# Patient Record
Sex: FEMALE | Race: WHITE | NOT HISPANIC OR LATINO | Employment: OTHER | URBAN - METROPOLITAN AREA
[De-identification: names, ages, dates, MRNs, and addresses within clinical notes are randomized per-mention and may not be internally consistent; named-entity substitution may affect disease eponyms.]

---

## 2017-02-01 ENCOUNTER — TELEPHONE (OUTPATIENT)
Dept: OTHER | Facility: CLINIC | Age: 57
End: 2017-02-01

## 2017-02-01 NOTE — TELEPHONE ENCOUNTER
2/1/2017    Call Regarding Onboarding [employer] Other    Attempt 1    Message on voicemail     Comments: No Dep      Outreach   cnt

## 2017-03-30 NOTE — TELEPHONE ENCOUNTER
3/30/2017    Call Regarding Onboarding [employer]    Attempt 2    Message VM FULL    Comments:       Outreach   KV

## 2017-04-04 ENCOUNTER — OFFICE VISIT (OUTPATIENT)
Dept: FAMILY MEDICINE | Facility: CLINIC | Age: 57
End: 2017-04-04
Payer: COMMERCIAL

## 2017-04-04 VITALS
DIASTOLIC BLOOD PRESSURE: 74 MMHG | OXYGEN SATURATION: 95 % | TEMPERATURE: 97.4 F | WEIGHT: 175.5 LBS | HEART RATE: 102 BPM | BODY MASS INDEX: 30.84 KG/M2 | SYSTOLIC BLOOD PRESSURE: 110 MMHG

## 2017-04-04 DIAGNOSIS — J44.9 CHRONIC OBSTRUCTIVE PULMONARY DISEASE, UNSPECIFIED COPD TYPE (H): Chronic | ICD-10-CM

## 2017-04-04 DIAGNOSIS — M79.645 PAIN OF FINGER OF LEFT HAND: ICD-10-CM

## 2017-04-04 DIAGNOSIS — J06.9 VIRAL URI WITH COUGH: Primary | ICD-10-CM

## 2017-04-04 LAB
DEPRECATED S PYO AG THROAT QL EIA: NORMAL
FLUAV+FLUBV AG SPEC QL: NEGATIVE
FLUAV+FLUBV AG SPEC QL: NORMAL
MICRO REPORT STATUS: NORMAL
SPECIMEN SOURCE: NORMAL
SPECIMEN SOURCE: NORMAL

## 2017-04-04 PROCEDURE — 99214 OFFICE O/P EST MOD 30 MIN: CPT | Performed by: INTERNAL MEDICINE

## 2017-04-04 PROCEDURE — 87804 INFLUENZA ASSAY W/OPTIC: CPT | Performed by: INTERNAL MEDICINE

## 2017-04-04 PROCEDURE — 87081 CULTURE SCREEN ONLY: CPT | Performed by: INTERNAL MEDICINE

## 2017-04-04 PROCEDURE — 87880 STREP A ASSAY W/OPTIC: CPT | Performed by: INTERNAL MEDICINE

## 2017-04-04 NOTE — PATIENT INSTRUCTIONS
- Schedule an appointment with the hand therapist.     - You can splint your finger as needed.     -  I recommend over the counter nasal rinses as needed.     - If symptoms worsen please contact me.      [facility]    If you have any questions regarding to your visit please contact your care team:     Team Pink:   Clinic Hours Telephone Number   Internal Medicine:  Dr. Claudine Sevilla NP       7am-7pm  Monday - Thursday   7am-5pm  Fridays  [phone]  (Appointment scheduling available 24/7)    Questions about your visit?  Team Line  [phone]   [facility] [place] [facility] 11am-[facility]pm Monday-Friday Saturday-Sunday- 9am-5pm   [place] - 5pm-9pm Monday-Friday Saturday-Sunday- 9am-5pm  [phone] - [place]   [phone] - [place]       What options do I have for visits at the clinic other than the traditional office visit?  To expand how we care for you, many of our providers are utilizing electronic visits (e-visits) and telephone visits, when medically appropriate, for interactions with their patients rather than a visit in the clinic.   We also offer nurse visits for many medical concerns. Just like any other service, we will bill your insurance company for this type of visit based on time spent on the phone with your provider. Not all insurance companies cover these visits. Please check with your medical insurance if this type of visit is covered. You will be responsible for any charges that are not paid by your insurance.      E-visits via [employer]:  generally incur a $35.00 fee.  Telephone visits:  Time spent on the phone: *charged based on time that is spent on the phone in increments of 10 minutes. Estimated cost:   5-10 mins $30.00   11-20 mins. $59.00   21-30 mins. $85.00   Use [employer] (secure email communication and access to your chart) to send your primary care provider a message or make an appointment. Ask someone on  your Team how to sign up for [email or web address].    For a Price Quote for your services, please call our Consumer Price Line at [phone].    As always, Thank you for trusting us with your health care needs!    Discharged by Heena SAHU CMA ([facility])

## 2017-04-04 NOTE — PROGRESS NOTES
INTERNAL MEDICINE   SUBJECTIVE:                                                    Ashleigh Alonzo is a 56 year old female who presents to clinic today for the following health issues:        ENT Symptoms             Symptoms: cc Present Absent Comment   Fever/Chills  x     Fatigue   x    Muscle Aches  x     Eye Irritation   x    Sneezing   x    Nasal Adi/Drg   x    Sinus Pressure/Pain   x    Loss of smell   x    Dental pain   x    Sore Throat  x     Swollen Glands   x    Ear Pain/Fullness   x    Cough  x     Wheeze   x    Chest Pain   x    Shortness of breath  x  Laying down mostly   Rash   x    Other   x      Symptom duration:  x2 days   Symptom severity:  moderate   Treatments tried:  none   Contacts:  [facility]   She started to get a sore throat yesterday. She denies any fever, but she does have body aches and chills. She notes nasal congestion. When she lays down she will have SOB. She adds that there is also a constant cough. Her symptoms are improved with ice water. She had one episode of nausea one day, but she did not vomit. She feels that her symptoms have worsened sice thy started. She was in contact with a lot of young children. Some of them were diagnosed with strep. She has a past medical history of COPD and sleep apnea, she takes Spiriva COPD and also has an inhaler. Her COPD is well controlled. Her sleep apnea is also well controlled.     Left Finger Pain: The third digit of the left hand has been very sore, this has been giving her concern for arthritis. This has been ongoing for th last couple of weeks. Her sister was diagnosed with rheumatoid artritis. She has full ROM of the finger, but it hurts to move it. The pain is localized to this area only, no symptoms are experienced in the right hand.     Problem list and histories reviewed & adjusted, as indicated.  Additional history: as documented    Patient Active Problem List   Diagnosis     Anxiety     Anisocoria     Hyperlipidemia LDL goal <130      COPD (chronic obstructive pulmonary disease)     Lack of libido     Family history of ischemic heart disease     Mild major depression (H)     Hives     Lung nodule, multiple     MERLIN (obstructive sleep apnea)- moderate (AHI 21)     Urticaria     Chronic obstructive pulmonary disease, unspecified COPD type (H)     Depression, unspecified depression type     Past Surgical History:   Procedure Laterality Date     APPENDECTOMY  10/6/2015     CATARACT IOL, RT/LT  11/2016    bilateral      COLONOSCOPY  11/15/2011    Procedure:COLONOSCOPY; Colonoscopy, screening; Surgeon:ANASTASIA BUNCH; Location:[facility] OR     TUBAL LIGATION  12/2004    Bilateral       Social History   Substance Use Topics     Smoking status: Former Smoker     Packs/day: 1.00     Years: 15.00     Types: Cigarettes     Quit date: 1/1/2000     Smokeless tobacco: Never Used     Alcohol use 8.4 - 12.6 oz/week      Comment: daily     Family History   Problem Relation Age of Onset     Cardiovascular Father 46     MI     Eye Disorder Father      CEREBROVASCULAR DISEASE Father      Arthritis Sister      Neurologic Disorder Brother      CANCER No family hx of      DIABETES No family hx of      Hypertension No family hx of          Labs reviewed in EPIC    Reviewed and updated as needed this visit by clinical staff  Tobacco  Allergies  Meds  Problems  Med Hx  Surg Hx  Fam Hx  Soc Hx        Reviewed and updated as needed this visit by Provider       ROS:  C: NEGATIVE for fever, chills, change in weight. POSITIVE for body aches.   E/M: NEGATIVE for ear, mouth. POSITIVE for sore throat  R: POSITIVE for significant cough or SOB  GI: NEGATIVE for nausea, abdominal pain, heartburn, or change in bowel habits  M: POSITIVE for pain in 3rd digit of left hand.   All other systems reviewed and were negative.      This document serves as a record of the services and decisions personally performed and made by Claudine Chinchilla MD. It was created on his/her behalf by Dionna  Nilson, a trained medical scribe. The creation of this document is based the provider's statements to the medical scribe.    Kaila Dionna Nilson 11:01 AM, April 4, 2017    OBJECTIVE:                                                    /74 (BP Location: Right arm, Patient Position: Chair, Cuff Size: Adult Regular)  Pulse 102  Temp 97.4  F (36.3  C) (Oral)  Wt 79.6 kg (175 lb 8 oz)  SpO2 95%  BMI 30.84 kg/m2  Body mass index is 30.84 kg/(m^2).  GENERAL: healthy, alert and no distress  HENT: ear canals and TM's normal, nose and mouth without ulcers or lesions  NECK: no adenopathy, no asymmetry, masses, or scars and thyroid normal to palpation. Tenderness to palpation of the left anterior cervical lymph nodes  RESP: lungs clear to auscultation - no rales, rhonchi or wheezes  CV: regular rate and rhythm, normal S1 S2, no S3 or S4, no murmur, click or rub, no peripheral edema and peripheral pulses strong  MS: no gross musculoskeletal defects noted, no edema, negative squeeze test on right hand, no active synovitis, no pain to palpation over the third digit of the left hand PIP joint.   PSYCH: mentation appears normal, affect normal/bright    Diagnostic Test Results:  Results for orders placed or performed in visit on 04/04/17 (from the past 24 hour(s))   Strep, Rapid Screen   Result Value Ref Range    Specimen Description Throat     Rapid Strep A Screen       NEGATIVE: No Group A streptococcal antigen detected by immunoassay, await   culture report.      Micro Report Status FINAL 04/04/2017    Influenza A/B antigen   Result Value Ref Range    Influenza A/B Agn Specimen Nasopharyngeal     Influenza A Negative NEG    Influenza B  NEG     Negative   Test results must be correlated with clinical data. If necessary, results   should be confirmed by a molecular assay or viral culture.          ASSESSMENT/PLAN:                                                    I spent 17 minutes of time with the patient and >50%  of it was in education and counseling regarding preventive healthcare.     1. Throat pain  Likely viral  - Strep, Rapid Screen  - Beta strep group A culture    2. Chronic obstructive pulmonary disease, unspecified COPD type (H)  No evidence of a significant exacerbation.  Low threshold for starting antibiotics and prednisone is she worsens      3. Pain of finger of left hand  Suspect trigger finger/oa.   Patient will schedule appointment   - [facility] PT, HAND, AND CHIROPRACTIC REFERRAL    4. Cough    - Influenza A/B antigen    5. Chills (without fever)    - Influenza A/B antigen    6. Viral URI with cough  Over the counter therapies.        Patient Instructions   - Schedule an appointment with the hand therapist.   - You can splint your finger as needed.   -  I recommend over the counter nasal rinses as needed.   - If symptoms worsen please contact me.        The information in this document, created by the medical scribe for me, accurately reflects the services I personally performed and the decisions made by me. I have reviewed and approved this document for accuracy prior to leaving the patient care area.  Claudine Chinchilla MD  11:01 AM, 04/04/17    Claudine Chinchilla MD  [facility]    Start: 10:58 AM   Out: 11:14 AM   In: 11:50 AM   End: 11:51 AM

## 2017-04-04 NOTE — NURSING NOTE
"Chief Complaint   Patient presents with     Pharyngitis       Initial /74 (BP Location: Right arm, Patient Position: Chair, Cuff Size: Adult Regular)  Pulse 102  Temp 97.4  F (36.3  C) (Oral)  Wt 175 lb 8 oz (79.6 kg)  SpO2 95%  BMI 30.84 kg/m2 Estimated body mass index is 30.84 kg/(m^2) as calculated from the following:    Height as of 11/15/16: 5' 3.25\" (1.607 m).    Weight as of this encounter: 175 lb 8 oz (79.6 kg).  Medication Reconciliation: complete   Heena SAHU CMA ([facility])      "

## 2017-04-04 NOTE — MR AVS SNAPSHOT
After Visit Summary   4/4/2017    Ashleigh Alonzo    MRN: [id number]           Patient Information     Date Of Birth          1960        Visit Information        Provider Department      4/4/2017 10:45 AM Claudine Chinchilla MD [facility]        Today's Diagnoses     Viral URI with cough    -  1    Throat pain        Chronic obstructive pulmonary disease, unspecified COPD type (H)        Pain of finger of left hand        Cough        Chills (without fever)          Care Instructions    - Schedule an appointment with the hand therapist.     - You can splint your finger as needed.     -  I recommend over the counter nasal rinses as needed.     - If symptoms worsen please contact me.      [facility]    If you have any questions regarding to your visit please contact your care team:     Team Pink:   Clinic Hours Telephone Number   Internal Medicine:  Dr. Claudine Sevilla, NP       7am-7pm  Monday - Thursday   7am-5pm  Fridays  [phone]  (Appointment scheduling available 24/7)    Questions about your visit?  Team Line  [phone]   Urgent Care - [place] - 11am-9pm Monday-Friday Saturday-Sunday- 9am-5pm   [place] - 5pm-9pm Monday-Friday Saturday-Sunday- 9am-5pm  [phone] - [place]   [phone] [facility]       What options do I have for visits at the clinic other than the traditional office visit?  To expand how we care for you, many of our providers are utilizing electronic visits (e-visits) and telephone visits, when medically appropriate, for interactions with their patients rather than a visit in the clinic.   We also offer nurse visits for many medical concerns. Just like any other service, we will bill your insurance company for this type of visit based on time spent on the phone with your provider. Not all insurance companies cover these visits. Please check with your medical insurance if  this type of visit is covered. You will be responsible for any charges that are not paid by your insurance.      E-visits via [employer]:  generally incur a $35.00 fee.  Telephone visits:  Time spent on the phone: *charged based on time that is spent on the phone in increments of 10 minutes. Estimated cost:   5-10 mins $30.00   11-20 mins. $59.00   21-30 mins. $85.00   Use [employer] (secure email communication and access to your chart) to send your primary care provider a message or make an appointment. Ask someone on your Team how to sign up for [employer].    For a Price Quote for your services, please call our Consumer Price Line at [phone].    As always, Thank you for trusting us with your health care needs!    Discharged by Heena SAHU CMA ([facility])          Follow-ups after your visit        Additional Services     [facility] PT, HAND, AND CHIROPRACTIC REFERRAL       **This order will print in the [facility] Scheduling Office**    Physical Therapy, Hand Therapy and Chiropractic Care are available through:    *[facility]  *[facility]  *[facility]    Call one number to schedule at any of the above locations: [phone].    Your provider has referred you to: Hand Therapy    Indication/Reason for Referral: Hand Pain  Onset of Illness: 2 weeks  Therapy Orders: Evaluate and Treat  Special Programs:   Special Request:     Ankit Pham      Additional Comments for the Therapist or Chiropractor:     Please be aware that coverage of these services is subject to the terms and limitations of your health insurance plan.  Call member services at your health plan with any benefit or coverage questions.      Please bring the following to your appointment:    *Your personal calendar for scheduling future appointments  *Comfortable clothing                  Your next 10 appointments already scheduled     Aug 22, 2017  2:30 PM CDT   CT CHEST LOW DOSE NON CONTRAST with MGCT1   [facility]  [facility] ([facility])    [address] [place] [address] [phone]           Please bring any scans or X-rays taken at other hospitals, if similar tests were done. Also bring a list of your medicines, including vitamins, minerals and over-the-counter drugs. It is safest to leave personal items at home.  Be sure to tell your doctor:   If you have any allergies.   If there s any chance you are pregnant.   If you are breastfeeding.   If you have any special needs.  You do not need to do anything special to prepare.  Please wear loose clothing, such as a sweat suit or jogging clothes. Avoid snaps, zippers and other metal. We may ask you to undress and put on a hospital gown.            Aug 22, 2017  3:00 PM CDT   Return Visit with Zonia Singh MD   [facility] ([facility])    [address] [place] [address] [phone]              Who to contact     If you have questions or need follow up information about today's clinic visit or your schedule please contact [facility] directly at [phone].  Normal or non-critical lab and imaging results will be communicated to you by [employer], letter or phone within 4 business days after the clinic has received the results. If you do not hear from us within 7 days, please contact the clinic through [employer] or phone. If you have a critical or abnormal lab result, we will notify you by phone as soon as possible.  Submit refill requests through [employer] or call your pharmacy and they will forward the refill request to us. Please allow 3 business days for your refill to be completed.          Additional Information About Your Visit        [employer] Information     [employer] gives you secure access to your electronic health record. If you see a primary care provider, you can also send messages to your care team and make appointments. If you have questions, please call  your primary care clinic.  If you do not have a primary care provider, please call [phone] and they will assist you.        Care EveryWhere ID     This is your Care EveryWhere ID. This could be used by other organizations to access your [facility] medical records  [id number]        Your Vitals Were     Pulse Temperature Pulse Oximetry BMI (Body Mass Index)          102 97.4  F (36.3  C) (Oral) 95% 30.84 kg/m2         Blood Pressure from Last 3 Encounters:   04/04/17 110/74   11/15/16 129/80   11/11/16 120/80    Weight from Last 3 Encounters:   04/04/17 175 lb 8 oz (79.6 kg)   11/15/16 176 lb 9.6 oz (80.1 kg)   11/11/16 175 lb (79.4 kg)              We Performed the Following     Beta strep group A culture     [facility] PT, HAND, AND CHIROPRACTIC REFERRAL     Influenza A/B antigen     Strep, Rapid Screen        Primary Care Provider Office Phone # Fax #    Radha Cazares [phone]       [address] [address]        Thank you!     Thank you for choosing [facility]  for your care. Our goal is always to provide you with excellent care. Hearing back from our patients is one way we can continue to improve our services. Please take a few minutes to complete the written survey that you may receive in the mail after your visit with us. Thank you!             Your Updated Medication List - Protect others around you: Learn how to safely use, store and throw away your medicines at www.disposemymeds.org.          This list is accurate as of: 4/4/17 11:52 AM.  Always use your most recent med list.                   Brand Name Dispense Instructions for use    albuterol 108 (90 BASE) MCG/ACT Inhaler    PROAIR HFA/PROVENTIL HFA/VENTOLIN HFA    1 Inhaler    Inhale 2 puffs into the lungs every 6 hours as needed for shortness of breath / dyspnea       aspirin 81 MG tablet      Take 1 tablet by mouth daily.       citalopram 10 MG tablet    celeXA     90 tablet    Take 1 tablet (10 mg) by mouth daily       CO Q 10 PO          guaiFENesin 600 MG 12 hr tablet    MUCINEX    180 tablet    Take 2 tablets (1,200 mg) by mouth 2 times daily       hydrOXYzine 25 MG tablet    ATARAX    100 tablet    Take 1-2 tablets (25-50 mg) by mouth every 6 hours as needed for itching       MULTIPLE VITAMIN PO          order for [facility]      Respironics Dream Station Auto CPAP 12-18 cm, F&P Simplus FFM small.       simvastatin 40 MG tablet    ZOCOR    90 tablet    Take 1 tablet (40 mg) by mouth At Bedtime       tiotropium 18 MCG capsule    SPIRIVA    1 capsule    Inhale 1 capsule (18 mcg) into the lungs daily Inhale contents of one capsule       VITAMIN B 12 PO      Take 500 mcg by mouth daily       VITAMIN B6 PO      Take 1 tablet by mouth daily.       vitamin D 1000 UNITS capsule      2 CAPSULE DAILY

## 2017-04-06 LAB
BACTERIA SPEC CULT: NORMAL
MICRO REPORT STATUS: NORMAL
SPECIMEN SOURCE: NORMAL

## 2017-04-06 NOTE — PROGRESS NOTES
Dear Ashleigh Perera,    Your recent test results are attached.      No strep on culture.      If you have any questions please feel free to contact [phone] or myself via [employer].    Sincerely,  Rebecca Sevilla, CNP

## 2017-04-06 NOTE — TELEPHONE ENCOUNTER
4/6/2017    Call Regarding Onboarding [employer]    Attempt 3    Message VM FULL    Comments: 0 Dep      Outreach   KV

## 2017-07-07 NOTE — PROGRESS NOTES
SUBJECTIVE:   CC: Ashleigh Alonzo is an 57 year old woman who presents for preventive health visit.     Healthy Habits:    Do you get at least three servings of calcium containing foods daily (dairy, green leafy vegetables, etc.)? yes    Amount of exercise or daily activities, outside of work: 3 day(s) per week    Problems taking medications regularly No    Medication side effects: No    Have you had an eye exam in the past two years? yes    Do you see a dentist twice per year? yes    Do you have sleep apnea, excessive snoring or daytime drowsiness?yes               Today's PHQ-2 Score:   PHQ-2 ( 1999 [employer]) 7/27/2017 11/11/2016   Q1: Little interest or pleasure in doing things 0 0   Q2: Feeling down, depressed or hopeless 0 0   PHQ-2 Score 0 0         Abuse: Current or Past(Physical, Sexual or Emotional)- No  Do you feel safe in your environment - Yes  Social History   Substance Use Topics     Smoking status: Former Smoker     Packs/day: 1.00     Years: 15.00     Types: Cigarettes     Quit date: 1/1/2000     Smokeless tobacco: Never Used     Alcohol use 8.4 - 12.6 oz/week      Comment: daily     The patient does not drink >3 drinks per day nor >7 drinks per week.    Reviewed orders with patient.  Reviewed health maintenance and updated orders accordingly - Yes  Labs reviewed in EPIC  BP Readings from Last 3 Encounters:   07/27/17 128/82   04/04/17 110/74   11/15/16 129/80    Wt Readings from Last 3 Encounters:   07/27/17 173 lb (78.5 kg)   04/04/17 175 lb 8 oz (79.6 kg)   11/15/16 176 lb 9.6 oz (80.1 kg)                  Patient Active Problem List   Diagnosis     Anxiety     Anisocoria     Hyperlipidemia LDL goal <130     COPD (chronic obstructive pulmonary disease)     Lack of libido     Family history of ischemic heart disease     Mild major depression (H)     Hives     Lung nodule, multiple     MERLIN (obstructive sleep apnea)- moderate (AHI 21)     Urticaria     Chronic obstructive pulmonary disease,  unspecified COPD type (H)     Depression, unspecified depression type     Past Surgical History:   Procedure Laterality Date     APPENDECTOMY  10/6/2015     CATARACT IOL, RT/LT  11/2016    bilateral      COLONOSCOPY  11/15/2011    Procedure:COLONOSCOPY; Colonoscopy, screening; Surgeon:ANASTASIA BUNCH; Location:[facility] OR     TUBAL LIGATION  12/2004    Bilateral       Social History   Substance Use Topics     Smoking status: Former Smoker     Packs/day: 1.00     Years: 15.00     Types: Cigarettes     Quit date: 1/1/2000     Smokeless tobacco: Never Used     Alcohol use 8.4 - 12.6 oz/week      Comment: daily     Family History   Problem Relation Age of Onset     Cardiovascular Father 46     MI     Eye Disorder Father      CEREBROVASCULAR DISEASE Father      Arthritis Sister      Neurologic Disorder Brother      CANCER No family hx of      DIABETES No family hx of      Hypertension No family hx of          Current Outpatient Prescriptions   Medication Sig Dispense Refill     hydrOXYzine (ATARAX) 25 MG tablet Take 1-2 tablets (25-50 mg) by mouth every 6 hours as needed for itching 100 tablet 2     guaiFENesin (MUCINEX) 600 MG 12 hr tablet Take 2 tablets (1,200 mg) by mouth 2 times daily 180 tablet 6     simvastatin (ZOCOR) 40 MG tablet Take 1 tablet (40 mg) by mouth At Bedtime 90 tablet 4     tiotropium (SPIRIVA) 18 MCG capsule Inhale 1 capsule (18 mcg) into the lungs daily Inhale contents of one capsule 1 capsule 11     albuterol (PROAIR HFA/PROVENTIL HFA/VENTOLIN HFA) 108 (90 BASE) MCG/ACT Inhaler Inhale 2 puffs into the lungs every 6 hours as needed for shortness of breath / dyspnea 1 Inhaler 5     citalopram (CELEXA) 10 MG tablet Take 1 tablet (10 mg) by mouth daily 90 tablet 3     darifenacin (ENABLEX) 7.5 MG 24 hr tablet Take 1 tablet (7.5 mg) by mouth daily 90 tablet 3     order for [facility] Respironics [place] Auto CPAP 12-18 cm, F&P Simplus FFM small.       Coenzyme Q10 (CO Q 10 PO)        MULTIPLE VITAMIN PO         Cyanocobalamin (VITAMIN B 12 PO) Take 500 mcg by mouth daily       Pyridoxine HCl (VITAMIN B6 PO) Take 1 tablet by mouth daily.       aspirin 81 MG tablet Take 1 tablet by mouth daily.  3     VITAMIN D 1000 UNIT OR CAPS 2 CAPSULE DAILY       [DISCONTINUED] simvastatin (ZOCOR) 40 MG tablet Take 1 tablet (40 mg) by mouth At Bedtime 90 tablet 4     [DISCONTINUED] tiotropium (SPIRIVA) 18 MCG inhalation capsule Inhale 1 capsule (18 mcg) into the lungs daily Inhale contents of one capsule 1 capsule 11     [DISCONTINUED] albuterol (PROAIR HFA, PROVENTIL HFA, VENTOLIN HFA) 108 (90 BASE) MCG/ACT inhaler Inhale 2 puffs into the lungs every 6 hours as needed for shortness of breath / dyspnea 1 Inhaler 5     [DISCONTINUED] citalopram (CELEXA) 10 MG tablet Take 1 tablet (10 mg) by mouth daily 90 tablet 3           Patient over age 50, mutual decision to screen reflected in health maintenance.      Pertinent mammograms are reviewed under the imaging tab.  History of abnormal Pap smear: NO - age 30-65 PAP every 5 years with negative HPV co-testing recommended    Reviewed and updated as needed this visit by clinical staff  Tobacco  Allergies  Meds  Med Hx  Surg Hx  Fam Hx  Soc Hx        Reviewed and updated as needed this visit by Provider  Fam Hx            Immunization History   Administered Date(s) Administered     HepB-Peds 07/10/2008, 12/05/2008     Hepatitis A Vac Ped/Adol-2 Dose 07/10/2008, 12/15/2008     Influenza (IIV3) 12/21/2010, 10/20/2011, 10/20/2012, 09/09/2014, 10/20/2016     Pneumococcal 23 valent 12/21/2010     TD (ADULT, 7+) 05/13/2004     TDAP Vaccine (Adacel) 06/22/2011      ROS:  This 57 year old female is here today for annual female exam. She sees pulmonologist for her COPD and uses her inhalers faithfully. She also uses her C-pap machine faithfully. She has 2 children and 3 grandchildren.   C: NEGATIVE for fever, chills, change in weight  I: NEGATIVE for worrisome rashes, moles or lesions  E:  "NEGATIVE for vision changes or irritation  ENT: NEGATIVE for ear, mouth and throat problems  R: NEGATIVE for significant cough or SOB  B: NEGATIVE for masses, tenderness or discharge  CV: NEGATIVE for chest pain, palpitations or peripheral edema  GI: NEGATIVE for nausea, abdominal pain, heartburn, or change in bowel habits  : NEGATIVE for unusual urinary or vaginal symptoms. No vaginal bleeding. But she has an overactive bladder. Used to take a medication for it about 10 years ago and then stopped. She would like to try a medication again.   M: NEGATIVE for significant arthralgias or myalgia  N: NEGATIVE for weakness, dizziness or paresthesias  P: NEGATIVE for changes in mood or affect     OBJECTIVE:   /82  Pulse 96  Temp 97.6  F (36.4  C) (Oral)  Ht 5' 3\" (1.6 m)  Wt 173 lb (78.5 kg)  SpO2 94%  BMI 30.65 kg/m2  EXAM:  GENERAL APPEARANCE: healthy, alert and no distress  EYES: Eyes grossly normal to inspection, PERRL and conjunctivae and sclerae normal  HENT: ear canals and TM's normal, nose and mouth without ulcers or lesions, oropharynx clear and oral mucous membranes moist  NECK: no adenopathy, no asymmetry, masses, or scars and thyroid normal to palpation  RESP: lungs clear to auscultation - no rales, rhonchi or wheezes  BREAST: normal without masses, tenderness or nipple discharge and no palpable axillary masses or adenopathy  CV: regular rate and rhythm, normal S1 S2, no S3 or S4, no murmur, click or rub, no peripheral edema and peripheral pulses strong  ABDOMEN: soft, nontender, no hepatosplenomegaly, no masses and bowel sounds normal   (female): normal female external genitalia, normal urethral meatus, vaginal mucosal atrophy noted, normal cervix, adnexae, and uterus without masses or abnormal discharge  MS: no musculoskeletal defects are noted and gait is age appropriate without ataxia  SKIN: no suspicious lesions or rashes  NEURO: Normal strength and tone, sensory exam grossly normal, " mentation intact and speech normal  PSYCH: mentation appears normal and affect normal/bright    ASSESSMENT/PLAN:   1. Encounter for routine adult health examination without abnormal findings  Healthy lady     2. Depression, unspecified depression type  PHQ-9 score:    PHQ-9 SCORE 6/8/2015   Total Score 0     KEVIN = 0  - citalopram (CELEXA) 10 MG tablet; Take 1 tablet (10 mg) by mouth daily  Dispense: 90 tablet; Refill: 3    3. MERLIN (obstructive sleep apnea)- moderate (AHI 21)  As above, good control     4. Chronic obstructive pulmonary disease, unspecified COPD type (H)  As above, good control   - tiotropium (SPIRIVA) 18 MCG capsule; Inhale 1 capsule (18 mcg) into the lungs daily Inhale contents of one capsule  Dispense: 1 capsule; Refill: 11  - albuterol (PROAIR HFA/PROVENTIL HFA/VENTOLIN HFA) 108 (90 BASE) MCG/ACT Inhaler; Inhale 2 puffs into the lungs every 6 hours as needed for shortness of breath / dyspnea  Dispense: 1 Inhaler; Refill: 5    5. Overactive bladder  As above   - darifenacin (ENABLEX) 7.5 MG 24 hr tablet; Take 1 tablet (7.5 mg) by mouth daily  Dispense: 90 tablet; Refill: 3    6. Cough with sputum  Good control   - guaiFENesin (MUCINEX) 600 MG 12 hr tablet; Take 2 tablets (1,200 mg) by mouth 2 times daily  Dispense: 180 tablet; Refill: 6    7. Hyperlipidemia LDL goal <130  Good control   - simvastatin (ZOCOR) 40 MG tablet; Take 1 tablet (40 mg) by mouth At Bedtime  Dispense: 90 tablet; Refill: 4  - Lipid panel reflex to direct LDL    8. Anxiety  KEVIN = 0  - citalopram (CELEXA) 10 MG tablet; Take 1 tablet (10 mg) by mouth daily  Dispense: 90 tablet; Refill: 3    9. Hives  Good control   - hydrOXYzine (ATARAX) 25 MG tablet; Take 1-2 tablets (25-50 mg) by mouth every 6 hours as needed for itching  Dispense: 100 tablet; Refill: 2    10. Screening for malignant neoplasm of cervix  due  - Pap imaged thin layer screen with HPV - recommended age 30 - 65 years (select HPV order below)  - HPV High Risk Types  "DNA Cervical    COUNSELING:   Reviewed preventive health counseling, as reflected in patient instructions       Regular exercise       Healthy diet/nutrition         reports that she quit smoking about 17 years ago. Her smoking use included Cigarettes. She has a 15.00 pack-year smoking history. She has never used smokeless tobacco.    Estimated body mass index is 30.65 kg/(m^2) as calculated from the following:    Height as of this encounter: 5' 3\" (1.6 m).    Weight as of this encounter: 173 lb (78.5 kg).   Weight management plan: Discussed healthy diet and exercise guidelines and patient will follow up in 12 months in clinic to re-evaluate.    Counseling Resources:  ATP IV Guidelines  Pooled Cohorts Equation Calculator  Breast Cancer Risk Calculator  FRAX Risk Assessment  ICSI Preventive Guidelines  Dietary Guidelines for Americans, 2010  USDA's MyPlate  ASA Prophylaxis  Lung CA Screening    HARLEY LEDBETTER MD  [facility]  "

## 2017-07-07 NOTE — PATIENT INSTRUCTIONS
Preventive Health Recommendations  Female Ages 50 - 64    Yearly exam: See your health care provider every year in order to  o Review health changes.   o Discuss preventive care.    o Review your medicines if your doctor has prescribed any.      Get a Pap test every three years (unless you have an abnormal result and your provider advises testing more often).    If you get Pap tests with HPV test, you only need to test every 5 years, unless you have an abnormal result.     You do not need a Pap test if your uterus was removed (hysterectomy) and you have not had cancer.    You should be tested each year for STDs (sexually transmitted diseases) if you're at risk.     Have a mammogram every 1 to 2 years.    Have a colonoscopy at age 50, or have a yearly FIT test (stool test). These exams screen for colon cancer.      Have a cholesterol test every 5 years, or more often if advised.    Have a diabetes test (fasting glucose) every three years. If you are at risk for diabetes, you should have this test more often.     If you are at risk for osteoporosis (brittle bone disease), think about having a bone density scan (DEXA).    Shots: Get a flu shot each year. Get a tetanus shot every 10 years.    Nutrition:     Eat at least 5 servings of fruits and vegetables each day.    Eat whole-grain bread, whole-wheat pasta and brown rice instead of white grains and rice.    Talk to your provider about Calcium and Vitamin D.     Lifestyle    Exercise at least 150 minutes a week (30 minutes a day, 5 days a week). This will help you control your weight and prevent disease.    Limit alcohol to one drink per day.    No smoking.     Wear sunscreen to prevent skin cancer.     See your dentist every six months for an exam and cleaning.    See your eye doctor every 1 to 2 years.    [facility]    If you have any questions regarding to your visit please contact your care team:       Team Purple:   Clinic Hours Telephone Number    Dr. Radha Bro     7am-7pm  Monday - Thursday   7am-5pm  Fridays  [phone]  (Appointment scheduling available 24/7)    Questions about your Visit?   Team Line:  [phone]   [facility] - [place] [facility] - 11am-9pm Monday-Friday Saturday-Sunday- 9am-5pm   [place] - 5pm-9pm Monday-Friday Saturday-Sunday- 9am-5pm  [phone] - [place]   [phone] - [place]       What options do I have for visits at the clinic other than the traditional office visit?  To expand how we care for you, many of our providers are utilizing electronic visits (e-visits) and telephone visits, when medically appropriate, for interactions with their patients rather than a visit in the clinic.   We also offer nurse visits for many medical concerns. Just like any other service, we will bill your insurance company for this type of visit based on time spent on the phone with your provider. Not all insurance companies cover these visits. Please check with your medical insurance if this type of visit is covered. You will be responsible for any charges that are not paid by your insurance.      E-visits via [employer]:  generally incur a $35.00 fee.  Telephone visits:  Time spent on the phone: *charged based on time that is spent on the phone in increments of 10 minutes. Estimated cost:   5-10 mins $30.00   11-20 mins. $59.00   21-30 mins. $85.00     Use [employer] (secure email communication and access to your chart) to send your primary care provider a message or make an appointment. Ask someone on your Team how to sign up for [employer].  For a Price Quote for your services, please call our Consumer Price Line at [phone].  As always, Thank you for trusting us with your health care needs!

## 2017-07-27 ENCOUNTER — OFFICE VISIT (OUTPATIENT)
Dept: FAMILY MEDICINE | Facility: CLINIC | Age: 57
End: 2017-07-27
Payer: COMMERCIAL

## 2017-07-27 ENCOUNTER — RADIANT APPOINTMENT (OUTPATIENT)
Dept: MAMMOGRAPHY | Facility: CLINIC | Age: 57
End: 2017-07-27
Payer: COMMERCIAL

## 2017-07-27 VITALS
OXYGEN SATURATION: 94 % | HEART RATE: 96 BPM | TEMPERATURE: 97.6 F | HEIGHT: 63 IN | BODY MASS INDEX: 30.65 KG/M2 | SYSTOLIC BLOOD PRESSURE: 128 MMHG | DIASTOLIC BLOOD PRESSURE: 82 MMHG | WEIGHT: 173 LBS

## 2017-07-27 DIAGNOSIS — Z12.31 VISIT FOR SCREENING MAMMOGRAM: ICD-10-CM

## 2017-07-27 DIAGNOSIS — F32.A DEPRESSION, UNSPECIFIED DEPRESSION TYPE: ICD-10-CM

## 2017-07-27 DIAGNOSIS — E78.5 HYPERLIPIDEMIA LDL GOAL <130: ICD-10-CM

## 2017-07-27 DIAGNOSIS — R05.8 COUGH WITH SPUTUM: ICD-10-CM

## 2017-07-27 DIAGNOSIS — Z12.4 SCREENING FOR MALIGNANT NEOPLASM OF CERVIX: Primary | ICD-10-CM

## 2017-07-27 DIAGNOSIS — G47.33 OSA (OBSTRUCTIVE SLEEP APNEA): Chronic | ICD-10-CM

## 2017-07-27 DIAGNOSIS — Z00.00 ENCOUNTER FOR ROUTINE ADULT HEALTH EXAMINATION WITHOUT ABNORMAL FINDINGS: ICD-10-CM

## 2017-07-27 DIAGNOSIS — L50.9 HIVES: ICD-10-CM

## 2017-07-27 DIAGNOSIS — F41.9 ANXIETY: ICD-10-CM

## 2017-07-27 DIAGNOSIS — J44.9 CHRONIC OBSTRUCTIVE PULMONARY DISEASE, UNSPECIFIED COPD TYPE (H): ICD-10-CM

## 2017-07-27 DIAGNOSIS — N32.81 OVERACTIVE BLADDER: ICD-10-CM

## 2017-07-27 LAB
CHOLEST SERPL-MCNC: 198 MG/DL
HDLC SERPL-MCNC: 51 MG/DL
LDLC SERPL CALC-MCNC: 131 MG/DL
NONHDLC SERPL-MCNC: 147 MG/DL
TRIGL SERPL-MCNC: 78 MG/DL

## 2017-07-27 PROCEDURE — G0202 SCR MAMMO BI INCL CAD: HCPCS | Mod: TC

## 2017-07-27 PROCEDURE — 80061 LIPID PANEL: CPT | Performed by: FAMILY MEDICINE

## 2017-07-27 PROCEDURE — G0145 SCR C/V CYTO,THINLAYER,RESCR: HCPCS | Performed by: FAMILY MEDICINE

## 2017-07-27 PROCEDURE — 99396 PREV VISIT EST AGE 40-64: CPT | Performed by: FAMILY MEDICINE

## 2017-07-27 PROCEDURE — 36415 COLL VENOUS BLD VENIPUNCTURE: CPT | Performed by: FAMILY MEDICINE

## 2017-07-27 PROCEDURE — 87624 HPV HI-RISK TYP POOLED RSLT: CPT | Performed by: FAMILY MEDICINE

## 2017-07-27 RX ORDER — HYDROXYZINE HYDROCHLORIDE 25 MG/1
TABLET, FILM COATED ORAL
Qty: 100 TABLET | Refills: 2 | Status: SHIPPED | OUTPATIENT
Start: 2017-07-27 | End: 2018-05-16

## 2017-07-27 RX ORDER — TIOTROPIUM BROMIDE 18 UG/1
18 CAPSULE ORAL; RESPIRATORY (INHALATION) DAILY
Qty: 1 CAPSULE | Refills: 11 | Status: SHIPPED | OUTPATIENT
Start: 2017-07-27 | End: 2018-08-21

## 2017-07-27 RX ORDER — SIMVASTATIN 40 MG
40 TABLET ORAL AT BEDTIME
Qty: 90 TABLET | Refills: 4 | Status: ON HOLD | OUTPATIENT
Start: 2017-07-27 | End: 2017-11-15

## 2017-07-27 RX ORDER — DARIFENACIN 7.5 MG/1
7.5 TABLET, EXTENDED RELEASE ORAL DAILY
Qty: 90 TABLET | Refills: 3 | Status: SHIPPED | OUTPATIENT
Start: 2017-07-27 | End: 2018-07-31

## 2017-07-27 RX ORDER — CITALOPRAM HYDROBROMIDE 10 MG/1
10 TABLET ORAL DAILY
Qty: 90 TABLET | Refills: 3 | Status: SHIPPED | OUTPATIENT
Start: 2017-07-27 | End: 2018-07-31

## 2017-07-27 RX ORDER — ALBUTEROL SULFATE 90 UG/1
2 AEROSOL, METERED RESPIRATORY (INHALATION) EVERY 6 HOURS PRN
Qty: 1 INHALER | Refills: 5 | Status: SHIPPED | OUTPATIENT
Start: 2017-07-27 | End: 2018-08-21

## 2017-07-27 RX ORDER — GUAIFENESIN 600 MG/1
1200 TABLET, EXTENDED RELEASE ORAL 2 TIMES DAILY
Qty: 180 TABLET | Refills: 6 | Status: SHIPPED | OUTPATIENT
Start: 2017-07-27 | End: 2018-08-21

## 2017-07-27 ASSESSMENT — PAIN SCALES - GENERAL: PAINLEVEL: NO PAIN (1)

## 2017-07-27 NOTE — LETTER
My Depression Action Plan  Name: Ashleigh Alonzo   Date of Birth 1960  Date: 7/27/2017    My doctor: Radha Cazares   My clinic: [address]  [place] [address]  [phone]          GREEN    ZONE   Good Control    What it looks like:     Things are going generally well. You have normal up s and down s. You may even feel depressed from time to time, but bad moods usually last less than a day.   What you need to do:  1. Continue to care for yourself (see self care plan)  2. Check your depression survival kit and update it as needed  3. Follow your physician s recommendations including any medication.  4. Do not stop taking medication unless you consult with your physician first.           YELLOW         ZONE Getting Worse    What it looks like:     Depression is starting to interfere with your life.     It may be hard to get out of bed; you may be starting to isolate yourself from others.    Symptoms of depression are starting to last most all day and this has happened for several days.     You may have suicidal thoughts but they are not constant.   What you need to do:     1. Call your care team, your response to treatment will improve if you keep your care team informed of your progress. Yellow periods are signs an adjustment may need to be made.     2. Continue your self-care, even if you have to fake it!    3. Talk to someone in your support network    4. Open up your depression survival kit           RED    ZONE Medical Alert - Get Help    What it looks like:     Depression is seriously interfering with your life.     You may experience these or other symptoms: You can t get out of bed most days, can t work or engage in other necessary activities, you have trouble taking care of basic hygiene, or basic responsibilities, thoughts of suicide or death that will not go away, self-injurious behavior.     What you need to do:  1. Call your care team and  request a same-day appointment. If they are not available (weekends or after hours) call your local crisis line, emergency room or 911.      Electronically signed by: HARLEY LEDBETTER, July 27, 2017    Depression Self Care Plan / Survival Kit    Self-Care for Depression  Here s the deal. Your body and mind are really not as separate as most people think.  What you do and think affects how you feel and how you feel influences what you do and think. This means if you do things that people who feel good do, it will help you feel better.  Sometimes this is all it takes.  There is also a place for medication and therapy depending on how severe your depression is, so be sure to consult with your medical provider and/ or Behavioral Health Consultant if your symptoms are worsening or not improving.     In order to better manage my stress, I will:    Exercise  Get some form of exercise, every day. This will help reduce pain and release endorphins, the  feel good  chemicals in your brain. This is almost as good as taking antidepressants!  This is not the same as joining a gym and then never going! (they count on that by the way ) It can be as simple as just going for a walk or doing some gardening, anything that will get you moving.      Hygiene   Maintain good hygiene (Get out of bed in the morning, Make your bed, Brush your teeth, Take a shower, and Get dressed like you were going to work, even if you are unemployed).  If your clothes don't fit try to get ones that do.    Diet  I will strive to eat foods that are good for me, drink plenty of water, and avoid excessive sugar, caffeine, alcohol, and other mood-altering substances.  Some foods that are helpful in depression are: complex carbohydrates, B vitamins, flaxseed, fish or fish oil, fresh fruits and vegetables.    Psychotherapy  I agree to participate in Individual Therapy (if recommended).    Medication  If prescribed medications, I agree to take them.  Missing  doses can result in serious side effects.  I understand that drinking alcohol, or other illicit drug use, may cause potential side effects.  I will not stop my medication abruptly without first discussing it with my provider.    Staying Connected With Others  I will stay in touch with my friends, family members, and my primary care provider/team.    Use your imagination  Be creative.  We all have a creative side; it doesn t matter if it s oil painting, sand castles, or mud pies! This will also kick up the endorphins.    Witness Beauty  (AKA stop and smell the roses) Take a look outside, even in mid-winter. Notice colors, textures. Watch the squirrels and birds.     Service to others  Be of service to others.  There is always someone else in need.  By helping others we can  get out of ourselves  and remember the really important things.  This also provides opportunities for practicing all the other parts of the program.    Humor  Laugh and be silly!  Adjust your TV habits for less news and crime-drama and more comedy.    Control your stress  Try breathing deep, massage therapy, biofeedback, and meditation. Find time to relax each day.     My support system    Clinic Contact:  Phone number:    Contact 1:  Phone number:    Contact 2:  Phone number:    Yarsanism/:  Phone number:    Therapist:  Phone number:    Local crisis center:    Phone number:    Other community support:  Phone number:

## 2017-07-27 NOTE — LETTER
[facility]  [address] [place] [address]. [place] [address]    July 28, 2017    Ashleigh Perera [place]  [address] [place] [address]          Dear Ashleigh Perera,    Your lipid panel is very good. Continue your healthy lifestyle    Enclosed is a copy of your results.     Results for orders placed or performed in visit on 07/27/17   Lipid panel reflex to direct LDL   Result Value Ref Range    Cholesterol 198 <200 mg/dL    Triglycerides 78 <150 mg/dL    HDL Cholesterol 51 >49 mg/dL    LDL Cholesterol Calculated 131 (H) <100 mg/dL    Non HDL Cholesterol 147 (H) <130 mg/dL       If you have any questions or concerns, please call myself or my nurse at [phone].      Sincerely,        Radha Cazares MD/ZANDER

## 2017-07-27 NOTE — NURSING NOTE
"Chief Complaint   Patient presents with     Physical     Health Maintenance     PHQ-9, Pap       Initial /82  Pulse 96  Temp 97.6  F (36.4  C) (Oral)  Ht 5' 3\" (1.6 m)  Wt 173 lb (78.5 kg)  SpO2 94%  BMI 30.65 kg/m2 Estimated body mass index is 30.65 kg/(m^2) as calculated from the following:    Height as of this encounter: 5' 3\" (1.6 m).    Weight as of this encounter: 173 lb (78.5 kg).  Medication Reconciliation: complete     Arpita Gaines MA       "

## 2017-07-27 NOTE — MR AVS SNAPSHOT
After Visit Summary   7/27/2017    Ashleigh Alonzo    MRN: [id number]           Patient Information     Date Of Birth          1960        Visit Information        Provider Department      7/27/2017 1:45 PM Radha Cazares MD [facility]        Today's Diagnoses     Screening for malignant neoplasm of cervix    -  1    Encounter for routine adult health examination without abnormal findings        Depression, unspecified depression type        MERLIN (obstructive sleep apnea)- moderate (AHI 21)        Chronic obstructive pulmonary disease, unspecified COPD type (H)        Overactive bladder        Cough with sputum        Hyperlipidemia LDL goal <130        Anxiety        Hives          Care Instructions      Preventive Health Recommendations  Female Ages 50 - 64    Yearly exam: See your health care provider every year in order to  o Review health changes.   o Discuss preventive care.    o Review your medicines if your doctor has prescribed any.      Get a Pap test every three years (unless you have an abnormal result and your provider advises testing more often).    If you get Pap tests with HPV test, you only need to test every 5 years, unless you have an abnormal result.     You do not need a Pap test if your uterus was removed (hysterectomy) and you have not had cancer.    You should be tested each year for STDs (sexually transmitted diseases) if you're at risk.     Have a mammogram every 1 to 2 years.    Have a colonoscopy at age 50, or have a yearly FIT test (stool test). These exams screen for colon cancer.      Have a cholesterol test every 5 years, or more often if advised.    Have a diabetes test (fasting glucose) every three years. If you are at risk for diabetes, you should have this test more often.     If you are at risk for osteoporosis (brittle bone disease), think about having a bone density scan (DEXA).    Shots: Get a flu shot each year. Get a tetanus shot every  10 years.    Nutrition:     Eat at least 5 servings of fruits and vegetables each day.    Eat whole-grain bread, whole-wheat pasta and brown rice instead of white grains and rice.    Talk to your provider about Calcium and Vitamin D.     Lifestyle    Exercise at least 150 minutes a week (30 minutes a day, 5 days a week). This will help you control your weight and prevent disease.    Limit alcohol to one drink per day.    No smoking.     Wear sunscreen to prevent skin cancer.     See your dentist every six months for an exam and cleaning.    See your eye doctor every 1 to 2 years.    [facility]    If you have any questions regarding to your visit please contact your care team:       Team Purple:   Clinic Hours Telephone Number   Dr. Radha Bro     7am-7pm  Monday - Thursday   7am-5pm  Fridays  [phone]  (Appointment scheduling available 24/7)    Questions about your Visit?   Team Line:  [phone]   Urgent Care - [place] [facility] - 11am-9pm Monday-Friday Saturday-Sunday- 9am-5pm   [place] - 5pm-9pm Monday-Friday Saturday-Sunday- 9am-5pm  [phone] - [place]   [phone] [facility]       What options do I have for visits at the clinic other than the traditional office visit?  To expand how we care for you, many of our providers are utilizing electronic visits (e-visits) and telephone visits, when medically appropriate, for interactions with their patients rather than a visit in the clinic.   We also offer nurse visits for many medical concerns. Just like any other service, we will bill your insurance company for this type of visit based on time spent on the phone with your provider. Not all insurance companies cover these visits. Please check with your medical insurance if this type of visit is covered. You will be responsible for any charges that are not paid by your insurance.      E-visits via [employer]:  generally incur  a $35.00 fee.  Telephone visits:  Time spent on the phone: *charged based on time that is spent on the phone in increments of 10 minutes. Estimated cost:   5-10 mins $30.00   11-20 mins. $59.00   21-30 mins. $85.00     Use [employer] (secure email communication and access to your chart) to send your primary care provider a message or make an appointment. Ask someone on your Team how to sign up for [employer].  For a Price Quote for your services, please call our [employer] Line at [phone].  As always, Thank you for trusting us with your health care needs!                Follow-ups after your visit        Your next 10 appointments already scheduled     Sep 05, 2017  2:15 PM CDT   CT CHEST LOW DOSE NON CONTRAST with MGCT1   [facility]    [address] [place] [id number] [phone]           Please bring any scans or X-rays taken at other hospitals, if similar tests were done. Also bring a list of your medicines, including vitamins, minerals and over-the-counter drugs. It is safest to leave personal items at home.  Be sure to tell your doctor:   If you have any allergies.   If there s any chance you are pregnant.   If you are breastfeeding.   If you have any special needs.  You do not need to do anything special to prepare.  Please wear loose clothing, such as a sweat suit or jogging clothes. Avoid snaps, zippers and other metal. We may ask you to undress and put on a hospital gown.            Sep 05, 2017  2:30 PM CDT   Return Visit with Zonia Singh MD   [facility])    [address] [place] [address] [phone]              Who to contact     If you have questions or need follow up information about today's clinic visit or your schedule please contact [facility] directly at [phone].  Normal or non-critical lab and imaging results will be communicated to  "you by MyChart, letter or phone within 4 business days after the clinic has received the results. If you do not hear from us within 7 days, please contact the clinic through [employer] or phone. If you have a critical or abnormal lab result, we will notify you by phone as soon as possible.  Submit refill requests through [employer] or call your pharmacy and they will forward the refill request to us. Please allow 3 business days for your refill to be completed.          Additional Information About Your Visit        [employer] Information     [employer] gives you secure access to your electronic health record. If you see a primary care provider, you can also send messages to your care team and make appointments. If you have questions, please call your primary care clinic.  If you do not have a primary care provider, please call [phone] and they will assist you.        Care EveryWhere ID     This is your Care EveryWhere ID. This could be used by other organizations to access your [facility] medical records  [id number]        Your Vitals Were     Pulse Temperature Height Pulse Oximetry BMI (Body Mass Index)       96 97.6  F (36.4  C) (Oral) 5' 3\" (1.6 m) 94% 30.65 kg/m2        Blood Pressure from Last 3 Encounters:   07/27/17 128/82   04/04/17 110/74   11/15/16 129/80    Weight from Last 3 Encounters:   07/27/17 173 lb (78.5 kg)   04/04/17 175 lb 8 oz (79.6 kg)   11/15/16 176 lb 9.6 oz (80.1 kg)              We Performed the Following     HPV High Risk Types DNA Cervical     Lipid panel reflex to direct LDL     Pap imaged thin layer screen with HPV - recommended age 30 - 65 years (select HPV order below)          Today's Medication Changes          These changes are accurate as of: 7/27/17  2:10 PM.  If you have any questions, ask your nurse or doctor.               Start taking these medicines.        Dose/Directions    darifenacin 7.5 MG 24 hr tablet   Commonly known as:  ENABLEX   Used for:  Overactive bladder   Started " by:  Radha Cazares MD        Dose:  7.5 mg   Take 1 tablet (7.5 mg) by mouth daily   Quantity:  90 tablet   Refills:  3            Where to get your medicines      These medications were sent to [facility] - [place], [place] - [address] [facility]  [address], [place] [facility] [address]     Phone:  [phone]     albuterol 108 (90 BASE) MCG/ACT Inhaler    citalopram 10 MG tablet    darifenacin 7.5 MG 24 hr tablet    guaiFENesin 600 MG 12 hr tablet    hydrOXYzine 25 MG tablet    simvastatin 40 MG tablet    tiotropium 18 MCG capsule                Primary Care Provider Office Phone # Fax #    Radha Cazares [phone]       [address] [address]        Equal Access to Services     [facility] AH: Hadii aad ku hadasho Soomaali, waaxda luqadaha, qaybta kaalmada adeegyada, waxay idiin hayaan adeeg nemo duran . So [facility] [phone].    ATENCIÓN: Si habla español, tiene a blackman disposición servicios gratuitos de asistencia lingüística. Pauline al [phone].    We comply with applicable federal civil rights laws and [place] laws. We do not discriminate on the basis of race, color, national origin, age, disability sex, sexual orientation or gender identity.            Thank you!     Thank you for choosing [facility]  for your care. Our goal is always to provide you with excellent care. Hearing back from our patients is one way we can continue to improve our services. Please take a few minutes to complete the written survey that you may receive in the mail after your visit with us. Thank you!             Your Updated Medication List - Protect others around you: Learn how to safely use, store and throw away your medicines at www.disposemymeds.org.          This list is accurate as of: 7/27/17  2:10 PM.  Always use your most recent med list.                   Brand Name Dispense  Instructions for use Diagnosis    albuterol 108 (90 BASE) MCG/ACT Inhaler    PROAIR HFA/PROVENTIL HFA/VENTOLIN HFA    1 Inhaler    Inhale 2 puffs into the lungs every 6 hours as needed for shortness of breath / dyspnea    Chronic obstructive pulmonary disease, unspecified COPD type (H)       aspirin 81 MG tablet      Take 1 tablet by mouth daily.        citalopram 10 MG tablet    celeXA    90 tablet    Take 1 tablet (10 mg) by mouth daily    Anxiety, Depression, unspecified depression type       CO Q 10 PO           darifenacin 7.5 MG 24 hr tablet    ENABLEX    90 tablet    Take 1 tablet (7.5 mg) by mouth daily    Overactive bladder       guaiFENesin 600 MG 12 hr tablet    MUCINEX    180 tablet    Take 2 tablets (1,200 mg) by mouth 2 times daily    Cough with sputum       hydrOXYzine 25 MG tablet    ATARAX    100 tablet    Take 1-2 tablets (25-50 mg) by mouth every 6 hours as needed for itching    Hives       MULTIPLE VITAMIN PO           order for [facility]      [employer] [place] Auto CPAP 12-18 cm, F&P Simplus FFM small.    MERLIN (obstructive sleep apnea)       simvastatin 40 MG tablet    ZOCOR    90 tablet    Take 1 tablet (40 mg) by mouth At Bedtime    Hyperlipidemia LDL goal <130       tiotropium 18 MCG capsule    SPIRIVA    1 capsule    Inhale 1 capsule (18 mcg) into the lungs daily Inhale contents of one capsule    Chronic obstructive pulmonary disease, unspecified COPD type (H)       VITAMIN B 12 PO      Take 500 mcg by mouth daily        VITAMIN B6 PO      Take 1 tablet by mouth daily.        vitamin D 1000 UNITS capsule      2 CAPSULE DAILY

## 2017-07-31 ENCOUNTER — TELEPHONE (OUTPATIENT)
Dept: FAMILY MEDICINE | Facility: CLINIC | Age: 57
End: 2017-07-31

## 2017-07-31 LAB
COPATH REPORT: NORMAL
PAP: NORMAL

## 2017-07-31 NOTE — TELEPHONE ENCOUNTER
Patient was in to see Dr. Cazares and has the diagnosis of Depression and was due for a PHQ-9, DAP and COPD action plan. Please complete, thank you.

## 2017-07-31 NOTE — TELEPHONE ENCOUNTER
1 attempt: Called patient and was not able to leave message due to mailbox is full.  Lauren Gomez MA

## 2017-08-01 LAB
FINAL DIAGNOSIS: NORMAL
HPV HR 12 DNA CVX QL NAA+PROBE: NEGATIVE
HPV16 DNA SPEC QL NAA+PROBE: NEGATIVE
HPV18 DNA SPEC QL NAA+PROBE: NEGATIVE
SPECIMEN DESCRIPTION: NORMAL

## 2017-08-03 ASSESSMENT — ANXIETY QUESTIONNAIRES
7. FEELING AFRAID AS IF SOMETHING AWFUL MIGHT HAPPEN: NOT AT ALL
IF YOU CHECKED OFF ANY PROBLEMS ON THIS QUESTIONNAIRE, HOW DIFFICULT HAVE THESE PROBLEMS MADE IT FOR YOU TO DO YOUR WORK, TAKE CARE OF THINGS AT HOME, OR GET ALONG WITH OTHER PEOPLE: NOT DIFFICULT AT ALL
3. WORRYING TOO MUCH ABOUT DIFFERENT THINGS: NOT AT ALL
5. BEING SO RESTLESS THAT IT IS HARD TO SIT STILL: NOT AT ALL
6. BECOMING EASILY ANNOYED OR IRRITABLE: NOT AT ALL
1. FEELING NERVOUS, ANXIOUS, OR ON EDGE: NOT AT ALL
GAD7 TOTAL SCORE: 0
2. NOT BEING ABLE TO STOP OR CONTROL WORRYING: NOT AT ALL

## 2017-08-03 ASSESSMENT — PATIENT HEALTH QUESTIONNAIRE - PHQ9: 5. POOR APPETITE OR OVEREATING: NOT AT ALL

## 2017-08-03 NOTE — TELEPHONE ENCOUNTER
Called patient. Completed PHQ9.   PHQ-9 SCORE 8/3/2017   Total Score -   Total Score 0     Lauren Gomez MA      DAP and COPD Action Plan pending. Please sign order.  Lauren Gomez MA

## 2017-08-04 ASSESSMENT — ANXIETY QUESTIONNAIRES: GAD7 TOTAL SCORE: 0

## 2017-08-04 ASSESSMENT — PATIENT HEALTH QUESTIONNAIRE - PHQ9: SUM OF ALL RESPONSES TO PHQ QUESTIONS 1-9: 0

## 2017-08-17 ENCOUNTER — RADIANT APPOINTMENT (OUTPATIENT)
Dept: MAMMOGRAPHY | Facility: CLINIC | Age: 57
End: 2017-08-17
Attending: FAMILY MEDICINE
Payer: COMMERCIAL

## 2017-08-17 DIAGNOSIS — R92.8 ABNORMAL MAMMOGRAM: ICD-10-CM

## 2017-08-17 PROCEDURE — G0206 DX MAMMO INCL CAD UNI: HCPCS | Mod: RT | Performed by: STUDENT IN AN ORGANIZED HEALTH CARE EDUCATION/TRAINING PROGRAM

## 2017-08-24 ENCOUNTER — TELEPHONE (OUTPATIENT)
Dept: MAMMOGRAPHY | Facility: CLINIC | Age: 57
End: 2017-08-24

## 2017-08-24 NOTE — TELEPHONE ENCOUNTER
Spoke to Ashleigh Perera this afternoon regarding her new diagnosis of Invasive Mammary/Ductal Carcinoma found during her breast biopsy earlier this week.  We discussed the Radiologist's recommendation of a surgical and oncological consultation. She is scheduled to meet with Dr. Fara Bradshaw for her Oncological consultation at the [facility] [place] [facility] on Monday, August 28th at 5:15pm.  Per Ashleigh Perera's request a informational e-mail was sent to her specified address with her new diagnosis, link to Dr. Bradshaw's profile, appointment information, and helpful links and documents to assist in her research prior to meeting with Dr. Bradshaw.  Ashleigh Perera verbalized understanding and did not have any additional questions or concerns at this time.

## 2017-08-24 NOTE — TELEPHONE ENCOUNTER
Left a message with Ashleigh Perera about the availability of her breast biopsy results.  Awaiting return phone call.

## 2017-08-25 NOTE — PROGRESS NOTES
Oncology Consultation:  Date on this visit: 8/28/2017    Ashleigh Alonzo is referred by Dr.Janice YASSINE Cazares for an oncology consultation. She requires evaluation for new diagnosis of triple negative breast cancer.    Primary Physician: Radha Cazares     History Of Present Illness:  Ms. Alonzo is a 57-year-old postmenopausal female with clinical stage IA, T1c N0 M0, grade 3, triple-negative invasive ductal carcinoma of the right breast.  Ms. Alonzo underwent routine bilateral screening mammogram on 07/27/2017 which showed possible developing calcifications in the right breast at the 12 o'clock position 6 cm from the nipple.  There were no concerning findings in the left breast.  Mammogram prior to this one had been 1 year prior in 07/2016.  Right breast ultrasound demonstrated a 7-mm, irregular, hypoechoic mass at the 12 o'clock position.  The patient went on to have a contrast-enhanced mammogram which showed a peripherally enhancing, irregular mass measured up to 1.9 cm as well as an additional 6-mm, enhancing focus anteromedial to the dominant mass.  The total area of abnormal enhancement on contrast mammogram measured up to 3.4 cm.  Right breast biopsy on 08/22/2017 demonstrated a grade 3 invasive mammary carcinoma with associated high-grade DCIS.  Invasive carcinoma was stained for estrogen and progesterone receptors.  Estrogen receptor and progesterone receptor staining was negative with 0% of nuclei staining.  HER2 was non-amplified by FISH with a HER2/CEN17 ratio of 1.5 and 2.8 HER2 signals per nucleus.  Ms. Alonzo comes in to clinic with her family today to discuss treatment options.       Ms. Alonzo has a history of COPD for which she is on inhalers.  She has a 10-pack-year history of smoking cigarettes; however, she quit smoking 15 years ago.  She states she is otherwise in relatively good health.  She has a history of hyperlipidemia and recently has been diagnosed with sleep apnea.  She has chronic right  "shoulder pain.  She denies new bone or joint aches or pains.  She denies palpable lumps or masses in either breast.  She has no breast pain or discomfort.  She denies nipple discharge.  She underwent menarche at 12 years old.  Her first full-term pregnancy was at 22 years old.  She  each of her two daughters for a few months.  She underwent a \"tubal ligation\" at 40 years old.  She states she had menopausal symptoms shortly thereafter and underwent menopause in her early 40s.  She was on OCPs until her early 30s, at which time she had an IUD for a period of 10 years.  She denies hormone replacement therapy.  She denies a family history of breast cancer.  She has never had any prior breast biopsies.  It is notable that both of her daughters have undergone BRCA testing due to a strong family history of breast cancer on their father's side.  Both of her daughters tested BRCA negative.       Ms. Alonzo denies current headache, visual changes or focal neurologic complaints.  She has no current cough, shortness of breath or chest pain; however, she does report occasional wheezing related to her COPD.  She denies abdominal complaints including abdominal pain, nausea, vomiting, diarrhea or constipation.  She denies symptoms of depression or anxiety.  The remainder of a 10-point review of systems is otherwise negative.          Past Medical/Surgical History:  Past Medical History:   Diagnosis Date     COPD (chronic obstructive pulmonary disease) (H) 2011     Periodontal disease      Sleep apnea 12/2015     Urticaria      Past Surgical History:   Procedure Laterality Date     APPENDECTOMY  10/6/2015     CATARACT IOL, RT/LT  11/2016    bilateral      COLONOSCOPY  11/15/2011    Procedure:COLONOSCOPY; Colonoscopy, screening; Surgeon:ANASTASIA BUNCH; Location:[facility]     TUBAL LIGATION  12/2004    Bilateral     Allergies:  Allergies as of 08/28/2017     (No Known Allergies)     Current Medications:  Current Outpatient " Prescriptions   Medication Sig Dispense Refill     hydrOXYzine (ATARAX) 25 MG tablet Take 1-2 tablets (25-50 mg) by mouth every 6 hours as needed for itching 100 tablet 2     guaiFENesin (MUCINEX) 600 MG 12 hr tablet Take 2 tablets (1,200 mg) by mouth 2 times daily 180 tablet 6     simvastatin (ZOCOR) 40 MG tablet Take 1 tablet (40 mg) by mouth At Bedtime 90 tablet 4     tiotropium (SPIRIVA) 18 MCG capsule Inhale 1 capsule (18 mcg) into the lungs daily Inhale contents of one capsule 1 capsule 11     albuterol (PROAIR HFA/PROVENTIL HFA/VENTOLIN HFA) 108 (90 BASE) MCG/ACT Inhaler Inhale 2 puffs into the lungs every 6 hours as needed for shortness of breath / dyspnea 1 Inhaler 5     citalopram (CELEXA) 10 MG tablet Take 1 tablet (10 mg) by mouth daily 90 tablet 3     darifenacin (ENABLEX) 7.5 MG 24 hr tablet Take 1 tablet (7.5 mg) by mouth daily 90 tablet 3     order for [facility] [employer] [place] Auto CPAP 12-18 cm, F&P Simplus FFM small.       Coenzyme Q10 (CO Q 10 PO)        MULTIPLE VITAMIN PO        Cyanocobalamin (VITAMIN B 12 PO) Take 500 mcg by mouth daily       Pyridoxine HCl (VITAMIN B6 PO) Take 1 tablet by mouth daily.       aspirin 81 MG tablet Take 1 tablet by mouth daily.  3     VITAMIN D 1000 UNIT OR CAPS 2 CAPSULE DAILY        Family History:  Maternal uncle with prostate cancer at 66 yo.  Maternal uncle with throat cancer.  Paternal great grandfather with h/o prostate cancer at 76 yo.  Of note, both of her daughters are BRCA negative; tested due to a strong family history of breast cancer on their father's side.  Patient denies other family history of malignancy.    Social History:  .  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  Has 2 daughters.  One of her daughters is a nurse on the oncology unit at [employer].  Patient resides in [place], [place].  They live on [place].  Patient smoked a 1/2 ppd of cigarettes for 20 years.  She quit smoking 15-17 years ago.  She drinks  "10-15 beers per week.  She does not exercise.      Physical Exam:  /82  Pulse 83  Temp 98.1  F (36.7  C) (Oral)  Resp 17  Ht 1.6 m (5' 3\")  Wt 78.3 kg (172 lb 11.2 oz)  SpO2 96%  Breastfeeding? No  BMI 30.59 kg/m2  General:  Well appearing, well-nourished adult female in NAD.  HEENT:  Normocephalic.  Sclera anicteric.  MMM.  No lesions of the oropharynx.  Lymph:  No palpable cervical, supraclavicular, or axillary LAD.  Chest:  CTA bilaterally.  No wheezes or crackles.  CV:  RRR.  Nl S1 and S2.  No m/r/g.  Breast:  There is a 2 x 2 cm soft mass palpable at the 12:00 position of the right breast.  There is a biopsy site with surrounding ecchymosis in the upper inner quadrant of the left breast, just medial to the mass.  There are no other discretely palpable masses in either breast.  Bilateral nipples are everted.  No nipple discharge.  Abd:  Soft/NT/ND.  BSs normoactive.  No hepatosplenomegaly.  Ext:  No pitting edema of the bilateral lower extremities.  Pulses 2+ and symmetric.  Musculo:  Strength 5/5 throughout.  Neuro:  Cranial nerves grossly intact.  Psych:  Mood and affect appear normal.    Laboratory/Imaging Studies:  7/27/17 Bilateral screening mammogram:  Possible developing calcifications in the right breast at  approximately 12:00 position, 6 cm from the nipple.  No significant  change in the left breast.    8/22/17 Right breast ultrasound:  Focused ultrasound of the right breast at 12:00 position 4 cm from the  nipple demonstrates an irregular hypoechoic mass with indistinct  margins and hyperechoic rim, measuring 7 x 7 x 5 mm. No posterior  features or internal vascularity on color Doppler. Punctate echogenic  foci within this mass, favored to represent calcifications seen on  Mammogram.    8/22/17 Contrast enhanced mammogram:  Findings: In the region of the suspicious calcifications and  architectural distortion at approximately 12:00 position on the right,  there is a peripherally " enhancing, irregular mass measuring up to 1.9  cm in length. Additional 6 mm enhancing focus is seen anterior and  medial to the dominant mass which corresponds to additional or focal  asymmetry with associated grouped microcalcifications on standard 2-D  images. The total area of or abnormal enhancement measures up to 3.4  cm.    8/22/17 Right breast biopsy:  FINAL DIAGNOSIS:   RIGHT BREAST, 12:00, 4 CM FROM NIPPLE, CORE NEEDLE BIOPSY:   - INVASIVE MAMMARY CARCINOMA (NST), Dex grade 3 (of 3), measuring   at least 0.7 cm; associated tumor necrosis is present.   - DUCTAL CARCINOMA IN-SITU (DCIS), high nuclear grade, solid type with   central (comedo) necrosis.   - Invasive carcinoma is estrogen receptor negative (0% nuclei) and   progesterone receptor negative (0% nuclei);     RESULTS:     Ratio of HER2/QUOC-17 signals   Ashleigh Alonzo:  1.5 (nonamplified)                               Avg.   number HER2 signals/nucleus:  2.8                                                                     Avg.   number QUOC-17 signals/nucleus:  1.8     ASSESSMENT/PLAN:  Ms. Alonzo is a 57-year-old postmenopausal female with newly diagnosed, clinical stage IA, T1c N0 M0, grade 3, triple-negative infiltrating ductal carcinoma of the right breast.  Today I discussed the diagnosis, staging, prognosis and treatment options in detail with Ashleigh Perera, her two daughters, her mother, and her .  We reviewed the results of her imaging which demonstrates an approximate 1.9 cm mass in the right breast.  The mass measures 2 cm on clinical exam.  There is no evidence of lymphadenopathy on either clinical exam or imaging.  She has no symptomatic evidence of metastatic disease.  Clinical staging at this time is therefore T1c N0 M0 or stage IA.  We discussed that the goal of treatment of stage IA breast cancer is curative or, in other words, to reduce the future risk of recurrence as much as possible.      We then reviewed treatment options  including chemotherapy, surgery, radiation, endocrine and HER2-targeted therapy.  We reviewed the receptor status of her breast cancer which is estrogen and progesterone receptor-negative and HER2-non-amplified.  Her breast cancer is therefore considered a triple-negative breast cancer.  Triple-negative breast cancers are inherently more aggressive in that they are prone to increaed rates of proliferation, recurrence, and metastasis.  Given that the breast cancer is estrogen receptor-negative and HER2-non-amplified, we cannot use endocrine or HER2-targeted therapy in order to prevent future recurrence.  Fortunately, a course of chemotherapy can significantly reduce the future risk of distant recurrence of triple-negative breast cancers.      We discussed performing chemotherapy prior to surgery.  We reviewed the rationale for this including the ability to shrink the size of the primary tumor for breast-conserving surgery, the ability to monitor the tumor response to chemotherapy, and the ability to predict prognosis based on response.  I recommended a regimen of Taxol administered intravenously weekly for 12 weeks followed by Adriamycin and cyclophosphamide administered intravenously once every 2 weeks for 4 cycles.  The chemotherapy is administered intravenously via a Port-A-Catheter which can be placed in a day procedure by Interventional Radiology.  We reviewed the potential side effects of chemotherapy including alopecia, fatigue, hypersensitivity reaction, neuropathy, nausea, vomiting, diarrhea, constipation, low blood counts, increased risk of infection, risk of congestive heart failure, increased risk of a secondary malignancy, and liver and kidney toxicity.       Approximately 4-5 weeks following completion of chemotherapy we would proceed with surgery.  Surgical excision could be by either lumpectomy or mastectomy.  Lymph nodes would be staged with a sentinel lymph node biopsy at the time of surgery.   During her treatment, she will have surgical consultation with a breast surgeon to discuss this further  Whether or not she will benefit from adjuvant radiation is unknown at this time and depends on the type of breast surgery, final surgical margins, and whether or not there is lymph node involvement at the time of surgery.  As she has an estrogen receptor-negative breast cancer, there will be no benefit to an adjuvant course of endocrine therapy.       Based on her imaging her tumor measures 1.9 cm; however, on contrast-enhanced mammogram there is an additional 6-mm-size area of enhancement adjacent to the primary tumor.  Altogether, the abnormal area of enhancement measures 3.4 cm.  We discussed that based on that measurement she may be eligible for enrollment in the I-SPY-2 clinical trial we have open here at the [facility].  This trial is evaluating whether the addition of a study drug to standard neoadjuvant breast cancer chemotherapy will increase the number of women with no tumor at the time of surgery.  In order for her to be eligible for the trial she needs to have at least a 2.5-cm area of mass-like enhancement on imaging.  The current study arm of the trial open to patients with triple negative breast cancer is Taxol plus pembrolizumab followed by Adriamycin and cyclophosphamide plus pembrolizumab.  We discussed that pembrolizumab is a type of immune therapy administered intravenously once every 3 weeks which aids the patient's immune system in fighting the cancer.  Ashleigh Perera expressed that she is interested in screening for the clinical trial, therefore I recommended obtaining a breast MRI at this time to see if she is indeed eligible.       Given she was diagnosed with a triple negative breast cancer at age <61 yo, NCCN guidelines recommend genetic counseling and testing.  She was agreeable, therefore a referral has been placed today.  Ashleigh Perera also dropped off [facility] paperwork, as she would like to take  the entirety of her chemotherapy treatment off of work.       Ashleigh Perera and her family had numerous questions which were answered to their satisfaction today.  A total of 80 minutes of our 90-minute face-to-face visit was spent in counseling the patient and her family.  At this time, we will proceed with breast MRI, port placement, echocardiogram, and chemo teaching.  Based on the results of her breast MRI (an eligibility for ISPY2), we will decide on the timing of when to start her chemotherapy.

## 2017-08-28 ENCOUNTER — ONCOLOGY VISIT (OUTPATIENT)
Dept: ONCOLOGY | Facility: CLINIC | Age: 57
End: 2017-08-28
Attending: INTERNAL MEDICINE
Payer: COMMERCIAL

## 2017-08-28 VITALS
SYSTOLIC BLOOD PRESSURE: 141 MMHG | TEMPERATURE: 98.1 F | DIASTOLIC BLOOD PRESSURE: 82 MMHG | HEIGHT: 63 IN | BODY MASS INDEX: 30.6 KG/M2 | RESPIRATION RATE: 17 BRPM | WEIGHT: 172.7 LBS | OXYGEN SATURATION: 96 % | HEART RATE: 83 BPM

## 2017-08-28 DIAGNOSIS — C50.211 MALIGNANT NEOPLASM OF UPPER-INNER QUADRANT OF RIGHT BREAST IN FEMALE, ESTROGEN RECEPTOR NEGATIVE (H): Primary | ICD-10-CM

## 2017-08-28 DIAGNOSIS — Z91.89 AT RISK FOR CARDIOMYOPATHY: ICD-10-CM

## 2017-08-28 DIAGNOSIS — Z17.1 MALIGNANT NEOPLASM OF UPPER-INNER QUADRANT OF RIGHT BREAST IN FEMALE, ESTROGEN RECEPTOR NEGATIVE (H): Primary | ICD-10-CM

## 2017-08-28 DIAGNOSIS — E55.9 VITAMIN D DEFICIENCY: ICD-10-CM

## 2017-08-28 PROCEDURE — 99354 ZZC PROLONGED SERV,OFFICE,1ST HR: CPT | Performed by: INTERNAL MEDICINE

## 2017-08-28 PROCEDURE — 99212 OFFICE O/P EST SF 10 MIN: CPT | Mod: ZF

## 2017-08-28 PROCEDURE — 99205 OFFICE O/P NEW HI 60 MIN: CPT | Mod: ZP | Performed by: INTERNAL MEDICINE

## 2017-08-28 ASSESSMENT — PAIN SCALES - GENERAL: PAINLEVEL: NO PAIN (0)

## 2017-08-28 NOTE — LETTER
8/28/2017       RE: Ashleigh Alonzo  [address] [facility] [place] [address]     Dear Colleague,    Thank you for referring your patient, Ashleigh Alonzo, to the [facility]. Please see a copy of my visit note below.    Oncology Consultation:  Date on this visit: 8/28/2017    Ashleigh Alonzo is referred by Dr.Janice YASSINE Cazares for an oncology consultation. She requires evaluation for new diagnosis of triple negative breast cancer.    Primary Physician: Radha Cazares     History Of Present Illness:  Ms. Alonzo is a 57-year-old postmenopausal female with clinical stage IA, T1c N0 M0, grade 3, triple-negative invasive ductal carcinoma of the right breast.  Ms. Alonzo underwent routine bilateral screening mammogram on 07/27/2017 which showed possible developing calcifications in the right breast at the 12 o'clock position 6 cm from the nipple.  There were no concerning findings in the left breast.  Mammogram prior to this one had been 1 year prior in 07/2016.  Right breast ultrasound demonstrated a 7-mm, irregular, hypoechoic mass at the 12 o'clock position.  The patient went on to have a contrast-enhanced mammogram which showed a peripherally enhancing, irregular mass measured up to 1.9 cm as well as an additional 6-mm, enhancing focus anteromedial to the dominant mass.  The total area of abnormal enhancement on contrast mammogram measured up to 3.4 cm.  Right breast biopsy on 08/22/2017 demonstrated a grade 3 invasive mammary carcinoma with associated high-grade DCIS.  Invasive carcinoma was stained for estrogen and progesterone receptors.  Estrogen receptor and progesterone receptor staining was negative with 0% of nuclei staining.  HER2 was non-amplified by FISH with a HER2/CEN17 ratio of 1.5 and 2.8 HER2 signals per nucleus.  Ms. Alonzo comes in to clinic with her family today to discuss treatment options.       Ms. Alonzo has a history of COPD for which she is on inhalers.  She has a  "10-pack-year history of smoking cigarettes; however, she quit smoking 15 years ago.  She states she is otherwise in relatively good health.  She has a history of hyperlipidemia and recently has been diagnosed with sleep apnea.  She has chronic right shoulder pain.  She denies new bone or joint aches or pains.  She denies palpable lumps or masses in either breast.  She has no breast pain or discomfort.  She denies nipple discharge.  She underwent menarche at 12 years old.  Her first full-term pregnancy was at 22 years old.  She  each of her two daughters for a few months.  She underwent a \"tubal ligation\" at 40 years old.  She states she had menopausal symptoms shortly thereafter and underwent menopause in her early 40s.  She was on OCPs until her early 30s, at which time she had an IUD for a period of 10 years.  She denies hormone replacement therapy.  She denies a family history of breast cancer.  She has never had any prior breast biopsies.  It is notable that both of her daughters have undergone BRCA testing due to a strong family history of breast cancer on their father's side.  Both of her daughters tested BRCA negative.       Ms. Alonzo denies current headache, visual changes or focal neurologic complaints.  She has no current cough, shortness of breath or chest pain; however, she does report occasional wheezing related to her COPD.  She denies abdominal complaints including abdominal pain, nausea, vomiting, diarrhea or constipation.  She denies symptoms of depression or anxiety.  The remainder of a 10-point review of systems is otherwise negative.          Past Medical/Surgical History:  Past Medical History:   Diagnosis Date     COPD (chronic obstructive pulmonary disease) (H) 2011     Periodontal disease      Sleep apnea 12/2015     Urticaria      Past Surgical History:   Procedure Laterality Date     APPENDECTOMY  10/6/2015     CATARACT IOL, RT/LT  11/2016    bilateral      COLONOSCOPY  11/15/2011 "    Procedure:COLONOSCOPY; Colonoscopy, screening; Surgeon:ANASTASIA BUNCH; Location:[facility] OR     TUBAL LIGATION  12/2004    Bilateral     Allergies:  Allergies as of 08/28/2017     (No Known Allergies)     Current Medications:  Current Outpatient Prescriptions   Medication Sig Dispense Refill     hydrOXYzine (ATARAX) 25 MG tablet Take 1-2 tablets (25-50 mg) by mouth every 6 hours as needed for itching 100 tablet 2     guaiFENesin (MUCINEX) 600 MG 12 hr tablet Take 2 tablets (1,200 mg) by mouth 2 times daily 180 tablet 6     simvastatin (ZOCOR) 40 MG tablet Take 1 tablet (40 mg) by mouth At Bedtime 90 tablet 4     tiotropium (SPIRIVA) 18 MCG capsule Inhale 1 capsule (18 mcg) into the lungs daily Inhale contents of one capsule 1 capsule 11     albuterol (PROAIR HFA/PROVENTIL HFA/VENTOLIN HFA) 108 (90 BASE) MCG/ACT Inhaler Inhale 2 puffs into the lungs every 6 hours as needed for shortness of breath / dyspnea 1 Inhaler 5     citalopram (CELEXA) 10 MG tablet Take 1 tablet (10 mg) by mouth daily 90 tablet 3     darifenacin (ENABLEX) 7.5 MG 24 hr tablet Take 1 tablet (7.5 mg) by mouth daily 90 tablet 3     order for [facility] [place] Auto CPAP 12-18 cm, F&P Simplus FFM small.       Coenzyme Q10 (CO Q 10 PO)        MULTIPLE VITAMIN PO        Cyanocobalamin (VITAMIN B 12 PO) Take 500 mcg by mouth daily       Pyridoxine HCl (VITAMIN B6 PO) Take 1 tablet by mouth daily.       aspirin 81 MG tablet Take 1 tablet by mouth daily.  3     VITAMIN D 1000 UNIT OR CAPS 2 CAPSULE DAILY        Family History:  Maternal uncle with prostate cancer at 64 yo.  Maternal uncle with throat cancer.  Paternal great grandfather with h/o prostate cancer at 74 yo.  Of note, both of her daughters are BRCA negative; tested due to a strong family history of breast cancer on their father's side.  Patient denies other family history of malignancy.    Social History:  .  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  Has 2  "daughters.  One of her daughters is a nurse on the oncology unit at [employer].  Patient resides in [place], [place].  They live on [place].  Patient smoked a 1/2 ppd of cigarettes for 20 years.  She quit smoking 15-17 years ago.  She drinks 10-15 beers per week.  She does not exercise.      Physical Exam:  /82  Pulse 83  Temp 98.1  F (36.7  C) (Oral)  Resp 17  Ht 1.6 m (5' 3\")  Wt 78.3 kg (172 lb 11.2 oz)  SpO2 96%  Breastfeeding? No  BMI 30.59 kg/m2  General:  Well appearing, well-nourished adult female in NAD.  HEENT:  Normocephalic.  Sclera anicteric.  MMM.  No lesions of the oropharynx.  Lymph:  No palpable cervical, supraclavicular, or axillary LAD.  Chest:  CTA bilaterally.  No wheezes or crackles.  CV:  RRR.  Nl S1 and S2.  No m/r/g.  Breast:  There is a 2 x 2 cm soft mass palpable at the 12:00 position of the right breast.  There is a biopsy site with surrounding ecchymosis in the upper inner quadrant of the left breast, just medial to the mass.  There are no other discretely palpable masses in either breast.  Bilateral nipples are everted.  No nipple discharge.  Abd:  Soft/NT/ND.  BSs normoactive.  No hepatosplenomegaly.  Ext:  No pitting edema of the bilateral lower extremities.  Pulses 2+ and symmetric.  Musculo:  Strength 5/5 throughout.  Neuro:  Cranial nerves grossly intact.  Psych:  Mood and affect appear normal.    Laboratory/Imaging Studies:  7/27/17 Bilateral screening mammogram:  Possible developing calcifications in the right breast at  approximately 12:00 position, 6 cm from the nipple.  No significant  change in the left breast.    8/22/17 Right breast ultrasound:  Focused ultrasound of the right breast at 12:00 position 4 cm from the  nipple demonstrates an irregular hypoechoic mass with indistinct  margins and hyperechoic rim, measuring 7 x 7 x 5 mm. No posterior  features or internal vascularity on color Doppler. Punctate echogenic  foci within this mass, favored to " represent calcifications seen on  Mammogram.    8/22/17 Contrast enhanced mammogram:  Findings: In the region of the suspicious calcifications and  architectural distortion at approximately 12:00 position on the right,  there is a peripherally enhancing, irregular mass measuring up to 1.9  cm in length. Additional 6 mm enhancing focus is seen anterior and  medial to the dominant mass which corresponds to additional or focal  asymmetry with associated grouped microcalcifications on standard 2-D  images. The total area of or abnormal enhancement measures up to 3.4  cm.    8/22/17 Right breast biopsy:  FINAL DIAGNOSIS:   RIGHT BREAST, 12:00, 4 CM FROM NIPPLE, CORE NEEDLE BIOPSY:   - INVASIVE MAMMARY CARCINOMA (NST), [place] grade 3 (of 3), measuring   at least 0.7 cm; associated tumor necrosis is present.   - DUCTAL CARCINOMA IN-SITU (DCIS), high nuclear grade, solid type with   central (comedo) necrosis.   - Invasive carcinoma is estrogen receptor negative (0% nuclei) and   progesterone receptor negative (0% nuclei);     RESULTS:     Ratio of HER2/QUOC-17 signals   Ashleigh Alonzo:  1.5 (nonamplified)                               Avg.   number HER2 signals/nucleus:  2.8                                                                     Avg.   number QUOC-17 signals/nucleus:  1.8     ASSESSMENT/PLAN:  Ms. Alonzo is a 57-year-old postmenopausal female with newly diagnosed, clinical stage IA, T1c N0 M0, grade 3, triple-negative infiltrating ductal carcinoma of the right breast.  Today I discussed the diagnosis, staging, prognosis and treatment options in detail with Ashleigh Perera, her two daughters, her mother, and her .  We reviewed the results of her imaging which demonstrates an approximate 1.9 cm mass in the right breast.  The mass measures 2 cm on clinical exam.  There is no evidence of lymphadenopathy on either clinical exam or imaging.  She has no symptomatic evidence of metastatic disease.  Clinical staging at  this time is therefore T1c N0 M0 or stage IA.  We discussed that the goal of treatment of stage IA breast cancer is curative or, in other words, to reduce the future risk of recurrence as much as possible.      We then reviewed treatment options including chemotherapy, surgery, radiation, endocrine and HER2-targeted therapy.  We reviewed the receptor status of her breast cancer which is estrogen and progesterone receptor-negative and HER2-non-amplified.  Her breast cancer is therefore considered a triple-negative breast cancer.  Triple-negative breast cancers are inherently more aggressive in that they are prone to increaed rates of proliferation, recurrence, and metastasis.  Given that the breast cancer is estrogen receptor-negative and HER2-non-amplified, we cannot use endocrine or HER2-targeted therapy in order to prevent future recurrence.  Fortunately, a course of chemotherapy can significantly reduce the future risk of distant recurrence of triple-negative breast cancers.      We discussed performing chemotherapy prior to surgery.  We reviewed the rationale for this including the ability to shrink the size of the primary tumor for breast-conserving surgery, the ability to monitor the tumor response to chemotherapy, and the ability to predict prognosis based on response.  I recommended a regimen of Taxol administered intravenously weekly for 12 weeks followed by Adriamycin and cyclophosphamide administered intravenously once every 2 weeks for 4 cycles.  The chemotherapy is administered intravenously via a Port-A-Catheter which can be placed in a day procedure by Interventional Radiology.  We reviewed the potential side effects of chemotherapy including alopecia, fatigue, hypersensitivity reaction, neuropathy, nausea, vomiting, diarrhea, constipation, low blood counts, increased risk of infection, risk of congestive heart failure, increased risk of a secondary malignancy, and liver and kidney toxicity.        Approximately 4-5 weeks following completion of chemotherapy we would proceed with surgery.  Surgical excision could be by either lumpectomy or mastectomy.  Lymph nodes would be staged with a sentinel lymph node biopsy at the time of surgery.  During her treatment, she will have surgical consultation with a breast surgeon to discuss this further  Whether or not she will benefit from adjuvant radiation is unknown at this time and depends on the type of breast surgery, final surgical margins, and whether or not there is lymph node involvement at the time of surgery.  As she has an estrogen receptor-negative breast cancer, there will be no benefit to an adjuvant course of endocrine therapy.       Based on her imaging her tumor measures 1.9 cm; however, on contrast-enhanced mammogram there is an additional 6-mm-size area of enhancement adjacent to the primary tumor.  Altogether, the abnormal area of enhancement measures 3.4 cm.  We discussed that based on that measurement she may be eligible for enrollment in the I-SPY-2 clinical trial we have open here at the [place].  This trial is evaluating whether the addition of a study drug to standard neoadjuvant breast cancer chemotherapy will increase the number of women with no tumor at the time of surgery.  In order for her to be eligible for the trial she needs to have at least a 2.5-cm area of mass-like enhancement on imaging.  The current study arm of the trial open to patients with triple negative breast cancer is Taxol plus pembrolizumab followed by Adriamycin and cyclophosphamide plus pembrolizumab.  We discussed that pembrolizumab is a type of immune therapy administered intravenously once every 3 weeks which aids the patient's immune system in fighting the cancer.  Ashleigh Perera expressed that she is interested in screening for the clinical trial, therefore I recommended obtaining a breast MRI at this time to see if she is indeed eligible.       Given she was  diagnosed with a triple negative breast cancer at age <61 yo, NCCN guidelines recommend genetic counseling and testing.  She was agreeable, therefore a referral has been placed today.  Ashleigh Perera also dropped off [facility] paperwork, as she would like to take the entirety of her chemotherapy treatment off of work.       Ashleigh Perera and her family had numerous questions which were answered to their satisfaction today.  A total of 80 minutes of our 90-minute face-to-face visit was spent in counseling the patient and her family.  At this time, we will proceed with breast MRI, port placement, echocardiogram, and chemo teaching.  Based on the results of her breast MRI (an eligibility for ISPY2), we will decide on the timing of when to start her chemotherapy.      Again, thank you for allowing me to participate in the care of your patient.      Sincerely,    Fara Bradshaw MD

## 2017-08-28 NOTE — NURSING NOTE
"Oncology Rooming Note    August 28, 2017 5:25 PM   Ashleigh Alonzo is a 57 year old female who presents for:    Chief Complaint   Patient presents with     Oncology Clinic Visit     new patient visit for consultation related to breast cancer     Initial Vitals: /82  Pulse 83  Temp 98.1  F (36.7  C) (Oral)  Resp 17  Ht 1.6 m (5' 3\")  Wt 78.3 kg (172 lb 11.2 oz)  SpO2 96%  Breastfeeding? No  BMI 30.59 kg/m2 Estimated body mass index is 30.59 kg/(m^2) as calculated from the following:    Height as of this encounter: 1.6 m (5' 3\").    Weight as of this encounter: 78.3 kg (172 lb 11.2 oz). Body surface area is 1.87 meters squared.  No Pain (0) Comment: Data Unavailable   No LMP recorded. Patient is postmenopausal.  Allergies reviewed: Yes  Medications reviewed: Yes    Medications: Medication refills not needed today.  Pharmacy name entered into [facility]:    [facility] [place], [place] - [address] [place] [facility] [place], [place] - [id number] [facility]    Clinical concerns: no concerns dr. hernandez was notified.    5 minutes for nursing intake (face to face time)     Anna Ruiz CMA              "

## 2017-08-28 NOTE — MR AVS SNAPSHOT
After Visit Summary   8/28/2017    Ashleigh Alonzo    MRN: [id number]           Patient Information     Date Of Birth          1960        Visit Information        Provider Department      8/28/2017 5:15 PM Fara Bradshaw MD [facility]        Today's Diagnoses     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)    -  1    At risk for cardiomyopathy        Vitamin D deficiency           Follow-ups after your visit        Additional Services     CANCER RISK MGMT/CANCER GENETIC COUNSELING REFERRAL       Your provider has referred you to the Cancer Risk Management Program - Cancer Genetic Counseling.    Reason for Referral: evaluate for inherited cause of malignancy in a patient with triple negative breast cancer    We have a sent a notice to a staff member of the Cancer Risk Management Program to give you a call to assist with scheduling your appointment.  You may also call  [phone] ([phone]) to initiate scheduling.    Please be aware that coverage of these services is subject to the terms and limitations of your health insurance plan.  Call member services at your health plan with any benefit or coverage questions.      Please bring the completed family history sheet to your appointment in addition to any available outside medical records documenting your cancer diagnosis.                  Your next 10 appointments already scheduled     Sep 01, 2017   Procedure with Leif Parkinson PA-C   [facility] ([facility])    [address] [place], [place] [address] [phone]           Located in the Clinics and Surgery Center at [address] [place] [address].   parking is very convenient and highly recommended.  is a $6 flat rate fee.  Both  and self parkers should enter the main arrival plaza  from [facility]; parking attendants will direct you based on your parking preference.            Sep 01, 2017  9:00 AM CDT   (Arrive by 7:30 AM)   IR CHEST PORT PLACEMENT > 5 YRS OF AGE with [id number]   [facility] ([facility])    [address]  5th Floor  [facility] [address]              1. Your doctor will need to do a history and physical within 7 days before this procedure. 2. Your doctor will which medications should not be taken the morning of the exam. 3. Laboratory tests are to be obtained by your doctor prior to the exam (Basic Metabolic Panel, CBCP, PTT and INR) (No labs needed if you are having a tunneled catheter exchange or removal) 4. If you have allergies to x-ray contrast or iodine, contact your doctor or a Radiology nurse prior to the exam day for instructions. 5. Someone will need to drive you to and from the hospital. 6. If you are or may be pregnant, contact your doctor or a Radiology nurse prior to the day of the exam. 7. If you have diabetes, check with your doctor or a Radiology nurse to see if your insulin needs to be adjusted for the exam. 8. If you are taking a medication called Glucophage or Glucovance; these medications need to be held the day of the exam and for approximately 48 hours following. A blood sample must be drawn so your creatinine level can be checked before resuming this medication. 9. If you are taking Coumadin (to thin you blood) please contact your doctor or a Radiology nurse at least 3 days before the exam for special instructions. 10. You should not have received contrast within 48 hours of this exam. 11. The day before your exam you may eat your regular diet and are encouraged to drink at least 2 quarts of clear liquids. Drink no alcoholic beverages for 24 hours prior to the exam. 12. If you have a colostomy you will need to irrigate it with tap water at 8PM the evening before and again at 6AM the morning of the exam.  13. Do not smoke for 24 hours prior to the procedure. 14. Birth to 4 years: - Breast feeding must be stopped 4 hours prior to exam - Solid food or formula must be stopped 6 hours prior to exam - Tube feedings must be stopped 6 hours prior to exam 15. 4-10 years old: - Nothing to eat or drink 6 hours prior to exam 16. 10+ years old: - Nothing to eat or drink 8 hours prior to exam 17. The morning of the exam you may brush your teeth and take medications as directed with a sip of water. 18. When discharged, you cannot drive until morning, and an adult must be with you until then. You should stay in the [facility] overnight. 19. Bring a list of all drugs you are taking; include supplements and over-the-counter medications. Wear comfortable clothes and leave your valuables at home.            Sep 01, 2017 11:00 AM CDT   Ech Limited with UCECHCR2   [facility] ([facility])    [address] [facility] [address] [facility] [address]  3rd [facility] [address] [phone]           1.  Please bring or wear a comfortable two-piece outfit. 2.  You may eat, drink and take your normal medicines. 3.  For any questions that cannot be answered, please contact the ordering physician            Sep 05, 2017  2:15 PM CDT   CT CHEST LOW DOSE NON CONTRAST with MGCT1   [facility] ([facility])    [address] [place] [address] [phone]           Please bring any scans or X-rays taken at other hospitals, if similar tests were done. Also bring a list of your medicines, including vitamins, minerals and over-the-counter drugs. It is safest to leave personal items at home.  Be sure to tell your doctor:   If you have any allergies.   If there s any chance you are pregnant.   If you are breastfeeding.   If you have any special needs.  You do not need to do anything special to prepare.  Please wear loose clothing, such as a sweat suit or jogging clothes. Avoid snaps, zippers  "and other metal. We may ask you to undress and put on a hospital gown.            Sep 05, 2017  2:30 PM CDT   Return Visit with Zonia Singh MD   [facility] ([facility])    [address] [address] [place] [address] [phone]              Who to contact     If you have questions or need follow up information about today's clinic visit or your schedule please contact [facility] directly at [phone].  Normal or non-critical lab and imaging results will be communicated to you by PulseOnhart, letter or phone within 4 business days after the clinic has received the results. If you do not hear from us within 7 days, please contact the clinic through Geewat or phone. If you have a critical or abnormal lab result, we will notify you by phone as soon as possible.  Submit refill requests through clickTRUE or call your pharmacy and they will forward the refill request to us. Please allow 3 business days for your refill to be completed.          Additional Information About Your Visit        PulseOnhart Information     clickTRUE gives you secure access to your electronic health record. If you see a primary care provider, you can also send messages to your care team and make appointments. If you have questions, please call your primary care clinic.  If you do not have a primary care provider, please call [phone] and they will assist you.        Care EveryWhere ID     This is your Care EveryWhere ID. This could be used by other organizations to access your [facility] medical records  [id number]        Your Vitals Were     Pulse Temperature Respirations Height Pulse Oximetry Breastfeeding?    83 98.1  F (36.7  C) (Oral) 17 1.6 m (5' 3\") 96% No    BMI (Body Mass Index)                   30.59 kg/m2            Blood Pressure from Last 3 Encounters:   08/28/17 141/82   07/27/17 128/82   04/04/17 110/74    Weight from Last 3 Encounters:   08/28/17 78.3 kg (172 lb " 11.2 oz)   07/27/17 78.5 kg (173 lb)   04/04/17 79.6 kg (175 lb 8 oz)              We Performed the Following     CANCER RISK MGMT/CANCER GENETIC COUNSELING REFERRAL        Primary Care Provider Office Phone # Fax #    Radha Cazares [phone]       [address] [address]        Equal Access to Services     THERESA HUGHES : Hadii aad ku hadasho Soomaali, waaxda luqadaha, qaybta kaalmada adeegyada, waxay idiin hayaan adeeg khkristiansh lajen . So [facility] [phone].    ATENCIÓN: Si marlen hawkins, tiene a blackman disposición servicios gratuitos de asistencia lingüística. Llame al [phone].    We comply with applicable federal civil rights laws and [place] laws. We do not discriminate on the basis of race, color, national origin, age, disability sex, sexual orientation or gender identity.            Thank you!     Thank you for choosing [facility]  for your care. Our goal is always to provide you with excellent care. Hearing back from our patients is one way we can continue to improve our services. Please take a few minutes to complete the written survey that you may receive in the mail after your visit with us. Thank you!             Your Updated Medication List - Protect others around you: Learn how to safely use, store and throw away your medicines at www.disposemymeds.org.          This list is accurate as of: 8/28/17 11:59 PM.  Always use your most recent med list.                   Brand Name Dispense Instructions for use Diagnosis    albuterol 108 (90 BASE) MCG/ACT Inhaler    PROAIR HFA/PROVENTIL HFA/VENTOLIN HFA    1 Inhaler    Inhale 2 puffs into the lungs every 6 hours as needed for shortness of breath / dyspnea    Chronic obstructive pulmonary disease, unspecified COPD type (H)       aspirin 81 MG tablet      Take 1 tablet by mouth daily.        citalopram 10 MG tablet    celeXA    90 tablet    Take 1 tablet (10 mg) by  mouth daily    Anxiety, Depression, unspecified depression type       CO Q 10 PO           darifenacin 7.5 MG 24 hr tablet    ENABLEX    90 tablet    Take 1 tablet (7.5 mg) by mouth daily    Overactive bladder       guaiFENesin 600 MG 12 hr tablet    MUCINEX    180 tablet    Take 2 tablets (1,200 mg) by mouth 2 times daily    Cough with sputum       hydrOXYzine 25 MG tablet    ATARAX    100 tablet    Take 1-2 tablets (25-50 mg) by mouth every 6 hours as needed for itching    Hives       MULTIPLE VITAMIN PO           order for [facility]      RespirAutrement ([employer])s [employer] Auto CPAP 12-18 cm, F&P Simplus FFM small.    MERLIN (obstructive sleep apnea)       simvastatin 40 MG tablet    ZOCOR    90 tablet    Take 1 tablet (40 mg) by mouth At Bedtime    Hyperlipidemia LDL goal <130       tiotropium 18 MCG capsule    SPIRIVA    1 capsule    Inhale 1 capsule (18 mcg) into the lungs daily Inhale contents of one capsule    Chronic obstructive pulmonary disease, unspecified COPD type (H)       VITAMIN B 12 PO      Take 500 mcg by mouth daily        VITAMIN B6 PO      Take 1 tablet by mouth daily.        vitamin D 1000 UNITS capsule      2 CAPSULE DAILY

## 2017-08-29 ENCOUNTER — CARE COORDINATION (OUTPATIENT)
Dept: ONCOLOGY | Facility: CLINIC | Age: 57
End: 2017-08-29

## 2017-08-29 NOTE — PROGRESS NOTES
Pt called triage and call was transferred.  Introduced this writer and explained upcoming appts that are in process of being scheduled.  Discussed power port placement.  Pt understood about receiving MRI results to see if she is eligible for I-Spy.  Pt had many questions and all were answered.  Pt was given information that our office will contact her with Echo and power port scheduling appts. Pt has MRI tomorrow.  Discussed meeting for chemo teaching.  Dionne Goode RNCC BSN CBCN

## 2017-08-30 ENCOUNTER — CARE COORDINATION (OUTPATIENT)
Dept: ONCOLOGY | Facility: CLINIC | Age: 57
End: 2017-08-30

## 2017-08-30 DIAGNOSIS — Z17.1 MALIGNANT NEOPLASM OF UPPER-INNER QUADRANT OF RIGHT BREAST IN FEMALE, ESTROGEN RECEPTOR NEGATIVE (H): Primary | ICD-10-CM

## 2017-08-30 DIAGNOSIS — Z00.6 EXAMINATION OF PARTICIPANT IN CLINICAL TRIAL: ICD-10-CM

## 2017-08-30 DIAGNOSIS — C50.211 MALIGNANT NEOPLASM OF UPPER-INNER QUADRANT OF RIGHT BREAST IN FEMALE, ESTROGEN RECEPTOR NEGATIVE (H): Primary | ICD-10-CM

## 2017-08-30 NOTE — PROGRESS NOTES
Call placed to patient for follow up about upcoming appts this Friday.  Port placement and Echo are scheduled.  Will wait for MRI ( done today) results and follow up with patient next week.  Pt was given  Our hospital on call telephone number for after hours, weekend and holiday.      Dionne Goode RNCC BSN CBCN

## 2017-08-31 ENCOUNTER — TELEPHONE (OUTPATIENT)
Dept: ONCOLOGY | Facility: CLINIC | Age: 57
End: 2017-08-31

## 2017-08-31 ENCOUNTER — RESEARCH ENCOUNTER (OUTPATIENT)
Dept: ONCOLOGY | Facility: CLINIC | Age: 57
End: 2017-08-31

## 2017-08-31 ENCOUNTER — TELEPHONE (OUTPATIENT)
Dept: PULMONOLOGY | Facility: CLINIC | Age: 57
End: 2017-08-31

## 2017-08-31 DIAGNOSIS — F41.9 ANXIETY: ICD-10-CM

## 2017-08-31 DIAGNOSIS — C50.919 BREAST CANCER (H): Primary | ICD-10-CM

## 2017-08-31 DIAGNOSIS — C50.211 MALIGNANT NEOPLASM OF UPPER-INNER QUADRANT OF RIGHT BREAST IN FEMALE, ESTROGEN RECEPTOR NEGATIVE (H): Primary | ICD-10-CM

## 2017-08-31 DIAGNOSIS — Z17.1 MALIGNANT NEOPLASM OF UPPER-INNER QUADRANT OF RIGHT BREAST IN FEMALE, ESTROGEN RECEPTOR NEGATIVE (H): Primary | ICD-10-CM

## 2017-08-31 RX ORDER — LORAZEPAM 0.5 MG/1
TABLET ORAL
Qty: 10 TABLET | Refills: 0 | COMMUNITY
Start: 2017-08-31 | End: 2017-12-26

## 2017-08-31 NOTE — NURSING NOTE
Research Note for ISPY2 Breast Trial: Patient was initially presented the clinical trial by Dr Bradshaw on 8/28/17. Patient had a Breast MRI on 8/30/17 which demonstrated eligibility for trial with tumor size 2.5 cm. I contacted patient by phone to further discuss the ISPY2 trial including reviewing the consent in detail, potential risks/bennefits of trial participation, expected chemotherapy drugs for TNBC (paclitaxol and pembrolizumab and AC), all study and screening procedures and study schedule. All patients questions answered to her satisfaction and she verbalized interest in participation in the ISP2 trial. . The actual consent signing is expected to take place following her SOC port placement Friday 9/1/17.  Screening MRI/BX and lab tests will be planned for 9/5/17.  Luzmaria Geiger RN / Research [phone]

## 2017-08-31 NOTE — TELEPHONE ENCOUNTER
[facility] Call Center    Phone Message    Name of Caller: Ashleigh Perera    Phone Number: Home number on file [phone] (home)    Best time to return call: any    May a detailed message be left on voicemail: yes    Relation to patient: Self    Reason for Call: Patient was just diagnosed with breast cancer, patient was told to cancel CT on 09/05, patient is asking if the Provider would like her to reschedule the CT?    Action Taken: Message routed to:  Adult Clinics: Pulmonology p [phone]

## 2017-08-31 NOTE — TELEPHONE ENCOUNTER
Form Request Documentation    Date Received in Clinic:  8/28/17  Name/Type of Form: [employer] [place]  FMLA  Questions that need to be addressed:   Current Employment Status: currently working on a full time basis    Amount of Leave Requested: Intermittent Leave beginning 9/1/2017 to 8/31/18   12 days per months time off for appts and treatments   Other: None  Date Completed: 8/31/2017  Copy Mailed to patient: No   Disposition of Form: Patient  in clinic on 9/1/17 at lock box on 1 st floor  [facility].   Form not faxed anywhere    ~  Just starting treatment and hopes to work when she can~

## 2017-09-01 ENCOUNTER — RADIANT APPOINTMENT (OUTPATIENT)
Dept: CARDIOLOGY | Facility: CLINIC | Age: 57
End: 2017-09-01
Attending: INTERNAL MEDICINE

## 2017-09-01 ENCOUNTER — SURGERY (OUTPATIENT)
Age: 57
End: 2017-09-01

## 2017-09-01 ENCOUNTER — HOSPITAL ENCOUNTER (OUTPATIENT)
Facility: AMBULATORY SURGERY CENTER | Age: 57
End: 2017-09-01
Attending: PHYSICIAN ASSISTANT

## 2017-09-01 ENCOUNTER — ANESTHESIA (OUTPATIENT)
Dept: SURGERY | Facility: AMBULATORY SURGERY CENTER | Age: 57
End: 2017-09-01

## 2017-09-01 ENCOUNTER — ANESTHESIA EVENT (OUTPATIENT)
Dept: SURGERY | Facility: AMBULATORY SURGERY CENTER | Age: 57
End: 2017-09-01

## 2017-09-01 VITALS
OXYGEN SATURATION: 96 % | HEIGHT: 63 IN | DIASTOLIC BLOOD PRESSURE: 61 MMHG | WEIGHT: 172.7 LBS | SYSTOLIC BLOOD PRESSURE: 115 MMHG | TEMPERATURE: 98.2 F | RESPIRATION RATE: 18 BRPM | HEART RATE: 81 BPM | BODY MASS INDEX: 30.6 KG/M2

## 2017-09-01 DIAGNOSIS — Z91.89 AT RISK FOR CARDIOMYOPATHY: ICD-10-CM

## 2017-09-01 LAB
ERYTHROCYTE [DISTWIDTH] IN BLOOD BY AUTOMATED COUNT: 11.6 % (ref 10–15)
HCT VFR BLD AUTO: 38.2 % (ref 35–47)
HGB BLD-MCNC: 13.7 G/DL (ref 11.7–15.7)
INR PPP: 1.06 (ref 0.86–1.14)
MCH RBC QN AUTO: 33.5 PG (ref 26.5–33)
MCHC RBC AUTO-ENTMCNC: 35.9 G/DL (ref 31.5–36.5)
MCV RBC AUTO: 93 FL (ref 78–100)
PLATELET # BLD AUTO: 267 10E9/L (ref 150–450)
RBC # BLD AUTO: 4.09 10E12/L (ref 3.8–5.2)
WBC # BLD AUTO: 6.6 10E9/L (ref 4–11)

## 2017-09-01 DEVICE — CATH PORT POWERPORT CLEARVUE ISP 8FR 5608062
Type: IMPLANTABLE DEVICE | Site: CHEST  WALL | Status: NON-FUNCTIONAL
Removed: 2018-02-27

## 2017-09-01 RX ORDER — ONDANSETRON 2 MG/ML
INJECTION INTRAMUSCULAR; INTRAVENOUS PRN
Status: DISCONTINUED | OUTPATIENT
Start: 2017-09-01 | End: 2017-09-01

## 2017-09-01 RX ORDER — HEPARIN SODIUM (PORCINE) LOCK FLUSH IV SOLN 100 UNIT/ML 100 UNIT/ML
5 SOLUTION INTRAVENOUS
Status: DISCONTINUED | OUTPATIENT
Start: 2017-09-01 | End: 2017-09-02 | Stop reason: HOSPADM

## 2017-09-01 RX ORDER — DEXAMETHASONE SODIUM PHOSPHATE 4 MG/ML
INJECTION, SOLUTION INTRA-ARTICULAR; INTRALESIONAL; INTRAMUSCULAR; INTRAVENOUS; SOFT TISSUE PRN
Status: DISCONTINUED | OUTPATIENT
Start: 2017-09-01 | End: 2017-09-01

## 2017-09-01 RX ORDER — LIDOCAINE HYDROCHLORIDE 20 MG/ML
INJECTION, SOLUTION INFILTRATION; PERINEURAL PRN
Status: DISCONTINUED | OUTPATIENT
Start: 2017-09-01 | End: 2017-09-01

## 2017-09-01 RX ORDER — HEPARIN SODIUM,PORCINE 10 UNIT/ML
5 VIAL (ML) INTRAVENOUS EVERY 24 HOURS
Status: DISCONTINUED | OUTPATIENT
Start: 2017-09-01 | End: 2017-09-02 | Stop reason: HOSPADM

## 2017-09-01 RX ORDER — FENTANYL CITRATE 50 UG/ML
25-50 INJECTION, SOLUTION INTRAMUSCULAR; INTRAVENOUS
Status: DISCONTINUED | OUTPATIENT
Start: 2017-09-01 | End: 2017-09-02 | Stop reason: HOSPADM

## 2017-09-01 RX ORDER — SODIUM CHLORIDE, SODIUM LACTATE, POTASSIUM CHLORIDE, CALCIUM CHLORIDE 600; 310; 30; 20 MG/100ML; MG/100ML; MG/100ML; MG/100ML
INJECTION, SOLUTION INTRAVENOUS CONTINUOUS
Status: DISCONTINUED | OUTPATIENT
Start: 2017-09-01 | End: 2017-09-02 | Stop reason: HOSPADM

## 2017-09-01 RX ORDER — PROPOFOL 10 MG/ML
INJECTION, EMULSION INTRAVENOUS PRN
Status: DISCONTINUED | OUTPATIENT
Start: 2017-09-01 | End: 2017-09-01

## 2017-09-01 RX ORDER — LIDOCAINE 40 MG/G
CREAM TOPICAL
Status: DISCONTINUED | OUTPATIENT
Start: 2017-09-01 | End: 2017-09-02 | Stop reason: HOSPADM

## 2017-09-01 RX ORDER — ONDANSETRON 4 MG/1
4 TABLET, ORALLY DISINTEGRATING ORAL EVERY 30 MIN PRN
Status: DISCONTINUED | OUTPATIENT
Start: 2017-09-01 | End: 2017-09-02 | Stop reason: HOSPADM

## 2017-09-01 RX ORDER — SODIUM CHLORIDE 9 MG/ML
INJECTION, SOLUTION INTRAVENOUS CONTINUOUS
Status: DISCONTINUED | OUTPATIENT
Start: 2017-09-01 | End: 2017-09-02 | Stop reason: HOSPADM

## 2017-09-01 RX ORDER — NALOXONE HYDROCHLORIDE 0.4 MG/ML
.1-.4 INJECTION, SOLUTION INTRAMUSCULAR; INTRAVENOUS; SUBCUTANEOUS
Status: DISCONTINUED | OUTPATIENT
Start: 2017-09-01 | End: 2017-09-02 | Stop reason: HOSPADM

## 2017-09-01 RX ORDER — KETOROLAC TROMETHAMINE 30 MG/ML
30 INJECTION, SOLUTION INTRAMUSCULAR; INTRAVENOUS EVERY 6 HOURS PRN
Status: DISCONTINUED | OUTPATIENT
Start: 2017-09-01 | End: 2017-09-02 | Stop reason: HOSPADM

## 2017-09-01 RX ORDER — MEPERIDINE HYDROCHLORIDE 25 MG/ML
12.5 INJECTION INTRAMUSCULAR; INTRAVENOUS; SUBCUTANEOUS
Status: DISCONTINUED | OUTPATIENT
Start: 2017-09-01 | End: 2017-09-02 | Stop reason: HOSPADM

## 2017-09-01 RX ORDER — ONDANSETRON 2 MG/ML
4 INJECTION INTRAMUSCULAR; INTRAVENOUS EVERY 30 MIN PRN
Status: DISCONTINUED | OUTPATIENT
Start: 2017-09-01 | End: 2017-09-02 | Stop reason: HOSPADM

## 2017-09-01 RX ADMIN — LIDOCAINE HYDROCHLORIDE 40 MG: 20 INJECTION, SOLUTION INFILTRATION; PERINEURAL at 09:20

## 2017-09-01 RX ADMIN — ONDANSETRON 4 MG: 2 INJECTION INTRAMUSCULAR; INTRAVENOUS at 09:00

## 2017-09-01 RX ADMIN — DEXAMETHASONE SODIUM PHOSPHATE 4 MG: 4 INJECTION, SOLUTION INTRA-ARTICULAR; INTRALESIONAL; INTRAMUSCULAR; INTRAVENOUS; SOFT TISSUE at 09:00

## 2017-09-01 RX ADMIN — Medication 6 ML: at 12:00

## 2017-09-01 RX ADMIN — PROPOFOL 40 MG: 10 INJECTION, EMULSION INTRAVENOUS at 09:20

## 2017-09-01 RX ADMIN — SODIUM CHLORIDE: 9 INJECTION, SOLUTION INTRAVENOUS at 08:29

## 2017-09-01 RX ADMIN — PROPOFOL 40 MG: 10 INJECTION, EMULSION INTRAVENOUS at 09:37

## 2017-09-01 RX ADMIN — Medication 25 ML: at 10:07

## 2017-09-01 RX ADMIN — PROPOFOL 40 MG: 10 INJECTION, EMULSION INTRAVENOUS at 09:44

## 2017-09-01 RX ADMIN — LIDOCAINE HYDROCHLORIDE 40 MG: 20 INJECTION, SOLUTION INFILTRATION; PERINEURAL at 09:44

## 2017-09-01 ASSESSMENT — COPD QUESTIONNAIRES: COPD: 1

## 2017-09-01 NOTE — ANESTHESIA POSTPROCEDURE EVALUATION
Patient: Ashleigh Alonzo    Procedure(s):  Single Lumen Chest Power Port - Wound Class: I-Clean    Diagnosis:Malignant Neoplasm of Upper Outer Quadrant of Right Female Breast  Diagnosis Additional Information: No value filed.    Anesthesia Type:  No value filed.    Note:  Anesthesia Post Evaluation    Patient location during evaluation: PACU  Patient participation: Able to fully participate in evaluation  Level of consciousness: awake and alert  Pain management: adequate  Airway patency: patent  Cardiovascular status: hemodynamically stable  Respiratory status: acceptable  Hydration status: stable  PONV: none     Anesthetic complications: None          Last vitals:  Vitals:    09/01/17 0716   BP: 121/79   Pulse: 81   Resp: 16   Temp: 37.1  C (98.7  F)   SpO2: 95%         Electronically Signed By: Alex Ybarra MD  September 1, 2017  10:20 AM

## 2017-09-01 NOTE — DISCHARGE INSTRUCTIONS
A collaboration between [facility] and [facility]  Experts in minimally invasive, targeted treatments performed using imaging guidance    Venous Access Device,  Port Catheter or Tunneled Central Line Placement    Today you had a procedure today to install a venous access device; either a tunneled central vein catheter or a subcutaneous port catheter.    One of our Radiology PAs performed this procedure for you today:  ? Aaron Cole PA-C  ? NANCI Maxwell PA-C  ? Dillon Monte PA-C  ? Deepthi Dewey PA-C   ? Lloyd Tinajero PA-C    After you go home:  - Drink plenty of fluids.  Generally 6-8 (8 ounce) glasses a day is recommended.  - Resume your regular diet unless otherwise ordered by a medical provider.  - Keep any applied tape/gauze dressings clean and dry.  Change tape/gauze dressings if they get wet or soiled.  - You may shower the following day after procedure, however cover and protect from moisture any tape/gauze dressings.  You may let water hit and run over dried skin glue, but do not scrub.  Pat the area dry after showering.  - Port placement incisions are closed with absorbable suture, meaning they do not need to be removed at a later date, and a topical skin adhesive (skin glue).  This glue will wear off in 7-14 days.  Do not remove before this time.  If 14 days have passed and residual glue is present, you may gently remove it.  - Do not apply gels, lotions, or ointments to the glue site for the first 10 days as this may cause the glue to prematurely soften and fail.  - Do not perform strenuous activities or lift greater than 10 pounds for the next three days.  - If there is bleeding or oozing from the procedure site, apply firm pressure to the area for 5-10 minutes.  If the bleeding continues seek medical advice at the numbers below.  - Mild procedure site discomfort can be treated with an ice pack and over-the-counter pain  relievers.        For 24 hours after any sedation used:  - Relax and take it easy.  No strenuous activities.  - Do not drive or operate machines at home or at work.  - No alcohol consumption.  - Do not make any important or legal decisions.    Call our Interventional Radiology (IR) service if:  - If you start bleeding from the procedure site.  If you do start to bleed from the site, lie down and hold some pressure on the site.  Our radiology provider can help you decide if you need to return to the hospital.  - If you have new or worsening pain related to the procedure.  - If you have concerning swelling at the procedure site.  - If you develop persistent nausea or vomiting.  - If you develop hives or a rash or any unexplained itching.  - If you have a fever of greater than 100.5  F and chills in the first 5 days after procedure.  - Any other concerns related to your procedure.      [facility]  Interventional Radiology (IR)  [address] [place] [address]    Contact Number:  [phone]  (IR control desk)  - Monday - Friday 8:00 am - 4:30 pm    After hours for urgent concerns:  [phone]  - After 4:30 pm Monday - Friday, Weekends and Holidays.   - Ask for Interventional Radiology on-call.  Someone is available 24 hours a day.  - [facility] toll free number:  [phone]      [facility]  Home Care Following Anesthesia  For 24 hours after surgery:  1. Get plenty of rest.  A responsible adult must stay with you for at least 24 hours after you leave the surgery center.  2. Do not drive or use heavy equipment.  If you have weakness or tingling, don't drive or use heavy equipment until this feeling goes away.   3. Do not drink alcohol.   4. Avoid strenuous or risky activities.  Ask for help when climbing stairs.  5. You may feel lightheaded.  IF so, sit for a few minutes before standing.  Have someone help you get  "up.   6. If you have nausea (feel sick to your stomach): Drink only clear liquids such as apple juice, ginger ale, broth or 7-Up.  Rest may also help.  Be sure to drink enough fluids.  Move to a regular diet as you feel able.   7. You may have a slight fever.  Call the doctor if your fever is over 100 F (37.7 C) (taken under the tongue) or lasts longer than 24 hours.  8. You may have a dry mouth, a sore throat, muscle aches or trouble sleeping. These should go away after 24 hours.  9. Do not make important or legal decisions.        Today you received a Marcaine or bupivacaine block to numb the nerves near your surgery site.  This is a block using local anesthetic or \"numbing\" medication injected around the nerves to anesthetize or \"numb\" the area supplied by those nerves.  This block is injected into the muscle layer near your surgical site.  The medication may numb the location where you had surgery for 6-18 hours, but may last up to 24 hours.  If your surgical site is an arm or leg you should be careful with your affected limb, since it is possible to injure your limb without being aware of it due to the numbing.  Until full feeling returns, you should guard against bumping or hitting your limb, and avoid extreme hot or cold temperatures on the skin.  As the block wears off, the feeling will return as a tingling or prickly sensation near your surgical site.  You will experience more discomfort from your incision as the feeling returns.  You may want to take a pain pill (a narcotic or Tylenol if this was prescribed by your surgeon) when you start to experience mild pain before the pain beccomes more severe.  If your pain medications do not control your pain you should notifiy your surgeon.    Tips for taking pain medications  To get the best pain relief possible, remember these points:    Take pain medications as directed, before pain becomes severe.    Pain medication can upset your stomach: taking it with food " may help.    Constipation is a common side effect of pain medication. Drink plenty of  fluids.    Eat foods high in fiber. Take a stool softener if recommended by your doctor or pharmacist.    Do not drink alcohol, drive or operate machinery while taking pain medications.    Ask about other ways to control pain, such as with heat, ice or relaxation.    Tylenol/Acetaminophen Consumption  To help encourage the safe use of acetaminophen, the makers of TYLENOL  have lowered the maximum daily dose for single-ingredient Extra Strength TYLENOL  (acetaminophen) products sold in the [place] from 8 pills per day (4,000 mg) to 6 pills per day (3,000 mg). The dosing interval has also changed from 2 pills every 4-6 hours to 2 pills every 6 hours.    If you feel your pain relief is insufficient, you may take Tylenol/Acetaminophen in addition to your narcotic pain medication.     Be careful not to exceed 3,000 mg of Tylenol/Acetaminophen in a 24 hour period from all sources.    If you are taking extra strength Tylenol/acetaminophen (500 mg), the maximum dose is 6 tablets in 24 hours.    If you are taking regular strength acetaminophen (325 mg), the maximum dose is 9 tablets in 24 hours.    Call a doctor for any of the followin. Signs of infection (fever, growing tenderness at the surgery site, a large amount of drainage or bleeding, severe pain, foul-smelling drainage, redness, swelling).  2. It has been over 8 to 10 hours since surgery and you are still not able to urinate (pass water).  3. Headache for over 24 hours.  4. Numbness, tingling or weakness the day after surgery (if you had spinal anesthesia).  Your doctor is:                         Or dial [phone] and ask for the resident on call for:  Interventional Radiology  For emergency care, call the:  [place] [facility]:  [phone] (TTY for hearing impaired: [phone])

## 2017-09-01 NOTE — ANESTHESIA CARE TRANSFER NOTE
Patient: Ashleigh Alonzo    Procedure(s):  Single Lumen Chest Power Port - Wound Class: I-Clean    Diagnosis: Malignant Neoplasm of Upper Outer Quadrant of Right Female Breast  Diagnosis Additional Information: No value filed.    Anesthesia Type:   No value filed.     Note:  Airway :Room Air  Patient transferred to:Phase II  Comments: Pt. Alert report given to RN.      Vitals: (Last set prior to Anesthesia Care Transfer)    CRNA VITALS  9/1/2017 0947 - 9/1/2017 1020      9/1/2017             Resp Rate (observed): (!)  1    Resp Rate (set): 10                Electronically Signed By: JENNIFER Keane CRNA  September 1, 2017  10:20 AM

## 2017-09-01 NOTE — ANESTHESIA PREPROCEDURE EVALUATION
Anesthesia Evaluation     .             ROS/MED HX    ENT/Pulmonary:     (+)sleep apnea, COPD, uses CPAP , . .    Neurologic:  - neg neurologic ROS     Cardiovascular:  - neg cardiovascular ROS       METS/Exercise Tolerance:     Hematologic:  - neg hematologic  ROS       Musculoskeletal:  - neg musculoskeletal ROS       GI/Hepatic:  - neg GI/hepatic ROS       Renal/Genitourinary:  - ROS Renal section negative       Endo:  - neg endo ROS       Psychiatric:  - neg psychiatric ROS       Infectious Disease:  - neg infectious disease ROS       Malignancy:      - no malignancy   Other:    - neg other ROS                 Physical Exam  Normal systems: cardiovascular, pulmonary and dental    Airway   Mallampati: I  TM distance: >3 FB  Neck ROM: full    Dental     Cardiovascular   Rhythm and rate: regular and normal      Pulmonary    breath sounds clear to auscultation                    Anesthesia Plan      History & Physical Review  History and physical reviewed and following examination; no interval change.    ASA Status:  2 .    NPO Status:  > 8 hours    Plan for MAC with Intravenous and Propofol induction. Maintenance will be TIVA.  Reason for MAC:  Deep or markedly invasive procedure (G8)  PONV prophylaxis:  Ondansetron (or other 5HT-3) and Dexamethasone or Solumedrol       Postoperative Care  Postoperative pain management:  Oral pain medications.      Consents  Anesthetic plan, risks, benefits and alternatives discussed with:  Patient..                          .

## 2017-09-01 NOTE — IP AVS SNAPSHOT
MRN:[id number]                      After Visit Summary   9/1/2017    Ashleigh Alonzo    MRN: [id number]           Thank you!     Thank you for choosing [facility] for your care. Our goal is always to provide you with excellent care. Hearing back from our patients is one way we can continue to improve our services. Please take a few minutes to complete the written survey that you may receive in the mail after you visit with us. Thank you!        Patient Information     Date Of Birth          1960        About your hospital stay     You were admitted on:  September 1, 2017 You last received care in the:  [facility]    You were discharged on:  September 1, 2017       Who to Call     For medical emergencies, please call 911.  For non-urgent questions about your medical care, please call your primary care provider or clinic, [phone]  For questions related to your surgery, please call your surgery clinic        Attending Provider     Provider Specialty    Leif Parkinson PA-C Radiology       Primary Care Provider Office Phone # Fax #    Radha Cazares [phone]      Your next 10 appointments already scheduled     Sep 01, 2017 11:00 AM CDT   [place] with [id number]   [facility] Echo ([facility])    [address] [place] [id number] [phone]           1.  Please bring or wear a comfortable two-piece outfit. 2.  You may eat, drink and take your normal medicines. 3.  For any questions that cannot be answered, please contact the ordering physician            Sep 01, 2017 12:10 PM CDT   (Arrive by 11:55 AM)   XR CHEST 2 VIEWS with [id number]   [facility] Center Xray ([facility] [place])    [address] [place] [id number] [phone]           Please bring a list of your current medicines to your exam. (Include vitamins, minerals and  over-thecounter medicines.) Leave your valuables at home.  Tell your doctor if there is a chance you may be pregnant.  You do not need to do anything special for this exam.            Sep 05, 2017 10:00 AM CDT   MR BREAST BILATERAL W/O & W CONTRAST with [id number]   [facility] ([facility])    [address] [facility] [facility] [address]   [phone]           Take your medicines as usual, unless your doctor tells you not to. Bring a list of your current medicines to your exam (including vitamins, minerals and over-the-counter drugs).  The timing of your exam may depend on the start of your last period. If you re in menopause, you may have the exam anytime.  Please bring any previous mammograms or breast MRIs from other facilities to the MRI dept. Do not mail these items to us.  You will have contrast for this exam. To prepare:   The day before your exam, drink extra fluids at least six 8-ounce glasses (unless your doctor tells you to restrict your fluids).   Have a blood test (creatinine test) within 30 days of your exam. Go to your clinic or Diagnostic Imaging Department for this test.  The MRI machine uses a strong magnet. Please wear clothes without metal (snaps, zippers). A sweatsuit works well, or we may give you a hospital gown.  Please remove any body piercings and hair extensions before you arrive. You will also remove watches, jewelry, hairpins, wallets, dentures, partial dental plates and hearing aids. You may wear contact lenses, and you may be able to wear your rings. We have a safe place to keep your personal items, but it is safer to leave them at home.   **IMPORTANT** THE INSTRUCTIONS BELOW ARE ONLY FOR THOSE PATIENTS WHO HAVE BEEN TOLD THEY WILL RECEIVE SEDATION OR GENERAL ANESTHESIA DURING THEIR MRI PROCEDURE:  IF YOU WILL RECEIVE SEDATION (take medicine to help you relax during your exam)   You must get the medicine from your doctor before you arrive. Bring the  medicine to the exam. Do not take it at home.   Arrive one hour early. Bring someone who can take you home after the test. Your medicine will make you sleepy. After the exam, you may not drive, take a bus or take a taxi by yourself.   No eating 8 hours before your exam. You may have clear liquids up until 4 hours before your exam. (Clear liquids include water, clear tea, black coffee and fruit juice without pulp.)  IF YOU WILL RECEIVE ANESTHESIA (be asleep for your exam)   Arrive 1 1/2 hours early. Bring someone who can take you home after the test. You may not drive, take a bus or take a taxi by yourself.   No eating 8 hours before your exam. You may have clear liquids up until 4 hours before your exam. (Clear liquids include water, clear tea, black coffee and fruit juice without pulp.)  If you have any questions, please contact your Imaging Department exam site.            Sep 05, 2017 12:15 PM CDT   [place] Lab Draw with  [facility]AS[facility] LAB DRAW   [facility] Lab Draw ([facility])    [address] [place] [id number] [phone]            Sep 05, 2017 12:30 PM CDT   Nurse Visit with  Oncology Nurse   [facility] ([facility])    [address] [place] [id number] [phone]            Sep 05, 2017  1:00 PM CDT   US BREAST BIOPSY CORE NEEDLE RIGHT with  Breast Rad, [email or web address],  BREAST NURSE   [facility] Imaging ([facility])    [address] [place] [id number] [phone]           Tell us in advance if there s any chance you may be pregnant.  Bring a list of your medicines to the exam. Include vitamins, minerals and over-the-counter drugs.  If you take blood thinners, you may need to stop taking them a few days before treatment. Talk to your doctor before stopping these medicines. You will need a blood test the morning of  your exam.   Stop taking Coumadin (warfarin) 3 days before your exam. Restart the day after your exam.   If you take aspirin, you may need to stop taking it 3 days before your scan.   If you take Plavix, Ticlid, Pletal or Persantine, you may need to stop taking them 5 days before your scan. Please talk to your doctor before stopping these medicines.  If you will receive sedation for this test (medicine to help you relax):   See your family doctor for an exam within 30 days of treatment.   Plan for an adult to drive you home and stay with you for at least 6 hours.   Follow the eating and drinking guidelines checked below:   No eating or drinking for 4 hours before your test. You may take medicine with small sips of water.   If you have diabetes:If you take insulin, call your diabetes care team. Do not take diabetes pills on the morning of your test. If you take metformin (Avandamet, Glucophage, Glucovance, Metaglip) and received contrast, wait 48 hours before re-starting this medicine.  Please call the Imaging Department at your exam site with any questions.            Sep 05, 2017  2:30 PM CDT   [facility] Visit with Zonia Singh MD   [facility]    [address] [place] [id number] [phone]              Further instructions from your care team         A collaboration between [facility] and [facility]  Experts in minimally invasive, targeted treatments performed using imaging guidance    Venous Access Device,  Port Catheter or Tunneled Central Line Placement    Today you had a procedure today to install a venous access device; either a tunneled central vein catheter or a subcutaneous port catheter.    One of our Radiology PAs performed this procedure for you today:  ? Aaron Cole PA-C  ? NANCI Maxwell PA-C  ? Dillon Monte PA-C  ? Deepthi Dewey PA-C   ? Lloyd Tinajero PA-C    After  you go home:  - Drink plenty of fluids.  Generally 6-8 (8 ounce) glasses a day is recommended.  - Resume your regular diet unless otherwise ordered by a medical provider.  - Keep any applied tape/gauze dressings clean and dry.  Change tape/gauze dressings if they get wet or soiled.  - You may shower the following day after procedure, however cover and protect from moisture any tape/gauze dressings.  You may let water hit and run over dried skin glue, but do not scrub.  Pat the area dry after showering.  - Port placement incisions are closed with absorbable suture, meaning they do not need to be removed at a later date, and a topical skin adhesive (skin glue).  This glue will wear off in 7-14 days.  Do not remove before this time.  If 14 days have passed and residual glue is present, you may gently remove it.  - Do not apply gels, lotions, or ointments to the glue site for the first 10 days as this may cause the glue to prematurely soften and fail.  - Do not perform strenuous activities or lift greater than 10 pounds for the next three days.  - If there is bleeding or oozing from the procedure site, apply firm pressure to the area for 5-10 minutes.  If the bleeding continues seek medical advice at the numbers below.  - Mild procedure site discomfort can be treated with an ice pack and over-the-counter pain relievers.        For 24 hours after any sedation used:  - Relax and take it easy.  No strenuous activities.  - Do not drive or operate machines at home or at work.  - No alcohol consumption.  - Do not make any important or legal decisions.    Call our Interventional Radiology (IR) service if:  - If you start bleeding from the procedure site.  If you do start to bleed from the site, lie down and hold some pressure on the site.  Our radiology provider can help you decide if you need to return to the hospital.  - If you have new or worsening pain related to the procedure.  - If you have concerning swelling at the  procedure site.  - If you develop persistent nausea or vomiting.  - If you develop hives or a rash or any unexplained itching.  - If you have a fever of greater than 100.5  F and chills in the first 5 days after procedure.  - Any other concerns related to your procedure.      [facility] [place]  Interventional Radiology (IR)  [address]  2nd Floor, [facility] [place] [address]    Contact Number:  [phone]  (IR control desk)  - Monday - Friday 8:00 am - 4:30 pm    After hours for urgent concerns:  [phone]  - After 4:30 pm Monday - Friday, Weekends and Holidays.   - Ask for Interventional Radiology on-call.  Someone is available 24 hours a day.  - [facility] toll free number:  [phone]      [facility]  Home Care Following Anesthesia  For 24 hours after surgery:  1. Get plenty of rest.  A responsible adult must stay with you for at least 24 hours after you leave the surgery center.  2. Do not drive or use heavy equipment.  If you have weakness or tingling, don't drive or use heavy equipment until this feeling goes away.   3. Do not drink alcohol.   4. Avoid strenuous or risky activities.  Ask for help when climbing stairs.  5. You may feel lightheaded.  IF so, sit for a few minutes before standing.  Have someone help you get up.   6. If you have nausea (feel sick to your stomach): Drink only clear liquids such as apple juice, ginger ale, broth or 7-Up.  Rest may also help.  Be sure to drink enough fluids.  Move to a regular diet as you feel able.   7. You may have a slight fever.  Call the doctor if your fever is over 100 F (37.7 C) (taken under the tongue) or lasts longer than 24 hours.  8. You may have a dry mouth, a sore throat, muscle aches or trouble sleeping. These should go away after 24 hours.  9. Do not make important or legal decisions.        Today you received a Marcaine or bupivacaine block to numb the nerves near your  "surgery site.  This is a block using local anesthetic or \"numbing\" medication injected around the nerves to anesthetize or \"numb\" the area supplied by those nerves.  This block is injected into the muscle layer near your surgical site.  The medication may numb the location where you had surgery for 6-18 hours, but may last up to 24 hours.  If your surgical site is an arm or leg you should be careful with your affected limb, since it is possible to injure your limb without being aware of it due to the numbing.  Until full feeling returns, you should guard against bumping or hitting your limb, and avoid extreme hot or cold temperatures on the skin.  As the block wears off, the feeling will return as a tingling or prickly sensation near your surgical site.  You will experience more discomfort from your incision as the feeling returns.  You may want to take a pain pill (a narcotic or Tylenol if this was prescribed by your surgeon) when you start to experience mild pain before the pain beccomes more severe.  If your pain medications do not control your pain you should notifiy your surgeon.    Tips for taking pain medications  To get the best pain relief possible, remember these points:    Take pain medications as directed, before pain becomes severe.    Pain medication can upset your stomach: taking it with food may help.    Constipation is a common side effect of pain medication. Drink plenty of  fluids.    Eat foods high in fiber. Take a stool softener if recommended by your doctor or pharmacist.    Do not drink alcohol, drive or operate machinery while taking pain medications.    Ask about other ways to control pain, such as with heat, ice or relaxation.    Tylenol/Acetaminophen Consumption  To help encourage the safe use of acetaminophen, the makers of TYLENOL  have lowered the maximum daily dose for single-ingredient Extra Strength TYLENOL  (acetaminophen) products sold in the [place] from 8 pills per day (4,000 mg) to " "6 pills per day (3,000 mg). The dosing interval has also changed from 2 pills every 4-6 hours to 2 pills every 6 hours.    If you feel your pain relief is insufficient, you may take Tylenol/Acetaminophen in addition to your narcotic pain medication.     Be careful not to exceed 3,000 mg of Tylenol/Acetaminophen in a 24 hour period from all sources.    If you are taking extra strength Tylenol/acetaminophen (500 mg), the maximum dose is 6 tablets in 24 hours.    If you are taking regular strength acetaminophen (325 mg), the maximum dose is 9 tablets in 24 hours.    Call a doctor for any of the followin. Signs of infection (fever, growing tenderness at the surgery site, a large amount of drainage or bleeding, severe pain, foul-smelling drainage, redness, swelling).  2. It has been over 8 to 10 hours since surgery and you are still not able to urinate (pass water).  3. Headache for over 24 hours.  4. Numbness, tingling or weakness the day after surgery (if you had spinal anesthesia).  Your doctor is:                         Or dial [phone] and ask for the resident on call for:  Interventional Radiology  For emergency care, call the:  [facility]:  [phone] (TTY for hearing impaired: [phone])          Pending Results     Date and Time Order Name Status Description    2017 0834 IR CHEST PORT PLACEMENT > 5 YRS OF AGE In process             Admission Information     Date & Time Provider Department Dept. Phone    2017 Leif Parkinson PA-C [facility] [phone]      Your Vitals Were     Blood Pressure Pulse Temperature Respirations Height Weight    136/83 81 98.2  F (36.8  C) (Oral) 18 1.6 m (5' 2.99\") 78.3 kg (172 lb 11.2 oz)    Pulse Oximetry BMI (Body Mass Index)                94% 30.6 kg/m2          [employer] [employer] Information     [employer] gives you secure access to your electronic health record. If you see a primary care provider, you can also " send messages to your care team and make appointments. If you have questions, please call your primary care clinic.  If you do not have a primary care provider, please call [phone] and they will assist you.      [employer] is an electronic gateway that provides easy, online access to your medical records. With [employer], you can request a clinic appointment, read your test results, renew a prescription or communicate with your care team.     To access your existing account, please contact your [facility] or call [phone] for assistance.        Care EveryWhere ID     This is your Care EveryWhere ID. This could be used by other organizations to access your [facility] medical records  [id number]        Equal Access to Services     THERESA HUGHES : Abdifatah Granda, kaden clancy, terrence lopez, bang pickering. So [facility] [phone].    ATENCIÓN: Si habla español, tiene a blackman disposición servicios gratuitos de asistencia lingüística. Llame al [phone].    We comply with applicable federal civil rights laws and [place] laws. We do not discriminate on the basis of race, color, national origin, age, disability sex, sexual orientation or gender identity.               Review of your medicines      CONTINUE these medicines which have NOT CHANGED        Dose / Directions    albuterol 108 (90 BASE) MCG/ACT Inhaler   Commonly known as:  PROAIR HFA/PROVENTIL HFA/VENTOLIN HFA   Used for:  Chronic obstructive pulmonary disease, unspecified COPD type (H)        Dose:  2 puff   Inhale 2 puffs into the lungs every 6 hours as needed for shortness of breath / dyspnea   Quantity:  1 Inhaler   Refills:  5       aspirin 81 MG tablet        Dose:  1 tablet   Take 1 tablet by mouth daily.   Refills:  3       citalopram 10 MG tablet   Commonly known as:  celeXA   Used for:  Anxiety, Depression, unspecified depression type        Dose:  10 mg   Take 1  tablet (10 mg) by mouth daily   Quantity:  90 tablet   Refills:  3       CO Q 10 PO        Dose:  1 tablet   Take 1 tablet by mouth   Refills:  0       darifenacin 7.5 MG 24 hr tablet   Commonly known as:  ENABLEX   Used for:  Overactive bladder        Dose:  7.5 mg   Take 1 tablet (7.5 mg) by mouth daily   Quantity:  90 tablet   Refills:  3       guaiFENesin 600 MG 12 hr tablet   Commonly known as:  MUCINEX   Used for:  Cough with sputum        Dose:  1200 mg   Take 2 tablets (1,200 mg) by mouth 2 times daily   Quantity:  180 tablet   Refills:  6       hydrOXYzine 25 MG tablet   Commonly known as:  ATARAX   Used for:  Hives        Take 1-2 tablets (25-50 mg) by mouth every 6 hours as needed for itching   Quantity:  100 tablet   Refills:  2       LORazepam 0.5 MG tablet   Commonly known as:  ATIVAN   Used for:  Breast cancer (H), Anxiety        Take 1-2 tabs 20 minutes prior to MRI scans.   Quantity:  10 tablet   Refills:  0       MULTIPLE VITAMIN PO        Refills:  0       order for DME   Used for:  MERLIN (obstructive sleep apnea)        Respir[employer]al [employer] Dream Station Auto CPAP 12-18 cm, F&P Simplus FFM small.   Refills:  0       simvastatin 40 MG tablet   Commonly known as:  ZOCOR   Used for:  Hyperlipidemia LDL goal <130        Dose:  40 mg   Take 1 tablet (40 mg) by mouth At Bedtime   Quantity:  90 tablet   Refills:  4       tiotropium 18 MCG capsule   Commonly known as:  SPIRIVA   Used for:  Chronic obstructive pulmonary disease, unspecified COPD type (H)        Dose:  18 mcg   Inhale 1 capsule (18 mcg) into the lungs daily Inhale contents of one capsule   Quantity:  1 capsule   Refills:  11       VITAMIN B 12 PO        Dose:  500 mcg   Take 500 mcg by mouth daily   Refills:  0       VITAMIN B6 PO        Dose:  1 tablet   Take 1 tablet by mouth daily.   Refills:  0       vitamin D 1000 UNITS capsule        2 CAPSULE DAILY   Refills:  0                Protect others around you: Learn how to safely use, store and throw  away your medicines at www.disposemymeds.org.             Medication List: This is a list of all your medications and when to take them. Check marks below indicate your daily home schedule. Keep this list as a reference.      Medications           Morning Afternoon Evening Bedtime As Needed    albuterol 108 (90 BASE) MCG/ACT Inhaler   Commonly known as:  PROAIR HFA/PROVENTIL HFA/VENTOLIN HFA   Inhale 2 puffs into the lungs every 6 hours as needed for shortness of breath / dyspnea                                aspirin 81 MG tablet   Take 1 tablet by mouth daily.                                citalopram 10 MG tablet   Commonly known as:  celeXA   Take 1 tablet (10 mg) by mouth daily                                CO Q 10 PO   Take 1 tablet by mouth                                darifenacin 7.5 MG 24 hr tablet   Commonly known as:  ENABLEX   Take 1 tablet (7.5 mg) by mouth daily                                guaiFENesin 600 MG 12 hr tablet   Commonly known as:  MUCINEX   Take 2 tablets (1,200 mg) by mouth 2 times daily                                hydrOXYzine 25 MG tablet   Commonly known as:  ATARAX   Take 1-2 tablets (25-50 mg) by mouth every 6 hours as needed for itching                                LORazepam 0.5 MG tablet   Commonly known as:  ATIVAN   Take 1-2 tabs 20 minutes prior to MRI scans.                                MULTIPLE VITAMIN PO                                order for [facility]   RespirBitex.las Dream Station Auto CPAP 12-18 cm, F&P Simplus FFM small.                                simvastatin 40 MG tablet   Commonly known as:  ZOCOR   Take 1 tablet (40 mg) by mouth At Bedtime                                tiotropium 18 MCG capsule   Commonly known as:  SPIRIVA   Inhale 1 capsule (18 mcg) into the lungs daily Inhale contents of one capsule                                VITAMIN B 12 PO   Take 500 mcg by mouth daily                                VITAMIN B6 PO   Take 1 tablet by mouth daily.                                 vitamin D 1000 UNITS capsule   2 CAPSULE DAILY

## 2017-09-01 NOTE — IP AVS SNAPSHOT
[facility]    [address] [place] [id number]    Phone:  [phone]    Fax:  [phone]                                       After Visit Summary   9/1/2017    Ashleigh Alonzo    MRN: [id number]           After Visit Summary Signature Page     I have received my discharge instructions, and my questions have been answered. I have discussed any challenges I see with this plan with the nurse or doctor.    ..........................................................................................................................................  Patient/Patient Representative Signature      ..........................................................................................................................................  Patient Representative Print Name and Relationship to Patient    ..................................................               ................................................  Date                                            Time    ..........................................................................................................................................  Reviewed by Signature/Title    ...................................................              ..............................................  Date                                                            Time

## 2017-09-01 NOTE — PROCEDURES
Interventional Radiology Brief Post Procedure Note    Procedure:  IR CHEST PORT PLACEMENT > 5 YRS OF AGE [[id number]]    Proceduralist: Leif Parkinson PA-C    Assistant: None    Time Out: Prior to the start of the procedure and with procedural staff participation, I verbally confirmed the patient s identity using two indicators, relevant allergies, that the procedure was appropriate and matched the consent or emergent situation, and that the correct equipment/implants were available. Immediately prior to starting the procedure I conducted the Time Out with the procedural staff and re-confirmed the patient s name, procedure, and site/side. (The Joint Commission universal protocol was followed.)  Yes        Sedation: Monitored Anesthesia Care (MAC) administered and documented by Anesthesia Care Provider    Findings: Completed placement of an 8 Syriac 27 cm, Bard ClearVUE isp brand, single lumen venous chest PowerPort via LIJV (RIGHT breast cancer). Tip lying in the right atrium. PORT is ready for immediate use. Dx: Malignant neoplasm of upper-inner quadrant of right breast in female, estrogen receptor negative. Iggy. MAC 5.4    Estimated Blood Loss: Minimal    Fluoroscopy Time:  minute(s)    SPECIMENS: None    Complications: 1. None     Condition: Stable    Plan:     Comments: See dictated procedure note for full details.    Leif Parkinson PA-C

## 2017-09-05 ENCOUNTER — ALLIED HEALTH/NURSE VISIT (OUTPATIENT)
Dept: ONCOLOGY | Facility: CLINIC | Age: 57
End: 2017-09-05
Attending: INTERNAL MEDICINE
Payer: COMMERCIAL

## 2017-09-05 ENCOUNTER — RESEARCH ENCOUNTER (OUTPATIENT)
Dept: ONCOLOGY | Facility: CLINIC | Age: 57
End: 2017-09-05

## 2017-09-05 DIAGNOSIS — C50.919 BREAST CANCER (H): Primary | ICD-10-CM

## 2017-09-05 DIAGNOSIS — Z17.1 MALIGNANT NEOPLASM OF UPPER-INNER QUADRANT OF RIGHT BREAST IN FEMALE, ESTROGEN RECEPTOR NEGATIVE (H): ICD-10-CM

## 2017-09-05 DIAGNOSIS — C50.211 MALIGNANT NEOPLASM OF UPPER-INNER QUADRANT OF RIGHT BREAST IN FEMALE, ESTROGEN RECEPTOR NEGATIVE (H): ICD-10-CM

## 2017-09-05 LAB
ALBUMIN SERPL-MCNC: 3.6 G/DL (ref 3.4–5)
ALBUMIN SERPL-MCNC: 3.6 G/DL (ref 3.4–5)
ALBUMIN UR-MCNC: NEGATIVE MG/DL
ALP SERPL-CCNC: 146 U/L (ref 40–150)
ALP SERPL-CCNC: 148 U/L (ref 40–150)
ALT SERPL W P-5'-P-CCNC: 49 U/L (ref 0–50)
ALT SERPL W P-5'-P-CCNC: 50 U/L (ref 0–50)
ANION GAP SERPL CALCULATED.3IONS-SCNC: 8 MMOL/L (ref 3–14)
APPEARANCE UR: CLEAR
APTT PPP: 28 SEC (ref 22–37)
AST SERPL W P-5'-P-CCNC: 33 U/L (ref 0–45)
AST SERPL W P-5'-P-CCNC: 34 U/L (ref 0–45)
BASOPHILS # BLD AUTO: 0 10E9/L (ref 0–0.2)
BASOPHILS NFR BLD AUTO: 0.4 %
BILIRUB DIRECT SERPL-MCNC: 0.1 MG/DL (ref 0–0.2)
BILIRUB SERPL-MCNC: 0.4 MG/DL (ref 0.2–1.3)
BILIRUB SERPL-MCNC: 0.5 MG/DL (ref 0.2–1.3)
BILIRUB UR QL STRIP: NEGATIVE
BUN SERPL-MCNC: 18 MG/DL (ref 7–30)
CALCIUM SERPL-MCNC: 9.1 MG/DL (ref 8.5–10.1)
CHLORIDE SERPL-SCNC: 104 MMOL/L (ref 94–109)
CO2 SERPL-SCNC: 25 MMOL/L (ref 20–32)
COLOR UR AUTO: ABNORMAL
CREAT SERPL-MCNC: 0.74 MG/DL (ref 0.52–1.04)
DIFFERENTIAL METHOD BLD: ABNORMAL
EOSINOPHIL # BLD AUTO: 0.5 10E9/L (ref 0–0.7)
EOSINOPHIL NFR BLD AUTO: 6.1 %
ERYTHROCYTE [DISTWIDTH] IN BLOOD BY AUTOMATED COUNT: 11.9 % (ref 10–15)
GFR SERPL CREATININE-BSD FRML MDRD: 81 ML/MIN/1.7M2
GLUCOSE SERPL-MCNC: 96 MG/DL (ref 70–99)
GLUCOSE UR STRIP-MCNC: NEGATIVE MG/DL
HBA1C MFR BLD: 5.4 % (ref 4.3–6)
HCT VFR BLD AUTO: 40.4 % (ref 35–47)
HGB BLD-MCNC: 14.2 G/DL (ref 11.7–15.7)
HGB UR QL STRIP: NEGATIVE
IMM GRANULOCYTES # BLD: 0 10E9/L (ref 0–0.4)
IMM GRANULOCYTES NFR BLD: 0.3 %
INR PPP: 0.94 (ref 0.86–1.14)
KETONES UR STRIP-MCNC: NEGATIVE MG/DL
LEUKOCYTE ESTERASE UR QL STRIP: ABNORMAL
LYMPHOCYTES # BLD AUTO: 2 10E9/L (ref 0.8–5.3)
LYMPHOCYTES NFR BLD AUTO: 27.5 %
MAGNESIUM SERPL-MCNC: 2.1 MG/DL (ref 1.6–2.3)
MCH RBC QN AUTO: 33.5 PG (ref 26.5–33)
MCHC RBC AUTO-ENTMCNC: 35.1 G/DL (ref 31.5–36.5)
MCV RBC AUTO: 95 FL (ref 78–100)
MONOCYTES # BLD AUTO: 0.6 10E9/L (ref 0–1.3)
MONOCYTES NFR BLD AUTO: 8.3 %
NEUTROPHILS # BLD AUTO: 4.3 10E9/L (ref 1.6–8.3)
NEUTROPHILS NFR BLD AUTO: 57.4 %
NITRATE UR QL: NEGATIVE
NRBC # BLD AUTO: 0 10*3/UL
NRBC BLD AUTO-RTO: 0 /100
PH UR STRIP: 5 PH (ref 5–7)
PLATELET # BLD AUTO: 296 10E9/L (ref 150–450)
POTASSIUM SERPL-SCNC: 4 MMOL/L (ref 3.4–5.3)
PROT SERPL-MCNC: 7.8 G/DL (ref 6.8–8.8)
PROT SERPL-MCNC: 7.8 G/DL (ref 6.8–8.8)
RBC # BLD AUTO: 4.24 10E12/L (ref 3.8–5.2)
RBC #/AREA URNS AUTO: 2 /HPF (ref 0–2)
SODIUM SERPL-SCNC: 136 MMOL/L (ref 133–144)
SOURCE: ABNORMAL
SP GR UR STRIP: 1.01 (ref 1–1.03)
SQUAMOUS #/AREA URNS AUTO: <1 /HPF (ref 0–1)
TSH SERPL DL<=0.005 MIU/L-ACNC: 2.11 MU/L (ref 0.4–4)
UROBILINOGEN UR STRIP-MCNC: 0 MG/DL (ref 0–2)
WBC # BLD AUTO: 7.4 10E9/L (ref 4–11)
WBC #/AREA URNS AUTO: 6 /HPF (ref 0–2)

## 2017-09-05 PROCEDURE — 85610 PROTHROMBIN TIME: CPT | Performed by: INTERNAL MEDICINE

## 2017-09-05 PROCEDURE — 93010 ELECTROCARDIOGRAM REPORT: CPT | Performed by: INTERNAL MEDICINE

## 2017-09-05 PROCEDURE — 85025 COMPLETE CBC W/AUTO DIFF WBC: CPT | Performed by: INTERNAL MEDICINE

## 2017-09-05 PROCEDURE — 83735 ASSAY OF MAGNESIUM: CPT | Performed by: INTERNAL MEDICINE

## 2017-09-05 PROCEDURE — 80076 HEPATIC FUNCTION PANEL: CPT | Performed by: INTERNAL MEDICINE

## 2017-09-05 PROCEDURE — 84443 ASSAY THYROID STIM HORMONE: CPT | Performed by: INTERNAL MEDICINE

## 2017-09-05 PROCEDURE — 80053 COMPREHEN METABOLIC PANEL: CPT | Performed by: INTERNAL MEDICINE

## 2017-09-05 PROCEDURE — 93005 ELECTROCARDIOGRAM TRACING: CPT

## 2017-09-05 PROCEDURE — 85730 THROMBOPLASTIN TIME PARTIAL: CPT | Performed by: INTERNAL MEDICINE

## 2017-09-05 PROCEDURE — 36415 COLL VENOUS BLD VENIPUNCTURE: CPT

## 2017-09-05 PROCEDURE — 81001 URINALYSIS AUTO W/SCOPE: CPT | Performed by: INTERNAL MEDICINE

## 2017-09-05 PROCEDURE — 83036 HEMOGLOBIN GLYCOSYLATED A1C: CPT | Performed by: INTERNAL MEDICINE

## 2017-09-05 NOTE — NURSING NOTE
Patient was seen for a triplicate EKG per Protocol.    This writer performed the EKG's.    Ligia RICOA

## 2017-09-05 NOTE — MR AVS SNAPSHOT
After Visit Summary   9/5/2017    Ashleigh Alonzo    MRN: [id number]           Patient Information     Date Of Birth          1960        Visit Information        Provider Department      9/5/2017 12:30 PM Nurse,  Oncology [facility]        Today's Diagnoses     Breast cancer (H)    -  1       Follow-ups after your visit        Your next 10 appointments already scheduled     Sep 05, 2017 12:30 PM CDT   Nurse Visit with  Oncology Nurse   [facility] ([facility])    [address] [place] [id number] [phone]            Sep 05, 2017  1:00 PM CDT   US BREAST BIOPSY CORE NEEDLE RIGHT with  Breast Rad, UCBCUS1,  BREAST NURSE   [facility] ([facility])    [address] [place] [id number] [phone]           Tell us in advance if there s any chance you may be pregnant.  Bring a list of your medicines to the exam. Include vitamins, minerals and over-the-counter drugs.  If you take blood thinners, you may need to stop taking them a few days before treatment. Talk to your doctor before stopping these medicines. You will need a blood test the morning of your exam.   Stop taking Coumadin (warfarin) 3 days before your exam. Restart the day after your exam.   If you take aspirin, you may need to stop taking it 3 days before your scan.   If you take Plavix, Ticlid, Pletal or Persantine, you may need to stop taking them 5 days before your scan. Please talk to your doctor before stopping these medicines.  If you will receive sedation for this test (medicine to help you relax):   See your family doctor for an exam within 30 days of treatment.   Plan for an adult to drive you home and stay with you for at least 6 hours.   Follow the eating and drinking guidelines checked below:   No eating or drinking for 4 hours before your test. You may take  medicine with small sips of water.   If you have diabetes:If you take insulin, call your diabetes care team. Do not take diabetes pills on the morning of your test. If you take metformin (Avandamet, Glucophage, Glucovance, Metaglip) and received contrast, wait 48 hours before re-starting this medicine.  Please call the Imaging Department at your exam site with any questions.            Sep 05, 2017  2:30 PM CDT   Return Visit with Zonia Singh MD   [facility] ([facility])    [id number] [place] [id number] [phone]              Who to contact     If you have questions or need follow up information about today's clinic visit or your schedule please contact [facility] directly at [phone].  Normal or non-critical lab and imaging results will be communicated to you by MyChart, letter or phone within 4 business days after the clinic has received the results. If you do not hear from us within 7 days, please contact the clinic through [employer] or phone. If you have a critical or abnormal lab result, we will notify you by phone as soon as possible.  Submit refill requests through [employer] or call your pharmacy and they will forward the refill request to us. Please allow 3 business days for your refill to be completed.          Additional Information About Your Visit        [employer] Information     [employer] gives you secure access to your electronic health record. If you see a primary care provider, you can also send messages to your care team and make appointments. If you have questions, please call your primary care clinic.  If you do not have a primary care provider, please call [phone] and they will assist you.        Care EveryWhere ID     This is your Care EveryWhere ID. This could be used by other organizations to access your [facility] medical records  [id number]         Blood Pressure from Last 3 Encounters:   09/01/17  115/61   08/28/17 141/82   07/27/17 128/82    Weight from Last 3 Encounters:   09/01/17 78.3 kg (172 lb 11.2 oz)   08/28/17 78.3 kg (172 lb 11.2 oz)   07/27/17 78.5 kg (173 lb)              Today, you had the following     No orders found for display       Primary Care Provider Office Phone # Fax #    Radha Cazares [phone]       [address] [address]        Equal Access to Services     [facility] : Hadii candie Granda, waenrico clancy, terrence kaalmajacqui lopez, bang duran . So [facility] [phone].    ATENCIÓN: Si habla español, tiene a blackman disposición servicios gratuitos de asistencia lingüística. [facility] [phone].    We comply with applicable federal civil rights laws and [place] laws. We do not discriminate on the basis of race, color, national origin, age, disability sex, sexual orientation or gender identity.            Thank you!     Thank you for choosing [facility]  for your care. Our goal is always to provide you with excellent care. Hearing back from our patients is one way we can continue to improve our services. Please take a few minutes to complete the written survey that you may receive in the mail after your visit with us. Thank you!             Your Updated Medication List - Protect others around you: Learn how to safely use, store and throw away your medicines at www.disposemymeds.org.          This list is accurate as of: 9/5/17 12:18 PM.  Always use your most recent med list.                   Brand Name Dispense Instructions for use Diagnosis    albuterol 108 (90 BASE) MCG/ACT Inhaler    PROAIR HFA/PROVENTIL HFA/VENTOLIN HFA    1 Inhaler    Inhale 2 puffs into the lungs every 6 hours as needed for shortness of breath / dyspnea    Chronic obstructive pulmonary disease, unspecified COPD type (H)       aspirin 81 MG tablet      Take 1 tablet by mouth daily.         citalopram 10 MG tablet    celeXA    90 tablet    Take 1 tablet (10 mg) by mouth daily    Anxiety, Depression, unspecified depression type       CO Q 10 PO      Take 1 tablet by mouth        darifenacin 7.5 MG 24 hr tablet    ENABLEX    90 tablet    Take 1 tablet (7.5 mg) by mouth daily    Overactive bladder       guaiFENesin 600 MG 12 hr tablet    MUCINEX    180 tablet    Take 2 tablets (1,200 mg) by mouth 2 times daily    Cough with sputum       hydrOXYzine 25 MG tablet    ATARAX    100 tablet    Take 1-2 tablets (25-50 mg) by mouth every 6 hours as needed for itching    Hives       LORazepam 0.5 MG tablet    ATIVAN    10 tablet    Take 1-2 tabs 20 minutes prior to MRI scans.    Breast cancer (H), Anxiety       MULTIPLE VITAMIN PO           order for [facility]      [employer] Dream Station Auto CPAP 12-18 cm, F&P Simplus FFM small.    MERLIN (obstructive sleep apnea)       simvastatin 40 MG tablet    ZOCOR    90 tablet    Take 1 tablet (40 mg) by mouth At Bedtime    Hyperlipidemia LDL goal <130       tiotropium 18 MCG capsule    SPIRIVA    1 capsule    Inhale 1 capsule (18 mcg) into the lungs daily Inhale contents of one capsule    Chronic obstructive pulmonary disease, unspecified COPD type (H)       VITAMIN B 12 PO      Take 500 mcg by mouth daily        VITAMIN B6 PO      Take 1 tablet by mouth daily.        vitamin D 1000 UNITS capsule      2 CAPSULE DAILY

## 2017-09-05 NOTE — NURSING NOTE
Research Note for ISPY2 Trial: Consenting process completed prior to MRI scan this morning due to sedation for port on 9/1/17. Patient had reviewed screen consent after my phone review of the protocol, procedures and potential risk and benefits by phone on 8/31/17. Patient demonstrated good comprehension and asked appropriate questions for understanding this protocol. All her and her 's questions were answered and she wants to sign the screen consent version 3/9/2017 and [employer] version 1/25/16 and a related HIPAA forms.  Copy given to patient and copy prepared for scanning into EMR. Patient will proceed today with screening procedures.  Luzmaria Geiger RN/ Research [phone]

## 2017-09-06 NOTE — TELEPHONE ENCOUNTER
She can hold of on CT-she should follow recommendation of Oncology team. We should renew inhalers as needed.    May be we should have a follow up before the end of the year.    Zonia Singh M.D.  Pulmonary/Critical Care/Sleep Medicine

## 2017-09-07 NOTE — TELEPHONE ENCOUNTER
Patient notified of the information below that she needs to follow the Oncology team recommendations   Patient states she has a follow up in November with Dr Singh that she will keep

## 2017-09-19 DIAGNOSIS — Z85.3 PERSONAL HISTORY OF MALIGNANT NEOPLASM OF BREAST: Primary | ICD-10-CM

## 2017-09-19 RX ORDER — DEXAMETHASONE SODIUM PHOSPHATE 10 MG/ML
10 INJECTION, SOLUTION INTRAMUSCULAR; INTRAVENOUS
Status: CANCELLED | OUTPATIENT
Start: 2017-09-20

## 2017-09-19 RX ORDER — MEPERIDINE HYDROCHLORIDE 25 MG/ML
25 INJECTION INTRAMUSCULAR; INTRAVENOUS; SUBCUTANEOUS EVERY 30 MIN PRN
Status: CANCELLED | OUTPATIENT
Start: 2017-09-20

## 2017-09-19 RX ORDER — METHYLPREDNISOLONE SODIUM SUCCINATE 125 MG/2ML
125 INJECTION, POWDER, LYOPHILIZED, FOR SOLUTION INTRAMUSCULAR; INTRAVENOUS
Status: CANCELLED
Start: 2017-09-20

## 2017-09-19 RX ORDER — ALBUTEROL SULFATE 0.83 MG/ML
2.5 SOLUTION RESPIRATORY (INHALATION)
Status: CANCELLED | OUTPATIENT
Start: 2017-09-20

## 2017-09-19 RX ORDER — LIDOCAINE/PRILOCAINE 2.5 %-2.5%
CREAM (GRAM) TOPICAL
Qty: 30 G | Refills: 1 | Status: SHIPPED | OUTPATIENT
Start: 2017-09-19 | End: 2017-12-05

## 2017-09-19 RX ORDER — EPINEPHRINE 0.3 MG/.3ML
0.3 INJECTION SUBCUTANEOUS EVERY 5 MIN PRN
Status: CANCELLED | OUTPATIENT
Start: 2017-09-20

## 2017-09-19 RX ORDER — SODIUM CHLORIDE 9 MG/ML
1000 INJECTION, SOLUTION INTRAVENOUS CONTINUOUS PRN
Status: CANCELLED
Start: 2017-09-20

## 2017-09-19 RX ORDER — LORAZEPAM 2 MG/ML
0.5 INJECTION INTRAMUSCULAR EVERY 4 HOURS PRN
Status: CANCELLED
Start: 2017-09-20

## 2017-09-19 RX ORDER — DIPHENHYDRAMINE HCL 25 MG
50 CAPSULE ORAL ONCE
Status: CANCELLED
Start: 2017-09-20

## 2017-09-19 RX ORDER — DIPHENHYDRAMINE HYDROCHLORIDE 50 MG/ML
50 INJECTION INTRAMUSCULAR; INTRAVENOUS
Status: CANCELLED
Start: 2017-09-20

## 2017-09-19 RX ORDER — EPINEPHRINE 1 MG/ML
0.3 INJECTION INTRAMUSCULAR; INTRAVENOUS; SUBCUTANEOUS EVERY 5 MIN PRN
Status: CANCELLED | OUTPATIENT
Start: 2017-09-20

## 2017-09-19 RX ORDER — ALBUTEROL SULFATE 90 UG/1
1-2 AEROSOL, METERED RESPIRATORY (INHALATION)
Status: CANCELLED
Start: 2017-09-20

## 2017-09-20 ENCOUNTER — ONCOLOGY VISIT (OUTPATIENT)
Dept: ONCOLOGY | Facility: CLINIC | Age: 57
End: 2017-09-20
Attending: PHYSICIAN ASSISTANT
Payer: COMMERCIAL

## 2017-09-20 ENCOUNTER — APPOINTMENT (OUTPATIENT)
Dept: ONCOLOGY | Facility: CLINIC | Age: 57
End: 2017-09-20
Attending: INTERNAL MEDICINE
Payer: COMMERCIAL

## 2017-09-20 ENCOUNTER — RESEARCH ENCOUNTER (OUTPATIENT)
Dept: ONCOLOGY | Facility: CLINIC | Age: 57
End: 2017-09-20

## 2017-09-20 VITALS
HEART RATE: 85 BPM | OXYGEN SATURATION: 92 % | DIASTOLIC BLOOD PRESSURE: 69 MMHG | SYSTOLIC BLOOD PRESSURE: 115 MMHG | TEMPERATURE: 97.7 F | RESPIRATION RATE: 16 BRPM | BODY MASS INDEX: 30.58 KG/M2 | WEIGHT: 172.6 LBS

## 2017-09-20 DIAGNOSIS — C50.211 MALIGNANT NEOPLASM OF UPPER-INNER QUADRANT OF RIGHT BREAST IN FEMALE, ESTROGEN RECEPTOR NEGATIVE (H): Primary | ICD-10-CM

## 2017-09-20 DIAGNOSIS — C50.211 MALIGNANT NEOPLASM OF UPPER-INNER QUADRANT OF RIGHT BREAST IN FEMALE, ESTROGEN RECEPTOR POSITIVE (H): Primary | ICD-10-CM

## 2017-09-20 DIAGNOSIS — Z17.1 MALIGNANT NEOPLASM OF UPPER-INNER QUADRANT OF RIGHT BREAST IN FEMALE, ESTROGEN RECEPTOR NEGATIVE (H): ICD-10-CM

## 2017-09-20 DIAGNOSIS — Z17.1 MALIGNANT NEOPLASM OF UPPER-INNER QUADRANT OF RIGHT BREAST IN FEMALE, ESTROGEN RECEPTOR NEGATIVE (H): Primary | ICD-10-CM

## 2017-09-20 DIAGNOSIS — C50.211 MALIGNANT NEOPLASM OF UPPER-INNER QUADRANT OF RIGHT BREAST IN FEMALE, ESTROGEN RECEPTOR NEGATIVE (H): ICD-10-CM

## 2017-09-20 DIAGNOSIS — Z17.0 MALIGNANT NEOPLASM OF UPPER-INNER QUADRANT OF RIGHT BREAST IN FEMALE, ESTROGEN RECEPTOR POSITIVE (H): Primary | ICD-10-CM

## 2017-09-20 LAB
ALBUMIN SERPL-MCNC: 3.4 G/DL (ref 3.4–5)
ALP SERPL-CCNC: 136 U/L (ref 40–150)
ALT SERPL W P-5'-P-CCNC: 49 U/L (ref 0–50)
ANION GAP SERPL CALCULATED.3IONS-SCNC: 8 MMOL/L (ref 3–14)
AST SERPL W P-5'-P-CCNC: 36 U/L (ref 0–45)
BASOPHILS # BLD AUTO: 0 10E9/L (ref 0–0.2)
BASOPHILS NFR BLD AUTO: 0.5 %
BILIRUB SERPL-MCNC: 0.4 MG/DL (ref 0.2–1.3)
BUN SERPL-MCNC: 12 MG/DL (ref 7–30)
CALCIUM SERPL-MCNC: 9 MG/DL (ref 8.5–10.1)
CHLORIDE SERPL-SCNC: 104 MMOL/L (ref 94–109)
CO2 SERPL-SCNC: 28 MMOL/L (ref 20–32)
CREAT SERPL-MCNC: 0.8 MG/DL (ref 0.52–1.04)
DIFFERENTIAL METHOD BLD: ABNORMAL
EOSINOPHIL # BLD AUTO: 0.7 10E9/L (ref 0–0.7)
EOSINOPHIL NFR BLD AUTO: 10.4 %
ERYTHROCYTE [DISTWIDTH] IN BLOOD BY AUTOMATED COUNT: 12 % (ref 10–15)
GFR SERPL CREATININE-BSD FRML MDRD: 73 ML/MIN/1.7M2
GLUCOSE SERPL-MCNC: 117 MG/DL (ref 70–99)
HCT VFR BLD AUTO: 39.1 % (ref 35–47)
HGB BLD-MCNC: 13.7 G/DL (ref 11.7–15.7)
IMM GRANULOCYTES # BLD: 0 10E9/L (ref 0–0.4)
IMM GRANULOCYTES NFR BLD: 0.3 %
LYMPHOCYTES # BLD AUTO: 1.6 10E9/L (ref 0.8–5.3)
LYMPHOCYTES NFR BLD AUTO: 25.3 %
MCH RBC QN AUTO: 34 PG (ref 26.5–33)
MCHC RBC AUTO-ENTMCNC: 35 G/DL (ref 31.5–36.5)
MCV RBC AUTO: 97 FL (ref 78–100)
MONOCYTES # BLD AUTO: 0.6 10E9/L (ref 0–1.3)
MONOCYTES NFR BLD AUTO: 9.5 %
NEUTROPHILS # BLD AUTO: 3.5 10E9/L (ref 1.6–8.3)
NEUTROPHILS NFR BLD AUTO: 54 %
NRBC # BLD AUTO: 0 10*3/UL
NRBC BLD AUTO-RTO: 0 /100
PLATELET # BLD AUTO: 288 10E9/L (ref 150–450)
POTASSIUM SERPL-SCNC: 4 MMOL/L (ref 3.4–5.3)
PROT SERPL-MCNC: 7.5 G/DL (ref 6.8–8.8)
RBC # BLD AUTO: 4.03 10E12/L (ref 3.8–5.2)
SODIUM SERPL-SCNC: 139 MMOL/L (ref 133–144)
TSH SERPL DL<=0.005 MIU/L-ACNC: 1.89 MU/L (ref 0.4–4)
WBC # BLD AUTO: 6.5 10E9/L (ref 4–11)

## 2017-09-20 PROCEDURE — S0028 INJECTION, FAMOTIDINE, 20 MG: HCPCS | Mod: ZF | Performed by: INTERNAL MEDICINE

## 2017-09-20 PROCEDURE — 96375 TX/PRO/DX INJ NEW DRUG ADDON: CPT

## 2017-09-20 PROCEDURE — 99212 OFFICE O/P EST SF 10 MIN: CPT | Mod: ZF

## 2017-09-20 PROCEDURE — 93010 ELECTROCARDIOGRAM REPORT: CPT | Mod: ZP | Performed by: INTERNAL MEDICINE

## 2017-09-20 PROCEDURE — 25000128 H RX IP 250 OP 636: Mod: ZF | Performed by: PHYSICIAN ASSISTANT

## 2017-09-20 PROCEDURE — 93005 ELECTROCARDIOGRAM TRACING: CPT

## 2017-09-20 PROCEDURE — 25000125 ZZHC RX 250: Mod: ZF | Performed by: INTERNAL MEDICINE

## 2017-09-20 PROCEDURE — 25000128 H RX IP 250 OP 636: Mod: ZF | Performed by: INTERNAL MEDICINE

## 2017-09-20 PROCEDURE — 80053 COMPREHEN METABOLIC PANEL: CPT | Performed by: INTERNAL MEDICINE

## 2017-09-20 PROCEDURE — 99213 OFFICE O/P EST LOW 20 MIN: CPT | Mod: ZP | Performed by: PHYSICIAN ASSISTANT

## 2017-09-20 PROCEDURE — 96417 CHEMO IV INFUS EACH ADDL SEQ: CPT

## 2017-09-20 PROCEDURE — 85025 COMPLETE CBC W/AUTO DIFF WBC: CPT | Performed by: INTERNAL MEDICINE

## 2017-09-20 PROCEDURE — 96413 CHEMO IV INFUSION 1 HR: CPT

## 2017-09-20 PROCEDURE — 84443 ASSAY THYROID STIM HORMONE: CPT | Performed by: INTERNAL MEDICINE

## 2017-09-20 RX ORDER — HEPARIN SODIUM (PORCINE) LOCK FLUSH IV SOLN 100 UNIT/ML 100 UNIT/ML
5 SOLUTION INTRAVENOUS ONCE
Status: COMPLETED | OUTPATIENT
Start: 2017-09-20 | End: 2017-09-20

## 2017-09-20 RX ORDER — ONDANSETRON 8 MG/1
8 TABLET, FILM COATED ORAL EVERY 8 HOURS PRN
Qty: 30 TABLET | Refills: 3 | Status: SHIPPED | OUTPATIENT
Start: 2017-09-20 | End: 2017-10-31

## 2017-09-20 RX ORDER — PROCHLORPERAZINE MALEATE 10 MG
10 TABLET ORAL EVERY 6 HOURS PRN
Qty: 30 TABLET | Refills: 2 | Status: SHIPPED | OUTPATIENT
Start: 2017-09-20 | End: 2017-10-31

## 2017-09-20 RX ORDER — LORAZEPAM 0.5 MG/1
0.5 TABLET ORAL EVERY 4 HOURS PRN
Qty: 30 TABLET | Refills: 2 | Status: SHIPPED | OUTPATIENT
Start: 2017-09-20 | End: 2018-04-18

## 2017-09-20 RX ORDER — LORAZEPAM 0.5 MG/1
0.5 TABLET ORAL EVERY 4 HOURS PRN
Qty: 30 TABLET | Refills: 2 | Status: SHIPPED | OUTPATIENT
Start: 2017-09-20 | End: 2017-09-20

## 2017-09-20 RX ORDER — EPINEPHRINE 0.3 MG/.3ML
INJECTION SUBCUTANEOUS
Status: DISCONTINUED
Start: 2017-09-20 | End: 2017-09-20 | Stop reason: WASHOUT

## 2017-09-20 RX ORDER — HEPARIN SODIUM (PORCINE) LOCK FLUSH IV SOLN 100 UNIT/ML 100 UNIT/ML
500 SOLUTION INTRAVENOUS ONCE
Status: COMPLETED | OUTPATIENT
Start: 2017-09-20 | End: 2017-09-20

## 2017-09-20 RX ADMIN — DIPHENHYDRAMINE HYDROCHLORIDE 50 MG: 50 INJECTION INTRAMUSCULAR; INTRAVENOUS at 15:12

## 2017-09-20 RX ADMIN — SODIUM CHLORIDE 250 ML: 9 INJECTION, SOLUTION INTRAVENOUS at 13:42

## 2017-09-20 RX ADMIN — PACLITAXEL 150 MG: 6 INJECTION, SOLUTION INTRAVENOUS at 15:52

## 2017-09-20 RX ADMIN — SODIUM CHLORIDE, PRESERVATIVE FREE 500 UNITS: 5 INJECTION INTRAVENOUS at 17:06

## 2017-09-20 RX ADMIN — SODIUM CHLORIDE, PRESERVATIVE FREE 5 ML: 5 INJECTION INTRAVENOUS at 10:27

## 2017-09-20 RX ADMIN — FAMOTIDINE 20 MG: 20 INJECTION, SOLUTION INTRAVENOUS at 15:30

## 2017-09-20 RX ADMIN — DEXAMETHASONE SODIUM PHOSPHATE 20 MG: 10 INJECTION, SOLUTION INTRAMUSCULAR; INTRAVENOUS at 14:57

## 2017-09-20 RX ADMIN — SODIUM CHLORIDE 200 MG: 9 INJECTION, SOLUTION INTRAVENOUS at 13:44

## 2017-09-20 ASSESSMENT — PAIN SCALES - GENERAL: PAINLEVEL: NO PAIN (0)

## 2017-09-20 NOTE — PATIENT INSTRUCTIONS
Contact Numbers  [facility]: [phone]  Triage: [phone] (Monday-Friday 8-4:30)  After Hours:  [phone]    Please call the [facility] Triage line if you experience a temperature greater than or equal to 100.5, shaking chills, have uncontrolled nausea, vomiting and/or diarrhea, dizziness, shortness of breath, chest pain, bleeding, unexplained bruising, or if you have any other new/concerning symptoms, questions or concerns.     If it is after hours, weekends, or holidays, please call [phone] to speak to someone regarding your questions or concerns.    If you are having any concerning symptoms or wish to speak to a provider before your next infusion visit, please call your care coordinator or triage to notify them so we can adequately serve you.     If you need a refill on a narcotic prescription or other medication, please call triage before your infusion appointment.             September 2017 Sunday Monday Tuesday Wednesday Thursday Friday Saturday                            1     Outpatient Visit    6:52 AM   [facility]     IR CHEST PORT PLACEMENT >5 YRS    7:30 AM   (75 min.)   [id number]   [facility] Imaging     INSERT PORT VASCULAR ACCESS    9:00 AM   Leif Parkinson PA-C    OR     ECH LIMITED   11:00 AM   (60 min.)   [id number]   [facility] Echo     XR CHEST 2 VIEWS   11:55 AM   (15 min.)   [address] [place] [address] Xray 2       3     4     5     MR BREAST BILATERAL WWO   10:00 AM   (90 min.)   [id number]   [facility]     [facility] LAB DRAW   12:15 PM   (15 min.)    [facility] LAB DRAW   [facility] Lab Draw     [facility] NURSE VISIT   12:30 PM   (10 min.)   Nurse,  Oncology   [facility]     US BREAST BX CORE NEEDLE RIGHT    1:00 PM   (50 min.)   [email or web address]   [facility]     XR CHEST 2 VIEWS    2:00 PM   (15 min.)   [address] [place] [address] Center Xray 6     7     8     9       10      11     12     13     14     15     16       17     18     19     20     [facility] RETURN WITH ROOM    9:05 AM   (60 min.)    ONCOLOGY RNCC   [facility]     [facility] LAB DRAW   10:45 AM   (15 min.)    MASONIC LAB DRAW   [facility] Mason[facility] Lab Draw     [facility] RETURN   10:55 AM   (50 min.)   Caren Colindres PA-C   [facility]     [facility] ONC INFUSION 180   12:30 PM   (180 min.)   [facility] ONCOLOGY INFUSION   [facility] 21     22     23       24     25     26     [facility] LAB DRAW   11:15 AM   (15 min.)   [facility] LAB DRAW   [facility] Lab Draw     [facility] RETURN   11:30 AM   (30 min.)   Fara Bradshaw MD   [facility]     [facility] ONC INFUSION 180    1:30 PM   (180 min.)    ONCOLOGY INFUSION   [facility] 27     28     29 30 October 2017 Sunday Monday Tuesday Wednesday Thursday Friday Saturday   1     2     3     [facility] LAB DRAW    1:15 PM   (15 min.)    MASONIC LAB DRAW   [facility] Lab Draw     [facility] RETURN    1:35 PM   (50 min.)   Lilia Livingston PA   [facility]     [facility] ONC INFUSION 180    2:30 PM   (180 min.)    ONCOLOGY INFUSION   [facility] 4     5     6     7       8     9     10     [facility] LAB DRAW    7:30 AM   (15 min.)    MASONIC LAB DRAW   [facility] Lab Draw     [facility] ONC INFUSION 180    8:30 AM   (180 min.)    ONCOLOGY INFUSION   [facility] 11     12     13     14       15     16     17     [facility] LAB DRAW    6:30 AM   (15 min.)    MASONIC LAB DRAW   [facility] Lab Draw     [facility] RETURN    6:45 AM   (50 min.)   Lilia Livingston PA   [facility]     [facility] ONC INFUSION 180    8:00 AM   (180 min.)    ONCOLOGY INFUSION   [facility] 18     19     20     21       22     23     24     [facility] LAB DRAW   12:30 PM   (15 min.)    MASONIC  LAB DRAW   [facility] Lab Draw     [facility] RETURN   12:45 PM   (50 min.)   Lilia Livingston PA   [facility] ONC INFUSION 180    2:30 PM   (180 min.)   UC ONCOLOGY INFUSION   [facility] 25     26     27     28       29     30     31                                      Lab Results:  Recent Results (from the past 12 hour(s))   TSH    Collection Time: 09/20/17 10:33 AM   Result Value Ref Range    TSH 1.89 0.40 - 4.00 mU/L   CBC with platelets differential    Collection Time: 09/20/17 10:33 AM   Result Value Ref Range    WBC 6.5 4.0 - 11.0 10e9/L    RBC Count 4.03 3.8 - 5.2 10e12/L    Hemoglobin 13.7 11.7 - 15.7 g/dL    Hematocrit 39.1 35.0 - 47.0 %    MCV 97 78 - 100 fl    MCH 34.0 (H) 26.5 - 33.0 pg    MCHC 35.0 31.5 - 36.5 g/dL    RDW 12.0 10.0 - 15.0 %    Platelet Count 288 150 - 450 10e9/L    Diff Method Automated Method     % Neutrophils 54.0 %    % Lymphocytes 25.3 %    % Monocytes 9.5 %    % Eosinophils 10.4 %    % Basophils 0.5 %    % Immature Granulocytes 0.3 %    Nucleated RBCs 0 0 /100    Absolute Neutrophil 3.5 1.6 - 8.3 10e9/L    Absolute Lymphocytes 1.6 0.8 - 5.3 10e9/L    Absolute Monocytes 0.6 0.0 - 1.3 10e9/L    Absolute Eosinophils 0.7 0.0 - 0.7 10e9/L    Absolute Basophils 0.0 0.0 - 0.2 10e9/L    Abs Immature Granulocytes 0.0 0 - 0.4 10e9/L    Absolute Nucleated RBC 0.0    Comprehensive metabolic panel    Collection Time: 09/20/17 10:33 AM   Result Value Ref Range    Sodium 139 133 - 144 mmol/L    Potassium 4.0 3.4 - 5.3 mmol/L    Chloride 104 94 - 109 mmol/L    Carbon Dioxide 28 20 - 32 mmol/L    Anion Gap 8 3 - 14 mmol/L    Glucose 117 (H) 70 - 99 mg/dL    Urea Nitrogen 12 7 - 30 mg/dL    Creatinine 0.80 0.52 - 1.04 mg/dL    GFR Estimate 73 >60 mL/min/1.7m2    GFR Estimate If Black 89 >60 mL/min/1.7m2    Calcium 9.0 8.5 - 10.1 mg/dL    Bilirubin Total 0.4 0.2 - 1.3 mg/dL    Albumin 3.4 3.4 - 5.0 g/dL    Protein Total 7.5 6.8 - 8.8 g/dL     Alkaline Phosphatase 136 40 - 150 U/L    ALT 49 0 - 50 U/L    AST 36 0 - 45 U/L

## 2017-09-20 NOTE — LETTER
9/20/2017      RE: Ashleigh Alonzo  [address] [facility] [place] [address]       Oncology Visit:  Date on this visit: Sep 20, 2017    Primary Physician: Radha Cazares     History Of Present Illness:  Ms. Alonzo is a 57 year old postmenopausal female with clinical stage IA, T1c N0 M0, grade 3, triple-negative invasive ductal carcinoma of the right breast.  Ms. Alonzo underwent routine bilateral screening mammogram on 07/27/2017 which showed possible developing calcifications in the right breast at the 12 o'clock position 6 cm from the nipple.  There were no concerning findings in the left breast.  Mammogram prior to this one had been 1 year prior in 07/2016.  Right breast ultrasound demonstrated a 7-mm, irregular, hypoechoic mass at the 12 o'clock position.  The patient went on to have a contrast-enhanced mammogram which showed a peripherally enhancing, irregular mass measured up to 1.9 cm as well as an additional 6-mm, enhancing focus anteromedial to the dominant mass.  The total area of abnormal enhancement on contrast mammogram measured up to 3.4 cm.  Right breast biopsy on 08/22/2017 demonstrated a grade 3 invasive mammary carcinoma with associated high-grade DCIS.  Invasive carcinoma was stained for estrogen and progesterone receptors.  Estrogen receptor and progesterone receptor staining was negative with 0% of nuclei staining.  HER2 was non-amplified by FISH with a HER2/CEN17 ratio of 1.5 and 2.8 HER2 signals per nucleus.       Ms. Alonzo has a history of COPD for which she is on inhalers.  She has a 10-pack-year history of smoking cigarettes; however, she quit smoking 15 years ago.  She states she is otherwise in relatively good health.  She has a history of hyperlipidemia and recently has been diagnosed with sleep apnea.  She has chronic right shoulder pain.  She denies new bone or joint aches or pains.  She denies palpable lumps or masses in either breast.  She has no breast pain or discomfort.  She  "denies nipple discharge.  She underwent menarche at 12 years old.  Her first full-term pregnancy was at 22 years old.  She  each of her two daughters for a few months.  She underwent a \"tubal ligation\" at 40 years old.  She states she had menopausal symptoms shortly thereafter and underwent menopause in her early 40s.  She was on OCPs until her early 30s, at which time she had an IUD for a period of 10 years.  She denies hormone replacement therapy.  She denies a family history of breast cancer.  She has never had any prior breast biopsies.  It is notable that both of her daughters have undergone BRCA testing due to a strong family history of breast cancer on their father's side.  Both of her daughters tested BRCA negative.       She is here today for routine follow up prior to starting on a clinical trial with Taxol +/- Keytruda. She was randomized to receive Keytruda.     Interval History:  Patient reports that she is doing well today. She jose any breast pain or changes to her breast mass. She reports eating and drinking well. She has used a CPAP for the past 1.5 years and is sleeping well. She denies concerns or questions.      Review of Systems:  Patient denies any of the following except if noted above: fevers, chills, difficulty with energy, vision or hearing changes, chest pain, dyspnea, abdominal pain, nausea, vomiting, diarrhea, constipation, urinary concerns, headaches, numbness, tingling, issues with sleep or mood.     Past Medical/Surgical History:  Past Medical History:   Diagnosis Date     COPD (chronic obstructive pulmonary disease) (H) 2011     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) 8/30/2017     Periodontal disease      Sleep apnea 12/2015     Urticaria      Past Surgical History:   Procedure Laterality Date     APPENDECTOMY  10/6/2015     CATARACT IOL, RT/LT  11/2016    bilateral      COLONOSCOPY  11/15/2011    " Procedure:COLONOSCOPY; Colonoscopy, screening; Surgeon:ANASTASIA BUNCH; Location:[facility] OR     INSERT PORT VASCULAR ACCESS Left 9/1/2017    Procedure: INSERT PORT VASCULAR ACCESS;  Single Lumen Chest Power Port;  Surgeon: Leif Parkinson PA-C;  Location: [facility] OR     TUBAL LIGATION  12/2004    Bilateral     Allergies:  Allergies as of 09/20/2017     (No Known Allergies)     Current Medications:  Current Outpatient Prescriptions   Medication Sig Dispense Refill     LORazepam (ATIVAN) 0.5 MG tablet Take 1-2 tabs 20 minutes prior to MRI scans. 10 tablet 0     hydrOXYzine (ATARAX) 25 MG tablet Take 1-2 tablets (25-50 mg) by mouth every 6 hours as needed for itching 100 tablet 2     guaiFENesin (MUCINEX) 600 MG 12 hr tablet Take 2 tablets (1,200 mg) by mouth 2 times daily 180 tablet 6     simvastatin (ZOCOR) 40 MG tablet Take 1 tablet (40 mg) by mouth At Bedtime 90 tablet 4     tiotropium (SPIRIVA) 18 MCG capsule Inhale 1 capsule (18 mcg) into the lungs daily Inhale contents of one capsule 1 capsule 11     albuterol (PROAIR HFA/PROVENTIL HFA/VENTOLIN HFA) 108 (90 BASE) MCG/ACT Inhaler Inhale 2 puffs into the lungs every 6 hours as needed for shortness of breath / dyspnea 1 Inhaler 5     citalopram (CELEXA) 10 MG tablet Take 1 tablet (10 mg) by mouth daily 90 tablet 3     darifenacin (ENABLEX) 7.5 MG 24 hr tablet Take 1 tablet (7.5 mg) by mouth daily 90 tablet 3     [facility] 12-18 cm, F&P Simplus FFM small.       Coenzyme Q10 (CO Q 10 PO) Take 1 tablet by mouth        MULTIPLE VITAMIN PO        Cyanocobalamin (VITAMIN B 12 PO) Take 500 mcg by mouth daily       Pyridoxine HCl (VITAMIN B6 PO) Take 1 tablet by mouth daily.       aspirin 81 MG tablet Take 1 tablet by mouth daily.  3     VITAMIN D 1000 UNIT OR CAPS 2 CAPSULE DAILY       ondansetron (ZOFRAN) 8 MG tablet Take 1 tablet (8 mg) by mouth every 8 hours as needed for nausea (Patient not taking: Reported on 9/20/2017) 30  tablet 3     LORazepam (ATIVAN) 0.5 MG tablet Take 1 tablet (0.5 mg) by mouth every 4 hours as needed (Anxiety, Nausea/Vomiting or Sleep) (Patient not taking: Reported on 9/20/2017) 30 tablet 2     prochlorperazine (COMPAZINE) 10 MG tablet Take 1 tablet (10 mg) by mouth every 6 hours as needed (Nausea/Vomiting) (Patient not taking: Reported on 9/20/2017) 30 tablet 2     lidocaine-prilocaine (EMLA) cream Please apply to port site 30 minutes before use prn (Patient not taking: Reported on 9/20/2017) 30 g 1      Family History:  Maternal uncle with prostate cancer at 66 yo.  Maternal uncle with throat cancer.  Paternal great grandfather with h/o prostate cancer at 76 yo.  Of note, both of her daughters are BRCA negative; tested due to a strong family history of breast cancer on their father's side.  Patient denies other family history of malignancy.    Social History:  .  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  Has 2 daughters.  One of her daughters is a nurse on the oncology unit at [employer].  Patient resides in [place], [place].  They live on [place] acres.  Patient smoked a 1/2 ppd of cigarettes for 20 years.  She quit smoking 15-17 years ago.  She drinks 10-15 beers per week.  She does not exercise.      Physical Exam:  /69 (BP Location: Right arm, Cuff Size: Adult Regular)  Pulse 85  Temp 97.7  F (36.5  C) (Oral)  Resp 16  Wt 78.3 kg (172 lb 9.6 oz)  SpO2 92%  BMI 30.58 kg/m2  General:  Well appearing, well-nourished adult female in [facility]. Here today with her .  HEENT:  Normocephalic.  Sclera anicteric.  MMM.  No lesions of the oropharynx.  Lymph:  No palpable cervical, supraclavicular, or axillary LAD.  Chest:  CTA bilaterally.  No wheezes or crackles.  CV:  RRR.   Breast:  There is a 2 x 2 cm soft mass palpable at the 12:00 position of the right breast.  There is a biopsy site with surrounding ecchymosis in the upper inner quadrant of the left breast, just medial to the  mass.  There are no other discretely palpable masses in either breast.  Bilateral nipples are everted.  No nipple discharge.  Abd:  Soft/NT/ND.  BSs normoactive.  No hepatosplenomegaly.  Ext:  No pitting edema of the bilateral lower extremities.   Neuro:  Cranial nerves grossly intact.  Psych:  Mood and affect appear normal.    Laboratory/Imaging Studies:   9/20/2017 10:33   Sodium 139   Potassium 4.0   Chloride 104   Carbon Dioxide 28   Urea Nitrogen 12   Creatinine 0.80   GFR Estimate 73   GFR Estimate If Black 89   Calcium 9.0   Anion Gap 8   Albumin 3.4   Protein Total 7.5   Bilirubin Total 0.4   Alkaline Phosphatase 136   ALT 49   AST 36   TSH 1.89   Glucose 117 (H)   WBC 6.5   Hemoglobin 13.7   Hematocrit 39.1   Platelet Count 288   RBC Count 4.03   MCV 97   MCH 34.0 (H)   MCHC 35.0   RDW 12.0   Diff Method Automated Method   % Neutrophils 54.0   % Lymphocytes 25.3   % Monocytes 9.5   % Eosinophils 10.4   % Basophils 0.5   % Immature Granulocytes 0.3   Nucleated RBCs 0   Absolute Neutrophil 3.5   Absolute Lymphocytes 1.6   Absolute Monocytes 0.6   Absolute Eosinophils 0.7   Absolute Basophils 0.0   Abs Immature Granulocytes 0.0   Absolute Nucleated RBC 0.0     ASSESSMENT/PLAN:  Ms. Alonzo is a 57 year old postmenopausal female with recently diagnosed, clinical stage IA, T1c N0 M0, grade 3, triple-negative infiltrating ductal carcinoma of the right breast, starting on a clinical trial with Taxol +/- Keytruda, randomized to Keytruda.      Breast cancer. Patient will start on treatment today with Taxol and Keytruda. We reviewed potential side effects and their management including fatigue, inflammation in organs, myelosuppression, nausea, vomiting, diarrhea, nail changes, and hair loss. Side effects were already previously reviewed with the patient. She will return weekly for follow up prior to each infusion. The plan is for to receive 12 weekly cycles of Taxol followed by 4 cycles of adriamycin and  cyclophosphamide, administered every 2 weeks. Keytruda will be given every 3 weeks throughout the course of chemotherapy. Approximately 4-5 weeks following completion of chemotherapy we would proceed with surgery.  Surgical excision could be by either lumpectomy or mastectomy.  Lymph nodes would be staged with a sentinel lymph node biopsy at the time of surgery.  During her treatment, she will have surgical consultation with a breast surgeon to discuss this further  Whether or not she will benefit from adjuvant radiation is unknown at this time and depends on the type of breast surgery, final surgical margins, and whether or not there is lymph node involvement at the time of surgery.  As she has an estrogen receptor-negative breast cancer, there will be no benefit to an adjuvant course of endocrine therapy.        Given she was diagnosed with a triple negative breast cancer at age <59 yo, NCCN guidelines recommend genetic counseling and testing.  I will f/u with scheduling to get this set up, as referral was placed last week.     Caren Colindres PA-C  [facility]  [address] [place] [id number] [phone]

## 2017-09-20 NOTE — PROGRESS NOTES
Research visit- C1D1. Pt had labs and is seeing provider before starting. Labs look good, checked in with pt- feeling good, ready to begin and a little anxious about the unknowns.  Went over treatment consent for ISOY2 for both SOC treatemtn and pembro with patient, discussed treatment schedule, S/E's labs and answered all questions with acknowledgement of understanding by the patient. Pt signed both consents and will start C1D1 today.   Checked lab results WBC and ANC ok to begin.   Will check in with patient by phone tomorrow to see how she is doing following the start of the study treatment.   Steffany Ma  [phone]

## 2017-09-20 NOTE — NURSING NOTE
"Chief Complaint   Patient presents with     Clinical Trials     EKG for research     Port Draw     Labs draw from port by RN. Line flushed with saline and heparin. Vitals taken and pt checked in for appt     Port accessed with 20g 3/4\" gripper needle by RN, labs collected, line flushed with saline and heparin.  Vitals taken. Pt checked in for appointment(s).    Luana Teran RN  "

## 2017-09-20 NOTE — PROGRESS NOTES
Infusion Nursing Note:  Ashleigh Alonzo presents today for Day 1 Cycle 1 Pembrolizumab (study) and Taxol.    Patient seen by provider today: Yes: EDDIE [place] saw patient prior to infusion   present during visit today: Not Applicable.    Note: Patient new to oncology infusion room and is receiving chemotherapy for the first time. Pt oriented to infusion room and call light. New patient teaching done previously. Pt received new pt folder prior to coming to infusion. Writer reinforced chemotherapy teaching/side effects and schedule.     Pt instructed to call care coordinator, triage (or MD on call if after hours/weekends) with chills/temp >=100.5, questions/concerns. Pt stated understanding of plan.      Intravenous Access:  Implanted Port.    Treatment Conditions:  Lab Results   Component Value Date    HGB 13.7 09/20/2017     Lab Results   Component Value Date    WBC 6.5 09/20/2017      Lab Results   Component Value Date    ANEU 3.5 09/20/2017     Lab Results   Component Value Date     09/20/2017      Lab Results   Component Value Date     09/20/2017                   Lab Results   Component Value Date    POTASSIUM 4.0 09/20/2017           Lab Results   Component Value Date    MAG 2.1 09/05/2017            Lab Results   Component Value Date    CR 0.80 09/20/2017                   Lab Results   Component Value Date    JAVIER 9.0 09/20/2017                Lab Results   Component Value Date    BILITOTAL 0.4 09/20/2017           Lab Results   Component Value Date    ALBUMIN 3.4 09/20/2017                    Lab Results   Component Value Date    ALT 49 09/20/2017           Lab Results   Component Value Date    AST 36 09/20/2017     Results reviewed, labs MET treatment parameters, ok to proceed with treatment.          Post Infusion Assessment:  Patient tolerated infusion without incident.  Patient observed for 30 minutes post Keytruda per study protocol.  Premedications then given after  observation.  Blood return noted pre and post infusion.  Site patent and intact, free from redness, edema or discomfort.  No evidence of extravasations.  Access discontinued per protocol.    Discharge Plan:   Prescription refills given for Ativan, Compazine, and Zofran.  Discharge instructions reviewed with: Patient and Family.  Patient and/or family verbalized understanding of discharge instructions and all questions answered.  Copy of AVS reviewed with patient and/or family.  Patient will return 9/26/17 for next appointment.  Patient discharged in stable condition accompanied by: .  Departure Mode: Ambulatory.  Face to Face time: 5 minutes educating patient.    Rebecca Dubois RN

## 2017-09-20 NOTE — MR AVS SNAPSHOT
After Visit Summary   9/20/2017    Ashleigh Alonzo    MRN: [id number]           Patient Information     Date Of Birth          1960        Visit Information        Provider Department      9/20/2017 12:30 PM  16 ATC;  ONCOLOGY INFUSION [facility]        Today's Diagnoses     Malignant neoplasm of upper-inner quadrant of right breast in female, estrogen receptor positive (H)    -  1    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)          Care Instructions    Contact Numbers  [facility]: [phone]  Triage: [phone] (Monday-Friday 8-4:30)  After Hours:  [phone]    Please call the [facility] Triage line if you experience a temperature greater than or equal to 100.5, shaking chills, have uncontrolled nausea, vomiting and/or diarrhea, dizziness, shortness of breath, chest pain, bleeding, unexplained bruising, or if you have any other new/concerning symptoms, questions or concerns.     If it is after hours, weekends, or holidays, please call [phone] to speak to someone regarding your questions or concerns.    If you are having any concerning symptoms or wish to speak to a provider before your next infusion visit, please call your care coordinator or triage to notify them so we can adequately serve you.     If you need a refill on a narcotic prescription or other medication, please call triage before your infusion appointment.             September 2017 Sunday Monday Tuesday Wednesday Thursday Friday Saturday                            1     Outpatient Visit    6:52 AM   [facility]     IR CHEST PORT PLACEMENT >5 YRS    7:30 AM   (75 min.)   [id number]   [facility] Imaging     INSERT PORT VASCULAR ACCESS    9:00 AM   Leif Parkinson PA-C    OR     ECH LIMITED   11:00 AM   (60 min.)   ECHCR2   [facility] Echo     XR CHEST 2 VIEWS   11:55 AM   (15 min.)   XR1     [facility] Xray 2       3     4     5     MR BREAST BILATERAL WWO   10:00 AM   (90 min.)   [id number]   [facility]     [facility] MASONIC LAB DRAW   12:15 PM   (15 min.)    MASONIC LAB DRAW   [facility] Masonic Lab Draw     UMP NURSE VISIT   12:30 PM   (10 min.)   Nurse,  Oncology   [facility]     US BREAST BX CORE NEEDLE RIGHT    1:00 PM   (50 min.)   UCBCUS1   [facility] Imaging     XR CHEST 2 VIEWS    2:00 PM   (15 min.)   UCXR1   [facility] Xray 6     7     8     9       10     11     12     13     14     15     16       17     18     19     20     UMP RETURN WITH ROOM    9:05 AM   (60 min.)    ON[facility] RNCC   [facility]   10:45 AM   (15 min.)    MASONIC LAB DRAW   [facility] Lab Draw     UMP RETURN   10:55 AM   (50 min.)   Caren Colindres PA-C   [facility] 180   12:30 PM   (180 min.)    ONCOLOGY INFUSION   [facility] 21     22     23       24     25     26     UMP [facility] LAB DRAW   11:15 AM   (15 min.)    MASONIC LAB DRAW   [facility] Masonic Lab Draw     UMP RETURN   11:30 AM   (30 min.)   Fara Bradshaw MD   [facility]    1:30 PM   (180 min.)    ONCOLOGY INFUSION   [facility] 27     28     29     30 October 2017 Sunday Monday Tuesday Wednesday Thursday Friday Saturday   1     2     3     UMP [facility] LAB DRAW    1:15 PM   (15 min.)    MASONIC LAB DRAW   [facility] Masonic Lab Draw     UMP RETURN    1:35 PM   (50 min.)   Lilia Livingston PA   [facility]     UMP ONC INFUSION 180    2:30 PM   (180 min.)    ONCOLOGY INFUSION   [facility] 4     5     6     7       8     9     10     UMP MASONIC LAB DRAW    7:30 AM   (15 min.)   UC MASONIC LAB DRAW   [facility] Lab  Draw     UMP ONC INFUSION 180    8:30 AM   (180 min.)   UC ONCOLOGY INFUSION   [facility] 11     12     13     14       15     16     17     [facility]    6:30 AM   (15 min.)    MASONIC LAB DRAW   [facility] Masonic Lab Draw     UMP RETURN    6:45 AM   (50 min.)   Lilia Livingston PA   [facility]     UMP ONC INFUSION 180    8:00 AM   (180 min.)   UC ONCOLOGY INFUSION   [facility] 18     19     20     21       22     23     24     [facility] DRAW   12:30 PM   (15 min.)    MASONIC LAB DRAW   [facility] Lab Draw     UMP RETURN   12:45 PM   (50 min.)   Lilia Livingston PA   [facility]    2:30 PM   (180 min.)    ONCOLOGY INFUSION   [facility] 25     26     27     28       29     30     31                                      Lab Results:  Recent Results (from the past 12 hour(s))   TSH    Collection Time: 09/20/17 10:33 AM   Result Value Ref Range    TSH 1.89 0.40 - 4.00 mU/L   CBC with platelets differential    Collection Time: 09/20/17 10:33 AM   Result Value Ref Range    WBC 6.5 4.0 - 11.0 10e9/L    RBC Count 4.03 3.8 - 5.2 10e12/L    Hemoglobin 13.7 11.7 - 15.7 g/dL    Hematocrit 39.1 35.0 - 47.0 %    MCV 97 78 - 100 fl    MCH 34.0 (H) 26.5 - 33.0 pg    MCHC 35.0 31.5 - 36.5 g/dL    RDW 12.0 10.0 - 15.0 %    Platelet Count 288 150 - 450 10e9/L    Diff Method Automated Method     % Neutrophils 54.0 %    % Lymphocytes 25.3 %    % Monocytes 9.5 %    % Eosinophils 10.4 %    % Basophils 0.5 %    % Immature Granulocytes 0.3 %    Nucleated RBCs 0 0 /100    Absolute Neutrophil 3.5 1.6 - 8.3 10e9/L    Absolute Lymphocytes 1.6 0.8 - 5.3 10e9/L    Absolute Monocytes 0.6 0.0 - 1.3 10e9/L    Absolute Eosinophils 0.7 0.0 - 0.7 10e9/L    Absolute Basophils 0.0 0.0 - 0.2 10e9/L    Abs Immature Granulocytes 0.0 0 - 0.4 10e9/L    Absolute Nucleated RBC 0.0    Comprehensive  metabolic panel    Collection Time: 09/20/17 10:33 AM   Result Value Ref Range    Sodium 139 133 - 144 mmol/L    Potassium 4.0 3.4 - 5.3 mmol/L    Chloride 104 94 - 109 mmol/L    Carbon Dioxide 28 20 - 32 mmol/L    Anion Gap 8 3 - 14 mmol/L    Glucose 117 (H) 70 - 99 mg/dL    Urea Nitrogen 12 7 - 30 mg/dL    Creatinine 0.80 0.52 - 1.04 mg/dL    GFR Estimate 73 >60 mL/min/1.7m2    GFR Estimate If Black 89 >60 mL/min/1.7m2    Calcium 9.0 8.5 - 10.1 mg/dL    Bilirubin Total 0.4 0.2 - 1.3 mg/dL    Albumin 3.4 3.4 - 5.0 g/dL    Protein Total 7.5 6.8 - 8.8 g/dL    Alkaline Phosphatase 136 40 - 150 U/L    ALT 49 0 - 50 U/L    AST 36 0 - 45 U/L              Follow-ups after your visit        Your next 10 appointments already scheduled     Sep 26, 2017 11:15 AM CDT   [facility]ic Lab Draw with  [facility]IC LAB DRAW   [facility]    [address] [place] [address]   [phone]            Sep 26, 2017 11:45 AM CDT   (Arrive by 11:30 AM)   Return Visit with Fara Bradshaw MD   [facility])    [address] [place] [address]   [phone]            Sep 26, 2017  1:30 PM CDT   Infusion 180 with UC ONCOLOGY INFUSION, UC 21 ATC   [facility] ([facility])    [address] [place] [address]   [phone]            Oct 03, 2017  1:15 PM CDT   Masonic Lab Draw with  MASONIC LAB DRAW   [facility] Masonic Lab Draw ([facility])    [address] [place] [address]   [phone]            Oct 03, 2017  1:50 PM CDT   (Arrive by 1:35 PM)   Return Visit with EDDIE Oliva   [facility] ([facility])    [address]  2nd [address]  [facility] [address]   [phone]            Oct 03, 2017   2:30 PM CDT   Infusion 180 with UC ONCOLOGY INFUSION, UC 19 ATC   [facility] ([facility])    [address]  [address] [facility] [id number] [phone]            Oct 10, 2017  7:30 AM CDT   Masonic Lab Draw with UC MASONIC LAB DRAW   [facility] Lab Draw ([facility])    [address] [id number] [phone]            Oct 10, 2017  8:30 AM CDT   Infusion 180 with UC ONCOLOGY INFUSION   [facility] ([facility])    [address] [address] [phone]              Who to contact     If you have questions or need follow up information about today's clinic visit or your schedule please contact [facility] directly at [phone].  Normal or non-critical lab and imaging results will be communicated to you by [employer], letter or phone within 4 business days after the clinic has received the results. If you do not hear from us within 7 days, please contact the clinic through [employer] or phone. If you have a critical or abnormal lab result, we will notify you by phone as soon as possible.  Submit refill requests through [place] [employer] or call your pharmacy and they will forward the refill request to us. Please allow 3 business days for your refill to be completed.          Additional Information About Your Visit        BitMethodhart Information     [place] [employer] gives you secure access to your electronic health record. If you see a primary care provider, you can also send messages to your care team and make appointments. If you have questions, please call your primary care clinic.  If you do not have a primary care provider, please call [phone] and they will assist you.        Care EveryWhere ID     This is your Care EveryWhere ID. This could be used by other organizations to access your [facility]  records  [id number]         Blood Pressure from Last 3 Encounters:   09/20/17 115/69   09/01/17 115/61   08/28/17 141/82    Weight from Last 3 Encounters:   09/20/17 78.3 kg (172 lb 9.6 oz)   09/01/17 78.3 kg (172 lb 11.2 oz)   08/28/17 78.3 kg (172 lb 11.2 oz)              Today, you had the following     No orders found for display         Today's Medication Changes          These changes are accurate as of: 9/20/17  2:17 PM.  If you have any questions, ask your nurse or doctor.               Start taking these medicines.        Dose/Directions    ondansetron 8 MG tablet   Commonly known as:  ZOFRAN   Used for:  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)   Started by:  Caren Colindres PA-C        Dose:  8 mg   Take 1 tablet (8 mg) by mouth every 8 hours as needed for nausea   Quantity:  30 tablet   Refills:  3       prochlorperazine 10 MG tablet   Commonly known as:  COMPAZINE   Used for:  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)   Started by:  Caren Colindres PA-C        Dose:  10 mg   Take 1 tablet (10 mg) by mouth every 6 hours as needed (Nausea/Vomiting)   Quantity:  30 tablet   Refills:  2         These medicines have changed or have updated prescriptions.        Dose/Directions    * LORazepam 0.5 MG tablet   Commonly known as:  ATIVAN   This may have changed:  Another medication with the same name was added. Make sure you understand how and when to take each.   Used for:  Breast cancer (H), Anxiety   Changed by:  Fara Bradshaw MD        Take 1-2 tabs 20 minutes prior to MRI scans.   Quantity:  10 tablet   Refills:  0       * LORazepam 0.5 MG tablet   Commonly known as:  ATIVAN   This may have changed:  You were already taking a medication with the same name, and this prescription was added. Make sure you understand how and when to take each.   Used for:  Malignant  neoplasm of upper-inner quadrant of right breast in female, estrogen receptor negative (H)        Dose:  0.5 mg   Take 1 tablet (0.5 mg) by mouth every 4 hours as needed (Anxiety, Nausea/Vomiting or Sleep)   Quantity:  30 tablet   Refills:  2       * Notice:  This list has 2 medication(s) that are the same as other medications prescribed for you. Read the directions carefully, and ask your doctor or other care provider to review them with you.         Where to get your medicines      These medications were sent to [place], [place] - [address]  [address], [facility] [address]    Hours:  TRANSPLANT PHONE NUMBER [phone] Phone:  [phone]     ondansetron 8 MG tablet    prochlorperazine 10 MG tablet         Some of these will need a paper prescription and others can be bought over the counter.  Ask your nurse if you have questions.     Bring a paper prescription for each of these medications     LORazepam 0.5 MG tablet                Primary Care Provider Office Phone # Fax #    Radha Cazares [phone]       [facility] [address] [place] [address]        Equal Access to Services     THERESA HUGHES AH: Abdifatah Granda, waenrico clancy, qaelianata kaalmada jessica, bang pickering. So [facility] [phone].    ATENCIÓN: Si habla español, tiene a blackman disposición servicios gratuitos de asistencia lingüística. Pauline al [phone].    We comply with applicable federal civil rights laws and [place] laws. We do not discriminate on the basis of race, color, national origin, age, disability sex, sexual orientation or gender identity.            Thank you!     Thank you for choosing [facility]  for your care. Our goal is always to provide you with excellent care. Hearing back from our patients is one way we can continue to improve our services.  Please take a few minutes to complete the written survey that you may receive in the mail after your visit with us. Thank you!             Your Updated Medication List - Protect others around you: Learn how to safely use, store and throw away your medicines at www.disposemymeds.org.          This list is accurate as of: 9/20/17  2:17 PM.  Always use your most recent med list.                   Brand Name Dispense Instructions for use Diagnosis    albuterol 108 (90 BASE) MCG/ACT Inhaler    PROAIR HFA/PROVENTIL HFA/VENTOLIN HFA    1 Inhaler    Inhale 2 puffs into the lungs every 6 hours as needed for shortness of breath / dyspnea    Chronic obstructive pulmonary disease, unspecified COPD type (H)       aspirin 81 MG tablet      Take 1 tablet by mouth daily.        citalopram 10 MG tablet    celeXA    90 tablet    Take 1 tablet (10 mg) by mouth daily    Anxiety, Depression, unspecified depression type       CO Q 10 PO      Take 1 tablet by mouth        darifenacin 7.5 MG 24 hr tablet    ENABLEX    90 tablet    Take 1 tablet (7.5 mg) by mouth daily    Overactive bladder       guaiFENesin 600 MG 12 hr tablet    MUCINEX    180 tablet    Take 2 tablets (1,200 mg) by mouth 2 times daily    Cough with sputum       hydrOXYzine 25 MG tablet    ATARAX    100 tablet    Take 1-2 tablets (25-50 mg) by mouth every 6 hours as needed for itching    Hives       lidocaine-prilocaine cream    EMLA    30 g    Please apply to port site 30 minutes before use prn    Personal history of malignant neoplasm of breast       * LORazepam 0.5 MG tablet    ATIVAN    10 tablet    Take 1-2 tabs 20 minutes prior to MRI scans.    Breast cancer (H), Anxiety       * LORazepam 0.5 MG tablet    ATIVAN    30 tablet    Take 1 tablet (0.5 mg) by mouth every 4 hours as needed (Anxiety, Nausea/Vomiting or Sleep)    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)       MULTIPLE VITAMIN PO           ondansetron  8 MG tablet    ZOFRAN    30 tablet    Take 1 tablet (8 mg) by mouth every 8 hours as needed for nausea    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)       [place] [facility]      [employer] Auto CPAP 12-18 cm, F&P Simplus FFM small.    MERLIN (obstructive sleep apnea)       prochlorperazine 10 MG tablet    COMPAZINE    30 tablet    Take 1 tablet (10 mg) by mouth every 6 hours as needed (Nausea/Vomiting)    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)       simvastatin 40 MG tablet    ZOCOR    90 tablet    Take 1 tablet (40 mg) by mouth At Bedtime    Hyperlipidemia LDL goal <130       tiotropium 18 MCG capsule    SPIRIVA    1 capsule    Inhale 1 capsule (18 mcg) into the lungs daily Inhale contents of one capsule    Chronic obstructive pulmonary disease, unspecified COPD type (H)       VITAMIN B 12 PO      Take 500 mcg by mouth daily        VITAMIN B6 PO      Take 1 tablet by mouth daily.        vitamin D 1000 UNITS capsule      2 CAPSULE DAILY        * Notice:  This list has 2 medication(s) that are the same as other medications prescribed for you. Read the directions carefully, and ask your doctor or other care provider to review them with you.

## 2017-09-20 NOTE — MR AVS SNAPSHOT
After Visit Summary   9/20/2017    Ashleigh Alonzo    MRN: [id number]           Patient Information     Date Of Birth          1960        Visit Information        Provider Department      9/20/2017 11:10 AM Caren Colindres PA-C [facility]        Today's Diagnoses     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)    -  1       Follow-ups after your visit        Your next 10 appointments already scheduled     Sep 26, 2017 11:15 AM CDT   Masonic Lab Draw with [facility] MASONIC LAB DRAW   [facility] Masonic Lab Draw ([facility])    [address] [address]   [phone]            Sep 26, 2017 11:45 AM CDT   (Arrive by 11:30 AM)   Return Visit with Fara Bradshaw MD   [facility] ([facility])    [address] [address]   [phone]            Sep 26, 2017  1:30 PM CDT   Infusion 180 with UC ONCOLOGY INFUSION, UC 21 ATC   [facility] ([facility])    [address] [facility]  [address] [facility] [address]   [phone]            Oct 03, 2017  1:15 PM CDT   Masonic Lab Draw with UC MASONIC LAB DRAW   [facility] Masonic Lab Draw ([facility])    [address] [address]   [phone]            Oct 03, 2017  1:50 PM CDT   (Arrive by 1:35 PM)   Return Visit with EDDIE Oliva   [facility] ([facility])    [address]  [address] [facility] [address]   [phone]            Oct 03, 2017  2:30 PM CDT   Infusion 180 with UC ONCOLOGY INFUSION, UC 19 ATC   [facility] ([facility])    [address] [address]   [phone]            Oct 10,  2017  7:30 AM CDT   Masonic Lab Draw with  MASONIC LAB DRAW   [facility] Lab Draw ([facility])    [address] [facility]  [address] [facility] [address] [phone]            Oct 10, 2017  8:30 AM CDT   Infusion 180 with UC ONCOLOGY INFUSION   [facility] ([facility])    [address] [address] [phone]              Who to contact     If you have questions or need follow up information about today's clinic visit or your schedule please contact [facility] directly at [phone].  Normal or non-critical lab and imaging results will be communicated to you by garbshart, letter or phone within 4 business days after the clinic has received the results. If you do not hear from us within 7 days, please contact the clinic through MessagePartyt or phone. If you have a critical or abnormal lab result, we will notify you by phone as soon as possible.  Submit refill requests through HotClickVideo or call your pharmacy and they will forward the refill request to us. Please allow 3 business days for your refill to be completed.          Additional Information About Your Visit        garbsharRealityMine Information     HotClickVideo gives you secure access to your electronic health record. If you see a primary care provider, you can also send messages to your care team and make appointments. If you have questions, please call your primary care clinic.  If you do not have a primary care provider, please call [phone] and they will assist you.        Care EveryWhere ID     This is your Care EveryWhere ID. This could be used by other organizations to access your [place] medical records  [id number]        Your Vitals Were     Pulse Temperature Respirations Pulse Oximetry BMI (Body Mass Index)       85 97.7  F (36.5  C) (Oral) 16 92% 30.58 kg/m2        Blood Pressure from Last 3 Encounters:   09/20/17 115/69    09/01/17 115/61   08/28/17 141/82    Weight from Last 3 Encounters:   09/20/17 78.3 kg (172 lb 9.6 oz)   09/01/17 78.3 kg (172 lb 11.2 oz)   08/28/17 78.3 kg (172 lb 11.2 oz)              We Performed the Following     CBC with platelets differential     Comprehensive metabolic panel     TSH          Today's Medication Changes          These changes are accurate as of: 9/20/17 12:54 PM.  If you have any questions, ask your nurse or doctor.               Start taking these medicines.        Dose/Directions    ondansetron 8 MG tablet   Commonly known as:  ZOFRAN   Used for:  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)   Started by:  Caren Colindres PA-C        Dose:  8 mg   Take 1 tablet (8 mg) by mouth every 8 hours as needed for nausea   Quantity:  30 tablet   Refills:  3       prochlorperazine 10 MG tablet   Commonly known as:  COMPAZINE   Used for:  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)   Started by:  Caren Colindres PA-C        Dose:  10 mg   Take 1 tablet (10 mg) by mouth every 6 hours as needed (Nausea/Vomiting)   Quantity:  30 tablet   Refills:  2         These medicines have changed or have updated prescriptions.        Dose/Directions    * LORazepam 0.5 MG tablet   Commonly known as:  ATIVAN   This may have changed:  Another medication with the same name was added. Make sure you understand how and when to take each.   Used for:  Breast cancer (H), Anxiety   Changed by:  Fara Bradshaw MD        Take 1-2 tabs 20 minutes prior to MRI scans.   Quantity:  10 tablet   Refills:  0       * LORazepam 0.5 MG tablet   Commonly known as:  ATIVAN   This may have changed:  You were already taking a medication with the same name, and this prescription was added. Make sure you understand how and when to take each.   Used for:  Malignant neoplasm of upper-inner quadrant of right  breast in female, estrogen receptor negative (H)   Changed by:  Caren Colindres PA-C        Dose:  0.5 mg   Take 1 tablet (0.5 mg) by mouth every 4 hours as needed (Anxiety, Nausea/Vomiting or Sleep)   Quantity:  30 tablet   Refills:  2       * Notice:  This list has 2 medication(s) that are the same as other medications prescribed for you. Read the directions carefully, and ask your doctor or other care provider to review them with you.         Where to get your medicines      These medications were sent to [place], [place] - [address] [facility] [address]  [address] [facility] [address], [facility] [place] [address]    Hours:  TRANSPLANT PHONE NUMBER [phone] Phone:  [phone]     ondansetron 8 MG tablet    prochlorperazine 10 MG tablet         Some of these will need a paper prescription and others can be bought over the counter.  Ask your nurse if you have questions.     Bring a paper prescription for each of these medications     LORazepam 0.5 MG tablet                Primary Care Provider Office Phone # Fax #    Radha Cazares [phone]       [address] [address]        Equal Access to Services     THERESA HUGHES AH: Abdifatah baileyo Somarian, waaxda luqadaha, qaybta kaalmada adeegyada, bang pickering. So [facility] [phone].    ATENCIÓN: Si habla español, tiene a blackman disposición servicios gratuitos de asistencia lingüística. Llerika al [phone].    We comply with applicable federal civil rights laws and [place] laws. We do not discriminate on the basis of race, color, national origin, age, disability sex, sexual orientation or gender identity.            Thank you!     Thank you for choosing [facility]  for your care. Our goal is always to provide you with excellent care. Hearing back from our patients is one way we can continue to improve our services.  Please take a few minutes to complete the written survey that you may receive in the mail after your visit with us. Thank you!             Your Updated Medication List - Protect others around you: Learn how to safely use, store and throw away your medicines at www.disposemymeds.org.          This list is accurate as of: 9/20/17 12:54 PM.  Always use your most recent med list.                   Brand Name Dispense Instructions for use Diagnosis    albuterol 108 (90 BASE) MCG/ACT Inhaler    PROAIR HFA/PROVENTIL HFA/VENTOLIN HFA    1 Inhaler    Inhale 2 puffs into the lungs every 6 hours as needed for shortness of breath / dyspnea    Chronic obstructive pulmonary disease, unspecified COPD type (H)       aspirin 81 MG tablet      Take 1 tablet by mouth daily.        citalopram 10 MG tablet    celeXA    90 tablet    Take 1 tablet (10 mg) by mouth daily    Anxiety, Depression, unspecified depression type       CO Q 10 PO      Take 1 tablet by mouth        darifenacin 7.5 MG 24 hr tablet    ENABLEX    90 tablet    Take 1 tablet (7.5 mg) by mouth daily    Overactive bladder       guaiFENesin 600 MG 12 hr tablet    MUCINEX    180 tablet    Take 2 tablets (1,200 mg) by mouth 2 times daily    Cough with sputum       hydrOXYzine 25 MG tablet    ATARAX    100 tablet    Take 1-2 tablets (25-50 mg) by mouth every 6 hours as needed for itching    Hives       lidocaine-prilocaine cream    EMLA    30 g    Please apply to port site 30 minutes before use prn    Personal history of malignant neoplasm of breast       * LORazepam 0.5 MG tablet    ATIVAN    10 tablet    Take 1-2 tabs 20 minutes prior to MRI scans.    Breast cancer (H), Anxiety       * LORazepam 0.5 MG tablet    ATIVAN    30 tablet    Take 1 tablet (0.5 mg) by mouth every 4 hours as needed (Anxiety, Nausea/Vomiting or Sleep)    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)       MULTIPLE VITAMIN PO           ondansetron  8 MG tablet    ZOFRAN    30 tablet    Take 1 tablet (8 mg) by mouth every 8 hours as needed for nausea    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)       order for [facility]      [employer] Dream Station Auto CPAP 12-18 cm, F&P Simplus FFM small.    MERLIN (obstructive sleep apnea)       prochlorperazine 10 MG tablet    COMPAZINE    30 tablet    Take 1 tablet (10 mg) by mouth every 6 hours as needed (Nausea/Vomiting)    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)       simvastatin 40 MG tablet    ZOCOR    90 tablet    Take 1 tablet (40 mg) by mouth At Bedtime    Hyperlipidemia LDL goal <130       tiotropium 18 MCG capsule    SPIRIVA    1 capsule    Inhale 1 capsule (18 mcg) into the lungs daily Inhale contents of one capsule    Chronic obstructive pulmonary disease, unspecified COPD type (H)       VITAMIN B 12 PO      Take 500 mcg by mouth daily        VITAMIN B6 PO      Take 1 tablet by mouth daily.        vitamin D 1000 UNITS capsule      2 CAPSULE DAILY        * Notice:  This list has 2 medication(s) that are the same as other medications prescribed for you. Read the directions carefully, and ask your doctor or other care provider to review them with you.

## 2017-09-20 NOTE — PROGRESS NOTES
Oncology Visit:  Date on this visit: Sep 20, 2017    Primary Physician: Radha Cazares     History Of Present Illness:  Ms. Alonzo is a 57 year old postmenopausal female with clinical stage IA, T1c N0 M0, grade 3, triple-negative invasive ductal carcinoma of the right breast.  Ms. Alonzo underwent routine bilateral screening mammogram on 07/27/2017 which showed possible developing calcifications in the right breast at the 12 o'clock position 6 cm from the nipple.  There were no concerning findings in the left breast.  Mammogram prior to this one had been 1 year prior in 07/2016.  Right breast ultrasound demonstrated a 7-mm, irregular, hypoechoic mass at the 12 o'clock position.  The patient went on to have a contrast-enhanced mammogram which showed a peripherally enhancing, irregular mass measured up to 1.9 cm as well as an additional 6-mm, enhancing focus anteromedial to the dominant mass.  The total area of abnormal enhancement on contrast mammogram measured up to 3.4 cm.  Right breast biopsy on 08/22/2017 demonstrated a grade 3 invasive mammary carcinoma with associated high-grade DCIS.  Invasive carcinoma was stained for estrogen and progesterone receptors.  Estrogen receptor and progesterone receptor staining was negative with 0% of nuclei staining.  HER2 was non-amplified by FISH with a HER2/CEN17 ratio of 1.5 and 2.8 HER2 signals per nucleus.       Ms. Alonzo has a history of COPD for which she is on inhalers.  She has a 10-pack-year history of smoking cigarettes; however, she quit smoking 15 years ago.  She states she is otherwise in relatively good health.  She has a history of hyperlipidemia and recently has been diagnosed with sleep apnea.  She has chronic right shoulder pain.  She denies new bone or joint aches or pains.  She denies palpable lumps or masses in either breast.  She has no breast pain or discomfort.  She denies nipple discharge.  She underwent menarche at 12 years old.  Her first  "full-term pregnancy was at 22 years old.  She  each of her two daughters for a few months.  She underwent a \"tubal ligation\" at 40 years old.  She states she had menopausal symptoms shortly thereafter and underwent menopause in her early 40s.  She was on OCPs until her early 30s, at which time she had an IUD for a period of 10 years.  She denies hormone replacement therapy.  She denies a family history of breast cancer.  She has never had any prior breast biopsies.  It is notable that both of her daughters have undergone BRCA testing due to a strong family history of breast cancer on their father's side.  Both of her daughters tested BRCA negative.       She is here today for routine follow up prior to starting on a clinical trial with Taxol +/- Keytruda. She was randomized to receive Keytruda.     Interval History:  Patient reports that she is doing well today. She jose any breast pain or changes to her breast mass. She reports eating and drinking well. She has used a CPAP for the past 1.5 years and is sleeping well. She denies concerns or questions.      Review of Systems:  Patient denies any of the following except if noted above: fevers, chills, difficulty with energy, vision or hearing changes, chest pain, dyspnea, abdominal pain, nausea, vomiting, diarrhea, constipation, urinary concerns, headaches, numbness, tingling, issues with sleep or mood.     Past Medical/Surgical History:  Past Medical History:   Diagnosis Date     COPD (chronic obstructive pulmonary disease) (H) 2011     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) 8/30/2017     Periodontal disease      Sleep apnea 12/2015     Urticaria      Past Surgical History:   Procedure Laterality Date     APPENDECTOMY  10/6/2015     CATARACT IOL, RT/LT  11/2016    bilateral      COLONOSCOPY  11/15/2011    Procedure:COLONOSCOPY; Colonoscopy, screening; Surgeon:ANASTASIA BUNCH; Location: OR "     INSERT PORT VASCULAR ACCESS Left 9/1/2017    Procedure: INSERT PORT VASCULAR ACCESS;  Single Lumen Chest Power Port;  Surgeon: Leif Parkinson PA-C;  [facility]     TUBAL LIGATION  12/2004    Bilateral     Allergies:  Allergies as of 09/20/2017     (No Known Allergies)     Current Medications:  Current Outpatient Prescriptions   Medication Sig Dispense Refill     LORazepam (ATIVAN) 0.5 MG tablet Take 1-2 tabs 20 minutes prior to MRI scans. 10 tablet 0     hydrOXYzine (ATARAX) 25 MG tablet Take 1-2 tablets (25-50 mg) by mouth every 6 hours as needed for itching 100 tablet 2     guaiFENesin (MUCINEX) 600 MG 12 hr tablet Take 2 tablets (1,200 mg) by mouth 2 times daily 180 tablet 6     simvastatin (ZOCOR) 40 MG tablet Take 1 tablet (40 mg) by mouth At Bedtime 90 tablet 4     tiotropium (SPIRIVA) 18 MCG capsule Inhale 1 capsule (18 mcg) into the lungs daily Inhale contents of one capsule 1 capsule 11     albuterol (PROAIR HFA/PROVENTIL HFA/VENTOLIN HFA) 108 (90 BASE) MCG/ACT Inhaler Inhale 2 puffs into the lungs every 6 hours as needed for shortness of breath / dyspnea 1 Inhaler 5     citalopram (CELEXA) 10 MG tablet Take 1 tablet (10 mg) by mouth daily 90 tablet 3     darifenacin (ENABLEX) 7.5 MG 24 hr tablet Take 1 tablet (7.5 mg) by mouth daily 90 tablet 3     order for [facility] [place] [facility] Auto CPAP 12-18 cm, F&P Simplus FFM small.       Coenzyme Q10 (CO Q 10 PO) Take 1 tablet by mouth        MULTIPLE VITAMIN PO        Cyanocobalamin (VITAMIN B 12 PO) Take 500 mcg by mouth daily       Pyridoxine HCl (VITAMIN B6 PO) Take 1 tablet by mouth daily.       aspirin 81 MG tablet Take 1 tablet by mouth daily.  3     VITAMIN D 1000 UNIT OR CAPS 2 CAPSULE DAILY       ondansetron (ZOFRAN) 8 MG tablet Take 1 tablet (8 mg) by mouth every 8 hours as needed for nausea (Patient not taking: Reported on 9/20/2017) 30 tablet 3     LORazepam (ATIVAN) 0.5 MG tablet Take 1 tablet (0.5 mg) by mouth every 4  hours as needed (Anxiety, Nausea/Vomiting or Sleep) (Patient not taking: Reported on 9/20/2017) 30 tablet 2     prochlorperazine (COMPAZINE) 10 MG tablet Take 1 tablet (10 mg) by mouth every 6 hours as needed (Nausea/Vomiting) (Patient not taking: Reported on 9/20/2017) 30 tablet 2     lidocaine-prilocaine (EMLA) cream Please apply to port site 30 minutes before use prn (Patient not taking: Reported on 9/20/2017) 30 g 1      Family History:  Maternal uncle with prostate cancer at 64 yo.  Maternal uncle with throat cancer.  Paternal great grandfather with h/o prostate cancer at 76 yo.  Of note, both of her daughters are BRCA negative; tested due to a strong family history of breast cancer on their father's side.  Patient denies other family history of malignancy.    Social History:  .  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  Has 2 daughters.  One of her daughters is a nurse on the oncology unit at [employer] [employer].  Patient resides in [place], [place].  They live on [place] acres.  Patient smoked a 1/2 ppd of cigarettes for 20 years.  She quit smoking 15-17 years ago.  She drinks 10-15 beers per week.  She does not exercise.      Physical Exam:  /69 (BP Location: Right arm, Cuff Size: Adult Regular)  Pulse 85  Temp 97.7  F (36.5  C) (Oral)  Resp 16  Wt 78.3 kg (172 lb 9.6 oz)  SpO2 92%  BMI 30.58 kg/m2  General:  Well appearing, well-nourished adult female in [facility]. Here today with her .  HEENT:  Normocephalic.  Sclera anicteric.  MMM.  No lesions of the oropharynx.  Lymph:  No palpable cervical, supraclavicular, or axillary LAD.  Chest:  CTA bilaterally.  No wheezes or crackles.  CV:  RRR.   Breast:  There is a 2 x 2 cm soft mass palpable at the 12:00 position of the right breast.  There is a biopsy site with surrounding ecchymosis in the upper inner quadrant of the left breast, just medial to the mass.  There are no other discretely palpable masses in either breast.  Bilateral  nipples are everted.  No nipple discharge.  Abd:  Soft/NT/ND.  BSs normoactive.  No hepatosplenomegaly.  Ext:  No pitting edema of the bilateral lower extremities.   Neuro:  Cranial nerves grossly intact.  Psych:  Mood and affect appear normal.    Laboratory/Imaging Studies:   9/20/2017 10:33   Sodium 139   Potassium 4.0   Chloride 104   Carbon Dioxide 28   Urea Nitrogen 12   Creatinine 0.80   GFR Estimate 73   GFR Estimate If Black 89   Calcium 9.0   Anion Gap 8   Albumin 3.4   Protein Total 7.5   Bilirubin Total 0.4   Alkaline Phosphatase 136   ALT 49   AST 36   TSH 1.89   Glucose 117 (H)   WBC 6.5   Hemoglobin 13.7   Hematocrit 39.1   Platelet Count 288   RBC Count 4.03   MCV 97   MCH 34.0 (H)   MCHC 35.0   RDW 12.0   Diff Method Automated Method   % Neutrophils 54.0   % Lymphocytes 25.3   % Monocytes 9.5   % Eosinophils 10.4   % Basophils 0.5   % Immature Granulocytes 0.3   Nucleated RBCs 0   Absolute Neutrophil 3.5   Absolute Lymphocytes 1.6   Absolute Monocytes 0.6   Absolute Eosinophils 0.7   Absolute Basophils 0.0   Abs Immature Granulocytes 0.0   Absolute Nucleated RBC 0.0     ASSESSMENT/PLAN:  Ms. Alonzo is a 57 year old postmenopausal female with recently diagnosed, clinical stage IA, T1c N0 M0, grade 3, triple-negative infiltrating ductal carcinoma of the right breast, starting on a clinical trial with Taxol +/- Keytruda, randomized to Keytruda.      Breast cancer. Patient will start on treatment today with Taxol and Keytruda. We reviewed potential side effects and their management including fatigue, inflammation in organs, myelosuppression, nausea, vomiting, diarrhea, nail changes, and hair loss. Side effects were already previously reviewed with the patient. She will return weekly for follow up prior to each infusion. The plan is for to receive 12 weekly cycles of Taxol followed by 4 cycles of adriamycin and cyclophosphamide, administered every 2 weeks. Keytruda will be given every 3 weeks throughout  the course of chemotherapy. Approximately 4-5 weeks following completion of chemotherapy we would proceed with surgery.  Surgical excision could be by either lumpectomy or mastectomy.  Lymph nodes would be staged with a sentinel lymph node biopsy at the time of surgery.  During her treatment, she will have surgical consultation with a breast surgeon to discuss this further  Whether or not she will benefit from adjuvant radiation is unknown at this time and depends on the type of breast surgery, final surgical margins, and whether or not there is lymph node involvement at the time of surgery.  As she has an estrogen receptor-negative breast cancer, there will be no benefit to an adjuvant course of endocrine therapy.        Given she was diagnosed with a triple negative breast cancer at age <61 yo, NCCN guidelines recommend genetic counseling and testing.  I will f/u with scheduling to get this set up, as referral was placed last week.     Caren Colindres PA-C  [facility]  [address] [place] [address] [phone]

## 2017-09-20 NOTE — NURSING NOTE
"Oncology Rooming Note    September 20, 2017 11:08 AM   Ashleigh Alonzo is a 57 year old female who presents for:    Chief Complaint   Patient presents with     Clinical Trials     EKG for research     Port Draw     Labs draw from port by RN. Line flushed with saline and heparin. Vitals taken and pt checked in for appt. No research labs seen.     Oncology Clinic Visit     Return-Breast Ca     Initial Vitals: /69 (BP Location: Right arm, Cuff Size: Adult Regular)  Pulse 85  Temp 97.7  F (36.5  C) (Oral)  Resp 16  Wt 78.3 kg (172 lb 9.6 oz)  SpO2 92%  BMI 30.58 kg/m2 Estimated body mass index is 30.58 kg/(m^2) as calculated from the following:    Height as of 9/1/17: 1.6 m (5' 2.99\").    Weight as of this encounter: 78.3 kg (172 lb 9.6 oz). Body surface area is 1.87 meters squared.  No Pain (0) Comment: Data Unavailable   No LMP recorded. Patient is postmenopausal.  Allergies reviewed: Yes  Medications reviewed: Yes    Medications: Medication refills not needed today.  Pharmacy name entered into Goowy:    [facility] [place] [address] [place] [facility] [place], [place] - [address] [facility]    Clinical concerns: No new concerns    6 minutes for nursing intake (face to face time)     Jillian Tay LPN            "

## 2017-09-21 ENCOUNTER — NURSE TRIAGE (OUTPATIENT)
Dept: NURSING | Facility: CLINIC | Age: 57
End: 2017-09-21

## 2017-09-22 ENCOUNTER — TELEPHONE (OUTPATIENT)
Dept: ONCOLOGY | Facility: CLINIC | Age: 57
End: 2017-09-22

## 2017-09-22 NOTE — TELEPHONE ENCOUNTER
Prior Authorization Approval    Authorization Effective Date: 9/21/2017  Authorization Expiration Date: 3/20/2018  Medication: Ondansetron - Approval  Approved Dose/Quantity: 8mg 30/5  Reference #:     Insurance Company: [employer] - Phone [phone] Fax [phone]  Expected CoPay:       CoPay Card Available:      Foundation Assistance Needed:    Which Pharmacy is filling the prescription (Not needed for infusion/clinic administered):    Pharmacy Notified:    Patient Notified:        Rufus Miner  [facility]  [facility] [email or web address]  [phone] (phone  [phone] (fax

## 2017-09-22 NOTE — TELEPHONE ENCOUNTER
"Patient states she had her first chemo treatment 9/20/17 and today is experiencing possible side effect(s), one of which is \"flushing\", not listed on discharge paperwork.  FNA paged on call, Dr. Page, via page  at 7:11PM to call patient back.  "

## 2017-09-23 RX ORDER — DEXAMETHASONE SODIUM PHOSPHATE 10 MG/ML
10 INJECTION, SOLUTION INTRAMUSCULAR; INTRAVENOUS
Status: CANCELLED | OUTPATIENT
Start: 2017-09-26

## 2017-09-23 RX ORDER — EPINEPHRINE 0.3 MG/.3ML
0.3 INJECTION SUBCUTANEOUS EVERY 5 MIN PRN
Status: CANCELLED | OUTPATIENT
Start: 2017-09-26

## 2017-09-23 RX ORDER — METHYLPREDNISOLONE SODIUM SUCCINATE 125 MG/2ML
125 INJECTION, POWDER, LYOPHILIZED, FOR SOLUTION INTRAMUSCULAR; INTRAVENOUS
Status: CANCELLED
Start: 2017-09-26

## 2017-09-23 RX ORDER — ALBUTEROL SULFATE 90 UG/1
1-2 AEROSOL, METERED RESPIRATORY (INHALATION)
Status: CANCELLED
Start: 2017-09-26

## 2017-09-23 RX ORDER — EPINEPHRINE 1 MG/ML
0.3 INJECTION INTRAMUSCULAR; INTRAVENOUS; SUBCUTANEOUS EVERY 5 MIN PRN
Status: CANCELLED | OUTPATIENT
Start: 2017-09-26

## 2017-09-23 RX ORDER — DIPHENHYDRAMINE HYDROCHLORIDE 50 MG/ML
50 INJECTION INTRAMUSCULAR; INTRAVENOUS
Status: CANCELLED
Start: 2017-09-26

## 2017-09-23 RX ORDER — LORAZEPAM 2 MG/ML
0.5 INJECTION INTRAMUSCULAR EVERY 4 HOURS PRN
Status: CANCELLED
Start: 2017-09-26

## 2017-09-23 RX ORDER — HEPARIN SODIUM (PORCINE) LOCK FLUSH IV SOLN 100 UNIT/ML 100 UNIT/ML
500 SOLUTION INTRAVENOUS ONCE
Status: CANCELLED
Start: 2017-09-26 | End: 2017-09-26

## 2017-09-23 RX ORDER — ALBUTEROL SULFATE 0.83 MG/ML
2.5 SOLUTION RESPIRATORY (INHALATION)
Status: CANCELLED | OUTPATIENT
Start: 2017-09-26

## 2017-09-23 RX ORDER — SODIUM CHLORIDE 9 MG/ML
1000 INJECTION, SOLUTION INTRAVENOUS CONTINUOUS PRN
Status: CANCELLED
Start: 2017-09-26

## 2017-09-23 RX ORDER — DIPHENHYDRAMINE HCL 25 MG
25 CAPSULE ORAL ONCE
Status: CANCELLED
Start: 2017-09-26

## 2017-09-23 RX ORDER — MEPERIDINE HYDROCHLORIDE 25 MG/ML
25 INJECTION INTRAMUSCULAR; INTRAVENOUS; SUBCUTANEOUS EVERY 30 MIN PRN
Status: CANCELLED | OUTPATIENT
Start: 2017-09-26

## 2017-09-23 NOTE — PROGRESS NOTES
Oncology On Treatment Visit:  Date on this visit: 9/26/2017    Diagnosis:  Stage IIa, T2N0M0, grade 3 triple negative cancer of the right breast.    Primary Physician: Radha Cazares     History Of Present Illness:  Ms. Alonzo is a 57-year-old postmenopausal female with stage IIa, T2 N0 M0, grade 3, triple-negative invasive carcinoma of the right breast.  Routine bilateral screening mammogram on 07/27/2017 showed possible developing calcifications in the right breast at the 12 o'clock position 6 cm from the nipple.  Mammogram prior to this one had been 1 year prior in 07/2016.  Right breast ultrasound demonstrated a 7-mm, irregular, hypoechoic mass at the 12 o'clock position.  The patient went on to have a contrast-enhanced mammogram which showed a peripherally enhancing, irregular mass measured up to 1.9 cm as well as an additional 6-mm, enhancing focus anteromedial to the dominant mass.  The total area of abnormal enhancement on contrast mammogram measured up to 3.4 cm.  Right breast biopsy on 08/22/2017 demonstrated a grade 3 invasive mammary carcinoma with associated high-grade DCIS.  Invasive carcinoma was stained for estrogen and progesterone receptors.  Estrogen receptor and progesterone receptor staining was negative with 0% of nuclei staining.  HER2 was non-amplified by FISH with a HER2/CEN17 ratio of 1.5 and 2.8 HER2 signals per nucleus.  Breast MRI measured the biopsy proven breast cancer at 3.2 cm.    Ms. Alonzo enrolled in the ISPY-2 clinical trial.  She began treatment with weekly taxol and once every 3 week pembrolizumab on 9/20/17.     Interval History:  Ms. Alonzo comes in to clinic today for evaluation prior to cycle number 2 of Taxol.  She received Taxol and pembrolizumab last week.  She states that overall her first infusion went very well for her.  She experienced flushing and a red face the day after chemotherapy.  This resolved after one day without intervention.  She has had a somewhat poor  appetite but is eating regular meals.  She denies nausea or vomiting.  She has not had any diarrhea or constipation.  She reports dry mouth but no mouth sores.  She has no joint aches or pains.  She denies cough, shortness of breath or chest pain.  She has not noted any swelling of her lower extremities.  She has had no headaches, visual changes or focal neurologic complaints.  She has not noted any symptoms of peripheral neuropathy.  There was some difficulty with port access this morning; therefore, she has tPA setting right now.  The remainder of a 10-point review of systems is otherwise negative.      Past Medical/Surgical History:  Past Medical History:   Diagnosis Date     COPD (chronic obstructive pulmonary disease) (H) 2011     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) 8/30/2017     Periodontal disease      Sleep apnea 12/2015     Urticaria      Past Surgical History:   Procedure Laterality Date     APPENDECTOMY  10/6/2015     CATARACT IOL, RT/LT  11/2016    bilateral      COLONOSCOPY  11/15/2011    Procedure:COLONOSCOPY; Colonoscopy, screening; Surgeon:ANASTASIA BUNCH; Location:[facility] OR     INSERT PORT VASCULAR ACCESS Left 9/1/2017    Procedure: INSERT PORT VASCULAR ACCESS;  Single Lumen Chest Power Port;  Surgeon: Leif Parkinson PA-C;  Location: [facility] OR     TUBAL LIGATION  12/2004    Bilateral     Allergies:  Allergies as of 09/26/2017     (No Known Allergies)     Current Medications:  Current Outpatient Prescriptions   Medication Sig Dispense Refill     ondansetron (ZOFRAN) 8 MG tablet Take 1 tablet (8 mg) by mouth every 8 hours as needed for nausea (Patient not taking: Reported on 9/20/2017) 30 tablet 3     prochlorperazine (COMPAZINE) 10 MG tablet Take 1 tablet (10 mg) by mouth every 6 hours as needed (Nausea/Vomiting) (Patient not taking: Reported on 9/20/2017) 30 tablet 2     LORazepam (ATIVAN) 0.5 MG tablet Take 1 tablet (0.5 mg) by  "mouth every 4 hours as needed (Anxiety, Nausea/Vomiting or Sleep) 30 tablet 2     lidocaine-prilocaine (EMLA) cream Please apply to port site 30 minutes before use prn (Patient not taking: Reported on 9/20/2017) 30 g 1     LORazepam (ATIVAN) 0.5 MG tablet Take 1-2 tabs 20 minutes prior to MRI scans. 10 tablet 0     hydrOXYzine (ATARAX) 25 MG tablet Take 1-2 tablets (25-50 mg) by mouth every 6 hours as needed for itching 100 tablet 2     guaiFENesin (MUCINEX) 600 MG 12 hr tablet Take 2 tablets (1,200 mg) by mouth 2 times daily 180 tablet 6     simvastatin (ZOCOR) 40 MG tablet Take 1 tablet (40 mg) by mouth At Bedtime 90 tablet 4     tiotropium (SPIRIVA) 18 MCG capsule Inhale 1 capsule (18 mcg) into the lungs daily Inhale contents of one capsule 1 capsule 11     albuterol (PROAIR HFA/PROVENTIL HFA/VENTOLIN HFA) 108 (90 BASE) MCG/ACT Inhaler Inhale 2 puffs into the lungs every 6 hours as needed for shortness of breath / dyspnea 1 Inhaler 5     citalopram (CELEXA) 10 MG tablet Take 1 tablet (10 mg) by mouth daily 90 tablet 3     darifenacin (ENABLEX) 7.5 MG 24 hr tablet Take 1 tablet (7.5 mg) by mouth daily 90 tablet 3     [facility] CPAP 12-18 cm, F&P Simplus FFM small.       Coenzyme Q10 (CO Q 10 PO) Take 1 tablet by mouth        MULTIPLE VITAMIN PO        Cyanocobalamin (VITAMIN B 12 PO) Take 500 mcg by mouth daily       Pyridoxine HCl (VITAMIN B6 PO) Take 1 tablet by mouth daily.       aspirin 81 MG tablet Take 1 tablet by mouth daily.  3     VITAMIN D 1000 UNIT OR CAPS 2 CAPSULE DAILY        Family and Social History:  Reviewed and unchanged from prior.  Please see initial consultation dated 8/28/17 for further details.    Physical Exam:  /89  Pulse 105  Temp 98.9  F (37.2  C) (Oral)  Resp 16  Ht 1.6 m (5' 2.99\")  Wt 77.4 kg (170 lb 11.2 oz)  SpO2 96%  BMI 30.25 kg/m2  General:  Well appearing, well-nourished adult female in NAD.  HEENT:  Normocephalic.  Sclera " anicteric.  MMM.  No lesions of the oropharynx.  Lymph:  No palpable cervical, supraclavicular, or axillary LAD.  Chest:  CTA bilaterally.  No wheezes or crackles.  CV:  RRR.  Nl S1 and S2.  No m/r/g.  Breast: Previously noted mass palpable at the 12:00 position of the right breast is barely palpable on today's exam.  There is a slight increase in density in this area, however, no discrete borders and therefore no measurable mass.    Abd:  Soft/NT/ND.  BSs normoactive.  No hepatosplenomegaly.  Ext:  No pitting edema of the bilateral lower extremities.  Pulses 2+ and symmetric.  Musculo:  Strength 5/5 throughout.  Neuro:  Cranial nerves grossly intact.  Psych:  Mood and affect appear normal.    Laboratory/Imaging Studies:  9/5/17 MRI Breast:  IMPRESSION: BI-RADS CATEGORY: 6 - Known Biopsy-Proven  Malignancy-Appropriate Action Should Be Taken.  1. The biopsy-proven cancer in the right breast at 12:00 measures 32  mm.  2. There is a satellite suspicious mass in the right breast at 1:00  measuring 15 mm in AP dimension.  3. The total area of suspicious enhancement measures about 30 x 40 mm.    Results for YUNI CALIXTO (MRN [id number]) as of 9/29/2017 14:00   Ref. Range 9/26/2017 11:27   Sodium Latest Ref Range: 133 - 144 mmol/L 136   Potassium Latest Ref Range: 3.4 - 5.3 mmol/L 4.0   Chloride Latest Ref Range: 94 - 109 mmol/L 104   Carbon Dioxide Latest Ref Range: 20 - 32 mmol/L 24   Urea Nitrogen Latest Ref Range: 7 - 30 mg/dL 10   Creatinine Latest Ref Range: 0.52 - 1.04 mg/dL 0.72   GFR Estimate Latest Ref Range: >60 mL/min/1.7m2 83   GFR Estimate If Black Latest Ref Range: >60 mL/min/1.7m2 >90   Calcium Latest Ref Range: 8.5 - 10.1 mg/dL 8.9   Anion Gap Latest Ref Range: 3 - 14 mmol/L 8   Albumin Latest Ref Range: 3.4 - 5.0 g/dL 3.3 (L)   Protein Total Latest Ref Range: 6.8 - 8.8 g/dL 7.5   Bilirubin Total Latest Ref Range: 0.2 - 1.3 mg/dL 1.2   Alkaline Phosphatase Latest Ref Range: 40 - 150 U/L 135   ALT Latest  Ref Range: 0 - 50 U/L 58 (H)   AST Latest Ref Range: 0 - 45 U/L 46 (H)   Results for YUNI CALIXTO (MRN [id number]) as of 9/29/2017 14:00   Ref. Range 9/26/2017 11:27   WBC Latest Ref Range: 4.0 - 11.0 10e9/L 4.8   Hemoglobin Latest Ref Range: 11.7 - 15.7 g/dL 13.5   Hematocrit Latest Ref Range: 35.0 - 47.0 % 38.7   Platelet Count Latest Ref Range: 150 - 450 10e9/L 282       ASSESSMENT/PLAN:  Ms. Calixto is a 57-year-old postmenopausal female with a stage IIa, grade 3, triple-negative infiltrating ductal carcinoma of the right breast. On weekly Taxol and once every three week pembrolizumab since 9/20/17.    1.  Right breast cancer:  Presents for evaluation prior to week #2 of chemotherapy, weekly Taxol.  She received both Taxol and Pembrolizumab last week.  Thus far she is tolerating treatment well. Given no evidence of hypersensitivity reaction, will decrease dexamethasone dosing to 12 mg today, and eliminate it altogether next week.  As she is clinically feeling well and laboratories are within parameters, we will proceed with administration of today's treatment. She will return in 1 week for labs, visit with Lilia Livingston, and Taxol infusion.  We discussed next breast MRI will be following week #3 chemotherapy administration per ISPY2 protocol.    Overall plan is to complete a total of 12 weeks Taxol and pembrolizumab followed by 4 cycles of adriamycin, Cytoxan, and pembrolizumab.   Following completion of chemotherapy, will proceed with surgery.  Whether or not she will benefit from adjuvant radiation is unknown at this time and depends on the type of breast surgery, final surgical margins, and whether or not there is lymph node involvement at the time of surgery.      2.  Facial flushing:  Secondary to dexamethasone.  If no hypersensitivity reaction, will discontinue dexamethasone altogether next week.    3.  Malfunctioning port-a-catheter:  TPA in place during visit.  If still does not draw, will obtain  CXR.    4.  Transaminitis:  Secondary to Taxol and pembrolizumab.  No need for dose reduction of chemotherapy today, but will need to continue to monitor closely.     5.  Genetic counseling:  Recommended based on diagnosis of a triple negative breast cancer at age <61 yo.  She is scheduled to see Naty Segovia on 10/10/17.     6.  Follow Up:  Return in 1 week for labs, visit with Lilia Livingston, and Taxol infusion.    It was a pleasure to meet with Ashleigh Perera in clinic today.  She had multiple questions which were answered to her satisfaction.  A total of 25 minutes of our 30 minute face to face visit was spent in counseling.

## 2017-09-26 ENCOUNTER — APPOINTMENT (OUTPATIENT)
Dept: LAB | Facility: CLINIC | Age: 57
End: 2017-09-26
Attending: INTERNAL MEDICINE
Payer: COMMERCIAL

## 2017-09-26 ENCOUNTER — ONCOLOGY VISIT (OUTPATIENT)
Dept: ONCOLOGY | Facility: CLINIC | Age: 57
End: 2017-09-26
Attending: INTERNAL MEDICINE
Payer: COMMERCIAL

## 2017-09-26 ENCOUNTER — RESEARCH ENCOUNTER (OUTPATIENT)
Dept: ONCOLOGY | Facility: CLINIC | Age: 57
End: 2017-09-26

## 2017-09-26 ENCOUNTER — CARE COORDINATION (OUTPATIENT)
Dept: ONCOLOGY | Facility: CLINIC | Age: 57
End: 2017-09-26

## 2017-09-26 VITALS
DIASTOLIC BLOOD PRESSURE: 89 MMHG | OXYGEN SATURATION: 96 % | HEIGHT: 63 IN | RESPIRATION RATE: 16 BRPM | BODY MASS INDEX: 30.25 KG/M2 | WEIGHT: 170.7 LBS | HEART RATE: 105 BPM | SYSTOLIC BLOOD PRESSURE: 132 MMHG | TEMPERATURE: 98.9 F

## 2017-09-26 DIAGNOSIS — Z17.1 MALIGNANT NEOPLASM OF UPPER-INNER QUADRANT OF RIGHT BREAST IN FEMALE, ESTROGEN RECEPTOR NEGATIVE (H): Primary | ICD-10-CM

## 2017-09-26 DIAGNOSIS — C50.211 MALIGNANT NEOPLASM OF UPPER-INNER QUADRANT OF RIGHT BREAST IN FEMALE, ESTROGEN RECEPTOR NEGATIVE (H): Primary | ICD-10-CM

## 2017-09-26 LAB
ALBUMIN SERPL-MCNC: 3.3 G/DL (ref 3.4–5)
ALP SERPL-CCNC: 135 U/L (ref 40–150)
ALT SERPL W P-5'-P-CCNC: 58 U/L (ref 0–50)
ANION GAP SERPL CALCULATED.3IONS-SCNC: 8 MMOL/L (ref 3–14)
AST SERPL W P-5'-P-CCNC: 46 U/L (ref 0–45)
BASOPHILS # BLD AUTO: 0 10E9/L (ref 0–0.2)
BASOPHILS NFR BLD AUTO: 0.2 %
BILIRUB SERPL-MCNC: 1.2 MG/DL (ref 0.2–1.3)
BUN SERPL-MCNC: 10 MG/DL (ref 7–30)
CALCIUM SERPL-MCNC: 8.9 MG/DL (ref 8.5–10.1)
CHLORIDE SERPL-SCNC: 104 MMOL/L (ref 94–109)
CO2 SERPL-SCNC: 24 MMOL/L (ref 20–32)
CREAT SERPL-MCNC: 0.72 MG/DL (ref 0.52–1.04)
DIFFERENTIAL METHOD BLD: ABNORMAL
EOSINOPHIL # BLD AUTO: 0.5 10E9/L (ref 0–0.7)
EOSINOPHIL NFR BLD AUTO: 10.9 %
ERYTHROCYTE [DISTWIDTH] IN BLOOD BY AUTOMATED COUNT: 11.8 % (ref 10–15)
GFR SERPL CREATININE-BSD FRML MDRD: 83 ML/MIN/1.7M2
GLUCOSE SERPL-MCNC: 178 MG/DL (ref 70–99)
HCT VFR BLD AUTO: 38.7 % (ref 35–47)
HGB BLD-MCNC: 13.5 G/DL (ref 11.7–15.7)
IMM GRANULOCYTES # BLD: 0 10E9/L (ref 0–0.4)
IMM GRANULOCYTES NFR BLD: 0.4 %
LYMPHOCYTES # BLD AUTO: 1 10E9/L (ref 0.8–5.3)
LYMPHOCYTES NFR BLD AUTO: 20.7 %
MCH RBC QN AUTO: 33.6 PG (ref 26.5–33)
MCHC RBC AUTO-ENTMCNC: 34.9 G/DL (ref 31.5–36.5)
MCV RBC AUTO: 96 FL (ref 78–100)
MONOCYTES # BLD AUTO: 0.2 10E9/L (ref 0–1.3)
MONOCYTES NFR BLD AUTO: 3.8 %
NEUTROPHILS # BLD AUTO: 3.1 10E9/L (ref 1.6–8.3)
NEUTROPHILS NFR BLD AUTO: 64 %
NRBC # BLD AUTO: 0 10*3/UL
NRBC BLD AUTO-RTO: 0 /100
PLATELET # BLD AUTO: 282 10E9/L (ref 150–450)
POTASSIUM SERPL-SCNC: 4 MMOL/L (ref 3.4–5.3)
PROT SERPL-MCNC: 7.5 G/DL (ref 6.8–8.8)
RBC # BLD AUTO: 4.02 10E12/L (ref 3.8–5.2)
SODIUM SERPL-SCNC: 136 MMOL/L (ref 133–144)
WBC # BLD AUTO: 4.8 10E9/L (ref 4–11)

## 2017-09-26 PROCEDURE — S0028 INJECTION, FAMOTIDINE, 20 MG: HCPCS | Mod: ZF | Performed by: INTERNAL MEDICINE

## 2017-09-26 PROCEDURE — 25000125 ZZHC RX 250: Mod: ZF | Performed by: INTERNAL MEDICINE

## 2017-09-26 PROCEDURE — 96375 TX/PRO/DX INJ NEW DRUG ADDON: CPT

## 2017-09-26 PROCEDURE — 99212 OFFICE O/P EST SF 10 MIN: CPT | Mod: ZF

## 2017-09-26 PROCEDURE — 25000128 H RX IP 250 OP 636: Mod: ZF | Performed by: INTERNAL MEDICINE

## 2017-09-26 PROCEDURE — 80053 COMPREHEN METABOLIC PANEL: CPT | Performed by: INTERNAL MEDICINE

## 2017-09-26 PROCEDURE — 96413 CHEMO IV INFUSION 1 HR: CPT

## 2017-09-26 PROCEDURE — G0008 ADMIN INFLUENZA VIRUS VAC: HCPCS

## 2017-09-26 PROCEDURE — 85025 COMPLETE CBC W/AUTO DIFF WBC: CPT | Performed by: INTERNAL MEDICINE

## 2017-09-26 PROCEDURE — 96367 TX/PROPH/DG ADDL SEQ IV INF: CPT

## 2017-09-26 PROCEDURE — 99214 OFFICE O/P EST MOD 30 MIN: CPT | Mod: ZP | Performed by: INTERNAL MEDICINE

## 2017-09-26 PROCEDURE — 36593 DECLOT VASCULAR DEVICE: CPT

## 2017-09-26 PROCEDURE — 90686 IIV4 VACC NO PRSV 0.5 ML IM: CPT | Mod: ZF | Performed by: INTERNAL MEDICINE

## 2017-09-26 RX ORDER — HEPARIN SODIUM (PORCINE) LOCK FLUSH IV SOLN 100 UNIT/ML 100 UNIT/ML
5 SOLUTION INTRAVENOUS ONCE
Status: COMPLETED | OUTPATIENT
Start: 2017-09-26 | End: 2017-09-26

## 2017-09-26 RX ORDER — HEPARIN SODIUM (PORCINE) LOCK FLUSH IV SOLN 100 UNIT/ML 100 UNIT/ML
500 SOLUTION INTRAVENOUS ONCE
Status: COMPLETED | OUTPATIENT
Start: 2017-09-26 | End: 2017-09-26

## 2017-09-26 RX ORDER — EPINEPHRINE 0.3 MG/.3ML
INJECTION SUBCUTANEOUS
Status: DISCONTINUED
Start: 2017-09-26 | End: 2017-09-26 | Stop reason: WASHOUT

## 2017-09-26 RX ADMIN — DEXAMETHASONE SODIUM PHOSPHATE 12 MG: 10 INJECTION, SOLUTION INTRAMUSCULAR; INTRAVENOUS at 14:15

## 2017-09-26 RX ADMIN — PACLITAXEL 150 MG: 6 INJECTION, SOLUTION INTRAVENOUS at 14:47

## 2017-09-26 RX ADMIN — SODIUM CHLORIDE 250 ML: 9 INJECTION, SOLUTION INTRAVENOUS at 13:29

## 2017-09-26 RX ADMIN — SODIUM CHLORIDE, PRESERVATIVE FREE 5 ML: 5 INJECTION INTRAVENOUS at 11:13

## 2017-09-26 RX ADMIN — INFLUENZA A VIRUS A/MICHIGAN/45/2015 X-275 (H1N1) ANTIGEN (FORMALDEHYDE INACTIVATED), INFLUENZA A VIRUS A/HONG KONG/4801/2014 X-263B (H3N2) ANTIGEN (FORMALDEHYDE INACTIVATED), INFLUENZA B VIRUS B/PHUKET/3073/2013 ANTIGEN (FORMALDEHYDE INACTIVATED), AND INFLUENZA B VIRUS B/BRISBANE/60/2008 ANTIGEN (FORMALDEHYDE INACTIVATED) 0.5 ML: 15; 15; 15; 15 INJECTION, SUSPENSION INTRAMUSCULAR at 16:01

## 2017-09-26 RX ADMIN — DIPHENHYDRAMINE HYDROCHLORIDE 25 MG: 50 INJECTION INTRAMUSCULAR; INTRAVENOUS at 13:52

## 2017-09-26 RX ADMIN — ALTEPLASE 2 MG: 2.2 INJECTION, POWDER, LYOPHILIZED, FOR SOLUTION INTRAVENOUS at 11:45

## 2017-09-26 RX ADMIN — FAMOTIDINE 20 MG: 20 INJECTION, SOLUTION INTRAVENOUS at 13:30

## 2017-09-26 RX ADMIN — SODIUM CHLORIDE, PRESERVATIVE FREE 500 UNITS: 5 INJECTION INTRAVENOUS at 16:04

## 2017-09-26 ASSESSMENT — PAIN SCALES - GENERAL: PAINLEVEL: NO PAIN (0)

## 2017-09-26 NOTE — MR AVS SNAPSHOT
After Visit Summary   9/26/2017    Ashleigh Alonzo    MRN: [id number]           Patient Information     Date Of Birth          1960        Visit Information        Provider Department      9/26/2017 11:45 AM Fara Bradshaw MD [facility]        Today's Diagnoses     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)    -  1       Follow-ups after your visit        Your next 10 appointments already scheduled     Oct 03, 2017  1:15 PM CDT   Masonic Lab Draw with  MASONIC LAB DRAW   [facility])    [address] [address]   [phone]            Oct 03, 2017  1:50 PM CDT   (Arrive by 1:35 PM)   Return Visit with EDDIE Oliva   [facility] ([facility])    [address] [place] [address]   [phone]            Oct 03, 2017  2:30 PM CDT   Infusion 180 with  ONCOLOGY INFUSION, UC 19 ATC   [facility] ([facility])    [address] [place] [address]   [phone]            Oct 05, 2017 12:00 PM CDT   Masonic Lab Draw with UC MASONIC LAB DRAW   [facility])    [address] [place] [address]   [phone]            Oct 05, 2017 12:30 PM CDT   MR BREAST BILATERAL W/O & W CONTRAST with [id number]   [facility] ([facility])    [address] [facility] [address] [address]   [phone]           Take your medicines as usual, unless your doctor tells you not to. Bring a list of your current medicines to your exam (including vitamins, minerals and over-the-counter drugs).  The timing of your exam may depend on the start of your last period. If you re in menopause, you may have  the exam anytime.  Please bring any previous mammograms or breast MRIs from other facilities to the MRI dept. Do not mail these items to us.  You will have contrast for this exam. To prepare:   The day before your exam, drink extra fluids at least six 8-ounce glasses (unless your doctor tells you to restrict your fluids).   Have a blood test (creatinine test) within 30 days of your exam. Go to your clinic or Diagnostic Imaging Department for this test.  The MRI machine uses a strong magnet. Please wear clothes without metal (snaps, zippers). A sweatsuit works well, or we may give you a hospital gown.  Please remove any body piercings and hair extensions before you arrive. You will also remove watches, jewelry, hairpins, wallets, dentures, partial dental plates and hearing aids. You may wear contact lenses, and you may be able to wear your rings. We have a safe place to keep your personal items, but it is safer to leave them at home.   **IMPORTANT** THE INSTRUCTIONS BELOW ARE ONLY FOR THOSE PATIENTS WHO HAVE BEEN TOLD THEY WILL RECEIVE SEDATION OR GENERAL ANESTHESIA DURING THEIR MRI PROCEDURE:  IF YOU WILL RECEIVE SEDATION (take medicine to help you relax during your exam)   You must get the medicine from your doctor before you arrive. Bring the medicine to the exam. Do not take it at home.   Arrive one hour early. Bring someone who can take you home after the test. Your medicine will make you sleepy. After the exam, you may not drive, take a bus or take a taxi by yourself.   No eating 8 hours before your exam. You may have clear liquids up until 4 hours before your exam. (Clear liquids include water, clear tea, black coffee and fruit juice without pulp.)  IF YOU WILL RECEIVE ANESTHESIA (be asleep for your exam)   Arrive 1 1/2 hours early. Bring someone who can take you home after the test. You may not drive, take a bus or take a taxi by yourself.   No eating 8 hours before your exam. You may have clear liquids up  until 4 hours before your exam. (Clear liquids include water, clear tea, black coffee and fruit juice without pulp.)  If you have any questions, please contact your Imaging Department exam site.            Oct 05, 2017  2:00 PM CDT   US BREAST BIOPSY CORE NEEDLE RIGHT with  Breast Rad, UCBCUS1,  BREAST NURSE   [facility] ([facility] [place])    [address] [address] [phone]           Tell us in advance if there s any chance you may be pregnant.  Bring a list of your medicines to the exam. Include vitamins, minerals and over-the-counter drugs.  If you take blood thinners, you may need to stop taking them a few days before treatment. Talk to your doctor before stopping these medicines. You will need a blood test the morning of your exam.   Stop taking Coumadin (warfarin) 3 days before your exam. Restart the day after your exam.   If you take aspirin, you may need to stop taking it 3 days before your scan.   If you take Plavix, Ticlid, Pletal or Persantine, you may need to stop taking them 5 days before your scan. Please talk to your doctor before stopping these medicines.  If you will receive sedation for this test (medicine to help you relax):   See your family doctor for an exam within 30 days of treatment.   Plan for an adult to drive you home and stay with you for at least 6 hours.   Follow the eating and drinking guidelines checked below:   No eating or drinking for 4 hours before your test. You may take medicine with small sips of water.   If you have diabetes:If you take insulin, call your diabetes care team. Do not take diabetes pills on the morning of your test. If you take metformin (Avandamet, Glucophage, Glucovance, Metaglip) and received contrast, wait 48 hours before re-starting this medicine.  Please call the Imaging Department at your exam site with any questions.            Oct 10, 2017  7:30 AM CDT   Strut Lab Draw  "with [facility] LAB DRAW   [facility] Draw ([facility] [place])    [address] [facility]  2nd Floor  [facility] [address] [phone]              Who to contact     If you have questions or need follow up information about today's clinic visit or your schedule please contact [facility] directly at [phone].  Normal or non-critical lab and imaging results will be communicated to you by ShopGohart, letter or phone within 4 business days after the clinic has received the results. If you do not hear from us within 7 days, please contact the clinic through Trovet or phone. If you have a critical or abnormal lab result, we will notify you by phone as soon as possible.  Submit refill requests through [employer] or call your pharmacy and they will forward the refill request to us. Please allow 3 business days for your refill to be completed.          Additional Information About Your Visit        ShopGoharSmartCrowds Information     [employer] gives you secure access to your electronic health record. If you see a primary care provider, you can also send messages to your care team and make appointments. If you have questions, please call your primary care clinic.  If you do not have a primary care provider, please call [phone] and they will assist you.        Care EveryWhere ID     This is your Care EveryWhere ID. This could be used by other organizations to access your [place] medical records  [id number]        Your Vitals Were     Pulse Temperature Respirations Height Pulse Oximetry BMI (Body Mass Index)    105 98.9  F (37.2  C) (Oral) 16 1.6 m (5' 2.99\") 96% 30.25 kg/m2       Blood Pressure from Last 3 Encounters:   09/26/17 132/89   09/20/17 115/69   09/01/17 115/61    Weight from Last 3 Encounters:   09/26/17 77.4 kg (170 lb 11.2 oz)   09/20/17 78.3 kg (172 lb 9.6 oz)   09/01/17 78.3 kg (172 lb 11.2 oz)              We Performed the Following     CBC with platelets " differential     Comprehensive metabolic panel          Today's Medication Changes          These changes are accurate as of: 9/26/17 11:59 PM.  If you have any questions, ask your nurse or doctor.               These medicines have changed or have updated prescriptions.        Dose/Directions    * order for DME   This may have changed:  Another medication with the same name was added. Make sure you understand how and when to take each.   Used for:  MERLIN (obstructive sleep apnea)   Changed by:  Matt Andrea MD        Respironics Dream Station Auto CPAP 12-18 cm, F&P Simplus FFM small.   Refills:  0       * order for DME   This may have changed:  You were already taking a medication with the same name, and this prescription was added. Make sure you understand how and when to take each.   Used for:  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)   Changed by:  Fara Bradshaw MD        Cranial prosthesis   Quantity:  1 Device   Refills:  1       * Notice:  This list has 2 medication(s) that are the same as other medications prescribed for you. Read the directions carefully, and ask your doctor or other care provider to review them with you.         Where to get your medicines      Some of these will need a paper prescription and others can be bought over the counter.  Ask your nurse if you have questions.     Bring a paper prescription for each of these medications     order for DME                Primary Care Provider Office Phone # Fax #    Radha Cazares [phone]       [address] [address]        Equal Access to Services     [facility] AH: Abdifatah Granda, waenrico clancy, qaybmike kaalbang hollis. So [facility] [phone].    ATENCIÓN: Si habla español, tiene a blackman disposición servicios gratuitos de asistencia  lingüística. Pauline al [phone].    We comply with applicable federal civil rights laws and [place] laws. We do not discriminate on the basis of race, color, national origin, age, disability, sex, sexual orientation, or gender identity.            Thank you!     Thank you for choosing [facility]  for your care. Our goal is always to provide you with excellent care. Hearing back from our patients is one way we can continue to improve our services. Please take a few minutes to complete the written survey that you may receive in the mail after your visit with us. Thank you!             Your Updated Medication List - Protect others around you: Learn how to safely use, store and throw away your medicines at www.disposemymeds.org.          This list is accurate as of: 9/26/17 11:59 PM.  Always use your most recent med list.                   Brand Name Dispense Instructions for use Diagnosis    albuterol 108 (90 BASE) MCG/ACT Inhaler    PROAIR HFA/PROVENTIL HFA/VENTOLIN HFA    1 Inhaler    Inhale 2 puffs into the lungs every 6 hours as needed for shortness of breath / dyspnea    Chronic obstructive pulmonary disease, unspecified COPD type (H)       aspirin 81 MG tablet      Take 1 tablet by mouth daily.        citalopram 10 MG tablet    celeXA    90 tablet    Take 1 tablet (10 mg) by mouth daily    Anxiety, Depression, unspecified depression type       CO Q 10 PO      Take 1 tablet by mouth        darifenacin 7.5 MG 24 hr tablet    ENABLEX    90 tablet    Take 1 tablet (7.5 mg) by mouth daily    Overactive bladder       guaiFENesin 600 MG 12 hr tablet    MUCINEX    180 tablet    Take 2 tablets (1,200 mg) by mouth 2 times daily    Cough with sputum       hydrOXYzine 25 MG tablet    ATARAX    100 tablet    Take 1-2 tablets (25-50 mg) by mouth every 6 hours as needed for itching    Hives       lidocaine-prilocaine cream    EMLA    30 g    Please apply to port site 30 minutes before use prn     Personal history of malignant neoplasm of breast       * LORazepam 0.5 MG tablet    ATIVAN    10 tablet    Take 1-2 tabs 20 minutes prior to MRI scans.    Breast cancer (H), Anxiety       * LORazepam 0.5 MG tablet    ATIVAN    30 tablet    Take 1 tablet (0.5 mg) by mouth every 4 hours as needed (Anxiety, Nausea/Vomiting or Sleep)    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)       MULTIPLE VITAMIN PO           ondansetron 8 MG tablet    ZOFRAN    30 tablet    Take 1 tablet (8 mg) by mouth every 8 hours as needed for nausea    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)       * order for [facility]      [email or web address] Dream Station Auto CPAP 12-18 cm, F&P Simplus FFM small.    MERLIN (obstructive sleep apnea)       * order for [facility]     1 Device    Cranial prosthesis    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)       prochlorperazine 10 MG tablet    COMPAZINE    30 tablet    Take 1 tablet (10 mg) by mouth every 6 hours as needed (Nausea/Vomiting)    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)       simvastatin 40 MG tablet    ZOCOR    90 tablet    Take 1 tablet (40 mg) by mouth At Bedtime    Hyperlipidemia LDL goal <130       tiotropium 18 MCG capsule    SPIRIVA    1 capsule    Inhale 1 capsule (18 mcg) into the lungs daily Inhale contents of one capsule    Chronic obstructive pulmonary disease, unspecified COPD type (H)       VITAMIN B 12 PO      Take 500 mcg by mouth daily        VITAMIN B6 PO      Take 1 tablet by mouth daily.        vitamin D 1000 UNITS capsule      2 CAPSULE DAILY        * Notice:  This list has 4 medication(s) that are the same as other medications prescribed for you. Read the directions carefully, and ask your doctor or other care provider to review them with you.

## 2017-09-26 NOTE — PATIENT INSTRUCTIONS
Contact Numbers    [facility] Main Line: [phone]    Triage Nurse Line: [phone]      Please call the [facility] Nurse Triage line if you experience a temperature greater than or equal to 100.5, shaking chills, have uncontrolled nausea, vomiting and/or diarrhea, dizziness, shortness of breath, bleeding not relieved with pressure, or if you have any other questions or concerns.     If it is after hours, weekends, or holidays, please call the main hospital  at  [phone] and ask to speak to the adult Oncology doctor on call.     If you are having any concerning symptoms or wish to speak to a provider before your next infusion visit, please call your care coordinator or triage them so we can adequately serve you.      If you need to refill your narcotic prescription or other medication, please call triage before your infusion appointment.        September 2017 Sunday Monday Tuesday Wednesday Thursday Friday Saturday                            1     Outpatient Visit    6:52 AM   [facility]     IR CHEST PORT PLACEMENT >5 YRS    7:30 AM   (75 min.)   [id number]   [facility]     INSERT PORT VASCULAR ACCESS    9:00 AM   Leif Parkinson PA-C    OR     ECH LIMITED   11:00 AM   (60 min.)   [id number]   [facility] Echo     XR CHEST 2 VIEWS   11:55 AM   (15 min.)   [address] [place] [address] Xray 2       3     4     5     MR BREAST BILATERAL WWO   10:00 AM   (90 min.)   [id number]   [facility]     [facility] MASONIC LAB DRAW   12:15 PM   (15 min.)   UC MASONIC LAB DRAW   [facility] Lab Draw     [facility] NURSE VISIT   12:30 PM   (10 min.)   Nurse,  Oncology   [facility]     US BREAST BX CORE NEEDLE RIGHT    1:00 PM   (50 min.)   [email or web address]   [facility]     XR CHEST 2 VIEWS    2:00 PM   (15 min.)   [address] [place] [address] Center Xray 6     7     8     9       10     11     12     13      14     15     16       17     18     19     20     UMP RETURN WITH ROOM    9:05 AM   (60 min.)    ON[facility]OGY RNCC   [facility]   10:45 AM   (15 min.)    MASONIC LAB DRAW   [facility] Masonic Lab Draw     UMP RETURN   10:55 AM   (50 min.)   Caren Colindres PA-C   [facility]     UMP ONC INFUSION 180   12:30 PM   (180 min.)   UC ONCOLOGY INFUSION   [facility] 21     22     23       24     25     26     UMP MASONIC LAB DRAW   11:15 AM   (15 min.)    MASONIC LAB DRAW   [facility] Lab Draw     UMP RETURN   11:30 AM   (30 min.)   Fara Bradshaw MD   [facility]     UMP ONC INFUSION 180    1:00 PM   (180 min.)    ONCOLOGY INFUSION   [facility] 27     28     29 30 October 2017 Sunday Monday Tuesday Wednesday Thursday Friday Saturday   1     2     3     [facility] MASONIC LAB DRAW    1:15 PM   (15 min.)    MASONIC LAB DRAW   [facility] Lab Draw     UMP RETURN    1:35 PM   (50 min.)   Lilia Livingston PA   [facility]     UMP ONC INFUSION 180    2:30 PM   (180 min.)    ONCOLOGY INFUSION   [facility] 4     5     UMP MASONIC LAB DRAW   12:00 PM   (15 min.)    [facility] LAB DRAW   [facility]     MR BREAST BILATERAL WWO   12:30 PM   (90 min.)   [id number]   [facility]     US BREAST BX CORE NEEDLE RIGHT    2:00 PM   (50 min.)   [email or web address]   [facility] 6     7       8     9     10     P MASONIC LAB DRAW    7:30 AM   (15 min.)    [facility] LAB DRAW   [facility]     [facility] ONC INFUSION 180    8:30 AM   (180 min.)    ONCOLOGY INFUSION   [facility]     UMP NEW WITH ROOM   11:45 AM   (75 min.)   Naty Segovia GC   [facility] 11     12     13     14       15     16     17     UMP MASONIC LAB DRAW    6:30 AM    (15 min.)   [facility] LAB DRAW   [facility] Lab Draw     UMP RETURN    6:45 AM   (50 min.)   Lilia Livingston PA   [facility]    8:00 AM   (180 min.)    ONCOLOGY INFUSION   [facility] 18     19     20     21       22     23     24     [facility]   12:30 PM   (15 min.)   [facility] LAB DRAW   [facility] Lab Draw     [facility] RETURN   12:45 PM   (50 min.)   Lilia Livingston PA   [facility] ONC INFUSION 180    2:30 PM   (180 min.)    ONCOLOGY INFUSION   [facility] 25     26     27     28       29     30     31                                     Recent Results (from the past 24 hour(s))   CBC with platelets differential    Collection Time: 09/26/17 11:27 AM   Result Value Ref Range    WBC 4.8 4.0 - 11.0 10e9/L    RBC Count 4.02 3.8 - 5.2 10e12/L    Hemoglobin 13.5 11.7 - 15.7 g/dL    Hematocrit 38.7 35.0 - 47.0 %    MCV 96 78 - 100 fl    MCH 33.6 (H) 26.5 - 33.0 pg    MCHC 34.9 31.5 - 36.5 g/dL    RDW 11.8 10.0 - 15.0 %    Platelet Count 282 150 - 450 10e9/L    Diff Method Automated Method     % Neutrophils 64.0 %    % Lymphocytes 20.7 %    % Monocytes 3.8 %    % Eosinophils 10.9 %    % Basophils 0.2 %    % Immature Granulocytes 0.4 %    Nucleated RBCs 0 0 /100    Absolute Neutrophil 3.1 1.6 - 8.3 10e9/L    Absolute Lymphocytes 1.0 0.8 - 5.3 10e9/L    Absolute Monocytes 0.2 0.0 - 1.3 10e9/L    Absolute Eosinophils 0.5 0.0 - 0.7 10e9/L    Absolute Basophils 0.0 0.0 - 0.2 10e9/L    Abs Immature Granulocytes 0.0 0 - 0.4 10e9/L    Absolute Nucleated RBC 0.0    Comprehensive metabolic panel    Collection Time: 09/26/17 11:27 AM   Result Value Ref Range    Sodium 136 133 - 144 mmol/L    Potassium 4.0 3.4 - 5.3 mmol/L    Chloride 104 94 - 109 mmol/L    Carbon Dioxide 24 20 - 32 mmol/L    Anion Gap 8 3 - 14 mmol/L    Glucose 178 (H) 70 - 99 mg/dL    Urea Nitrogen 10 7 - 30 mg/dL     Creatinine 0.72 0.52 - 1.04 mg/dL    GFR Estimate 83 >60 mL/min/1.7m2    GFR Estimate If Black >90 >60 mL/min/1.7m2    Calcium 8.9 8.5 - 10.1 mg/dL    Bilirubin Total 1.2 0.2 - 1.3 mg/dL    Albumin 3.3 (L) 3.4 - 5.0 g/dL    Protein Total 7.5 6.8 - 8.8 g/dL    Alkaline Phosphatase 135 40 - 150 U/L    ALT 58 (H) 0 - 50 U/L    AST 46 (H) 0 - 45 U/L

## 2017-09-26 NOTE — NURSING NOTE
"Oncology Rooming Note    September 26, 2017 11:51 AM   Ashleigh Alonzo is a 57 year old female who presents for:    Chief Complaint   Patient presents with     Port Draw     Port labs     Oncology Clinic Visit     Breast Ca- F/U     Initial Vitals: /89  Pulse 105  Temp 98.9  F (37.2  C) (Oral)  Resp 16  Ht 1.6 m (5' 2.99\")  Wt 77.4 kg (170 lb 11.2 oz)  SpO2 96%  BMI 30.25 kg/m2 Estimated body mass index is 30.25 kg/(m^2) as calculated from the following:    Height as of this encounter: 1.6 m (5' 2.99\").    Weight as of this encounter: 77.4 kg (170 lb 11.2 oz). Body surface area is 1.85 meters squared.  No Pain (0) Comment: Data Unavailable   No LMP recorded. Patient is postmenopausal.  Allergies reviewed: Yes  Medications reviewed: Yes    Medications: Medication refills not needed today.  Pharmacy name entered into [facility]:    [facility] [place], [place] - [address] [place] [facility] - [place], [place] - [address] [address]    Clinical concerns: no clinical concerns  provider was notified.    7 minutes for nursing intake (face to face time)     Alyssa Gomez CMA              "

## 2017-09-26 NOTE — NURSING NOTE
ISPY2 Research Note for C2D1 visit. Patient reports having facial flushing and hives under amrpits and inner thigh that started 9/21/17 early morning and lasted 12-16 hours following chemo on 9/20/17.  Updated con-medication log with patient provided start dates and clarifications. ECOG activity score is 0.  Added medical history for 1) intermittent hives/uticaria (onset 2015) treated with daily atarax 75 mg/day 2) overactive bladder (onset 2002) treated with darifenicen 7.5 mg/day since July 2017 3) right chronic shoulder pain (onset 2002)-no treatment required 4) appendectomy 10/6/2015.  All patient's questions answered related to research.     Plan: Schedule MRI/BX post C3 on or about 10/5/17.   Luzmaria Geiger RN/ Research [phone]

## 2017-09-26 NOTE — LETTER
September 26, 2017      TO: Ashleigh Alonzo  [address]  [place] [address]         To whom it may concern,    Ms. Alonzo is currently receiving cancer treatment under my care.  Her current treatment has a number of adverse effects that may be exacerbated by stress.  She has brought to my attention that a change in work locale has been proposed.  In my opinion, this has the potential to cause additional stress which could negatively impact the outcomes of her treatment.  As such, I would advise against significant changes in her work routine, including a move to another facility, at this time.      Sincerely,      Fara Bradshaw MD

## 2017-09-26 NOTE — MR AVS SNAPSHOT
After Visit Summary   9/26/2017    Ashleigh Alonzo    MRN: [id number]           Patient Information     Date Of Birth          1960        Visit Information        Provider Department      9/26/2017 1:00 PM CARLINE 21 ATC;  ONCOLOGY INFUSION [facility]        Today's Diagnoses     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)    -  1      Care Instructions    Contact Numbers    [facility] Main Line: [phone]    Triage Nurse Line: [phone]      Please call the [facility] Nurse Triage line if you experience a temperature greater than or equal to 100.5, shaking chills, have uncontrolled nausea, vomiting and/or diarrhea, dizziness, shortness of breath, bleeding not relieved with pressure, or if you have any other questions or concerns.     If it is after hours, weekends, or holidays, please call the main hospital  at  [phone] and ask to speak to the adult Oncology doctor on call.     If you are having any concerning symptoms or wish to speak to a provider before your next infusion visit, please call your care coordinator or triage them so we can adequately serve you.      If you need to refill your narcotic prescription or other medication, please call triage before your infusion appointment.        September 2017 Sunday Monday Tuesday Wednesday Thursday Friday Saturday                            1     Outpatient Visit    6:52 AM   [facility]     IR CHEST PORT PLACEMENT >5 YRS    7:30 AM   (75 min.)   [id number]   [facility]     INSERT PORT VASCULAR ACCESS    9:00 AM   Leif Parkinson PA-C    OR     ECH LIMITED   11:00 AM   (60 min.)   [id number]   [facility] Echo     XR CHEST 2 VIEWS   11:55 AM   (15 min.)   UCXR1   [facility] Xray 2       3     4     5     MR BREAST BILATERAL WWO   10:00 AM   (90 min.)   [id number]   [place] [facility]     [facility] MASONIC LAB DRAW   12:15 PM   (15 min.)    MASONIC LAB DRAW   [facility] Masonic Lab Draw     UMP NURSE VISIT   12:30 PM   (10 min.)   Nurse,  Oncology   [facility]     US BREAST BX CORE NEEDLE RIGHT    1:00 PM   (50 min.)   UCBCUS1   [facility]     XR CHEST 2 VIEWS    2:00 PM   (15 min.)   UCXR1   [facility] Xray 6     7     8     9       10     11     12     13     14     15     16       17     18     19     20     UMP RETURN WITH ROOM    9:05 AM   (60 min.)    ON[facility] RNCC   [facility]   10:45 AM   (15 min.)    [facility]     UMP RETURN   10:55 AM   (50 min.)   Caren Colindres PA-C   [facility] 180   12:30 PM   (180 min.)    ONCOLOGY INFUSION   [facility] 21     22     23       24     25     26     [facility] MASONIC LAB DRAW   11:15 AM   (15 min.)    [facility]     [facility] RETURN   11:30 AM   (30 min.)   Fara Bradshaw MD   [facility] 180    1:00 PM   (180 min.)    ONCOLOGY INFUSION   [facility] 27     28     29     30 October 2017 Sunday Monday Tuesday Wednesday Thursday Friday Saturday   1     2     3     [facility] MASONIC LAB DRAW    1:15 PM   (15 min.)    MASONIC LAB DRAW   [facility] Masonic Lab Draw     UMP RETURN    1:35 PM   (50 min.)   Lilia Livingston PA   [facility] 180    2:30 PM   (180 min.)    ONCOLOGY INFUSION   [facility] 4     5     UMP MASONIC LAB DRAW   12:00 PM   (15 min.)    [facility]   [facility]     MR BREAST BILATERAL WWO   12:30 PM   (90 min.)   [id number]   [facility]     US BREAST BX CORE NEEDLE RIGHT    2:00 PM   (50 min.)    UCBCUS1   [facility] 6     7       8     9     10     [facility] MASONIC LAB DRAW    7:30 AM   (15 min.)    MASONIC LAB DRAW   [facility] Masonic Lab Draw     P ONC INFUSION 180    8:30 AM   (180 min.)    ONCOLOGY INFUSION   [facility]     UMP NEW WITH ROOM   11:45 AM   (75 min.)   Naty Segovia GC   [facility] 11     12     13     14       15     16     17     UMP MASONIC LAB DRAW    6:30 AM   (15 min.)    [facility]     UMP RETURN    6:45 AM   (50 min.)   Lilia Livingston PA   [facility] ONC INFUSION 180    8:00 AM   (180 min.)    ONCOLOGY INFUSION   [facility] 18     19     20     21       22     23     24     [facility] MASONIC LAB DRAW   12:30 PM   (15 min.)    [facility] LAB DRAW   [facility]     UMP RETURN   12:45 PM   (50 min.)   Lilia Livingston PA   [facility] ONC INFUSION 180    2:30 PM   (180 min.)    ONCOLOGY INFUSION   [facility] 25     26     27     28       29     30     31                                     Recent Results (from the past 24 hour(s))   CBC with platelets differential    Collection Time: 09/26/17 11:27 AM   Result Value Ref Range    WBC 4.8 4.0 - 11.0 10e9/L    RBC Count 4.02 3.8 - 5.2 10e12/L    Hemoglobin 13.5 11.7 - 15.7 g/dL    Hematocrit 38.7 35.0 - 47.0 %    MCV 96 78 - 100 fl    MCH 33.6 (H) 26.5 - 33.0 pg    MCHC 34.9 31.5 - 36.5 g/dL    RDW 11.8 10.0 - 15.0 %    Platelet Count 282 150 - 450 10e9/L    Diff Method Automated Method     % Neutrophils 64.0 %    % Lymphocytes 20.7 %    % Monocytes 3.8 %    % Eosinophils 10.9 %    % Basophils 0.2 %    % Immature Granulocytes 0.4 %    Nucleated RBCs 0 0 /100    Absolute Neutrophil 3.1 1.6 - 8.3 10e9/L    Absolute Lymphocytes 1.0 0.8 - 5.3 10e9/L    Absolute Monocytes 0.2 0.0 - 1.3 10e9/L    Absolute Eosinophils 0.5 0.0 -  0.7 10e9/L    Absolute Basophils 0.0 0.0 - 0.2 10e9/L    Abs Immature Granulocytes 0.0 0 - 0.4 10e9/L    Absolute Nucleated RBC 0.0    Comprehensive metabolic panel    Collection Time: 09/26/17 11:27 AM   Result Value Ref Range    Sodium 136 133 - 144 mmol/L    Potassium 4.0 3.4 - 5.3 mmol/L    Chloride 104 94 - 109 mmol/L    Carbon Dioxide 24 20 - 32 mmol/L    Anion Gap 8 3 - 14 mmol/L    Glucose 178 (H) 70 - 99 mg/dL    Urea Nitrogen 10 7 - 30 mg/dL    Creatinine 0.72 0.52 - 1.04 mg/dL    GFR Estimate 83 >60 mL/min/1.7m2    GFR Estimate If Black >90 >60 mL/min/1.7m2    Calcium 8.9 8.5 - 10.1 mg/dL    Bilirubin Total 1.2 0.2 - 1.3 mg/dL    Albumin 3.3 (L) 3.4 - 5.0 g/dL    Protein Total 7.5 6.8 - 8.8 g/dL    Alkaline Phosphatase 135 40 - 150 U/L    ALT 58 (H) 0 - 50 U/L    AST 46 (H) 0 - 45 U/L                 Follow-ups after your visit        Your next 10 appointments already scheduled     Oct 03, 2017  1:15 PM CDT   M[facility]ic Lab Draw with  MASONIC LAB DRAW   [facility] ([facility])    [address] [place] [id number] [phone]            Oct 03, 2017  1:50 PM CDT   (Arrive by 1:35 PM)   Return Visit with EDDIE Oliva   [facility])    [address] [place] [id number] [phone]            Oct 03, 2017  2:30 PM CDT   Infusion 180 with UC ONCOLOGY INFUSION, UC 19 ATC   [facility])    [address] [place] [id number] [phone]            Oct 05, 2017 12:00 PM CDT   Masonic Lab Draw with  MASONIC LAB DRAW   [facility] Masonic Lab Draw ([facility])    [address] [place] [id number]   [phone]            Oct 05, 2017 12:30 PM CDT   MR BREAST BILATERAL W/O & W CONTRAST with [id number]   [facility] ([facility])    [address] [facility] [address]   [phone]           Take your medicines as usual, unless your doctor tells you not to. Bring a list of your current medicines to your exam (including vitamins, minerals and over-the-counter drugs).  The timing of your exam may depend on the start of your last period. If you re in menopause, you may have the exam anytime.  Please bring any previous mammograms or breast MRIs from other facilities to the MRI dept. Do not mail these items to us.  You will have contrast for this exam. To prepare:   The day before your exam, drink extra fluids at least six 8-ounce glasses (unless your doctor tells you to restrict your fluids).   Have a blood test (creatinine test) within 30 days of your exam. Go to your clinic or Diagnostic Imaging Department for this test.  The MRI machine uses a strong magnet. Please wear clothes without metal (snaps, zippers). A sweatsuit works well, or we may give you a hospital gown.  Please remove any body piercings and hair extensions before you arrive. You will also remove watches, jewelry, hairpins, wallets, dentures, partial dental plates and hearing aids. You may wear contact lenses, and you may be able to wear your rings. We have a safe place to keep your personal items, but it is safer to leave them at home.   **IMPORTANT** THE INSTRUCTIONS BELOW ARE ONLY FOR THOSE PATIENTS WHO HAVE BEEN TOLD THEY WILL RECEIVE SEDATION OR GENERAL ANESTHESIA DURING THEIR MRI PROCEDURE:  IF YOU WILL RECEIVE SEDATION (take medicine to help you relax during your exam)   You must get the medicine from your doctor before you arrive. Bring the medicine to the exam. Do not take it at home.   Arrive one hour early. Bring someone who can take you home after the test. Your medicine will make you sleepy. After the exam, you may not drive, take a bus or take a taxi by yourself.   No eating 8 hours before your exam. You may have  clear liquids up until 4 hours before your exam. (Clear liquids include water, clear tea, black coffee and fruit juice without pulp.)  IF YOU WILL RECEIVE ANESTHESIA (be asleep for your exam)   Arrive 1 1/2 hours early. Bring someone who can take you home after the test. You may not drive, take a bus or take a taxi by yourself.   No eating 8 hours before your exam. You may have clear liquids up until 4 hours before your exam. (Clear liquids include water, clear tea, black coffee and fruit juice without pulp.)  If you have any questions, please contact your Imaging Department exam site.            Oct 05, 2017  2:00 PM CDT   US BREAST BIOPSY CORE NEEDLE RIGHT with  Breast Rad, UCBCUS1,  BREAST NURSE   [facility] ([facility])    [address] [address] [phone]           Tell us in advance if there s any chance you may be pregnant.  Bring a list of your medicines to the exam. Include vitamins, minerals and over-the-counter drugs.  If you take blood thinners, you may need to stop taking them a few days before treatment. Talk to your doctor before stopping these medicines. You will need a blood test the morning of your exam.   Stop taking Coumadin (warfarin) 3 days before your exam. Restart the day after your exam.   If you take aspirin, you may need to stop taking it 3 days before your scan.   If you take Plavix, Ticlid, Pletal or Persantine, you may need to stop taking them 5 days before your scan. Please talk to your doctor before stopping these medicines.  If you will receive sedation for this test (medicine to help you relax):   See your family doctor for an exam within 30 days of treatment.   Plan for an adult to drive you home and stay with you for at least 6 hours.   Follow the eating and drinking guidelines checked below:   No eating or drinking for 4 hours before your test. You may take medicine with small sips of water.    If you have diabetes:If you take insulin, call your diabetes care team. Do not take diabetes pills on the morning of your test. If you take metformin (Avandamet, Glucophage, Glucovance, Metaglip) and received contrast, wait 48 hours before re-starting this medicine.  Please call the Imaging Department at your exam site with any questions.            Oct 10, 2017  7:30 AM CDT   [facility] with  [facility] LAB DRAW   [facility] [place])    [address]  [address] [facility] [address] [phone]              Future tests that were ordered for you today     Open Future Orders        Priority Expected Expires Ordered    US Breast Biopsy Core Needle, 1St Lesion Right Routine  9/26/2018 9/26/2017    MRI Breast Bilateral w/o & w Contrast Routine  9/26/2018 9/26/2017            Who to contact     If you have questions or need follow up information about today's clinic visit or your schedule please contact [facility] directly at [phone].  Normal or non-critical lab and imaging results will be communicated to you by MyChart, letter or phone within 4 business days after the clinic has received the results. If you do not hear from us within 7 days, please contact the clinic through [facility] or phone. If you have a critical or abnormal lab result, we will notify you by phone as soon as possible.  Submit refill requests through [email or web address] or call your pharmacy and they will forward the refill request to us. Please allow 3 business days for your refill to be completed.          Additional Information About Your Visit        [facility] Information     [email or web address] gives you secure access to your electronic health record. If you see a primary care provider, you can also send messages to your care team and make appointments. If you have questions, please call your primary care clinic.  If you do not have a primary care provider, please call [phone]  and they will assist you.        Care EveryWhere ID     This is your Care EveryWhere ID. This could be used by other organizations to access your [place] medical records  [id number]         Blood Pressure from Last 3 Encounters:   09/26/17 132/89   09/20/17 115/69   09/01/17 115/61    Weight from Last 3 Encounters:   09/26/17 77.4 kg (170 lb 11.2 oz)   09/20/17 78.3 kg (172 lb 9.6 oz)   09/01/17 78.3 kg (172 lb 11.2 oz)              Today, you had the following     No orders found for display         Today's Medication Changes          These changes are accurate as of: 9/26/17  3:59 PM.  If you have any questions, ask your nurse or doctor.               These medicines have changed or have updated prescriptions.        Dose/Directions    * order for DME   This may have changed:  Another medication with the same name was added. Make sure you understand how and when to take each.   Used for:  MERLIN (obstructive sleep apnea)   Changed by:  Matt Andrea MD        Respironics Dream Station Auto CPAP 12-18 cm, F&P Simplus FFM small.   Refills:  0       * order for DME   This may have changed:  You were already taking a medication with the same name, and this prescription was added. Make sure you understand how and when to take each.   Used for:  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)   Changed by:  Fara Bradshaw MD        Cranial prosthesis   Quantity:  1 Device   Refills:  1       * Notice:  This list has 2 medication(s) that are the same as other medications prescribed for you. Read the directions carefully, and ask your doctor or other care provider to review them with you.         Where to get your medicines      Some of these will need a paper prescription and others can be bought over the counter.  Ask your nurse if you have questions.     Bring a paper prescription for each of these medications     order for DME                Primary  Care Provider Office Phone # Fax #    Radha Cazares [phone]       [address] [address]        Equal Access to Services     THERESA HUGHES : Hadjose martin candie ku lynno Soomaali, waaxda luqadaha, qaybta kaalmada ademoda, bang aguilarn kevin chester laLolitarainer pickering. So [facility] [phone].    ATENCIÓN: Si habla español, tiene a blackman disposición servicios gratuitos de asistencia lingüística. Llame al [phone].    We comply with applicable federal civil rights laws and [place] laws. We do not discriminate on the basis of race, color, national origin, age, disability sex, sexual orientation or gender identity.            Thank you!     Thank you for choosing [facility]  for your care. Our goal is always to provide you with excellent care. Hearing back from our patients is one way we can continue to improve our services. Please take a few minutes to complete the written survey that you may receive in the mail after your visit with us. Thank you!             Your Updated Medication List - Protect others around you: Learn how to safely use, store and throw away your medicines at www.disposemymeds.org.          This list is accurate as of: 9/26/17  3:59 PM.  Always use your most recent med list.                   Brand Name Dispense Instructions for use Diagnosis    albuterol 108 (90 BASE) MCG/ACT Inhaler    PROAIR HFA/PROVENTIL HFA/VENTOLIN HFA    1 Inhaler    Inhale 2 puffs into the lungs every 6 hours as needed for shortness of breath / dyspnea    Chronic obstructive pulmonary disease, unspecified COPD type (H)       aspirin 81 MG tablet      Take 1 tablet by mouth daily.        citalopram 10 MG tablet    celeXA    90 tablet    Take 1 tablet (10 mg) by mouth daily    Anxiety, Depression, unspecified depression type       CO Q 10 PO      Take 1 tablet by mouth        darifenacin 7.5 MG 24 hr tablet    ENABLEX    90 tablet    Take 1  tablet (7.5 mg) by mouth daily    Overactive bladder       guaiFENesin 600 MG 12 hr tablet    MUCINEX    180 tablet    Take 2 tablets (1,200 mg) by mouth 2 times daily    Cough with sputum       hydrOXYzine 25 MG tablet    ATARAX    100 tablet    Take 1-2 tablets (25-50 mg) by mouth every 6 hours as needed for itching    Hives       lidocaine-prilocaine cream    EMLA    30 g    Please apply to port site 30 minutes before use prn    Personal history of malignant neoplasm of breast       * LORazepam 0.5 MG tablet    ATIVAN    10 tablet    Take 1-2 tabs 20 minutes prior to MRI scans.    Breast cancer (H), Anxiety       * LORazepam 0.5 MG tablet    ATIVAN    30 tablet    Take 1 tablet (0.5 mg) by mouth every 4 hours as needed (Anxiety, Nausea/Vomiting or Sleep)    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)       MULTIPLE VITAMIN PO           ondansetron 8 MG tablet    ZOFRAN    30 tablet    Take 1 tablet (8 mg) by mouth every 8 hours as needed for nausea    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)       * order for DME      RespirDS [employer] Dream Station Auto CPAP 12-18 cm, F&P Simplus FFM small.    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)       prochlorperazine 10 MG tablet    COMPAZINE    30 tablet    Take 1 tablet (10 mg) by mouth every 6 hours as needed (Nausea/Vomiting)    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)       simvastatin 40 MG tablet    ZOCOR    90 tablet    Take 1 tablet (40 mg) by mouth At Bedtime    Hyperlipidemia LDL goal <130       tiotropium 18 MCG capsule    SPIRIVA    1 capsule    Inhale 1 capsule (18 mcg) into the lungs daily Inhale contents of one capsule    Chronic obstructive  pulmonary disease, unspecified COPD type (H)       VITAMIN B 12 PO      Take 500 mcg by mouth daily        VITAMIN B6 PO      Take 1 tablet by mouth daily.        vitamin D 1000 UNITS capsule      2 CAPSULE DAILY        * Notice:  This list has 4 medication(s) that are the same as other medications prescribed for you. Read the directions carefully, and ask your doctor or other care provider to review them with you.

## 2017-09-26 NOTE — PROGRESS NOTES
Infusion Nursing Note:  Ashleigh Alonzo presents today for C2D1 Taxol.    Patient seen by provider today: Yes: Dr. Bradshaw    Note: Patient arrived in the infusion center with [employer] dwelling.  Positive blood return obtained and infusion given through the port without further issue.  Positive blood return obtained throughout.    Intravenous Access:  Implanted Port.      Treatment Conditions:  Lab Results   Component Value Date    HGB 13.5 09/26/2017     Lab Results   Component Value Date    WBC 4.8 09/26/2017      Lab Results   Component Value Date    ANEU 3.1 09/26/2017     Lab Results   Component Value Date     09/26/2017      Lab Results   Component Value Date     09/26/2017                   Lab Results   Component Value Date    POTASSIUM 4.0 09/26/2017           Lab Results   Component Value Date    MAG 2.1 09/05/2017            Lab Results   Component Value Date    CR 0.72 09/26/2017                   Lab Results   Component Value Date    JAVIER 8.9 09/26/2017                Lab Results   Component Value Date    BILITOTAL 1.2 09/26/2017           Lab Results   Component Value Date    ALBUMIN 3.3 09/26/2017                    Lab Results   Component Value Date    ALT 58 09/26/2017           Lab Results   Component Value Date    AST 46 09/26/2017     Results reviewed, labs MET treatment parameters, ok to proceed with treatment.          Post Infusion Assessment:  Patient tolerated infusion without incident.  Blood return noted pre and post infusion.  Site patent and intact, free from redness, edema or discomfort.  No evidence of extravasations.  Access discontinued per protocol.    Discharge Plan:   Patient declined prescription refills.  Discharge instructions reviewed with: Patient and Family.  Patient and/or family verbalized understanding of discharge instructions and all questions answered.  Copy of AVS reviewed with patient and/or family.  Patient will return 10/3/17 for next  appointment.  Patient discharged in stable condition accompanied by: .  Departure Mode: Ambulatory.    Afia Walton RN        Ashleigh Alonzo      1.  Has the patient received the information for the influenza vaccine? NO, patient denied need for information    2.  Does the patient have any of the following contraindications?     Allergy to eggs?  No     Allergic reaction to previous influenza vaccines? Yes     Any other problems to previous influenza vaccines?  No     Paralyzed by Guillain-Maynard syndrome?  No     Currently pregnant? NO     Current moderate or severe illness?  No     Allergy to contact lens solution?  No    3.  The vaccine has been administered in the usual fashion and the patient was instructed to wait 20 minutes before leaving the building in the event of an allergic reaction: NO    Vaccination given by Afia Walton RN.  Recorded by Afia Walton

## 2017-09-26 NOTE — LETTER
9/26/2017       RE: Ashleigh Alonzo  [address] [place] [address]     Dear Colleague,    Thank you for referring your patient, Ashleigh Alonzo, to the [facility]. Please see a copy of my visit note below.    Oncology On Treatment Visit:  Date on this visit: 9/26/2017    Diagnosis:  Stage IIa, T2N0M0, grade 3 triple negative cancer of the right breast.    Primary Physician: Radha Cazares     History Of Present Illness:  Ms. Alonzo is a 57-year-old postmenopausal female with stage IIa, T2 N0 M0, grade 3, triple-negative invasive carcinoma of the right breast.  Routine bilateral screening mammogram on 07/27/2017 showed possible developing calcifications in the right breast at the 12 o'clock position 6 cm from the nipple.  Mammogram prior to this one had been 1 year prior in 07/2016.  Right breast ultrasound demonstrated a 7-mm, irregular, hypoechoic mass at the 12 o'clock position.  The patient went on to have a contrast-enhanced mammogram which showed a peripherally enhancing, irregular mass measured up to 1.9 cm as well as an additional 6-mm, enhancing focus anteromedial to the dominant mass.  The total area of abnormal enhancement on contrast mammogram measured up to 3.4 cm.  Right breast biopsy on 08/22/2017 demonstrated a grade 3 invasive mammary carcinoma with associated high-grade DCIS.  Invasive carcinoma was stained for estrogen and progesterone receptors.  Estrogen receptor and progesterone receptor staining was negative with 0% of nuclei staining.  HER2 was non-amplified by FISH with a HER2/CEN17 ratio of 1.5 and 2.8 HER2 signals per nucleus.  Breast MRI measured the biopsy proven breast cancer at 3.2 cm.    Ms. Alonzo enrolled in the ISPY-2 clinical trial.  She began treatment with weekly taxol and once every 3 week pembrolizumab on 9/20/17.     Interval History:  Ms. Alonzo comes in to clinic today for evaluation prior to cycle number 2 of Taxol.  She received Taxol and  pembrolizumab last week.  She states that overall her first infusion went very well for her.  She experienced flushing and a red face the day after chemotherapy.  This resolved after one day without intervention.  She has had a somewhat poor appetite but is eating regular meals.  She denies nausea or vomiting.  She has not had any diarrhea or constipation.  She reports dry mouth but no mouth sores.  She has no joint aches or pains.  She denies cough, shortness of breath or chest pain.  She has not noted any swelling of her lower extremities.  She has had no headaches, visual changes or focal neurologic complaints.  She has not noted any symptoms of peripheral neuropathy.  There was some difficulty with port access this morning; therefore, she has tPA setting right now.  The remainder of a 10-point review of systems is otherwise negative.      Past Medical/Surgical History:  Past Medical History:   Diagnosis Date     COPD (chronic obstructive pulmonary disease) (H) 2011     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) 8/30/2017     Periodontal disease      Sleep apnea 12/2015     Urticaria      Past Surgical History:   Procedure Laterality Date     APPENDECTOMY  10/6/2015     CATARACT IOL, RT/LT  11/2016    bilateral      COLONOSCOPY  11/15/2011    Procedure:COLONOSCOPY; Colonoscopy, screening; Surgeon:ANASTASIA BUNCH; Location:[facility] OR     INSERT PORT VASCULAR ACCESS Left 9/1/2017    Procedure: INSERT PORT VASCULAR ACCESS;  Single Lumen Chest Power Port;  Surgeon: Leif Parkinson PA-C;  Location: [facility] OR     TUBAL LIGATION  12/2004    Bilateral     Allergies:  Allergies as of 09/26/2017     (No Known Allergies)     Current Medications:  Current Outpatient Prescriptions   Medication Sig Dispense Refill     ondansetron (ZOFRAN) 8 MG tablet Take 1 tablet (8 mg) by mouth every 8 hours as needed for nausea (Patient not taking: Reported on 9/20/2017) 30  tablet 3     prochlorperazine (COMPAZINE) 10 MG tablet Take 1 tablet (10 mg) by mouth every 6 hours as needed (Nausea/Vomiting) (Patient not taking: Reported on 9/20/2017) 30 tablet 2     LORazepam (ATIVAN) 0.5 MG tablet Take 1 tablet (0.5 mg) by mouth every 4 hours as needed (Anxiety, Nausea/Vomiting or Sleep) 30 tablet 2     lidocaine-prilocaine (EMLA) cream Please apply to port site 30 minutes before use prn (Patient not taking: Reported on 9/20/2017) 30 g 1     LORazepam (ATIVAN) 0.5 MG tablet Take 1-2 tabs 20 minutes prior to MRI scans. 10 tablet 0     hydrOXYzine (ATARAX) 25 MG tablet Take 1-2 tablets (25-50 mg) by mouth every 6 hours as needed for itching 100 tablet 2     guaiFENesin (MUCINEX) 600 MG 12 hr tablet Take 2 tablets (1,200 mg) by mouth 2 times daily 180 tablet 6     simvastatin (ZOCOR) 40 MG tablet Take 1 tablet (40 mg) by mouth At Bedtime 90 tablet 4     tiotropium (SPIRIVA) 18 MCG capsule Inhale 1 capsule (18 mcg) into the lungs daily Inhale contents of one capsule 1 capsule 11     albuterol (PROAIR HFA/PROVENTIL HFA/VENTOLIN HFA) 108 (90 BASE) MCG/ACT Inhaler Inhale 2 puffs into the lungs every 6 hours as needed for shortness of breath / dyspnea 1 Inhaler 5     citalopram (CELEXA) 10 MG tablet Take 1 tablet (10 mg) by mouth daily 90 tablet 3     darifenacin (ENABLEX) 7.5 MG 24 hr tablet Take 1 tablet (7.5 mg) by mouth daily 90 tablet 3     [facility] Dream [facility] Auto CPAP 12-18 cm, F&P Simplus FFM small.       Coenzyme Q10 (CO Q 10 PO) Take 1 tablet by mouth        MULTIPLE VITAMIN PO        Cyanocobalamin (VITAMIN B 12 PO) Take 500 mcg by mouth daily       Pyridoxine HCl (VITAMIN B6 PO) Take 1 tablet by mouth daily.       aspirin 81 MG tablet Take 1 tablet by mouth daily.  3     VITAMIN D 1000 UNIT OR CAPS 2 CAPSULE DAILY        Family and Social History:  Reviewed and unchanged from prior.  Please see initial consultation dated 8/28/17 for further details.    Physical  "Exam:  /89  Pulse 105  Temp 98.9  F (37.2  C) (Oral)  Resp 16  Ht 1.6 m (5' 2.99\")  Wt 77.4 kg (170 lb 11.2 oz)  SpO2 96%  BMI 30.25 kg/m2  General:  Well appearing, well-nourished adult female in NAD.  HEENT:  Normocephalic.  Sclera anicteric.  MMM.  No lesions of the oropharynx.  Lymph:  No palpable cervical, supraclavicular, or axillary LAD.  Chest:  CTA bilaterally.  No wheezes or crackles.  CV:  RRR.  Nl S1 and S2.  No m/r/g.  Breast: Previously noted mass palpable at the 12:00 position of the right breast is barely palpable on today's exam.  There is a slight increase in density in this area, however, no discrete borders and therefore no measurable mass.    Abd:  Soft/NT/ND.  BSs normoactive.  No hepatosplenomegaly.  Ext:  No pitting edema of the bilateral lower extremities.  Pulses 2+ and symmetric.  Musculo:  Strength 5/5 throughout.  Neuro:  Cranial nerves grossly intact.  Psych:  Mood and affect appear normal.    Laboratory/Imaging Studies:  9/5/17 MRI Breast:  IMPRESSION: BI-RADS CATEGORY: 6 - Known Biopsy-Proven  Malignancy-Appropriate Action Should Be Taken.  1. The biopsy-proven cancer in the right breast at 12:00 measures 32  mm.  2. There is a satellite suspicious mass in the right breast at 1:00  measuring 15 mm in AP dimension.  3. The total area of suspicious enhancement measures about 30 x 40 mm.    Results for YUNI CALIXTO (MRN [id number]) as of 9/29/2017 14:00   Ref. Range 9/26/2017 11:27   Sodium Latest Ref Range: 133 - 144 mmol/L 136   Potassium Latest Ref Range: 3.4 - 5.3 mmol/L 4.0   Chloride Latest Ref Range: 94 - 109 mmol/L 104   Carbon Dioxide Latest Ref Range: 20 - 32 mmol/L 24   Urea Nitrogen Latest Ref Range: 7 - 30 mg/dL 10   Creatinine Latest Ref Range: 0.52 - 1.04 mg/dL 0.72   GFR Estimate Latest Ref Range: >60 mL/min/1.7m2 83   GFR Estimate If Black Latest Ref Range: >60 mL/min/1.7m2 >90   Calcium Latest Ref Range: 8.5 - 10.1 mg/dL 8.9   Anion Gap Latest Ref Range: " 3 - 14 mmol/L 8   Albumin Latest Ref Range: 3.4 - 5.0 g/dL 3.3 (L)   Protein Total Latest Ref Range: 6.8 - 8.8 g/dL 7.5   Bilirubin Total Latest Ref Range: 0.2 - 1.3 mg/dL 1.2   Alkaline Phosphatase Latest Ref Range: 40 - 150 U/L 135   ALT Latest Ref Range: 0 - 50 U/L 58 (H)   AST Latest Ref Range: 0 - 45 U/L 46 (H)   Results for YUNI CALIXTO (MRN [id number]) as of 9/29/2017 14:00   Ref. Range 9/26/2017 11:27   WBC Latest Ref Range: 4.0 - 11.0 10e9/L 4.8   Hemoglobin Latest Ref Range: 11.7 - 15.7 g/dL 13.5   Hematocrit Latest Ref Range: 35.0 - 47.0 % 38.7   Platelet Count Latest Ref Range: 150 - 450 10e9/L 282       ASSESSMENT/PLAN:  Ms. Calixto is a 57-year-old postmenopausal female with a stage IIa, grade 3, triple-negative infiltrating ductal carcinoma of the right breast. On weekly Taxol and once every three week pembrolizumab since 9/20/17.    1.  Right breast cancer:  Presents for evaluation prior to week #2 of chemotherapy, weekly Taxol.  She received both Taxol and Pembrolizumab last week.  Thus far she is tolerating treatment well. Given no evidence of hypersensitivity reaction, will decrease dexamethasone dosing to 12 mg today, and eliminate it altogether next week.  As she is clinically feeling well and laboratories are within parameters, we will proceed with administration of today's treatment. She will return in 1 week for labs, visit with Lilia Livingston, and Taxol infusion.  We discussed next breast MRI will be following week #3 chemotherapy administration per ISPY2 protocol.    Overall plan is to complete a total of 12 weeks Taxol and pembrolizumab followed by 4 cycles of adriamycin, Cytoxan, and pembrolizumab.   Following completion of chemotherapy, will proceed with surgery.  Whether or not she will benefit from adjuvant radiation is unknown at this time and depends on the type of breast surgery, final surgical margins, and whether or not there is lymph node involvement at the time of surgery.       2.  Facial flushing:  Secondary to dexamethasone.  If no hypersensitivity reaction, will discontinue dexamethasone altogether next week.    3.  Malfunctioning port-a-catheter:  TPA in place during visit.  If still does not draw, will obtain CXR.    4.  Transaminitis:  Secondary to Taxol and pembrolizumab.  No need for dose reduction of chemotherapy today, but will need to continue to monitor closely.     5.  Genetic counseling:  Recommended based on diagnosis of a triple negative breast cancer at age <59 yo.  She is scheduled to see Naty Segovia on 10/10/17.     6.  Follow Up:  Return in 1 week for labs, visit with Lilia Livingston, and Taxol infusion.    It was a pleasure to meet with Ashleigh Perera in clinic today.  She had multiple questions which were answered to her satisfaction.  A total of 25 minutes of our 30 minute face to face visit was spent in counseling.    Again, thank you for allowing me to participate in the care of your patient.      Sincerely,    Fara Bradshaw MD

## 2017-09-26 NOTE — NURSING NOTE
Port accessed x2--flushing well without blood return.  Heparin instilled.  No blood return from heparin.  Labs drawn peripherally and sent.  VS done.  TPA ordered and instilled.  Sent to provider for infusion RN to follow up on port a cath.    Tiffany Meier  RN

## 2017-09-26 NOTE — PROGRESS NOTES
Met with patient and her  in clinic visit.  Patient is overall doing well.  Today, her port was not working and her labs were drawn peripherally.  Pt did not offer any other complaints.  Her I-SPY Quality of Life paperwork was given to us.      Dionne Goode RNCC BSN CBCN

## 2017-10-02 ASSESSMENT — ENCOUNTER SYMPTOMS
HALLUCINATIONS: 0
NECK PAIN: 0
WEIGHT LOSS: 0
DECREASED APPETITE: 1
MUSCLE WEAKNESS: 0
CHILLS: 0
MYALGIAS: 1
JOINT SWELLING: 0
WEIGHT GAIN: 0
FEVER: 0
NAIL CHANGES: 0
NIGHT SWEATS: 0
ARTHRALGIAS: 1
POLYDIPSIA: 0
INCREASED ENERGY: 0
MUSCLE CRAMPS: 1
SKIN CHANGES: 0
STIFFNESS: 0
POOR WOUND HEALING: 0
BACK PAIN: 0
ALTERED TEMPERATURE REGULATION: 0
FATIGUE: 1
POLYPHAGIA: 0

## 2017-10-03 ENCOUNTER — ONCOLOGY VISIT (OUTPATIENT)
Dept: ONCOLOGY | Facility: CLINIC | Age: 57
End: 2017-10-03
Attending: PHYSICIAN ASSISTANT
Payer: COMMERCIAL

## 2017-10-03 ENCOUNTER — RESEARCH ENCOUNTER (OUTPATIENT)
Dept: ONCOLOGY | Facility: CLINIC | Age: 57
End: 2017-10-03

## 2017-10-03 ENCOUNTER — INFUSION THERAPY VISIT (OUTPATIENT)
Dept: ONCOLOGY | Facility: CLINIC | Age: 57
End: 2017-10-03
Attending: INTERNAL MEDICINE
Payer: COMMERCIAL

## 2017-10-03 VITALS
BODY MASS INDEX: 30.59 KG/M2 | WEIGHT: 172.62 LBS | OXYGEN SATURATION: 95 % | TEMPERATURE: 99.3 F | DIASTOLIC BLOOD PRESSURE: 75 MMHG | SYSTOLIC BLOOD PRESSURE: 115 MMHG | HEART RATE: 109 BPM | RESPIRATION RATE: 16 BRPM | HEIGHT: 63 IN

## 2017-10-03 DIAGNOSIS — C50.211 MALIGNANT NEOPLASM OF UPPER-INNER QUADRANT OF RIGHT BREAST IN FEMALE, ESTROGEN RECEPTOR NEGATIVE (H): Primary | ICD-10-CM

## 2017-10-03 DIAGNOSIS — Z17.1 MALIGNANT NEOPLASM OF UPPER-INNER QUADRANT OF RIGHT BREAST IN FEMALE, ESTROGEN RECEPTOR NEGATIVE (H): Primary | ICD-10-CM

## 2017-10-03 LAB
ALBUMIN SERPL-MCNC: 3.2 G/DL (ref 3.4–5)
ALP SERPL-CCNC: 133 U/L (ref 40–150)
ALT SERPL W P-5'-P-CCNC: 62 U/L (ref 0–50)
ANION GAP SERPL CALCULATED.3IONS-SCNC: 6 MMOL/L (ref 3–14)
AST SERPL W P-5'-P-CCNC: 46 U/L (ref 0–45)
BASOPHILS # BLD AUTO: 0 10E9/L (ref 0–0.2)
BASOPHILS NFR BLD AUTO: 0.6 %
BILIRUB SERPL-MCNC: 0.5 MG/DL (ref 0.2–1.3)
BUN SERPL-MCNC: 6 MG/DL (ref 7–30)
CALCIUM SERPL-MCNC: 7.9 MG/DL (ref 8.5–10.1)
CHLORIDE SERPL-SCNC: 105 MMOL/L (ref 94–109)
CO2 SERPL-SCNC: 27 MMOL/L (ref 20–32)
CREAT SERPL-MCNC: 0.68 MG/DL (ref 0.52–1.04)
DIFFERENTIAL METHOD BLD: ABNORMAL
EOSINOPHIL # BLD AUTO: 0.2 10E9/L (ref 0–0.7)
EOSINOPHIL NFR BLD AUTO: 5.6 %
ERYTHROCYTE [DISTWIDTH] IN BLOOD BY AUTOMATED COUNT: 11.5 % (ref 10–15)
GFR SERPL CREATININE-BSD FRML MDRD: 90 ML/MIN/1.7M2
GLUCOSE SERPL-MCNC: 135 MG/DL (ref 70–99)
HCT VFR BLD AUTO: 33.9 % (ref 35–47)
HGB BLD-MCNC: 11.9 G/DL (ref 11.7–15.7)
IMM GRANULOCYTES # BLD: 0 10E9/L (ref 0–0.4)
IMM GRANULOCYTES NFR BLD: 0.6 %
LYMPHOCYTES # BLD AUTO: 1 10E9/L (ref 0.8–5.3)
LYMPHOCYTES NFR BLD AUTO: 30.7 %
MCH RBC QN AUTO: 33.6 PG (ref 26.5–33)
MCHC RBC AUTO-ENTMCNC: 35.1 G/DL (ref 31.5–36.5)
MCV RBC AUTO: 96 FL (ref 78–100)
MONOCYTES # BLD AUTO: 0.2 10E9/L (ref 0–1.3)
MONOCYTES NFR BLD AUTO: 6.3 %
NEUTROPHILS # BLD AUTO: 1.8 10E9/L (ref 1.6–8.3)
NEUTROPHILS NFR BLD AUTO: 56.2 %
NRBC # BLD AUTO: 0 10*3/UL
NRBC BLD AUTO-RTO: 0 /100
PLATELET # BLD AUTO: 316 10E9/L (ref 150–450)
POTASSIUM SERPL-SCNC: 3.7 MMOL/L (ref 3.4–5.3)
PROT SERPL-MCNC: 7.3 G/DL (ref 6.8–8.8)
RBC # BLD AUTO: 3.54 10E12/L (ref 3.8–5.2)
SODIUM SERPL-SCNC: 138 MMOL/L (ref 133–144)
WBC # BLD AUTO: 3.2 10E9/L (ref 4–11)

## 2017-10-03 PROCEDURE — 99214 OFFICE O/P EST MOD 30 MIN: CPT | Mod: ZP | Performed by: PHYSICIAN ASSISTANT

## 2017-10-03 PROCEDURE — 25000128 H RX IP 250 OP 636: Mod: ZF | Performed by: PHYSICIAN ASSISTANT

## 2017-10-03 PROCEDURE — 96375 TX/PRO/DX INJ NEW DRUG ADDON: CPT

## 2017-10-03 PROCEDURE — 25000125 ZZHC RX 250: Mod: ZF | Performed by: PHYSICIAN ASSISTANT

## 2017-10-03 PROCEDURE — 85025 COMPLETE CBC W/AUTO DIFF WBC: CPT | Performed by: INTERNAL MEDICINE

## 2017-10-03 PROCEDURE — 96413 CHEMO IV INFUSION 1 HR: CPT

## 2017-10-03 PROCEDURE — S0028 INJECTION, FAMOTIDINE, 20 MG: HCPCS | Mod: ZF | Performed by: PHYSICIAN ASSISTANT

## 2017-10-03 PROCEDURE — 99212 OFFICE O/P EST SF 10 MIN: CPT | Mod: ZF

## 2017-10-03 PROCEDURE — 80053 COMPREHEN METABOLIC PANEL: CPT | Performed by: INTERNAL MEDICINE

## 2017-10-03 RX ORDER — ALBUTEROL SULFATE 90 UG/1
1-2 AEROSOL, METERED RESPIRATORY (INHALATION)
Status: CANCELLED
Start: 2017-10-03

## 2017-10-03 RX ORDER — MEPERIDINE HYDROCHLORIDE 25 MG/ML
25 INJECTION INTRAMUSCULAR; INTRAVENOUS; SUBCUTANEOUS EVERY 30 MIN PRN
Status: CANCELLED | OUTPATIENT
Start: 2017-10-03

## 2017-10-03 RX ORDER — EPINEPHRINE 1 MG/ML
0.3 INJECTION INTRAMUSCULAR; INTRAVENOUS; SUBCUTANEOUS EVERY 5 MIN PRN
Status: CANCELLED | OUTPATIENT
Start: 2017-10-03

## 2017-10-03 RX ORDER — ALBUTEROL SULFATE 0.83 MG/ML
2.5 SOLUTION RESPIRATORY (INHALATION)
Status: CANCELLED | OUTPATIENT
Start: 2017-10-03

## 2017-10-03 RX ORDER — EPINEPHRINE 0.3 MG/.3ML
0.3 INJECTION SUBCUTANEOUS EVERY 5 MIN PRN
Status: CANCELLED | OUTPATIENT
Start: 2017-10-03

## 2017-10-03 RX ORDER — LORAZEPAM 2 MG/ML
0.5 INJECTION INTRAMUSCULAR EVERY 4 HOURS PRN
Status: CANCELLED
Start: 2017-10-03

## 2017-10-03 RX ORDER — SODIUM CHLORIDE 9 MG/ML
1000 INJECTION, SOLUTION INTRAVENOUS CONTINUOUS PRN
Status: CANCELLED
Start: 2017-10-03

## 2017-10-03 RX ORDER — HEPARIN SODIUM (PORCINE) LOCK FLUSH IV SOLN 100 UNIT/ML 100 UNIT/ML
500 SOLUTION INTRAVENOUS ONCE
Status: CANCELLED
Start: 2017-10-03 | End: 2017-10-03

## 2017-10-03 RX ORDER — METHYLPREDNISOLONE SODIUM SUCCINATE 125 MG/2ML
125 INJECTION, POWDER, LYOPHILIZED, FOR SOLUTION INTRAMUSCULAR; INTRAVENOUS
Status: CANCELLED
Start: 2017-10-03

## 2017-10-03 RX ORDER — DIPHENHYDRAMINE HYDROCHLORIDE 50 MG/ML
50 INJECTION INTRAMUSCULAR; INTRAVENOUS
Status: CANCELLED
Start: 2017-10-03

## 2017-10-03 RX ORDER — DEXAMETHASONE SODIUM PHOSPHATE 10 MG/ML
10 INJECTION, SOLUTION INTRAMUSCULAR; INTRAVENOUS
Status: CANCELLED | OUTPATIENT
Start: 2017-10-03

## 2017-10-03 RX ORDER — EPINEPHRINE 0.3 MG/.3ML
INJECTION SUBCUTANEOUS
Status: DISCONTINUED
Start: 2017-10-03 | End: 2017-10-03 | Stop reason: WASHOUT

## 2017-10-03 RX ORDER — HEPARIN SODIUM (PORCINE) LOCK FLUSH IV SOLN 100 UNIT/ML 100 UNIT/ML
5 SOLUTION INTRAVENOUS
Status: DISCONTINUED | OUTPATIENT
Start: 2017-10-03 | End: 2017-10-03 | Stop reason: HOSPADM

## 2017-10-03 RX ORDER — HEPARIN SODIUM (PORCINE) LOCK FLUSH IV SOLN 100 UNIT/ML 100 UNIT/ML
500 SOLUTION INTRAVENOUS ONCE
Status: COMPLETED | OUTPATIENT
Start: 2017-10-03 | End: 2017-10-03

## 2017-10-03 RX ADMIN — FAMOTIDINE 20 MG: 20 INJECTION, SOLUTION INTRAVENOUS at 14:38

## 2017-10-03 RX ADMIN — SODIUM CHLORIDE, PRESERVATIVE FREE 5 ML: 5 INJECTION INTRAVENOUS at 13:19

## 2017-10-03 RX ADMIN — PACLITAXEL 150 MG: 6 INJECTION, SOLUTION INTRAVENOUS at 15:07

## 2017-10-03 RX ADMIN — SODIUM CHLORIDE, PRESERVATIVE FREE 500 UNITS: 5 INJECTION INTRAVENOUS at 16:15

## 2017-10-03 RX ADMIN — SODIUM CHLORIDE 250 ML: 9 INJECTION, SOLUTION INTRAVENOUS at 14:38

## 2017-10-03 ASSESSMENT — PAIN SCALES - GENERAL: PAINLEVEL: NO PAIN (0)

## 2017-10-03 NOTE — MR AVS SNAPSHOT
After Visit Summary   10/3/2017    Ashleigh Alonzo    MRN: [id number]           Patient Information     Date Of Birth          1960        Visit Information        Provider Department      10/3/2017 1:50 PM Lilia Livingston PA [facility]        Today's Diagnoses     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)    -  1       Follow-ups after your visit        Your next 10 appointments already scheduled     Oct 05, 2017 12:30 PM CDT   MR BREAST BILATERAL W/O & W CONTRAST with [id number]   [facility]    [address] [address]   [phone]           Take your medicines as usual, unless your doctor tells you not to. Bring a list of your current medicines to your exam (including vitamins, minerals and over-the-counter drugs).  The timing of your exam may depend on the start of your last period. If you re in menopause, you may have the exam anytime.  Please bring any previous mammograms or breast MRIs from other facilities to the MRI dept. Do not mail these items to us.  You will have contrast for this exam. To prepare:   The day before your exam, drink extra fluids at least six 8-ounce glasses (unless your doctor tells you to restrict your fluids).   Have a blood test (creatinine test) within 30 days of your exam. Go to your clinic or Diagnostic Imaging Department for this test.  The MRI machine uses a strong magnet. Please wear clothes without metal (snaps, zippers). A sweatsuit works well, or we may give you a hospital gown.  Please remove any body piercings and hair extensions before you arrive. You will also remove watches, jewelry, hairpins, wallets, dentures, partial dental plates and hearing aids. You may wear contact lenses, and you may be able to wear your rings. We have a safe place to keep your personal items, but it is safer to leave them at  home.   **IMPORTANT** THE INSTRUCTIONS BELOW ARE ONLY FOR THOSE PATIENTS WHO HAVE BEEN TOLD THEY WILL RECEIVE SEDATION OR GENERAL ANESTHESIA DURING THEIR MRI PROCEDURE:  IF YOU WILL RECEIVE SEDATION (take medicine to help you relax during your exam)   You must get the medicine from your doctor before you arrive. Bring the medicine to the exam. Do not take it at home.   Arrive one hour early. Bring someone who can take you home after the test. Your medicine will make you sleepy. After the exam, you may not drive, take a bus or take a taxi by yourself.   No eating 8 hours before your exam. You may have clear liquids up until 4 hours before your exam. (Clear liquids include water, clear tea, black coffee and fruit juice without pulp.)  IF YOU WILL RECEIVE ANESTHESIA (be asleep for your exam)   Arrive 1 1/2 hours early. Bring someone who can take you home after the test. You may not drive, take a bus or take a taxi by yourself.   No eating 8 hours before your exam. You may have clear liquids up until 4 hours before your exam. (Clear liquids include water, clear tea, black coffee and fruit juice without pulp.)  If you have any questions, please contact your Imaging Department exam site.            Oct 05, 2017  2:00 PM CDT   US BREAST BIOPSY CORE NEEDLE RIGHT with  Breast Rad, [id number],  BREAST NURSE   [facility] ([facility])    [address] [place] [id number] [phone]           Tell us in advance if there s any chance you may be pregnant.  Bring a list of your medicines to the exam. Include vitamins, minerals and over-the-counter drugs.  If you take blood thinners, you may need to stop taking them a few days before treatment. Talk to your doctor before stopping these medicines. You will need a blood test the morning of your exam.   Stop taking Coumadin (warfarin) 3 days before your exam. Restart the day after your exam.   If you take  aspirin, you may need to stop taking it 3 days before your scan.   If you take Plavix, Ticlid, Pletal or Persantine, you may need to stop taking them 5 days before your scan. Please talk to your doctor before stopping these medicines.  If you will receive sedation for this test (medicine to help you relax):   See your family doctor for an exam within 30 days of treatment.   Plan for an adult to drive you home and stay with you for at least 6 hours.   Follow the eating and drinking guidelines checked below:   No eating or drinking for 4 hours before your test. You may take medicine with small sips of water.   If you have diabetes:If you take insulin, call your diabetes care team. Do not take diabetes pills on the morning of your test. If you take metformin (Avandamet, Glucophage, Glucovance, Metaglip) and received contrast, wait 48 hours before re-starting this medicine.  Please call the Imaging Department at your exam site with any questions.            Oct 05, 2017  3:00 PM CDT   [facility]ic Lab Draw with  MASONIC LAB DRAW   [facility] ([facility])    [address] [place] [id number] [phone]            Oct 10, 2017  7:30 AM CDT   Masonic Lab Draw with  MASONIC LAB DRAW   [facility] ([facility])    [address] [place] [address]   [phone]            Oct 10, 2017  8:30 AM CDT   Infusion 180 with  ONCOLOGY INFUSION, UC 20 ATC   [facility] ([facility])    [address] [place] [address]   [phone]            Oct 10, 2017 12:00 PM CDT   (Arrive by 11:45 AM)   NEW WITH ROOM with Naty Segovia GC,  2 119 CONSULT RM   [facility] ([facility])    [address] [place] [id number] [phone]              Who to contact     If you  "have questions or need follow up information about today's clinic visit or your schedule please contact [facility] directly at [phone].  Normal or non-critical lab and imaging results will be communicated to you by MyChart, letter or phone within 4 business days after the clinic has received the results. If you do not hear from us within 7 days, please contact the clinic through [employer] or phone. If you have a critical or abnormal lab result, we will notify you by phone as soon as possible.  Submit refill requests through [employer] or call your pharmacy and they will forward the refill request to us. Please allow 3 business days for your refill to be completed.          Additional Information About Your Visit        [employer] Information     [employer] gives you secure access to your electronic health record. If you see a primary care provider, you can also send messages to your care team and make appointments. If you have questions, please call your primary care clinic.  If you do not have a primary care provider, please call [phone] and they will assist you.        Care EveryWhere ID     This is your Care EveryWhere ID. This could be used by other organizations to access your [facility] medical records  [id number]        Your Vitals Were     Pulse Temperature Respirations Height Pulse Oximetry BMI (Body Mass Index)    109 99.3  F (37.4  C) (Oral) 16 1.6 m (5' 2.99\") 95% 30.59 kg/m2       Blood Pressure from Last 3 Encounters:   10/03/17 115/75   09/26/17 132/89   09/20/17 115/69    Weight from Last 3 Encounters:   10/03/17 78.3 kg (172 lb 9.9 oz)   09/26/17 77.4 kg (170 lb 11.2 oz)   09/20/17 78.3 kg (172 lb 9.6 oz)              Today, you had the following     No orders found for display       Primary Care Provider Office Phone # Fax #    Radha Cazares [phone]       [address] [address]        Equal Access to " Services     [facility]: Hadii candie mcdaniel lynnfacundo Karenali, waaxda luqadaha, qaybta kaalmada ricardomojacqui, bang duran . So [facility] [phone].    ATENCIÓN: Si marlen hawkins, tiene a blackman disposición servicios gratuitos de asistencia lingüística. Llame al [phone].    We comply with applicable federal civil rights laws and [place] laws. We do not discriminate on the basis of race, color, national origin, age, disability, sex, sexual orientation, or gender identity.            Thank you!     Thank you for choosing [facility]  for your care. Our goal is always to provide you with excellent care. Hearing back from our patients is one way we can continue to improve our services. Please take a few minutes to complete the written survey that you may receive in the mail after your visit with us. Thank you!             Your Updated Medication List - Protect others around you: Learn how to safely use, store and throw away your medicines at www.disposemymeds.org.          This list is accurate as of: 10/3/17  5:04 PM.  Always use your most recent med list.                   Brand Name Dispense Instructions for use Diagnosis    albuterol 108 (90 BASE) MCG/ACT Inhaler    PROAIR HFA/PROVENTIL HFA/VENTOLIN HFA    1 Inhaler    Inhale 2 puffs into the lungs every 6 hours as needed for shortness of breath / dyspnea    Chronic obstructive pulmonary disease, unspecified COPD type (H)       aspirin 81 MG tablet      Take 1 tablet by mouth daily.        citalopram 10 MG tablet    celeXA    90 tablet    Take 1 tablet (10 mg) by mouth daily    Anxiety, Depression, unspecified depression type       CO Q 10 PO      Take 1 tablet by mouth        darifenacin 7.5 MG 24 hr tablet    ENABLEX    90 tablet    Take 1 tablet (7.5 mg) by mouth daily    Overactive bladder       guaiFENesin 600 MG 12 hr tablet    MUCINEX    180 tablet    Take 2 tablets (1,200 mg) by mouth 2 times daily    Cough with  sputum       hydrOXYzine 25 MG tablet    ATARAX    100 tablet    Take 1-2 tablets (25-50 mg) by mouth every 6 hours as needed for itching    Hives       lidocaine-prilocaine cream    EMLA    30 g    Please apply to port site 30 minutes before use prn    Personal history of malignant neoplasm of breast       * LORazepam 0.5 MG tablet    ATIVAN    10 tablet    Take 1-2 tabs 20 minutes prior to MRI scans.    Breast cancer (H), Anxiety       * LORazepam 0.5 MG tablet    ATIVAN    30 tablet    Take 1 tablet (0.5 mg) by mouth every 4 hours as needed (Anxiety, Nausea/Vomiting or Sleep)    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)       MULTIPLE VITAMIN PO           ondansetron 8 MG tablet    ZOFRAN    30 tablet    Take 1 tablet (8 mg) by mouth every 8 hours as needed for nausea    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)       * order for DME      [employer]ironics Dream Station Auto CPAP 12-18 cm, F&P Simplus FFM small.    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)       prochlorperazine 10 MG tablet    COMPAZINE    30 tablet    Take 1 tablet (10 mg) by mouth every 6 hours as needed (Nausea/Vomiting)    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)       simvastatin 40 MG tablet    ZOCOR    90 tablet    Take 1 tablet (40 mg) by mouth At Bedtime    Hyperlipidemia LDL goal <130       tiotropium 18 MCG capsule    SPIRIVA    1 capsule    Inhale 1 capsule (18 mcg) into the lungs daily Inhale contents of one capsule    Chronic obstructive pulmonary disease, unspecified COPD type (H)       VITAMIN B 12 PO      Take 500 mcg by mouth daily        VITAMIN B6 PO      Take 1 tablet by mouth daily.        vitamin D 1000  UNITS capsule      2 CAPSULE DAILY        * Notice:  This list has 4 medication(s) that are the same as other medications prescribed for you. Read the directions carefully, and ask your doctor or other care provider to review them with you.

## 2017-10-03 NOTE — PROGRESS NOTES
Research C3D1 pt feeling good no real difference. She did report having the facial flushing and hives (9/28) like she did before (9/21) but they resolved the same day without needing to take anything.  Appetite fair but when she eats she eats fine, weight stable. Not having any trouble with bowels, some nausea but not enough to take meds for it.   Labs done and checked- ANC 1.8, WBC 3.2. Ok'd for treatment today, will get paclitaxel.   Gave pt calendar and reminded her of appts on thurs for MRI and Bx. No other concerns today.  Steffany Nix RN   [phone].

## 2017-10-03 NOTE — NURSING NOTE
"Oncology Rooming Note    October 3, 2017 1:37 PM   Ashleigh Alonzo is a 57 year old female who presents for:    Chief Complaint   Patient presents with     Port Draw     Labs drawn by RN from Left Chest Port-a-Cath.  Line flushed with Saline and Heparin.      Oncology Clinic Visit     Breast Ca- F/U     Initial Vitals: /75  Pulse 109  Temp 99.3  F (37.4  C) (Oral)  Resp 16  Ht 1.6 m (5' 2.99\")  Wt 78.3 kg (172 lb 9.9 oz)  SpO2 95%  BMI 30.59 kg/m2 Estimated body mass index is 30.59 kg/(m^2) as calculated from the following:    Height as of this encounter: 1.6 m (5' 2.99\").    Weight as of this encounter: 78.3 kg (172 lb 9.9 oz). Body surface area is 1.87 meters squared.  No Pain (0) Comment: Data Unavailable   No LMP recorded. Patient is postmenopausal.  Allergies reviewed: Yes  Medications reviewed: Yes    Medications: Medication refills not needed today.  Pharmacy name entered into [facility]:    [facility], [place] - [address] [place] [facility] - [place], [place] - [address] [facility]    Clinical concerns: no clinical concerns  provider was notified.    7 minutes for nursing intake (face to face time)     Alyssa Gomez CMA              "

## 2017-10-03 NOTE — Clinical Note
10/3/2017       RE: Ashleigh Alonzo  [address] [place] [address]     Dear Colleague,    Thank you for referring your patient, Ashleigh Alonzo, to the [facility]. Please see a copy of my visit note below.    No notes on file    Again, thank you for allowing me to participate in the care of your patient.      Sincerely,    EDDIE Oliva

## 2017-10-03 NOTE — LETTER
10/3/2017      RE: Ashleigh Alonzo  [address] [place] [address]       Hematology-Oncology Visit  Oct 3, 2017    Reason for Visit: follow-up breast cancer     HPI: Ashleigh Alonzo is a 57 year old female with past medical history of COPD, sleep apnea, periodontal disease with stage IIa, T2N0M0, grade 3, triple negative right breast cancer. She was diagnosed via abnormal screening mammogram. She ultimately had US, contrast-enhanced mammo, and biopsy showing 3.4 cm mass and pathology showed grade 3 invasive mammary carcinoma with associated high-grade DCIS. ER/MA was negative and HER2 negative. She enrolled in ISPY-2 clinical trial and began treatment with weekly taxol and once every 3 week pembrolizumab on 9/2/17. Please see notes from Dr. Bradshaw for further details of patient's oncology history.     Interval History: Ashleigh Perera is here with her  today for week 3 of treatment with Taxol. She is feeling well. Has tolerated treatment extremely well so far. She notes with the infusion last week her facial flushing and arm rash was better. This only lasted for one day. She has had intermittent queasy stomach but has not needed to take anything for this. She has a great appetite and is drinking plenty of fluids. No bowel changes. No mucositis, neuropathy, aches, fevers/chills or infectious concerns. ROS otherwise negative.    Current Outpatient Prescriptions   Medication     order for DME     LORazepam (ATIVAN) 0.5 MG tablet     lidocaine-prilocaine (EMLA) cream     LORazepam (ATIVAN) 0.5 MG tablet     hydrOXYzine (ATARAX) 25 MG tablet     guaiFENesin (MUCINEX) 600 MG 12 hr tablet     simvastatin (ZOCOR) 40 MG tablet     tiotropium (SPIRIVA) 18 MCG capsule     albuterol (PROAIR HFA/PROVENTIL HFA/VENTOLIN HFA) 108 (90 BASE) MCG/ACT Inhaler     citalopram (CELEXA) 10 MG tablet     darifenacin (ENABLEX) 7.5 MG 24 hr tablet     order for DME     Coenzyme Q10 (CO Q 10 PO)     MULTIPLE VITAMIN PO      "Cyanocobalamin (VITAMIN B 12 PO)     Pyridoxine HCl (VITAMIN B6 PO)     aspirin 81 MG tablet     VITAMIN D 1000 UNIT OR CAPS     ondansetron (ZOFRAN) 8 MG tablet     prochlorperazine (COMPAZINE) 10 MG tablet     Current Facility-Administered Medications   Medication     heparin 100 UNIT/ML injection 5 mL     Facility-Administered Medications Ordered in Other Visits   Medication     sodium chloride (PF) 0.9% PF flush 10 mL     heparin 100 UNIT/ML injection 500 Units     PACLitaxel (TAXOL) 150 mg in NaCl 0.9 % 300 mL CHEMOTHERAPY     EPINEPHrine (EPIPEN/ADRENACLICK/or ANY BX GENERIC EQUIV) 0.3 MG/0.3ML injection     influenza quadrivalent (PF) vacc age 3 yrs and older (FLUZONE or Flulaval) injection 0.5 mL     lidocaine-EPINEPHrine 1.5 %-1:[id number] injection 10 mL       PHYSICAL EXAM:  /75  Pulse 109  Temp 99.3  F (37.4  C) (Oral)  Resp 16  Ht 1.6 m (5' 2.99\")  Wt 78.3 kg (172 lb 9.9 oz)  SpO2 95%  BMI 30.59 kg/m2  General: Alert, oriented, pleasant, NAD  Skin: No focal rash on exposed skin   HEENT: Normocephalic, atraumatic, PERRLA, EOMI. Moist mucus membranes, no lesions or thrush  Neck: No cervical or supraclavicular LAD.  Axillary: No LAD  Breast: Deferred   Lungs: CTA bilaterally, normal work of breathing  Cardiac: RRR, S1, S2, no murmurs  Abdomen: Soft, nontender, nondistended. Normoactive bowel sounds. No hepatosplenomegaly, masses  Neuro: CNII-XII grossly intact  Extremities: No pedal edema    Labs:    10/3/2017 13:32   Sodium 138   Potassium 3.7   Chloride 105   Carbon Dioxide 27   Urea Nitrogen 6 (L)   Creatinine 0.68   GFR Estimate 90   GFR Estimate If Black >90   Calcium 7.9 (L)   Anion Gap 6   Albumin 3.2 (L)   Protein Total 7.3   Bilirubin Total 0.5   Alkaline Phosphatase 133   ALT 62 (H)   AST 46 (H)   Glucose 135 (H)   WBC 3.2 (L)   Hemoglobin 11.9   Hematocrit 33.9 (L)   Platelet Count 316   RBC Count 3.54 (L)   MCV 96   MCH 33.6 (H)   MCHC 35.1   RDW 11.5   Diff Method Automated Method   % " Neutrophils 56.2   % Lymphocytes 30.7   % Monocytes 6.3   % Eosinophils 5.6   % Basophils 0.6   % Immature Granulocytes 0.6   Nucleated RBCs 0   Absolute Neutrophil 1.8   Absolute Lymphocytes 1.0   Absolute Monocytes 0.2   Absolute Eosinophils 0.2   Absolute Basophils 0.0   Abs Immature Granulocytes 0.0   Absolute Nucleated RBC 0.0       Assessment & Plan:     1. Stage IIa, T2N0M0, grade 3, triple negative breast cancer: S/p 2 weeks of weekly Taxol and every 3 week pembro. She is tolerating treatment well without significant symptoms. Has tolerated first 2 weeks of Taxol well so will discontinue dexamethasone today. Will proceed with week 3 of treatment with Taxol as she is feeling well and labs are adequate. Plan for 12 weeks of Taxol and pembrolizumab followed by 4 cycles of AC and pembrolizumab. She will then proceed with surgery and potentially adjuvant radiation. She will be seen weekly on the clinical trial.     2. Mild transaminitis: Stable. Likely from Taxol. Monitor.     3. Facial flushing/rash: Should resolve now that dexamethasone is being discontinued.     Lilia Livingston PA-C    [facility]  [address] [place] [id number]  [phone]

## 2017-10-03 NOTE — PATIENT INSTRUCTIONS
Contact Numbers    [facility] Main Line: [phone]  [facility] Triage:  [phone]    Call triage with chills and/or temperature greater than or equal to 100.5, uncontrolled nausea/vomiting, diarrhea, constipation, dizziness, shortness of breath, chest pain, bleeding, unexplained bruising, or any new/concerning symptoms, questions/concerns.     If you are having any concerning symptoms or wish to speak to a provider before your next infusion visit, please call your care coordinator or triage to notify them so we can adequately serve you.       After Hours: [phone]    If after hours, weekends, or holidays, call main hospital  and ask for Oncology doctor on call.         October 2017 Sunday Monday Tuesday Wednesday Thursday Friday Saturday   1     2     3     [facility] DRAW    1:15 PM   (15 min.)    [facility] DRAW   [facility] Lab Draw     [facility] RETURN    1:35 PM   (50 min.)   Lilia Livingston PA   [facility] ONC INFUSION 180    2:30 PM   (180 min.)    ONCOLOGY INFUSION   [facility] 4     5     MR BREAST BILATERAL WWO   12:30 PM   (90 min.)   [id number]   [facility]     US BREAST BX CORE NEEDLE RIGHT    2:00 PM   (50 min.)   [id number]   [facility]     [facility] DRAW    3:00 PM   (15 min.)    [facility] DRAW   [facility] Lab Draw 6     7       8     9     10     [facility] DRAW    7:30 AM   (15 min.)    [facility] DRAW   [facility] Lab Draw     [facility] ONC INFUSION 180    8:30 AM   (180 min.)    ONCOLOGY INFUSION   [facility] NEW WITH ROOM   11:45 AM   (75 min.)   Naty Segovia GC   [facility] 11     12     13     14       15     16     17     [facility] DRAW    6:30 AM   (15 min.)    [facility] DRAW   [facility] Lab Draw     [facility] RETURN    6:45 AM   (50 min.)   Lilia Livingston PA   [facility] ONC INFUSION 180    8:00 AM   (180 min.)    ONCOLOGY INFUSION   [facility] 18     19     20     21       22     23     24     [facility] DRAW   12:30 PM   (15 min.)    [facility] DRAW   [facility] Lab Draw     P RETURN   12:45 PM   (50 min.)   Lilia Livingston PA   [facility] ONC INFUSION 180    2:30 PM   (180 min.)    ONCOLOGY INFUSION   [facility] 25     26     27     28       29     30     31 November 2017 Sunday Monday Tuesday Wednesday Thursday Friday Saturday                  1     2     3     4       5     6     7     8     9     10     11       12     13     14     15     16     17     18       19     20     21     22     23     24     25       26     27     28     29     30                           Recent Results (from the past 24 hour(s))   CBC with platelets differential    Collection Time: 10/03/17  1:32 PM   Result Value Ref Range    WBC 3.2 (L) 4.0 - 11.0 10e9/L    RBC Count 3.54 (L) 3.8 - 5.2 10e12/L    Hemoglobin 11.9 11.7 - 15.7 g/dL    Hematocrit 33.9 (L) 35.0 - 47.0 %    MCV 96 78 - 100 fl    MCH 33.6 (H) 26.5 - 33.0 pg    MCHC 35.1 31.5 - 36.5 g/dL    RDW 11.5 10.0 - 15.0 %    Platelet Count 316 150 - 450 10e9/L    Diff Method Automated Method     % Neutrophils 56.2 %    % Lymphocytes 30.7 %    % Monocytes 6.3 %    % Eosinophils 5.6 %    % Basophils 0.6 %    % Immature Granulocytes 0.6 %    Nucleated RBCs 0 0 /100    Absolute Neutrophil 1.8 1.6 - 8.3 10e9/L    Absolute Lymphocytes 1.0 0.8 - 5.3 10e9/L    Absolute Monocytes 0.2 0.0 - 1.3 10e9/L    Absolute Eosinophils 0.2 0.0 - 0.7 10e9/L    Absolute Basophils 0.0 0.0 - 0.2 10e9/L    Abs Immature Granulocytes 0.0 0 - 0.4 10e9/L    Absolute Nucleated RBC 0.0    Comprehensive metabolic panel    Collection Time: 10/03/17  1:32 PM   Result Value Ref Range    Sodium 138 133 - 144 mmol/L    Potassium 3.7 3.4  - 5.3 mmol/L    Chloride 105 94 - 109 mmol/L    Carbon Dioxide 27 20 - 32 mmol/L    Anion Gap 6 3 - 14 mmol/L    Glucose 135 (H) 70 - 99 mg/dL    Urea Nitrogen 6 (L) 7 - 30 mg/dL    Creatinine 0.68 0.52 - 1.04 mg/dL    GFR Estimate 90 >60 mL/min/1.7m2    GFR Estimate If Black >90 >60 mL/min/1.7m2    Calcium 7.9 (L) 8.5 - 10.1 mg/dL    Bilirubin Total 0.5 0.2 - 1.3 mg/dL    Albumin 3.2 (L) 3.4 - 5.0 g/dL    Protein Total 7.3 6.8 - 8.8 g/dL    Alkaline Phosphatase 133 40 - 150 U/L    ALT 62 (H) 0 - 50 U/L    AST 46 (H) 0 - 45 U/L

## 2017-10-03 NOTE — PROGRESS NOTES
Infusion Nursing Note:    Patient presents today for Cycle 3 Taxol.  Arrived with spouse.  Patient met with Lilia MORGAN prior to infusion.    Lab Results   Component Value Date    HGB 11.9 10/03/2017     Lab Results   Component Value Date    WBC 3.2 10/03/2017      Lab Results   Component Value Date    ANEU 1.8 10/03/2017     Lab Results   Component Value Date     10/03/2017      Lab Results   Component Value Date     10/03/2017                   Lab Results   Component Value Date    POTASSIUM 3.7 10/03/2017           Lab Results   Component Value Date    MAG 2.1 09/05/2017            Lab Results   Component Value Date    CR 0.68 10/03/2017                   Lab Results   Component Value Date    JAVIER 7.9 10/03/2017                Lab Results   Component Value Date    BILITOTAL 0.5 10/03/2017           Lab Results   Component Value Date    ALBUMIN 3.2 10/03/2017                    Lab Results   Component Value Date    ALT 62 10/03/2017           Lab Results   Component Value Date    AST 46 10/03/2017     Results reviewed, labs MET treatment parameters, ok to proceed with treatment.        Note: N/A.    Intravenous Access:  Implanted Port.    Post Infusion Assessment:  Patient tolerated infusion without incident.  Blood return noted pre and post infusion.  No evidence of extravasations.  Access discontinued per protocol.    Discharge Plan:   Patient declined prescription refills.  AVS to patient via [facility].  Patient will return 10/10 for next appointment.   Patient discharged in stable condition accompanied by: .  Departure Mode: Ambulatory.

## 2017-10-03 NOTE — MR AVS SNAPSHOT
After Visit Summary   10/3/2017    Ashleigh Alonzo    MRN: [id number]           Patient Information     Date Of Birth          1960        Visit Information        Provider Department      10/3/2017 2:30 PM UC 19 ATC;  ONCOLOGY INFUSION [facility]        Today's Diagnoses     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)    -  1      Care Instructions    Contact Numbers    [facility] Main Line: [phone]  [facility] Triage:  [phone]    Call triage with chills and/or temperature greater than or equal to 100.5, uncontrolled nausea/vomiting, diarrhea, constipation, dizziness, shortness of breath, chest pain, bleeding, unexplained bruising, or any new/concerning symptoms, questions/concerns.     If you are having any concerning symptoms or wish to speak to a provider before your next infusion visit, please call your care coordinator or triage to notify them so we can adequately serve you.       After Hours: [phone]    If after hours, weekends, or holidays, call main hospital  and ask for Oncology doctor on call.         October 2017 Sunday Monday Tuesday Wednesday Thursday Friday Saturday   1     2     3     [facility] DRAW    1:15 PM   (15 min.)   [facility] DRAW   [facility] Lab Draw     [facility] RETURN    1:35 PM   (50 min.)   Lilia Livingston PA   [facility] ONC INFUSION 180    2:30 PM   (180 min.)    ONCOLOGY INFUSION   [facility] 4     5     MR BREAST BILATERAL WWO   12:30 PM   (90 min.)   [id number]   [facility]     US BREAST BX CORE NEEDLE RIGHT    2:00 PM   (50 min.)   [id number]   [facility]     [facility] DRAW    3:00 PM   (15 min.)    [facility] DRAW   [facility] Lab Draw 6     7       8     9     10     [facility] DRAW    7:30 AM   (15 min.)    [facility] DRAW   [facility]  Lab Draw     [facility] ONC INFUSION 180    8:30 AM   (180 min.)   [facility] ONCOLOGY INFUSION   [facility] NEW WITH ROOM   11:45 AM   (75 min.)   Naty Segovia GC   [facility] 11     12     13     14       15     16     17     [facility] DRAW    6:30 AM   (15 min.)   [facility] DRAW   [facility] Lab Draw     [facility] RETURN    6:45 AM   (50 min.)   Lilia Livingston PA   [facility]     [facility] ONC INFUSION 180    8:00 AM   (180 min.)   UC ONCOLOGY INFUSION   [facility] 18     19     20     21       22     23     24     [facility] DRAW   12:30 PM   (15 min.)    [facility] DRAW   [facility] Lab Draw     [facility] RETURN   12:45 PM   (50 min.)   Lilia Livingston PA   [facility] ONC INFUSION 180    2:30 PM   (180 min.)    ONCOLOGY INFUSION   [facility] 25     26     27     28       29     30     31 November 2017 Sunday Monday Tuesday Wednesday Thursday Friday Saturday                  1     2     3     4       5     6     7     8     9     10     11       12     13     14     15     16     17     18       19     20     21     22     23     24     25       26     27     28     29     30                           Recent Results (from the past 24 hour(s))   CBC with platelets differential    Collection Time: 10/03/17  1:32 PM   Result Value Ref Range    WBC 3.2 (L) 4.0 - 11.0 10e9/L    RBC Count 3.54 (L) 3.8 - 5.2 10e12/L    Hemoglobin 11.9 11.7 - 15.7 g/dL    Hematocrit 33.9 (L) 35.0 - 47.0 %    MCV 96 78 - 100 fl    MCH 33.6 (H) 26.5 - 33.0 pg    MCHC 35.1 31.5 - 36.5 g/dL    RDW 11.5 10.0 - 15.0 %    Platelet Count 316 150 - 450 10e9/L    Diff Method Automated Method     % Neutrophils 56.2 %    % Lymphocytes 30.7 %    % Monocytes 6.3 %    % Eosinophils 5.6 %    % Basophils 0.6 %    % Immature Granulocytes 0.6 %    Nucleated RBCs 0 0  /100    Absolute Neutrophil 1.8 1.6 - 8.3 10e9/L    Absolute Lymphocytes 1.0 0.8 - 5.3 10e9/L    Absolute Monocytes 0.2 0.0 - 1.3 10e9/L    Absolute Eosinophils 0.2 0.0 - 0.7 10e9/L    Absolute Basophils 0.0 0.0 - 0.2 10e9/L    Abs Immature Granulocytes 0.0 0 - 0.4 10e9/L    Absolute Nucleated RBC 0.0    Comprehensive metabolic panel    Collection Time: 10/03/17  1:32 PM   Result Value Ref Range    Sodium 138 133 - 144 mmol/L    Potassium 3.7 3.4 - 5.3 mmol/L    Chloride 105 94 - 109 mmol/L    Carbon Dioxide 27 20 - 32 mmol/L    Anion Gap 6 3 - 14 mmol/L    Glucose 135 (H) 70 - 99 mg/dL    Urea Nitrogen 6 (L) 7 - 30 mg/dL    Creatinine 0.68 0.52 - 1.04 mg/dL    GFR Estimate 90 >60 mL/min/1.7m2    GFR Estimate If Black >90 >60 mL/min/1.7m2    Calcium 7.9 (L) 8.5 - 10.1 mg/dL    Bilirubin Total 0.5 0.2 - 1.3 mg/dL    Albumin 3.2 (L) 3.4 - 5.0 g/dL    Protein Total 7.3 6.8 - 8.8 g/dL    Alkaline Phosphatase 133 40 - 150 U/L    ALT 62 (H) 0 - 50 U/L    AST 46 (H) 0 - 45 U/L               Follow-ups after your visit        Your next 10 appointments already scheduled     Oct 05, 2017 12:30 PM CDT   MR BREAST BILATERAL W/O & W CONTRAST with [id number]   [facility] ([facility])    [address] [facility] [address]   [phone]           Take your medicines as usual, unless your doctor tells you not to. Bring a list of your current medicines to your exam (including vitamins, minerals and over-the-counter drugs).  The timing of your exam may depend on the start of your last period. If you re in menopause, you may have the exam anytime.  Please bring any previous mammograms or breast MRIs from other facilities to the MRI dept. Do not mail these items to us.  You will have contrast for this exam. To prepare:   The day before your exam, drink extra fluids at least six 8-ounce glasses (unless your doctor tells you to restrict your fluids).   Have a blood test (creatinine test) within  30 days of your exam. Go to your clinic or Diagnostic Imaging Department for this test.  The MRI machine uses a strong magnet. Please wear clothes without metal (snaps, zippers). A sweatsuit works well, or we may give you a hospital gown.  Please remove any body piercings and hair extensions before you arrive. You will also remove watches, jewelry, hairpins, wallets, dentures, partial dental plates and hearing aids. You may wear contact lenses, and you may be able to wear your rings. We have a safe place to keep your personal items, but it is safer to leave them at home.   **IMPORTANT** THE INSTRUCTIONS BELOW ARE ONLY FOR THOSE PATIENTS WHO HAVE BEEN TOLD THEY WILL RECEIVE SEDATION OR GENERAL ANESTHESIA DURING THEIR MRI PROCEDURE:  IF YOU WILL RECEIVE SEDATION (take medicine to help you relax during your exam)   You must get the medicine from your doctor before you arrive. Bring the medicine to the exam. Do not take it at home.   Arrive one hour early. Bring someone who can take you home after the test. Your medicine will make you sleepy. After the exam, you may not drive, take a bus or take a taxi by yourself.   No eating 8 hours before your exam. You may have clear liquids up until 4 hours before your exam. (Clear liquids include water, clear tea, black coffee and fruit juice without pulp.)  IF YOU WILL RECEIVE ANESTHESIA (be asleep for your exam)   Arrive 1 1/2 hours early. Bring someone who can take you home after the test. You may not drive, take a bus or take a taxi by yourself.   No eating 8 hours before your exam. You may have clear liquids up until 4 hours before your exam. (Clear liquids include water, clear tea, black coffee and fruit juice without pulp.)  If you have any questions, please contact your Imaging Department exam site.            Oct 05, 2017  2:00 PM CDT   US BREAST BIOPSY CORE NEEDLE RIGHT with Uc Breast Rad, UCBCUS1, CARLINE BREAST NURSE   [facility] ([facility])    [address] [place] [id number] [phone]           Tell us in advance if there s any chance you may be pregnant.  Bring a list of your medicines to the exam. Include vitamins, minerals and over-the-counter drugs.  If you take blood thinners, you may need to stop taking them a few days before treatment. Talk to your doctor before stopping these medicines. You will need a blood test the morning of your exam.   Stop taking Coumadin (warfarin) 3 days before your exam. Restart the day after your exam.   If you take aspirin, you may need to stop taking it 3 days before your scan.   If you take Plavix, Ticlid, Pletal or Persantine, you may need to stop taking them 5 days before your scan. Please talk to your doctor before stopping these medicines.  If you will receive sedation for this test (medicine to help you relax):   See your family doctor for an exam within 30 days of treatment.   Plan for an adult to drive you home and stay with you for at least 6 hours.   Follow the eating and drinking guidelines checked below:   No eating or drinking for 4 hours before your test. You may take medicine with small sips of water.   If you have diabetes:If you take insulin, call your diabetes care team. Do not take diabetes pills on the morning of your test. If you take metformin (Avandamet, Glucophage, Glucovance, Metaglip) and received contrast, wait 48 hours before re-starting this medicine.  Please call the Imaging Department at your exam site with any questions.            Oct 05, 2017  3:00 PM [facility]   [facility] Lab Draw with  MAS[facility]IC LAB DRAW   [facility] Draw ([facility])    [address] [place] [id number]   [phone]            Oct 10, 2017  7:30 AM CDT   [facility] Draw with  MAS[facility] LAB DRAW   [facility] Draw ([facility])    [address] [place] [address]  [id number] [phone]            Oct 10, 2017  8:30 AM CDT   Infusion 180 with UC ONCOLOGY INFUSION, UC 20 ATC   [facility] ([facility])    [address] [place] [id number] [phone]            Oct 10, 2017 12:00 PM CDT   (Arrive by 11:45 AM)   NEW WITH ROOM with Naty Segovia GC, UC 2 119 CONSULT RM   [facility] ([facility])    [address] [place] [phone] [phone]              Who to contact     If you have questions or need follow up information about today's clinic visit or your schedule please contact [facility] directly at [phone].  Normal or non-critical lab and imaging results will be communicated to you by Jamgluehart, letter or phone within 4 business days after the clinic has received the results. If you do not hear from us within 7 days, please contact the clinic through Jamgluehart or phone. If you have a critical or abnormal lab result, we will notify you by phone as soon as possible.  Submit refill requests through BuyHappy or call your pharmacy and they will forward the refill request to us. Please allow 3 business days for your refill to be completed.          Additional Information About Your Visit        MyChart Information     BuyHappy gives you secure access to your electronic health record. If you see a primary care provider, you can also send messages to your care team and make appointments. If you have questions, please call your primary care clinic.  If you do not have a primary care provider, please call [phone] and they will assist you.        Care EveryWhere ID     This is your Care EveryWhere ID. This could be used by other organizations to access your [facility] medical records  [id number]         Blood Pressure from Last 3 Encounters:   10/03/17 115/75   09/26/17 132/89   09/20/17 115/69    Weight from Last  3 Encounters:   10/03/17 78.3 kg (172 lb 9.9 oz)   09/26/17 77.4 kg (170 lb 11.2 oz)   09/20/17 78.3 kg (172 lb 9.6 oz)              We Performed the Following     CBC with platelets differential     Comprehensive metabolic panel        Primary Care Provider Office Phone # Fax #    Radha Cazares [phone]       [address] [address]        Equal Access to Services     WILMER HUGHES : Hadii aad ku hadasho Soomaali, waaxda luqadaha, qaybta kaalmada adeegyada, waxay idiin hayaan adeanmol duran . So [facility] [phone].    ATENCIÓN: Si habla español, tiene a blackman disposición servicios gratuitos de asistencia lingüística. [facility] [phone].    We comply with applicable federal civil rights laws and [place] laws. We do not discriminate on the basis of race, color, national origin, age, disability, sex, sexual orientation, or gender identity.            Thank you!     Thank you for choosing [facility]  for your care. Our goal is always to provide you with excellent care. Hearing back from our patients is one way we can continue to improve our services. Please take a few minutes to complete the written survey that you may receive in the mail after your visit with us. Thank you!             Your Updated Medication List - Protect others around you: Learn how to safely use, store and throw away your medicines at www.disposemymeds.org.          This list is accurate as of: 10/3/17  6:05 PM.  Always use your most recent med list.                   Brand Name Dispense Instructions for use Diagnosis    albuterol 108 (90 BASE) MCG/ACT Inhaler    PROAIR HFA/PROVENTIL HFA/VENTOLIN HFA    1 Inhaler    Inhale 2 puffs into the lungs every 6 hours as needed for shortness of breath / dyspnea    Chronic obstructive pulmonary disease, unspecified COPD type (H)       aspirin 81 MG tablet      Take 1 tablet by mouth daily.        citalopram  10 MG tablet    celeXA    90 tablet    Take 1 tablet (10 mg) by mouth daily    Anxiety, Depression, unspecified depression type       CO Q 10 PO      Take 1 tablet by mouth        darifenacin 7.5 MG 24 hr tablet    ENABLEX    90 tablet    Take 1 tablet (7.5 mg) by mouth daily    Overactive bladder       guaiFENesin 600 MG 12 hr tablet    MUCINEX    180 tablet    Take 2 tablets (1,200 mg) by mouth 2 times daily    Cough with sputum       hydrOXYzine 25 MG tablet    ATARAX    100 tablet    Take 1-2 tablets (25-50 mg) by mouth every 6 hours as needed for itching    Hives       lidocaine-prilocaine cream    EMLA    30 g    Please apply to port site 30 minutes before use prn    Personal history of malignant neoplasm of breast       * LORazepam 0.5 MG tablet    ATIVAN    10 tablet    Take 1-2 tabs 20 minutes prior to MRI scans.    Breast cancer (H), Anxiety       * LORazepam 0.5 MG tablet    ATIVAN    30 tablet    Take 1 tablet (0.5 mg) by mouth every 4 hours as needed (Anxiety, Nausea/Vomiting or Sleep)    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)       MULTIPLE VITAMIN PO           ondansetron 8 MG tablet    ZOFRAN    30 tablet    Take 1 tablet (8 mg) by mouth every 8 hours as needed for nausea    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)       * order for [facility]      Respironics Dream Station Auto CPAP 12-18 cm, F&P Simplus FFM small.    MERLIN (obstructive sleep apnea)       * order for [facility]     1 Device    Cranial prosthesis    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)       prochlorperazine 10 MG tablet    COMPAZINE    30 tablet    Take 1 tablet (10 mg) by mouth every 6 hours as needed (Nausea/Vomiting)    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)       simvastatin 40 MG tablet    ZOCOR     90 tablet    Take 1 tablet (40 mg) by mouth At Bedtime    Hyperlipidemia LDL goal <130       tiotropium 18 MCG capsule    SPIRIVA    1 capsule    Inhale 1 capsule (18 mcg) into the lungs daily Inhale contents of one capsule    Chronic obstructive pulmonary disease, unspecified COPD type (H)       VITAMIN B 12 PO      Take 500 mcg by mouth daily        VITAMIN B6 PO      Take 1 tablet by mouth daily.        vitamin D 1000 UNITS capsule      2 CAPSULE DAILY        * Notice:  This list has 4 medication(s) that are the same as other medications prescribed for you. Read the directions carefully, and ask your doctor or other care provider to review them with you.

## 2017-10-03 NOTE — PROGRESS NOTES
Hematology-Oncology Visit  Oct 3, 2017    Reason for Visit: follow-up breast cancer     HPI: Ashleigh Alonzo is a 57 year old female with past medical history of COPD, sleep apnea, periodontal disease with stage IIa, T2N0M0, grade 3, triple negative right breast cancer. She was diagnosed via abnormal screening mammogram. She ultimately had US, contrast-enhanced mammo, and biopsy showing 3.4 cm mass and pathology showed grade 3 invasive mammary carcinoma with associated high-grade DCIS. ER/DE was negative and HER2 negative. She enrolled in ISPY-2 clinical trial and began treatment with weekly taxol and once every 3 week pembrolizumab on 9/2/17. Please see notes from Dr. Bradshaw for further details of patient's oncology history.     Interval History: Ashleigh Perera is here with her  today for week 3 of treatment with Taxol. She is feeling well. Has tolerated treatment extremely well so far. She notes with the infusion last week her facial flushing and arm rash was better. This only lasted for one day. She has had intermittent queasy stomach but has not needed to take anything for this. She has a great appetite and is drinking plenty of fluids. No bowel changes. No mucositis, neuropathy, aches, fevers/chills or infectious concerns. ROS otherwise negative.    Current Outpatient Prescriptions   Medication     order for DME     LORazepam (ATIVAN) 0.5 MG tablet     lidocaine-prilocaine (EMLA) cream     LORazepam (ATIVAN) 0.5 MG tablet     hydrOXYzine (ATARAX) 25 MG tablet     guaiFENesin (MUCINEX) 600 MG 12 hr tablet     simvastatin (ZOCOR) 40 MG tablet     tiotropium (SPIRIVA) 18 MCG capsule     albuterol (PROAIR HFA/PROVENTIL HFA/VENTOLIN HFA) 108 (90 BASE) MCG/ACT Inhaler     citalopram (CELEXA) 10 MG tablet     darifenacin (ENABLEX) 7.5 MG 24 hr tablet     order for DME     Coenzyme Q10 (CO Q 10 PO)     MULTIPLE VITAMIN PO     Cyanocobalamin (VITAMIN B 12 PO)     Pyridoxine HCl (VITAMIN B6 PO)     aspirin 81 MG tablet  "    VITAMIN D 1000 UNIT OR CAPS     ondansetron (ZOFRAN) 8 MG tablet     prochlorperazine (COMPAZINE) 10 MG tablet     Current Facility-Administered Medications   Medication     heparin 100 UNIT/ML injection 5 mL     Facility-Administered Medications Ordered in Other Visits   Medication     sodium chloride (PF) 0.9% PF flush 10 mL     heparin 100 UNIT/ML injection 500 Units     PACLitaxel (TAXOL) 150 mg in NaCl 0.9 % 300 mL CHEMOTHERAPY     EPINEPHrine (EPIPEN/ADRENACLICK/or ANY BX GENERIC EQUIV) 0.3 MG/0.3ML injection     influenza quadrivalent (PF) vacc age 3 yrs and older (FLUZONE or Flulaval) injection 0.5 mL     lidocaine-EPINEPHrine 1.5 %-1:200000 injection 10 mL       PHYSICAL EXAM:  /75  Pulse 109  Temp 99.3  F (37.4  C) (Oral)  Resp 16  Ht 1.6 m (5' 2.99\")  Wt 78.3 kg (172 lb 9.9 oz)  SpO2 95%  BMI 30.59 kg/m2  General: Alert, oriented, pleasant, NAD  Skin: No focal rash on exposed skin   HEENT: Normocephalic, atraumatic, PERRLA, EOMI. Moist mucus membranes, no lesions or thrush  Neck: No cervical or supraclavicular LAD.  Axillary: No LAD  Breast: Deferred   Lungs: CTA bilaterally, normal work of breathing  Cardiac: RRR, S1, S2, no murmurs  Abdomen: Soft, nontender, nondistended. Normoactive bowel sounds. No hepatosplenomegaly, masses  Neuro: CNII-XII grossly intact  Extremities: No pedal edema    Labs:    10/3/2017 13:32   Sodium 138   Potassium 3.7   Chloride 105   Carbon Dioxide 27   Urea Nitrogen 6 (L)   Creatinine 0.68   GFR Estimate 90   GFR Estimate If Black >90   Calcium 7.9 (L)   Anion Gap 6   Albumin 3.2 (L)   Protein Total 7.3   Bilirubin Total 0.5   Alkaline Phosphatase 133   ALT 62 (H)   AST 46 (H)   Glucose 135 (H)   WBC 3.2 (L)   Hemoglobin 11.9   Hematocrit 33.9 (L)   Platelet Count 316   RBC Count 3.54 (L)   MCV 96   MCH 33.6 (H)   MCHC 35.1   RDW 11.5   Diff Method Automated Method   % Neutrophils 56.2   % Lymphocytes 30.7   % Monocytes 6.3   % Eosinophils 5.6   % Basophils " 0.6   % Immature Granulocytes 0.6   Nucleated RBCs 0   Absolute Neutrophil 1.8   Absolute Lymphocytes 1.0   Absolute Monocytes 0.2   Absolute Eosinophils 0.2   Absolute Basophils 0.0   Abs Immature Granulocytes 0.0   Absolute Nucleated RBC 0.0       Assessment & Plan:     1. Stage IIa, T2N0M0, grade 3, triple negative breast cancer: S/p 2 weeks of weekly Taxol and every 3 week pembro. She is tolerating treatment well without significant symptoms. Has tolerated first 2 weeks of Taxol well so will discontinue dexamethasone today. Will proceed with week 3 of treatment with Taxol as she is feeling well and labs are adequate. Plan for 12 weeks of Taxol and pembrolizumab followed by 4 cycles of AC and pembrolizumab. She will then proceed with surgery and potentially adjuvant radiation. She will be seen weekly on the clinical trial.     2. Mild transaminitis: Stable. Likely from Taxol. Monitor.     3. Facial flushing/rash: Should resolve now that dexamethasone is being discontinued.     Lilia Livingston PA-C    [facility]  [address] [place] [id number] [phone]

## 2017-10-03 NOTE — NURSING NOTE
Chief Complaint   Patient presents with     Port Draw     Labs drawn by RN from Left Chest Port-a-Cath.  Line flushed with Saline and Heparin.      Libertad Stewart RN

## 2017-10-09 RX ORDER — DIPHENHYDRAMINE HYDROCHLORIDE 50 MG/ML
50 INJECTION INTRAMUSCULAR; INTRAVENOUS
Status: CANCELLED
Start: 2017-10-10

## 2017-10-09 RX ORDER — HEPARIN SODIUM (PORCINE) LOCK FLUSH IV SOLN 100 UNIT/ML 100 UNIT/ML
500 SOLUTION INTRAVENOUS ONCE
Status: CANCELLED
Start: 2017-10-10 | End: 2017-10-10

## 2017-10-09 RX ORDER — LORAZEPAM 2 MG/ML
0.5 INJECTION INTRAMUSCULAR EVERY 4 HOURS PRN
Status: CANCELLED
Start: 2017-10-10

## 2017-10-09 RX ORDER — ALBUTEROL SULFATE 90 UG/1
1-2 AEROSOL, METERED RESPIRATORY (INHALATION)
Status: CANCELLED
Start: 2017-10-10

## 2017-10-09 RX ORDER — DEXAMETHASONE SODIUM PHOSPHATE 10 MG/ML
10 INJECTION, SOLUTION INTRAMUSCULAR; INTRAVENOUS
Status: CANCELLED | OUTPATIENT
Start: 2017-10-10

## 2017-10-09 RX ORDER — MEPERIDINE HYDROCHLORIDE 25 MG/ML
25 INJECTION INTRAMUSCULAR; INTRAVENOUS; SUBCUTANEOUS EVERY 30 MIN PRN
Status: CANCELLED | OUTPATIENT
Start: 2017-10-10

## 2017-10-09 RX ORDER — ALBUTEROL SULFATE 0.83 MG/ML
2.5 SOLUTION RESPIRATORY (INHALATION)
Status: CANCELLED | OUTPATIENT
Start: 2017-10-10

## 2017-10-09 RX ORDER — SODIUM CHLORIDE 9 MG/ML
1000 INJECTION, SOLUTION INTRAVENOUS CONTINUOUS PRN
Status: CANCELLED
Start: 2017-10-10

## 2017-10-09 RX ORDER — EPINEPHRINE 1 MG/ML
0.3 INJECTION, SOLUTION, CONCENTRATE INTRAVENOUS EVERY 5 MIN PRN
Status: CANCELLED | OUTPATIENT
Start: 2017-10-10

## 2017-10-09 RX ORDER — EPINEPHRINE 0.3 MG/.3ML
0.3 INJECTION SUBCUTANEOUS EVERY 5 MIN PRN
Status: CANCELLED | OUTPATIENT
Start: 2017-10-10

## 2017-10-09 RX ORDER — METHYLPREDNISOLONE SODIUM SUCCINATE 125 MG/2ML
125 INJECTION, POWDER, LYOPHILIZED, FOR SOLUTION INTRAMUSCULAR; INTRAVENOUS
Status: CANCELLED
Start: 2017-10-10

## 2017-10-10 ENCOUNTER — APPOINTMENT (OUTPATIENT)
Dept: LAB | Facility: CLINIC | Age: 57
End: 2017-10-10
Attending: INTERNAL MEDICINE
Payer: COMMERCIAL

## 2017-10-10 ENCOUNTER — INFUSION THERAPY VISIT (OUTPATIENT)
Dept: ONCOLOGY | Facility: CLINIC | Age: 57
End: 2017-10-10
Attending: INTERNAL MEDICINE
Payer: COMMERCIAL

## 2017-10-10 ENCOUNTER — APPOINTMENT (OUTPATIENT)
Dept: LAB | Facility: CLINIC | Age: 57
End: 2017-10-10
Attending: GENETIC COUNSELOR, MS
Payer: COMMERCIAL

## 2017-10-10 ENCOUNTER — OFFICE VISIT (OUTPATIENT)
Dept: ONCOLOGY | Facility: CLINIC | Age: 57
End: 2017-10-10
Attending: GENETIC COUNSELOR, MS
Payer: COMMERCIAL

## 2017-10-10 VITALS
WEIGHT: 168.8 LBS | HEART RATE: 109 BPM | DIASTOLIC BLOOD PRESSURE: 68 MMHG | BODY MASS INDEX: 29.91 KG/M2 | OXYGEN SATURATION: 95 % | HEIGHT: 63 IN | TEMPERATURE: 98.8 F | SYSTOLIC BLOOD PRESSURE: 112 MMHG | RESPIRATION RATE: 16 BRPM

## 2017-10-10 DIAGNOSIS — R43.2 DYSGEUSIA: ICD-10-CM

## 2017-10-10 DIAGNOSIS — Z17.1 MALIGNANT NEOPLASM OF UPPER-INNER QUADRANT OF RIGHT BREAST IN FEMALE, ESTROGEN RECEPTOR NEGATIVE (H): Primary | ICD-10-CM

## 2017-10-10 DIAGNOSIS — C50.911 MALIGNANT NEOPLASM OF RIGHT BREAST IN FEMALE, ESTROGEN RECEPTOR NEGATIVE, UNSPECIFIED SITE OF BREAST (H): Primary | ICD-10-CM

## 2017-10-10 DIAGNOSIS — C50.211 MALIGNANT NEOPLASM OF UPPER-INNER QUADRANT OF RIGHT BREAST IN FEMALE, ESTROGEN RECEPTOR NEGATIVE (H): Primary | ICD-10-CM

## 2017-10-10 DIAGNOSIS — Z17.1 MALIGNANT NEOPLASM OF RIGHT BREAST IN FEMALE, ESTROGEN RECEPTOR NEGATIVE, UNSPECIFIED SITE OF BREAST (H): ICD-10-CM

## 2017-10-10 DIAGNOSIS — C50.911 MALIGNANT NEOPLASM OF RIGHT BREAST IN FEMALE, ESTROGEN RECEPTOR NEGATIVE, UNSPECIFIED SITE OF BREAST (H): ICD-10-CM

## 2017-10-10 DIAGNOSIS — Z17.1 MALIGNANT NEOPLASM OF RIGHT BREAST IN FEMALE, ESTROGEN RECEPTOR NEGATIVE, UNSPECIFIED SITE OF BREAST (H): Primary | ICD-10-CM

## 2017-10-10 DIAGNOSIS — Z80.42 FAMILY HISTORY OF PROSTATE CANCER: ICD-10-CM

## 2017-10-10 LAB
ALBUMIN SERPL-MCNC: 3.1 G/DL (ref 3.4–5)
ALP SERPL-CCNC: 136 U/L (ref 40–150)
ALT SERPL W P-5'-P-CCNC: 49 U/L (ref 0–50)
ANION GAP SERPL CALCULATED.3IONS-SCNC: 7 MMOL/L (ref 3–14)
AST SERPL W P-5'-P-CCNC: 45 U/L (ref 0–45)
BASOPHILS # BLD AUTO: 0 10E9/L (ref 0–0.2)
BASOPHILS NFR BLD AUTO: 0.9 %
BILIRUB SERPL-MCNC: 0.6 MG/DL (ref 0.2–1.3)
BUN SERPL-MCNC: 9 MG/DL (ref 7–30)
CALCIUM SERPL-MCNC: 8.8 MG/DL (ref 8.5–10.1)
CHLORIDE SERPL-SCNC: 101 MMOL/L (ref 94–109)
CO2 SERPL-SCNC: 26 MMOL/L (ref 20–32)
CREAT SERPL-MCNC: 0.7 MG/DL (ref 0.52–1.04)
DIFFERENTIAL METHOD BLD: ABNORMAL
EOSINOPHIL # BLD AUTO: 0.2 10E9/L (ref 0–0.7)
EOSINOPHIL NFR BLD AUTO: 4.6 %
ERYTHROCYTE [DISTWIDTH] IN BLOOD BY AUTOMATED COUNT: 11.3 % (ref 10–15)
GFR SERPL CREATININE-BSD FRML MDRD: 87 ML/MIN/1.7M2
GLUCOSE SERPL-MCNC: 135 MG/DL (ref 70–99)
HCT VFR BLD AUTO: 32.1 % (ref 35–47)
HGB BLD-MCNC: 11.4 G/DL (ref 11.7–15.7)
IMM GRANULOCYTES # BLD: 0 10E9/L (ref 0–0.4)
IMM GRANULOCYTES NFR BLD: 0.3 %
LYMPHOCYTES # BLD AUTO: 1.1 10E9/L (ref 0.8–5.3)
LYMPHOCYTES NFR BLD AUTO: 34.8 %
MCH RBC QN AUTO: 33.8 PG (ref 26.5–33)
MCHC RBC AUTO-ENTMCNC: 35.5 G/DL (ref 31.5–36.5)
MCV RBC AUTO: 95 FL (ref 78–100)
MISCELLANEOUS TEST: NORMAL
MONOCYTES # BLD AUTO: 0.4 10E9/L (ref 0–1.3)
MONOCYTES NFR BLD AUTO: 10.7 %
NEUTROPHILS # BLD AUTO: 1.6 10E9/L (ref 1.6–8.3)
NEUTROPHILS NFR BLD AUTO: 48.7 %
NRBC # BLD AUTO: 0 10*3/UL
NRBC BLD AUTO-RTO: 0 /100
PLATELET # BLD AUTO: 415 10E9/L (ref 150–450)
POTASSIUM SERPL-SCNC: 3.6 MMOL/L (ref 3.4–5.3)
PROT SERPL-MCNC: 7.8 G/DL (ref 6.8–8.8)
RBC # BLD AUTO: 3.37 10E12/L (ref 3.8–5.2)
SODIUM SERPL-SCNC: 134 MMOL/L (ref 133–144)
WBC # BLD AUTO: 3.3 10E9/L (ref 4–11)

## 2017-10-10 PROCEDURE — 96375 TX/PRO/DX INJ NEW DRUG ADDON: CPT

## 2017-10-10 PROCEDURE — 25000128 H RX IP 250 OP 636: Mod: ZF | Performed by: INTERNAL MEDICINE

## 2017-10-10 PROCEDURE — 96040 ZZH GENETIC COUNSELING, EACH 30 MINUTES: CPT | Mod: ZF | Performed by: GENETIC COUNSELOR, MS

## 2017-10-10 PROCEDURE — 99214 OFFICE O/P EST MOD 30 MIN: CPT | Mod: ZP | Performed by: INTERNAL MEDICINE

## 2017-10-10 PROCEDURE — 96413 CHEMO IV INFUSION 1 HR: CPT

## 2017-10-10 PROCEDURE — 96417 CHEMO IV INFUS EACH ADDL SEQ: CPT

## 2017-10-10 PROCEDURE — 25000125 ZZHC RX 250: Mod: ZF | Performed by: INTERNAL MEDICINE

## 2017-10-10 PROCEDURE — S0028 INJECTION, FAMOTIDINE, 20 MG: HCPCS | Mod: ZF | Performed by: INTERNAL MEDICINE

## 2017-10-10 PROCEDURE — 85025 COMPLETE CBC W/AUTO DIFF WBC: CPT | Performed by: INTERNAL MEDICINE

## 2017-10-10 PROCEDURE — 80053 COMPREHEN METABOLIC PANEL: CPT | Performed by: INTERNAL MEDICINE

## 2017-10-10 PROCEDURE — 99212 OFFICE O/P EST SF 10 MIN: CPT | Mod: ZF

## 2017-10-10 RX ORDER — HEPARIN SODIUM (PORCINE) LOCK FLUSH IV SOLN 100 UNIT/ML 100 UNIT/ML
5 SOLUTION INTRAVENOUS ONCE
Status: COMPLETED | OUTPATIENT
Start: 2017-10-10 | End: 2017-10-10

## 2017-10-10 RX ORDER — ZINC GLUCONATE 100 MG
1 TABLET ORAL DAILY
Qty: 30 TABLET | Refills: 3 | Status: SHIPPED | OUTPATIENT
Start: 2017-10-10 | End: 2017-11-11

## 2017-10-10 RX ADMIN — FAMOTIDINE 20 MG: 20 INJECTION, SOLUTION INTRAVENOUS at 10:38

## 2017-10-10 RX ADMIN — PACLITAXEL 150 MG: 6 INJECTION, SOLUTION INTRAVENOUS at 11:09

## 2017-10-10 RX ADMIN — SODIUM CHLORIDE 250 ML: 9 INJECTION, SOLUTION INTRAVENOUS at 09:00

## 2017-10-10 RX ADMIN — SODIUM CHLORIDE 200 MG: 9 INJECTION, SOLUTION INTRAVENOUS at 09:30

## 2017-10-10 RX ADMIN — SODIUM CHLORIDE, PRESERVATIVE FREE 5 ML: 5 INJECTION INTRAVENOUS at 06:55

## 2017-10-10 ASSESSMENT — PAIN SCALES - GENERAL: PAINLEVEL: NO PAIN (0)

## 2017-10-10 NOTE — PATIENT INSTRUCTIONS
Contact Numbers    [facility] Main Line: [phone]  [facility] Triage:  [phone]    Call triage with chills and/or temperature greater than or equal to 100.5, uncontrolled nausea/vomiting, diarrhea, constipation, dizziness, shortness of breath, chest pain, bleeding, unexplained bruising, or any new/concerning symptoms, questions/concerns.     If you are having any concerning symptoms or wish to speak to a provider before your next infusion visit, please call your care coordinator or triage to notify them so we can adequately serve you.             October 2017 Sunday Monday Tuesday Wednesday Thursday Friday Saturday   1     2     3     [facility] MASONIC LAB DRAW    1:15 PM   (15 min.)    MASONIC LAB DRAW   [facility] Lab Draw     [facility] RETURN    1:35 PM   (50 min.)   Lilia Livingston PA   [facility] ONC INFUSION 180    2:30 PM   (180 min.)    ONCOLOGY INFUSION   [facility] 4     5     MR BREAST BILATERAL WWO   12:30 PM   (90 min.)   [id number]   [place] [facility]     US BREAST BX CORE NEEDLE RIGHT    2:00 PM   (50 min.)   [email or web address]   [facility] Imaging     [facility] MASONIC LAB DRAW    3:00 PM   (15 min.)    [facility]   [facility] 6     7       8     9     10     [facility] MASONIC LAB DRAW    7:00 AM   (15 min.)    [facility] LAB [facility]RAW   [facility]     [facility] RETURN    7:15 AM   (30 min.)   Fara Bradshaw MD   [facility] ONC INFUSION 180    8:30 AM   (180 min.)    ONCOLOGY INFUSION   [facility] NEW WITH ROOM   11:45 AM   (75 min.)   Naty Segovia GC   [facility] MASONIC LAB DRAW    1:15 PM   (15 min.)    MASONIC LAB DRAW   [facility] Masonic Lab Draw 11     12     13     14       15     16     17     [facility] MASONIC LAB DRAW    6:30 AM   (15 min.)    [facility] LAB [facility]RAW   [facility]     [facility] RETURN    6:45  AM   (50 min.)   Lilia Livingston PA   [facility]     [facility] ONC INFUSION 180    8:00 AM   (180 min.)   UC ONCOLOGY INFUSION   [facility] 18     19     20     21       22     23     24     [facility] MASONIC LAB DRAW   12:30 PM   (15 min.)   [facility] MASONIC LAB DRAW   [facility] Masonic Lab Draw     [facility] RETURN   12:45 PM   (50 min.)   Lilia Livingston PA   [facility]     [facility] ONC INFUSION 180    2:30 PM   (180 min.)   UC ONCOLOGY INFUSION   [facility] 25     26     27     28       29     30     31     [facility] MASONIC LAB DRAW    7:45 AM   (15 min.)   [facility] MASONIC LAB DRAW   [facility] Lab Draw     [facility] RETURN    8:00 AM   (30 min.)   Fara Bradshaw MD   [facility]     [facility] ONC INFUSION 180    9:30 AM   (180 min.)   UC ONCOLOGY INFUSION   [facility]                                November 2017 Sunday Monday Tuesday Wednesday Thursday Friday Saturday                  1     2     3     4       5     6     7     [facility] MASONIC LAB DRAW    6:30 AM   (15 min.)   [facility] MASONIC LAB DRAW   [facility] Masonic Lab Draw     [facility] RETURN    6:45 AM   (50 min.)   Lilia Livingston PA   [facility]     [facility] ONC INFUSION 180    8:00 AM   (180 min.)   UC ONCOLOGY INFUSION   [facility] 8     9     10     11       12     13     14     [facility] MASONIC LAB DRAW    6:30 AM   (15 min.)   [facility] MASONIC LAB DRAW   [facility] Masonic Lab Draw     [facility] RETURN    6:45 AM   (50 min.)   Lilia Livingston PA   [facility]     [facility] ONC INFUSION 180    8:00 AM   (180 min.)   UC ONCOLOGY INFUSION   [facility] 15     16     17     18       19     20     21     [facility] MASONIC LAB DRAW   11:15 AM   (15 min.)   [facility] MASONIC LAB DRAW   [facility] Masonic Lab Draw     [facility] RETURN   11:45 AM   (30 min.)   Fara Bradshaw MD   [facility]      [facility] ONC INFUSION 180    1:00 PM   (180 min.)    ONCOLOGY INFUSION   [facility] 22     23     24     25       26     27     28     [facility] MASONIC LAB DRAW    6:30 AM   (15 min.)   [facility] LAB DRAW   [facility] Lab Draw     [facility] RETURN    6:45 AM   (50 min.)   Lilia Livingston PA   [facility] ONC INFUSION 180    8:00 AM   (180 min.)    ONCOLOGY INFUSION   [facility] 29     30                            Lab Results:  Recent Results (from the past 12 hour(s))   CBC with platelets differential    Collection Time: 10/10/17  6:59 AM   Result Value Ref Range    WBC 3.3 (L) 4.0 - 11.0 10e9/L    RBC Count 3.37 (L) 3.8 - 5.2 10e12/L    Hemoglobin 11.4 (L) 11.7 - 15.7 g/dL    Hematocrit 32.1 (L) 35.0 - 47.0 %    MCV 95 78 - 100 fl    MCH 33.8 (H) 26.5 - 33.0 pg    MCHC 35.5 31.5 - 36.5 g/dL    RDW 11.3 10.0 - 15.0 %    Platelet Count 415 150 - 450 10e9/L    Diff Method Automated Method     % Neutrophils 48.7 %    % Lymphocytes 34.8 %    % Monocytes 10.7 %    % Eosinophils 4.6 %    % Basophils 0.9 %    % Immature Granulocytes 0.3 %    Nucleated RBCs 0 0 /100    Absolute Neutrophil 1.6 1.6 - 8.3 10e9/L    Absolute Lymphocytes 1.1 0.8 - 5.3 10e9/L    Absolute Monocytes 0.4 0.0 - 1.3 10e9/L    Absolute Eosinophils 0.2 0.0 - 0.7 10e9/L    Absolute Basophils 0.0 0.0 - 0.2 10e9/L    Abs Immature Granulocytes 0.0 0 - 0.4 10e9/L    Absolute Nucleated RBC 0.0    Comprehensive metabolic panel    Collection Time: 10/10/17  6:59 AM   Result Value Ref Range    Sodium 134 133 - 144 mmol/L    Potassium 3.6 3.4 - 5.3 mmol/L    Chloride 101 94 - 109 mmol/L    Carbon Dioxide 26 20 - 32 mmol/L    Anion Gap 7 3 - 14 mmol/L    Glucose 135 (H) 70 - 99 mg/dL    Urea Nitrogen 9 7 - 30 mg/dL    Creatinine 0.70 0.52 - 1.04 mg/dL    GFR Estimate 87 >60 mL/min/1.7m2    GFR Estimate If Black >90 >60 mL/min/1.7m2    Calcium 8.8 8.5 - 10.1 mg/dL    Bilirubin Total 0.6 0.2 - 1.3  mg/dL    Albumin 3.1 (L) 3.4 - 5.0 g/dL    Protein Total 7.8 6.8 - 8.8 g/dL    Alkaline Phosphatase 136 40 - 150 U/L    ALT 49 0 - 50 U/L    AST 45 0 - 45 U/L

## 2017-10-10 NOTE — MR AVS SNAPSHOT
After Visit Summary   10/10/2017    Ashleigh Alonzo    MRN: [id number]           Patient Information     Date Of Birth          1960        Visit Information        Provider Department      10/10/2017 8:30 AM UC 20 ATC;  ONCOLOGY INFUSION [facility]        Today's Diagnoses     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)    -  1      Care Instructions    Contact Numbers    [facility] Main Line: [phone]  [facility] Triage:  [phone]    Call triage with chills and/or temperature greater than or equal to 100.5, uncontrolled nausea/vomiting, diarrhea, constipation, dizziness, shortness of breath, chest pain, bleeding, unexplained bruising, or any new/concerning symptoms, questions/concerns.     If you are having any concerning symptoms or wish to speak to a provider before your next infusion visit, please call your care coordinator or triage to notify them so we can adequately serve you.             October 2017 Sunday Monday Tuesday Wednesday Thursday Friday Saturday   1     2     3     [facility]    1[facility]15 PM   (15 min.)   [facility] LAB DRAW   [facility] Lab Draw     [facility] RETURN    1:35 PM   (50 min.)   Lilia Livingston PA   [facility] ONC INFUSION 180    2:30 PM   (180 min.)    ONCOLOGY INFUSION   [facility] 4     5     MR BREAST BILATERAL WWO   12:30 PM   (90 min.)   [id number]   [facility]     US BREAST BX CORE NEEDLE RIGHT    2:00 PM   (50 min.)   [email or web address]   [facility] Imaging     [facility] MASONIC LAB DRAW    3:00 PM   (15 min.)    MASONIC LAB DRAW   [facility] Lab Draw 6     7       8     9     10     [facility] MASONIC LAB DRAW    7:00 AM   (15 min.)   UC MASONIC LAB DRAW   [facility] Lab Draw     [facility] RETURN    7:15 AM   (30 min.)   Fara Bradshaw MD   [facility] ONC  INFUSION 180    8:30 AM   (180 min.)   UC ONCOLOGY INFUSION   [facility] NEW WITH ROOM   11:45 AM   (75 min.)   Naty Segovia GC   [facility] MASONIC LAB DRAW    1:15 PM   (15 min.)   UC MASONIC LAB DRAW   [facility] Masonic Lab Draw 11     12     13     14       15     16     17     [facility] MASONIC LAB DRAW    6:30 AM   (15 min.)   UC MASONIC LAB DRAW   [facility] Masonic Lab Draw     UMP RETURN    6:45 AM   (50 min.)   Lilia Livingston PA   [facility] ONC INFUSION 180    8:00 AM   (180 min.)    ONCOLOGY INFUSION   [facility] 18     19     20     21       22     23     24     [facility] MASONIC LAB DRAW   12:30 PM   (15 min.)   UC MASONIC LAB DRAW   [facility] Masonic Lab Draw     UMP RETURN   12:45 PM   (50 min.)   Lilia Livingston PA   [facility] ONC INFUSION 180    2:30 PM   (180 min.)    ONCOLOGY INFUSION   [facility] 25     26     27     28       29     30     31     [facility] MASONIC LAB DRAW    7:45 AM   (15 min.)   UC MASONIC LAB DRAW   [facility] Masonic Lab Draw     UMP RETURN    8:00 AM   (30 min.)   Fara Bradshaw MD   [facility] ONC INFUSION 180    9:30 AM   (180 min.)    ONCOLOGY INFUSION   [facility]                                November 2017 Sunday Monday Tuesday Wednesday Thursday Friday Saturday                  1     2     3     4       5     6     7     [facility] MASONIC LAB DRAW    6:30 AM   (15 min.)   UC MASONIC LAB DRAW   [facility] Masonic Lab Draw     UMP RETURN    6:45 AM   (50 min.)   Lilia Livingston PA   [facility]    8:00 AM   (180 min.)    ONCOLOGY INFUSION   [facility] 8     9     10     11       12     13     14     [facility] MASONIC LAB DRAW    6:30 AM   (15 min.)   [facility]   [facility]     [facility] RETURN    6:45 AM   (50 min.)   Lilia Livingston PA   [facility]     UMP ONC INFUSION 180    8:00 AM   (180 min.)    ONCOLOGY INFUSION   [facility] 15     16     17     18       19     20     21     [facility] MASONIC LAB DRAW   11:15 AM   (15 min.)   [facility] LAB [facility]RAW   [facility]     [facility] RETURN   11:45 AM   (30 min.)   Fara Bradshaw MD   [facility]     [facility] ONC INFUSION 180    1:00 PM   (180 min.)    ONCOLOGY INFUSION   [facility] 22     23     24     25       26     27     28     [facility] MASONIC LAB DRAW    6:30 AM   (15 min.)    MASONIC LAB DRAW   [facility] Masonic Lab Draw     UMP RETURN    6:45 AM   (50 min.)   Lilia Livingston PA   [facility]     [facility] ONC INFUSION 180    8:00 AM   (180 min.)    ONCOLOGY INFUSION   [facility] 29     30                            Lab Results:  Recent Results (from the past 12 hour(s))   CBC with platelets differential    Collection Time: 10/10/17  6:59 AM   Result Value Ref Range    WBC 3.3 (L) 4.0 - 11.0 10e9/L    RBC Count 3.37 (L) 3.8 - 5.2 10e12/L    Hemoglobin 11.4 (L) 11.7 - 15.7 g/dL    Hematocrit 32.1 (L) 35.0 - 47.0 %    MCV 95 78 - 100 fl    MCH 33.8 (H) 26.5 - 33.0 pg    MCHC 35.5 31.5 - 36.5 g/dL    RDW 11.3 10.0 - 15.0 %    Platelet Count 415 150 - 450 10e9/L    Diff Method Automated Method     % Neutrophils 48.7 %    % Lymphocytes 34.8 %    % Monocytes 10.7 %    % Eosinophils 4.6 %    % Basophils 0.9 %    % Immature Granulocytes 0.3 %    Nucleated RBCs 0 0 /100    Absolute Neutrophil 1.6 1.6 - 8.3 10e9/L    Absolute Lymphocytes 1.1 0.8 - 5.3 10e9/L    Absolute Monocytes 0.4 0.0 - 1.3 10e9/L    Absolute Eosinophils 0.2 0.0 - 0.7 10e9/L    Absolute Basophils 0.0 0.0 - 0.2 10e9/L    Abs Immature Granulocytes 0.0 0 - 0.4 10e9/L    Absolute Nucleated RBC 0.0    Comprehensive metabolic panel     Collection Time: 10/10/17  6:59 AM   Result Value Ref Range    Sodium 134 133 - 144 mmol/L    Potassium 3.6 3.4 - 5.3 mmol/L    Chloride 101 94 - 109 mmol/L    Carbon Dioxide 26 20 - 32 mmol/L    Anion Gap 7 3 - 14 mmol/L    Glucose 135 (H) 70 - 99 mg/dL    Urea Nitrogen 9 7 - 30 mg/dL    Creatinine 0.70 0.52 - 1.04 mg/dL    GFR Estimate 87 >60 mL/min/1.7m2    GFR Estimate If Black >90 >60 mL/min/1.7m2    Calcium 8.8 8.5 - 10.1 mg/dL    Bilirubin Total 0.6 0.2 - 1.3 mg/dL    Albumin 3.1 (L) 3.4 - 5.0 g/dL    Protein Total 7.8 6.8 - 8.8 g/dL    Alkaline Phosphatase 136 40 - 150 U/L    ALT 49 0 - 50 U/L    AST 45 0 - 45 U/L             Follow-ups after your visit        Your next 10 appointments already scheduled     Oct 10, 2017  1:15 PM CDT   Masonic Lab Draw with  MASONIC LAB DRAW   [facility])    [address] [place] [id number]   [phone]            Oct 17, 2017  6:30 AM CDT   Masonic Lab Draw with  MASONIC LAB DRAW   [facility] Masonic Lab Draw ([facility])    [address] [place] [id number]   [phone]            Oct 17, 2017  7:00 AM CDT   (Arrive by 6:45 AM)   Return Visit with EDDIE Oliva   [facility] ([facility])    [address] [place] [address]   [phone]            Oct 17, 2017  8:00 AM CDT   Infusion 180 with  ONCOLOGY INFUSION, UC 15 ATC   [facility] ([facility])    [address] [place] [id number]   [phone]            Oct 24, 2017 12:30 PM CDT   Masonic Lab Draw with  MASONIC LAB DRAW   [facility] Masonic Lab Draw ([facility])    [address] [place] [phone]   [phone]            Oct 24, 2017  1:00 PM CDT   (Arrive by 12:45 PM)   Return Visit  with EDDIE Oliva   [facility] ([facility])    [address] [facility]  [address] [facility] [id number] [phone]            Oct 24, 2017  2:30 PM CDT   Infusion 180 with UC ONCOLOGY INFUSION, UC 19 ATC   [facility] ([facility])    [address] [id number] [phone]            Oct 31, 2017  7:45 AM CDT   Masonic Lab Draw with UC MASONIC LAB DRAW   [facility] Lab Draw ([facility])    [address] [id number] [phone]              Who to contact     If you have questions or need follow up information about today's clinic visit or your schedule please contact [facility] directly at [phone].  Normal or non-critical lab and imaging results will be communicated to you by MyChart, letter or phone within 4 business days after the clinic has received the results. If you do not hear from us within 7 days, please contact the clinic through [employer] or phone. If you have a critical or abnormal lab result, we will notify you by phone as soon as possible.  Submit refill requests through [employer] or call your pharmacy and they will forward the refill request to us. Please allow 3 business days for your refill to be completed.          Additional Information About Your Visit        [employer] Information     [employer] gives you secure access to your electronic health record. If you see a primary care provider, you can also send messages to your care team and make appointments. If you have questions, please call your primary care clinic.  If you do not have a primary care provider, please call [phone] and they will assist you.        Care EveryWhere ID     This is your Care EveryWhere ID. This could be used by other organizations to access your [facility] medical records  [id number]          Blood Pressure from Last 3 Encounters:   10/10/17 112/68   10/03/17 115/75   09/26/17 132/89    Weight from Last 3 Encounters:   10/10/17 76.6 kg (168 lb 12.8 oz)   10/03/17 78.3 kg (172 lb 9.9 oz)   09/26/17 77.4 kg (170 lb 11.2 oz)              We Performed the Following     CBC with platelets differential     Comprehensive metabolic panel          Today's Medication Changes          These changes are accurate as of: 10/10/17 12:03 PM.  If you have any questions, ask your nurse or doctor.               Start taking these medicines.        Dose/Directions    Zinc Gluconate 100 MG Tabs   Used for:  Dysgeusia   Started by:  Fara Bradshaw MD        Dose:  1 tablet   Take 1 tablet by mouth daily   Quantity:  30 tablet   Refills:  3            Where to get your medicines      These medications were sent to [address] [place] [address] [address] [place] [address]    Hours:  TRANSPLANT PHONE NUMBER [phone] Phone:  [phone]     Zinc Gluconate 100 MG Tabs                Primary Care Provider Office Phone # Fax #    Radha Cazares [phone]       [address] [address]        Equal Access to Services     THERESA HUGHES AH: Hadii candie mcdaniel hadasho Somarian, waaxda luqadaha, qaybta kaalmada adeegyada, bang pickering. So [facility] [phone].    ATENCIÓN: Si habla español, tiene a blackman disposición servicios gratuitos de asistencia lingüística. Llame al [phone].    We comply with applicable federal civil rights laws and [place] laws. We do not discriminate on the basis of race, color, national origin, age, disability, sex, sexual orientation, or gender identity.            Thank you!     Thank you for choosing [facility]  for your care. Our goal is always to provide you with excellent care. Hearing back from our  patients is one way we can continue to improve our services. Please take a few minutes to complete the written survey that you may receive in the mail after your visit with us. Thank you!             Your Updated Medication List - Protect others around you: Learn how to safely use, store and throw away your medicines at www.disposemymeds.org.          This list is accurate as of: 10/10/17 12:03 PM.  Always use your most recent med list.                   Brand Name Dispense Instructions for use Diagnosis    albuterol 108 (90 BASE) MCG/ACT Inhaler    PROAIR HFA/PROVENTIL HFA/VENTOLIN HFA    1 Inhaler    Inhale 2 puffs into the lungs every 6 hours as needed for shortness of breath / dyspnea    Chronic obstructive pulmonary disease, unspecified COPD type (H)       aspirin 81 MG tablet      Take 1 tablet by mouth daily.        citalopram 10 MG tablet    celeXA    90 tablet    Take 1 tablet (10 mg) by mouth daily    Anxiety, Depression, unspecified depression type       CO Q 10 PO      Take 1 tablet by mouth        darifenacin 7.5 MG 24 hr tablet    ENABLEX    90 tablet    Take 1 tablet (7.5 mg) by mouth daily    Overactive bladder       guaiFENesin 600 MG 12 hr tablet    MUCINEX    180 tablet    Take 2 tablets (1,200 mg) by mouth 2 times daily    Cough with sputum       hydrOXYzine 25 MG tablet    ATARAX    100 tablet    Take 1-2 tablets (25-50 mg) by mouth every 6 hours as needed for itching    Hives       lidocaine-prilocaine cream    EMLA    30 g    Please apply to port site 30 minutes before use prn    Personal history of malignant neoplasm of breast       * LORazepam 0.5 MG tablet    ATIVAN    10 tablet    Take 1-2 tabs 20 minutes prior to MRI scans.    Breast cancer (H), Anxiety       * LORazepam 0.5 MG tablet    ATIVAN    30 tablet    Take 1 tablet (0.5 mg) by mouth every 4 hours as needed (Anxiety, Nausea/Vomiting or Sleep)    Malignant neoplasm of upper-inner quadrant of right breast in female, estrogen  receptor negative (H)       MULTIPLE VITAMIN PO           ondansetron 8 MG tablet    ZOFRAN    30 tablet    Take 1 tablet (8 mg) by mouth every 8 hours as needed for nausea    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)       * order for DME      Respironics Dream Station Auto CPAP 12-18 cm, F&P Simplus FFM small.    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)       prochlorperazine 10 MG tablet    COMPAZINE    30 tablet    Take 1 tablet (10 mg) by mouth every 6 hours as needed (Nausea/Vomiting)    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)       simvastatin 40 MG tablet    ZOCOR    90 tablet    Take 1 tablet (40 mg) by mouth At Bedtime    Hyperlipidemia LDL goal <130       tiotropium 18 MCG capsule    SPIRIVA    1 capsule    Inhale 1 capsule (18 mcg) into the lungs daily Inhale contents of one capsule    Chronic obstructive pulmonary disease, unspecified COPD type (H)       VITAMIN B 12 PO      Take 500 mcg by mouth daily        VITAMIN B6 PO      Take 1 tablet by mouth daily.        vitamin D 1000 UNITS capsule      2 CAPSULE DAILY        Zinc Gluconate 100 MG Tabs     30 tablet    Take 1 tablet by mouth daily    Dysgeusia       * Notice:  This list has 4 medication(s) that are the same as other medications prescribed for you. Read the directions carefully, and ask your doctor or other care provider to review them with you.

## 2017-10-10 NOTE — NURSING NOTE
Research Note for ISPY2 Visit C4D1. Patient will receive study drug Pembrolizumab today. Overall feeling well with ECOG of 0. Reporting 10/9/17 as start of hair loss and  intermittent neuropathy in both hands, she describes as numbness.  All patients questions answered related to research and her treatment.  Luzmaria Geiger  Research -[phone]

## 2017-10-10 NOTE — LETTER
10/10/2017       RE: Ashleigh Alonzo  [address] [place] [address]     Dear Colleague,    Thank you for referring your patient, Ashleigh Alonzo, to the [facility]. Please see a copy of my visit note below.    10/10/2017    Referring Provider: Fara Bradshaw MD    Presenting Information:   I met with Ashleigh Alonzo today for genetic counseling at the Cancer Risk Management Program at the [facility] to discuss her personal history of breast cancer and family history of prostate cancer. She is here today to review this history, cancer screening recommendations, and available genetic testing options.    Personal History:  Ashleigh Perera is a 57 year old female. 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 Treatment has thus far included chemotherapy, which will be followed by surgery. Ashleigh Perera explained that her genetic testing results may help inform her future surgery decisions.      She had her first menstrual period at age 12, her first child at age 22, and is postmenopausal. Ashleigh Perera has her ovaries, fallopian tubes and uterus in place. She reports that she has never used hormone replacement therapy.      Her most recent OB-GYN exam and Pap smear in July 2017 were normal. She has annual clinical breast exams and mammograms; her most recent mammogram in July 2017 identified this cancer. Her most recent colonoscopy in November 2011 was normal and follow-up was recommended in 10 years. She does not regularly do any other cancer screening at this time. Ashleigh Perera reported that she quit smoking 15 years ago and occasionally drinks alcohol.    Family History: (Please see scanned pedigree for detailed family history information)    Ashleigh Perera's two daughters reportedly had negative genetic testing of the BRCA1 and BRCA2 genes because of their paternal family history; no test reports were available for review  "today.    One maternal uncle was diagnosed with throat cancer at an older age; he has a history of smoking.    One maternal uncle was diagnosed with prostate cancer at age 70.    Ashleigh Perera's maternal grandfather was diagnosed with prostate cancer in his 70's and passed away at age 74.    Ashleigh Perera's father passed away at age 52 and did not have a cancer history. He had a stroke and heart attacked at age 46. Ashleigh Perera confirmed that her primary care provider is aware of this history and is being screening appropriately.    One paternal aunt passed away in her 50's from an unknown cancer.    Ashleigh Perera's paternal grandfather was diagnosed with lung cancer at an older age; he had a history of smoking.    Her maternal ethnicity is [place] and [place]ian. Her paternal ethnicity is Swedish, [place]ian, and English. There is no known Ashkenazi Worship ancestry on either side of her family. There is no reported consanguinity.    Discussion:    Ashleigh Perera's personal history of breast cancer and family history of prostate cancer is suggestive of a hereditary cancer syndrome.    We reviewed the features of sporadic, familial, and hereditary cancers. In looking at Ashleigh Perera's family history, it is possible that a cancer susceptibility gene is present as Ashleigh Perera was diagnosed with a \"triple negative\" breast cancer and has two maternal relatives diagnosed with a related cancer (prostate). We discussed that hereditary breast cancers have a higher chance of being \"triple negative\" as compared to sporadic cancers. That being said, Ashleigh Perera does have several relatives that have never been diagnosed with a cancer, including her siblings and her mother.    We discussed the natural history and genetics of several hereditary cancer syndromes, including Hereditary Breast and Ovarian Cancer (HBOC) syndrome. A detailed handout regarding these syndromes and the information we discussed was provided to Ashleigh Perera at the end of our appointment today and can be found in the " after visit summary. Topics included: inheritance pattern, cancer risks, cancer screening recommendations, and also risks, benefits and limitations of testing.    We reviewed that the most common cause of hereditary breast cancer is HBOC syndrome, which is caused by mutations in the BRCA1 and BRCA2 genes. Individuals with HBOC syndrome are at increased risk for several different cancers, including breast, ovarian, male breast, prostate, melanoma, and pancreatic cancer.    Based on her personal and family history, Ashleigh Perera meets current National Comprehensive Cancer Network (NCCN) criteria for genetic testing of BRCA1 and BRCA2.      We discussed that there are also other additional genes that could cause increased risk for breast cancer. As many of these genes present with overlapping features in a family and accurate cancer risk cannot always be established based upon the pedigree analysis alone, it would be reasonable for Ashleigh Perera to consider panel genetic testing to analyze multiple genes at once.  We reviewed genetic testing options for hereditary breast cancer: guidelines-based high and moderate risk panel testing (BRCAplus, 8 genes) and expanded high and moderate risk panel testing (BreastNext, 17 genes). Ashleigh Perera expressed interest in learning as much information as possible from the testing. She opted for the BreastNext panel.  Genetic testing is available for 17 genes associated with increased risk for breast cancer: BreastNext (DASHA, BARD1, BRCA1, BRCA2, BRIP1, CDH1, CHEK2, MRE11A, MUTYH, NBN, NF1, PALB2, PTEN, RAD50, RAD51C, RAD51D, and TP53).  We discussed that some of the genes in the BreastNext panel are associated with specific hereditary cancer syndromes and published management guidelines: HBOC syndrome (BRCA1, BRCA2), Hereditary Diffuse Gastric Cancer (CDH1), Cowden syndrome (PTEN), Li Fraumeni syndrome (TP53), MUTYH Associated Polyposis (MUTYH), and Neurofibromatosis type 1 (NF1).   The DASHA, BRIP1,  CHEK2, NBN, PALB2, RAD51C, and RAD51D genes are associated with moderate breast cancer risk and have published management guidelines for either breast and/or ovarian cancer risk management.    The remaining genes (BARD1, MRE11A, and RAD50) are associated with moderate breast cancer risk and may allow us to make medical recommendations when mutations are identified.    Ashleigh Perera was provided with a detailed brochure from [email or web address] explaining the BreastNext testing.  Consent was obtained and genetic testing for BreastNext was sent to [email or web address]. Turn around time: 4 weeks.    Medical Management: For Ashleigh Perera, we reviewed that the information from genetic testing may determine:    surgery to treat Ashleigh Perera's active cancer diagnosis (i.e. lumpectomy versus bilateral mastectomy),    additional cancer screening for which Ashleigh Perera may qualify (i.e. mammogram and breast MRI, colonoscopies every 1-5 years, annual dermatologic exams, etc.),    options for risk reducing surgeries Ashleigh Perera could consider (i.e. surgery to remove her ovaries and/or uterus),      and targeted chemotherapies for Ashleigh Perera's active cancer, or if she were to develop certain cancers in the future (i.e. PARP inhibitors, etc.).     These recommendations and possible targeted chemotherapies will be discussed in detail once genetic testing is completed.     Plan:  1) Today Ashleigh Perera elected to proceed with genetic testing using the BreastNext gene panel offered by [email or web address].  2) This information should be available in 4 weeks.  3) Ashleigh Perera will return to clinic to discuss the results.    Face to face time: 45 minutes    Naty Segovia MS, [facility]  Certified Genetic Counselor  Office: [phone]  Pager: [phone]

## 2017-10-10 NOTE — PROGRESS NOTES
Oncology On Treatment Visit:  Date on this visit: 10/10/2017    Diagnosis:  Stage IIa, T2N0M0, grade 3 triple negative cancer of the right breast.    Primary Physician: Radha Cazares     History Of Present Illness:  Ms. Alonzo is a 57-year-old postmenopausal female with stage IIa, T2 N0 M0, grade 3, triple-negative invasive carcinoma of the right breast.  Routine bilateral screening mammogram on 07/27/2017 showed possible developing calcifications in the right breast at the 12 o'clock position 6 cm from the nipple.  Mammogram prior to this one had been 1 year prior in 07/2016.  Right breast ultrasound demonstrated a 7-mm, irregular, hypoechoic mass at the 12 o'clock position.  The patient went on to have a contrast-enhanced mammogram which showed a peripherally enhancing, irregular mass measured up to 1.9 cm as well as an additional 6-mm, enhancing focus anteromedial to the dominant mass.  The total area of abnormal enhancement on contrast mammogram measured up to 3.4 cm.  Right breast biopsy on 08/22/2017 demonstrated a grade 3 invasive mammary carcinoma with associated high-grade DCIS.  Invasive carcinoma was stained for estrogen and progesterone receptors.  Estrogen receptor and progesterone receptor staining was negative with 0% of nuclei staining.  HER2 was non-amplified by FISH with a HER2/CEN17 ratio of 1.5 and 2.8 HER2 signals per nucleus.  Breast MRI measured the biopsy proven breast cancer at 3.2 cm.    Ms. Alonzo enrolled in the ISPY-2 clinical trial.  She began treatment with weekly taxol and once every 3 week pembrolizumab on 9/20/17.  Week 3 MRI showed a decrease in size of right breast mass from 3.2 cm to 2.4 cm and overall decrease in enhancement.     Interval History:  Ashleigh Perera comes in to clinic today for evaluation prior to cycle number 4 of Taxol and pembrolizumab.  She is thus far tolerating chemotherapy generally well.  This past week she has noted some tingling in her fingertips.  It is  intermittent.  It is not bothersome enough for her to take pain medicine.  It does not keep her awake at night.  It does not interfere with daily activities such as doing buttons and opening jars.  She reports poor appetite and poor taste.  A number of sweets still taste good to her; however, she is having difficulty eating protein.  She has had intermittent nausea relieved with antinausea medicine.  She also has had intermittent loose stools.  She states that she is drinking plenty of fluids and has had no lightheadedness.  She denies mouth sores.  She had general achiness yesterday.  It was not bothersome enough for her to take pain medicine.  She reports some fatigue; however, again, it does not interfere with daily activities.  She denies fevers, chills or infectious complaints.  She has not noted any swelling of her lower extremities.  She has no cough, shortness of breath or chest pain.  She believes that her right breast mass has decreased in size by approximately 30%.  The remainder of a 10-point review of systems is otherwise negative.      Past Medical/Surgical History:  Past Medical History:   Diagnosis Date     COPD (chronic obstructive pulmonary disease) (H) 2011     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) 8/30/2017     Periodontal disease      Sleep apnea 12/2015     Urticaria      Past Surgical History:   Procedure Laterality Date     APPENDECTOMY  10/6/2015     CATARACT IOL, RT/LT  11/2016    bilateral      COLONOSCOPY  11/15/2011    Procedure:COLONOSCOPY; Colonoscopy, screening; Surgeon:ANASTASIA BUNCH; Location: OR     INSERT PORT VASCULAR ACCESS Left 9/1/2017    Procedure: INSERT PORT VASCULAR ACCESS;  Single Lumen Chest Power Port;  Surgeon: Leif Parkinson PA-C;  Location: [facility] OR     TUBAL LIGATION  12/2004    Bilateral     Allergies:  Allergies as of 10/10/2017     (No Known Allergies)     Current Medications:  Current  Outpatient Prescriptions   Medication Sig Dispense Refill     order for DME Cranial prosthesis 1 Device 1     ondansetron (ZOFRAN) 8 MG tablet Take 1 tablet (8 mg) by mouth every 8 hours as needed for nausea (Patient not taking: Reported on 10/3/2017) 30 tablet 3     prochlorperazine (COMPAZINE) 10 MG tablet Take 1 tablet (10 mg) by mouth every 6 hours as needed (Nausea/Vomiting) (Patient not taking: Reported on 10/3/2017) 30 tablet 2     LORazepam (ATIVAN) 0.5 MG tablet Take 1 tablet (0.5 mg) by mouth every 4 hours as needed (Anxiety, Nausea/Vomiting or Sleep) 30 tablet 2     lidocaine-prilocaine (EMLA) cream Please apply to port site 30 minutes before use prn 30 g 1     LORazepam (ATIVAN) 0.5 MG tablet Take 1-2 tabs 20 minutes prior to MRI scans. 10 tablet 0     hydrOXYzine (ATARAX) 25 MG tablet Take 1-2 tablets (25-50 mg) by mouth every 6 hours as needed for itching 100 tablet 2     guaiFENesin (MUCINEX) 600 MG 12 hr tablet Take 2 tablets (1,200 mg) by mouth 2 times daily 180 tablet 6     simvastatin (ZOCOR) 40 MG tablet Take 1 tablet (40 mg) by mouth At Bedtime 90 tablet 4     tiotropium (SPIRIVA) 18 MCG capsule Inhale 1 capsule (18 mcg) into the lungs daily Inhale contents of one capsule 1 capsule 11     albuterol (PROAIR HFA/PROVENTIL HFA/VENTOLIN HFA) 108 (90 BASE) MCG/ACT Inhaler Inhale 2 puffs into the lungs every 6 hours as needed for shortness of breath / dyspnea 1 Inhaler 5     citalopram (CELEXA) 10 MG tablet Take 1 tablet (10 mg) by mouth daily 90 tablet 3     darifenacin (ENABLEX) 7.5 MG 24 hr tablet Take 1 tablet (7.5 mg) by mouth daily 90 tablet 3     order for DME Respironics Dream Station Auto CPAP 12-18 cm, F&P Simplus FFM small.       Coenzyme Q10 (CO Q 10 PO) Take 1 tablet by mouth        MULTIPLE VITAMIN PO        Cyanocobalamin (VITAMIN B 12 PO) Take 500 mcg by mouth daily       Pyridoxine HCl (VITAMIN B6 PO) Take 1 tablet by mouth daily.       aspirin 81 MG tablet Take 1 tablet by mouth  daily.  3     VITAMIN D 1000 UNIT OR CAPS 2 CAPSULE DAILY        Family and Social History:  Reviewed and unchanged from prior.  Please see initial consultation dated 8/28/17 for further details.    Physical Exam:  /68 (BP Location: Right arm, Cuff Size: Adult Large)  Pulse 109  Temp 98.8  F (37.1  C) (Oral)  Resp 16  Wt 76.6 kg (168 lb 12.8 oz)  SpO2 95%  BMI 29.91 kg/m2  General:  Well appearing, well-nourished adult female in NAD.  HEENT:  Normocephalic.  Sclera anicteric.  MMM.  No lesions of the oropharynx.  Lymph:  No palpable cervical, supraclavicular, or axillary LAD.  Chest:  CTA bilaterally.  No wheezes or crackles.  CV:  RRR.  Nl S1 and S2.  No m/r/g.  Abd:  Soft/NT/ND.  BSs normoactive.  No hepatosplenomegaly.  Ext:  No pitting edema of the bilateral lower extremities.  Pulses 2+ and symmetric.  Musculo:  Strength 5/5 throughout.  Neuro:  Cranial nerves grossly intact.  Psych:  Mood and affect appear normal.    Laboratory/Imaging Studies:  10/5/17 MRI breast:  Right breast: There is less rapid initial uptake of contrast material  shown by greater decreased size of the lesion on the initial time  point.      Measured at later time points where the size is greatest, there is  decreased size of biopsy-proven invasive carcinoma at the 12:00  position middle depth measuring approximately 2.4 x 0.9 x 2.0 cm  (series 9 image 73, series 25 image 45). Nonmass enhancement is again  seen between the index mass in the second mass.     Additional mass which was noted at the 1:00 position is also decreased  in size measuring approximately 1.3 x 0.7 x 0.5 cm (series 9 image 87,  series 25 image 51).     Left breast: No suspicious enhancement.     No lymphadenopathy.    Results for YUNI CALIXTO (MRN [id number]) as of 10/10/2017 08:21   Ref. Range 10/10/2017 06:59   Sodium Latest Ref Range: 133 - 144 mmol/L 134   Potassium Latest Ref Range: 3.4 - 5.3 mmol/L 3.6   Chloride Latest Ref Range: 94 - 109 mmol/L  101   Carbon Dioxide Latest Ref Range: 20 - 32 mmol/L 26   Urea Nitrogen Latest Ref Range: 7 - 30 mg/dL 9   Creatinine Latest Ref Range: 0.52 - 1.04 mg/dL 0.70   GFR Estimate Latest Ref Range: >60 mL/min/1.7m2 87   GFR Estimate If Black Latest Ref Range: >60 mL/min/1.7m2 >90   Calcium Latest Ref Range: 8.5 - 10.1 mg/dL 8.8   Anion Gap Latest Ref Range: 3 - 14 mmol/L 7   Albumin Latest Ref Range: 3.4 - 5.0 g/dL 3.1 (L)   Protein Total Latest Ref Range: 6.8 - 8.8 g/dL 7.8   Bilirubin Total Latest Ref Range: 0.2 - 1.3 mg/dL 0.6   Alkaline Phosphatase Latest Ref Range: 40 - 150 U/L 136   ALT Latest Ref Range: 0 - 50 U/L 49   AST Latest Ref Range: 0 - 45 U/L 45   Results for YUNI CALIXTO (MRN [id number]) as of 10/10/2017 08:21   Ref. Range 10/10/2017 06:59   WBC Latest Ref Range: 4.0 - 11.0 10e9/L 3.3 (L)   Hemoglobin Latest Ref Range: 11.7 - 15.7 g/dL 11.4 (L)   Hematocrit Latest Ref Range: 35.0 - 47.0 % 32.1 (L)   Platelet Count Latest Ref Range: 150 - 450 10e9/L 415       ASSESSMENT/PLAN:  Ms. Calixto is a 57-year-old postmenopausal female with a stage IIa, grade 3, triple-negative infiltrating ductal carcinoma of the right breast. On weekly Taxol and once every three week pembrolizumab since 9/20/17.    1.  Right breast cancer:  Presents for evaluation prior to week number 4 of Taxol and pembrolizumab.  I reviewed her I-SPY week 3 breast MRI images with her today.  She has had a good response to chemotherapy thus far with a decrease in size of the breast mass from 3.2 to 2.4 cm.  She is tolerating chemotherapy remarkably well.  She has onset of neuropathy; however, it is intermittent and not interfering with functionality.  We will therefore proceed with full-dose Taxol today.  She will return in 1 week for labs, a visit with Lilia, and week number 5 of Taxol infusion.         Our overall plan is to complete a total of 12 weeks of Taxol and pembrolizumab followed by 4 cycles of Adriamycin, Cytoxan and  pembrolizumab.  Following completion of chemotherapy, she will proceed with surgery.  Whether or not she will benefit from adjuvant radiation is unknown at this time and depends on the type of breast surgery, surgical margins, and whether or not there is lymph node involvement at the time of surgery.      2.  Neuropathy:  Mild and not interfering with daily activities.  She will continue to take pyridoxine 100 mg daily.       3.  Dysgeusia:  Zinc gluconate 100 mg daily was prescribed today.  She also will add an Ensure daily to increase protein intake.      4.  Transaminitis:  Secondary to chemotherapy.  Her LFTs are within normal limits today.  We will continue to monitor.       5.  Followup:  Ms. Alonzo will return in 1 week for labs, a visit with Lilia Livingston, and Taxol infusion.       It was a pleasure to meet with Ashleigh Perera and her  in clinic today.  They had multiple questions which were answered to their satisfaction.  A total of 25 minutes of our 30-minute face-to-face visit was spent in counseling.

## 2017-10-10 NOTE — LETTER
10/10/2017       RE: Ashleigh Alonzo  [address] [place] [address]     Dear Colleague,    Thank you for referring your patient, Ashleigh Alonzo, to the [facility]. Please see a copy of my visit note below.    Oncology On Treatment Visit:  Date on this visit: 10/10/2017    Diagnosis:  Stage IIa, T2N0M0, grade 3 triple negative cancer of the right breast.    Primary Physician: Radha Cazraes     History Of Present Illness:  Ms. Alonzo is a 57-year-old postmenopausal female with stage IIa, T2 N0 M0, grade 3, triple-negative invasive carcinoma of the right breast.  Routine bilateral screening mammogram on 07/27/2017 showed possible developing calcifications in the right breast at the 12 o'clock position 6 cm from the nipple.  Mammogram prior to this one had been 1 year prior in 07/2016.  Right breast ultrasound demonstrated a 7-mm, irregular, hypoechoic mass at the 12 o'clock position.  The patient went on to have a contrast-enhanced mammogram which showed a peripherally enhancing, irregular mass measured up to 1.9 cm as well as an additional 6-mm, enhancing focus anteromedial to the dominant mass.  The total area of abnormal enhancement on contrast mammogram measured up to 3.4 cm.  Right breast biopsy on 08/22/2017 demonstrated a grade 3 invasive mammary carcinoma with associated high-grade DCIS.  Invasive carcinoma was stained for estrogen and progesterone receptors.  Estrogen receptor and progesterone receptor staining was negative with 0% of nuclei staining.  HER2 was non-amplified by FISH with a HER2/CEN17 ratio of 1.5 and 2.8 HER2 signals per nucleus.  Breast MRI measured the biopsy proven breast cancer at 3.2 cm.    Ms. Alonzo enrolled in the ISPY-2 clinical trial.  She began treatment with weekly taxol and once every 3 week pembrolizumab on 9/20/17.  Week 3 MRI showed a decrease in size of right breast mass from 3.2 cm to 2.4 cm and overall decrease in enhancement.     Interval  History:  Ashleigh Perera comes in to clinic today for evaluation prior to cycle number 4 of Taxol and pembrolizumab.  She is thus far tolerating chemotherapy generally well.  This past week she has noted some tingling in her fingertips.  It is intermittent.  It is not bothersome enough for her to take pain medicine.  It does not keep her awake at night.  It does not interfere with daily activities such as doing buttons and opening jars.  She reports poor appetite and poor taste.  A number of sweets still taste good to her; however, she is having difficulty eating protein.  She has had intermittent nausea relieved with antinausea medicine.  She also has had intermittent loose stools.  She states that she is drinking plenty of fluids and has had no lightheadedness.  She denies mouth sores.  She had general achiness yesterday.  It was not bothersome enough for her to take pain medicine.  She reports some fatigue; however, again, it does not interfere with daily activities.  She denies fevers, chills or infectious complaints.  She has not noted any swelling of her lower extremities.  She has no cough, shortness of breath or chest pain.  She believes that her right breast mass has decreased in size by approximately 30%.  The remainder of a 10-point review of systems is otherwise negative.      Past Medical/Surgical History:  Past Medical History:   Diagnosis Date     COPD (chronic obstructive pulmonary disease) (H) 2011     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) 8/30/2017     Periodontal disease      Sleep apnea 12/2015     Urticaria      Past Surgical History:   Procedure Laterality Date     APPENDECTOMY  10/6/2015     CATARACT IOL, RT/LT  11/2016    bilateral      COLONOSCOPY  11/15/2011    Procedure:COLONOSCOPY; Colonoscopy, screening; Surgeon:ANASTASIA BUNCH; Location:[facility] OR     INSERT PORT VASCULAR ACCESS Left 9/1/2017    Procedure: INSERT PORT VASCULAR  ACCESS;  Single Lumen Chest Power Port;  Surgeon: Leif Parkinson PA-C;  Location: [facility] OR     TUBAL LIGATION  12/2004    Bilateral     Allergies:  Allergies as of 10/10/2017     (No Known Allergies)     Current Medications:  Current Outpatient Prescriptions   Medication Sig Dispense Refill     order for DME Cranial prosthesis 1 Device 1     ondansetron (ZOFRAN) 8 MG tablet Take 1 tablet (8 mg) by mouth every 8 hours as needed for nausea (Patient not taking: Reported on 10/3/2017) 30 tablet 3     prochlorperazine (COMPAZINE) 10 MG tablet Take 1 tablet (10 mg) by mouth every 6 hours as needed (Nausea/Vomiting) (Patient not taking: Reported on 10/3/2017) 30 tablet 2     LORazepam (ATIVAN) 0.5 MG tablet Take 1 tablet (0.5 mg) by mouth every 4 hours as needed (Anxiety, Nausea/Vomiting or Sleep) 30 tablet 2     lidocaine-prilocaine (EMLA) cream Please apply to port site 30 minutes before use prn 30 g 1     LORazepam (ATIVAN) 0.5 MG tablet Take 1-2 tabs 20 minutes prior to MRI scans. 10 tablet 0     hydrOXYzine (ATARAX) 25 MG tablet Take 1-2 tablets (25-50 mg) by mouth every 6 hours as needed for itching 100 tablet 2     guaiFENesin (MUCINEX) 600 MG 12 hr tablet Take 2 tablets (1,200 mg) by mouth 2 times daily 180 tablet 6     simvastatin (ZOCOR) 40 MG tablet Take 1 tablet (40 mg) by mouth At Bedtime 90 tablet 4     tiotropium (SPIRIVA) 18 MCG capsule Inhale 1 capsule (18 mcg) into the lungs daily Inhale contents of one capsule 1 capsule 11     albuterol (PROAIR HFA/PROVENTIL HFA/VENTOLIN HFA) 108 (90 BASE) MCG/ACT Inhaler Inhale 2 puffs into the lungs every 6 hours as needed for shortness of breath / dyspnea 1 Inhaler 5     citalopram (CELEXA) 10 MG tablet Take 1 tablet (10 mg) by mouth daily 90 tablet 3     darifenacin (ENABLEX) 7.5 MG 24 hr tablet Take 1 tablet (7.5 mg) by mouth daily 90 tablet 3     order for DME Respironics Dream [employer] Auto CPAP 12-18 cm, F&P Simplus FFM small.       Coenzyme Q10 (CO Q 10  PO) Take 1 tablet by mouth        MULTIPLE VITAMIN PO        Cyanocobalamin (VITAMIN B 12 PO) Take 500 mcg by mouth daily       Pyridoxine HCl (VITAMIN B6 PO) Take 1 tablet by mouth daily.       aspirin 81 MG tablet Take 1 tablet by mouth daily.  3     VITAMIN D 1000 UNIT OR CAPS 2 CAPSULE DAILY        Family and Social History:  Reviewed and unchanged from prior.  Please see initial consultation dated 8/28/17 for further details.    Physical Exam:  /68 (BP Location: Right arm, Cuff Size: Adult Large)  Pulse 109  Temp 98.8  F (37.1  C) (Oral)  Resp 16  Wt 76.6 kg (168 lb 12.8 oz)  SpO2 95%  BMI 29.91 kg/m2  General:  Well appearing, well-nourished adult female in NAD.  HEENT:  Normocephalic.  Sclera anicteric.  MMM.  No lesions of the oropharynx.  Lymph:  No palpable cervical, supraclavicular, or axillary LAD.  Chest:  CTA bilaterally.  No wheezes or crackles.  CV:  RRR.  Nl S1 and S2.  No m/r/g.  Abd:  Soft/NT/ND.  BSs normoactive.  No hepatosplenomegaly.  Ext:  No pitting edema of the bilateral lower extremities.  Pulses 2+ and symmetric.  Musculo:  Strength 5/5 throughout.  Neuro:  Cranial nerves grossly intact.  Psych:  Mood and affect appear normal.    Laboratory/Imaging Studies:  10/5/17 MRI breast:  Right breast: There is less rapid initial uptake of contrast material  shown by greater decreased size of the lesion on the initial time  point.      Measured at later time points where the size is greatest, there is  decreased size of biopsy-proven invasive carcinoma at the 12:00  position middle depth measuring approximately 2.4 x 0.9 x 2.0 cm  (series 9 image 73, series 25 image 45). Nonmass enhancement is again  seen between the index mass in the second mass.     Additional mass which was noted at the 1:00 position is also decreased  in size measuring approximately 1.3 x 0.7 x 0.5 cm (series 9 image 87,  series 25 image 51).     Left breast: No suspicious enhancement.     No  lymphadenopathy.    Results for YUNI CALIXTO (MRN [id number]) as of 10/10/2017 08:21   Ref. Range 10/10/2017 06:59   Sodium Latest Ref Range: 133 - 144 mmol/L 134   Potassium Latest Ref Range: 3.4 - 5.3 mmol/L 3.6   Chloride Latest Ref Range: 94 - 109 mmol/L 101   Carbon Dioxide Latest Ref Range: 20 - 32 mmol/L 26   Urea Nitrogen Latest Ref Range: 7 - 30 mg/dL 9   Creatinine Latest Ref Range: 0.52 - 1.04 mg/dL 0.70   GFR Estimate Latest Ref Range: >60 mL/min/1.7m2 87   GFR Estimate If Black Latest Ref Range: >60 mL/min/1.7m2 >90   Calcium Latest Ref Range: 8.5 - 10.1 mg/dL 8.8   Anion Gap Latest Ref Range: 3 - 14 mmol/L 7   Albumin Latest Ref Range: 3.4 - 5.0 g/dL 3.1 (L)   Protein Total Latest Ref Range: 6.8 - 8.8 g/dL 7.8   Bilirubin Total Latest Ref Range: 0.2 - 1.3 mg/dL 0.6   Alkaline Phosphatase Latest Ref Range: 40 - 150 U/L 136   ALT Latest Ref Range: 0 - 50 U/L 49   AST Latest Ref Range: 0 - 45 U/L 45   Results for YUNI CALIXTO (MRN [id number]) as of 10/10/2017 08:21   Ref. Range 10/10/2017 06:59   WBC Latest Ref Range: 4.0 - 11.0 10e9/L 3.3 (L)   Hemoglobin Latest Ref Range: 11.7 - 15.7 g/dL 11.4 (L)   Hematocrit Latest Ref Range: 35.0 - 47.0 % 32.1 (L)   Platelet Count Latest Ref Range: 150 - 450 10e9/L 415       ASSESSMENT/PLAN:  Ms. Calixto is a 57-year-old postmenopausal female with a stage IIa, grade 3, triple-negative infiltrating ductal carcinoma of the right breast. On weekly Taxol and once every three week pembrolizumab since 9/20/17.    1.  Right breast cancer:  Presents for evaluation prior to week number 4 of Taxol and pembrolizumab.  I reviewed her I-SPY week 3 breast MRI images with her today.  She has had a good response to chemotherapy thus far with a decrease in size of the breast mass from 3.2 to 2.4 cm.  She is tolerating chemotherapy remarkably well.  She has onset of neuropathy; however, it is intermittent and not interfering with functionality.  We will therefore proceed with  full-dose Taxol today.  She will return in 1 week for labs, a visit with Lilia, and week number 5 of Taxol infusion.         Our overall plan is to complete a total of 12 weeks of Taxol and pembrolizumab followed by 4 cycles of Adriamycin, Cytoxan and pembrolizumab.  Following completion of chemotherapy, she will proceed with surgery.  Whether or not she will benefit from adjuvant radiation is unknown at this time and depends on the type of breast surgery, surgical margins, and whether or not there is lymph node involvement at the time of surgery.      2.  Neuropathy:  Mild and not interfering with daily activities.  She will continue to take pyridoxine 100 mg daily.       3.  Dysgeusia:  Zinc gluconate 100 mg daily was prescribed today.  She also will add an Ensure daily to increase protein intake.      4.  Transaminitis:  Secondary to chemotherapy.  Her LFTs are within normal limits today.  We will continue to monitor.       5.  Followup:  Ms. Alonzo will return in 1 week for labs, a visit with Lilia Livingston, and Taxol infusion.       It was a pleasure to meet with Ashleigh Perera and her  in clinic today.  They had multiple questions which were answered to their satisfaction.  A total of 25 minutes of our 30-minute face-to-face visit was spent in counseling.         Again, thank you for allowing me to participate in the care of your patient.      Sincerely,    Fara Bradshaw MD

## 2017-10-10 NOTE — MR AVS SNAPSHOT
After Visit Summary   10/10/2017    Ashleigh Alonzo    MRN: [id number]           Patient Information     Date Of Birth          1960        Visit Information        Provider Department      10/10/2017 12:00 PM Naty Segovia GC;  2 119 CONSULT [facility]        Today's Diagnoses     Malignant neoplasm of right breast in female, estrogen receptor negative, unspecified site of breast (H)    -  1    Family history of prostate cancer          Care Instructions      Assessing Cancer Risk  Only about 5-10% of cancers are thought to be due to an inherited cancer susceptibility gene.    These families often have:    Several people with the same or related types of cancer    Cancers diagnosed at a young age (before age 50)    Individuals with more than one primary cancer    Multiple generations of the family affected with cancer    Some people may be candidates for genetic testing of more than one gene.  For these families, genetic testing using a multi-gene cancer panel may be offered.  These panels may test genes known to increase the risk for breast (and other) cancers: DASHA, BRCA1, BRCA2, CDH1, CHEK2, PALB2, PTEN, and TP53.  The purpose of this handout is to serve as a brief summary of the high/moderate-risk breast cancer genes which have published clinical management guidelines for individuals who are found to carry a mutation.    Hereditary Breast and Ovarian Cancer Syndrome (HBOC)  A single mutation in one of the copies of BRCA1 or BRCA2 increases the risk for breast and ovarian cancer, among others.  The risk for pancreatic cancer and melanoma may also be slightly increased in some families.  The tables below list the chance that someone with a BRCA mutation would develop cancer in his or her lifetime1,2,3,4.          Women   Men     General Population BRCA+    General Population BRCA+   Breast  12% 40-80%  Breast <1% ~7%   Ovarian  1-2% 10-40%  Prostate 16% 20%     A  person s ethnic background is also important to consider, as individuals of Ashkenazi Latter day ancestry have a higher chance of having a BRCA gene mutation.  There are three BRCA mutations that occur more frequently in this population.    Li-Fraumeni Syndrome (LFS)  LFS is a cancer predisposition syndrome. Individuals with LFS are at an increased risk for developing cancer at a young age. The general lifetime risk for development of cancer is 50% by age 30 and 90% by age 60.  LFS is caused by a mutation in the TP53 gene.  A single mutation in one of the copies of TP53 increases the risk for multiple cancers.     Core Cancers: Sarcomas, Breast, Brain, Lung, Leukemias/Lymphomas, Adrenocortical carcinomas  Other Cancers: Gastrointestinal, Thyroid, Skin, Genitourinary        Cowden Syndrome  Cowden syndrome is a hereditary condition that increases the risk for breast, thyroid, endometrial, and kidney cancer.  Cowden syndrome is caused by a mutation in the PTEN gene.  A single mutation in one of the copies of PTEN causes Cowden syndrome and increases cancer risk.  The table below shows the chance that someone with a PTEN mutation would develop cancer in their lifetime5,6.  Other benign features seen in some individuals with Cowden syndrome include benign skin lesions (facial papules, keratoses, lipomas), learning disability, autism, thyroid nodules, colon polyps, and larger head size.      Lifetime Cancer Risk   Cancer Type General Population Cowden Syndrome   Breast  12% 25-50%*   Thyroid  1% 35%   Renal 1-2% 35%   Endometrial  2-3% 28%   Colon 5% 9%   Melanoma 2-3% >5%     *One recent study found breast cancer risk to be increased to 85%    Hereditary Diffuse Gastric Cancer (HDGC)  Currently, one gene is known to cause hereditary diffuse gastric cancer: CDH1.  Individuals with HDGC are at increased risk for diffuse gastric cancer and lobular breast cancer. Of people diagnosed with HDGC, 30-50% have a mutation in the CDH1  gene.  This suggests there are likely other genes that may cause HDGC that have not been identified yet.      Lifetime Cancer Risks    General Pop HDGC    Diffuse Gastric  <1% ~80%   Breast 12% 39-52%     Additional Genes Associated with Increased Breast Cancer Risk  PALB2  Mutations in PALB2 have been shown to increase the risk of breast cancer up to 33-58% in some families; where individuals fall within this risk range is dependent upon family history.9 PALB2 mutations have also been associated with increased risk for pancreatic cancer, although this risk has not been quantified yet.  Individuals who inherit two PALB2 mutations--one from their mother and one from their father--have a condition called Fanconi Anemia.  This rare autosomal recessive condition is associated with short stature, developmental delay, bone marrow failure, and increased risk for childhood cancers.    DASHA  DASHA is a moderate-risk breast cancer gene. Women who have a mutation in DASHA can have between a 2-4 fold increased risk for breast cancer compared to the general population.10  DASHA mutations have also been associated with increased risk for pancreatic cancer, however an estimate of this cancer risk is not well understood.11  Individuals who inherit two DASHA mutations have a condition called ataxia-telangiectasia (AT).  This rare autosomal recessive condition affects the nervous system and immune system, and is associated with progressive cerebellar ataxia beginning in childhood.  Individuals with ataxia-telangiectasia often have a weakened immune system and have an increased risk for childhood cancers.           CHEK2   CHEK2 is a moderate-risk breast cancer gene.  Women who have a mutation in CHEK2 have around a 2-fold increased risk for breast cancer compared to the general population, and this risk may be higher depending upon family history.12,13,14  Mutations in CHEK2 have also been shown to increase the risk of a number of other  cancers, including colon and prostate, however these cancer risks are currently not well understood.         Inheritance   All of the genes reviewed above are inherited in an autosomal dominant pattern.  This means that if a parent has a mutation, each of his or her children will have a 50% chance of inheriting that same mutation.  Therefore, each child--male or female--would have a 50% chance of being at increased risk for developing cancer.      Image obtained from Genetics Home Reference, 2013     Mutations in some genes can occur de leatha, which means that a person s mutation occurred for the first time in them and was not inherited from a parent.  Now that they have the mutation, however, it can be passed on to future generations.    Genetic Testing  Genetic testing involves a blood test and will look for any harmful mutations within genes that are associated with increased cancer risk.  If possible, it is recommended that the person(s) who has had cancer be tested before other family members.  That person will give us the most useful information about whether or not a specific gene is associated with the cancer in the family.    Results  There are three possible results of genetic testing:    Positive--a harmful mutation was identified in one or more of the genes    Negative--no mutation was identified in any of the genes on this panel    Variant of unknown significance--a variation in one of the genes was identified, but it is unclear how this impacts cancer risk in the family    Advantages and Disadvantages  There are advantages and disadvantages to genetic testing.  Advantages    May clarify your cancer risk    Can help you make medical decisions    May explain the cancers in your family    May give useful information to your family members (if you share your results)    Disadvantages    Possible negative emotional impact of learning about inherited cancer risk    Uncertainty in interpreting a negative test  result in some situations    Possible genetic discrimination concerns (see below)    Genetic Information Nondiscrimination Act (BLACK)  BLACK is a federal law that protects individuals from health insurance or employment discrimination based on a genetic test result alone.  Although rare, there are currently no legal discrimination protections in terms of life insurance, long term care, or disability insurances.  Visit the National Human Genome Research [place] genome.gov/37018709 to learn more.    Reducing Cancer Risk  Each of the genes listed within this handout have nationally recognized cancer screening guidelines that would be recommended for individuals who test positive.  In addition to increased cancer screening, surgeries may be offered or recommended to reduce cancer risk.  Recommendations are based upon an individual s genetic test result as well as their personal and family history of cancer.    Questions to Think About Regarding Genetic Testing    What effect will the test result have on me and my relationship with my family members if I have an inherited gene mutation?  If I don t have a gene mutation?    Should I share my test results, and how will my family react to this news, which may also affect them?    Are my children ready to learn new information that may one day affect their own health?      Resources  FORCE: Facing Our Risk of Cancer Empowered facingourrisk.org   Bright Pink bebrightpink.org   Li-Fraumeni Syndrome Association lfsassociation.org   PTEN World PTENworld.gloStream   No stomach for cancer, Inc. nostomachforcancer.org   Stomach cancer relief network scrnet.org   Collaborative Group of the Americas on Inherited Colorectal Cancer (CGA) cgaicc.com    Cancer Care cancercare.org   American Cancer Society (ACS) cancer.org   National Cancer [place] (NCI) cancer.gov     Cancer Risk Management Program [phone] ([phone])  ? Ashleigh Cannon MS, [facility]  [phone]  ? Caren Marquis MS,  [facility]  [phone]  ? Naty Segovia, MS, [facility]  [phone]  ? Aparna Dia, MS, [facility]  [phone]   ? Anu Sevilla, MS, [facility]  [phone]    References  1. Eric Patrick PDP, Celestina S, Julieta FERMIN, Neville JE, Gela JL, Beto N, Raissa H, Paul O, Lexie A, Ememtt B, Meri P, Manelena S, Liz DM, Crowley N, Ryann E, Ernie H, Eddie E, Adolfo J, Kristan J, Alethea B, Mikala H, Thorlaci S, Eerola H, Cristi H, Arlene K, Kim OP. Average risks of breast and ovarian cancer associated with BRCA1 or BRCA2 mutations detected in case series unselected for family history: a combined analysis of 222 studies. Am J Hum Liz. 2003;72:1117-30.  2. Isidro N, Lindsay M, Brannon G.  BRCA1 and BRCA2 Hereditary Breast and Ovarian Cancer. Gene Reviews online. 2013.  3. Tato YC, Harrietk S, Toby G, Pardo S. Breast cancer risk among male BRCA1 and BRCA2 mutation carriers. J Natl Cancer Inst. 2007;99:1811-4.  4. Russ DG, Dillan I, Vinayak J, Eulogio E, Kirk ER, Zian F. Risk of breast cancer in male BRCA2 carriers. J Med Liz. 2010;47:710-1.  5. Ye MH, Josias J, Elena J, Miles ROSA, Amrik MS, Eng C. Lifetime cancer risks in individuals with germline PTEN mutations. Clin Cancer Res. 2012;18:400-7.  6. Piljazmin R. Cowden Syndrome: A Critical Review of the Clinical Literature. J Liz . 2009:18:13-27.  7. National Comprehensive Cancer Network. Clinical practice guidelines in oncology, colorectal cancer screening. Available online (registration required). 2013.  8. National Cancer [place]. SEER Cancer Stat Fact Sheets.  December 2013.  9. Nolberto GALARZA, et al. Breast-Cancer Risk in Families with Mutations in PALB2. NEJM. 2014; 371(6):497-506.  10. Brittney SRIVASTAVA, Gaston D, Elizabeth S, Mariama P, Francisca T, Rashida M, Howie B, Grey H, Billy R, Meghan K, Mahogany L, Russ MANUEL, Liz D, Hosea DF, Brayan MR, The Breast Cancer Susceptibility Collaboration (UK) & Bernardo HALL. DASHA mutations that  cause ataxia-telangiectasia are breast cancer susceptibility alleles. Nature Genetics. 2006;38:873-875  11. Marsahll N , Karen Y, Seema J, Flip L, Wicho GM , Yeison ML, Wiliam S, Hernandez AG, Syngal S, Kali ML, Laura J , Jo Ann R, Dom SZ, Rodolfo JR, Taylor VE, Jerome M, Vokenyastein B, Zayda N, Annika RH, Thomas KW, and Radha AP. DASHA mutations in patients with hereditary pancreatic cancer. Cancer Discover. 2012;2:41-46  12. CHEK2 Breast Cancer Case-Control Consortium. CHEK2*1100delC and susceptibility to breast cancer: A collaborative analysis involving 10,860 breast cancer cases and 9,065 controls from 10 studies. Am J Hum Liz, 74 (2004), pp. 1208-5404  13. Lorraine T, Carl S, Go K, et al. Spectrum of Mutations in BRCA1, BRCA2, CHEK2, and TP53 in Families at High Risk of Breast Cancer. RUSSELL. 2006;295(12):8171-6260.   14. Sean C, Ira D, Cleopatra A, et al. Risk of breast cancer in women with a CHEK2 mutation with and without a family history of breast cancer. J Clin Oncol. 2011;29:7122-6516          Follow-ups after your visit        Follow-up notes from your care team     Return in about 4 weeks (around 11/7/2017).      Your next 10 appointments already scheduled     Oct 10, 2017  1:15 PM CDT   [facility] Lab Draw with  [facility] LAB DRAW   [facility]    [address] [place] [address]   [phone]            Oct 17, 2017  6:30 AM CDT   [facility] Lab Draw with  [facility] LAB DRAW   [facility]    [address] [place] [address]   [phone]            Oct 17, 2017  7:00 AM CDT   (Arrive by 6:45 AM)   Return Visit with EDDIE Oliva   [facility] ([facility])    [address]  2nd Floor  [facility] [address]   [phone]            Oct 17, 2017  8:00  AM CDT   Infusion 180 with UC ONCOLOGY INFUSION, UC 15 ATC   [facility] ([facility])    [address]  2nd Floor  [facility] [address] [phone]            Oct 24, 2017 12:30 PM CDT   [facility] Lab Draw with UC MASONIC LAB DRAW   [facility] Draw ([facility])    [address]  2nd [facility] [id number] [phone]            Oct 24, 2017  1:00 PM CDT   (Arrive by 12:45 PM)   Return Visit with EDDIE Oliva   [facility] ([facility])    [address]  2nd Floor  [facility] [id number] [phone]            Oct 24, 2017  2:30 PM CDT   Infusion 180 with UC ONCOLOGY INFUSION, UC 19 ATC   [facility] ([facility])    [address] [place] [id number] [phone]            Oct 31, 2017  7:45 AM CDT   [facility] Lab Draw with UC MASONIC LAB DRAW   [facility] Draw ([facility])    [address] [id number] [phone]              Future tests that were ordered for you today     Open Future Orders        Priority Expected Expires Ordered    [facility]ast[facility] genetic testing, [place] Test: Laboratory Miscellaneous Order Routine  10/10/2018 10/10/2017            Who to contact     If you have questions or need follow up information about today's clinic visit or your schedule please contact [facility] directly at [phone].  Normal or non-critical lab and imaging results will be communicated to you by MyChart, letter or phone within 4 business days after the clinic has received the results. If you do not hear from us within 7 days, please contact the clinic through MyChart or phone. If you have a critical or abnormal lab result, we will notify you by phone as soon as possible.  Submit refill  requests through Travel Notes or call your pharmacy and they will forward the refill request to us. Please allow 3 business days for your refill to be completed.          Additional Information About Your Visit        CodeHShart Information     Travel Notes gives you secure access to your electronic health record. If you see a primary care provider, you can also send messages to your care team and make appointments. If you have questions, please call your primary care clinic.  If you do not have a primary care provider, please call [phone] and they will assist you.        Care EveryWhere ID     This is your Care EveryWhere ID. This could be used by other organizations to access your [place] medical records  [id number]         Blood Pressure from Last 3 Encounters:   10/10/17 112/68   10/03/17 115/75   09/26/17 132/89    Weight from Last 3 Encounters:   10/10/17 76.6 kg (168 lb 12.8 oz)   10/03/17 78.3 kg (172 lb 9.9 oz)   09/26/17 77.4 kg (170 lb 11.2 oz)                 Today's Medication Changes          These changes are accurate as of: 10/10/17  1:05 PM.  If you have any questions, ask your nurse or doctor.               Start taking these medicines.        Dose/Directions    Zinc Gluconate 100 MG Tabs   Used for:  Dysgeusia   Started by:  Fara Bradshaw MD        Dose:  1 tablet   Take 1 tablet by mouth daily   Quantity:  30 tablet   Refills:  3            Where to get your medicines      These medications were sent to [facility] [address] [address]    Hours:  TRANSPLANT PHONE NUMBER [phone] Phone:  [phone]     Zinc Gluconate 100 MG Tabs                Primary Care Provider Office Phone # Fax #    Radha Cazares [phone]       [address] [address]        Equal Access to Services     THERESA HUGHES AH: Abdifatah mcdaniel  tushar Granda, wabridgerda luqadaha, qaybta kadonaldo lopez, bang jacobsenkenny raheel. So [facility] [phone].    ATENCIÓN: Si habla ross, tiene a blackman disposición servicios gratuitos de asistencia lingüística. Pauline al [phone].    We comply with applicable federal civil rights laws and [place] laws. We do not discriminate on the basis of race, color, national origin, age, disability, sex, sexual orientation, or gender identity.            Thank you!     Thank you for choosing [facility]  for your care. Our goal is always to provide you with excellent care. Hearing back from our patients is one way we can continue to improve our services. Please take a few minutes to complete the written survey that you may receive in the mail after your visit with us. Thank you!             Your Updated Medication List - Protect others around you: Learn how to safely use, store and throw away your medicines at www.disposemymeds.org.          This list is accurate as of: 10/10/17  1:05 PM.  Always use your most recent med list.                   Brand Name Dispense Instructions for use Diagnosis    albuterol 108 (90 BASE) MCG/ACT Inhaler    PROAIR HFA/PROVENTIL HFA/VENTOLIN HFA    1 Inhaler    Inhale 2 puffs into the lungs every 6 hours as needed for shortness of breath / dyspnea    Chronic obstructive pulmonary disease, unspecified COPD type (H)       aspirin 81 MG tablet      Take 1 tablet by mouth daily.        citalopram 10 MG tablet    celeXA    90 tablet    Take 1 tablet (10 mg) by mouth daily    Anxiety, Depression, unspecified depression type       CO Q 10 PO      Take 1 tablet by mouth        darifenacin 7.5 MG 24 hr tablet    ENABLEX    90 tablet    Take 1 tablet (7.5 mg) by mouth daily    Overactive bladder       guaiFENesin 600 MG 12 hr tablet    MUCINEX    180 tablet    Take 2 tablets (1,200 mg) by mouth 2 times daily    Cough with sputum       hydrOXYzine 25 MG tablet     ATARAX    100 tablet    Take 1-2 tablets (25-50 mg) by mouth every 6 hours as needed for itching    Hives       lidocaine-prilocaine cream    EMLA    30 g    Please apply to port site 30 minutes before use prn    Personal history of malignant neoplasm of breast       * LORazepam 0.5 MG tablet    ATIVAN    10 tablet    Take 1-2 tabs 20 minutes prior to MRI scans.    Breast cancer (H), Anxiety       * LORazepam 0.5 MG tablet    ATIVAN    30 tablet    Take 1 tablet (0.5 mg) by mouth every 4 hours as needed (Anxiety, Nausea/Vomiting or Sleep)    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)       MULTIPLE VITAMIN PO           ondansetron 8 MG tablet    ZOFRAN    30 tablet    Take 1 tablet (8 mg) by mouth every 8 hours as needed for nausea    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)       * order for DME      [employer] Auto CPAP 12-18 cm, F&P Simplus FFM small.    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)       prochlorperazine 10 MG tablet    COMPAZINE    30 tablet    Take 1 tablet (10 mg) by mouth every 6 hours as needed (Nausea/Vomiting)    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)       simvastatin 40 MG tablet    ZOCOR    90 tablet    Take 1 tablet (40 mg) by mouth At Bedtime    Hyperlipidemia LDL goal <130       tiotropium 18 MCG capsule    SPIRIVA    1 capsule    Inhale 1 capsule (18 mcg) into the lungs daily Inhale contents of one capsule    Chronic obstructive pulmonary disease, unspecified COPD type (H)       VITAMIN B 12 PO      Take 500 mcg by mouth daily        VITAMIN B6 PO      Take 1 tablet by mouth daily.        vitamin D 1000 UNITS capsule      2 CAPSULE DAILY         Zinc Gluconate 100 MG Tabs     30 tablet    Take 1 tablet by mouth daily    Dysgeusia       * Notice:  This list has 4 medication(s) that are the same as other medications prescribed for you. Read the directions carefully, and ask your doctor or other care provider to review them with you.

## 2017-10-10 NOTE — NURSING NOTE
Research labs drawn from already-accessed port.  Port de-accessed and flushed with NS.  Note that heparin was inadvertently omitted.  Called pt and left message for her to return for heparin flush.  Notified Jammie (supervisor).,    Kyra Liz RN

## 2017-10-10 NOTE — PROGRESS NOTES
10/10/2017    Referring Provider: Fara Bradshaw MD    Presenting Information:   I met with Ashleigh Alonzo today for genetic counseling at the Cancer Risk Management Program at the [facility] to discuss her personal history of breast cancer and family history of prostate cancer. She is here today to review this history, cancer screening recommendations, and available genetic testing options.    Personal History:  Ashleigh Perera is a 57 year old female. 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 Treatment has thus far included chemotherapy, which will be followed by surgery. Ashleigh Perera explained that her genetic testing results may help inform her future surgery decisions.      She had her first menstrual period at age 12, her first child at age 22, and is postmenopausal. Ashleigh Perera has her ovaries, fallopian tubes and uterus in place. She reports that she has never used hormone replacement therapy.      Her most recent OB-GYN exam and Pap smear in July 2017 were normal. She has annual clinical breast exams and mammograms; her most recent mammogram in July 2017 identified this cancer. Her most recent colonoscopy in November 2011 was normal and follow-up was recommended in 10 years. She does not regularly do any other cancer screening at this time. Ashleigh Perera reported that she quit smoking 15 years ago and occasionally drinks alcohol.    Family History: (Please see scanned pedigree for detailed family history information)    Ashleigh Perera's two daughters reportedly had negative genetic testing of the BRCA1 and BRCA2 genes because of their paternal family history; no test reports were available for review today.    One maternal uncle was diagnosed with throat cancer at an older age; he has a history of smoking.    One maternal uncle was diagnosed with prostate cancer at age 70.    Ashleigh Perera's maternal grandfather was diagnosed with prostate  "cancer in his 70's and passed away at age 74.    Ashleigh Perera's father passed away at age 52 and did not have a cancer history. He had a stroke and heart attacked at age 46. Ashleigh Perera confirmed that her primary care provider is aware of this history and is being screening appropriately.    One paternal aunt passed away in her 50's from an unknown cancer.    Ashleigh Perera's paternal grandfather was diagnosed with lung cancer at an older age; he had a history of smoking.    Her maternal ethnicity is Vietnamese and Pakistani. Her paternal ethnicity is Vietnamese, Pakistani, and English. There is no known Ashkenazi Church ancestry on either side of her family. There is no reported consanguinity.    Discussion:    Ashleigh Perera's personal history of breast cancer and family history of prostate cancer is suggestive of a hereditary cancer syndrome.    We reviewed the features of sporadic, familial, and hereditary cancers. In looking at Ashleigh Perera's family history, it is possible that a cancer susceptibility gene is present as Ashleigh Perera was diagnosed with a \"triple negative\" breast cancer and has two maternal relatives diagnosed with a related cancer (prostate). We discussed that hereditary breast cancers have a higher chance of being \"triple negative\" as compared to sporadic cancers. That being said, Ashleigh Perera does have several relatives that have never been diagnosed with a cancer, including her siblings and her mother.    We discussed the natural history and genetics of several hereditary cancer syndromes, including Hereditary Breast and Ovarian Cancer (HBOC) syndrome. A detailed handout regarding these syndromes and the information we discussed was provided to Ashleigh Perera at the end of our appointment today and can be found in the after visit summary. Topics included: inheritance pattern, cancer risks, cancer screening recommendations, and also risks, benefits and limitations of testing.    We reviewed that the most common cause of hereditary breast cancer is HBOC " syndrome, which is caused by mutations in the BRCA1 and BRCA2 genes. Individuals with HBOC syndrome are at increased risk for several different cancers, including breast, ovarian, male breast, prostate, melanoma, and pancreatic cancer.    Based on her personal and family history, Ashleigh Perera meets current National Comprehensive Cancer Network (NCCN) criteria for genetic testing of BRCA1 and BRCA2.      We discussed that there are also other additional genes that could cause increased risk for breast cancer. As many of these genes present with overlapping features in a family and accurate cancer risk cannot always be established based upon the pedigree analysis alone, it would be reasonable for Ashleigh Perera to consider panel genetic testing to analyze multiple genes at once.  We reviewed genetic testing options for hereditary breast cancer: guidelines-based high and moderate risk panel testing (BRCAplus, 8 genes) and expanded high and moderate risk panel testing (BreastNext, 17 genes). Ashleigh Perera expressed interest in learning as much information as possible from the testing. She opted for the BreastNext panel.  Genetic testing is available for 17 genes associated with increased risk for breast cancer: BreastNext (DASHA, BARD1, BRCA1, BRCA2, BRIP1, CDH1, CHEK2, MRE11A, MUTYH, NBN, NF1, PALB2, PTEN, RAD50, RAD51C, RAD51D, and TP53).  We discussed that some of the genes in the BreastNext panel are associated with specific hereditary cancer syndromes and published management guidelines: HBOC syndrome (BRCA1, BRCA2), Hereditary Diffuse Gastric Cancer (CDH1), Cowden syndrome (PTEN), Li Fraumeni syndrome (TP53), MUTYH Associated Polyposis (MUTYH), and Neurofibromatosis type 1 (NF1).   The DASHA, BRIP1, CHEK2, NBN, PALB2, RAD51C, and RAD51D genes are associated with moderate breast cancer risk and have published management guidelines for either breast and/or ovarian cancer risk management.    The remaining genes (BARD1, MRE11A, and RAD50)  are associated with moderate breast cancer risk and may allow us to make medical recommendations when mutations are identified.    Ashleigh Perera was provided with a detailed brochure from [employer] explaining the BreastNext testing.  Consent was obtained and genetic testing for BreastNext was sent to [facility]. Turn around time: 4 weeks.    Medical Management: For Ashleigh Perera, we reviewed that the information from genetic testing may determine:    surgery to treat Ashleigh Perera's active cancer diagnosis (i.e. lumpectomy versus bilateral mastectomy),    additional cancer screening for which Ashleigh Perera may qualify (i.e. mammogram and breast MRI, colonoscopies every 1-5 years, annual dermatologic exams, etc.),    options for risk reducing surgeries Ashleigh Perera could consider (i.e. surgery to remove her ovaries and/or uterus),      and targeted chemotherapies for Ashleigh Perera's active cancer, or if she were to develop certain cancers in the future (i.e. PARP inhibitors, etc.).     These recommendations and possible targeted chemotherapies will be discussed in detail once genetic testing is completed.     Plan:  1) Today Ashleigh Perera elected to proceed with genetic testing using the BreastNext gene panel offered by [facility] [facility].  2) This information should be available in 4 weeks.  3) Ashleigh Perera will return to clinic to discuss the results.    Face to face time: 45 minutes    Naty Segovia MS, [facility] [place]  Certified Genetic Counselor  Office: [phone]  Pager: [phone]

## 2017-10-10 NOTE — LETTER
Cancer Risk Management  Program Locations    [facility]  [facility]  Mailing Address  Cancer Risk Management Program  [facility]  [address]  [place] [address]    New patient appointments  [phone]  October 11, 2017    Ashleigh Alonzo  [address] [place] [address]      Dear Ashleigh Perera,    It was a pleasure meeting with you at the [facility] on October 10, 2017. Here is a copy of the progress note from your recent genetic counseling visit to the Cancer Risk Management Program. If you have any additional questions, please feel free to call.    10/10/2017    Referring Provider: Fara Bradshaw MD    Presenting Information:   I met with Ashleigh Alonzo today for genetic counseling at the Cancer Risk Management Program at the [facility] to discuss her personal history of breast cancer and family history of prostate cancer. She is here today to review this history, cancer screening recommendations, and available genetic testing options.    Personal History:  Ashleigh Perera is a 57 year old female. 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 Treatment has thus far included chemotherapy, which will be followed by surgery. Ashleigh Perera explained that her genetic testing results may help inform her future surgery decisions.      She had her first menstrual period at age 12, her first child at age 22, and is postmenopausal. Ashleigh Perera has her ovaries, fallopian tubes and uterus in place. She reports that she has never used hormone replacement therapy.      Her most recent OB-GYN exam and Pap smear in July 2017 were normal. She has annual clinical breast exams and mammograms; her most recent mammogram in July 2017  "identified this cancer. Her most recent colonoscopy in November 2011 was normal and follow-up was recommended in 10 years. She does not regularly do any other cancer screening at this time. Ashleigh Perera reported that she quit smoking 15 years ago and occasionally drinks alcohol.    Family History: (Please see scanned pedigree for detailed family history information)    Ashleigh Perera's two daughters reportedly had negative genetic testing of the BRCA1 and BRCA2 genes because of their paternal family history; no test reports were available for review today.    One maternal uncle was diagnosed with throat cancer at an older age; he has a history of smoking.    One maternal uncle was diagnosed with prostate cancer at age 70.    Ashleigh Perera's maternal grandfather was diagnosed with prostate cancer in his 70's and passed away at age 74.    Ashleigh Perera's father passed away at age 52 and did not have a cancer history. He had a stroke and heart attacked at age 46. Ashleigh Perera confirmed that her primary care provider is aware of this history and is being screening appropriately.    One paternal aunt passed away in her 50's from an unknown cancer.    Ashleigh Perera's paternal grandfather was diagnosed with lung cancer at an older age; he had a history of smoking.    Her maternal ethnicity is Taiwanese and Br[place]ian. Her paternal ethnicity is [place]ese, [place]ian, and English. There is no known Ashkenazi Bahai ancestry on either side of her family. There is no reported consanguinity.    Discussion:    Ashleigh Perera's personal history of breast cancer and family history of prostate cancer is suggestive of a hereditary cancer syndrome.    We reviewed the features of sporadic, familial, and hereditary cancers. In looking at Ashleigh Perera's family history, it is possible that a cancer susceptibility gene is present as Ashleigh Perera was diagnosed with a \"triple negative\" breast cancer and has two maternal relatives diagnosed with a related cancer (prostate). We discussed that hereditary " "breast cancers have a higher chance of being \"triple negative\" as compared to sporadic cancers. That being said, Ashleigh Perera does have several relatives that have never been diagnosed with a cancer, including her siblings and her mother.    We discussed the natural history and genetics of several hereditary cancer syndromes, including Hereditary Breast and Ovarian Cancer (HBOC) syndrome. A detailed handout regarding these syndromes and the information we discussed was provided to Ashleigh Perera at the end of our appointment today and can be found in the after visit summary. Topics included: inheritance pattern, cancer risks, cancer screening recommendations, and also risks, benefits and limitations of testing.    We reviewed that the most common cause of hereditary breast cancer is HBOC syndrome, which is caused by mutations in the BRCA1 and BRCA2 genes. Individuals with HBOC syndrome are at increased risk for several different cancers, including breast, ovarian, male breast, prostate, melanoma, and pancreatic cancer.    Based on her personal and family history, Ashleigh Perera meets current National Comprehensive Cancer Network (NCCN) criteria for genetic testing of BRCA1 and BRCA2.      We discussed that there are also other additional genes that could cause increased risk for breast cancer. As many of these genes present with overlapping features in a family and accurate cancer risk cannot always be established based upon the pedigree analysis alone, it would be reasonable for Ashleigh Perera to consider panel genetic testing to analyze multiple genes at once.  We reviewed genetic testing options for hereditary breast cancer: guidelines-based high and moderate risk panel testing (BRCAplus, 8 genes) and expanded high and moderate risk panel testing (BreastNext, 17 genes). Ashleigh Perera expressed interest in learning as much information as possible from the testing. She opted for the BreastNext panel.  Genetic testing is available for 17 genes " associated with increased risk for breast cancer: BreastNext (DASHA, BARD1, BRCA1, BRCA2, BRIP1, CDH1, CHEK2, MRE11A, MUTYH, NBN, NF1, PALB2, PTEN, RAD50, RAD51C, RAD51D, and TP53).  We discussed that some of the genes in the BreastNext panel are associated with specific hereditary cancer syndromes and published management guidelines: HBOC syndrome (BRCA1, BRCA2), Hereditary Diffuse Gastric Cancer (CDH1), Cowden syndrome (PTEN), Li Fraumeni syndrome (TP53), MUTYH Associated Polyposis (MUTYH), and Neurofibromatosis type 1 (NF1).   The DASHA, BRIP1, CHEK2, NBN, PALB2, RAD51C, and RAD51D genes are associated with moderate breast cancer risk and have published management guidelines for either breast and/or ovarian cancer risk management.    The remaining genes (BARD1, MRE11A, and RAD50) are associated with moderate breast cancer risk and may allow us to make medical recommendations when mutations are identified.    Ashleigh Perera was provided with a detailed brochure from [employer] explaining the BreastNext testing.  Consent was obtained and genetic testing for BreastNext was sent to [facility]. Turn around time: 4 weeks.    Medical Management: For Ashleigh Perera, we reviewed that the information from genetic testing may determine:    surgery to treat Ashleigh Perera's active cancer diagnosis (i.e. lumpectomy versus bilateral mastectomy),    additional cancer screening for which Ashleigh Perera may qualify (i.e. mammogram and breast MRI, colonoscopies every 1-5 years, annual dermatologic exams, etc.),    options for risk reducing surgeries Ashleigh Perera could consider (i.e. surgery to remove her ovaries and/or uterus),      and targeted chemotherapies for Ashleigh Perera's active cancer, or if she were to develop certain cancers in the future (i.e. PARP inhibitors, etc.).     These recommendations and possible targeted chemotherapies will be discussed in detail once genetic testing is completed.     Plan:  1) Today Ashleigh Perera elected to proceed with  genetic testing using the BreastNext gene panel offered by "[employer]".  2) This information should be available in 4 weeks.  3) Ashleigh Perera will return to clinic to discuss the results.    Face to face time: 45 minutes    Naty Segovia MS, [facility] [place]  Certified Genetic Counselor  Office: [phone]  Pager: [phone]

## 2017-10-10 NOTE — MR AVS SNAPSHOT
After Visit Summary   10/10/2017    Ashleigh Alonzo    MRN: [id number]           Patient Information     Date Of Birth          1960        Visit Information        Provider Department      10/10/2017 7:30 AM Fara Bradshaw MD [facility]        Today's Diagnoses     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)    -  1    Dysgeusia        Malignant neoplasm of right breast in female, estrogen receptor negative, unspecified site of breast (H)        Family history of prostate cancer           Follow-ups after your visit        Your next 10 appointments already scheduled     Oct 17, 2017  6:30 AM CDT   M[facility]ic Lab Draw with [facility] MASONIC LAB DRAW   [facility] Mason[facility] Lab Draw ([facility])    [address] [facility] [address]   [phone]            Oct 17, 2017  7:00 AM CDT   (Arrive by 6:45 AM)   Return Visit with EDDIE Oliva   [facility] ([facility])    [address] [facility] [address]   [phone]            Oct 17, 2017  8:00 AM CDT   Infusion 180 with UC ONCOLOGY INFUSION, UC 15 ATC   [facility] ([facility])    [address] [facility] [address]   [phone]            Oct 24, 2017 12:30 PM CDT   Masonic Lab Draw with [facility] MASONIC LAB DRAW   [facility] Mason[facility] Lab Draw ([facility])    [address] [facility] [address]   [phone]            Oct 24, 2017  1:00 PM CDT   (Arrive by 12:45 PM)   Return Visit with EDDIE Oliva   [facility] ([facility])    [address] [facility] [address]   [phone]            Oct 24, 2017  2:30 PM CDT   Infusion 180 with UC ONCOLOGY INFUSION, UC 19  ATC   [facility] ([facility])    [address] [address] [phone]            Oct 31, 2017  7:45 AM CDT   Masonic Lab Draw with [facility] MASONIC LAB DRAW   [facility] Lab Draw ([facility])    [address] [address] [phone]            Oct 31, 2017  8:15 AM CDT   (Arrive by 8:00 AM)   Return Visit with Fara Bradshaw MD   [facility] ([facility])    [address] [place] [id number] [phone]              Who to contact     If you have questions or need follow up information about today's clinic visit or your schedule please contact [facility] directly at [phone].  Normal or non-critical lab and imaging results will be communicated to you by MyChart, letter or phone within 4 business days after the clinic has received the results. If you do not hear from us within 7 days, please contact the clinic through [employer] [place] or phone. If you have a critical or abnormal lab result, we will notify you by phone as soon as possible.  Submit refill requests through [employer] or call your pharmacy and they will forward the refill request to us. Please allow 3 business days for your refill to be completed.          Additional Information About Your Visit        MyChart Information     [employer] gives you secure access to your electronic health record. If you see a primary care provider, you can also send messages to your care team and make appointments. If you have questions, please call your primary care clinic.  If you do not have a primary care provider, please call [phone] and they will assist you.        Care EveryWhere ID     This is your Care EveryWhere ID. This could be used by other organizations to access your [place] medical records  [id number]        Your  "Vitals Were     Pulse Temperature Respirations Height Pulse Oximetry BMI (Body Mass Index)    109 98.8  F (37.1  C) (Oral) 16 1.6 m (5' 2.99\") 95% 29.91 kg/m2       Blood Pressure from Last 3 Encounters:   10/10/17 112/68   10/03/17 115/75   09/26/17 132/89    Weight from Last 3 Encounters:   10/10/17 76.6 kg (168 lb 12.8 oz)   10/03/17 78.3 kg (172 lb 9.9 oz)   09/26/17 77.4 kg (170 lb 11.2 oz)              We Performed the Following     BreastNext genetic testing, [place] Test: Laboratory Miscellaneous Order     Send outs misc test          Today's Medication Changes          These changes are accurate as of: 10/10/17 11:59 PM.  If you have any questions, ask your nurse or doctor.               Start taking these medicines.        Dose/Directions    Zinc Gluconate 100 MG Tabs   Used for:  Dysgeusia   Started by:  Fara Bradshaw MD        Dose:  1 tablet   Take 1 tablet by mouth daily   Quantity:  30 tablet   Refills:  3            Where to get your medicines      These medications were sent to [address] [place] [address] [address] [place] [address]    Hours:  TRANSPLANT PHONE NUMBER [phone] Phone:  [phone]     Zinc Gluconate 100 MG Tabs                Primary Care Provider Office Phone # Fax #    Radha Cazares [phone]       [address] [address]        Equal Access to Services     THERESA HUGHES AH: Hadjose martin mcdaniel hadasho Soomaali, waaxda luqadaha, qaybta kaalmada adeegyajacqui, bang pickering. So [facility] [phone].    ATENCIÓN: Si habla español, tiene a blackman disposición servicios gratuitos de asistencia lingüística. Llame al [phone].    We comply with applicable federal civil rights laws and [place] laws. We do not discriminate on the basis of race, color, national origin, age, disability, sex, sexual orientation, or " gender identity.            Thank you!     Thank you for choosing [facility]  for your care. Our goal is always to provide you with excellent care. Hearing back from our patients is one way we can continue to improve our services. Please take a few minutes to complete the written survey that you may receive in the mail after your visit with us. Thank you!             Your Updated Medication List - Protect others around you: Learn how to safely use, store and throw away your medicines at www.disposemymeds.org.          This list is accurate as of: 10/10/17 11:59 PM.  Always use your most recent med list.                   Brand Name Dispense Instructions for use Diagnosis    albuterol 108 (90 BASE) MCG/ACT Inhaler    PROAIR HFA/PROVENTIL HFA/VENTOLIN HFA    1 Inhaler    Inhale 2 puffs into the lungs every 6 hours as needed for shortness of breath / dyspnea    Chronic obstructive pulmonary disease, unspecified COPD type (H)       aspirin 81 MG tablet      Take 1 tablet by mouth daily.        citalopram 10 MG tablet    celeXA    90 tablet    Take 1 tablet (10 mg) by mouth daily    Anxiety, Depression, unspecified depression type       CO Q 10 PO      Take 1 tablet by mouth        darifenacin 7.5 MG 24 hr tablet    ENABLEX    90 tablet    Take 1 tablet (7.5 mg) by mouth daily    Overactive bladder       guaiFENesin 600 MG 12 hr tablet    MUCINEX    180 tablet    Take 2 tablets (1,200 mg) by mouth 2 times daily    Cough with sputum       hydrOXYzine 25 MG tablet    ATARAX    100 tablet    Take 1-2 tablets (25-50 mg) by mouth every 6 hours as needed for itching    Hives       lidocaine-prilocaine cream    EMLA    30 g    Please apply to port site 30 minutes before use prn    Personal history of malignant neoplasm of breast       * LORazepam 0.5 MG tablet    ATIVAN    10 tablet    Take 1-2 tabs 20 minutes prior to MRI scans.    Breast cancer (H), Anxiety       * LORazepam 0.5 MG tablet    ATIVAN    30  tablet    Take 1 tablet (0.5 mg) by mouth every 4 hours as needed (Anxiety, Nausea/Vomiting or Sleep)    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)       MULTIPLE VITAMIN PO           ondansetron 8 MG tablet    ZOFRAN    30 tablet    Take 1 tablet (8 mg) by mouth every 8 hours as needed for nausea    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)       * order for DME      [email or web address] Dream Station Auto CPAP 12-18 cm, F&P Simplus FFM small.    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)       prochlorperazine 10 MG tablet    COMPAZINE    30 tablet    Take 1 tablet (10 mg) by mouth every 6 hours as needed (Nausea/Vomiting)    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)       simvastatin 40 MG tablet    ZOCOR    90 tablet    Take 1 tablet (40 mg) by mouth At Bedtime    Hyperlipidemia LDL goal <130       tiotropium 18 MCG capsule    SPIRIVA    1 capsule    Inhale 1 capsule (18 mcg) into the lungs daily Inhale contents of one capsule    Chronic obstructive pulmonary disease, unspecified COPD type (H)       VITAMIN B 12 PO      Take 500 mcg by mouth daily        VITAMIN B6 PO      Take 1 tablet by mouth daily.        vitamin D 1000 UNITS capsule      2 CAPSULE DAILY        Zinc Gluconate 100 MG Tabs     30 tablet    Take 1 tablet by mouth daily    Dysgeusia       * Notice:  This list has 4 medication(s) that are the same as other medications prescribed for you. Read the directions carefully, and ask your doctor or other care provider to review them with you.

## 2017-10-10 NOTE — PATIENT INSTRUCTIONS
Assessing Cancer Risk  Only about 5-10% of cancers are thought to be due to an inherited cancer susceptibility gene.    These families often have:    Several people with the same or related types of cancer    Cancers diagnosed at a young age (before age 50)    Individuals with more than one primary cancer    Multiple generations of the family affected with cancer    Some people may be candidates for genetic testing of more than one gene.  For these families, genetic testing using a multi-gene cancer panel may be offered.  These panels may test genes known to increase the risk for breast (and other) cancers: DASHA, BRCA1, BRCA2, CDH1, CHEK2, PALB2, PTEN, and TP53.  The purpose of this handout is to serve as a brief summary of the high/moderate-risk breast cancer genes which have published clinical management guidelines for individuals who are found to carry a mutation.    Hereditary Breast and Ovarian Cancer Syndrome (HBOC)  A single mutation in one of the copies of BRCA1 or BRCA2 increases the risk for breast and ovarian cancer, among others.  The risk for pancreatic cancer and melanoma may also be slightly increased in some families.  The tables below list the chance that someone with a BRCA mutation would develop cancer in his or her lifetime1,2,3,4.          Women   Men     General Population BRCA+    General Population BRCA+   Breast  12% 40-80%  Breast <1% ~7%   Ovarian  1-2% 10-40%  Prostate 16% 20%     A person s ethnic background is also important to consider, as individuals of Ashkenazi Latter day ancestry have a higher chance of having a BRCA gene mutation.  There are three BRCA mutations that occur more frequently in this population.    Li-Fraumeni Syndrome (LFS)  LFS is a cancer predisposition syndrome. Individuals with LFS are at an increased risk for developing cancer at a young age. The general lifetime risk for development of cancer is 50% by age 30 and 90% by age 60.  LFS is caused by a mutation in the  TP53 gene.  A single mutation in one of the copies of TP53 increases the risk for multiple cancers.     Core Cancers: Sarcomas, Breast, Brain, Lung, Leukemias/Lymphomas, Adrenocortical carcinomas  Other Cancers: Gastrointestinal, Thyroid, Skin, Genitourinary        Cowden Syndrome  Cowden syndrome is a hereditary condition that increases the risk for breast, thyroid, endometrial, and kidney cancer.  Cowden syndrome is caused by a mutation in the PTEN gene.  A single mutation in one of the copies of PTEN causes Cowden syndrome and increases cancer risk.  The table below shows the chance that someone with a PTEN mutation would develop cancer in their lifetime5,6.  Other benign features seen in some individuals with Cowden syndrome include benign skin lesions (facial papules, keratoses, lipomas), learning disability, autism, thyroid nodules, colon polyps, and larger head size.      Lifetime Cancer Risk   Cancer Type General Population Cowden Syndrome   Breast  12% 25-50%*   Thyroid  1% 35%   Renal 1-2% 35%   Endometrial  2-3% 28%   Colon 5% 9%   Melanoma 2-3% >5%     *One recent study found breast cancer risk to be increased to 85%    Hereditary Diffuse Gastric Cancer (HDGC)  Currently, one gene is known to cause hereditary diffuse gastric cancer: CDH1.  Individuals with HDGC are at increased risk for diffuse gastric cancer and lobular breast cancer. Of people diagnosed with HDGC, 30-50% have a mutation in the CDH1 gene.  This suggests there are likely other genes that may cause HDGC that have not been identified yet.      Lifetime Cancer Risks    General Pop HDGC    Diffuse Gastric  <1% ~80%   Breast 12% 39-52%     Additional Genes Associated with Increased Breast Cancer Risk  PALB2  Mutations in PALB2 have been shown to increase the risk of breast cancer up to 33-58% in some families; where individuals fall within this risk range is dependent upon family history.9 PALB2 mutations have also been associated with  increased risk for pancreatic cancer, although this risk has not been quantified yet.  Individuals who inherit two PALB2 mutations--one from their mother and one from their father--have a condition called Fanconi Anemia.  This rare autosomal recessive condition is associated with short stature, developmental delay, bone marrow failure, and increased risk for childhood cancers.    DASHA  DASHA is a moderate-risk breast cancer gene. Women who have a mutation in DASHA can have between a 2-4 fold increased risk for breast cancer compared to the general population.10  DASHA mutations have also been associated with increased risk for pancreatic cancer, however an estimate of this cancer risk is not well understood.11  Individuals who inherit two DASHA mutations have a condition called ataxia-telangiectasia (AT).  This rare autosomal recessive condition affects the nervous system and immune system, and is associated with progressive cerebellar ataxia beginning in childhood.  Individuals with ataxia-telangiectasia often have a weakened immune system and have an increased risk for childhood cancers.           CHEK2   CHEK2 is a moderate-risk breast cancer gene.  Women who have a mutation in CHEK2 have around a 2-fold increased risk for breast cancer compared to the general population, and this risk may be higher depending upon family history.12,13,14  Mutations in CHEK2 have also been shown to increase the risk of a number of other cancers, including colon and prostate, however these cancer risks are currently not well understood.         Inheritance   All of the genes reviewed above are inherited in an autosomal dominant pattern.  This means that if a parent has a mutation, each of his or her children will have a 50% chance of inheriting that same mutation.  Therefore, each child--male or female--would have a 50% chance of being at increased risk for developing cancer.      Image obtained from [employer] Home Reference, 2013      Mutations in some genes can occur de leatha, which means that a person s mutation occurred for the first time in them and was not inherited from a parent.  Now that they have the mutation, however, it can be passed on to future generations.    Genetic Testing  Genetic testing involves a blood test and will look for any harmful mutations within genes that are associated with increased cancer risk.  If possible, it is recommended that the person(s) who has had cancer be tested before other family members.  That person will give us the most useful information about whether or not a specific gene is associated with the cancer in the family.    Results  There are three possible results of genetic testing:    Positive--a harmful mutation was identified in one or more of the genes    Negative--no mutation was identified in any of the genes on this panel    Variant of unknown significance--a variation in one of the genes was identified, but it is unclear how this impacts cancer risk in the family    Advantages and Disadvantages  There are advantages and disadvantages to genetic testing.  Advantages    May clarify your cancer risk    Can help you make medical decisions    May explain the cancers in your family    May give useful information to your family members (if you share your results)    Disadvantages    Possible negative emotional impact of learning about inherited cancer risk    Uncertainty in interpreting a negative test result in some situations    Possible genetic discrimination concerns (see below)    Genetic Information Nondiscrimination Act (BLACK)  BLACK is a federal law that protects individuals from health insurance or employment discrimination based on a genetic test result alone.  Although rare, there are currently no legal discrimination protections in terms of life insurance, long term care, or disability insurances.  Visit the National Human Genome Research [place] genome.gov/83180885 to learn  more.    Reducing Cancer Risk  Each of the genes listed within this handout have nationally recognized cancer screening guidelines that would be recommended for individuals who test positive.  In addition to increased cancer screening, surgeries may be offered or recommended to reduce cancer risk.  Recommendations are based upon an individual s genetic test result as well as their personal and family history of cancer.    Questions to Think About Regarding Genetic Testing    What effect will the test result have on me and my relationship with my family members if I have an inherited gene mutation?  If I don t have a gene mutation?    Should I share my test results, and how will my family react to this news, which may also affect them?    Are my children ready to learn new information that may one day affect their own health?      Resources  FORCE: Facing Our Risk of Cancer Empowered facingourrisk.org   Bright Pink bebrightpink.org   Li-Fraumeni Syndrome Association lfsassociation.org   PTEN World PTENworld.com   No stomach for cancer, Inc. nostomachforcancer.org   Stomach cancer relief network scrnet.org   Collaborative Group of the Americas on Inherited Colorectal Cancer (CGA) cgaicc.com    Cancer Care cancercare.org   American Cancer Society (ACS) cancer.org   National Cancer [place] (NCI) cancer.gov     Cancer Risk Management Program [phone] ([phone])  ? Ashleigh Kassandra, MS, [facility]  [phone]  ? Caren Kandis, MS, [facility]  [phone]  ? Naty Segovia, MS, [facility]  [phone]  ? Aparna Dia, MS, [facility]  [phone]   ? Anu Sevilla, MS, [facility]  [phone]    References  1. Eric Patrick PDP, Celestina S, Julieta FERMIN, Neville JE, Gela JL, Sademan N, Raissa H, Paul O, Lexie A, Emmett B, Meri P, Christine S, Liz DM, Keo N, Ryann E, Ernie H, Eddie E, Adolfo J, Kristan J, Alethea B, Tulinius H, Thorlacius S, Eerola H, Cristi H, Arlene K, Kim OP. Average risks of breast and ovarian  cancer associated with BRCA1 or BRCA2 mutations detected in case series unselected for family history: a combined analysis of 222 studies. Am J Hum Liz. 2003;72:1117-30.  2. Isidro N, Lindsay MACDONALD, Brannon G.  BRCA1 and BRCA2 Hereditary Breast and Ovarian Cancer. Gene Reviews online. 2013.  3. Tato YC, Ej S, Toby G, Char S. Breast cancer risk among male BRCA1 and BRCA2 mutation carriers. J Natl Cancer Inst. 2007;99:1811-4.  4. Russ MANUEL, Dillan I, Vinayak J, Eulogio E, Kirk ER, Zina F. Risk of breast cancer in male BRCA2 carriers. J Med Liz. 2010;47:710-1.  5. Ye MH, Josias J, Elena J, Miles ROSA, Amrik MS, Diana C. Lifetime cancer risks in individuals with germline PTEN mutations. Clin Cancer Res. 2012;18:400-7.  6. Yelenaki R. Cowden Syndrome: A Critical Review of the Clinical Literature. J Liz . 2009:18:13-27.  7. National Comprehensive Cancer Network. Clinical practice guidelines in oncology, colorectal cancer screening. Available online (registration required). 2013.  8. National Cancer [place]. SEER Cancer Stat Fact Sheets.  December 2013.  9. Nolberto GALARZA, et al. Breast-Cancer Risk in Families with Mutations in PALB2. NEJM. 2014; 371(6):497-506.  10. Brittney SRIVASTAVA, Gaston D, Elizabeth S, Mariama P, Jacquelinei T, Rashida M, Howie B, Grey H, Billy R, Meghan K, Mahogany L, Russ MANUEL, Liz D, Hosea DF, Brayan MR, The Breast Cancer Susceptibility Collaboration (UK) & Bernardo N. DASHA mutations that cause ataxia-telangiectasia are breast cancer susceptibility alleles. Nature Genetics. 2006;38:873-875  11. Marshall N , Karen Y, Seema HAWTHORNE, Flip L, Wicho GM , Yeison ML, Wiliam S, Hernandez AG, Syngal S, Kali ML, Laura J , Jo Ann R, Dom SZ, Rodolfo JR, Taylor VE, Jerome M, Vogelstein B, Zayda N, Annika RH, Thomas KW, and Radha AP. DASHA mutations in patients with hereditary pancreatic cancer. Cancer Discover. 2012;2:41-46  12. CHEK2 Breast Cancer Case-Control Consortium.  CHEK2*1100delC and susceptibility to breast cancer: A collaborative analysis involving 10,860 breast cancer cases and 9,065 controls from 10 studies. Am J Hum Liz, 74 (2004), pp. 4725-6069  13. Lorraine T, Carl S, Go K, et al. Spectrum of Mutations in BRCA1, BRCA2, CHEK2, and TP53 in Families at High Risk of Breast Cancer. RUSSELL. 2006;295(12):8433-1462.   14. Sean RAMIRES, Ira MCCORMICK, Cleopatra SRIVASTAVA, et al. Risk of breast cancer in women with a CHEK2 mutation with and without a family history of breast cancer. J Clin Oncol. 2011;29:0691-8006

## 2017-10-10 NOTE — PROGRESS NOTES
Infusion Nursing Note:  Ashleigh Alonzo presents today for Day 1 Cycle 4 Pembrolizumab and Taxol .    Patient seen by provider today: Yes: Dr Bradshaw   present during visit today: Not Applicable.    Note: N/A.    Intravenous Access:  Implanted Port.    Treatment Conditions:  Lab Results   Component Value Date    HGB 11.4 10/10/2017     Lab Results   Component Value Date    WBC 3.3 10/10/2017      Lab Results   Component Value Date    ANEU 1.6 10/10/2017     Lab Results   Component Value Date     10/10/2017      Lab Results   Component Value Date     10/10/2017                   Lab Results   Component Value Date    POTASSIUM 3.6 10/10/2017           Lab Results   Component Value Date    MAG 2.1 09/05/2017            Lab Results   Component Value Date    CR 0.70 10/10/2017                   Lab Results   Component Value Date    JAVIER 8.8 10/10/2017                Lab Results   Component Value Date    BILITOTAL 0.6 10/10/2017           Lab Results   Component Value Date    ALBUMIN 3.1 10/10/2017                    Lab Results   Component Value Date    ALT 49 10/10/2017           Lab Results   Component Value Date    AST 45 10/10/2017     Results reviewed, labs MET treatment parameters, ok to proceed with treatment.          Post Infusion Assessment:  Patient tolerated infusion without incident.  Patient observed for 30 minutes post Pembrolizumab per protocol.  Blood return noted pre and post infusion.  Site patent and intact, free from redness, edema or discomfort.  No evidence of extravasations.  Access discontinued per protocol.    Discharge Plan:   Patient declined prescription refills.  Discharge instructions reviewed with: Patient and Family.  Patient and/or family verbalized understanding of discharge instructions and all questions answered.  Copy of AVS reviewed with patient and/or family.  Patient will return 10/17  for next appointment.  Patient discharged in stable condition accompanied  by: self and .  Departure Mode: Ambulatory.    Haley Toth RN

## 2017-10-10 NOTE — NURSING NOTE
"Oncology Rooming Note    October 10, 2017 7:30 AM   Ashleigh Alonzo is a 57 year old female who presents for:    Chief Complaint   Patient presents with     Port Draw     Labs drawn from port by RN. Line flushed with saline and heparin. Vitals taken and pt checked in for appt     Oncology Clinic Visit     Return: Breast CA     Initial Vitals: /68 (BP Location: Right arm, Cuff Size: Adult Large)  Pulse 109  Temp 98.8  F (37.1  C) (Oral)  Resp 16  Ht 1.6 m (5' 2.99\")  Wt 76.6 kg (168 lb 12.8 oz)  SpO2 95%  BMI 29.91 kg/m2 Estimated body mass index is 29.91 kg/(m^2) as calculated from the following:    Height as of this encounter: 1.6 m (5' 2.99\").    Weight as of this encounter: 76.6 kg (168 lb 12.8 oz). Body surface area is 1.85 meters squared.  No Pain (0) Comment: Data Unavailable   No LMP recorded. Patient is postmenopausal.  Allergies reviewed: Yes  Medications reviewed: Yes    Medications: Medication refills not needed today.  Pharmacy name entered into [facility]:    [facility] [place], [place] - [address] [place] [facility] - [place], [place] - [address] [address]    Clinical concerns: no new concerns     6 minutes for nursing intake (face to face time)     Mira Morales CMA  Unable to give flu vaccine to patient for research reasons.  Mira Morales CMA                  "

## 2017-10-12 ENCOUNTER — HOSPITAL ENCOUNTER (EMERGENCY)
Facility: CLINIC | Age: 57
Discharge: HOME | End: 2017-10-12
Attending: INTERNAL MEDICINE | Admitting: INTERNAL MEDICINE
Payer: COMMERCIAL

## 2017-10-12 ENCOUNTER — TELEPHONE (OUTPATIENT)
Dept: ONCOLOGY | Facility: CLINIC | Age: 57
End: 2017-10-12

## 2017-10-12 ENCOUNTER — APPOINTMENT (OUTPATIENT)
Dept: GENERAL RADIOLOGY | Facility: CLINIC | Age: 57
End: 2017-10-12
Attending: INTERNAL MEDICINE
Payer: COMMERCIAL

## 2017-10-12 VITALS
HEIGHT: 64 IN | HEART RATE: 84 BPM | TEMPERATURE: 97.8 F | RESPIRATION RATE: 18 BRPM | BODY MASS INDEX: 28.68 KG/M2 | SYSTOLIC BLOOD PRESSURE: 103 MMHG | WEIGHT: 168 LBS | DIASTOLIC BLOOD PRESSURE: 60 MMHG | OXYGEN SATURATION: 95 %

## 2017-10-12 DIAGNOSIS — Z17.1 MALIGNANT NEOPLASM OF UPPER-INNER QUADRANT OF RIGHT BREAST IN FEMALE, ESTROGEN RECEPTOR NEGATIVE (H): ICD-10-CM

## 2017-10-12 DIAGNOSIS — R50.9 FEVER IN ADULT: ICD-10-CM

## 2017-10-12 DIAGNOSIS — D70.9 NEUTROPENIA WITH FEVER (H): ICD-10-CM

## 2017-10-12 DIAGNOSIS — R50.81 NEUTROPENIA WITH FEVER (H): ICD-10-CM

## 2017-10-12 DIAGNOSIS — C50.211 MALIGNANT NEOPLASM OF UPPER-INNER QUADRANT OF RIGHT BREAST IN FEMALE, ESTROGEN RECEPTOR NEGATIVE (H): ICD-10-CM

## 2017-10-12 DIAGNOSIS — Z92.21 S/P CHEMOTHERAPY, TIME SINCE LESS THAN 4 WEEKS: ICD-10-CM

## 2017-10-12 LAB
ALBUMIN SERPL-MCNC: 2.8 G/DL (ref 3.4–5)
ALBUMIN UR-MCNC: NEGATIVE MG/DL
ALP SERPL-CCNC: 114 U/L (ref 40–150)
ALT SERPL W P-5'-P-CCNC: 34 U/L (ref 0–50)
ANION GAP SERPL CALCULATED.3IONS-SCNC: 9 MMOL/L (ref 3–14)
APPEARANCE UR: CLEAR
AST SERPL W P-5'-P-CCNC: 36 U/L (ref 0–45)
BASOPHILS # BLD AUTO: 0 10E9/L (ref 0–0.2)
BASOPHILS NFR BLD AUTO: 0 %
BILIRUB SERPL-MCNC: 0.9 MG/DL (ref 0.2–1.3)
BILIRUB UR QL STRIP: NEGATIVE
BUN SERPL-MCNC: 6 MG/DL (ref 7–30)
CA-I BLD-MCNC: 4.4 MG/DL (ref 4.4–5.2)
CALCIUM SERPL-MCNC: 8 MG/DL (ref 8.5–10.1)
CHLORIDE SERPL-SCNC: 103 MMOL/L (ref 94–109)
CO2 SERPL-SCNC: 26 MMOL/L (ref 20–32)
COLOR UR AUTO: NORMAL
CREAT SERPL-MCNC: 0.64 MG/DL (ref 0.52–1.04)
CRP SERPL-MCNC: 84 MG/L (ref 0–8)
DIFFERENTIAL METHOD BLD: ABNORMAL
EOSINOPHIL # BLD AUTO: 0.1 10E9/L (ref 0–0.7)
EOSINOPHIL NFR BLD AUTO: 4.6 %
ERYTHROCYTE [DISTWIDTH] IN BLOOD BY AUTOMATED COUNT: 12.2 % (ref 10–15)
GFR SERPL CREATININE-BSD FRML MDRD: >90 ML/MIN/1.7M2
GLUCOSE SERPL-MCNC: 70 MG/DL (ref 70–99)
GLUCOSE UR STRIP-MCNC: NEGATIVE MG/DL
HCT VFR BLD AUTO: 32.5 % (ref 35–47)
HGB BLD-MCNC: 10.9 G/DL (ref 11.7–15.7)
HGB UR QL STRIP: NEGATIVE
HYALINE CASTS #/AREA URNS LPF: 1 /LPF (ref 0–2)
IMM GRANULOCYTES # BLD: 0 10E9/L (ref 0–0.4)
IMM GRANULOCYTES NFR BLD: 0 %
INR PPP: 1.1 (ref 0.86–1.14)
KETONES UR STRIP-MCNC: NEGATIVE MG/DL
LACTATE BLD-SCNC: 0.8 MMOL/L (ref 0.7–2)
LEUKOCYTE ESTERASE UR QL STRIP: NEGATIVE
LYMPHOCYTES # BLD AUTO: 0.9 10E9/L (ref 0.8–5.3)
LYMPHOCYTES NFR BLD AUTO: 28.3 %
MCH RBC QN AUTO: 32.8 PG (ref 26.5–33)
MCHC RBC AUTO-ENTMCNC: 33.5 G/DL (ref 31.5–36.5)
MCV RBC AUTO: 98 FL (ref 78–100)
MONOCYTES # BLD AUTO: 0.2 10E9/L (ref 0–1.3)
MONOCYTES NFR BLD AUTO: 6.9 %
NEUTROPHILS # BLD AUTO: 1.8 10E9/L (ref 1.6–8.3)
NEUTROPHILS NFR BLD AUTO: 60.2 %
NITRATE UR QL: NEGATIVE
NRBC # BLD AUTO: 0 10*3/UL
NRBC BLD AUTO-RTO: 0 /100
PH UR STRIP: 5.5 PH (ref 5–7)
PLATELET # BLD AUTO: 337 10E9/L (ref 150–450)
POTASSIUM SERPL-SCNC: 3.7 MMOL/L (ref 3.4–5.3)
PROT SERPL-MCNC: 6.9 G/DL (ref 6.8–8.8)
RBC # BLD AUTO: 3.32 10E12/L (ref 3.8–5.2)
RBC #/AREA URNS AUTO: <1 /HPF (ref 0–2)
SODIUM SERPL-SCNC: 138 MMOL/L (ref 133–144)
SOURCE: NORMAL
SP GR UR STRIP: 1 (ref 1–1.03)
UROBILINOGEN UR STRIP-MCNC: NORMAL MG/DL (ref 0–2)
WBC # BLD AUTO: 3 10E9/L (ref 4–11)
WBC #/AREA URNS AUTO: <1 /HPF (ref 0–2)

## 2017-10-12 PROCEDURE — 86140 C-REACTIVE PROTEIN: CPT | Performed by: INTERNAL MEDICINE

## 2017-10-12 PROCEDURE — 25000128 H RX IP 250 OP 636: Performed by: PHYSICIAN ASSISTANT

## 2017-10-12 PROCEDURE — 99284 EMERGENCY DEPT VISIT MOD MDM: CPT | Mod: 25 | Performed by: INTERNAL MEDICINE

## 2017-10-12 PROCEDURE — 85610 PROTHROMBIN TIME: CPT | Performed by: INTERNAL MEDICINE

## 2017-10-12 PROCEDURE — 87040 BLOOD CULTURE FOR BACTERIA: CPT | Performed by: INTERNAL MEDICINE

## 2017-10-12 PROCEDURE — 82330 ASSAY OF CALCIUM: CPT | Performed by: INTERNAL MEDICINE

## 2017-10-12 PROCEDURE — 99285 EMERGENCY DEPT VISIT HI MDM: CPT | Mod: Z6 | Performed by: INTERNAL MEDICINE

## 2017-10-12 PROCEDURE — 25000132 ZZH RX MED GY IP 250 OP 250 PS 637: Performed by: INTERNAL MEDICINE

## 2017-10-12 PROCEDURE — 96360 HYDRATION IV INFUSION INIT: CPT | Performed by: INTERNAL MEDICINE

## 2017-10-12 PROCEDURE — 87086 URINE CULTURE/COLONY COUNT: CPT | Performed by: INTERNAL MEDICINE

## 2017-10-12 PROCEDURE — 25000128 H RX IP 250 OP 636: Performed by: INTERNAL MEDICINE

## 2017-10-12 PROCEDURE — 96523 IRRIG DRUG DELIVERY DEVICE: CPT

## 2017-10-12 PROCEDURE — 71020 XR CHEST 2 VW: CPT

## 2017-10-12 PROCEDURE — 85025 COMPLETE CBC W/AUTO DIFF WBC: CPT | Performed by: INTERNAL MEDICINE

## 2017-10-12 PROCEDURE — 81001 URINALYSIS AUTO W/SCOPE: CPT | Performed by: INTERNAL MEDICINE

## 2017-10-12 PROCEDURE — 83605 ASSAY OF LACTIC ACID: CPT | Performed by: INTERNAL MEDICINE

## 2017-10-12 PROCEDURE — 80053 COMPREHEN METABOLIC PANEL: CPT | Performed by: INTERNAL MEDICINE

## 2017-10-12 RX ORDER — LEVOFLOXACIN 500 MG/1
500 TABLET, FILM COATED ORAL DAILY
Qty: 7 TABLET | Refills: 0 | Status: SHIPPED | OUTPATIENT
Start: 2017-10-12 | End: 2017-10-24

## 2017-10-12 RX ORDER — HEPARIN SODIUM (PORCINE) LOCK FLUSH IV SOLN 100 UNIT/ML 100 UNIT/ML
5 SOLUTION INTRAVENOUS ONCE
Status: COMPLETED | OUTPATIENT
Start: 2017-10-12 | End: 2017-10-12

## 2017-10-12 RX ORDER — ACETAMINOPHEN 325 MG/1
975 TABLET ORAL ONCE
Status: COMPLETED | OUTPATIENT
Start: 2017-10-12 | End: 2017-10-12

## 2017-10-12 RX ORDER — LEVOFLOXACIN 500 MG/1
500 TABLET, FILM COATED ORAL ONCE
Status: COMPLETED | OUTPATIENT
Start: 2017-10-12 | End: 2017-10-12

## 2017-10-12 RX ORDER — HEPARIN SODIUM (PORCINE) LOCK FLUSH IV SOLN 100 UNIT/ML 100 UNIT/ML
5 SOLUTION INTRAVENOUS
Status: DISCONTINUED | OUTPATIENT
Start: 2017-10-12 | End: 2017-10-13 | Stop reason: HOSPADM

## 2017-10-12 RX ADMIN — LEVOFLOXACIN 500 MG: 500 TABLET, FILM COATED ORAL at 22:56

## 2017-10-12 RX ADMIN — ACETAMINOPHEN 975 MG: 325 TABLET, FILM COATED ORAL at 19:32

## 2017-10-12 RX ADMIN — SODIUM CHLORIDE 1000 ML: 9 INJECTION, SOLUTION INTRAVENOUS at 19:34

## 2017-10-12 RX ADMIN — SODIUM CHLORIDE, PRESERVATIVE FREE 5 ML: 5 INJECTION INTRAVENOUS at 22:56

## 2017-10-12 RX ADMIN — SODIUM CHLORIDE, PRESERVATIVE FREE 5 ML: 5 INJECTION INTRAVENOUS at 14:34

## 2017-10-12 ASSESSMENT — ENCOUNTER SYMPTOMS
HEADACHES: 1
ARTHRALGIAS: 0
DIARRHEA: 0
FEVER: 1
VOMITING: 0
SHORTNESS OF BREATH: 0
CONFUSION: 0
NAUSEA: 0
DYSURIA: 0
ADENOPATHY: 0
SORE THROAT: 0
COUGH: 0
ABDOMINAL PAIN: 0

## 2017-10-12 NOTE — TELEPHONE ENCOUNTER
[facility] Telephone Triage Note    Assessment: Patient called in to triage reporting the following symptoms: fever of 100.8,.    Recommendations:  ER Visit recommended.  Pt plans on being seen at  [facility] ER, report called to RN.    Follow-Up: Patient voiced understanding of advice and/or instructions given.

## 2017-10-12 NOTE — ED NOTES
Bed: IN02  Expected date: 10/12/17  Expected time: 5:30 PM  Means of arrival:   Comments:  Ashleigh Alonzo.  1960. MR [id number]. Coming from home with fever of 100.8. Hx BRCA with last chemo on Tuesday.

## 2017-10-12 NOTE — ED PROVIDER NOTES
[place] EMERGENCY DEPARTMENT ([facility])  10/12/17 ED 12    History     Chief Complaint   Patient presents with     Fever     HPI  Ashleigh Alonzo is a 57 year old female who presents with high fevers. She has a history of cancer of the right breast, currently undergoing chemotherapy with plans for surgery in the future. The patient reports she checked her temperature today at 4:30pm (2 hours ago) after warm and found her temperature to be 100.8F. She states she has also had a mild temporal headache throughout the day. Her last chemotherapy infusion was on Tuesday (2 days ago) and she does note that the clinic forgot to wash her port. She says she went back to clinic today to have her port flushed and heparinized. She denies any cold symptoms, sore throat, cough, shortness of breath, chest pain, nausea, vomiting, diarrhea, dysuria, rashes, or leg swelling.    Past Medical History:   Diagnosis Date     COPD (chronic obstructive pulmonary disease) (H) 2011     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) 8/30/2017     Periodontal disease      Sleep apnea 12/2015     Urticaria        Past Surgical History:   Procedure Laterality Date     APPENDECTOMY  10/6/2015     CATARACT IOL, RT/LT  11/2016    bilateral      COLONOSCOPY  11/15/2011    Procedure:COLONOSCOPY; Colonoscopy, screening; Surgeon:ANASTASIA BUNCH; Location: OR     INSERT PORT VASCULAR ACCESS Left 9/1/2017    Procedure: INSERT PORT VASCULAR ACCESS;  Single Lumen Chest Power Port;  Surgeon: Leif Parkinson PA-C;  Location: [facility] OR     TUBAL LIGATION  12/2004    Bilateral       Family History   Problem Relation Age of Onset     Cardiovascular Father 46     MI     Eye Disorder Father      CEREBROVASCULAR DISEASE Father      Arthritis Sister      Neurologic Disorder Brother      CANCER No family hx of      DIABETES No family hx of      Hypertension No family hx of        Social  History   Substance Use Topics     Smoking status: Former Smoker     Packs/day: 1.00     Years: 15.00     Types: Cigarettes     Quit date: 1/1/2000     Smokeless tobacco: Never Used     Alcohol use 8.4 - 12.6 oz/week      Comment: daily       Current Facility-Administered Medications   Medication     heparin 100 UNIT/ML injection 5 mL     levofloxacin (LEVAQUIN) tablet 500 mg     Current Outpatient Prescriptions   Medication     levofloxacin (LEVAQUIN) 500 MG tablet     Zinc Gluconate 100 MG TABS     order for DME     ondansetron (ZOFRAN) 8 MG tablet     prochlorperazine (COMPAZINE) 10 MG tablet     LORazepam (ATIVAN) 0.5 MG tablet     lidocaine-prilocaine (EMLA) cream     LORazepam (ATIVAN) 0.5 MG tablet     hydrOXYzine (ATARAX) 25 MG tablet     guaiFENesin (MUCINEX) 600 MG 12 hr tablet     simvastatin (ZOCOR) 40 MG tablet     tiotropium (SPIRIVA) 18 MCG capsule     albuterol (PROAIR HFA/PROVENTIL HFA/VENTOLIN HFA) 108 (90 BASE) MCG/ACT Inhaler     citalopram (CELEXA) 10 MG tablet     darifenacin (ENABLEX) 7.5 MG 24 hr tablet     order for DME     Coenzyme Q10 (CO Q 10 PO)     MULTIPLE VITAMIN PO     Cyanocobalamin (VITAMIN B 12 PO)     Pyridoxine HCl (VITAMIN B6 PO)     aspirin 81 MG tablet     VITAMIN D 1000 UNIT OR CAPS     Facility-Administered Medications Ordered in Other Encounters   Medication     lidocaine 1 % 9 mL     sodium bicarbonate 8.4 % injection 1 mEq     influenza quadrivalent (PF) vacc age 3 yrs and older (FLUZONE or Flulaval) injection 0.5 mL     lidocaine-EPINEPHrine 1.5 %-[id number] injection 10 mL      No Known Allergies      I have reviewed the Medications, Allergies, Past Medical and Surgical History, and Social History in the Epic system.    Review of Systems   Constitutional: Positive for fever.   HENT: Negative for congestion and sore throat.    Eyes: Negative for visual disturbance.   Respiratory: Negative for cough and shortness of breath.    Cardiovascular: Negative for chest pain and  "leg swelling.   Gastrointestinal: Negative for abdominal pain, diarrhea, nausea and vomiting.   Genitourinary: Negative for dysuria.   Musculoskeletal: Negative for arthralgias.   Skin: Negative for rash.   Neurological: Positive for headaches.   Hematological: Negative for adenopathy.   Psychiatric/Behavioral: Negative for confusion.   All other systems reviewed and are negative.      Physical Exam   BP: 121/70  Pulse: 98  Heart Rate: 98  Temp: 101.2  F (38.4  C)  Resp: 18  Height: 162.6 cm (5' 4\")  Weight: 76.2 kg (168 lb)  SpO2: 96 %       Physical Exam   Constitutional: She is oriented to person, place, and time. She appears well-developed and well-nourished. No distress.   HENT:   Head: Normocephalic and atraumatic.   Right Ear: External ear normal.   Left Ear: External ear normal.   Nose: Nose normal.   Mouth/Throat: Oropharynx is clear and moist. No oropharyngeal exudate.   Eyes: EOM are normal. Pupils are equal, round, and reactive to light. No scleral icterus.   Neck: Normal range of motion. Neck supple. No JVD present.   Cardiovascular: Normal rate, regular rhythm and normal heart sounds.  Exam reveals no friction rub.    No murmur heard.  Pulmonary/Chest: Effort normal. She has no wheezes. She has rales (occasional scattered).   Clean port site left upper chest   Abdominal: Soft. Bowel sounds are normal. There is no tenderness. There is no rebound and no guarding.   Musculoskeletal: She exhibits no edema or tenderness.   Neurological: She is alert and oriented to person, place, and time. No cranial nerve deficit. Coordination normal.   Skin: Skin is warm and dry.   Psychiatric: She has a normal mood and affect. Her behavior is normal.   Nursing note and vitals reviewed.      ED Course     ED Course     Procedures        Labs/Imaging    Results for orders placed or performed during the hospital encounter of 10/12/17 (from the past 24 hour(s))   Calcium ionized   Result Value Ref Range    Calcium Ionized " Whole Blood 4.4 4.4 - 5.2 mg/dL   Comprehensive metabolic panel   Result Value Ref Range    Sodium 138 133 - 144 mmol/L    Potassium 3.7 3.4 - 5.3 mmol/L    Chloride 103 94 - 109 mmol/L    Carbon Dioxide 26 20 - 32 mmol/L    Anion Gap 9 3 - 14 mmol/L    Glucose 70 70 - 99 mg/dL    Urea Nitrogen 6 (L) 7 - 30 mg/dL    Creatinine 0.64 0.52 - 1.04 mg/dL    GFR Estimate >90 >60 mL/min/1.7m2    GFR Estimate If Black >90 >60 mL/min/1.7m2    Calcium 8.0 (L) 8.5 - 10.1 mg/dL    Bilirubin Total 0.9 0.2 - 1.3 mg/dL    Albumin 2.8 (L) 3.4 - 5.0 g/dL    Protein Total 6.9 6.8 - 8.8 g/dL    Alkaline Phosphatase 114 40 - 150 U/L    ALT 34 0 - 50 U/L    AST 36 0 - 45 U/L   CRP inflammation   Result Value Ref Range    CRP Inflammation 84.0 (H) 0.0 - 8.0 mg/L   INR   Result Value Ref Range    INR 1.10 0.86 - 1.14   Lactic acid   Result Value Ref Range    Lactic Acid 0.8 0.7 - 2.0 mmol/L   CBC with platelets differential   Result Value Ref Range    WBC 3.0 (L) 4.0 - 11.0 10e9/L    RBC Count 3.32 (L) 3.8 - 5.2 10e12/L    Hemoglobin 10.9 (L) 11.7 - 15.7 g/dL    Hematocrit 32.5 (L) 35.0 - 47.0 %    MCV 98 78 - 100 fl    MCH 32.8 26.5 - 33.0 pg    MCHC 33.5 31.5 - 36.5 g/dL    RDW 12.2 10.0 - 15.0 %    Platelet Count 337 150 - 450 10e9/L    Diff Method Automated Method     % Neutrophils 60.2 %    % Lymphocytes 28.3 %    % Monocytes 6.9 %    % Eosinophils 4.6 %    % Basophils 0.0 %    % Immature Granulocytes 0.0 %    Nucleated RBCs 0 0 /100    Absolute Neutrophil 1.8 1.6 - 8.3 10e9/L    Absolute Lymphocytes 0.9 0.8 - 5.3 10e9/L    Absolute Monocytes 0.2 0.0 - 1.3 10e9/L    Absolute Eosinophils 0.1 0.0 - 0.7 10e9/L    Absolute Basophils 0.0 0.0 - 0.2 10e9/L    Abs Immature Granulocytes 0.0 0 - 0.4 10e9/L    Absolute Nucleated RBC 0.0    Chest XR,  PA & LAT    Narrative    EXAM: XR CHEST 2 VW  10/12/2017 7:41 PM      HISTORY: Patient with fever, on chemotherapy    COMPARISON: Radiograph dated 9/5/2017. CTs dated 8/16/2016,  2/8/2016.    FINDINGS: Left chest wall port tip projects over the cavoatrial  junction. 2 mm linear hyperdensity projects over the right 10th rib.  The cardiomediastinal silhouette is stable. Pulmonary vasculature is  within normal limits. Mild increased interstitial prominence.  Increased conspicuity of opacities in the inferior right upper lobe,  appreciated on both views. Pulmonary granulomas. Pulmonary nodules are  better appreciated on prior CT examinations. The visualized upper  abdomen is unremarkable.      Impression    IMPRESSION: Increased interstitial opacities and most discrete opacity  in the inferior right upper lobe may represent infection.    I have personally reviewed the examination and initial interpretation  and I agree with the findings.    REJI PENA MD   UA with Microscopic   Result Value Ref Range    Color Urine Light Yellow     Appearance Urine Clear     Glucose Urine Negative NEG^Negative mg/dL    Bilirubin Urine Negative NEG^Negative    Ketones Urine Negative NEG^Negative mg/dL    Specific Gravity Urine 1.004 1.003 - 1.035    Blood Urine Negative NEG^Negative    pH Urine 5.5 5.0 - 7.0 pH    Protein Albumin Urine Negative NEG^Negative mg/dL    Urobilinogen mg/dL Normal 0.0 - 2.0 mg/dL    Nitrite Urine Negative NEG^Negative    Leukocyte Esterase Urine Negative NEG^Negative    Source Midstream Urine     WBC Urine <1 0 - 2 /HPF    RBC Urine <1 0 - 2 /HPF    Hyaline Casts 1 0 - 2 /LPF   Urine Culture   Result Value Ref Range    Specimen Description Midstream Urine     Special Requests Specimen received in preservative     Culture Micro PENDING          Assessments & Plan (with Medical Decision Making)   Impression:  Middle aged female presents with fever to 101. She is 2 days s/p chemotherapy for breast cancer.  She has no localizing symptoms and no source for infection identified. She does have a portacath which was accessed 2 days ago and flushed earlier today. CBC is unremarkable.  Total WBC is 3000 and ANC is 1.8. CXR and UA are normal. Electrolytes and LFT are unremarkable. She has blood cultures and UC pending. She was discussed with the oncology fellow. She will be discharged to home on oral Levaquin. She should contact the oncology clinic tomorrow for follow up. She should return for high fever, chills, new or worsening symptoms.    I have reviewed the nursing notes.    I have reviewed the findings, diagnosis, plan and need for follow up with the patient.    New Prescriptions    LEVOFLOXACIN (LEVAQUIN) 500 MG TABLET    Take 1 tablet (500 mg) by mouth daily       Final diagnoses:   Fever in adult   S/P chemotherapy, time since less than 4 weeks   I, Elvin Cronin, am serving as a trained medical scribe to document services personally performed by Jaxon Richmond MD, based on the provider's statements to me.      I, Jaxon Richmond MD, was physically present and have reviewed and verified the accuracy of this note documented by Elvin Cronin.       10/12/2017   [facility], [place], EMERGENCY DEPARTMENT     Jaxon Richmond MD  10/12/17 2259

## 2017-10-12 NOTE — ED NOTES
"Triage Assessment & Note:    /70  Pulse 98  Temp 101.2  F (38.4  C) (Oral)  Resp 18  Ht 1.626 m (5' 4\")  Wt 76.2 kg (168 lb)  SpO2 96%  BMI 28.84 kg/m2    Patient presents with:  57-yr female patient - presenting to ED for eval of fever:  101.2f at triage; on chemo for breast CA.    Home Treatments/Remedies:  Med's, chemo    Febrile / Afebrile?  Febrile    Duration of C/o:  1 day    Kofi Chavez  October 12, 2017    "

## 2017-10-12 NOTE — NURSING NOTE
"Chief Complaint   Patient presents with     Port Flush     Port accessed and flushed with saline and heparin.     Port accessed with 20g 3/4\" by RN, line flushed with saline and heparin.    Luana Teran RN  "

## 2017-10-12 NOTE — ED AVS SNAPSHOT
[facility], Emergency Department    [address] [facility] [address]    Phone:  [phone]                                       Ashleigh Alonzo   MRN: [id number]    Department:  [facility], Emergency Department   Date of Visit:  10/12/2017           Patient Information     Date Of Birth          1960        Your diagnoses for this visit were:     Fever in adult     S/P chemotherapy, time since less than 4 weeks        You were seen by Jaxon Richmond MD.      Follow-up Information     Follow up with Radha Cazares MD.    Specialty:  Family Practice    Contact information:    [address] [address] [phone]          Discharge Instructions       Levaquin 500 mg daily for 7 days.  Return for high fever, new or worsening symptoms.  Contact the Oncology clinic tomorrow for follow up.      Future Appointments        Provider Department Dept Phone [place]    10/17/2017 6:30 AM [facility] [phone] [facility]    10/17/2017 7:00 AM EDDIE Oliva [facility] [place] [address] [phone] [facility]    10/17/2017 8:00 AM [facility]; Oncology Infusion [facility] [place] [address] [phone] [facility]    10/24/2017 12:30 PM [facility] Draw [phone] [facility]    10/24/2017 1:00 PM EDDIE Oliva [facility] [place] [address] [phone] [facility]    10/24/2017 2:30 PM [facility]; Oncology Infusion [facility] [place] [address] [phone] [facility]    10/31/2017 7:45 AM [facility] Lab Draw [phone] [facility]    10/31/2017 8:15 AM Fara Bradshaw MD [facility] [place] [address] [phone] [facility]    10/31/2017 9:30 AM [facility]; Oncology Infusion [facility] [place] [address] [phone] [facility]    11/7/2017 6:30 AM [facility] Draw [facility] Lab Draw [phone] [facility]     11/7/2017 7:00 AM [facility] [phone] [facility]    11/7/2017 8:00 AM [facility] [phone] [facility]    11/14/2017 6:30 AM [facility] Draw [facility] [phone] [facility]    11/14/2017 7:00 AM [facility] [phone] [facility]    11/14/2017 8:00 AM [facility] [phone] [facility]    11/21/2017 11:15 AM [facility] [phone] [facility]    11/21/2017 12:00 PM Fara Bradshaw MD [facility] [phone] [facility]    11/21/2017 1:00 PM [facility] [phone] [facility]    11/28/2017 6:30 AM [facility] Draw [facility] [phone] [facility]    11/28/2017 7:00 AM [facility] [phone] [facility]    11/28/2017 8:00 AM [facility] [phone] [facility]    12/5/2017 6:30 AM [facility] Draw [facility] [phone] [facility]    12/5/2017 7:00 AM [facility] [phone] [facility]    12/5/2017 8:00 AM [facility] [phone] [facility]      24 Hour Appointment Hotline       To make an appointment at any [facility] clinic, call [phone] ([phone]). If you don't have a family doctor or clinic, we will help you find one. [facility] clinics are conveniently located to serve the needs of you and your family.             Review of your medicines      START taking        Dose / Directions Last dose taken    levofloxacin 500 MG tablet   Commonly known as:  LEVAQUIN   Dose:  500 mg   Quantity:  7 tablet        Take 1 tablet (500 mg) by mouth daily    Refills:  0          Our records show that you are taking the medicines listed below. If these are incorrect, please call your family doctor or clinic.        Dose / Directions Last dose taken    albuterol 108 (90 BASE) MCG/ACT Inhaler   Commonly known as:  PROAIR HFA/PROVENTIL HFA/VENTOLIN HFA   Dose:  2 puff   Quantity:  1 Inhaler        Inhale 2 puffs into the lungs every 6 hours as needed for shortness of breath / dyspnea   Refills:  5        aspirin 81 MG tablet   Dose:  1 tablet        Take 1 tablet by mouth daily.   Refills:  3        citalopram 10 MG tablet   Commonly known as:  celeXA   Dose:  10 mg   Quantity:  90 tablet        Take 1 tablet (10 mg) by mouth daily   Refills:  3        CO Q 10 PO   Dose:  1 tablet        Take 1 tablet by mouth   Refills:  0        darifenacin 7.5 MG 24 hr tablet   Commonly known as:  ENABLEX   Dose:  7.5 mg   Quantity:  90 tablet        Take 1 tablet (7.5 mg) by mouth daily   Refills:  3        guaiFENesin 600 MG 12 hr tablet   Commonly known as:  MUCINEX   Dose:  1200 mg   Quantity:  180 tablet        Take 2 tablets (1,200 mg) by mouth 2 times daily   Refills:  6        hydrOXYzine 25 MG tablet   Commonly known as:  ATARAX   Quantity:  100 tablet        Take 1-2 tablets (25-50 mg) by mouth every 6 hours as needed for itching   Refills:  2        lidocaine-prilocaine cream   Commonly known as:  EMLA   Quantity:  30 g        Please apply to port site 30 minutes before use prn   Refills:  1        * LORazepam 0.5 MG tablet   Commonly known as:  ATIVAN   Quantity:  10 tablet        Take 1-2 tabs 20 minutes prior to MRI scans.   Refills:  0        * LORazepam 0.5 MG tablet   Commonly known as:  ATIVAN   Dose:  0.5 mg   Quantity:  30 tablet        Take 1 tablet (0.5 mg) by mouth every 4 hours as needed (Anxiety, Nausea/Vomiting or Sleep)   Refills:  2        MULTIPLE VITAMIN PO        Refills:  0        ondansetron 8 MG tablet   Commonly known as:  ZOFRAN   Dose:  8 mg    Quantity:  30 tablet        Take 1 tablet (8 mg) by mouth every 8 hours as needed for nausea   Refills:  3        * order for DME        Respironics Dream Station Auto CPAP 12-18 cm, F&P Simplus FFM small.   Refills:  0        * order for DME   Quantity:  1 Device        Cranial prosthesis   Refills:  1        prochlorperazine 10 MG tablet   Commonly known as:  COMPAZINE   Dose:  10 mg   Quantity:  30 tablet        Take 1 tablet (10 mg) by mouth every 6 hours as needed (Nausea/Vomiting)   Refills:  2        simvastatin 40 MG tablet   Commonly known as:  ZOCOR   Dose:  40 mg   Quantity:  90 tablet        Take 1 tablet (40 mg) by mouth At Bedtime   Refills:  4        tiotropium 18 MCG capsule   Commonly known as:  SPIRIVA   Dose:  18 mcg   Quantity:  1 capsule        Inhale 1 capsule (18 mcg) into the lungs daily Inhale contents of one capsule   Refills:  11        VITAMIN B 12 PO   Dose:  500 mcg        Take 500 mcg by mouth daily   Refills:  0        VITAMIN B6 PO   Dose:  1 tablet        Take 1 tablet by mouth daily.   Refills:  0        vitamin D 1000 UNITS capsule        2 CAPSULE DAILY   Refills:  0        Zinc Gluconate 100 MG Tabs   Dose:  1 tablet   Quantity:  30 tablet        Take 1 tablet by mouth daily   Refills:  3        * Notice:  This list has 4 medication(s) that are the same as other medications prescribed for you. Read the directions carefully, and ask your doctor or other care provider to review them with you.            Prescriptions were sent or printed at these locations (1 Prescription)                   Other Prescriptions                Printed at Department/Unit printer (1 of 1)         levofloxacin (LEVAQUIN) 500 MG tablet                Procedures and tests performed during your visit     Procedure/Test Number of Times Performed    Blood culture 2    CBC with platelets differential 1    CRP inflammation 1    Calcium ionized 1    Chest XR,  PA & LAT 1    Comprehensive metabolic panel 1     INR 1    Lactic acid 1    UA with Microscopic 1    Urine Culture 1      Orders Needing Specimen Collection     None      Pending Results     Date and Time Order Name Status Description    10/12/2017 1827 Blood culture In process     10/12/2017 1827 Blood culture In process     10/12/2017 1819 Urine Culture Preliminary     10/10/2017 1356 SEND OUTS MISC TEST In process             Pending Culture Results     Date and Time Order Name Status Description    10/12/2017 1827 Blood culture In process     10/12/2017 1827 Blood culture In process     10/12/2017 1819 Urine Culture Preliminary             Pending Results Instructions     If you had any lab results that were not finalized at the time of your Discharge, you can call the ED Lab Result RN at [phone]. You will be contacted by this team for any positive Lab results or changes in treatment. The nurses are available 7 days a week from 10A to 6:30P.  You can leave a message 24 hours per day and they will return your call.        Thank you for choosing [facility]       Thank you for choosing [facility] for your care. Our goal is always to provide you with excellent care. Hearing back from our patients is one way we can continue to improve our services. Please take a few minutes to complete the written survey that you may receive in the mail after you visit with us. Thank you!        [employer] [facility] Information     [facility] gives you secure access to your electronic health record. If you see a primary care provider, you can also send messages to your care team and make appointments. If you have questions, please call your primary care clinic.  If you do not have a primary care provider, please call [phone] and they will assist you.        Care EveryWhere ID     This is your Care EveryWhere ID. This could be used by other organizations to access your [facility] medical records  [id number]        Equal Access to Services     THERESA HUGHES AH: Abdifatah Granda  kaden clancy, bang weiss. So [facility] [phone].    ATENCIÓN: Si habla español, tiene a blackman disposición servicios gratuitos de asistencia lingüística. Llame al [phone].    We comply with applicable federal civil rights laws and [place] laws. We do not discriminate on the basis of race, color, national origin, age, disability, sex, sexual orientation, or gender identity.            After Visit Summary       This is your record. Keep this with you and show to your community pharmacist(s) and doctor(s) at your next visit.

## 2017-10-12 NOTE — ED AVS SNAPSHOT
[facility], [place], Emergency Department    [address] [place] [address]    Phone:  [phone]                                       Ashleigh Alonzo   MRN: [id number]    Department:  [facility], Emergency Department   Date of Visit:  10/12/2017           After Visit Summary Signature Page     I have received my discharge instructions, and my questions have been answered. I have discussed any challenges I see with this plan with the nurse or doctor.    ..........................................................................................................................................  Patient/Patient Representative Signature      ..........................................................................................................................................  Patient Representative Print Name and Relationship to Patient    ..................................................               ................................................  Date                                            Time    ..........................................................................................................................................  Reviewed by Signature/Title    ...................................................              ..............................................  Date                                                            Time

## 2017-10-13 ENCOUNTER — CARE COORDINATION (OUTPATIENT)
Dept: ONCOLOGY | Facility: CLINIC | Age: 57
End: 2017-10-13

## 2017-10-13 LAB
BACTERIA SPEC CULT: NO GROWTH
Lab: NORMAL
SPECIMEN SOURCE: NORMAL

## 2017-10-13 NOTE — DISCHARGE INSTRUCTIONS
Levaquin 500 mg daily for 7 days.  Return for high fever, new or worsening symptoms.  Contact the Oncology clinic tomorrow for follow up.

## 2017-10-13 NOTE — PROGRESS NOTES
Pt had visit to ED on 10/12/17.   Call placed to patient for follow up.  Left a voicemail message with our nurse triage clinic telephone number for return call back.      Dionne Goode RNCC BSN CBCN

## 2017-10-17 ENCOUNTER — INFUSION THERAPY VISIT (OUTPATIENT)
Dept: ONCOLOGY | Facility: CLINIC | Age: 57
End: 2017-10-17
Attending: INTERNAL MEDICINE
Payer: COMMERCIAL

## 2017-10-17 ENCOUNTER — RESEARCH ENCOUNTER (OUTPATIENT)
Dept: ONCOLOGY | Facility: CLINIC | Age: 57
End: 2017-10-17

## 2017-10-17 ENCOUNTER — CARE COORDINATION (OUTPATIENT)
Dept: ONCOLOGY | Facility: CLINIC | Age: 57
End: 2017-10-17

## 2017-10-17 ENCOUNTER — ONCOLOGY VISIT (OUTPATIENT)
Dept: ONCOLOGY | Facility: CLINIC | Age: 57
End: 2017-10-17
Attending: PHYSICIAN ASSISTANT
Payer: COMMERCIAL

## 2017-10-17 VITALS
BODY MASS INDEX: 28.13 KG/M2 | SYSTOLIC BLOOD PRESSURE: 115 MMHG | TEMPERATURE: 98.7 F | OXYGEN SATURATION: 94 % | WEIGHT: 164.8 LBS | RESPIRATION RATE: 16 BRPM | DIASTOLIC BLOOD PRESSURE: 86 MMHG | HEIGHT: 64 IN | HEART RATE: 115 BPM

## 2017-10-17 DIAGNOSIS — C50.211 MALIGNANT NEOPLASM OF UPPER-INNER QUADRANT OF RIGHT BREAST IN FEMALE, ESTROGEN RECEPTOR NEGATIVE (H): Primary | ICD-10-CM

## 2017-10-17 DIAGNOSIS — Z17.1 MALIGNANT NEOPLASM OF UPPER-INNER QUADRANT OF RIGHT BREAST IN FEMALE, ESTROGEN RECEPTOR NEGATIVE (H): Primary | ICD-10-CM

## 2017-10-17 LAB
ALBUMIN SERPL-MCNC: 2.8 G/DL (ref 3.4–5)
ALP SERPL-CCNC: 140 U/L (ref 40–150)
ALT SERPL W P-5'-P-CCNC: 41 U/L (ref 0–50)
ANION GAP SERPL CALCULATED.3IONS-SCNC: 8 MMOL/L (ref 3–14)
AST SERPL W P-5'-P-CCNC: 44 U/L (ref 0–45)
BASOPHILS # BLD AUTO: 0 10E9/L (ref 0–0.2)
BASOPHILS NFR BLD AUTO: 0.5 %
BILIRUB SERPL-MCNC: 0.5 MG/DL (ref 0.2–1.3)
BUN SERPL-MCNC: 10 MG/DL (ref 7–30)
CALCIUM SERPL-MCNC: 8.7 MG/DL (ref 8.5–10.1)
CHLORIDE SERPL-SCNC: 103 MMOL/L (ref 94–109)
CO2 SERPL-SCNC: 24 MMOL/L (ref 20–32)
CREAT SERPL-MCNC: 0.64 MG/DL (ref 0.52–1.04)
DIFFERENTIAL METHOD BLD: ABNORMAL
EOSINOPHIL # BLD AUTO: 0.1 10E9/L (ref 0–0.7)
EOSINOPHIL NFR BLD AUTO: 2.5 %
ERYTHROCYTE [DISTWIDTH] IN BLOOD BY AUTOMATED COUNT: 11.8 % (ref 10–15)
GFR SERPL CREATININE-BSD FRML MDRD: >90 ML/MIN/1.7M2
GLUCOSE SERPL-MCNC: 155 MG/DL (ref 70–99)
HCT VFR BLD AUTO: 32.7 % (ref 35–47)
HGB BLD-MCNC: 11.3 G/DL (ref 11.7–15.7)
IMM GRANULOCYTES # BLD: 0 10E9/L (ref 0–0.4)
IMM GRANULOCYTES NFR BLD: 0.7 %
LYMPHOCYTES # BLD AUTO: 0.9 10E9/L (ref 0.8–5.3)
LYMPHOCYTES NFR BLD AUTO: 19.6 %
MCH RBC QN AUTO: 33.1 PG (ref 26.5–33)
MCHC RBC AUTO-ENTMCNC: 34.6 G/DL (ref 31.5–36.5)
MCV RBC AUTO: 96 FL (ref 78–100)
MONOCYTES # BLD AUTO: 0.5 10E9/L (ref 0–1.3)
MONOCYTES NFR BLD AUTO: 12.2 %
NEUTROPHILS # BLD AUTO: 2.9 10E9/L (ref 1.6–8.3)
NEUTROPHILS NFR BLD AUTO: 64.5 %
NRBC # BLD AUTO: 0 10*3/UL
NRBC BLD AUTO-RTO: 0 /100
PLATELET # BLD AUTO: 412 10E9/L (ref 150–450)
POTASSIUM SERPL-SCNC: 3.5 MMOL/L (ref 3.4–5.3)
PROT SERPL-MCNC: 7.7 G/DL (ref 6.8–8.8)
RBC # BLD AUTO: 3.41 10E12/L (ref 3.8–5.2)
SODIUM SERPL-SCNC: 134 MMOL/L (ref 133–144)
WBC # BLD AUTO: 4.4 10E9/L (ref 4–11)

## 2017-10-17 PROCEDURE — S0028 INJECTION, FAMOTIDINE, 20 MG: HCPCS | Mod: ZF | Performed by: PHYSICIAN ASSISTANT

## 2017-10-17 PROCEDURE — 99212 OFFICE O/P EST SF 10 MIN: CPT | Mod: ZF

## 2017-10-17 PROCEDURE — 85025 COMPLETE CBC W/AUTO DIFF WBC: CPT | Performed by: INTERNAL MEDICINE

## 2017-10-17 PROCEDURE — 36593 DECLOT VASCULAR DEVICE: CPT

## 2017-10-17 PROCEDURE — 25000128 H RX IP 250 OP 636: Mod: ZF | Performed by: PHYSICIAN ASSISTANT

## 2017-10-17 PROCEDURE — 96375 TX/PRO/DX INJ NEW DRUG ADDON: CPT

## 2017-10-17 PROCEDURE — 25000128 H RX IP 250 OP 636: Mod: ZF | Performed by: INTERNAL MEDICINE

## 2017-10-17 PROCEDURE — 96413 CHEMO IV INFUSION 1 HR: CPT

## 2017-10-17 PROCEDURE — 99214 OFFICE O/P EST MOD 30 MIN: CPT | Mod: ZP | Performed by: PHYSICIAN ASSISTANT

## 2017-10-17 PROCEDURE — 25000125 ZZHC RX 250: Mod: ZF | Performed by: PHYSICIAN ASSISTANT

## 2017-10-17 PROCEDURE — 80053 COMPREHEN METABOLIC PANEL: CPT | Performed by: INTERNAL MEDICINE

## 2017-10-17 RX ORDER — MEPERIDINE HYDROCHLORIDE 25 MG/ML
25 INJECTION INTRAMUSCULAR; INTRAVENOUS; SUBCUTANEOUS EVERY 30 MIN PRN
Status: CANCELLED | OUTPATIENT
Start: 2017-10-17

## 2017-10-17 RX ORDER — DIPHENHYDRAMINE HYDROCHLORIDE 50 MG/ML
50 INJECTION INTRAMUSCULAR; INTRAVENOUS
Status: CANCELLED
Start: 2017-10-17

## 2017-10-17 RX ORDER — EPINEPHRINE 1 MG/ML
0.3 INJECTION, SOLUTION, CONCENTRATE INTRAVENOUS EVERY 5 MIN PRN
Status: CANCELLED | OUTPATIENT
Start: 2017-10-17

## 2017-10-17 RX ORDER — HEPARIN SODIUM (PORCINE) LOCK FLUSH IV SOLN 100 UNIT/ML 100 UNIT/ML
500 SOLUTION INTRAVENOUS ONCE
Status: CANCELLED
Start: 2017-10-17 | End: 2017-10-17

## 2017-10-17 RX ORDER — METHYLPREDNISOLONE SODIUM SUCCINATE 125 MG/2ML
125 INJECTION, POWDER, LYOPHILIZED, FOR SOLUTION INTRAMUSCULAR; INTRAVENOUS
Status: CANCELLED
Start: 2017-10-17

## 2017-10-17 RX ORDER — ALBUTEROL SULFATE 90 UG/1
1-2 AEROSOL, METERED RESPIRATORY (INHALATION)
Status: CANCELLED
Start: 2017-10-17

## 2017-10-17 RX ORDER — EPINEPHRINE 0.3 MG/.3ML
0.3 INJECTION SUBCUTANEOUS EVERY 5 MIN PRN
Status: CANCELLED | OUTPATIENT
Start: 2017-10-17

## 2017-10-17 RX ORDER — ALBUTEROL SULFATE 0.83 MG/ML
2.5 SOLUTION RESPIRATORY (INHALATION)
Status: CANCELLED | OUTPATIENT
Start: 2017-10-17

## 2017-10-17 RX ORDER — DEXAMETHASONE SODIUM PHOSPHATE 10 MG/ML
10 INJECTION, SOLUTION INTRAMUSCULAR; INTRAVENOUS
Status: CANCELLED | OUTPATIENT
Start: 2017-10-17

## 2017-10-17 RX ORDER — LORAZEPAM 2 MG/ML
0.5 INJECTION INTRAMUSCULAR EVERY 4 HOURS PRN
Status: CANCELLED
Start: 2017-10-17

## 2017-10-17 RX ORDER — HEPARIN SODIUM (PORCINE) LOCK FLUSH IV SOLN 100 UNIT/ML 100 UNIT/ML
500 SOLUTION INTRAVENOUS ONCE
Status: COMPLETED | OUTPATIENT
Start: 2017-10-17 | End: 2017-10-17

## 2017-10-17 RX ORDER — HEPARIN SODIUM (PORCINE) LOCK FLUSH IV SOLN 100 UNIT/ML 100 UNIT/ML
5 SOLUTION INTRAVENOUS ONCE
Status: COMPLETED | OUTPATIENT
Start: 2017-10-17 | End: 2017-10-17

## 2017-10-17 RX ORDER — SODIUM CHLORIDE 9 MG/ML
1000 INJECTION, SOLUTION INTRAVENOUS CONTINUOUS PRN
Status: CANCELLED
Start: 2017-10-17

## 2017-10-17 RX ADMIN — SODIUM CHLORIDE 250 ML: 9 INJECTION, SOLUTION INTRAVENOUS at 08:35

## 2017-10-17 RX ADMIN — SODIUM CHLORIDE, PRESERVATIVE FREE 5 ML: 5 INJECTION INTRAVENOUS at 06:48

## 2017-10-17 RX ADMIN — SODIUM CHLORIDE, PRESERVATIVE FREE 500 UNITS: 5 INJECTION INTRAVENOUS at 11:42

## 2017-10-17 RX ADMIN — FAMOTIDINE 20 MG: 20 INJECTION, SOLUTION INTRAVENOUS at 08:35

## 2017-10-17 RX ADMIN — PACLITAXEL 150 MG: 6 INJECTION, SOLUTION INTRAVENOUS at 10:38

## 2017-10-17 RX ADMIN — ALTEPLASE 2 MG: 2.2 INJECTION, POWDER, LYOPHILIZED, FOR SOLUTION INTRAVENOUS at 09:11

## 2017-10-17 ASSESSMENT — PAIN SCALES - GENERAL: PAINLEVEL: NO PAIN (0)

## 2017-10-17 NOTE — NURSING NOTE
"Oncology Rooming Note    October 17, 2017 6:55 AM   Ashleigh Alonzo is a 57 year old female who presents for:    Chief Complaint   Patient presents with     Port Draw     Labs drawn from port by RN. Line flushed with saline and heparin. VItals taken and pt checked in for appt     Oncology Clinic Visit     Return: Breast CA     Initial Vitals: /86 (BP Location: Right arm, Cuff Size: Adult Regular)  Pulse 115  Temp 98.7  F (37.1  C) (Oral)  Resp 16  Ht 1.626 m (5' 4.02\")  Wt 74.8 kg (164 lb 12.8 oz)  SpO2 94%  BMI 28.27 kg/m2 Estimated body mass index is 28.27 kg/(m^2) as calculated from the following:    Height as of this encounter: 1.626 m (5' 4.02\").    Weight as of this encounter: 74.8 kg (164 lb 12.8 oz). Body surface area is 1.84 meters squared.  No Pain (0) Comment: Data Unavailable   No LMP recorded. Patient is postmenopausal.  Allergies reviewed: Yes  Medications reviewed: Yes    Medications: Medication refills not needed today.  Pharmacy name entered into [employer]:    [facility], [place] - [address] [place] [facility] - [place], [place] - [address] [facility]    Clinical concerns: no new concerns     6 minutes for nursing intake (face to face time)     Mira Morales CMA    Unable to give flu vaccine to patient for research reasons.  Mira Morales CMA                "

## 2017-10-17 NOTE — PROGRESS NOTES
Infusion Nursing Note:    Patient presents today for Cycle 5 Day 1 Taxol.  Arrived with spouse.  Patient met with Lilia MORGAN prior to infusion.    Lab Results   Component Value Date    HGB 11.3 10/17/2017     Lab Results   Component Value Date    WBC 4.4 10/17/2017      Lab Results   Component Value Date    ANEU 2.9 10/17/2017     Lab Results   Component Value Date     10/17/2017      Lab Results   Component Value Date     10/17/2017                   Lab Results   Component Value Date    POTASSIUM 3.5 10/17/2017           Lab Results   Component Value Date    MAG 2.1 09/05/2017            Lab Results   Component Value Date    CR 0.64 10/17/2017                   Lab Results   Component Value Date    JAVIER 8.7 10/17/2017                Lab Results   Component Value Date    BILITOTAL 0.5 10/17/2017           Lab Results   Component Value Date    ALBUMIN 2.8 10/17/2017                    Lab Results   Component Value Date    ALT 41 10/17/2017           Lab Results   Component Value Date    AST 44 10/17/2017     Results reviewed, labs MET treatment parameters, ok to proceed with treatment.        Note: Port without blood return in infusion, TPA instilled to port with positive blood return after 90 minutes indwelling.     Intravenous Access:  Implanted Port.    Post Infusion Assessment:  Patient tolerated infusion without incident.  Blood return noted pre and post infusion.  No evidence of extravasations.  Access discontinued per protocol.    Discharge Plan:   Patient declined prescription refills.  Copy of AVS reviewed with patient and/or family.  Patient will return 10/24 for next appointment.  Patient discharged in stable condition accompanied by: .  Departure Mode: Ambulatory.  Face to face time: 5 minutes

## 2017-10-17 NOTE — MR AVS SNAPSHOT
After Visit Summary   10/17/2017    Ashleigh Alonzo    MRN: [id number]           Patient Information     Date Of Birth          1960        Visit Information        Provider Department      10/17/2017 7:00 AM Lilia Livingston PA [facility]        Today's Diagnoses     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)    -  1       Follow-ups after your visit        Your next 10 appointments already scheduled     Oct 17, 2017  8:00 AM CDT   Infusion 180 with UC ONCOLOGY INFUSION, UC 15 ATC   [facility] ([facility])    [address] [address]   [phone]            Oct 24, 2017 12:30 PM CDT   Masonic Lab Draw with  MASONIC LAB DRAW   [facility] Masonic Lab Draw ([facility])    [address] [place] [address]   [phone]            Oct 24, 2017  1:00 PM CDT   (Arrive by 12:45 PM)   Return Visit with EDDIE Oliva   [facility] ([facility])    [address] [place] [address]   [phone]            Oct 24, 2017  2:30 PM CDT   Infusion 180 with UC ONCOLOGY INFUSION, UC 19 ATC   [facility] ([facility])    [address] [place] [address]   [phone]            Oct 31, 2017  7:45 AM CDT   Masonic Lab Draw with  MASONIC LAB DRAW   [facility] Masonic Lab Draw ([facility])    [address] [place] [address]   [phone]            Oct 31, 2017  8:15 AM CDT   (Arrive by 8:00 AM)   Return Visit with Fara Bradshaw MD   [facility] ([facility])    [address] [place] [address]   [phone]            Oct  "31, 2017  9:30 AM CDT   Infusion 180 with UC ONCOLOGY INFUSION, UC 11 ATC   [facility] ([facility])    [address]  [facility] [address] [phone]              Who to contact     If you have questions or need follow up information about today's clinic visit or your schedule please contact [facility] directly at [phone].  Normal or non-critical lab and imaging results will be communicated to you by Gobbler[employer], letter or phone within 4 business days after the clinic has received the results. If you do not hear from us within 7 days, please contact the clinic through [employer] or phone. If you have a critical or abnormal lab result, we will notify you by phone as soon as possible.  Submit refill requests through [employer] or call your pharmacy and they will forward the refill request to us. Please allow 3 business days for your refill to be completed.          Additional Information About Your Visit        [employer] Information     [employer] gives you secure access to your electronic health record. If you see a primary care provider, you can also send messages to your care team and make appointments. If you have questions, please call your primary care clinic.  If you do not have a primary care provider, please call [phone] and they will assist you.        Care EveryWhere ID     This is your Care EveryWhere ID. This could be used by other organizations to access your [place] medical records  [id number]        Your Vitals Were     Pulse Temperature Respirations Height Pulse Oximetry BMI (Body Mass Index)    115 98.7  F (37.1  C) (Oral) 16 1.626 m (5' 4.02\") 94% 28.27 kg/m2       Blood Pressure from Last 3 Encounters:   10/17/17 115/86   10/12/17 103/60   10/10/17 112/68    Weight from Last 3 Encounters:   10/17/17 74.8 kg (164 lb 12.8 oz)   10/12/17 76.2 kg (168 lb)   10/10/17 76.6 kg (168 lb 12.8 oz)              Today, you " had the following     No orders found for display       Primary Care Provider Office Phone # Fax #    Radha Cazares [phone]       [address] [address]        Equal Access to Services     NOHEMYWILMER SAUL : Hadii aad ku hadasho Soomaali, waaxda luqadaha, qaybta kaalmada adeegyada, waxay silvanoin haylukaszn ricardoanmol chester renee . So [facility] [phone].    ATENCIÓN: Si habla español, tiene a blackman disposición servicios gratuitos de asistencia lingüística. Llame al [phone].    We comply with applicable federal civil rights laws and [place] laws. We do not discriminate on the basis of race, color, national origin, age, disability, sex, sexual orientation, or gender identity.            Thank you!     Thank you for choosing [facility]  for your care. Our goal is always to provide you with excellent care. Hearing back from our patients is one way we can continue to improve our services. Please take a few minutes to complete the written survey that you may receive in the mail after your visit with us. Thank you!             Your Updated Medication List - Protect others around you: Learn how to safely use, store and throw away your medicines at www.disposemymeds.org.          This list is accurate as of: 10/17/17  7:55 AM.  Always use your most recent med list.                   Brand Name Dispense Instructions for use Diagnosis    albuterol 108 (90 BASE) MCG/ACT Inhaler    PROAIR HFA/PROVENTIL HFA/VENTOLIN HFA    1 Inhaler    Inhale 2 puffs into the lungs every 6 hours as needed for shortness of breath / dyspnea    Chronic obstructive pulmonary disease, unspecified COPD type (H)       aspirin 81 MG tablet      Take 1 tablet by mouth daily.        citalopram 10 MG tablet    celeXA    90 tablet    Take 1 tablet (10 mg) by mouth daily    Anxiety, Depression, unspecified depression type       CO Q 10 PO      Take 1 tablet by mouth         darifenacin 7.5 MG 24 hr tablet    ENABLEX    90 tablet    Take 1 tablet (7.5 mg) by mouth daily    Overactive bladder       guaiFENesin 600 MG 12 hr tablet    MUCINEX    180 tablet    Take 2 tablets (1,200 mg) by mouth 2 times daily    Cough with sputum       hydrOXYzine 25 MG tablet    ATARAX    100 tablet    Take 1-2 tablets (25-50 mg) by mouth every 6 hours as needed for itching    Hives       levofloxacin 500 MG tablet    LEVAQUIN    7 tablet    Take 1 tablet (500 mg) by mouth daily        lidocaine-prilocaine cream    EMLA    30 g    Please apply to port site 30 minutes before use prn    Personal history of malignant neoplasm of breast       * LORazepam 0.5 MG tablet    ATIVAN    10 tablet    Take 1-2 tabs 20 minutes prior to MRI scans.    Breast cancer (H), Anxiety       * LORazepam 0.5 MG tablet    ATIVAN    30 tablet    Take 1 tablet (0.5 mg) by mouth every 4 hours as needed (Anxiety, Nausea/Vomiting or Sleep)    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)       MULTIPLE VITAMIN PO           ondansetron 8 MG tablet    ZOFRAN    30 tablet    Take 1 tablet (8 mg) by mouth every 8 hours as needed for nausea    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)       * [place] [facility]      [employer] Dream Station Auto CPAP 12-18 cm, F&P Simplus FFM small.    MERLIN (obstructive sleep apnea)       * [place] [facility]     1 Device    Cranial prosthesis    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)       prochlorperazine 10 MG tablet    COMPAZINE    30 tablet    Take 1 tablet (10 mg) by mouth every 6 hours as needed (Nausea/Vomiting)    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)       simvastatin 40 MG tablet    ZOCOR    90 tablet    Take 1 tablet (40 mg) by mouth At Bedtime    Hyperlipidemia LDL goal  <130       tiotropium 18 MCG capsule    SPIRIVA    1 capsule    Inhale 1 capsule (18 mcg) into the lungs daily Inhale contents of one capsule    Chronic obstructive pulmonary disease, unspecified COPD type (H)       VITAMIN B 12 PO      Take 100 mcg by mouth daily        VITAMIN B6 PO      Take 1 tablet by mouth daily.        vitamin D 1000 UNITS capsule      2 CAPSULE DAILY        Zinc Gluconate 100 MG Tabs     30 tablet    Take 1 tablet by mouth daily    Dysgeusia       * Notice:  This list has 4 medication(s) that are the same as other medications prescribed for you. Read the directions carefully, and ask your doctor or other care provider to review them with you.

## 2017-10-17 NOTE — PROGRESS NOTES
"Hematology-Oncology Visit  Oct 17, 2017    Reason for Visit: follow-up breast cancer     HPI: Ashleigh Alonzo is a 57 year old female with past medical history of COPD, sleep apnea, periodontal disease with stage IIa, T2N0M0, grade 3, triple negative right breast cancer. She was diagnosed via abnormal screening mammogram. She ultimately had US, contrast-enhanced mammo, and biopsy showing 3.4 cm mass and pathology showed grade 3 invasive mammary carcinoma with associated high-grade DCIS. ER/MS was negative and HER2 negative. She enrolled in ISPY-2 clinical trial and began treatment with weekly taxol and once every 3 week pembrolizumab on 9/2/17. Please see notes from Dr. Bradshaw for further details of patient's oncology history.     Interval History: Ashleigh Perera is here with her  today for week 5 of treatment with Taxol. She had a rough week last week with port access issues and then it was not flushed. She developed fevers up to about 101 and felt fatigued and went to ED on 10/12. She was pancultured (all negative) and CXR showed some opacities. She was d/c on Levaquin. ANC was normal. Feels like her energy level is better now. She is not coughing or SOB. Appetite is generally down a bit but she continues to eat. Has no nausea. Has been having several episodes of \"explosive\" diarrhea per day. No taking anything for this yet. Notes some tingling sensation in her palms when her wrists are flexed but then this resolves when she shakes out her hands. No symptoms in feet. Urinating well. ROS otherwise negative.    Current Outpatient Prescriptions   Medication     levofloxacin (LEVAQUIN) 500 MG tablet     Zinc Gluconate 100 MG TABS     order for DME     lidocaine-prilocaine (EMLA) cream     hydrOXYzine (ATARAX) 25 MG tablet     guaiFENesin (MUCINEX) 600 MG 12 hr tablet     simvastatin (ZOCOR) 40 MG tablet     tiotropium (SPIRIVA) 18 MCG capsule     albuterol (PROAIR HFA/PROVENTIL HFA/VENTOLIN HFA) 108 (90 BASE) MCG/ACT " "Inhaler     citalopram (CELEXA) 10 MG tablet     darifenacin (ENABLEX) 7.5 MG 24 hr tablet     order for DME     Coenzyme Q10 (CO Q 10 PO)     MULTIPLE VITAMIN PO     Cyanocobalamin (VITAMIN B 12 PO)     Pyridoxine HCl (VITAMIN B6 PO)     aspirin 81 MG tablet     VITAMIN D 1000 UNIT OR CAPS     ondansetron (ZOFRAN) 8 MG tablet     prochlorperazine (COMPAZINE) 10 MG tablet     LORazepam (ATIVAN) 0.5 MG tablet     LORazepam (ATIVAN) 0.5 MG tablet     No current facility-administered medications for this visit.      Facility-Administered Medications Ordered in Other Visits   Medication     lidocaine 1 % 9 mL     sodium bicarbonate 8.4 % injection 1 mEq     influenza quadrivalent (PF) vacc age 3 yrs and older (FLUZONE or Flulaval) injection 0.5 mL     lidocaine-EPINEPHrine 1.5 %-1:204825 injection 10 mL       PHYSICAL EXAM:  /86 (BP Location: Right arm, Cuff Size: Adult Regular)  Pulse 115  Temp 98.7  F (37.1  C) (Oral)  Resp 16  Ht 1.626 m (5' 4.02\")  Wt 74.8 kg (164 lb 12.8 oz)  SpO2 94%  BMI 28.27 kg/m2  General: Alert, oriented, pleasant, NAD  Skin: No focal rash on exposed skin   HEENT: Normocephalic, atraumatic, PERRLA, EOMI. Moist mucus membranes, no lesions or thrush  Neck: No cervical or supraclavicular LAD.  Axillary: No LAD  Breast: Deferred   Lungs: CTA bilaterally, normal work of breathing. Clear with coughing.   Cardiac: RRR, S1, S2, no murmurs  Abdomen: Soft, nontender, nondistended. Normoactive bowel sounds. No hepatosplenomegaly, masses  Neuro: CNII-XII grossly intact  Extremities: No pedal edema    Labs:    10/17/2017 06:40   Sodium 134   Potassium 3.5   Chloride 103   Carbon Dioxide 24   Urea Nitrogen 10   Creatinine 0.64   GFR Estimate >90   GFR Estimate If Black >90   Calcium 8.7   Anion Gap 8   Albumin 2.8 (L)   Protein Total 7.7   Bilirubin Total 1.2   Alkaline Phosphatase 140   ALT 41   AST 44   Glucose 155 (H)   WBC 4.4   Hemoglobin 11.3 (L)   Hematocrit 32.7 (L)   Platelet Count " 412   RBC Count 3.41 (L)   MCV 96   MCH 33.1 (H)   MCHC 34.6   RDW 11.8   Diff Method Automated Method   % Neutrophils 64.5   % Lymphocytes 19.6   % Monocytes 12.2   % Eosinophils 2.5   % Basophils 0.5   % Immature Granulocytes 0.7   Nucleated RBCs 0   Absolute Neutrophil 2.9   Absolute Lymphocytes 0.9   Absolute Monocytes 0.5   Absolute Eosinophils 0.1   Absolute Basophils 0.0   Abs Immature Granulocytes 0.0   Absolute Nucleated RBC 0.0         Assessment & Plan:     1. Stage IIa, T2N0M0, grade 3, triple negative breast cancer: S/p 4 weeks of weekly Taxol and every 3 week pembro. She is tolerating treatment well without significant symptoms. Had a great partial response already to treatment after 3 weeks per breast MRI. Feeling okay and counts are adequate to proceed with Taxol today. Plan for 12 weeks of Taxol and pembrolizumab followed by 4 cycles of AC and pembrolizumab. She will then proceed with surgery and potentially adjuvant radiation. She will be seen weekly on the clinical trial.     2. Mild transaminitis: LFTs WNL today. Continue to monitor.    3. URI/PNA: Continue Levaquin to complete course. Fever curve has trended down. Lungs are clear.    4. Diarrhea: Secondary to chemo and likely Levaquin. Start yogurt daily. Imodium prn. Continue good hydration.     Lilia Livingston PA-C    [facility]  [address] [place] [id number]  [phone]

## 2017-10-17 NOTE — PATIENT INSTRUCTIONS
Contact Numbers    [facility] [place]: [phone]  [facility] Triage:  [phone]    Call triage with chills and/or temperature greater than or equal to 100.5, uncontrolled nausea/vomiting, diarrhea, constipation, dizziness, shortness of breath, chest pain, bleeding, unexplained bruising, or any new/concerning symptoms, questions/concerns.     If you are having any concerning symptoms or wish to speak to a provider before your next infusion visit, please call your care coordinator or triage to notify them so we can adequately serve you.       After Hours: [phone]    If after hours, weekends, or holidays, call main hospital  and ask for Oncology doctor on call.           October 2017 Sunday Monday Tuesday Wednesday Thursday Friday Saturday   1     2     3     [facility] MASONIC LAB DRAW    1:15 PM   (15 min.)    [facility]   [facility]     [facility] RETURN    1:35 PM   (50 min.)   Lilia Livingston PA   [facility] ONC INFUSION 180    2:30 PM   (180 min.)    ONCOLOGY INFUSION   [facility] 4     5     MR BREAST BILATERAL WWO   12:30 PM   (90 min.)   [id number]   [facility]     US BREAST BX CORE NEEDLE RIGHT    2:00 PM   (50 min.)   [id number]   [facility]     [facility] MASONIC LAB DRAW    3:00 PM   (15 min.)    MASONIC LAB DRAW   [facility] Masonic Lab Draw 6     7       8     9     10     [facility] MASONIC LAB DRAW    7:00 AM   (15 min.)    [facility] LAB DRAW   [facility]     [facility] RETURN    7:15 AM   (30 min.)   Fara Bradshaw MD   [facility] ONC INFUSION 180    8:30 AM   (180 min.)    ONCOLOGY INFUSION   [facility] NEW WITH ROOM   11:45 AM   (75 min.)   Naty Segovia GC   [facility] MASONIC LAB DRAW    1:15 PM   (15 min.)    [facility] LAB DRAW   [facility]     12     [facility]    2:30 PM   (15 min.)   [facility] LAB DRAW   [facility] Masonic Lab Draw     Admission    6:00 PM   Jaxon Richmond MD   [facility] [place], Emergency Department   (Discharge: 10/12/2017)     XR CHEST 2 VIEWS    6:25 PM   (15 min.)   [email or web address]   [facility],  Radiology 13     14       15     16     17     [facility]ON[facility] LAB DRAW    6:30 AM   (15 min.)   [facility] LAB DRAW   [facility] Masonic Lab Draw     [facility] RETURN    6:45 AM   (50 min.)   Lilia Livingston PA   [facility] ONC INFUSION 180    8:00 AM   (180 min.)    ONCOLOGY INFUSION   [facility] 18     19     20     21       22     23     24     [facility] LAB DRAW   12:30 PM   (15 min.)   [facility] LAB DRAW   [facility] Masonic Lab Draw     [facility] RETURN   12:45 PM   (50 min.)   Lilia Livingston PA   [facility] ONC INFUSION 180    2:30 PM   (180 min.)   [facility] ONCOLOGY INFUSION   [facility] 25     26     27     28       29     30     31     [facility]IC LAB DRAW    7:45 AM   (15 min.)   [facility] LAB DRAW   [facility] Masonic Lab Draw     [facility] RETURN    8:00 AM   (30 min.)   Fara Bradshaw MD   [facility] ONC INFUSION 180    9:30 AM   (180 min.)   [facility] ONCOLOGY INFUSION   [facility]                                November 2017 Sunday Monday Tuesday Wednesday Thursday Friday Saturday                  1     2     3     4       5     6     7     [facility] MAS[facility]IC LAB DRAW    6:30 AM   (15 min.)   [facility] LAB DRAW   [facility] Masonic Lab Draw     [facility] RETURN    6:45 AM   (50 min.)   Lilia Livingston PA   [facility]    8:00 AM   (180 min.)   [facility] ONCOLOGY INFUSION   [facility] 8     9     10     11       12     13     14     [facility]IC LAB DRAW    6:30 AM   (15 min.)   [facility] LAB DRAW   [facility] Masonic Lab Draw     [facility] RETURN    6:45  AM   (50 min.)   Lilia Livingston PA   [facility]     [facility] ONC INFUSION 180    8:00 AM   (180 min.)    ONCOLOGY INFUSION   [facility] 15     16     17     18       19     20     21     [facility] MASONIC LAB DRAW   11:15 AM   (15 min.)    [facility] LAB [facility]RAW   [facility]     [facility] RETURN   11:45 AM   (30 min.)   Fara Bradshaw MD   [facility]    1:00 PM   (180 min.)    ONCOLOGY INFUSION   [facility] 22     23     24     25       26     27     28     [facility] MASONIC LAB DRAW    6:30 AM   (15 min.)    MASONIC LAB DRAW   [facility] Lab Draw     P RETURN    6:45 AM   (50 min.)   Lilia Livingston PA   [facility]    8:00 AM   (180 min.)    ONCOLOGY INFUSION   [facility] 29     30                           Recent Results (from the past 24 hour(s))   CBC with platelets differential    Collection Time: 10/17/17  6:40 AM   Result Value Ref Range    WBC 4.4 4.0 - 11.0 10e9/L    RBC Count 3.41 (L) 3.8 - 5.2 10e12/L    Hemoglobin 11.3 (L) 11.7 - 15.7 g/dL    Hematocrit 32.7 (L) 35.0 - 47.0 %    MCV 96 78 - 100 fl    MCH 33.1 (H) 26.5 - 33.0 pg    MCHC 34.6 31.5 - 36.5 g/dL    RDW 11.8 10.0 - 15.0 %    Platelet Count 412 150 - 450 10e9/L    Diff Method Automated Method     % Neutrophils 64.5 %    % Lymphocytes 19.6 %    % Monocytes 12.2 %    % Eosinophils 2.5 %    % Basophils 0.5 %    % Immature Granulocytes 0.7 %    Nucleated RBCs 0 0 /100    Absolute Neutrophil 2.9 1.6 - 8.3 10e9/L    Absolute Lymphocytes 0.9 0.8 - 5.3 10e9/L    Absolute Monocytes 0.5 0.0 - 1.3 10e9/L    Absolute Eosinophils 0.1 0.0 - 0.7 10e9/L    Absolute Basophils 0.0 0.0 - 0.2 10e9/L    Abs Immature Granulocytes 0.0 0 - 0.4 10e9/L    Absolute Nucleated RBC 0.0    Comprehensive metabolic panel    Collection Time: 10/17/17  6:40 AM   Result Value Ref  Range    Sodium 134 133 - 144 mmol/L    Potassium 3.5 3.4 - 5.3 mmol/L    Chloride 103 94 - 109 mmol/L    Carbon Dioxide 24 20 - 32 mmol/L    Anion Gap 8 3 - 14 mmol/L    Glucose 155 (H) 70 - 99 mg/dL    Urea Nitrogen 10 7 - 30 mg/dL    Creatinine 0.64 0.52 - 1.04 mg/dL    GFR Estimate >90 >60 mL/min/1.7m2    GFR Estimate If Black >90 >60 mL/min/1.7m2    Calcium 8.7 8.5 - 10.1 mg/dL    Bilirubin Total 0.5 0.2 - 1.3 mg/dL    Albumin 2.8 (L) 3.4 - 5.0 g/dL    Protein Total 7.7 6.8 - 8.8 g/dL    Alkaline Phosphatase 140 40 - 150 U/L    ALT 41 0 - 50 U/L    AST 44 0 - 45 U/L

## 2017-10-17 NOTE — PROGRESS NOTES
Met patient and  in clinic room.  Pt feeling overall better, no coughing.  Pt stated her energy is better. Discussed ED experience,  parking and wigs today.  Pt knows how to contact our office to report symptoms.    Dionne Goode RNCC BSN CBCN

## 2017-10-17 NOTE — NURSING NOTE
"Chief Complaint   Patient presents with     Port Draw     Labs drawn from port by RN. Line flushed with saline and heparin. VItals taken and pt checked in for appt     Port accessed with 20g 3/4\" gripper needle by RN, labs collected, line flushed with saline and heparin.  Vitals taken. Pt checked in for appointment(s).    Luana Teran RN  "

## 2017-10-17 NOTE — MR AVS SNAPSHOT
After Visit Summary   10/17/2017    Ashleigh Alonzo    MRN: [id number]           Patient Information     Date Of Birth          1960        Visit Information        Provider Department      10/17/2017 8:00 AM  15 ATC; [facility] ONCOLOGY INFUSION [facility]        Today's Diagnoses     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)    -  1      Care Instructions    Contact Numbers    [facility] [place]: [phone]  [facility] [place]:  [phone]    Call triage with chills and/or temperature greater than or equal to 100.5, uncontrolled nausea/vomiting, diarrhea, constipation, dizziness, shortness of breath, chest pain, bleeding, unexplained bruising, or any new/concerning symptoms, questions/concerns.     If you are having any concerning symptoms or wish to speak to a provider before your next infusion visit, please call your care coordinator or triage to notify them so we can adequately serve you.       After Hours: [phone]    If after hours, weekends, or holidays, call main hospital  and ask for Oncology doctor on call.           October 2017 Sunday Monday Tuesday Wednesday Thursday Friday Saturday   1     2     3     [facility] LAB DRAW    1:15 PM   (15 min.)   [facility] LAB DRAW   [facility] Lab Draw     [facility] RETURN    1:35 PM   (50 min.)   Lilia Livingston PA   [facility] ONC INFUSION 180    2:30 PM   (180 min.)    ONCOLOGY INFUSION   [facility] 4     5     MR BREAST BILATERAL WWO   12:30 PM   (90 min.)   [id number]   [facility]     US BREAST BX CORE NEEDLE RIGHT    2:00 PM   (50 min.)   [id number]   [facility]     [facility] LAB DRAW    3:00 PM   (15 min.)    [facility] LAB DRAW   [facility] Lab Draw 6     7       8     9     10     [facility] LAB DRAW    7:00 AM   (15 min.)    [facility] LAB DRAW   [facility]  [facility] Lab Draw     [facility] RETURN    7:15 AM   (30 min.)   Fara Bradshaw MD   [facility] ONC INFUSION 180    8:30 AM   (180 min.)   UC ONCOLOGY INFUSION   [facility] NEW WITH ROOM   11:45 AM   (75 min.)   Naty Segovia GC   [facility] LAB DRAW    1:15 PM   (15 min.)   [facility] LAB DRAW   [facility] Lab Draw 11     12     [facility] LAB DRAW    2:30 PM   (15 min.)   [facility]RAW   [facility]     Admission    6:00 PM   Jaxon Richmond MD   [facility] [place], Emergency Department   (Discharge: 10/12/2017)     XR CHEST 2 VIEWS    6:25 PM   (15 min.)   [email or web address]   [facility],  Radiology 13     14       15     16     17     [facility] MAS[facility] LAB DRAW    6:30 AM   (15 min.)    [facility] LAB DRAW   [facility] Mason[facility] Lab Draw     [facility] RETURN    6:45 AM   (50 min.)   Lilia Livingston PA   [facility] ONC INFUSION 180    8:00 AM   (180 min.)    ONCOLOGY INFUSION   [facility] 18     19     20     21       22     23     24     P MASONIC LAB DRAW   12:30 PM   (15 min.)   UC [facility] LAB DRAW   [facility] Mason[facility] Lab Draw     [facility] RETURN   12:45 PM   (50 min.)   Lilia Livingston PA   [facility] ONC INFUSION 180    2:30 PM   (180 min.)   UC ONCOLOGY INFUSION   [facility] 25     26     27     28       29     30     31     P [facility] LAB DRAW    7:45 AM   (15 min.)   [facility]RAW   [facility]     [facility] RETURN    8:00 AM   (30 min.)   Fara Bradshaw MD   [facility] ONC INFUSION 180    9:30 AM   (180 min.)   UC ONCOLOGY INFUSION   [facility]                                November 2017 Sunday Monday Tuesday Wednesday Thursday Friday Saturday                  1     2     3     4       5     6     7     P MASONIC  LAB DRAW    6:30 AM   (15 min.)   UC [facility] LAB DRAW   [facility] Mason[facility] Lab Draw     [facility] RETURN    6:45 AM   (50 min.)   Lilia Livingston PA   [facility]     [facility] ONC INFUSION 180    8:00 AM   (180 min.)    ONCOLOGY INFUSION   [facility] 8     9     10     11       12     13     14     [facility] LAB DRAW    6:30 AM   (15 min.)   [facility] LAB DRAW   [facility] Masonic Lab Draw     [facility] RETURN    6:45 AM   (50 min.)   Lilia Livingston PA   [facility]     [facility] ONC INFUSION 180    8:00 AM   (180 min.)    ONCOLOGY INFUSION   [facility] 15     16     17     18       19     20     21     [facility] LAB DRAW   11:15 AM   (15 min.)   UC [facility] LAB DRAW   [facility] Masonic Lab Draw     [facility] RETURN   11:45 AM   (30 min.)   [facility]    1:00 PM   (180 min.)    ONCOLOGY INFUSION   [facility] 22     23     24     25       26     27     28     [facility] LAB DRAW    6:30 AM   (15 min.)   [facility] LAB DRAW   [facility]     [facility] RETURN    6:45 AM   (50 min.)   Lilia Livingston PA   [facility]     [facility] ONC INFUSION 180    8:00 AM   (180 min.)    ONCOLOGY INFUSION   [facility] 29     30                           Recent Results (from the past 24 hour(s))   CBC with platelets differential    Collection Time: 10/17/17  6:40 AM   Result Value Ref Range    WBC 4.4 4.0 - 11.0 10e9/L    RBC Count 3.41 (L) 3.8 - 5.2 10e12/L    Hemoglobin 11.3 (L) 11.7 - 15.7 g/dL    Hematocrit 32.7 (L) 35.0 - 47.0 %    MCV 96 78 - 100 fl    MCH 33.1 (H) 26.5 - 33.0 pg    MCHC 34.6 31.5 - 36.5 g/dL    RDW 11.8 10.0 - 15.0 %    Platelet Count 412 150 - 450 10e9/L    Diff Method Automated Method     % Neutrophils 64.5 %    % Lymphocytes 19.6 %    % Monocytes 12.2 %    % Eosinophils 2.5 %    % Basophils  0.5 %    % Immature Granulocytes 0.7 %    Nucleated RBCs 0 0 /100    Absolute Neutrophil 2.9 1.6 - 8.3 10e9/L    Absolute Lymphocytes 0.9 0.8 - 5.3 10e9/L    Absolute Monocytes 0.5 0.0 - 1.3 10e9/L    Absolute Eosinophils 0.1 0.0 - 0.7 10e9/L    Absolute Basophils 0.0 0.0 - 0.2 10e9/L    Abs Immature Granulocytes 0.0 0 - 0.4 10e9/L    Absolute Nucleated RBC 0.0    Comprehensive metabolic panel    Collection Time: 10/17/17  6:40 AM   Result Value Ref Range    Sodium 134 133 - 144 mmol/L    Potassium 3.5 3.4 - 5.3 mmol/L    Chloride 103 94 - 109 mmol/L    Carbon Dioxide 24 20 - 32 mmol/L    Anion Gap 8 3 - 14 mmol/L    Glucose 155 (H) 70 - 99 mg/dL    Urea Nitrogen 10 7 - 30 mg/dL    Creatinine 0.64 0.52 - 1.04 mg/dL    GFR Estimate >90 >60 mL/min/1.7m2    GFR Estimate If Black >90 >60 mL/min/1.7m2    Calcium 8.7 8.5 - 10.1 mg/dL    Bilirubin Total 0.5 0.2 - 1.3 mg/dL    Albumin 2.8 (L) 3.4 - 5.0 g/dL    Protein Total 7.7 6.8 - 8.8 g/dL    Alkaline Phosphatase 140 40 - 150 U/L    ALT 41 0 - 50 U/L    AST 44 0 - 45 U/L               Follow-ups after your visit        Your next 10 appointments already scheduled     Oct 24, 2017 12:30 PM CDT   [facility] Lab Draw with [facility] LAB DRAW   [facility] Lab Draw ([facility])    [address]  [address] [facility] [address] [phone]            Oct 24, 2017  1:00 PM CDT   (Arrive by 12:45 PM)   Return Visit with EDDIE Oliva   [facility])    [address] [address] [phone]            Oct 24, 2017  2:30 PM CDT   Infusion 180 with  ONCOLOGY INFUSION, UC 19 ATC   [facility] ([facility])    [address]  2nd Floor  [facility] [address]   [phone]            Oct 31, 2017  7:45 AM CDT   [facility] Lab Draw with [facility] LAB DRAW   [facility] Lab Draw (  [facility])    [address] [place] [address]   [phone]            Oct 31, 2017  8:15 AM CDT   (Arrive by 8:00 AM)   Return Visit with Fara Bradshaw MD   [facility] ([facility])    [address] [place] [address]   [phone]            Oct 31, 2017  9:30 AM CDT   Infusion 180 with UC ONCOLOGY INFUSION, UC 11 ATC   [facility])    [address] [place] [address]   [phone]            Nov 07, 2017  6:30 AM CST   [facility] Lab Draw with  [facility] LAB DRAW   [facility] Lab Draw ([facility])    [address] [place] [address]   [phone]            Nov 07, 2017  7:00 AM CST   (Arrive by 6:45 AM)   Return Visit with EDDIE Oliva   [facility] ([facility])    [address] [place] [address]   [phone]              Who to contact     If you have questions or need follow up information about today's clinic visit or your schedule please contact [facility] directly at [phone].  Normal or non-critical lab and imaging results will be communicated to you by MyChart, letter or phone within 4 business days after the clinic has received the results. If you do not hear from us within 7 days, please contact the clinic through [employer] or phone. If you have a critical or abnormal lab result, we will notify you by phone as soon as possible.  Submit refill requests through [employer] or call your pharmacy and they will forward the refill request to us. Please allow 3 business days for your refill to be completed.          Additional Information About Your Visit        [facility] Information     [employer] gives you secure access to your electronic  health record. If you see a primary care provider, you can also send messages to your care team and make appointments. If you have questions, please call your primary care clinic.  If you do not have a primary care provider, please call [phone] and they will assist you.        Care EveryWhere ID     This is your Care EveryWhere ID. This could be used by other organizations to access your [facility] medical records  [id number]         Blood Pressure from Last 3 Encounters:   10/17/17 115/86   10/12/17 103/60   10/10/17 112/68    Weight from Last 3 Encounters:   10/17/17 74.8 kg (164 lb 12.8 oz)   10/12/17 76.2 kg (168 lb)   10/10/17 76.6 kg (168 lb 12.8 oz)              We Performed the Following     CBC with platelets differential     Comprehensive metabolic panel        Primary Care Provider Office Phone # Fax #    Radha Cazraes [phone]       [address] [address]        Equal Access to Services     [facility]: Hadii aad ku hadasho Soomaali, waaxda luqadaha, qaybta kaalmada adeegyajacqui, bang druan . So [facility] [phone].    ATENCIÓN: Si habla español, tiene a blackman disposición servicios gratuitos de asistencia lingüística. Llame al [phone].    We comply with applicable federal civil rights laws and [place] laws. We do not discriminate on the basis of race, color, national origin, age, disability, sex, sexual orientation, or gender identity.            Thank you!     Thank you for choosing [facility]  for your care. Our goal is always to provide you with excellent care. Hearing back from our patients is one way we can continue to improve our services. Please take a few minutes to complete the written survey that you may receive in the mail after your visit with us. Thank you!             Your Updated Medication List - Protect others around you: Learn how to safely use, store and  throw away your medicines at www.disposemymeds.org.          This list is accurate as of: 10/17/17  8:51 AM.  Always use your most recent med list.                   Brand Name Dispense Instructions for use Diagnosis    albuterol 108 (90 BASE) MCG/ACT Inhaler    PROAIR HFA/PROVENTIL HFA/VENTOLIN HFA    1 Inhaler    Inhale 2 puffs into the lungs every 6 hours as needed for shortness of breath / dyspnea    Chronic obstructive pulmonary disease, unspecified COPD type (H)       aspirin 81 MG tablet      Take 1 tablet by mouth daily.        citalopram 10 MG tablet    celeXA    90 tablet    Take 1 tablet (10 mg) by mouth daily    Anxiety, Depression, unspecified depression type       CO Q 10 PO      Take 1 tablet by mouth        darifenacin 7.5 MG 24 hr tablet    ENABLEX    90 tablet    Take 1 tablet (7.5 mg) by mouth daily    Overactive bladder       guaiFENesin 600 MG 12 hr tablet    MUCINEX    180 tablet    Take 2 tablets (1,200 mg) by mouth 2 times daily    Cough with sputum       hydrOXYzine 25 MG tablet    ATARAX    100 tablet    Take 1-2 tablets (25-50 mg) by mouth every 6 hours as needed for itching    Hives       levofloxacin 500 MG tablet    LEVAQUIN    7 tablet    Take 1 tablet (500 mg) by mouth daily        lidocaine-prilocaine cream    EMLA    30 g    Please apply to port site 30 minutes before use prn    Personal history of malignant neoplasm of breast       * LORazepam 0.5 MG tablet    ATIVAN    10 tablet    Take 1-2 tabs 20 minutes prior to MRI scans.    Breast cancer (H), Anxiety       * LORazepam 0.5 MG tablet    ATIVAN    30 tablet    Take 1 tablet (0.5 mg) by mouth every 4 hours as needed (Anxiety, Nausea/Vomiting or Sleep)    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)       MULTIPLE VITAMIN PO           ondansetron 8 MG tablet    ZOFRAN    30 tablet    Take 1 tablet (8 mg) by mouth every 8 hours as needed for nausea    Malignant neoplasm of  upper-inner quadrant of right breast in female, estrogen receptor negative (H)       * order for DME      Respironics Dream Station Auto CPAP 12-18 cm, F&P Simplus FFM small.    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)       prochlorperazine 10 MG tablet    COMPAZINE    30 tablet    Take 1 tablet (10 mg) by mouth every 6 hours as needed (Nausea/Vomiting)    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)       simvastatin 40 MG tablet    ZOCOR    90 tablet    Take 1 tablet (40 mg) by mouth At Bedtime    Hyperlipidemia LDL goal <130       tiotropium 18 MCG capsule    SPIRIVA    1 capsule    Inhale 1 capsule (18 mcg) into the lungs daily Inhale contents of one capsule    Chronic obstructive pulmonary disease, unspecified COPD type (H)       VITAMIN B 12 PO      Take 100 mcg by mouth daily        VITAMIN B6 PO      Take 1 tablet by mouth daily.        vitamin D 1000 UNITS capsule      2 CAPSULE DAILY        Zinc Gluconate 100 MG Tabs     30 tablet    Take 1 tablet by mouth daily    Dysgeusia       * Notice:  This list has 4 medication(s) that are the same as other medications prescribed for you. Read the directions carefully, and ask your doctor or other care provider to review them with you.

## 2017-10-17 NOTE — PROGRESS NOTES
Research visit: pt here for C5D1, starting 10/12 pt had fever 100.8, she called and went to the ER to be checked. Pt put on levaquin and fevers have mostly gone but has had a couple spikes. Today is afebrile 98.7. Was feeling low energy and not good when she had fevers laid low over weekend but feeling better today.   Appetite fair, eating has lost some weight each week. She reports eating about 1.5 meals a day but prior to treatment would eat a couple meals a day. hahving some watery stools since beginning of chemo up to 2/day per pt. Not using immodium.   Denies pain and nausea, energy improved, pt denies coughing. Sleeping ok.   Labs checked and look ok today. Pt ok'd to continue treatment. All questions answered and pt verbalized understanding. Pt to get paclitaxel infusion today and will see her again next week.   Steffany Nix RN research  [phone]

## 2017-10-17 NOTE — LETTER
"10/17/2017      RE: Ashleigh Alonzo  [address]  [place] [address]       Hematology-Oncology Visit  Oct 17, 2017    Reason for Visit: follow-up breast cancer     HPI: Ashleigh Alonzo is a 57 year old female with past medical history of COPD, sleep apnea, periodontal disease with stage IIa, T2N0M0, grade 3, triple negative right breast cancer. She was diagnosed via abnormal screening mammogram. She ultimately had US, contrast-enhanced mammo, and biopsy showing 3.4 cm mass and pathology showed grade 3 invasive mammary carcinoma with associated high-grade DCIS. ER/OK was negative and HER2 negative. She enrolled in ISPY-2 clinical trial and began treatment with weekly taxol and once every 3 week pembrolizumab on 9/2/17. Please see notes from Dr. Bradshaw for further details of patient's oncology history.     Interval History: Ashleigh Perera is here with her  today for week 5 of treatment with Taxol. She had a rough week last week with port access issues and then it was not flushed. She developed fevers up to about 101 and felt fatigued and went to ED on 10/12. She was pancultured (all negative) and CXR showed some opacities. She was d/c on Levaquin. ANC was normal. Feels like her energy level is better now. She is not coughing or SOB. Appetite is generally down a bit but she continues to eat. Has no nausea. Has been having several episodes of \"explosive\" diarrhea per day. No taking anything for this yet. Notes some tingling sensation in her palms when her wrists are flexed but then this resolves when she shakes out her hands. No symptoms in feet. Urinating well. ROS otherwise negative.    Current Outpatient Prescriptions   Medication     levofloxacin (LEVAQUIN) 500 MG tablet     Zinc Gluconate 100 MG TABS     order for DME     lidocaine-prilocaine (EMLA) cream     hydrOXYzine (ATARAX) 25 MG tablet     guaiFENesin (MUCINEX) 600 MG 12 hr tablet     simvastatin (ZOCOR) 40 MG tablet     tiotropium (SPIRIVA) 18 MCG " "capsule     albuterol (PROAIR HFA/PROVENTIL HFA/VENTOLIN HFA) 108 (90 BASE) MCG/ACT Inhaler     citalopram (CELEXA) 10 MG tablet     darifenacin (ENABLEX) 7.5 MG 24 hr tablet     order for DME     Coenzyme Q10 (CO Q 10 PO)     MULTIPLE VITAMIN PO     Cyanocobalamin (VITAMIN B 12 PO)     Pyridoxine HCl (VITAMIN B6 PO)     aspirin 81 MG tablet     VITAMIN D 1000 UNIT OR CAPS     ondansetron (ZOFRAN) 8 MG tablet     prochlorperazine (COMPAZINE) 10 MG tablet     LORazepam (ATIVAN) 0.5 MG tablet     LORazepam (ATIVAN) 0.5 MG tablet     No current facility-administered medications for this visit.      Facility-Administered Medications Ordered in Other Visits   Medication     lidocaine 1 % 9 mL     sodium bicarbonate 8.4 % injection 1 mEq     influenza quadrivalent (PF) vacc age 3 yrs and older (FLUZONE or Flulaval) injection 0.5 mL     lidocaine-EPINEPHrine 1.5 %-[id number] injection 10 mL       PHYSICAL EXAM:  /86 (BP Location: Right arm, Cuff Size: Adult Regular)  Pulse 115  Temp 98.7  F (37.1  C) (Oral)  Resp 16  Ht 1.626 m (5' 4.02\")  Wt 74.8 kg (164 lb 12.8 oz)  SpO2 94%  BMI 28.27 kg/m2  General: Alert, oriented, pleasant, NAD  Skin: No focal rash on exposed skin   HEENT: Normocephalic, atraumatic, PERRLA, EOMI. Moist mucus membranes, no lesions or thrush  Neck: No cervical or supraclavicular LAD.  Axillary: No LAD  Breast: Deferred   Lungs: CTA bilaterally, normal work of breathing. Clear with coughing.   Cardiac: RRR, S1, S2, no murmurs  Abdomen: Soft, nontender, nondistended. Normoactive bowel sounds. No hepatosplenomegaly, masses  Neuro: CNII-XII grossly intact  Extremities: No pedal edema    Labs:    10/17/2017 06:40   Sodium 134   Potassium 3.5   Chloride 103   Carbon Dioxide 24   Urea Nitrogen 10   Creatinine 0.64   GFR Estimate >90   GFR Estimate If Black >90   Calcium 8.7   Anion Gap 8   Albumin 2.8 (L)   Protein Total 7.7   Bilirubin Total 1.2   Alkaline Phosphatase 140   ALT 41   AST 44 "   Glucose 155 (H)   WBC 4.4   Hemoglobin 11.3 (L)   Hematocrit 32.7 (L)   Platelet Count 412   RBC Count 3.41 (L)   MCV 96   MCH 33.1 (H)   MCHC 34.6   RDW 11.8   Diff Method Automated Method   % Neutrophils 64.5   % Lymphocytes 19.6   % Monocytes 12.2   % Eosinophils 2.5   % Basophils 0.5   % Immature Granulocytes 0.7   Nucleated RBCs 0   Absolute Neutrophil 2.9   Absolute Lymphocytes 0.9   Absolute Monocytes 0.5   Absolute Eosinophils 0.1   Absolute Basophils 0.0   Abs Immature Granulocytes 0.0   Absolute Nucleated RBC 0.0         Assessment & Plan:     1. Stage IIa, T2N0M0, grade 3, triple negative breast cancer: S/p 4 weeks of weekly Taxol and every 3 week pembro. She is tolerating treatment well without significant symptoms. Had a great partial response already to treatment after 3 weeks per breast MRI. Feeling okay and counts are adequate to proceed with Taxol today. Plan for 12 weeks of Taxol and pembrolizumab followed by 4 cycles of AC and pembrolizumab. She will then proceed with surgery and potentially adjuvant radiation. She will be seen weekly on the clinical trial.     2. Mild transaminitis: LFTs WNL today. Continue to monitor.    3. URI/PNA: Continue Levaquin to complete course. Fever curve has trended down. Lungs are clear.    4. Diarrhea: Secondary to chemo and likely Levaquin. Start yogurt daily. Imodium prn. Continue good hydration.     Lilia Livingston PA-C    [facility] [address] [address]  [phone]

## 2017-10-18 ENCOUNTER — TELEPHONE (OUTPATIENT)
Dept: ONCOLOGY | Facility: CLINIC | Age: 57
End: 2017-10-18

## 2017-10-18 LAB
BACTERIA SPEC CULT: NO GROWTH
BACTERIA SPEC CULT: NO GROWTH
SPECIMEN SOURCE: NORMAL
SPECIMEN SOURCE: NORMAL

## 2017-10-18 NOTE — TELEPHONE ENCOUNTER
"Per Lilia: \"She is really low-risk in terms of infection. I would keep monitoring the temps and complete Levaquin course since she is feeling great and she sounded good yesterday. If she still has fevers at the end of the week, I could put her on doxycycline.\"     Called patient back and instructed her to complete Levaquin, push fluids, obtain adequate rest and sleep, monitor fever. If fever > 104 go to ED, if cough, SOB, dyspnea, chest pain, dizziness develop she needs to call back. Ashleigh Perera verbalized understanding.    "

## 2017-10-18 NOTE — TELEPHONE ENCOUNTER
Ashleigh Perera was seen in the clinic yesterday by Lilia and received Taxol, no neutropenia. She has been on Levaquin 500mg daily since ED visit on 10/12 for pneumonia. Her fevers have been trending down over the past week and she is negative for: cough, SOB, dyspnea, dizziness.     She reports T100.3 yesterday at 6:30pm. Took 3 tylenol. Upon waking up this morning around 11am her fever ranged from 101-102. She has 2 doses of Levaquin left. She's concerned about her continued fevers.    Message sent to Lilia Livingston and Dr. Bradshaw

## 2017-10-24 ENCOUNTER — INFUSION THERAPY VISIT (OUTPATIENT)
Dept: ONCOLOGY | Facility: CLINIC | Age: 57
End: 2017-10-24
Attending: INTERNAL MEDICINE
Payer: COMMERCIAL

## 2017-10-24 ENCOUNTER — RESEARCH ENCOUNTER (OUTPATIENT)
Dept: ONCOLOGY | Facility: CLINIC | Age: 57
End: 2017-10-24

## 2017-10-24 ENCOUNTER — ONCOLOGY VISIT (OUTPATIENT)
Dept: ONCOLOGY | Facility: CLINIC | Age: 57
End: 2017-10-24
Attending: PHYSICIAN ASSISTANT
Payer: COMMERCIAL

## 2017-10-24 ENCOUNTER — APPOINTMENT (OUTPATIENT)
Dept: LAB | Facility: CLINIC | Age: 57
End: 2017-10-24
Attending: INTERNAL MEDICINE
Payer: COMMERCIAL

## 2017-10-24 VITALS
OXYGEN SATURATION: 95 % | DIASTOLIC BLOOD PRESSURE: 86 MMHG | HEIGHT: 64 IN | TEMPERATURE: 98.3 F | WEIGHT: 161.7 LBS | BODY MASS INDEX: 27.61 KG/M2 | SYSTOLIC BLOOD PRESSURE: 123 MMHG | RESPIRATION RATE: 18 BRPM

## 2017-10-24 DIAGNOSIS — Z17.1 MALIGNANT NEOPLASM OF UPPER-INNER QUADRANT OF RIGHT BREAST IN FEMALE, ESTROGEN RECEPTOR NEGATIVE (H): Primary | ICD-10-CM

## 2017-10-24 DIAGNOSIS — Z17.1 MALIGNANT NEOPLASM OF UPPER-INNER QUADRANT OF RIGHT BREAST IN FEMALE, ESTROGEN RECEPTOR NEGATIVE (H): ICD-10-CM

## 2017-10-24 DIAGNOSIS — C50.211 MALIGNANT NEOPLASM OF UPPER-INNER QUADRANT OF RIGHT BREAST IN FEMALE, ESTROGEN RECEPTOR NEGATIVE (H): Primary | ICD-10-CM

## 2017-10-24 DIAGNOSIS — C50.211 MALIGNANT NEOPLASM OF UPPER-INNER QUADRANT OF RIGHT BREAST IN FEMALE, ESTROGEN RECEPTOR NEGATIVE (H): ICD-10-CM

## 2017-10-24 LAB
ALBUMIN SERPL-MCNC: 3.1 G/DL (ref 3.4–5)
ALP SERPL-CCNC: 131 U/L (ref 40–150)
ALT SERPL W P-5'-P-CCNC: 53 U/L (ref 0–50)
ANION GAP SERPL CALCULATED.3IONS-SCNC: 9 MMOL/L (ref 3–14)
AST SERPL W P-5'-P-CCNC: 66 U/L (ref 0–45)
BASOPHILS # BLD AUTO: 0 10E9/L (ref 0–0.2)
BASOPHILS NFR BLD AUTO: 0.5 %
BILIRUB SERPL-MCNC: 0.5 MG/DL (ref 0.2–1.3)
BUN SERPL-MCNC: 6 MG/DL (ref 7–30)
CALCIUM SERPL-MCNC: 8.9 MG/DL (ref 8.5–10.1)
CHLORIDE SERPL-SCNC: 100 MMOL/L (ref 94–109)
CO2 SERPL-SCNC: 24 MMOL/L (ref 20–32)
CREAT SERPL-MCNC: 0.62 MG/DL (ref 0.52–1.04)
DIFFERENTIAL METHOD BLD: ABNORMAL
EOSINOPHIL # BLD AUTO: 0.1 10E9/L (ref 0–0.7)
EOSINOPHIL NFR BLD AUTO: 1.6 %
ERYTHROCYTE [DISTWIDTH] IN BLOOD BY AUTOMATED COUNT: 12.4 % (ref 10–15)
GFR SERPL CREATININE-BSD FRML MDRD: >90 ML/MIN/1.7M2
GLUCOSE SERPL-MCNC: 127 MG/DL (ref 70–99)
HCT VFR BLD AUTO: 33.9 % (ref 35–47)
HGB BLD-MCNC: 11.6 G/DL (ref 11.7–15.7)
IMM GRANULOCYTES # BLD: 0 10E9/L (ref 0–0.4)
IMM GRANULOCYTES NFR BLD: 0.5 %
LYMPHOCYTES # BLD AUTO: 1.2 10E9/L (ref 0.8–5.3)
LYMPHOCYTES NFR BLD AUTO: 21.6 %
MCH RBC QN AUTO: 32.8 PG (ref 26.5–33)
MCHC RBC AUTO-ENTMCNC: 34.2 G/DL (ref 31.5–36.5)
MCV RBC AUTO: 96 FL (ref 78–100)
MONOCYTES # BLD AUTO: 0.7 10E9/L (ref 0–1.3)
MONOCYTES NFR BLD AUTO: 11.8 %
NEUTROPHILS # BLD AUTO: 3.5 10E9/L (ref 1.6–8.3)
NEUTROPHILS NFR BLD AUTO: 64 %
NRBC # BLD AUTO: 0 10*3/UL
NRBC BLD AUTO-RTO: 0 /100
PLATELET # BLD AUTO: 524 10E9/L (ref 150–450)
POTASSIUM SERPL-SCNC: 3.5 MMOL/L (ref 3.4–5.3)
PROT SERPL-MCNC: 7.9 G/DL (ref 6.8–8.8)
RBC # BLD AUTO: 3.54 10E12/L (ref 3.8–5.2)
SODIUM SERPL-SCNC: 134 MMOL/L (ref 133–144)
WBC # BLD AUTO: 5.5 10E9/L (ref 4–11)

## 2017-10-24 PROCEDURE — 25000125 ZZHC RX 250: Mod: ZF | Performed by: PHYSICIAN ASSISTANT

## 2017-10-24 PROCEDURE — 25000128 H RX IP 250 OP 636: Mod: ZF | Performed by: PHYSICIAN ASSISTANT

## 2017-10-24 PROCEDURE — 85025 COMPLETE CBC W/AUTO DIFF WBC: CPT | Performed by: INTERNAL MEDICINE

## 2017-10-24 PROCEDURE — 36593 DECLOT VASCULAR DEVICE: CPT

## 2017-10-24 PROCEDURE — 80053 COMPREHEN METABOLIC PANEL: CPT | Performed by: INTERNAL MEDICINE

## 2017-10-24 PROCEDURE — 96375 TX/PRO/DX INJ NEW DRUG ADDON: CPT

## 2017-10-24 PROCEDURE — 96413 CHEMO IV INFUSION 1 HR: CPT

## 2017-10-24 PROCEDURE — 25000128 H RX IP 250 OP 636: Mod: ZF | Performed by: INTERNAL MEDICINE

## 2017-10-24 PROCEDURE — S0028 INJECTION, FAMOTIDINE, 20 MG: HCPCS | Mod: ZF | Performed by: PHYSICIAN ASSISTANT

## 2017-10-24 PROCEDURE — 99214 OFFICE O/P EST MOD 30 MIN: CPT | Mod: ZP | Performed by: PHYSICIAN ASSISTANT

## 2017-10-24 RX ORDER — EPINEPHRINE 0.3 MG/.3ML
0.3 INJECTION SUBCUTANEOUS EVERY 5 MIN PRN
Status: CANCELLED | OUTPATIENT
Start: 2017-10-24

## 2017-10-24 RX ORDER — DEXAMETHASONE SODIUM PHOSPHATE 10 MG/ML
10 INJECTION, SOLUTION INTRAMUSCULAR; INTRAVENOUS
Status: CANCELLED | OUTPATIENT
Start: 2017-10-24

## 2017-10-24 RX ORDER — HEPARIN SODIUM (PORCINE) LOCK FLUSH IV SOLN 100 UNIT/ML 100 UNIT/ML
500 SOLUTION INTRAVENOUS ONCE
Status: COMPLETED | OUTPATIENT
Start: 2017-10-24 | End: 2017-10-24

## 2017-10-24 RX ORDER — METHYLPREDNISOLONE SODIUM SUCCINATE 125 MG/2ML
125 INJECTION, POWDER, LYOPHILIZED, FOR SOLUTION INTRAMUSCULAR; INTRAVENOUS
Status: CANCELLED
Start: 2017-10-24

## 2017-10-24 RX ORDER — SODIUM CHLORIDE 9 MG/ML
1000 INJECTION, SOLUTION INTRAVENOUS CONTINUOUS PRN
Status: CANCELLED
Start: 2017-10-24

## 2017-10-24 RX ORDER — LORAZEPAM 2 MG/ML
0.5 INJECTION INTRAMUSCULAR EVERY 4 HOURS PRN
Status: CANCELLED
Start: 2017-10-24

## 2017-10-24 RX ORDER — EPINEPHRINE 0.3 MG/.3ML
INJECTION SUBCUTANEOUS
Status: DISCONTINUED
Start: 2017-10-24 | End: 2017-10-24 | Stop reason: WASHOUT

## 2017-10-24 RX ORDER — EPINEPHRINE 1 MG/ML
0.3 INJECTION, SOLUTION, CONCENTRATE INTRAVENOUS EVERY 5 MIN PRN
Status: CANCELLED | OUTPATIENT
Start: 2017-10-24

## 2017-10-24 RX ORDER — HEPARIN SODIUM (PORCINE) LOCK FLUSH IV SOLN 100 UNIT/ML 100 UNIT/ML
5 SOLUTION INTRAVENOUS DAILY PRN
Status: DISCONTINUED | OUTPATIENT
Start: 2017-10-24 | End: 2017-10-25 | Stop reason: HOSPADM

## 2017-10-24 RX ORDER — DIPHENHYDRAMINE HYDROCHLORIDE 50 MG/ML
50 INJECTION INTRAMUSCULAR; INTRAVENOUS
Status: CANCELLED
Start: 2017-10-24

## 2017-10-24 RX ORDER — ALBUTEROL SULFATE 90 UG/1
1-2 AEROSOL, METERED RESPIRATORY (INHALATION)
Status: CANCELLED
Start: 2017-10-24

## 2017-10-24 RX ORDER — MEPERIDINE HYDROCHLORIDE 25 MG/ML
25 INJECTION INTRAMUSCULAR; INTRAVENOUS; SUBCUTANEOUS EVERY 30 MIN PRN
Status: CANCELLED | OUTPATIENT
Start: 2017-10-24

## 2017-10-24 RX ORDER — HEPARIN SODIUM (PORCINE) LOCK FLUSH IV SOLN 100 UNIT/ML 100 UNIT/ML
500 SOLUTION INTRAVENOUS ONCE
Status: CANCELLED
Start: 2017-10-24 | End: 2017-10-24

## 2017-10-24 RX ORDER — ALBUTEROL SULFATE 0.83 MG/ML
2.5 SOLUTION RESPIRATORY (INHALATION)
Status: CANCELLED | OUTPATIENT
Start: 2017-10-24

## 2017-10-24 RX ADMIN — FAMOTIDINE 20 MG: 20 INJECTION, SOLUTION INTRAVENOUS at 14:35

## 2017-10-24 RX ADMIN — PACLITAXEL 150 MG: 6 INJECTION, SOLUTION INTRAVENOUS at 16:30

## 2017-10-24 RX ADMIN — SODIUM CHLORIDE, PRESERVATIVE FREE 5 ML: 5 INJECTION INTRAVENOUS at 12:35

## 2017-10-24 RX ADMIN — SODIUM CHLORIDE 250 ML: 9 INJECTION, SOLUTION INTRAVENOUS at 14:35

## 2017-10-24 RX ADMIN — SODIUM CHLORIDE, PRESERVATIVE FREE 500 UNITS: 5 INJECTION INTRAVENOUS at 17:35

## 2017-10-24 RX ADMIN — ALTEPLASE 2 MG: 2.2 INJECTION, POWDER, LYOPHILIZED, FOR SOLUTION INTRAVENOUS at 15:25

## 2017-10-24 ASSESSMENT — PAIN SCALES - GENERAL: PAINLEVEL: NO PAIN (0)

## 2017-10-24 NOTE — NURSING NOTE
Chief Complaint   Patient presents with     Port Draw     Port accessed by RN, pt tolerated well. Labs collected and sent, line flushed with NS and heparin. Vitals taken and pt checked in for next appt.   Haley Duran

## 2017-10-24 NOTE — MR AVS SNAPSHOT
After Visit Summary   10/24/2017    Ashleigh Alonzo    MRN: [id number]           Patient Information     Date Of Birth          1960        Visit Information        Provider Department      10/24/2017 2:30 PM  19 ATC;  ONCOLOGY INFUSION [facility]        Today's Diagnoses     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)    -  1      Care [facility]    [facility] Main Line: [phone]    Call triage nurse with chills and/or temperature greater than or equal to 100.4, uncontrolled nausea/vomiting, diarrhea, constipation, dizziness, shortness of breath, chest pain, bleeding, unexplained bruising, or any new/concerning symptoms, questions/concerns.   If you are having any concerning symptoms or wish to speak to a provider before your next infusion visit, please call your care coordinator or triage to notify them so we can adequately serve you.   Triage Nurse Line: [phone]    If after hours, weekends, or holidays, call main hospital  and ask for Oncology doctor on call @ [phone]          October 2017 Sunday Monday Tuesday Wednesday Thursday Friday Saturday   1     2     3     [facility] DRAW    1:15 PM   (15 min.)   [facility]   [facility] Lab Draw     [facility] RETURN    1:35 PM   (50 min.)   Lilia Livingston PA   [facility] ONC INFUSION 180    2:30 PM   (180 min.)    ONCOLOGY INFUSION   [facility] 4     5     MR BREAST BILATERAL WWO   12:30 PM   (90 min.)   [id number]   [facility]     US BREAST BX CORE NEEDLE RIGHT    2:00 PM   (50 min.)   [id number]   [facility]     [facility] DRAW    3:00 PM   (15 min.)    [facility] LAB DRAW   [facility] Lab Draw 6     7       8     9     10     [facility] DRAW    7:00 AM   (15 min.)    [facility] LAB DRAW   [facility]  Lab Draw     [facility] RETURN    7:15 AM   (30 min.)   Fara Bradshaw MD   [facility] ONC INFUSION 180    8:30 AM   (180 min.)   [facility] ONCOLOGY INFUSION   [facility] NEW WITH ROOM   11:45 AM   (75 min.)   Naty Segovia GC   [facility] DRAW    1:15 PM   (15 min.)   [facility] DRAW   [facility] Lab Draw 11     12     P [facility] LAB DRAW    2:30 PM   (15 min.)   [facility] DRAW   [facility] Draw     Admission    6:00 PM   Jaxon Richmond MD   [facility], Emergency Department   (Discharge: 10/12/2017)     XR CHEST 2 VIEWS    6:25 PM   (15 min.)   [email or web address]   [facility],  Radiology 13     14       15     16     17     [facility] LAB DRAW    6:30 AM   (15 min.)    [facility] LAB DRAW   [facility] Lab Draw     [facility] RETURN    6:45 AM   (50 min.)   Lilia Livingston PA   [facility] ONC INFUSION 180    8:00 AM   (180 min.)    ONCOLOGY INFUSION   [facility] 18     19     20     21       22     23     24     [facility] LAB DRAW   12:30 PM   (15 min.)   [facility] DRAW   [facility] Draw     [facility] RETURN   12:45 PM   (50 min.)   Lilia Livingston PA   [facility] ONC INFUSION 180    2:30 PM   (180 min.)    ONCOLOGY INFUSION   [facility] 25     26     27     28       29     30     31     [facility] LAB DRAW    7:45 AM   (15 min.)   [facility]   [facility]     [facility] RETURN    8:00 AM   (30 min.)   Fara Bradshaw MD   [facility] ONC INFUSION 180    9:30 AM   (180 min.)    ONCOLOGY INFUSION   [facility]                                November 2017 Sunday Monday Tuesday Wednesday Thursday Friday Saturday                  1     2     3     4       5     6     7     [facility] LAB  DRAW    6:30 AM   (15 min.)   [facility] DRAW   [facility] Draw     [facility] RETURN    6:45 AM   (50 min.)   Lilia Livingston PA   [facility]     [facility] ONC INFUSION 180    8:00 AM   (180 min.)    ONCOLOGY INFUSION   [facility] 8     9     10     11       12     13     14     [facility] DRAW    6:30 AM   (15 min.)   [facility] DRAW   [facility] Draw     [facility] RETURN    6:45 AM   (50 min.)   Lilia Livingston PA   [facility]     [facility] ONC INFUSION 180    8:00 AM   (180 min.)    ONCOLOGY INFUSION   [facility] 15     16     17     18       19     20     21     [facility] DRAW   11:15 AM   (15 min.)   [facility] DRAW   [facility] Draw     [facility] RETURN   11:45 AM   (30 min.)   Fara Bradshaw MD   [facility] 180    1:00 PM   (180 min.)    ONCOLOGY INFUSION   [facility] 22     23     24     25       26     27     28     [facility] DRAW    6:30 AM   (15 min.)   [facility] DRAW   [facility] Draw     [facility] RETURN    6:45 AM   (50 min.)   Lilia Livingston PA   [facility]     [facility] ONC INFUSION 180    8:00 AM   (180 min.)    ONCOLOGY INFUSION   [facility] 29     30                            Lab Results:  Recent Results (from the past 12 hour(s))   CBC with platelets differential    Collection Time: 10/24/17 12:36 PM   Result Value Ref Range    WBC 5.5 4.0 - 11.0 10e9/L    RBC Count 3.54 (L) 3.8 - 5.2 10e12/L    Hemoglobin 11.6 (L) 11.7 - 15.7 g/dL    Hematocrit 33.9 (L) 35.0 - 47.0 %    MCV 96 78 - 100 fl    MCH 32.8 26.5 - 33.0 pg    MCHC 34.2 31.5 - 36.5 g/dL    RDW 12.4 10.0 - 15.0 %    Platelet Count 524 (H) 150 - 450 10e9/L    Diff Method Automated Method     % Neutrophils 64.0 %    % Lymphocytes 21.6 %    % Monocytes 11.8 %    % Eosinophils 1.6 %    %  Basophils 0.5 %    % Immature Granulocytes 0.5 %    Nucleated RBCs 0 0 /100    Absolute Neutrophil 3.5 1.6 - 8.3 10e9/L    Absolute Lymphocytes 1.2 0.8 - 5.3 10e9/L    Absolute Monocytes 0.7 0.0 - 1.3 10e9/L    Absolute Eosinophils 0.1 0.0 - 0.7 10e9/L    Absolute Basophils 0.0 0.0 - 0.2 10e9/L    Abs Immature Granulocytes 0.0 0 - 0.4 10e9/L    Absolute Nucleated RBC 0.0    Comprehensive metabolic panel    Collection Time: 10/24/17 12:36 PM   Result Value Ref Range    Sodium 134 133 - 144 mmol/L    Potassium 3.5 3.4 - 5.3 mmol/L    Chloride 100 94 - 109 mmol/L    Carbon Dioxide 24 20 - 32 mmol/L    Anion Gap 9 3 - 14 mmol/L    Glucose 127 (H) 70 - 99 mg/dL    Urea Nitrogen 6 (L) 7 - 30 mg/dL    Creatinine 0.62 0.52 - 1.04 mg/dL    GFR Estimate >90 >60 mL/min/1.7m2    GFR Estimate If Black >90 >60 mL/min/1.7m2    Calcium 8.9 8.5 - 10.1 mg/dL    Bilirubin Total 0.5 0.2 - 1.3 mg/dL    Albumin 3.1 (L) 3.4 - 5.0 g/dL    Protein Total 7.9 6.8 - 8.8 g/dL    Alkaline Phosphatase 131 40 - 150 U/L    ALT 53 (H) 0 - 50 U/L    AST 66 (H) 0 - 45 U/L             Follow-ups after your visit        Your next 10 appointments already scheduled     Oct 31, 2017  7:45 AM CDT   [facility]   [facility] Lab Draw ([facility])    [address]  2nd Floor  [facility] [address] [phone]            Oct 31, 2017  8:15 AM CDT   (Arrive by 8:00 AM)   Return Visit with Fara Bradshaw MD   [facility] ([facility] [place])    [address]  [address]nd Floor  [facility] [address] [phone]            Oct 31, 2017  9:30 AM CDT   Infusion 180 with  ONCOLOGY INFUSION,  [address] [facility] ([facility])    [address]  2nd Floor  [facility] [address] [phone]            Nov 07, 2017  6:30 AM [facility] Lab Draw with [facility] LAB DRAW   [facility] ([facility])    [address] [address]   [phone]            Nov 07, 2017  7:00 AM CST   (Arrive by 6:45 AM)   Return Visit with EDDIE Oliva   [facility] ([facility])    [address] [place] [address]   [phone]            Nov 07, 2017  8:00 AM CST   Infusion 180 with UC ONCOLOGY INFUSION, [facility] 18 ATC   [facility] ([facility])    [address] [place] [address]   [phone]            Nov 14, 2017  6:30 AM CST   [facility] with [facility] DRAW   [facility] Draw ([facility])    [address] [place] [address]   [phone]            Nov 14, 2017  7:00 AM CST   (Arrive by 6:45 AM)   Return Visit with EDDIE Oliva   [facility] ([facility])    [address] [place] [address]   [phone]              Who to contact     If you have questions or need follow up information about today's clinic visit or your schedule please contact [facility] directly at [phone].  Normal or non-critical lab and imaging results will be communicated to you by MyChart, letter or phone within 4 business days after the clinic has received the results. If you do not hear from us within 7 days, please contact the clinic through MyChart or phone. If you have a critical or abnormal lab result, we will notify you by phone as soon as possible.  Submit refill requests through [employer] or call your pharmacy and they will forward the refill request to us. Please allow 3 business days for your refill to be completed.          Additional Information About Your Visit        MyChart Information     [employer] gives you secure access to your  electronic health record. If you see a primary care provider, you can also send messages to your care team and make appointments. If you have questions, please call your primary care clinic.  If you do not have a primary care provider, please call [phone] and they will assist you.        Care EveryWhere ID     This is your Care EveryWhere ID. This could be used by other organizations to access your [facility] medical records  [id number]         Blood Pressure from Last 3 Encounters:   10/24/17 123/86   10/17/17 115/86   10/12/17 103/60    Weight from Last 3 Encounters:   10/24/17 73.3 kg (161 lb 11.2 oz)   10/17/17 74.8 kg (164 lb 12.8 oz)   10/12/17 76.2 kg (168 lb)              We Performed the Following     CBC with platelets differential     Comprehensive metabolic panel          Today's Medication Changes          These changes are accurate as of: 10/24/17  4:35 PM.  If you have any questions, ask your nurse or doctor.               Stop taking these medicines if you haven't already. Please contact your care team if you have questions.     levofloxacin 500 MG tablet   Commonly known as:  LEVAQUIN   Stopped by:  Lilia Livingston PA                    Primary Care Provider Office Phone # Fax #    Radha Cazares [phone]       [address] [address]        Equal Access to Services     THERESA HUGHES AH: Hadii candie baileyo Somarian, waaxda luqadaha, qaybta kaalmada jessica, bang duran . So [facility] [phone].    ATENCIÓN: Si habla español, tiene a blackman disposición servicios gratuitos de asistencia lingüística. Llame al [phone].    We comply with applicable federal civil rights laws and [place] laws. We do not discriminate on the basis of race, color, national origin, age, disability, sex, sexual orientation, or gender identity.            Thank you!     Thank you for choosing [facility]  for your care. Our goal is always to provide you with excellent care. Hearing back from our patients is one way we can continue to improve our services. Please take a few minutes to complete the written survey that you may receive in the mail after your visit with us. Thank you!             Your Updated Medication List - Protect others around you: Learn how to safely use, store and throw away your medicines at www.disposemymeds.org.          This list is accurate as of: 10/24/17  4:35 PM.  Always use your most recent med list.                   Brand Name Dispense Instructions for use Diagnosis    albuterol 108 (90 BASE) MCG/ACT Inhaler    PROAIR HFA/PROVENTIL HFA/VENTOLIN HFA    1 Inhaler    Inhale 2 puffs into the lungs every 6 hours as needed for shortness of breath / dyspnea    Chronic obstructive pulmonary disease, unspecified COPD type (H)       aspirin 81 MG tablet      Take 1 tablet by mouth daily.        citalopram 10 MG tablet    celeXA    90 tablet    Take 1 tablet (10 mg) by mouth daily    Anxiety, Depression, unspecified depression type       CO Q 10 PO      Take 1 tablet by mouth        darifenacin 7.5 MG 24 hr tablet    ENABLEX    90 tablet    Take 1 tablet (7.5 mg) by mouth daily    Overactive bladder       guaiFENesin 600 MG 12 hr tablet    MUCINEX    180 tablet    Take 2 tablets (1,200 mg) by mouth 2 times daily    Cough with sputum       hydrOXYzine 25 MG tablet    ATARAX    100 tablet    Take 1-2 tablets (25-50 mg) by mouth every 6 hours as needed for itching    Hives       lidocaine-prilocaine cream    EMLA    30 g    Please apply to port site 30 minutes before use prn    Personal history of malignant neoplasm of breast       * LORazepam 0.5 MG tablet    ATIVAN    10 tablet    Take 1-2 tabs 20 minutes prior to MRI scans.    Breast cancer (H), Anxiety       * LORazepam 0.5 MG tablet    ATIVAN    30 tablet    Take 1 tablet (0.5 mg) by mouth every 4 hours as needed (Anxiety,  Nausea/Vomiting or Sleep)    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)       MULTIPLE VITAMIN PO           ondansetron 8 MG tablet    ZOFRAN    30 tablet    Take 1 tablet (8 mg) by mouth every 8 hours as needed for nausea    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)       * order for DME      Respironics Dream Station Auto CPAP 12-18 cm, F&P Simplus FFM small.    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)       prochlorperazine 10 MG tablet    COMPAZINE    30 tablet    Take 1 tablet (10 mg) by mouth every 6 hours as needed (Nausea/Vomiting)    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)       simvastatin 40 MG tablet    ZOCOR    90 tablet    Take 1 tablet (40 mg) by mouth At Bedtime    Hyperlipidemia LDL goal <130       tiotropium 18 MCG capsule    SPIRIVA    1 capsule    Inhale 1 capsule (18 mcg) into the lungs daily Inhale contents of one capsule    Chronic obstructive pulmonary disease, unspecified COPD type (H)       VITAMIN B 12 PO      Take 100 mcg by mouth daily        VITAMIN B6 PO      Take 1 tablet by mouth daily.        vitamin D 1000 UNITS capsule      2 CAPSULE DAILY        Zinc Gluconate 100 MG Tabs     30 tablet    Take 1 tablet by mouth daily    Dysgeusia       * Notice:  This list has 4 medication(s) that are the same as other medications prescribed for you. Read the directions carefully, and ask your doctor or other care provider to review them with you.

## 2017-10-24 NOTE — NURSING NOTE
"Oncology Rooming Note    October 24, 2017 12:52 PM   Ashleigh Alonzo is a 57 year old female who presents for:    Chief Complaint   Patient presents with     Port Draw     Oncology Clinic Visit     return patient visit for pre-chemo follow up related to breast cancer     Initial Vitals: /86  Temp 98.3  F (36.8  C) (Oral)  Resp 18  Ht 1.626 m (5' 4.02\")  Wt 73.3 kg (161 lb 11.2 oz)  SpO2 95%  Breastfeeding? No  BMI 27.74 kg/m2 Estimated body mass index is 27.74 kg/(m^2) as calculated from the following:    Height as of this encounter: 1.626 m (5' 4.02\").    Weight as of this encounter: 73.3 kg (161 lb 11.2 oz). Body surface area is 1.82 meters squared.  No Pain (0) Comment: Data Unavailable   No LMP recorded. Patient is postmenopausal.  Allergies reviewed: Yes  Medications reviewed: Yes    Medications: Medication refills not needed today.  Pharmacy name entered into [facility]:    [facility] [place], [place] - [address] [facility] - [place], [place] - [address]    Clinical concerns: no pain/discomfort just fatigue lay was notified.    6 minutes for nursing intake (face to face time)     Anna Ruiz CMA              "

## 2017-10-24 NOTE — MR AVS SNAPSHOT
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)           Follow-ups after your visit        Your next 10 appointments already scheduled     Oct 31, 2017  7:45 AM CDT   [facility] Lab Draw with UC [facility] LAB DRAW   [facility] ([facility]    [address] [place] [address]   [phone]            Oct 31, 2017  8:15 AM CDT   (Arrive by 8:00 AM)   Return Visit with Fara Bradshaw MD   [facility] ([facility])    [address] [place] [address]   [phone]            Oct 31, 2017  9:30 AM CDT   Infusion 180 with UC ONCOLOGY INFUSION, UC 11 ATC   [facility] ([facility])    [address] [place] [address]   [phone]            Nov 07, 2017  6:30 AM CST   Masonic Lab Draw with UC MASONIC LAB DRAW   [facility] Masonic Lab Draw ([facility])    [address] [place] [address]   [phone]            Nov 07, 2017  7:00 AM CST   (Arrive by 6:45 AM)   Return Visit with EDDIE Oliva   [facility] ([facility])    [address] [place] [address]   [phone]            Nov 07, 2017  8:00 AM CST   Infusion 180 with UC ONCOLOGY INFUSION, UC 18 ATC   [facility] ([facility])    [address] [place] [address]   [phone]            Nov 14,  "2017  6:30 AM CST   Masonic Lab Draw with  MASONIC LAB DRAW   [facility] Lab Draw ([facility])    [address]  [address] [facility] [address] [phone]            Nov 14, 2017  7:00 AM CST   (Arrive by 6:45 AM)   Return Visit with EDDIE Oliva   [facility] ([facility])    [address]  [address] [facility] [address] [phone]              Who to contact     If you have questions or need follow up information about today's clinic visit or your schedule please contact [facility] directly at [phone].  Normal or non-critical lab and imaging results will be communicated to you by MyChart, letter or phone within 4 business days after the clinic has received the results. If you do not hear from us within 7 days, please contact the clinic through [employer] or phone. If you have a critical or abnormal lab result, we will notify you by phone as soon as possible.  Submit refill requests through [employer] or call your pharmacy and they will forward the refill request to us. Please allow 3 business days for your refill to be completed.          Additional Information About Your Visit        [employer] Information     [employer] gives you secure access to your electronic health record. If you see a primary care provider, you can also send messages to your care team and make appointments. If you have questions, please call your primary care clinic.  If you do not have a primary care provider, please call [phone] and they will assist you.        Care EveryWhere ID     This is your Care EveryWhere ID. This could be used by other organizations to access your [facility] medical records  [id number]        Your Vitals Were     Temperature Respirations Height Pulse Oximetry Breastfeeding? BMI (Body Mass Index)    98.3  F (36.8  C) (Oral) 18 1.626 m (5' 4.02\") 95% No 27.74 kg/m2       Blood " Pressure from Last 3 Encounters:   10/24/17 123/86   10/17/17 115/86   10/12/17 103/60    Weight from Last 3 Encounters:   10/24/17 73.3 kg (161 lb 11.2 oz)   10/17/17 74.8 kg (164 lb 12.8 oz)   10/12/17 76.2 kg (168 lb)              Today, you had the following     No orders found for display         Today's Medication Changes          These changes are accurate as of: 10/24/17 11:59 PM.  If you have any questions, ask your nurse or doctor.               Stop taking these medicines if you haven't already. Please contact your care team if you have questions.     levofloxacin 500 MG tablet   Commonly known as:  LEVAQUIN   Stopped by:  Lilia Livingston PA                    Primary Care Provider Office Phone # Fax #    Radha Cazares [phone]       [address] [address]        Equal Access to Services     [facility] : Hadii candie ku hadasho Soomaali, waaxda luqadaha, qaybta kaalmada adeegyada, waxay silvanoin hayrainer duran . So [facility] [phone].    ATENCIÓN: Si habla español, tiene a blackman disposición servicios gratuitos de asistencia lingüística. [facility] [phone].    We comply with applicable federal civil rights laws and [place] laws. We do not discriminate on the basis of race, color, national origin, age, disability, sex, sexual orientation, or gender identity.            Thank you!     Thank you for choosing [facility]  for your care. Our goal is always to provide you with excellent care. Hearing back from our patients is one way we can continue to improve our services. Please take a few minutes to complete the written survey that you may receive in the mail after your visit with us. Thank you!             Your Updated Medication List - Protect others around you: Learn how to safely use, store and throw away your medicines at www.disposemymeds.org.          This list is accurate as of: 10/24/17  11:59 PM.  Always use your most recent med list.                   Brand Name Dispense Instructions for use Diagnosis    albuterol 108 (90 BASE) MCG/ACT Inhaler    PROAIR HFA/PROVENTIL HFA/VENTOLIN HFA    1 Inhaler    Inhale 2 puffs into the lungs every 6 hours as needed for shortness of breath / dyspnea    Chronic obstructive pulmonary disease, unspecified COPD type (H)       aspirin 81 MG tablet      Take 1 tablet by mouth daily.        citalopram 10 MG tablet    celeXA    90 tablet    Take 1 tablet (10 mg) by mouth daily    Anxiety, Depression, unspecified depression type       CO Q 10 PO      Take 1 tablet by mouth        darifenacin 7.5 MG 24 hr tablet    ENABLEX    90 tablet    Take 1 tablet (7.5 mg) by mouth daily    Overactive bladder       guaiFENesin 600 MG 12 hr tablet    MUCINEX    180 tablet    Take 2 tablets (1,200 mg) by mouth 2 times daily    Cough with sputum       hydrOXYzine 25 MG tablet    ATARAX    100 tablet    Take 1-2 tablets (25-50 mg) by mouth every 6 hours as needed for itching    Hives       lidocaine-prilocaine cream    EMLA    30 g    Please apply to port site 30 minutes before use prn    Personal history of malignant neoplasm of breast       * LORazepam 0.5 MG tablet    ATIVAN    10 tablet    Take 1-2 tabs 20 minutes prior to MRI scans.    Breast cancer (H), Anxiety       * LORazepam 0.5 MG tablet    ATIVAN    30 tablet    Take 1 tablet (0.5 mg) by mouth every 4 hours as needed (Anxiety, Nausea/Vomiting or Sleep)    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)       MULTIPLE VITAMIN PO           ondansetron 8 MG tablet    ZOFRAN    30 tablet    Take 1 tablet (8 mg) by mouth every 8 hours as needed for nausea    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)       * order for [facility]      RespireEye Dream Station Auto CPAP 12-18 cm, F&P Simplus FFM small.    MERLIN (obstructive sleep  apnea)       * order for DME     1 Device    Cranial prosthesis    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)       prochlorperazine 10 MG tablet    COMPAZINE    30 tablet    Take 1 tablet (10 mg) by mouth every 6 hours as needed (Nausea/Vomiting)    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)       simvastatin 40 MG tablet    ZOCOR    90 tablet    Take 1 tablet (40 mg) by mouth At Bedtime    Hyperlipidemia LDL goal <130       tiotropium 18 MCG capsule    SPIRIVA    1 capsule    Inhale 1 capsule (18 mcg) into the lungs daily Inhale contents of one capsule    Chronic obstructive pulmonary disease, unspecified COPD type (H)       VITAMIN B 12 PO      Take 100 mcg by mouth daily        VITAMIN B6 PO      Take 1 tablet by mouth daily.        vitamin D 1000 UNITS capsule      2 CAPSULE DAILY        Zinc Gluconate 100 MG Tabs     30 tablet    Take 1 tablet by mouth daily    Dysgeusia       * Notice:  This list has 4 medication(s) that are the same as other medications prescribed for you. Read the directions carefully, and ask your doctor or other care provider to review them with you.

## 2017-10-24 NOTE — PATIENT INSTRUCTIONS
[facility] Main Line: [phone]    Call triage nurse with chills and/or temperature greater than or equal to 100.4, uncontrolled nausea/vomiting, diarrhea, constipation, dizziness, shortness of breath, chest pain, bleeding, unexplained bruising, or any new/concerning symptoms, questions/concerns.   If you are having any concerning symptoms or wish to speak to a provider before your next infusion visit, please call your care coordinator or triage to notify them so we can adequately serve you.   Triage Nurse Line: [phone]    If after hours, weekends, or holidays, call main hospital  and ask for Oncology doctor on call @ [phone]          October 2017 Sunday Monday Tuesday Wednesday Thursday Friday Saturday   1     2     3     [facility] MASONIC LAB DRAW    1:15 PM   (15 min.)    [facility] LAB DRAW   [facility]     [facility] RETURN    1:35 PM   (50 min.)   Lilia Livingston PA   [facility] ONC INFUSION 180    2:30 PM   (180 min.)    ONCOLOGY INFUSION   [facility] 4     5     MR BREAST BILATERAL WWO   12:30 PM   (90 min.)   [id number]   [facility]     US BREAST BX CORE NEEDLE RIGHT    2:00 PM   (50 min.)   [id number]   [facility]     [facility] MASONIC LAB DRAW    3:00 PM   (15 min.)    MASONIC LAB DRAW   [facility] Masonic Lab Draw 6     7       8     9     10     [facility] MASONIC LAB DRAW    7:00 AM   (15 min.)    [facility] LAB DRAW   [facility]     [facility] RETURN    7:15 AM   (30 min.)   Fara Bradshaw MD   [facility] ONC INFUSION 180    8:30 AM   (180 min.)    ONCOLOGY INFUSION   [facility] NEW WITH ROOM   11:45 AM   (75 min.)   Naty Segovia GC   [facility] MASONIC LAB DRAW    1:15 PM   (15 min.)    [facility] LAB DRAW   [facility] 11     12     [facility] MASONIC LAB DRAW     2:30 PM   (15 min.)   [facility] LAB DRAW    [facility] Lab Draw     Admission    6:00 PM   Jaxon Richmond MD   [facility] [place], Emergency Department   (Discharge: 10/12/2017)     XR CHEST 2 VIEWS    6:25 PM   (15 min.)   [email or web address]   [facility],  Radiology 13     14       15     16     17     [facility] LAB DRAW    6:30 AM   (15 min.)   [facility] MASONIC LAB DRAW   [facility] Masonic Lab Draw     [facility] RETURN    6:45 AM   (50 min.)   Lilia Livingston PA   [facility] ONC INFUSION 180    8:00 AM   (180 min.)    ONCOLOGY INFUSION   [facility] 18     19     20     21       22     23     24     [facility] MAS[facility]IC LAB DRAW   12:30 PM   (15 min.)   [facility] LAB DRAW   [facility] Masonic Lab Draw     [facility] RETURN   12:45 PM   (50 min.)   Lilia Livingston PA   [facility] ONC INFUSION 180    2:30 PM   (180 min.)   [facility] ONCOLOGY INFUSION   [facility] 25     26     27     28       29     30     31     [facility]IC LAB DRAW    7:45 AM   (15 min.)   [facility] LAB DRAW   [facility] Masonic Lab Draw     [facility] RETURN    8:00 AM   (30 min.)   Fara Bradshaw MD   [facility] ONC INFUSION 180    9:30 AM   (180 min.)    ONCOLOGY INFUSION   [facility]                                November 2017 Sunday Monday Tuesday Wednesday Thursday Friday Saturday                  1     2     3     4       5     6     7     [facility] MAS[facility]IC LAB DRAW    6:30 AM   (15 min.)   [facility] LAB DRAW   [facility] Masonic Lab Draw     [facility] RETURN    6:45 AM   (50 min.)   Lilia Livingston PA   [facility] 180    8:00 AM   (180 min.)   UC ONCOLOGY INFUSION   [facility] 8     9     10     11       12     13     14     [facility]IC LAB DRAW    6:30 AM   (15 min.)   [facility] LAB DRAW   [facility] Masonic Lab Draw     [facility] RETURN    6:45 AM   (50  min.)   Lilia Livingston PA   [facility]     [facility] ONC INFUSION 180    8:00 AM   (180 min.)    ONCOLOGY INFUSION   [facility] 15     16     17     18       19     20     21     [facility] LAB DRAW   11:15 AM   (15 min.)    [facility] LAB DRAW   [facility]     [facility] RETURN   11:45 AM   (30 min.)   Fara Bradshaw MD   [facility]    1:00 PM   (180 min.)    ONCOLOGY INFUSION   [facility] 22     23     24     25       26     27     28     [facility] MASONIC LAB DRAW    6:30 AM   (15 min.)    MASONIC LAB DRAW   [facility] Lab Draw     [facility] RETURN    6:45 AM   (50 min.)   Lilia Livingston PA   [facility] 180    8:00 AM   (180 min.)    ONCOLOGY INFUSION   [facility] 29     30                            Lab Results:  Recent Results (from the past 12 hour(s))   CBC with platelets differential    Collection Time: 10/24/17 12:36 PM   Result Value Ref Range    WBC 5.5 4.0 - 11.0 10e9/L    RBC Count 3.54 (L) 3.8 - 5.2 10e12/L    Hemoglobin 11.6 (L) 11.7 - 15.7 g/dL    Hematocrit 33.9 (L) 35.0 - 47.0 %    MCV 96 78 - 100 fl    MCH 32.8 26.5 - 33.0 pg    MCHC 34.2 31.5 - 36.5 g/dL    RDW 12.4 10.0 - 15.0 %    Platelet Count 524 (H) 150 - 450 10e9/L    Diff Method Automated Method     % Neutrophils 64.0 %    % Lymphocytes 21.6 %    % Monocytes 11.8 %    % Eosinophils 1.6 %    % Basophils 0.5 %    % Immature Granulocytes 0.5 %    Nucleated RBCs 0 0 /100    Absolute Neutrophil 3.5 1.6 - 8.3 10e9/L    Absolute Lymphocytes 1.2 0.8 - 5.3 10e9/L    Absolute Monocytes 0.7 0.0 - 1.3 10e9/L    Absolute Eosinophils 0.1 0.0 - 0.7 10e9/L    Absolute Basophils 0.0 0.0 - 0.2 10e9/L    Abs Immature Granulocytes 0.0 0 - 0.4 10e9/L    Absolute Nucleated RBC 0.0    Comprehensive metabolic panel    Collection Time: 10/24/17 12:36 PM   Result  Value Ref Range    Sodium 134 133 - 144 mmol/L    Potassium 3.5 3.4 - 5.3 mmol/L    Chloride 100 94 - 109 mmol/L    Carbon Dioxide 24 20 - 32 mmol/L    Anion Gap 9 3 - 14 mmol/L    Glucose 127 (H) 70 - 99 mg/dL    Urea Nitrogen 6 (L) 7 - 30 mg/dL    Creatinine 0.62 0.52 - 1.04 mg/dL    GFR Estimate >90 >60 mL/min/1.7m2    GFR Estimate If Black >90 >60 mL/min/1.7m2    Calcium 8.9 8.5 - 10.1 mg/dL    Bilirubin Total 0.5 0.2 - 1.3 mg/dL    Albumin 3.1 (L) 3.4 - 5.0 g/dL    Protein Total 7.9 6.8 - 8.8 g/dL    Alkaline Phosphatase 131 40 - 150 U/L    ALT 53 (H) 0 - 50 U/L    AST 66 (H) 0 - 45 U/L

## 2017-10-24 NOTE — NURSING NOTE
Research Note for ISPY C6 /Paclitaxol/Pembrolizumab arm: Patient has been recovering from fevers and pneumonia since 10/12/17. Completed Levaquin on 10/20/17. She monitored her temperature and in past week the highest was 100.8 F and no elevations since 10/22/17. Has used PRN tylenol since 10/12/17. Intermittent diarrhea managed by use of Imodium which she has taken 1 time per day since 10/12/17.  Intermittent fatigue has also been present with pneumonia and continues to affect ECOG activity score (1). Taste changes persist and she has not filled Zinc RX but plans to today.   Treatment to continue and all patient's questions answered to her satisfaction.  Luzmaria Geiger RN/ Research [phone]

## 2017-10-24 NOTE — PROGRESS NOTES
Infusion Nursing Note:  Ashleigh Alonzo presents today for Cycle 6 Day 1 Taxol.    Patient seen by provider today: Yes.  Lilia MORGAN   present during visit today: Not Applicable.    Note: No blood return from port upon arrival to infusion.  TPA instilled per standing orders.  Blood return noted after 60 minutes of TPA dwell time.    Intravenous Access:  Implanted Port.    Treatment Conditions:  Lab Results   Component Value Date    HGB 11.6 10/24/2017     Lab Results   Component Value Date    WBC 5.5 10/24/2017      Lab Results   Component Value Date    ANEU 3.5 10/24/2017     Lab Results   Component Value Date     10/24/2017      Lab Results   Component Value Date     10/24/2017                   Lab Results   Component Value Date    POTASSIUM 3.5 10/24/2017           Lab Results   Component Value Date    MAG 2.1 09/05/2017            Lab Results   Component Value Date    CR 0.62 10/24/2017                   Lab Results   Component Value Date    JAVIER 8.9 10/24/2017                Lab Results   Component Value Date    BILITOTAL 0.5 10/24/2017           Lab Results   Component Value Date    ALBUMIN 3.1 10/24/2017                    Lab Results   Component Value Date    ALT 53 10/24/2017           Lab Results   Component Value Date    AST 66 10/24/2017     Results reviewed, labs MET treatment parameters, ok to proceed with treatment.          Post Infusion Assessment:  Patient tolerated infusion without incident.  Blood return noted pre and post infusion.  Site patent and intact, free from redness, edema or discomfort.  No evidence of extravasations.  Access discontinued per protocol.    Discharge Plan:   Patient declined prescription refills.  Copy of AVS reviewed with patient and/or family.  Patient will return 10/31/47 for labs, provider visit, chemo for next appointment.  Patient discharged in stable condition accompanied by: .  Departure Mode: Ambulatory.  Face to Face time: 5  minutes.    Joselyn Pruitt RN

## 2017-10-24 NOTE — LETTER
10/24/2017      RE: Ashleigh Alonzo  [address] [facility] [place] [address]       Hematology-Oncology Visit  Oct 24, 2017    Reason for Visit: follow-up breast cancer     HPI: Ashleigh Alonzo is a 57 year old female with past medical history of COPD, sleep apnea, periodontal disease with stage IIa, T2N0M0, grade 3, triple negative right breast cancer. She was diagnosed via abnormal screening mammogram. She ultimately had US, contrast-enhanced mammo, and biopsy showing 3.4 cm mass and pathology showed grade 3 invasive mammary carcinoma with associated high-grade DCIS. ER/ND was negative and HER2 negative. She enrolled in ISPY-2 clinical trial and began treatment with weekly taxol and once every 3 week pembrolizumab on 9/2/17. Please see notes from Dr. Bradshaw for further details of patient's oncology history.     Interval History  She had PNA on 10/12. She competed Levaquin X 7 days. She denies fever chills or sweats, denies cough or SOB . Diarrhea got worsen with Levaquin and she started imodium OTC every 4-6 hrs. Now no diarrhea, denies vomiting , denies abdominal pain or cramping, denies constipation. She had normal BM this am. Fatigue started after PNA  but now it is improving. She walks frequently. She denies neuropathy or edema. No nausea. ROS otherwise negative.     Current Outpatient Prescriptions   Medication     Zinc Gluconate 100 MG TABS     order for DME     lidocaine-prilocaine (EMLA) cream     LORazepam (ATIVAN) 0.5 MG tablet     hydrOXYzine (ATARAX) 25 MG tablet     guaiFENesin (MUCINEX) 600 MG 12 hr tablet     simvastatin (ZOCOR) 40 MG tablet     tiotropium (SPIRIVA) 18 MCG capsule     albuterol (PROAIR HFA/PROVENTIL HFA/VENTOLIN HFA) 108 (90 BASE) MCG/ACT Inhaler     citalopram (CELEXA) 10 MG tablet     darifenacin (ENABLEX) 7.5 MG 24 hr tablet     order for DME     Coenzyme Q10 (CO Q 10 PO)     MULTIPLE VITAMIN PO     Cyanocobalamin (VITAMIN B 12 PO)     Pyridoxine HCl (VITAMIN B6 PO)     aspirin  "81 MG tablet     VITAMIN D 1000 UNIT OR CAPS     ondansetron (ZOFRAN) 8 MG tablet     prochlorperazine (COMPAZINE) 10 MG tablet     LORazepam (ATIVAN) 0.5 MG tablet     Current Facility-Administered Medications   Medication     heparin 100 UNIT/ML injection 5 mL     sodium chloride (PF) 0.9% PF flush 20 mL     Facility-Administered Medications Ordered in Other Visits   Medication     sodium chloride (PF) 0.9% PF flush 10 mL     heparin 100 UNIT/ML injection 500 Units     PACLitaxel (TAXOL) 150 mg in NaCl 0.9 % 300 mL CHEMOTHERAPY     EPINEPHrine (EPIPEN/ADRENACLICK/or ANY BX GENERIC EQUIV) 0.3 MG/0.3ML injection     alteplase 2 mg/2 mL (in 10 mL syringe)     lidocaine 1 % 9 mL     sodium bicarbonate 8.4 % injection 1 mEq     influenza quadrivalent (PF) vacc age 3 yrs and older (FLUZONE or Flulaval) injection 0.5 mL     lidocaine-EPINEPHrine 1.5 %-1:992907 injection 10 mL       PHYSICAL EXAM:  /86  Temp 98.3  F (36.8  C) (Oral)  Resp 18  Ht 1.626 m (5' 4.02\")  Wt 73.3 kg (161 lb 11.2 oz)  SpO2 95%  Breastfeeding? No  BMI 27.74 kg/m2  General: Alert, oriented, pleasant, NAD  Skin: No focal rash on exposed skin   HEENT: Normocephalic, atraumatic, PERRLA, EOMI. Moist mucus membranes, no lesions or thrush  Neck: No cervical or supraclavicular LAD.  Axillary: No LAD  Breast: Deferred   Lungs: CTA bilaterally, normal work of breathing. Clear with coughing.   Cardiac: RRR, S1, S2, no murmurs  Abdomen: Soft, nontender, nondistended. Normoactive bowel sounds. No hepatosplenomegaly, masses  Extremities: No pedal edema    Labs:    10/24/2017 12:36   Sodium 134   Potassium 3.5   Chloride 100   Carbon Dioxide 24   Urea Nitrogen 6 (L)   Creatinine 0.62   GFR Estimate >90   GFR Estimate If Black >90   Calcium 8.9   Anion Gap 9   Albumin 3.1 (L)   Protein Total 7.9   Bilirubin Total 0.5   Alkaline Phosphatase 131   ALT 53 (H)   AST 66 (H)   Glucose 127 (H)   WBC 5.5   Hemoglobin 11.6 (L)   Hematocrit 33.9 (L)   Platelet " Count 524 (H)   RBC Count 3.54 (L)   MCV 96   MCH 32.8   MCHC 34.2   RDW 12.4   Diff Method Automated Method   % Neutrophils 64.0   % Lymphocytes 21.6   % Monocytes 11.8   % Eosinophils 1.6   % Basophils 0.5   % Immature Granulocytes 0.5   Nucleated RBCs 0   Absolute Neutrophil 3.5   Absolute Lymphocytes 1.2   Absolute Monocytes 0.7   Absolute Eosinophils 0.1   Absolute Basophils 0.0   Abs Immature Granulocytes 0.0   Absolute Nucleated RBC 0.0       Assessment & Plan:     1. Stage IIa, T2N0M0, grade 3, triple negative breast cancer: S/p 5 weeks of weekly Taxol and every 3 week pembro. She is tolerating treatment well without significant symptoms. Had a great partial response already to treatment after 3 weeks per breast MRI. Feeling okay and counts are adequate to proceed with Taxol today. Plan for 12 weeks of Taxol and pembrolizumab followed by 4 cycles of AC and pembrolizumab. She will then proceed with surgery and potentially adjuvant radiation. She will be seen weekly on the clinical trial.      2. Mild transaminitis:  Continue to monitor.    3 Diarrhea: Secondary to chemo and likely Levaquin. She can stop taking imodium ATC.  Take imodium only with diarrhea. Carefully monitor given risk of colitis with pembro.     4. Hx of PNA -  Resolved she completed 7 days of Levaquin . She denies fever chills or sweats, denies cough or SOB     The patient was seen in conjunction with Garcia Hopkins NP who served as a scribe for today's visit. I have reviewed and edited the above note, and agree with the above findings and plan.  Lilia Livingston PA-C    [facility] [address] [address]  [phone]

## 2017-10-24 NOTE — PROGRESS NOTES
Hematology-Oncology Visit  Oct 24, 2017    Reason for Visit: follow-up breast cancer     HPI: Ashleigh Alonzo is a 57 year old female with past medical history of COPD, sleep apnea, periodontal disease with stage IIa, T2N0M0, grade 3, triple negative right breast cancer. She was diagnosed via abnormal screening mammogram. She ultimately had US, contrast-enhanced mammo, and biopsy showing 3.4 cm mass and pathology showed grade 3 invasive mammary carcinoma with associated high-grade DCIS. ER/TN was negative and HER2 negative. She enrolled in ISPY-2 clinical trial and began treatment with weekly taxol and once every 3 week pembrolizumab on 9/2/17. Please see notes from Dr. Bradshaw for further details of patient's oncology history.     Interval History  She had PNA on 10/12. She competed Levaquin X 7 days. She denies fever chills or sweats, denies cough or SOB . Diarrhea got worsen with Levaquin and she started imodium OTC every 4-6 hrs. Now no diarrhea, denies vomiting , denies abdominal pain or cramping, denies constipation. She had normal BM this am. Fatigue started after PNA  but now it is improving. She walks frequently. She denies neuropathy or edema. No nausea. ROS otherwise negative.     Current Outpatient Prescriptions   Medication     Zinc Gluconate 100 MG TABS     order for DME     lidocaine-prilocaine (EMLA) cream     LORazepam (ATIVAN) 0.5 MG tablet     hydrOXYzine (ATARAX) 25 MG tablet     guaiFENesin (MUCINEX) 600 MG 12 hr tablet     simvastatin (ZOCOR) 40 MG tablet     tiotropium (SPIRIVA) 18 MCG capsule     albuterol (PROAIR HFA/PROVENTIL HFA/VENTOLIN HFA) 108 (90 BASE) MCG/ACT Inhaler     citalopram (CELEXA) 10 MG tablet     darifenacin (ENABLEX) 7.5 MG 24 hr tablet     order for DME     Coenzyme Q10 (CO Q 10 PO)     MULTIPLE VITAMIN PO     Cyanocobalamin (VITAMIN B 12 PO)     Pyridoxine HCl (VITAMIN B6 PO)     aspirin 81 MG tablet     VITAMIN D 1000 UNIT OR CAPS     ondansetron (ZOFRAN) 8 MG tablet      "prochlorperazine (COMPAZINE) 10 MG tablet     LORazepam (ATIVAN) 0.5 MG tablet     Current Facility-Administered Medications   Medication     heparin 100 UNIT/ML injection 5 mL     sodium chloride (PF) 0.9% PF flush 20 mL     Facility-Administered Medications Ordered in Other Visits   Medication     sodium chloride (PF) 0.9% PF flush 10 mL     heparin 100 UNIT/ML injection 500 Units     PACLitaxel (TAXOL) 150 mg in NaCl 0.9 % 300 mL CHEMOTHERAPY     EPINEPHrine (EPIPEN/ADRENACLICK/or ANY BX GENERIC EQUIV) 0.3 MG/0.3ML injection     alteplase 2 mg/2 mL (in 10 mL syringe)     lidocaine 1 % 9 mL     sodium bicarbonate 8.4 % injection 1 mEq     influenza quadrivalent (PF) vacc age 3 yrs and older (FLUZONE or Flulaval) injection 0.5 mL     lidocaine-EPINEPHrine 1.5 %-1:534115 injection 10 mL       PHYSICAL EXAM:  /86  Temp 98.3  F (36.8  C) (Oral)  Resp 18  Ht 1.626 m (5' 4.02\")  Wt 73.3 kg (161 lb 11.2 oz)  SpO2 95%  Breastfeeding? No  BMI 27.74 kg/m2  General: Alert, oriented, pleasant, NAD  Skin: No focal rash on exposed skin   HEENT: Normocephalic, atraumatic, PERRLA, EOMI. Moist mucus membranes, no lesions or thrush  Neck: No cervical or supraclavicular LAD.  Axillary: No LAD  Breast: Deferred   Lungs: CTA bilaterally, normal work of breathing. Clear with coughing.   Cardiac: RRR, S1, S2, no murmurs  Abdomen: Soft, nontender, nondistended. Normoactive bowel sounds. No hepatosplenomegaly, masses  Extremities: No pedal edema    Labs:    10/24/2017 12:36   Sodium 134   Potassium 3.5   Chloride 100   Carbon Dioxide 24   Urea Nitrogen 6 (L)   Creatinine 0.62   GFR Estimate >90   GFR Estimate If Black >90   Calcium 8.9   Anion Gap 9   Albumin 3.1 (L)   Protein Total 7.9   Bilirubin Total 0.5   Alkaline Phosphatase 131   ALT 53 (H)   AST 66 (H)   Glucose 127 (H)   WBC 5.5   Hemoglobin 11.6 (L)   Hematocrit 33.9 (L)   Platelet Count 524 (H)   RBC Count 3.54 (L)   MCV 96   MCH 32.8   MCHC 34.2   RDW 12.4   Diff " Method Automated Method   % Neutrophils 64.0   % Lymphocytes 21.6   % Monocytes 11.8   % Eosinophils 1.6   % Basophils 0.5   % Immature Granulocytes 0.5   Nucleated RBCs 0   Absolute Neutrophil 3.5   Absolute Lymphocytes 1.2   Absolute Monocytes 0.7   Absolute Eosinophils 0.1   Absolute Basophils 0.0   Abs Immature Granulocytes 0.0   Absolute Nucleated RBC 0.0       Assessment & Plan:     1. Stage IIa, T2N0M0, grade 3, triple negative breast cancer: S/p 5 weeks of weekly Taxol and every 3 week pembro. She is tolerating treatment well without significant symptoms. Had a great partial response already to treatment after 3 weeks per breast MRI. Feeling okay and counts are adequate to proceed with Taxol today. Plan for 12 weeks of Taxol and pembrolizumab followed by 4 cycles of AC and pembrolizumab. She will then proceed with surgery and potentially adjuvant radiation. She will be seen weekly on the clinical trial.      2. Mild transaminitis:  Continue to monitor.    3 Diarrhea: Secondary to chemo and likely Levaquin. She can stop taking imodium ATC.  Take imodium only with diarrhea. Carefully monitor given risk of colitis with pembro.     4. Hx of PNA -  Resolved she completed 7 days of Levaquin . She denies fever chills or sweats, denies cough or SOB     The patient was seen in conjunction with Garcia Hopkins NP who served as a scribe for today's visit. I have reviewed and edited the above note, and agree with the above findings and plan.  Lilia Livingston PA-C    [facility] [address] [address]  [phone]

## 2017-10-25 ENCOUNTER — TELEPHONE (OUTPATIENT)
Dept: ONCOLOGY | Facility: CLINIC | Age: 57
End: 2017-10-25

## 2017-10-25 NOTE — TELEPHONE ENCOUNTER
Patient calling in reporting fever of 101 with chills. Pt denies cough, nasal drainage, urinary symptoms, chest discomfort or shortness of breath.     Discussed with Addy Leach. Pt to take Tylenol and come in for labs and appointment tomorrow 10/26/17. Writer updated patient and patient verbalized understanding. Message sent to scheduling team.

## 2017-10-26 ENCOUNTER — ONCOLOGY VISIT (OUTPATIENT)
Dept: ONCOLOGY | Facility: CLINIC | Age: 57
End: 2017-10-26
Attending: PHYSICIAN ASSISTANT
Payer: COMMERCIAL

## 2017-10-26 ENCOUNTER — INFUSION THERAPY VISIT (OUTPATIENT)
Dept: ONCOLOGY | Facility: CLINIC | Age: 57
End: 2017-10-26
Attending: INTERNAL MEDICINE
Payer: COMMERCIAL

## 2017-10-26 ENCOUNTER — APPOINTMENT (OUTPATIENT)
Dept: LAB | Facility: CLINIC | Age: 57
End: 2017-10-26
Attending: INTERNAL MEDICINE
Payer: COMMERCIAL

## 2017-10-26 VITALS
OXYGEN SATURATION: 94 % | RESPIRATION RATE: 16 BRPM | BODY MASS INDEX: 27.66 KG/M2 | HEIGHT: 64 IN | TEMPERATURE: 103 F | SYSTOLIC BLOOD PRESSURE: 146 MMHG | WEIGHT: 162 LBS | HEART RATE: 134 BPM | DIASTOLIC BLOOD PRESSURE: 72 MMHG

## 2017-10-26 VITALS
SYSTOLIC BLOOD PRESSURE: 126 MMHG | HEART RATE: 106 BPM | OXYGEN SATURATION: 92 % | TEMPERATURE: 101.7 F | DIASTOLIC BLOOD PRESSURE: 78 MMHG

## 2017-10-26 DIAGNOSIS — N30.00 ACUTE CYSTITIS WITHOUT HEMATURIA: ICD-10-CM

## 2017-10-26 DIAGNOSIS — C50.211 MALIGNANT NEOPLASM OF UPPER-INNER QUADRANT OF RIGHT BREAST IN FEMALE, ESTROGEN RECEPTOR NEGATIVE (H): Primary | ICD-10-CM

## 2017-10-26 DIAGNOSIS — Z17.1 MALIGNANT NEOPLASM OF UPPER-INNER QUADRANT OF RIGHT BREAST IN FEMALE, ESTROGEN RECEPTOR NEGATIVE (H): Primary | ICD-10-CM

## 2017-10-26 DIAGNOSIS — J44.9 CHRONIC OBSTRUCTIVE PULMONARY DISEASE, UNSPECIFIED COPD TYPE (H): ICD-10-CM

## 2017-10-26 DIAGNOSIS — R50.9 FEVER, UNSPECIFIED FEVER CAUSE: ICD-10-CM

## 2017-10-26 DIAGNOSIS — N39.0 URINARY TRACT INFECTION WITHOUT HEMATURIA, SITE UNSPECIFIED: ICD-10-CM

## 2017-10-26 DIAGNOSIS — J44.9 CHRONIC OBSTRUCTIVE PULMONARY DISEASE, UNSPECIFIED COPD TYPE (H): Primary | ICD-10-CM

## 2017-10-26 LAB
ALBUMIN SERPL-MCNC: 2.9 G/DL (ref 3.4–5)
ALBUMIN UR-MCNC: 100 MG/DL
ALP SERPL-CCNC: 122 U/L (ref 40–150)
ALT SERPL W P-5'-P-CCNC: 43 U/L (ref 0–50)
ANION GAP SERPL CALCULATED.3IONS-SCNC: 10 MMOL/L (ref 3–14)
APPEARANCE UR: ABNORMAL
AST SERPL W P-5'-P-CCNC: 49 U/L (ref 0–45)
BACTERIA #/AREA URNS HPF: ABNORMAL /HPF
BASOPHILS # BLD AUTO: 0 10E9/L (ref 0–0.2)
BASOPHILS NFR BLD AUTO: 0.3 %
BILIRUB SERPL-MCNC: 0.7 MG/DL (ref 0.2–1.3)
BILIRUB UR QL STRIP: NEGATIVE
BUN SERPL-MCNC: 6 MG/DL (ref 7–30)
CALCIUM SERPL-MCNC: 8.5 MG/DL (ref 8.5–10.1)
CHLORIDE SERPL-SCNC: 100 MMOL/L (ref 94–109)
CO2 SERPL-SCNC: 23 MMOL/L (ref 20–32)
COLOR UR AUTO: ABNORMAL
CREAT SERPL-MCNC: 0.58 MG/DL (ref 0.52–1.04)
DIFFERENTIAL METHOD BLD: ABNORMAL
EOSINOPHIL # BLD AUTO: 0 10E9/L (ref 0–0.7)
EOSINOPHIL NFR BLD AUTO: 0.5 %
ERYTHROCYTE [DISTWIDTH] IN BLOOD BY AUTOMATED COUNT: 12.7 % (ref 10–15)
GFR SERPL CREATININE-BSD FRML MDRD: >90 ML/MIN/1.7M2
GLUCOSE SERPL-MCNC: 172 MG/DL (ref 70–99)
GLUCOSE UR STRIP-MCNC: NEGATIVE MG/DL
HCT VFR BLD AUTO: 33.5 % (ref 35–47)
HGB BLD-MCNC: 11.2 G/DL (ref 11.7–15.7)
HGB UR QL STRIP: ABNORMAL
HYALINE CASTS #/AREA URNS LPF: 21 /LPF (ref 0–2)
IMM GRANULOCYTES # BLD: 0 10E9/L (ref 0–0.4)
IMM GRANULOCYTES NFR BLD: 0.5 %
KETONES UR STRIP-MCNC: 5 MG/DL
LEUKOCYTE ESTERASE UR QL STRIP: ABNORMAL
LYMPHOCYTES # BLD AUTO: 0.4 10E9/L (ref 0.8–5.3)
LYMPHOCYTES NFR BLD AUTO: 10.6 %
MCH RBC QN AUTO: 32.7 PG (ref 26.5–33)
MCHC RBC AUTO-ENTMCNC: 33.4 G/DL (ref 31.5–36.5)
MCV RBC AUTO: 98 FL (ref 78–100)
MONOCYTES # BLD AUTO: 0.2 10E9/L (ref 0–1.3)
MONOCYTES NFR BLD AUTO: 4.8 %
MUCOUS THREADS #/AREA URNS LPF: PRESENT /LPF
NEUTROPHILS # BLD AUTO: 3.3 10E9/L (ref 1.6–8.3)
NEUTROPHILS NFR BLD AUTO: 83.3 %
NITRATE UR QL: NEGATIVE
NRBC # BLD AUTO: 0 10*3/UL
NRBC BLD AUTO-RTO: 0 /100
PH UR STRIP: 5 PH (ref 5–7)
PLATELET # BLD AUTO: 505 10E9/L (ref 150–450)
POTASSIUM SERPL-SCNC: 3.7 MMOL/L (ref 3.4–5.3)
PROT SERPL-MCNC: 7.6 G/DL (ref 6.8–8.8)
RBC # BLD AUTO: 3.42 10E12/L (ref 3.8–5.2)
RBC #/AREA URNS AUTO: 76 /HPF (ref 0–2)
SODIUM SERPL-SCNC: 133 MMOL/L (ref 133–144)
SOURCE: ABNORMAL
SP GR UR STRIP: 1.02 (ref 1–1.03)
SQUAMOUS #/AREA URNS AUTO: 31 /HPF (ref 0–1)
TRANS CELLS #/AREA URNS HPF: 4 /HPF
UROBILINOGEN UR STRIP-MCNC: 2 MG/DL (ref 0–2)
WBC # BLD AUTO: 4 10E9/L (ref 4–11)
WBC #/AREA URNS AUTO: 158 /HPF (ref 0–2)

## 2017-10-26 PROCEDURE — 99214 OFFICE O/P EST MOD 30 MIN: CPT | Mod: ZP | Performed by: PHYSICIAN ASSISTANT

## 2017-10-26 PROCEDURE — 25000128 H RX IP 250 OP 636: Mod: ZF | Performed by: INTERNAL MEDICINE

## 2017-10-26 PROCEDURE — 80053 COMPREHEN METABOLIC PANEL: CPT | Performed by: PHYSICIAN ASSISTANT

## 2017-10-26 PROCEDURE — 25000128 H RX IP 250 OP 636: Mod: ZF | Performed by: PHYSICIAN ASSISTANT

## 2017-10-26 PROCEDURE — 25000128 H RX IP 250 OP 636: Mod: ZF | Performed by: NURSE PRACTITIONER

## 2017-10-26 PROCEDURE — 85025 COMPLETE CBC W/AUTO DIFF WBC: CPT | Performed by: PHYSICIAN ASSISTANT

## 2017-10-26 PROCEDURE — 96360 HYDRATION IV INFUSION INIT: CPT

## 2017-10-26 PROCEDURE — 87633 RESP VIRUS 12-25 TARGETS: CPT | Performed by: NURSE PRACTITIONER

## 2017-10-26 PROCEDURE — 96361 HYDRATE IV INFUSION ADD-ON: CPT

## 2017-10-26 PROCEDURE — 81001 URINALYSIS AUTO W/SCOPE: CPT | Performed by: PHYSICIAN ASSISTANT

## 2017-10-26 PROCEDURE — 99212 OFFICE O/P EST SF 10 MIN: CPT | Mod: 25

## 2017-10-26 PROCEDURE — 87086 URINE CULTURE/COLONY COUNT: CPT | Performed by: PHYSICIAN ASSISTANT

## 2017-10-26 PROCEDURE — 87040 BLOOD CULTURE FOR BACTERIA: CPT | Performed by: PHYSICIAN ASSISTANT

## 2017-10-26 RX ORDER — HEPARIN SODIUM (PORCINE) LOCK FLUSH IV SOLN 100 UNIT/ML 100 UNIT/ML
500 SOLUTION INTRAVENOUS ONCE
Status: COMPLETED | OUTPATIENT
Start: 2017-10-26 | End: 2017-10-26

## 2017-10-26 RX ORDER — HEPARIN SODIUM (PORCINE) LOCK FLUSH IV SOLN 100 UNIT/ML 100 UNIT/ML
5 SOLUTION INTRAVENOUS ONCE
Status: COMPLETED | OUTPATIENT
Start: 2017-10-26 | End: 2017-10-26

## 2017-10-26 RX ORDER — CIPROFLOXACIN 500 MG/1
500 TABLET, FILM COATED ORAL 2 TIMES DAILY
Qty: 14 TABLET | Refills: 0 | Status: SHIPPED | OUTPATIENT
Start: 2017-10-26 | End: 2017-11-02

## 2017-10-26 RX ADMIN — SODIUM CHLORIDE 1000 ML: 9 INJECTION, SOLUTION INTRAVENOUS at 15:45

## 2017-10-26 RX ADMIN — SODIUM CHLORIDE, PRESERVATIVE FREE 5 ML: 5 INJECTION INTRAVENOUS at 12:59

## 2017-10-26 RX ADMIN — SODIUM CHLORIDE, PRESERVATIVE FREE 500 UNITS: 5 INJECTION INTRAVENOUS at 17:57

## 2017-10-26 ASSESSMENT — PAIN SCALES - GENERAL: PAINLEVEL: NO PAIN (0)

## 2017-10-26 NOTE — PATIENT INSTRUCTIONS
Contact Numbers    [facility] Main Line: [phone]  [facility] Triage:  [phone]    Call triage with chills and/or temperature greater than or equal to 100.5, uncontrolled nausea/vomiting, diarrhea, constipation, dizziness, shortness of breath, chest pain, bleeding, unexplained bruising, or any new/concerning symptoms, questions/concerns.     If you are having any concerning symptoms or wish to speak to a provider before your next infusion visit, please call your care coordinator or triage to notify them so we can adequately serve you.       After Hours: [phone]    If after hours, weekends, or holidays, call main hospital  and ask for Oncology doctor on call.           October 2017 Sunday Monday Tuesday Wednesday Thursday Friday Saturday   1     2     3     [facility] MASONIC LAB DRAW    1:15 PM   (15 min.)    [facility] LAB DRAW   [facility]     [facility] RETURN    1:35 PM   (50 min.)   Lilia Livingston PA   [facility] ONC INFUSION 180    2:30 PM   (180 min.)    ONCOLOGY INFUSION   [facility] 4     5     MR BREAST BILATERAL WWO   12:30 PM   (90 min.)   [id number]   [facility]     US BREAST BX CORE NEEDLE RIGHT    2:00 PM   (50 min.)   [id number]   [facility]     [facility] MASONIC LAB DRAW    3:00 PM   (15 min.)    MASONIC LAB DRAW   [facility] Masonic Lab Draw 6     7       8     9     10     [facility] MASONIC LAB DRAW    7:00 AM   (15 min.)    [facility] LAB DRAW   [facility]     [facility] RETURN    7:15 AM   (30 min.)   Fara Bradshaw MD   [facility] ONC INFUSION 180    8:30 AM   (180 min.)    ONCOLOGY INFUSION   [facility] NEW WITH ROOM   11:45 AM   (75 min.)   Naty Segovia GC   [facility] MASONIC LAB DRAW    1:15 PM   (15 min.)    [facility] LAB DRAW   [facility] 11     12     [facility]    2:30 PM   (15 min.)   [facility] DRAW   [facility] Masonic Lab Draw     Admission    6:00 PM   Jaxon Richmond MD   [facility], Emergency Department   (Discharge: 10/12/2017)     XR CHEST 2 VIEWS    6:25 PM   (15 min.)   [email or web address]   [facility],  Radiology 13     14       15     16     17     [facility] LAB DRAW    6:30 AM   (15 min.)   [facility] LAB DRAW   [facility] Masonic Lab Draw     [facility] RETURN    6:45 AM   (50 min.)   L[facility] Livingston PA   [facility]    8:00 AM   (180 min.)    ONCOLOGY INFUSION   [facility] 18     19     20     21       22     23     24     [facility] LAB DRAW   12:30 PM   (15 min.)   [facility] LAB DRAW   [facility] Lab Draw     [facility] RETURN   12:45 PM   (50 min.)   Lilia Livingston PA   [facility]    2:30 PM   (180 min.)   [facility] ONCOLOGY INFUSION   [facility] 25     26     [facility] LAB DRAW    1:00 PM   (15 min.)   [facility] LAB DRAW   [facility] Lab Draw     [facility] RETURN    1:25 PM   (50 min.)   Lilai Livingston PA   [facility]     XR CHEST 2 VIEWS    2:15 PM   (15 min.)   [email or web address]   [facility] Imaging Center Xray     [facility] ONC INFUSION 120    3:30 PM   (120 min.)   [facility] ONCOLOGY INFUSION   [facility] 27     28       29     30     31     [facility] LAB DRAW    7:45 AM   (15 min.)   [facility] LAB DRAW   [facility] Masonic Lab Draw     [facility] RETURN    8:00 AM   (30 min.)   Fara Bradshaw MD   [facility]     [facility] ONC INFUSION 180    9:30 AM   (180 min.)   [facility] ONCOLOGY INFUSION   [facility]                                November 2017 Sunday Monday Tuesday Wednesday Thursday Friday Saturday                  1     2     3     4       5     6     7     [facility] LAB DRAW    6:30 AM   (15 min.)   [facility] LAB DRAW   [facility]  Lab Draw     [facility] RETURN    6:45 AM   (50 min.)   Lilia Livingston PA   [facility]     [facility] ONC INFUSION 180    8:00 AM   (180 min.)    ONCOLOGY INFUSION   [facility] 8     9     10     [facility] RETURN WITH ROOM    1:00 PM   (75 min.)   Naty Segovia GC   [facility] 11       12     13     14     [facility] LAB DRAW    6:30 AM   (15 min.)   [facility]     [facility] RETURN    6:45 AM   (50 min.)   Lilia Livingston PA   [facility]     [facility] ONC INFUSION 180    8:00 AM   (180 min.)    ONCOLOGY INFUSION   [facility] 15     16     17     18       19     20     21     [facility] LAB DRAW   11:15 AM   (15 min.)   [facility] LAB DRAW   [facility] Lab Draw     [facility] RETURN   11:45 AM   (30 min.)   Fara Bradshaw MD   [facility]     [facility] ONC INFUSION 180    1:00 PM   (180 min.)    ONCOLOGY INFUSION   [facility] 22     23     24     25       26     27     28     [facility] LAB DRAW    6:30 AM   (15 min.)   [facility] LAB DRAW   [facility] Lab Draw     [facility] RETURN    6:45 AM   (50 min.)   Lilia Livingston PA   [facility]     [facility] ONC INFUSION 180    8:00 AM   (180 min.)    ONCOLOGY INFUSION   [facility] 29     30                           Recent Results (from the past 24 hour(s))   CBC with platelets differential    Collection Time: 10/26/17  1:15 PM   Result Value Ref Range    WBC 4.0 4.0 - 11.0 10e9/L    RBC Count 3.42 (L) 3.8 - 5.2 10e12/L    Hemoglobin 11.2 (L) 11.7 - 15.7 g/dL    Hematocrit 33.5 (L) 35.0 - 47.0 %    MCV 98 78 - 100 fl    MCH 32.7 26.5 - 33.0 pg    MCHC 33.4 31.5 - 36.5 g/dL    RDW 12.7 10.0 - 15.0 %    Platelet Count 505 (H) 150 - 450 10e9/L    Diff Method Automated Method     % Neutrophils 83.3 %    % Lymphocytes 10.6 %    % Monocytes 4.8 %    % Eosinophils  0.5 %    % Basophils 0.3 %    % Immature Granulocytes 0.5 %    Nucleated RBCs 0 0 /100    Absolute Neutrophil 3.3 1.6 - 8.3 10e9/L    Absolute Lymphocytes 0.4 (L) 0.8 - 5.3 10e9/L    Absolute Monocytes 0.2 0.0 - 1.3 10e9/L    Absolute Eosinophils 0.0 0.0 - 0.7 10e9/L    Absolute Basophils 0.0 0.0 - 0.2 10e9/L    Abs Immature Granulocytes 0.0 0 - 0.4 10e9/L    Absolute Nucleated RBC 0.0    Comprehensive metabolic panel    Collection Time: 10/26/17  1:15 PM   Result Value Ref Range    Sodium 133 133 - 144 mmol/L    Potassium 3.7 3.4 - 5.3 mmol/L    Chloride 100 94 - 109 mmol/L    Carbon Dioxide 23 20 - 32 mmol/L    Anion Gap 10 3 - 14 mmol/L    Glucose 172 (H) 70 - 99 mg/dL    Urea Nitrogen 6 (L) 7 - 30 mg/dL    Creatinine 0.58 0.52 - 1.04 mg/dL    GFR Estimate >90 >60 mL/min/1.7m2    GFR Estimate If Black >90 >60 mL/min/1.7m2    Calcium 8.5 8.5 - 10.1 mg/dL    Bilirubin Total 0.7 0.2 - 1.3 mg/dL    Albumin 2.9 (L) 3.4 - 5.0 g/dL    Protein Total 7.6 6.8 - 8.8 g/dL    Alkaline Phosphatase 122 40 - 150 U/L    ALT 43 0 - 50 U/L    AST 49 (H) 0 - 45 U/L   Blood culture    Collection Time: 10/26/17  1:26 PM   Result Value Ref Range    Specimen Description Blood Left Arm     Culture Micro No growth after 1 hour    Blood culture    Collection Time: 10/26/17  1:26 PM   Result Value Ref Range    Specimen Description Blood Port     Culture Micro No growth after 1 hour    Routine UA with micro reflex to culture    Collection Time: 10/26/17  3:00 PM   Result Value Ref Range    Color Urine Linette     Appearance Urine Cloudy     Glucose Urine Negative NEG^Negative mg/dL    Bilirubin Urine Negative NEG^Negative    Ketones Urine 5 (A) NEG^Negative mg/dL    Specific Gravity Urine 1.021 1.003 - 1.035    Blood Urine Small (A) NEG^Negative    pH Urine 5.0 5.0 - 7.0 pH    Protein Albumin Urine 100 (A) NEG^Negative mg/dL    Urobilinogen mg/dL 2.0 0.0 - 2.0 mg/dL    Nitrite Urine Negative NEG^Negative    Leukocyte Esterase Urine Large (A)  NEG^Negative    Source Midstream Urine     WBC Urine 158 (H) 0 - 2 /HPF    RBC Urine 76 (H) 0 - 2 /HPF    Bacteria Urine Few (A) NEG^Negative /HPF    Squamous Epithelial /HPF Urine 31 (H) 0 - 1 /HPF    Transitional Epi 4 (A) FEW^Few /HPF    Mucous Urine Present (A) NEG^Negative /LPF    Hyaline Casts 21 (H) 0 - 2 /LPF

## 2017-10-26 NOTE — NURSING NOTE
"Oncology Rooming Note    October 26, 2017 1:41 PM   Ashleigh Alonzo is a 57 year old female who presents for:    Chief Complaint   Patient presents with     Port Draw     Labs drawn from port by RN. Line flushed with saline and heparin. Vitals taken - paged Miki about temp and HR. Also paged for lab orders - humberto JIC tubes. Left pt accessed in case needs more labs. Checked pt in for appt     Port Draw     Blood cultures drawn from port then flushed with saline and heparin, then deaccessed. Sent urine along with pt, to be collected in clinic if possible.     Oncology Clinic Visit     Return: Breast CA      Initial Vitals: /72 (BP Location: Right arm, Cuff Size: Adult Regular)  Pulse 134  Temp 103  F (39.4  C) (Oral)  Resp 16  Ht 1.626 m (5' 4.02\")  Wt 73.5 kg (162 lb)  SpO2 94%  BMI 27.79 kg/m2 Estimated body mass index is 27.79 kg/(m^2) as calculated from the following:    Height as of this encounter: 1.626 m (5' 4.02\").    Weight as of this encounter: 73.5 kg (162 lb). Body surface area is 1.82 meters squared.  No Pain (0) Comment: Data Unavailable   No LMP recorded. Patient is postmenopausal.  Allergies reviewed: Yes  Medications reviewed: Yes    Medications: Medication refills not needed today.  Pharmacy name entered into [employer]:    [facility] [place], [place] - [address] [facility] [place], [place] - [address] [facility]    Clinical concerns: no new concerns   Unable to give flu vaccine to patient for research reasons.  Mira Morales CMA        6 minutes for nursing intake (face to face time)     Mira Morales CMA                "

## 2017-10-26 NOTE — MR AVS SNAPSHOT
After Visit Summary   10/26/2017    Ashleigh Alonzo    MRN: [id number]           Patient Information     Date Of Birth          1960        Visit Information        Provider Department      10/26/2017 1:40 PM Lilia Livingston PA [facility]        Today's Diagnoses     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)    -  1    Fever, unspecified fever cause        Urinary tract infection without hematuria, site unspecified        Acute cystitis without hematuria        Chronic obstructive pulmonary disease, unspecified COPD type (H)           Follow-ups after your visit        Your next 10 appointments already scheduled     Oct 31, 2017  7:45 AM CDT   [facility] Draw with [facility] DRAW   [facility] ([facility])    [address] [place] [address]   [phone]            Oct 31, 2017  8:15 AM CDT   (Arrive by 8:00 AM)   Return Visit with Fara rBadshaw MD   [facility] ([facility])    [address] [place] [address]   [phone]            Oct 31, 2017  9:30 AM CDT   Infusion 180 with UC ONCOLOGY INFUSION, UC 11 ATC   [facility] ([facility])    [address] [place] [address]   [phone]            Nov 07, 2017  6:30 AM CST   [facility] Lab Draw with [facility] LAB DRAW   [facility] Draw ([facility])    [address] [place] [address]   [phone]            Nov 07, 2017  7:00 AM CST   (Arrive by 6:45 AM)   Return Visit with EDDIE Oliva   [facility] ([facility])    [address] [place] [address]   [phone]            Nov 07, 2017  8:00 AM CST  "  Infusion 180 with  ONCOLOGY INFUSION, UC 18 ATC   [facility] ([facility])    [address]  [address]  [facility] [address] [phone]            Nov 10, 2017  1:00 PM CST   RETURN WITH ROOM with Naty Segovia GC,  2 117 CONSULT RM   [facility] ([facility])    [address]  [address] [facility] [address] [phone]              Who to contact     If you have questions or need follow up information about today's clinic visit or your schedule please contact [facility] directly at [phone].  Normal or non-critical lab and imaging results will be communicated to you by [employer], letter or phone within 4 business days after the clinic has received the results. If you do not hear from us within 7 days, please contact the clinic through [employer] or phone. If you have a critical or abnormal lab result, we will notify you by phone as soon as possible.  Submit refill requests through [employer] ([employer]) or call your pharmacy and they will forward the refill request to us. Please allow 3 business days for your refill to be completed.          Additional Information About Your Visit        [employer] Information     [employer] ([employer]) gives you secure access to your electronic health record. If you see a primary care provider, you can also send messages to your care team and make appointments. If you have questions, please call your primary care clinic.  If you do not have a primary care provider, please call [phone] and they will assist you.        Care EveryWhere ID     This is your Care EveryWhere ID. This could be used by other organizations to access your [place] medical records  [id number]        Your Vitals Were     Pulse Temperature Respirations Height Pulse Oximetry BMI (Body Mass Index)    134 103  F (39.4  C) (Oral) 16 1.626 m (5' 4.02\") 94% 27.79 kg/m2       Blood Pressure from Last " 3 Encounters:   10/26/17 126/78   10/26/17 146/72   10/24/17 123/86    Weight from Last 3 Encounters:   10/26/17 73.5 kg (162 lb)   10/24/17 73.3 kg (161 lb 11.2 oz)   10/17/17 74.8 kg (164 lb 12.8 oz)              We Performed the Following     Blood culture     Blood culture     CBC with platelets differential     Comprehensive metabolic panel     Respiratory Virus Panel by PCR     Routine UA with micro reflex to culture          Today's Medication Changes          These changes are accurate as of: 10/26/17 11:59 PM.  If you have any questions, ask your nurse or doctor.               Start taking these medicines.        Dose/Directions    ciprofloxacin 500 MG tablet   Commonly known as:  CIPRO   Used for:  Urinary tract infection without hematuria, site unspecified   Started by:  Lilia Livingston PA        Dose:  500 mg   Take 1 tablet (500 mg) by mouth 2 times daily for 7 days   Quantity:  14 tablet   Refills:  0            Where to get your medicines      These medications were sent to [address] [place] [address] [address] [place] [id number]    Hours:  TRANSPLANT PHONE NUMBER [phone] Phone:  [phone]     ciprofloxacin 500 MG tablet                Primary Care Provider Office Phone # Fax #    Radha Cazares [phone]       [address] [address]        Equal Access to Services     THERESA HUGHES AH: Hadii candie baileyo Somarian, waaxda luqadaha, qaybta kaalmada adeegyada, waxay edward pickering. So [facility] [phone].    ATENCIÓN: Si habla español, tiene a blackman disposición servicios gratuitos de asistencia lingüística. Llame al [phone].    We comply with applicable federal civil rights laws and [place] laws. We do not discriminate on the basis of race, color, national origin, age, disability, sex, sexual orientation, or gender  identity.            Thank you!     Thank you for choosing [facility]  for your care. Our goal is always to provide you with excellent care. Hearing back from our patients is one way we can continue to improve our services. Please take a few minutes to complete the written survey that you may receive in the mail after your visit with us. Thank you!             Your Updated Medication List - Protect others around you: Learn how to safely use, store and throw away your medicines at www.disposemymeds.org.          This list is accurate as of: 10/26/17 11:59 PM.  Always use your most recent med list.                   Brand Name Dispense Instructions for use Diagnosis    albuterol 108 (90 BASE) MCG/ACT Inhaler    PROAIR HFA/PROVENTIL HFA/VENTOLIN HFA    1 Inhaler    Inhale 2 puffs into the lungs every 6 hours as needed for shortness of breath / dyspnea    Chronic obstructive pulmonary disease, unspecified COPD type (H)       aspirin 81 MG tablet      Take 1 tablet by mouth daily.        ciprofloxacin 500 MG tablet    CIPRO    14 tablet    Take 1 tablet (500 mg) by mouth 2 times daily for 7 days    Urinary tract infection without hematuria, site unspecified       citalopram 10 MG tablet    celeXA    90 tablet    Take 1 tablet (10 mg) by mouth daily    Anxiety, Depression, unspecified depression type       CO Q 10 PO      Take 1 tablet by mouth        darifenacin 7.5 MG 24 hr tablet    ENABLEX    90 tablet    Take 1 tablet (7.5 mg) by mouth daily    Overactive bladder       guaiFENesin 600 MG 12 hr tablet    MUCINEX    180 tablet    Take 2 tablets (1,200 mg) by mouth 2 times daily    Cough with sputum       hydrOXYzine 25 MG tablet    ATARAX    100 tablet    Take 1-2 tablets (25-50 mg) by mouth every 6 hours as needed for itching    Hives       lidocaine-prilocaine cream    EMLA    30 g    Please apply to port site 30 minutes before use prn    Personal history of malignant neoplasm of breast       *  LORazepam 0.5 MG tablet    ATIVAN    10 tablet    Take 1-2 tabs 20 minutes prior to MRI scans.    Breast cancer (H), Anxiety       * LORazepam 0.5 MG tablet    ATIVAN    30 tablet    Take 1 tablet (0.5 mg) by mouth every 4 hours as needed (Anxiety, Nausea/Vomiting or Sleep)    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)       MULTIPLE VITAMIN PO           ondansetron 8 MG tablet    ZOFRAN    30 tablet    Take 1 tablet (8 mg) by mouth every 8 hours as needed for nausea    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)       * order for DME      [employer] Auto CPAP 12-18 cm, F&P Simplus FFM small.    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)       prochlorperazine 10 MG tablet    COMPAZINE    30 tablet    Take 1 tablet (10 mg) by mouth every 6 hours as needed (Nausea/Vomiting)    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)       simvastatin 40 MG tablet    ZOCOR    90 tablet    Take 1 tablet (40 mg) by mouth At Bedtime    Hyperlipidemia LDL goal <130       tiotropium 18 MCG capsule    SPIRIVA    1 capsule    Inhale 1 capsule (18 mcg) into the lungs daily Inhale contents of one capsule    Chronic obstructive pulmonary disease, unspecified COPD type (H)       VITAMIN B 12 PO      Take 100 mcg by mouth daily        VITAMIN B6 PO      Take 1 tablet by mouth daily.        vitamin D 1000 UNITS capsule      2 CAPSULE DAILY        Zinc Gluconate 100 MG Tabs     30 tablet    Take 1 tablet by mouth daily    Dysgeusia       * Notice:  This list has 4 medication(s) that are the same as other medications prescribed for you. Read the directions carefully, and ask your doctor or other care provider to  review them with you.

## 2017-10-26 NOTE — MR AVS SNAPSHOT
After Visit Summary   10/26/2017    Ashleigh Alonzo    MRN: [id number]           Patient Information     Date Of Birth          1960        Visit Information        Provider Department      10/26/2017 3:30 PM UC 21 ATC;  ONCOLOGY INFUSION [facility]        Today's Diagnoses     Chronic obstructive pulmonary disease, unspecified COPD type (H)    -  1    Acute cystitis without hematuria          Care Instructions    Contact Numbers    [facility] Main Line: [phone]  [facility] Triage:  [phone]    Call triage with chills and/or temperature greater than or equal to 100.5, uncontrolled nausea/vomiting, diarrhea, constipation, dizziness, shortness of breath, chest pain, bleeding, unexplained bruising, or any new/concerning symptoms, questions/concerns.     If you are having any concerning symptoms or wish to speak to a provider before your next infusion visit, please call your care coordinator or triage to notify them so we can adequately serve you.       After Hours: [phone]    If after hours, weekends, or holidays, call main hospital  and ask for Oncology doctor on call.           October 2017 Sunday Monday Tuesday Wednesday Thursday Friday Saturday   1     2     3     [facility] LAB DRAW    1:15 PM   (15 min.)   [facility] DRAW   [facility] Lab Draw     [facility] RETURN    1:35 PM   (50 min.)   Lilia Livingston PA   [facility] ONC INFUSION 180    2:30 PM   (180 min.)    ONCOLOGY INFUSION   [facility] 4     5     MR BREAST BILATERAL WWO   12:30 PM   (90 min.)   [id number]   [facility]     US BREAST BX CORE NEEDLE RIGHT    2:00 PM   (50 min.)   [id number]   [facility] Imaging     [facility] DRAW    3:00 PM   (15 min.)    [facility] DRAW   [facility] Lab Draw 6     7       8     9     10     [facility] DRAW    7:00 AM   (15 min.)    [facility] LAB DRAW   [facility]  [facility] Draw     [facility] RETURN    7:15 AM   (30 min.)   Fara Bradshaw MD   [facility] ONC INFUSION 180    8:30 AM   (180 min.)   UC ONCOLOGY INFUSION   [facility] NEW WITH ROOM   11:45 AM   (75 min.)   Naty Segovia GC   [facility] DRAW    1:15 PM   (15 min.)   [facility] DRAW   [facility] Lab Draw 11     12     [facility] LAB DRAW    2:30 PM   (15 min.)    [facility]   [facility]     Admission    6:00 PM   Jaxon Richmond MD   [facility] [place], Emergency Department   (Discharge: 10/12/2017)     XR CHEST 2 VIEWS    6:25 PM   (15 min.)   [email or web address]   [facility],  Radiology 13     14       15     16     17     [facility] DRAW    6:30 AM   (15 min.)    [facility] DRAW   [facility]     [facility] RETURN    6:45 AM   (50 min.)   Lilia Livingston PA   [facility] ONC INFUSION 180    8:00 AM   (180 min.)    ONCOLOGY INFUSION   [facility] 18     19     20     21       22     23     24     [facility] DRAW   12:30 PM   (15 min.)   [facility] DRAW   [facility]     [facility] RETURN   12:45 PM   (50 min.)   Lilia Livingston PA   [facility] ONC INFUSION 180    2:30 PM   (180 min.)   UC ONCOLOGY INFUSION   [facility] 25     26     P [facility] DRAW    1:00 PM   (15 min.)    [facility] DRAW   [facility]     [facility] RETURN    1:25 PM   (50 min.)   Lilia Livingston PA   [facility]     XR CHEST 2 VIEWS    2:15 PM   (15 min.)   [email or web address]   [facility] Xray     [facility] ONC INFUSION 120    3:30 PM   (120 min.)    ONCOLOGY INFUSION   [facility] 27     28       29     30     31     [facility] D[facility]    7:45 AM   (15 min.)   [facility] DRAW   [facility] Draw      [facility] RETURN    8:00 AM   (30 min.)   Fara Bradshaw MD   [facility]     [facility] ONC INFUSION 180    9:30 AM   (180 min.)    ONCOLOGY INFUSION   [facility]                                November 2017 Sunday Monday Tuesday Wednesday Thursday Friday Saturday                  1     2     3     4       5     6     7     [facility] DRAW    6:30 AM   (15 min.)   [facility]   [facility]     [facility] RETURN    6:45 AM   (50 min.)   Lilia Livingston PA   [facility]     [facility] ONC INFUSION 180    8:00 AM   (180 min.)    ONCOLOGY INFUSION   [facility] 8     9     10     [facility] RETURN WITH ROOM    1:00 PM   (75 min.)   Naty Segovia GC   [facility] 11       12     13     14     [facility] DRAW    6:30 AM   (15 min.)   [facility] DRAW   [facility] Lab Draw     [facility] RETURN    6:45 AM   (50 min.)   Lilia Livingston PA   [facility]     [facility] ONC INFUSION 180    8:00 AM   (180 min.)    ONCOLOGY INFUSION   [facility] 15     16     17     18       19     20     21     [facility] DRAW   11:15 AM   (15 min.)   [facility] DRAW   [facility] Lab Draw     [facility] RETURN   11:45 AM   (30 min.)   Fara Bradshaw MD   [facility]     [facility] ONC INFUSION 180    1:00 PM   (180 min.)    ONCOLOGY INFUSION   [facility] 22     23     24     25       26     27     28     [facility] DRAW    6:30 AM   (15 min.)   [facility] DRAW   [facility] Lab Draw     [facility] RETURN    6:45 AM   (50 min.)   Lilia Livingston PA   [facility]     [facility] ONC INFUSION 180    8:00 AM   (180 min.)    ONCOLOGY INFUSION   [facility] 29     30                           Recent Results (from the past 24 hour(s))   CBC with platelets differential    Collection Time:  10/26/17  1:15 PM   Result Value Ref Range    WBC 4.0 4.0 - 11.0 10e9/L    RBC Count 3.42 (L) 3.8 - 5.2 10e12/L    Hemoglobin 11.2 (L) 11.7 - 15.7 g/dL    Hematocrit 33.5 (L) 35.0 - 47.0 %    MCV 98 78 - 100 fl    MCH 32.7 26.5 - 33.0 pg    MCHC 33.4 31.5 - 36.5 g/dL    RDW 12.7 10.0 - 15.0 %    Platelet Count 505 (H) 150 - 450 10e9/L    Diff Method Automated Method     % Neutrophils 83.3 %    % Lymphocytes 10.6 %    % Monocytes 4.8 %    % Eosinophils 0.5 %    % Basophils 0.3 %    % Immature Granulocytes 0.5 %    Nucleated RBCs 0 0 /100    Absolute Neutrophil 3.3 1.6 - 8.3 10e9/L    Absolute Lymphocytes 0.4 (L) 0.8 - 5.3 10e9/L    Absolute Monocytes 0.2 0.0 - 1.3 10e9/L    Absolute Eosinophils 0.0 0.0 - 0.7 10e9/L    Absolute Basophils 0.0 0.0 - 0.2 10e9/L    Abs Immature Granulocytes 0.0 0 - 0.4 10e9/L    Absolute Nucleated RBC 0.0    Comprehensive metabolic panel    Collection Time: 10/26/17  1:15 PM   Result Value Ref Range    Sodium 133 133 - 144 mmol/L    Potassium 3.7 3.4 - 5.3 mmol/L    Chloride 100 94 - 109 mmol/L    Carbon Dioxide 23 20 - 32 mmol/L    Anion Gap 10 3 - 14 mmol/L    Glucose 172 (H) 70 - 99 mg/dL    Urea Nitrogen 6 (L) 7 - 30 mg/dL    Creatinine 0.58 0.52 - 1.04 mg/dL    GFR Estimate >90 >60 mL/min/1.7m2    GFR Estimate If Black >90 >60 mL/min/1.7m2    Calcium 8.5 8.5 - 10.1 mg/dL    Bilirubin Total 0.7 0.2 - 1.3 mg/dL    Albumin 2.9 (L) 3.4 - 5.0 g/dL    Protein Total 7.6 6.8 - 8.8 g/dL    Alkaline Phosphatase 122 40 - 150 U/L    ALT 43 0 - 50 U/L    AST 49 (H) 0 - 45 U/L   Blood culture    Collection Time: 10/26/17  1:26 PM   Result Value Ref Range    Specimen Description Blood Left Arm     Culture Micro No growth after 1 hour    Blood culture    Collection Time: 10/26/17  1:26 PM   Result Value Ref Range    Specimen Description Blood Port     Culture Micro No growth after 1 hour    Routine UA with micro reflex to culture    Collection Time: 10/26/17  3:00 PM   Result Value Ref Range     Color Urine Linette     Appearance Urine Cloudy     Glucose Urine Negative NEG^Negative mg/dL    Bilirubin Urine Negative NEG^Negative    Ketones Urine 5 (A) NEG^Negative mg/dL    Specific Gravity Urine 1.021 1.003 - 1.035    Blood Urine Small (A) NEG^Negative    pH Urine 5.0 5.0 - 7.0 pH    Protein Albumin Urine 100 (A) NEG^Negative mg/dL    Urobilinogen mg/dL 2.0 0.0 - 2.0 mg/dL    Nitrite Urine Negative NEG^Negative    Leukocyte Esterase Urine Large (A) NEG^Negative    Source Midstream Urine     WBC Urine 158 (H) 0 - 2 /HPF    RBC Urine 76 (H) 0 - 2 /HPF    Bacteria Urine Few (A) NEG^Negative /HPF    Squamous Epithelial /HPF Urine 31 (H) 0 - 1 /HPF    Transitional Epi 4 (A) FEW^Few /HPF    Mucous Urine Present (A) NEG^Negative /LPF    Hyaline Casts 21 (H) 0 - 2 /LPF               Follow-ups after your visit        Your next 10 appointments already scheduled     Oct 31, 2017  7:45 AM CDT   [facility] Draw with  [facility] LAB DRAW   [facility] Lab Draw ([facility])    [address] [place] [id number]   [phone]            Oct 31, 2017  8:15 AM CDT   (Arrive by 8:00 AM)   Return Visit with Fara Bradshaw MD   [facility])    [address] [place] [address]   [phone]            Oct 31, 2017  9:30 AM CDT   Infusion 180 with UC ONCOLOGY INFUSION, UC 11 ATC   [facility] ([facility])    [address] [place] [address]   [phone]            Nov 07, 2017  6:30 AM CST   [facility] Lab Draw with  [facility] LAB DRAW   [facility] Draw ([facility])    [address] [place] [address]   [phone]            Nov 07, 2017  7:00 AM CST   (Arrive by 6:45 AM)   Return Visit with EDDIE Oliva   [facility])    [address] [place] [address]  2nd [facility] [id number] [phone]            Nov 07, 2017  8:00 AM CST   Infusion 180 with UC ONCOLOGY INFUSION, UC 18 ATC   [facility] ([facility])    [address] [place] [id number] [phone]            Nov 10, 2017  1:00 PM CST   RETURN WITH ROOM with Naty Segovia GC,  2 117 CONSULT RM   [facility] ([facility])    [address] [place] [id number] [phone]              Who to contact     If you have questions or need follow up information about today's clinic visit or your schedule please contact [facility] directly at [phone].  Normal or non-critical lab and imaging results will be communicated to you by MyChart, letter or phone within 4 business days after the clinic has received the results. If you do not hear from us within 7 days, please contact the clinic through [employer] or phone. If you have a critical or abnormal lab result, we will notify you by phone as soon as possible.  Submit refill requests through [employer] or call your pharmacy and they will forward the refill request to us. Please allow 3 business days for your refill to be completed.          Additional Information About Your Visit        MyChart Information     [employer] gives you secure access to your electronic health record. If you see a primary care provider, you can also send messages to your care team and make appointments. If you have questions, please call your primary care clinic.  If you do not have a primary care provider, please call [phone] and they will assist you.        Care EveryWhere ID     This is your Care EveryWhere ID. This could be used by other organizations to access your [facility] medical records  [id number]        Your Vitals Were     Pulse Temperature Pulse  Oximetry             106 101.7  F (38.7  C) (Oral) 92%          Blood Pressure from Last 3 Encounters:   10/26/17 126/78   10/26/17 146/72   10/24/17 123/86    Weight from Last 3 Encounters:   10/26/17 73.5 kg (162 lb)   10/24/17 73.3 kg (161 lb 11.2 oz)   10/17/17 74.8 kg (164 lb 12.8 oz)              Today, you had the following     No orders found for display         Today's Medication Changes          These changes are accurate as of: 10/26/17  5:59 PM.  If you have any questions, ask your nurse or doctor.               Start taking these medicines.        Dose/Directions    ciprofloxacin 500 MG tablet   Commonly known as:  CIPRO   Used for:  Urinary tract infection without hematuria, site unspecified   Started by:  Lilia Livingston PA        Dose:  500 mg   Take 1 tablet (500 mg) by mouth 2 times daily for 7 days   Quantity:  14 tablet   Refills:  0            Where to get your medicines      These medications were sent to [address] [place] [address] [address] [address]    Hours:  TRANSPLANT PHONE NUMBER [phone] Phone:  [phone]     ciprofloxacin 500 MG tablet                Primary Care Provider Office Phone # Fax #    Radha Cazares [phone]       [address] [address]        Equal Access to Services     THERESA HUGHES AH: Abdifatah baileyo Soomaali, waaxda luqadaha, qaybta kaalmada adeegyada, waxay edward kan adeanmol pickering. So [facility] [phone].    ATENCIÓN: Si habla español, tiene a blackman disposición servicios gratuitos de asistencia lingüística. Llame al [phone].    We comply with applicable federal civil rights laws and [place] laws. We do not discriminate on the basis of race, color, national origin, age, disability, sex, sexual orientation, or gender identity.            Thank you!     Thank you for choosing [facility]  for your care. Our goal is always to provide you with excellent care. Hearing back from our patients is one way we can continue to improve our services. Please take a few minutes to complete the written survey that you may receive in the mail after your visit with us. Thank you!             Your Updated Medication List - Protect others around you: Learn how to safely use, store and throw away your medicines at www.disposemymeds.org.          This list is accurate as of: 10/26/17  5:59 PM.  Always use your most recent med list.                   Brand Name Dispense Instructions for use Diagnosis    albuterol 108 (90 BASE) MCG/ACT Inhaler    PROAIR HFA/PROVENTIL HFA/VENTOLIN HFA    1 Inhaler    Inhale 2 puffs into the lungs every 6 hours as needed for shortness of breath / dyspnea    Chronic obstructive pulmonary disease, unspecified COPD type (H)       aspirin 81 MG tablet      Take 1 tablet by mouth daily.        ciprofloxacin 500 MG tablet    CIPRO    14 tablet    Take 1 tablet (500 mg) by mouth 2 times daily for 7 days    Urinary tract infection without hematuria, site unspecified       citalopram 10 MG tablet    celeXA    90 tablet    Take 1 tablet (10 mg) by mouth daily    Anxiety, Depression, unspecified depression type       CO Q 10 PO      Take 1 tablet by mouth        darifenacin 7.5 MG 24 hr tablet    ENABLEX    90 tablet    Take 1 tablet (7.5 mg) by mouth daily    Overactive bladder       guaiFENesin 600 MG 12 hr tablet    MUCINEX    180 tablet    Take 2 tablets (1,200 mg) by mouth 2 times daily    Cough with sputum       hydrOXYzine 25 MG tablet    ATARAX    100 tablet    Take 1-2 tablets (25-50 mg) by mouth every 6 hours as needed for itching    Hives       lidocaine-prilocaine cream    EMLA    30 g    Please apply to port site 30 minutes before use prn    Personal history of malignant neoplasm of breast       * LORazepam 0.5 MG tablet    ATIVAN    10 tablet    Take 1-2 tabs 20  minutes prior to MRI scans.    Breast cancer (H), Anxiety       * LORazepam 0.5 MG tablet    ATIVAN    30 tablet    Take 1 tablet (0.5 mg) by mouth every 4 hours as needed (Anxiety, Nausea/Vomiting or Sleep)    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)       MULTIPLE VITAMIN PO           ondansetron 8 MG tablet    ZOFRAN    30 tablet    Take 1 tablet (8 mg) by mouth every 8 hours as needed for nausea    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)       * order for DME      RespirXands [place] Auto CPAP 12-18 cm, F&P Simplus FFM small.    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)       prochlorperazine 10 MG tablet    COMPAZINE    30 tablet    Take 1 tablet (10 mg) by mouth every 6 hours as needed (Nausea/Vomiting)    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)       simvastatin 40 MG tablet    ZOCOR    90 tablet    Take 1 tablet (40 mg) by mouth At Bedtime    Hyperlipidemia LDL goal <130       tiotropium 18 MCG capsule    SPIRIVA    1 capsule    Inhale 1 capsule (18 mcg) into the lungs daily Inhale contents of one capsule    Chronic obstructive pulmonary disease, unspecified COPD type (H)       VITAMIN B 12 PO      Take 100 mcg by mouth daily        VITAMIN B6 PO      Take 1 tablet by mouth daily.        vitamin D 1000 UNITS capsule      2 CAPSULE DAILY        Zinc Gluconate 100 MG Tabs     30 tablet    Take 1 tablet by mouth daily    Dysgeusia       * Notice:  This list has 4 medication(s) that are the same as other medications prescribed for you. Read the directions carefully, and ask your doctor or other care provider to review them with you.

## 2017-10-26 NOTE — LETTER
10/26/2017      RE: Ashleigh Alonzo  [address] [place] [address]       Hematology-Oncology Visit  Oct 26, 2017    Reason for Visit: follow-up breast cancer     HPI: Ashleigh Alonzo is a 57 year old female with past medical history of COPD, sleep apnea, periodontal disease with stage IIa, T2N0M0, grade 3, triple negative right breast cancer. She was diagnosed via abnormal screening mammogram. She ultimately had US, contrast-enhanced mammo, and biopsy showing 3.4 cm mass and pathology showed grade 3 invasive mammary carcinoma with associated high-grade DCIS. ER/NH was negative and HER2 negative. She enrolled in ISPY-2 clinical trial and began treatment with weekly taxol and once every 3 week pembrolizumab on 9/2/17. Please see notes from Dr. Bradshaw for further details of patient's oncology history.     She called in yesterday with fever to 101 and was added on today for assessment of this.     Interval History   Fever started on Tuesday night 101.3 she took tyelnol . Her temp went down to 99.8. Yesterday she had temp 101.3 . She took tylenol again and today temp went up to 103. She has chills , denies sweats, she denies  cough or SOB , denies abdominal pain , denies NVD. She denies urinary symptoms. Denies sinus pain or pressure, denies rhinorrhea or muscle pain. Since fever started She feels a little more tired now .     Current Outpatient Prescriptions   Medication     ciprofloxacin (CIPRO) 500 MG tablet     order for DME     lidocaine-prilocaine (EMLA) cream     hydrOXYzine (ATARAX) 25 MG tablet     guaiFENesin (MUCINEX) 600 MG 12 hr tablet     simvastatin (ZOCOR) 40 MG tablet     tiotropium (SPIRIVA) 18 MCG capsule     albuterol (PROAIR HFA/PROVENTIL HFA/VENTOLIN HFA) 108 (90 BASE) MCG/ACT Inhaler     citalopram (CELEXA) 10 MG tablet     darifenacin (ENABLEX) 7.5 MG 24 hr tablet     order for DME     Coenzyme Q10 (CO Q 10 PO)     MULTIPLE VITAMIN PO     Cyanocobalamin (VITAMIN B 12 PO)     Pyridoxine  "HCl (VITAMIN B6 PO)     aspirin 81 MG tablet     VITAMIN D 1000 UNIT OR CAPS     Zinc Gluconate 100 MG TABS     ondansetron (ZOFRAN) 8 MG tablet     prochlorperazine (COMPAZINE) 10 MG tablet     LORazepam (ATIVAN) 0.5 MG tablet     LORazepam (ATIVAN) 0.5 MG tablet     No current facility-administered medications for this visit.      Facility-Administered Medications Ordered in Other Visits   Medication     0.9% sodium chloride BOLUS     sodium chloride (PF) 0.9% PF flush 10 mL     heparin 100 UNIT/ML injection 500 Units     lidocaine 1 % 9 mL     sodium bicarbonate 8.4 % injection 1 mEq     influenza quadrivalent (PF) vacc age 3 yrs and older (FLUZONE or Flulaval) injection 0.5 mL     lidocaine-EPINEPHrine 1.5 %-1:[id number] injection 10 mL       PHYSICAL EXAM:  /72 (BP Location: Right arm, Cuff Size: Adult Regular)  Pulse 134  Temp 103  F (39.4  C) (Oral)  Resp 16  Ht 1.626 m (5' 4.02\")  Wt 73.5 kg (162 lb)  SpO2 94%  BMI 27.79 kg/m2  General: Alert, oriented, pleasant, NAD  Skin: No focal rash on exposed skin   HEENT: Normocephalic, atraumatic, PERRLA, EOMI. Moist mucus membranes, no lesions or thrush  Lungs: CTA bilaterally, normal work of breathing. Clear with coughing.   Cardiac: RRR, S1, S2, no murmurs  Abdomen: Soft, nontender, nondistended. Normoactive bowel sounds. No hepatosplenomegaly, masses  Extremities: No pedal edema    Labs:      10/26/2017 13:15   Sodium 133   Potassium 3.7   Chloride 100   Carbon Dioxide 23   Urea Nitrogen 6 (L)   Creatinine 0.58   GFR Estimate >90   GFR Estimate If Black >90   Calcium 8.5   Anion Gap 10   Albumin 2.9 (L)   Protein Total 7.6   Bilirubin Total 0.7   Alkaline Phosphatase 122   ALT 43   AST 49 (H)   Glucose 172 (H)   WBC 4.0   Hemoglobin 11.2 (L)   Hematocrit 33.5 (L)   Platelet Count 505 (H)   RBC Count 3.42 (L)   MCV 98   MCH 32.7   MCHC 33.4   RDW 12.7   Diff Method Automated Method   % Neutrophils 83.3   % Lymphocytes 10.6   % Monocytes 4.8   % " Eosinophils 0.5   % Basophils 0.3   % Immature Granulocytes 0.5   Nucleated RBCs 0   Absolute Neutrophil 3.3   Absolute Lymphocytes 0.4 (L)   Absolute Monocytes 0.2   Absolute Eosinophils 0.0   Absolute Basophils 0.0   Abs Immature Granulocytes 0.0   Absolute Nucleated RBC 0.0        10/26/2017 15:00   Color Urine Linette   Appearance Urine Cloudy   Glucose Urine Negative   Bilirubin Urine Negative   Ketones Urine 5 (A)   Specific Gravity Urine 1.021   pH Urine 5.0   Protein Albumin Urine 100 (A)   Urobilinogen mg/dL 2.0   Nitrite Urine Negative   Blood Urine Small (A)   Leukocyte Esterase Urine Large (A)   Source Midstream Urine   WBC Urine 158 (H)   RBC Urine 76 (H)   Bacteria Urine Few (A)   Squamous Epithelial /HPF Urine 31 (H)   Transitional Epi 4 (A)   Mucous Urine Present (A)   Hyaline Casts 21 (H)     BC x 2 NGTD     Assessment & Plan:     1.Stage IIa, T2N0M0, grade 3, triple negative breast cancer: S/p 6 weeks of weekly Taxol and every 3 week pembro. She is tolerating treatment well without significant symptoms. Had a great partial response already to treatment after 3 weeks per breast MRI.  Plan for 12 weeks of Taxol and pembrolizumab followed by 4 cycles of AC and pembrolizumab. She will then proceed with surgery and potentially adjuvant radiation. She will be seen weekly on the clinical trial.      2.  Fever related to UTI   UA showed . Urine Cxs pending  Chest x ray today showed no consolidation, blood CxR and resp virus panel is pending. Will give NS bolus 1L start cipro 500 mg po BID x 7 days  Call if no improvement in fever     The patient was seen in conjunction with Garcia Hopkins NP who served as a scribe for today's visit. I have reviewed and edited the above note, and agree with the above findings and plan.    Lilia Livingston PA-C    [facility] [address] [address]  [phone]

## 2017-10-26 NOTE — PROGRESS NOTES
Infusion Nursing Note:  Ashleigh Alonzo presents today for IV fluids.    Patient seen by provider today: Yes: Lilia MROGAN    Treatment Conditions:  Lab Results   Component Value Date    HGB 11.2 10/26/2017     Lab Results   Component Value Date    WBC 4.0 10/26/2017      Lab Results   Component Value Date    ANEU 3.3 10/26/2017     Lab Results   Component Value Date     10/26/2017      Lab Results   Component Value Date     10/26/2017                   Lab Results   Component Value Date    POTASSIUM 3.7 10/26/2017           Lab Results   Component Value Date    MAG 2.1 09/05/2017            Lab Results   Component Value Date    CR 0.58 10/26/2017                   Lab Results   Component Value Date    JAVIER 8.5 10/26/2017                Lab Results   Component Value Date    BILITOTAL 0.7 10/26/2017           Lab Results   Component Value Date    ALBUMIN 2.9 10/26/2017                    Lab Results   Component Value Date    ALT 43 10/26/2017           Lab Results   Component Value Date    AST 49 10/26/2017           Intravenous Access:  Implanted Port.    Note: Pt added on to infusion schedule for IV hydration. Pt with fever and tachycardia.  UA/UC, Blood cultures and CXR performed before infusion appt.    Temp on arrival to infusion: 103.1  Pt denies pain.    VS after 1 liter NS:  101.7, 106, 20, 126/78, 92% RA    Pt will start Cipro 500 mg PO BID x 7 days for UTI    Pt instructed to call with worsening symptoms.       Post Infusion Assessment:  Patient tolerated infusion without incident.  Blood return noted pre and post infusion.  Access discontinued per protocol.    Discharge Plan:   Prescription given for Cipro.  Discharge instructions reviewed with: Patient.  Patient and/or family verbalized understanding of discharge instructions and all questions answered.  Copy of AVS reviewed with patient and/or family.  Patient will return 10/31/17 for next appointment.  Patient discharged in stable  condition accompanied by: .  Departure Mode: Ambulatory.  Face to Face time: 5 min.    Carolina Porter RN

## 2017-10-26 NOTE — PROGRESS NOTES
Hematology-Oncology Visit  Oct 26, 2017    Reason for Visit: follow-up breast cancer     HPI: Ashleigh Alonzo is a 57 year old female with past medical history of COPD, sleep apnea, periodontal disease with stage IIa, T2N0M0, grade 3, triple negative right breast cancer. She was diagnosed via abnormal screening mammogram. She ultimately had US, contrast-enhanced mammo, and biopsy showing 3.4 cm mass and pathology showed grade 3 invasive mammary carcinoma with associated high-grade DCIS. ER/MI was negative and HER2 negative. She enrolled in ISPY-2 clinical trial and began treatment with weekly taxol and once every 3 week pembrolizumab on 9/2/17. Please see notes from Dr. Bradshaw for further details of patient's oncology history.     She called in yesterday with fever to 101 and was added on today for assessment of this.     Interval History   Fever started on Tuesday night 101.3 she took tyelnol . Her temp went down to 99.8. Yesterday she had temp 101.3 . She took tylenol again and today temp went up to 103. She has chills , denies sweats, she denies  cough or SOB , denies abdominal pain , denies NVD. She denies urinary symptoms. Denies sinus pain or pressure, denies rhinorrhea or muscle pain. Since fever started She feels a little more tired now .     Current Outpatient Prescriptions   Medication     ciprofloxacin (CIPRO) 500 MG tablet     order for DME     lidocaine-prilocaine (EMLA) cream     hydrOXYzine (ATARAX) 25 MG tablet     guaiFENesin (MUCINEX) 600 MG 12 hr tablet     simvastatin (ZOCOR) 40 MG tablet     tiotropium (SPIRIVA) 18 MCG capsule     albuterol (PROAIR HFA/PROVENTIL HFA/VENTOLIN HFA) 108 (90 BASE) MCG/ACT Inhaler     citalopram (CELEXA) 10 MG tablet     darifenacin (ENABLEX) 7.5 MG 24 hr tablet     order for DME     Coenzyme Q10 (CO Q 10 PO)     MULTIPLE VITAMIN PO     Cyanocobalamin (VITAMIN B 12 PO)     Pyridoxine HCl (VITAMIN B6 PO)     aspirin 81 MG tablet     VITAMIN D 1000 UNIT OR CAPS      "Zinc Gluconate 100 MG TABS     ondansetron (ZOFRAN) 8 MG tablet     prochlorperazine (COMPAZINE) 10 MG tablet     LORazepam (ATIVAN) 0.5 MG tablet     LORazepam (ATIVAN) 0.5 MG tablet     No current facility-administered medications for this visit.      Facility-Administered Medications Ordered in Other Visits   Medication     0.9% sodium chloride BOLUS     sodium chloride (PF) 0.9% PF flush 10 mL     heparin 100 UNIT/ML injection 500 Units     lidocaine 1 % 9 mL     sodium bicarbonate 8.4 % injection 1 mEq     influenza quadrivalent (PF) vacc age 3 yrs and older (FLUZONE or Flulaval) injection 0.5 mL     lidocaine-EPINEPHrine 1.5 %-[id number] injection 10 mL       PHYSICAL EXAM:  /72 (BP Location: Right arm, Cuff Size: Adult Regular)  Pulse 134  Temp 103  F (39.4  C) (Oral)  Resp 16  Ht 1.626 m (5' 4.02\")  Wt 73.5 kg (162 lb)  SpO2 94%  BMI 27.79 kg/m2  General: Alert, oriented, pleasant, NAD  Skin: No focal rash on exposed skin   HEENT: Normocephalic, atraumatic, PERRLA, EOMI. Moist mucus membranes, no lesions or thrush  Lungs: CTA bilaterally, normal work of breathing. Clear with coughing.   Cardiac: RRR, S1, S2, no murmurs  Abdomen: Soft, nontender, nondistended. Normoactive bowel sounds. No hepatosplenomegaly, masses  Extremities: No pedal edema    Labs:      10/26/2017 13:15   Sodium 133   Potassium 3.7   Chloride 100   Carbon Dioxide 23   Urea Nitrogen 6 (L)   Creatinine 0.58   GFR Estimate >90   GFR Estimate If Black >90   Calcium 8.5   Anion Gap 10   Albumin 2.9 (L)   Protein Total 7.6   Bilirubin Total 0.7   Alkaline Phosphatase 122   ALT 43   AST 49 (H)   Glucose 172 (H)   WBC 4.0   Hemoglobin 11.2 (L)   Hematocrit 33.5 (L)   Platelet Count 505 (H)   RBC Count 3.42 (L)   MCV 98   MCH 32.7   MCHC 33.4   RDW 12.7   Diff Method Automated Method   % Neutrophils 83.3   % Lymphocytes 10.6   % Monocytes 4.8   % Eosinophils 0.5   % Basophils 0.3   % Immature Granulocytes 0.5   Nucleated RBCs 0 "   Absolute Neutrophil 3.3   Absolute Lymphocytes 0.4 (L)   Absolute Monocytes 0.2   Absolute Eosinophils 0.0   Absolute Basophils 0.0   Abs Immature Granulocytes 0.0   Absolute Nucleated RBC 0.0        10/26/2017 15:00   Color Urine Linette   Appearance Urine Cloudy   Glucose Urine Negative   Bilirubin Urine Negative   Ketones Urine 5 (A)   Specific Gravity Urine 1.021   pH Urine 5.0   Protein Albumin Urine 100 (A)   Urobilinogen mg/dL 2.0   Nitrite Urine Negative   Blood Urine Small (A)   Leukocyte Esterase Urine Large (A)   Source Midstream Urine   WBC Urine 158 (H)   RBC Urine 76 (H)   Bacteria Urine Few (A)   Squamous Epithelial /HPF Urine 31 (H)   Transitional Epi 4 (A)   Mucous Urine Present (A)   Hyaline Casts 21 (H)     BC x 2 NGTD     Assessment & Plan:     1.Stage IIa, T2N0M0, grade 3, triple negative breast cancer: S/p 6 weeks of weekly Taxol and every 3 week pembro. She is tolerating treatment well without significant symptoms. Had a great partial response already to treatment after 3 weeks per breast MRI.  Plan for 12 weeks of Taxol and pembrolizumab followed by 4 cycles of AC and pembrolizumab. She will then proceed with surgery and potentially adjuvant radiation. She will be seen weekly on the clinical trial.      2.  Fever related to UTI   UA showed . Urine Cxs pending  Chest x ray today showed no consolidation, blood CxR and resp virus panel is pending. Will give NS bolus 1L start cipro 500 mg po BID x 7 days  Call if no improvement in fever     The patient was seen in conjunction with Garcia Hopkins NP who served as a scribe for today's visit. I have reviewed and edited the above note, and agree with the above findings and plan.    Lilia Livingston PA-C    [facility] [address] [address]  [phone]

## 2017-10-26 NOTE — NURSING NOTE
"Chief Complaint   Patient presents with     Port Draw     Labs drawn from port by RN. Line flushed with saline and heparin. Vitals taken - paged Miki about temp and HR. Also paged for lab orders - humberto JIC tubes. Left pt accessed in case needs more labs. Checked pt in for appt     Port accessed with 20g 3/4\" gripper needle by RN, labs collected, line flushed with saline and heparin.  Vitals taken - paged provider about temp and HR and lab orders. Pt checked in for appointment(s).    Luana Teran RN  "

## 2017-10-27 LAB
BACTERIA SPEC CULT: NORMAL
BACTERIA SPEC CULT: NORMAL
FLUAV H1 2009 PAND RNA SPEC QL NAA+PROBE: NEGATIVE
FLUAV H1 RNA SPEC QL NAA+PROBE: NEGATIVE
FLUAV H3 RNA SPEC QL NAA+PROBE: NEGATIVE
FLUAV RNA SPEC QL NAA+PROBE: NEGATIVE
FLUBV RNA SPEC QL NAA+PROBE: NEGATIVE
HADV DNA SPEC QL NAA+PROBE: NEGATIVE
HADV DNA SPEC QL NAA+PROBE: NEGATIVE
HMPV RNA SPEC QL NAA+PROBE: NEGATIVE
HPIV1 RNA SPEC QL NAA+PROBE: NEGATIVE
HPIV2 RNA SPEC QL NAA+PROBE: NEGATIVE
HPIV3 RNA SPEC QL NAA+PROBE: NEGATIVE
MICROBIOLOGIST REVIEW: NORMAL
RHINOVIRUS RNA SPEC QL NAA+PROBE: NEGATIVE
RSV RNA SPEC QL NAA+PROBE: NEGATIVE
RSV RNA SPEC QL NAA+PROBE: NEGATIVE
SPECIMEN SOURCE: NORMAL
SPECIMEN SOURCE: NORMAL

## 2017-10-29 LAB — LAB SCANNED RESULT: NORMAL

## 2017-10-30 NOTE — PROGRESS NOTES
Oncology On Treatment Visit:  Date on this visit: 10/31/2017    Diagnosis:  Stage IIa, T2N0M0, grade 3 triple negative cancer of the right breast.    Primary Physician: Radha Cazares     History Of Present Illness:  Ms. Alonzo is a 57-year-old postmenopausal female with stage IIa, T2 N0 M0, grade 3, triple-negative invasive carcinoma of the right breast.  Routine bilateral screening mammogram on 07/27/2017 showed possible developing calcifications in the right breast at the 12 o'clock position 6 cm from the nipple.  Mammogram prior to this one had been 1 year prior in 07/2016.  Right breast ultrasound demonstrated a 7-mm, irregular, hypoechoic mass at the 12 o'clock position.  The patient went on to have a contrast-enhanced mammogram which showed a peripherally enhancing, irregular mass measured up to 1.9 cm as well as an additional 6-mm, enhancing focus anteromedial to the dominant mass.  The total area of abnormal enhancement on contrast mammogram measured up to 3.4 cm.  Right breast biopsy on 08/22/2017 demonstrated a grade 3 invasive mammary carcinoma with associated high-grade DCIS.  Invasive carcinoma was stained for estrogen and progesterone receptors.  Estrogen receptor and progesterone receptor staining was negative with 0% of nuclei staining.  HER2 was non-amplified by FISH with a HER2/CEN17 ratio of 1.5 and 2.8 HER2 signals per nucleus.  Breast MRI measured the biopsy proven breast cancer at 3.2 cm.    Ms. Alonzo enrolled in the ISPY-2 clinical trial.  She began treatment with weekly taxol and once every 3 week pembrolizumab on 9/20/17.  Week 3 MRI showed a decrease in size of right breast mass from 3.2 cm to 2.4 cm and overall decrease in enhancement.     Interval History:  Ashleigh Perera comes in to clinic today for evaluation prior to cycle #7 of Taxol and pembrolizumab.  She is thus far tolerating chemotherapy generally well.  She had ED visits twice 10/12, and 10/26 for fever. On Oct 12 she was  diagnosed with pneumonia. She was managed with levaquin. She was fever free for 3 days, however again had fever on 10/24. She was again seen in ED.  She was started on Ciprofloxacin for UTI. She will complete ciprofloxacin on Nov 2nd. She is feeling well overall except for some occasional cough. Cough is nonproductive.  She denies nail changes, rash. She has been eating well, example Mac and cheese. She denies any fevers, chills, nausea, vomiting, diarrhea, abdominal pain. She feels fatigued, but is still able to perform her daily activities.  The remainder of a 10-point review of systems is otherwise negative.      Past Medical/Surgical History:  Past Medical History:   Diagnosis Date     COPD (chronic obstructive pulmonary disease) (H) 2011     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) 8/30/2017     Periodontal disease      Sleep apnea 12/2015     Urticaria      Past Surgical History:   Procedure Laterality Date     APPENDECTOMY  10/6/2015     CATARACT IOL, RT/LT  11/2016    bilateral      COLONOSCOPY  11/15/2011    Procedure:COLONOSCOPY; Colonoscopy, screening; Surgeon:ANASTASIA BUNCH; Location:[facility] OR     INSERT PORT VASCULAR ACCESS Left 9/1/2017    Procedure: INSERT PORT VASCULAR ACCESS;  Single Lumen Chest Power Port;  Surgeon: Leif Parkinson PA-C;  Location: [facility] OR     TUBAL LIGATION  12/2004    Bilateral     Allergies:  Allergies as of 10/31/2017     (No Known Allergies)     Current Medications:  Current Outpatient Prescriptions   Medication Sig Dispense Refill     ciprofloxacin (CIPRO) 500 MG tablet Take 1 tablet (500 mg) by mouth 2 times daily for 7 days 14 tablet 0     Zinc Gluconate 100 MG TABS Take 1 tablet by mouth daily (Patient not taking: Reported on 10/26/2017) 30 tablet 3     order for DME Cranial prosthesis 1 Device 1     ondansetron (ZOFRAN) 8 MG tablet Take 1 tablet (8 mg) by mouth every 8 hours as needed for nausea (Patient  not taking: Reported on 10/3/2017) 30 tablet 3     prochlorperazine (COMPAZINE) 10 MG tablet Take 1 tablet (10 mg) by mouth every 6 hours as needed (Nausea/Vomiting) (Patient not taking: Reported on 10/3/2017) 30 tablet 2     LORazepam (ATIVAN) 0.5 MG tablet Take 1 tablet (0.5 mg) by mouth every 4 hours as needed (Anxiety, Nausea/Vomiting or Sleep) (Patient not taking: Reported on 10/10/2017) 30 tablet 2     lidocaine-prilocaine (EMLA) cream Please apply to port site 30 minutes before use prn 30 g 1     LORazepam (ATIVAN) 0.5 MG tablet Take 1-2 tabs 20 minutes prior to MRI scans. 10 tablet 0     hydrOXYzine (ATARAX) 25 MG tablet Take 1-2 tablets (25-50 mg) by mouth every 6 hours as needed for itching 100 tablet 2     guaiFENesin (MUCINEX) 600 MG 12 hr tablet Take 2 tablets (1,200 mg) by mouth 2 times daily 180 tablet 6     simvastatin (ZOCOR) 40 MG tablet Take 1 tablet (40 mg) by mouth At Bedtime 90 tablet 4     tiotropium (SPIRIVA) 18 MCG capsule Inhale 1 capsule (18 mcg) into the lungs daily Inhale contents of one capsule 1 capsule 11     albuterol (PROAIR HFA/PROVENTIL HFA/VENTOLIN HFA) 108 (90 BASE) MCG/ACT Inhaler Inhale 2 puffs into the lungs every 6 hours as needed for shortness of breath / dyspnea 1 Inhaler 5     citalopram (CELEXA) 10 MG tablet Take 1 tablet (10 mg) by mouth daily 90 tablet 3     darifenacin (ENABLEX) 7.5 MG 24 hr tablet Take 1 tablet (7.5 mg) by mouth daily 90 tablet 3     order for [facility] [employer] Auto CPAP 12-18 cm, F&P Simplus FFM small.       Coenzyme Q10 (CO Q 10 PO) Take 1 tablet by mouth        MULTIPLE VITAMIN PO        Cyanocobalamin (VITAMIN B 12 PO) Take 100 mcg by mouth daily        Pyridoxine HCl (VITAMIN B6 PO) Take 1 tablet by mouth daily.       aspirin 81 MG tablet Take 1 tablet by mouth daily.  3     VITAMIN D 1000 UNIT OR CAPS 2 CAPSULE DAILY        Family and Social History:  Reviewed and unchanged from prior.  Please see initial consultation dated 8/28/17  for further details.    Physical Exam:  /75 (BP Location: Right arm, Patient Position: Sitting, Cuff Size: Adult Regular)  Pulse 104  Temp 99.5  F (37.5  C) (Oral)  Resp 18  Wt 71.3 kg (157 lb 3.2 oz)  SpO2 96%  BMI 26.97 kg/m2  General:  Well appearing, well-nourished adult female in NAD.  HEENT:  Normocephalic.  Sclera anicteric.  MMM.  No lesions of the oropharynx.  Lymph:  No palpable cervical, supraclavicular, or axillary LAD.  Chest:  CTA bilaterally.  No wheezes or crackles.  Left chest port-a-cath is without surrounding erythema or edema.  Breast exam:  Right breast mass is no longer palpable on today's exam.  CV:  RRR.  Nl S1 and S2.  No m/r/g.  Abd:  Soft/NT/ND.  BSs normoactive.  No hepatosplenomegaly.  Ext:  No pitting edema of the bilateral lower extremities.  Pulses 2+ and symmetric.  Musculo:  Strength 5/5 throughout.  Neuro:  Cranial nerves grossly intact.  Psych:  Mood and affect appear normal.    Laboratory/Imaging Studies:  10/5/17 MRI breast:  Right breast: There is less rapid initial uptake of contrast material  shown by greater decreased size of the lesion on the initial time  point.      Measured at later time points where the size is greatest, there is  decreased size of biopsy-proven invasive carcinoma at the 12:00  position middle depth measuring approximately 2.4 x 0.9 x 2.0 cm  (series 9 image 73, series 25 image 45). Nonmass enhancement is again  seen between the index mass in the second mass.     Additional mass which was noted at the 1:00 position is also decreased  in size measuring approximately 1.3 x 0.7 x 0.5 cm (series 9 image 87,  series 25 image 51).     Left breast: No suspicious enhancement.     No lymphadenopathy.    Results for YUNI CALIXTO (MRN [id number]) as of 10/31/2017 08:36   Ref. Range 10/31/2017 08:02   Sodium Latest Ref Range: 133 - 144 mmol/L 133   Potassium Latest Ref Range: 3.4 - 5.3 mmol/L 3.7   Chloride Latest Ref Range: 94 - 109 mmol/L 102    Carbon Dioxide Latest Ref Range: 20 - 32 mmol/L 22   Urea Nitrogen Latest Ref Range: 7 - 30 mg/dL 8   Creatinine Latest Ref Range: 0.52 - 1.04 mg/dL 0.55   GFR Estimate Latest Ref Range: >60 mL/min/1.7m2 >90   GFR Estimate If Black Latest Ref Range: >60 mL/min/1.7m2 >90   Calcium Latest Ref Range: 8.5 - 10.1 mg/dL 8.4 (L)   Anion Gap Latest Ref Range: 3 - 14 mmol/L 10   Albumin Latest Ref Range: 3.4 - 5.0 g/dL 2.7 (L)   Protein Total Latest Ref Range: 6.8 - 8.8 g/dL 7.4   Bilirubin Total Latest Ref Range: 0.2 - 1.3 mg/dL 0.5   Alkaline Phosphatase Latest Ref Range: 40 - 150 U/L 146   ALT Latest Ref Range: 0 - 50 U/L 118 (H)   AST Latest Ref Range: 0 - 45 U/L 165 (H)   Results for YUNI CALIXTO (MRN [id number]) as of 10/31/2017 08:36   Ref. Range 10/31/2017 08:02   WBC Latest Ref Range: 4.0 - 11.0 10e9/L 5.3   Hemoglobin Latest Ref Range: 11.7 - 15.7 g/dL 10.6 (L)   Hematocrit Latest Ref Range: 35.0 - 47.0 % 31.0 (L)   Platelet Count Latest Ref Range: 150 - 450 10e9/L 372   RBC Count Latest Ref Range: 3.8 - 5.2 10e12/L 3.23 (L)   MCV Latest Ref Range: 78 - 100 fl 96   MCH Latest Ref Range: 26.5 - 33.0 pg 32.8   MCHC Latest Ref Range: 31.5 - 36.5 g/dL 34.2   RDW Latest Ref Range: 10.0 - 15.0 % 12.9   Diff Method Unknown Automated Method   % Neutrophils Latest Units: % 75.7   % Lymphocytes Latest Units: % 11.9   % Monocytes Latest Units: % 10.5   % Eosinophils Latest Units: % 1.1   % Basophils Latest Units: % 0.4   % Immature Granulocytes Latest Units: % 0.4   Nucleated RBCs Latest Ref Range: 0 /100 0   Absolute Neutrophil Latest Ref Range: 1.6 - 8.3 10e9/L 4.0   Absolute Lymphocytes Latest Ref Range: 0.8 - 5.3 10e9/L 0.6 (L)   Absolute Monocytes Latest Ref Range: 0.0 - 1.3 10e9/L 0.6   Absolute Eosinophils Latest Ref Range: 0.0 - 0.7 10e9/L 0.1   Absolute Basophils Latest Ref Range: 0.0 - 0.2 10e9/L 0.0   Abs Immature Granulocytes Latest Ref Range: 0 - 0.4 10e9/L 0.0   Absolute Nucleated RBC Unknown 0.0      ASSESSMENT/PLAN:  Ms. Alonzo is a 57-year-old postmenopausal female with a stage IIa, grade 3, triple-negative infiltrating ductal carcinoma of the right breast. On weekly Taxol and once every three week pembrolizumab since 9/20/17 per the ISPY2 clinical trial.    1.  Right breast cancer:  Presents for evaluation prior to week number 7 of Taxol and pembrolizumab. She has had a good response to chemotherapy thus far with a decrease in size of the breast mass both on imaging and clinical exam.  She is noted to have grade 2 transaminitis in labs today and hence per protocol we will hold Taxol and Pembrolizumab.  She will return in 1 week, if LFTs are improved (<150) will proceed with administration of cycle #7 Taxol and pembrolizumab.     Our overall plan is to complete a total of 12 weeks of Taxol and pembrolizumab followed by 4 cycles of Adriamycin, Cytoxan and pembrolizumab.  Following completion of chemotherapy, she will proceed with surgery.  Whether or not she will benefit from adjuvant radiation is unknown at this time and depends on the type of breast surgery, surgical margins, and whether or not there is lymph node involvement at the time of surgery.      2.  Neuropathy:  Mild and not interfering with daily activities.  She will continue to take pyridoxine 100 mg daily.       3.  Transaminitis:  Secondary to chemotherapy.  Her LFTs are >3 times ULN and hence grade 2 transaminitis.  Per the ISPY2 protocol, we will hold both Taxol and pembrolizumab.  Asked that she avoid tylenol, alcohol, and supplements.    4.  UTI:  She will continue Cipro 500 mg PO BID to complete 7 day course.    5.  Followup:  Return in 1 week for labs, visit with Lilia Livingston, and cycle #7 Taxol and pembrolizumab.    Patient seen and discussed with Dr Leeann Geller  Hem/onc fellow     Patient seen and discussed with Dr. Geller.  I have edited the above note to reflect our joint assessment and plan.  A total of  20 minutes of our 25 minute face to face visit was spent in counseling.    Fara Bradshaw MD

## 2017-10-31 ENCOUNTER — RESEARCH ENCOUNTER (OUTPATIENT)
Dept: ONCOLOGY | Facility: CLINIC | Age: 57
End: 2017-10-31

## 2017-10-31 ENCOUNTER — APPOINTMENT (OUTPATIENT)
Dept: LAB | Facility: CLINIC | Age: 57
End: 2017-10-31
Attending: INTERNAL MEDICINE
Payer: COMMERCIAL

## 2017-10-31 ENCOUNTER — INFUSION THERAPY VISIT (OUTPATIENT)
Dept: ONCOLOGY | Facility: CLINIC | Age: 57
End: 2017-10-31
Attending: INTERNAL MEDICINE
Payer: COMMERCIAL

## 2017-10-31 VITALS
BODY MASS INDEX: 26.97 KG/M2 | WEIGHT: 157.2 LBS | DIASTOLIC BLOOD PRESSURE: 75 MMHG | RESPIRATION RATE: 18 BRPM | TEMPERATURE: 99.5 F | OXYGEN SATURATION: 96 % | SYSTOLIC BLOOD PRESSURE: 132 MMHG | HEART RATE: 104 BPM

## 2017-10-31 DIAGNOSIS — Z17.1 MALIGNANT NEOPLASM OF UPPER-INNER QUADRANT OF RIGHT BREAST IN FEMALE, ESTROGEN RECEPTOR NEGATIVE (H): Primary | ICD-10-CM

## 2017-10-31 DIAGNOSIS — C50.211 MALIGNANT NEOPLASM OF UPPER-INNER QUADRANT OF RIGHT BREAST IN FEMALE, ESTROGEN RECEPTOR NEGATIVE (H): Primary | ICD-10-CM

## 2017-10-31 LAB
ALBUMIN SERPL-MCNC: 2.7 G/DL (ref 3.4–5)
ALP SERPL-CCNC: 146 U/L (ref 40–150)
ALT SERPL W P-5'-P-CCNC: 118 U/L (ref 0–50)
ANION GAP SERPL CALCULATED.3IONS-SCNC: 10 MMOL/L (ref 3–14)
AST SERPL W P-5'-P-CCNC: 165 U/L (ref 0–45)
BASOPHILS # BLD AUTO: 0 10E9/L (ref 0–0.2)
BASOPHILS NFR BLD AUTO: 0.4 %
BILIRUB SERPL-MCNC: 0.5 MG/DL (ref 0.2–1.3)
BUN SERPL-MCNC: 8 MG/DL (ref 7–30)
CALCIUM SERPL-MCNC: 8.4 MG/DL (ref 8.5–10.1)
CHLORIDE SERPL-SCNC: 102 MMOL/L (ref 94–109)
CO2 SERPL-SCNC: 22 MMOL/L (ref 20–32)
CREAT SERPL-MCNC: 0.55 MG/DL (ref 0.52–1.04)
DIFFERENTIAL METHOD BLD: ABNORMAL
EOSINOPHIL # BLD AUTO: 0.1 10E9/L (ref 0–0.7)
EOSINOPHIL NFR BLD AUTO: 1.1 %
ERYTHROCYTE [DISTWIDTH] IN BLOOD BY AUTOMATED COUNT: 12.9 % (ref 10–15)
GFR SERPL CREATININE-BSD FRML MDRD: >90 ML/MIN/1.7M2
GLUCOSE SERPL-MCNC: 132 MG/DL (ref 70–99)
HCT VFR BLD AUTO: 31 % (ref 35–47)
HGB BLD-MCNC: 10.6 G/DL (ref 11.7–15.7)
IMM GRANULOCYTES # BLD: 0 10E9/L (ref 0–0.4)
IMM GRANULOCYTES NFR BLD: 0.4 %
LYMPHOCYTES # BLD AUTO: 0.6 10E9/L (ref 0.8–5.3)
LYMPHOCYTES NFR BLD AUTO: 11.9 %
MCH RBC QN AUTO: 32.8 PG (ref 26.5–33)
MCHC RBC AUTO-ENTMCNC: 34.2 G/DL (ref 31.5–36.5)
MCV RBC AUTO: 96 FL (ref 78–100)
MONOCYTES # BLD AUTO: 0.6 10E9/L (ref 0–1.3)
MONOCYTES NFR BLD AUTO: 10.5 %
NEUTROPHILS # BLD AUTO: 4 10E9/L (ref 1.6–8.3)
NEUTROPHILS NFR BLD AUTO: 75.7 %
NRBC # BLD AUTO: 0 10*3/UL
NRBC BLD AUTO-RTO: 0 /100
PLATELET # BLD AUTO: 372 10E9/L (ref 150–450)
POTASSIUM SERPL-SCNC: 3.7 MMOL/L (ref 3.4–5.3)
PROT SERPL-MCNC: 7.4 G/DL (ref 6.8–8.8)
RBC # BLD AUTO: 3.23 10E12/L (ref 3.8–5.2)
SODIUM SERPL-SCNC: 133 MMOL/L (ref 133–144)
TSH SERPL DL<=0.005 MIU/L-ACNC: 2.16 MU/L (ref 0.4–4)
WBC # BLD AUTO: 5.3 10E9/L (ref 4–11)

## 2017-10-31 PROCEDURE — 36591 DRAW BLOOD OFF VENOUS DEVICE: CPT

## 2017-10-31 PROCEDURE — 25000128 H RX IP 250 OP 636: Mod: ZF | Performed by: INTERNAL MEDICINE

## 2017-10-31 PROCEDURE — 84443 ASSAY THYROID STIM HORMONE: CPT | Performed by: INTERNAL MEDICINE

## 2017-10-31 PROCEDURE — 99212 OFFICE O/P EST SF 10 MIN: CPT | Mod: ZF

## 2017-10-31 PROCEDURE — 80053 COMPREHEN METABOLIC PANEL: CPT | Performed by: INTERNAL MEDICINE

## 2017-10-31 PROCEDURE — 99215 OFFICE O/P EST HI 40 MIN: CPT | Mod: GC | Performed by: INTERNAL MEDICINE

## 2017-10-31 PROCEDURE — 85025 COMPLETE CBC W/AUTO DIFF WBC: CPT | Performed by: INTERNAL MEDICINE

## 2017-10-31 RX ORDER — SODIUM CHLORIDE 9 MG/ML
1000 INJECTION, SOLUTION INTRAVENOUS CONTINUOUS PRN
Status: CANCELLED
Start: 2017-10-31

## 2017-10-31 RX ORDER — LORAZEPAM 2 MG/ML
0.5 INJECTION INTRAMUSCULAR EVERY 4 HOURS PRN
Status: CANCELLED
Start: 2017-10-31

## 2017-10-31 RX ORDER — EPINEPHRINE 1 MG/ML
0.3 INJECTION, SOLUTION, CONCENTRATE INTRAVENOUS EVERY 5 MIN PRN
Status: CANCELLED | OUTPATIENT
Start: 2017-10-31

## 2017-10-31 RX ORDER — DEXAMETHASONE SODIUM PHOSPHATE 10 MG/ML
10 INJECTION, SOLUTION INTRAMUSCULAR; INTRAVENOUS
Status: CANCELLED | OUTPATIENT
Start: 2017-10-31

## 2017-10-31 RX ORDER — METHYLPREDNISOLONE SODIUM SUCCINATE 125 MG/2ML
125 INJECTION, POWDER, LYOPHILIZED, FOR SOLUTION INTRAMUSCULAR; INTRAVENOUS
Status: CANCELLED
Start: 2017-10-31

## 2017-10-31 RX ORDER — ALBUTEROL SULFATE 90 UG/1
1-2 AEROSOL, METERED RESPIRATORY (INHALATION)
Status: CANCELLED
Start: 2017-10-31

## 2017-10-31 RX ORDER — DIPHENHYDRAMINE HYDROCHLORIDE 50 MG/ML
50 INJECTION INTRAMUSCULAR; INTRAVENOUS
Status: CANCELLED
Start: 2017-10-31

## 2017-10-31 RX ORDER — EPINEPHRINE 0.3 MG/.3ML
0.3 INJECTION SUBCUTANEOUS EVERY 5 MIN PRN
Status: CANCELLED | OUTPATIENT
Start: 2017-10-31

## 2017-10-31 RX ORDER — HEPARIN SODIUM (PORCINE) LOCK FLUSH IV SOLN 100 UNIT/ML 100 UNIT/ML
500 SOLUTION INTRAVENOUS ONCE
Status: CANCELLED
Start: 2017-10-31 | End: 2017-10-31

## 2017-10-31 RX ORDER — ALBUTEROL SULFATE 0.83 MG/ML
2.5 SOLUTION RESPIRATORY (INHALATION)
Status: CANCELLED | OUTPATIENT
Start: 2017-10-31

## 2017-10-31 RX ORDER — HEPARIN SODIUM (PORCINE) LOCK FLUSH IV SOLN 100 UNIT/ML 100 UNIT/ML
5 SOLUTION INTRAVENOUS
Status: COMPLETED | OUTPATIENT
Start: 2017-10-31 | End: 2017-10-31

## 2017-10-31 RX ORDER — MEPERIDINE HYDROCHLORIDE 25 MG/ML
25 INJECTION INTRAMUSCULAR; INTRAVENOUS; SUBCUTANEOUS EVERY 30 MIN PRN
Status: CANCELLED | OUTPATIENT
Start: 2017-10-31

## 2017-10-31 RX ADMIN — SODIUM CHLORIDE, PRESERVATIVE FREE 5 ML: 5 INJECTION INTRAVENOUS at 07:56

## 2017-10-31 ASSESSMENT — PAIN SCALES - GENERAL: PAINLEVEL: NO PAIN (0)

## 2017-10-31 NOTE — PROGRESS NOTES
Patient was seen Dr. Bradshaw and has elevated AST/ALT today.  Due to study, she was deferred x1 week for chemo.  Lab deaccessed port and printed schedule for return visit.

## 2017-10-31 NOTE — LETTER
10/31/2017       RE: Ashleigh Alonzo  [address] [place] [address]     Dear Colleague,    Thank you for referring your patient, Ashleigh Alonzo, to the [facility]. Please see a copy of my visit note below.    Oncology On Treatment Visit:  Date on this visit: 10/31/2017    Diagnosis:  Stage IIa, T2N0M0, grade 3 triple negative cancer of the right breast.    Primary Physician: Radha Cazares     History Of Present Illness:  Ms. Alonzo is a 57-year-old postmenopausal female with stage IIa, T2 N0 M0, grade 3, triple-negative invasive carcinoma of the right breast.  Routine bilateral screening mammogram on 07/27/2017 showed possible developing calcifications in the right breast at the 12 o'clock position 6 cm from the nipple.  Mammogram prior to this one had been 1 year prior in 07/2016.  Right breast ultrasound demonstrated a 7-mm, irregular, hypoechoic mass at the 12 o'clock position.  The patient went on to have a contrast-enhanced mammogram which showed a peripherally enhancing, irregular mass measured up to 1.9 cm as well as an additional 6-mm, enhancing focus anteromedial to the dominant mass.  The total area of abnormal enhancement on contrast mammogram measured up to 3.4 cm.  Right breast biopsy on 08/22/2017 demonstrated a grade 3 invasive mammary carcinoma with associated high-grade DCIS.  Invasive carcinoma was stained for estrogen and progesterone receptors.  Estrogen receptor and progesterone receptor staining was negative with 0% of nuclei staining.  HER2 was non-amplified by FISH with a HER2/CEN17 ratio of 1.5 and 2.8 HER2 signals per nucleus.  Breast MRI measured the biopsy proven breast cancer at 3.2 cm.    Ms. Alonzo enrolled in the ISPY-2 clinical trial.  She began treatment with weekly taxol and once every 3 week pembrolizumab on 9/20/17.  Week 3 MRI showed a decrease in size of right breast mass from 3.2 cm to 2.4 cm and overall decrease in enhancement.     Interval  History:  Ashleigh Perera comes in to clinic today for evaluation prior to cycle #7 of Taxol and pembrolizumab.  She is thus far tolerating chemotherapy generally well.  She had ED visits twice 10/12, and 10/26 for fever. On Oct 12 she was diagnosed with pneumonia. She was managed with levaquin. She was fever free for 3 days, however again had fever on 10/24. She was again seen in ED.  She was started on Ciprofloxacin for UTI. She will complete ciprofloxacin on Nov 2nd. She is feeling well overall except for some occasional cough. Cough is nonproductive.  She denies nail changes, rash. She has been eating well, example Mac and cheese. She denies any fevers, chills, nausea, vomiting, diarrhea, abdominal pain. She feels fatigued, but is still able to perform her daily activities.  The remainder of a 10-point review of systems is otherwise negative.      Past Medical/Surgical History:  Past Medical History:   Diagnosis Date     COPD (chronic obstructive pulmonary disease) (H) 2011     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) 8/30/2017     Periodontal disease      Sleep apnea 12/2015     Urticaria      Past Surgical History:   Procedure Laterality Date     APPENDECTOMY  10/6/2015     CATARACT IOL, RT/LT  11/2016    bilateral      COLONOSCOPY  11/15/2011    Procedure:COLONOSCOPY; Colonoscopy, screening; Surgeon:ANASTASIA BUNCH; Location:[facility] OR     INSERT PORT VASCULAR ACCESS Left 9/1/2017    Procedure: INSERT PORT VASCULAR ACCESS;  Single Lumen Chest Power Port;  Surgeon: Leif Parkinson PA-C;  Location: [facility] OR     TUBAL LIGATION  12/2004    Bilateral     Allergies:  Allergies as of 10/31/2017     (No Known Allergies)     Current Medications:  Current Outpatient Prescriptions   Medication Sig Dispense Refill     ciprofloxacin (CIPRO) 500 MG tablet Take 1 tablet (500 mg) by mouth 2 times daily for 7 days 14 tablet 0     Zinc Gluconate 100 MG TABS Take 1  tablet by mouth daily (Patient not taking: Reported on 10/26/2017) 30 tablet 3     order for DME Cranial prosthesis 1 Device 1     ondansetron (ZOFRAN) 8 MG tablet Take 1 tablet (8 mg) by mouth every 8 hours as needed for nausea (Patient not taking: Reported on 10/3/2017) 30 tablet 3     prochlorperazine (COMPAZINE) 10 MG tablet Take 1 tablet (10 mg) by mouth every 6 hours as needed (Nausea/Vomiting) (Patient not taking: Reported on 10/3/2017) 30 tablet 2     LORazepam (ATIVAN) 0.5 MG tablet Take 1 tablet (0.5 mg) by mouth every 4 hours as needed (Anxiety, Nausea/Vomiting or Sleep) (Patient not taking: Reported on 10/10/2017) 30 tablet 2     lidocaine-prilocaine (EMLA) cream Please apply to port site 30 minutes before use prn 30 g 1     LORazepam (ATIVAN) 0.5 MG tablet Take 1-2 tabs 20 minutes prior to MRI scans. 10 tablet 0     hydrOXYzine (ATARAX) 25 MG tablet Take 1-2 tablets (25-50 mg) by mouth every 6 hours as needed for itching 100 tablet 2     guaiFENesin (MUCINEX) 600 MG 12 hr tablet Take 2 tablets (1,200 mg) by mouth 2 times daily 180 tablet 6     simvastatin (ZOCOR) 40 MG tablet Take 1 tablet (40 mg) by mouth At Bedtime 90 tablet 4     tiotropium (SPIRIVA) 18 MCG capsule Inhale 1 capsule (18 mcg) into the lungs daily Inhale contents of one capsule 1 capsule 11     albuterol (PROAIR HFA/PROVENTIL HFA/VENTOLIN HFA) 108 (90 BASE) MCG/ACT Inhaler Inhale 2 puffs into the lungs every 6 hours as needed for shortness of breath / dyspnea 1 Inhaler 5     citalopram (CELEXA) 10 MG tablet Take 1 tablet (10 mg) by mouth daily 90 tablet 3     darifenacin (ENABLEX) 7.5 MG 24 hr tablet Take 1 tablet (7.5 mg) by mouth daily 90 tablet 3     order for DME Respironics Dream Station Auto CPAP 12-18 cm, F&P Simplus FFM small.       Coenzyme Q10 (CO Q 10 PO) Take 1 tablet by mouth        MULTIPLE VITAMIN PO        Cyanocobalamin (VITAMIN B 12 PO) Take 100 mcg by mouth daily        Pyridoxine HCl (VITAMIN B6 PO) Take 1 tablet by  mouth daily.       aspirin 81 MG tablet Take 1 tablet by mouth daily.  3     VITAMIN D 1000 UNIT OR CAPS 2 CAPSULE DAILY        Family and Social History:  Reviewed and unchanged from prior.  Please see initial consultation dated 8/28/17 for further details.    Physical Exam:  /75 (BP Location: Right arm, Patient Position: Sitting, Cuff Size: Adult Regular)  Pulse 104  Temp 99.5  F (37.5  C) (Oral)  Resp 18  Wt 71.3 kg (157 lb 3.2 oz)  SpO2 96%  BMI 26.97 kg/m2  General:  Well appearing, well-nourished adult female in NAD.  HEENT:  Normocephalic.  Sclera anicteric.  MMM.  No lesions of the oropharynx.  Lymph:  No palpable cervical, supraclavicular, or axillary LAD.  Chest:  CTA bilaterally.  No wheezes or crackles.  Left chest port-a-cath is without surrounding erythema or edema.  Breast exam:  Right breast mass is no longer palpable on today's exam.  CV:  RRR.  Nl S1 and S2.  No m/r/g.  Abd:  Soft/NT/ND.  BSs normoactive.  No hepatosplenomegaly.  Ext:  No pitting edema of the bilateral lower extremities.  Pulses 2+ and symmetric.  Musculo:  Strength 5/5 throughout.  Neuro:  Cranial nerves grossly intact.  Psych:  Mood and affect appear normal.    Laboratory/Imaging Studies:  10/5/17 MRI breast:  Right breast: There is less rapid initial uptake of contrast material  shown by greater decreased size of the lesion on the initial time  point.      Measured at later time points where the size is greatest, there is  decreased size of biopsy-proven invasive carcinoma at the 12:00  position middle depth measuring approximately 2.4 x 0.9 x 2.0 cm  (series 9 image 73, series 25 image 45). Nonmass enhancement is again  seen between the index mass in the second mass.     Additional mass which was noted at the 1:00 position is also decreased  in size measuring approximately 1.3 x 0.7 x 0.5 cm (series 9 image 87,  series 25 image 51).     Left breast: No suspicious enhancement.     No lymphadenopathy.    Results for  YUNI CALIXTO (MRN [id number]) as of 10/31/2017 08:36   Ref. Range 10/31/2017 08:02   Sodium Latest Ref Range: 133 - 144 mmol/L 133   Potassium Latest Ref Range: 3.4 - 5.3 mmol/L 3.7   Chloride Latest Ref Range: 94 - 109 mmol/L 102   Carbon Dioxide Latest Ref Range: 20 - 32 mmol/L 22   Urea Nitrogen Latest Ref Range: 7 - 30 mg/dL 8   Creatinine Latest Ref Range: 0.52 - 1.04 mg/dL 0.55   GFR Estimate Latest Ref Range: >60 mL/min/1.7m2 >90   GFR Estimate If Black Latest Ref Range: >60 mL/min/1.7m2 >90   Calcium Latest Ref Range: 8.5 - 10.1 mg/dL 8.4 (L)   Anion Gap Latest Ref Range: 3 - 14 mmol/L 10   Albumin Latest Ref Range: 3.4 - 5.0 g/dL 2.7 (L)   Protein Total Latest Ref Range: 6.8 - 8.8 g/dL 7.4   Bilirubin Total Latest Ref Range: 0.2 - 1.3 mg/dL 0.5   Alkaline Phosphatase Latest Ref Range: 40 - 150 U/L 146   ALT Latest Ref Range: 0 - 50 U/L 118 (H)   AST Latest Ref Range: 0 - 45 U/L 165 (H)   Results for YUNI CALIXTO (MRN [id number]) as of 10/31/2017 08:36   Ref. Range 10/31/2017 08:02   WBC Latest Ref Range: 4.0 - 11.0 10e9/L 5.3   Hemoglobin Latest Ref Range: 11.7 - 15.7 g/dL 10.6 (L)   Hematocrit Latest Ref Range: 35.0 - 47.0 % 31.0 (L)   Platelet Count Latest Ref Range: 150 - 450 10e9/L 372   RBC Count Latest Ref Range: 3.8 - 5.2 10e12/L 3.23 (L)   MCV Latest Ref Range: 78 - 100 fl 96   MCH Latest Ref Range: 26.5 - 33.0 pg 32.8   MCHC Latest Ref Range: 31.5 - 36.5 g/dL 34.2   RDW Latest Ref Range: 10.0 - 15.0 % 12.9   Diff Method Unknown Automated Method   % Neutrophils Latest Units: % 75.7   % Lymphocytes Latest Units: % 11.9   % Monocytes Latest Units: % 10.5   % Eosinophils Latest Units: % 1.1   % Basophils Latest Units: % 0.4   % Immature Granulocytes Latest Units: % 0.4   Nucleated RBCs Latest Ref Range: 0 /100 0   Absolute Neutrophil Latest Ref Range: 1.6 - 8.3 10e9/L 4.0   Absolute Lymphocytes Latest Ref Range: 0.8 - 5.3 10e9/L 0.6 (L)   Absolute Monocytes Latest Ref Range: 0.0 - 1.3 10e9/L 0.6    Absolute Eosinophils Latest Ref Range: 0.0 - 0.7 10e9/L 0.1   Absolute Basophils Latest Ref Range: 0.0 - 0.2 10e9/L 0.0   Abs Immature Granulocytes Latest Ref Range: 0 - 0.4 10e9/L 0.0   Absolute Nucleated RBC Unknown 0.0     ASSESSMENT/PLAN:  Ms. Alonzo is a 57-year-old postmenopausal female with a stage IIa, grade 3, triple-negative infiltrating ductal carcinoma of the right breast. On weekly Taxol and once every three week pembrolizumab since 9/20/17 per the ISPY2 clinical trial.    1.  Right breast cancer:  Presents for evaluation prior to week number 7 of Taxol and pembrolizumab. She has had a good response to chemotherapy thus far with a decrease in size of the breast mass both on imaging and clinical exam.  She is noted to have grade 2 transaminitis in labs today and hence per protocol we will hold Taxol and Pembrolizumab.  She will return in 1 week, if LFTs are improved (<150) will proceed with administration of cycle #7 Taxol and pembrolizumab.     Our overall plan is to complete a total of 12 weeks of Taxol and pembrolizumab followed by 4 cycles of Adriamycin, Cytoxan and pembrolizumab.  Following completion of chemotherapy, she will proceed with surgery.  Whether or not she will benefit from adjuvant radiation is unknown at this time and depends on the type of breast surgery, surgical margins, and whether or not there is lymph node involvement at the time of surgery.      2.  Neuropathy:  Mild and not interfering with daily activities.  She will continue to take pyridoxine 100 mg daily.       3.  Transaminitis:  Secondary to chemotherapy.  Her LFTs are >3 times ULN and hence grade 2 transaminitis.  Per the ISPY2 protocol, we will hold both Taxol and pembrolizumab.  Asked that she avoid tylenol, alcohol, and supplements.    4.  UTI:  She will continue Cipro 500 mg PO BID to complete 7 day course.    5.  Followup:  Return in 1 week for labs, visit with Lilia Livingston, and cycle #7 Taxol and  pembrolizumab.    Patient seen and discussed with Dr Leeann Geller  Hem/onc fellow     Patient seen and discussed with Dr. Geller.  I have edited the above note to reflect our joint assessment and plan.  A total of 20 minutes of our 25 minute face to face visit was spent in counseling.    Fara Bradshaw MD

## 2017-10-31 NOTE — PROGRESS NOTES
Research appt- C7D1. Pt feeling ok,  reports a little bit of a rough weekend because she was having up and down fevers- tylenol used and with relief. 10/26 was seen and told she had a UTI, cipor started BID x1 week. Last fever of 100.4 per pt on Sunday none since.  Reporting being more fatigued over the last week since last treatment leading up to thurs and then improving as the weekend went on, sleeping more and long stretches. Appetite also decreased but again over the weekend improved.    (Grade 2), - as result of AST and per protocol going to hold her doses of both the paclitaxel and the pembro infusions today and recheck labs next week with plan being to dose both next week.   Infusion today cancelled, discussed plan with pt, answered questions and pt verbalized understanding of plan.   F/u next week.   Steffany Nix RN   [phone]

## 2017-10-31 NOTE — MR AVS SNAPSHOT
After Visit Summary   10/31/2017    Ashleigh Alonzo    MRN: [id number]           Patient Information     Date Of Birth          1960        Visit Information        Provider Department      10/31/2017 8:15 AM Fara Bradshaw MD [facility]        Today's Diagnoses     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)    -  1       Follow-ups after your visit        Your next 10 appointments already scheduled     Nov 07, 2017  6:30 AM CST   [facility] Draw with  [facility] DRAW   [facility] Draw ([facility])    [address] [place] [address]   [phone]            Nov 07, 2017  7:00 AM CST   (Arrive by 6:45 AM)   Return Visit with EDDIE Oliva   [facility] ([facility])    [address] [place] [address]   [phone]            Nov 07, 2017  8:00 AM CST   Infusion 180 with  ONCOLOGY INFUSION, UC 18 ATC   [facility] ([facility])    [address] [place] [address]   [phone]            Nov 10, 2017  1:00 PM CST   RETURN WITH ROOM with Naty Segovia GC,  2 117 CONSULT RM   [facility] ([facility])    [address] [place] [address]   [phone]            Nov 14, 2017  6:30 AM CST   [facility] Lab Draw with  [facility] LAB DRAW   [facility] Draw ([facility])    [address] [place] [address]   [phone]            Nov 14, 2017  7:00 AM CST   (Arrive by 6:45 AM)   Return Visit with EDDIE Oliva   [facility] ([facility])    [address] [place] [address]   [phone]             Nov 14, 2017  8:00 AM CST   Infusion 180 with UC ONCOLOGY INFUSION, UC 18 ATC   [facility] ([facility])    [address]  [address] [facility] [address] [phone]              Who to contact     If you have questions or need follow up information about today's clinic visit or your schedule please contact [facility] directly at [phone].  Normal or non-critical lab and imaging results will be communicated to you by Retevohart, letter or phone within 4 business days after the clinic has received the results. If you do not hear from us within 7 days, please contact the clinic through [employer] or phone. If you have a critical or abnormal lab result, we will notify you by phone as soon as possible.  Submit refill requests through CBIT A/S or call your pharmacy and they will forward the refill request to us. Please allow 3 business days for your refill to be completed.          Additional Information About Your Visit        RetevoharLiveQoS Information     CBIT [employer]/S gives you secure access to your electronic health record. If you see a primary care provider, you can also send messages to your care team and make appointments. If you have questions, please call your primary care clinic.  If you do not have a primary care provider, please call [phone] and they will assist you.        Care EveryWhere ID     This is your Care EveryWhere ID. This could be used by other organizations to access your [place] medical records  [id number]        Your Vitals Were     Pulse Temperature Respirations Pulse Oximetry BMI (Body Mass Index)       104 99.5  F (37.5  C) (Oral) 18 96% 26.97 kg/m2        Blood Pressure from Last 3 Encounters:   10/31/17 132/75   10/26/17 126/78   10/26/17 146/72    Weight from Last 3 Encounters:   10/31/17 71.3 kg (157 lb 3.2 oz)   10/26/17 73.5 kg (162 lb)   10/24/17 73.3 kg (161 lb 11.2 oz)              Today, you had the  following     No orders found for display         Today's Medication Changes          These changes are accurate as of: 10/31/17 11:59 PM.  If you have any questions, ask your nurse or doctor.               Stop taking these medicines if you haven't already. Please contact your care team if you have questions.     ondansetron 8 MG tablet   Commonly known as:  ZOFRAN   Stopped by:  Fara Bradshaw MD           prochlorperazine 10 MG tablet   Commonly known as:  COMPAZINE   Stopped by:  Fara Bradshaw MD                    Primary Care Provider Office Phone # Fax #    Radha YASSINE Cazares [phone]       [address] [address]        Equal Access to Services     [facility] SAUL : Hadii candie mcdaniel hadasho Soomaali, waaxda luqadaha, qaybta kaalmada adeegyada, waxay idiin hayrainer duran . So [facility] [phone].    ATENCIÓN: Si habla español, tiene a blackman disposición servicios gratuitos de asistencia lingüística. [facility] [phone].    We comply with applicable federal civil rights laws and [place] laws. We do not discriminate on the basis of race, color, national origin, age, disability, sex, sexual orientation, or gender identity.            Thank you!     Thank you for choosing [facility]  for your care. Our goal is always to provide you with excellent care. Hearing back from our patients is one way we can continue to improve our services. Please take a few minutes to complete the written survey that you may receive in the mail after your visit with us. Thank you!             Your Updated Medication List - Protect others around you: Learn how to safely use, store and throw away your medicines at www.disposemymeds.org.          This list is accurate as of: 10/31/17 11:59 PM.  Always use your most recent med list.                   Brand Name Dispense Instructions for use Diagnosis    albuterol 108  (90 BASE) MCG/ACT Inhaler    PROAIR HFA/PROVENTIL HFA/VENTOLIN HFA    1 Inhaler    Inhale 2 puffs into the lungs every 6 hours as needed for shortness of breath / dyspnea    Chronic obstructive pulmonary disease, unspecified COPD type (H)       aspirin 81 MG tablet      Take 1 tablet by mouth daily.        ciprofloxacin 500 MG tablet    CIPRO    14 tablet    Take 1 tablet (500 mg) by mouth 2 times daily for 7 days    Urinary tract infection without hematuria, site unspecified       citalopram 10 MG tablet    celeXA    90 tablet    Take 1 tablet (10 mg) by mouth daily    Anxiety, Depression, unspecified depression type       CO Q 10 PO      Take 1 tablet by mouth        darifenacin 7.5 MG 24 hr tablet    ENABLEX    90 tablet    Take 1 tablet (7.5 mg) by mouth daily    Overactive bladder       guaiFENesin 600 MG 12 hr tablet    MUCINEX    180 tablet    Take 2 tablets (1,200 mg) by mouth 2 times daily    Cough with sputum       hydrOXYzine 25 MG tablet    ATARAX    100 tablet    Take 1-2 tablets (25-50 mg) by mouth every 6 hours as needed for itching    Hives       lidocaine-prilocaine cream    EMLA    30 g    Please apply to port site 30 minutes before use prn    Personal history of malignant neoplasm of breast       * LORazepam 0.5 MG tablet    ATIVAN    10 tablet    Take 1-2 tabs 20 minutes prior to MRI scans.    Breast cancer (H), Anxiety       * LORazepam 0.5 MG tablet    ATIVAN    30 tablet    Take 1 tablet (0.5 mg) by mouth every 4 hours as needed (Anxiety, Nausea/Vomiting or Sleep)    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)       MULTIPLE VITAMIN PO           * order for [facility]      [employer] (powering [employer])s Dream Station Auto CPAP 12-18 cm, F&P Simplus FFM small.    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)        simvastatin 40 MG tablet    ZOCOR    90 tablet    Take 1 tablet (40 mg) by mouth At Bedtime    Hyperlipidemia LDL goal <130       tiotropium 18 MCG capsule    SPIRIVA    1 capsule    Inhale 1 capsule (18 mcg) into the lungs daily Inhale contents of one capsule    Chronic obstructive pulmonary disease, unspecified COPD type (H)       VITAMIN B 12 PO      Take 100 mcg by mouth daily        VITAMIN B6 PO      Take 1 tablet by mouth daily.        vitamin D 1000 UNITS capsule      2 CAPSULE DAILY        Zinc Gluconate 100 MG Tabs     30 tablet    Take 1 tablet by mouth daily    Dysgeusia       * Notice:  This list has 4 medication(s) that are the same as other medications prescribed for you. Read the directions carefully, and ask your doctor or other care provider to review them with you.

## 2017-10-31 NOTE — NURSING NOTE
"Oncology Rooming Note    October 31, 2017 8:11 AM   Ashleigh Alonzo is a 57 year old female who presents for:    Chief Complaint   Patient presents with     Port Draw     port accessed and labs drawn by rn.  vs taken.     Oncology Clinic Visit      Patient is here for Breast CA 7 wk f/u     Initial Vitals: /75 (BP Location: Right arm, Patient Position: Sitting, Cuff Size: Adult Regular)  Pulse 104  Temp 99.5  F (37.5  C) (Oral)  Resp 18  Wt 71.3 kg (157 lb 3.2 oz)  SpO2 96%  BMI 26.97 kg/m2 Estimated body mass index is 26.97 kg/(m^2) as calculated from the following:    Height as of 10/26/17: 1.626 m (5' 4.02\").    Weight as of this encounter: 71.3 kg (157 lb 3.2 oz). Body surface area is 1.79 meters squared.  No Pain (0) Comment: Data Unavailable   No LMP recorded. Patient is postmenopausal.  Allergies reviewed: Yes  Medications reviewed: Yes    Medications: Medication refills not needed today.  Pharmacy name entered into [facility]:    [facility] [place], [place] - [address] [place] [facility] [place], [place] - [address] [address] [address] [facility]    Clinical concerns: Patient denied pain when asked. Vitals taken in lab.     6 minutes for nursing intake (face to face time)     Yenni Vallejo LPN            "

## 2017-10-31 NOTE — NURSING NOTE
"Chief Complaint   Patient presents with     Port Draw     port accessed and labs drawn by rn.  vs taken.     Port accessed with 20g 3/4\" gripper needle, labs drawn, port flushed with saline and heparin, vitals checked, pt checked in for next appointment.  Lay Grier RN    "

## 2017-10-31 NOTE — MR AVS SNAPSHOT
After Visit Summary   10/31/2017    Ashleigh Alonzo    MRN: [id number]           Patient Information     Date Of Birth          1960        Visit Information        Provider Department      10/31/2017 9:30 AM UC 11 ATC; UC ONCOLOGY INFUSION [facility]        Today's Diagnoses     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)    -  1       Follow-ups after your visit        Your next 10 appointments already scheduled     Oct 31, 2017  9:30 AM CDT   Infusion 180 with UC ONCOLOGY INFUSION, UC 11 ATC   [facility] ([facility])    [address] [place] [address]   [phone]            Nov 07, 2017  6:30 AM CST   [facility] Draw with  [facility] DRAW   [facility])    [address] [place] [address]   [phone]            Nov 07, 2017  7:00 AM CST   (Arrive by 6:45 AM)   Return Visit with EDDIE Oliva   [facility] ([facility])    [address] [place] [address]   [phone]            Nov 07, 2017  8:00 AM CST   Infusion 180 with UC ONCOLOGY INFUSION, UC 18 ATC   [facility] ([facility])    [address] [place] [address]   [phone]            Nov 10, 2017  1:00 PM CST   RETURN WITH ROOM with Naty Segovia GC,  2 117 CONSULT RM   [facility] ([facility])    [address] [place] [address]   [phone]            Nov 14, 2017  6:30 AM CST   [facility] Lab Draw with  [facility] LAB DRAW   [facility])    [address] [place] [address]   [phone]            Nov 14,  2017  7:00 AM CST   (Arrive by 6:45 AM)   Return Visit with EDDIE Oliva   [facility] ([facility])    [address]  [address] [facility] [address] [phone]              Who to contact     If you have questions or need follow up information about today's clinic visit or your schedule please contact [facility] directly at [phone].  Normal or non-critical lab and imaging results will be communicated to you by Janrain[employer], letter or phone within 4 business days after the clinic has received the results. If you do not hear from us within 7 days, please contact the clinic through Janrain[employer] or phone. If you have a critical or abnormal lab result, we will notify you by phone as soon as possible.  Submit refill requests through [employer] or call your pharmacy and they will forward the refill request to us. Please allow 3 business days for your refill to be completed.          Additional Information About Your Visit        Janrainhart Information     [employer] gives you secure access to your electronic health record. If you see a primary care provider, you can also send messages to your care team and make appointments. If you have questions, please call your primary care clinic.  If you do not have a primary care provider, please call [phone] and they will assist you.        Care EveryWhere ID     This is your Care EveryWhere ID. This could be used by other organizations to access your [facility] medical records  [id number]         Blood Pressure from Last 3 Encounters:   10/31/17 132/75   10/26/17 126/78   10/26/17 146/72    Weight from Last 3 Encounters:   10/31/17 71.3 kg (157 lb 3.2 oz)   10/26/17 73.5 kg (162 lb)   10/24/17 73.3 kg (161 lb 11.2 oz)              We Performed the Following     CBC with platelets differential     Comprehensive metabolic panel     TSH          Today's Medication Changes          These changes are  accurate as of: 10/31/17  9:28 AM.  If you have any questions, ask your nurse or doctor.               Stop taking these medicines if you haven't already. Please contact your care team if you have questions.     ondansetron 8 MG tablet   Commonly known as:  ZOFRAN   Stopped by:  Fara Bradshaw MD           prochlorperazine 10 MG tablet   Commonly known as:  COMPAZINE   Stopped by:  Fara Bradshaw MD                    Primary Care Provider Office Phone # Fax #    Radha YASSINE Cazares [phone]       [address] [address]        Equal Access to Services     [place] [facility] : Hadii candie mcdaniel hadasho Soomaali, waaxda luqadaha, qaybta kaalmada adeegyada, bang duran . So [facility] [phone].    ATENCIÓN: Si habla español, tiene a blackman disposición servicios gratuitos de asistencia lingüística. Llame al [phone].    We comply with applicable federal civil rights laws and [place] laws. We do not discriminate on the basis of race, color, national origin, age, disability, sex, sexual orientation, or gender identity.            Thank you!     Thank you for choosing [facility]  for your care. Our goal is always to provide you with excellent care. Hearing back from our patients is one way we can continue to improve our services. Please take a few minutes to complete the written survey that you may receive in the mail after your visit with us. Thank you!             Your Updated Medication List - Protect others around you: Learn how to safely use, store and throw away your medicines at www.disposemymeds.org.          This list is accurate as of: 10/31/17  9:28 AM.  Always use your most recent med list.                   Brand Name Dispense Instructions for use Diagnosis    albuterol 108 (90 BASE) MCG/ACT Inhaler    PROAIR HFA/PROVENTIL HFA/VENTOLIN HFA    1 Inhaler    Inhale 2 puffs into  the lungs every 6 hours as needed for shortness of breath / dyspnea    Chronic obstructive pulmonary disease, unspecified COPD type (H)       aspirin 81 MG tablet      Take 1 tablet by mouth daily.        ciprofloxacin 500 MG tablet    CIPRO    14 tablet    Take 1 tablet (500 mg) by mouth 2 times daily for 7 days    Urinary tract infection without hematuria, site unspecified       citalopram 10 MG tablet    celeXA    90 tablet    Take 1 tablet (10 mg) by mouth daily    Anxiety, Depression, unspecified depression type       CO Q 10 PO      Take 1 tablet by mouth        darifenacin 7.5 MG 24 hr tablet    ENABLEX    90 tablet    Take 1 tablet (7.5 mg) by mouth daily    Overactive bladder       guaiFENesin 600 MG 12 hr tablet    MUCINEX    180 tablet    Take 2 tablets (1,200 mg) by mouth 2 times daily    Cough with sputum       hydrOXYzine 25 MG tablet    ATARAX    100 tablet    Take 1-2 tablets (25-50 mg) by mouth every 6 hours as needed for itching    Hives       lidocaine-prilocaine cream    EMLA    30 g    Please apply to port site 30 minutes before use prn    Personal history of malignant neoplasm of breast       * LORazepam 0.5 MG tablet    ATIVAN    10 tablet    Take 1-2 tabs 20 minutes prior to MRI scans.    Breast cancer (H), Anxiety       * LORazepam 0.5 MG tablet    ATIVAN    30 tablet    Take 1 tablet (0.5 mg) by mouth every 4 hours as needed (Anxiety, Nausea/Vomiting or Sleep)    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)       MULTIPLE VITAMIN PO           * order for [facility]      Respironics Dream Station Auto CPAP 12-18 cm, F&P Simplus FFM small.    MERLIN (obstructive sleep apnea)       * order for [facility]     1 Device    Cranial prosthesis    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)       simvastatin 40 MG tablet    ZOCOR    90 tablet    Take 1 tablet (40 mg) by mouth At Bedtime    Hyperlipidemia  LDL goal <130       tiotropium 18 MCG capsule    SPIRIVA    1 capsule    Inhale 1 capsule (18 mcg) into the lungs daily Inhale contents of one capsule    Chronic obstructive pulmonary disease, unspecified COPD type (H)       VITAMIN B 12 PO      Take 100 mcg by mouth daily        VITAMIN B6 PO      Take 1 tablet by mouth daily.        vitamin D 1000 UNITS capsule      2 CAPSULE DAILY        Zinc Gluconate 100 MG Tabs     30 tablet    Take 1 tablet by mouth daily    Dysgeusia       * Notice:  This list has 4 medication(s) that are the same as other medications prescribed for you. Read the directions carefully, and ask your doctor or other care provider to review them with you.

## 2017-11-07 ENCOUNTER — RESEARCH ENCOUNTER (OUTPATIENT)
Dept: ONCOLOGY | Facility: CLINIC | Age: 57
End: 2017-11-07

## 2017-11-07 ENCOUNTER — INFUSION THERAPY VISIT (OUTPATIENT)
Dept: ONCOLOGY | Facility: CLINIC | Age: 57
End: 2017-11-07
Attending: INTERNAL MEDICINE
Payer: COMMERCIAL

## 2017-11-07 VITALS
WEIGHT: 156.3 LBS | BODY MASS INDEX: 26.82 KG/M2 | TEMPERATURE: 98.9 F | SYSTOLIC BLOOD PRESSURE: 125 MMHG | DIASTOLIC BLOOD PRESSURE: 70 MMHG | OXYGEN SATURATION: 94 % | RESPIRATION RATE: 16 BRPM | HEART RATE: 102 BPM

## 2017-11-07 DIAGNOSIS — Z17.1 MALIGNANT NEOPLASM OF UPPER-INNER QUADRANT OF RIGHT BREAST IN FEMALE, ESTROGEN RECEPTOR NEGATIVE (H): Primary | ICD-10-CM

## 2017-11-07 DIAGNOSIS — C50.211 MALIGNANT NEOPLASM OF UPPER-INNER QUADRANT OF RIGHT BREAST IN FEMALE, ESTROGEN RECEPTOR NEGATIVE (H): Primary | ICD-10-CM

## 2017-11-07 LAB
ALBUMIN SERPL-MCNC: 2.9 G/DL (ref 3.4–5)
ALP SERPL-CCNC: 126 U/L (ref 40–150)
ALT SERPL W P-5'-P-CCNC: 69 U/L (ref 0–50)
ANION GAP SERPL CALCULATED.3IONS-SCNC: 10 MMOL/L (ref 3–14)
AST SERPL W P-5'-P-CCNC: 64 U/L (ref 0–45)
BASOPHILS # BLD AUTO: 0.1 10E9/L (ref 0–0.2)
BASOPHILS NFR BLD AUTO: 1.2 %
BILIRUB SERPL-MCNC: 0.6 MG/DL (ref 0.2–1.3)
BUN SERPL-MCNC: 6 MG/DL (ref 7–30)
CALCIUM SERPL-MCNC: 8.9 MG/DL (ref 8.5–10.1)
CHLORIDE SERPL-SCNC: 102 MMOL/L (ref 94–109)
CO2 SERPL-SCNC: 23 MMOL/L (ref 20–32)
CREAT SERPL-MCNC: 0.58 MG/DL (ref 0.52–1.04)
DIFFERENTIAL METHOD BLD: ABNORMAL
EOSINOPHIL # BLD AUTO: 0.3 10E9/L (ref 0–0.7)
EOSINOPHIL NFR BLD AUTO: 4.4 %
ERYTHROCYTE [DISTWIDTH] IN BLOOD BY AUTOMATED COUNT: 14 % (ref 10–15)
GFR SERPL CREATININE-BSD FRML MDRD: >90 ML/MIN/1.7M2
GLUCOSE SERPL-MCNC: 106 MG/DL (ref 70–99)
HCT VFR BLD AUTO: 33.6 % (ref 35–47)
HGB BLD-MCNC: 11.1 G/DL (ref 11.7–15.7)
IMM GRANULOCYTES # BLD: 0 10E9/L (ref 0–0.4)
IMM GRANULOCYTES NFR BLD: 0.4 %
LYMPHOCYTES # BLD AUTO: 1 10E9/L (ref 0.8–5.3)
LYMPHOCYTES NFR BLD AUTO: 18.1 %
MCH RBC QN AUTO: 32.6 PG (ref 26.5–33)
MCHC RBC AUTO-ENTMCNC: 33 G/DL (ref 31.5–36.5)
MCV RBC AUTO: 99 FL (ref 78–100)
MONOCYTES # BLD AUTO: 1 10E9/L (ref 0–1.3)
MONOCYTES NFR BLD AUTO: 18.3 %
NEUTROPHILS # BLD AUTO: 3.3 10E9/L (ref 1.6–8.3)
NEUTROPHILS NFR BLD AUTO: 57.6 %
NRBC # BLD AUTO: 0 10*3/UL
NRBC BLD AUTO-RTO: 0 /100
PLATELET # BLD AUTO: 387 10E9/L (ref 150–450)
POTASSIUM SERPL-SCNC: 3.8 MMOL/L (ref 3.4–5.3)
PROT SERPL-MCNC: 6.9 G/DL (ref 6.8–8.8)
RBC # BLD AUTO: 3.41 10E12/L (ref 3.8–5.2)
SODIUM SERPL-SCNC: 136 MMOL/L (ref 133–144)
TSH SERPL DL<=0.005 MIU/L-ACNC: 2.31 MU/L (ref 0.4–4)
WBC # BLD AUTO: 5.7 10E9/L (ref 4–11)

## 2017-11-07 PROCEDURE — 96417 CHEMO IV INFUS EACH ADDL SEQ: CPT

## 2017-11-07 PROCEDURE — 80053 COMPREHEN METABOLIC PANEL: CPT | Performed by: INTERNAL MEDICINE

## 2017-11-07 PROCEDURE — 25000128 H RX IP 250 OP 636: Mod: ZF | Performed by: PHYSICIAN ASSISTANT

## 2017-11-07 PROCEDURE — 84443 ASSAY THYROID STIM HORMONE: CPT | Performed by: INTERNAL MEDICINE

## 2017-11-07 PROCEDURE — S0028 INJECTION, FAMOTIDINE, 20 MG: HCPCS | Mod: ZF | Performed by: PHYSICIAN ASSISTANT

## 2017-11-07 PROCEDURE — 99212 OFFICE O/P EST SF 10 MIN: CPT | Mod: ZF

## 2017-11-07 PROCEDURE — 99214 OFFICE O/P EST MOD 30 MIN: CPT | Mod: ZP | Performed by: PHYSICIAN ASSISTANT

## 2017-11-07 PROCEDURE — 96375 TX/PRO/DX INJ NEW DRUG ADDON: CPT

## 2017-11-07 PROCEDURE — 25000125 ZZHC RX 250: Mod: ZF | Performed by: PHYSICIAN ASSISTANT

## 2017-11-07 PROCEDURE — 96413 CHEMO IV INFUSION 1 HR: CPT

## 2017-11-07 PROCEDURE — 85025 COMPLETE CBC W/AUTO DIFF WBC: CPT | Performed by: INTERNAL MEDICINE

## 2017-11-07 RX ORDER — METHYLPREDNISOLONE SODIUM SUCCINATE 125 MG/2ML
125 INJECTION, POWDER, LYOPHILIZED, FOR SOLUTION INTRAMUSCULAR; INTRAVENOUS
Status: CANCELLED
Start: 2017-11-07

## 2017-11-07 RX ORDER — LORAZEPAM 2 MG/ML
0.5 INJECTION INTRAMUSCULAR EVERY 4 HOURS PRN
Status: CANCELLED
Start: 2017-11-07

## 2017-11-07 RX ORDER — ALBUTEROL SULFATE 90 UG/1
1-2 AEROSOL, METERED RESPIRATORY (INHALATION)
Status: CANCELLED
Start: 2017-11-07

## 2017-11-07 RX ORDER — SODIUM CHLORIDE 9 MG/ML
1000 INJECTION, SOLUTION INTRAVENOUS CONTINUOUS PRN
Status: CANCELLED
Start: 2017-11-07

## 2017-11-07 RX ORDER — DEXAMETHASONE SODIUM PHOSPHATE 10 MG/ML
10 INJECTION, SOLUTION INTRAMUSCULAR; INTRAVENOUS
Status: CANCELLED | OUTPATIENT
Start: 2017-11-07

## 2017-11-07 RX ORDER — EPINEPHRINE 1 MG/ML
0.3 INJECTION, SOLUTION, CONCENTRATE INTRAVENOUS EVERY 5 MIN PRN
Status: CANCELLED | OUTPATIENT
Start: 2017-11-07

## 2017-11-07 RX ORDER — EPINEPHRINE 0.3 MG/.3ML
0.3 INJECTION SUBCUTANEOUS EVERY 5 MIN PRN
Status: CANCELLED | OUTPATIENT
Start: 2017-11-07

## 2017-11-07 RX ORDER — HEPARIN SODIUM (PORCINE) LOCK FLUSH IV SOLN 100 UNIT/ML 100 UNIT/ML
5 SOLUTION INTRAVENOUS ONCE
Status: COMPLETED | OUTPATIENT
Start: 2017-11-07 | End: 2017-11-07

## 2017-11-07 RX ORDER — ALBUTEROL SULFATE 0.83 MG/ML
2.5 SOLUTION RESPIRATORY (INHALATION)
Status: CANCELLED | OUTPATIENT
Start: 2017-11-07

## 2017-11-07 RX ORDER — HEPARIN SODIUM (PORCINE) LOCK FLUSH IV SOLN 100 UNIT/ML 100 UNIT/ML
500 SOLUTION INTRAVENOUS ONCE
Status: COMPLETED | OUTPATIENT
Start: 2017-11-07 | End: 2017-11-07

## 2017-11-07 RX ORDER — DIPHENHYDRAMINE HYDROCHLORIDE 50 MG/ML
50 INJECTION INTRAMUSCULAR; INTRAVENOUS
Status: CANCELLED
Start: 2017-11-07

## 2017-11-07 RX ORDER — HEPARIN SODIUM (PORCINE) LOCK FLUSH IV SOLN 100 UNIT/ML 100 UNIT/ML
500 SOLUTION INTRAVENOUS ONCE
Status: CANCELLED
Start: 2017-11-07 | End: 2017-11-07

## 2017-11-07 RX ORDER — MEPERIDINE HYDROCHLORIDE 25 MG/ML
25 INJECTION INTRAMUSCULAR; INTRAVENOUS; SUBCUTANEOUS EVERY 30 MIN PRN
Status: CANCELLED | OUTPATIENT
Start: 2017-11-07

## 2017-11-07 RX ADMIN — PACLITAXEL 150 MG: 6 INJECTION, SOLUTION INTRAVENOUS at 10:17

## 2017-11-07 RX ADMIN — SODIUM CHLORIDE, PRESERVATIVE FREE 5 ML: 5 INJECTION INTRAVENOUS at 06:50

## 2017-11-07 RX ADMIN — FAMOTIDINE 20 MG: 10 INJECTION INTRAVENOUS at 10:13

## 2017-11-07 RX ADMIN — SODIUM CHLORIDE 200 MG: 9 INJECTION, SOLUTION INTRAVENOUS at 08:59

## 2017-11-07 RX ADMIN — SODIUM CHLORIDE, PRESERVATIVE FREE 500 UNITS: 5 INJECTION INTRAVENOUS at 11:23

## 2017-11-07 RX ADMIN — SODIUM CHLORIDE 500 ML: 9 INJECTION, SOLUTION INTRAVENOUS at 08:59

## 2017-11-07 ASSESSMENT — PAIN SCALES - GENERAL: PAINLEVEL: NO PAIN (0)

## 2017-11-07 NOTE — PATIENT INSTRUCTIONS
Contact Numbers    [facility] Main Line: [phone]  [facility] Triage:  [phone]    Call triage with chills and/or temperature greater than or equal to 100.5, uncontrolled nausea/vomiting, diarrhea, constipation, dizziness, shortness of breath, chest pain, bleeding, unexplained bruising, or any new/concerning symptoms, questions/concerns.     If you are having any concerning symptoms or wish to speak to a provider before your next infusion visit, please call your care coordinator or triage to notify them so we can adequately serve you.       After Hours: [phone]    If after hours, weekends, or holidays, call main hospital  and ask for Oncology doctor on call.           November 2017 Sunday Monday Tuesday Wednesday Thursday Friday Saturday                  1     2     3     4       5     6     7     [facility] DRAW    6:30 AM   (15 min.)   [facility]     [facility] RETURN    6:45 AM   (50 min.)   Lilia Livingston PA   [facility]     [facility] ONC INFUSION 180    8:00 AM   (180 min.)    ONCOLOGY INFUSION   [facility] 8     9     10     [facility] RETURN WITH ROOM    1:00 PM   (75 min.)   Naty Segovia GC   [facility] 11       12     13     14     [facility] DRAW    6:30 AM   (15 min.)   [facility] DRAW   [facility] Lab Draw     [facility] RETURN    6:45 AM   (50 min.)   Lilia Livingston PA   [facility]     [facility] ONC INFUSION 180    8:00 AM   (180 min.)    ONCOLOGY INFUSION   [facility] 15     16     17     18       19     20     21     [facility] DRAW   11:15 AM   (15 min.)   [facility] DRAW   [facility] Lab Draw     [facility] RETURN   11:45 AM   (30 min.)   Fara Bradshaw MD   [facility]     [facility] ONC INFUSION 180    1:00 PM   (180 min.)    ONCOLOGY INFUSION   [facility] 22     23     24     25        26     27     28     [facility] LAB DRAW    6:30 AM   (15 min.)    [facility] LAB DRAW   [facility] Draw     [facility] RETURN    6:45 AM   (50 min.)   Lilia Livingston PA   [facility]    8:00 AM   (180 min.)   UC ONCOLOGY INFUSION   [facility] 29 30 December 2017 Sunday Monday Tuesday Wednesday Thursday Friday Saturday                            1     2       3     4     5     [facility] LAB DRAW    6:30 AM   (15 min.)   [facility] DRAW   [facility] Draw     [facility] RETURN    6:45 AM   (50 min.)   Lilia Livingston PA   [facility] ONC INFUSION 180    8:00 AM   (180 min.)    ONCOLOGY INFUSION   [facility] 6     7     8     9       10     11     12     13     14     15     16       17     18     19     20     21     22     23       24     25     26     27     28     29     30       31                                               Recent Results (from the past 24 hour(s))   CBC with platelets differential    Collection Time: 11/07/17  6:56 AM   Result Value Ref Range    WBC 5.7 4.0 - 11.0 10e9/L    RBC Count 3.41 (L) 3.8 - 5.2 10e12/L    Hemoglobin 11.1 (L) 11.7 - 15.7 g/dL    Hematocrit 33.6 (L) 35.0 - 47.0 %    MCV 99 78 - 100 fl    MCH 32.6 26.5 - 33.0 pg    MCHC 33.0 31.5 - 36.5 g/dL    RDW 14.0 10.0 - 15.0 %    Platelet Count 387 150 - 450 10e9/L    Diff Method Automated Method     % Neutrophils 57.6 %    % Lymphocytes 18.1 %    % Monocytes 18.3 %    % Eosinophils 4.4 %    % Basophils 1.2 %    % Immature Granulocytes 0.4 %    Nucleated RBCs 0 0 /100    Absolute Neutrophil 3.3 1.6 - 8.3 10e9/L    Absolute Lymphocytes 1.0 0.8 - 5.3 10e9/L    Absolute Monocytes 1.0 0.0 - 1.3 10e9/L    Absolute Eosinophils 0.3 0.0 - 0.7 10e9/L    Absolute Basophils 0.1 0.0 - 0.2 10e9/L    Abs Immature Granulocytes 0.0 0 - 0.4 10e9/L    Absolute Nucleated RBC 0.0     Comprehensive metabolic panel    Collection Time: 11/07/17  6:56 AM   Result Value Ref Range    Sodium 136 133 - 144 mmol/L    Potassium 3.8 3.4 - 5.3 mmol/L    Chloride 102 94 - 109 mmol/L    Carbon Dioxide 23 20 - 32 mmol/L    Anion Gap 10 3 - 14 mmol/L    Glucose 106 (H) 70 - 99 mg/dL    Urea Nitrogen 6 (L) 7 - 30 mg/dL    Creatinine 0.58 0.52 - 1.04 mg/dL    GFR Estimate >90 >60 mL/min/1.7m2    GFR Estimate If Black >90 >60 mL/min/1.7m2    Calcium 8.9 8.5 - 10.1 mg/dL    Bilirubin Total 0.6 0.2 - 1.3 mg/dL    Albumin 2.9 (L) 3.4 - 5.0 g/dL    Protein Total 6.9 6.8 - 8.8 g/dL    Alkaline Phosphatase 126 40 - 150 U/L    ALT 69 (H) 0 - 50 U/L    AST 64 (H) 0 - 45 U/L   TSH    Collection Time: 11/07/17  6:56 AM   Result Value Ref Range    TSH 2.31 0.40 - 4.00 mU/L

## 2017-11-07 NOTE — MR AVS SNAPSHOT
After Visit Summary   11/7/2017    Ashleigh lAonzo    MRN: [id number]           Patient Information     Date Of Birth          1960        Visit Information        Provider Department      11/7/2017 8:00 AM UC 18 ATC; UC ONCOLOGY INFUSION [facility]        Today's Diagnoses     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)    -  1      Care Instructions    Contact Numbers    [facility] Main Line: [phone]  [facility] Triage:  [phone]    Call triage with chills and/or temperature greater than or equal to 100.5, uncontrolled nausea/vomiting, diarrhea, constipation, dizziness, shortness of breath, chest pain, bleeding, unexplained bruising, or any new/concerning symptoms, questions/concerns.     If you are having any concerning symptoms or wish to speak to a provider before your next infusion visit, please call your care coordinator or triage to notify them so we can adequately serve you.       After Hours: [phone]    If after hours, weekends, or holidays, call main hospital  and ask for Oncology doctor on call.           November 2017 Sunday Monday Tuesday Wednesday Thursday Friday Saturday                  1     2     3     4       5     6     7     P [facility] LAB DRAW    6:30 AM   (15 min.)    [facility] DRAW   [facility] Lab Draw     UMP RETURN    6:45 AM   (50 min.)   Lilia Livingston PA   [facility]     UMP ONC INFUSION 180    8:00 AM   (180 min.)   UC ONCOLOGY INFUSION   [facility] 8     9     10     UMP RETURN WITH ROOM    1:00 PM   (75 min.)   Naty Segovia GC   [facility] 11       12     13     14     [facility] LAB DRAW    6:30 AM   (15 min.)    [facility] LAB DRAW   [facility] Lab Draw     UMP RETURN    6:45 AM   (50 min.)   Lilia Livingston PA   [facility]     UMP ONC INFUSION 180    8:00 AM   (180  min.)   UC ONCOLOGY INFUSION   [facility] 15     16     17     18       19     20     21     [facility] DRAW   11:15 AM   (15 min.)   [facility] DRAW   [facility] Lab Draw     UMP RETURN   11:45 AM   (30 min.)   Fara Bradshaw MD   [facility]     UMP ONC INFUSION 180    1:00 PM   (180 min.)    ONCOLOGY INFUSION   [facility] 22     23     24     25       26     27     28     [facility] DRAW    6:30 AM   (15 min.)   [facility] LAB DRAW   [facility] Lab Draw     UMP RETURN    6:45 AM   (50 min.)   [facility]    8:00 AM   (180 min.)    ONCOLOGY INFUSION   [facility] 29 30 December 2017 Sunday Monday Tuesday Wednesday Thursday Friday Saturday                            1     2       3     4     5     [facility] DRAW    6:30 AM   (15 min.)   [facility] LAB DRAW   [facility] Lab Draw     UMP RETURN    6:45 AM   (50 min.)   Lilia iLvingston PA   [facility]     UMP ONC INFUSION 180    8:00 AM   (180 min.)    ONCOLOGY INFUSION   [facility] 6     7     8     9       10     11     12     13     14     15     16       17     18     19     20     21     22     23       24     25     26     27     28     29     30       31                                               Recent Results (from the past 24 hour(s))   CBC with platelets differential    Collection Time: 11/07/17  6:56 AM   Result Value Ref Range    WBC 5.7 4.0 - 11.0 10e9/L    RBC Count 3.41 (L) 3.8 - 5.2 10e12/L    Hemoglobin 11.1 (L) 11.7 - 15.7 g/dL    Hematocrit 33.6 (L) 35.0 - 47.0 %    MCV 99 78 - 100 fl    MCH 32.6 26.5 - 33.0 pg    MCHC 33.0 31.5 - 36.5 g/dL    RDW 14.0 10.0 - 15.0 %    Platelet Count 387 150 - 450 10e9/L    Diff Method Automated Method     % Neutrophils 57.6 %    %  Lymphocytes 18.1 %    % Monocytes 18.3 %    % Eosinophils 4.4 %    % Basophils 1.2 %    % Immature Granulocytes 0.4 %    Nucleated RBCs 0 0 /100    Absolute Neutrophil 3.3 1.6 - 8.3 10e9/L    Absolute Lymphocytes 1.0 0.8 - 5.3 10e9/L    Absolute Monocytes 1.0 0.0 - 1.3 10e9/L    Absolute Eosinophils 0.3 0.0 - 0.7 10e9/L    Absolute Basophils 0.1 0.0 - 0.2 10e9/L    Abs Immature Granulocytes 0.0 0 - 0.4 10e9/L    Absolute Nucleated RBC 0.0    Comprehensive metabolic panel    Collection Time: 11/07/17  6:56 AM   Result Value Ref Range    Sodium 136 133 - 144 mmol/L    Potassium 3.8 3.4 - 5.3 mmol/L    Chloride 102 94 - 109 mmol/L    Carbon Dioxide 23 20 - 32 mmol/L    Anion Gap 10 3 - 14 mmol/L    Glucose 106 (H) 70 - 99 mg/dL    Urea Nitrogen 6 (L) 7 - 30 mg/dL    Creatinine 0.58 0.52 - 1.04 mg/dL    GFR Estimate >90 >60 mL/min/1.7m2    GFR Estimate If Black >90 >60 mL/min/1.7m2    Calcium 8.9 8.5 - 10.1 mg/dL    Bilirubin Total 0.6 0.2 - 1.3 mg/dL    Albumin 2.9 (L) 3.4 - 5.0 g/dL    Protein Total 6.9 6.8 - 8.8 g/dL    Alkaline Phosphatase 126 40 - 150 U/L    ALT 69 (H) 0 - 50 U/L    AST 64 (H) 0 - 45 U/L   TSH    Collection Time: 11/07/17  6:56 AM   Result Value Ref Range    TSH 2.31 0.40 - 4.00 mU/L               Follow-ups after your visit        Your next 10 appointments already scheduled     Nov 10, 2017  1:00 PM CST   RETURN WITH ROOM with Naty Segovia GC, CARLINE 2 117 CONSULT [facility] ([facility])    [address] [place] [id number] [phone]            Nov 14, 2017  6:30 AM CST   [facility]   [facility] Lab Draw ([facility])    [facility]  2nd Floor  [facility] [address]   [phone]            Nov 14, 2017  7:00 AM CST   (Arrive by 6:45 AM)   Return Visit with EDDIE Oliva   [facility] ([facility]  [place])    [address] [place] [id number]   [phone]            Nov 14, 2017  8:00 AM CST   Infusion 180 with UC ONCOLOGY INFUSION, UC 18 ATC   [facility] ([facility])    [address] [place] [id number]   [phone]            Nov 21, 2017 11:15 AM CST   [facility] Lab Draw with [facility] DRAW   [facility] Lab Draw ([facility])    [address] [place] [address]   [phone]            Nov 21, 2017 12:00 PM CST   (Arrive by 11:45 AM)   Return Visit with Fara Bradshaw MD   [facility] ([facility])    [address] [place] [address]   [phone]            Nov 21, 2017  1:00 PM CST   Infusion 180 with UC ONCOLOGY INFUSION, UC 25 ATC   [facility] ([facility])    [address] [place] [address]   [phone]              Who to contact     If you have questions or need follow up information about today's clinic visit or your schedule please contact [facility] directly at [phone].  Normal or non-critical lab and imaging results will be communicated to you by MyChart, letter or phone within 4 business days after the clinic has received the results. If you do not hear from us within 7 days, please contact the clinic through [employer] or phone. If you have a critical or abnormal lab result, we will notify you by phone as soon as possible.  Submit refill requests through [employer] or call your pharmacy and they will forward the refill request to us. Please allow 3 business days for your refill to be completed.          Additional Information About Your Visit        [employer] Information     [employer] gives you secure access to your electronic health record. If you see a primary care  provider, you can also send messages to your care team and make appointments. If you have questions, please call your primary care clinic.  If you do not have a primary care provider, please call [phone] and they will assist you.        Care EveryWhere ID     This is your Care EveryWhere ID. This could be used by other organizations to access your [facility] medical records  [id number]         Blood Pressure from Last 3 Encounters:   11/07/17 125/70   10/31/17 132/75   10/26/17 126/78    Weight from Last 3 Encounters:   11/07/17 70.9 kg (156 lb 4.8 oz)   10/31/17 71.3 kg (157 lb 3.2 oz)   10/26/17 73.5 kg (162 lb)              We Performed the Following     CBC with platelets differential     Comprehensive metabolic panel     TSH        Primary Care Provider Office Phone # Fax #    Radha Cazares [phone]       [address] [address]        Equal Access to Services     THERESA HUGHES : Hadii aad ku hadasho Soomaali, waaxda luqadaha, qaybta kaalmada adeegyada, waxay edward hayrainer duran . So [facility] [phone].    ATENCIÓN: Si habla español, tiene a blackman disposición servicios gratuitos de asistencia lingüística. Llame al [phone].    We comply with applicable federal civil rights laws and [place] laws. We do not discriminate on the basis of race, color, national origin, age, disability, sex, sexual orientation, or gender identity.            Thank you!     Thank you for choosing [facility]  for your care. Our goal is always to provide you with excellent care. Hearing back from our patients is one way we can continue to improve our services. Please take a few minutes to complete the written survey that you may receive in the mail after your visit with us. Thank you!             Your Updated Medication List - Protect others around you: Learn how to safely use, store and throw away your medicines at  www.disposemymeds.org.          This list is accurate as of: 11/7/17  9:41 AM.  Always use your most recent med list.                   Brand Name Dispense Instructions for use Diagnosis    albuterol 108 (90 BASE) MCG/ACT Inhaler    PROAIR HFA/PROVENTIL HFA/VENTOLIN HFA    1 Inhaler    Inhale 2 puffs into the lungs every 6 hours as needed for shortness of breath / dyspnea    Chronic obstructive pulmonary disease, unspecified COPD type (H)       aspirin 81 MG tablet      Take 1 tablet by mouth daily.        citalopram 10 MG tablet    celeXA    90 tablet    Take 1 tablet (10 mg) by mouth daily    Anxiety, Depression, unspecified depression type       CO Q 10 PO      Take 1 tablet by mouth        darifenacin 7.5 MG 24 hr tablet    ENABLEX    90 tablet    Take 1 tablet (7.5 mg) by mouth daily    Overactive bladder       guaiFENesin 600 MG 12 hr tablet    MUCINEX    180 tablet    Take 2 tablets (1,200 mg) by mouth 2 times daily    Cough with sputum       hydrOXYzine 25 MG tablet    ATARAX    100 tablet    Take 1-2 tablets (25-50 mg) by mouth every 6 hours as needed for itching    Hives       lidocaine-prilocaine cream    EMLA    30 g    Please apply to port site 30 minutes before use prn    Personal history of malignant neoplasm of breast       * LORazepam 0.5 MG tablet    ATIVAN    10 tablet    Take 1-2 tabs 20 minutes prior to MRI scans.    Breast cancer (H), Anxiety       * LORazepam 0.5 MG tablet    ATIVAN    30 tablet    Take 1 tablet (0.5 mg) by mouth every 4 hours as needed (Anxiety, Nausea/Vomiting or Sleep)    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)       MULTIPLE VITAMIN PO           * order for DME      Respironics Dream Station Auto CPAP 12-18 cm, F&P Simplus FFM small.    MERLIN (obstructive sleep apnea)       * order for DME     1 Device    Cranial prosthesis    Malignant neoplasm of upper-inner quadrant of right breast in female, estrogen receptor  negative (H)       simvastatin 40 MG tablet    ZOCOR    90 tablet    Take 1 tablet (40 mg) by mouth At Bedtime    Hyperlipidemia LDL goal <130       tiotropium 18 MCG capsule    SPIRIVA    1 capsule    Inhale 1 capsule (18 mcg) into the lungs daily Inhale contents of one capsule    Chronic obstructive pulmonary disease, unspecified COPD type (H)       VITAMIN B 12 PO      Take 100 mcg by mouth daily        VITAMIN B6 PO      Take 1 tablet by mouth daily.        vitamin D 1000 UNITS capsule      2 CAPSULE DAILY        Zinc Gluconate 100 MG Tabs     30 tablet    Take 1 tablet by mouth daily    Dysgeusia       * Notice:  This list has 4 medication(s) that are the same as other medications prescribed for you. Read the directions carefully, and ask your doctor or other care provider to review them with you.

## 2017-11-07 NOTE — NURSING NOTE
"Oncology Rooming Note    November 7, 2017 7:22 AM   Ashleigh Alonzo is a 57 year old female who presents for:    Chief Complaint   Patient presents with     Port Draw     Labs drawn from port by RN. Line flushed with saline and heparin. Vs taken and pt checked in for appt     Oncology Clinic Visit     Return for Breast Ca      Initial Vitals: /70 (BP Location: Right arm, Cuff Size: Adult Regular)  Pulse 102  Temp 98.9  F (37.2  C) (Oral)  Resp 16  Wt 70.9 kg (156 lb 4.8 oz)  SpO2 94%  BMI 26.82 kg/m2 Estimated body mass index is 26.82 kg/(m^2) as calculated from the following:    Height as of 10/26/17: 1.626 m (5' 4.02\").    Weight as of this encounter: 70.9 kg (156 lb 4.8 oz). Body surface area is 1.79 meters squared.  No Pain (0) Comment: Data Unavailable   No LMP recorded. Patient is postmenopausal.  Allergies reviewed: Yes  Medications reviewed: Yes    Medications: Medication refills not needed today.  Pharmacy name entered into [facility]:    [facility] [place], [place] - [address] [place] [facility] [place], [place] - [address] [address]    Clinical concerns: no concerns  Miki  was notified.    6 minutes for nursing intake (face to face time)     Debbie Olivo MA              "

## 2017-11-07 NOTE — PROGRESS NOTES
Hematology-Oncology Visit  Nov 7, 2017    Reason for Visit: follow-up breast cancer     HPI: Ashleigh Alonzo is a 57 year old female with past medical history of COPD, sleep apnea, periodontal disease with stage IIa, T2N0M0, grade 3, triple negative right breast cancer. She was diagnosed via abnormal screening mammogram. She ultimately had US, contrast-enhanced mammo, and biopsy showing 3.4 cm mass and pathology showed grade 3 invasive mammary carcinoma with associated high-grade DCIS. ER/IL was negative and HER2 negative. She enrolled in ISPY-2 clinical trial and began treatment with weekly taxol and once every 3 week pembrolizumab on 9/2/17. Please see notes from Dr. Bradshaw for further details of patient's oncology history.     Course has been complicated by fevers with PNA and then UTI. Week 7 was held due to transaminitis. She is here for deferred week 7 today.     Interval History: Ashleigh Perera is here with her  today. She is feeling better. No fevers since 10/29--which were low grade. Her energy has much improved. She has no concerns today beyond having some crusting sores beneath nose. Nose has been crusting as well. She is not exercising right now. She is pleased breast tumor clinically resolved last week. No mucositis, nausea, diarrhea, constipation, neuropathy, cough, SOB, CP, edema, abdominal pain. ROS otherwise negative.     Current Outpatient Prescriptions   Medication     Zinc Gluconate 100 MG TABS     order for DME     LORazepam (ATIVAN) 0.5 MG tablet     lidocaine-prilocaine (EMLA) cream     LORazepam (ATIVAN) 0.5 MG tablet     hydrOXYzine (ATARAX) 25 MG tablet     guaiFENesin (MUCINEX) 600 MG 12 hr tablet     simvastatin (ZOCOR) 40 MG tablet     tiotropium (SPIRIVA) 18 MCG capsule     albuterol (PROAIR HFA/PROVENTIL HFA/VENTOLIN HFA) 108 (90 BASE) MCG/ACT Inhaler     citalopram (CELEXA) 10 MG tablet     darifenacin (ENABLEX) 7.5 MG 24 hr tablet     order for DME     Coenzyme Q10 (CO Q 10 PO)      MULTIPLE VITAMIN PO     Cyanocobalamin (VITAMIN B 12 PO)     Pyridoxine HCl (VITAMIN B6 PO)     aspirin 81 MG tablet     VITAMIN D 1000 UNIT OR CAPS     No current facility-administered medications for this visit.      Facility-Administered Medications Ordered in Other Visits   Medication     lidocaine 1 % 9 mL     sodium bicarbonate 8.4 % injection 1 mEq     lidocaine-EPINEPHrine 1.5 %-[id number] injection 10 mL       PHYSICAL EXAM:  /70 (BP Location: Right arm, Cuff Size: Adult Regular)  Pulse 102  Temp 98.9  F (37.2  C) (Oral)  Resp 16  Wt 70.9 kg (156 lb 4.8 oz)  SpO2 94%  BMI 26.82 kg/m2  General: Alert, oriented, pleasant, NAD  Skin: No focal rash on exposed skin   HEENT: Normocephalic, atraumatic, PERRLA, EOMI. Moist mucus membranes, no lesions or thrush  Lungs: CTA bilaterally, normal work of breathing. Clear with coughing.   Cardiac: RRR, S1, S2, no murmurs  Abdomen: Soft, nontender, nondistended. Normoactive bowel sounds. No hepatosplenomegaly, masses  Extremities: No pedal edema    Labs:    11/7/2017 06:56   Sodium 136   Potassium 3.8   Chloride 102   Carbon Dioxide 23   Urea Nitrogen 6 (L)   Creatinine 0.58   GFR Estimate >90   GFR Estimate If Black >90   Calcium 8.9   Anion Gap 10   Albumin 2.9 (L)   Protein Total 6.9   Bilirubin Total 0.6   Alkaline Phosphatase 126   ALT 69 (H)   AST 64 (H)   TSH 2.31   Glucose 106 (H)   WBC 5.7   Hemoglobin 11.1 (L)   Hematocrit 33.6 (L)   Platelet Count 387   RBC Count 3.41 (L)    MCV 99   MCH 32.6   MCHC 33.0   RDW 14.0   Diff Method Automated Method   % Neutrophils 57.6   % Lymphocytes 18.1   % Monocytes 18.3   % Eosinophils 4.4   % Basophils 1.2   % Immature Granulocytes 0.4   Nucleated RBCs 0   Absolute Neutrophil 3.3   Absolute Lymphocytes 1.0   Absolute Monocytes 1.0   Absolute Eosinophils 0.3   Absolute Basophils 0.1   Abs Immature Granulocytes 0.0   Absolute Nucleated RBC 0.0           Assessment & Plan:     1.Stage IIa, T2N0M0, grade 3, triple  negative breast cancer: S/p 6 weeks of weekly Taxol and every 3 week pembro. Week 7 was held due to grade 2 transaminitis. She has been tolerating treatment well without other significant symptoms. Had a great partial response already to treatment after 3 weeks per breast MRI.  Plan for 12 weeks of Taxol and pembrolizumab followed by 4 cycles of AC and pembrolizumab. She will then proceed with surgery and potentially adjuvant radiation. She will be seen weekly on the clinical trial.      2. Transaminitis: LFTs today are trending down with ALT 69 and AST 64. Okay per study protocol to proceed with week 7. Monitor.     3. Nasolabial lesions: Apply triple antibiotic cream to sites.       Lilia Livingston PA-C    [facility]  [address] [place] [id number] [phone]

## 2017-11-07 NOTE — NURSING NOTE
"Chief Complaint   Patient presents with     Port Draw     Labs drawn from port by RN. Line flushed with saline and heparin. Vs taken and pt checked in for appt     Port accessed with 20g 3/4\" gripper needle by RN, labs collected, line flushed with saline and heparin.  Vitals taken. Pt checked in for appointment(s).    Luana Teran RN  "

## 2017-11-07 NOTE — MR AVS SNAPSHOT
After Visit Summary   11/7/2017    Ashleigh Alonzo    MRN: [id number]           Patient Information     Date Of Birth          1960        Visit Information        Provider Department      11/7/2017 7:00 AM Lliia Livingston PA [facility]        Today's Diagnoses     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)    -  1       Follow-ups after your visit        Your next 10 appointments already scheduled     Nov 07, 2017  8:00 AM CST   Infusion 180 with UC ONCOLOGY INFUSION, UC 18 ATC   [facility] ([facility])    [address] [place] [address]   [phone]            Nov 10, 2017  1:00 PM CST   RETURN WITH ROOM with Naty Segovia GC,  2 117 CONSULT RM   [facility] ([facility])    [address] [place] [address]   [phone]            Nov 14, 2017  6:30 AM CST   [facility] Lab Draw with  MASONIC LAB DRAW   [facility] ([facility])    [address] [place] [address]   [phone]            Nov 14, 2017  7:00 AM CST   (Arrive by 6:45 AM)   Return Visit with EDDIE Oliva   [facility] ([facility])    [address] [place] [address]   [phone]            Nov 14, 2017  8:00 AM CST   Infusion 180 with UC ONCOLOGY INFUSION, UC 18 ATC   [facility] ([facility])    [address] [place] [address]   [phone]            Nov 21, 2017 11:15 AM CST   [facility] Lab Draw with UC MASONIC LAB DRAW   [facility] ([facility])    [address] [place] [address]   [phone]            Nov 21, 2017 12:00  PM CST   (Arrive by 11:45 AM)   Return Visit with Fara Bradshaw MD   [facility] ([facility])    [address] [facility] [address] [phone]              Who to contact     If you have questions or need follow up information about today's clinic visit or your schedule please contact [facility] directly at [phone].  Normal or non-critical lab and imaging results will be communicated to you by [employer], letter or phone within 4 business days after the clinic has received the results. If you do not hear from us within 7 days, please contact the clinic through [email or web address] or phone. If you have a critical or abnormal lab result, we will notify you by phone as soon as possible.  Submit refill requests through [employer] or call your pharmacy and they will forward the refill request to us. Please allow 3 business days for your refill to be completed.          Additional Information About Your Visit        [employer] Information     [employer] gives you secure access to your electronic health record. If you see a primary care provider, you can also send messages to your care team and make appointments. If you have questions, please call your primary care clinic.  If you do not have a primary care provider, please call [phone] and they will assist you.        Care EveryWhere ID     This is your Care EveryWhere ID. This could be used by other organizations to access your [facility] medical records  [id number]        Your Vitals Were     Pulse Temperature Respirations Pulse Oximetry BMI (Body Mass Index)       102 98.9  F (37.2  C) (Oral) 16 94% 26.82 kg/m2        Blood Pressure from Last 3 Encounters:   11/07/17 125/70   10/31/17 132/75   10/26/17 126/78    Weight from Last 3 Encounters:   11/07/17 70.9 kg (156 lb 4.8 oz)   10/31/17 71.3 kg (157 lb 3.2 oz)   10/26/17 73.5 kg (162 lb)              Today, you had the  following     No orders found for display       Primary Care Provider Office Phone # Fax #    Radha Cazares [phone]       [address] [address]        Equal Access to Services     NOHEMYWILMER SAUL : Hadii aad ku hadbecko Soomaali, waaxda luqadaha, qaybta kaalmada adeegyada, waxck silvanoin haylukaszn adeanmol chester lanormakenny pickering. So [facility] [phone].    ATENCIÓN: Si habla español, tiene a blackman disposición servicios gratuitos de asistencia lingüística. Llame al [phone].    We comply with applicable federal civil rights laws and [place] laws. We do not discriminate on the basis of race, color, national origin, age, disability, sex, sexual orientation, or gender identity.            Thank you!     Thank you for choosing [facility]  for your care. Our goal is always to provide you with excellent care. Hearing back from our patients is one way we can continue to improve our services. Please take a few minutes to complete the written survey that you may receive in the mail after your visit with us. Thank you!             Your Updated Medication List - Protect others around you: Learn how to safely use, store and throw away your medicines at www.disposemymeds.org.          This list is accurate as of: 11/7/17  7:45 AM.  Always use your most recent med list.                   Brand Name Dispense Instructions for use Diagnosis    albuterol 108 (90 BASE) MCG/ACT Inhaler    PROAIR HFA/PROVENTIL HFA/VENTOLIN HFA    1 Inhaler    Inhale 2 puffs into the lungs every 6 hours as needed for shortness of breath / dyspnea    Chronic obstructive pulmonary disease, unspecified COPD type (H)       aspirin 81 MG tablet      Take 1 tablet by mouth daily.        citalopram 10 MG tablet    celeXA    90 tablet    Take 1 tablet (10 mg) by mouth daily    Anxiety, Depression, unspecified depression type       CO Q 10 PO      Take 1 tablet by mouth         darifenacin 7.5 MG 24 hr tablet    ENABLEX    90 tablet    Take 1 tablet (7.5 mg) by mouth daily    Overactive bladder       guaiFENesin 600 MG 12 hr tablet    MUCINEX    180 tablet    Take 2 tablets (1,200 mg) by mouth 2 times daily    Cough with sputum       hydrOXYzine 25 MG tablet    ATARAX    100 tablet    Take 1-2 tablets (25-50 mg) by mouth every 6 hours as needed for itching    Hives       lidocaine-prilocaine cream    EMLA    30 g    Please apply to port site 30 minutes before use prn    Personal history of malignant neoplasm of breast       * LORazepam 0.5 MG tablet    ATIVAN    10 tablet    Take 1-2 tabs 20 minutes prior to MRI scans.    Breast cancer (H), Anxiety       * LORazepam 0.5 MG tablet    ATIVAN    30 tablet    Take 1 tablet (0.5 mg) by mouth every 4 hours as needed (Anxiety, Nausea/Vomiting or Sleep)    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)       MULTIPLE VITAMIN PO           * order for DME      Respirfotobabbles Dream Station Auto CPAP 12-18 cm, F&P Simplus FFM small.    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)       simvastatin 40 MG tablet    ZOCOR    90 tablet    Take 1 tablet (40 mg) by mouth At Bedtime    Hyperlipidemia LDL goal <130       tiotropium 18 MCG capsule    SPIRIVA    1 capsule    Inhale 1 capsule (18 mcg) into the lungs daily Inhale contents of one capsule    Chronic obstructive pulmonary disease, unspecified COPD type (H)       VITAMIN B 12 PO      Take 100 mcg by mouth daily        VITAMIN B6 PO      Take 1 tablet by mouth daily.        vitamin D 1000 UNITS capsule      2 CAPSULE DAILY        Zinc Gluconate 100 MG Tabs     30 tablet    Take 1 tablet by mouth daily    Dysgeusia       * Notice:  This list has 4 medication(s) that are the same as other medications  prescribed for you. Read the directions carefully, and ask your doctor or other care provider to review them with you.

## 2017-11-07 NOTE — LETTER
11/7/2017      RE: Ashleigh Alonzo  [address]  [place] [address]       Hematology-Oncology Visit  Nov 7, 2017    Reason for Visit: follow-up breast cancer     HPI: Ashleigh Alonzo is a 57 year old female with past medical history of COPD, sleep apnea, periodontal disease with stage IIa, T2N0M0, grade 3, triple negative right breast cancer. She was diagnosed via abnormal screening mammogram. She ultimately had US, contrast-enhanced mammo, and biopsy showing 3.4 cm mass and pathology showed grade 3 invasive mammary carcinoma with associated high-grade DCIS. ER/IA was negative and HER2 negative. She enrolled in ISPY-2 clinical trial and began treatment with weekly taxol and once every 3 week pembrolizumab on 9/2/17. Please see notes from Dr. Bradshaw for further details of patient's oncology history.     Course has been complicated by fevers with PNA and then UTI. Week 7 was held due to transaminitis. She is here for deferred week 7 today.     Interval History: Ashleigh Perera is here with her  today. She is feeling better. No fevers since 10/29--which were low grade. Her energy has much improved. She has no concerns today beyond having some crusting sores beneath nose. Nose has been crusting as well. She is not exercising right now. She is pleased breast tumor clinically resolved last week. No mucositis, nausea, diarrhea, constipation, neuropathy, cough, SOB, CP, edema, abdominal pain. ROS otherwise negative.     Current Outpatient Prescriptions   Medication     Zinc Gluconate 100 MG TABS     order for DME     LORazepam (ATIVAN) 0.5 MG tablet     lidocaine-prilocaine (EMLA) cream     LORazepam (ATIVAN) 0.5 MG tablet     hydrOXYzine (ATARAX) 25 MG tablet     guaiFENesin (MUCINEX) 600 MG 12 hr tablet     simvastatin (ZOCOR) 40 MG tablet     tiotropium (SPIRIVA) 18 MCG capsule     albuterol (PROAIR HFA/PROVENTIL HFA/VENTOLIN HFA) 108 (90 BASE) MCG/ACT Inhaler     citalopram (CELEXA) 10 MG tablet     darifenacin  (ENABLEX) 7.5 MG 24 hr tablet     order for DME     Coenzyme Q10 (CO Q 10 PO)     MULTIPLE VITAMIN PO     Cyanocobalamin (VITAMIN B 12 PO)     Pyridoxine HCl (VITAMIN B6 PO)     aspirin 81 MG tablet     VITAMIN D 1000 UNIT OR CAPS     No current facility-administered medications for this visit.      Facility-Administered Medications Ordered in Other Visits   Medication     lidocaine 1 % 9 mL     sodium bicarbonate 8.4 % injection 1 mEq     lidocaine-EPINEPHrine 1.5 %[id number] injection 10 mL       PHYSICAL EXAM:  /70 (BP Location: Right arm, Cuff Size: Adult Regular)  Pulse 102  Temp 98.9  F (37.2  C) (Oral)  Resp 16  Wt 70.9 kg (156 lb 4.8 oz)  SpO2 94%  BMI 26.82 kg/m2  General: Alert, oriented, pleasant, NAD  Skin: No focal rash on exposed skin   HEENT: Normocephalic, atraumatic, PERRLA, EOMI. Moist mucus membranes, no lesions or thrush  Lungs: CTA bilaterally, normal work of breathing. Clear with coughing.   Cardiac: RRR, S1, S2, no murmurs  Abdomen: Soft, nontender, nondistended. Normoactive bowel sounds. No hepatosplenomegaly, masses  Extremities: No pedal edema    Labs:    11/7/2017 06:56   Sodium 136   Potassium 3.8   Chloride 102   Carbon Dioxide 23   Urea Nitrogen 6 (L)   Creatinine 0.58   GFR Estimate >90   GFR Estimate If Black >90   Calcium 8.9   Anion Gap 10   Albumin 2.9 (L)   Protein Total 6.9   Bilirubin Total 0.6   Alkaline Phosphatase 126   ALT 69 (H)   AST 64 (H)   TSH 2.31   Glucose 106 (H)   WBC 5.7   Hemoglobin 11.1 (L)   Hematocrit 33.6 (L)   Platelet Count 387   RBC Count 3.41 (L)    MCV 99   MCH 32.6   MCHC 33.0   RDW 14.0   Diff Method Automated Method   % Neutrophils 57.6   % Lymphocytes 18.1   % Monocytes 18.3   % Eosinophils 4.4   % Basophils 1.2   % Immature Granulocytes 0.4   Nucleated RBCs 0   Absolute Neutrophil 3.3   Absolute Lymphocytes 1.0   Absolute Monocytes 1.0   Absolute Eosinophils 0.3   Absolute Basophils 0.1   Abs Immature Granulocytes 0.0   Absolute Nucleated  RBC 0.0           Assessment & Plan:     1.Stage IIa, T2N0M0, grade 3, triple negative breast cancer: S/p 6 weeks of weekly Taxol and every 3 week pembro. Week 7 was held due to grade 2 transaminitis. She has been tolerating treatment well without other significant symptoms. Had a great partial response already to treatment after 3 weeks per breast MRI.  Plan for 12 weeks of Taxol and pembrolizumab followed by 4 cycles of AC and pembrolizumab. She will then proceed with surgery and potentially adjuvant radiation. She will be seen weekly on the clinical trial.      2. Transaminitis: LFTs today are trending down with ALT 69 and AST 64. Okay per study protocol to proceed with week 7. Monitor.     3. Nasolabial lesions: Apply triple antibiotic cream to sites.       Lilia Livingston PA-C    [facility]  [address] [place] [address]  [phone]

## 2017-11-07 NOTE — PROGRESS NOTES
Research visit: feeling better- last fever 10/29 afebrile since. Finished antibiotics for UTI and generally feels better. Appetite has improved, bowels back to normal for her, had been having diarrhea with the abx. Has some scabs under nostrils- healing and have been present only over this last week.   Labs improved from last week AST which was 164 last week is 64 today, ALT improved as well. Labs otherwise ok to proceed with treatment today- orders signed Pembro released. Checked in and left contact info with infusion.   All questions answered with verbalization of understanding by pt. F/u next week.   Steffany Nix RN    [phone]

## 2017-11-09 DIAGNOSIS — Z17.1 MALIGNANT NEOPLASM OF UPPER-INNER QUADRANT OF RIGHT BREAST IN FEMALE, ESTROGEN RECEPTOR NEGATIVE (H): Primary | ICD-10-CM

## 2017-11-09 DIAGNOSIS — C50.211 MALIGNANT NEOPLASM OF UPPER-INNER QUADRANT OF RIGHT BREAST IN FEMALE, ESTROGEN RECEPTOR NEGATIVE (H): Primary | ICD-10-CM

## 2017-11-09 DIAGNOSIS — Z00.6 EXAMINATION OF PARTICIPANT IN CLINICAL TRIAL: ICD-10-CM

## 2017-11-10 ENCOUNTER — OFFICE VISIT (OUTPATIENT)
Dept: ONCOLOGY | Facility: CLINIC | Age: 57
End: 2017-11-10
Attending: GENETIC COUNSELOR, MS
Payer: COMMERCIAL

## 2017-11-10 DIAGNOSIS — Z80.42 FAMILY HISTORY OF PROSTATE CANCER: ICD-10-CM

## 2017-11-10 DIAGNOSIS — Z17.1 MALIGNANT NEOPLASM OF RIGHT BREAST IN FEMALE, ESTROGEN RECEPTOR NEGATIVE, UNSPECIFIED SITE OF BREAST (H): Primary | ICD-10-CM

## 2017-11-10 DIAGNOSIS — C50.911 MALIGNANT NEOPLASM OF RIGHT BREAST IN FEMALE, ESTROGEN RECEPTOR NEGATIVE, UNSPECIFIED SITE OF BREAST (H): Primary | ICD-10-CM

## 2017-11-10 PROCEDURE — 96040 ZZH GENETIC COUNSELING, EACH 30 MINUTES: CPT | Mod: ZF | Performed by: GENETIC COUNSELOR, MS

## 2017-11-10 NOTE — MR AVS SNAPSHOT
After Visit Summary   11/10/2017    Ashleigh Alonzo    MRN: [id number]           Patient Information     Date Of Birth          1960        Visit Information        Provider Department      11/10/2017 1:00 PM Naty Segovia GC;  2 117 CONSULT [facility]        Care Instructions            Negative Genetic Test Result    Genetic Testing  You had a blood test that looked at the genetic information in one or more genes associated with increased cancer risk.  The testing looked for any harmful changes that would stop this particular gene from working like it should. If an individual does not have any harmful changes or variants of unknown significance found from their blood test, their genetic test result is reported as negative.       Results  The genetic test did not identify any pathogenic (harmful) changes in the genes that were tested. There are several possible explanations for a negative test result. Without knowing the gene mutation in your family, the cause of the cancer in you or your relatives is still unknown. Your genetic counselor can help interpret the result for you and your relatives. In this case, there are several reasons that may explain the negative test result:    There may be a gene mutation in the family that you did not inherit.     You may have a gene mutation in a different gene that was not included in the test, or has not yet been discovered.     The cancers in you or your family may be due to a combination of genetic factors and environment (multifactorial/familial).    The cancers in you or your family may be sporadic/random cancers.    There is very small chance that a mutation was not found by current testing methods.  As testing technology evolves over time, it may still be possible to identify a mutation in a gene that was not found on this test.    It is important to note which genes were included in your test. A list of these genes can be  found on your test result.    Screening Recommendations  Due to this negative test result, cancer screening recommendations should be based on your personal and family history. This may include increased cancer screening for you and/or your family members. Your genetic counselor and health care provider can help make appropriate recommendations.      Please call us if you have any questions or concerns.     Cancer Risk Management Program  [phone] ([phone]  ? Ashleigh Cannon, MS, [facility]  [phone]  ? Caren Kandis, MS, [facility] [place] [facility]  [phone]  ? Naty Luca, MS, [facility] [place] [facility]  [phone]  ? Aparna Dia, MS, [facility] [place]  [phone]  ? Anu Sevilla, MS, [facility] [place] [facility]  [phone]          Follow-ups after your visit        Your next 10 appointments already scheduled     Nov 14, 2017  6:30 AM CST   Masonic Lab Draw with  MASONIC LAB DRAW   [facility] Masonic Lab Draw ([facility])    [address] [place] [id number] [phone]            Nov 14, 2017  7:00 AM CST   (Arrive by 6:45 AM)   Return Visit with EDDIE Oliva   [facility])    [address] [place] [address]   [phone]            Nov 14, 2017  8:00 AM CST   Infusion 180 with UC ONCOLOGY INFUSION, UC 18 ATC   [facility] ([facility])    [address] [place] [address]   [phone]            Nov 21, 2017 11:15 AM CST   Masonic Lab Draw with UC MASONIC LAB DRAW   [facility] Masonic Lab Draw ([facility])    [address] [place] [address]   [phone]            Nov 21, 2017 12:00 PM CST   (Arrive by 11:45 AM)   Return Visit with Fara Bradshaw MD   [facility] ([facility])    [address] [place] [address]   [phone]             Nov 21, 2017  1:00 PM CST   Infusion 180 with UC ONCOLOGY INFUSION, UC 25 ATC   [facility] ([facility])    [address]  [address] [facility] [address] [phone]            Nov 28, 2017  6:30 AM CST   Masonic Lab Draw with UC MASONIC LAB DRAW   [facility] Lab Draw ([facility])    [address] [id number] [phone]            Nov 28, 2017  7:00 AM CST   (Arrive by 6:45 AM)   Return Visit with EDDIE Oliva   [facility] ([facility])    [address] [place] [id number] [phone]              Future tests that were ordered for you today     Open Future Orders        Priority Expected Expires Ordered    Echocardiogram Complete Routine 12/11/2017 11/9/2018 11/9/2017    MRI Breast Bilateral w/o & w Contrast Routine 12/11/2017 11/9/2018 11/9/2017            Who to contact     If you have questions or need follow up information about today's clinic visit or your schedule please contact [facility] directly at [phone].  Normal or non-critical lab and imaging results will be communicated to you by ShareMeisterhart, letter or phone within 4 business days after the clinic has received the results. If you do not hear from us within 7 days, please contact the clinic through ShareMeisterhart or phone. If you have a critical or abnormal lab result, we will notify you by phone as soon as possible.  Submit refill requests through StyleShare or call your pharmacy and they will forward the refill request to us. Please allow 3 business days for your refill to be completed.          Additional Information About Your Visit        StyleShare Information     StyleShare gives you secure access to your electronic health record. If you see a primary care provider, you can also send messages to your care team and make  appointments. If you have questions, please call your primary care clinic.  If you do not have a primary care provider, please call [phone] and they will assist you.        Care EveryWhere ID     This is your Care EveryWhere ID. This could be used by other organizations to access your [facility] medical records  [id number]         Blood Pressure from Last 3 Encounters:   11/07/17 125/70   10/31/17 132/75   10/26/17 126/78    Weight from Last 3 Encounters:   11/07/17 70.9 kg (156 lb 4.8 oz)   10/31/17 71.3 kg (157 lb 3.2 oz)   10/26/17 73.5 kg (162 lb)              Today, you had the following     No orders found for display       Primary Care Provider Office Phone # Fax #    Radha Cazares [phone]       [address] [address]        Equal Access to Services     THERESA HUGHES : Hadii aad ku hadasho Soomaali, waaxda luqadaha, qaybta kaalmada adeegyada, waxay idiin haylukaszn kevin duran . So [facility] [phone].    ATENCIÓN: Si georgila español, tiene a blackman disposición servicios gratuitos de asistencia lingüística. Llame al [phone].    We comply with applicable federal civil rights laws and [place] laws. We do not discriminate on the basis of race, color, national origin, age, disability, sex, sexual orientation, or gender identity.            Thank you!     Thank you for choosing [facility] [place]  for your care. Our goal is always to provide you with excellent care. Hearing back from our patients is one way we can continue to improve our services. Please take a few minutes to complete the written survey that you may receive in the mail after your visit with us. Thank you!             Your Updated Medication List - Protect others around you: Learn how to safely use, store and throw away your medicines at www.disposemymeds.org.          This list is accurate as of: 11/10/17  1:35 PM.  Always use your most recent med  list.                   Brand Name Dispense Instructions for use Diagnosis    albuterol 108 (90 BASE) MCG/ACT Inhaler    PROAIR HFA/PROVENTIL HFA/VENTOLIN HFA    1 Inhaler    Inhale 2 puffs into the lungs every 6 hours as needed for shortness of breath / dyspnea    Chronic obstructive pulmonary disease, unspecified COPD type (H)       aspirin 81 MG tablet      Take 1 tablet by mouth daily.        citalopram 10 MG tablet    celeXA    90 tablet    Take 1 tablet (10 mg) by mouth daily    Anxiety, Depression, unspecified depression type       CO Q 10 PO      Take 1 tablet by mouth        darifenacin 7.5 MG 24 hr tablet    ENABLEX    90 tablet    Take 1 tablet (7.5 mg) by mouth daily    Overactive bladder       guaiFENesin 600 MG 12 hr tablet    MUCINEX    180 tablet    Take 2 tablets (1,200 mg) by mouth 2 times daily    Cough with sputum       hydrOXYzine 25 MG tablet    ATARAX    100 tablet    Take 1-2 tablets (25-50 mg) by mouth every 6 hours as needed for itching    Hives       lidocaine-prilocaine cream    EMLA    30 g    Please apply to port site 30 minutes before use prn    Personal history of malignant neoplasm of breast       * LORazepam 0.5 MG tablet    ATIVAN    10 tablet    Take 1-2 tabs 20 minutes prior to MRI scans.    Breast cancer (H), Anxiety       * LORazepam 0.5 MG tablet    ATIVAN    30 tablet    Take 1 tablet (0.5 mg) by mouth every 4 hours as needed (Anxiety, Nausea/Vomiting or Sleep)    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)       MULTIPLE VITAMIN PO           * [place] [facility]      Respir[employer]ia [employer] Dream Station Auto CPAP 12-18 cm, F&P Simplus FFM small.    MERLIN (obstructive sleep apnea)       * order [place] [facility]     1 Device    Cranial prosthesis    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)       simvastatin 40 MG tablet    ZOCOR    90 tablet    Take 1 tablet (40 mg) by mouth At Bedtime     Hyperlipidemia LDL goal <130       tiotropium 18 MCG capsule    SPIRIVA    1 capsule    Inhale 1 capsule (18 mcg) into the lungs daily Inhale contents of one capsule    Chronic obstructive pulmonary disease, unspecified COPD type (H)       VITAMIN B 12 PO      Take 100 mcg by mouth daily        VITAMIN B6 PO      Take 1 tablet by mouth daily.        vitamin D 1000 UNITS capsule      2 CAPSULE DAILY        Zinc Gluconate 100 MG Tabs     30 tablet    Take 1 tablet by mouth daily    Dysgeusia       * Notice:  This list has 4 medication(s) that are the same as other medications prescribed for you. Read the directions carefully, and ask your doctor or other care provider to review them with you.

## 2017-11-10 NOTE — PROGRESS NOTES
"11/10/2017    Referring Provider: Fara Bradshaw MD    Presenting Information:  Ashleigh Alonzo returned to the Cancer Risk Management Program at the [facility] to discuss her genetic testing results. Her blood was drawn on 10/10/2017. BRCA1/2 Analyses with the BreastNext panel was ordered from [employer]. This testing was done because of her personal history of breast cancer and family history of prostate cancer.    Genetic Testing Result: NEGATIVE  Ashleigh Perera is negative for mutations in the DASHA, BARD1, BRCA1, BRCA2, BRIP1, CDH1, CHEK2, MRE11A, MUTYH, NBN, NF1, PALB2, PTEN, RAD50, RAD51C, RAD51D, and TP53 genes by sequencing and deletion/duplication analysis. No mutations were found in any of the 17 genes analyzed.    She does not have an identifiable mutation associated with Hereditary Breast and Ovarian Cancer syndrome (BRCA1, BRCA2), Li Fraumeni syndrome (TP53), Hereditary Diffuse Gastric Cancer (CDH1), Cowden syndrome (PTEN), Neurofibromatosis type 1 (NF1) or MUTYH Associated Polyposis (MUTYH).    A copy of the test report can be found in the Laboratory tab, dated 10/10/2017, and named \"SEND OUTS MISC TEST\". The report is scanned in as a linked document.    Interpretation:  We discussed several different interpretations of this negative test result.    1. One explanation may be that there is a different currently unidentifiable gene or combination of genes and environment that are associated with the cancers in Ashleigh Perera and/or her relatives. If that is the case, additional genetic testing may be available in the future that can identify a genetic explanation for Ashleigh Perera's breast cancer. As such, she is encouraged to contact me annually to review any new genetic testing options that may be appropriate for her.    2. It is possible that her breast cancer may be a sporadic cancer caused by random cellular changes.  3. There is also a small possibility that there is a mutation in one of these genes and " we could not find it with our current testing methods.       Screening:  Based on this negative test result, it is important for Ashleigh Perera and her relatives to refer back to the family history for appropriate cancer screening.      Ashleigh Perera should continue to follow all breast cancer treatment and future screening recommendations as made by her medical providers.    Ashleigh Perera s close female relatives remain at increased risk for breast cancer given their family history. Breast cancer screening is generally recommended to begin approximately 10 years younger than the earliest age of breast cancer diagnosis in the family, or at age 40, whichever comes first. In this family, screening may begin at age 40. Breast screening options should be discussed with an individual's primary care provider and a genetic counselor, to determine what screening is appropriate, or if additional screening (such as breast MRI) is necessary based on personal/family history factors.    Other population cancer screening options, such as those recommended by the American Cancer Society and the National Comprehensive Cancer Network (NCCN), are also appropriate for Ashleigh Perera and her family. These screening recommendations may change if there are changes to Ashleigh Perera's personal and/or family history. Final screening recommendations should be made by each individual's managing physician.      Inheritance:  We reviewed the autosomal dominant inheritance of mutations in these 17 genes. We discussed that Ashleigh Perera did not pass on an identifiable mutation in these genes to her children based on this test result. Mutations in these genes do not skip generations.      Additional Testing Considerations:  Although Ashleigh Perera's genetic testing result was negative, other relatives may still carry a gene mutation associated with hereditary cancer. Genetic counseling is recommended for Ashleigh Perera's daughters to discuss any additional genetic testing options that may be  appropriate for them, given their reported paternal family history of a BRCA mutation. It is currently unclear if their negative BRCA1 and BRCA2 testing implies that do not carry a paternally-inherited BRCA1 or BRCA2 mutation, or if additional genetic testing using a multi-gene panel is appropriate. In the event Ashleigh Perera's daughters do seek genetic counseling, she signed a release of information form allowing her daughters access to her family history and genetic testing results.    Summary:  We do not have an explanation for Ashleigh Perera's breast cancer. Because of that, it is important that she continue with cancer screening based on her personal and family history as discussed above.    Genetic testing is rapidly advancing, and new cancer susceptibility genes will most likely be identified in the future. Therefore, I encouraged Ashleigh Perera to contact me annually or if there are changes in her personal or family history. This may change how we assess her cancer risk, screening, and the testing we would offer.    Plan:  1.  I provided Ashleigh Perera with a copy of her test results today and a handout summarizing negative genetic test results (see after visit summary).  2. She plans to follow-up with her medical providers, including Dr. Bradshaw.  3. She should contact me annually, or sooner if her family history changes.    If Ashleigh Perera has any further questions, I encouraged her to contact me at [phone].    Time spent face to face: 25 minutes    Naty Segovia MS, [facility] [place]  Certified Genetic Counselor  Office: [phone]  Pager: [phone]

## 2017-11-10 NOTE — LETTER
"11/10/2017       RE: Ashleigh Alonzo  [address] [place] [address]     Dear Colleague,    Thank you for referring your patient, Ashleigh Alonzo, to the [facility]. Please see a copy of my visit note below.    11/10/2017    Referring Provider: Fara Bradshaw MD    Presenting Information:  Ashleigh Alonzo returned to the Cancer Risk Management Program at the [facility] to discuss her genetic testing results. Her blood was drawn on 10/10/2017. BRCA1/2 Analyses with the BreastNext panel was ordered from [employer]. This testing was done because of her personal history of breast cancer and family history of prostate cancer.    Genetic Testing Result: NEGATIVE  Ashleigh Perera is negative for mutations in the DASHA, BARD1, BRCA1, BRCA2, BRIP1, CDH1, CHEK2, MRE11A, MUTYH, NBN, NF1, PALB2, PTEN, RAD50, RAD51C, RAD51D, and TP53 genes by sequencing and deletion/duplication analysis. No mutations were found in any of the 17 genes analyzed.    She does not have an identifiable mutation associated with Hereditary Breast and Ovarian Cancer syndrome (BRCA1, BRCA2), Li Fraumeni syndrome (TP53), Hereditary Diffuse Gastric Cancer (CDH1), Cowden syndrome (PTEN), Neurofibromatosis type 1 (NF1) or MUTYH Associated Polyposis (MUTYH).    A copy of the test report can be found in the Laboratory tab, dated 10/10/2017, and named \"[facility]". The report is scanned in as a linked document.    Interpretation:  We discussed several different interpretations of this negative test result.    1. One explanation may be that there is a different currently unidentifiable gene or combination of genes and environment that are associated with the cancers in Ashleigh Perera and/or her relatives. If that is the case, additional genetic testing may be available in the future that can identify a genetic explanation for Ashleigh Perera's breast cancer. As such, she is encouraged to contact me annually to review any new genetic " testing options that may be appropriate for her.    2. It is possible that her breast cancer may be a sporadic cancer caused by random cellular changes.  3. There is also a small possibility that there is a mutation in one of these genes and we could not find it with our current testing methods.       Screening:  Based on this negative test result, it is important for Ashleigh Perera and her relatives to refer back to the family history for appropriate cancer screening.      Ashleigh Perera should continue to follow all breast cancer treatment and future screening recommendations as made by her medical providers.    Ashleigh Perera s close female relatives remain at increased risk for breast cancer given their family history. Breast cancer screening is generally recommended to begin approximately 10 years younger than the earliest age of breast cancer diagnosis in the family, or at age 40, whichever comes first. In this family, screening may begin at age 40. Breast screening options should be discussed with an individual's primary care provider and a genetic counselor, to determine what screening is appropriate, or if additional screening (such as breast MRI) is necessary based on personal/family history factors.    Other population cancer screening options, such as those recommended by the American Cancer Society and the National Comprehensive Cancer Network (NCCN), are also appropriate for Ashleigh Perera and her family. These screening recommendations may change if there are changes to Ashleigh Perera's personal and/or family history. Final screening recommendations should be made by each individual's managing physician.      Inheritance:  We reviewed the autosomal dominant inheritance of mutations in these 17 genes. We discussed that Ashleigh Perera did not pass on an identifiable mutation in these genes to her children based on this test result. Mutations in these genes do not skip generations.      Additional Testing Considerations:  Although Ashleigh Perera's  genetic testing result was negative, other relatives may still carry a gene mutation associated with hereditary cancer. Genetic counseling is recommended for Ashleigh Perera's daughters to discuss any additional genetic testing options that may be appropriate for them, given their reported paternal family history of a BRCA mutation. It is currently unclear if their negative BRCA1 and BRCA2 testing implies that do not carry a paternally-inherited BRCA1 or BRCA2 mutation, or if additional genetic testing using a multi-gene panel is appropriate. In the event Ashleigh Perera's daughters do seek genetic counseling, she signed a release of information form allowing her daughters access to her family history and genetic testing results.    Summary:  We do not have an explanation for Ashleigh Perera's breast cancer. Because of that, it is important that she continue with cancer screening based on her personal and family history as discussed above.    Genetic testing is rapidly advancing, and new cancer susceptibility genes will most likely be identified in the future. Therefore, I encouraged Ashleigh Perera to contact me annually or if there are changes in her personal or family history. This may change how we assess her cancer risk, screening, and the testing we would offer.    Plan:  1.  I provided Ashleigh Perera with a copy of her test results today and a handout summarizing negative genetic test results (see after visit summary).  2. She plans to follow-up with her medical providers, including Dr. Bradshaw.  3. She should contact me annually, or sooner if her family history changes.    If Ashleigh Perera has any further questions, I encouraged her to contact me at [phone].    Time spent face to face: 25 minutes    Naty Segovia MS, [facility]  Certified Genetic Counselor  Office: [phone]  Pager: [phone]

## 2017-11-10 NOTE — PATIENT INSTRUCTIONS
Negative Genetic Test Result    Genetic Testing  You had a blood test that looked at the genetic information in one or more genes associated with increased cancer risk.  The testing looked for any harmful changes that would stop this particular gene from working like it should. If an individual does not have any harmful changes or variants of unknown significance found from their blood test, their genetic test result is reported as negative.       Results  The genetic test did not identify any pathogenic (harmful) changes in the genes that were tested. There are several possible explanations for a negative test result. Without knowing the gene mutation in your family, the cause of the cancer in you or your relatives is still unknown. Your genetic counselor can help interpret the result for you and your relatives. In this case, there are several reasons that may explain the negative test result:    There may be a gene mutation in the family that you did not inherit.     You may have a gene mutation in a different gene that was not included in the test, or has not yet been discovered.     The cancers in you or your family may be due to a combination of genetic factors and environment (multifactorial/familial).    The cancers in you or your family may be sporadic/random cancers.    There is very small chance that a mutation was not found by current testing methods.  As testing technology evolves over time, it may still be possible to identify a mutation in a gene that was not found on this test.    It is important to note which genes were included in your test. A list of these genes can be found on your test result.    Screening Recommendations  Due to this negative test result, cancer screening recommendations should be based on your personal and family history. This may include increased cancer screening for you and/or your family members. Your genetic counselor and health care provider can help make  appropriate recommendations.      Please call us if you have any questions or concerns.     Cancer Risk Management Program  [phone]-CANCER ([phone])  ? Ashleigh Cannon, MS, [facility]  [phone]  ? Caren Marquis, MS, [facility]  [phone]  ? Naty Segovia, MS, [facility]  [phone]  ? Aparna Dia, MS, [facility]  [phone]  ? Anu Sevilla, MS, [facility]  [phone]

## 2017-11-10 NOTE — LETTER
"    Cancer Risk Management  Program Locations    [facility]  [facility]  Mailing Address  Cancer Risk Management Program  [facility]  [address]  [place] [address]    New patient appointments  [phone]  November 13, 2017    Ashleigh Alonzo  [address]  [place] [address]      Dear Ashleigh Perera,    It was a pleasure meeting with you again at the [facility] on November 10, 2017. Here is a copy of the progress note from your recent genetic counseling visit to the Cancer Risk Management Program. If you have any additional questions, please feel free to call.    11/10/2017    Referring Provider: Fara Bradshaw MD    Presenting Information:  Ashleigh Alonzo returned to the Cancer Risk Management Program at the [facility] to discuss her genetic testing results. Her blood was drawn on 10/10/2017. BRCA1/2 Analyses with the BreastNext panel was ordered from [facility]. This testing was done because of her personal history of breast cancer and family history of prostate cancer.    Genetic Testing Result: NEGATIVE  Ashleigh Perera is negative for mutations in the DASHA, BARD1, BRCA1, BRCA2, BRIP1, CDH1, CHEK2, MRE11A, MUTYH, NBN, NF1, PALB2, PTEN, RAD50, RAD51C, RAD51D, and TP53 genes by sequencing and deletion/duplication analysis. No mutations were found in any of the 17 genes analyzed.    She does not have an identifiable mutation associated with Hereditary Breast and Ovarian Cancer syndrome (BRCA1, BRCA2), Li Fraumeni syndrome (TP53), Hereditary Diffuse Gastric Cancer (CDH1), Cowden syndrome (PTEN), Neurofibromatosis type 1 (NF1) or MUTYH Associated Polyposis (MUTYH).    A copy of the test report can be found in the Laboratory tab, dated 10/10/2017, and named \"SEND OUTS MISC TEST\". The report is " scanned in as a linked document.    Interpretation:  We discussed several different interpretations of this negative test result.    1. One explanation may be that there is a different currently unidentifiable gene or combination of genes and environment that are associated with the cancers in Ashleigh Perera and/or her relatives. If that is the case, additional genetic testing may be available in the future that can identify a genetic explanation for Ashleigh Perera's breast cancer. As such, she is encouraged to contact me annually to review any new genetic testing options that may be appropriate for her.    2. It is possible that her breast cancer may be a sporadic cancer caused by random cellular changes.  3. There is also a small possibility that there is a mutation in one of these genes and we could not find it with our current testing methods.        Screening:  Based on this negative test result, it is important for Ashleigh Perera and her relatives to refer back to the family history for appropriate cancer screening.      Ashleigh Perera should continue to follow all breast cancer treatment and future screening recommendations as made by her medical providers.    Ashleigh Perera s close female relatives remain at increased risk for breast cancer given their family history. Breast cancer screening is generally recommended to begin approximately 10 years younger than the earliest age of breast cancer diagnosis in the family, or at age 40, whichever comes first. In this family, screening may begin at age 40. Breast screening options should be discussed with an individual's primary care provider and a genetic counselor, to determine what screening is appropriate, or if additional screening (such as breast MRI) is necessary based on personal/family history factors.    Other population cancer screening options, such as those recommended by the American Cancer Society and the National Comprehensive Cancer Network (NCCN), are also appropriate for Ashleigh Perera  and her family. These screening recommendations may change if there are changes to Ashleigh Perera's personal and/or family history. Final screening recommendations should be made by each individual's managing physician.      Inheritance:  We reviewed the autosomal dominant inheritance of mutations in these 17 genes. We discussed that Ashleigh Perera did not pass on an identifiable mutation in these genes to her children based on this test result. Mutations in these genes do not skip generations.      Additional Testing Considerations:  Although Ashleigh Perera's genetic testing result was negative, other relatives may still carry a gene mutation associated with hereditary cancer. Genetic counseling is recommended for Ashleigh Perera's daughters to discuss any additional genetic testing options that may be appropriate for them, given their reported paternal family history of a BRCA mutation. It is currently unclear if their negative BRCA1 and BRCA2 testing implies that do not carry a paternally-inherited BRCA1 or BRCA2 mutation, or if additional genetic testing using a multi-gene panel is appropriate. In the event Ashleigh Perera's daughters do seek genetic counseling, she signed a release of information form allowing her daughters access to her family history and genetic testing results.    Summary:  We do not have an explanation for Ashleigh Perera's breast cancer. Because of that, it is important that she continue with cancer screening based on her personal and family history as discussed above.     Genetic testing is rapidly advancing, and new cancer susceptibility genes will most likely be identified in the future. Therefore, I encouraged Ashleigh Perera to contact me annually or if there are changes in her personal or family history. This may change how we assess her cancer risk, screening, and the testing we would offer.    Plan:  1.  I provided Ashleigh Perera with a copy of her test results today and a handout summarizing negative genetic test results (see after visit  summary).  2. She plans to follow-up with her medical providers, including Dr. Bradshaw.  3. She should contact me annually, or sooner if her family history changes.    If Ashleigh Perera has any further questions, I encouraged her to contact me at [phone].    Time spent face to face: 25 minutes    Naty Segovia MS, [facility]  Certified Genetic Counselor  Office: [phone]  Pager: [phone]

## 2017-11-11 ENCOUNTER — APPOINTMENT (OUTPATIENT)
Dept: GENERAL RADIOLOGY | Facility: CLINIC | Age: 57
DRG: 193 | End: 2017-11-11
Attending: EMERGENCY MEDICINE
Payer: COMMERCIAL

## 2017-11-11 ENCOUNTER — NURSE TRIAGE (OUTPATIENT)
Dept: NURSING | Facility: CLINIC | Age: 57
End: 2017-11-11

## 2017-11-11 ENCOUNTER — TELEPHONE (OUTPATIENT)
Dept: OTHER | Facility: CLINIC | Age: 57
End: 2017-11-11

## 2017-11-11 ENCOUNTER — HOSPITAL ENCOUNTER (INPATIENT)
Facility: CLINIC | Age: 57
LOS: 4 days | Discharge: HOME | DRG: 193 | End: 2017-11-15
Attending: EMERGENCY MEDICINE | Admitting: INTERNAL MEDICINE
Payer: COMMERCIAL

## 2017-11-11 DIAGNOSIS — C50.211 MALIGNANT NEOPLASM OF UPPER-INNER QUADRANT OF RIGHT BREAST IN FEMALE, ESTROGEN RECEPTOR NEGATIVE (H): Primary | ICD-10-CM

## 2017-11-11 DIAGNOSIS — Z17.1 MALIGNANT NEOPLASM OF UPPER-INNER QUADRANT OF RIGHT BREAST IN FEMALE, ESTROGEN RECEPTOR NEGATIVE (H): Primary | ICD-10-CM

## 2017-11-11 DIAGNOSIS — J18.9 PNEUMONIA DUE TO INFECTIOUS ORGANISM, UNSPECIFIED LATERALITY, UNSPECIFIED PART OF LUNG: ICD-10-CM

## 2017-11-11 DIAGNOSIS — A41.9 SEPSIS, DUE TO UNSPECIFIED ORGANISM: ICD-10-CM

## 2017-11-11 LAB
ALBUMIN SERPL-MCNC: 2.6 G/DL (ref 3.4–5)
ALBUMIN UR-MCNC: 10 MG/DL
ALP SERPL-CCNC: 130 U/L (ref 40–150)
ALT SERPL W P-5'-P-CCNC: 53 U/L (ref 0–50)
ANION GAP SERPL CALCULATED.3IONS-SCNC: 8 MMOL/L (ref 3–14)
APPEARANCE UR: CLEAR
AST SERPL W P-5'-P-CCNC: 68 U/L (ref 0–45)
BASOPHILS # BLD AUTO: 0 10E9/L (ref 0–0.2)
BASOPHILS NFR BLD AUTO: 0.3 %
BILIRUB SERPL-MCNC: 0.7 MG/DL (ref 0.2–1.3)
BILIRUB UR QL STRIP: NEGATIVE
BUN SERPL-MCNC: 8 MG/DL (ref 7–30)
CALCIUM SERPL-MCNC: 8.5 MG/DL (ref 8.5–10.1)
CHLORIDE SERPL-SCNC: 102 MMOL/L (ref 94–109)
CO2 SERPL-SCNC: 24 MMOL/L (ref 20–32)
COLOR UR AUTO: YELLOW
CREAT SERPL-MCNC: 0.66 MG/DL (ref 0.52–1.04)
DIFFERENTIAL METHOD BLD: ABNORMAL
EOSINOPHIL # BLD AUTO: 0 10E9/L (ref 0–0.7)
EOSINOPHIL NFR BLD AUTO: 0.3 %
ERYTHROCYTE [DISTWIDTH] IN BLOOD BY AUTOMATED COUNT: 13.9 % (ref 10–15)
GFR SERPL CREATININE-BSD FRML MDRD: >90 ML/MIN/1.7M2
GLUCOSE SERPL-MCNC: 137 MG/DL (ref 70–99)
GLUCOSE UR STRIP-MCNC: NEGATIVE MG/DL
HCT VFR BLD AUTO: 33.2 % (ref 35–47)
HGB BLD-MCNC: 10.9 G/DL (ref 11.7–15.7)
HGB UR QL STRIP: NEGATIVE
IMM GRANULOCYTES # BLD: 0 10E9/L (ref 0–0.4)
IMM GRANULOCYTES NFR BLD: 0.3 %
KETONES UR STRIP-MCNC: NEGATIVE MG/DL
LACTATE BLD-SCNC: 1 MMOL/L (ref 0.7–2)
LACTATE BLD-SCNC: 2.2 MMOL/L (ref 0.7–2)
LEUKOCYTE ESTERASE UR QL STRIP: ABNORMAL
LYMPHOCYTES # BLD AUTO: 0.6 10E9/L (ref 0.8–5.3)
LYMPHOCYTES NFR BLD AUTO: 7.7 %
MCH RBC QN AUTO: 32.7 PG (ref 26.5–33)
MCHC RBC AUTO-ENTMCNC: 32.8 G/DL (ref 31.5–36.5)
MCV RBC AUTO: 100 FL (ref 78–100)
MONOCYTES # BLD AUTO: 0.2 10E9/L (ref 0–1.3)
MONOCYTES NFR BLD AUTO: 2.5 %
MUCOUS THREADS #/AREA URNS LPF: PRESENT /LPF
NEUTROPHILS # BLD AUTO: 6.4 10E9/L (ref 1.6–8.3)
NEUTROPHILS NFR BLD AUTO: 88.9 %
NITRATE UR QL: NEGATIVE
NRBC # BLD AUTO: 0 10*3/UL
NRBC BLD AUTO-RTO: 0 /100
PH UR STRIP: 6 PH (ref 5–7)
PLATELET # BLD AUTO: 371 10E9/L (ref 150–450)
POTASSIUM SERPL-SCNC: 3.9 MMOL/L (ref 3.4–5.3)
PROCALCITONIN SERPL-MCNC: 1.06 NG/ML
PROT SERPL-MCNC: 7.3 G/DL (ref 6.8–8.8)
RBC # BLD AUTO: 3.33 10E12/L (ref 3.8–5.2)
RBC #/AREA URNS AUTO: 4 /HPF (ref 0–2)
SODIUM SERPL-SCNC: 134 MMOL/L (ref 133–144)
SOURCE: ABNORMAL
SP GR UR STRIP: 1.01 (ref 1–1.03)
SQUAMOUS #/AREA URNS AUTO: <1 /HPF (ref 0–1)
TRANS CELLS #/AREA URNS HPF: <1 /HPF (ref 0–1)
UROBILINOGEN UR STRIP-MCNC: NORMAL MG/DL (ref 0–2)
WBC # BLD AUTO: 7.2 10E9/L (ref 4–11)
WBC #/AREA URNS AUTO: 18 /HPF (ref 0–2)

## 2017-11-11 PROCEDURE — 83605 ASSAY OF LACTIC ACID: CPT | Performed by: EMERGENCY MEDICINE

## 2017-11-11 PROCEDURE — 12000008 ZZH R&B INTERMEDIATE UMMC

## 2017-11-11 PROCEDURE — 87086 URINE CULTURE/COLONY COUNT: CPT | Performed by: EMERGENCY MEDICINE

## 2017-11-11 PROCEDURE — 71020 XR CHEST 2 VW: CPT

## 2017-11-11 PROCEDURE — 96365 THER/PROPH/DIAG IV INF INIT: CPT | Performed by: EMERGENCY MEDICINE

## 2017-11-11 PROCEDURE — 99285 EMERGENCY DEPT VISIT HI MDM: CPT | Mod: 25 | Performed by: EMERGENCY MEDICINE

## 2017-11-11 PROCEDURE — 96367 TX/PROPH/DG ADDL SEQ IV INF: CPT | Performed by: EMERGENCY MEDICINE

## 2017-11-11 PROCEDURE — 25000128 H RX IP 250 OP 636: Performed by: EMERGENCY MEDICINE

## 2017-11-11 PROCEDURE — 80053 COMPREHEN METABOLIC PANEL: CPT | Performed by: EMERGENCY MEDICINE

## 2017-11-11 PROCEDURE — 25000132 ZZH RX MED GY IP 250 OP 250 PS 637: Performed by: INTERNAL MEDICINE

## 2017-11-11 PROCEDURE — 99222 1ST HOSP IP/OBS MODERATE 55: CPT | Mod: AI | Performed by: INTERNAL MEDICINE

## 2017-11-11 PROCEDURE — 81001 URINALYSIS AUTO W/SCOPE: CPT | Performed by: EMERGENCY MEDICINE

## 2017-11-11 PROCEDURE — 25000125 ZZHC RX 250

## 2017-11-11 PROCEDURE — 84145 PROCALCITONIN (PCT): CPT | Performed by: EMERGENCY MEDICINE

## 2017-11-11 PROCEDURE — 87040 BLOOD CULTURE FOR BACTERIA: CPT | Performed by: EMERGENCY MEDICINE

## 2017-11-11 PROCEDURE — 85025 COMPLETE CBC W/AUTO DIFF WBC: CPT | Performed by: EMERGENCY MEDICINE

## 2017-11-11 PROCEDURE — 99285 EMERGENCY DEPT VISIT HI MDM: CPT | Mod: Z6 | Performed by: EMERGENCY MEDICINE

## 2017-11-11 RX ORDER — LEVOFLOXACIN 5 MG/ML
750 INJECTION, SOLUTION INTRAVENOUS ONCE
Status: COMPLETED | OUTPATIENT
Start: 2017-11-11 | End: 2017-11-11

## 2017-11-11 RX ORDER — ACETAMINOPHEN 325 MG/1
650 TABLET ORAL EVERY 4 HOURS PRN
Status: DISCONTINUED | OUTPATIENT
Start: 2017-11-11 | End: 2017-11-15 | Stop reason: HOSPADM

## 2017-11-11 RX ORDER — LIDOCAINE 40 MG/G
CREAM TOPICAL ONCE
Status: COMPLETED | OUTPATIENT
Start: 2017-11-11 | End: 2017-11-11

## 2017-11-11 RX ORDER — CITALOPRAM HYDROBROMIDE 10 MG/1
10 TABLET ORAL DAILY
Status: DISCONTINUED | OUTPATIENT
Start: 2017-11-12 | End: 2017-11-15 | Stop reason: HOSPADM

## 2017-11-11 RX ORDER — ALBUTEROL SULFATE 90 UG/1
2 AEROSOL, METERED RESPIRATORY (INHALATION) EVERY 6 HOURS PRN
Status: DISCONTINUED | OUTPATIENT
Start: 2017-11-11 | End: 2017-11-15 | Stop reason: HOSPADM

## 2017-11-11 RX ORDER — PIPERACILLIN SODIUM, TAZOBACTAM SODIUM 4; .5 G/20ML; G/20ML
4.5 INJECTION, POWDER, LYOPHILIZED, FOR SOLUTION INTRAVENOUS EVERY 6 HOURS
Status: DISCONTINUED | OUTPATIENT
Start: 2017-11-12 | End: 2017-11-15 | Stop reason: CLARIF

## 2017-11-11 RX ORDER — LORAZEPAM 0.5 MG/1
0.5 TABLET ORAL EVERY 4 HOURS PRN
Status: DISCONTINUED | OUTPATIENT
Start: 2017-11-11 | End: 2017-11-15 | Stop reason: HOSPADM

## 2017-11-11 RX ORDER — LIDOCAINE/PRILOCAINE 2.5 %-2.5%
1 CREAM (GRAM) TOPICAL ONCE
Status: DISCONTINUED | OUTPATIENT
Start: 2017-11-11 | End: 2017-11-11 | Stop reason: CLARIF

## 2017-11-11 RX ORDER — ASPIRIN 81 MG/1
81 TABLET ORAL DAILY
Status: DISCONTINUED | OUTPATIENT
Start: 2017-11-12 | End: 2017-11-15 | Stop reason: HOSPADM

## 2017-11-11 RX ORDER — LEVOFLOXACIN 5 MG/ML
750 INJECTION, SOLUTION INTRAVENOUS EVERY 24 HOURS
Status: DISCONTINUED | OUTPATIENT
Start: 2017-11-12 | End: 2017-11-12

## 2017-11-11 RX ORDER — PIPERACILLIN SODIUM, TAZOBACTAM SODIUM 4; .5 G/20ML; G/20ML
4.5 INJECTION, POWDER, LYOPHILIZED, FOR SOLUTION INTRAVENOUS ONCE
Status: COMPLETED | OUTPATIENT
Start: 2017-11-11 | End: 2017-11-11

## 2017-11-11 RX ORDER — LIDOCAINE 40 MG/G
CREAM TOPICAL
Status: COMPLETED
Start: 2017-11-11 | End: 2017-11-11

## 2017-11-11 RX ORDER — SIMVASTATIN 40 MG
40 TABLET ORAL AT BEDTIME
Status: DISCONTINUED | OUTPATIENT
Start: 2017-11-11 | End: 2017-11-15 | Stop reason: HOSPADM

## 2017-11-11 RX ORDER — GUAIFENESIN 600 MG/1
1200 TABLET, EXTENDED RELEASE ORAL 2 TIMES DAILY
Status: DISCONTINUED | OUTPATIENT
Start: 2017-11-11 | End: 2017-11-15 | Stop reason: HOSPADM

## 2017-11-11 RX ORDER — HYDROXYZINE HYDROCHLORIDE 25 MG/1
25-50 TABLET, FILM COATED ORAL EVERY 6 HOURS PRN
Status: DISCONTINUED | OUTPATIENT
Start: 2017-11-11 | End: 2017-11-15 | Stop reason: HOSPADM

## 2017-11-11 RX ORDER — SODIUM CHLORIDE, SODIUM LACTATE, POTASSIUM CHLORIDE, CALCIUM CHLORIDE 600; 310; 30; 20 MG/100ML; MG/100ML; MG/100ML; MG/100ML
INJECTION, SOLUTION INTRAVENOUS CONTINUOUS
Status: DISCONTINUED | OUTPATIENT
Start: 2017-11-11 | End: 2017-11-12

## 2017-11-11 RX ADMIN — PIPERACILLIN AND TAZOBACTAM 4.5 G: 4; .5 INJECTION, POWDER, LYOPHILIZED, FOR SOLUTION INTRAVENOUS; PARENTERAL at 18:31

## 2017-11-11 RX ADMIN — SODIUM CHLORIDE, POTASSIUM CHLORIDE, SODIUM LACTATE AND CALCIUM CHLORIDE: 600; 310; 30; 20 INJECTION, SOLUTION INTRAVENOUS at 21:19

## 2017-11-11 RX ADMIN — ACETAMINOPHEN 650 MG: 325 TABLET, FILM COATED ORAL at 23:41

## 2017-11-11 RX ADMIN — SODIUM CHLORIDE, POTASSIUM CHLORIDE, SODIUM LACTATE AND CALCIUM CHLORIDE 1000 ML: 600; 310; 30; 20 INJECTION, SOLUTION INTRAVENOUS at 19:43

## 2017-11-11 RX ADMIN — GUAIFENESIN 1200 MG: 600 TABLET, EXTENDED RELEASE ORAL at 21:27

## 2017-11-11 RX ADMIN — SODIUM CHLORIDE, POTASSIUM CHLORIDE, SODIUM LACTATE AND CALCIUM CHLORIDE 1000 ML: 600; 310; 30; 20 INJECTION, SOLUTION INTRAVENOUS at 17:26

## 2017-11-11 RX ADMIN — LIDOCAINE: 40 CREAM TOPICAL at 16:39

## 2017-11-11 RX ADMIN — LEVOFLOXACIN 750 MG: 5 INJECTION, SOLUTION INTRAVENOUS at 19:42

## 2017-11-11 RX ADMIN — SIMVASTATIN 40 MG: 40 TABLET, FILM COATED ORAL at 22:09

## 2017-11-11 ASSESSMENT — ENCOUNTER SYMPTOMS
AGITATION: 0
SORE THROAT: 0
DIFFICULTY URINATING: 0
CHILLS: 1
ADENOPATHY: 0
COUGH: 1
VOMITING: 0
FEVER: 1
LIGHT-HEADEDNESS: 0
POLYDIPSIA: 0
NAUSEA: 0
NECK STIFFNESS: 0
NECK PAIN: 0
BACK PAIN: 0
SHORTNESS OF BREATH: 0
COLOR CHANGE: 0
ABDOMINAL PAIN: 0

## 2017-11-11 ASSESSMENT — ACTIVITIES OF DAILY LIVING (ADL)
RETIRED_EATING: 0-->INDEPENDENT
RETIRED_COMMUNICATION: 0-->UNDERSTANDS/COMMUNICATES WITHOUT DIFFICULTY
DRESS: 0-->INDEPENDENT
TRANSFERRING: 0-->INDEPENDENT
AMBULATION: 0-->INDEPENDENT
COGNITION: 0 - NO COGNITION ISSUES REPORTED
TOILETING: 0-->INDEPENDENT
SWALLOWING: 0-->SWALLOWS FOODS/LIQUIDS WITHOUT DIFFICULTY
FALL_HISTORY_WITHIN_LAST_SIX_MONTHS: NO
BATHING: 0-->INDEPENDENT

## 2017-11-11 NOTE — ED NOTES
"Triage Assessment & Note:    /65  Pulse 120  Temp 100.4  F (38  C) (Oral)  Resp 20  Ht 1.626 m (5' 4\")  Wt 69.9 kg (154 lb 3.2 oz)  SpO2 95%  Breastfeeding? No  BMI 26.47 kg/m2      Patient presents with: Breast cancer pt with COPD comes to triage with reports of fever. Pt reports last chemo was 11/7/17 and has been feeling ill since.     Home Treatments/Remedies: Home medications    Febrile / Afebrile: afebrile    Duration of C/o: 5 days    Eleonora Ferreira RN  November 11, 2017        "

## 2017-11-11 NOTE — ED PROVIDER NOTES
[place] EMERGENCY DEPARTMENT ([facility])  11/11/17   History     Chief Complaint   Patient presents with     Fever     max them 105     HPI  Ashleigh Alonzo is a 57 year old female with a medical history significant for breast cancer currently on chemotherapy treatment (last infusion C7D1 Pembrolizumab, Taxol on 11/7/2017) and COPD who presents to the Emergency Department for evaluation of a fever. Patients elevated temperatures started on 11/9/2017 and spiked to a fever of 105.1 today at 2 PM. Patient then took 2 Tylenol 500 mg tablets at 3 PM after talking to her on-call oncologist. She was also instructed to be evaluated in the Emergency Department by her oncologist. Patient complains of a new dry cough. She denies any difficulty urinating, nausea, vomiting, diarrhea, congestion, rhinorrhea, rashes, mouth sores or recent infections at her Port site. Patient reports having the Port in place since 9/1/2017. Patient notes spiking a fever at the beginning of October and was evaluated in the Emergency Department here on 10/12/2017. At that time, patient was found to have pneumonia and she was started on Levaquin 500 mg. Patient was then seen in her oncology clinic on 10/26/2017 after she spiked a fever again. At that time, she was found to have a UTI and was started on Cipro 500 mg PO BID x7 days. Patient reports finishing both rounds of these antibiotics. Patient denies being on prophylactic Levaquin or any other antibiotic. No pain.    I have reviewed the Medications, Allergies, Past Medical and Surgical History, and Social History in the [facility] system.    Past Medical History:   Diagnosis Date     COPD (chronic obstructive pulmonary disease) (H) 2011     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) 8/30/2017     Periodontal disease      Sleep apnea 12/2015     Urticaria        Past Surgical History:   Procedure Laterality Date     APPENDECTOMY   10/6/2015     CATARACT IOL, RT/LT  11/2016    bilateral      COLONOSCOPY  11/15/2011    Procedure:COLONOSCOPY; Colonoscopy, screening; Surgeon:ANASTASIA BUNCH; Location:[facility] OR     INSERT PORT VASCULAR ACCESS Left 9/1/2017    Procedure: INSERT PORT VASCULAR ACCESS;  Single Lumen Chest Power Port;  Surgeon: Leif Parkinson PA-C;  Location: [facility] OR     TUBAL LIGATION  12/2004    Bilateral       Family History   Problem Relation Age of Onset     Cardiovascular Father 46     MI     Eye Disorder Father      CEREBROVASCULAR DISEASE Father      Arthritis Sister      Neurologic Disorder Brother      CANCER No family hx of      DIABETES No family hx of      Hypertension No family hx of        Social History   Substance Use Topics     Smoking status: Former Smoker     Packs/day: 1.00     Years: 15.00     Types: Cigarettes     Quit date: 1/1/2000     Smokeless tobacco: Never Used     Alcohol use 8.4 - 12.6 oz/week      Comment: daily       Current Facility-Administered Medications   Medication     lactated ringers infusion     piperacillin-tazobactam (ZOSYN) 4.5 g vial to attach to  mL bag     levofloxacin (LEVAQUIN) infusion 750 mg     Current Outpatient Prescriptions   Medication     Zinc Gluconate 100 MG TABS     LORazepam (ATIVAN) 0.5 MG tablet     lidocaine-prilocaine (EMLA) cream     LORazepam (ATIVAN) 0.5 MG tablet     hydrOXYzine (ATARAX) 25 MG tablet     guaiFENesin (MUCINEX) 600 MG 12 hr tablet     simvastatin (ZOCOR) 40 MG tablet     tiotropium (SPIRIVA) 18 MCG capsule     albuterol (PROAIR HFA/PROVENTIL HFA/VENTOLIN HFA) 108 (90 BASE) MCG/ACT Inhaler     citalopram (CELEXA) 10 MG tablet     darifenacin (ENABLEX) 7.5 MG 24 hr tablet     Coenzyme Q10 (CO Q 10 PO)     MULTIPLE VITAMIN PO     Cyanocobalamin (VITAMIN B 12 PO)     Pyridoxine HCl (VITAMIN B6 PO)     aspirin 81 MG tablet     VITAMIN D 1000 UNIT OR CAPS     order for DME     order for DME     Facility-Administered Medications Ordered in  "Other Encounters   Medication     lidocaine 1 % 9 mL     sodium bicarbonate 8.4 % injection 1 mEq     lidocaine-EPINEPHrine 1.5 %-[id number] injection 10 mL      No Known Allergies      Review of Systems   Constitutional: Positive for chills and fever.   HENT: Negative for congestion and sore throat.    Eyes: Negative for visual disturbance.   Respiratory: Positive for cough ( dry). Negative for shortness of breath.    Cardiovascular: Negative for chest pain.   Gastrointestinal: Negative for abdominal pain, nausea and vomiting.   Endocrine: Negative for polydipsia and polyuria.   Genitourinary: Negative for difficulty urinating.   Musculoskeletal: Negative for back pain, neck pain and neck stiffness.   Skin: Negative for color change.   Allergic/Immunologic: Positive for immunocompromised state.   Neurological: Negative for light-headedness.   Hematological: Negative for adenopathy.   Psychiatric/Behavioral: Negative for agitation and behavioral problems.   All other systems reviewed and are negative.      Physical Exam   BP: 111/65  Pulse: 120  Heart Rate: 106  Temp: 100.4  F (38  C)  Resp: 20  Height: 162.6 cm (5' 4\")  Weight: 69.9 kg (154 lb 3.2 oz)  SpO2: 95 %      Physical Exam   Constitutional: She is oriented to person, place, and time. She appears well-developed and well-nourished. No distress.   HENT:   Head: Normocephalic and atraumatic.   Right Ear: External ear normal.   Left Ear: External ear normal.   Nose: Nose normal.   Mouth/Throat: Oropharynx is clear and moist. No oropharyngeal exudate.   Eyes: Conjunctivae and EOM are normal. Pupils are equal, round, and reactive to light. No scleral icterus.   Neck: Normal range of motion.   Cardiovascular: Normal heart sounds and intact distal pulses.    tachycardia   Pulmonary/Chest: Effort normal and breath sounds normal. No respiratory distress. She has no wheezes. She has no rales.   Abdominal: Soft. Bowel sounds are normal. There is no tenderness. "   Musculoskeletal: Normal range of motion. She exhibits no edema or tenderness.   Neurological: She is alert and oriented to person, place, and time. No cranial nerve deficit. She exhibits normal muscle tone. Coordination normal.   Skin: Skin is warm. No rash noted. She is not diaphoretic.   Psychiatric: She has a normal mood and affect. Her behavior is normal. Judgment and thought content normal.   Nursing note and vitals reviewed.      ED Course   4:34 PM  The patient was seen and examined by John Lopez MD in Room ED06.     ED Course     Procedures             Critical Care time:  none     The patient has signs of Severe Sepsis as evidenced by:    1. 2 SIRS criteria, AND  2. Suspected infection, AND   3. Organ dysfunction: Lactic Acid >2    Time severe sepsis diagnosis confirmed = 18:05 as this was the time when Lactate resulted, and the level was >2 and CXR was reviewed.      3 Hour Severe Sepsis Bundle Completion:  1. Initial Lactic Acid Result:   Recent Labs   Lab Test  11/11/17   1726  10/12/17   1905   LACT  2.2*  0.8     2. Blood Cultures before Antibiotics: Yes  3. Broad Spectrum Antibiotics Administered: Yes     Anti-infectives (Future)    Start     Dose/Rate Route Frequency Ordered Stop    11/11/17 1811  piperacillin-tazobactam (ZOSYN) 4.5 g vial to attach to  mL bag      4.5 g  over 30 Minutes Intravenous ONCE 11/11/17 1810      11/11/17 1811  levofloxacin (LEVAQUIN) infusion 750 mg      750 mg  100 mL/hr over 90 Minutes Intravenous ONCE 11/11/17 1810          4. 2097 ml of IV fluids.           Labs Ordered and Resulted from Time of ED Arrival Up to the Time of Departure from the ED   CBC WITH PLATELETS DIFFERENTIAL - Abnormal; Notable for the following:        Result Value    RBC Count 3.33 (*)     Hemoglobin 10.9 (*)     Hematocrit 33.2 (*)     Absolute Lymphocytes 0.6 (*)     All other components within normal limits   COMPREHENSIVE METABOLIC PANEL - Abnormal; Notable for the following:      Glucose 137 (*)     Albumin 2.6 (*)     ALT 53 (*)     AST 68 (*)     All other components within normal limits   LACTIC ACID WHOLE BLOOD - Abnormal; Notable for the following:     Lactic Acid 2.2 (*)     All other components within normal limits   ROUTINE UA WITH MICROSCOPIC   PULSE OXIMETRY NURSING   CARDIAC CONTINUOUS MONITORING   PATIENT CARE ORDER   BLOOD CULTURE   URINE CULTURE AEROBIC BACTERIAL   BLOOD CULTURE            Assessments & Plan (with Medical Decision Making)   57-year-old woman on chemotherapy presenting with a fever for 1 day. Differential diagnosis: bacteriemia, pneumonia, UTI, pyelonephritis, sepsis, viral illness, febrile neutropenia.    After thorough history and physical examination, patient appears to be in no acute distress. Her temperature has decreased since the T-max that she had at home. She took 1 g of Tylenol approximately 2 hours prior to arrival. I'll obtain laboratory studies and chest x-ray for further diagnostic evaluation. I will hydrate her with bolus of lactated Ringer's. She and her  agree with the plan.    Patient's laboratory studies returned with no leukocytosis, WBC is normal at 7200. There is anemia with a hemoglobin of 10.9, however, this appears to be the patient's baseline. Lactic acid is elevated at 2.2. Electrolytes show a normal creatinine at 0.66 and slightly elevated transaminases. I reviewed patient's chest x-ray and I read the radiology report; patient has bilateral pneumonia. At this point I initiated IV Zosyn and IV Levaquin. Case was discussed with on-call Oncology Fellow and she will be admitted to the Oncology Service. She and her  agree with the plan.     I have reviewed the nursing notes.    I have reviewed the findings, diagnosis, plan and need for follow up with the patient.    New Prescriptions    No medications on file       Final diagnoses:   Pneumonia due to infectious organism, unspecified laterality, unspecified part of lung    Sepsis, due to unspecified organism (H)     I, Dwayne Fernández, am serving as a trained medical scribe to document services personally performed by John Lopez MD, based on the provider's statements to me.   I, John Lopez MD, was physically present and have reviewed and verified the accuracy of this note documented by Dwayne Fernández.    11/11/2017   [facility], [place], EMERGENCY DEPARTMENT     Grady Lopez MD  11/11/17 8729

## 2017-11-11 NOTE — TELEPHONE ENCOUNTER
[facility] Onc Fellow brief note    Pt states that over the last two days she has noticed worsening fevers, initially to 100.5 but then worsening to 102.1 and then 105 today. This is associated with nonproductive cough. She denies lightheadedness/dizziness, chest pain or diarrhea.     I recommended that she come into the ER for evaluation. Discussed that nearest ER would be acceptable if she was at all worried but coming to ER at [facility] would be preferable.     Pt states that her  would drive her and they would come to [facility].    Ronny Perkins MD   Hematology / Oncology Fellow  P: [phone]    CC: Dr. Navarro

## 2017-11-11 NOTE — IP AVS SNAPSHOT
[address] [address]    Phone:  [phone]                                       After Visit Summary   11/11/2017    Ashleigh Alonzo    MRN: [id number]           After Visit Summary Signature Page     I have received my discharge instructions, and my questions have been answered. I have discussed any challenges I see with this plan with the nurse or doctor.    ..........................................................................................................................................  Patient/Patient Representative Signature      ..........................................................................................................................................  Patient Representative Print Name and Relationship to Patient    ..................................................               ................................................  Date                                            Time    ..........................................................................................................................................  Reviewed by Signature/Title    ...................................................              ..............................................  Date                                                            Time

## 2017-11-11 NOTE — TELEPHONE ENCOUNTER
Patient states she has not yet received a call back from on call provider.  FNA paged on call, Dr. Herrera, via page  at 3:01PM to call patient back.

## 2017-11-11 NOTE — TELEPHONE ENCOUNTER
Patient states she has had a fluctuating temperature since last evening and temperature now is 104.4.  FNA paged on call, Dr. Herrera, via page  at 2:29PM to call patient back directly.  Reason for Disposition    [1] Fever > 101 F (38.3 C) AND [2] co-morbidity risk factor for serious infection (e.g., COPD, heart failure, liver failure, renal failure with dialysis )    Additional Information    Negative: Shock suspected (e.g., cold/pale/clammy skin, too weak to stand, low BP, rapid pulse)    Negative: Difficult to awaken or acting confused  (e.g., disoriented, slurred speech)    Negative: Bluish lips, tongue, or face now    Negative: New onset rash with many purple (or blood-colored) spots or dots    Negative: Sounds like a life-threatening emergency to the triager    Negative: Other symptom is present, see that guideline  (e.g., symptoms of cough, runny nose, sore throat, earache, abdominal pain, diarrhea, vomiting)    Negative: Fever > 104 F (40 C)    Negative: [1] Neutropenia known or suspected (e.g., recent cancer chemotherapy) AND [2] fever > 100.4 F (38.0 C)    Negative: [1] Neutropenia known or suspected (e.g., recent cancer chemotherapy) AND [2] signs or symptoms of suspected infection are present    Negative: [1] Headache AND [2] stiff neck (can't touch chin to chest)    Negative: Difficulty breathing    Negative: [1] Drinking very little AND [2] dehydration suspected (e.g., no urine > 12 hours, very dry mouth, very lightheaded)    Negative: Patient sounds very sick or weak to the triager  (Exception: mild weakness and hasn't taken fever medicine)    Negative: [1] Fever > 101 F (38.3 C) AND [2] age > 60    Protocols used: CANCER - FEVER-ADULT-

## 2017-11-11 NOTE — IP AVS SNAPSHOT
MRN:[id number]                      After Visit Summary   11/11/2017    Ashleigh Alonzo    MRN: [id number]           Thank you!     Thank you for choosing [facility] for your care. Our goal is always to provide you with excellent care. Hearing back from our patients is one way we can continue to improve our services. Please take a few minutes to complete the written survey that you may receive in the mail after you visit with us. Thank you!        Patient Information     Date Of Birth          1960        Designated Caregiver       Most Recent Value    Caregiver    Will someone help with your care after discharge? yes    Name of designated caregiver Aly    Phone number of caregiver [phone]    Caregiver address same as pt      About your hospital stay     You were admitted on:  November 11, 2017 You last received care in the:  Unit [facility] [facility]    You were discharged on:  November 15, 2017        Reason for your hospital stay       Admitted for hypoxia, fevers, underlying breast cancer on immunotherapy. Dx immunotherapy pneumonitis & CAP.                  Who to Call     For medical emergencies, please call 911.  For non-urgent questions about your medical care, please call your primary care provider or clinic, [phone]          Attending Provider     Provider Specialty    Grady Lopez MD Emergency Medicine    [facility], Angelika Perera MD Emergency Medicine    [facility], MD Velia Hematology & Oncology       Primary Care Provider Office Phone # Fax #    Radha Cazares [phone]       When to contact your care team       Please call the [facility] Triage RN Line [phone] ([facility] RN available M-F 8-5, after 5 pm the RN Advisor will page the On-Call Oncology Fellow who will return your call) for temperature greater than 100.4 F, uncontrolled nausea/vomiting/diarrhea or unrelieved constipation, pain not relieved by medications, bleeding not stopped by pressure,  dizziness, chest pain, shortness of breath, changes in level of consciousness, or any other new concerning symptoms.                  After Care Instructions     Activity       Your activity upon discharge: activity as tolerated            Diet       Follow this diet upon discharge: regular diet as tolerated                  Follow-up Appointments     Adult [facility] Follow-up and recommended labs and tests       11/21/17 --> see mauricio & Leeann DUNLAP    Appointments on [place] and/or [facility] (with [facility] provider or service). Call [phone] if you haven't heard regarding these appointments within 7 days of discharge.                  Your next 10 appointments already scheduled     Nov 21, 2017 11:15 AM CST   [facility] Draw with [facility] DRAW   [facility] Draw ([facility])    [address] [place] [address]   [phone]            Nov 21, 2017 12:00 PM CST   (Arrive by 11:45 AM)   Return Visit with Fara Bradshaw MD   [facility] ([facility])    [address] [place] [address]   [phone]            Nov 21, 2017  1:00 PM CST   Infusion 180 with [facility] ONCOLOGY INFUSION, [facility] 25 ATC   [facility] ([facility])    [address] [place] [address]   [phone]            Nov 28, 2017  6:30 AM CST   [facility] Draw with [facility] DRAW   [facility] Draw ([facility])    [address] [place] [address]   [phone]            Nov 28, 2017  7:00 AM CST   (Arrive by 6:45 AM)   Return Visit with EDDIE Oliva   [facility] ([facility])    [address] [place] [address]   [phone]            Nov 28, 2017  8:00 AM CST   Infusion 180 with   ONCOLOGY INFUSION, UC 14 ATC   [facility] ([facility])    [address] [facility]  [address] [facility] [address] [phone]            Dec 05, 2017  6:30 AM CST   [facility] Draw with [facility] DRAW   [facility] Lab Draw ([facility])    [address] [address]   [phone]            Dec 05, 2017  7:00 AM CST   (Arrive by 6:45 AM)   Return Visit with EDDIE Oliva   [facility] ([facility])    [address] [id number] [phone]              Future tests that were ordered for you     CBC with platelets differential       Last Lab Result: Hemoglobin (g/dL)       Date                     Value                 11/15/2017               9.4 (L)          ----------            Comprehensive metabolic panel                 Further instructions from your care team       Contact Numbers    [facility] Main Line: [phone]  [facility] Triage:  [phone]    Call triage with chills and/or temperature greater than or equal to 100.4, uncontrolled nausea/vomiting, diarrhea, constipation, dizziness, shortness of breath, chest pain, bleeding, unexplained bruising, or any new/concerning symptoms, questions/concerns.     If you are having any concerning symptoms or wish to speak to a provider before your next infusion visit, please call your care coordinator or triage to notify them so we can adequately serve you.         Pending Results     Date and Time Order Name Status Description    11/13/2017 0007 Blood culture Preliminary     11/13/2017 0007 Blood culture Preliminary     11/11/2017 1642 Blood culture Preliminary     11/11/2017 1642 Blood culture Preliminary             Statement of Approval     Ordered          11/15/17 1138  I have reviewed and agree with all the recommendations and orders detailed in this document.   "EFFECTIVE NOW     Approved and electronically signed by:  Chelsi iY APRN CNP             Admission Information     Date & Time Provider Department Dept. Phone    11/11/2017 Velia Noguera MD Unit [facility]D [facility] [phone]      Your Vitals Were     Blood Pressure Pulse Temperature Respirations Height Weight    112/70 (BP Location: Left arm) 109 96.5  F (35.8  C) (Oral) 18 1.626 m (5' 4\") 71.1 kg (156 lb 12.8 oz)    Pulse Oximetry BMI (Body Mass Index)                92% 26.91 kg/m2          MyChart Information     [facility] gives you secure access to your electronic health record. If you see a primary care provider, you can also send messages to your care team and make appointments. If you have questions, please call your primary care clinic.  If you do not have a primary care provider, please call [phone] and they will assist you.        Care EveryWhere ID     This is your Care EveryWhere ID. This could be used by other organizations to access your [facility] medical records  [id number]        Equal Access to Services     [facility]: Hadjose martin Granda, waenrico clancy, qapiper lopez, bang pickering. So [facility] [phone].    ATENCIÓN: Si habla español, tiene a blackman disposición servicios gratuitos de asistencia lingüística. Pauline al [phone].    We comply with applicable federal civil rights laws and [place] laws. We do not discriminate on the basis of race, color, national origin, age, disability, sex, sexual orientation, or gender identity.               Review of your medicines      START taking        Dose / Directions    doxycycline 100 MG capsule   Commonly known as:  VIBRAMYCIN   Indication:  Community Acquired Pneumonia   Used for:  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)        Dose:  100 mg   Take 1 capsule (100 mg) by mouth 2 times daily for 6 days   Quantity:  12 " capsule   Refills:  0       pantoprazole 40 MG EC tablet   Commonly known as:  PROTONIX   Used for:  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)        Dose:  40 mg   Start taking on:  11/16/2017   Take 1 tablet (40 mg) by mouth every morning   Quantity:  30 tablet   Refills:  0       predniSONE 10 MG tablet   Commonly known as:  DELTASONE   Used for:  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)        Dose:  1 mg/kg   Take 7 tablets (70 mg) by mouth daily   Quantity:  70 tablet   Refills:  0         CONTINUE these medicines which may have CHANGED, or have new prescriptions. If we are uncertain of the size of tablets/capsules you have at home, strength may be listed as something that might have changed.        Dose / Directions    darifenacin 7.5 MG 24 hr tablet   Commonly known as:  ENABLEX   This may have changed:    - when to take this  - reasons to take this   Used for:  Overactive bladder        Dose:  7.5 mg   Take 1 tablet (7.5 mg) by mouth daily   Quantity:  90 tablet   Refills:  3         CONTINUE these medicines which have NOT CHANGED        Dose / Directions    albuterol 108 (90 BASE) MCG/ACT Inhaler   Commonly known as:  PROAIR HFA/PROVENTIL HFA/VENTOLIN HFA   Used for:  Chronic obstructive pulmonary disease, unspecified COPD type (H)        Dose:  2 puff   Inhale 2 puffs into the lungs every 6 hours as needed for shortness of breath / dyspnea   Quantity:  1 Inhaler   Refills:  5       aspirin 81 MG tablet        Dose:  1 tablet   Take 1 tablet by mouth daily.   Refills:  3       citalopram 10 MG tablet   Commonly known as:  celeXA   Used for:  Anxiety, Depression, unspecified depression type        Dose:  10 mg   Take 1 tablet (10 mg) by mouth daily   Quantity:  90 tablet   Refills:  3       CO Q 10 PO        Dose:  1 tablet   Take 1 tablet by mouth daily   Refills:  0       guaiFENesin 600  MG 12 hr tablet   Commonly known as:  MUCINEX   Used for:  Cough with sputum        Dose:  1200 mg   Take 2 tablets (1,200 mg) by mouth 2 times daily   Quantity:  180 tablet   Refills:  6       hydrOXYzine 25 MG tablet   Commonly known as:  ATARAX   Used for:  Hives        Take 1-2 tablets (25-50 mg) by mouth every 6 hours as needed for itching   Quantity:  100 tablet   Refills:  2       lidocaine-prilocaine cream   Commonly known as:  EMLA   Used for:  Personal history of malignant neoplasm of breast        Please apply to port site 30 minutes before use prn   Quantity:  30 g   Refills:  1       * LORazepam 0.5 MG tablet   Commonly known as:  ATIVAN   Used for:  Breast cancer (H), Anxiety        Take 1-2 tabs 20 minutes prior to MRI scans.   Quantity:  10 tablet   Refills:  0       * LORazepam 0.5 MG tablet   Commonly known as:  ATIVAN   Used for:  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)        Dose:  0.5 mg   Take 1 tablet (0.5 mg) by mouth every 4 hours as needed (Anxiety, Nausea/Vomiting or Sleep)   Quantity:  30 tablet   Refills:  2       MULTIPLE VITAMIN PO        Dose:  1 tablet   Take 1 tablet by mouth daily   Refills:  0       * order for DME   Used for:  MERLIN (obstructive sleep apnea)        Respironics Dream Station Auto CPAP 12-18 cm, F&P Simplus FFM small.   Refills:  0       * order for DME   Used for:  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)        Cranial prosthesis   Quantity:  1 Device   Refills:  1       tiotropium 18 MCG capsule   Commonly known as:  SPIRIVA   Used for:  Chronic obstructive pulmonary disease, unspecified COPD type (H)        Dose:  18 mcg   Inhale 1 capsule (18 mcg) into the lungs daily Inhale contents of one capsule   Quantity:  1 capsule   Refills:  11       VITAMIN B 12 PO        Dose:  100 mcg   Take 100 mcg by mouth daily   Refills:  0       VITAMIN B6 PO         Dose:  1 tablet   Take 1 tablet by mouth daily.   Refills:  0       vitamin D 1000 UNITS capsule        TAKE 2 CAPSULES BY MOUTH DAILY   Refills:  0       * Notice:  This list has 4 medication(s) that are the same as other medications prescribed for you. Read the directions carefully, and ask your doctor or other care provider to review them with you.      STOP taking     simvastatin 40 MG tablet   Commonly known as:  ZOCOR                Where to get your medicines      These medications were sent to [facility] [place], [place] - [address] [address]     Phone:  [phone]     doxycycline 100 MG capsule    pantoprazole 40 MG EC tablet    predniSONE 10 MG tablet               ANTIBIOTIC INSTRUCTION     You've Been Prescribed an Antibiotic - Now What?  Your healthcare team thinks that you or your loved one might have an infection. Some infections can be treated with antibiotics, which are powerful, life-saving drugs. Like all medications, antibiotics have side effects and should only be used when necessary. There are some important things you should know about your antibiotic treatment.      Your healthcare team may run tests before you start taking an antibiotic.    Your team may take samples (e.g., from your blood, urine or other areas) to run tests to look for bacteria. These test can be important to determine if you need an antibiotic at all and, if you do, which antibiotic will work best.      Within a few days, your healthcare team might change or even stop your antibiotic.    Your team may start you on an antibiotic while they are working to find out what is making you sick.    Your team might change your antibiotic because test results show that a different antibiotic would be better to treat your infection.    In some cases, once your team has more information, they learn that you do not need an antibiotic at all. They may find out that  you don't have an infection, or that the antibiotic you're taking won't work against your infection. For example, an infection caused by a virus can't be treated with antibiotics. Staying on an antibiotic when you don't need it is more likely to be harmful than helpful.      You may experience side effects from your antibiotic.    Like all medications, antibiotics have side effects. Some of these can be serious.    Let you healthcare team know if you have any known allergies when you are admitted to the hospital.    One significant side effect of nearly all antibiotics is the risk of severe and sometimes deadly diarrhea caused by Clostridium difficile (C. Difficile). This occurs when a person takes antibiotics because some good germs are destroyed. Antibiotic use allows C. diificile to take over, putting patients at high risk for this serious infection.    As a patient or caregiver, it is important to understand your or your loved one's antibiotic treatment. It is especially important for caregivers to speak up when patients can't speak for themselves. Here are some important questions to ask your healthcare team.    What infection is this antibiotic treating and how do you know I have that infection?    What side effects might occur from this antibiotic?    How long will I need to take this antibiotic?    Is it safe to take this antibiotic with other medications or supplements (e.g., vitamins) that I am taking?     Are there any special directions I need to know about taking this antibiotic? For example, should I take it with food?    How will I be monitored to know whether my infection is responding to the antibiotic?    What tests may help to make sure the right antibiotic is prescribed for me?      Information provided by:  www.cdc.gov/getsmart  U.S. Department of Health and Human Services  Centers for disease Control and Prevention  National Center for Emerging and Zoonotic Infectious Diseases  Division of  Healthcare Quality Promotion         Protect others around you: Learn how to safely use, store and throw away your medicines at www.disposemymeds.org.             Medication List: This is a list of all your medications and when to take them. Check marks below indicate your daily home schedule. Keep this list as a reference.      Medications           Morning Afternoon Evening Bedtime As Needed    albuterol 108 (90 BASE) MCG/ACT Inhaler   Commonly known as:  PROAIR HFA/PROVENTIL HFA/VENTOLIN HFA   Inhale 2 puffs into the lungs every 6 hours as needed for shortness of breath / dyspnea                                   aspirin 81 MG tablet   Take 1 tablet by mouth daily.            800                       citalopram 10 MG tablet   Commonly known as:  celeXA   Take 1 tablet (10 mg) by mouth daily   Last time this was given:  10 mg on 11/14/2017  9:00 AM                    600               CO Q 10 PO   Take 1 tablet by mouth daily            800                       darifenacin 7.5 MG 24 hr tablet   Commonly known as:  ENABLEX   Take 1 tablet (7.5 mg) by mouth daily                    600               doxycycline 100 MG capsule   Commonly known as:  VIBRAMYCIN   Take 1 capsule (100 mg) by mouth 2 times daily for 6 days            800           800               guaiFENesin 600 MG 12 hr tablet   Commonly known as:  MUCINEX   Take 2 tablets (1,200 mg) by mouth 2 times daily   Last time this was given:  1,200 mg on 11/15/2017  8:21 AM            800           800               hydrOXYzine 25 MG tablet   Commonly known as:  ATARAX   Take 1-2 tablets (25-50 mg) by mouth every 6 hours as needed for itching                                   lidocaine-prilocaine cream   Commonly known as:  EMLA   Please apply to port site 30 minutes before use prn                                   * LORazepam 0.5 MG tablet   Commonly known as:  ATIVAN   Take 1-2 tabs 20 minutes prior to MRI scans.                                   *  LORazepam 0.5 MG tablet   Commonly known as:  ATIVAN   Take 1 tablet (0.5 mg) by mouth every 4 hours as needed (Anxiety, Nausea/Vomiting or Sleep)                                   MULTIPLE VITAMIN PO   Take 1 tablet by mouth daily            800                       * order for DME   Respironics Dream Station Auto CPAP 12-18 cm, F&P Simplus FFM small.                                * order for DME   Cranial prosthesis                                pantoprazole 40 MG EC tablet   Commonly known as:  PROTONIX   Take 1 tablet (40 mg) by mouth every morning   Start taking on:  11/16/2017   Last time this was given:  40 mg on 11/15/2017  8:21 AM            800                       predniSONE 10 MG tablet   Commonly known as:  DELTASONE   Take 7 tablets (70 mg) by mouth daily            800  Take with food                       tiotropium 18 MCG capsule   Commonly known as:  SPIRIVA   Inhale 1 capsule (18 mcg) into the lungs daily Inhale contents of one capsule            800                       VITAMIN B 12 PO   Take 100 mcg by mouth daily            800                       VITAMIN B6 PO   Take 1 tablet by mouth daily.            800                       vitamin D 1000 UNITS capsule   TAKE 2 CAPSULES BY MOUTH DAILY            800                       * Notice:  This list has 4 medication(s) that are the same as other medications prescribed for you. Read the directions carefully, and ask your doctor or other care provider to review them with you.

## 2017-11-12 LAB
ANION GAP SERPL CALCULATED.3IONS-SCNC: 6 MMOL/L (ref 3–14)
BACTERIA SPEC CULT: NO GROWTH
BASOPHILS # BLD AUTO: 0 10E9/L (ref 0–0.2)
BASOPHILS NFR BLD AUTO: 0.2 %
BUN SERPL-MCNC: 7 MG/DL (ref 7–30)
CALCIUM SERPL-MCNC: 8 MG/DL (ref 8.5–10.1)
CHLORIDE SERPL-SCNC: 106 MMOL/L (ref 94–109)
CO2 SERPL-SCNC: 24 MMOL/L (ref 20–32)
CREAT SERPL-MCNC: 0.64 MG/DL (ref 0.52–1.04)
DIFFERENTIAL METHOD BLD: ABNORMAL
EOSINOPHIL # BLD AUTO: 0 10E9/L (ref 0–0.7)
EOSINOPHIL NFR BLD AUTO: 0.2 %
ERYTHROCYTE [DISTWIDTH] IN BLOOD BY AUTOMATED COUNT: 13.8 % (ref 10–15)
FLUAV+FLUBV RNA SPEC QL NAA+PROBE: NEGATIVE
FLUAV+FLUBV RNA SPEC QL NAA+PROBE: NEGATIVE
GFR SERPL CREATININE-BSD FRML MDRD: >90 ML/MIN/1.7M2
GLUCOSE SERPL-MCNC: 104 MG/DL (ref 70–99)
HCT VFR BLD AUTO: 27.1 % (ref 35–47)
HGB BLD-MCNC: 8.9 G/DL (ref 11.7–15.7)
IMM GRANULOCYTES # BLD: 0 10E9/L (ref 0–0.4)
IMM GRANULOCYTES NFR BLD: 0.2 %
LACTATE BLD-SCNC: 0.7 MMOL/L (ref 0.7–2)
LYMPHOCYTES # BLD AUTO: 0.7 10E9/L (ref 0.8–5.3)
LYMPHOCYTES NFR BLD AUTO: 17.7 %
Lab: NORMAL
MCH RBC QN AUTO: 32.1 PG (ref 26.5–33)
MCHC RBC AUTO-ENTMCNC: 32.8 G/DL (ref 31.5–36.5)
MCV RBC AUTO: 98 FL (ref 78–100)
MONOCYTES # BLD AUTO: 0.1 10E9/L (ref 0–1.3)
MONOCYTES NFR BLD AUTO: 2.9 %
NEUTROPHILS # BLD AUTO: 3.2 10E9/L (ref 1.6–8.3)
NEUTROPHILS NFR BLD AUTO: 78.8 %
NRBC # BLD AUTO: 0 10*3/UL
NRBC BLD AUTO-RTO: 0 /100
PLATELET # BLD AUTO: 257 10E9/L (ref 150–450)
POTASSIUM SERPL-SCNC: 4.2 MMOL/L (ref 3.4–5.3)
RBC # BLD AUTO: 2.77 10E12/L (ref 3.8–5.2)
RSV RNA SPEC NAA+PROBE: NEGATIVE
SODIUM SERPL-SCNC: 136 MMOL/L (ref 133–144)
SPECIMEN SOURCE: NORMAL
SPECIMEN SOURCE: NORMAL
WBC # BLD AUTO: 4.1 10E9/L (ref 4–11)

## 2017-11-12 PROCEDURE — 87633 RESP VIRUS 12-25 TARGETS: CPT | Performed by: INTERNAL MEDICINE

## 2017-11-12 PROCEDURE — 25000132 ZZH RX MED GY IP 250 OP 250 PS 637: Performed by: INTERNAL MEDICINE

## 2017-11-12 PROCEDURE — 85025 COMPLETE CBC W/AUTO DIFF WBC: CPT | Performed by: INTERNAL MEDICINE

## 2017-11-12 PROCEDURE — 36592 COLLECT BLOOD FROM PICC: CPT | Performed by: INTERNAL MEDICINE

## 2017-11-12 PROCEDURE — 99232 SBSQ HOSP IP/OBS MODERATE 35: CPT | Performed by: INTERNAL MEDICINE

## 2017-11-12 PROCEDURE — 25000128 H RX IP 250 OP 636: Performed by: INTERNAL MEDICINE

## 2017-11-12 PROCEDURE — 12000008 ZZH R&B INTERMEDIATE UMMC

## 2017-11-12 PROCEDURE — 80048 BASIC METABOLIC PNL TOTAL CA: CPT | Performed by: INTERNAL MEDICINE

## 2017-11-12 PROCEDURE — 87631 RESP VIRUS 3-5 TARGETS: CPT | Performed by: INTERNAL MEDICINE

## 2017-11-12 PROCEDURE — 25000128 H RX IP 250 OP 636: Performed by: EMERGENCY MEDICINE

## 2017-11-12 PROCEDURE — 83605 ASSAY OF LACTIC ACID: CPT | Performed by: INTERNAL MEDICINE

## 2017-11-12 RX ORDER — SODIUM CHLORIDE 9 MG/ML
INJECTION, SOLUTION INTRAVENOUS CONTINUOUS
Status: DISCONTINUED | OUTPATIENT
Start: 2017-11-12 | End: 2017-11-13 | Stop reason: CLARIF

## 2017-11-12 RX ORDER — HEPARIN SODIUM (PORCINE) LOCK FLUSH IV SOLN 100 UNIT/ML 100 UNIT/ML
5 SOLUTION INTRAVENOUS
Status: DISCONTINUED | OUTPATIENT
Start: 2017-11-12 | End: 2017-11-13

## 2017-11-12 RX ORDER — HEPARIN SODIUM,PORCINE 10 UNIT/ML
5-10 VIAL (ML) INTRAVENOUS EVERY 24 HOURS
Status: DISCONTINUED | OUTPATIENT
Start: 2017-11-12 | End: 2017-11-13

## 2017-11-12 RX ORDER — LIDOCAINE 40 MG/G
CREAM TOPICAL
Status: DISCONTINUED | OUTPATIENT
Start: 2017-11-12 | End: 2017-11-13 | Stop reason: CLARIF

## 2017-11-12 RX ORDER — HEPARIN SODIUM,PORCINE 10 UNIT/ML
5-10 VIAL (ML) INTRAVENOUS
Status: DISCONTINUED | OUTPATIENT
Start: 2017-11-12 | End: 2017-11-15 | Stop reason: HOSPADM

## 2017-11-12 RX ADMIN — ASPIRIN 81 MG: 81 TABLET, COATED ORAL at 08:50

## 2017-11-12 RX ADMIN — SODIUM CHLORIDE: 9 INJECTION, SOLUTION INTRAVENOUS at 21:38

## 2017-11-12 RX ADMIN — SODIUM CHLORIDE: 9 INJECTION, SOLUTION INTRAVENOUS at 08:55

## 2017-11-12 RX ADMIN — PIPERACILLIN AND TAZOBACTAM 4.5 G: 4; .5 INJECTION, POWDER, LYOPHILIZED, FOR SOLUTION INTRAVENOUS; PARENTERAL at 12:07

## 2017-11-12 RX ADMIN — GUAIFENESIN 1200 MG: 600 TABLET, EXTENDED RELEASE ORAL at 08:50

## 2017-11-12 RX ADMIN — SIMVASTATIN 40 MG: 40 TABLET, FILM COATED ORAL at 21:38

## 2017-11-12 RX ADMIN — PIPERACILLIN AND TAZOBACTAM 4.5 G: 4; .5 INJECTION, POWDER, LYOPHILIZED, FOR SOLUTION INTRAVENOUS; PARENTERAL at 00:16

## 2017-11-12 RX ADMIN — ACETAMINOPHEN 650 MG: 325 TABLET, FILM COATED ORAL at 16:08

## 2017-11-12 RX ADMIN — PIPERACILLIN AND TAZOBACTAM 4.5 G: 4; .5 INJECTION, POWDER, LYOPHILIZED, FOR SOLUTION INTRAVENOUS; PARENTERAL at 06:16

## 2017-11-12 RX ADMIN — LIDOCAINE: 40 CREAM TOPICAL at 10:21

## 2017-11-12 RX ADMIN — GUAIFENESIN 1200 MG: 600 TABLET, EXTENDED RELEASE ORAL at 21:37

## 2017-11-12 RX ADMIN — CITALOPRAM HYDROBROMIDE 10 MG: 10 TABLET ORAL at 08:50

## 2017-11-12 RX ADMIN — ACETAMINOPHEN 650 MG: 325 TABLET, FILM COATED ORAL at 08:55

## 2017-11-12 RX ADMIN — UMECLIDINIUM 1 PUFF: 62.5 AEROSOL, POWDER ORAL at 08:50

## 2017-11-12 RX ADMIN — SODIUM CHLORIDE, POTASSIUM CHLORIDE, SODIUM LACTATE AND CALCIUM CHLORIDE: 600; 310; 30; 20 INJECTION, SOLUTION INTRAVENOUS at 03:23

## 2017-11-12 RX ADMIN — PIPERACILLIN AND TAZOBACTAM 4.5 G: 4; .5 INJECTION, POWDER, LYOPHILIZED, FOR SOLUTION INTRAVENOUS; PARENTERAL at 18:26

## 2017-11-12 NOTE — PLAN OF CARE
Problem: Patient Care Overview  Goal: Plan of Care/Patient Progress Review  Outcome: No Change     7748-6588: Tmax 102.1, Tachy up to 115, RR 22-24. Tylenol given and temp decreased to 97.1. RSV panel is pending at this time so continue droplet precaution. Pt c/o sharp pain just below port site, especially with movement. Blood return noted but port re-accessed using EMLA cream. Pt no longer having pain. Appetite mildly poor but encouraging pt to eat small, frequent meals. Incentive spirometer done with lots of encouragement.   0082-5827: Tmax 101.8. Tachy. RR elevated. Tylenol given as pt very symptomatic.  at the bedside. Continue to encourage IS. Pt encouraged to walk but declined as she was feeling poor from her fever. Continue POC.

## 2017-11-12 NOTE — PHARMACY-ADMISSION MEDICATION HISTORY
"Admission medication history interview status for the 11/11/2017 admission is complete. See Epic admission navigator for allergy information, pharmacy, prior to admission medications and immunization status.     Medication history interview sources:  Patient    Changes made to PTA medication list (reason)  Added: N/A (per pt)  Deleted:   -zinc gluconate tablets (Per pt, physician prescribed this \"to see if it would help with [her] taste buds.\" Pt reported she has \"not needed this\" in a while.)  Changed:   -coenzyme Q10 capsules: Take 1 tablet by mouth --> Take 1 tablet by mouth daily. (per pt)  -darifenacin 24hr tablets: Take 7.5mg by mouth daily -->  Take 7.5mg by mouth daily as needed. (per pt)  -multivitamin tablet: added \"Take 1 tablet by mouth daily.\" (per pt)    Additional medication history information (including reliability of information, actions taken by pharmacist):  -Pt was able to confirm dosing regimens and last doses taken.  -pyridoxine tablets: Pt unable to report strength of this over-the-counter medication.  -Pt denied any other prescription and over-the-counter medications, including antibiotics, eye/ear drops, etc.  -Allergies confirmed, per pt.  -Home pharmacy updated, per pt.  -Per [facility], pt has documented influenza immunization for this season.      Prior to Admission medications    Medication Sig Last Dose Taking? Auth Provider   lidocaine-prilocaine (EMLA) cream Please apply to port site 30 minutes before use prn 11/11/2017 at Unknown time Yes Fara Bradshaw MD   hydrOXYzine (ATARAX) 25 MG tablet Take 1-2 tablets (25-50 mg) by mouth every 6 hours as needed for itching 11/10/2017 at PM Yes Radha Cazares MD   guaiFENesin (MUCINEX) 600 MG 12 hr tablet Take 2 tablets (1,200 mg) by mouth 2 times daily 11/10/2017 at PM Yes Radha Cazares MD   simvastatin (ZOCOR) 40 MG tablet Take 1 tablet (40 mg) by mouth At Bedtime 11/10/2017 at PM Yes Radha Cazares MD "   tiotropium (SPIRIVA) 18 MCG capsule Inhale 1 capsule (18 mcg) into the lungs daily Inhale contents of one capsule Past Week at Unknown time Yes Radha Cazares MD   citalopram (CELEXA) 10 MG tablet Take 1 tablet (10 mg) by mouth daily 11/10/2017 at PM Yes Radha Cazares MD   Coenzyme Q10 (CO Q 10 PO) Take 1 tablet by mouth daily  Past Week at Unknown time Yes Reported, Patient   MULTIPLE VITAMIN PO Take 1 tablet by mouth daily  Past Week at Unknown time Yes Reported, Patient   Cyanocobalamin (VITAMIN B 12 PO) Take 100 mcg by mouth daily  Past Week at Unknown time Yes Reported, Patient   Pyridoxine HCl (VITAMIN B6 PO) Take 1 tablet by mouth daily. Past Week at Unknown time Yes Reported, Patient   aspirin 81 MG tablet Take 1 tablet by mouth daily. Past Week at Unknown time Yes Jennifer Bautista MD   VITAMIN D 1000 UNIT OR CAPS TAKE 2 CAPSULES BY MOUTH DAILY Past Week at Unknown time Yes Reported, Patient   order for DME Cranial prosthesis Unknown at Unknown time  Fara Bradshaw MD   LORazepam (ATIVAN) 0.5 MG tablet Take 1 tablet (0.5 mg) by mouth every 4 hours as needed (Anxiety, Nausea/Vomiting or Sleep) Unknown at Unknown time  Fara Bradshaw MD   LORazepam (ATIVAN) 0.5 MG tablet Take 1-2 tabs 20 minutes prior to MRI scans. Unknown at Unknown time  Fara Bradshaw MD   albuterol (PROAIR HFA/PROVENTIL HFA/VENTOLIN HFA) 108 (90 BASE) MCG/ACT Inhaler Inhale 2 puffs into the lungs every 6 hours as needed for shortness of breath / dyspnea More than a month at Unknown time  Radha Cazares MD   darifenacin (ENABLEX) 7.5 MG 24 hr tablet Take 1 tablet (7.5 mg) by mouth daily  Patient taking differently: Take 7.5 mg by mouth daily as needed  Unknown at Unknown time  Radha Cazares MD   order for DME Respironics Dream Station Auto CPAP 12-18 cm, F&P Simplus FFM small. Unknown at Unknown time  Matt Andrea MD         Medication history completed by: Sherly  Mary

## 2017-11-12 NOTE — H&P
[facility], [place]    Hematology / Oncology  Admission History & Physical     Date of Admission:  11/11/17  Date of Service: 11/11/17  Primary Oncologist: Dr. Bradshaw    Assessment & Plan   Ashleigh Alonzo is a 57 year old female with history of stage IIa ER-/OK-/Her2- right breast cancer, currently on chemotherapy with taxol + pembrolizumab. She presented to the ED with high fevers, chills, and non-productive cough. CXR suggestive of pneumonia, started on broad-spectrum antibiotics and admitted to the oncology service for further cares.    Fever + cough, suspected pneumonia.  Patient reports a 2 day history of high fevers at home (as high as 105.1F earlier today). Also complains of a non-productive cough and chills. Work-up in the ED included a CXR which suggests bilateral (L>R) opacities concerning for infection. Lactate elevated at 2.2, but other labs and vitals are reassuring. She does not require supplemental oxygen. She was given 2 L IV fluids in the ED and started on Zosyn and levofloxacin. Of note, recently also diagnosed with pneumonia in our ED with CXR from 10/12/17 showing RUL consolidation, treated with levofloxacin 500 mg daily.  - Recheck lactate after IV fluids.  - Continue Zosyn and levofloxacin for now.  - Will check influenza and procalcitonin.  - Acetaminophen PRN for pain or fevers.  - Follow-up blood and urine cultures which were drawn in the ED.    ?UTI.  UA with 18 WBCs, large LE, 4 RBCs. Suggests possible UTI, though she is asymptomatic. Was diagnosed with UTI on 10/26/17, asymptomatic at the time, and treated with ciprofloxacin for a 7 day course. Urine culture from that sample grew <10K urogenital ariela.  - Antibiotics as above.  - Follow-up urine culture.    Stage IIa, T2N0M0, grade 3, triple negative, right breast cancer.  Follows with Dr. Bradshaw, now s/p 6 weeks of weekly Taxol and every 3 week pembrolizumab. Week 7 was held due to transaminitis. Course  has also been complicated by fevers with PNA and then UTI. Noted to be otherwise tolerating therapy well, with partial response noted on breast MRI after 3 weeks. Plan for 12 weeks of Taxol and pembrolizumab followed by 4 cycles of AC and pembrolizumab. She will then proceed with surgery and potentially adjuvant radiation.    COPD.  - Continue home inhalers (albuterol and tiotropium)  - Consider addition of prednisone if decompensates clinically.    Depression/Anxiety.  - Continue home ativan and citalopram.    FEN: regular diet, bolus IV fluids for hypotension, replete lytes PRN  Prophylaxis: mechanical  Code: FULL - Discussed with patient on admission.  Disposition: Admitted to the oncology service for treatment of suspected pneumonia. Anticipate discharge to home in the next couple of days pending clinical improvement.    Pearl Mayes MD/PhD  Heme/Onc/Transplant Hospitalist    Chief Complaint   High fevers and chills at home over the last 2 days, new non-productive cough.  - History obtained from the patient and through chart review.    History of Present Illness   Ashleigh Alonzo is a 57 year old female with history of breast cancer, currently on chemotherapy with pembrolizumab and taxol (last on 11/7/17). She called the nurse triage line complaining of fevers over the last 2 days, noting a temperature of 105.1F this afternoon. She was instructed to present to the ED for further evaluation. She was afebrile upon arrival, but had taken 2 tylenol. Cough is non-productive, and she has no chest pain. She endorses some chills and body aches as well. No rhinorrhea, congestion, sore throat or headaches. No nausea, vomiting, abdominal pain or diarrhea. No urinary complaints. No dizziness or lightheadedness. Has been eating and drinking OK, though feels fatigued and ill.    In the ED, initially afebrile, but later temperature spiked to 100.4F. She had associated tachycardia to the 120s, but otherwise hemodynamically  stable. Work-up included an essentially normal BMP, and CBC notable for stable chronic anemia, and no leukocytosis, not neutropenic. Lactic acid was elevated at 2.2. CXR revealed bilateral (L>R) mixed interstitial opacities and patchy airspace opacities compared to the prior CXR from 10/26/17, concerning for infection. She was given IV fluids, and started on Zosyn and levofloxacin.     Heme/Onc History (per most recent clinic visit note):  Diagnosis: Stage IIA, T2N0M0, grade 3, ER-/SC-/Her2- (triple negative), right breast cancer.  She was diagnosed via abnormal screening mammogram. She ultimately had US, contrast-enhanced mammo, and biopsy showing a 3.4 cm mass and pathology showed a grade 3 invasive mammary carcinoma with associated high-grade DCIS. ER/SC was negative and HER2 negative. She enrolled in ISPY-2 clinical trial and began treatment with weekly taxol and once every 3 week pembrolizumab on 9/2/17. Course has been complicated by fevers with PNA and then UTI. Week 7 was held due to transaminitis. Noted to be tolerating therapy well, with partial response noted on breast MRI after 3 weeks. Plan for 12 weeks of Taxol and pembrolizumab followed by 4 cycles of AC and pembrolizumab. She will then proceed with surgery and potentially adjuvant radiation.    Past Medical History    I have reviewed this patient's medical history and updated it with pertinent information if needed.   Past Medical History:   Diagnosis Date     COPD (chronic obstructive pulmonary disease) (H) 2011     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) 8/30/2017     Periodontal disease      Sleep apnea 12/2015     Urticaria        Past Surgical History   I have reviewed this patient's surgical history and updated it with pertinent information if needed.  Past Surgical History:   Procedure Laterality Date     APPENDECTOMY  10/6/2015     CATARACT IOL, RT/LT  11/2016    bilateral       COLONOSCOPY  11/15/2011    Procedure:COLONOSCOPY; Colonoscopy, screening; Surgeon:ANASTASIA BUNCH; Location:[facility] OR     INSERT PORT VASCULAR ACCESS Left 2017    Procedure: INSERT PORT VASCULAR ACCESS;  Single Lumen Chest Power Port;  Surgeon: Leif Parkinson PA-C;  Location: [facility] OR     TUBAL LIGATION  2004    Bilateral       Prior to Admission Medications   Prior to Admission Medications   Prescriptions Last Dose Informant Patient Reported? Taking?   Coenzyme Q10 (CO Q 10 PO) 11/10/2017 at Unknown time  Yes Yes   Sig: Take 1 tablet by mouth    Cyanocobalamin (VITAMIN B 12 PO) 11/10/2017 at Unknown time  Yes Yes   Sig: Take 100 mcg by mouth daily    LORazepam (ATIVAN) 0.5 MG tablet 11/10/2017 at Unknown time  Yes Yes   Sig: Take 1-2 tabs 20 minutes prior to MRI scans.   LORazepam (ATIVAN) 0.5 MG tablet 11/10/2017 at Unknown time  No Yes   Sig: Take 1 tablet (0.5 mg) by mouth every 4 hours as needed (Anxiety, Nausea/Vomiting or Sleep)   MULTIPLE VITAMIN PO 11/10/2017 at Unknown time  Yes Yes   Pyridoxine HCl (VITAMIN B6 PO) 11/10/2017 at Unknown time  Yes Yes   Sig: Take 1 tablet by mouth daily.   VITAMIN D 1000 UNIT OR CAPS 11/10/2017 at Unknown time  Yes Yes   Si CAPSULE DAILY   Zinc Gluconate 100 MG TABS 11/10/2017 at Unknown time  No Yes   Sig: Take 1 tablet by mouth daily   albuterol (PROAIR HFA/PROVENTIL HFA/VENTOLIN HFA) 108 (90 BASE) MCG/ACT Inhaler Past Week at Unknown time  No Yes   Sig: Inhale 2 puffs into the lungs every 6 hours as needed for shortness of breath / dyspnea   aspirin 81 MG tablet 11/10/2017 at Unknown time  Yes Yes   Sig: Take 1 tablet by mouth daily.   citalopram (CELEXA) 10 MG tablet 11/10/2017 at Unknown time  No Yes   Sig: Take 1 tablet (10 mg) by mouth daily   darifenacin (ENABLEX) 7.5 MG 24 hr tablet 11/10/2017 at Unknown time  No Yes   Sig: Take 1 tablet (7.5 mg) by mouth daily   guaiFENesin (MUCINEX) 600 MG 12 hr tablet 11/10/2017 at Unknown time  No Yes   Sig:  Take 2 tablets (1,200 mg) by mouth 2 times daily   hydrOXYzine (ATARAX) 25 MG tablet 11/10/2017 at Unknown time  No Yes   Sig: Take 1-2 tablets (25-50 mg) by mouth every 6 hours as needed for itching   lidocaine-prilocaine (EMLA) cream 11/10/2017 at Unknown time  No Yes   Sig: Please apply to port site 30 minutes before use prn   order for DME Unknown at Unknown time  No No   Sig: Respironics Dream Station Auto CPAP 12-18 cm, F&P Simplus FFM small.   order for DME Unknown at Unknown time  No No   Sig: Cranial prosthesis   simvastatin (ZOCOR) 40 MG tablet 11/10/2017 at Unknown time  No Yes   Sig: Take 1 tablet (40 mg) by mouth At Bedtime   tiotropium (SPIRIVA) 18 MCG capsule 11/10/2017 at Unknown time  No Yes   Sig: Inhale 1 capsule (18 mcg) into the lungs daily Inhale contents of one capsule      Facility-Administered Medications: None     Allergies   No Known Allergies    Social History   Social History     Social History     Marital status: Single     Spouse name: N/A     Number of children: N/A     Years of education: N/A     Occupational History     Equipment dispatcher [employer]      Social History Main Topics     Smoking status: Former Smoker     Packs/day: 1.00     Years: 15.00     Types: Cigarettes     Quit date: 1/1/2000     Smokeless tobacco: Never Used     Alcohol use 8.4 - 12.6 oz/week      Comment: daily     Drug use: No     Sexual activity: Not Currently     Partners: Male     Other Topics Concern     Parent/Sibling W/ Cabg, Mi Or Angioplasty Before 65f 55m? Yes     Social History Narrative       Family History   I have reviewed this patient's family history and updated it with pertinent information if needed.   Family History   Problem Relation Age of Onset     Cardiovascular Father 46     MI     Eye Disorder Father      CEREBROVASCULAR DISEASE Father      Arthritis Sister      Neurologic Disorder Brother      CANCER No family hx of      DIABETES No family hx of      Hypertension No family  hx of        Review of Systems   A comprehensive ROS was performed with the patient and was found to be negative or non-contributory with the exception of that noted in the HPI above.    Physical Exam   Temp:  [100  F (37.8  C)-100.4  F (38  C)] 100  F (37.8  C)  Pulse:  [120] 120  Heart Rate:  [106] 106  Resp:  [20-22] 22  BP: (111-114)/(65-67) 114/67  SpO2:  [95 %] 95 %  CONSTITUTIONAL: Appears ill and fatigued, but alert and interactive. Lying in bed in no acute distress.  HEENT: NC/AT, PERRLA, EOMI, anicteric sclera, oropharynx is pink and moist without erythema/exudates/lesions/thrush.  NECK: Supple, full ROM, no appreciable thyromegaly.  CARDIOVASCULAR: Tachycardic, but regular rhythm. No murmurs, click, or rub. 2+ equal and bilateral radial and pedal pulses.   RESPIRATORY: CTAB. No wheezes appreciated. Normal respiratory effort on ambient air.  GASTROINTESTINAL: Soft, non-tender, non-distended, normoactive bowel sounds, no masses or organomegaly appreciated.  MUSCULOSKELETAL: No joint swelling or tenderness.  EXTREMITIES: No lower extremity edema.   SKIN: Warm and intact. No concerning lesions or rashes on exposed skin surfaces. No jaundice.  NEUROLOGIC: Alert and fully oriented, appropriately responsive during interview.   VASCULAR ACCESS: Port in left upper chest, C/D/I, non-tender.    Data   I have personally reviewed the following labs/imaging:  Results for orders placed or performed during the hospital encounter of 11/11/17 (from the past 24 hour(s))   CBC with platelets differential   Result Value Ref Range    WBC 7.2 4.0 - 11.0 10e9/L    RBC Count 3.33 (L) 3.8 - 5.2 10e12/L    Hemoglobin 10.9 (L) 11.7 - 15.7 g/dL    Hematocrit 33.2 (L) 35.0 - 47.0 %     78 - 100 fl    MCH 32.7 26.5 - 33.0 pg    MCHC 32.8 31.5 - 36.5 g/dL    RDW 13.9 10.0 - 15.0 %    Platelet Count 371 150 - 450 10e9/L    Diff Method Automated Method     % Neutrophils 88.9 %    % Lymphocytes 7.7 %    % Monocytes 2.5 %    %  Eosinophils 0.3 %    % Basophils 0.3 %    % Immature Granulocytes 0.3 %    Nucleated RBCs 0 0 /100    Absolute Neutrophil 6.4 1.6 - 8.3 10e9/L    Absolute Lymphocytes 0.6 (L) 0.8 - 5.3 10e9/L    Absolute Monocytes 0.2 0.0 - 1.3 10e9/L    Absolute Eosinophils 0.0 0.0 - 0.7 10e9/L    Absolute Basophils 0.0 0.0 - 0.2 10e9/L    Abs Immature Granulocytes 0.0 0 - 0.4 10e9/L    Absolute Nucleated RBC 0.0    Comprehensive metabolic panel   Result Value Ref Range    Sodium 134 133 - 144 mmol/L    Potassium 3.9 3.4 - 5.3 mmol/L    Chloride 102 94 - 109 mmol/L    Carbon Dioxide 24 20 - 32 mmol/L    Anion Gap 8 3 - 14 mmol/L    Glucose 137 (H) 70 - 99 mg/dL    Urea Nitrogen 8 7 - 30 mg/dL    Creatinine 0.66 0.52 - 1.04 mg/dL    GFR Estimate >90 >60 mL/min/1.7m2    GFR Estimate If Black >90 >60 mL/min/1.7m2    Calcium 8.5 8.5 - 10.1 mg/dL    Bilirubin Total 0.7 0.2 - 1.3 mg/dL    Albumin 2.6 (L) 3.4 - 5.0 g/dL    Protein Total 7.3 6.8 - 8.8 g/dL    Alkaline Phosphatase 130 40 - 150 U/L    ALT 53 (H) 0 - 50 U/L    AST 68 (H) 0 - 45 U/L   Lactic acid whole blood   Result Value Ref Range    Lactic Acid 2.2 (H) 0.7 - 2.0 mmol/L   XR Chest 2 Views    Narrative    Exam:  XR CHEST 2 VW, 11/11/2017 5:45 PM    History: Fever;     Comparison:  Chest radiograph from 10/26/2017.    Findings:  PA and lateral views chest. Left chest wall portacatheter  tip projects over the low SVC. The cardiomediastinal silhouette is  within normal limits. There is no pleural effusion or pneumothorax.  Slightly increased left greater than right mixed interstitial and  patchy airspace opacities. Visualized upper abdomen and osseous  structures are unremarkable.      Impression    Impression:    Increased left greater than right mixed interstitial and patchy  airspace opacities compared to 10/26/2017, concerning for infection,  less likely pulmonary edema or atelectasis.    I have personally reviewed the examination and initial interpretation  and I agree with  the findings.    BON LU MD

## 2017-11-12 NOTE — ED PROVIDER NOTES
SIGN-OUT:  - Assumed care of this patient from Dr. Lopez  - Pending at shift change: Movement to inpatient floor/service  - Tentative plan: Admit to Onc    REASSESSMENT:  - No acute issues or interventions necessary while still in the emergency department.    DISPOSITION:  - Patient carried on with their original disposition plan.       Angelika Martinez MD  11/11/17 2024

## 2017-11-12 NOTE — PROGRESS NOTES
[facility]  Hematology Oncology Progress Note      Patient: Ashleigh Alonzo MRN# [id number]   Age: 57 year old YOB: 1960           Assessment and Plans     Assessment:  1. Non-neutropenic fever - from possible PNA (CXR positive for bilateral mixed patchy and interstitial infiltration)   2. Triple negative Rt breast cancer, Stage IIA (T2, N0, cM0), Dx 9/19/17, Dr Bradshaw  - Neoadjuvant s/p C7 Taxol + Pembro (C7D1 11/7, C7 delayed d/t grade 2 transaminitis) per study ISPY2   - original plan: 12 weekly Taxol + Pembro q3wks f/b 4C Pembro/AC  - partial response noted on breast MRI 3 weeks after treatment  - course complicated by possible RUL PNA on 10/12/17, treated with levofloxacin; questionable UTI (culture negative)  3. Transaminitis grade 2 from Taxol/Pembro  4. Hx of COPD, on inhalers, no home oxygen use, no recent flair    The patient was admitted for fever and dry cough for 2 days after recent taxol & keytruda (C7D1 11/7). Previously her treatment course was complicated by questionable UTI and PNA but has not had recurrent fevers previously. Given multi-lobar infiltration and acute toxic appearance, pneumonia is suspected. Zosyn will be continued to cover healthcare associated PNA (but will d/c levaquin - unclear why she needs double pseudomonas coverage in light of normal ANC). On the other hand, due to ongoing use of pembrolizumab, possible autoimmune pneumonitis is a concern. If she remains febrile or refractory to broad spectrum antibiotics, it would be definitely considered and we will review the study protocol to see what treatments are allowed including steroids. Of note, transaminitis possibly from the breast cancer treatment seemed improving on admission and we will continue to monitor LFT.    Plans:  Continue Zosyn for possible HA-pneumonia; may consider vancomycin if not improving or recurrent fevers  Obtain full respiratory virus panel (influenza  negative)  Will review the study protocol for possible Keytruda related pneumonitis    Patient was seen, care plans discussed and staffed with Dr. Noguera. Please don't hesitate to contact us if you have any questions.    Se rebecca Marroquin  Hematology Oncology and Transplantation Fellow  Pager: [phone]      Interval History     Ms. Alonzo was admitted for spiking fevers and dry cough for 2 days after receiving C7 taxol and keytruda on 11/7. Did not have any fevers previously from Keytruda and prolonged fevers concerned her. Recent PNA on 10/12, treated with PO levofloxacin. Did not feel much difference after completing Abx course, partially from ongoing cancer treatment. Dry cough was new w/o SOB or CP. No sick contact. Received flu vaccine. Had hx of mild COPD but did not have any recent exacerbation. Also told she had possible UTI before based on UA (18 WBCs, large LE, 4 RBCs; No Sx on 10/26/17) and treated with ciprofloxacin for a 7 day course. However, Urine culture from that sample grew <10K urogenital ariela. No other immediate concerns or acute issues.     ROS: Negative other than as stated in above interval history.      Current Facility-Administered Medications   Medication     0.9% sodium chloride infusion     lidocaine 1 % 1 mL     lidocaine (LMX4) kit     sodium chloride (PF) 0.9% PF flush 10-20 mL     heparin lock flush 10 UNIT/ML injection 5-10 mL     heparin lock flush 10 UNIT/ML injection 5-10 mL     heparin 100 UNIT/ML injection 5 mL     sodium chloride (PF) 0.9% PF flush 10-20 mL     albuterol (PROAIR HFA/PROVENTIL HFA/VENTOLIN HFA) Inhaler 2 puff     aspirin EC EC tablet 81 mg     citalopram (celeXA) tablet 10 mg     guaiFENesin (MUCINEX) 12 hr tablet 1,200 mg     hydrOXYzine (ATARAX) tablet 25-50 mg     LORazepam (ATIVAN) tablet 0.5 mg     simvastatin (ZOCOR) tablet 40 mg     umeclidinium (INCRUSE ELLIPTA) 62.5 MCG/INH oral inhaler 1 puff     Medication Instruction     acetaminophen (TYLENOL)  "tablet 650 mg     piperacillin-tazobactam (ZOSYN) 4.5 g vial to attach to  mL bag     levofloxacin (LEVAQUIN) infusion 750 mg     Facility-Administered Medications Ordered in Other Encounters   Medication     lidocaine 1 % 9 mL     sodium bicarbonate 8.4 % injection 1 mEq     lidocaine-EPINEPHrine 1.5 %-[id number] injection 10 mL           Physical Exams     /53 (BP Location: Left arm)  Pulse 111  Temp 102.1  F (38.9  C) (Oral)  Resp 22  Ht 1.626 m (5' 4\")  Wt 70.6 kg (155 lb 9.6 oz)  SpO2 95%  Breastfeeding? No  BMI 26.71 kg/m2  Wt Readings from Last 3 Encounters:   11/12/17 70.6 kg (155 lb 9.6 oz)   11/07/17 70.9 kg (156 lb 4.8 oz)   10/31/17 71.3 kg (157 lb 3.2 oz)     General:  Acute sick looking with sweating  HEENT: NCAT. anicteric sclera. Oral mucosa pink and moist with no lesions or thrush.  Neck: Neck supple.   Respiratory: Non-labored breathing, decreased BS in left lower lobe, negative rhonchi or crackles  Cardiovascular: Regular rate and rhythm. No murmur or rub.   Abdomen: Normoactive bowel sounds. Abdomen soft, non-distended, and non-tender. No palpable masses or organomegaly.  Extremities: grossly normal, non-tender, no edema. Good strength and ROM.  Psych: AAOx3, appropriated mood  Access: Port a cath in left chest intact          Labs     CBC  ===  WBC (10e9/L)   Date Value   11/12/2017 4.1     Hemoglobin (g/dL)   Date Value   11/12/2017 8.9 (L)     Platelet Count (10e9/L)   Date Value   11/12/2017 257       BMP  ===  Sodium (mmol/L)   Date Value   11/12/2017 136     Potassium (mmol/L)   Date Value   11/12/2017 4.2     Urea Nitrogen (mg/dL)   Date Value   11/12/2017 7     Creatinine (mg/dL)   Date Value   11/12/2017 0.64     Calcium (mg/dL)   Date Value   11/12/2017 8.0 (L)       LFTs  ====  Bilirubin Total (mg/dL)   Date Value   11/11/2017 0.7     Alkaline Phosphatase (U/L)   Date Value   11/11/2017 130     AST (U/L)   Date Value   11/11/2017 68 (H)     ALT (U/L)   Date Value "   11/11/2017 53 (H)               Images     Results for orders placed or performed during the hospital encounter of 11/11/17   XR Chest 2 Views    Narrative    Exam:  XR CHEST 2 VW, 11/11/2017 5:45 PM    History: Fever;     Comparison:  Chest radiograph from 10/26/2017.    Findings:  PA and lateral views chest. Left chest wall portacatheter  tip projects over the low SVC. The cardiomediastinal silhouette is  within normal limits. There is no pleural effusion or pneumothorax.  Slightly increased left greater than right mixed interstitial and  patchy airspace opacities. Visualized upper abdomen and osseous  structures are unremarkable.      Impression    Impression:    Increased left greater than right mixed interstitial and patchy  airspace opacities compared to 10/26/2017, concerning for infection,  less likely pulmonary edema or atelectasis.    I have personally reviewed the examination and initial interpretation  and I agree with the findings.    BON LU MD

## 2017-11-12 NOTE — PLAN OF CARE
Problem: Pneumonia (Adult)  Goal: Signs and Symptoms of Listed Potential Problems Will be Absent, Minimized or Managed (Pneumonia)  Signs and symptoms of listed potential problems will be absent, minimized or managed by discharge/transition of care (reference Pneumonia (Adult) CPG).      Admitted from the ED around 2000. Given IVF's and antibiotics downstairs. Here for diagnosis of pneumonia. Placed on droplet precautions. Needs an influenza A/B antigen collected yet. Lactic acid rechecked and was 1.0. Has a dry cough. Is on room air with sats of 97%. States she is weak and has had a poor appetite. LBM was three days ago. Received her last chemo treatment last Tuesday for breast cancer. Tmax 100.6. Had chest x-ray and blood cultures done today.

## 2017-11-12 NOTE — PLAN OF CARE
Problem: Patient Care Overview  Goal: Plan of Care/Patient Progress Review  Outcome: No Change     Tmax 105.1. HR in the 150s during fever. O2 sats in high 80s; 2L NC applied. Vitals improved after tylenol. Temp down to 99.3 and . Lactic acid 0.7. On zosyn and levaquin. Influenza A & B and RSV negative. 2 softer stools. Up with SBA. Continue with plan of care.

## 2017-11-13 ENCOUNTER — APPOINTMENT (OUTPATIENT)
Dept: CT IMAGING | Facility: CLINIC | Age: 57
DRG: 193 | End: 2017-11-13
Attending: NURSE PRACTITIONER
Payer: COMMERCIAL

## 2017-11-13 LAB
ABO + RH BLD: NORMAL
ABO + RH BLD: NORMAL
ALBUMIN SERPL-MCNC: 2 G/DL (ref 3.4–5)
ALP SERPL-CCNC: 97 U/L (ref 40–150)
ALT SERPL W P-5'-P-CCNC: 38 U/L (ref 0–50)
ANION GAP SERPL CALCULATED.3IONS-SCNC: 8 MMOL/L (ref 3–14)
AST SERPL W P-5'-P-CCNC: 49 U/L (ref 0–45)
BASOPHILS # BLD AUTO: 0 10E9/L (ref 0–0.2)
BASOPHILS NFR BLD AUTO: 0.3 %
BILIRUB SERPL-MCNC: 0.6 MG/DL (ref 0.2–1.3)
BLD GP AB SCN SERPL QL: NORMAL
BLOOD BANK CMNT PATIENT-IMP: NORMAL
BUN SERPL-MCNC: 5 MG/DL (ref 7–30)
C DIFF TOX B STL QL: NEGATIVE
CALCIUM SERPL-MCNC: 8.2 MG/DL (ref 8.5–10.1)
CHLORIDE SERPL-SCNC: 107 MMOL/L (ref 94–109)
CO2 SERPL-SCNC: 23 MMOL/L (ref 20–32)
CREAT SERPL-MCNC: 0.58 MG/DL (ref 0.52–1.04)
DIFFERENTIAL METHOD BLD: ABNORMAL
EOSINOPHIL # BLD AUTO: 0.1 10E9/L (ref 0–0.7)
EOSINOPHIL NFR BLD AUTO: 2.2 %
ERYTHROCYTE [DISTWIDTH] IN BLOOD BY AUTOMATED COUNT: 14 % (ref 10–15)
FLUAV H1 2009 PAND RNA SPEC QL NAA+PROBE: NEGATIVE
FLUAV H1 RNA SPEC QL NAA+PROBE: NEGATIVE
FLUAV H3 RNA SPEC QL NAA+PROBE: NEGATIVE
FLUAV RNA SPEC QL NAA+PROBE: NEGATIVE
FLUBV RNA SPEC QL NAA+PROBE: NEGATIVE
GFR SERPL CREATININE-BSD FRML MDRD: >90 ML/MIN/1.7M2
GLUCOSE SERPL-MCNC: 79 MG/DL (ref 70–99)
HADV DNA SPEC QL NAA+PROBE: NEGATIVE
HADV DNA SPEC QL NAA+PROBE: NEGATIVE
HCT VFR BLD AUTO: 26.5 % (ref 35–47)
HGB BLD-MCNC: 8.7 G/DL (ref 11.7–15.7)
HMPV RNA SPEC QL NAA+PROBE: NEGATIVE
HPIV1 RNA SPEC QL NAA+PROBE: NEGATIVE
HPIV2 RNA SPEC QL NAA+PROBE: NEGATIVE
HPIV3 RNA SPEC QL NAA+PROBE: NEGATIVE
IMM GRANULOCYTES # BLD: 0 10E9/L (ref 0–0.4)
IMM GRANULOCYTES NFR BLD: 0.6 %
LACTATE BLD-SCNC: 0.7 MMOL/L (ref 0.7–2)
LYMPHOCYTES # BLD AUTO: 0.6 10E9/L (ref 0.8–5.3)
LYMPHOCYTES NFR BLD AUTO: 19.4 %
MCH RBC QN AUTO: 31.8 PG (ref 26.5–33)
MCHC RBC AUTO-ENTMCNC: 32.8 G/DL (ref 31.5–36.5)
MCV RBC AUTO: 97 FL (ref 78–100)
MICROBIOLOGIST REVIEW: NORMAL
MONOCYTES # BLD AUTO: 0.1 10E9/L (ref 0–1.3)
MONOCYTES NFR BLD AUTO: 4.4 %
NEUTROPHILS # BLD AUTO: 2.3 10E9/L (ref 1.6–8.3)
NEUTROPHILS NFR BLD AUTO: 73.1 %
NRBC # BLD AUTO: 0 10*3/UL
NRBC BLD AUTO-RTO: 0 /100
PLATELET # BLD AUTO: 245 10E9/L (ref 150–450)
POTASSIUM SERPL-SCNC: 4.1 MMOL/L (ref 3.4–5.3)
PROT SERPL-MCNC: 5.8 G/DL (ref 6.8–8.8)
RBC # BLD AUTO: 2.74 10E12/L (ref 3.8–5.2)
RHINOVIRUS RNA SPEC QL NAA+PROBE: NEGATIVE
RSV RNA SPEC QL NAA+PROBE: NEGATIVE
RSV RNA SPEC QL NAA+PROBE: NEGATIVE
SODIUM SERPL-SCNC: 138 MMOL/L (ref 133–144)
SPECIMEN EXP DATE BLD: NORMAL
SPECIMEN SOURCE: NORMAL
SPECIMEN SOURCE: NORMAL
WBC # BLD AUTO: 3.2 10E9/L (ref 4–11)

## 2017-11-13 PROCEDURE — 25000128 H RX IP 250 OP 636: Performed by: INTERNAL MEDICINE

## 2017-11-13 PROCEDURE — 25000132 ZZH RX MED GY IP 250 OP 250 PS 637: Performed by: INTERNAL MEDICINE

## 2017-11-13 PROCEDURE — 86901 BLOOD TYPING SEROLOGIC RH(D): CPT | Performed by: INTERNAL MEDICINE

## 2017-11-13 PROCEDURE — 80053 COMPREHEN METABOLIC PANEL: CPT | Performed by: INTERNAL MEDICINE

## 2017-11-13 PROCEDURE — 87493 C DIFF AMPLIFIED PROBE: CPT | Performed by: NURSE PRACTITIONER

## 2017-11-13 PROCEDURE — 12000008 ZZH R&B INTERMEDIATE UMMC

## 2017-11-13 PROCEDURE — 71250 CT THORAX DX C-: CPT

## 2017-11-13 PROCEDURE — 25000132 ZZH RX MED GY IP 250 OP 250 PS 637: Performed by: NURSE PRACTITIONER

## 2017-11-13 PROCEDURE — 85025 COMPLETE CBC W/AUTO DIFF WBC: CPT | Performed by: INTERNAL MEDICINE

## 2017-11-13 PROCEDURE — 86900 BLOOD TYPING SEROLOGIC ABO: CPT | Performed by: INTERNAL MEDICINE

## 2017-11-13 PROCEDURE — 87040 BLOOD CULTURE FOR BACTERIA: CPT | Performed by: INTERNAL MEDICINE

## 2017-11-13 PROCEDURE — 36592 COLLECT BLOOD FROM PICC: CPT | Performed by: INTERNAL MEDICINE

## 2017-11-13 PROCEDURE — 86850 RBC ANTIBODY SCREEN: CPT | Performed by: INTERNAL MEDICINE

## 2017-11-13 PROCEDURE — 25000128 H RX IP 250 OP 636: Performed by: NURSE PRACTITIONER

## 2017-11-13 PROCEDURE — 99232 SBSQ HOSP IP/OBS MODERATE 35: CPT | Mod: GC | Performed by: INTERNAL MEDICINE

## 2017-11-13 PROCEDURE — 83605 ASSAY OF LACTIC ACID: CPT | Performed by: INTERNAL MEDICINE

## 2017-11-13 RX ORDER — METHYLPREDNISOLONE SODIUM SUCCINATE 125 MG/2ML
60 INJECTION, POWDER, LYOPHILIZED, FOR SOLUTION INTRAMUSCULAR; INTRAVENOUS EVERY 24 HOURS
Status: DISCONTINUED | OUTPATIENT
Start: 2017-11-13 | End: 2017-11-15 | Stop reason: CLARIF

## 2017-11-13 RX ORDER — AZITHROMYCIN 250 MG/1
250 TABLET, FILM COATED ORAL DAILY
Status: DISCONTINUED | OUTPATIENT
Start: 2017-11-14 | End: 2017-11-15 | Stop reason: CLARIF

## 2017-11-13 RX ORDER — HEPARIN SODIUM (PORCINE) LOCK FLUSH IV SOLN 100 UNIT/ML 100 UNIT/ML
5 SOLUTION INTRAVENOUS EVERY 8 HOURS PRN
Status: DISCONTINUED | OUTPATIENT
Start: 2017-11-13 | End: 2017-11-15 | Stop reason: HOSPADM

## 2017-11-13 RX ORDER — AZITHROMYCIN 250 MG/1
500 TABLET, FILM COATED ORAL ONCE
Status: COMPLETED | OUTPATIENT
Start: 2017-11-13 | End: 2017-11-13

## 2017-11-13 RX ORDER — PANTOPRAZOLE SODIUM 40 MG/1
40 TABLET, DELAYED RELEASE ORAL EVERY MORNING
Status: DISCONTINUED | OUTPATIENT
Start: 2017-11-13 | End: 2017-11-15 | Stop reason: HOSPADM

## 2017-11-13 RX ORDER — HEPARIN SODIUM,PORCINE 10 UNIT/ML
5-10 VIAL (ML) INTRAVENOUS
Status: DISCONTINUED | OUTPATIENT
Start: 2017-11-13 | End: 2017-11-15 | Stop reason: HOSPADM

## 2017-11-13 RX ADMIN — GUAIFENESIN 1200 MG: 600 TABLET, EXTENDED RELEASE ORAL at 20:15

## 2017-11-13 RX ADMIN — AZITHROMYCIN MONOHYDRATE 500 MG: 250 TABLET ORAL at 11:58

## 2017-11-13 RX ADMIN — VANCOMYCIN HYDROCHLORIDE 1500 MG: 10 INJECTION, POWDER, LYOPHILIZED, FOR SOLUTION INTRAVENOUS at 01:59

## 2017-11-13 RX ADMIN — PIPERACILLIN AND TAZOBACTAM 4.5 G: 4; .5 INJECTION, POWDER, LYOPHILIZED, FOR SOLUTION INTRAVENOUS; PARENTERAL at 18:47

## 2017-11-13 RX ADMIN — GUAIFENESIN 1200 MG: 600 TABLET, EXTENDED RELEASE ORAL at 08:54

## 2017-11-13 RX ADMIN — ASPIRIN 81 MG: 81 TABLET, COATED ORAL at 08:54

## 2017-11-13 RX ADMIN — PIPERACILLIN AND TAZOBACTAM 4.5 G: 4; .5 INJECTION, POWDER, LYOPHILIZED, FOR SOLUTION INTRAVENOUS; PARENTERAL at 23:39

## 2017-11-13 RX ADMIN — ENOXAPARIN SODIUM 40 MG: 40 INJECTION SUBCUTANEOUS at 11:58

## 2017-11-13 RX ADMIN — PANTOPRAZOLE SODIUM 40 MG: 40 TABLET, DELAYED RELEASE ORAL at 11:58

## 2017-11-13 RX ADMIN — CITALOPRAM HYDROBROMIDE 10 MG: 10 TABLET ORAL at 08:54

## 2017-11-13 RX ADMIN — ACETAMINOPHEN 650 MG: 325 TABLET, FILM COATED ORAL at 00:19

## 2017-11-13 RX ADMIN — PIPERACILLIN AND TAZOBACTAM 4.5 G: 4; .5 INJECTION, POWDER, LYOPHILIZED, FOR SOLUTION INTRAVENOUS; PARENTERAL at 11:58

## 2017-11-13 RX ADMIN — SIMVASTATIN 40 MG: 40 TABLET, FILM COATED ORAL at 21:45

## 2017-11-13 RX ADMIN — VANCOMYCIN HYDROCHLORIDE 1500 MG: 10 INJECTION, POWDER, LYOPHILIZED, FOR SOLUTION INTRAVENOUS at 13:38

## 2017-11-13 RX ADMIN — ACETAMINOPHEN 650 MG: 325 TABLET, FILM COATED ORAL at 11:58

## 2017-11-13 RX ADMIN — PIPERACILLIN AND TAZOBACTAM 4.5 G: 4; .5 INJECTION, POWDER, LYOPHILIZED, FOR SOLUTION INTRAVENOUS; PARENTERAL at 00:19

## 2017-11-13 RX ADMIN — UMECLIDINIUM 1 PUFF: 62.5 AEROSOL, POWDER ORAL at 08:55

## 2017-11-13 RX ADMIN — METHYLPREDNISOLONE SODIUM SUCCINATE 62.5 MG: 125 INJECTION, POWDER, LYOPHILIZED, FOR SOLUTION INTRAMUSCULAR; INTRAVENOUS at 20:16

## 2017-11-13 RX ADMIN — PIPERACILLIN AND TAZOBACTAM 4.5 G: 4; .5 INJECTION, POWDER, LYOPHILIZED, FOR SOLUTION INTRAVENOUS; PARENTERAL at 05:51

## 2017-11-13 RX ADMIN — ACETAMINOPHEN 650 MG: 325 TABLET, FILM COATED ORAL at 21:50

## 2017-11-13 NOTE — PLAN OF CARE
Problem: Patient Care Overview  Goal: Plan of Care/Patient Progress Review  Outcome: No Change  Temp 102.1.NP notified. Tylenol given. No bc since within 24 hrs. Feeling better after tylenol. Zosyn/vanco/azithromycin cont as ordered. Poor po ittake. Please stool cx, unable to send stool cx since no bm ao far.up with sba.

## 2017-11-13 NOTE — PLAN OF CARE
Problem: Patient Care Overview  Goal: Plan of Care/Patient Progress Review  Outcome: No Change     6530-8757: Tmax 98.5. On 2L NC for comfort. Refused to ambulate in the halls but did complete IS x1. RSV panel pending.      2300-0700: Tmax 102.2.  with fever. Blood cultures drawn and tylenol given. On zosyn; vanco added. Loose stool x2 and had softer stools prior to that; hat placed in toilet to assess. Up with SBA to bathroom. Pt feeling discouraged with constant fevers. Lactic acid 0.7. Continue with plan of care.

## 2017-11-13 NOTE — PROGRESS NOTES
[facility]  Hematology Oncology Progress Note  Patient: Ashleigh Alonzo MRN# [id number]   Age: 57 year old YOB: 1960       Assessment and Plans     Assessment:  1. Non-neutropenic fever - from possible PNA (CXR positive for bilateral mixed patchy and interstitial infiltration)   2. Triple negative Rt breast cancer, Stage IIA (T2, N0, cM0), Dx 9/19/17, Dr Bradshaw  - Neoadjuvant s/p C7 Taxol + Pembro (C7D1 11/7, C7 delayed d/t grade 2 transaminitis) per study ISPY2   - original plan: 12 weekly Taxol + Pembro q3wks f/b 4C Pembro/AC  - partial response noted on breast MRI 3 weeks after treatment  - course complicated by possible RUL PNA on 10/12/17, treated with levofloxacin; questionable UTI (culture negative)  3. Transaminitis grade 2 from Taxol/Pembro  4. Hx of COPD, on inhalers, no home oxygen use, no recent flair    The patient was admitted for fever and dry cough for 2 days after recent taxol & keytruda (C7D1 11/7). Previously her treatment course was complicated by questionable UTI and PNA but has not had recurrent fevers previously. Given multi-lobar infiltration and acute toxic appearance, pneumonia is suspected. Zosyn will be continued to cover healthcare associated PNA (but will d/c levaquin - unclear why she needs double pseudomonas coverage in light of normal ANC). On the other hand, due to ongoing use of pembrolizumab, possible autoimmune pneumonitis is a concern. If she remains febrile or refractory to broad spectrum antibiotics, it would be definitely considered and we will review the study protocol to see what treatments are allowed including steroids. Of note, transaminitis possibly from the breast cancer treatment seemed improving on admission and we will continue to monitor LFT.    Plans:  Zosyn, Vancomycin, Azithromax for HCAP/COPD exacerbation.  RVP negative, so evaluate infiltrates (pneumonitis vs. PNA), hold off on steroids until 11/14 unless acute  decompensation, moonlighter aware.  R/O c difficile in the setting of diarrhea.    Patient was seen, care plans discussed and staffed with Dr. Noguera. Please don't hesitate to contact us if you have any questions.    Jayashree Herrity NP heme/onc [id number]      Interval History   Ashleigh Perera reports feeling overall better this AM but with persistent fevers.  Reports work of breathing is less, feels more energetic would like to ambulate more today.  Reports diarrhea overnight -- c diff pending.  She reports she been eating mostly a liquid diet so diarrhea wasn't surprising to her.  No abdominal pain or cramping.  Still with dry cough, no congestion/drainage reported, no sinus HA.  Added azithromax for atypical coverage (vanc added ON).  Plan for Chest CT if RVP negative.    ROS: Negative other than as stated in above interval history.      Current Facility-Administered Medications   Medication     vancomycin (VANCOCIN) 1,500 mg in NaCl 0.9 % 250 mL intermittent infusion     heparin 100 UNIT/ML injection 5 mL     heparin lock flush 10 UNIT/ML injection 5-10 mL     [START ON 11/14/2017] azithromycin (ZITHROMAX) tablet 250 mg     enoxaparin (LOVENOX) injection 40 mg     pantoprazole (PROTONIX) EC tablet 40 mg     sodium chloride (PF) 0.9% PF flush 10-20 mL     heparin lock flush 10 UNIT/ML injection 5-10 mL     albuterol (PROAIR HFA/PROVENTIL HFA/VENTOLIN HFA) Inhaler 2 puff     aspirin EC EC tablet 81 mg     citalopram (celeXA) tablet 10 mg     guaiFENesin (MUCINEX) 12 hr tablet 1,200 mg     hydrOXYzine (ATARAX) tablet 25-50 mg     LORazepam (ATIVAN) tablet 0.5 mg     simvastatin (ZOCOR) tablet 40 mg     umeclidinium (INCRUSE ELLIPTA) 62.5 MCG/INH oral inhaler 1 puff     acetaminophen (TYLENOL) tablet 650 mg     piperacillin-tazobactam (ZOSYN) 4.5 g vial to attach to  mL bag     Facility-Administered Medications Ordered in Other Encounters   Medication     lidocaine 1 % 9 mL     sodium bicarbonate 8.4 % injection 1 mEq  "    lidocaine-EPINEPHrine 1.5 %-[id number] injection 10 mL         Physical Exams     BP 91/44 (BP Location: Left arm)  Pulse 109  Temp 95.9  F (35.5  C) (Oral)  Resp 18  Ht 1.626 m (5' 4\")  Wt 71.4 kg (157 lb 4.8 oz)  SpO2 93%  Breastfeeding? No  BMI 27 kg/m2  Wt Readings from Last 3 Encounters:   11/13/17 71.4 kg (157 lb 4.8 oz)   11/07/17 70.9 kg (156 lb 4.8 oz)   10/31/17 71.3 kg (157 lb 3.2 oz)     General: NAD, awake & alert, non-diaphoretic.  HEENT: NCAT. anicteric sclera. Oral mucosa pink and moist with no lesions or thrush.  Neck: Neck supple.   Respiratory: Non-labored breathing, decreased BS in left lower lobe, negative rhonchi or crackles.  Cardiovascular: Regular rate and rhythm. No murmur or rub.   Abdomen: Normoactive bowel sounds. Abdomen soft, non-distended, and non-tender. No palpable masses or organomegaly.  Extremities: grossly normal, non-tender, no edema. Good strength and ROM.  Psych: AAOx3, appropriated mood  Access: Port a cath in left chest c/d/i.       Labs     CBC  ===  WBC (10e9/L)   Date Value   11/13/2017 3.2 (L)     Hemoglobin (g/dL)   Date Value   11/13/2017 8.7 (L)     Platelet Count (10e9/L)   Date Value   11/13/2017 245     BMP  ===  Sodium (mmol/L)   Date Value   11/13/2017 138     Potassium (mmol/L)   Date Value   11/13/2017 4.1     Urea Nitrogen (mg/dL)   Date Value   11/13/2017 5 (L)     Creatinine (mg/dL)   Date Value   11/13/2017 0.58     Calcium (mg/dL)   Date Value   11/13/2017 8.2 (L)     LFTs  ====  Bilirubin Total (mg/dL)   Date Value   11/13/2017 0.6     Alkaline Phosphatase (U/L)   Date Value   11/13/2017 97     AST (U/L)   Date Value   11/13/2017 49 (H)     ALT (U/L)   Date Value   11/13/2017 38           Images     Results for orders placed or performed during the hospital encounter of 11/11/17   XR Chest 2 Views    Narrative    Exam:  XR CHEST 2 VW, 11/11/2017 5:45 PM    History: Fever;     Comparison:  Chest radiograph from 10/26/2017.    Findings:  PA and " lateral views chest. Left chest wall portacatheter  tip projects over the low SVC. The cardiomediastinal silhouette is  within normal limits. There is no pleural effusion or pneumothorax.  Slightly increased left greater than right mixed interstitial and  patchy airspace opacities. Visualized upper abdomen and osseous  structures are unremarkable.      Impression    Impression:    Increased left greater than right mixed interstitial and patchy  airspace opacities compared to 10/26/2017, concerning for infection,  less likely pulmonary edema or atelectasis.    I have personally reviewed the examination and initial interpretation  and I agree with the findings.    BON LU MD

## 2017-11-13 NOTE — PHARMACY-VANCOMYCIN DOSING SERVICE
Pharmacy Vancomycin Initial Note  Date of Service 2017  Patient's  1960  57 year old, female    Indication: Healthcare-Associated Pneumonia    Current estimated CrCl = Estimated Creatinine Clearance: 93.5 mL/min (based on Cr of 0.64).    Creatinine for last 3 days  2017:  5:26 PM Creatinine 0.66 mg/dL  2017:  5:30 AM Creatinine 0.64 mg/dL    Recent Vancomycin Level(s) for last 3 days  No results found for requested labs within last 72 hours.      Vancomycin IV Administrations (past 72 hours)      No vancomycin orders with administrations in past 72 hours.                Nephrotoxins and other renal medications (Future)    Start     Dose/Rate Route Frequency Ordered Stop    17 0030  piperacillin-tazobactam (ZOSYN) 4.5 g vial to attach to  mL bag      4.5 g  over 30 Minutes Intravenous EVERY 6 HOURS 17            Contrast Orders - past 72 hours     None                Plan:  1.  Start vancomycin  1500 mg IV q12h.   2.  Goal Trough Level: 15-20 mg/L   3.  Pharmacy will check trough levels as appropriate in 1-3 Days.    4. Serum creatinine levels will be ordered daily for the first week of therapy and at least twice weekly for subsequent weeks.    5. [place] method utilized to dose vancomycin therapy: Method 2    Payam Schwarz

## 2017-11-14 LAB
ALBUMIN SERPL-MCNC: 2 G/DL (ref 3.4–5)
ALP SERPL-CCNC: 109 U/L (ref 40–150)
ALT SERPL W P-5'-P-CCNC: 50 U/L (ref 0–50)
ANION GAP SERPL CALCULATED.3IONS-SCNC: 9 MMOL/L (ref 3–14)
AST SERPL W P-5'-P-CCNC: 58 U/L (ref 0–45)
BASOPHILS # BLD AUTO: 0 10E9/L (ref 0–0.2)
BASOPHILS NFR BLD AUTO: 0 %
BILIRUB SERPL-MCNC: 0.4 MG/DL (ref 0.2–1.3)
BUN SERPL-MCNC: 6 MG/DL (ref 7–30)
CALCIUM SERPL-MCNC: 8.3 MG/DL (ref 8.5–10.1)
CHLORIDE SERPL-SCNC: 111 MMOL/L (ref 94–109)
CO2 SERPL-SCNC: 22 MMOL/L (ref 20–32)
CREAT SERPL-MCNC: 0.61 MG/DL (ref 0.52–1.04)
DIFFERENTIAL METHOD BLD: ABNORMAL
EOSINOPHIL # BLD AUTO: 0 10E9/L (ref 0–0.7)
EOSINOPHIL NFR BLD AUTO: 0 %
ERYTHROCYTE [DISTWIDTH] IN BLOOD BY AUTOMATED COUNT: 13.9 % (ref 10–15)
GFR SERPL CREATININE-BSD FRML MDRD: >90 ML/MIN/1.7M2
GLUCOSE SERPL-MCNC: 155 MG/DL (ref 70–99)
HCT VFR BLD AUTO: 29.3 % (ref 35–47)
HGB BLD-MCNC: 9.6 G/DL (ref 11.7–15.7)
IMM GRANULOCYTES # BLD: 0 10E9/L (ref 0–0.4)
IMM GRANULOCYTES NFR BLD: 0 %
LYMPHOCYTES # BLD AUTO: 0.5 10E9/L (ref 0.8–5.3)
LYMPHOCYTES NFR BLD AUTO: 27.2 %
MCH RBC QN AUTO: 32.1 PG (ref 26.5–33)
MCHC RBC AUTO-ENTMCNC: 32.8 G/DL (ref 31.5–36.5)
MCV RBC AUTO: 98 FL (ref 78–100)
MONOCYTES # BLD AUTO: 0 10E9/L (ref 0–1.3)
MONOCYTES NFR BLD AUTO: 1.2 %
NEUTROPHILS # BLD AUTO: 1.2 10E9/L (ref 1.6–8.3)
NEUTROPHILS NFR BLD AUTO: 71.6 %
NRBC # BLD AUTO: 0 10*3/UL
NRBC BLD AUTO-RTO: 0 /100
PLATELET # BLD AUTO: 291 10E9/L (ref 150–450)
POTASSIUM SERPL-SCNC: 3.7 MMOL/L (ref 3.4–5.3)
PROT SERPL-MCNC: 6.5 G/DL (ref 6.8–8.8)
RBC # BLD AUTO: 2.99 10E12/L (ref 3.8–5.2)
SODIUM SERPL-SCNC: 142 MMOL/L (ref 133–144)
WBC # BLD AUTO: 1.7 10E9/L (ref 4–11)

## 2017-11-14 PROCEDURE — 36592 COLLECT BLOOD FROM PICC: CPT | Performed by: INTERNAL MEDICINE

## 2017-11-14 PROCEDURE — 12000008 ZZH R&B INTERMEDIATE UMMC

## 2017-11-14 PROCEDURE — 25000132 ZZH RX MED GY IP 250 OP 250 PS 637: Performed by: NURSE PRACTITIONER

## 2017-11-14 PROCEDURE — 25000128 H RX IP 250 OP 636: Performed by: INTERNAL MEDICINE

## 2017-11-14 PROCEDURE — 25000128 H RX IP 250 OP 636: Performed by: NURSE PRACTITIONER

## 2017-11-14 PROCEDURE — 25000132 ZZH RX MED GY IP 250 OP 250 PS 637: Performed by: INTERNAL MEDICINE

## 2017-11-14 PROCEDURE — 80053 COMPREHEN METABOLIC PANEL: CPT | Performed by: INTERNAL MEDICINE

## 2017-11-14 PROCEDURE — 85025 COMPLETE CBC W/AUTO DIFF WBC: CPT | Performed by: INTERNAL MEDICINE

## 2017-11-14 PROCEDURE — 99233 SBSQ HOSP IP/OBS HIGH 50: CPT | Performed by: INTERNAL MEDICINE

## 2017-11-14 RX ADMIN — SODIUM CHLORIDE, PRESERVATIVE FREE 5 ML: 5 INJECTION INTRAVENOUS at 16:19

## 2017-11-14 RX ADMIN — GUAIFENESIN 1200 MG: 600 TABLET, EXTENDED RELEASE ORAL at 08:59

## 2017-11-14 RX ADMIN — METHYLPREDNISOLONE SODIUM SUCCINATE 62.5 MG: 125 INJECTION, POWDER, LYOPHILIZED, FOR SOLUTION INTRAMUSCULAR; INTRAVENOUS at 21:52

## 2017-11-14 RX ADMIN — VANCOMYCIN HYDROCHLORIDE 1500 MG: 10 INJECTION, POWDER, LYOPHILIZED, FOR SOLUTION INTRAVENOUS at 02:09

## 2017-11-14 RX ADMIN — ALTEPLASE 2 MG: 2.2 INJECTION, POWDER, LYOPHILIZED, FOR SOLUTION INTRAVENOUS at 20:35

## 2017-11-14 RX ADMIN — ENOXAPARIN SODIUM 40 MG: 40 INJECTION SUBCUTANEOUS at 12:43

## 2017-11-14 RX ADMIN — CITALOPRAM HYDROBROMIDE 10 MG: 10 TABLET ORAL at 09:00

## 2017-11-14 RX ADMIN — PIPERACILLIN AND TAZOBACTAM 4.5 G: 4; .5 INJECTION, POWDER, LYOPHILIZED, FOR SOLUTION INTRAVENOUS; PARENTERAL at 21:48

## 2017-11-14 RX ADMIN — PANTOPRAZOLE SODIUM 40 MG: 40 TABLET, DELAYED RELEASE ORAL at 08:59

## 2017-11-14 RX ADMIN — AZITHROMYCIN MONOHYDRATE 250 MG: 250 TABLET ORAL at 08:59

## 2017-11-14 RX ADMIN — PIPERACILLIN AND TAZOBACTAM 4.5 G: 4; .5 INJECTION, POWDER, LYOPHILIZED, FOR SOLUTION INTRAVENOUS; PARENTERAL at 05:38

## 2017-11-14 RX ADMIN — GUAIFENESIN 1200 MG: 600 TABLET, EXTENDED RELEASE ORAL at 20:37

## 2017-11-14 RX ADMIN — SIMVASTATIN 40 MG: 40 TABLET, FILM COATED ORAL at 20:39

## 2017-11-14 RX ADMIN — PIPERACILLIN AND TAZOBACTAM 4.5 G: 4; .5 INJECTION, POWDER, LYOPHILIZED, FOR SOLUTION INTRAVENOUS; PARENTERAL at 12:43

## 2017-11-14 RX ADMIN — ASPIRIN 81 MG: 81 TABLET, COATED ORAL at 08:59

## 2017-11-14 NOTE — PLAN OF CARE
Problem: Patient Care Overview  Goal: Plan of Care/Patient Progress Review  Outcome: No Change     Tmax 101.3. Recheck 95.8. Tachy with fever, OVSS. Pt slept well overnight. Denies pain. Continue POC.

## 2017-11-14 NOTE — PROGRESS NOTES
Hematology / Oncology Progress Note   Date of Service: 11/14/2017    Assessment & Plan  Ashleigh Alonzo is a 56 y/o F PMH COPD & recent dx. [place] breast CA most recently on neoadjuvant C7 Taxol/Pembrolizumab c/b elevated transaminases & now with immunotherapy induced pneumonitis & health care associated pneumonia.     # Acute hypoxic respiratory failure 2/2 to Pneumonitis (likely r/t immunotherapy - pembrolizumab) & HCAP.  Presented with non-neutropenic fever, hypoxia, dry cough & CXR positive for bilateral mixed patchy & interstitial infiltration.  Confirmed on CT chest 11/13.  Infectious work up thus far unrevealing other than imaging (c diff negative 11/13, BlCx 11/11, 11/13 NTD, RVP NTD, sputum unable to be collected, Infl A/B RSV negative, UCx NTD).    - 1 mg/kg prednisone started 11/13/17 (afebrile thus far today), actually on methylpred IV equivalent.  - Initially on levaquin/zosyn--> transitioned to zosyn/vancomycin/azithromax (atypical coverage)--> 11/14 on zosyn/azithromax only don't suspect MRSA PNA based on symptoms/imaging.  - O2 PRN (on 1-2 L today).    # Triple Negative R Breast Ca.  IIA, T2, N0, cM0.  Dr Bradshaw patient.  S/P neoadjuvant C7 taxol/Pembro (D1 was 11/7/17, cycle 7 was delayed r/t grade 2 elevation of transaminases).  Partial response was seen on MRI after 3 weeks treatment.  Course has been complicated by RUL PNA on 10/12/17 tx with levofloxacin & questionable UTI (with negative culture).    - Will need to touch base with Dr. Bradshaw regarding plan r/t two toxicities now with (pneumonitis & elevated transaminases).      # Leukopenia/neutropenia. Think likely r/t overall inflammation-itis.  Could be r/t infection (on more treatment for HCAP), could be r/t Zosyn itself (hopefully transition this 11/15).  Could be r/t treatment (cumulative with taxol but less likely). # Anemia. Likely r/t malignancy/chronic disease/treatment.  - Monitor daily, CBC with differential.      # Hx. COPD.  Continue  PTA cough suppressant & inhaler (umeclidinium).  # Hx. Depression/Anxiety.  Continue PTA celexa 10 mg PO daily.  # HLD.  Continue simvastatin 40 mg PO daily.   # Family hx. Ischemic Heart disease. Continue PTA aspirin 81 mg daily.    FEN:     Active Diet Order      Regular Diet Adult  PPX (DVT/GI): Lovenox 40 mg SQ daily & protonix ordered.   LINES/DRAINS: Port (9/1/17).  CONSULTS: None ordered yet.  CODE: Full  DISPO: Likely 1-2 days, need to transition to PO antibiotics & PO steroids with further clinical improvement in respiratory status (would like to ideally see no further fevers, last fever 11/13 late ayala).  She's still on 1-2 L O2.     Chelsi Yi, [facility], [phone].  Hematology/Oncology, November 14, 2017.    Rounding:  Temp:  [95.8  F (35.4  C)-102.2  F (39  C)] 96.8  F (36  C)  Heart Rate:  [] 81  Resp:  [16-18] 16  BP: (119-137)/(71-78) 125/73  SpO2:  [91 %-95 %] 93 %  ----------------------------------  I/O last 3 completed shifts:  In: 560 [P.O.:240; I.V.:320]  Out: 500 [Urine:500]  ----------------------------------  Vitals:    11/11/17 1627 11/11/17 1956 11/12/17 1007 11/13/17 1044   Weight: 69.9 kg (154 lb 3.2 oz) 70.9 kg (156 lb 6.4 oz) 70.6 kg (155 lb 9.6 oz) 71.4 kg (157 lb 4.8 oz)    11/14/17 0956   Weight: 71.1 kg (156 lb 11.2 oz)     ----------------------------------  Recent Labs   Lab Test  11/14/17   0729  11/13/17   0603  11/12/17   0530  11/11/17   1726  11/07/17   0656   09/05/17   1240   WBC  1.7*  3.2*  4.1  7.2  5.7   < >  7.4   HGB  9.6*  8.7*  8.9*  10.9*  11.1*   < >  14.2   PLT  291  245  257  371  387   < >  296   NA  142  138  136  134  136   < >  136   POTASSIUM  3.7  4.1  4.2  3.9  3.8   < >  4.0   CHLORIDE  111*  107  106  102  102   < >  104   CO2  22  23  24  24  23   < >  25   ANIONGAP  9  8  6  8  10   < >  8   BUN  6*  5*  7  8  6*   < >  18   CR  0.61  0.58  0.64  0.66  0.58   < >  0.74   JAVIER  8.3*  8.2*  8.0*  8.5  8.9   < >  9.1   MAG   --    --    --     --    --    --   2.1   BILITOTAL  0.4  0.6   --   0.7  0.6   < >  0.5  0.4   ALKPHOS  109  97   --   130  126   < >  146  148   ALT  50  38   --   53*  69*   < >  50  49   AST  58*  49*   --   68*  64*   < >  34  33   ALBUMIN  2.0*  2.0*   --   2.6*  2.9*   < >  3.6  3.6    < > = values in this interval not displayed.     ----------------------------------  Recent Results (from the past 24 hour(s))   CT Chest w/o Contrast    Narrative    EXAMINATION: CT chest without contrast, 11/13/2017 4:51 PM    TECHNIQUE:  Helical CT images from the thoracic inlet through the lung  bases were obtained without IV contrast. Contrast dose: None    COMPARISON: Plain film 11/11/2017, CT 8/16/2016    HISTORY: Evaluate for autoimmune pneumonitis, febrile patient    FINDINGS:    The central tracheobronchial tree is patent. Peripheral and  peribronchovascular predominant lobular consolidative and groundglass  opacities as well as mild traction bronchiectasis. Small bilateral  pleural effusions. Scattered calcified granulomas.    The heart size is normal. Left chest wall Port-A-Cath with tip in the  low SVC. No pericardial effusion. Normal caliber and configuration of  the thoracic great vessels. Numerous calcified nodes in the  mediastinum and frankie, similar to prior.    Limited views of the abdomen reveal no worrisome pathology. No  worrisome bony or soft tissue lesions.      Impression    IMPRESSION:  1. Peripheral and peribronchial vascular predominant lobular opacities  with mild associated traction bronchiectasis. Overall the appearance  is not typical for an infectious etiology, but rather has an  appearance most consistent with organizing pneumonia (potentially due  to pneumonitis). Differential includes eosinophilic pneumonia or  sarcoid.  2. Small bilateral pleural effusions.    I have personally reviewed the examination and initial interpretation  and I agree with the findings.    JADEN MIN MD      -----------------------------    azithromycin  250 mg Oral Daily     enoxaparin  40 mg Subcutaneous Q24H     pantoprazole  40 mg Oral QAM     methylPREDNISolone  62.5 mg Intravenous Q24H     aspirin EC  81 mg Oral Daily     citalopram  10 mg Oral Daily     guaiFENesin  1,200 mg Oral BID     simvastatin  40 mg Oral At Bedtime     umeclidinium  1 puff Inhalation Daily     piperacillin-tazobactam  4.5 g Intravenous Q6H        heparin, heparin lock flush, sodium chloride (PF), heparin lock flush, albuterol, hydrOXYzine, LORazepam, acetaminophen    Interval history Ashleigh Perera is feeling better today.  Reports breathing easier.  On 1-2 L NC.  Tmax thus far today has been < 100 (95-97 actually) last T max was 101.2 at 2147 last ayala.  Steroids were started 2016 on 11/13.  Eating well.  Ambulating more in room, less SUAZO.  No N/V.  No dysuria.  Denies diarrhea.  Denies dysuria.  No dizziness with ambulation.  No chest pain.    Physical Exam  Constitutional: Awake, alert, cooperative, in NAD.  Eyes: PERRL, EOMI, sclera clear, conjunctiva normal.  ENT: Normocephalic, without obvious abnormality, moist mucus membranes, no lesions or thrush.   Respiratory: Non-labored breathing, good air exchange, CTAB, no crackles or wheezing.  Cardiovascular: RRR, no murmur noted.  GI: +BS, soft, non-distended, non-tender, no masses palpated, no hepatosplenomegaly.  Skin: No concerning lesions or rash on exposed areas.  Musculoskeletal: No edema ame LEs  Neurologic: Awake, A&O x 3. Cranial nerves II-XII are grossly intact.   Neuropsychiatric: Calm, normal affect.

## 2017-11-14 NOTE — PLAN OF CARE
Problem: Patient Care Overview  Goal: Plan of Care/Patient Progress Review  Outcome: No Change  Afeb.vss. Feeling much better today. ? Dc tomorrow if pt is stable.good po intake without n/v.up ad sundar.

## 2017-11-14 NOTE — PLAN OF CARE
Problem: Patient Care Overview  Goal: Plan of Care/Patient Progress Review  Outcome: No Change    11/13/17 2235   OTHER   Plan Of Care Reviewed With patient   Plan of Care Review   Progress no change      Patient Vitals for the past 8 hrs:    Temp Temp src Heart Rate BP Resp   11/13/17 2214 101.3  F (38.5  C) Oral 111 131/71 18   11/13/17 2147 102.2  F (39  C) Oral - - -   11/13/17 1923 98.8  F (37.1  C) Oral 116 137/72 18   11/13/17 1849 98.7  F (37.1  C) Oral - - -   11/13/17 1519 95.9  F (35.5  C) Oral 96 91/44 18      Patient had a Tmax of 102.2 orally @ 21:47, given Tylenol, blood culture was drawn this morning and already on scheduled IV Zosyn and Vanco. Sats stable mid 90s on RA.  Chest CT today showed PNA and small bilateral effusion.  Denies pain and nausea.  Given scheduled Guaifenesin for cough.  Up independently.   Continue with plan of care.

## 2017-11-15 VITALS
HEIGHT: 64 IN | TEMPERATURE: 96.5 F | DIASTOLIC BLOOD PRESSURE: 70 MMHG | SYSTOLIC BLOOD PRESSURE: 112 MMHG | BODY MASS INDEX: 26.77 KG/M2 | WEIGHT: 156.8 LBS | RESPIRATION RATE: 18 BRPM | OXYGEN SATURATION: 92 % | HEART RATE: 109 BPM

## 2017-11-15 LAB
ALBUMIN SERPL-MCNC: 2.1 G/DL (ref 3.4–5)
ALP SERPL-CCNC: 115 U/L (ref 40–150)
ALT SERPL W P-5'-P-CCNC: 84 U/L (ref 0–50)
ANION GAP SERPL CALCULATED.3IONS-SCNC: 10 MMOL/L (ref 3–14)
AST SERPL W P-5'-P-CCNC: 95 U/L (ref 0–45)
BASOPHILS # BLD AUTO: 0 10E9/L (ref 0–0.2)
BASOPHILS NFR BLD AUTO: 0 %
BILIRUB SERPL-MCNC: 0.4 MG/DL (ref 0.2–1.3)
BUN SERPL-MCNC: 12 MG/DL (ref 7–30)
CALCIUM SERPL-MCNC: 8.7 MG/DL (ref 8.5–10.1)
CHLORIDE SERPL-SCNC: 106 MMOL/L (ref 94–109)
CO2 SERPL-SCNC: 23 MMOL/L (ref 20–32)
CREAT SERPL-MCNC: 0.71 MG/DL (ref 0.52–1.04)
DIFFERENTIAL METHOD BLD: ABNORMAL
EOSINOPHIL # BLD AUTO: 0 10E9/L (ref 0–0.7)
EOSINOPHIL NFR BLD AUTO: 0 %
ERYTHROCYTE [DISTWIDTH] IN BLOOD BY AUTOMATED COUNT: 14 % (ref 10–15)
GFR SERPL CREATININE-BSD FRML MDRD: 84 ML/MIN/1.7M2
GLUCOSE SERPL-MCNC: 168 MG/DL (ref 70–99)
HCT VFR BLD AUTO: 28.6 % (ref 35–47)
HGB BLD-MCNC: 9.4 G/DL (ref 11.7–15.7)
IMM GRANULOCYTES # BLD: 0 10E9/L (ref 0–0.4)
IMM GRANULOCYTES NFR BLD: 0.2 %
LYMPHOCYTES # BLD AUTO: 0.6 10E9/L (ref 0.8–5.3)
LYMPHOCYTES NFR BLD AUTO: 13.3 %
MCH RBC QN AUTO: 32.2 PG (ref 26.5–33)
MCHC RBC AUTO-ENTMCNC: 32.9 G/DL (ref 31.5–36.5)
MCV RBC AUTO: 98 FL (ref 78–100)
MONOCYTES # BLD AUTO: 0.1 10E9/L (ref 0–1.3)
MONOCYTES NFR BLD AUTO: 1.9 %
NEUTROPHILS # BLD AUTO: 3.5 10E9/L (ref 1.6–8.3)
NEUTROPHILS NFR BLD AUTO: 84.6 %
NRBC # BLD AUTO: 0 10*3/UL
NRBC BLD AUTO-RTO: 0 /100
PLATELET # BLD AUTO: 332 10E9/L (ref 150–450)
POTASSIUM SERPL-SCNC: 3.7 MMOL/L (ref 3.4–5.3)
PROT SERPL-MCNC: 6.6 G/DL (ref 6.8–8.8)
RBC # BLD AUTO: 2.92 10E12/L (ref 3.8–5.2)
SODIUM SERPL-SCNC: 139 MMOL/L (ref 133–144)
WBC # BLD AUTO: 4.1 10E9/L (ref 4–11)

## 2017-11-15 PROCEDURE — 80053 COMPREHEN METABOLIC PANEL: CPT | Performed by: INTERNAL MEDICINE

## 2017-11-15 PROCEDURE — 36592 COLLECT BLOOD FROM PICC: CPT | Performed by: INTERNAL MEDICINE

## 2017-11-15 PROCEDURE — 25000132 ZZH RX MED GY IP 250 OP 250 PS 637: Performed by: INTERNAL MEDICINE

## 2017-11-15 PROCEDURE — 85025 COMPLETE CBC W/AUTO DIFF WBC: CPT | Performed by: INTERNAL MEDICINE

## 2017-11-15 PROCEDURE — 25000128 H RX IP 250 OP 636: Performed by: INTERNAL MEDICINE

## 2017-11-15 PROCEDURE — 25000131 ZZH RX MED GY IP 250 OP 636 PS 637: Performed by: NURSE PRACTITIONER

## 2017-11-15 PROCEDURE — 99239 HOSP IP/OBS DSCHRG MGMT >30: CPT | Performed by: INTERNAL MEDICINE

## 2017-11-15 PROCEDURE — 25000125 ZZHC RX 250: Performed by: NURSE PRACTITIONER

## 2017-11-15 PROCEDURE — 25000128 H RX IP 250 OP 636: Performed by: NURSE PRACTITIONER

## 2017-11-15 PROCEDURE — 25000132 ZZH RX MED GY IP 250 OP 250 PS 637: Performed by: NURSE PRACTITIONER

## 2017-11-15 RX ORDER — PREDNISONE 10 MG/1
1 TABLET ORAL DAILY
Qty: 70 TABLET | Refills: 0 | Status: SHIPPED | OUTPATIENT
Start: 2017-11-15 | End: 2017-11-21

## 2017-11-15 RX ORDER — DOXYCYCLINE 100 MG/1
100 CAPSULE ORAL EVERY 12 HOURS SCHEDULED
Status: DISCONTINUED | OUTPATIENT
Start: 2017-11-15 | End: 2017-11-15 | Stop reason: HOSPADM

## 2017-11-15 RX ORDER — PANTOPRAZOLE SODIUM 40 MG/1
40 TABLET, DELAYED RELEASE ORAL EVERY MORNING
Qty: 30 TABLET | Refills: 0 | Status: SHIPPED | OUTPATIENT
Start: 2017-11-16 | End: 2017-12-05

## 2017-11-15 RX ORDER — DOXYCYCLINE 100 MG/1
100 CAPSULE ORAL 2 TIMES DAILY
Qty: 12 CAPSULE | Refills: 0 | Status: SHIPPED | OUTPATIENT
Start: 2017-11-15 | End: 2017-11-21

## 2017-11-15 RX ADMIN — UMECLIDINIUM 1 PUFF: 62.5 AEROSOL, POWDER ORAL at 08:22

## 2017-11-15 RX ADMIN — PIPERACILLIN AND TAZOBACTAM 4.5 G: 4; .5 INJECTION, POWDER, LYOPHILIZED, FOR SOLUTION INTRAVENOUS; PARENTERAL at 03:30

## 2017-11-15 RX ADMIN — PANTOPRAZOLE SODIUM 40 MG: 40 TABLET, DELAYED RELEASE ORAL at 08:21

## 2017-11-15 RX ADMIN — ASPIRIN 81 MG: 81 TABLET, COATED ORAL at 08:21

## 2017-11-15 RX ADMIN — PIPERACILLIN AND TAZOBACTAM 4.5 G: 4; .5 INJECTION, POWDER, LYOPHILIZED, FOR SOLUTION INTRAVENOUS; PARENTERAL at 09:48

## 2017-11-15 RX ADMIN — SODIUM CHLORIDE, PRESERVATIVE FREE 5 ML: 5 INJECTION INTRAVENOUS at 13:16

## 2017-11-15 RX ADMIN — PREDNISONE 70 MG: 50 TABLET ORAL at 13:39

## 2017-11-15 RX ADMIN — GUAIFENESIN 1200 MG: 600 TABLET, EXTENDED RELEASE ORAL at 08:21

## 2017-11-15 RX ADMIN — AZITHROMYCIN MONOHYDRATE 250 MG: 250 TABLET ORAL at 08:21

## 2017-11-15 NOTE — PLAN OF CARE
Problem: Patient Care Overview  Goal: Plan of Care/Patient Progress Review  Outcome: Improving  Pt without fever over night- if afebrile 24 hrs can discharge home. Sats  90 to 91 and denies sob and declined O2. Rash moved to forehead but o/w no new symptoms. Denies pain and nausea. Voiding adeq  uate amts,

## 2017-11-15 NOTE — PLAN OF CARE
Problem: Infection, Risk/Actual (Adult)  Goal: Identify Related Risk Factors and Signs and Symptoms  Related risk factors and signs and symptoms are identified upon initiation of Human Response Clinical Practice Guideline (CPG).   Outcome: Adequate for Discharge Date Met: 11/15/17  Pt remains afe this am.  Continues with dry infrequent cough and unable to production sputum cx.  LS clear except fine crackles in mid right lobe.  Denies SOB or chest pains.  IV AB changed to po as well as steroids in prep for DC.  DC to home today.  DC medications and instructions reviewed with pt and her  - no questions.  Will  medications at hospital pharmacy. IVAD heplocked and needle removed.

## 2017-11-15 NOTE — DISCHARGE INSTRUCTIONS
Contact Numbers    [facility] [place]: [phone]  [facility] [place]:  [phone]    Call triage with chills and/or temperature greater than or equal to 100.4, uncontrolled nausea/vomiting, diarrhea, constipation, dizziness, shortness of breath, chest pain, bleeding, unexplained bruising, or any new/concerning symptoms, questions/concerns.     If you are having any concerning symptoms or wish to speak to a provider before your next infusion visit, please call your care coordinator or triage to notify them so we can adequately serve you.

## 2017-11-15 NOTE — PLAN OF CARE
Problem: Patient Care Overview  Goal: Plan of Care/Patient Progress Review  Outcome: No Change     VSS, afebrile. Oxygen weaned; O2 sats 92% on room air. Walked in halls with . Redness noted under eyes and cheeks; pt stated that she applied lotion to this area and may be sensitive to it. Unhooked and heparinized port for pt to take a walk; unable to get blood return from port when she returned. Alteplase used; port working after 1 hour of instillation. Keep port at TKO from now on. Pt reports that she has lots of issues in clinic with port clogging also. Zosyn re-timed. Possible d/c tomorrow if remains afebrile. Continue with plan of care.

## 2017-11-17 ENCOUNTER — CARE COORDINATION (OUTPATIENT)
Dept: ONCOLOGY | Facility: CLINIC | Age: 57
End: 2017-11-17

## 2017-11-17 NOTE — DISCHARGE SUMMARY
[facility], [place] -- Discharge Summary -- Hematology / Oncology  Date of Admission:  11/11/2017  Date of Discharge:  11/17/2017  Discharging Provider: Chelsi Yi  Date of Service (when I saw the patient): 11/17/17    Discharge Diagnoses   Active Problems:    Pneumonia      History of Present Illness   Ashleigh Alonzo is a 57 year old female with history of breast cancer, currently on chemotherapy with pembrolizumab and taxol (last on 11/7/17). She called the nurse triage line complaining of fevers over the last 2 days, noting a temperature of 105.1F this afternoon. She was instructed to present to the ED for further evaluation. She was afebrile upon arrival, but had taken 2 tylenol. Cough is non-productive, and she has no chest pain. She endorses some chills and body aches as well. No rhinorrhea, congestion, sore throat or headaches. No nausea, vomiting, abdominal pain or diarrhea. No urinary complaints. No dizziness or lightheadedness. Has been eating and drinking OK, though feels fatigued and ill.     In the ED, initially afebrile, but later temperature spiked to 100.4F. She had associated tachycardia to the 120s, but otherwise hemodynamically stable. Work-up included an essentially normal BMP, and CBC notable for stable chronic anemia, and no leukocytosis, not neutropenic. Lactic acid was elevated at 2.2. CXR revealed bilateral (L>R) mixed interstitial opacities and patchy airspace opacities compared to the prior CXR from 10/26/17, concerning for infection. She was given IV fluids, and started on Zosyn and levofloxacin.      Heme/Onc History (per most recent clinic visit note):  Diagnosis: Stage IIA, T2N0M0, grade 3, ER-/WV-/Her2- (triple negative), right breast cancer.  She was diagnosed via abnormal screening mammogram. She ultimately had US, contrast-enhanced mammo, and biopsy showing a 3.4 cm mass and pathology showed a grade 3 invasive mammary carcinoma with associated  high-grade DCIS. ER/DC was negative and HER2 negative. She enrolled in ISPY-2 clinical trial and began treatment with weekly taxol and once every 3 week pembrolizumab on 9/2/17. Course has been complicated by fevers with PNA and then UTI. Week 7 was held due to transaminitis. Noted to be tolerating therapy well, with partial response noted on breast MRI after 3 weeks. Plan for 12 weeks of Taxol and pembrolizumab followed by 4 cycles of AC and pembrolizumab. She will then proceed with surgery and potentially adjuvant radiation.    Hospital Course  Ashleigh Alonzo is a 56 y/o F PMH COPD & recent dx. TN breast CA most recently on neoadjuvant C7 Taxol/Pembrolizumab c/b elevated transaminases & now with immunotherapy induced pneumonitis & health care associated pneumonia. Presented with non-neutropenic fever, hypoxia, dry cough & CXR positive for bilateral mixed patchy & interstitial infiltration.  Confirmed on CT chest 11/13.  Infectious work up thus far unrevealing other than imaging (c diff negative 11/13, BlCx 11/11, 11/13 NTD, RVP NTD, sputum unable to be collected, Infl A/B RSV negative, UCx NTD). Initially on levaquin/zosyn--> transitioned to zosyn/vancomycin/azithromax (atypical coverage)--> 11/14 on zosyn/azithromax only don't suspect MRSA PNA based on symptoms/imaging. 1 mg/kg prednisone started 11/13/17 (afebrile thus far today), actually on methylpred IV equivalent. At d/c transitioned to doxycycline PO.  Will go out on long term (1 month) prednisone taper.  Close f/u scheduled.     Problem List   # Acute hypoxic respiratory failure 2/2 to Pneumonitis (likely r/t immunotherapy - pembrolizumab) & HCAP.   # Sepsis ruled out, remained HD stable. Did get treatment for HCAP.   Presented with non-neutropenic fever, hypoxia, dry cough & CXR positive for bilateral mixed patchy & interstitial infiltration.  Confirmed on CT chest 11/13.  Infectious work up thus far unrevealing other than imaging (c diff negative 11/13,  BlCx 11/11, 11/13 NTD, RVP NTD, sputum unable to be collected, Infl A/B RSV negative, UCx NTD).    - 1 mg/kg prednisone started 11/13/17 (afebrile thus far today), actually on methylpred IV equivalent.--> d/c on 60 mg prednisone daily for at least next 2 weeks, then taper in clinic over following 2 weeks.  - Initially on levaquin/zosyn--> transitioned to zosyn/vancomycin/azithromax (atypical coverage)--> 11/14 on zosyn/azithromax only don't suspect MRSA PNA based on symptoms/imaging. --> d/c on Doxycycline 10 days.  - O2 PRN (on 1-2 L today).     # Triple Negative R Breast Ca.  IIA, T2, N0, cM0.  Dr Bradshaw patient.  S/P neoadjuvant C7 taxol/Pembro (D1 was 11/7/17, cycle 7 was delayed r/t grade 2 elevation of transaminases).  Partial response was seen on MRI after 3 weeks treatment.  Course has been complicated by RUL PNA on 10/12/17 tx with levofloxacin & questionable UTI (with negative culture).    - Will need to touch base with Dr. Bradshaw regarding plan r/t two toxicities now with (pneumonitis & elevated transaminases).      # Leukopenia/neutropenia. Think likely r/t overall inflammation-itis.  Could be r/t infection (on more treatment for HCAP), could be r/t Zosyn itself (hopefully transition this 11/15).  Could be r/t treatment (cumulative with taxol but less likely). # Anemia. Likely r/t malignancy/chronic disease/treatment.  - Monitor daily, CBC with differential.      # Hx. COPD.  Continue PTA cough suppressant & inhaler (umeclidinium).  # Hx. Depression/Anxiety.  Continue PTA celexa 10 mg PO daily.  # HLD.  Continue simvastatin 40 mg PO daily.   # Family hx. Ischemic Heart disease. Continue PTA aspirin 81 mg daily.    Chelsi Yi  [facility]  [phone]  Hematology/Oncology  November 17, 2017    Pending Results   These results will be followed up by Miki MORGAN  Unresulted Labs Ordered in the Past 30 Days of this Admission     Date and Time Order Name Status Description    11/13/2017 0007 Blood  culture Preliminary     11/13/2017 0007 Blood culture Preliminary         Code Status  FULL    Primary Care Physician   HARLEY LEDBETTER    Physical Exam   Constitutional: Awake, alert, cooperative, in NAD.  Eyes: PERRL, EOMI, sclera clear, conjunctiva normal.  ENT: Normocephalic, without obvious abnormality, moist mucus membranes, no lesions or thrush.   Respiratory: Non-labored breathing, good air exchange, CTAB, no crackles or wheezing.  Cardiovascular: RRR, no murmur noted.  GI: +BS, soft, non-distended, non-tender, no masses palpated, no hepatosplenomegaly.  Skin: No concerning lesions or rash on exposed areas.  Musculoskeletal: No edema ame LEs  Neurologic: Awake, A&O x 3. Cranial nerves II-XII are grossly intact.   Neuropsychiatric: Calm, normal affect.    Discharge Disposition  Home & in stable condition  Discharge Orders     CBC with platelets differential   Last Lab Result: Hemoglobin (g/dL)      Date                     Value                11/15/2017               9.4 (L)          ----------     Comprehensive metabolic panel     Reason for your hospital stay   Admitted for hypoxia, fevers, underlying breast cancer on immunotherapy. Dx immunotherapy pneumonitis & CAP.     Adult [facility] Follow-up and recommended labs and tests   11/21/17 --> see labs & Leeann DUNLAP    Appointments on [place] and/or [facility] (with [facility] or [facility] provider or service). Call [phone] if you haven't heard regarding these appointments within 7 days of discharge.     Activity   Your activity upon discharge: activity as tolerated     When to contact your care team   Please call the [facility] Triage RN Line [phone] (Josefa RN available M-F 8-5, after 5 pm the RN Advisor will page the On-Call Oncology Fellow who will return your call) for temperature greater than 100.4 F, uncontrolled nausea/vomiting/diarrhea or unrelieved constipation, pain not relieved by medications, bleeding not stopped by pressure,  dizziness, chest pain, shortness of breath, changes in level of consciousness, or any other new concerning symptoms.     Full Code     Diet   Follow this diet upon discharge: regular diet as tolerated       Discharge Medications   Discharge Medication List as of 11/15/2017  1:30 PM      START taking these medications    Details   pantoprazole (PROTONIX) 40 MG EC tablet Take 1 tablet (40 mg) by mouth every morning, Disp-30 tablet, R-0, E-Prescribe      predniSONE (DELTASONE) 10 MG tablet Take 7 tablets (70 mg) by mouth daily, Disp-70 tablet, R-0, E-Prescribe      doxycycline (VIBRAMYCIN) 100 MG capsule Take 1 capsule (100 mg) by mouth 2 times daily for 6 days, Disp-12 capsule, R-0, E-Prescribe         CONTINUE these medications which have NOT CHANGED    Details   lidocaine-prilocaine (EMLA) cream Please apply to port site 30 minutes before use prnDisp-30 g, [id number]      hydrOXYzine (ATARAX) 25 MG tablet Take 1-2 tablets (25-50 mg) by mouth every 6 hours as needed for itching, Disp-100 tablet, R-2, E-Prescribe      guaiFENesin (MUCINEX) 600 MG 12 hr tablet Take 2 tablets (1,200 mg) by mouth 2 times daily, Disp-180 tablet, R-6, E-Prescribe      tiotropium (SPIRIVA) 18 MCG capsule Inhale 1 capsule (18 mcg) into the lungs daily Inhale contents of one capsule, Disp-1 capsule, R-11, E-Prescribe      citalopram (CELEXA) 10 MG tablet Take 1 tablet (10 mg) by mouth daily, Disp-90 tablet, R-3, E-Prescribe      Coenzyme Q10 (CO Q 10 PO) Take 1 tablet by mouth daily , Historical      MULTIPLE VITAMIN PO Take 1 tablet by mouth daily , Historical      Cyanocobalamin (VITAMIN B 12 PO) Take 100 mcg by mouth daily , Historical      Pyridoxine HCl (VITAMIN B6 PO) Take 1 tablet by mouth daily., 1 tablet, Oral, DAILY, Until Discontinued, Historical      aspirin 81 MG tablet Take 1 tablet by mouth daily., 1 tablet = 81 mg, Oral, DAILY Starting 10/3/2010, Until Discontinued, R-3, Historical      VITAMIN D 1000 UNIT OR CAPS TAKE 2  CAPSULES BY MOUTH DAILY, Historical      !! order for DME Cranial prosthesisDisp-1 Device, R-1, Local Print      !! LORazepam (ATIVAN) 0.5 MG tablet Take 1 tablet (0.5 mg) by mouth every 4 hours as needed (Anxiety, Nausea/Vomiting or Sleep), Disp-30 tablet, R-2, Local Print      !! LORazepam (ATIVAN) 0.5 MG tablet Take 1-2 tabs 20 minutes prior to MRI scans., Disp-10 tablet, R-0, HistoricalRX called to [facility] pharmacist at  Pharmacy @ [facility] [place] [facility], [phone].      albuterol (PROAIR HFA/PROVENTIL HFA/VENTOLIN HFA) 108 (90 BASE) MCG/ACT Inhaler Inhale 2 puffs into the lungs every 6 hours as needed for shortness of breath / dyspnea, Disp-1 Inhaler, R-5, E-Prescribe      darifenacin (ENABLEX) 7.5 MG 24 hr tablet Take 1 tablet (7.5 mg) by mouth daily, Disp-90 tablet, R-3, E-Prescribe      !! order for DME Respironics Dream Station Auto CPAP 12-18 cm, [facility]       !! - Potential duplicate medications found. Please discuss with provider.      STOP taking these medications       simvastatin (ZOCOR) 40 MG tablet Comments:   Reason for Stopping:             Allergies   No Known Allergies  Data   Most Recent 3 CBC's:  Recent Labs   Lab Test  11/15/17   0601  11/14/17   0729  11/13/17   0603   WBC  4.1  1.7*  3.2*   HGB  9.4*  9.6*  8.7*   MCV  98  98  97   PLT  332  291  245      Most Recent 3 BMP's:  Recent Labs   Lab Test  11/15/17   0601  11/14/17   0729  11/13/17   0603   NA  139  142  138   POTASSIUM  3.7  3.7  4.1   CHLORIDE  106  111*  107   CO2  23  22  23   BUN  12  6*  5*   CR  0.71  0.61  0.58   ANIONGAP  10  9  8   JAVIER  8.7  8.3*  8.2*   GLC  168*  155*  79     Most Recent 2 LFT's:  Recent Labs   Lab Test  11/15/17   0601  11/14/17   0729   AST  95*  58*   ALT  84*  50   ALKPHOS  115  109   BILITOTAL  0.4  0.4     Most Recent INR's and Anticoagulation Dosing History:  Anticoagulation Dose History     Recent Dosing and Labs Latest Ref Rng & Units 9/1/2017 9/5/2017 10/12/2017    INR 0.86 - 1.14  1.06 0.94 1.10        Most Recent 3 Troponin's:No lab results found.  Most Recent Cholesterol Panel:  Recent Labs   Lab Test  07/27/17   1416   CHOL  198   LDL  131*   HDL  51   TRIG  78     Most Recent 6 Bacteria Isolates From Any Culture (See EPIC Reports for Culture Details):  Recent Labs   Lab Test  11/13/17   0106  11/13/17   0058  11/11/17   1830  11/11/17   1726  10/26/17   1500  10/26/17   1326   CULT  No growth after 4 days  No growth after 4 days  No growth  No growth  No growth  <10,000 colonies/mL  urogenital ariela    Susceptibility testing not routinely done  No growth  No growth     Most Recent TSH, T4 and A1c Labs:  Recent Labs   Lab Test  11/07/17   0656   09/05/17   1240   TSH  2.31   < >  2.11   A1C   --    --   5.4    < > = values in this interval not displayed.

## 2017-11-17 NOTE — PROGRESS NOTES
Call placed to patient for hospital discharge telephone call.  Pt is scheduled to see Dr. Bradshaw Tuesday.  Left clinic nurse line telephone number for any questions or concerns.      Dionne Goode RNCC BSN CBCN

## 2017-11-20 NOTE — PROGRESS NOTES
Oncology On Treatment Visit:  Date on this visit: 11/20/2017    Diagnosis:  Stage IIa, T2N0M0, grade 3 triple negative cancer of the right breast.    Primary Physician: Radha Cazares     History Of Present Illness:  Ms. Alonzo is a 57-year-old postmenopausal female with stage IIa, T2 N0 M0, grade 3, triple-negative invasive carcinoma of the right breast.  Routine bilateral screening mammogram on 07/27/2017 showed possible developing calcifications in the right breast at the 12 o'clock position 6 cm from the nipple.  Mammogram prior to this one had been 1 year prior in 07/2016.  Right breast ultrasound demonstrated a 7-mm, irregular, hypoechoic mass at the 12 o'clock position.  The patient went on to have a contrast-enhanced mammogram which showed a peripherally enhancing, irregular mass measured up to 1.9 cm as well as an additional 6-mm, enhancing focus anteromedial to the dominant mass.  The total area of abnormal enhancement on contrast mammogram measured up to 3.4 cm.  Right breast biopsy on 08/22/2017 demonstrated a grade 3 invasive mammary carcinoma with associated high-grade DCIS.  Invasive carcinoma was stained for estrogen and progesterone receptors.  Estrogen receptor and progesterone receptor staining was negative with 0% of nuclei staining.  HER2 was non-amplified by FISH with a HER2/CEN17 ratio of 1.5 and 2.8 HER2 signals per nucleus.  Breast MRI measured the biopsy proven breast cancer at 3.2 cm.    Ms. Alonzo enrolled in the ISPY-2 clinical trial.  She began treatment with weekly taxol and once every 3 week pembrolizumab on 9/20/17.  Week 3 MRI showed a decrease in size of right breast mass from 3.2 cm to 2.4 cm and overall decrease in enhancement.  She was hospitalized 11/11 - 11/17 with fevers ranging from 102 - 105.  CT chest was consistent with pneumonitis.  She was started on corticosteroids.       Interval History:  Ashleigh Perera comes in to clinic today for post-hospitalization followup.  She was  hospitalized from 11/11 through 11/17 for high fevers with associated extreme fatigue and dry cough.  A CT of the chest showed bilateral interstitial opacities consistent with pneumonitis.  She was started on broad-spectrum antibiotics as well as Solu-Medrol.  She has had a vast improvement in symptoms.  She was discharged on 11/17 with prednisone 70 mg daily.  She states that since discharge from the hospital she has felt very well.  She has had no further fevers.  She denies cough.  She is able to walk blocks without any distress and denies shortness of breath or chest pain at this time.  She has had no infectious complaints.  She denies abdominal pain, nausea, vomiting, diarrhea or constipation.  She denies insomnia.  She believes that she has gained a couple of pounds since starting prednisone; however, she welcomes this, as she initially lost weight with her chemotherapy.  Her appetite is very good; in fact, she ate two ham and cheese croissants for breakfast this morning.  She denies headaches, visual changes or focal neurologic complaints.  The remainder of a 10-point review of systems is otherwise negative.      Past Medical/Surgical History:  Past Medical History:   Diagnosis Date     COPD (chronic obstructive pulmonary disease) (H) 2011     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) 8/30/2017     Periodontal disease      Sleep apnea 12/2015     Urticaria      Past Surgical History:   Procedure Laterality Date     APPENDECTOMY  10/6/2015     CATARACT IOL, RT/LT  11/2016    bilateral      COLONOSCOPY  11/15/2011    Procedure:COLONOSCOPY; Colonoscopy, screening; Surgeon:ANASTASIA BUNCH; Location: OR     INSERT PORT VASCULAR ACCESS Left 9/1/2017    Procedure: INSERT PORT VASCULAR ACCESS;  Single Lumen Chest Power Port;  Surgeon: Leif Parkinson PA-C;  Location: [facility] OR     TUBAL LIGATION  12/2004    Bilateral     Allergies:  Allergies as  of 11/21/2017     (No Known Allergies)     Current Medications:  Current Outpatient Prescriptions   Medication Sig Dispense Refill     pantoprazole (PROTONIX) 40 MG EC tablet Take 1 tablet (40 mg) by mouth every morning 30 tablet 0     predniSONE (DELTASONE) 10 MG tablet Take 7 tablets (70 mg) by mouth daily 70 tablet 0     doxycycline (VIBRAMYCIN) 100 MG capsule Take 1 capsule (100 mg) by mouth 2 times daily for 6 days 12 capsule 0     order for DME Cranial prosthesis 1 Device 1     LORazepam (ATIVAN) 0.5 MG tablet Take 1 tablet (0.5 mg) by mouth every 4 hours as needed (Anxiety, Nausea/Vomiting or Sleep) 30 tablet 2     lidocaine-prilocaine (EMLA) cream Please apply to port site 30 minutes before use prn 30 g 1     LORazepam (ATIVAN) 0.5 MG tablet Take 1-2 tabs 20 minutes prior to MRI scans. 10 tablet 0     hydrOXYzine (ATARAX) 25 MG tablet Take 1-2 tablets (25-50 mg) by mouth every 6 hours as needed for itching 100 tablet 2     guaiFENesin (MUCINEX) 600 MG 12 hr tablet Take 2 tablets (1,200 mg) by mouth 2 times daily 180 tablet 6     tiotropium (SPIRIVA) 18 MCG capsule Inhale 1 capsule (18 mcg) into the lungs daily Inhale contents of one capsule 1 capsule 11     albuterol (PROAIR HFA/PROVENTIL HFA/VENTOLIN HFA) 108 (90 BASE) MCG/ACT Inhaler Inhale 2 puffs into the lungs every 6 hours as needed for shortness of breath / dyspnea 1 Inhaler 5     citalopram (CELEXA) 10 MG tablet Take 1 tablet (10 mg) by mouth daily 90 tablet 3     darifenacin (ENABLEX) 7.5 MG 24 hr tablet Take 1 tablet (7.5 mg) by mouth daily (Patient taking differently: Take 7.5 mg by mouth daily as needed ) 90 tablet 3     order for [facility] Respir[employer] Dream Station Auto CPAP 12-18 cm, F&P Simplus FFM small.       Coenzyme Q10 (CO Q 10 PO) Take 1 tablet by mouth daily        MULTIPLE VITAMIN PO Take 1 tablet by mouth daily        Cyanocobalamin (VITAMIN B 12 PO) Take 100 mcg by mouth daily        Pyridoxine HCl (VITAMIN B6 PO) Take 1 tablet by mouth  "daily.       aspirin 81 MG tablet Take 1 tablet by mouth daily.  3     VITAMIN D 1000 UNIT OR CAPS TAKE 2 CAPSULES BY MOUTH DAILY        Family and Social History:  Reviewed and unchanged from prior.  Please see initial consultation dated 8/28/17 for further details.    Physical Exam:  /78 (BP Location: Right arm, Patient Position: Sitting, Cuff Size: Adult Regular)  Pulse 102  Temp 98.3  F (36.8  C) (Oral)  Resp 16  Ht 1.626 m (5' 4.02\")  Wt 71.6 kg (157 lb 12.8 oz)  SpO2 97%  BMI 27.07 kg/m2  General:  Well appearing, well-nourished adult female in NAD.  HEENT:  Normocephalic.  Sclera anicteric.  MMM.  No lesions of the oropharynx.  Lymph:  No palpable cervical, supraclavicular, or axillary LAD.  Chest:  Fine crackles at the bilateral bases.  Left chest port-a-cath is without surrounding erythema or edema.  Breast exam:  Increased density palpated at 12:00 right breast, but not discrete mass is palpable.  CV:  RRR.  Nl S1 and S2.  No m/r/g.  Abd:  Soft/NT/ND.  BSs normoactive.  No hepatosplenomegaly.  Ext:  No pitting edema of the bilateral lower extremities.  Pulses 2+ and symmetric.  Musculo:  Strength 5/5 throughout.  Neuro:  Cranial nerves grossly intact.  Psych:  Mood and affect appear normal.    Laboratory/Imaging Studies:  Results for YUNI CALIXTO (MRN [id number]) as of 11/25/2017 16:02   Ref. Range 11/21/2017 11:37   Sodium Latest Ref Range: 133 - 144 mmol/L 138   Potassium Latest Ref Range: 3.4 - 5.3 mmol/L 3.6   Chloride Latest Ref Range: 94 - 109 mmol/L 103   Carbon Dioxide Latest Ref Range: 20 - 32 mmol/L 24   Urea Nitrogen Latest Ref Range: 7 - 30 mg/dL 11   Creatinine Latest Ref Range: 0.52 - 1.04 mg/dL 0.65   GFR Estimate Latest Ref Range: >60 mL/min/1.7m2 >90   GFR Estimate If Black Latest Ref Range: >60 mL/min/1.7m2 >90   Calcium Latest Ref Range: 8.5 - 10.1 mg/dL 8.6   Anion Gap Latest Ref Range: 3 - 14 mmol/L 11   Albumin Latest Ref Range: 3.4 - 5.0 g/dL 2.7 (L)   Protein Total " Latest Ref Range: 6.8 - 8.8 g/dL 6.7 (L)   Bilirubin Total Latest Ref Range: 0.2 - 1.3 mg/dL 0.5   Alkaline Phosphatase Latest Ref Range: 40 - 150 U/L 144   ALT Latest Ref Range: 0 - 50 U/L 118 (H)   AST Latest Ref Range: 0 - 45 U/L 46 (H)   Results for YUNI CALIXTO (MRN [id number]) as of 11/25/2017 16:02   Ref. Range 11/21/2017 11:37   WBC Latest Ref Range: 4.0 - 11.0 10e9/L 9.6   Hemoglobin Latest Ref Range: 11.7 - 15.7 g/dL 11.6 (L)   Hematocrit Latest Ref Range: 35.0 - 47.0 % 34.3 (L)   Platelet Count Latest Ref Range: 150 - 450 10e9/L 384       ASSESSMENT/PLAN:  Ms. Calixto is a 57-year-old postmenopausal female with a stage IIa, grade 3, triple-negative infiltrating ductal carcinoma of the right breast. On weekly Taxol and once every three week pembrolizumab since 9/20/17 per the ISPY2 clinical trial.  S/p cycle #7 Taxol and pembrolizumab on 10/31/17.    1.  Right breast cancer:  Presents for post-hospitalization follow up.  She has developed pneumonitis and hepatitis.  Presumably secondary to pembrolizumab, given the timing of her fevers following cycles during which she received pembrolizumab.  Will discontinue pembrolizumab altogether given toxicity.  Plan to proceed with Taxol alone.  Will defer chemotherapy this week in order to allow another week of steroids before resuming chemotherapy.  If she remains asymptomatic next week and liver tests are improving, could consider resuming Taxol.  She will return in 1 week for labs, visit with Lilia Livingston, and cycle #8 Taxol.     Our overall plan is to complete a total of 12 weeks of Taxol followed by 4 cycles of Adriamycin, Cytoxan and pembrolizumab.  Following completion of chemotherapy, she will proceed with surgery.  Whether or not she will benefit from adjuvant radiation is unknown at this time and depends on the type of breast surgery, surgical margins, and whether or not there is lymph node involvement at the time of surgery.     2.  Pneumonitis:   Currently on prednisone 70 mg PO daily and will continue for now.  Also continues on doxycycline to complete a 10 day course.       3.  Transaminitis:  LFTs further elevated today despite no chemo x 3 weeks.  Suspect hepatitis secondary to pembrolizumab.  Stop pembrolizumab as above.  Continue prednisone 70 mg daily.  Will start slow steroid taper once LFTs improving.      4.  Neuropathy:  Mild and not interfering with daily activities.  She will continue to take pyridoxine 100 mg daily.    5.  Followup:  Return in 1 week for labs, visit with Lilia Livingston, and cycle #8 Taxol.    It was a pleasure to see Ms. Alonzo in clinic today.  A total of 25 minutes of our 30 minute face to face visit was spent in counseling.    Fara Bradshaw MD

## 2017-11-21 ENCOUNTER — CARE COORDINATION (OUTPATIENT)
Dept: ONCOLOGY | Facility: CLINIC | Age: 57
End: 2017-11-21

## 2017-11-21 ENCOUNTER — APPOINTMENT (OUTPATIENT)
Dept: LAB | Facility: CLINIC | Age: 57
End: 2017-11-21
Attending: INTERNAL MEDICINE
Payer: COMMERCIAL

## 2017-11-21 ENCOUNTER — ONCOLOGY VISIT (OUTPATIENT)
Dept: ONCOLOGY | Facility: CLINIC | Age: 57
End: 2017-11-21
Attending: INTERNAL MEDICINE
Payer: COMMERCIAL

## 2017-11-21 ENCOUNTER — RESEARCH ENCOUNTER (OUTPATIENT)
Dept: ONCOLOGY | Facility: CLINIC | Age: 57
End: 2017-11-21

## 2017-11-21 VITALS
HEIGHT: 64 IN | HEART RATE: 102 BPM | TEMPERATURE: 98.3 F | RESPIRATION RATE: 16 BRPM | BODY MASS INDEX: 26.94 KG/M2 | SYSTOLIC BLOOD PRESSURE: 145 MMHG | WEIGHT: 157.8 LBS | OXYGEN SATURATION: 97 % | DIASTOLIC BLOOD PRESSURE: 78 MMHG

## 2017-11-21 DIAGNOSIS — C50.211 MALIGNANT NEOPLASM OF UPPER-INNER QUADRANT OF RIGHT BREAST IN FEMALE, ESTROGEN RECEPTOR NEGATIVE (H): ICD-10-CM

## 2017-11-21 DIAGNOSIS — Z17.1 MALIGNANT NEOPLASM OF UPPER-INNER QUADRANT OF RIGHT BREAST IN FEMALE, ESTROGEN RECEPTOR NEGATIVE (H): ICD-10-CM

## 2017-11-21 PROCEDURE — 25000128 H RX IP 250 OP 636: Mod: ZF | Performed by: INTERNAL MEDICINE

## 2017-11-21 PROCEDURE — 99213 OFFICE O/P EST LOW 20 MIN: CPT | Mod: ZF

## 2017-11-21 PROCEDURE — 36591 DRAW BLOOD OFF VENOUS DEVICE: CPT

## 2017-11-21 PROCEDURE — 99214 OFFICE O/P EST MOD 30 MIN: CPT | Mod: ZP | Performed by: INTERNAL MEDICINE

## 2017-11-21 RX ORDER — HEPARIN SODIUM (PORCINE) LOCK FLUSH IV SOLN 100 UNIT/ML 100 UNIT/ML
5 SOLUTION INTRAVENOUS
Status: COMPLETED | OUTPATIENT
Start: 2017-11-21 | End: 2017-11-21

## 2017-11-21 RX ORDER — PREDNISONE 10 MG/1
1 TABLET ORAL DAILY
Qty: 70 TABLET | Refills: 0 | Status: SHIPPED | OUTPATIENT
Start: 2017-11-21 | End: 2017-12-05

## 2017-11-21 RX ADMIN — SODIUM CHLORIDE, PRESERVATIVE FREE 5 ML: 5 INJECTION INTRAVENOUS at 11:29

## 2017-11-21 ASSESSMENT — PAIN SCALES - GENERAL: PAINLEVEL: NO PAIN (0)

## 2017-11-21 NOTE — PROGRESS NOTES
Met with patient and  during clinic visit today.  Pt feeling better and will not have treatment today.  Pt will no longer receive Pembrolizumab.  Pt knows how to contact our office with   Any questions or concerns.      Dionne Goode RNCC BSN CBCN

## 2017-11-21 NOTE — NURSING NOTE
"Oncology Rooming Note    November 21, 2017 12:14 PM   Ashleigh Alonzo is a 57 year old female who presents for:    Chief Complaint   Patient presents with     Port Draw     labs drawn from port by rn.  vs taken     Oncology Clinic Visit     Breast ca - hospital follow up     Initial Vitals: /78 (BP Location: Right arm, Patient Position: Sitting, Cuff Size: Adult Regular)  Pulse 102  Temp 98.3  F (36.8  C) (Oral)  Resp 16  Ht 1.626 m (5' 4.02\")  Wt 71.6 kg (157 lb 12.8 oz)  SpO2 97%  BMI 27.07 kg/m2 Estimated body mass index is 27.07 kg/(m^2) as calculated from the following:    Height as of this encounter: 1.626 m (5' 4.02\").    Weight as of this encounter: 71.6 kg (157 lb 12.8 oz). Body surface area is 1.8 meters squared.  No Pain (0) Comment: Data Unavailable   No LMP recorded. Patient is postmenopausal.  Allergies reviewed: Yes  Medications reviewed: Yes    Medications: Medication refills not needed today.  Pharmacy name entered into [facility]:    [facility] [place], [place] - [address] [place] [facility] - [place], [place] - [address] [address]    Clinical concerns: No new concerns at this time.    10 minutes for nursing intake (face to face time)     Adilson Bahena LPN            "

## 2017-11-21 NOTE — MR AVS SNAPSHOT
After Visit Summary   11/21/2017    Ashleigh Alonzo    MRN: [id number]           Patient Information     Date Of Birth          1960        Visit Information        Provider Department      11/21/2017 12:00 PM Fara Bradshaw MD [facility]        Today's Diagnoses     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)           Follow-ups after your visit        Your next 10 appointments already scheduled     Nov 28, 2017  6:30 AM CST   [facility] Lab Draw with [facility] LAB DRAW   [facility] Lab Draw ([facility])    [address] [place] [address]   [phone]            Nov 28, 2017  7:00 AM CST   (Arrive by 6:45 AM)   Return Visit with EDDIE Oliva   [facility] ([facility])    [address] [place] [address]   [phone]            Nov 28, 2017  8:00 AM CST   Infusion 180 with  ONCOLOGY INFUSION, UC 14 ATC   [facility] ([facility])    [address] [place] [address]   [phone]            Nov 28, 2017  8:00 AM CST   (Arrive by 7:45 AM)   Office Visit with Dyan Lozano RPH   [facility] Medication Therapy Management ([facility])    [address] [place] [address]   [phone]           Bring a current list of meds and any records pertaining to this visit. For Physicals, please bring immunization records and any forms needing to be filled out. Please arrive 10 minutes early to complete paperwork.            Dec 05, 2017  6:30 AM CST   M[facility]ic Lab Draw with  MASONIC LAB DRAW   [facility] Lab Draw ([facility])    [address] [place] [address]   [phone]            Dec 05, 2017  7:00 AM  CST   (Arrive by 6:45 AM)   Return Visit with EDDIE Oliva   [facility] ([facility])    [address] [facility] [address] [phone]            Dec 05, 2017  8:00 AM CST   Infusion 180 with UC ONCOLOGY INFUSION, UC 17 ATC   [facility] ([facility])    [address] [id number] [phone]            Dec 12, 2017 11:15 AM CST   M[facility]ic Lab Draw with  MASONIC LAB DRAW   [facility] Lab Draw ([facility])    [address] [id number] [phone]              Who to contact     If you have questions or need follow up information about today's clinic visit or your schedule please contact [facility] directly at [phone].  Normal or non-critical lab and imaging results will be communicated to you by [employer], letter or phone within 4 business days after the clinic has received the results. If you do not hear from us within 7 days, please contact the clinic through "[employer]"t or phone. If you have a critical or abnormal lab result, we will notify you by phone as soon as possible.  Submit refill requests through [employer] or call your pharmacy and they will forward the refill request to us. Please allow 3 business days for your refill to be completed.          Additional Information About Your Visit        U[employer]hothart Information     [employer] gives you secure access to your electronic health record. If you see a primary care provider, you can also send messages to your care team and make appointments. If you have questions, please call your primary care clinic.  If you do not have a primary care provider, please call [phone] and they will assist you.        Care EveryWhere ID     This is your Care EveryWhere ID. This could be used by other organizations to access your [facility]  "medical records  [id number]        Your Vitals Were     Pulse Temperature Respirations Height Pulse Oximetry BMI (Body Mass Index)    102 98.3  F (36.8  C) (Oral) 16 1.626 m (5' 4.02\") 97% 27.07 kg/m2       Blood Pressure from Last 3 Encounters:   11/21/17 145/78   11/15/17 112/70   11/07/17 125/70    Weight from Last 3 Encounters:   11/21/17 71.6 kg (157 lb 12.8 oz)   11/15/17 71.1 kg (156 lb 12.8 oz)   11/07/17 70.9 kg (156 lb 4.8 oz)              Today, you had the following     No orders found for display         Where to get your medicines      These medications were sent to [place], [place] - [address] [facility] [address]  [address] [place] [address] [address] [place] [address]    Hours:  TRANSPLANT PHONE NUMBER [phone] Phone:  [phone]     predniSONE 10 MG tablet          Primary Care Provider Office Phone # Fax #    Radha Cazares [phone]       [address] [address]        Equal Access to Services     THERESA HUGHES AH: Hadii candie mcdaniel hadasho Somariaaali, waaxda luqadaha, qaybta kaalmada adeegyada, bang pickering. So [facility] [phone].    ATENCIÓN: Si habla español, tiene a blackman disposición servicios gratuitos de asistencia lingüística. Llame al [phone].    We comply with applicable federal civil rights laws and [place] laws. We do not discriminate on the basis of race, color, national origin, age, disability, sex, sexual orientation, or gender identity.            Thank you!     Thank you for choosing [facility]  for your care. Our goal is always to provide you with excellent care. Hearing back from our patients is one way we can continue to improve our services. Please take a few minutes to complete the written survey that you may receive in the mail after your visit with us. Thank you!             Your Updated Medication List - Protect others around " you: Learn how to safely use, store and throw away your medicines at www.disposemymeds.org.          This list is accurate as of: 11/21/17 11:59 PM.  Always use your most recent med list.                   Brand Name Dispense Instructions for use Diagnosis    albuterol 108 (90 BASE) MCG/ACT Inhaler    PROAIR HFA/PROVENTIL HFA/VENTOLIN HFA    1 Inhaler    Inhale 2 puffs into the lungs every 6 hours as needed for shortness of breath / dyspnea    Chronic obstructive pulmonary disease, unspecified COPD type (H)       aspirin 81 MG tablet      Take 1 tablet by mouth daily.        citalopram 10 MG tablet    celeXA    90 tablet    Take 1 tablet (10 mg) by mouth daily    Anxiety, Depression, unspecified depression type       CO Q 10 PO      Take 1 tablet by mouth daily        darifenacin 7.5 MG 24 hr tablet    ENABLEX    90 tablet    Take 1 tablet (7.5 mg) by mouth daily    Overactive bladder       doxycycline 100 MG capsule    VIBRAMYCIN    12 capsule    Take 1 capsule (100 mg) by mouth 2 times daily for 6 days    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)       guaiFENesin 600 MG 12 hr tablet    MUCINEX    180 tablet    Take 2 tablets (1,200 mg) by mouth 2 times daily    Cough with sputum       hydrOXYzine 25 MG tablet    ATARAX    100 tablet    Take 1-2 tablets (25-50 mg) by mouth every 6 hours as needed for itching    Hives       lidocaine-prilocaine cream    EMLA    30 g    Please apply to port site 30 minutes before use prn    Personal history of malignant neoplasm of breast       * LORazepam 0.5 MG tablet    ATIVAN    10 tablet    Take 1-2 tabs 20 minutes prior to MRI scans.    Breast cancer (H), Anxiety       * LORazepam 0.5 MG tablet    ATIVAN    30 tablet    Take 1 tablet (0.5 mg) by mouth every 4 hours as needed (Anxiety, Nausea/Vomiting or Sleep)    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)        MULTIPLE VITAMIN PO      Take 1 tablet by mouth daily        * order for DME      Respironics Dream Station Auto CPAP 12-18 cm, F&P Simplus FFM small.    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)       pantoprazole 40 MG EC tablet    PROTONIX    30 tablet    Take 1 tablet (40 mg) by mouth every morning    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)       predniSONE 10 MG tablet    DELTASONE    70 tablet    Take 7 tablets (70 mg) by mouth daily    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        vitamin D 1000 UNITS capsule      TAKE 2 CAPSULES BY MOUTH DAILY        * Notice:  This list has 4 medication(s) that are the same as other medications prescribed for you. Read the directions carefully, and ask your doctor or other care provider to review them with you.

## 2017-11-21 NOTE — PROGRESS NOTES
Research appt.: Pt just recently in hospital with pneumonitis. D/C'd from hospital last week on wed. She is currently on 70mg of prednisone and initially was going to start taper next week but because her ALT is elevated (118) MD wanted her to stay on 70mg/day until seen next week. If she is still doing ok and ALT is decreased then she may be able to resume Taxol. She is still on ISPY but is off the Pembrolizumab.   She has not had any further fevers since d/c from hospital. Energy is better- was sleeping much of time before. Denies SOB, cough is improved was dry cough.   Appetite has been very good and reporting bowels are moving well. Drinking fluids. Clarified with pt and  she is still going to be followed as she has been on the study but wont be getting the pembro any longer. Answered all questions with verbalized understanding by the patient.   Steffany Nix RN   [phone]

## 2017-11-21 NOTE — NURSING NOTE
"Chief Complaint   Patient presents with     Port Draw     labs drawn from port by rn.  vs taken     Port accessed with 20 gauge 3/4\" gripper needle and labs drawn by rn.  Port flushed with NS and heparin.  Pt tolerated well.  VS taken.  Pt checked in for next appt.  Kyra Liz RN      "

## 2017-11-21 NOTE — LETTER
11/21/2017       RE: Ashleigh Alonzo  [address] [place] [address]     Dear Colleague,    Thank you for referring your patient, Ashleigh Alonzo, to the [facility]. Please see a copy of my visit note below.    Oncology On Treatment Visit:  Date on this visit: 11/20/2017    Diagnosis:  Stage IIa, T2N0M0, grade 3 triple negative cancer of the right breast.    Primary Physician: Radha Cazares     History Of Present Illness:  Ms. Alonzo is a 57-year-old postmenopausal female with stage IIa, T2 N0 M0, grade 3, triple-negative invasive carcinoma of the right breast.  Routine bilateral screening mammogram on 07/27/2017 showed possible developing calcifications in the right breast at the 12 o'clock position 6 cm from the nipple.  Mammogram prior to this one had been 1 year prior in 07/2016.  Right breast ultrasound demonstrated a 7-mm, irregular, hypoechoic mass at the 12 o'clock position.  The patient went on to have a contrast-enhanced mammogram which showed a peripherally enhancing, irregular mass measured up to 1.9 cm as well as an additional 6-mm, enhancing focus anteromedial to the dominant mass.  The total area of abnormal enhancement on contrast mammogram measured up to 3.4 cm.  Right breast biopsy on 08/22/2017 demonstrated a grade 3 invasive mammary carcinoma with associated high-grade DCIS.  Invasive carcinoma was stained for estrogen and progesterone receptors.  Estrogen receptor and progesterone receptor staining was negative with 0% of nuclei staining.  HER2 was non-amplified by FISH with a HER2/CEN17 ratio of 1.5 and 2.8 HER2 signals per nucleus.  Breast MRI measured the biopsy proven breast cancer at 3.2 cm.    Ms. Alonzo enrolled in the ISPY-2 clinical trial.  She began treatment with weekly taxol and once every 3 week pembrolizumab on 9/20/17.  Week 3 MRI showed a decrease in size of right breast mass from 3.2 cm to 2.4 cm and overall decrease in enhancement.  She was  hospitalized 11/11 - 11/17 with fevers ranging from 102 - 105.  CT chest was consistent with pneumonitis.  She was started on corticosteroids.       Interval History:  Ashleigh Perera comes in to clinic today for post-hospitalization followup.  She was hospitalized from 11/11 through 11/17 for high fevers with associated extreme fatigue and dry cough.  A CT of the chest showed bilateral interstitial opacities consistent with pneumonitis.  She was started on broad-spectrum antibiotics as well as Solu-Medrol.  She has had a vast improvement in symptoms.  She was discharged on 11/17 with prednisone 70 mg daily.  She states that since discharge from the hospital she has felt very well.  She has had no further fevers.  She denies cough.  She is able to walk blocks without any distress and denies shortness of breath or chest pain at this time.  She has had no infectious complaints.  She denies abdominal pain, nausea, vomiting, diarrhea or constipation.  She denies insomnia.  She believes that she has gained a couple of pounds since starting prednisone; however, she welcomes this, as she initially lost weight with her chemotherapy.  Her appetite is very good; in fact, she ate two ham and cheese croissants for breakfast this morning.  She denies headaches, visual changes or focal neurologic complaints.  The remainder of a 10-point review of systems is otherwise negative.      Past Medical/Surgical History:  Past Medical History:   Diagnosis Date     COPD (chronic obstructive pulmonary disease) (H) 2011     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) 8/30/2017     Periodontal disease      Sleep apnea 12/2015     Urticaria      Past Surgical History:   Procedure Laterality Date     APPENDECTOMY  10/6/2015     CATARACT IOL, RT/LT  11/2016    bilateral      COLONOSCOPY  11/15/2011    Procedure:COLONOSCOPY; Colonoscopy, screening; Surgeon:ANASTASIA BUNCH; Location: OR      INSERT PORT VASCULAR ACCESS Left 9/1/2017    Procedure: INSERT PORT VASCULAR ACCESS;  Single Lumen Chest Power Port;  Surgeon: Leif Parkinson PA-C;  [facility]     TUBAL LIGATION  12/2004    Bilateral     Allergies:  Allergies as of 11/21/2017     (No Known Allergies)     Current Medications:  Current Outpatient Prescriptions   Medication Sig Dispense Refill     pantoprazole (PROTONIX) 40 MG EC tablet Take 1 tablet (40 mg) by mouth every morning 30 tablet 0     predniSONE (DELTASONE) 10 MG tablet Take 7 tablets (70 mg) by mouth daily 70 tablet 0     doxycycline (VIBRAMYCIN) 100 MG capsule Take 1 capsule (100 mg) by mouth 2 times daily for 6 days 12 capsule 0     order for DME Cranial prosthesis 1 Device 1     LORazepam (ATIVAN) 0.5 MG tablet Take 1 tablet (0.5 mg) by mouth every 4 hours as needed (Anxiety, Nausea/Vomiting or Sleep) 30 tablet 2     lidocaine-prilocaine (EMLA) cream Please apply to port site 30 minutes before use prn 30 g 1     LORazepam (ATIVAN) 0.5 MG tablet Take 1-2 tabs 20 minutes prior to MRI scans. 10 tablet 0     hydrOXYzine (ATARAX) 25 MG tablet Take 1-2 tablets (25-50 mg) by mouth every 6 hours as needed for itching 100 tablet 2     guaiFENesin (MUCINEX) 600 MG 12 hr tablet Take 2 tablets (1,200 mg) by mouth 2 times daily 180 tablet 6     tiotropium (SPIRIVA) 18 MCG capsule Inhale 1 capsule (18 mcg) into the lungs daily Inhale contents of one capsule 1 capsule 11     albuterol (PROAIR HFA/PROVENTIL HFA/VENTOLIN HFA) 108 (90 BASE) MCG/ACT Inhaler Inhale 2 puffs into the lungs every 6 hours as needed for shortness of breath / dyspnea 1 Inhaler 5     citalopram (CELEXA) 10 MG tablet Take 1 tablet (10 mg) by mouth daily 90 tablet 3     darifenacin (ENABLEX) 7.5 MG 24 hr tablet Take 1 tablet (7.5 mg) by mouth daily (Patient taking differently: Take 7.5 mg by mouth daily as needed ) 90 tablet 3     order for DME Respironics Dream Station Auto CPAP 12-18 cm, F&P Simplus FFM small.    "    Coenzyme Q10 (CO Q 10 PO) Take 1 tablet by mouth daily        MULTIPLE VITAMIN PO Take 1 tablet by mouth daily        Cyanocobalamin (VITAMIN B 12 PO) Take 100 mcg by mouth daily        Pyridoxine HCl (VITAMIN B6 PO) Take 1 tablet by mouth daily.       aspirin 81 MG tablet Take 1 tablet by mouth daily.  3     VITAMIN D 1000 UNIT OR CAPS TAKE 2 CAPSULES BY MOUTH DAILY        Family and Social History:  Reviewed and unchanged from prior.  Please see initial consultation dated 8/28/17 for further details.    Physical Exam:  /78 (BP Location: Right arm, Patient Position: Sitting, Cuff Size: Adult Regular)  Pulse 102  Temp 98.3  F (36.8  C) (Oral)  Resp 16  Ht 1.626 m (5' 4.02\")  Wt 71.6 kg (157 lb 12.8 oz)  SpO2 97%  BMI 27.07 kg/m2  General:  Well appearing, well-nourished adult female in NAD.  HEENT:  Normocephalic.  Sclera anicteric.  MMM.  No lesions of the oropharynx.  Lymph:  No palpable cervical, supraclavicular, or axillary LAD.  Chest:  Fine crackles at the bilateral bases.  Left chest port-a-cath is without surrounding erythema or edema.  Breast exam:  Increased density palpated at 12:00 right breast, but not discrete mass is palpable.  CV:  RRR.  Nl S1 and S2.  No m/r/g.  Abd:  Soft/NT/ND.  BSs normoactive.  No hepatosplenomegaly.  Ext:  No pitting edema of the bilateral lower extremities.  Pulses 2+ and symmetric.  Musculo:  Strength 5/5 throughout.  Neuro:  Cranial nerves grossly intact.  Psych:  Mood and affect appear normal.    Laboratory/Imaging Studies:  Results for YUNI CALIXTO (MRN [id number]) as of 11/25/2017 16:02   Ref. Range 11/21/2017 11:37   Sodium Latest Ref Range: 133 - 144 mmol/L 138   Potassium Latest Ref Range: 3.4 - 5.3 mmol/L 3.6   Chloride Latest Ref Range: 94 - 109 mmol/L 103   Carbon Dioxide Latest Ref Range: 20 - 32 mmol/L 24   Urea Nitrogen Latest Ref Range: 7 - 30 mg/dL 11   Creatinine Latest Ref Range: 0.52 - 1.04 mg/dL 0.65   GFR Estimate Latest Ref Range: >60 " mL/min/1.7m2 >90   GFR Estimate If Black Latest Ref Range: >60 mL/min/1.7m2 >90   Calcium Latest Ref Range: 8.5 - 10.1 mg/dL 8.6   Anion Gap Latest Ref Range: 3 - 14 mmol/L 11   Albumin Latest Ref Range: 3.4 - 5.0 g/dL 2.7 (L)   Protein Total Latest Ref Range: 6.8 - 8.8 g/dL 6.7 (L)   Bilirubin Total Latest Ref Range: 0.2 - 1.3 mg/dL 0.5   Alkaline Phosphatase Latest Ref Range: 40 - 150 U/L 144   ALT Latest Ref Range: 0 - 50 U/L 118 (H)   AST Latest Ref Range: 0 - 45 U/L 46 (H)   Results for YUNI CALIXTO (MRN [id number]) as of 11/25/2017 16:02   Ref. Range 11/21/2017 11:37   WBC Latest Ref Range: 4.0 - 11.0 10e9/L 9.6   Hemoglobin Latest Ref Range: 11.7 - 15.7 g/dL 11.6 (L)   Hematocrit Latest Ref Range: 35.0 - 47.0 % 34.3 (L)   Platelet Count Latest Ref Range: 150 - 450 10e9/L 384       ASSESSMENT/PLAN:  Ms. Calixto is a 57-year-old postmenopausal female with a stage IIa, grade 3, triple-negative infiltrating ductal carcinoma of the right breast. On weekly Taxol and once every three week pembrolizumab since 9/20/17 per the ISPY2 clinical trial.  S/p cycle #7 Taxol and pembrolizumab on 10/31/17.    1.  Right breast cancer:  Presents for post-hospitalization follow up.  She has developed pneumonitis and hepatitis.  Presumably secondary to pembrolizumab, given the timing of her fevers following cycles during which she received pembrolizumab.  Will discontinue pembrolizumab altogether given toxicity.  Plan to proceed with Taxol alone.  Will defer chemotherapy this week in order to allow another week of steroids before resuming chemotherapy.  If she remains asymptomatic next week and liver tests are improving, could consider resuming Taxol.  She will return in 1 week for labs, visit with Lilia Livingston, and cycle #8 Taxol.     Our overall plan is to complete a total of 12 weeks of Taxol followed by 4 cycles of Adriamycin, Cytoxan and pembrolizumab.  Following completion of chemotherapy, she will proceed with surgery.   Whether or not she will benefit from adjuvant radiation is unknown at this time and depends on the type of breast surgery, surgical margins, and whether or not there is lymph node involvement at the time of surgery.     2.  Pneumonitis:  Currently on prednisone 70 mg PO daily and will continue for now.  Also continues on doxycycline to complete a 10 day course.       3.  Transaminitis:  LFTs further elevated today despite no chemo x 3 weeks.  Suspect hepatitis secondary to pembrolizumab.  Stop pembrolizumab as above.  Continue prednisone 70 mg daily.  Will start slow steroid taper once LFTs improving.      4.  Neuropathy:  Mild and not interfering with daily activities.  She will continue to take pyridoxine 100 mg daily.    5.  Followup:  Return in 1 week for labs, visit with Lilia Livingston, and cycle #8 Taxol.    It was a pleasure to see Ms. Alonzo in clinic today.  A total of 25 minutes of our 30 minute face to face visit was spent in counseling.    Fara Bradshaw MD

## 2017-11-25 RX ORDER — ALBUTEROL SULFATE 0.83 MG/ML
2.5 SOLUTION RESPIRATORY (INHALATION)
Status: CANCELLED | OUTPATIENT
Start: 2017-11-27

## 2017-11-25 RX ORDER — EPINEPHRINE 1 MG/ML
0.3 INJECTION, SOLUTION, CONCENTRATE INTRAVENOUS EVERY 5 MIN PRN
Status: CANCELLED | OUTPATIENT
Start: 2017-11-27

## 2017-11-25 RX ORDER — METHYLPREDNISOLONE SODIUM SUCCINATE 125 MG/2ML
125 INJECTION, POWDER, LYOPHILIZED, FOR SOLUTION INTRAMUSCULAR; INTRAVENOUS
Status: CANCELLED
Start: 2017-11-27

## 2017-11-25 RX ORDER — HEPARIN SODIUM (PORCINE) LOCK FLUSH IV SOLN 100 UNIT/ML 100 UNIT/ML
500 SOLUTION INTRAVENOUS ONCE
Status: CANCELLED
Start: 2017-11-27 | End: 2017-11-27

## 2017-11-25 RX ORDER — DEXAMETHASONE SODIUM PHOSPHATE 10 MG/ML
10 INJECTION, SOLUTION INTRAMUSCULAR; INTRAVENOUS
Status: CANCELLED | OUTPATIENT
Start: 2017-11-27

## 2017-11-25 RX ORDER — DIPHENHYDRAMINE HYDROCHLORIDE 50 MG/ML
50 INJECTION INTRAMUSCULAR; INTRAVENOUS
Status: CANCELLED
Start: 2017-11-27

## 2017-11-25 RX ORDER — MEPERIDINE HYDROCHLORIDE 25 MG/ML
25 INJECTION INTRAMUSCULAR; INTRAVENOUS; SUBCUTANEOUS EVERY 30 MIN PRN
Status: CANCELLED | OUTPATIENT
Start: 2017-11-27

## 2017-11-25 RX ORDER — SODIUM CHLORIDE 9 MG/ML
1000 INJECTION, SOLUTION INTRAVENOUS CONTINUOUS PRN
Status: CANCELLED
Start: 2017-11-27

## 2017-11-25 RX ORDER — EPINEPHRINE 0.3 MG/.3ML
0.3 INJECTION SUBCUTANEOUS EVERY 5 MIN PRN
Status: CANCELLED | OUTPATIENT
Start: 2017-11-27

## 2017-11-25 RX ORDER — LORAZEPAM 2 MG/ML
0.5 INJECTION INTRAMUSCULAR EVERY 4 HOURS PRN
Status: CANCELLED
Start: 2017-11-27

## 2017-11-25 RX ORDER — ALBUTEROL SULFATE 90 UG/1
1-2 AEROSOL, METERED RESPIRATORY (INHALATION)
Status: CANCELLED
Start: 2017-11-27

## 2017-11-28 ENCOUNTER — ONCOLOGY VISIT (OUTPATIENT)
Dept: ONCOLOGY | Facility: CLINIC | Age: 57
End: 2017-11-28
Attending: INTERNAL MEDICINE
Payer: COMMERCIAL

## 2017-11-28 ENCOUNTER — OFFICE VISIT (OUTPATIENT)
Dept: PHARMACY | Facility: CLINIC | Age: 57
End: 2017-11-28
Payer: COMMERCIAL

## 2017-11-28 ENCOUNTER — RESEARCH ENCOUNTER (OUTPATIENT)
Dept: ONCOLOGY | Facility: CLINIC | Age: 57
End: 2017-11-28

## 2017-11-28 ENCOUNTER — APPOINTMENT (OUTPATIENT)
Dept: LAB | Facility: CLINIC | Age: 57
End: 2017-11-28
Attending: INTERNAL MEDICINE
Payer: COMMERCIAL

## 2017-11-28 VITALS
HEART RATE: 86 BPM | OXYGEN SATURATION: 94 % | BODY MASS INDEX: 26.91 KG/M2 | SYSTOLIC BLOOD PRESSURE: 138 MMHG | RESPIRATION RATE: 16 BRPM | WEIGHT: 157.6 LBS | TEMPERATURE: 97.9 F | HEIGHT: 64 IN | DIASTOLIC BLOOD PRESSURE: 85 MMHG

## 2017-11-28 DIAGNOSIS — C50.211 MALIGNANT NEOPLASM OF UPPER-INNER QUADRANT OF RIGHT BREAST IN FEMALE, ESTROGEN RECEPTOR NEGATIVE (H): Primary | ICD-10-CM

## 2017-11-28 DIAGNOSIS — Z17.1 MALIGNANT NEOPLASM OF UPPER-INNER QUADRANT OF RIGHT BREAST IN FEMALE, ESTROGEN RECEPTOR NEGATIVE (H): Primary | ICD-10-CM

## 2017-11-28 DIAGNOSIS — F32.A DEPRESSION, UNSPECIFIED DEPRESSION TYPE: ICD-10-CM

## 2017-11-28 DIAGNOSIS — L50.9 URTICARIA: ICD-10-CM

## 2017-11-28 DIAGNOSIS — Z79.82 ENCOUNTER FOR LONG-TERM (CURRENT) USE OF ASPIRIN: ICD-10-CM

## 2017-11-28 DIAGNOSIS — Z17.1 MALIGNANT NEOPLASM OF UPPER-INNER QUADRANT OF RIGHT BREAST IN FEMALE, ESTROGEN RECEPTOR NEGATIVE (H): ICD-10-CM

## 2017-11-28 DIAGNOSIS — J98.4 PNEUMONITIS: ICD-10-CM

## 2017-11-28 DIAGNOSIS — E63.9 NUTRITIONAL DEFICIENCY: ICD-10-CM

## 2017-11-28 DIAGNOSIS — C50.211 MALIGNANT NEOPLASM OF UPPER-INNER QUADRANT OF RIGHT BREAST IN FEMALE, ESTROGEN RECEPTOR NEGATIVE (H): ICD-10-CM

## 2017-11-28 DIAGNOSIS — J44.9 CHRONIC OBSTRUCTIVE PULMONARY DISEASE, UNSPECIFIED COPD TYPE (H): Primary | ICD-10-CM

## 2017-11-28 LAB
ALBUMIN SERPL-MCNC: 2.9 G/DL (ref 3.4–5)
ALP SERPL-CCNC: 159 U/L (ref 40–150)
ALT SERPL W P-5'-P-CCNC: 151 U/L (ref 0–50)
ANION GAP SERPL CALCULATED.3IONS-SCNC: 9 MMOL/L (ref 3–14)
AST SERPL W P-5'-P-CCNC: 71 U/L (ref 0–45)
BASOPHILS # BLD AUTO: 0 10E9/L (ref 0–0.2)
BASOPHILS NFR BLD AUTO: 0.2 %
BILIRUB SERPL-MCNC: 0.4 MG/DL (ref 0.2–1.3)
BUN SERPL-MCNC: 11 MG/DL (ref 7–30)
CALCIUM SERPL-MCNC: 8.4 MG/DL (ref 8.5–10.1)
CHLORIDE SERPL-SCNC: 102 MMOL/L (ref 94–109)
CO2 SERPL-SCNC: 26 MMOL/L (ref 20–32)
CREAT SERPL-MCNC: 0.73 MG/DL (ref 0.52–1.04)
DIFFERENTIAL METHOD BLD: ABNORMAL
EOSINOPHIL # BLD AUTO: 0 10E9/L (ref 0–0.7)
EOSINOPHIL NFR BLD AUTO: 0.2 %
ERYTHROCYTE [DISTWIDTH] IN BLOOD BY AUTOMATED COUNT: 15.9 % (ref 10–15)
GFR SERPL CREATININE-BSD FRML MDRD: 82 ML/MIN/1.7M2
GLUCOSE SERPL-MCNC: 68 MG/DL (ref 70–99)
HCT VFR BLD AUTO: 38.4 % (ref 35–47)
HGB BLD-MCNC: 12.4 G/DL (ref 11.7–15.7)
IMM GRANULOCYTES # BLD: 0.3 10E9/L (ref 0–0.4)
IMM GRANULOCYTES NFR BLD: 1.7 %
LYMPHOCYTES # BLD AUTO: 3.8 10E9/L (ref 0.8–5.3)
LYMPHOCYTES NFR BLD AUTO: 22.3 %
MCH RBC QN AUTO: 33.1 PG (ref 26.5–33)
MCHC RBC AUTO-ENTMCNC: 32.3 G/DL (ref 31.5–36.5)
MCV RBC AUTO: 102 FL (ref 78–100)
MONOCYTES # BLD AUTO: 1.4 10E9/L (ref 0–1.3)
MONOCYTES NFR BLD AUTO: 8.3 %
NEUTROPHILS # BLD AUTO: 11.6 10E9/L (ref 1.6–8.3)
NEUTROPHILS NFR BLD AUTO: 67.3 %
NRBC # BLD AUTO: 0 10*3/UL
NRBC BLD AUTO-RTO: 0 /100
PLATELET # BLD AUTO: 280 10E9/L (ref 150–450)
POTASSIUM SERPL-SCNC: 4 MMOL/L (ref 3.4–5.3)
PROT SERPL-MCNC: 6.6 G/DL (ref 6.8–8.8)
RBC # BLD AUTO: 3.75 10E12/L (ref 3.8–5.2)
SODIUM SERPL-SCNC: 138 MMOL/L (ref 133–144)
WBC # BLD AUTO: 17.2 10E9/L (ref 4–11)

## 2017-11-28 PROCEDURE — 99214 OFFICE O/P EST MOD 30 MIN: CPT | Mod: ZP | Performed by: PHYSICIAN ASSISTANT

## 2017-11-28 PROCEDURE — 99212 OFFICE O/P EST SF 10 MIN: CPT | Mod: ZF

## 2017-11-28 PROCEDURE — 25000128 H RX IP 250 OP 636: Mod: ZF | Performed by: PHYSICIAN ASSISTANT

## 2017-11-28 PROCEDURE — 99605 MTMS BY PHARM NP 15 MIN: CPT | Performed by: PHARMACIST

## 2017-11-28 PROCEDURE — 25000128 H RX IP 250 OP 636: Mod: ZF | Performed by: INTERNAL MEDICINE

## 2017-11-28 PROCEDURE — 25000125 ZZHC RX 250: Mod: ZF | Performed by: INTERNAL MEDICINE

## 2017-11-28 PROCEDURE — 85025 COMPLETE CBC W/AUTO DIFF WBC: CPT | Performed by: NURSE PRACTITIONER

## 2017-11-28 PROCEDURE — 99607 MTMS BY PHARM ADDL 15 MIN: CPT | Performed by: PHARMACIST

## 2017-11-28 PROCEDURE — 96413 CHEMO IV INFUSION 1 HR: CPT

## 2017-11-28 PROCEDURE — 80053 COMPREHEN METABOLIC PANEL: CPT | Performed by: NURSE PRACTITIONER

## 2017-11-28 PROCEDURE — S0028 INJECTION, FAMOTIDINE, 20 MG: HCPCS | Mod: ZF | Performed by: INTERNAL MEDICINE

## 2017-11-28 RX ORDER — HEPARIN SODIUM (PORCINE) LOCK FLUSH IV SOLN 100 UNIT/ML 100 UNIT/ML
500 SOLUTION INTRAVENOUS ONCE
Status: COMPLETED | OUTPATIENT
Start: 2017-11-28 | End: 2017-11-28

## 2017-11-28 RX ORDER — HEPARIN SODIUM (PORCINE) LOCK FLUSH IV SOLN 100 UNIT/ML 100 UNIT/ML
5 SOLUTION INTRAVENOUS
Status: COMPLETED | OUTPATIENT
Start: 2017-11-28 | End: 2017-11-28

## 2017-11-28 RX ADMIN — FAMOTIDINE 20 MG: 20 INJECTION, SOLUTION INTRAVENOUS at 08:00

## 2017-11-28 RX ADMIN — PACLITAXEL 150 MG: 6 INJECTION, SOLUTION INTRAVENOUS at 08:23

## 2017-11-28 RX ADMIN — SODIUM CHLORIDE, PRESERVATIVE FREE 5 ML: 5 INJECTION INTRAVENOUS at 06:33

## 2017-11-28 RX ADMIN — SODIUM CHLORIDE 250 ML: 9 INJECTION, SOLUTION INTRAVENOUS at 08:00

## 2017-11-28 RX ADMIN — SODIUM CHLORIDE, PRESERVATIVE FREE 500 UNITS: 5 INJECTION INTRAVENOUS at 09:20

## 2017-11-28 ASSESSMENT — PAIN SCALES - GENERAL: PAINLEVEL: NO PAIN (0)

## 2017-11-28 NOTE — MR AVS SNAPSHOT
After Visit Summary   11/28/2017    Ashleigh Alonzo    MRN: [id number]           Patient Information     Date Of Birth          1960        Visit Information        Provider Department      11/28/2017 7:00 AM Lilia Livingston PA [facility]        Today's Diagnoses     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)    -  1       Follow-ups after your visit        Your next 10 appointments already scheduled     Dec 05, 2017  6:30 AM CST   Masonic Lab Draw with [facility]ONIC LAB DRAW   [facility] Masonic Lab Draw ([facility])    [address] [address]   [phone]            Dec 05, 2017  7:00 AM CST   (Arrive by 6:45 AM)   Return Visit with EDDIE Oliva   [facility] ([facility])    [address] [place] [address]   [phone]            Dec 05, 2017  8:00 AM CST   Infusion 120 with UC ONCOLOGY INFUSION, UC 17 ATC   [facility] ([facility])    [address] [place] [address]   [phone]            Dec 12, 2017 11:15 AM CST   Masonic Lab Draw with UC MASONIC LAB DRAW   [facility] Masonic Lab Draw ([facility])    [address] [place] [address]   [phone]            Dec 12, 2017 11:40 AM CST   (Arrive by 11:25 AM)   Return Visit with Rosa Salazar PA-C   [facility] ([facility])    [address] [place] [address]   [phone]            Dec 12, 2017 12:30 PM CST   Infusion 120 with UC ONCOLOGY INFUSION, UC 30 ATC   [facility] ([facility])    [address] [address]   [phone]            Dec 13, 2017   8:00 AM CST   Masonic Lab Draw with UC MASONIC LAB DRAW   [facility] Masonic Lab Draw ([facility])    [address]  2nd Floor  [facility] [address] [phone]            Dec 13, 2017  8:30 AM CST   Ech Complete with [id number]   [facility] Echo ([facility])    [address]  3rd Floor  [facility] [address] [phone]           1. Please bring or wear a comfortable two-piece outfit. 2. You may eat, drink and take your normal medicines. 3. For any questions that cannot be answered, please contact the ordering physician              Who to contact     If you have questions or need follow up information about today's clinic visit or your schedule please contact [facility] directly at [phone].  Normal or non-critical lab and imaging results will be communicated to you by Muufrihart, letter or phone within 4 business days after the clinic has received the results. If you do not hear from us within 7 days, please contact the clinic through Muufrihart or phone. If you have a critical or abnormal lab result, we will notify you by phone as soon as possible.  Submit refill requests through [employer] or call your pharmacy and they will forward the refill request to us. Please allow 3 business days for your refill to be completed.          Additional Information About Your Visit        Muufriharfoodpanda / hellofood Information     [employer] gives you secure access to your electronic health record. If you see a primary care provider, you can also send messages to your care team and make appointments. If you have questions, please call your primary care clinic.  If you do not have a primary care provider, please call [phone] and they will assist you.        Care EveryWhere ID     This is your Care EveryWhere ID. This could be used by other organizations to access your [facility] medical records  [id number]        Your Vitals Were     Pulse Temperature  "Respirations Height Pulse Oximetry BMI (Body Mass Index)    86 97.9  F (36.6  C) (Oral) 16 1.626 m (5' 4.02\") 94% 27.04 kg/m2       Blood Pressure from Last 3 Encounters:   11/28/17 138/85   11/21/17 145/78   11/15/17 112/70    Weight from Last 3 Encounters:   11/28/17 71.5 kg (157 lb 9.6 oz)   11/21/17 71.6 kg (157 lb 12.8 oz)   11/15/17 71.1 kg (156 lb 12.8 oz)              Today, you had the following     No orders found for display       Primary Care Provider Office Phone # Fax #    Radha Cazares [phone]       [address] [address]        Equal Access to Services     [facility]: Hadii candie baileyo Somarian, waaxda luqadaha, qaybta kaalmada adeanmolyada, bang duran . So [facility] [phone].    ATENCIÓN: Si habla español, tiene a blackman disposición servicios gratuitos de asistencia lingüística. Llame al [phone].    We comply with applicable federal civil rights laws and [place] laws. We do not discriminate on the basis of race, color, national origin, age, disability, sex, sexual orientation, or gender identity.            Thank you!     Thank you for choosing [facility]  for your care. Our goal is always to provide you with excellent care. Hearing back from our patients is one way we can continue to improve our services. Please take a few minutes to complete the written survey that you may receive in the mail after your visit with us. Thank you!             Your Updated Medication List - Protect others around you: Learn how to safely use, store and throw away your medicines at www.disposemymeds.org.          This list is accurate as of: 11/28/17  8:58 AM.  Always use your most recent med list.                   Brand Name Dispense Instructions for use Diagnosis    albuterol 108 (90 BASE) MCG/ACT Inhaler    PROAIR HFA/PROVENTIL HFA/VENTOLIN HFA    1 Inhaler    Inhale 2 puffs into the " lungs every 6 hours as needed for shortness of breath / dyspnea    Chronic obstructive pulmonary disease, unspecified COPD type (H)       aspirin 81 MG tablet      Take 1 tablet by mouth daily.        citalopram 10 MG tablet    celeXA    90 tablet    Take 1 tablet (10 mg) by mouth daily    Anxiety, Depression, unspecified depression type       CO Q 10 PO      Take 1 tablet by mouth daily        darifenacin 7.5 MG 24 hr tablet    ENABLEX    90 tablet    Take 1 tablet (7.5 mg) by mouth daily    Overactive bladder       guaiFENesin 600 MG 12 hr tablet    MUCINEX    180 tablet    Take 2 tablets (1,200 mg) by mouth 2 times daily    Cough with sputum       hydrOXYzine 25 MG tablet    ATARAX    100 tablet    Take 1-2 tablets (25-50 mg) by mouth every 6 hours as needed for itching    Hives       lidocaine-prilocaine cream    EMLA    30 g    Please apply to port site 30 minutes before use prn    Personal history of malignant neoplasm of breast       * LORazepam 0.5 MG tablet    ATIVAN    10 tablet    Take 1-2 tabs 20 minutes prior to MRI scans.    Breast cancer (H), Anxiety       * LORazepam 0.5 MG tablet    ATIVAN    30 tablet    Take 1 tablet (0.5 mg) by mouth every 4 hours as needed (Anxiety, Nausea/Vomiting or Sleep)    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)       MULTIPLE VITAMIN PO      Take 1 tablet by mouth daily        * order for [facility]      Respironics Dream Station Auto CPAP 12-18 cm, F&P Simplus FFM small.    MERLIN (obstructive sleep apnea)       * order for [facility]     1 Device    Cranial prosthesis    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)       pantoprazole 40 MG EC tablet    PROTONIX    30 tablet    Take 1 tablet (40 mg) by mouth every morning    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)       predniSONE 10 MG tablet    DELTASONE    70  tablet    Take 7 tablets (70 mg) by mouth daily    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        vitamin D 1000 UNITS capsule      TAKE 2 CAPSULES BY MOUTH DAILY        * Notice:  This list has 4 medication(s) that are the same as other medications prescribed for you. Read the directions carefully, and ask your doctor or other care provider to review them with you.

## 2017-11-28 NOTE — PATIENT INSTRUCTIONS
Recommendations from today's MTM visit:                                                    MTM (medication therapy management) is a service provided by a clinical pharmacist designed to help you get the most of out of your medicines.   Today we reviewed what your medicines are for, how to know if they are working, that your medicines are safe and how to make your medicine regimen as easy as possible.     1. No medication changes today.    Next MTM visit: 2-3 months    To schedule another MTM appointment, please call the clinic directly or you may call the MTM scheduling line at [phone] or toll-free at [phone].     My Clinical Pharmacist's contact information:                                                      It was a pleasure seeing you today!  Please feel free to contact me with any questions or concerns you have.      Dyan Lozano, PharmD, BCOP, BCPS  Clinical Pharmacy Specialist/  Oncology Medication Therapy Management Pharmacist  [facility]  Pager [phone]  Phone [phone]          You may receive a survey about the MTM services you received.  I would appreciate your feedback to help me serve you better in the future. Please fill it out and return it when you can. Your comments will be anonymous.

## 2017-11-28 NOTE — PROGRESS NOTES
Infusion Nursing Note:  Ashleigh Alonzo presents today for cycle 8, day 1 taxol.    Patient seen by provider today: Yes: EDDIE Leach   present during visit today: Not Applicable.    Note: N/A.    Intravenous Access:  Implanted Port.    Treatment Conditions:  Lab Results   Component Value Date    HGB 12.4 11/28/2017     Lab Results   Component Value Date    WBC 17.2 11/28/2017      Lab Results   Component Value Date    ANEU 11.6 11/28/2017     Lab Results   Component Value Date     11/28/2017      Lab Results   Component Value Date     11/28/2017                   Lab Results   Component Value Date    POTASSIUM 4.0 11/28/2017           Lab Results   Component Value Date    MAG 2.1 09/05/2017            Lab Results   Component Value Date    CR 0.73 11/28/2017                   Lab Results   Component Value Date    JAVIER 8.4 11/28/2017                Lab Results   Component Value Date    BILITOTAL 0.4 11/28/2017           Lab Results   Component Value Date    ALBUMIN 2.9 11/28/2017                    Lab Results   Component Value Date     11/28/2017           Lab Results   Component Value Date    AST 71 11/28/2017     Results reviewed, labs did NOT meet treatment parameters:    11/28/2017 0748 TORB EDDIE Leach/Enedelia Parnell RN  -ok to give chemo today with elevated LFT's and elevated WBC (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  Blood return noted pre and post infusion.  Site patent and intact, free from redness, edema or discomfort.  No evidence of extravasations.  Access discontinued per protocol.    Discharge Plan:   Patient declined prescription refills.  Discharge instructions reviewed with: Patient.  Patient and/or family verbalized understanding of discharge  instructions and all questions answered.  Copy of AVS reviewed with patient and/or family.  Patient will return 12/5 for next appointment.  Patient discharged in stable condition accompanied by: self and .  Departure Mode: Ambulatory.    Enedelia Parnell RN

## 2017-11-28 NOTE — PATIENT INSTRUCTIONS
Contact Numbers    [facility] Main Line: [phone]  [facility] Triage:  [phone]    Call triage with chills and/or temperature greater than or equal to 100.5, uncontrolled nausea/vomiting, diarrhea, constipation, dizziness, shortness of breath, chest pain, bleeding, unexplained bruising, or any new/concerning symptoms, questions/concerns.     If you are having any concerning symptoms or wish to speak to a provider before your next infusion visit, please call your care coordinator or triage to notify them so we can adequately serve you.       After Hours: [phone]    If after hours, weekends, or holidays, call main hospital  and ask for Oncology doctor on call.           November 2017 Sunday Monday Tuesday Wednesday Thursday Friday Saturday                  1     2     3     4       5     6     7     P MASONIC LAB DRAW    6:30 AM   (15 min.)   [facility] DRAW   [facility] Draw     P RETURN    6:45 AM   (50 min.)   Lilia Livingston PA   [facility] ONC INFUSION 180    8:00 AM   (180 min.)   UC ONCOLOGY INFUSION   [facility] 8     9     10     UMP RETURN WITH ROOM    1:00 PM   (75 min.)   Naty Segovia GC   [facility] 11     Admission    4:33 PM   Velia Noguera MD   [address] [facility]   (Discharge: 11/15/2017)     XR CHEST 2 VIEWS    5:10 PM   (15 min.)   [email or web address]   [facility],  Radiology   12     13     CT CHEST WO    3:45 PM   (15 min.)   [id number]   [facility], CT 14     15     16     17     18       19     20     21     [facility] LAB DRAW   11:15 AM   (15 min.)   [facility] LAB DRAW   [facility] Masonic Lab Draw     P RETURN   11:45 AM   (30 min.)   Fara Bradshaw MD   [facility] 22     23     24     25       26     27     28     [facility] LAB DRAW    6:30 AM   (15 min.)   [facility] LAB DRAW   [facility] Masonic Lab Draw     P RETURN    6:45 AM   (50 min.)   Miki  EDDIE Kong   [facility]     LONG    7:45 AM   (60 min.)   Dyan Lozano [facility] Medication Therapy Management     P ONC INFUSION 180    8:00 AM   (180 min.)    ONCOLOGY INFUSION   [facility] 29 30 December 2017 Sunday Monday Tuesday Wednesday Thursday Friday Saturday                            1     2       3     4     5     [facility] LAB DRAW    6:30 AM   (15 min.)    MASONIC LAB DRAW   [facility] Masonic Lab Draw     UMP RETURN    6:45 AM   (50 min.)   Lilia Livingston PA   [facility]     UMP ONC INFUSION 180    8:00 AM   (180 min.)    ONCOLOGY INFUSION   [facility] 6     7     8     9       10     11     12     [facility] LAB DRAW   11:15 AM   (15 min.)    MASONIC LAB DRAW   [facility] Lab Draw     UMP RETURN   11:25 AM   (50 min.)   Rosa Salazar PA-C   [facility] ONC INFUSION 180   12:30 PM   (180 min.)    ONCOLOGY INFUSION   [facility] 13     [facility] LAB DRAW    8:00 AM   (15 min.)    [facility]     ECH COMPLETE    8:30 AM   (60 min.)   [id number] Echo     MR BREAST BILATERAL WWO   10:00 AM   (45 min.)   [id number]   [facility] 14     15     16       17     18     [facility] LAB DRAW    9:15 AM   (15 min.)    MASONIC LAB DRAW   [facility] Masonic Lab Draw     UMP RETURN    9:25 AM   (50 min.)   Lilia Livingston PA   [facility]     [facility] ONC INFUSION 180   10:30 AM   (180 min.)    ONCOLOGY INFUSION   [facility] 19     20     21     22     23       24     25     26     27     28     29     30       31                                               Recent Results (from the past 24 hour(s))   CBC with platelets differential    Collection Time: 11/28/17  6:42 AM   Result Value Ref Range    WBC 17.2  (H) 4.0 - 11.0 10e9/L    RBC Count 3.75 (L) 3.8 - 5.2 10e12/L    Hemoglobin 12.4 11.7 - 15.7 g/dL    Hematocrit 38.4 35.0 - 47.0 %     (H) 78 - 100 fl    MCH 33.1 (H) 26.5 - 33.0 pg    MCHC 32.3 31.5 - 36.5 g/dL    RDW 15.9 (H) 10.0 - 15.0 %    Platelet Count 280 150 - 450 10e9/L    Diff Method Automated Method     % Neutrophils 67.3 %    % Lymphocytes 22.3 %    % Monocytes 8.3 %    % Eosinophils 0.2 %    % Basophils 0.2 %    % Immature Granulocytes 1.7 %    Nucleated RBCs 0 0 /100    Absolute Neutrophil 11.6 (H) 1.6 - 8.3 10e9/L    Absolute Lymphocytes 3.8 0.8 - 5.3 10e9/L    Absolute Monocytes 1.4 (H) 0.0 - 1.3 10e9/L    Absolute Eosinophils 0.0 0.0 - 0.7 10e9/L    Absolute Basophils 0.0 0.0 - 0.2 10e9/L    Abs Immature Granulocytes 0.3 0 - 0.4 10e9/L    Absolute Nucleated RBC 0.0    Comprehensive metabolic panel    Collection Time: 11/28/17  6:42 AM   Result Value Ref Range    Sodium 138 133 - 144 mmol/L    Potassium 4.0 3.4 - 5.3 mmol/L    Chloride 102 94 - 109 mmol/L    Carbon Dioxide 26 20 - 32 mmol/L    Anion Gap 9 3 - 14 mmol/L    Glucose 68 (L) 70 - 99 mg/dL    Urea Nitrogen 11 7 - 30 mg/dL    Creatinine 0.73 0.52 - 1.04 mg/dL    GFR Estimate 82 >60 mL/min/1.7m2    GFR Estimate If Black >90 >60 mL/min/1.7m2    Calcium 8.4 (L) 8.5 - 10.1 mg/dL    Bilirubin Total 0.4 0.2 - 1.3 mg/dL    Albumin 2.9 (L) 3.4 - 5.0 g/dL    Protein Total 6.6 (L) 6.8 - 8.8 g/dL    Alkaline Phosphatase 159 (H) 40 - 150 U/L     (H) 0 - 50 U/L    AST 71 (H) 0 - 45 U/L

## 2017-11-28 NOTE — PROGRESS NOTES
Hematology-Oncology Visit  Nov 28, 2017    Reason for Visit: follow-up breast cancer     HPI: Ashleigh Alonzo is a 57 year old female with past medical history of COPD, sleep apnea, periodontal disease with stage IIa, T2N0M0, grade 3, triple negative right breast cancer. She was diagnosed via abnormal screening mammogram. She ultimately had US, contrast-enhanced mammo, and biopsy showing 3.4 cm mass and pathology showed grade 3 invasive mammary carcinoma with associated high-grade DCIS. ER/FL was negative and HER2 negative. She enrolled in ISPY-2 clinical trial and began treatment with weekly taxol and once every 3 week pembrolizumab on 9/2/17. Please see notes from Dr. Bradshaw for further details of patient's oncology history.     Course has been complicated by fevers with PNA and then UTI. Week 7 was held due to transaminitis. She was given week 7 on 11/7 with pembrolizumab and Taxol. She was admitted 11/11-11/17 with high fever (105), cough, and dyspnea and was found to have HCAP and pneumonitis secondary to pembro. 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 She received 2 doses of methylpred, antibiotics, and d/c on prednisone 70 mg daily. Had follow-up last week on 11/21 with Dr. Bradshaw. Plan to resume Taxol this week.     Interval History: Ashleigh Perera is here with her  today. She is feeling great. She denies any fevers/chills, cough, SOB. Her energy level has been great. She has a good appetite, no nausea, or abdominal pain. No issues with bowel movements, edema, or urination. She has been sleeping okay on steroids. ROS otherwise negative.     Current Outpatient Prescriptions   Medication     predniSONE (DELTASONE) 10 MG tablet     pantoprazole (PROTONIX) 40 MG EC tablet     order for DME     lidocaine-prilocaine (EMLA) cream     hydrOXYzine (ATARAX) 25 MG tablet     guaiFENesin (MUCINEX) 600 MG 12 hr tablet     tiotropium (SPIRIVA) 18 MCG capsule     albuterol (PROAIR  "HFA/PROVENTIL HFA/VENTOLIN HFA) 108 (90 BASE) MCG/ACT Inhaler     citalopram (CELEXA) 10 MG tablet     order for DME     Coenzyme Q10 (CO Q 10 PO)     MULTIPLE VITAMIN PO     Cyanocobalamin (VITAMIN B 12 PO)     Pyridoxine HCl (VITAMIN B6 PO)     aspirin 81 MG tablet     VITAMIN D 1000 UNIT OR CAPS     LORazepam (ATIVAN) 0.5 MG tablet     LORazepam (ATIVAN) 0.5 MG tablet     darifenacin (ENABLEX) 7.5 MG 24 hr tablet     No current facility-administered medications for this visit.      Facility-Administered Medications Ordered in Other Visits   Medication     sodium chloride (PF) 0.9% PF flush 10 mL     heparin 100 UNIT/ML injection 500 Units     PACLitaxel (TAXOL) 150 mg in NaCl 0.9 % 300 mL CHEMOTHERAPY     lidocaine 1 % 9 mL     sodium bicarbonate 8.4 % injection 1 mEq     lidocaine-EPINEPHrine 1.5 %-1:501891 injection 10 mL       PHYSICAL EXAM:  /85 (BP Location: Right arm, Patient Position: Sitting, Cuff Size: Adult Regular)  Pulse 86  Temp 97.9  F (36.6  C) (Oral)  Resp 16  Ht 1.626 m (5' 4.02\")  Wt 71.5 kg (157 lb 9.6 oz)  SpO2 94%  BMI 27.04 kg/m2  General: Alert, oriented, pleasant, NAD  Skin: No focal rash on exposed skin   HEENT: Normocephalic, atraumatic, PERRLA, EOMI. Moist mucus membranes, no lesions or thrush  Lungs: CTA bilaterally, normal work of breathing. Clear with coughing.   Cardiac: RRR, S1, S2, no murmurs  Abdomen: Soft, nontender, nondistended. Normoactive bowel sounds. No hepatosplenomegaly, masses  Neuro: CN II-XII grossly nonfocal   Extremities: No pedal edema    Labs:    11/15/2017 06:01 11/21/2017 11:37 11/28/2017 06:42   Sodium 139 138 138   Potassium 3.7 3.6 4.0   Chloride 106 103 102   Carbon Dioxide 23 24 26   Urea Nitrogen 12 11 11   Creatinine 0.71 0.65 0.73   GFR Estimate 84 >90 82   GFR Estimate If Black >90 >90 >90   Calcium 8.7 8.6 8.4 (L)   Anion Gap 10 11 9   Albumin 2.1 (L) 2.7 (L) 2.9 (L)   Protein Total 6.6 (L) 6.7 (L) 6.6 (L)   Bilirubin Total 0.4 0.5 0.4 "   Alkaline Phosphatase 115 144 159 (H)   ALT 84 (H) 118 (H) 151 (H)   AST 95 (H) 46 (H) 71 (H)   Glucose 168 (H) 158 (H) 68 (L)   WBC 4.1 9.6 17.2 (H)   Hemoglobin 9.4 (L) 11.6 (L) 12.4   Hematocrit 28.6 (L) 34.3 (L) 38.4   Platelet Count 332 384 280   RBC Count 2.92 (L) 3.49 (L) 3.75 (L)   MCV 98 98 102 (H)   MCH 32.2 33.2 (H) 33.1 (H)   MCHC 32.9 33.8 32.3   RDW 14.0 15.7 (H) 15.9 (H)   Diff Method Automated Method Automated Method Automated Method   % Neutrophils 84.6 85.0 67.3   % Lymphocytes 13.3 8.7 22.3   % Monocytes 1.9 4.4 8.3   % Eosinophils 0.0 0.0 0.2   % Basophils 0.0 0.1 0.2   % Immature Granulocytes 0.2 1.8 1.7   Nucleated RBCs 0 0 0   Absolute Neutrophil 3.5 8.2 11.6 (H)   Absolute Lymphocytes 0.6 (L) 0.8 3.8   Absolute Monocytes 0.1 0.4 1.4 (H)   Absolute Eosinophils 0.0 0.0 0.0   Absolute Basophils 0.0 0.0 0.0   Abs Immature Granulocytes 0.0 0.2 0.3   Absolute Nucleated RBC 0.0 0.0 0.0       Assessment & Plan:     1.Stage IIa, T2N0M0, grade 3, triple negative breast cancer: S/p 7 weeks of weekly Taxol and every 3 week pembro. Week 7 was held due to grade 2 transaminitis. Last treatment was given 11/7 prior to developing pneumonitis and continued elevation of LFTs. Pembro has been d/c. She has grade 2 transaminitis with ALT of 151 but given the parameter is 150 or less and she is feeling well, will proceed with Taxol today.     Plan for 12 weeks of Taxol followed by 4 cycles of AC and pembrolizumab. She will then proceed with surgery and potentially adjuvant radiation. She will be seen weekly on the clinical trial.      2. Transaminitis: Normal bili, alk phos 159, , AST 71. Thought to be immune mediated hepatitis as well. Discussed with Dr. Rajput who recommended monitoring another week and if LFTs are not improving, to proceed with liver biopsy. Will keep her on prednisone 70 mg daily for now and then consider taper next week if LFTs have improved.    3. Pneumonitis: Symptoms resolved. O2  saturation is normal.       Lilia Livingston PA-C    [facility] [address] [address] [phone]

## 2017-11-28 NOTE — NURSING NOTE
"Oncology Rooming Note    November 28, 2017 7:01 AM   Ashleigh Alonzo is a 57 year old female who presents for:    Chief Complaint   Patient presents with     Port Draw     port accessed and labs drawn by rn.  vs taken.     Oncology Clinic Visit     Breast Ca F/U     Initial Vitals: /85 (BP Location: Right arm, Patient Position: Sitting, Cuff Size: Adult Regular)  Pulse 86  Temp 97.9  F (36.6  C) (Oral)  Resp 16  Ht 1.626 m (5' 4.02\")  Wt 71.5 kg (157 lb 9.6 oz)  SpO2 94%  BMI 27.04 kg/m2 Estimated body mass index is 27.04 kg/(m^2) as calculated from the following:    Height as of this encounter: 1.626 m (5' 4.02\").    Weight as of this encounter: 71.5 kg (157 lb 9.6 oz). Body surface area is 1.8 meters squared.  No Pain (0) Comment: Data Unavailable   No LMP recorded. Patient is postmenopausal.  Allergies reviewed: Yes  Medications reviewed: Yes    Medications: Medication refills not needed today.  Pharmacy name entered into [facility]:    [facility] [place], [place] - [address] [place] [facility] - [place], [place] - [address] [address]    Clinical concerns: None Lilia Livingston was NOT notified.    7 minutes for nursing intake (face to face time)     Fanny Savage LPN              "

## 2017-11-28 NOTE — PROGRESS NOTES
Research Appt- Pt here for C8, she has had 2 weeks off treatment d/t pneumonitis. She came in last week felling better than the previous week when she was in the hospital. This week she feels great- no cough, no SOB, reporting appetite being really good, energy is good talking about doing the treadmill. Bowels are good and sleep is good. She was able to work last week.  Labs checked and her ALT is 151 1 point above grade 1 criteria. MD and NAI both wanting to go forward with treatment since so close and pt feels really good. Sent email to sponsor to see if we could get a waiver or if would be a deviation. Pt is off the pembro after the pneumonitis so treatment here on is only paclitaxel. CC'd NAI and MD on request to sponsor. Pt also really wanting to get back on track with treatment. NAI going to check with Dr Rajput to discuss if changing steroid would benefit patient.   Ok'd for treatment today will f/u next week with labs, provider and infusion. All questions answered with verbalization of understanding.   Steffany Nix RN   [phone]

## 2017-11-28 NOTE — NURSING NOTE
"Chief Complaint   Patient presents with     Port Draw     port accessed and labs drawn by rn.  vs taken.     Port accessed with 20g 3/4\" gripper needle, labs drawn, port flushed with saline and heparin, vitals checked.  Lay Grier RN    "

## 2017-11-28 NOTE — PROGRESS NOTES
"SUBJECTIVE/OBJECTIVE:                Ashleigh Alonzo is a 57 year old female coming in for a transitions of care visit.  She was discharged from [facility] on 11/17/17 for pneumonia.     Chief Complaint: medication review.    The primary oncologist for this patient is Dr Fara Bradshaw.  The PCP for this patient is Dr Radha Cazares.    Cancer diagnosis: Breast cancer    Allergies/ADRs: None  Tobacco: History of tobacco dependence - quit 2000   Alcohol: 7-9 beverages / week  Caffeine: 1 cups/day of coffee  Activity: not much    PMH: Reviewed in Epic    Breast cancer:  Current medications include: Taxol IV weekly.  Keytruda treatment has been DCd completely.  Patient denies adverse effects from Taxol.    Keytruda-induced pneumonitis/transaminitis:  Current medications include: prednisone 70mg daily.  Patient denies insomnia or stomach upset.  Patient is also taking pantoprazole 40mg daily for GI prophylaxis while on high-dose steroids.    Itching:  Current medications include: hydroxyzine 25mg PRN (QHS).  Patient says this is working well for her.  No drowsiness reported as she takes this only at bedtime.    COPD: Current medications: Albuterol MDI and (Pt reports using albuterol rarely) and Tiotropium (Spiriva) once daily, and Mucinex BID (chronically as she takes it \"as a preventative.\"    Pt is not experiencing side effects.   Pt reports the following symptoms: none.  Pt does have an COPD Action Plan on file.   Has spirometry been completed: Yes     Depression:  Current medications include: Citalopram 10mg once daily. Pt reports that depression symptoms are controlled/stable.  Patient declined to do PHQ9 today in [facility] center.  PHQ-9 SCORE 6/26/2014 6/8/2015 8/3/2017   Total Score 0 0 -   Total Score - - 0     Supplements:  Current medications include: CoQ10, multivits, vitamin B12, pyridoxine, vitamin D.  No problems reported.    Cardiovascular prophylaxis:  Current medications include: aspirin 81mg daily.  No " "stomach upset reported.      Of note, patient is not currently needing the following medications: lorazepam, Enablex.        Current labs include:BP Readings from Last 3 Encounters:   11/28/17 138/85   11/21/17 145/78   11/15/17 112/70     Latest Ht and Wt:  Wt Readings from Last 2 Encounters:   11/28/17 157 lb 9.6 oz (71.5 kg)   11/21/17 157 lb 12.8 oz (71.6 kg)     Ht Readings from Last 2 Encounters:   11/28/17 5' 4.02\" (1.626 m)   11/21/17 5' 4.02\" (1.626 m)        Latest vitals:  /85 P 86    Current labs include:  Lab Results   Component Value Date    BUN 11 11/28/2017     Lab Results   Component Value Date    CR 0.73 11/28/2017     Lab Results   Component Value Date    POTASSIUM 4.0 11/28/2017     Lab Results   Component Value Date     11/28/2017     Lab Results   Component Value Date    AST 71 11/28/2017     Lab Results   Component Value Date    BILITOTAL 0.4 11/28/2017     Lab Results   Component Value Date    ALBUMIN 2.9 11/28/2017     Lab Results   Component Value Date    WBC 17.2 11/28/2017     Lab Results   Component Value Date    HGB 12.4 11/28/2017     Lab Results   Component Value Date     11/28/2017     Absolute Neutrophil   Date Value Ref Range Status   11/28/2017 11.6 (H) 1.6 - 8.3 10e9/L Final       Most Recent Immunizations   Administered Date(s) Administered     HEPA 12/15/2008     HepB 12/05/2008     Influenza (IIV3) PF 10/20/2016     Influenza Vaccine IM 3yrs+ 4 Valent IIV4 09/26/2017     Pneumococcal 23 valent 12/21/2010     TD (ADULT, 7+) 05/13/2004     TDAP Vaccine (Adacel) 06/22/2011       ASSESSMENT:                 Current medications were reviewed today.      Medication Adherence: no issues identified    Breast cancer: Stable. Patient is tolerating the Taxol well.      Keytruda-induced pneumonitis/transaminitis: Stable.The decision regarding tapering of steroids will be made at next provider visit.      Itching: Stable. No changes needed.    COPD: Stable. Patient " would benefit from no changes at this time.    Depression: Stable. Current treatment seems effective.    Supplements: Stable. No change needed.    Cardiovascular prophylaxis: Stable. Platelet count is adequate.  Treatment safe for patient.        PLAN:                  No medication changes at this time.      I spent 30 minutes with this patient today. A copy of the visit note was provided to the patient's primary care provider.    Will follow up in 2-3 months.    The patient was sent via [employer] a summary of these recommendations as an after visit summary.    Dyan Lozano, PharmD, BCOP, BCPS  Clinical Pharmacy Specialist/  Oncology Medication Therapy Management Pharmacist  [facility]  Pager [phone]  Phone [phone]

## 2017-11-28 NOTE — MR AVS SNAPSHOT
After Visit Summary   11/28/2017    Ashleigh Alonzo    MRN: [id number]           Patient Information     Date Of Birth          1960        Visit Information        Provider Department      11/28/2017 8:00 AM Dyan Lozano, [facility] Medication Therapy Management        Today's Diagnoses     Chronic obstructive pulmonary disease, unspecified COPD type (H)    -  1    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)        Urticaria        Pneumonitis        Depression, unspecified depression type        Nutritional deficiency        Encounter for long-term (current) use of aspirin          Care Instructions    Recommendations from today's MTM visit:                                                    MTM (medication therapy management) is a service provided by a clinical pharmacist designed to help you get the most of out of your medicines.   Today we reviewed what your medicines are for, how to know if they are working, that your medicines are safe and how to make your medicine regimen as easy as possible.     1. No medication changes today.    Next MTM visit: 2-3 months    To schedule another MTM appointment, please call the clinic directly or you may call the MTM scheduling line at [phone] or toll-free at [phone].     My Clinical Pharmacist's contact information:                                                      It was a pleasure seeing you today!  Please feel free to contact me with any questions or concerns you have.      Dyan Lozano, PharmD, BCOP, BCPS  Clinical Pharmacy Specialist/  Oncology Medication Therapy Management Pharmacist  [facility]  Pager [phone]  Phone [phone]          You may receive a survey about the MTM services you received.  I would appreciate your feedback to help me serve you better in the future. Please fill it out and return it when you can. Your comments will be anonymous.                 Follow-ups after your visit        Your next 10 appointments already scheduled     Dec 05, 2017  6:30 AM CST   [facility] Lab Draw with [facility] DRAW   [facility] Draw ([facility])    [address] [place] [address]   [phone]            Dec 05, 2017  7:00 AM CST   (Arrive by 6:45 AM)   Return Visit with EDDIE Oliva   [facility] ([facility])    [address] [place] [address]   [phone]            Dec 05, 2017  8:00 AM CST   Infusion 120 with [facility] ONCOLOGY INFUSION, UC 17 ATC   [facility] ([facility])    [address] [place] [address]   [phone]            Dec 12, 2017 11:15 AM CST   [facility] Lab Draw with [facility] LAB DRAW   [facility] Draw ([facility])    [address] [place] [address]   [phone]            Dec 12, 2017 11:40 AM CST   (Arrive by 11:25 AM)   Return Visit with Rosa Salazar PA-C   [facility] ([facility])    [address] [place] [address]   [phone]            Dec 12, 2017 12:30 PM CST   Infusion 120 with UC ONCOLOGY INFUSION, UC 30 ATC   [facility] ([facility])    [address] [place] [address]   [phone]            Dec 18, 2017  9:15 AM CST   [facility] Lab Draw with [facility] LAB DRAW   [facility] Draw ([facility])    [address] [place] [address]   [phone]            Dec 18, 2017  9:40 AM CST   (Arrive by 9:25 AM)   Return Visit with EDDIE Oliva   [facility] ([facility])    [address] [place] [address]  [facility] [address] [phone]              Who to contact     If you have questions or need follow up information about today's clinic visit or your schedule please contact [facility] MEDICATION THERAPY MANAGEMENT directly at [phone].  Normal or non-critical lab and imaging results will be communicated to you by MyChart, letter or phone within 4 business days after the clinic has received the results. If you do not hear from us within 7 days, please contact the clinic through [facility] or phone. If you have a critical or abnormal lab result, we will notify you by phone as soon as possible.  Submit refill requests through [employer] or call your pharmacy and they will forward the refill request to us. Please allow 3 business days for your refill to be completed.          Additional Information About Your Visit        [facility] [employer] Information     [employer] gives you secure access to your electronic health record. If you see a primary care provider, you can also send messages to your care team and make appointments. If you have questions, please call your primary care clinic.  If you do not have a primary care provider, please call [phone] and they will assist you.        Care EveryWhere ID     This is your Care EveryWhere ID. This could be used by other organizations to access your [place] medical records  [id number]         Blood Pressure from Last 3 Encounters:   11/28/17 138/85   11/21/17 145/78   11/15/17 112/70    Weight from Last 3 Encounters:   11/28/17 157 lb 9.6 oz (71.5 kg)   11/21/17 157 lb 12.8 oz (71.6 kg)   11/15/17 156 lb 12.8 oz (71.1 kg)              Today, you had the following     No orders found for display       Primary Care Provider Office Phone # Fax #    Radha Cazares [phone]       [address] [address]        Equal Access to Services     THERESA HUGHES AH: kaden Patel,  bang weiss ah. So [facility] [phone].    ATENCIÓN: Si marlen hawkins, tiene a blackman disposición servicios gratuitos de asistencia lingüística. Pauline al [phone].    We comply with applicable federal civil rights laws and [place] laws. We do not discriminate on the basis of race, color, national origin, age, disability, sex, sexual orientation, or gender identity.            Thank you!     Thank you for choosing [facility] MEDICATION THERAPY MANAGEMENT  for your care. Our goal is always to provide you with excellent care. Hearing back from our patients is one way we can continue to improve our services. Please take a few minutes to complete the written survey that you may receive in the mail after your visit with us. Thank you!             Your Updated Medication List - Protect others around you: Learn how to safely use, store and throw away your medicines at www.disposemymeds.org.          This list is accurate as of: 11/28/17 12:44 PM.  Always use your most recent med list.                   Brand Name Dispense Instructions for use Diagnosis    albuterol 108 (90 BASE) MCG/ACT Inhaler    PROAIR HFA/PROVENTIL HFA/VENTOLIN HFA    1 Inhaler    Inhale 2 puffs into the lungs every 6 hours as needed for shortness of breath / dyspnea    Chronic obstructive pulmonary disease, unspecified COPD type (H)       aspirin 81 MG tablet      Take 1 tablet by mouth daily.        citalopram 10 MG tablet    celeXA    90 tablet    Take 1 tablet (10 mg) by mouth daily    Anxiety, Depression, unspecified depression type       CO Q 10 PO      Take 1 tablet by mouth daily        darifenacin 7.5 MG 24 hr tablet    ENABLEX    90 tablet    Take 1 tablet (7.5 mg) by mouth daily    Overactive bladder       guaiFENesin 600 MG 12 hr tablet    MUCINEX    180 tablet    Take 2 tablets (1,200 mg) by mouth 2 times daily    Cough with sputum       hydrOXYzine 25 MG tablet    ATARAX    100 tablet     Take 1-2 tablets (25-50 mg) by mouth every 6 hours as needed for itching    Hives       lidocaine-prilocaine cream    EMLA    30 g    Please apply to port site 30 minutes before use prn    Personal history of malignant neoplasm of breast       * LORazepam 0.5 MG tablet    ATIVAN    10 tablet    Take 1-2 tabs 20 minutes prior to MRI scans.    Breast cancer (H), Anxiety       * LORazepam 0.5 MG tablet    ATIVAN    30 tablet    Take 1 tablet (0.5 mg) by mouth every 4 hours as needed (Anxiety, Nausea/Vomiting or Sleep)    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)       MULTIPLE VITAMIN PO      Take 1 tablet by mouth daily        * order for [facility]      Respirdigitalboxs Dream Station Auto CPAP 12-18 cm, F&P Simplus FFM small.    MERLIN (obstructive sleep apnea)       * order for [facility]     1 Device    Cranial prosthesis    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)       pantoprazole 40 MG EC tablet    PROTONIX    30 tablet    Take 1 tablet (40 mg) by mouth every morning    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)       predniSONE 10 MG tablet    DELTASONE    70 tablet    Take 7 tablets (70 mg) by mouth daily    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        vitamin D 1000 UNITS capsule      TAKE 2 CAPSULES BY MOUTH DAILY        * Notice:  This list has 4 medication(s) that are the same as other medications prescribed for you. Read the directions carefully, and ask your doctor or other care  provider to review them with you.

## 2017-11-28 NOTE — MR AVS SNAPSHOT
After Visit Summary   11/28/2017    Ashleigh Alonzo    MRN: [id number]           Patient Information     Date Of Birth          1960        Visit Information        Provider Department      11/28/2017 8:00 AM UC 14 ATC;  ONCOLOGY INFUSION [facility]        Today's Diagnoses     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)    -  1      Care Instructions    Contact Numbers    [facility] [place]: [phone]  [facility] [place]:  [phone]    Call triage with chills and/or temperature greater than or equal to 100.5, uncontrolled nausea/vomiting, diarrhea, constipation, dizziness, shortness of breath, chest pain, bleeding, unexplained bruising, or any new/concerning symptoms, questions/concerns.     If you are having any concerning symptoms or wish to speak to a provider before your next infusion visit, please call your care coordinator or triage to notify them so we can adequately serve you.       After Hours: [phone]    If after hours, weekends, or holidays, call main hospital  and ask for Oncology doctor on call.           November 2017 Sunday Monday Tuesday Wednesday Thursday Friday Saturday                  1     2     3     4       5     6     7     [facility]    6[facility]30 AM   (15 min.)   [facility] LAB DRAW   [facility] Lab Draw     [facility] RETURN    6:45 AM   (50 min.)   Lilia Livingston PA   [facility] ONC INFUSION 180    8:00 AM   (180 min.)    ONCOLOGY INFUSION   [facility] 8     9     10     [facility] RETURN WITH ROOM    1:00 PM   (75 min.)   Naty Segovia GC   [facility] 11     Admission    4:33 PM   Velia Noguera MD   [address] [facility]   (Discharge: 11/15/2017)     XR CHEST 2 VIEWS    5:10 PM   (15 min.)   [email or web address]   [facility], [place],  Radiology   12     13     CT CHEST WO    3:45 PM   (15 min.)   [id number]   [facility] [place], [place]  14     15     16     17     18       19     20     21     [facility]   11:15 AM   (15 min.)   [facility] DRAW   [facility] Lab Draw     [facility] RETURN   11:45 AM   (30 min.)   Fara Bradshaw MD   [facility] 22     23     24     25       26     27     28     [facility] LAB DRAW    6:30 AM   (15 min.)   [facility] DRAW   [facility] Lab Draw     [facility] RETURN    6:45 AM   (50 min.)   Lilia Livingston PA   [facility]     LONG    7:45 AM   (60 min.)   Dyan Lozano [facility]     [facility] ONC INFUSION 180    8:00 AM   (180 min.)    ONCOLOGY INFUSION   [facility] 29 30 December 2017 Sunday Monday Tuesday Wednesday Thursday Friday Saturday                            1     2       3     4     5     [facility] DRAW    6:30 AM   (15 min.)    [facility] LAB DRAW   [facility] Lab Draw     [facility] RETURN    6:45 AM   (50 min.)   Lilia Livingston PA   [facility]     [facility] ONC INFUSION 180    8:00 AM   (180 min.)   UC ONCOLOGY INFUSION   [facility] 6     7     8     9       10     11     12     [facility] LAB DRAW   11:15 AM   (15 min.)    [facility] LAB DRAW   [facility] Lab Draw     [facility] RETURN   11:25 AM   (50 min.)   Rosa Salazar PA-C   [facility]     [facility] ONC INFUSION 180   12:30 PM   (180 min.)   UC ONCOLOGY INFUSION   [facility] 13     [facility] LAB DRAW    8:00 AM   (15 min.)    [facility]     ECH COMPLETE    8:30 AM   (60 min.)   [email or web address]   [facility] Echo     MR BREAST BILATERAL WWO   10:00 AM   (45 min.)   [id number]   [facility] 14     15     16       17     18     [facility] DRAW    9:15 AM   (15 min.)    [facility] LAB DRAW   [facility] Lab Draw     UMP RETURN    9:25 AM   (50 min.)    Lilia Livingston PA   [facility]     [facility] ONC INFUSION 180   10:30 AM   (180 min.)   UC ONCOLOGY INFUSION   [facility] 19     20     21     22     23       24     25     26     27     28     29     30       31                                               Recent Results (from the past 24 hour(s))   CBC with platelets differential    Collection Time: 11/28/17  6:42 AM   Result Value Ref Range    WBC 17.2 (H) 4.0 - 11.0 10e9/L    RBC Count 3.75 (L) 3.8 - 5.2 10e12/L    Hemoglobin 12.4 11.7 - 15.7 g/dL    Hematocrit 38.4 35.0 - 47.0 %     (H) 78 - 100 fl    MCH 33.1 (H) 26.5 - 33.0 pg    MCHC 32.3 31.5 - 36.5 g/dL    RDW 15.9 (H) 10.0 - 15.0 %    Platelet Count 280 150 - 450 10e9/L    Diff Method Automated Method     % Neutrophils 67.3 %    % Lymphocytes 22.3 %    % Monocytes 8.3 %    % Eosinophils 0.2 %    % Basophils 0.2 %    % Immature Granulocytes 1.7 %    Nucleated RBCs 0 0 /100    Absolute Neutrophil 11.6 (H) 1.6 - 8.3 10e9/L    Absolute Lymphocytes 3.8 0.8 - 5.3 10e9/L    Absolute Monocytes 1.4 (H) 0.0 - 1.3 10e9/L    Absolute Eosinophils 0.0 0.0 - 0.7 10e9/L    Absolute Basophils 0.0 0.0 - 0.2 10e9/L    Abs Immature Granulocytes 0.3 0 - 0.4 10e9/L    Absolute Nucleated RBC 0.0    Comprehensive metabolic panel    Collection Time: 11/28/17  6:42 AM   Result Value Ref Range    Sodium 138 133 - 144 mmol/L    Potassium 4.0 3.4 - 5.3 mmol/L    Chloride 102 94 - 109 mmol/L    Carbon Dioxide 26 20 - 32 mmol/L    Anion Gap 9 3 - 14 mmol/L    Glucose 68 (L) 70 - 99 mg/dL    Urea Nitrogen 11 7 - 30 mg/dL    Creatinine 0.73 0.52 - 1.04 mg/dL    GFR Estimate 82 >60 mL/min/1.7m2    GFR Estimate If Black >90 >60 mL/min/1.7m2    Calcium 8.4 (L) 8.5 - 10.1 mg/dL    Bilirubin Total 0.4 0.2 - 1.3 mg/dL    Albumin 2.9 (L) 3.4 - 5.0 g/dL    Protein Total 6.6 (L) 6.8 - 8.8 g/dL    Alkaline Phosphatase 159 (H) 40 - 150 U/L     (H) 0 - 50 U/L    AST 71 (H) 0 - 45 U/L                  Follow-ups after your visit        Your next 10 appointments already scheduled     Dec 05, 2017  6:30 AM CST   [facility] Lab Draw with [facility] LAB DRAW   [facility]    [address]  [address] [facility] [address]   [phone]            Dec 05, 2017  7:00 AM CST   (Arrive by 6:45 AM)   Return Visit with EDDIE Oliva   [facility] ([facility])    [address]  [address] [facility] [address]   [phone]            Dec 05, 2017  8:00 AM CST   Infusion 180 with UC ONCOLOGY INFUSION, UC 17 ATC   [facility] ([facility])    [address] [place] [address]   [phone]            Dec 12, 2017 11:15 AM CST   [facility] Lab Draw with [facility] LAB DRAW   [facility]    [address] [place] [address]   [phone]            Dec 12, 2017 11:40 AM CST   (Arrive by 11:25 AM)   Return Visit with Rosa Salazar PA-C   [facility] ([facility])    [address] [place] [address]   [phone]            Dec 12, 2017 12:30 PM CST   Infusion 180 with UC ONCOLOGY INFUSION, UC 30 ATC   [facility] ([facility])    [address] [place] [address]   [phone]            Dec 13, 2017  8:00 AM CST   [facility] Lab Draw with [facility] LAB DRAW   [facility]    [address]  [address] [facility] [address]   [phone]            Dec 13, 2017  8:30 AM CST   Ech Complete with [id number]   [facility] Echo ([facility])    [address] [place] [address]  [address]rd [facility] [address]   [phone]           1.  Please bring or wear a comfortable two-piece outfit. 2. You may eat, drink and take your normal medicines. 3. For any questions that cannot be answered, please contact the ordering physician              Who to contact     If you have questions or need follow up information about today's clinic visit or your schedule please contact [facility] directly at [phone].  Normal or non-critical lab and imaging results will be communicated to you by MyChart, letter or phone within 4 business days after the clinic has received the results. If you do not hear from us within 7 days, please contact the clinic through [employer] or phone. If you have a critical or abnormal lab result, we will notify you by phone as soon as possible.  Submit refill requests through [employer] or call your pharmacy and they will forward the refill request to us. Please allow 3 business days for your refill to be completed.          Additional Information About Your Visit        [employer] Information     [employer] gives you secure access to your electronic health record. If you see a primary care provider, you can also send messages to your care team and make appointments. If you have questions, please call your primary care clinic.  If you do not have a primary care provider, please call [phone] and they will assist you.        Care EveryWhere ID     This is your Care EveryWhere ID. This could be used by other organizations to access your [facility] medical records  [id number]         Blood Pressure from Last 3 Encounters:   11/28/17 138/85   11/21/17 145/78   11/15/17 112/70    Weight from Last 3 Encounters:   11/28/17 71.5 kg (157 lb 9.6 oz)   11/21/17 71.6 kg (157 lb 12.8 oz)   11/15/17 71.1 kg (156 lb 12.8 oz)              We Performed the Following     CBC with platelets differential     Comprehensive metabolic panel        Primary Care Provider Office Phone # Fax #    Radha Cazares [phone]        [address] [address]        Equal Access to Services     THERESA HUGHES : Hadii aad violeta hadbecko Somarian, waaxda luqadaha, qaybta kaalmada jessica, bang silvanoin hayaakenny silvaanmol chester renee pickering. So [facility] [phone].    ATENCIÓN: Si habla español, tiene a blackman disposición servicios gratuitos de asistencia lingüística. Llame al [phone].    We comply with applicable federal civil rights laws and [place] laws. We do not discriminate on the basis of race, color, national origin, age, disability, sex, sexual orientation, or gender identity.            Thank you!     Thank you for choosing [facility]  for your care. Our goal is always to provide you with excellent care. Hearing back from our patients is one way we can continue to improve our services. Please take a few minutes to complete the written survey that you may receive in the mail after your visit with us. Thank you!             Your Updated Medication List - Protect others around you: Learn how to safely use, store and throw away your medicines at www.disposemymeds.org.          This list is accurate as of: 11/28/17  8:03 AM.  Always use your most recent med list.                   Brand Name Dispense Instructions for use Diagnosis    albuterol 108 (90 BASE) MCG/ACT Inhaler    PROAIR HFA/PROVENTIL HFA/VENTOLIN HFA    1 Inhaler    Inhale 2 puffs into the lungs every 6 hours as needed for shortness of breath / dyspnea    Chronic obstructive pulmonary disease, unspecified COPD type (H)       aspirin 81 MG tablet      Take 1 tablet by mouth daily.        citalopram 10 MG tablet    celeXA    90 tablet    Take 1 tablet (10 mg) by mouth daily    Anxiety, Depression, unspecified depression type       CO Q 10 PO      Take 1 tablet by mouth daily        darifenacin 7.5 MG 24 hr tablet    ENABLEX    90 tablet    Take 1 tablet (7.5 mg) by mouth daily    Overactive bladder       guaiFENesin 600 MG 12 hr  tablet    MUCINEX    180 tablet    Take 2 tablets (1,200 mg) by mouth 2 times daily    Cough with sputum       hydrOXYzine 25 MG tablet    ATARAX    100 tablet    Take 1-2 tablets (25-50 mg) by mouth every 6 hours as needed for itching    Hives       lidocaine-prilocaine cream    EMLA    30 g    Please apply to port site 30 minutes before use prn    Personal history of malignant neoplasm of breast       * LORazepam 0.5 MG tablet    ATIVAN    10 tablet    Take 1-2 tabs 20 minutes prior to MRI scans.    Breast cancer (H), Anxiety       * LORazepam 0.5 MG tablet    ATIVAN    30 tablet    Take 1 tablet (0.5 mg) by mouth every 4 hours as needed (Anxiety, Nausea/Vomiting or Sleep)    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)       MULTIPLE VITAMIN PO      Take 1 tablet by mouth daily        * order for [facility]      [employer] Dream Station Auto CPAP 12-18 cm, F&P Simplus FFM small.    MERLIN (obstructive sleep apnea)       * order for [facility]     1 Device    Cranial prosthesis    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)       pantoprazole 40 MG EC tablet    PROTONIX    30 tablet    Take 1 tablet (40 mg) by mouth every morning    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)       predniSONE 10 MG tablet    DELTASONE    70 tablet    Take 7 tablets (70 mg) by mouth daily    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        vitamin D 1000 UNITS capsule      TAKE 2 CAPSULES BY MOUTH DAILY        *  Notice:  This list has 4 medication(s) that are the same as other medications prescribed for you. Read the directions carefully, and ask your doctor or other care provider to review them with you.

## 2017-12-03 DIAGNOSIS — Z17.1 MALIGNANT NEOPLASM OF UPPER-INNER QUADRANT OF RIGHT BREAST IN FEMALE, ESTROGEN RECEPTOR NEGATIVE (H): Primary | ICD-10-CM

## 2017-12-03 DIAGNOSIS — C50.211 MALIGNANT NEOPLASM OF UPPER-INNER QUADRANT OF RIGHT BREAST IN FEMALE, ESTROGEN RECEPTOR NEGATIVE (H): Primary | ICD-10-CM

## 2017-12-05 ENCOUNTER — ONCOLOGY VISIT (OUTPATIENT)
Dept: ONCOLOGY | Facility: CLINIC | Age: 57
End: 2017-12-05
Attending: INTERNAL MEDICINE
Payer: COMMERCIAL

## 2017-12-05 ENCOUNTER — APPOINTMENT (OUTPATIENT)
Dept: LAB | Facility: CLINIC | Age: 57
End: 2017-12-05
Attending: INTERNAL MEDICINE
Payer: COMMERCIAL

## 2017-12-05 ENCOUNTER — RESEARCH ENCOUNTER (OUTPATIENT)
Dept: ONCOLOGY | Facility: CLINIC | Age: 57
End: 2017-12-05

## 2017-12-05 VITALS
SYSTOLIC BLOOD PRESSURE: 135 MMHG | OXYGEN SATURATION: 95 % | TEMPERATURE: 98.6 F | RESPIRATION RATE: 16 BRPM | HEIGHT: 64 IN | WEIGHT: 159.9 LBS | HEART RATE: 109 BPM | BODY MASS INDEX: 27.3 KG/M2 | DIASTOLIC BLOOD PRESSURE: 85 MMHG

## 2017-12-05 DIAGNOSIS — C50.211 MALIGNANT NEOPLASM OF UPPER-INNER QUADRANT OF RIGHT BREAST IN FEMALE, ESTROGEN RECEPTOR NEGATIVE (H): Primary | ICD-10-CM

## 2017-12-05 DIAGNOSIS — Z85.3 PERSONAL HISTORY OF MALIGNANT NEOPLASM OF BREAST: ICD-10-CM

## 2017-12-05 DIAGNOSIS — Z17.1 MALIGNANT NEOPLASM OF UPPER-INNER QUADRANT OF RIGHT BREAST IN FEMALE, ESTROGEN RECEPTOR NEGATIVE (H): Primary | ICD-10-CM

## 2017-12-05 DIAGNOSIS — Z17.1 MALIGNANT NEOPLASM OF UPPER-INNER QUADRANT OF RIGHT BREAST IN FEMALE, ESTROGEN RECEPTOR NEGATIVE (H): ICD-10-CM

## 2017-12-05 DIAGNOSIS — C50.211 MALIGNANT NEOPLASM OF UPPER-INNER QUADRANT OF RIGHT BREAST IN FEMALE, ESTROGEN RECEPTOR NEGATIVE (H): ICD-10-CM

## 2017-12-05 LAB
ALBUMIN SERPL-MCNC: 3 G/DL (ref 3.4–5)
ALP SERPL-CCNC: 137 U/L (ref 40–150)
ALT SERPL W P-5'-P-CCNC: 69 U/L (ref 0–50)
ANION GAP SERPL CALCULATED.3IONS-SCNC: 12 MMOL/L (ref 3–14)
AST SERPL W P-5'-P-CCNC: 37 U/L (ref 0–45)
BASOPHILS # BLD AUTO: 0 10E9/L (ref 0–0.2)
BASOPHILS NFR BLD AUTO: 0.2 %
BILIRUB SERPL-MCNC: 0.3 MG/DL (ref 0.2–1.3)
BUN SERPL-MCNC: 12 MG/DL (ref 7–30)
CALCIUM SERPL-MCNC: 8.1 MG/DL (ref 8.5–10.1)
CHLORIDE SERPL-SCNC: 102 MMOL/L (ref 94–109)
CO2 SERPL-SCNC: 24 MMOL/L (ref 20–32)
CREAT SERPL-MCNC: 0.62 MG/DL (ref 0.52–1.04)
DIFFERENTIAL METHOD BLD: ABNORMAL
EOSINOPHIL # BLD AUTO: 0.1 10E9/L (ref 0–0.7)
EOSINOPHIL NFR BLD AUTO: 0.5 %
ERYTHROCYTE [DISTWIDTH] IN BLOOD BY AUTOMATED COUNT: 15.2 % (ref 10–15)
GFR SERPL CREATININE-BSD FRML MDRD: >90 ML/MIN/1.7M2
GLUCOSE SERPL-MCNC: 101 MG/DL (ref 70–99)
HCT VFR BLD AUTO: 34.6 % (ref 35–47)
HGB BLD-MCNC: 11.3 G/DL (ref 11.7–15.7)
IMM GRANULOCYTES # BLD: 0.2 10E9/L (ref 0–0.4)
IMM GRANULOCYTES NFR BLD: 1.7 %
LYMPHOCYTES # BLD AUTO: 3.9 10E9/L (ref 0.8–5.3)
LYMPHOCYTES NFR BLD AUTO: 34.8 %
MCH RBC QN AUTO: 33.1 PG (ref 26.5–33)
MCHC RBC AUTO-ENTMCNC: 32.7 G/DL (ref 31.5–36.5)
MCV RBC AUTO: 102 FL (ref 78–100)
MONOCYTES # BLD AUTO: 0.7 10E9/L (ref 0–1.3)
MONOCYTES NFR BLD AUTO: 5.8 %
NEUTROPHILS # BLD AUTO: 6.4 10E9/L (ref 1.6–8.3)
NEUTROPHILS NFR BLD AUTO: 57 %
NRBC # BLD AUTO: 0 10*3/UL
NRBC BLD AUTO-RTO: 0 /100
PLATELET # BLD AUTO: 211 10E9/L (ref 150–450)
POTASSIUM SERPL-SCNC: 3.6 MMOL/L (ref 3.4–5.3)
PROT SERPL-MCNC: 6.6 G/DL (ref 6.8–8.8)
RBC # BLD AUTO: 3.41 10E12/L (ref 3.8–5.2)
SODIUM SERPL-SCNC: 138 MMOL/L (ref 133–144)
WBC # BLD AUTO: 11.3 10E9/L (ref 4–11)

## 2017-12-05 PROCEDURE — 85025 COMPLETE CBC W/AUTO DIFF WBC: CPT | Performed by: INTERNAL MEDICINE

## 2017-12-05 PROCEDURE — 25000128 H RX IP 250 OP 636: Mod: ZF | Performed by: PHYSICIAN ASSISTANT

## 2017-12-05 PROCEDURE — 80053 COMPREHEN METABOLIC PANEL: CPT | Performed by: INTERNAL MEDICINE

## 2017-12-05 PROCEDURE — 99212 OFFICE O/P EST SF 10 MIN: CPT | Mod: ZF

## 2017-12-05 PROCEDURE — 96367 TX/PROPH/DG ADDL SEQ IV INF: CPT

## 2017-12-05 PROCEDURE — 25000125 ZZHC RX 250: Mod: ZF | Performed by: PHYSICIAN ASSISTANT

## 2017-12-05 PROCEDURE — 99214 OFFICE O/P EST MOD 30 MIN: CPT | Mod: ZP | Performed by: PHYSICIAN ASSISTANT

## 2017-12-05 PROCEDURE — S0028 INJECTION, FAMOTIDINE, 20 MG: HCPCS | Mod: ZF | Performed by: PHYSICIAN ASSISTANT

## 2017-12-05 PROCEDURE — 96413 CHEMO IV INFUSION 1 HR: CPT

## 2017-12-05 RX ORDER — HEPARIN SODIUM (PORCINE) LOCK FLUSH IV SOLN 100 UNIT/ML 100 UNIT/ML
5 SOLUTION INTRAVENOUS ONCE
Status: COMPLETED | OUTPATIENT
Start: 2017-12-05 | End: 2017-12-05

## 2017-12-05 RX ORDER — PREDNISONE 10 MG/1
TABLET ORAL
Qty: 150 TABLET | Refills: 0 | Status: ON HOLD | OUTPATIENT
Start: 2017-12-05 | End: 2018-02-08

## 2017-12-05 RX ORDER — EPINEPHRINE 0.3 MG/.3ML
0.3 INJECTION SUBCUTANEOUS EVERY 5 MIN PRN
Status: CANCELLED | OUTPATIENT
Start: 2017-12-05

## 2017-12-05 RX ORDER — HEPARIN SODIUM (PORCINE) LOCK FLUSH IV SOLN 100 UNIT/ML 100 UNIT/ML
500 SOLUTION INTRAVENOUS ONCE
Status: COMPLETED | OUTPATIENT
Start: 2017-12-05 | End: 2017-12-05

## 2017-12-05 RX ORDER — DEXAMETHASONE SODIUM PHOSPHATE 10 MG/ML
10 INJECTION, SOLUTION INTRAMUSCULAR; INTRAVENOUS
Status: CANCELLED | OUTPATIENT
Start: 2017-12-05

## 2017-12-05 RX ORDER — METHYLPREDNISOLONE SODIUM SUCCINATE 125 MG/2ML
125 INJECTION, POWDER, LYOPHILIZED, FOR SOLUTION INTRAMUSCULAR; INTRAVENOUS
Status: CANCELLED
Start: 2017-12-05

## 2017-12-05 RX ORDER — DIPHENHYDRAMINE HYDROCHLORIDE 50 MG/ML
50 INJECTION INTRAMUSCULAR; INTRAVENOUS
Status: CANCELLED
Start: 2017-12-05

## 2017-12-05 RX ORDER — PANTOPRAZOLE SODIUM 40 MG/1
40 TABLET, DELAYED RELEASE ORAL EVERY MORNING
Qty: 30 TABLET | Refills: 0 | Status: SHIPPED | OUTPATIENT
Start: 2017-12-05 | End: 2018-01-16

## 2017-12-05 RX ORDER — MEPERIDINE HYDROCHLORIDE 25 MG/ML
25 INJECTION INTRAMUSCULAR; INTRAVENOUS; SUBCUTANEOUS EVERY 30 MIN PRN
Status: CANCELLED | OUTPATIENT
Start: 2017-12-05

## 2017-12-05 RX ORDER — ALBUTEROL SULFATE 0.83 MG/ML
2.5 SOLUTION RESPIRATORY (INHALATION)
Status: CANCELLED | OUTPATIENT
Start: 2017-12-05

## 2017-12-05 RX ORDER — HEPARIN SODIUM (PORCINE) LOCK FLUSH IV SOLN 100 UNIT/ML 100 UNIT/ML
500 SOLUTION INTRAVENOUS ONCE
Status: CANCELLED
Start: 2017-12-05 | End: 2017-12-05

## 2017-12-05 RX ORDER — LORAZEPAM 2 MG/ML
0.5 INJECTION INTRAMUSCULAR EVERY 4 HOURS PRN
Status: CANCELLED
Start: 2017-12-05

## 2017-12-05 RX ORDER — SODIUM CHLORIDE 9 MG/ML
1000 INJECTION, SOLUTION INTRAVENOUS CONTINUOUS PRN
Status: CANCELLED
Start: 2017-12-05

## 2017-12-05 RX ORDER — EPINEPHRINE 1 MG/ML
0.3 INJECTION, SOLUTION, CONCENTRATE INTRAVENOUS EVERY 5 MIN PRN
Status: CANCELLED | OUTPATIENT
Start: 2017-12-05

## 2017-12-05 RX ORDER — ALBUTEROL SULFATE 90 UG/1
1-2 AEROSOL, METERED RESPIRATORY (INHALATION)
Status: CANCELLED
Start: 2017-12-05

## 2017-12-05 RX ORDER — LIDOCAINE/PRILOCAINE 2.5 %-2.5%
CREAM (GRAM) TOPICAL
Qty: 30 G | Refills: 1 | Status: SHIPPED | OUTPATIENT
Start: 2017-12-05 | End: 2018-03-19

## 2017-12-05 RX ADMIN — SODIUM CHLORIDE, PRESERVATIVE FREE 5 ML: 5 INJECTION INTRAVENOUS at 06:38

## 2017-12-05 RX ADMIN — SODIUM CHLORIDE, PRESERVATIVE FREE 500 UNITS: 5 INJECTION INTRAVENOUS at 09:46

## 2017-12-05 RX ADMIN — PACLITAXEL 150 MG: 6 INJECTION, SOLUTION INTRAVENOUS at 08:38

## 2017-12-05 RX ADMIN — SODIUM CHLORIDE 250 ML: 9 INJECTION, SOLUTION INTRAVENOUS at 08:00

## 2017-12-05 RX ADMIN — FAMOTIDINE 20 MG: 20 INJECTION, SOLUTION INTRAVENOUS at 08:02

## 2017-12-05 ASSESSMENT — PAIN SCALES - GENERAL: PAINLEVEL: NO PAIN (0)

## 2017-12-05 NOTE — PATIENT INSTRUCTIONS
Contact Numbers  [facility]: [phone]    After Hours:  [phone]  Triage: [phone]    Please call the [facility] Triage line if you experience a temperature greater than or equal to 100.5, shaking chills, have uncontrolled nausea, vomiting and/or diarrhea, dizziness, shortness of breath, chest pain, bleeding, unexplained bruising, or if you have any other new/concerning symptoms, questions or concerns.     If it is after hours, weekends, or holidays, please call the main hospital  at  [phone] and ask to speak to the Oncology doctor on call.     If you are having any concerning symptoms or wish to speak to a provider before your next infusion visit, please call your care coordinator or triage to notify them so we can adequately serve you.     If you need a refill on a narcotic prescription or other medication, please call triage before your infusion appointment.           December 2017 Sunday Monday Tuesday Wednesday Thursday Friday Saturday                            1     2       3     4     5     [facility] DRAW    6:30 AM   (15 min.)    [facility] LAB DRAW   [facility] Lab Draw     UMP RETURN    6:45 AM   (50 min.)   Lliia Livingston PA [facility] ONC INFUSION 120    8:00 AM   (120 min.)    ONCOLOGY INFUSION   [facility] 6     7     8     9       10     11     12     [facility] LAB DRAW   11:15 AM   (15 min.)    [facility] LAB DRAW   [facility] Lab Draw     UMP RETURN   11:25 AM   (50 min.)   Rosa Salazar PA-C   [facility] ONC INFUSION 120   12:30 PM   (120 min.)    ONCOLOGY INFUSION   [facility] 13     14     15     16       17     18     [facility] LAB DRAW    9:15 AM   (15 min.)    [facility] LAB DRAW   [facility] Lab Draw     UMP RETURN    9:25 AM   (50 min.)   Lilia Livingston PA [facility] ONC INFUSION  120   10:30 AM   (120 min.)    ONCOLOGY INFUSION   [facility] 19     20     21     22     23       24     25     26     [facility] LAB DRAW    8:00 AM   (15 min.)    [facility] LAB DRAW   [facility]     [facility] RETURN    8:15 AM   (50 min.)   Deanna Blanco [facility]-C   [facility]    9:30 AM   (120 min.)    ONCOLOGY INFUSION   [facility] 27     28     MR BREAST BILATERAL WWO    8:00 AM   (45 min.)   [id number]   [facility]     ECH COMPLETE   10:30 AM   (60 min.)   [email or web address]   [facility] 29     30       31 January 2018 Sunday Monday Tuesday Wednesday Thursday Friday Saturday        1     2     [facility] LAB DRAW   10:45 AM   (15 min.)    [facility] LAB DRAW   [facility] Lab Draw     UMP RETURN   10:55 AM   (50 min.)   Caren Colindres PA-C   [facility] 120   12:30 PM   (120 min.)    ONCOLOGY INFUSION   [facility] 3     4     5     6       7     8     9     10     11     12     13       14     15     16     [facility] LAB DRAW    8:15 AM   (15 min.)    [facility] LAB DRAW   [facility] Lab Draw     UMP RETURN    8:30 AM   (30 min.)   Fara Bradshaw MD   [facility] 120    9:30 AM   (120 min.)    ONCOLOGY INFUSION   [facility] 17     18     19     20       21     22     23     24     25     26     27       28     29     [facility] LAB DRAW   10:45 AM   (15 min.)    [facility] LAB DRAW   [facility]     UMP RETURN   11:05 AM   (50 min.)   Lilia Livingston PA   [facility]     UMP ONC INFUSION 120   12:00 PM   (120 min.)    ONCOLOGY INFUSION   [facility] 30     31                                 Lab Results:  Recent Results (from the  past 12 hour(s))   CBC with platelets differential    Collection Time: 12/05/17  6:38 AM   Result Value Ref Range    WBC 11.3 (H) 4.0 - 11.0 10e9/L    RBC Count 3.41 (L) 3.8 - 5.2 10e12/L    Hemoglobin 11.3 (L) 11.7 - 15.7 g/dL    Hematocrit 34.6 (L) 35.0 - 47.0 %     (H) 78 - 100 fl    MCH 33.1 (H) 26.5 - 33.0 pg    MCHC 32.7 31.5 - 36.5 g/dL    RDW 15.2 (H) 10.0 - 15.0 %    Platelet Count 211 150 - 450 10e9/L    Diff Method Automated Method     % Neutrophils 57.0 %    % Lymphocytes 34.8 %    % Monocytes 5.8 %    % Eosinophils 0.5 %    % Basophils 0.2 %    % Immature Granulocytes 1.7 %    Nucleated RBCs 0 0 /100    Absolute Neutrophil 6.4 1.6 - 8.3 10e9/L    Absolute Lymphocytes 3.9 0.8 - 5.3 10e9/L    Absolute Monocytes 0.7 0.0 - 1.3 10e9/L    Absolute Eosinophils 0.1 0.0 - 0.7 10e9/L    Absolute Basophils 0.0 0.0 - 0.2 10e9/L    Abs Immature Granulocytes 0.2 0 - 0.4 10e9/L    Absolute Nucleated RBC 0.0    Comprehensive metabolic panel    Collection Time: 12/05/17  6:38 AM   Result Value Ref Range    Sodium 138 133 - 144 mmol/L    Potassium 3.6 3.4 - 5.3 mmol/L    Chloride 102 94 - 109 mmol/L    Carbon Dioxide 24 20 - 32 mmol/L    Anion Gap 12 3 - 14 mmol/L    Glucose 101 (H) 70 - 99 mg/dL    Urea Nitrogen 12 7 - 30 mg/dL    Creatinine 0.62 0.52 - 1.04 mg/dL    GFR Estimate >90 >60 mL/min/1.7m2    GFR Estimate If Black >90 >60 mL/min/1.7m2    Calcium 8.1 (L) 8.5 - 10.1 mg/dL    Bilirubin Total 0.3 0.2 - 1.3 mg/dL    Albumin 3.0 (L) 3.4 - 5.0 g/dL    Protein Total 6.6 (L) 6.8 - 8.8 g/dL    Alkaline Phosphatase 137 40 - 150 U/L    ALT 69 (H) 0 - 50 U/L    AST 37 0 - 45 U/L

## 2017-12-05 NOTE — MR AVS SNAPSHOT
After Visit Summary   12/5/2017    Ashleigh Alonzo    MRN: [id number]           Patient Information     Date Of Birth          1960        Visit Information        Provider Department      12/5/2017 7:00 AM Lilia Livingston PA [facility]        Today's Diagnoses     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)        Personal history of malignant neoplasm of breast           Follow-ups after your visit        Your next 10 appointments already scheduled     Dec 12, 2017 11:15 AM CST   [facility] Lab Draw with [facility] LAB DRAW   [facility] ([facility])    [address] [address]   [phone]            Dec 12, 2017 11:40 AM CST   (Arrive by 11:25 AM)   Return Visit with Rosa Salazar PA-C   [facility] ([facility])    [address] [place] [address]   [phone]            Dec 12, 2017 12:30 PM CST   Infusion 120 with UC ONCOLOGY INFUSION, UC 30 ATC   [facility] ([facility])    [address] [place] [id number]   [phone]            Dec 18, 2017  9:15 AM CST   Masonic Lab Draw with UC MASONIC LAB DRAW   [facility] Masonic Lab Draw ([facility])    [address] [address]   [phone]            Dec 18, 2017  9:40 AM CST   (Arrive by 9:25 AM)   Return Visit with EDDIE Oliva   [facility] ([facility])    [address] [place] [address]   [phone]            Dec 18, 2017 10:30 AM CST   Infusion 120 with UC ONCOLOGY INFUSION, UC 22 ATC   [facility] ([facility])    [address] [place] [address]  [id number] [phone]            Dec 26, 2017  8:00 AM CST   Masonic Lab Draw with  MASONIC LAB DRAW   [facility] Draw ([facility])    [address] [place] [id number] [phone]            Dec 26, 2017  8:30 AM CST   (Arrive by 8:15 AM)   Return Visit with Deanna Blanco PA-C   [facility] ([facility])    [address] [place] [id number] [phone]              Who to contact     If you have questions or need follow up information about today's clinic visit or your schedule please contact [facility] directly at [phone].  Normal or non-critical lab and imaging results will be communicated to you by [employer], letter or phone within 4 business days after the clinic has received the results. If you do not hear from us within 7 days, please contact the clinic through [employer] or phone. If you have a critical or abnormal lab result, we will notify you by phone as soon as possible.  Submit refill requests through [email or web address] or call your pharmacy and they will forward the refill request to us. Please allow 3 business days for your refill to be completed.          Additional Information About Your Visit        [employer] Information     [email or web address] gives you secure access to your electronic health record. If you see a primary care provider, you can also send messages to your care team and make appointments. If you have questions, please call your primary care clinic.  If you do not have a primary care provider, please call [phone] and they will assist you.        Care EveryWhere ID     This is your Care EveryWhere ID. This could be used by other organizations to access your [place] medical records  [id number]        Your Vitals Were     Pulse Temperature Respirations Height Pulse Oximetry BMI (Body Mass Index)    109 98.6  F (37  C) (Oral) 16 1.626 m  "(5' 4.02\") 95% 27.43 kg/m2       Blood Pressure from Last 3 Encounters:   12/05/17 135/85   11/28/17 138/85   11/21/17 145/78    Weight from Last 3 Encounters:   12/05/17 72.5 kg (159 lb 14.4 oz)   11/28/17 71.5 kg (157 lb 9.6 oz)   11/21/17 71.6 kg (157 lb 12.8 oz)              We Performed the Following     Urine Culture Aerobic Bacterial          Today's Medication Changes          These changes are accurate as of: 12/5/17  9:52 AM.  If you have any questions, ask your nurse or doctor.               These medicines have changed or have updated prescriptions.        Dose/Directions    predniSONE 10 MG tablet   Commonly known as:  DELTASONE   This may have changed:    - how much to take  - how to take this  - when to take this  - additional instructions   Used for:  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)   Changed by:  Lilia Livingston PA        Take 60mg daily for 1 week, then continue to decrease by 10mg each week   Quantity:  150 tablet   Refills:  0            Where to get your medicines      These medications were sent to [address] [place] [address] [address] [place] [id number]    Hours:  TRANSPLANT PHONE NUMBER [phone] Phone:  [phone]     lidocaine-prilocaine cream    pantoprazole 40 MG EC tablet    predniSONE 10 MG tablet                Primary Care Provider Office Phone # Fax #    Radha Cazares [phone]       [address] [address]        Equal Access to Services     WILMER HUGHES AH: Hadjose martin Granda, kaden clancy, bang weiss. So [facility] [phone].    ATENCIÓN: Si habla español, tiene a blackman disposición servicios gratuitos de asistencia lingüística. Llame al [phone].    We comply with applicable federal " civil rights laws and [place] laws. We do not discriminate on the basis of race, color, national origin, age, disability, sex, sexual orientation, or gender identity.            Thank you!     Thank you for choosing [facility]  for your care. Our goal is always to provide you with excellent care. Hearing back from our patients is one way we can continue to improve our services. Please take a few minutes to complete the written survey that you may receive in the mail after your visit with us. Thank you!             Your Updated Medication List - Protect others around you: Learn how to safely use, store and throw away your medicines at www.disposemymeds.org.          This list is accurate as of: 12/5/17  9:52 AM.  Always use your most recent med list.                   Brand Name Dispense Instructions for use Diagnosis    albuterol 108 (90 BASE) MCG/ACT Inhaler    PROAIR HFA/PROVENTIL HFA/VENTOLIN HFA    1 Inhaler    Inhale 2 puffs into the lungs every 6 hours as needed for shortness of breath / dyspnea    Chronic obstructive pulmonary disease, unspecified COPD type (H)       aspirin 81 MG tablet      Take 1 tablet by mouth daily.        citalopram 10 MG tablet    celeXA    90 tablet    Take 1 tablet (10 mg) by mouth daily    Anxiety, Depression, unspecified depression type       CO Q 10 PO      Take 1 tablet by mouth daily        darifenacin 7.5 MG 24 hr tablet    ENABLEX    90 tablet    Take 1 tablet (7.5 mg) by mouth daily    Overactive bladder       guaiFENesin 600 MG 12 hr tablet    MUCINEX    180 tablet    Take 2 tablets (1,200 mg) by mouth 2 times daily    Cough with sputum       hydrOXYzine 25 MG tablet    ATARAX    100 tablet    Take 1-2 tablets (25-50 mg) by mouth every 6 hours as needed for itching    Hives       lidocaine-prilocaine cream    EMLA    30 g    Please apply to port site 30 minutes before use prn    Personal history of malignant neoplasm of breast       * LORazepam 0.5 MG  tablet    ATIVAN    10 tablet    Take 1-2 tabs 20 minutes prior to MRI scans.    Breast cancer (H), Anxiety       * LORazepam 0.5 MG tablet    ATIVAN    30 tablet    Take 1 tablet (0.5 mg) by mouth every 4 hours as needed (Anxiety, Nausea/Vomiting or Sleep)    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)       MULTIPLE VITAMIN PO      Take 1 tablet by mouth daily        * order for DME      [facility] Dream Station Auto CPAP 12-18 cm, F&P Simplus FFM small.    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)       pantoprazole 40 MG EC tablet    PROTONIX    30 tablet    Take 1 tablet (40 mg) by mouth every morning    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)       predniSONE 10 MG tablet    DELTASONE    150 tablet    Take 60mg daily for 1 week, then continue to decrease by 10mg each week    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        vitamin D 1000 UNITS capsule      TAKE 2 CAPSULES BY MOUTH DAILY        * Notice:  This list has 4 medication(s) that are the same as other medications prescribed for you. Read the directions carefully, and ask your doctor or other care provider to review them with you.

## 2017-12-05 NOTE — PROGRESS NOTES
Oncology/Hematology Visit Note  Dec 5, 2017    Reason for Visit: Follow up of breast cancer     History of Present Illness: Ashleigh Alonzo is a 57 year old female with past medical history of COPD, sleep apnea, periodontal disease with stage IIa, T2N0M0, grade 3, triple negative right breast cancer. She was diagnosed via abnormal screening mammogram. She ultimately had US, contrast-enhanced mammo, and biopsy showing 3.4 cm mass and pathology showed grade 3 invasive mammary carcinoma with associated high-grade DCIS. ER/AL was negative and HER2 negative. She enrolled in ISPY-2 clinical trial and began treatment with weekly taxol and once every 3 week pembrolizumab on 9/2/17. Please see notes from Dr. Bradshaw for further details of patient's oncology history.      Course has been complicated by fevers with PNA and then UTI. Week 7 was held due to transaminitis. She was given week 7 on 11/7 with pembrolizumab and Taxol. She was admitted 11/11-11/17 with high fever (105), cough, and dyspnea and was found to have HCAP and pneumonitis secondary to pembro. 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 She received 2 doses of methylpred, antibiotics, and d/c on prednisone 70 mg daily. Had follow-up on 11/21 with Dr. Bradshaw and resumed weekly Taxol on 11/28/17. She returns today for consideration of week 9.    Interval History:  Ms. Alonzo returns to clinic today with her . She has no complaints. She continues on the 70mg daily of prednisone and is tolerating this well other than some mild insomnia. She continues to have no neuropathy and is taking vitamin B6 daily, which she started prior to chemotherapy. She has no breathing concerns and denies chest pain or cough. She has not had any fevers since discontinuing the pembrolizumab. She is eating and drinking well. No bleeding issues.    Review of Systems:  Patient denies fevers, chills, night sweats, unexplained weight changes,  headaches, dizziness, vision or hearing changes, new lumps or bumps, chest pain, shortness of breath, cough, abdominal pain, nausea, vomiting, changes to bowel or bladder, swelling of extremities, bleeding issues, or rash.    Current Outpatient Prescriptions   Medication Sig Dispense Refill     predniSONE (DELTASONE) 10 MG tablet Take 60mg daily for 1 week, then continue to decrease by 10mg each week 150 tablet 0     pantoprazole (PROTONIX) 40 MG EC tablet Take 1 tablet (40 mg) by mouth every morning 30 tablet 0     lidocaine-prilocaine (EMLA) cream Please apply to port site 30 minutes before use prn 30 g 1     order for DME Cranial prosthesis 1 Device 1     hydrOXYzine (ATARAX) 25 MG tablet Take 1-2 tablets (25-50 mg) by mouth every 6 hours as needed for itching 100 tablet 2     guaiFENesin (MUCINEX) 600 MG 12 hr tablet Take 2 tablets (1,200 mg) by mouth 2 times daily 180 tablet 6     tiotropium (SPIRIVA) 18 MCG capsule Inhale 1 capsule (18 mcg) into the lungs daily Inhale contents of one capsule 1 capsule 11     albuterol (PROAIR HFA/PROVENTIL HFA/VENTOLIN HFA) 108 (90 BASE) MCG/ACT Inhaler Inhale 2 puffs into the lungs every 6 hours as needed for shortness of breath / dyspnea 1 Inhaler 5     citalopram (CELEXA) 10 MG tablet Take 1 tablet (10 mg) by mouth daily 90 tablet 3     order for [facility]ics Dream Station Auto CPAP 12-18 cm, F&P Simplus FFM small.       Coenzyme Q10 (CO Q 10 PO) Take 1 tablet by mouth daily        MULTIPLE VITAMIN PO Take 1 tablet by mouth daily        Cyanocobalamin (VITAMIN B 12 PO) Take 100 mcg by mouth daily        Pyridoxine HCl (VITAMIN B6 PO) Take 1 tablet by mouth daily.       aspirin 81 MG tablet Take 1 tablet by mouth daily.  3     VITAMIN D 1000 UNIT OR CAPS TAKE 2 CAPSULES BY MOUTH DAILY       LORazepam (ATIVAN) 0.5 MG tablet Take 1 tablet (0.5 mg) by mouth every 4 hours as needed (Anxiety, Nausea/Vomiting or Sleep) (Patient not taking: Reported on 11/21/2017) 30 tablet 2  "    [DISCONTINUED] lidocaine-prilocaine (EMLA) cream Please apply to port site 30 minutes before use prn 30 g 1     LORazepam (ATIVAN) 0.5 MG tablet Take 1-2 tabs 20 minutes prior to MRI scans. 10 tablet 0     darifenacin (ENABLEX) 7.5 MG 24 hr tablet Take 1 tablet (7.5 mg) by mouth daily (Patient not taking: Reported on 11/21/2017) 90 tablet 3       Physical Examination:  /85 (BP Location: Right arm, Cuff Size: Adult Regular)  Pulse 109  Temp 98.6  F (37  C) (Oral)  Resp 16  Ht 1.626 m (5' 4.02\")  Wt 72.5 kg (159 lb 14.4 oz)  SpO2 95%  BMI 27.43 kg/m2  Wt Readings from Last 10 Encounters:   12/05/17 72.5 kg (159 lb 14.4 oz)   11/28/17 71.5 kg (157 lb 9.6 oz)   11/21/17 71.6 kg (157 lb 12.8 oz)   11/15/17 71.1 kg (156 lb 12.8 oz)   11/07/17 70.9 kg (156 lb 4.8 oz)   10/31/17 71.3 kg (157 lb 3.2 oz)   10/26/17 73.5 kg (162 lb)   10/24/17 73.3 kg (161 lb 11.2 oz)   10/17/17 74.8 kg (164 lb 12.8 oz)   10/12/17 76.2 kg (168 lb)     Constitutional: Well-appearing female in no acute distress.  Eyes: EOMI, PERRL. No scleral icterus.  ENT: Oral mucosa is moist without lesions or thrush.   Lymphatic: Neck is supple without cervical or supraclavicular lymphadenopathy. No axillary lymphadenopathy.  Cardiovascular: Regular rate and rhythm. No murmurs, gallops, or rubs. Trace bilateral peripheral edema.  Respiratory: Clear to auscultation bilaterally. No wheezes or crackles.  Gastrointestinal: Bowel sounds present. Abdomen soft, non-tender. No palpable hepatosplenomegaly or masses.   Neurologic: Cranial nerves II through XII are grossly intact.  Skin: No rashes, petechiae, or bruising noted on exposed skin.    Laboratory Data:   12/5/2017 06:38   Sodium 138   Potassium 3.6   Chloride 102   Carbon Dioxide 24   Urea Nitrogen 12   Creatinine 0.62   GFR Estimate >90   GFR Estimate If Black >90   Calcium 8.1 (L)   Anion Gap 12   Albumin 3.0 (L)   Protein Total 6.6 (L)   Bilirubin Total 0.3   Alkaline Phosphatase 137 "   ALT 69 (H)   AST 37   Glucose 101 (H)   WBC 11.3 (H)   Hemoglobin 11.3 (L)   Hematocrit 34.6 (L)   Platelet Count 211   RBC Count 3.41 (L)    (H)   MCH 33.1 (H)   MCHC 32.7   RDW 15.2 (H)   Diff Method Automated Method   % Neutrophils 57.0   % Lymphocytes 34.8   % Monocytes 5.8   % Eosinophils 0.5   % Basophils 0.2   % Immature Granulocytes 1.7   Nucleated RBCs 0   Absolute Neutrophil 6.4   Absolute Lymphocytes 3.9   Absolute Monocytes 0.7   Absolute Eosinophils 0.1   Absolute Basophils 0.0   Abs Immature Granulocytes 0.2   Absolute Nucleated RBC 0.0         Assessment and Plan:  1. Stage IIa right breast cancer, currently on treatment with weekly Taxol on I-SPY  Was initially started on weekly Taxol/pembolizumab but course was complicated by fevers, pneumonia, and then pneumonitis and hepatitis requiring hospitalization. Pembrolizumab was discontinued altogether and she is now just on weekly Taxol and is doing very well. Labs are within treatment parameters to move forward today. She is not having any neuropathy and can continue vitamin B6 for ppx.    Our overall plan is to complete a total of 12 weeks of Taxol followed by 4 cycles of Adriamycin and Cytoxan. Following completion of chemotherapy, she will proceed with surgery.  Whether or not she will benefit from adjuvant radiation is unknown at this time and depends on the type of breast surgery, surgical margins, and whether or not there is lymph node involvement at the time of surgery.    2. Pneumonitis, resolved  No pulmonary concerns and lung exam clear. Can start prednisone taper, 60mg daily this week, then decrease by 10mg weekly. Continue Protonix for any steroid associated GERD.    3. Transaminitis, improving  LFTs continue to improve and are almost all back to normal today. As listed above, will be starting prednisone taper this week. Should she have any issues in the future, can reach back out to hepatology for further work-up but nothing  further needed at this time.       4. FEN  Weight stable, albumin improving, corrected calcium 8.9. Continue to encourage good hydration.     5. Follow-Up  Weekly visit while on Taxol (scheduled)  Breast MRI and Echo following Taxol treatment (scheduled)    Thang Mancilla PA-C    The patient was seen in conjunction with Thang Mancilla PA-C who served as a scribe for today's visit. I have reviewed and edited the above note, and agree with the above findings and plan.    Lilia Livingston PA-C  [facility]  [address] [place] [address] [phone]

## 2017-12-05 NOTE — PROGRESS NOTES
Research appt:C9D1, pt feeling well, minimal insomnia since being on the prednisone but does report she feels like she is sleeping well and feels rested when waking. Appetite good, bowels good, energy has been really good.   Had a fall last week after chemo when she was trying to carry too many things. Knees swollen wed but then back to normal on thurs. No pain.   Labs checked and look good to proceed with chemo. Her LFTs are coming down as well so will starting tapering down by 10 mg the prednisone. No other concerns today. Checked in with the infusion nurses and let them know pt ok to treat and gave contact info. F/u next week.   Nury VILLATORO   [phone]

## 2017-12-05 NOTE — LETTER
12/5/2017      RE: Ashleigh Alonzo  [address] [facility] [place] [address]       Oncology/Hematology Visit Note  Dec 5, 2017    Reason for Visit: Follow up of breast cancer     History of Present Illness: Ashleigh Alonzo is a 57 year old female with past medical history of COPD, sleep apnea, periodontal disease with stage IIa, T2N0M0, grade 3, triple negative right breast cancer. She was diagnosed via abnormal screening mammogram. She ultimately had US, contrast-enhanced mammo, and biopsy showing 3.4 cm mass and pathology showed grade 3 invasive mammary carcinoma with associated high-grade DCIS. ER/NE was negative and HER2 negative. She enrolled in ISPY-2 clinical trial and began treatment with weekly taxol and once every 3 week pembrolizumab on 9/2/17. Please see notes from Dr. Bradshaw for further details of patient's oncology history.      Course has been complicated by fevers with PNA and then UTI. Week 7 was held due to transaminitis. She was given week 7 on 11/7 with pembrolizumab and Taxol. She was admitted 11/11-11/17 with high fever (105), cough, and dyspnea and was found to have HCAP and pneumonitis secondary to pembro. 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 She received 2 doses of methylpred, antibiotics, and d/c on prednisone 70 mg daily. Had follow-up on 11/21 with Dr. Bradshaw and resumed weekly Taxol on 11/28/17. She returns today for consideration of week 9.    Interval History:  Ms. Alonzo returns to clinic today with her . She has no complaints. She continues on the 70mg daily of prednisone and is tolerating this well other than some mild insomnia. She continues to have no neuropathy and is taking vitamin B6 daily, which she started prior to chemotherapy. She has no breathing concerns and denies chest pain or cough. She has not had any fevers since discontinuing the pembrolizumab. She is eating and drinking well. No bleeding issues.    Review of  Systems:  Patient denies fevers, chills, night sweats, unexplained weight changes, headaches, dizziness, vision or hearing changes, new lumps or bumps, chest pain, shortness of breath, cough, abdominal pain, nausea, vomiting, changes to bowel or bladder, swelling of extremities, bleeding issues, or rash.    Current Outpatient Prescriptions   Medication Sig Dispense Refill     predniSONE (DELTASONE) 10 MG tablet Take 60mg daily for 1 week, then continue to decrease by 10mg each week 150 tablet 0     pantoprazole (PROTONIX) 40 MG EC tablet Take 1 tablet (40 mg) by mouth every morning 30 tablet 0     lidocaine-prilocaine (EMLA) cream Please apply to port site 30 minutes before use prn 30 g 1     order for DME Cranial prosthesis 1 Device 1     hydrOXYzine (ATARAX) 25 MG tablet Take 1-2 tablets (25-50 mg) by mouth every 6 hours as needed for itching 100 tablet 2     guaiFENesin (MUCINEX) 600 MG 12 hr tablet Take 2 tablets (1,200 mg) by mouth 2 times daily 180 tablet 6     tiotropium (SPIRIVA) 18 MCG capsule Inhale 1 capsule (18 mcg) into the lungs daily Inhale contents of one capsule 1 capsule 11     albuterol (PROAIR HFA/PROVENTIL HFA/VENTOLIN HFA) 108 (90 BASE) MCG/ACT Inhaler Inhale 2 puffs into the lungs every 6 hours as needed for shortness of breath / dyspnea 1 Inhaler 5     citalopram (CELEXA) 10 MG tablet Take 1 tablet (10 mg) by mouth daily 90 tablet 3     [place] [facility] [employer] CPAP 12-18 cm, F&P Simplus FFM small.       Coenzyme Q10 (CO Q 10 PO) Take 1 tablet by mouth daily        MULTIPLE VITAMIN PO Take 1 tablet by mouth daily        Cyanocobalamin (VITAMIN B 12 PO) Take 100 mcg by mouth daily        Pyridoxine HCl (VITAMIN B6 PO) Take 1 tablet by mouth daily.       aspirin 81 MG tablet Take 1 tablet by mouth daily.  3     VITAMIN D 1000 UNIT OR CAPS TAKE 2 CAPSULES BY MOUTH DAILY       LORazepam (ATIVAN) 0.5 MG tablet Take 1 tablet (0.5 mg) by mouth every 4 hours as needed (Anxiety,  "Nausea/Vomiting or Sleep) (Patient not taking: Reported on 11/21/2017) 30 tablet 2     [DISCONTINUED] lidocaine-prilocaine (EMLA) cream Please apply to port site 30 minutes before use prn 30 g 1     LORazepam (ATIVAN) 0.5 MG tablet Take 1-2 tabs 20 minutes prior to MRI scans. 10 tablet 0     darifenacin (ENABLEX) 7.5 MG 24 hr tablet Take 1 tablet (7.5 mg) by mouth daily (Patient not taking: Reported on 11/21/2017) 90 tablet 3       Physical Examination:  /85 (BP Location: Right arm, Cuff Size: Adult Regular)  Pulse 109  Temp 98.6  F (37  C) (Oral)  Resp 16  Ht 1.626 m (5' 4.02\")  Wt 72.5 kg (159 lb 14.4 oz)  SpO2 95%  BMI 27.43 kg/m2  Wt Readings from Last 10 Encounters:   12/05/17 72.5 kg (159 lb 14.4 oz)   11/28/17 71.5 kg (157 lb 9.6 oz)   11/21/17 71.6 kg (157 lb 12.8 oz)   11/15/17 71.1 kg (156 lb 12.8 oz)   11/07/17 70.9 kg (156 lb 4.8 oz)   10/31/17 71.3 kg (157 lb 3.2 oz)   10/26/17 73.5 kg (162 lb)   10/24/17 73.3 kg (161 lb 11.2 oz)   10/17/17 74.8 kg (164 lb 12.8 oz)   10/12/17 76.2 kg (168 lb)     Constitutional: Well-appearing female in no acute distress.  Eyes: EOMI, PERRL. No scleral icterus.  ENT: Oral mucosa is moist without lesions or thrush.   Lymphatic: Neck is supple without cervical or supraclavicular lymphadenopathy. No axillary lymphadenopathy.  Cardiovascular: Regular rate and rhythm. No murmurs, gallops, or rubs. Trace bilateral peripheral edema.  Respiratory: Clear to auscultation bilaterally. No wheezes or crackles.  Gastrointestinal: Bowel sounds present. Abdomen soft, non-tender. No palpable hepatosplenomegaly or masses.   Neurologic: Cranial nerves II through XII are grossly intact.  Skin: No rashes, petechiae, or bruising noted on exposed skin.    Laboratory Data:   12/5/2017 06:38   Sodium 138   Potassium 3.6   Chloride 102   Carbon Dioxide 24   Urea Nitrogen 12   Creatinine 0.62   GFR Estimate >90   GFR Estimate If Black >90   Calcium 8.1 (L)   Anion Gap 12   Albumin " 3.0 (L)   Protein Total 6.6 (L)   Bilirubin Total 0.3   Alkaline Phosphatase 137   ALT 69 (H)   AST 37   Glucose 101 (H)   WBC 11.3 (H)   Hemoglobin 11.3 (L)   Hematocrit 34.6 (L)   Platelet Count 211   RBC Count 3.41 (L)    (H)   MCH 33.1 (H)   MCHC 32.7   RDW 15.2 (H)   Diff Method Automated Method   % Neutrophils 57.0   % Lymphocytes 34.8   % Monocytes 5.8   % Eosinophils 0.5   % Basophils 0.2   % Immature Granulocytes 1.7   Nucleated RBCs 0   Absolute Neutrophil 6.4   Absolute Lymphocytes 3.9   Absolute Monocytes 0.7   Absolute Eosinophils 0.1   Absolute Basophils 0.0   Abs Immature Granulocytes 0.2   Absolute Nucleated RBC 0.0         Assessment and Plan:  1. Stage IIa right breast cancer, currently on treatment with weekly Taxol on I-SPY  Was initially started on weekly Taxol/pembolizumab but course was complicated by fevers, pneumonia, and then pneumonitis and hepatitis requiring hospitalization. Pembrolizumab was discontinued altogether and she is now just on weekly Taxol and is doing very well. Labs are within treatment parameters to move forward today. She is not having any neuropathy and can continue vitamin B6 for ppx.    Our overall plan is to complete a total of 12 weeks of Taxol followed by 4 cycles of Adriamycin and Cytoxan. Following completion of chemotherapy, she will proceed with surgery.  Whether or not she will benefit from adjuvant radiation is unknown at this time and depends on the type of breast surgery, surgical margins, and whether or not there is lymph node involvement at the time of surgery.    2. Pneumonitis, resolved  No pulmonary concerns and lung exam clear. Can start prednisone taper, 60mg daily this week, then decrease by 10mg weekly. Continue Protonix for any steroid associated GERD.    3. Transaminitis, improving  LFTs continue to improve and are almost all back to normal today. As listed above, will be starting prednisone taper this week. Should she have any issues in  the future, can reach back out to hepatology for further work-up but nothing further needed at this time.       4. FEN  Weight stable, albumin improving, corrected calcium 8.9. Continue to encourage good hydration.     5. Follow-Up  Weekly visit while on Taxol (scheduled)  Breast MRI and Echo following Taxol treatment (scheduled)    Thang Mancilla PA-C    The patient was seen in conjunction with Thang Mancilla PA-C who served as a scribe for today's visit. I have reviewed and edited the above note, and agree with the above findings and plan.    Lilia Livingston PA-C  [facility]  [address] [place] [address] [phone]

## 2017-12-05 NOTE — PROGRESS NOTES
Infusion Nursing Note:  Ashleigh Alonzo presents today for C9D1.    Patient seen by provider today: Yes: Lilia MORGAN   present during visit today: Not Applicable.    Note: Patient is feeling well today.     Intravenous Access:  Implanted Port.    Treatment Conditions:  Lab Results   Component Value Date    HGB 11.3 12/05/2017     Lab Results   Component Value Date    WBC 11.3 12/05/2017      Lab Results   Component Value Date    ANEU 6.4 12/05/2017     Lab Results   Component Value Date     12/05/2017      Lab Results   Component Value Date     12/05/2017                   Lab Results   Component Value Date    POTASSIUM 3.6 12/05/2017           Lab Results   Component Value Date    MAG 2.1 09/05/2017            Lab Results   Component Value Date    CR 0.62 12/05/2017                   Lab Results   Component Value Date    JAVIER 8.1 12/05/2017                Lab Results   Component Value Date    BILITOTAL 0.3 12/05/2017           Lab Results   Component Value Date    ALBUMIN 3.0 12/05/2017                    Lab Results   Component Value Date    ALT 69 12/05/2017           Lab Results   Component Value Date    AST 37 12/05/2017     Results reviewed, labs MET treatment parameters, ok to proceed with treatment.          Post Infusion Assessment:  Patient tolerated infusion without incident.  Blood return noted pre and post infusion.  Site patent and intact, free from redness, edema or discomfort.  No evidence of extravasations.  Access discontinued per protocol.    Discharge Plan:   Patient declined prescription refills.  Copy of AVS reviewed with patient and/or family.  Patient will return 12/12/2017 for next appointment.  Patient discharged in stable condition accompanied by: .  Departure Mode: Ambulatory.  Face to Face time: 0.    GURMEET CARLSON RN

## 2017-12-05 NOTE — NURSING NOTE
"Oncology Rooming Note    December 5, 2017 6:54 AM   Ashleigh Alonzo is a 52 year old female who presents for: Port Draw and Oncology Clinic Visit    Initial Vitals: /85 (BP Location: Right arm, Cuff Size: Adult Regular)  Pulse 109  Temp 98.6  F (37  C) (Oral)  Resp 16  Ht 1.626 m (5' 4.02\")  Wt 72.5 kg (159 lb 14.4 oz)  SpO2 95%  BMI 27.43 kg/m2 Estimated body mass index is 27.43 kg/(m^2) as calculated from the following:    Height as of this encounter: 1.626 m (5' 4.02\").    Weight as of this encounter: 72.5 kg (159 lb 14.4 oz). Body surface area is 1.81 meters squared.  No Pain (0) Comment: Data Unavailable   No LMP recorded. Patient is postmenopausal.  Allergies reviewed: Yes  Medications reviewed: Yes    Medications: MEDICATION REFILLS NEEDED TODAY. Provider was NOT notified.  Pharmacy name entered into [facility]:    [facility] [place], [place] - [address] [place] [facility] [place] [place] - [address] [address]    Clinical concerns:refills on Lidocaine cream  I informed pt to remind the Provider/NAI about refills in case i dont touch bases with them, if the provider was in the exam room while i attend on rooming the next pt. Pt verbalized understandings.  Mira Morales CMA      6 minutes for nursing intake (face to face time)     Mira Morales CMA    Unable to give flu vaccine to patient for research reasons.  Mira Morales CMA                              "

## 2017-12-05 NOTE — MR AVS SNAPSHOT
After Visit Summary   12/5/2017    Ashleigh Alonzo    MRN: [id number]           Patient Information     Date Of Birth          1960        Visit Information        Provider Department      12/5/2017 8:00 AM  17 ATC;  ONCOLOGY INFUSION [facility]        Today's Diagnoses     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)    -  1      Care Instructions    Contact Numbers  [facility]: [phone]    After Hours:  [phone]  Triage: [phone]    Please call the [facility] Triage line if you experience a temperature greater than or equal to 100.5, shaking chills, have uncontrolled nausea, vomiting and/or diarrhea, dizziness, shortness of breath, chest pain, bleeding, unexplained bruising, or if you have any other new/concerning symptoms, questions or concerns.     If it is after hours, weekends, or holidays, please call the main hospital  at  [phone] and ask to speak to the Oncology doctor on call.     If you are having any concerning symptoms or wish to speak to a provider before your next infusion visit, please call your care coordinator or triage to notify them so we can adequately serve you.     If you need a refill on a narcotic prescription or other medication, please call triage before your infusion appointment.           December 2017 Sunday Monday Tuesday Wednesday Thursday Friday Saturday                            1     2       3     4     5     [facility] LAB DRAW    6:30 AM   (15 min.)   [facility] DRAW   [facility] Lab Draw     [facility] RETURN    6:45 AM   (50 min.)   Lilia Livingston PA   [facility] ONC INFUSION 120    8:00 AM   (120 min.)    ONCOLOGY INFUSION   [facility] 6     7     8     9       10     11     12     [facility] LAB DRAW   11:15 AM   (15 min.)   [facility] DRAW   [facility] Lab Draw     [facility]  RETURN   11:25 AM   (50 min.)   Rosa Salazar PA-C   [facility]     [facility] ONC INFUSION 120   12:30 PM   (120 min.)    ONCOLOGY INFUSION   [facility] 13     14     15     16       17     18     [facility] DRAW    9:15 AM   (15 min.)    [facility] LAB DRAW   [facility] Lab Draw     [facility] RETURN    9:25 AM   (50 min.)   Lilia Livingston PA   [facility]     [facility] ONC INFUSION 120   10:30 AM   (120 min.)    ONCOLOGY INFUSION   [facility] 19     20     21     22     23       24     25     26     [facility] DRAW    8:00 AM   (15 min.)    [facility] LAB DRAW   [facility]     [facility] RETURN    8:15 AM   (50 min.)   Deanna Blanco [facility]-C   [facility] 120    9:30 AM   (120 min.)    ONCOLOGY INFUSION   [facility] 27     28     MR BREAST BILATERAL WWO    8:00 AM   (45 min.)   [id number]   [facility]     ECH COMPLETE   10:30 AM   (60 min.)   [address] 29     30       31 January 2018 Sunday Monday Tuesday Wednesday Thursday Friday Saturday        1     2     P [facility] LAB DRAW   10:45 AM   (15 min.)    [facility] LAB DRAW   [facility] Lab Draw     [facility] RETURN   10:55 AM   (50 min.)   Caren Colindres PA-C   [facility] 120   12:30 PM   (120 min.)    ONCOLOGY INFUSION   [facility] 3     4     5     6       7     8     9     10     11     12     13       14     15     16     [facility] DRAW    8:15 AM   (15 min.)    [facility] LAB DRAW   [facility] Lab Draw     [facility] RETURN    8:30 AM   (30 min.)   Fara Bradshaw MD   [facility] 120    9:30 AM   (120 min.)    ONCOLOGY INFUSION   [facility] 17     18     19      20       21     22     23     24     25     26     27       28     29     [facility] LAB DRAW   10:45 AM   (15 min.)   [facility] LAB DRAW   [facility] Lab Draw     [facility] RETURN   11:05 AM   (50 min.)   Lilia Livingston PA   [facility] ONC INFUSION 120   12:00 PM   (120 min.)    ONCOLOGY INFUSION   [facility] 30     31                                 Lab Results:  Recent Results (from the past 12 hour(s))   CBC with platelets differential    Collection Time: 12/05/17  6:38 AM   Result Value Ref Range    WBC 11.3 (H) 4.0 - 11.0 10e9/L    RBC Count 3.41 (L) 3.8 - 5.2 10e12/L    Hemoglobin 11.3 (L) 11.7 - 15.7 g/dL    Hematocrit 34.6 (L) 35.0 - 47.0 %     (H) 78 - 100 fl    MCH 33.1 (H) 26.5 - 33.0 pg    MCHC 32.7 31.5 - 36.5 g/dL    RDW 15.2 (H) 10.0 - 15.0 %    Platelet Count 211 150 - 450 10e9/L    Diff Method Automated Method     % Neutrophils 57.0 %    % Lymphocytes 34.8 %    % Monocytes 5.8 %    % Eosinophils 0.5 %    % Basophils 0.2 %    % Immature Granulocytes 1.7 %    Nucleated RBCs 0 0 /100    Absolute Neutrophil 6.4 1.6 - 8.3 10e9/L    Absolute Lymphocytes 3.9 0.8 - 5.3 10e9/L    Absolute Monocytes 0.7 0.0 - 1.3 10e9/L    Absolute Eosinophils 0.1 0.0 - 0.7 10e9/L    Absolute Basophils 0.0 0.0 - 0.2 10e9/L    Abs Immature Granulocytes 0.2 0 - 0.4 10e9/L    Absolute Nucleated RBC 0.0    Comprehensive metabolic panel    Collection Time: 12/05/17  6:38 AM   Result Value Ref Range    Sodium 138 133 - 144 mmol/L    Potassium 3.6 3.4 - 5.3 mmol/L    Chloride 102 94 - 109 mmol/L    Carbon Dioxide 24 20 - 32 mmol/L    Anion Gap 12 3 - 14 mmol/L    Glucose 101 (H) 70 - 99 mg/dL    Urea Nitrogen 12 7 - 30 mg/dL    Creatinine 0.62 0.52 - 1.04 mg/dL    GFR Estimate >90 >60 mL/min/1.7m2    GFR Estimate If Black >90 >60 mL/min/1.7m2    Calcium 8.1 (L) 8.5 - 10.1 mg/dL    Bilirubin Total 0.3 0.2 - 1.3 mg/dL    Albumin 3.0 (L) 3.4 - 5.0 g/dL    Protein Total  6.6 (L) 6.8 - 8.8 g/dL    Alkaline Phosphatase 137 40 - 150 U/L    ALT 69 (H) 0 - 50 U/L    AST 37 0 - 45 U/L               Follow-ups after your visit        Your next 10 appointments already scheduled     Dec 05, 2017  8:00 AM CST   Infusion 120 with UC ONCOLOGY INFUSION, UC 17 ATC   [facility]    [address] [facility] [address]   [phone]            Dec 12, 2017 11:15 AM CST   [facility] Lab Draw with [facility] LAB DRAW   [facility] Lab Draw ([facility])    [address]  [address] [facility] [address] [phone]            Dec 12, 2017 11:40 AM CST   (Arrive by 11:25 AM)   Return Visit with Rosa Salazar PA-C   [facility] ([facility])    [address]  [address] [facility] [address]   [phone]            Dec 12, 2017 12:30 PM CST   Infusion 120 with UC ONCOLOGY INFUSION, UC 30 ATC   [facility] ([facility])    [address]  [address] [facility] [address] [phone]            Dec 18, 2017  9:15 AM CST   [facility] Lab Draw with [facility] LAB DRAW   [facility] Lab Draw ([facility])    [address]  [address] [facility] [address]   [phone]            Dec 18, 2017  9:40 AM CST   (Arrive by 9:25 AM)   Return Visit with EDDIE Oliva   [facility] ([facility])    [address]  [address] [facility] [address]   [phone]            Dec 18, 2017 10:30 AM CST   Infusion 120 with UC ONCOLOGY INFUSION, UC 22 ATC   [facility] ([facility])    [address]  [address] [facility] [address]   [phone]              Who to contact     If you have questions or need follow up information about  today's clinic visit or your schedule please contact [facility] directly at [phone].  Normal or non-critical lab and imaging results will be communicated to you by [employer], letter or phone within 4 business days after the clinic has received the results. If you do not hear from us within 7 days, please contact the clinic through [employer] or phone. If you have a critical or abnormal lab result, we will notify you by phone as soon as possible.  Submit refill requests through [employer] or call your pharmacy and they will forward the refill request to us. Please allow 3 business days for your refill to be completed.          Additional Information About Your Visit        [employer] Information     [employer] gives you secure access to your electronic health record. If you see a primary care provider, you can also send messages to your care team and make appointments. If you have questions, please call your primary care clinic.  If you do not have a primary care provider, please call [phone] and they will assist you.        Care EveryWhere ID     This is your Care EveryWhere ID. This could be used by other organizations to access your [place] medical records  [id number]         Blood Pressure from Last 3 Encounters:   12/05/17 135/85   11/28/17 138/85   11/21/17 145/78    Weight from Last 3 Encounters:   12/05/17 72.5 kg (159 lb 14.4 oz)   11/28/17 71.5 kg (157 lb 9.6 oz)   11/21/17 71.6 kg (157 lb 12.8 oz)              We Performed the Following     CBC with platelets differential     Comprehensive metabolic panel          Today's Medication Changes          These changes are accurate as of: 12/5/17  7:55 AM.  If you have any questions, ask your nurse or doctor.               These medicines have changed or have updated prescriptions.        Dose/Directions    predniSONE 10 MG tablet   Commonly known as:  DELTASONE   This may have changed:    - how much to take  - how to take this  - when to take  this  - additional instructions   Used for:  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)   Changed by:  Lilia Livingston PA        Take 60mg daily for 1 week, then continue to decrease by 10mg each week   Quantity:  150 tablet   Refills:  0            Where to get your medicines      These medications were sent to [place], [place] - [address]  [address], [facility] [address]    Hours:  TRANSPLANT PHONE NUMBER [phone] Phone:  [phone]     lidocaine-prilocaine cream    pantoprazole 40 MG EC tablet    predniSONE 10 MG tablet                Primary Care Provider Office Phone # Fax #    Radha Cazares [phone]       [address] [address]        Equal Access to Services     THERESA HUGHES : Hadii candie mcdaniel hadasho Soomaali, waaxda luqadaha, qaybta kaalmada adeegyada, bang schaeferin hayrainer duran . So [facility] [phone].    ATENCIÓN: Si habla español, tiene a blackman disposición servicios gratuitos de asistencia lingüística. Pauline al [phone].    We comply with applicable federal civil rights laws and [place] laws. We do not discriminate on the basis of race, color, national origin, age, disability, sex, sexual orientation, or gender identity.            Thank you!     Thank you for choosing [facility]  for your care. Our goal is always to provide you with excellent care. Hearing back from our patients is one way we can continue to improve our services. Please take a few minutes to complete the written survey that you may receive in the mail after your visit with us. Thank you!             Your Updated Medication List - Protect others around you: Learn how to safely use, store and throw away your medicines at www.disposemymeds.org.          This list is accurate as of: 12/5/17   7:55 AM.  Always use your most recent med list.                   Brand Name Dispense Instructions for use Diagnosis    albuterol 108 (90 BASE) MCG/ACT Inhaler    PROAIR HFA/PROVENTIL HFA/VENTOLIN HFA    1 Inhaler    Inhale 2 puffs into the lungs every 6 hours as needed for shortness of breath / dyspnea    Chronic obstructive pulmonary disease, unspecified COPD type (H)       aspirin 81 MG tablet      Take 1 tablet by mouth daily.        citalopram 10 MG tablet    celeXA    90 tablet    Take 1 tablet (10 mg) by mouth daily    Anxiety, Depression, unspecified depression type       CO Q 10 PO      Take 1 tablet by mouth daily        darifenacin 7.5 MG 24 hr tablet    ENABLEX    90 tablet    Take 1 tablet (7.5 mg) by mouth daily    Overactive bladder       guaiFENesin 600 MG 12 hr tablet    MUCINEX    180 tablet    Take 2 tablets (1,200 mg) by mouth 2 times daily    Cough with sputum       hydrOXYzine 25 MG tablet    ATARAX    100 tablet    Take 1-2 tablets (25-50 mg) by mouth every 6 hours as needed for itching    Hives       lidocaine-prilocaine cream    EMLA    30 g    Please apply to port site 30 minutes before use prn    Personal history of malignant neoplasm of breast       * LORazepam 0.5 MG tablet    ATIVAN    10 tablet    Take 1-2 tabs 20 minutes prior to MRI scans.    Breast cancer (H), Anxiety       * LORazepam 0.5 MG tablet    ATIVAN    30 tablet    Take 1 tablet (0.5 mg) by mouth every 4 hours as needed (Anxiety, Nausea/Vomiting or Sleep)    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)       MULTIPLE VITAMIN PO      Take 1 tablet by mouth daily        * order for [facility]      RespirStartists Dream Station Auto CPAP 12-18 cm, F&P Simplus FFM small.    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)       pantoprazole 40  MG EC tablet    PROTONIX    30 tablet    Take 1 tablet (40 mg) by mouth every morning    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)       predniSONE 10 MG tablet    DELTASONE    150 tablet    Take 60mg daily for 1 week, then continue to decrease by 10mg each week    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        vitamin D 1000 UNITS capsule      TAKE 2 CAPSULES BY MOUTH DAILY        * Notice:  This list has 4 medication(s) that are the same as other medications prescribed for you. Read the directions carefully, and ask your doctor or other care provider to review them with you.

## 2017-12-11 DIAGNOSIS — C50.211 MALIGNANT NEOPLASM OF UPPER-INNER QUADRANT OF RIGHT BREAST IN FEMALE, ESTROGEN RECEPTOR NEGATIVE (H): Primary | ICD-10-CM

## 2017-12-11 DIAGNOSIS — Z17.1 MALIGNANT NEOPLASM OF UPPER-INNER QUADRANT OF RIGHT BREAST IN FEMALE, ESTROGEN RECEPTOR NEGATIVE (H): Primary | ICD-10-CM

## 2017-12-12 ENCOUNTER — APPOINTMENT (OUTPATIENT)
Dept: LAB | Facility: CLINIC | Age: 57
End: 2017-12-12
Attending: INTERNAL MEDICINE
Payer: COMMERCIAL

## 2017-12-12 ENCOUNTER — RESEARCH ENCOUNTER (OUTPATIENT)
Dept: ONCOLOGY | Facility: CLINIC | Age: 57
End: 2017-12-12

## 2017-12-12 ENCOUNTER — ONCOLOGY VISIT (OUTPATIENT)
Dept: ONCOLOGY | Facility: CLINIC | Age: 57
End: 2017-12-12
Attending: PHYSICIAN ASSISTANT
Payer: COMMERCIAL

## 2017-12-12 ENCOUNTER — INFUSION THERAPY VISIT (OUTPATIENT)
Dept: ONCOLOGY | Facility: CLINIC | Age: 57
End: 2017-12-12
Attending: INTERNAL MEDICINE
Payer: COMMERCIAL

## 2017-12-12 VITALS
TEMPERATURE: 98.6 F | RESPIRATION RATE: 16 BRPM | OXYGEN SATURATION: 97 % | SYSTOLIC BLOOD PRESSURE: 133 MMHG | BODY MASS INDEX: 28.19 KG/M2 | WEIGHT: 165.1 LBS | HEIGHT: 64 IN | HEART RATE: 105 BPM | DIASTOLIC BLOOD PRESSURE: 78 MMHG

## 2017-12-12 DIAGNOSIS — C50.211 MALIGNANT NEOPLASM OF UPPER-INNER QUADRANT OF RIGHT BREAST IN FEMALE, ESTROGEN RECEPTOR NEGATIVE (H): Primary | ICD-10-CM

## 2017-12-12 DIAGNOSIS — C50.211 MALIGNANT NEOPLASM OF UPPER-INNER QUADRANT OF RIGHT BREAST IN FEMALE, ESTROGEN RECEPTOR NEGATIVE (H): ICD-10-CM

## 2017-12-12 DIAGNOSIS — Z17.1 MALIGNANT NEOPLASM OF UPPER-INNER QUADRANT OF RIGHT BREAST IN FEMALE, ESTROGEN RECEPTOR NEGATIVE (H): ICD-10-CM

## 2017-12-12 DIAGNOSIS — Z17.1 MALIGNANT NEOPLASM OF UPPER-INNER QUADRANT OF RIGHT BREAST IN FEMALE, ESTROGEN RECEPTOR NEGATIVE (H): Primary | ICD-10-CM

## 2017-12-12 LAB
ALBUMIN SERPL-MCNC: 2.9 G/DL (ref 3.4–5)
ALP SERPL-CCNC: 118 U/L (ref 40–150)
ALT SERPL W P-5'-P-CCNC: 72 U/L (ref 0–50)
ANION GAP SERPL CALCULATED.3IONS-SCNC: 9 MMOL/L (ref 3–14)
AST SERPL W P-5'-P-CCNC: 49 U/L (ref 0–45)
BASOPHILS # BLD AUTO: 0 10E9/L (ref 0–0.2)
BASOPHILS NFR BLD AUTO: 0.2 %
BILIRUB SERPL-MCNC: 0.3 MG/DL (ref 0.2–1.3)
BUN SERPL-MCNC: 14 MG/DL (ref 7–30)
CALCIUM SERPL-MCNC: 8.4 MG/DL (ref 8.5–10.1)
CHLORIDE SERPL-SCNC: 107 MMOL/L (ref 94–109)
CO2 SERPL-SCNC: 24 MMOL/L (ref 20–32)
CREAT SERPL-MCNC: 0.67 MG/DL (ref 0.52–1.04)
DIFFERENTIAL METHOD BLD: ABNORMAL
EOSINOPHIL # BLD AUTO: 0.1 10E9/L (ref 0–0.7)
EOSINOPHIL NFR BLD AUTO: 1.4 %
ERYTHROCYTE [DISTWIDTH] IN BLOOD BY AUTOMATED COUNT: 15.9 % (ref 10–15)
GFR SERPL CREATININE-BSD FRML MDRD: 90 ML/MIN/1.7M2
GLUCOSE SERPL-MCNC: 145 MG/DL (ref 70–99)
HCT VFR BLD AUTO: 31.8 % (ref 35–47)
HGB BLD-MCNC: 10.2 G/DL (ref 11.7–15.7)
IMM GRANULOCYTES # BLD: 0.2 10E9/L (ref 0–0.4)
IMM GRANULOCYTES NFR BLD: 3.4 %
LYMPHOCYTES # BLD AUTO: 1.8 10E9/L (ref 0.8–5.3)
LYMPHOCYTES NFR BLD AUTO: 40.3 %
MCH RBC QN AUTO: 33.4 PG (ref 26.5–33)
MCHC RBC AUTO-ENTMCNC: 32.1 G/DL (ref 31.5–36.5)
MCV RBC AUTO: 104 FL (ref 78–100)
MONOCYTES # BLD AUTO: 0.4 10E9/L (ref 0–1.3)
MONOCYTES NFR BLD AUTO: 9.8 %
NEUTROPHILS # BLD AUTO: 2 10E9/L (ref 1.6–8.3)
NEUTROPHILS NFR BLD AUTO: 44.9 %
NRBC # BLD AUTO: 0.1 10*3/UL
NRBC BLD AUTO-RTO: 1 /100
PLATELET # BLD AUTO: 192 10E9/L (ref 150–450)
POTASSIUM SERPL-SCNC: 3.8 MMOL/L (ref 3.4–5.3)
PROT SERPL-MCNC: 6.3 G/DL (ref 6.8–8.8)
RBC # BLD AUTO: 3.05 10E12/L (ref 3.8–5.2)
SODIUM SERPL-SCNC: 140 MMOL/L (ref 133–144)
WBC # BLD AUTO: 4.4 10E9/L (ref 4–11)

## 2017-12-12 PROCEDURE — 25000125 ZZHC RX 250: Mod: ZF | Performed by: PHYSICIAN ASSISTANT

## 2017-12-12 PROCEDURE — 96413 CHEMO IV INFUSION 1 HR: CPT

## 2017-12-12 PROCEDURE — 25000128 H RX IP 250 OP 636: Mod: ZF | Performed by: PHYSICIAN ASSISTANT

## 2017-12-12 PROCEDURE — 85025 COMPLETE CBC W/AUTO DIFF WBC: CPT | Performed by: PHYSICIAN ASSISTANT

## 2017-12-12 PROCEDURE — 99213 OFFICE O/P EST LOW 20 MIN: CPT | Mod: ZF

## 2017-12-12 PROCEDURE — 99214 OFFICE O/P EST MOD 30 MIN: CPT | Mod: ZP | Performed by: PHYSICIAN ASSISTANT

## 2017-12-12 PROCEDURE — 80053 COMPREHEN METABOLIC PANEL: CPT | Performed by: PHYSICIAN ASSISTANT

## 2017-12-12 PROCEDURE — 96375 TX/PRO/DX INJ NEW DRUG ADDON: CPT

## 2017-12-12 RX ORDER — MEPERIDINE HYDROCHLORIDE 25 MG/ML
25 INJECTION INTRAMUSCULAR; INTRAVENOUS; SUBCUTANEOUS EVERY 30 MIN PRN
Status: CANCELLED | OUTPATIENT
Start: 2017-12-12

## 2017-12-12 RX ORDER — SODIUM CHLORIDE 9 MG/ML
1000 INJECTION, SOLUTION INTRAVENOUS CONTINUOUS PRN
Status: CANCELLED
Start: 2017-12-12

## 2017-12-12 RX ORDER — EPINEPHRINE 1 MG/ML
0.3 INJECTION, SOLUTION, CONCENTRATE INTRAVENOUS EVERY 5 MIN PRN
Status: CANCELLED | OUTPATIENT
Start: 2017-12-12

## 2017-12-12 RX ORDER — ALBUTEROL SULFATE 90 UG/1
1-2 AEROSOL, METERED RESPIRATORY (INHALATION)
Status: CANCELLED
Start: 2017-12-12

## 2017-12-12 RX ORDER — DIPHENHYDRAMINE HYDROCHLORIDE 50 MG/ML
50 INJECTION INTRAMUSCULAR; INTRAVENOUS
Status: CANCELLED
Start: 2017-12-12

## 2017-12-12 RX ORDER — ALBUTEROL SULFATE 0.83 MG/ML
2.5 SOLUTION RESPIRATORY (INHALATION)
Status: CANCELLED | OUTPATIENT
Start: 2017-12-12

## 2017-12-12 RX ORDER — HEPARIN SODIUM (PORCINE) LOCK FLUSH IV SOLN 100 UNIT/ML 100 UNIT/ML
500 SOLUTION INTRAVENOUS ONCE
Status: DISCONTINUED | OUTPATIENT
Start: 2017-12-12 | End: 2017-12-12 | Stop reason: HOSPADM

## 2017-12-12 RX ORDER — HEPARIN SODIUM (PORCINE) LOCK FLUSH IV SOLN 100 UNIT/ML 100 UNIT/ML
500 SOLUTION INTRAVENOUS ONCE
Status: CANCELLED
Start: 2017-12-12 | End: 2017-12-12

## 2017-12-12 RX ORDER — METHYLPREDNISOLONE SODIUM SUCCINATE 125 MG/2ML
125 INJECTION, POWDER, LYOPHILIZED, FOR SOLUTION INTRAMUSCULAR; INTRAVENOUS
Status: CANCELLED
Start: 2017-12-12

## 2017-12-12 RX ORDER — HEPARIN SODIUM (PORCINE) LOCK FLUSH IV SOLN 100 UNIT/ML 100 UNIT/ML
5 SOLUTION INTRAVENOUS
Status: COMPLETED | OUTPATIENT
Start: 2017-12-12 | End: 2017-12-12

## 2017-12-12 RX ORDER — EPINEPHRINE 0.3 MG/.3ML
0.3 INJECTION SUBCUTANEOUS EVERY 5 MIN PRN
Status: CANCELLED | OUTPATIENT
Start: 2017-12-12

## 2017-12-12 RX ORDER — DEXAMETHASONE SODIUM PHOSPHATE 10 MG/ML
10 INJECTION, SOLUTION INTRAMUSCULAR; INTRAVENOUS
Status: CANCELLED | OUTPATIENT
Start: 2017-12-12

## 2017-12-12 RX ORDER — LORAZEPAM 2 MG/ML
0.5 INJECTION INTRAMUSCULAR EVERY 4 HOURS PRN
Status: CANCELLED
Start: 2017-12-12

## 2017-12-12 RX ADMIN — SODIUM CHLORIDE, PRESERVATIVE FREE 5 ML: 5 INJECTION INTRAVENOUS at 11:02

## 2017-12-12 RX ADMIN — SODIUM CHLORIDE 250 ML: 9 INJECTION, SOLUTION INTRAVENOUS at 13:49

## 2017-12-12 RX ADMIN — PACLITAXEL 150 MG: 6 INJECTION, SOLUTION INTRAVENOUS at 14:07

## 2017-12-12 RX ADMIN — FAMOTIDINE 20 MG: 20 INJECTION, SOLUTION INTRAVENOUS at 13:48

## 2017-12-12 ASSESSMENT — PAIN SCALES - GENERAL: PAINLEVEL: NO PAIN (0)

## 2017-12-12 NOTE — NURSING NOTE
"Oncology Rooming Note    December 12, 2017 11:25 AM   Ashleigh Alonzo is a 57 year old female who presents for:    Chief Complaint   Patient presents with     Port Draw     labs drawn from port by rn.  vs taken     Oncology Clinic Visit     Return visit related to Breast Cancer     Initial Vitals: /78 (BP Location: Right arm, Patient Position: Sitting, Cuff Size: Adult Large)  Pulse 105  Temp 98.6  F (37  C) (Oral)  Resp 16  Ht 1.626 m (5' 4.02\")  Wt 74.9 kg (165 lb 1.6 oz)  SpO2 97%  BMI 28.33 kg/m2 Estimated body mass index is 28.33 kg/(m^2) as calculated from the following:    Height as of this encounter: 1.626 m (5' 4.02\").    Weight as of this encounter: 74.9 kg (165 lb 1.6 oz). Body surface area is 1.84 meters squared.  No Pain (0) Comment: Data Unavailable   No LMP recorded. Patient is postmenopausal.  Allergies reviewed: Yes  Medications reviewed: Yes    Medications: Medication refills not needed today.  Pharmacy name entered into [facility]:    [facility] [place], [place] - [address] [place] Pending sale to [facility] - [place], [place] - [address]    Clinical concerns: No new concerns. Provider was notified.    10 minutes for nursing intake (face to face time)     Ciara Chaudhry LPN            "

## 2017-12-12 NOTE — PROGRESS NOTES
Infusion Nursing Note:  Ashleigh Alonzo presents today for cycle 10, day 1 Taxol.    Patient seen by provider today: Yes, EDDIE Amaya   present during visit today: Not Applicable.    Intravenous Access:  Implanted Port.    Treatment Conditions:  Lab Results   Component Value Date    HGB 10.2 12/12/2017     Lab Results   Component Value Date    WBC 4.4 12/12/2017      Lab Results   Component Value Date    ANEU 2.0 12/12/2017     Lab Results   Component Value Date     12/12/2017      Lab Results   Component Value Date     12/12/2017                   Lab Results   Component Value Date    POTASSIUM 3.8 12/12/2017           Lab Results   Component Value Date    MAG 2.1 09/05/2017            Lab Results   Component Value Date    CR 0.67 12/12/2017                   Lab Results   Component Value Date    JAVIER 8.4 12/12/2017                Lab Results   Component Value Date    BILITOTAL 0.3 12/12/2017           Lab Results   Component Value Date    ALBUMIN 2.9 12/12/2017                    Lab Results   Component Value Date    ALT 72 12/12/2017           Lab Results   Component Value Date    AST 49 12/12/2017     Results reviewed, labs MET treatment parameters, ok to proceed with treatment.    Post Infusion Assessment:  Patient tolerated infusion without incident.  Blood return noted pre and post infusion.  Site patent and intact, free from redness, edema or discomfort.  No evidence of extravasations.  Access discontinued per protocol.    Discharge Plan:   Patient declined prescription refills.  Discharge instructions reviewed with: Patient and Family.  Patient and/or family verbalized understanding of discharge instructions and all questions answered.  Copy of AVS reviewed with patient and/or family.  Patient will return 12/18 for next appointment.  Patient discharged in stable condition accompanied by: .  Departure Mode: Ambulatory.    Joe Hayes RN

## 2017-12-12 NOTE — MR AVS SNAPSHOT
After Visit Summary   12/12/2017    Ashleigh Alonzo    MRN: [id number]           Patient Information     Date Of Birth          1960        Visit Information        Provider Department      12/12/2017 12:30 PM  30 ATC; [facility] ONCOLOGY INFUSION [facility]        Today's Diagnoses     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)    -  1      Care Instructions    Contact Numbers    [facility] Main Line: [phone]  [facility] Triage:  [phone]    Call triage with chills and/or temperature greater than or equal to 100.5, uncontrolled nausea/vomiting, diarrhea, constipation, dizziness, shortness of breath, chest pain, bleeding, unexplained bruising, or any new/concerning symptoms, questions/concerns.     If you are having any concerning symptoms or wish to speak to a provider before your next infusion visit, please call your care coordinator or triage to notify them so we can adequately serve you.       After Hours: [phone]    If after hours, weekends, or holidays, call main hospital  and ask for Oncology doctor on call.           December 2017 Sunday Monday Tuesday Wednesday Thursday Friday Saturday                            1     2       3     4     5     [facility] LAB DRAW    6:30 AM   (15 min.)    [facility] LAB DRAW   [facility] Lab Draw     [facility] RETURN    6:45 AM   (50 min.)   Lilia Livingston PA   [facility]    8:00 AM   (120 min.)    ONCOLOGY INFUSION   [facility] 6     7     8     9       10     11     12     P [facility] LAB DRAW   11:15 AM   (15 min.)    [facility] LAB DRAW   [facility] Lab Draw     [facility] RETURN   11:25 AM   (50 min.)   Rosa Salazar PA-C   [facility]   12:30 PM   (120 min.)    ONCOLOGY INFUSION   [facility] 13     14     15     16       17      18     [facility] DRAW    9:15 AM   (15 min.)   [facility] DRAW   [facility] Lab Draw     [facility] RETURN    9:25 AM   (50 min.)   Lilia Livingston PA   [facility]     [facility] ONC INFUSION 120   10:30 AM   (120 min.)    ONCOLOGY INFUSION   [facility] 19     20     21     22     23       24     25     26     [facility] DRAW    8:00 AM   (15 min.)   [facility]   [facility]     [facility] RETURN    8:15 AM   (50 min.)   Deanna Blanco PA-C   [facility]     [facility] ONC INFUSION 120    9:30 AM   (120 min.)    ONCOLOGY INFUSION   [facility] 27     28     MR BREAST BILATERAL WWO    8:00 AM   (45 min.)   [id number]   [facility]     ECH COMPLETE   10:30 AM   (60 min.)   [email or web address]   [facility] Echo     [facility] DRAW   11:30 AM   (15 min.)    [facility]RAW   [facility] 29 30 31 January 2018 Sunday Monday Tuesday Wednesday Thursday Friday Saturday        1     2     [facility] DRAW   10:45 AM   (15 min.)    [facility] LAB DRAW   [facility] Lab Draw     [facility] RETURN   10:55 AM   (50 min.)   Caren Colindres PA-C   [facility]     [facility] ONC INFUSION 120   12:30 PM   (120 min.)    ONCOLOGY INFUSION   [facility] 3     4     5     6       7     8     9     10     11     12     13       14     15     16     [facility] DRAW    8:15 AM   (15 min.)    [facility] LAB DRAW   [facility] Lab Draw     [facility] RETURN    8:30 AM   (30 min.)   Fara Bradshaw MD   [facility]     [facility] ONC INFUSION 120    9:30 AM   (120 min.)    ONCOLOGY INFUSION   [facility] 17     18     19     20       21     22     23     24     25     26     27       28     29     [facility] DRAW   10:45 AM   (15 min.)    [facility] LAB  DRAW   [facility] Lab Draw     [facility] RETURN   11:05 AM   (50 min.)   Lilia Livingston PA   [facility] ONC INFUSION 120   12:00 PM   (120 min.)   [facility] ONCOLOGY INFUSION   [facility] 30     31                                 Lab Results:  Recent Results (from the past 12 hour(s))   CBC with platelets differential    Collection Time: 12/12/17 11:13 AM   Result Value Ref Range    WBC 4.4 4.0 - 11.0 10e9/L    RBC Count 3.05 (L) 3.8 - 5.2 10e12/L    Hemoglobin 10.2 (L) 11.7 - 15.7 g/dL    Hematocrit 31.8 (L) 35.0 - 47.0 %     (H) 78 - 100 fl    MCH 33.4 (H) 26.5 - 33.0 pg    MCHC 32.1 31.5 - 36.5 g/dL    RDW 15.9 (H) 10.0 - 15.0 %    Platelet Count 192 150 - 450 10e9/L    Diff Method Automated Method     % Neutrophils 44.9 %    % Lymphocytes 40.3 %    % Monocytes 9.8 %    % Eosinophils 1.4 %    % Basophils 0.2 %    % Immature Granulocytes 3.4 %    Nucleated RBCs 1 (H) 0 /100    Absolute Neutrophil 2.0 1.6 - 8.3 10e9/L    Absolute Lymphocytes 1.8 0.8 - 5.3 10e9/L    Absolute Monocytes 0.4 0.0 - 1.3 10e9/L    Absolute Eosinophils 0.1 0.0 - 0.7 10e9/L    Absolute Basophils 0.0 0.0 - 0.2 10e9/L    Abs Immature Granulocytes 0.2 0 - 0.4 10e9/L    Absolute Nucleated RBC 0.1    Comprehensive metabolic panel    Collection Time: 12/12/17 11:13 AM   Result Value Ref Range    Sodium 140 133 - 144 mmol/L    Potassium 3.8 3.4 - 5.3 mmol/L    Chloride 107 94 - 109 mmol/L    Carbon Dioxide 24 20 - 32 mmol/L    Anion Gap 9 3 - 14 mmol/L    Glucose 145 (H) 70 - 99 mg/dL    Urea Nitrogen 14 7 - 30 mg/dL    Creatinine 0.67 0.52 - 1.04 mg/dL    GFR Estimate 90 >60 mL/min/1.7m2    GFR Estimate If Black >90 >60 mL/min/1.7m2    Calcium 8.4 (L) 8.5 - 10.1 mg/dL    Bilirubin Total 0.3 0.2 - 1.3 mg/dL    Albumin 2.9 (L) 3.4 - 5.0 g/dL    Protein Total 6.3 (L) 6.8 - 8.8 g/dL    Alkaline Phosphatase 118 40 - 150 U/L    ALT 72 (H) 0 - 50 U/L    AST 49 (H) 0 - 45 U/L                Follow-ups after your visit        Your next 10 appointments already scheduled     Dec 18, 2017  9:15 AM CST   [facility] Draw with [facility] DRAW   [facility] Draw ([facility])    [address] [place] [address]   [phone]            Dec 18, 2017  9:40 AM CST   (Arrive by 9:25 AM)   Return Visit with EDDIE Oliva   [facility] ([facility])    [address] [place] [address]   [phone]            Dec 18, 2017 10:30 AM CST   Infusion 120 with UC ONCOLOGY INFUSION, UC 22 ATC   [facility] ([facility])    [address] [place] [address]   [phone]            Dec 26, 2017  8:00 AM CST   [facility] Draw with [facility] LAB DRAW   [facility] Draw ([facility])    [address] [place] [address]   [phone]            Dec 26, 2017  8:30 AM CST   (Arrive by 8:15 AM)   Return Visit with Deanna Blanco PA-C   [facility] ([facility])    [address] [place] [address]   [phone]            Dec 26, 2017  9:30 AM CST   Infusion 120 with UC ONCOLOGY INFUSION, UC 14 ATC   [facility] ([facility])    [address] [place] [address]   [phone]            Dec 28, 2017  8:30 AM CST   (Arrive by 8:00 AM)   MR BREAST BILATERAL W/O & W CONTRAST with [id number]   [facility])    [address] [address]   [phone]           Take your medicines as usual, unless your doctor tells you not to. Bring a list of your current medicines to your exam (including vitamins, minerals and over-the-counter drugs).  The timing of your  exam may depend on the start of your last period. If you re in menopause, you may have the exam anytime.  Please bring any previous mammograms or breast MRIs from other facilities to the MRI dept. Do not mail these items to us.  You will have contrast for this exam. To prepare:   The day before your exam, drink extra fluids at least six 8-ounce glasses (unless your doctor tells you to restrict your fluids).   Have a blood test (creatinine test) within 30 days of your exam. Go to your clinic or Diagnostic Imaging Department for this test.  The MRI machine uses a strong magnet. Please wear clothes without metal (snaps, zippers). A sweatsuit works well, or we may give you a hospital gown.  Please remove any body piercings and hair extensions before you arrive. You will also remove watches, jewelry, hairpins, wallets, dentures, partial dental plates and hearing aids. You may wear contact lenses, and you may be able to wear your rings. We have a safe place to keep your personal items, but it is safer to leave them at home.   **IMPORTANT** THE INSTRUCTIONS BELOW ARE ONLY FOR THOSE PATIENTS WHO HAVE BEEN TOLD THEY WILL RECEIVE SEDATION OR GENERAL ANESTHESIA DURING THEIR MRI PROCEDURE:  IF YOU WILL RECEIVE SEDATION (take medicine to help you relax during your exam)   You must get the medicine from your doctor before you arrive. Bring the medicine to the exam. Do not take it at home.   Arrive one hour early. Bring someone who can take you home after the test. Your medicine will make you sleepy. After the exam, you may not drive, take a bus or take a taxi by yourself.   No eating 8 hours before your exam. You may have clear liquids up until 4 hours before your exam. (Clear liquids include water, clear tea, black coffee and fruit juice without pulp.)  IF YOU WILL RECEIVE ANESTHESIA (be asleep for your exam)   Arrive 1 1/2 hours early. Bring someone who can take you home after the test. You may not drive, take a bus or take a  taxi by yourself.   No eating 8 hours before your exam. You may have clear liquids up until 4 hours before your exam. (Clear liquids include water, clear tea, black coffee and fruit juice without pulp.)  If you have any questions, please contact your Imaging Department exam site.            Dec 28, 2017 10:30 AM CST   Ech Complete with [id number]   [facility] Echo ([facility])    [address]  3rd Floor  [place] [address] [phone]           1. Please bring or wear a comfortable two-piece outfit. 2. You may eat, drink and take your normal medicines. 3. For any questions that cannot be answered, please contact the ordering physician              Who to contact     If you have questions or need follow up information about today's clinic visit or your schedule please contact [facility] directly at [phone].  Normal or non-critical lab and imaging results will be communicated to you by [employer], letter or phone within 4 business days after the clinic has received the results. If you do not hear from us within 7 days, please contact the clinic through [employer] or phone. If you have a critical or abnormal lab result, we will notify you by phone as soon as possible.  Submit refill requests through [employer] or call your pharmacy and they will forward the refill request to us. Please allow 3 business days for your refill to be completed.          Additional Information About Your Visit        [employer] Information     [employer] gives you secure access to your electronic health record. If you see a primary care provider, you can also send messages to your care team and make appointments. If you have questions, please call your primary care clinic.  If you do not have a primary care provider, please call [phone] and they will assist you.        Care EveryWhere ID     This is your Care EveryWhere ID. This could be used by other organizations to access your [facility]  medical records  [id number]         Blood Pressure from Last 3 Encounters:   12/12/17 133/78   12/05/17 135/85   11/28/17 138/85    Weight from Last 3 Encounters:   12/12/17 74.9 kg (165 lb 1.6 oz)   12/05/17 72.5 kg (159 lb 14.4 oz)   11/28/17 71.5 kg (157 lb 9.6 oz)              We Performed the Following     CBC with platelets differential     Comprehensive metabolic panel        Primary Care Provider Office Phone # Fax #    Radha Cazares [phone]       [facility] [address] [address]        Equal Access to Services     TEHRESA HUGHES : Hadii candie baileyo Somarian, waaxda luqadaha, qaybta kaalmada adeegyada, bang pickering. So [facility] [phone].    ATENCIÓN: Si habla español, tiene a blackman disposición servicios gratuitos de asistencia lingüística. Llame al [phone].    We comply with applicable federal civil rights laws and [place] laws. We do not discriminate on the basis of race, color, national origin, age, disability, sex, sexual orientation, or gender identity.            Thank you!     Thank you for choosing [facility]  for your care. Our goal is always to provide you with excellent care. Hearing back from our patients is one way we can continue to improve our services. Please take a few minutes to complete the written survey that you may receive in the mail after your visit with us. Thank you!             Your Updated Medication List - Protect others around you: Learn how to safely use, store and throw away your medicines at www.disposemymeds.org.          This list is accurate as of: 12/12/17  2:36 PM.  Always use your most recent med list.                   Brand Name Dispense Instructions for use Diagnosis    albuterol 108 (90 BASE) MCG/ACT Inhaler    PROAIR HFA/PROVENTIL HFA/VENTOLIN HFA    1 Inhaler    Inhale 2 puffs into the lungs every 6 hours as needed for shortness of breath /  dyspnea    Chronic obstructive pulmonary disease, unspecified COPD type (H)       aspirin 81 MG tablet      Take 1 tablet by mouth daily.        citalopram 10 MG tablet    celeXA    90 tablet    Take 1 tablet (10 mg) by mouth daily    Anxiety, Depression, unspecified depression type       CO Q 10 PO      Take 1 tablet by mouth daily        darifenacin 7.5 MG 24 hr tablet    ENABLEX    90 tablet    Take 1 tablet (7.5 mg) by mouth daily    Overactive bladder       guaiFENesin 600 MG 12 hr tablet    MUCINEX    180 tablet    Take 2 tablets (1,200 mg) by mouth 2 times daily    Cough with sputum       hydrOXYzine 25 MG tablet    ATARAX    100 tablet    Take 1-2 tablets (25-50 mg) by mouth every 6 hours as needed for itching    Hives       lidocaine-prilocaine cream    EMLA    30 g    Please apply to port site 30 minutes before use prn    Personal history of malignant neoplasm of breast       * LORazepam 0.5 MG tablet    ATIVAN    10 tablet    Take 1-2 tabs 20 minutes prior to MRI scans.    Breast cancer (H), Anxiety       * LORazepam 0.5 MG tablet    ATIVAN    30 tablet    Take 1 tablet (0.5 mg) by mouth every 4 hours as needed (Anxiety, Nausea/Vomiting or Sleep)    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)       MULTIPLE VITAMIN PO      Take 1 tablet by mouth daily        * order for [place]      [employer] Auto CPAP 12-18 cm, F&P Simplus FFM small.    MERLIN (obstructive sleep apnea)       * order for [place]     1 Device    Cranial prosthesis    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)       pantoprazole 40 MG EC tablet    PROTONIX    30 tablet    Take 1 tablet (40 mg) by mouth every morning    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)       predniSONE 10 MG tablet    DELTASONE    150 tablet    Take 60mg daily for 1 week, then continue to  decrease by 10mg each week    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        vitamin D 1000 UNITS capsule      TAKE 2 CAPSULES BY MOUTH DAILY        * Notice:  This list has 4 medication(s) that are the same as other medications prescribed for you. Read the directions carefully, and ask your doctor or other care provider to review them with you.

## 2017-12-12 NOTE — MR AVS SNAPSHOT
After Visit Summary   12/12/2017    Ashleigh Alonzo    MRN: [id number]           Patient Information     Date Of Birth          1960        Visit Information        Provider Department      12/12/2017 11:40 AM Rosa Salazar PA-C [facility]        Today's Diagnoses     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)           Follow-ups after your visit        Your next 10 appointments already scheduled     Dec 18, 2017  9:15 AM CST   Masonic Lab Draw with [facility]ONIC LAB DRAW   [facility] Masonic Lab Draw ([facility])    [address] [address]   [phone]            Dec 18, 2017  9:40 AM CST   (Arrive by 9:25 AM)   Return Visit with EDDIE Oliva   [facility] ([facility])    [address] [place] [address]   [phone]            Dec 18, 2017 10:30 AM CST   Infusion 120 with UC ONCOLOGY INFUSION, UC 22 ATC   [facility] ([facility])    [address] [place] [address]   [phone]            Dec 26, 2017  8:00 AM CST   Masonic Lab Draw with [facility] MASONIC LAB DRAW   [facility] Masonic Lab Draw ([facility])    [address] [place] [address]   [phone]            Dec 26, 2017  8:30 AM CST   (Arrive by 8:15 AM)   Return Visit with Deanna Blanco PA-C   [facility] ([facility])    [address] [place] [address]   [phone]            Dec 26, 2017  9:30 AM CST   Infusion 120 with UC ONCOLOGY INFUSION, UC 14 ATC   [facility] ([facility])    [address] [place] [address]   [phone]            Dec 28, 2017   8:30 AM CST   (Arrive by 8:00 AM)   MR BREAST BILATERAL W/O & W CONTRAST with [id number]   [facility] ([facility])    [address] [facility] [address]   [phone]           Take your medicines as usual, unless your doctor tells you not to. Bring a list of your current medicines to your exam (including vitamins, minerals and over-the-counter drugs).  The timing of your exam may depend on the start of your last period. If you re in menopause, you may have the exam anytime.  Please bring any previous mammograms or breast MRIs from other facilities to the MRI dept. Do not mail these items to us.  You will have contrast for this exam. To prepare:   The day before your exam, drink extra fluids at least six 8-ounce glasses (unless your doctor tells you to restrict your fluids).   Have a blood test (creatinine test) within 30 days of your exam. Go to your clinic or Diagnostic Imaging Department for this test.  The MRI machine uses a strong magnet. Please wear clothes without metal (snaps, zippers). A sweatsuit works well, or we may give you a hospital gown.  Please remove any body piercings and hair extensions before you arrive. You will also remove watches, jewelry, hairpins, wallets, dentures, partial dental plates and hearing aids. You may wear contact lenses, and you may be able to wear your rings. We have a safe place to keep your personal items, but it is safer to leave them at home.   **IMPORTANT** THE INSTRUCTIONS BELOW ARE ONLY FOR THOSE PATIENTS WHO HAVE BEEN TOLD THEY WILL RECEIVE SEDATION OR GENERAL ANESTHESIA DURING THEIR MRI PROCEDURE:  IF YOU WILL RECEIVE SEDATION (take medicine to help you relax during your exam)   You must get the medicine from your doctor before you arrive. Bring the medicine to the exam. Do not take it at home.   Arrive one hour early. Bring someone who can take you home after the test. Your medicine will make you sleepy. After the exam,  you may not drive, take a bus or take a taxi by yourself.   No eating 8 hours before your exam. You may have clear liquids up until 4 hours before your exam. (Clear liquids include water, clear tea, black coffee and fruit juice without pulp.)  IF YOU WILL RECEIVE ANESTHESIA (be asleep for your exam)   Arrive 1 1/2 hours early. Bring someone who can take you home after the test. You may not drive, take a bus or take a taxi by yourself.   No eating 8 hours before your exam. You may have clear liquids up until 4 hours before your exam. (Clear liquids include water, clear tea, black coffee and fruit juice without pulp.)  If you have any questions, please contact your Imaging Department exam site.            Dec 28, 2017 10:30 AM CST   [facility]   [facility] ([facility])    [address] [address] [phone]           1. Please bring or wear a comfortable two-piece outfit. 2. You may eat, drink and take your normal medicines. 3. For any questions that cannot be answered, please contact the ordering physician              Who to contact     If you have questions or need follow up information about today's clinic visit or your schedule please contact [facility] directly at [phone].  Normal or non-critical lab and imaging results will be communicated to you by [employer], letter or phone within 4 business days after the clinic has received the results. If you do not hear from us within 7 days, please contact the clinic through [employer] or phone. If you have a critical or abnormal lab result, we will notify you by phone as soon as possible.  Submit refill requests through [employer] or call your pharmacy and they will forward the refill request to us. Please allow 3 business days for your refill to be completed.          Additional Information About Your Visit        [employer] Information     [employer] gives you secure access to  "your electronic health record. If you see a primary care provider, you can also send messages to your care team and make appointments. If you have questions, please call your primary care clinic.  If you do not have a primary care provider, please call [phone] and they will assist you.        Care EveryWhere ID     This is your Care EveryWhere ID. This could be used by other organizations to access your [place] medical records  [id number]        Your Vitals Were     Pulse Temperature Respirations Height Pulse Oximetry BMI (Body Mass Index)    105 98.6  F (37  C) (Oral) 16 1.626 m (5' 4.02\") 97% 28.33 kg/m2       Blood Pressure from Last 3 Encounters:   12/12/17 133/78   12/05/17 135/85   11/28/17 138/85    Weight from Last 3 Encounters:   12/12/17 74.9 kg (165 lb 1.6 oz)   12/05/17 72.5 kg (159 lb 14.4 oz)   11/28/17 71.5 kg (157 lb 9.6 oz)              Today, you had the following     No orders found for display       Primary Care Provider Office Phone # Fax #    Radha Cazares [phone]       [address] [address]        Equal Access to Services     THERESA HUGHES : Hadii candie ku hadasho Soomaali, waaxda luqadaha, qaybta kaalmada adeegyada, bang leon hayrainer duran . So [facility] [phone].    ATENCIÓN: Si habla español, tiene a blackman disposición servicios gratuitos de asistencia lingüística. Llame al [phone].    We comply with applicable federal civil rights laws and [place] laws. We do not discriminate on the basis of race, color, national origin, age, disability, sex, sexual orientation, or gender identity.            Thank you!     Thank you for choosing [facility]  for your care. Our goal is always to provide you with excellent care. Hearing back from our patients is one way we can continue to improve our services. Please take a few minutes to complete the written survey that you may receive " in the mail after your visit with us. Thank you!             Your Updated Medication List - Protect others around you: Learn how to safely use, store and throw away your medicines at www.disposemymeds.org.          This list is accurate as of: 12/12/17 11:59 PM.  Always use your most recent med list.                   Brand Name Dispense Instructions for use Diagnosis    albuterol 108 (90 BASE) MCG/ACT Inhaler    PROAIR HFA/PROVENTIL HFA/VENTOLIN HFA    1 Inhaler    Inhale 2 puffs into the lungs every 6 hours as needed for shortness of breath / dyspnea    Chronic obstructive pulmonary disease, unspecified COPD type (H)       aspirin 81 MG tablet      Take 1 tablet by mouth daily.        citalopram 10 MG tablet    celeXA    90 tablet    Take 1 tablet (10 mg) by mouth daily    Anxiety, Depression, unspecified depression type       CO Q 10 PO      Take 1 tablet by mouth daily        darifenacin 7.5 MG 24 hr tablet    ENABLEX    90 tablet    Take 1 tablet (7.5 mg) by mouth daily    Overactive bladder       guaiFENesin 600 MG 12 hr tablet    MUCINEX    180 tablet    Take 2 tablets (1,200 mg) by mouth 2 times daily    Cough with sputum       hydrOXYzine 25 MG tablet    ATARAX    100 tablet    Take 1-2 tablets (25-50 mg) by mouth every 6 hours as needed for itching    Hives       lidocaine-prilocaine cream    EMLA    30 g    Please apply to port site 30 minutes before use prn    Personal history of malignant neoplasm of breast       * LORazepam 0.5 MG tablet    ATIVAN    10 tablet    Take 1-2 tabs 20 minutes prior to MRI scans.    Breast cancer (H), Anxiety       * LORazepam 0.5 MG tablet    ATIVAN    30 tablet    Take 1 tablet (0.5 mg) by mouth every 4 hours as needed (Anxiety, Nausea/Vomiting or Sleep)    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)       MULTIPLE VITAMIN PO      Take 1 tablet by mouth daily        * order for [employer]  CPAP 12-18 cm, F&P Simplus FFM small.    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)       pantoprazole 40 MG EC tablet    PROTONIX    30 tablet    Take 1 tablet (40 mg) by mouth every morning    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)       predniSONE 10 MG tablet    DELTASONE    150 tablet    Take 60mg daily for 1 week, then continue to decrease by 10mg each week    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        vitamin D 1000 UNITS capsule      TAKE 2 CAPSULES BY MOUTH DAILY        * Notice:  This list has 4 medication(s) that are the same as other medications prescribed for you. Read the directions carefully, and ask your doctor or other care provider to review them with you.

## 2017-12-12 NOTE — PATIENT INSTRUCTIONS
Contact Numbers    [facility] Main Line: [phone]  [facility] Triage:  [phone]    Call triage with chills and/or temperature greater than or equal to 100.5, uncontrolled nausea/vomiting, diarrhea, constipation, dizziness, shortness of breath, chest pain, bleeding, unexplained bruising, or any new/concerning symptoms, questions/concerns.     If you are having any concerning symptoms or wish to speak to a provider before your next infusion visit, please call your care coordinator or triage to notify them so we can adequately serve you.       After Hours: [phone]    If after hours, weekends, or holidays, call main hospital  and ask for Oncology doctor on call.           December 2017 Sunday Monday Tuesday Wednesday Thursday Friday Saturday                            1     2       3     4     5     [facility] DRAW    6:30 AM   (15 min.)   [facility] DRAW   [facility] Masonic Lab Draw     [facility] RETURN    6:45 AM   (50 min.)   Lilia Livingston PA   [facility] ONC INFUSION 120    8:00 AM   (120 min.)    ONCOLOGY INFUSION   [facility] 6     7     8     9       10     11     12     [facility] DRAW   11:15 AM   (15 min.)   [facility] DRAW   [facility] Masonic Lab Draw     [facility] RETURN   11:25 AM   (50 min.)   [facility]   12:30 PM   (120 min.)    ONCOLOGY INFUSION   [facility] 13     14     15     16       17     18     [facility] DRAW    9:15 AM   (15 min.)   [facility] DRAW   [facility] Masonic Lab Draw     [facility] RETURN    9:25 AM   (50 min.)   [facility]   [facility]   10:30 AM   (120 min.)    ONCOLOGY INFUSION   [facility] 19     20     21     22     23       24     25     26     [facility] DRAW    8:00 AM   (15 min.)   [facility]     [facility] RETURN    8:15 AM   (50 min.)   [facility]anco [facility]    9:30 AM   (120 min.)    ONCOLOGY INFUSION   [facility] 27     28     MR BREAST BILATERAL WWO    8:00 AM   (45 min.)   [id number]   [facility]     ECH COMPLETE   10:30 AM   (60 min.)   [email or web address]   [facility] Echo     [facility] DRAW   11:30 AM   (15 min.)    [facility] 29     30       31 January 2018 Sunday Monday Tuesday Wednesday Thursday Friday Saturday        1     2     [facility] DRAW   10:45 AM   (15 min.)    [facility]     [facility] RETURN   10:55 AM   (50 min.)   Caren Colindres PA-C   [facility] 120   12:30 PM   (120 min.)    ONCOLOGY INFUSION   [facility] 3     4     5     6       7     8     9     10     11     12     13       14     15     16     [facility] DRAW    8:15 AM   (15 min.)    MASONIC LAB DRAW   [facility] Masonic Lab Draw     [facility] RETURN    8:30 AM   (30 min.)   Fara Bradshaw MD   [facility] 120    9:30 AM   (120 min.)    ONCOLOGY INFUSION   [facility] 17     18     19     20       21     22     23     24     25     26     27       28     29     [facility] DRAW   10:45 AM   (15 min.)    MASONIC LAB DRAW   [facility] Masonic Lab Draw     [facility] RETURN   11:05 AM   (50 min.)   Lilia Livingston PA   [facility]     [facility] ONC INFUSION 120   12:00 PM   (120 min.)    ONCOLOGY INFUSION   [facility] 30     31                                 Lab Results:  Recent Results (from the past 12 hour(s))   CBC with platelets differential    Collection Time: 12/12/17 11:13 AM   Result Value Ref Range    WBC 4.4 4.0 -  11.0 10e9/L    RBC Count 3.05 (L) 3.8 - 5.2 10e12/L    Hemoglobin 10.2 (L) 11.7 - 15.7 g/dL    Hematocrit 31.8 (L) 35.0 - 47.0 %     (H) 78 - 100 fl    MCH 33.4 (H) 26.5 - 33.0 pg    MCHC 32.1 31.5 - 36.5 g/dL    RDW 15.9 (H) 10.0 - 15.0 %    Platelet Count 192 150 - 450 10e9/L    Diff Method Automated Method     % Neutrophils 44.9 %    % Lymphocytes 40.3 %    % Monocytes 9.8 %    % Eosinophils 1.4 %    % Basophils 0.2 %    % Immature Granulocytes 3.4 %    Nucleated RBCs 1 (H) 0 /100    Absolute Neutrophil 2.0 1.6 - 8.3 10e9/L    Absolute Lymphocytes 1.8 0.8 - 5.3 10e9/L    Absolute Monocytes 0.4 0.0 - 1.3 10e9/L    Absolute Eosinophils 0.1 0.0 - 0.7 10e9/L    Absolute Basophils 0.0 0.0 - 0.2 10e9/L    Abs Immature Granulocytes 0.2 0 - 0.4 10e9/L    Absolute Nucleated RBC 0.1    Comprehensive metabolic panel    Collection Time: 12/12/17 11:13 AM   Result Value Ref Range    Sodium 140 133 - 144 mmol/L    Potassium 3.8 3.4 - 5.3 mmol/L    Chloride 107 94 - 109 mmol/L    Carbon Dioxide 24 20 - 32 mmol/L    Anion Gap 9 3 - 14 mmol/L    Glucose 145 (H) 70 - 99 mg/dL    Urea Nitrogen 14 7 - 30 mg/dL    Creatinine 0.67 0.52 - 1.04 mg/dL    GFR Estimate 90 >60 mL/min/1.7m2    GFR Estimate If Black >90 >60 mL/min/1.7m2    Calcium 8.4 (L) 8.5 - 10.1 mg/dL    Bilirubin Total 0.3 0.2 - 1.3 mg/dL    Albumin 2.9 (L) 3.4 - 5.0 g/dL    Protein Total 6.3 (L) 6.8 - 8.8 g/dL    Alkaline Phosphatase 118 40 - 150 U/L    ALT 72 (H) 0 - 50 U/L    AST 49 (H) 0 - 45 U/L

## 2017-12-12 NOTE — PROGRESS NOTES
Research appt: C10D1. Pt feeling good, eating well and has gained some weight. Denies nausea, not using anti emetics as all. Bowels are moving well.  reporting she is having some difficulty sleeping- pt reports she sleeps ok. Feels like energy is good. Working and is enjoying being back at work. WBC and ANC looks ok to proceed with treatment. Still waiting for the CMP. Based on LFTs will continue taper of prednisone. She has been on 60mg this last week and would decrease to 50mg. Pt was seen by provider in infusion to help keep schedule on track. Pt asked about a calcium supplement, getting maybe 1 serving/day of calcium, supplement recommended and then pt wanting to clarify dose of B6 she should take. Has been taking 1 tab but isnt sure of dose- using to help prevent numbness and tingling- so far has had no issue. All questions answered, pt verbalized understanding. F/u next week.

## 2017-12-12 NOTE — PROGRESS NOTES
Oncology/Hematology Visit Note  Dec 12, 2017    Reason for Visit: Follow up of breast cancer     History of Present Illness: Ashleigh Alonzo is a 57 year old female with past medical history of COPD, sleep apnea, periodontal disease with stage IIa, T2N0M0, grade 3, triple negative right breast cancer. She was diagnosed via abnormal screening mammogram. She ultimately had US, contrast-enhanced mammo, and biopsy showing 3.4 cm mass and pathology showed grade 3 invasive mammary carcinoma with associated high-grade DCIS. ER/DE was negative and HER2 negative. She enrolled in ISPY-2 clinical trial and began treatment with weekly taxol and once every 3 week pembrolizumab on 9/2/17. Please see notes from Dr. Bradshaw for further details of patient's oncology history.      Course has been complicated by fevers with PNA and then UTI. Week 7 was held due to transaminitis. She was given week 7 on 11/7 with pembrolizumab and Taxol. She was admitted 11/11-11/17 with high fever (105), cough, and dyspnea and was found to have HCAP and pneumonitis secondary to pembro. 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 She received 2 doses of methylpred, antibiotics, and d/c on prednisone 70 mg daily. Had follow-up on 11/21 with Dr. Bradshaw and resumed weekly Taxol on 11/28/17.     She returns today for consideration of week 10.    Interval History:  Ms. Alonzo returns to clinic today with her . She has no complaints. She continues on the 60mg daily of prednisone and is tolerating this well other than some mild insomnia. She continues to have no neuropathy and is taking vitamin B6 daily, which she started prior to chemotherapy. She has no breathing concerns and denies chest pain or cough. She has not had any fevers since discontinuing the pembrolizumab. She is eating and drinking well. No bleeding issues. She really feels like she has had a lot of complications -but now is doing really well with the  chemo each week.     Review of Systems:  Patient denies fevers, chills, night sweats, unexplained weight changes, headaches, dizziness, vision or hearing changes, new lumps or bumps, chest pain, shortness of breath, cough, abdominal pain, nausea, vomiting, changes to bowel or bladder, swelling of extremities, bleeding issues, or rash.    Current Outpatient Prescriptions   Medication Sig Dispense Refill     predniSONE (DELTASONE) 10 MG tablet Take 60mg daily for 1 week, then continue to decrease by 10mg each week 150 tablet 0     pantoprazole (PROTONIX) 40 MG EC tablet Take 1 tablet (40 mg) by mouth every morning 30 tablet 0     lidocaine-prilocaine (EMLA) cream Please apply to port site 30 minutes before use prn 30 g 1     order for DME Cranial prosthesis 1 Device 1     LORazepam (ATIVAN) 0.5 MG tablet Take 1 tablet (0.5 mg) by mouth every 4 hours as needed (Anxiety, Nausea/Vomiting or Sleep) 30 tablet 2     LORazepam (ATIVAN) 0.5 MG tablet Take 1-2 tabs 20 minutes prior to MRI scans. 10 tablet 0     hydrOXYzine (ATARAX) 25 MG tablet Take 1-2 tablets (25-50 mg) by mouth every 6 hours as needed for itching 100 tablet 2     guaiFENesin (MUCINEX) 600 MG 12 hr tablet Take 2 tablets (1,200 mg) by mouth 2 times daily 180 tablet 6     tiotropium (SPIRIVA) 18 MCG capsule Inhale 1 capsule (18 mcg) into the lungs daily Inhale contents of one capsule 1 capsule 11     albuterol (PROAIR HFA/PROVENTIL HFA/VENTOLIN HFA) 108 (90 BASE) MCG/ACT Inhaler Inhale 2 puffs into the lungs every 6 hours as needed for shortness of breath / dyspnea 1 Inhaler 5     citalopram (CELEXA) 10 MG tablet Take 1 tablet (10 mg) by mouth daily 90 tablet 3     darifenacin (ENABLEX) 7.5 MG 24 hr tablet Take 1 tablet (7.5 mg) by mouth daily 90 tablet 3     order for [facility] [employer] Dream Station Auto CPAP 12-18 cm, F&P Simplus FFM small.       Coenzyme Q10 (CO Q 10 PO) Take 1 tablet by mouth daily        MULTIPLE VITAMIN PO Take 1 tablet by mouth daily     "    Cyanocobalamin (VITAMIN B 12 PO) Take 100 mcg by mouth daily        Pyridoxine HCl (VITAMIN B6 PO) Take 1 tablet by mouth daily.       aspirin 81 MG tablet Take 1 tablet by mouth daily.  3     VITAMIN D 1000 UNIT OR CAPS TAKE 2 CAPSULES BY MOUTH DAILY         Physical Examination:  /78 (BP Location: Right arm, Patient Position: Sitting, Cuff Size: Adult Large)  Pulse 105  Temp 98.6  F (37  C) (Oral)  Resp 16  Ht 1.626 m (5' 4.02\")  Wt 74.9 kg (165 lb 1.6 oz)  SpO2 97%  BMI 28.33 kg/m2  Wt Readings from Last 10 Encounters:   12/12/17 74.9 kg (165 lb 1.6 oz)   12/05/17 72.5 kg (159 lb 14.4 oz)   11/28/17 71.5 kg (157 lb 9.6 oz)   11/21/17 71.6 kg (157 lb 12.8 oz)   11/15/17 71.1 kg (156 lb 12.8 oz)   11/07/17 70.9 kg (156 lb 4.8 oz)   10/31/17 71.3 kg (157 lb 3.2 oz)   10/26/17 73.5 kg (162 lb)   10/24/17 73.3 kg (161 lb 11.2 oz)   10/17/17 74.8 kg (164 lb 12.8 oz)     Constitutional: Well-appearing female in no acute distress.  Eyes: EOMI, PERRL. No scleral icterus.  ENT: Oral mucosa is moist without lesions or thrush.   Lymphatic: Neck is supple without cervical or supraclavicular lymphadenopathy. No axillary lymphadenopathy.  Cardiovascular: Regular rate and rhythm. No murmurs, gallops, or rubs. Trace bilateral peripheral edema.  Respiratory: Clear to auscultation bilaterally. No wheezes or crackles.  Gastrointestinal: Bowel sounds present. Abdomen soft, non-tender. No palpable hepatosplenomegaly or masses.   Neurologic: Cranial nerves II through XII are grossly intact.  Skin: No rashes, petechiae, or bruising noted on exposed skin.    Laboratory Data:     12/12/2017 11:13   Sodium 140   Potassium 3.8   Chloride 107   Carbon Dioxide 24   Urea Nitrogen 14   Creatinine 0.67   GFR Estimate 90   GFR Estimate If Black >90   Calcium 8.4 (L)   Anion Gap 9   Albumin 2.9 (L)   Protein Total 6.3 (L)   Bilirubin Total 0.3   Alkaline Phosphatase 118   ALT 72 (H)   AST 49 (H)   Glucose 145 (H)   WBC 4.4 "   Hemoglobin 10.2 (L)   Hematocrit 31.8 (L)   Platelet Count 192   RBC Count 3.05 (L)    (H)       Assessment and Plan:  1. Stage IIa right breast cancer, currently on treatment with weekly Taxol on I-SPY  Was initially started on weekly Taxol/pembolizumab but course was complicated by fevers, pneumonia, and then pneumonitis and hepatitis requiring hospitalization. Pembrolizumab was discontinued altogether and she is now just on weekly Taxol and is doing very well. Labs are within treatment parameters to move forward today. She is not having any neuropathy and can continue vitamin B6 for ppx.    Our overall plan is to complete a total of 12 weeks of Taxol followed by 4 cycles of Adriamycin and Cytoxan. Following completion of chemotherapy, she will proceed with surgery.  Whether or not she will benefit from adjuvant radiation is unknown at this time and depends on the type of breast surgery, surgical margins, and whether or not there is lymph node involvement at the time of surgery.    2. Pneumonitis, resolved  No pulmonary concerns and lung exam clear. Continue to decrease by 10 mg each week.  Continue Protonix for any steroid associated GERD.    3. Transaminitis, improving  LFTs have overall improved, but slightly up today.  This can be from the Taxol as well. Given overall improvement and continued improvement of her lungs, I would still decrease the pred today as planned.     4. FEN  Weight is going up, likely from the prednisone. Will have to watch weight and sugars closely this next month.     5. Follow-Up  Weekly visit while on Taxol (scheduled)  Breast MRI and Echo following Taxol treatment (scheduled)    Kirsty Salazar PA-C

## 2017-12-15 DIAGNOSIS — G47.33 OSA (OBSTRUCTIVE SLEEP APNEA): Primary | Chronic | ICD-10-CM

## 2017-12-18 ENCOUNTER — ONCOLOGY VISIT (OUTPATIENT)
Dept: ONCOLOGY | Facility: CLINIC | Age: 57
End: 2017-12-18
Attending: PHYSICIAN ASSISTANT
Payer: COMMERCIAL

## 2017-12-18 ENCOUNTER — RESEARCH ENCOUNTER (OUTPATIENT)
Dept: ONCOLOGY | Facility: CLINIC | Age: 57
End: 2017-12-18

## 2017-12-18 ENCOUNTER — INFUSION THERAPY VISIT (OUTPATIENT)
Dept: ONCOLOGY | Facility: CLINIC | Age: 57
End: 2017-12-18
Attending: INTERNAL MEDICINE
Payer: COMMERCIAL

## 2017-12-18 VITALS
BODY MASS INDEX: 28.6 KG/M2 | SYSTOLIC BLOOD PRESSURE: 150 MMHG | DIASTOLIC BLOOD PRESSURE: 87 MMHG | HEIGHT: 64 IN | TEMPERATURE: 99 F | HEART RATE: 110 BPM | RESPIRATION RATE: 16 BRPM | OXYGEN SATURATION: 94 % | WEIGHT: 167.5 LBS

## 2017-12-18 DIAGNOSIS — C50.211 MALIGNANT NEOPLASM OF UPPER-INNER QUADRANT OF RIGHT BREAST IN FEMALE, ESTROGEN RECEPTOR NEGATIVE (H): Primary | ICD-10-CM

## 2017-12-18 DIAGNOSIS — Z17.1 MALIGNANT NEOPLASM OF UPPER-INNER QUADRANT OF RIGHT BREAST IN FEMALE, ESTROGEN RECEPTOR NEGATIVE (H): Primary | ICD-10-CM

## 2017-12-18 LAB
ALBUMIN SERPL-MCNC: 3.1 G/DL (ref 3.4–5)
ALP SERPL-CCNC: 116 U/L (ref 40–150)
ALT SERPL W P-5'-P-CCNC: 58 U/L (ref 0–50)
ANION GAP SERPL CALCULATED.3IONS-SCNC: 9 MMOL/L (ref 3–14)
AST SERPL W P-5'-P-CCNC: 38 U/L (ref 0–45)
BASOPHILS # BLD AUTO: 0 10E9/L (ref 0–0.2)
BASOPHILS NFR BLD AUTO: 0.5 %
BILIRUB SERPL-MCNC: 0.5 MG/DL (ref 0.2–1.3)
BUN SERPL-MCNC: 9 MG/DL (ref 7–30)
CALCIUM SERPL-MCNC: 8.1 MG/DL (ref 8.5–10.1)
CHLORIDE SERPL-SCNC: 107 MMOL/L (ref 94–109)
CO2 SERPL-SCNC: 25 MMOL/L (ref 20–32)
CREAT SERPL-MCNC: 0.64 MG/DL (ref 0.52–1.04)
DIFFERENTIAL METHOD BLD: ABNORMAL
EOSINOPHIL # BLD AUTO: 0 10E9/L (ref 0–0.7)
EOSINOPHIL NFR BLD AUTO: 0.7 %
ERYTHROCYTE [DISTWIDTH] IN BLOOD BY AUTOMATED COUNT: 15.1 % (ref 10–15)
GFR SERPL CREATININE-BSD FRML MDRD: >90 ML/MIN/1.7M2
GLUCOSE SERPL-MCNC: 114 MG/DL (ref 70–99)
HCT VFR BLD AUTO: 32.1 % (ref 35–47)
HGB BLD-MCNC: 10.5 G/DL (ref 11.7–15.7)
IMM GRANULOCYTES # BLD: 0.1 10E9/L (ref 0–0.4)
IMM GRANULOCYTES NFR BLD: 1.6 %
LYMPHOCYTES # BLD AUTO: 2.9 10E9/L (ref 0.8–5.3)
LYMPHOCYTES NFR BLD AUTO: 52.2 %
MCH RBC QN AUTO: 34 PG (ref 26.5–33)
MCHC RBC AUTO-ENTMCNC: 32.7 G/DL (ref 31.5–36.5)
MCV RBC AUTO: 104 FL (ref 78–100)
MONOCYTES # BLD AUTO: 0.4 10E9/L (ref 0–1.3)
MONOCYTES NFR BLD AUTO: 7.3 %
NEUTROPHILS # BLD AUTO: 2.1 10E9/L (ref 1.6–8.3)
NEUTROPHILS NFR BLD AUTO: 37.7 %
NRBC # BLD AUTO: 0 10*3/UL
NRBC BLD AUTO-RTO: 0 /100
PLATELET # BLD AUTO: 191 10E9/L (ref 150–450)
POTASSIUM SERPL-SCNC: 3.8 MMOL/L (ref 3.4–5.3)
PROT SERPL-MCNC: 6.3 G/DL (ref 6.8–8.8)
RBC # BLD AUTO: 3.09 10E12/L (ref 3.8–5.2)
SODIUM SERPL-SCNC: 140 MMOL/L (ref 133–144)
WBC # BLD AUTO: 5.5 10E9/L (ref 4–11)

## 2017-12-18 PROCEDURE — 96413 CHEMO IV INFUSION 1 HR: CPT

## 2017-12-18 PROCEDURE — 85025 COMPLETE CBC W/AUTO DIFF WBC: CPT | Performed by: INTERNAL MEDICINE

## 2017-12-18 PROCEDURE — 25000128 H RX IP 250 OP 636: Mod: ZF | Performed by: PHYSICIAN ASSISTANT

## 2017-12-18 PROCEDURE — 96375 TX/PRO/DX INJ NEW DRUG ADDON: CPT

## 2017-12-18 PROCEDURE — 80053 COMPREHEN METABOLIC PANEL: CPT | Performed by: INTERNAL MEDICINE

## 2017-12-18 PROCEDURE — 99214 OFFICE O/P EST MOD 30 MIN: CPT | Mod: ZP | Performed by: PHYSICIAN ASSISTANT

## 2017-12-18 PROCEDURE — 25000125 ZZHC RX 250: Mod: ZF | Performed by: PHYSICIAN ASSISTANT

## 2017-12-18 PROCEDURE — 99213 OFFICE O/P EST LOW 20 MIN: CPT | Mod: ZF

## 2017-12-18 RX ORDER — EPINEPHRINE 1 MG/ML
0.3 INJECTION, SOLUTION, CONCENTRATE INTRAVENOUS EVERY 5 MIN PRN
Status: CANCELLED | OUTPATIENT
Start: 2017-12-18

## 2017-12-18 RX ORDER — ALBUTEROL SULFATE 90 UG/1
1-2 AEROSOL, METERED RESPIRATORY (INHALATION)
Status: CANCELLED
Start: 2017-12-18

## 2017-12-18 RX ORDER — METHYLPREDNISOLONE SODIUM SUCCINATE 125 MG/2ML
125 INJECTION, POWDER, LYOPHILIZED, FOR SOLUTION INTRAMUSCULAR; INTRAVENOUS
Status: CANCELLED
Start: 2017-12-18

## 2017-12-18 RX ORDER — MEPERIDINE HYDROCHLORIDE 25 MG/ML
25 INJECTION INTRAMUSCULAR; INTRAVENOUS; SUBCUTANEOUS EVERY 30 MIN PRN
Status: CANCELLED | OUTPATIENT
Start: 2017-12-18

## 2017-12-18 RX ORDER — DIPHENHYDRAMINE HYDROCHLORIDE 50 MG/ML
50 INJECTION INTRAMUSCULAR; INTRAVENOUS
Status: CANCELLED
Start: 2017-12-18

## 2017-12-18 RX ORDER — DEXAMETHASONE SODIUM PHOSPHATE 10 MG/ML
10 INJECTION, SOLUTION INTRAMUSCULAR; INTRAVENOUS
Status: CANCELLED | OUTPATIENT
Start: 2017-12-18

## 2017-12-18 RX ORDER — HEPARIN SODIUM (PORCINE) LOCK FLUSH IV SOLN 100 UNIT/ML 100 UNIT/ML
500 SOLUTION INTRAVENOUS ONCE
Status: COMPLETED | OUTPATIENT
Start: 2017-12-18 | End: 2017-12-18

## 2017-12-18 RX ORDER — PHENOL 1.4 %
AEROSOL, SPRAY (ML) MUCOUS MEMBRANE 2 TIMES DAILY WITH MEALS
Qty: 60 TABLET | COMMUNITY
Start: 2017-12-18 | End: 2018-04-18

## 2017-12-18 RX ORDER — ALBUTEROL SULFATE 0.83 MG/ML
2.5 SOLUTION RESPIRATORY (INHALATION)
Status: CANCELLED | OUTPATIENT
Start: 2017-12-18

## 2017-12-18 RX ORDER — HEPARIN SODIUM (PORCINE) LOCK FLUSH IV SOLN 100 UNIT/ML 100 UNIT/ML
500 SOLUTION INTRAVENOUS ONCE
Status: CANCELLED
Start: 2017-12-18 | End: 2017-12-19

## 2017-12-18 RX ORDER — LORAZEPAM 2 MG/ML
0.5 INJECTION INTRAMUSCULAR EVERY 4 HOURS PRN
Status: CANCELLED
Start: 2017-12-18

## 2017-12-18 RX ORDER — SODIUM CHLORIDE 9 MG/ML
1000 INJECTION, SOLUTION INTRAVENOUS CONTINUOUS PRN
Status: CANCELLED
Start: 2017-12-18

## 2017-12-18 RX ORDER — HEPARIN SODIUM (PORCINE) LOCK FLUSH IV SOLN 100 UNIT/ML 100 UNIT/ML
5 SOLUTION INTRAVENOUS
Status: COMPLETED | OUTPATIENT
Start: 2017-12-18 | End: 2017-12-18

## 2017-12-18 RX ORDER — EPINEPHRINE 0.3 MG/.3ML
0.3 INJECTION SUBCUTANEOUS EVERY 5 MIN PRN
Status: CANCELLED | OUTPATIENT
Start: 2017-12-18

## 2017-12-18 RX ADMIN — SODIUM CHLORIDE, PRESERVATIVE FREE 500 UNITS: 5 INJECTION INTRAVENOUS at 11:18

## 2017-12-18 RX ADMIN — PACLITAXEL 150 MG: 6 INJECTION, SOLUTION INTRAVENOUS at 10:16

## 2017-12-18 RX ADMIN — SODIUM CHLORIDE, PRESERVATIVE FREE 5 ML: 5 INJECTION INTRAVENOUS at 09:06

## 2017-12-18 RX ADMIN — FAMOTIDINE 20 MG: 20 INJECTION, SOLUTION INTRAVENOUS at 10:03

## 2017-12-18 RX ADMIN — SODIUM CHLORIDE 250 ML: 9 INJECTION, SOLUTION INTRAVENOUS at 10:03

## 2017-12-18 ASSESSMENT — PAIN SCALES - GENERAL: PAINLEVEL: NO PAIN (0)

## 2017-12-18 NOTE — PROGRESS NOTES
Infusion Nursing Note:  Ashleigh Alonzo presents today for C11 Taxol.    Patient seen by provider today: Yes: EDDIE Leach    Treatment Conditions:  Lab Results   Component Value Date    HGB 10.5 12/18/2017     Lab Results   Component Value Date    WBC 5.5 12/18/2017      Lab Results   Component Value Date    ANEU 2.1 12/18/2017     Lab Results   Component Value Date     12/18/2017      Lab Results   Component Value Date     12/18/2017                   Lab Results   Component Value Date    POTASSIUM 3.8 12/18/2017         \  Lab Results   Component Value Date    CR 0.64 12/18/2017                   Lab Results   Component Value Date    JAVIER 8.1 12/18/2017                Lab Results   Component Value Date    BILITOTAL 0.5 12/18/2017           Lab Results   Component Value Date    ALBUMIN 3.1 12/18/2017                    Lab Results   Component Value Date    ALT 58 12/18/2017           Lab Results   Component Value Date    AST 38 12/18/2017     Results reviewed, labs MET treatment parameters, ok to proceed with treatment.        Intravenous Access:  Implanted Port.  Access dc'd at time of discharge.      Note:   Results reviewed, copy given to patient.  Proceed with treatment.    Copy of AVS given to patient. + Blood return from PORT pre and post infusion.  Tolerated infusion without incident. No Prescriptions filled today.   D/C in care of spouse.  Pt will return 12/26 for next appointment.       Consuelo Florian RN

## 2017-12-18 NOTE — MR AVS SNAPSHOT
After Visit Summary   12/18/2017    Ashleigh Alonzo    MRN: [id number]           Patient Information     Date Of Birth          1960        Visit Information        Provider Department      12/18/2017 9:40 AM Lilia Livingston PA [facility]        Today's Diagnoses     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)    -  1       Follow-ups after your visit        Your next 10 appointments already scheduled     Dec 18, 2017 10:30 AM CST   Infusion 120 with UC ONCOLOGY INFUSION, UC 22 ATC   [facility] ([facility])    [address] [place] [id number]   [phone]            Dec 26, 2017  8:00 AM CST   [facility] Lab Draw with [facility] LAB DRAW   [facility] Draw ([facility])    [address] [place] [id number]   [phone]            Dec 26, 2017  8:30 AM CST   (Arrive by 8:15 AM)   Return Visit with Deanna Blanco PA-C   [facility] ([facility])    [address] [place] [id number]   [phone]            Dec 26, 2017  9:30 AM CST   Infusion 120 with UC ONCOLOGY INFUSION, UC 14 ATC   [facility] ([facility])    [address] [place] [id number]   [phone]            Dec 28, 2017  8:30 AM CST   (Arrive by 8:00 AM)   MR BREAST BILATERAL W/O & W CONTRAST with [id number]   [facility] ([facility])    [address] [facility] [address]   [phone]           Take your medicines as usual, unless your doctor tells you not to. Bring a list of your current medicines to your exam (including vitamins, minerals and over-the-counter drugs).  The timing of your exam may depend on the start of your last period.  If you re in menopause, you may have the exam anytime.  Please bring any previous mammograms or breast MRIs from other facilities to the MRI dept. Do not mail these items to us.  You will have contrast for this exam. To prepare:   The day before your exam, drink extra fluids at least six 8-ounce glasses (unless your doctor tells you to restrict your fluids).   Have a blood test (creatinine test) within 30 days of your exam. Go to your clinic or Diagnostic Imaging Department for this test.  The MRI machine uses a strong magnet. Please wear clothes without metal (snaps, zippers). A sweatsuit works well, or we may give you a hospital gown.  Please remove any body piercings and hair extensions before you arrive. You will also remove watches, jewelry, hairpins, wallets, dentures, partial dental plates and hearing aids. You may wear contact lenses, and you may be able to wear your rings. We have a safe place to keep your personal items, but it is safer to leave them at home.   **IMPORTANT** THE INSTRUCTIONS BELOW ARE ONLY FOR THOSE PATIENTS WHO HAVE BEEN TOLD THEY WILL RECEIVE SEDATION OR GENERAL ANESTHESIA DURING THEIR MRI PROCEDURE:  IF YOU WILL RECEIVE SEDATION (take medicine to help you relax during your exam)   You must get the medicine from your doctor before you arrive. Bring the medicine to the exam. Do not take it at home.   Arrive one hour early. Bring someone who can take you home after the test. Your medicine will make you sleepy. After the exam, you may not drive, take a bus or take a taxi by yourself.   No eating 8 hours before your exam. You may have clear liquids up until 4 hours before your exam. (Clear liquids include water, clear tea, black coffee and fruit juice without pulp.)  IF YOU WILL RECEIVE ANESTHESIA (be asleep for your exam)   Arrive 1 1/2 hours early. Bring someone who can take you home after the test. You may not drive, take a bus or take a taxi by yourself.   No eating 8 hours before your  exam. You may have clear liquids up until 4 hours before your exam. (Clear liquids include water, clear tea, black coffee and fruit juice without pulp.)  If you have any questions, please contact your Imaging Department exam site.            Dec 28, 2017 10:30 AM CST   Ech Complete with [id number]   [facility] ([facility])    [address] [place] [id number] [phone]           1. Please bring or wear a comfortable two-piece outfit. 2. You may eat, drink and take your normal medicines. 3. For any questions that cannot be answered, please contact the ordering physician            Dec 28, 2017 11:30 AM CST   [facility] Lab Draw with  [facility] LAB DRAW   [facility] Lab Draw ([facility])    [address] [place] [id number] [phone]            Jan 02, 2018 10:45 AM CST   [facility] Lab Draw with  [facility] LAB DRAW   [facility] Lab Draw ([facility])    [address] [place] [id number] [phone]              Who to contact     If you have questions or need follow up information about today's clinic visit or your schedule please contact [facility] directly at [phone].  Normal or non-critical lab and imaging results will be communicated to you by MyChart, letter or phone within 4 business days after the clinic has received the results. If you do not hear from us within 7 days, please contact the clinic through MyChart or phone. If you have a critical or abnormal lab result, we will notify you by phone as soon as possible.  Submit refill requests through [employer] or call your pharmacy and they will forward the refill request to us. Please allow 3 business days for your refill to be completed.          Additional Information About Your Visit        [employer] Information     [employer] gives you secure access to your electronic health  "record. If you see a primary care provider, you can also send messages to your care team and make appointments. If you have questions, please call your primary care clinic.  If you do not have a primary care provider, please call [phone] and they will assist you.        Care EveryWhere ID     This is your Care EveryWhere ID. This could be used by other organizations to access your [facility] medical records  [id number]        Your Vitals Were     Pulse Temperature Respirations Height Pulse Oximetry BMI (Body Mass Index)    110 99  F (37.2  C) (Oral) 16 1.626 m (5' 4.02\") 94% 28.74 kg/m2       Blood Pressure from Last 3 Encounters:   12/18/17 150/87   12/12/17 133/78   12/05/17 135/85    Weight from Last 3 Encounters:   12/18/17 76 kg (167 lb 8 oz)   12/12/17 74.9 kg (165 lb 1.6 oz)   12/05/17 72.5 kg (159 lb 14.4 oz)              Today, you had the following     No orders found for display       Primary Care Provider Office Phone # Fax #    Radha Cazares [phone]       [address] [address]        Equal Access to Services     THERESA HUGHES : Hadii candie baileyo Somariaaali, waaxda luqadaha, qaybta kaalmada adeegyada, waxay edward pickering. So [facility] [phone].    ATENCIÓN: Si habla español, tiene a blackman disposición servicios gratuitos de asistencia lingüística. Llame al [phone].    We comply with applicable federal civil rights laws and [place] laws. We do not discriminate on the basis of race, color, national origin, age, disability, sex, sexual orientation, or gender identity.            Thank you!     Thank you for choosing [facility]  for your care. Our goal is always to provide you with excellent care. Hearing back from our patients is one way we can continue to improve our services. Please take a few minutes to complete the written survey that you may receive in the mail after your visit " with us. Thank you!             Your Updated Medication List - Protect others around you: Learn how to safely use, store and throw away your medicines at www.disposemymeds.org.          This list is accurate as of: 12/18/17  9:58 AM.  Always use your most recent med list.                   Brand Name Dispense Instructions for use Diagnosis    albuterol 108 (90 BASE) MCG/ACT Inhaler    PROAIR HFA/PROVENTIL HFA/VENTOLIN HFA    1 Inhaler    Inhale 2 puffs into the lungs every 6 hours as needed for shortness of breath / dyspnea    Chronic obstructive pulmonary disease, unspecified COPD type (H)       aspirin 81 MG tablet      Take 1 tablet by mouth daily.        calcium carbonate 600 MG tablet   Generic drug:  calcium carbonate     60 tablet    Take by mouth 2 times daily (with meals)        citalopram 10 MG tablet    celeXA    90 tablet    Take 1 tablet (10 mg) by mouth daily    Anxiety, Depression, unspecified depression type       CO Q 10 PO      Take 1 tablet by mouth daily        darifenacin 7.5 MG 24 hr tablet    ENABLEX    90 tablet    Take 1 tablet (7.5 mg) by mouth daily    Overactive bladder       guaiFENesin 600 MG 12 hr tablet    MUCINEX    180 tablet    Take 2 tablets (1,200 mg) by mouth 2 times daily    Cough with sputum       hydrOXYzine 25 MG tablet    ATARAX    100 tablet    Take 1-2 tablets (25-50 mg) by mouth every 6 hours as needed for itching    Hives       lidocaine-prilocaine cream    EMLA    30 g    Please apply to port site 30 minutes before use prn    Personal history of malignant neoplasm of breast       * LORazepam 0.5 MG tablet    ATIVAN    10 tablet    Take 1-2 tabs 20 minutes prior to MRI scans.    Breast cancer (H), Anxiety       * LORazepam 0.5 MG tablet    ATIVAN    30 tablet    Take 1 tablet (0.5 mg) by mouth every 4 hours as needed (Anxiety, Nausea/Vomiting or Sleep)    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)       MULTIPLE  VITAMIN PO      Take 1 tablet by mouth daily        * order for DME      Respironics Dream Station Auto CPAP 12-18 cm, F&P Simplus FFM small.    MERLIN (obstructive sleep apnea)       * order for [facility]     1 Device    Cranial prosthesis    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)       pantoprazole 40 MG EC tablet    PROTONIX    30 tablet    Take 1 tablet (40 mg) by mouth every morning    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)       predniSONE 10 MG tablet    DELTASONE    150 tablet    Take 60mg daily for 1 week, then continue to decrease by 10mg each week    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        vitamin D 1000 UNITS capsule      TAKE 2 CAPSULES BY MOUTH DAILY        * Notice:  This list has 4 medication(s) that are the same as other medications prescribed for you. Read the directions carefully, and ask your doctor or other care provider to review them with you.

## 2017-12-18 NOTE — PROGRESS NOTES
Research visit- C11D1. Developed a mild cold last thurs, some mild nasal congestion thurs, slept a lot on fri. Denies cough, low grade fever- 37.2. Some weight gain from steroids. Also has some insomnia from steroids and as a result a little fatigue. Prednisone dose will be decreased starting tomorrow.   Labs look ok for treatment today- pt reporting she started calcium supplement but couldn't remember the dose will check and let us know.   Otherwise no other concerns today. Will have treatment this week and next and the get MRI/ECHO and labs for between C12 and C13, checked in with infusion nurse to let them know pt good to go today. F/U next week.   Steffany Ma   [phone]

## 2017-12-18 NOTE — MR AVS SNAPSHOT
After Visit Summary   12/18/2017    Ashleigh Alonzo    MRN: [id number]           Patient Information     Date Of Birth          1960        Visit Information        Provider Department      12/18/2017 10:30 AM  22 ATC;  ONCOLOGY INFUSION [facility]        Today's Diagnoses     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)    -  1      Care Instructions    Contact Numbers  Medical Center Clinic: [phone]    After Hours:  [phone]  Triage: [phone]    Please call the [facility] Triage line if you experience a temperature greater than or equal to 100.5, shaking chills, have uncontrolled nausea, vomiting and/or diarrhea, dizziness, shortness of breath, chest pain, bleeding, unexplained bruising, or if you have any other new/concerning symptoms, questions or concerns.     If it is after hours, weekends, or holidays, please call the main hospital  at  [phone] and ask to speak to the Oncology doctor on call.     If you are having any concerning symptoms or wish to speak to a provider before your next infusion visit, please call your care coordinator or triage to notify them so we can adequately serve you.     If you need a refill on a narcotic prescription or other medication, please call triage before your infusion appointment.         December 2017 Sunday Monday Tuesday Wednesday Thursday Friday Saturday                            1     2       3     4     5     [facility] LAB DRAW    6:30 AM   (15 min.)   [facility]   [facility] Lab Draw     [facility] RETURN    6:45 AM   (50 min.)   Lilia Livingston PA   [facility] ONC INFUSION 120    8:00 AM   (120 min.)    ONCOLOGY INFUSION   [facility] 6     7     8     9       10     11     12     [facility] DRAW   11:15 AM   (15 min.)   [facility] DRAW   [facility] Lab Draw     [facility]  RETURN   11:25 AM   (50 min.)   Rosa Salazar PA-C   [facility]     [facility] ONC INFUSION 120   12:30 PM   (120 min.)    ONCOLOGY INFUSION   [facility] 13     14     15     16       17     18     [facility] DRAW    9:15 AM   (15 min.)    [facility] LAB DRAW   [facility] Lab Draw     [facility] RETURN    9:25 AM   (50 min.)   Lilia Livingston PA   [facility]     [facility] ONC INFUSION 120   10:30 AM   (120 min.)    ONCOLOGY INFUSION   [facility] 19     20     21     22     23       24     25     26     [facility] DRAW    8:00 AM   (15 min.)    [facility] LAB DRAW   [facility] Lab Draw     [facility] RETURN    8:15 AM   (50 min.)   Deanna Blanco PA-C   [facility] 120    9:30 AM   (120 min.)    ONCOLOGY INFUSION   [facility] 27     28     MR BREAST BILATERAL WWO    8:00 AM   (45 min.)   [id number]   [facility]     ECH COMPLETE   10:30 AM   (60 min.)   [email or web address]   [facility] Echo     [facility] DRAW   11:30 AM   (15 min.)    [facility] LAB DRAW   [facility] Draw 29 30 31 January 2018 Sunday Monday Tuesday Wednesday Thursday Friday Saturday        1     2     [facility] DRAW   10:45 AM   (15 min.)    [facility] LAB DRAW   [facility] Lab Draw     [facility] RETURN   10:55 AM   (50 min.)   Caren Colindres PA-C   [facility] INFUS[facility] 120   12:30 PM   (120 min.)    ONCOLOGY INFUSION   [facility] 3     4     5     6       7     8     9     10     11     12     13       14     15     16     [facility] DRAW    8:15 AM   (15 min.)    [facility] LAB DRAW   [facility] Lab Draw     [facility] RETURN    8:30 AM   (30 min.)   Fara Bradshaw MD   [facility]     [facility] ONC INFUSION 120     9:30 AM   (120 min.)    ONCOLOGY INFUSION   [facility] 17     18     19     20       21     22     23     24     25     26     27       28     29     [facility] LAB DRAW   10:45 AM   (15 min.)   [facility] LAB DRAW   [facility] Lab Draw     [facility] RETURN   11:05 AM   (50 min.)   Lilia Livingston PA   [facility] ONC INFUSION 120   12:00 PM   (120 min.)    ONCOLOGY INFUSION   [facility] 30     31                                Recent Results (from the past 24 hour(s))   CBC with platelets differential    Collection Time: 12/18/17  9:11 AM   Result Value Ref Range    WBC 5.5 4.0 - 11.0 10e9/L    RBC Count 3.09 (L) 3.8 - 5.2 10e12/L    Hemoglobin 10.5 (L) 11.7 - 15.7 g/dL    Hematocrit 32.1 (L) 35.0 - 47.0 %     (H) 78 - 100 fl    MCH 34.0 (H) 26.5 - 33.0 pg    MCHC 32.7 31.5 - 36.5 g/dL    RDW 15.1 (H) 10.0 - 15.0 %    Platelet Count 191 150 - 450 10e9/L    Diff Method Automated Method     % Neutrophils 37.7 %    % Lymphocytes 52.2 %    % Monocytes 7.3 %    % Eosinophils 0.7 %    % Basophils 0.5 %    % Immature Granulocytes 1.6 %    Nucleated RBCs 0 0 /100    Absolute Neutrophil 2.1 1.6 - 8.3 10e9/L    Absolute Lymphocytes 2.9 0.8 - 5.3 10e9/L    Absolute Monocytes 0.4 0.0 - 1.3 10e9/L    Absolute Eosinophils 0.0 0.0 - 0.7 10e9/L    Absolute Basophils 0.0 0.0 - 0.2 10e9/L    Abs Immature Granulocytes 0.1 0 - 0.4 10e9/L    Absolute Nucleated RBC 0.0    Comprehensive metabolic panel    Collection Time: 12/18/17  9:11 AM   Result Value Ref Range    Sodium 140 133 - 144 mmol/L    Potassium 3.8 3.4 - 5.3 mmol/L    Chloride 107 94 - 109 mmol/L    Carbon Dioxide 25 20 - 32 mmol/L    Anion Gap 9 3 - 14 mmol/L    Glucose 114 (H) 70 - 99 mg/dL    Urea Nitrogen 9 7 - 30 mg/dL    Creatinine 0.64 0.52 - 1.04 mg/dL    GFR Estimate >90 >60 mL/min/1.7m2    GFR Estimate If Black >90 >60 mL/min/1.7m2    Calcium 8.1 (L) 8.5 - 10.1 mg/dL    Bilirubin  Total 0.5 0.2 - 1.3 mg/dL    Albumin 3.1 (L) 3.4 - 5.0 g/dL    Protein Total 6.3 (L) 6.8 - 8.8 g/dL    Alkaline Phosphatase 116 40 - 150 U/L    ALT 58 (H) 0 - 50 U/L    AST 38 0 - 45 U/L                 Follow-ups after your visit        Your next 10 appointments already scheduled     Dec 18, 2017 10:30 AM CST   Infusion 120 with UC ONCOLOGY INFUSION, UC 22 ATC   [facility] ([facility])    [address] [place] [id number]   [phone]            Dec 26, 2017  8:00 AM CST   [facility] Draw with [facility] LAB DRAW   [facility] Lab Draw ([facility])    [address] [place] [id number]   [phone]            Dec 26, 2017  8:30 AM CST   (Arrive by 8:15 AM)   Return Visit with Deanna Blanco PA-C   [facility] ([facility])    [address] [place] [id number]   [phone]            Dec 26, 2017  9:30 AM CST   Infusion 120 with UC ONCOLOGY INFUSION, UC 14 ATC   [facility] ([facility])    [address] [place] [address]   [phone]            Dec 28, 2017  8:30 AM CST   (Arrive by 8:00 AM)   MR BREAST BILATERAL W/O & W CONTRAST with [id number]   [facility] ([facility])    [address] [facility] [address]   [phone]           Take your medicines as usual, unless your doctor tells you not to. Bring a list of your current medicines to your exam (including vitamins, minerals and over-the-counter drugs).  The timing of your exam may depend on the start of your last period. If you re in menopause, you may have the exam anytime.  Please bring any previous mammograms or breast MRIs from other facilities to the MRI dept. Do not mail these items to us.  You will have contrast for this exam. To  prepare:   The day before your exam, drink extra fluids at least six 8-ounce glasses (unless your doctor tells you to restrict your fluids).   Have a blood test (creatinine test) within 30 days of your exam. Go to your clinic or Diagnostic Imaging Department for this test.  The MRI machine uses a strong magnet. Please wear clothes without metal (snaps, zippers). A sweatsuit works well, or we may give you a hospital gown.  Please remove any body piercings and hair extensions before you arrive. You will also remove watches, jewelry, hairpins, wallets, dentures, partial dental plates and hearing aids. You may wear contact lenses, and you may be able to wear your rings. We have a safe place to keep your personal items, but it is safer to leave them at home.   **IMPORTANT** THE INSTRUCTIONS BELOW ARE ONLY FOR THOSE PATIENTS WHO HAVE BEEN TOLD THEY WILL RECEIVE SEDATION OR GENERAL ANESTHESIA DURING THEIR MRI PROCEDURE:  IF YOU WILL RECEIVE SEDATION (take medicine to help you relax during your exam)   You must get the medicine from your doctor before you arrive. Bring the medicine to the exam. Do not take it at home.   Arrive one hour early. Bring someone who can take you home after the test. Your medicine will make you sleepy. After the exam, you may not drive, take a bus or take a taxi by yourself.   No eating 8 hours before your exam. You may have clear liquids up until 4 hours before your exam. (Clear liquids include water, clear tea, black coffee and fruit juice without pulp.)  IF YOU WILL RECEIVE ANESTHESIA (be asleep for your exam)   Arrive 1 1/2 hours early. Bring someone who can take you home after the test. You may not drive, take a bus or take a taxi by yourself.   No eating 8 hours before your exam. You may have clear liquids up until 4 hours before your exam. (Clear liquids include water, clear tea, black coffee and fruit juice without pulp.)  If you have any questions, please contact your Imaging Department  exam site.            Dec 28, 2017 10:30 AM CST   Ech Complete with [email or web address]   [facility] Echo ([facility])    [address]  3rd Floor  [facility] [id number] [phone]           1. Please bring or wear a comfortable two-piece outfit. 2. You may eat, drink and take your normal medicines. 3. For any questions that cannot be answered, please contact the ordering physician            Dec 28, 2017 11:30 AM CST   Masonic Lab Draw with  MASONIC LAB DRAW   [facility] Lab Draw ([facility])    [address]  [address] [facility] [id number] [phone]            Jan 02, 2018 10:45 AM CST   [facility] Lab Draw with  M[facility]ON[facility] LAB DRAW   [facility] Lab Draw ([facility])    [address] [place] [id number] [phone]              Who to contact     If you have questions or need follow up information about today's clinic visit or your schedule please contact [facility] directly at [phone].  Normal or non-critical lab and imaging results will be communicated to you by [employer], letter or phone within 4 business days after the clinic has received the results. If you do not hear from us within 7 days, please contact the clinic through [employer] or phone. If you have a critical or abnormal lab result, we will notify you by phone as soon as possible.  Submit refill requests through [employer] or call your pharmacy and they will forward the refill request to us. Please allow 3 business days for your refill to be completed.          Additional Information About Your Visit        [employer] Information     [employer] gives you secure access to your electronic health record. If you see a primary care provider, you can also send messages to your care team and make appointments. If you have questions, please call your primary care clinic.  If you do not have a primary care provider,  please call [phone] and they will assist you.        Care EveryWhere ID     This is your Care EveryWhere ID. This could be used by other organizations to access your [facility] medical records  [id number]         Blood Pressure from Last 3 Encounters:   12/18/17 150/87   12/12/17 133/78   12/05/17 135/85    Weight from Last 3 Encounters:   12/18/17 76 kg (167 lb 8 oz)   12/12/17 74.9 kg (165 lb 1.6 oz)   12/05/17 72.5 kg (159 lb 14.4 oz)              We Performed the Following     CBC with platelets differential     Comprehensive metabolic panel        Primary Care Provider Office Phone # Fax #    Radha Cazares [phone]       [address] [address]        Equal Access to Services     THERESA HUGHES : Hadii aad ku hadasho Soomaali, waaxda luqadaha, qaybta kaalmada adeegyada, bang leon hayrainer duran . So [facility] [phone].    ATENCIÓN: Si habla español, tiene a blackman disposición servicios gratuitos de asistencia lingüística. Llame al [phone].    We comply with applicable federal civil rights laws and [place] laws. We do not discriminate on the basis of race, color, national origin, age, disability, sex, sexual orientation, or gender identity.            Thank you!     Thank you for choosing [facility]  for your care. Our goal is always to provide you with excellent care. Hearing back from our patients is one way we can continue to improve our services. Please take a few minutes to complete the written survey that you may receive in the mail after your visit with us. Thank you!             Your Updated Medication List - Protect others around you: Learn how to safely use, store and throw away your medicines at www.disposemymeds.org.          This list is accurate as of: 12/18/17 10:12 AM.  Always use your most recent med list.                   Brand Name Dispense Instructions for use Diagnosis     albuterol 108 (90 BASE) MCG/ACT Inhaler    PROAIR HFA/PROVENTIL HFA/VENTOLIN HFA    1 Inhaler    Inhale 2 puffs into the lungs every 6 hours as needed for shortness of breath / dyspnea    Chronic obstructive pulmonary disease, unspecified COPD type (H)       aspirin 81 MG tablet      Take 1 tablet by mouth daily.        calcium carbonate 600 MG tablet   Generic drug:  calcium carbonate     60 tablet    Take by mouth 2 times daily (with meals)        citalopram 10 MG tablet    celeXA    90 tablet    Take 1 tablet (10 mg) by mouth daily    Anxiety, Depression, unspecified depression type       CO Q 10 PO      Take 1 tablet by mouth daily        darifenacin 7.5 MG 24 hr tablet    ENABLEX    90 tablet    Take 1 tablet (7.5 mg) by mouth daily    Overactive bladder       guaiFENesin 600 MG 12 hr tablet    MUCINEX    180 tablet    Take 2 tablets (1,200 mg) by mouth 2 times daily    Cough with sputum       hydrOXYzine 25 MG tablet    ATARAX    100 tablet    Take 1-2 tablets (25-50 mg) by mouth every 6 hours as needed for itching    Hives       lidocaine-prilocaine cream    EMLA    30 g    Please apply to port site 30 minutes before use prn    Personal history of malignant neoplasm of breast       * LORazepam 0.5 MG tablet    ATIVAN    10 tablet    Take 1-2 tabs 20 minutes prior to MRI scans.    Breast cancer (H), Anxiety       * LORazepam 0.5 MG tablet    ATIVAN    30 tablet    Take 1 tablet (0.5 mg) by mouth every 4 hours as needed (Anxiety, Nausea/Vomiting or Sleep)    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)       MULTIPLE VITAMIN PO      Take 1 tablet by mouth daily        * order for [facility]      RespirEyepics Dream Station Auto CPAP 12-18 cm, F&P Simplus FFM small.    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)        pantoprazole 40 MG EC tablet    PROTONIX    30 tablet    Take 1 tablet (40 mg) by mouth every morning    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)       predniSONE 10 MG tablet    DELTASONE    150 tablet    Take 60mg daily for 1 week, then continue to decrease by 10mg each week    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        vitamin D 1000 UNITS capsule      TAKE 2 CAPSULES BY MOUTH DAILY        * Notice:  This list has 4 medication(s) that are the same as other medications prescribed for you. Read the directions carefully, and ask your doctor or other care provider to review them with you.

## 2017-12-18 NOTE — LETTER
12/18/2017      RE: Ashleigh Alonzo  [address] [facility] [place] [address]       Oncology/Hematology Visit Note  Dec 18, 2017    Reason for Visit: Follow up of breast cancer     History of Present Illness: Ashleigh Alonzo is a 57 year old female with past medical history of COPD, sleep apnea, periodontal disease with stage IIa, T2N0M0, grade 3, triple negative right breast cancer. She was diagnosed via abnormal screening mammogram. She ultimately had US, contrast-enhanced mammo, and biopsy showing 3.4 cm mass and pathology showed grade 3 invasive mammary carcinoma with associated high-grade DCIS. ER/OH was negative and HER2 negative. She enrolled in ISPY-2 clinical trial and began treatment with weekly taxol and once every 3 week pembrolizumab on 9/2/17. Please see notes from Dr. Bradshaw for further details of patient's oncology history.      Course has been complicated by fevers with PNA and then UTI. Week 7 was held due to transaminitis. She was given week 7 on 11/7 with pembrolizumab and Taxol. She was admitted 11/11-11/17 with high fever (105), cough, and dyspnea and was found to have HCAP and pneumonitis secondary to pembro. 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 She received 2 doses of methylpred, antibiotics, and d/c on prednisone 70 mg daily. Had follow-up on 11/21 with Dr. Bradshaw and resumed weekly Taxol on 11/28/17. She returns today for consideration of week 11.     Interval History:  Ms. Alonzo returns to clinic today with her . She has mild nasal congestion that started last Friday. Had a low grade fever 99.5 and some fatigue but no other symptoms. Denies cough, SOB. She will start 40 mg of prednisone tomorrow. Having some insomnia and weight gain from steroids. Denies any neuropathy, mucositis, abdominal pain, nausea, changes in bowels or bladder.     Review of Systems:  Patient denies fevers, chills, night sweats, unexplained weight changes,  headaches, dizziness, vision or hearing changes, new lumps or bumps, chest pain, shortness of breath, cough, abdominal pain, nausea, vomiting, changes to bowel or bladder, swelling of extremities, bleeding issues, or rash.    Current Outpatient Prescriptions   Medication Sig Dispense Refill     calcium carbonate (CALCIUM CARBONATE) 600 MG tablet Take by mouth 2 times daily (with meals) 60 tablet      predniSONE (DELTASONE) 10 MG tablet Take 60mg daily for 1 week, then continue to decrease by 10mg each week 150 tablet 0     pantoprazole (PROTONIX) 40 MG EC tablet Take 1 tablet (40 mg) by mouth every morning 30 tablet 0     lidocaine-prilocaine (EMLA) cream Please apply to port site 30 minutes before use prn 30 g 1     order for DME Cranial prosthesis 1 Device 1     hydrOXYzine (ATARAX) 25 MG tablet Take 1-2 tablets (25-50 mg) by mouth every 6 hours as needed for itching 100 tablet 2     guaiFENesin (MUCINEX) 600 MG 12 hr tablet Take 2 tablets (1,200 mg) by mouth 2 times daily 180 tablet 6     tiotropium (SPIRIVA) 18 MCG capsule Inhale 1 capsule (18 mcg) into the lungs daily Inhale contents of one capsule 1 capsule 11     albuterol (PROAIR HFA/PROVENTIL HFA/VENTOLIN HFA) 108 (90 BASE) MCG/ACT Inhaler Inhale 2 puffs into the lungs every 6 hours as needed for shortness of breath / dyspnea 1 Inhaler 5     citalopram (CELEXA) 10 MG tablet Take 1 tablet (10 mg) by mouth daily 90 tablet 3     order for [facility] [employer] Dream Station Auto CPAP 12-18 cm, F&P Simplus FFM small.       Coenzyme Q10 (CO Q 10 PO) Take 1 tablet by mouth daily        MULTIPLE VITAMIN PO Take 1 tablet by mouth daily        Cyanocobalamin (VITAMIN B 12 PO) Take 100 mcg by mouth daily        Pyridoxine HCl (VITAMIN B6 PO) Take 1 tablet by mouth daily.       aspirin 81 MG tablet Take 1 tablet by mouth daily.  3     VITAMIN D 1000 UNIT OR CAPS TAKE 2 CAPSULES BY MOUTH DAILY       LORazepam (ATIVAN) 0.5 MG tablet Take 1 tablet (0.5 mg) by mouth every 4  "hours as needed (Anxiety, Nausea/Vomiting or Sleep) (Patient not taking: Reported on 12/18/2017) 30 tablet 2     LORazepam (ATIVAN) 0.5 MG tablet Take 1-2 tabs 20 minutes prior to MRI scans. 10 tablet 0     darifenacin (ENABLEX) 7.5 MG 24 hr tablet Take 1 tablet (7.5 mg) by mouth daily (Patient not taking: Reported on 12/18/2017) 90 tablet 3       Physical Examination:  /87 (BP Location: Right arm, Patient Position: Sitting, Cuff Size: Adult Regular)  Pulse 110  Temp 99  F (37.2  C) (Oral)  Resp 16  Ht 1.626 m (5' 4.02\")  Wt 76 kg (167 lb 8 oz)  SpO2 94%  BMI 28.74 kg/m2  Wt Readings from Last 10 Encounters:   12/18/17 76 kg (167 lb 8 oz)   12/12/17 74.9 kg (165 lb 1.6 oz)   12/05/17 72.5 kg (159 lb 14.4 oz)   11/28/17 71.5 kg (157 lb 9.6 oz)   11/21/17 71.6 kg (157 lb 12.8 oz)   11/15/17 71.1 kg (156 lb 12.8 oz)   11/07/17 70.9 kg (156 lb 4.8 oz)   10/31/17 71.3 kg (157 lb 3.2 oz)   10/26/17 73.5 kg (162 lb)   10/24/17 73.3 kg (161 lb 11.2 oz)     Constitutional: Well-appearing female in no acute distress.  Eyes: EOMI, PERRL. No scleral icterus.  ENT: Oral mucosa is moist without lesions or thrush.   Lymphatic: Neck is supple without cervical or supraclavicular lymphadenopathy. No axillary lymphadenopathy.  Cardiovascular: Regular rate and rhythm. No murmurs, gallops, or rubs. Trace bilateral peripheral edema.  Respiratory: Clear to auscultation bilaterally. No wheezes or crackles.  Gastrointestinal: Bowel sounds present. Abdomen soft, non-tender. No palpable hepatosplenomegaly or masses.   Neurologic: Cranial nerves II through XII are grossly intact.  Skin: No rashes, petechiae, or bruising noted on exposed skin.    Laboratory Data:   12/18/2017 09:11   Sodium 140   Potassium 3.8   Chloride 107   Carbon Dioxide 25   Urea Nitrogen 9   Creatinine 0.64   GFR Estimate >90   GFR Estimate If Black >90   Calcium 8.1 (L)   Anion Gap 9   Albumin 3.1 (L)   Protein Total 6.3 (L)   Bilirubin Total 0.5   Alkaline " Phosphatase 116   ALT 58 (H)   AST 38   Glucose 114 (H)   WBC 5.5   Hemoglobin 10.5 (L)   Hematocrit 32.1 (L)   Platelet Count 191   RBC Count 3.09 (L)    (H)   MCH 34.0 (H)   MCHC 32.7   RDW 15.1 (H)   Diff Method Automated Method   % Neutrophils 37.7   % Lymphocytes 52.2   % Monocytes 7.3   % Eosinophils 0.7   % Basophils 0.5   % Immature Granulocytes 1.6   Nucleated RBCs 0   Absolute Neutrophil 2.1   Absolute Lymphocytes 2.9   Absolute Monocytes 0.4   Absolute Eosinophils 0.0   Absolute Basophils 0.0   Abs Immature Granulocytes 0.1   Absolute Nucleated RBC 0.0         Assessment and Plan:  1. Stage IIa right breast cancer, currently on treatment with weekly Taxol on I-SPY  Was initially started on weekly Taxol/pembolizumab but course was complicated by fevers, pneumonia, and then pneumonitis and hepatitis requiring hospitalization. Pembrolizumab was discontinued altogether and she is now just on weekly Taxol and is doing very well. Continue with week 11 today.    Our overall plan is to complete a total of 12 weeks of Taxol followed by 4 cycles of Adriamycin and Cytoxan. Following completion of chemotherapy, she will proceed with surgery.  Whether or not she will benefit from adjuvant radiation is unknown at this time and depends on the type of breast surgery, surgical margins, and whether or not there is lymph node involvement at the time of surgery.    2. Pneumonitis, resolved  No pulmonary concerns and lung exam clear. Continue prednisone taper, starting 40 mg daily tomorrow, then decrease by 10mg weekly. Continue Protonix for any steroid associated GERD.    3. Transaminitis, improving  LFTs trended up slightly last week, thought to be Taxol related. Trended down today. Continue treatment.      4. FEN  Weight is up 10 pounds in a month, steroids contributing. Work on portion control and healthier options.     5. URI: Mild and viral. Supportive cares.     Lilia Livingston PA-C  [facility]  [address]  [place] [address]  [phone]

## 2017-12-18 NOTE — PROGRESS NOTES
Oncology/Hematology Visit Note  Dec 18, 2017    Reason for Visit: Follow up of breast cancer     History of Present Illness: Ashleigh Alonzo is a 57 year old female with past medical history of COPD, sleep apnea, periodontal disease with stage IIa, T2N0M0, grade 3, triple negative right breast cancer. She was diagnosed via abnormal screening mammogram. She ultimately had US, contrast-enhanced mammo, and biopsy showing 3.4 cm mass and pathology showed grade 3 invasive mammary carcinoma with associated high-grade DCIS. ER/NC was negative and HER2 negative. She enrolled in ISPY-2 clinical trial and began treatment with weekly taxol and once every 3 week pembrolizumab on 9/2/17. Please see notes from Dr. Bradshaw for further details of patient's oncology history.      Course has been complicated by fevers with PNA and then UTI. Week 7 was held due to transaminitis. She was given week 7 on 11/7 with pembrolizumab and Taxol. She was admitted 11/11-11/17 with high fever (105), cough, and dyspnea and was found to have HCAP and pneumonitis secondary to pembro. 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 She received 2 doses of methylpred, antibiotics, and d/c on prednisone 70 mg daily. Had follow-up on 11/21 with Dr. Bradshaw and resumed weekly Taxol on 11/28/17. She returns today for consideration of week 11.     Interval History:  Ms. Alonzo returns to clinic today with her . She has mild nasal congestion that started last Friday. Had a low grade fever 99.5 and some fatigue but no other symptoms. Denies cough, SOB. She will start 40 mg of prednisone tomorrow. Having some insomnia and weight gain from steroids. Denies any neuropathy, mucositis, abdominal pain, nausea, changes in bowels or bladder.     Review of Systems:  Patient denies fevers, chills, night sweats, unexplained weight changes, headaches, dizziness, vision or hearing changes, new lumps or bumps, chest pain, shortness of  breath, cough, abdominal pain, nausea, vomiting, changes to bowel or bladder, swelling of extremities, bleeding issues, or rash.    Current Outpatient Prescriptions   Medication Sig Dispense Refill     calcium carbonate (CALCIUM CARBONATE) 600 MG tablet Take by mouth 2 times daily (with meals) 60 tablet      predniSONE (DELTASONE) 10 MG tablet Take 60mg daily for 1 week, then continue to decrease by 10mg each week 150 tablet 0     pantoprazole (PROTONIX) 40 MG EC tablet Take 1 tablet (40 mg) by mouth every morning 30 tablet 0     lidocaine-prilocaine (EMLA) cream Please apply to port site 30 minutes before use prn 30 g 1     order for DME Cranial prosthesis 1 Device 1     hydrOXYzine (ATARAX) 25 MG tablet Take 1-2 tablets (25-50 mg) by mouth every 6 hours as needed for itching 100 tablet 2     guaiFENesin (MUCINEX) 600 MG 12 hr tablet Take 2 tablets (1,200 mg) by mouth 2 times daily 180 tablet 6     tiotropium (SPIRIVA) 18 MCG capsule Inhale 1 capsule (18 mcg) into the lungs daily Inhale contents of one capsule 1 capsule 11     albuterol (PROAIR HFA/PROVENTIL HFA/VENTOLIN HFA) 108 (90 BASE) MCG/ACT Inhaler Inhale 2 puffs into the lungs every 6 hours as needed for shortness of breath / dyspnea 1 Inhaler 5     citalopram (CELEXA) 10 MG tablet Take 1 tablet (10 mg) by mouth daily 90 tablet 3     order for [facility] Respironics Dream Station Auto CPAP 12-18 cm, F&P Simplus FFM small.       Coenzyme Q10 (CO Q 10 PO) Take 1 tablet by mouth daily        MULTIPLE VITAMIN PO Take 1 tablet by mouth daily        Cyanocobalamin (VITAMIN B 12 PO) Take 100 mcg by mouth daily        Pyridoxine HCl (VITAMIN B6 PO) Take 1 tablet by mouth daily.       aspirin 81 MG tablet Take 1 tablet by mouth daily.  3     VITAMIN D 1000 UNIT OR CAPS TAKE 2 CAPSULES BY MOUTH DAILY       LORazepam (ATIVAN) 0.5 MG tablet Take 1 tablet (0.5 mg) by mouth every 4 hours as needed (Anxiety, Nausea/Vomiting or Sleep) (Patient not taking: Reported on 12/18/2017)  "30 tablet 2     LORazepam (ATIVAN) 0.5 MG tablet Take 1-2 tabs 20 minutes prior to MRI scans. 10 tablet 0     darifenacin (ENABLEX) 7.5 MG 24 hr tablet Take 1 tablet (7.5 mg) by mouth daily (Patient not taking: Reported on 12/18/2017) 90 tablet 3       Physical Examination:  /87 (BP Location: Right arm, Patient Position: Sitting, Cuff Size: Adult Regular)  Pulse 110  Temp 99  F (37.2  C) (Oral)  Resp 16  Ht 1.626 m (5' 4.02\")  Wt 76 kg (167 lb 8 oz)  SpO2 94%  BMI 28.74 kg/m2  Wt Readings from Last 10 Encounters:   12/18/17 76 kg (167 lb 8 oz)   12/12/17 74.9 kg (165 lb 1.6 oz)   12/05/17 72.5 kg (159 lb 14.4 oz)   11/28/17 71.5 kg (157 lb 9.6 oz)   11/21/17 71.6 kg (157 lb 12.8 oz)   11/15/17 71.1 kg (156 lb 12.8 oz)   11/07/17 70.9 kg (156 lb 4.8 oz)   10/31/17 71.3 kg (157 lb 3.2 oz)   10/26/17 73.5 kg (162 lb)   10/24/17 73.3 kg (161 lb 11.2 oz)     Constitutional: Well-appearing female in no acute distress.  Eyes: EOMI, PERRL. No scleral icterus.  ENT: Oral mucosa is moist without lesions or thrush.   Lymphatic: Neck is supple without cervical or supraclavicular lymphadenopathy. No axillary lymphadenopathy.  Cardiovascular: Regular rate and rhythm. No murmurs, gallops, or rubs. Trace bilateral peripheral edema.  Respiratory: Clear to auscultation bilaterally. No wheezes or crackles.  Gastrointestinal: Bowel sounds present. Abdomen soft, non-tender. No palpable hepatosplenomegaly or masses.   Neurologic: Cranial nerves II through XII are grossly intact.  Skin: No rashes, petechiae, or bruising noted on exposed skin.    Laboratory Data:   12/18/2017 09:11   Sodium 140   Potassium 3.8   Chloride 107   Carbon Dioxide 25   Urea Nitrogen 9   Creatinine 0.64   GFR Estimate >90   GFR Estimate If Black >90   Calcium 8.1 (L)   Anion Gap 9   Albumin 3.1 (L)   Protein Total 6.3 (L)   Bilirubin Total 0.5   Alkaline Phosphatase 116   ALT 58 (H)   AST 38   Glucose 114 (H)   WBC 5.5   Hemoglobin 10.5 (L) "   Hematocrit 32.1 (L)   Platelet Count 191   RBC Count 3.09 (L)    (H)   MCH 34.0 (H)   MCHC 32.7   RDW 15.1 (H)   Diff Method Automated Method   % Neutrophils 37.7   % Lymphocytes 52.2   % Monocytes 7.3   % Eosinophils 0.7   % Basophils 0.5   % Immature Granulocytes 1.6   Nucleated RBCs 0   Absolute Neutrophil 2.1   Absolute Lymphocytes 2.9   Absolute Monocytes 0.4   Absolute Eosinophils 0.0   Absolute Basophils 0.0   Abs Immature Granulocytes 0.1   Absolute Nucleated RBC 0.0         Assessment and Plan:  1. Stage IIa right breast cancer, currently on treatment with weekly Taxol on I-SPY  Was initially started on weekly Taxol/pembolizumab but course was complicated by fevers, pneumonia, and then pneumonitis and hepatitis requiring hospitalization. Pembrolizumab was discontinued altogether and she is now just on weekly Taxol and is doing very well. Continue with week 11 today.    Our overall plan is to complete a total of 12 weeks of Taxol followed by 4 cycles of Adriamycin and Cytoxan. Following completion of chemotherapy, she will proceed with surgery.  Whether or not she will benefit from adjuvant radiation is unknown at this time and depends on the type of breast surgery, surgical margins, and whether or not there is lymph node involvement at the time of surgery.    2. Pneumonitis, resolved  No pulmonary concerns and lung exam clear. Continue prednisone taper, starting 40 mg daily tomorrow, then decrease by 10mg weekly. Continue Protonix for any steroid associated GERD.    3. Transaminitis, improving  LFTs trended up slightly last week, thought to be Taxol related. Trended down today. Continue treatment.      4. FEN  Weight is up 10 pounds in a month, steroids contributing. Work on portion control and healthier options.     5. URI: Mild and viral. Supportive cares.     Lilia Livingston PA-C  [facility]  [address] [place] [address] [phone]

## 2017-12-18 NOTE — NURSING NOTE
"Oncology Rooming Note    December 18, 2017 9:28 AM   Ashleigh Alonzo is a 57 year old female who presents for:    Chief Complaint   Patient presents with     Port Draw     port accessed and labs drawn by rn.  vs taken.     Oncology Clinic Visit     F/U Breast ca     Initial Vitals: /87 (BP Location: Right arm, Patient Position: Sitting, Cuff Size: Adult Regular)  Pulse 110  Temp 99  F (37.2  C) (Oral)  Resp 16  Ht 1.626 m (5' 4.02\")  Wt 76 kg (167 lb 8 oz)  SpO2 94%  BMI 28.74 kg/m2 Estimated body mass index is 28.74 kg/(m^2) as calculated from the following:    Height as of this encounter: 1.626 m (5' 4.02\").    Weight as of this encounter: 76 kg (167 lb 8 oz). Body surface area is 1.85 meters squared.  No Pain (0) Comment: Data Unavailable   No LMP recorded. Patient is postmenopausal.  Allergies reviewed: Yes  Medications reviewed: Yes    Medications: Medication refills not needed today.  Pharmacy name entered into [facility]:    [facility], [place] - [address] [place] [facility] - [place], [place] - [address] [address] [facility]    Clinical concerns: none Lilia was NOT notified.    10 minutes for nursing intake (face to face time)     REENA DE LPN            "

## 2017-12-18 NOTE — PATIENT INSTRUCTIONS
Contact Numbers  [facility]: [phone]    After Hours:  [phone]  Triage: [phone]    Please call the [facility] Triage line if you experience a temperature greater than or equal to 100.5, shaking chills, have uncontrolled nausea, vomiting and/or diarrhea, dizziness, shortness of breath, chest pain, bleeding, unexplained bruising, or if you have any other new/concerning symptoms, questions or concerns.     If it is after hours, weekends, or holidays, please call the main hospital  at  [phone] and ask to speak to the Oncology doctor on call.     If you are having any concerning symptoms or wish to speak to a provider before your next infusion visit, please call your care coordinator or triage to notify them so we can adequately serve you.     If you need a refill on a narcotic prescription or other medication, please call triage before your infusion appointment.         December 2017 Sunday Monday Tuesday Wednesday Thursday Friday Saturday                            1     2       3     4     5     [facility] DRAW    6:30 AM   (15 min.)    [facility] DRAW   [facility] Lab Draw     [facility] RETURN    6:45 AM   (50 min.)   Lilia Livingston PA [facility] ONC INFUSION 120    8:00 AM   (120 min.)    ONCOLOGY INFUSION   [facility] 6     7     8     9       10     11     12     [facility] DRAW   11:15 AM   (15 min.)    [facility] DRAW   [facility] Lab Draw     [facility] RETURN   11:25 AM   (50 min.)   Rosa Salazar PA-C   [facility] ONC INFUSION 120   12:30 PM   (120 min.)    ONCOLOGY INFUSION   [facility] 13     14     15     16       17     18     [facility] DRAW    9:15 AM   (15 min.)    [facility] DRAW   [facility] Lab Draw     [facility] RETURN    9:25 AM   (50 min.)   Lilia Livingston PA M [facility] ONC INFUSION  120   10:30 AM   (120 min.)    ONCOLOGY INFUSION   [facility] 19     20     21     22     23       24     25     26     [facility] DRAW    8:00 AM   (15 min.)    [facility]   [facility]     [facility] RETURN    8:15 AM   (50 min.)   Deanna Blanco PA-C   [facility]     [facility] ONC INFUSION 120    9:30 AM   (120 min.)    ONCOLOGY INFUSION   [facility] 27     28     MR BREAST BILATERAL WWO    8:00 AM   (45 min.)   [id number]   [facility]     ECH COMPLETE   10:30 AM   (60 min.)   [id number]   [facility] Echo     [facility] DRAW   11:30 AM   (15 min.)    [facility] DRAW   [facility] Draw 29 30 31 January 2018 Sunday Monday Tuesday Wednesday Thursday Friday Saturday        1     2     [facility] DRAW   10:45 AM   (15 min.)    [facility] DRAW   [facility] Draw     [facility] RETURN   10:55 AM   (50 min.)   Caren Colindres PA-C   [facility]     [facility] ONC INFUSION 120   12:30 PM   (120 min.)    ONCOLOGY INFUSION   [facility] 3     4     5     6       7     8     9     10     11     12     13       14     15     16     [facility] DRAW    8:15 AM   (15 min.)    [facility] DRAW   [facility] Draw     [facility] RETURN    8:30 AM   (30 min.)   Fara Bradshaw MD   [facility] 120    9:30 AM   (120 min.)    ONCOLOGY INFUSION   [facility] 17     18     19     20       21     22     23     24     25     26     27       28     29     [facility] DRAW   10:45 AM   (15 min.)    [facility] DRAW   [facility] Draw     [facility] RETURN   11:05 AM   (50 min.)   Lilia Livingston PA   [facility]     [facility] ONC INFUSION 120   12:00 PM   (120 min.)    ONCOLOGY INFUSION   [facility]  [facility] 30     31                                Recent Results (from the past 24 hour(s))   CBC with platelets differential    Collection Time: 12/18/17  9:11 AM   Result Value Ref Range    WBC 5.5 4.0 - 11.0 10e9/L    RBC Count 3.09 (L) 3.8 - 5.2 10e12/L    Hemoglobin 10.5 (L) 11.7 - 15.7 g/dL    Hematocrit 32.1 (L) 35.0 - 47.0 %     (H) 78 - 100 fl    MCH 34.0 (H) 26.5 - 33.0 pg    MCHC 32.7 31.5 - 36.5 g/dL    RDW 15.1 (H) 10.0 - 15.0 %    Platelet Count 191 150 - 450 10e9/L    Diff Method Automated Method     % Neutrophils 37.7 %    % Lymphocytes 52.2 %    % Monocytes 7.3 %    % Eosinophils 0.7 %    % Basophils 0.5 %    % Immature Granulocytes 1.6 %    Nucleated RBCs 0 0 /100    Absolute Neutrophil 2.1 1.6 - 8.3 10e9/L    Absolute Lymphocytes 2.9 0.8 - 5.3 10e9/L    Absolute Monocytes 0.4 0.0 - 1.3 10e9/L    Absolute Eosinophils 0.0 0.0 - 0.7 10e9/L    Absolute Basophils 0.0 0.0 - 0.2 10e9/L    Abs Immature Granulocytes 0.1 0 - 0.4 10e9/L    Absolute Nucleated RBC 0.0    Comprehensive metabolic panel    Collection Time: 12/18/17  9:11 AM   Result Value Ref Range    Sodium 140 133 - 144 mmol/L    Potassium 3.8 3.4 - 5.3 mmol/L    Chloride 107 94 - 109 mmol/L    Carbon Dioxide 25 20 - 32 mmol/L    Anion Gap 9 3 - 14 mmol/L    Glucose 114 (H) 70 - 99 mg/dL    Urea Nitrogen 9 7 - 30 mg/dL    Creatinine 0.64 0.52 - 1.04 mg/dL    GFR Estimate >90 >60 mL/min/1.7m2    GFR Estimate If Black >90 >60 mL/min/1.7m2    Calcium 8.1 (L) 8.5 - 10.1 mg/dL    Bilirubin Total 0.5 0.2 - 1.3 mg/dL    Albumin 3.1 (L) 3.4 - 5.0 g/dL    Protein Total 6.3 (L) 6.8 - 8.8 g/dL    Alkaline Phosphatase 116 40 - 150 U/L    ALT 58 (H) 0 - 50 U/L    AST 38 0 - 45 U/L

## 2017-12-26 ENCOUNTER — INFUSION THERAPY VISIT (OUTPATIENT)
Dept: ONCOLOGY | Facility: CLINIC | Age: 57
End: 2017-12-26
Attending: INTERNAL MEDICINE
Payer: COMMERCIAL

## 2017-12-26 ENCOUNTER — APPOINTMENT (OUTPATIENT)
Dept: LAB | Facility: CLINIC | Age: 57
End: 2017-12-26
Attending: INTERNAL MEDICINE
Payer: COMMERCIAL

## 2017-12-26 ENCOUNTER — RESEARCH ENCOUNTER (OUTPATIENT)
Dept: ONCOLOGY | Facility: CLINIC | Age: 57
End: 2017-12-26

## 2017-12-26 VITALS
BODY MASS INDEX: 27.96 KG/M2 | HEART RATE: 109 BPM | WEIGHT: 163.8 LBS | HEIGHT: 64 IN | OXYGEN SATURATION: 99 % | TEMPERATURE: 98.8 F | DIASTOLIC BLOOD PRESSURE: 78 MMHG | SYSTOLIC BLOOD PRESSURE: 141 MMHG | RESPIRATION RATE: 18 BRPM

## 2017-12-26 DIAGNOSIS — Z17.1 MALIGNANT NEOPLASM OF UPPER-INNER QUADRANT OF RIGHT BREAST IN FEMALE, ESTROGEN RECEPTOR NEGATIVE (H): Primary | ICD-10-CM

## 2017-12-26 DIAGNOSIS — C50.211 MALIGNANT NEOPLASM OF UPPER-INNER QUADRANT OF RIGHT BREAST IN FEMALE, ESTROGEN RECEPTOR NEGATIVE (H): Primary | ICD-10-CM

## 2017-12-26 LAB
ALBUMIN SERPL-MCNC: 3.1 G/DL (ref 3.4–5)
ALP SERPL-CCNC: 132 U/L (ref 40–150)
ALT SERPL W P-5'-P-CCNC: 57 U/L (ref 0–50)
ANION GAP SERPL CALCULATED.3IONS-SCNC: 7 MMOL/L (ref 3–14)
AST SERPL W P-5'-P-CCNC: 44 U/L (ref 0–45)
BASOPHILS # BLD AUTO: 0 10E9/L (ref 0–0.2)
BASOPHILS NFR BLD AUTO: 0.4 %
BILIRUB SERPL-MCNC: 0.4 MG/DL (ref 0.2–1.3)
BUN SERPL-MCNC: 16 MG/DL (ref 7–30)
CALCIUM SERPL-MCNC: 8.3 MG/DL (ref 8.5–10.1)
CHLORIDE SERPL-SCNC: 104 MMOL/L (ref 94–109)
CO2 SERPL-SCNC: 27 MMOL/L (ref 20–32)
CREAT SERPL-MCNC: 0.62 MG/DL (ref 0.52–1.04)
DIFFERENTIAL METHOD BLD: ABNORMAL
EOSINOPHIL # BLD AUTO: 0 10E9/L (ref 0–0.7)
EOSINOPHIL NFR BLD AUTO: 0.8 %
ERYTHROCYTE [DISTWIDTH] IN BLOOD BY AUTOMATED COUNT: 15.4 % (ref 10–15)
GFR SERPL CREATININE-BSD FRML MDRD: >90 ML/MIN/1.7M2
GLUCOSE SERPL-MCNC: 165 MG/DL (ref 70–99)
HCT VFR BLD AUTO: 33.7 % (ref 35–47)
HGB BLD-MCNC: 11.1 G/DL (ref 11.7–15.7)
IMM GRANULOCYTES # BLD: 0.2 10E9/L (ref 0–0.4)
IMM GRANULOCYTES NFR BLD: 4.4 %
LYMPHOCYTES # BLD AUTO: 2.2 10E9/L (ref 0.8–5.3)
LYMPHOCYTES NFR BLD AUTO: 46.5 %
MCH RBC QN AUTO: 34.2 PG (ref 26.5–33)
MCHC RBC AUTO-ENTMCNC: 32.9 G/DL (ref 31.5–36.5)
MCV RBC AUTO: 104 FL (ref 78–100)
MONOCYTES # BLD AUTO: 0.6 10E9/L (ref 0–1.3)
MONOCYTES NFR BLD AUTO: 12.2 %
NEUTROPHILS # BLD AUTO: 1.7 10E9/L (ref 1.6–8.3)
NEUTROPHILS NFR BLD AUTO: 35.7 %
NRBC # BLD AUTO: 0.1 10*3/UL
NRBC BLD AUTO-RTO: 2 /100
PLATELET # BLD AUTO: 284 10E9/L (ref 150–450)
POTASSIUM SERPL-SCNC: 4.1 MMOL/L (ref 3.4–5.3)
PROT SERPL-MCNC: 6.5 G/DL (ref 6.8–8.8)
RBC # BLD AUTO: 3.25 10E12/L (ref 3.8–5.2)
SODIUM SERPL-SCNC: 138 MMOL/L (ref 133–144)
WBC # BLD AUTO: 4.8 10E9/L (ref 4–11)

## 2017-12-26 PROCEDURE — 25000128 H RX IP 250 OP 636: Mod: ZF | Performed by: PHYSICIAN ASSISTANT

## 2017-12-26 PROCEDURE — 99214 OFFICE O/P EST MOD 30 MIN: CPT | Mod: ZP | Performed by: PHYSICIAN ASSISTANT

## 2017-12-26 PROCEDURE — 96367 TX/PROPH/DG ADDL SEQ IV INF: CPT

## 2017-12-26 PROCEDURE — 85025 COMPLETE CBC W/AUTO DIFF WBC: CPT | Performed by: INTERNAL MEDICINE

## 2017-12-26 PROCEDURE — 80053 COMPREHEN METABOLIC PANEL: CPT | Performed by: INTERNAL MEDICINE

## 2017-12-26 PROCEDURE — 96413 CHEMO IV INFUSION 1 HR: CPT

## 2017-12-26 PROCEDURE — 25000125 ZZHC RX 250: Mod: ZF | Performed by: PHYSICIAN ASSISTANT

## 2017-12-26 PROCEDURE — 99212 OFFICE O/P EST SF 10 MIN: CPT | Mod: ZF

## 2017-12-26 RX ORDER — HEPARIN SODIUM (PORCINE) LOCK FLUSH IV SOLN 100 UNIT/ML 100 UNIT/ML
500 SOLUTION INTRAVENOUS ONCE
Status: COMPLETED | OUTPATIENT
Start: 2017-12-26 | End: 2017-12-26

## 2017-12-26 RX ORDER — HEPARIN SODIUM (PORCINE) LOCK FLUSH IV SOLN 100 UNIT/ML 100 UNIT/ML
500 SOLUTION INTRAVENOUS ONCE
Status: CANCELLED
Start: 2017-12-26 | End: 2017-12-26

## 2017-12-26 RX ORDER — DOXORUBICIN HYDROCHLORIDE 2 MG/ML
60 INJECTION, SOLUTION INTRAVENOUS ONCE
Status: CANCELLED | OUTPATIENT
Start: 2018-01-02

## 2017-12-26 RX ORDER — METHYLPREDNISOLONE SODIUM SUCCINATE 125 MG/2ML
125 INJECTION, POWDER, LYOPHILIZED, FOR SOLUTION INTRAMUSCULAR; INTRAVENOUS
Status: CANCELLED
Start: 2017-12-26

## 2017-12-26 RX ORDER — MEPERIDINE HYDROCHLORIDE 25 MG/ML
25 INJECTION INTRAMUSCULAR; INTRAVENOUS; SUBCUTANEOUS EVERY 30 MIN PRN
Status: CANCELLED | OUTPATIENT
Start: 2017-12-26

## 2017-12-26 RX ORDER — EPINEPHRINE 0.3 MG/.3ML
0.3 INJECTION SUBCUTANEOUS EVERY 5 MIN PRN
Status: CANCELLED | OUTPATIENT
Start: 2018-01-02

## 2017-12-26 RX ORDER — HEPARIN SODIUM (PORCINE) LOCK FLUSH IV SOLN 100 UNIT/ML 100 UNIT/ML
500 SOLUTION INTRAVENOUS ONCE
Status: CANCELLED
Start: 2018-01-02 | End: 2018-01-02

## 2017-12-26 RX ORDER — DEXAMETHASONE SODIUM PHOSPHATE 10 MG/ML
10 INJECTION, SOLUTION INTRAMUSCULAR; INTRAVENOUS
Status: CANCELLED | OUTPATIENT
Start: 2017-12-26

## 2017-12-26 RX ORDER — ALBUTEROL SULFATE 90 UG/1
1-2 AEROSOL, METERED RESPIRATORY (INHALATION)
Status: CANCELLED
Start: 2018-01-02

## 2017-12-26 RX ORDER — LORAZEPAM 2 MG/ML
0.5 INJECTION INTRAMUSCULAR EVERY 4 HOURS PRN
Status: CANCELLED
Start: 2017-12-26

## 2017-12-26 RX ORDER — METHYLPREDNISOLONE SODIUM SUCCINATE 125 MG/2ML
125 INJECTION, POWDER, LYOPHILIZED, FOR SOLUTION INTRAMUSCULAR; INTRAVENOUS
Status: CANCELLED
Start: 2018-01-02

## 2017-12-26 RX ORDER — PALONOSETRON 0.05 MG/ML
0.25 INJECTION, SOLUTION INTRAVENOUS ONCE
Status: CANCELLED
Start: 2018-01-02

## 2017-12-26 RX ORDER — ALBUTEROL SULFATE 0.83 MG/ML
2.5 SOLUTION RESPIRATORY (INHALATION)
Status: CANCELLED | OUTPATIENT
Start: 2017-12-26

## 2017-12-26 RX ORDER — DIPHENHYDRAMINE HYDROCHLORIDE 50 MG/ML
50 INJECTION INTRAMUSCULAR; INTRAVENOUS
Status: CANCELLED
Start: 2017-12-26

## 2017-12-26 RX ORDER — EPINEPHRINE 0.3 MG/.3ML
0.3 INJECTION SUBCUTANEOUS EVERY 5 MIN PRN
Status: CANCELLED | OUTPATIENT
Start: 2017-12-26

## 2017-12-26 RX ORDER — HEPARIN SODIUM (PORCINE) LOCK FLUSH IV SOLN 100 UNIT/ML 100 UNIT/ML
5 SOLUTION INTRAVENOUS DAILY PRN
Status: DISCONTINUED | OUTPATIENT
Start: 2017-12-26 | End: 2017-12-26 | Stop reason: HOSPADM

## 2017-12-26 RX ORDER — SODIUM CHLORIDE 9 MG/ML
1000 INJECTION, SOLUTION INTRAVENOUS CONTINUOUS PRN
Status: CANCELLED
Start: 2018-01-02

## 2017-12-26 RX ORDER — DIPHENHYDRAMINE HYDROCHLORIDE 50 MG/ML
50 INJECTION INTRAMUSCULAR; INTRAVENOUS
Status: CANCELLED
Start: 2018-01-02

## 2017-12-26 RX ORDER — MEPERIDINE HYDROCHLORIDE 25 MG/ML
25 INJECTION INTRAMUSCULAR; INTRAVENOUS; SUBCUTANEOUS EVERY 30 MIN PRN
Status: CANCELLED | OUTPATIENT
Start: 2018-01-02

## 2017-12-26 RX ORDER — EPINEPHRINE 1 MG/ML
0.3 INJECTION, SOLUTION, CONCENTRATE INTRAVENOUS EVERY 5 MIN PRN
Status: CANCELLED | OUTPATIENT
Start: 2017-12-26

## 2017-12-26 RX ORDER — LORAZEPAM 2 MG/ML
0.5 INJECTION INTRAMUSCULAR EVERY 4 HOURS PRN
Status: CANCELLED
Start: 2018-01-02

## 2017-12-26 RX ORDER — EPINEPHRINE 1 MG/ML
0.3 INJECTION, SOLUTION, CONCENTRATE INTRAVENOUS EVERY 5 MIN PRN
Status: CANCELLED | OUTPATIENT
Start: 2018-01-02

## 2017-12-26 RX ORDER — SODIUM CHLORIDE 9 MG/ML
1000 INJECTION, SOLUTION INTRAVENOUS CONTINUOUS PRN
Status: CANCELLED
Start: 2017-12-26

## 2017-12-26 RX ORDER — ALBUTEROL SULFATE 90 UG/1
1-2 AEROSOL, METERED RESPIRATORY (INHALATION)
Status: CANCELLED
Start: 2017-12-26

## 2017-12-26 RX ORDER — ALBUTEROL SULFATE 0.83 MG/ML
2.5 SOLUTION RESPIRATORY (INHALATION)
Status: CANCELLED | OUTPATIENT
Start: 2018-01-02

## 2017-12-26 RX ADMIN — PACLITAXEL 150 MG: 6 INJECTION, SOLUTION INTRAVENOUS at 10:16

## 2017-12-26 RX ADMIN — FAMOTIDINE 20 MG: 20 INJECTION, SOLUTION INTRAVENOUS at 09:48

## 2017-12-26 RX ADMIN — SODIUM CHLORIDE, PRESERVATIVE FREE 5 ML: 5 INJECTION INTRAVENOUS at 08:18

## 2017-12-26 RX ADMIN — SODIUM CHLORIDE, PRESERVATIVE FREE 500 UNITS: 5 INJECTION INTRAVENOUS at 11:18

## 2017-12-26 RX ADMIN — SODIUM CHLORIDE 250 ML: 9 INJECTION, SOLUTION INTRAVENOUS at 09:48

## 2017-12-26 ASSESSMENT — PAIN SCALES - GENERAL: PAINLEVEL: NO PAIN (0)

## 2017-12-26 NOTE — MR AVS SNAPSHOT
After Visit Summary   12/26/2017    Ashleigh Alonzo    MRN: [id number]           Patient Information     Date Of Birth          1960        Visit Information        Provider Department      12/26/2017 9:30 AM  14 ATC;  ONCOLOGY INFUSION [facility]        Today's Diagnoses     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)    -  1      Care Instructions    Contact Numbers  [facility]: [phone]    After Hours:  [phone]  Triage: [phone]    Please call the [facility] Triage line if you experience a temperature greater than or equal to 100.5, shaking chills, have uncontrolled nausea, vomiting and/or diarrhea, dizziness, shortness of breath, chest pain, bleeding, unexplained bruising, or if you have any other new/concerning symptoms, questions or concerns.     If it is after hours, weekends, or holidays, please call the main hospital  at  [phone] and ask to speak to the Oncology doctor on call.     If you are having any concerning symptoms or wish to speak to a provider before your next infusion visit, please call your care coordinator or triage to notify them so we can adequately serve you.     If you need a refill on a narcotic prescription or other medication, please call triage before your infusion appointment.           December 2017 Sunday Monday Tuesday Wednesday Thursday Friday Saturday                            1     2       3     4     5     [facility] LAB DRAW    6:30 AM   (15 min.)   [facility]   [facility] Lab Draw     [facility] RETURN    6:45 AM   (50 min.)   Lilia Livingston PA   [facility] ONC INFUSION 120    8:00 AM   (120 min.)    ONCOLOGY INFUSION   [facility] 6     7     8     9       10     11     12     [facility] LAB DRAW   11:15 AM   (15 min.)   [facility] DRAW   [facility] Lab Draw     [facility]  RETURN   11:25 AM   (50 min.)   Rosa Salazar PA-C   [facility]     [facility] ONC INFUSION 120   12:30 PM   (120 min.)    ONCOLOGY INFUSION   [facility] 13     14     15     16       17     18     [facility] DRAW    9:15 AM   (15 min.)    [facility] LAB DRAW   [facility] Lab Draw     [facility] RETURN    9:25 AM   (50 min.)   Lilia Livingston PA   [facility]     [facility] ONC INFUSION 120   10:30 AM   (120 min.)    ONCOLOGY INFUSION   [facility] 19     20     21     22     23       24     25     26     [facility] DRAW    8:00 AM   (15 min.)    [facility] LAB DRAW   [facility] Lab Draw     [facility] RETURN    8:15 AM   (50 min.)   Deanna Blanco [facility]-C   [facility]    9:30 AM   (120 min.)    ONCOLOGY INFUSION   [facility] 27     28     MR BREAST BILATERAL WWO    8:00 AM   (45 min.)   [id number]   [facility]     ECH COMPLETE   10:30 AM   (60 min.)   [id number]   [facility] Echo     [facility] LAB DRAW   11:30 AM   (15 min.)    [facility] LAB DRAW   [facility] Lab Draw 29 30 31 January 2018 Sunday Monday Tuesday Wednesday Thursday Friday Saturday        1     2     [facility] DRAW   10:45 AM   (15 min.)    [facility] LAB DRAW   [facility] Draw     [facility] RETURN   10:55 AM   (50 min.)   Caren Colindres PA-C   [facility] 120   12:30 PM   (120 min.)    ONCOLOGY INFUSION   [facility] 3     4     5     6       7     8     9     10     11     12     13       14     15     16     [facility] DRAW    8:15 AM   (15 min.)    [facility] LAB DRAW   [facility] Lab Draw     [facility] RETURN    8:30 AM   (30 min.)   Fara Bradshaw MD   [facility]     [facility] ONC INFUSION 120     9:30 AM   (120 min.)    ONCOLOGY INFUSION   [facility] 17     18     19     20       21     22     23     24     25     26     27       28     29     [facility]   10:45 AM   (15 min.)   [facility] LAB DRAW   [facility] Lab Draw     [facility] RETURN   11:05 AM   (50 min.)   Lilia Livingston PA   [facility] ONC INFUSION 120   12:00 PM   (120 min.)    ONCOLOGY INFUSION   [facility] 30     31                                 Lab Results:  Recent Results (from the past 12 hour(s))   CBC with platelets differential    Collection Time: 12/26/17  8:18 AM   Result Value Ref Range    WBC 4.8 4.0 - 11.0 10e9/L    RBC Count 3.25 (L) 3.8 - 5.2 10e12/L    Hemoglobin 11.1 (L) 11.7 - 15.7 g/dL    Hematocrit 33.7 (L) 35.0 - 47.0 %     (H) 78 - 100 fl    MCH 34.2 (H) 26.5 - 33.0 pg    MCHC 32.9 31.5 - 36.5 g/dL    RDW 15.4 (H) 10.0 - 15.0 %    Platelet Count 284 150 - 450 10e9/L    Diff Method Automated Method     % Neutrophils 35.7 %    % Lymphocytes 46.5 %    % Monocytes 12.2 %    % Eosinophils 0.8 %    % Basophils 0.4 %    % Immature Granulocytes 4.4 %    Nucleated RBCs 2 (H) 0 /100    Absolute Neutrophil 1.7 1.6 - 8.3 10e9/L    Absolute Lymphocytes 2.2 0.8 - 5.3 10e9/L    Absolute Monocytes 0.6 0.0 - 1.3 10e9/L    Absolute Eosinophils 0.0 0.0 - 0.7 10e9/L    Absolute Basophils 0.0 0.0 - 0.2 10e9/L    Abs Immature Granulocytes 0.2 0 - 0.4 10e9/L    Absolute Nucleated RBC 0.1    Comprehensive metabolic panel    Collection Time: 12/26/17  8:18 AM   Result Value Ref Range    Sodium 138 133 - 144 mmol/L    Potassium 4.1 3.4 - 5.3 mmol/L    Chloride 104 94 - 109 mmol/L    Carbon Dioxide 27 20 - 32 mmol/L    Anion Gap 7 3 - 14 mmol/L    Glucose 165 (H) 70 - 99 mg/dL    Urea Nitrogen 16 7 - 30 mg/dL    Creatinine 0.62 0.52 - 1.04 mg/dL    GFR Estimate >90 >60 mL/min/1.7m2    GFR Estimate If Black >90 >60 mL/min/1.7m2    Calcium 8.3 (L) 8.5 -  10.1 mg/dL    Bilirubin Total 0.4 0.2 - 1.3 mg/dL    Albumin 3.1 (L) 3.4 - 5.0 g/dL    Protein Total 6.5 (L) 6.8 - 8.8 g/dL    Alkaline Phosphatase 132 40 - 150 U/L    ALT 57 (H) 0 - 50 U/L    AST 44 0 - 45 U/L               Follow-ups after your visit        Your next 10 appointments already scheduled     Dec 28, 2017  8:30 AM CST   (Arrive by 8:00 AM)   MR BREAST BILATERAL W/O & W CONTRAST with [id number]   [facility]    [address] [address]   [phone]           Take your medicines as usual, unless your doctor tells you not to. Bring a list of your current medicines to your exam (including vitamins, minerals and over-the-counter drugs).  The timing of your exam may depend on the start of your last period. If you re in menopause, you may have the exam anytime.  Please bring any previous mammograms or breast MRIs from other facilities to the MRI dept. Do not mail these items to us.  You will have contrast for this exam. To prepare:   The day before your exam, drink extra fluids at least six 8-ounce glasses (unless your doctor tells you to restrict your fluids).   Have a blood test (creatinine test) within 30 days of your exam. Go to your clinic or Diagnostic Imaging Department for this test.  The MRI machine uses a strong magnet. Please wear clothes without metal (snaps, zippers). A sweatsuit works well, or we may give you a hospital gown.  Please remove any body piercings and hair extensions before you arrive. You will also remove watches, jewelry, hairpins, wallets, dentures, partial dental plates and hearing aids. You may wear contact lenses, and you may be able to wear your rings. We have a safe place to keep your personal items, but it is safer to leave them at home.   **IMPORTANT** THE INSTRUCTIONS BELOW ARE ONLY FOR THOSE PATIENTS WHO HAVE BEEN TOLD THEY WILL RECEIVE SEDATION OR GENERAL ANESTHESIA DURING THEIR MRI PROCEDURE:  IF YOU  WILL RECEIVE SEDATION (take medicine to help you relax during your exam)   You must get the medicine from your doctor before you arrive. Bring the medicine to the exam. Do not take it at home.   Arrive one hour early. Bring someone who can take you home after the test. Your medicine will make you sleepy. After the exam, you may not drive, take a bus or take a taxi by yourself.   No eating 8 hours before your exam. You may have clear liquids up until 4 hours before your exam. (Clear liquids include water, clear tea, black coffee and fruit juice without pulp.)  IF YOU WILL RECEIVE ANESTHESIA (be asleep for your exam)   Arrive 1 1/2 hours early. Bring someone who can take you home after the test. You may not drive, take a bus or take a taxi by yourself.   No eating 8 hours before your exam. You may have clear liquids up until 4 hours before your exam. (Clear liquids include water, clear tea, black coffee and fruit juice without pulp.)  If you have any questions, please contact your Imaging Department exam site.            Dec 28, 2017 10:30 AM CST   Ech Complete with [id number]   [facility] Echo ([facility])    [address] [place] [id number] [phone]           1. Please bring or wear a comfortable two-piece outfit. 2. You may eat, drink and take your normal medicines. 3. For any questions that cannot be answered, please contact the ordering physician            Dec 28, 2017 11:30 AM CST   [facility] Lab Draw with  [facility] LAB DRAW   [facility] Draw ([facility])    [address] [place] [id number]   [phone]            Jan 02, 2018 10:45 AM CST   [facility] Lab Draw with  [facility] LAB DRAW   [facility] Lab Draw ([facility])    [address] [place] [phone]   [phone]            Jan 02, 2018 11:10 AM CST   (Arrive by 10:55 AM)   Return Visit  with Caren Colindres PA-C   [facility] ([facility])    [address] [place] [phone]   [phone]            Jan 02, 2018 12:30 PM CST   Infusion 120 with UC ONCOLOGY INFUSION, UC 29 ATC   [facility] ([facility])    [address] [place] [phone]   [phone]            Jan 16, 2018  8:15 AM CST   [facility] Lab Draw with UC [facility] LAB DRAW   [facility] Lab Draw ([facility])    [address] [place] [phone]            Jan 16, 2018  8:45 AM CST   (Arrive by 8:30 AM)   Return Visit with Fara Bradshaw MD   [facility] [place] ([facility])    [address] [place] [phone]            Jan 16, 2018  9:30 AM CST   Infusion 120 with UC ONCOLOGY INFUSION   [facility] ([facility])    [address] [place] [phone]              Who to contact     If you have questions or need follow up information about today's clinic visit or your schedule please contact [facility] directly at [phone].  Normal or non-critical lab and imaging results will be communicated to you by MyChart, letter or phone within 4 business days after the clinic has received the results. If you do not hear from us within 7 days, please contact the clinic through [employer] or phone. If you have a critical or abnormal lab result, we will notify you by phone as soon as possible.  Submit refill requests through [employer] or call your pharmacy and they will forward the refill request to us. Please allow 3 business days for your refill to be completed.          Additional Information About Your Visit        MyChart Information     [facility] gives  you secure access to your electronic health record. If you see a primary care provider, you can also send messages to your care team and make appointments. If you have questions, please call your primary care clinic.  If you do not have a primary care provider, please call [phone] and they will assist you.        Care EveryWhere ID     This is your Care EveryWhere ID. This could be used by other organizations to access your [facility] medical records  [id number]         Blood Pressure from Last 3 Encounters:   12/26/17 141/78   12/18/17 150/87   12/12/17 133/78    Weight from Last 3 Encounters:   12/26/17 74.3 kg (163 lb 12.8 oz)   12/18/17 76 kg (167 lb 8 oz)   12/12/17 74.9 kg (165 lb 1.6 oz)              We Performed the Following     CBC with platelets differential     Comprehensive metabolic panel          Today's Medication Changes          These changes are accurate as of: 12/26/17  9:43 AM.  If you have any questions, ask your nurse or doctor.               These medicines have changed or have updated prescriptions.        Dose/Directions    LORazepam 0.5 MG tablet   Commonly known as:  ATIVAN   This may have changed:  Another medication with the same name was removed. Continue taking this medication, and follow the directions you see here.   Used for:  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)        Dose:  0.5 mg   Take 1 tablet (0.5 mg) by mouth every 4 hours as needed (Anxiety, Nausea/Vomiting or Sleep)   Quantity:  30 tablet   Refills:  2                Primary Care Provider Office Phone # Fax #    Radha Cazares [phone]       [address] [address]        Equal Access to Services     THERESA HUGHES : Abdifatah Granda, kaden clancy, bang weiss. So [facility] [phone].    ATENCIÓN: Kimberley gee  español, tiene a blackman disposición servicios gratuitos de asistencia lingüística. Pauline petersen [phone].    We comply with applicable federal civil rights laws and [place] laws. We do not discriminate on the basis of race, color, national origin, age, disability, sex, sexual orientation, or gender identity.            Thank you!     Thank you for choosing [facility]  for your care. Our goal is always to provide you with excellent care. Hearing back from our patients is one way we can continue to improve our services. Please take a few minutes to complete the written survey that you may receive in the mail after your visit with us. Thank you!             Your Updated Medication List - Protect others around you: Learn how to safely use, store and throw away your medicines at www.disposemymeds.org.          This list is accurate as of: 12/26/17  9:43 AM.  Always use your most recent med list.                   Brand Name Dispense Instructions for use Diagnosis    albuterol 108 (90 BASE) MCG/ACT Inhaler    PROAIR HFA/PROVENTIL HFA/VENTOLIN HFA    1 Inhaler    Inhale 2 puffs into the lungs every 6 hours as needed for shortness of breath / dyspnea    Chronic obstructive pulmonary disease, unspecified COPD type (H)       aspirin 81 MG tablet      Take 1 tablet by mouth daily.        calcium carbonate 600 MG tablet   Generic drug:  calcium carbonate     60 tablet    Take by mouth 2 times daily (with meals)        citalopram 10 MG tablet    celeXA    90 tablet    Take 1 tablet (10 mg) by mouth daily    Anxiety, Depression, unspecified depression type       CO Q 10 PO      Take 1 tablet by mouth daily        darifenacin 7.5 MG 24 hr tablet    ENABLEX    90 tablet    Take 1 tablet (7.5 mg) by mouth daily    Overactive bladder       guaiFENesin 600 MG 12 hr tablet    MUCINEX    180 tablet    Take 2 tablets (1,200 mg) by mouth 2 times daily    Cough with sputum       hydrOXYzine 25 MG tablet    ATARAX    100  tablet    Take 1-2 tablets (25-50 mg) by mouth every 6 hours as needed for itching    Hives       lidocaine-prilocaine cream    EMLA    30 g    Please apply to port site 30 minutes before use prn    Personal history of malignant neoplasm of breast       LORazepam 0.5 MG tablet    ATIVAN    30 tablet    Take 1 tablet (0.5 mg) by mouth every 4 hours as needed (Anxiety, Nausea/Vomiting or Sleep)    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)       MULTIPLE VITAMIN PO      Take 1 tablet by mouth daily        * order for DME      RespirLikeedss Dream Station Auto CPAP 12-18 cm, F&P Simplus FFM small.    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)       pantoprazole 40 MG EC tablet    PROTONIX    30 tablet    Take 1 tablet (40 mg) by mouth every morning    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)       predniSONE 10 MG tablet    DELTASONE    150 tablet    Take 60mg daily for 1 week, then continue to decrease by 10mg each week    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        vitamin D 1000 UNITS capsule      TAKE 2 CAPSULES BY MOUTH DAILY        * Notice:  This list has 2 medication(s) that are the same as other medications prescribed for you. Read the directions carefully, and ask your doctor or other care provider to review them with you.

## 2017-12-26 NOTE — MR AVS SNAPSHOT
After Visit Summary   12/26/2017    Ashleigh Alonzo    MRN: [id number]           Patient Information     Date Of Birth          1960        Visit Information        Provider Department      12/26/2017 8:30 AM Deanna Blanco PA-C [facility]        Today's Diagnoses     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)    -  1       Follow-ups after your visit        Your next 10 appointments already scheduled     Dec 26, 2017  9:30 AM CST   Infusion 120 with UC ONCOLOGY INFUSION, UC 14 ATC   [facility] ([facility])    [address]  2nd Floor  [facility] [id number] [phone]            Dec 28, 2017  8:30 AM CST   (Arrive by 8:00 AM)   MR BREAST BILATERAL W/O & W CONTRAST with [id number]   [facility] ([facility])    [address] [address]   [phone]           Take your medicines as usual, unless your doctor tells you not to. Bring a list of your current medicines to your exam (including vitamins, minerals and over-the-counter drugs).  The timing of your exam may depend on the start of your last period. If you re in menopause, you may have the exam anytime.  Please bring any previous mammograms or breast MRIs from other facilities to the MRI dept. Do not mail these items to us.  You will have contrast for this exam. To prepare:   The day before your exam, drink extra fluids at least six 8-ounce glasses (unless your doctor tells you to restrict your fluids).   Have a blood test (creatinine test) within 30 days of your exam. Go to your clinic or Diagnostic Imaging Department for this test.  The MRI machine uses a strong magnet. Please wear clothes without metal (snaps, zippers). A sweatsuit works well, or we may give you a hospital gown.  Please remove any body piercings and hair extensions before you  arrive. You will also remove watches, jewelry, hairpins, wallets, dentures, partial dental plates and hearing aids. You may wear contact lenses, and you may be able to wear your rings. We have a safe place to keep your personal items, but it is safer to leave them at home.   **IMPORTANT** THE INSTRUCTIONS BELOW ARE ONLY FOR THOSE PATIENTS WHO HAVE BEEN TOLD THEY WILL RECEIVE SEDATION OR GENERAL ANESTHESIA DURING THEIR MRI PROCEDURE:  IF YOU WILL RECEIVE SEDATION (take medicine to help you relax during your exam)   You must get the medicine from your doctor before you arrive. Bring the medicine to the exam. Do not take it at home.   Arrive one hour early. Bring someone who can take you home after the test. Your medicine will make you sleepy. After the exam, you may not drive, take a bus or take a taxi by yourself.   No eating 8 hours before your exam. You may have clear liquids up until 4 hours before your exam. (Clear liquids include water, clear tea, black coffee and fruit juice without pulp.)  IF YOU WILL RECEIVE ANESTHESIA (be asleep for your exam)   Arrive 1 1/2 hours early. Bring someone who can take you home after the test. You may not drive, take a bus or take a taxi by yourself.   No eating 8 hours before your exam. You may have clear liquids up until 4 hours before your exam. (Clear liquids include water, clear tea, black coffee and fruit juice without pulp.)  If you have any questions, please contact your Imaging Department exam site.            Dec 28, 2017 10:30 AM CST   Ech Complete with [address] Echo ([facility])    [address] [address] [phone]           1. Please bring or wear a comfortable two-piece outfit. 2. You may eat, drink and take your normal medicines. 3. For any questions that cannot be answered, please contact the ordering physician            Dec 28, 2017 11:30 AM CST   [facility] Lab Draw with  [employer] LAB  DRAW   [facility] ([facility])    [address] [place] [address]   [phone]            Jan 02, 2018 10:45 AM CST   Masonic Lab Draw with UC MASONIC LAB DRAW   [facility] Mason[facility] Lab Draw ([facility])    [address] [place] [address]   [phone]            Jan 02, 2018 11:10 AM CST   (Arrive by 10:55 AM)   Return Visit with Caren Colindres PA-C   [facility] ([facility])    [address] [place] [address]   [phone]            Jan 02, 2018 12:30 PM CST   Infusion 120 with UC ONCOLOGY INFUSION, UC 29 ATC   [facility] ([facility])    [address] [place] [address]   [phone]            Jan 16, 2018  8:15 AM CST   Masonic Lab Draw with UC MASONIC LAB DRAW   [facility] Masonic Lab Draw ([facility])    [address] [place] [address]   [phone]              Who to contact     If you have questions or need follow up information about today's clinic visit or your schedule please contact [facility] directly at [phone].  Normal or non-critical lab and imaging results will be communicated to you by MyChart, letter or phone within 4 business days after the clinic has received the results. If you do not hear from us within 7 days, please contact the clinic through [employer] or phone. If you have a critical or abnormal lab result, we will notify you by phone as soon as possible.  Submit refill requests through [employer] or call your pharmacy and they will forward the refill request to us. Please allow 3 business days for your refill to be completed.          Additional Information About Your Visit        MyChart Information     [employer] gives you secure access to your electronic  "health record. If you see a primary care provider, you can also send messages to your care team and make appointments. If you have questions, please call your primary care clinic.  If you do not have a primary care provider, please call [phone] and they will assist you.        Care EveryWhere ID     This is your Care EveryWhere ID. This could be used by other organizations to access your [place] medical records  [id number]        Your Vitals Were     Pulse Temperature Respirations Height Pulse Oximetry BMI (Body Mass Index)    109 98.8  F (37.1  C) 18 1.626 m (5' 4\") 99% 28.12 kg/m2       Blood Pressure from Last 3 Encounters:   12/26/17 141/78   12/18/17 150/87   12/12/17 133/78    Weight from Last 3 Encounters:   12/26/17 74.3 kg (163 lb 12.8 oz)   12/18/17 76 kg (167 lb 8 oz)   12/12/17 74.9 kg (165 lb 1.6 oz)              Today, you had the following     No orders found for display         Today's Medication Changes          These changes are accurate as of: 12/26/17  9:05 AM.  If you have any questions, ask your nurse or doctor.               These medicines have changed or have updated prescriptions.        Dose/Directions    LORazepam 0.5 MG tablet   Commonly known as:  ATIVAN   This may have changed:  Another medication with the same name was removed. Continue taking this medication, and follow the directions you see here.   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 So [facility] [phone].    ATENCIÓN: Si georgila ross, tiene a blackman disposición servicios gratuitos de asistencia lingüística. Pauline petersen [phone].    We comply with applicable federal civil rights laws and [place] laws. We do not discriminate on the basis of race, color, national origin, age, disability, sex, sexual orientation, or gender identity.            Thank you!     Thank you for choosing [facility]  for your care. Our goal is always to provide you with excellent care. Hearing back from our patients is one way we can continue to improve our services. Please take a few minutes to complete the written survey that you may receive in the mail after your visit with us. Thank you!             Your Updated Medication List - Protect others around you: Learn how to safely use, store and throw away your medicines at www.disposemymeds.org.          This list is accurate as of: 12/26/17  9:05 AM.  Always use your most recent med list.                   Brand Name Dispense Instructions for use Diagnosis    albuterol 108 (90 BASE) MCG/ACT Inhaler    PROAIR HFA/PROVENTIL HFA/VENTOLIN HFA    1 Inhaler    Inhale 2 puffs into the lungs every 6 hours as needed for shortness of breath / dyspnea    Chronic obstructive pulmonary disease, unspecified COPD type (H)       aspirin 81 MG tablet      Take 1 tablet by mouth daily.        calcium carbonate 600 MG tablet   Generic drug:  calcium carbonate     60 tablet    Take by mouth 2 times daily (with meals)        citalopram 10 MG tablet    celeXA    90 tablet    Take 1 tablet (10 mg) by mouth daily    Anxiety, Depression, unspecified depression type       CO Q 10 PO      Take 1 tablet by mouth daily        darifenacin 7.5 MG 24 hr tablet    ENABLEX    90 tablet    Take 1 tablet (7.5 mg) by mouth daily    Overactive bladder       guaiFENesin 600 MG 12 hr tablet    MUCINEX    180 tablet    Take 2 tablets (1,200 mg) by mouth 2  times daily    Cough with sputum       hydrOXYzine 25 MG tablet    ATARAX    100 tablet    Take 1-2 tablets (25-50 mg) by mouth every 6 hours as needed for itching    Hives       lidocaine-prilocaine cream    EMLA    30 g    Please apply to port site 30 minutes before use prn    Personal history of malignant neoplasm of breast       LORazepam 0.5 MG tablet    ATIVAN    30 tablet    Take 1 tablet (0.5 mg) by mouth every 4 hours as needed (Anxiety, Nausea/Vomiting or Sleep)    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)       pantoprazole 40 MG EC tablet    PROTONIX    30 tablet    Take 1 tablet (40 mg) by mouth every morning    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)       predniSONE 10 MG tablet    DELTASONE    150 tablet    Take 60mg daily for 1 week, then continue to decrease by 10mg each week    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        vitamin D 1000 UNITS capsule      TAKE 2 CAPSULES BY MOUTH DAILY        * Notice:  This list has 2 medication(s) that are the same as other medications prescribed for you. Read the directions carefully, and ask your doctor or other care provider  to review them with you.

## 2017-12-26 NOTE — NURSING NOTE
ISPY Trial Research note for Cycle12 Day1. Patient reports coughing up a small amount of blood and phlegm one time 12/25/17.No other symptoms associated with this episode.No further report of fever since 12/18/17 and ending on 12/19/17. Rhinitis resolved by 12/21/17 per pt.  Labs good for treatment to continue today.  ECOG activity status is 0. Steroid taper will be 30 mg starting 12/27/17 (from 4mg). All patient's questions answered related to treatment plan and research. Schewduled for MRI/Echo and Research labs on 12/28/17.  Luzmaria Geiger RN/ Research [phone]

## 2017-12-26 NOTE — NURSING NOTE
"Oncology Rooming Note    December 26, 2017 8:31 AM   Ashleigh Alonzo is a 57 year old female who presents for:    Chief Complaint   Patient presents with     Port Draw     Oncology Clinic Visit     Follow up-Breast CA     Initial Vitals: /78  Pulse 109  Temp 98.8  F (37.1  C)  Resp 18  Ht 1.626 m (5' 4\")  Wt 74.3 kg (163 lb 12.8 oz)  SpO2 99%  BMI 28.12 kg/m2 Estimated body mass index is 28.12 kg/(m^2) as calculated from the following:    Height as of this encounter: 1.626 m (5' 4\").    Weight as of this encounter: 74.3 kg (163 lb 12.8 oz). Body surface area is 1.83 meters squared.  No Pain (0) Comment: Data Unavailable   No LMP recorded. Patient is postmenopausal.  Allergies reviewed: Yes  Medications reviewed: Yes    Medications: Medication refills not needed today.  Pharmacy name entered into [facility]:    [facility] [place], [place] - [address] [facility] - [place], [place] - [address] [address]    Clinical concerns: No Concerns Deanna Blanco was notified.    10 minutes for nursing intake (face to face time)     Amanda Peters LPN              "

## 2017-12-26 NOTE — NURSING NOTE
Port accessed by RN, pt tolerated well. Labs collected and sent, line flushed with NS and heparin. Vitals taken and pt checked in for next appt.   Haley Duran

## 2017-12-26 NOTE — PROGRESS NOTES
"Oncology/Hematology Visit Note  Dec 26, 2017    Reason for Visit: Follow up of breast cancer     History of Present Illness: Ashleigh Alonzo is a 57 year old female with past medical history of COPD, sleep apnea, periodontal disease with stage IIa, T2N0M0, grade 3, triple negative right breast cancer. She was diagnosed via abnormal screening mammogram. She ultimately had US, contrast-enhanced mammo, and biopsy showing 3.4 cm mass and pathology showed grade 3 invasive mammary carcinoma with associated high-grade DCIS. ER/CA was negative and HER2 negative. She enrolled in ISPY-2 clinical trial and began treatment with weekly taxol and once every 3 week pembrolizumab on 9/2/17. Please see notes from Dr. Bradshaw for further details of patient's oncology history.      Course has been complicated by fevers with PNA and then UTI. Week 7 was held due to transaminitis. She was given week 7 on 11/7 with pembrolizumab and Taxol. She was admitted 11/11-11/17 with high fever (105), cough, and dyspnea and was found to have HCAP and pneumonitis secondary to pembro. 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 She received 2 doses of methylpred, antibiotics, and d/c on prednisone 70 mg daily. Had follow-up on 11/21 with Dr. Bradshaw and resumed weekly Taxol on 11/28/17. She returns today for consideration of week 12.     Interval History:  Ms. Alonzo returns to clinic today with her .   Doing well. Only new concern is that the other day she coughed up a \"glob\" that had blood in it. She describes it mostly as blood with mucus, not mucus streaked with blood. She felt completely fine otherwise. She hasn't had a cough recently and outside of this one episode, has not been coughing. No SOB. No bleeding at any other site. No further episodes. Otherwise, remains without fevers, chills, cough, chest pain, nausea, vomiting, mucositis, bowel changes, swelling, rashes, or neuropathy.      Current " Outpatient Prescriptions   Medication Sig Dispense Refill     calcium carbonate (CALCIUM CARBONATE) 600 MG tablet Take by mouth 2 times daily (with meals) 60 tablet      predniSONE (DELTASONE) 10 MG tablet Take 60mg daily for 1 week, then continue to decrease by 10mg each week 150 tablet 0     pantoprazole (PROTONIX) 40 MG EC tablet Take 1 tablet (40 mg) by mouth every morning 30 tablet 0     lidocaine-prilocaine (EMLA) cream Please apply to port site 30 minutes before use prn 30 g 1     order for DME Cranial prosthesis 1 Device 1     LORazepam (ATIVAN) 0.5 MG tablet Take 1 tablet (0.5 mg) by mouth every 4 hours as needed (Anxiety, Nausea/Vomiting or Sleep) (Patient not taking: Reported on 12/18/2017) 30 tablet 2     LORazepam (ATIVAN) 0.5 MG tablet Take 1-2 tabs 20 minutes prior to MRI scans. 10 tablet 0     hydrOXYzine (ATARAX) 25 MG tablet Take 1-2 tablets (25-50 mg) by mouth every 6 hours as needed for itching 100 tablet 2     guaiFENesin (MUCINEX) 600 MG 12 hr tablet Take 2 tablets (1,200 mg) by mouth 2 times daily 180 tablet 6     tiotropium (SPIRIVA) 18 MCG capsule Inhale 1 capsule (18 mcg) into the lungs daily Inhale contents of one capsule 1 capsule 11     albuterol (PROAIR HFA/PROVENTIL HFA/VENTOLIN HFA) 108 (90 BASE) MCG/ACT Inhaler Inhale 2 puffs into the lungs every 6 hours as needed for shortness of breath / dyspnea 1 Inhaler 5     citalopram (CELEXA) 10 MG tablet Take 1 tablet (10 mg) by mouth daily 90 tablet 3     darifenacin (ENABLEX) 7.5 MG 24 hr tablet Take 1 tablet (7.5 mg) by mouth daily (Patient not taking: Reported on 12/18/2017) 90 tablet 3     order for [facility] RespirXconomy Dream Station Auto CPAP 12-18 cm, F&P Simplus FFM small.       Coenzyme Q10 (CO Q 10 PO) Take 1 tablet by mouth daily        MULTIPLE VITAMIN PO Take 1 tablet by mouth daily        Cyanocobalamin (VITAMIN B 12 PO) Take 100 mcg by mouth daily        Pyridoxine HCl (VITAMIN B6 PO) Take 1 tablet by mouth daily.       aspirin 81  "MG tablet Take 1 tablet by mouth daily.  3     VITAMIN D 1000 UNIT OR CAPS TAKE 2 CAPSULES BY MOUTH DAILY         Physical Examination:  /78  Pulse 109  Temp 98.8  F (37.1  C)  Resp 18  Ht 1.626 m (5' 4\")  Wt 74.3 kg (163 lb 12.8 oz)  SpO2 99%  BMI 28.12 kg/m2  Wt Readings from Last 10 Encounters:   12/26/17 74.3 kg (163 lb 12.8 oz)   12/18/17 76 kg (167 lb 8 oz)   12/12/17 74.9 kg (165 lb 1.6 oz)   12/05/17 72.5 kg (159 lb 14.4 oz)   11/28/17 71.5 kg (157 lb 9.6 oz)   11/21/17 71.6 kg (157 lb 12.8 oz)   11/15/17 71.1 kg (156 lb 12.8 oz)   11/07/17 70.9 kg (156 lb 4.8 oz)   10/31/17 71.3 kg (157 lb 3.2 oz)   10/26/17 73.5 kg (162 lb)     Constitutional: Well-appearing female in no acute distress.  Eyes: EOMI, PERRL. No scleral icterus.  ENT: Oral mucosa is moist without lesions or thrush.   Lymphatic: Neck is supple without cervical or supraclavicular lymphadenopathy. No axillary lymphadenopathy.  Cardiovascular: Regular rate and rhythm. No murmurs, gallops, or rubs. Trace bilateral peripheral edema.  Respiratory: Clear to auscultation bilaterally. No wheezes or crackles.  Gastrointestinal: Bowel sounds present. Abdomen soft, non-tender. No palpable hepatosplenomegaly or masses.   Neurologic: Cranial nerves II through XII are grossly intact.  Skin: No rashes, petechiae, or bruising noted on exposed skin.    Laboratory Data:    Results for YUNI CALIXTO (MRN [id number]) as of 12/26/2017 08:30   Ref. Range 12/18/2017 09:11 12/26/2017 08:18   WBC Latest Ref Range: 4.0 - 11.0 10e9/L 5.5 4.8   Hemoglobin Latest Ref Range: 11.7 - 15.7 g/dL 10.5 (L) 11.1 (L)   Hematocrit Latest Ref Range: 35.0 - 47.0 % 32.1 (L) 33.7 (L)   Platelet Count Latest Ref Range: 150 - 450 10e9/L 191 284     Assessment and Plan:  1. Stage IIa right breast cancer, currently on treatment with weekly Taxol on I-SPY  Was initially started on weekly Taxol/pembolizumab but course was complicated by fevers, pneumonia, and then pneumonitis " and hepatitis requiring hospitalization. Pembrolizumab was discontinued altogether and she is now just on weekly Taxol and is doing very well. Continue with week 12 today. She will have a breast MRI and echo prior to starting AC on 1/2.    Our overall plan is to complete a total of 12 weeks of Taxol followed by 4 cycles of Adriamycin and Cytoxan. Following completion of chemotherapy, she will proceed with surgery.  Whether or not she will benefit from adjuvant radiation is unknown at this time and depends on the type of breast surgery, surgical margins, and whether or not there is lymph node involvement at the time of surgery.    2. Pneumonitis, resolved  No pulmonary concerns and lung exam clear. Continue prednisone taper,currently on 40 mg daily, to decrease by 10mg weekly. Continue Protonix for any steroid associated GERD.    3. Transaminitis, improving  Suspected to be 2/2 Taxol. Trended down today. Continue treatment.      4. FEN  Weight increased on chemotherapy, suspected to be secondary to steroids/portion control. Improved today.    5. Isolated episode of blood-stained mucus with coughing: No further episodes, no cough or SOB. No other sites of bleeding. Plt normal. Asked that she let us know if this recurs.    Deanna Blanco PA-C

## 2017-12-26 NOTE — LETTER
"12/26/2017      RE: Ashleigh Alonzo  [address] [facility] [place] [address]       Oncology/Hematology Visit Note  Dec 26, 2017    Reason for Visit: Follow up of breast cancer     History of Present Illness: Ashleigh Alonzo is a 57 year old female with past medical history of COPD, sleep apnea, periodontal disease with stage IIa, T2N0M0, grade 3, triple negative right breast cancer. She was diagnosed via abnormal screening mammogram. She ultimately had US, contrast-enhanced mammo, and biopsy showing 3.4 cm mass and pathology showed grade 3 invasive mammary carcinoma with associated high-grade DCIS. ER/NM was negative and HER2 negative. She enrolled in ISPY-2 clinical trial and began treatment with weekly taxol and once every 3 week pembrolizumab on 9/2/17. Please see notes from Dr. Bradshaw for further details of patient's oncology history.      Course has been complicated by fevers with PNA and then UTI. Week 7 was held due to transaminitis. She was given week 7 on 11/7 with pembrolizumab and Taxol. She was admitted 11/11-11/17 with high fever (105), cough, and dyspnea and was found to have HCAP and pneumonitis secondary to pembro. 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 She received 2 doses of methylpred, antibiotics, and d/c on prednisone 70 mg daily. Had follow-up on 11/21 with Dr. Bradshaw and resumed weekly Taxol on 11/28/17. She returns today for consideration of week 12.     Interval History:  Ms. Alonzo returns to clinic today with her .   Doing well. Only new concern is that the other day she coughed up a \"glob\" that had blood in it. She describes it mostly as blood with mucus, not mucus streaked with blood. She felt completely fine otherwise. She hasn't had a cough recently and outside of this one episode, has not been coughing. No SOB. No bleeding at any other site. No further episodes. Otherwise, remains without fevers, chills, cough, chest pain, nausea, " vomiting, mucositis, bowel changes, swelling, rashes, or neuropathy.      Current Outpatient Prescriptions   Medication Sig Dispense Refill     calcium carbonate (CALCIUM CARBONATE) 600 MG tablet Take by mouth 2 times daily (with meals) 60 tablet      predniSONE (DELTASONE) 10 MG tablet Take 60mg daily for 1 week, then continue to decrease by 10mg each week 150 tablet 0     pantoprazole (PROTONIX) 40 MG EC tablet Take 1 tablet (40 mg) by mouth every morning 30 tablet 0     lidocaine-prilocaine (EMLA) cream Please apply to port site 30 minutes before use prn 30 g 1     order for DME Cranial prosthesis 1 Device 1     LORazepam (ATIVAN) 0.5 MG tablet Take 1 tablet (0.5 mg) by mouth every 4 hours as needed (Anxiety, Nausea/Vomiting or Sleep) (Patient not taking: Reported on 12/18/2017) 30 tablet 2     LORazepam (ATIVAN) 0.5 MG tablet Take 1-2 tabs 20 minutes prior to MRI scans. 10 tablet 0     hydrOXYzine (ATARAX) 25 MG tablet Take 1-2 tablets (25-50 mg) by mouth every 6 hours as needed for itching 100 tablet 2     guaiFENesin (MUCINEX) 600 MG 12 hr tablet Take 2 tablets (1,200 mg) by mouth 2 times daily 180 tablet 6     tiotropium (SPIRIVA) 18 MCG capsule Inhale 1 capsule (18 mcg) into the lungs daily Inhale contents of one capsule 1 capsule 11     albuterol (PROAIR HFA/PROVENTIL HFA/VENTOLIN HFA) 108 (90 BASE) MCG/ACT Inhaler Inhale 2 puffs into the lungs every 6 hours as needed for shortness of breath / dyspnea 1 Inhaler 5     citalopram (CELEXA) 10 MG tablet Take 1 tablet (10 mg) by mouth daily 90 tablet 3     darifenacin (ENABLEX) 7.5 MG 24 hr tablet Take 1 tablet (7.5 mg) by mouth daily (Patient not taking: Reported on 12/18/2017) 90 tablet 3     order for [facility] [employer] Auto CPAP 12-18 cm, F&P Simplus FFM small.       Coenzyme Q10 (CO Q 10 PO) Take 1 tablet by mouth daily        MULTIPLE VITAMIN PO Take 1 tablet by mouth daily        Cyanocobalamin (VITAMIN B 12 PO) Take 100 mcg by mouth daily     "    Pyridoxine HCl (VITAMIN B6 PO) Take 1 tablet by mouth daily.       aspirin 81 MG tablet Take 1 tablet by mouth daily.  3     VITAMIN D 1000 UNIT OR CAPS TAKE 2 CAPSULES BY MOUTH DAILY         Physical Examination:  /78  Pulse 109  Temp 98.8  F (37.1  C)  Resp 18  Ht 1.626 m (5' 4\")  Wt 74.3 kg (163 lb 12.8 oz)  SpO2 99%  BMI 28.12 kg/m2  Wt Readings from Last 10 Encounters:   12/26/17 74.3 kg (163 lb 12.8 oz)   12/18/17 76 kg (167 lb 8 oz)   12/12/17 74.9 kg (165 lb 1.6 oz)   12/05/17 72.5 kg (159 lb 14.4 oz)   11/28/17 71.5 kg (157 lb 9.6 oz)   11/21/17 71.6 kg (157 lb 12.8 oz)   11/15/17 71.1 kg (156 lb 12.8 oz)   11/07/17 70.9 kg (156 lb 4.8 oz)   10/31/17 71.3 kg (157 lb 3.2 oz)   10/26/17 73.5 kg (162 lb)     Constitutional: Well-appearing female in no acute distress.  Eyes: EOMI, PERRL. No scleral icterus.  ENT: Oral mucosa is moist without lesions or thrush.   Lymphatic: Neck is supple without cervical or supraclavicular lymphadenopathy. No axillary lymphadenopathy.  Cardiovascular: Regular rate and rhythm. No murmurs, gallops, or rubs. Trace bilateral peripheral edema.  Respiratory: Clear to auscultation bilaterally. No wheezes or crackles.  Gastrointestinal: Bowel sounds present. Abdomen soft, non-tender. No palpable hepatosplenomegaly or masses.   Neurologic: Cranial nerves II through XII are grossly intact.  Skin: No rashes, petechiae, or bruising noted on exposed skin.    Laboratory Data:    Results for YUNI CALIXTO (MRN [id number]) as of 12/26/2017 08:30   Ref. Range 12/18/2017 09:11 12/26/2017 08:18   WBC Latest Ref Range: 4.0 - 11.0 10e9/L 5.5 4.8   Hemoglobin Latest Ref Range: 11.7 - 15.7 g/dL 10.5 (L) 11.1 (L)   Hematocrit Latest Ref Range: 35.0 - 47.0 % 32.1 (L) 33.7 (L)   Platelet Count Latest Ref Range: 150 - 450 10e9/L 191 284     Assessment and Plan:  1. Stage IIa right breast cancer, currently on treatment with weekly Taxol on I-SPY  Was initially started on weekly " Taxol/pembolizumab but course was complicated by fevers, pneumonia, and then pneumonitis and hepatitis requiring hospitalization. Pembrolizumab was discontinued altogether and she is now just on weekly Taxol and is doing very well. Continue with week 12 today. She will have a breast MRI and echo prior to starting AC on 1/2.    Our overall plan is to complete a total of 12 weeks of Taxol followed by 4 cycles of Adriamycin and Cytoxan. Following completion of chemotherapy, she will proceed with surgery.  Whether or not she will benefit from adjuvant radiation is unknown at this time and depends on the type of breast surgery, surgical margins, and whether or not there is lymph node involvement at the time of surgery.    2. Pneumonitis, resolved  No pulmonary concerns and lung exam clear. Continue prednisone taper,currently on 40 mg daily, to decrease by 10mg weekly. Continue Protonix for any steroid associated GERD.    3. Transaminitis, improving  Suspected to be 2/2 Taxol. Trended down today. Continue treatment.      4. FEN  Weight increased on chemotherapy, suspected to be secondary to steroids/portion control. Improved today.    5. Isolated episode of blood-stained mucus with coughing: No further episodes, no cough or SOB. No other sites of bleeding. Plt normal. Asked that she let us know if this recurs.    AVIS Leung PA-C

## 2017-12-26 NOTE — PATIENT INSTRUCTIONS
Contact Numbers  [facility]: [phone]    After Hours:  [phone]  Triage: [phone]    Please call the [facility] Triage line if you experience a temperature greater than or equal to 100.5, shaking chills, have uncontrolled nausea, vomiting and/or diarrhea, dizziness, shortness of breath, chest pain, bleeding, unexplained bruising, or if you have any other new/concerning symptoms, questions or concerns.     If it is after hours, weekends, or holidays, please call the main hospital  at  [phone] and ask to speak to the Oncology doctor on call.     If you are having any concerning symptoms or wish to speak to a provider before your next infusion visit, please call your care coordinator or triage to notify them so we can adequately serve you.     If you need a refill on a narcotic prescription or other medication, please call triage before your infusion appointment.           December 2017 Sunday Monday Tuesday Wednesday Thursday Friday Saturday                            1     2       3     4     5     [facility]    6:30 AM   (15 min.)    [facility] DRAW   [facility] Lab Draw     [facility] RETURN    6:45 AM   (50 min.)   Lilia Livingston PA [facility] ONC INFUSION 120    8:00 AM   (120 min.)    ONCOLOGY INFUSION   [facility] 6     7     8     9       10     11     12     [facility]   11:15 AM   (15 min.)    [facility] DRAW   [facility] Lab Draw     [facility] RETURN   11:25 AM   (50 min.)   Rosa Salazar PA-C   [facility] ONC INFUSION 120   12:30 PM   (120 min.)    ONCOLOGY INFUSION   [facility] 13     14     15     16       17     18     [facility] DRAW    9:15 AM   (15 min.)    [facility] DRAW   [facility] Lab Draw     [facility] RETURN    9:25 AM   (50 min.)   Lilia Livingston PA M [facility] ONC INFUSION  120   10:30 AM   (120 min.)    ONCOLOGY INFUSION   [facility] 19     20     21     22     23       24     25     26     [facility] DRAW    8:00 AM   (15 min.)    [facility] DRAW   [facility] Draw     [facility] RETURN    8:15 AM   (50 min.)   Deanna Blanco PA-C   [facility]     [facility] ONC INFUSION 120    9:30 AM   (120 min.)    ONCOLOGY INFUSION   [facility] 27     28     MR BREAST BILATERAL WWO    8:00 AM   (45 min.)   [id number]   [facility]     ECH COMPLETE   10:30 AM   (60 min.)   [id number]   [facility] Echo     [facility] DRAW   11:30 AM   (15 min.)    [facility] DRAW   [facility] Draw 29 30 31 January 2018 Sunday Monday Tuesday Wednesday Thursday Friday Saturday        1     2     [facility]   10:45 AM   (15 min.)    [facility] DRAW   [facility] Draw     [facility] RETURN   10:55 AM   (50 min.)   Caren Colindres PA-C   [facility]     [facility] ONC INFUSION 120   12:30 PM   (120 min.)    ONCOLOGY INFUSION   [facility] 3     4     5     6       7     8     9     10     11     12     13       14     15     16     [facility] D[facility]    8:15 AM   (15 min.)    [facility] DRAW   [facility] Draw     [facility] RETURN    8:30 AM   (30 min.)   F[facility] Bradshaw MD   [facility] 120    9:30 AM   (120 min.)    ONCOLOGY INFUSION   [facility] 17     18     19     20       21     22     23     24     25     26     27       28     29     [facility] DRAW   10:45 AM   (15 min.)    [facility] DRAW   [facility] Draw     [facility] RETURN   11:05 AM   (50 min.)   Lilia Livingston PA   [facility]     [facility] ONC INFUSION 120   12:00 PM   (120 min.)    ONCOLOGY INFUSION   [facility]  [facility] 30     31                                 Lab Results:  Recent Results (from the past 12 hour(s))   CBC with platelets differential    Collection Time: 12/26/17  8:18 AM   Result Value Ref Range    WBC 4.8 4.0 - 11.0 10e9/L    RBC Count 3.25 (L) 3.8 - 5.2 10e12/L    Hemoglobin 11.1 (L) 11.7 - 15.7 g/dL    Hematocrit 33.7 (L) 35.0 - 47.0 %     (H) 78 - 100 fl    MCH 34.2 (H) 26.5 - 33.0 pg    MCHC 32.9 31.5 - 36.5 g/dL    RDW 15.4 (H) 10.0 - 15.0 %    Platelet Count 284 150 - 450 10e9/L    Diff Method Automated Method     % Neutrophils 35.7 %    % Lymphocytes 46.5 %    % Monocytes 12.2 %    % Eosinophils 0.8 %    % Basophils 0.4 %    % Immature Granulocytes 4.4 %    Nucleated RBCs 2 (H) 0 /100    Absolute Neutrophil 1.7 1.6 - 8.3 10e9/L    Absolute Lymphocytes 2.2 0.8 - 5.3 10e9/L    Absolute Monocytes 0.6 0.0 - 1.3 10e9/L    Absolute Eosinophils 0.0 0.0 - 0.7 10e9/L    Absolute Basophils 0.0 0.0 - 0.2 10e9/L    Abs Immature Granulocytes 0.2 0 - 0.4 10e9/L    Absolute Nucleated RBC 0.1    Comprehensive metabolic panel    Collection Time: 12/26/17  8:18 AM   Result Value Ref Range    Sodium 138 133 - 144 mmol/L    Potassium 4.1 3.4 - 5.3 mmol/L    Chloride 104 94 - 109 mmol/L    Carbon Dioxide 27 20 - 32 mmol/L    Anion Gap 7 3 - 14 mmol/L    Glucose 165 (H) 70 - 99 mg/dL    Urea Nitrogen 16 7 - 30 mg/dL    Creatinine 0.62 0.52 - 1.04 mg/dL    GFR Estimate >90 >60 mL/min/1.7m2    GFR Estimate If Black >90 >60 mL/min/1.7m2    Calcium 8.3 (L) 8.5 - 10.1 mg/dL    Bilirubin Total 0.4 0.2 - 1.3 mg/dL    Albumin 3.1 (L) 3.4 - 5.0 g/dL    Protein Total 6.5 (L) 6.8 - 8.8 g/dL    Alkaline Phosphatase 132 40 - 150 U/L    ALT 57 (H) 0 - 50 U/L    AST 44 0 - 45 U/L

## 2017-12-26 NOTE — PROGRESS NOTES
Infusion Nursing Note:  Ashleigh Alonzo presents today for C12D1 Taxol.    Patient seen by provider today: Yes: Deanna MORGAN   present during visit today: Not Applicable.    Note: Patient feels well today. No complaints made.    Intravenous Access:  Implanted Port.    Treatment Conditions:  Lab Results   Component Value Date    HGB 11.1 12/26/2017     Lab Results   Component Value Date    WBC 4.8 12/26/2017      Lab Results   Component Value Date    ANEU 1.7 12/26/2017     Lab Results   Component Value Date     12/26/2017      Lab Results   Component Value Date     12/26/2017                   Lab Results   Component Value Date    POTASSIUM 4.1 12/26/2017           Lab Results   Component Value Date    MAG 2.1 09/05/2017            Lab Results   Component Value Date    CR 0.62 12/26/2017                   Lab Results   Component Value Date    JAVIER 8.3 12/26/2017                Lab Results   Component Value Date    BILITOTAL 0.4 12/26/2017           Lab Results   Component Value Date    ALBUMIN 3.1 12/26/2017                    Lab Results   Component Value Date    ALT 57 12/26/2017           Lab Results   Component Value Date    AST 44 12/26/2017     Results reviewed, labs MET treatment parameters, ok to proceed with treatment.          Post Infusion Assessment:  Patient tolerated infusion without incident.  Blood return noted pre and post infusion.  Site patent and intact, free from redness, edema or discomfort.  No evidence of extravasations.  Access discontinued per protocol.    Discharge Plan:   Patient declined prescription refills.  Discharge instructions reviewed with: Patient and Family.  Patient and/or family verbalized understanding of discharge instructions and all questions answered.  Copy of AVS reviewed with patient and/or family.  Patient will return 1/2/18 for next appointment.  Patient discharged in stable condition accompanied by: .  Departure Mode:  Ambulatory.    GURMEET CARLSON, RN

## 2017-12-28 ENCOUNTER — RADIANT APPOINTMENT (OUTPATIENT)
Dept: CARDIOLOGY | Facility: CLINIC | Age: 57
End: 2017-12-28
Attending: INTERNAL MEDICINE
Payer: COMMERCIAL

## 2017-12-28 ENCOUNTER — RADIANT APPOINTMENT (OUTPATIENT)
Dept: MRI IMAGING | Facility: CLINIC | Age: 57
End: 2017-12-28
Attending: INTERNAL MEDICINE
Payer: COMMERCIAL

## 2017-12-28 DIAGNOSIS — C50.211 MALIGNANT NEOPLASM OF UPPER-INNER QUADRANT OF RIGHT BREAST IN FEMALE, ESTROGEN RECEPTOR NEGATIVE (H): ICD-10-CM

## 2017-12-28 DIAGNOSIS — Z17.1 MALIGNANT NEOPLASM OF UPPER-INNER QUADRANT OF RIGHT BREAST IN FEMALE, ESTROGEN RECEPTOR NEGATIVE (H): ICD-10-CM

## 2017-12-28 PROCEDURE — 25000128 H RX IP 250 OP 636: Performed by: INTERNAL MEDICINE

## 2017-12-28 PROCEDURE — 96523 IRRIG DRUG DELIVERY DEVICE: CPT

## 2017-12-28 RX ORDER — GADOBUTROL 604.72 MG/ML
0.1 INJECTION INTRAVENOUS ONCE
Status: COMPLETED | OUTPATIENT
Start: 2017-12-28 | End: 2017-12-28

## 2017-12-28 RX ORDER — HEPARIN SODIUM (PORCINE) LOCK FLUSH IV SOLN 100 UNIT/ML 100 UNIT/ML
5 SOLUTION INTRAVENOUS
Status: COMPLETED | OUTPATIENT
Start: 2017-12-28 | End: 2017-12-28

## 2017-12-28 RX ADMIN — Medication 5 ML: at 11:30

## 2017-12-28 RX ADMIN — SODIUM CHLORIDE, PRESERVATIVE FREE 5 ML: 5 INJECTION INTRAVENOUS at 11:57

## 2017-12-28 RX ADMIN — GADOBUTROL 7.43 ML: 604.72 INJECTION INTRAVENOUS at 09:26

## 2017-12-28 NOTE — NURSING NOTE
Chief Complaint   Patient presents with     Port Draw     labs drawn from port (already accessed).       Labs drawn from port (already accessed) by rn.  Port flushed with saline and heparin.  Port de-accessed.    Lay Grier RN

## 2018-01-02 ENCOUNTER — INFUSION THERAPY VISIT (OUTPATIENT)
Dept: ONCOLOGY | Facility: CLINIC | Age: 58
End: 2018-01-02
Attending: INTERNAL MEDICINE
Payer: COMMERCIAL

## 2018-01-02 ENCOUNTER — RESEARCH ENCOUNTER (OUTPATIENT)
Dept: ONCOLOGY | Facility: CLINIC | Age: 58
End: 2018-01-02

## 2018-01-02 ENCOUNTER — APPOINTMENT (OUTPATIENT)
Dept: LAB | Facility: CLINIC | Age: 58
End: 2018-01-02
Attending: INTERNAL MEDICINE
Payer: COMMERCIAL

## 2018-01-02 ENCOUNTER — ONCOLOGY VISIT (OUTPATIENT)
Dept: ONCOLOGY | Facility: CLINIC | Age: 58
End: 2018-01-02
Attending: PHYSICIAN ASSISTANT
Payer: COMMERCIAL

## 2018-01-02 ENCOUNTER — CARE COORDINATION (OUTPATIENT)
Dept: ONCOLOGY | Facility: CLINIC | Age: 58
End: 2018-01-02

## 2018-01-02 VITALS
DIASTOLIC BLOOD PRESSURE: 86 MMHG | OXYGEN SATURATION: 96 % | TEMPERATURE: 99.4 F | SYSTOLIC BLOOD PRESSURE: 142 MMHG | BODY MASS INDEX: 27.81 KG/M2 | HEIGHT: 64 IN | HEART RATE: 126 BPM | WEIGHT: 162.9 LBS | RESPIRATION RATE: 18 BRPM

## 2018-01-02 DIAGNOSIS — Z17.1 MALIGNANT NEOPLASM OF UPPER-INNER QUADRANT OF RIGHT BREAST IN FEMALE, ESTROGEN RECEPTOR NEGATIVE (H): ICD-10-CM

## 2018-01-02 DIAGNOSIS — C50.211 MALIGNANT NEOPLASM OF UPPER-INNER QUADRANT OF RIGHT BREAST IN FEMALE, ESTROGEN RECEPTOR NEGATIVE (H): ICD-10-CM

## 2018-01-02 DIAGNOSIS — Z51.11 ENCOUNTER FOR ANTINEOPLASTIC CHEMOTHERAPY: ICD-10-CM

## 2018-01-02 DIAGNOSIS — Z17.1 MALIGNANT NEOPLASM OF UPPER-INNER QUADRANT OF RIGHT BREAST IN FEMALE, ESTROGEN RECEPTOR NEGATIVE (H): Primary | ICD-10-CM

## 2018-01-02 DIAGNOSIS — C50.211 MALIGNANT NEOPLASM OF UPPER-INNER QUADRANT OF RIGHT BREAST IN FEMALE, ESTROGEN RECEPTOR POSITIVE (H): Primary | ICD-10-CM

## 2018-01-02 DIAGNOSIS — C50.211 MALIGNANT NEOPLASM OF UPPER-INNER QUADRANT OF RIGHT BREAST IN FEMALE, ESTROGEN RECEPTOR NEGATIVE (H): Primary | ICD-10-CM

## 2018-01-02 DIAGNOSIS — Z17.0 MALIGNANT NEOPLASM OF UPPER-INNER QUADRANT OF RIGHT BREAST IN FEMALE, ESTROGEN RECEPTOR POSITIVE (H): Primary | ICD-10-CM

## 2018-01-02 LAB
ALBUMIN SERPL-MCNC: 3.1 G/DL (ref 3.4–5)
ALP SERPL-CCNC: 111 U/L (ref 40–150)
ALT SERPL W P-5'-P-CCNC: 51 U/L (ref 0–50)
ANION GAP SERPL CALCULATED.3IONS-SCNC: 12 MMOL/L (ref 3–14)
AST SERPL W P-5'-P-CCNC: 42 U/L (ref 0–45)
BASOPHILS # BLD AUTO: 0.1 10E9/L (ref 0–0.2)
BASOPHILS NFR BLD AUTO: 0.8 %
BILIRUB SERPL-MCNC: 0.5 MG/DL (ref 0.2–1.3)
BUN SERPL-MCNC: 7 MG/DL (ref 7–30)
CALCIUM SERPL-MCNC: 8.6 MG/DL (ref 8.5–10.1)
CHLORIDE SERPL-SCNC: 102 MMOL/L (ref 94–109)
CO2 SERPL-SCNC: 22 MMOL/L (ref 20–32)
CREAT SERPL-MCNC: 0.7 MG/DL (ref 0.52–1.04)
DIFFERENTIAL METHOD BLD: ABNORMAL
EOSINOPHIL # BLD AUTO: 0.1 10E9/L (ref 0–0.7)
EOSINOPHIL NFR BLD AUTO: 1.2 %
ERYTHROCYTE [DISTWIDTH] IN BLOOD BY AUTOMATED COUNT: 15 % (ref 10–15)
GFR SERPL CREATININE-BSD FRML MDRD: 86 ML/MIN/1.7M2
GLUCOSE SERPL-MCNC: 144 MG/DL (ref 70–99)
HCT VFR BLD AUTO: 33.9 % (ref 35–47)
HGB BLD-MCNC: 11.1 G/DL (ref 11.7–15.7)
IMM GRANULOCYTES # BLD: 0.1 10E9/L (ref 0–0.4)
IMM GRANULOCYTES NFR BLD: 2 %
LYMPHOCYTES # BLD AUTO: 1.2 10E9/L (ref 0.8–5.3)
LYMPHOCYTES NFR BLD AUTO: 18 %
MCH RBC QN AUTO: 33.5 PG (ref 26.5–33)
MCHC RBC AUTO-ENTMCNC: 32.7 G/DL (ref 31.5–36.5)
MCV RBC AUTO: 102 FL (ref 78–100)
MONOCYTES # BLD AUTO: 0.8 10E9/L (ref 0–1.3)
MONOCYTES NFR BLD AUTO: 12.3 %
NEUTROPHILS # BLD AUTO: 4.4 10E9/L (ref 1.6–8.3)
NEUTROPHILS NFR BLD AUTO: 65.7 %
NRBC # BLD AUTO: 0.1 10*3/UL
NRBC BLD AUTO-RTO: 1 /100
PLATELET # BLD AUTO: 289 10E9/L (ref 150–450)
POTASSIUM SERPL-SCNC: 3.8 MMOL/L (ref 3.4–5.3)
PROT SERPL-MCNC: 6.8 G/DL (ref 6.8–8.8)
RBC # BLD AUTO: 3.31 10E12/L (ref 3.8–5.2)
SODIUM SERPL-SCNC: 137 MMOL/L (ref 133–144)
WBC # BLD AUTO: 6.6 10E9/L (ref 4–11)

## 2018-01-02 PROCEDURE — 96413 CHEMO IV INFUSION 1 HR: CPT

## 2018-01-02 PROCEDURE — 85025 COMPLETE CBC W/AUTO DIFF WBC: CPT | Performed by: INTERNAL MEDICINE

## 2018-01-02 PROCEDURE — G0463 HOSPITAL OUTPT CLINIC VISIT: HCPCS | Mod: ZF

## 2018-01-02 PROCEDURE — 99214 OFFICE O/P EST MOD 30 MIN: CPT | Mod: ZP | Performed by: PHYSICIAN ASSISTANT

## 2018-01-02 PROCEDURE — 96375 TX/PRO/DX INJ NEW DRUG ADDON: CPT

## 2018-01-02 PROCEDURE — 25000128 H RX IP 250 OP 636: Mod: ZF

## 2018-01-02 PROCEDURE — 25000128 H RX IP 250 OP 636: Mod: ZF | Performed by: PHYSICIAN ASSISTANT

## 2018-01-02 PROCEDURE — 96411 CHEMO IV PUSH ADDL DRUG: CPT

## 2018-01-02 PROCEDURE — 96377 APPLICATON ON-BODY INJECTOR: CPT | Mod: 59

## 2018-01-02 PROCEDURE — 80053 COMPREHEN METABOLIC PANEL: CPT | Performed by: INTERNAL MEDICINE

## 2018-01-02 PROCEDURE — 25000128 H RX IP 250 OP 636: Mod: ZF | Performed by: INTERNAL MEDICINE

## 2018-01-02 PROCEDURE — 96367 TX/PROPH/DG ADDL SEQ IV INF: CPT

## 2018-01-02 RX ORDER — HEPARIN SODIUM (PORCINE) LOCK FLUSH IV SOLN 100 UNIT/ML 100 UNIT/ML
500 SOLUTION INTRAVENOUS ONCE
Status: COMPLETED | OUTPATIENT
Start: 2018-01-02 | End: 2018-01-02

## 2018-01-02 RX ORDER — HEPARIN SODIUM (PORCINE) LOCK FLUSH IV SOLN 100 UNIT/ML 100 UNIT/ML
5 SOLUTION INTRAVENOUS EVERY 8 HOURS PRN
Status: DISCONTINUED | OUTPATIENT
Start: 2018-01-02 | End: 2018-01-05 | Stop reason: HOSPADM

## 2018-01-02 RX ORDER — PALONOSETRON 0.05 MG/ML
0.25 INJECTION, SOLUTION INTRAVENOUS ONCE
Status: COMPLETED | OUTPATIENT
Start: 2018-01-02 | End: 2018-01-02

## 2018-01-02 RX ORDER — DOXORUBICIN HYDROCHLORIDE 2 MG/ML
60 INJECTION, SOLUTION INTRAVENOUS ONCE
Status: COMPLETED | OUTPATIENT
Start: 2018-01-02 | End: 2018-01-02

## 2018-01-02 RX ORDER — DEXAMETHASONE 4 MG/1
8 TABLET ORAL DAILY
Qty: 6 TABLET | Refills: 0 | Status: SHIPPED | OUTPATIENT
Start: 2018-01-02 | End: 2018-01-10

## 2018-01-02 RX ADMIN — SODIUM CHLORIDE, PRESERVATIVE FREE 500 UNITS: 5 INJECTION INTRAVENOUS at 14:59

## 2018-01-02 RX ADMIN — PEGFILGRASTIM 6 MG: KIT SUBCUTANEOUS at 15:14

## 2018-01-02 RX ADMIN — DOXORUBICIN HYDROCHLORIDE 112 MG: 2 INJECTION, SOLUTION INTRAVENOUS at 13:38

## 2018-01-02 RX ADMIN — DEXAMETHASONE SODIUM PHOSPHATE 150 MG: 10 INJECTION, SOLUTION INTRAMUSCULAR; INTRAVENOUS at 13:01

## 2018-01-02 RX ADMIN — SODIUM CHLORIDE 500 ML: 9 INJECTION, SOLUTION INTRAVENOUS at 12:39

## 2018-01-02 RX ADMIN — PALONOSETRON HYDROCHLORIDE 0.25 MG: 0.25 INJECTION INTRAVENOUS at 12:45

## 2018-01-02 RX ADMIN — SODIUM CHLORIDE, PRESERVATIVE FREE 5 ML: 5 INJECTION INTRAVENOUS at 10:55

## 2018-01-02 RX ADMIN — CYCLOPHOSPHAMIDE 1120 MG: 1 INJECTION, POWDER, FOR SOLUTION INTRAVENOUS; ORAL at 13:56

## 2018-01-02 ASSESSMENT — PAIN SCALES - GENERAL: PAINLEVEL: NO PAIN (0)

## 2018-01-02 NOTE — NURSING NOTE
"Oncology Rooming Note    January 2, 2018 11:19 AM   Ashleigh Alonzo is a 57 year old female who presents for:    Chief Complaint   Patient presents with     Port Draw     Port accessed, unable to obtain labs - no blood return. Flushed and hep locked. VS taken. TPA ordered. Infusion nurse notified.      Oncology Clinic Visit     Return visit related to Breast Cancer     Initial Vitals: /86 (BP Location: Right arm, Patient Position: Sitting, Cuff Size: Adult Regular)  Pulse 126  Temp 99.4  F (37.4  C) (Oral)  Resp 18  Ht 1.626 m (5' 4.02\")  Wt 73.9 kg (162 lb 14.4 oz)  SpO2 96%  BMI 27.95 kg/m2 Estimated body mass index is 27.95 kg/(m^2) as calculated from the following:    Height as of this encounter: 1.626 m (5' 4.02\").    Weight as of this encounter: 73.9 kg (162 lb 14.4 oz). Body surface area is 1.83 meters squared.  No Pain (0) Comment: Data Unavailable   No LMP recorded. Patient is postmenopausal.  Allergies reviewed: Yes  Medications reviewed: Yes    Medications: Medication refills not needed today.  Pharmacy name entered into [facility]:    [facility] [place], [place] - [address] [place] [facility] - [place], [place] - [address] [address]    Clinical concerns: No new concerns. Provider was notified.    10 minutes for nursing intake (face to face time)     Ciara Chaudhry LPN            "

## 2018-01-02 NOTE — NURSING NOTE
Chief Complaint   Patient presents with     Port Draw     Port accessed, unable to obtain labs - no blood return. Flushed and hep locked. VS taken. TPA ordered. Infusion nurse notified.      Yaima Hector RN

## 2018-01-02 NOTE — PATIENT INSTRUCTIONS
Contact Numbers  [facility] Nurse Triage: [phone]  After Hours Nurse Line:  [phone]    Please call the [facility] Nurse Triage line or after hours number if you experience a temperature greater than or equal to 100.5, shaking chills, have uncontrolled nausea, vomiting and/or diarrhea, dizziness, shortness of breath, chest pain, bleeding, unexplained bruising, or if you have any other new/concerning symptoms, questions or concerns.     If you are having any concerning symptoms or wish to speak to a provider before your next infusion visit, please call your care coordinator or triage to notify them so we can adequately serve you.     If you need a refill on a narcotic prescription or other medication, please call triage before your infusion appointment.       Neulasta injection would start on 01/03/2018 at 6pm, approximately 27 hours after application applied today.  Written and Verbal instruction reviewed with patient.  Pt instructed when the dose delivery starts, it will take about 45 minutes to complete.  Pt aware Neulasta Onpro On-Body should have green flashing light and to call triage or on-call MD if injector flashes red or appears to be leaking. Pt aware to keep Onpro On-Body Neulasta 4 inches away from electrical equipment and to avoid showering 4 hours prior to injection.           January 2018 Sunday Monday Tuesday Wednesday Thursday Friday Saturday        1     2     [facility] DRAW   10:45 AM   (15 min.)    [facility] LAB DRAW   [facility] Lab Draw     [facility] RETURN   10:55 AM   (50 min.)   Caren Colindres PA-C   [facility] ONC INFUSION 120   12:30 PM   (120 min.)    ONCOLOGY INFUSION   [facility] 3     4     5     6       7     8     9     10     11     12     13       14     15     16     P [facility] LAB DRAW    8:15 AM   (15 min.)    [facility] LAB DRAW   [facility] Lab Draw     [facility] RETURN    8:30 AM   (30 min.)    Fara Bradshaw MD   [facility]     [facility] ONC INFUSION 120    9:30 AM   (120 min.)    ONCOLOGY INFUSION   [facility] 17     18     19     20       21     22     23     24     25     26     27       28     29     [facility] DRAW   10:45 AM   (15 min.)    [facility]     [facility] RETURN   11:05 AM   (50 min.)   Lilia Livingston PA   [facility]     [facility] ONC INFUSION 120   12:00 PM   (120 min.)    ONCOLOGY INFUSION   [facility] 30 31 February 2018 Sunday Monday Tuesday Wednesday Thursday Friday Saturday                       1     2     3       4     5     6     7     8     9     10       11     12     13     [facility] DRAW    9:00 AM   (15 min.)    [facility] LAB DRAW   [facility] Lab Draw     [facility] RETURN    9:15 AM   (50 min.)   Lilia Livingston PA   [facility]     [facility] ONC INFUSION 120   10:30 AM   (120 min.)    ONCOLOGY INFUSION   [facility] 14     15     16     17       18     19     20     21     22     23     24       25     26     27     28                                 Lab Results:  Recent Results (from the past 12 hour(s))   CBC with platelets differential    Collection Time: 01/02/18 11:11 AM   Result Value Ref Range    WBC 6.6 4.0 - 11.0 10e9/L    RBC Count 3.31 (L) 3.8 - 5.2 10e12/L    Hemoglobin 11.1 (L) 11.7 - 15.7 g/dL    Hematocrit 33.9 (L) 35.0 - 47.0 %     (H) 78 - 100 fl    MCH 33.5 (H) 26.5 - 33.0 pg    MCHC 32.7 31.5 - 36.5 g/dL    RDW 15.0 10.0 - 15.0 %    Platelet Count 289 150 - 450 10e9/L    Diff Method Automated Method     % Neutrophils 65.7 %    % Lymphocytes 18.0 %    % Monocytes 12.3 %    % Eosinophils 1.2 %    % Basophils 0.8 %    % Immature Granulocytes 2.0 %    Nucleated RBCs 1 (H) 0 /100    Absolute Neutrophil 4.4 1.6 - 8.3 10e9/L    Absolute Lymphocytes  1.2 0.8 - 5.3 10e9/L    Absolute Monocytes 0.8 0.0 - 1.3 10e9/L    Absolute Eosinophils 0.1 0.0 - 0.7 10e9/L    Absolute Basophils 0.1 0.0 - 0.2 10e9/L    Abs Immature Granulocytes 0.1 0 - 0.4 10e9/L    Absolute Nucleated RBC 0.1    Comprehensive metabolic panel    Collection Time: 01/02/18 11:11 AM   Result Value Ref Range    Sodium 137 133 - 144 mmol/L    Potassium 3.8 3.4 - 5.3 mmol/L    Chloride 102 94 - 109 mmol/L    Carbon Dioxide 22 20 - 32 mmol/L    Anion Gap 12 3 - 14 mmol/L    Glucose 144 (H) 70 - 99 mg/dL    Urea Nitrogen 7 7 - 30 mg/dL    Creatinine 0.70 0.52 - 1.04 mg/dL    GFR Estimate 86 >60 mL/min/1.7m2    GFR Estimate If Black >90 >60 mL/min/1.7m2    Calcium 8.6 8.5 - 10.1 mg/dL    Bilirubin Total 0.5 0.2 - 1.3 mg/dL    Albumin 3.1 (L) 3.4 - 5.0 g/dL    Protein Total 6.8 6.8 - 8.8 g/dL    Alkaline Phosphatase 111 40 - 150 U/L    ALT 51 (H) 0 - 50 U/L    AST 42 0 - 45 U/L

## 2018-01-02 NOTE — PROGRESS NOTES
Oncology/Hematology Visit Note  Jan 2, 2018    Reason for Visit: Follow up of breast cancer     History of Present Illness: Ashleigh Alonzo is a 57 year old female with past medical history of COPD, sleep apnea, periodontal disease with stage IIa, T2N0M0, grade 3, triple negative right breast cancer. She was diagnosed via abnormal screening mammogram. She ultimately had US, contrast-enhanced mammo, and biopsy showing 3.4 cm mass and pathology showed grade 3 invasive mammary carcinoma with associated high-grade DCIS. ER/IA was negative and HER2 negative. She enrolled in ISPY-2 clinical trial and began treatment with weekly taxol and once every 3 week pembrolizumab on 9/2/17. Please see notes from Dr. Bradshaw for further details of patient's oncology history.      Course has been complicated by fevers with PNA and then UTI. Week 7 was held due to transaminitis. She was given week 7 on 11/7 with pembrolizumab and Taxol. She was admitted 11/11-11/17 with high fever (105), cough, and dyspnea and was found to have HCAP and pneumonitis secondary to pembro. 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 She received 2 doses of methylpred, antibiotics, and d/c on prednisone 70 mg daily. Had follow-up on 11/21 with Dr. Bradshaw and resumed weekly Taxol on 11/28/17. She completed Taxol on 12/26/17. She returns today for consideration of week 1 AC.     Interval History:  Ms. Alonzo returns to clinic today with her . She states she has been feeling overall well. She has some persistent nasal congestion which has been ongoing for past few weeks. No sinus pain/pressure. No rhinorrhea. Denies headaches. She denies any fever, chills, cough, hemoptysis, SOB. Appetite has been good. She is on prednisone taper, and has been on 30 mg daily for the past week.    Denies chest pain, nausea, vomiting, mucositis, bowel changes, swelling, rashes, or neuropathy.    Current Outpatient Prescriptions    Medication Sig Dispense Refill     calcium carbonate (CALCIUM CARBONATE) 600 MG tablet Take by mouth 2 times daily (with meals) 60 tablet      predniSONE (DELTASONE) 10 MG tablet Take 60mg daily for 1 week, then continue to decrease by 10mg each week 150 tablet 0     pantoprazole (PROTONIX) 40 MG EC tablet Take 1 tablet (40 mg) by mouth every morning 30 tablet 0     lidocaine-prilocaine (EMLA) cream Please apply to port site 30 minutes before use prn 30 g 1     order for DME Cranial prosthesis 1 Device 1     LORazepam (ATIVAN) 0.5 MG tablet Take 1 tablet (0.5 mg) by mouth every 4 hours as needed (Anxiety, Nausea/Vomiting or Sleep) 30 tablet 2     hydrOXYzine (ATARAX) 25 MG tablet Take 1-2 tablets (25-50 mg) by mouth every 6 hours as needed for itching 100 tablet 2     guaiFENesin (MUCINEX) 600 MG 12 hr tablet Take 2 tablets (1,200 mg) by mouth 2 times daily 180 tablet 6     tiotropium (SPIRIVA) 18 MCG capsule Inhale 1 capsule (18 mcg) into the lungs daily Inhale contents of one capsule 1 capsule 11     albuterol (PROAIR HFA/PROVENTIL HFA/VENTOLIN HFA) 108 (90 BASE) MCG/ACT Inhaler Inhale 2 puffs into the lungs every 6 hours as needed for shortness of breath / dyspnea 1 Inhaler 5     citalopram (CELEXA) 10 MG tablet Take 1 tablet (10 mg) by mouth daily 90 tablet 3     darifenacin (ENABLEX) 7.5 MG 24 hr tablet Take 1 tablet (7.5 mg) by mouth daily 90 tablet 3     order for [facility] [place] Respironics Dream Station Auto CPAP 12-18 cm, F&P Simplus FFM small.       Coenzyme Q10 (CO Q 10 PO) Take 1 tablet by mouth daily        MULTIPLE VITAMIN PO Take 1 tablet by mouth daily        Cyanocobalamin (VITAMIN B 12 PO) Take 100 mcg by mouth daily        Pyridoxine HCl (VITAMIN B6 PO) Take 1 tablet by mouth daily.       aspirin 81 MG tablet Take 1 tablet by mouth daily.  3     VITAMIN D 1000 UNIT OR CAPS TAKE 2 CAPSULES BY MOUTH DAILY       Physical Examination:  /86 (BP Location: Right arm, Patient Position: Sitting, Cuff Size:  "Adult Regular)  Pulse 126  Temp 99.4  F (37.4  C) (Oral)  Resp 18  Ht 1.626 m (5' 4.02\")  Wt 73.9 kg (162 lb 14.4 oz)  SpO2 96%  BMI 27.95 kg/m2  Wt Readings from Last 10 Encounters:   01/02/18 73.9 kg (162 lb 14.4 oz)   12/26/17 74.3 kg (163 lb 12.8 oz)   12/18/17 76 kg (167 lb 8 oz)   12/12/17 74.9 kg (165 lb 1.6 oz)   12/05/17 72.5 kg (159 lb 14.4 oz)   11/28/17 71.5 kg (157 lb 9.6 oz)   11/21/17 71.6 kg (157 lb 12.8 oz)   11/15/17 71.1 kg (156 lb 12.8 oz)   11/07/17 70.9 kg (156 lb 4.8 oz)   10/31/17 71.3 kg (157 lb 3.2 oz)   Constitutional: Well-appearing female in no acute distress.  Eyes: EOMI, PERRL. No scleral icterus.  ENT: Oral mucosa is moist without lesions or thrush.   Lymphatic: Neck is supple without cervical or supraclavicular lymphadenopathy. No axillary lymphadenopathy.  Cardiovascular: Regular rate and rhythm. No murmurs, gallops, or rubs. Trace bilateral peripheral edema.  Respiratory: Clear to auscultation bilaterally. No wheezes or crackles.  Gastrointestinal: Bowel sounds present. Abdomen soft, non-tender. No palpable hepatosplenomegaly or masses.   Neurologic: Cranial nerves II through XII are grossly intact.  Skin: No rashes, petechiae, or bruising noted on exposed skin.    Laboratory Data:   1/2/2018 11:11   Sodium 137   Potassium 3.8   Chloride 102   Carbon Dioxide 22   Urea Nitrogen 7   Creatinine 0.70   GFR Estimate 86   GFR Estimate If Black >90   Calcium 8.6   Anion Gap 12   Albumin 3.1 (L)   Protein Total 6.8   Bilirubin Total 0.5   Alkaline Phosphatase 111   ALT 51 (H)   AST 42   Glucose 144 (H)   WBC 6.6   Hemoglobin 11.1 (L)   Hematocrit 33.9 (L)   Platelet Count 289   RBC Count 3.31 (L)    (H)   MCH 33.5 (H)   MCHC 32.7   RDW 15.0   Diff Method Automated Method   % Neutrophils 65.7   % Lymphocytes 18.0   % Monocytes 12.3   % Eosinophils 1.2   % Basophils 0.8   % Immature Granulocytes 2.0   Nucleated RBCs 1 (H)   Absolute Neutrophil 4.4   Absolute Lymphocytes 1.2 "   Absolute Monocytes 0.8   Absolute Eosinophils 0.1   Absolute Basophils 0.1   Abs Immature Granulocytes 0.1   Absolute Nucleated RBC 0.1     IMAGING:  FINDINGS:  CLINICAL TRIAL TIMEPOINT: MR #3 Inter-regimen     Breast composition: Scattered fibroglandular tissue  Background parenchymal enhancement: Minimal     The biopsy clip is seen in the right breast at 12:00 middle depth.  There is near complete resolution of suspicious enhancement. There are  scattered foci of enhancement which may be related to background  parenchyma.     No suspicious enhancement in the left breast. There are stable,  nonspecific internal mammary chain lymph nodes. There is a venous  access port in the left anterior chest. There are areas of bilateral  consolidation, better visualized on CT in November 2017. Unchanged  right greater than left shoulder joint effusion compared to MRI  10/5/2017.         IMPRESSION: BI-RADS CATEGORY: 6 - Known Biopsy-Proven  Malignancy-Appropriate Action Should Be Taken     Significant decrease in enhancement of biopsy proven breast cancer in  the right breast.     RECOMMENDED FOLLOW-UP: Oncological follow-up.     Assessment and Plan:  1. Stage IIa right breast cancer, currently on treatment with weekly Taxol on I-SPY  Was initially started on weekly Taxol/pembolizumab but course was complicated by fevers, pneumonia, and then pneumonitis and hepatitis requiring hospitalization. Pembrolizumab was discontinued altogether. She completed 12 weeks of Taxol on 12/26/17. MRI of breast as above, with near complete resolution of suspicious enhancement of right breast. Plan for 4 cycles of Adriamycin and Cytoxan and then proceed with surgery.  Whether or not she will benefit from adjuvant radiation is unknown at this time and depends on the type of breast surgery, surgical margins, and whether or not there is lymph node involvement at the time of surgery. Reviewed MRI images with patient. Labs reviewed. [facility]  handouts provided to patient for Adriamycin and Cytoxan. Questions answered. Proceed with AC.  --As she is on steroid taper, recommended to hold prednisone on D1 of chemo as she will receive IV dex 12 mg. She should also hold prednisone on D2-4 as she will be taking Dex 8mg daily on those days.  --Scheduled for labs, infusion for C2 on 1/16 with Dr. Bradshaw, C3 on 1/29 and C4 on 2/13 with Lilia Livingston  --Adjusted her AC schedule to every 2 weeks and added in Neulasta, as was previously written to be given every 3 weeks without Neulasta (when plan was to give with Keytruda).     2. Pneumonitis, resolved  No pulmonary concerns and lung exam clear. Continue prednisone taper, currently on 30 mg daily, to decrease by 10mg weekly. Next decrease to 20 mg will be following dex prep, as above. Continue Protonix for any steroid associated GERD.    3. Transaminitis, improving  Suspected to be 2/2 Taxol. Trended down today. Continue treatment.      4. FEN  Weight increased on chemotherapy, suspected to be secondary to steroids/portion control. Improved today.    Carolynn Perales PA-C    The patient was seen in conjunction with Carolynn Perales PA-C who served as a scribe for today's visit. I have reviewed and edited the note and agree with the above findings and plan.  Caren Colindres PA-C

## 2018-01-02 NOTE — PROGRESS NOTES
Research- AC#1 today. Labs look ok to proceed with treatment. Pt feeling ok. Reporting insomnia is resolved since she has had the prednisone tapering down. On 30 this last week and will go to 20mg. She will get dexamethasone on AC days so on those days she will hold the prednisone.   Had restaging MRI last week along with research labs and ECHO. MRI continues to show a good response to treatment.   Reporting some stuffiness in sinuses- has not been able to wear CPAP as a result, breathing thru mouth at night causing some dryness issues. Denies coughing, pain, N/V. Bowels are moving well and consistently. Eating and drinking well, weight down just a little this week from last week.   Asking about next steps after the AC- discussed she will have another MRI, she will still have surgery to verify response to treatment. Went thru some of the side effects of the AC, also going to see if she can get some additional teaching on side effects.   Checked in with infusion RN and gave contact info if anything comes up. F/u with pt by phone later this week to check in. Next appt is in 2 weeks.   Steffany Nix RN   [phone]

## 2018-01-02 NOTE — LETTER
1/2/2018      RE: Ashleigh Alonzo  [address] [place] [address]       Oncology/Hematology Visit Note  Jan 2, 2018    Reason for Visit: Follow up of breast cancer     History of Present Illness: Ashleigh Alonzo is a 57 year old female with past medical history of COPD, sleep apnea, periodontal disease with stage IIa, T2N0M0, grade 3, triple negative right breast cancer. She was diagnosed via abnormal screening mammogram. She ultimately had US, contrast-enhanced mammo, and biopsy showing 3.4 cm mass and pathology showed grade 3 invasive mammary carcinoma with associated high-grade DCIS. ER/VA was negative and HER2 negative. She enrolled in ISPY-2 clinical trial and began treatment with weekly taxol and once every 3 week pembrolizumab on 9/2/17. Please see notes from Dr. Bradshaw for further details of patient's oncology history.      Course has been complicated by fevers with PNA and then UTI. Week 7 was held due to transaminitis. She was given week 7 on 11/7 with pembrolizumab and Taxol. She was admitted 11/11-11/17 with high fever (105), cough, and dyspnea and was found to have HCAP and pneumonitis secondary to pembro. 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 She received 2 doses of methylpred, antibiotics, and d/c on prednisone 70 mg daily. Had follow-up on 11/21 with Dr. Bradshaw and resumed weekly Taxol on 11/28/17. She completed Taxol on 12/26/17. She returns today for consideration of week 1 AC.     Interval History:  Ms. Alonzo returns to clinic today with her . She states she has been feeling overall well. She has some persistent nasal congestion which has been ongoing for past few weeks. No sinus pain/pressure. No rhinorrhea. Denies headaches. She denies any fever, chills, cough, hemoptysis, SOB. Appetite has been good. She is on prednisone taper, and has been on 30 mg daily for the past week.    Denies chest pain, nausea, vomiting, mucositis, bowel  changes, swelling, rashes, or neuropathy.    Current Outpatient Prescriptions   Medication Sig Dispense Refill     calcium carbonate (CALCIUM CARBONATE) 600 MG tablet Take by mouth 2 times daily (with meals) 60 tablet      predniSONE (DELTASONE) 10 MG tablet Take 60mg daily for 1 week, then continue to decrease by 10mg each week 150 tablet 0     pantoprazole (PROTONIX) 40 MG EC tablet Take 1 tablet (40 mg) by mouth every morning 30 tablet 0     lidocaine-prilocaine (EMLA) cream Please apply to port site 30 minutes before use prn 30 g 1     order for [facility] Cranial prosthesis 1 Device 1     LORazepam (ATIVAN) 0.5 MG tablet Take 1 tablet (0.5 mg) by mouth every 4 hours as needed (Anxiety, Nausea/Vomiting or Sleep) 30 tablet 2     hydrOXYzine (ATARAX) 25 MG tablet Take 1-2 tablets (25-50 mg) by mouth every 6 hours as needed for itching 100 tablet 2     guaiFENesin (MUCINEX) 600 MG 12 hr tablet Take 2 tablets (1,200 mg) by mouth 2 times daily 180 tablet 6     tiotropium (SPIRIVA) 18 MCG capsule Inhale 1 capsule (18 mcg) into the lungs daily Inhale contents of one capsule 1 capsule 11     albuterol (PROAIR HFA/PROVENTIL HFA/VENTOLIN HFA) 108 (90 BASE) MCG/ACT Inhaler Inhale 2 puffs into the lungs every 6 hours as needed for shortness of breath / dyspnea 1 Inhaler 5     citalopram (CELEXA) 10 MG tablet Take 1 tablet (10 mg) by mouth daily 90 tablet 3     darifenacin (ENABLEX) 7.5 MG 24 hr tablet Take 1 tablet (7.5 mg) by mouth daily 90 tablet 3     order for [facility] Respironics Dream Station Auto CPAP 12-18 cm, F&P Simplus FFM small.       Coenzyme Q10 (CO Q 10 PO) Take 1 tablet by mouth daily        MULTIPLE VITAMIN PO Take 1 tablet by mouth daily        Cyanocobalamin (VITAMIN B 12 PO) Take 100 mcg by mouth daily        Pyridoxine HCl (VITAMIN B6 PO) Take 1 tablet by mouth daily.       aspirin 81 MG tablet Take 1 tablet by mouth daily.  3     VITAMIN D 1000 UNIT OR CAPS TAKE 2 CAPSULES BY MOUTH DAILY       Physical  "Examination:  /86 (BP Location: Right arm, Patient Position: Sitting, Cuff Size: Adult Regular)  Pulse 126  Temp 99.4  F (37.4  C) (Oral)  Resp 18  Ht 1.626 m (5' 4.02\")  Wt 73.9 kg (162 lb 14.4 oz)  SpO2 96%  BMI 27.95 kg/m2  Wt Readings from Last 10 Encounters:   01/02/18 73.9 kg (162 lb 14.4 oz)   12/26/17 74.3 kg (163 lb 12.8 oz)   12/18/17 76 kg (167 lb 8 oz)   12/12/17 74.9 kg (165 lb 1.6 oz)   12/05/17 72.5 kg (159 lb 14.4 oz)   11/28/17 71.5 kg (157 lb 9.6 oz)   11/21/17 71.6 kg (157 lb 12.8 oz)   11/15/17 71.1 kg (156 lb 12.8 oz)   11/07/17 70.9 kg (156 lb 4.8 oz)   10/31/17 71.3 kg (157 lb 3.2 oz)   Constitutional: Well-appearing female in no acute distress.  Eyes: EOMI, PERRL. No scleral icterus.  ENT: Oral mucosa is moist without lesions or thrush.   Lymphatic: Neck is supple without cervical or supraclavicular lymphadenopathy. No axillary lymphadenopathy.  Cardiovascular: Regular rate and rhythm. No murmurs, gallops, or rubs. Trace bilateral peripheral edema.  Respiratory: Clear to auscultation bilaterally. No wheezes or crackles.  Gastrointestinal: Bowel sounds present. Abdomen soft, non-tender. No palpable hepatosplenomegaly or masses.   Neurologic: Cranial nerves II through XII are grossly intact.  Skin: No rashes, petechiae, or bruising noted on exposed skin.    Laboratory Data:   1/2/2018 11:11   Sodium 137   Potassium 3.8   Chloride 102   Carbon Dioxide 22   Urea Nitrogen 7   Creatinine 0.70   GFR Estimate 86   GFR Estimate If Black >90   Calcium 8.6   Anion Gap 12   Albumin 3.1 (L)   Protein Total 6.8   Bilirubin Total 0.5   Alkaline Phosphatase 111   ALT 51 (H)   AST 42   Glucose 144 (H)   WBC 6.6   Hemoglobin 11.1 (L)   Hematocrit 33.9 (L)   Platelet Count 289   RBC Count 3.31 (L)    (H)   MCH 33.5 (H)   MCHC 32.7   RDW 15.0   Diff Method Automated Method   % Neutrophils 65.7   % Lymphocytes 18.0   % Monocytes 12.3   % Eosinophils 1.2   % Basophils 0.8   % Immature " Granulocytes 2.0   Nucleated RBCs 1 (H)   Absolute Neutrophil 4.4   Absolute Lymphocytes 1.2   Absolute Monocytes 0.8   Absolute Eosinophils 0.1   Absolute Basophils 0.1   Abs Immature Granulocytes 0.1   Absolute Nucleated RBC 0.1     IMAGING:  FINDINGS:  CLINICAL TRIAL TIMEPOINT: MR #3 Inter-regimen     Breast composition: Scattered fibroglandular tissue  Background parenchymal enhancement: Minimal     The biopsy clip is seen in the right breast at 12:00 middle depth.  There is near complete resolution of suspicious enhancement. There are  scattered foci of enhancement which may be related to background  parenchyma.     No suspicious enhancement in the left breast. There are stable,  nonspecific internal mammary chain lymph nodes. There is a venous  access port in the left anterior chest. There are areas of bilateral  consolidation, better visualized on CT in November 2017. Unchanged  right greater than left shoulder joint effusion compared to MRI  10/5/2017.         IMPRESSION: BI-RADS CATEGORY: 6 - Known Biopsy-Proven  Malignancy-Appropriate Action Should Be Taken     Significant decrease in enhancement of biopsy proven breast cancer in  the right breast.     RECOMMENDED FOLLOW-UP: Oncological follow-up.     Assessment and Plan:  1. Stage IIa right breast cancer, currently on treatment with weekly Taxol on I-SPY  Was initially started on weekly Taxol/pembolizumab but course was complicated by fevers, pneumonia, and then pneumonitis and hepatitis requiring hospitalization. Pembrolizumab was discontinued altogether. She completed 12 weeks of Taxol on 12/26/17. MRI of breast as above, with near complete resolution of suspicious enhancement of right breast. Plan for 4 cycles of Adriamycin and Cytoxan and then proceed with surgery.  Whether or not she will benefit from adjuvant radiation is unknown at this time and depends on the type of breast surgery, surgical margins, and whether or not there is lymph node  involvement at the time of surgery. Reviewed MRI images with patient. Labs reviewed. [facility] handouts provided to patient for Adriamycin and Cytoxan. Questions answered. Proceed with AC.  --As she is on steroid taper, recommended to hold prednisone on D1 of chemo as she will receive IV dex 12 mg. She should also hold prednisone on D2-4 as she will be taking Dex 8mg daily on those days.  --Scheduled for labs, infusion for C2 on 1/16 with Dr. Bradshaw, C3 on 1/29 and C4 on 2/13 with Lilia Livingston  --Adjusted her AC schedule to every 2 weeks and added in Neulasta, as was previously written to be given every 3 weeks without Neulasta (when plan was to give with Keytruda).     2. Pneumonitis, resolved  No pulmonary concerns and lung exam clear. Continue prednisone taper, currently on 30 mg daily, to decrease by 10mg weekly. Next decrease to 20 mg will be following dex prep, as above. Continue Protonix for any steroid associated GERD.    3. Transaminitis, improving  Suspected to be 2/2 Taxol. Trended down today. Continue treatment.      4. FEN  Weight increased on chemotherapy, suspected to be secondary to steroids/portion control. Improved today.    Carolynn Perales PA-C    The patient was seen in conjunction with Carolynn Perales PA-C who served as a scribe for today's visit. I have reviewed and edited the note and agree with the above findings and plan.  Caren Colindres PA-C

## 2018-01-02 NOTE — PROGRESS NOTES
Infusion Nursing Note:  Ashleigh Alonzo presents today for Cycle 1 Day 1 Adriamycin, Cytoxan.    Patient seen by provider today: Yes: EDDIE Cortez   present during visit today: Not Applicable.    Note: Patient has been to treatment center prior and completed 12 cycles of Taxol.  She is receiving Adriamycin and Cytoxan for the first time today.  Patient teaching done previously.  Writer reinforced chemotherapy teaching/side effects and schedule.     Copy of AVS reviewed with patient. Pt instructed to call care coordinator, triage (or MD on call if after hours/weekends) with chills/temp >=100.5, questions/concerns. Pt stated understanding of plan.     Intravenous Access:  Implanted Port.  Port flushes well and has excellent blood return.  No Cathflo needed.    Treatment Conditions:  Lab Results   Component Value Date    HGB 11.1 01/02/2018     Lab Results   Component Value Date    WBC 6.6 01/02/2018      Lab Results   Component Value Date    ANEU 4.4 01/02/2018     Lab Results   Component Value Date     01/02/2018      Lab Results   Component Value Date     01/02/2018                   Lab Results   Component Value Date    POTASSIUM 3.8 01/02/2018           Lab Results   Component Value Date    MAG 2.1 09/05/2017            Lab Results   Component Value Date    CR 0.70 01/02/2018                   Lab Results   Component Value Date    JAVIER 8.6 01/02/2018                Lab Results   Component Value Date    BILITOTAL 0.5 01/02/2018           Lab Results   Component Value Date    ALBUMIN 3.1 01/02/2018                    Lab Results   Component Value Date    ALT 51 01/02/2018           Lab Results   Component Value Date    AST 42 01/02/2018     Results reviewed, labs MET treatment parameters, ok to proceed with treatment.  ECHO/MUGA completed 12/28/2017 EF 60-65%.    Post Infusion Assessment:  Patient tolerated infusion without incident.  Blood return noted pre and post infusion.  Blood return  noted during administration every 2 cc.  Site patent and intact, free from redness, edema or discomfort.  No evidence of extravasations.  Access discontinued per protocol.    Discharge Plan:   Prescription refills given for dexamethasone.  Discharge instructions reviewed with: Patient and Family.  Patient and/or family verbalized understanding of discharge instructions and all questions answered.  Copy of AVS reviewed with patient and/or family.  Patient will return 01/16/2018 for next lab, provider, and chemotherapy appointment.  Patient discharged in stable condition accompanied by: self and .  Departure Mode: Ambulatory.    Neulasta Onpro On-Body injector applied to right arm at 1520 with light facing down.  Writer discussed Neulasta injection would start on 01/03/2018 at 1820, approximately 27 hours after application applied today.  Written and Verbal instruction reviewed with patient.  Pt instructed when the dose delivery starts, it will take about 45 minutes to complete.  Pt aware Neulasta Onpro On-Body should have green flashing light and to call triage or on-call MD if injector flashes red or appears to be leaking. Pt aware to keep Onpro On-Body Neulasta 4 inches away from electrical equipment and to avoid showering 4 hours prior to injection.   Neulasta Onpro Lot number: [id number].    Lolis Fair RN

## 2018-01-02 NOTE — MR AVS SNAPSHOT
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)    -  1    Encounter for antineoplastic chemotherapy           Follow-ups after your visit        Your next 10 appointments already scheduled     Jan 16, 2018  8:15 AM CST   Masonic Lab Draw with UC MASONIC LAB DRAW   [facility] ([facility])    [address] [place] [address]  [address]  [facility] [address]   [phone]            Jan 16, 2018  8:45 AM CST   (Arrive by 8:30 AM)   Return Visit with Fara Bradshaw MD   [facility] ([facility])    [address] [place] [address]  [address]  [facility] [address]   [phone]            Jan 16, 2018  9:30 AM CST   Infusion 120 with UC ONCOLOGY INFUSION, UC 22 ATC   [facility] ([facility])    [address] [place] [address]  [address]  [facility] [address]   [phone]            Jan 29, 2018 10:45 AM CST   Masonic Lab Draw with UC MASONIC LAB DRAW   [facility] Masonic Lab Draw ([facility])    [address] [place] [address]  [address]  [facility] [address]   [phone]            Jan 29, 2018 11:20 AM CST   (Arrive by 11:05 AM)   Return Visit with EDDIE Oliva   [facility] ([facility])    [address] [place] [address]  [address]  [facility] [address]   [phone]            Jan 29, 2018 12:00 PM CST   Infusion 120 with UC ONCOLOGY INFUSION, UC 15 ATC   [facility] ([facility])    [address] [place] [address]  [address] [place] [address]  [place] [address]   [phone]            Feb 13, 2018  9:00 AM CST   Masonic Lab Draw with  MASONIC LAB DRAW   [facility] Lab Draw ([facility])    [address] [place] [address]  [address]  [facility] [address] [phone]            Feb 13, 2018  9:30 AM CST   (Arrive by 9:15 AM)   Return Visit with EDDIE Oliva   [facility] ([facility])    [address] [place] [address]  [address]  [facility] [id number] [phone]              Who to contact     If you have questions or need follow up information about today's clinic visit or your schedule please contact [facility] directly at [phone].  Normal or non-critical lab and imaging results will be communicated to you by ICAgenhart, letter or phone within 4 business days after the clinic has received the results. If you do not hear from us within 7 days, please contact the clinic through ICAgenhart or phone. If you have a critical or abnormal lab result, we will notify you by phone as soon as possible.  Submit refill requests through FreeLunched or call your pharmacy and they will forward the refill request to us. Please allow 3 business days for your refill to be completed.          Additional Information About Your Visit        ICAgenhart Information     FreeLunched gives you secure access to your electronic health record. If you see a primary care provider, you can also send messages to your care team and make appointments. If you have questions, please call your primary care clinic.  If you do not have a primary care provider, please call [phone] and they will assist you.        Care EveryWhere ID     This is your Care EveryWhere ID. This could be used by other organizations to access your [place] medical records  [id number]        Your Vitals Were     Pulse Temperature Respirations Height Pulse Oximetry BMI (Body Mass Index)    126 99.4  F (37.4  C) (Oral) 18 1.626 m  "(5' 4.02\") 96% 27.95 kg/m2       Blood Pressure from Last 3 Encounters:   01/02/18 142/86   12/26/17 141/78   12/18/17 150/87    Weight from Last 3 Encounters:   01/02/18 73.9 kg (162 lb 14.4 oz)   12/26/17 74.3 kg (163 lb 12.8 oz)   12/18/17 76 kg (167 lb 8 oz)              We Performed the Following     Research Lab          Today's Medication Changes          These changes are accurate as of: 1/2/18 11:59 PM.  If you have any questions, ask your nurse or doctor.               Start taking these medicines.        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   Quantity:  6 tablet   Refills:  0            Where to get your medicines      These medications were sent to [address] [place] [address] [address] [address]    Hours:  TRANSPLANT PHONE NUMBER [phone] Phone:  [phone]     dexamethasone 4 MG tablet                Primary Care Provider Office Phone # Fax #    Radha Cazares [phone]       [address] [address]        Equal Access to Services     THERESA HUGHES AH: Hadii candie baileyo Somarian, waaxda luqadaha, qaybta kaalmada adeegyada, waxay edward pickering. So [facility] [phone].    ATENCIÓN: Si habla español, tiene a blackman disposición servicios gratuitos de asistencia lingüística. Bassemame al [phone].    We comply with applicable federal civil rights laws and [place] laws. We do not discriminate on the basis of race, color, national origin, age, disability, sex, sexual orientation, or gender identity.            Thank you!     Thank you for choosing [facility]  for your care. Our goal is always to provide you with excellent care. Hearing back from our patients is one way we can continue to improve our services. Please take a few minutes to complete the written survey that you may receive in the mail after your visit with us. Thank you!             Your Updated Medication List - Protect others around you: Learn how to safely use, store and throw away your medicines at www.disposemymeds.org.          This list is accurate as of: 1/2/18 11:59 PM.  Always use your most recent med list.                   Brand Name Dispense Instructions for use Diagnosis    albuterol 108 (90 BASE) MCG/ACT Inhaler    PROAIR HFA/PROVENTIL HFA/VENTOLIN HFA    1 Inhaler    Inhale 2 puffs into the lungs every 6 hours as needed for shortness of breath / dyspnea    Chronic obstructive pulmonary disease, unspecified COPD type (H)       aspirin 81 MG tablet      Take 1 tablet by mouth daily.        calcium carbonate 600 MG tablet   Generic drug:  calcium carbonate     60 tablet    Take by mouth 2 times daily (with meals)        citalopram 10 MG tablet    celeXA    90 tablet    Take 1 tablet (10 mg) by mouth daily    Anxiety, Depression, unspecified depression type       CO Q 10 PO      Take 1 tablet by mouth daily        darifenacin 7.5 MG 24 hr tablet    ENABLEX    90 tablet    Take 1 tablet (7.5 mg) by mouth daily    Overactive bladder       dexamethasone 4 MG tablet    DECADRON    6 tablet    Take 2 tablets (8 mg) by mouth daily for 3 doses Start on Day 2 of first cycle of doxorubicin / cyclophosphamide.    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)       guaiFENesin 600 MG 12 hr tablet    MUCINEX    180 tablet    Take 2 tablets (1,200 mg) by mouth 2 times daily    Cough with sputum       hydrOXYzine 25 MG tablet    ATARAX    100 tablet    Take 1-2 tablets (25-50 mg) by mouth every 6 hours as needed for itching     Hives       lidocaine-prilocaine cream    EMLA    30 g    Please apply to port site 30 minutes before use prn    Personal history of malignant neoplasm of breast       LORazepam 0.5 MG tablet    ATIVAN    30 tablet    Take 1 tablet (0.5 mg) by mouth every 4 hours as needed (Anxiety, Nausea/Vomiting or Sleep)    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)       pantoprazole 40 MG EC tablet    PROTONIX    30 tablet    Take 1 tablet (40 mg) by mouth every morning    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)       predniSONE 10 MG tablet    DELTASONE    150 tablet    Take 60mg daily for 1 week, then continue to decrease by 10mg each week    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        vitamin D 1000 UNITS capsule      TAKE 2 CAPSULES BY MOUTH DAILY        * Notice:  This list has 2 medication(s) that are the same as other medications prescribed for you. Read the directions carefully, and ask your doctor or other care provider to review them with you.

## 2018-01-02 NOTE — MR AVS SNAPSHOT
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  Written and Verbal instruction reviewed with patient.  Pt instructed when the dose delivery starts, it will take about 45 minutes to complete.  Pt aware Neulasta Onpro On-Body should have green flashing light and to call triage or on-call MD if injector flashes red or appears to be leaking. Pt aware to keep Onpro On-Body Neulasta 4 inches away from electrical equipment and to avoid showering 4 hours prior to injection.           Follow-ups after your visit        Your next 10 appointments already scheduled     Jan 16, 2018  8:15 AM CST   Masonic Lab Draw with  MASONIC LAB DRAW   [facility] ([facility])    [address] [place] [id number] [phone]            Jan 16, 2018  8:45 AM CST   (Arrive by 8:30 AM)   Return Visit with Fara Bradshaw MD   [facility] ([facility])    [address] [place] [id number] [phone]            Jan 16, 2018  9:30 AM CST   Infusion 120 with UC ONCOLOGY INFUSION, UC 22 ATC   [facility] ([facility])    [address] [place] [id number] [phone]             Jan 29, 2018 10:45 AM CST   Masonic Lab Draw with UC MASONIC LAB DRAW   [facility] Masonic Lab Draw ([facility])    [address] [address]   [phone]            Jan 29, 2018 11:20 AM CST   (Arrive by 11:05 AM)   Return Visit with EDDIE Oliva   [facility] ([facility])    [address] [address] [phone]            Jan 29, 2018 12:00 PM CST   Infusion 120 with UC ONCOLOGY INFUSION, UC 15 ATC   [facility] ([facility])    [address] [place] [address]   [phone]            Feb 13, 2018  9:00 AM CST   Masonic Lab Draw with UC MASONIC LAB DRAW   [facility] Masonic Lab Draw ([facility])    [address] [place] [address]   [phone]            Feb 13, 2018  9:30 AM CST   (Arrive by 9:15 AM)   Return Visit with EDDIE Oliva   [facility] ([facility])    [address] [place] [address] [phone]              Who to contact     If you have questions or need follow up information about today's clinic visit or your schedule please contact [facility] directly at [phone].  Normal or non-critical lab and imaging results will be communicated to you by MyChart, letter or phone within 4 business days after the clinic has received the results. If you do not hear from us within 7 days, please contact the clinic through MyChart or phone. If you have a critical or abnormal lab result, we will notify you by phone as soon as possible.  Submit refill requests through Incap or call your pharmacy and they will forward the refill request to us. Please allow 3 business days for your refill to be completed.          Additional Information  About Your Visit        Tattvahart Information     LocoMobi gives you secure access to your electronic health record. If you see a primary care provider, you can also send messages to your care team and make appointments. If you have questions, please call your primary care clinic.  If you do not have a primary care provider, please call [phone] and they will assist you.        Care EveryWhere ID     This is your Care EveryWhere ID. This could be used by other organizations to access your [place] medical records  [id number]         Blood Pressure from Last 3 Encounters:   01/02/18 142/86   12/26/17 141/78   12/18/17 150/87    Weight from Last 3 Encounters:   01/02/18 73.9 kg (162 lb 14.4 oz)   12/26/17 74.3 kg (163 lb 12.8 oz)   12/18/17 76 kg (167 lb 8 oz)              We Performed the Following     CBC with platelets differential     Comprehensive metabolic panel          Today's Medication Changes          These changes are accurate as of: 1/2/18  2:54 PM.  If you have any questions, ask your nurse or doctor.               Start taking these medicines.        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   Quantity:  6 tablet   Refills:  0            Where to get your medicines      These medications were sent to [place] [id number] [facility] [address] [place] [address] [address] [place] [address]    Hours:  TRANSPLANT PHONE NUMBER [phone] Phone:  [phone]     dexamethasone 4 MG tablet                Primary Care Provider Office Phone # Fax #    Radha Cazares [phone]       [address] [address]         Equal Access to Services     [facility] : Hadii candie mcdaniel tushar Granda, wabridgerda luqnikhil, qaybta kaneginbang ledezma. So [facility] [phone].    ATENCIÓN: Si habla español, tiene a blackman disposición servicios gratuitos de asistencia lingüística. Bassemame al [phone].    We comply with applicable federal civil rights laws and [place] laws. We do not discriminate on the basis of race, color, national origin, age, disability, sex, sexual orientation, or gender identity.            Thank you!     Thank you for choosing [facility]  for your care. Our goal is always to provide you with excellent care. Hearing back from our patients is one way we can continue to improve our services. Please take a few minutes to complete the written survey that you may receive in the mail after your visit with us. Thank you!             Your Updated Medication List - Protect others around you: Learn how to safely use, store and throw away your medicines at www.disposemymeds.org.          This list is accurate as of: 1/2/18  2:54 PM.  Always use your most recent med list.                   Brand Name Dispense Instructions for use Diagnosis    albuterol 108 (90 BASE) MCG/ACT Inhaler    PROAIR HFA/PROVENTIL HFA/VENTOLIN HFA    1 Inhaler    Inhale 2 puffs into the lungs every 6 hours as needed for shortness of breath / dyspnea    Chronic obstructive pulmonary disease, unspecified COPD type (H)       aspirin 81 MG tablet      Take 1 tablet by mouth daily.        calcium carbonate 600 MG tablet   Generic drug:  calcium carbonate     60 tablet    Take by mouth 2 times daily (with meals)        citalopram 10 MG tablet    celeXA    90 tablet    Take 1 tablet (10 mg) by mouth daily    Anxiety, Depression, unspecified depression type       CO Q 10 PO      Take 1 tablet by mouth daily        darifenacin 7.5 MG 24 hr tablet    ENABLEX    90 tablet    Take 1 tablet (7.5 mg) by mouth daily     Overactive bladder       dexamethasone 4 MG tablet    DECADRON    6 tablet    Take 2 tablets (8 mg) by mouth daily for 3 doses Start on Day 2 of first cycle of doxorubicin / cyclophosphamide.    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)       guaiFENesin 600 MG 12 hr tablet    MUCINEX    180 tablet    Take 2 tablets (1,200 mg) by mouth 2 times daily    Cough with sputum       hydrOXYzine 25 MG tablet    ATARAX    100 tablet    Take 1-2 tablets (25-50 mg) by mouth every 6 hours as needed for itching    Hives       lidocaine-prilocaine cream    EMLA    30 g    Please apply to port site 30 minutes before use prn    Personal history of malignant neoplasm of breast       LORazepam 0.5 MG tablet    ATIVAN    30 tablet    Take 1 tablet (0.5 mg) by mouth every 4 hours as needed (Anxiety, Nausea/Vomiting or Sleep)    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)       MULTIPLE VITAMIN PO      Take 1 tablet by mouth daily        * order for [facility]      RespirFilmakas Dream Station Auto CPAP 12-18 cm, F&P Simplus FFM small.    MERLIN (obstructive sleep apnea)       * order for [facility]     1 Device    Cranial prosthesis    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)       pantoprazole 40 MG EC tablet    PROTONIX    30 tablet    Take 1 tablet (40 mg) by mouth every morning    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)       predniSONE 10 MG tablet    DELTASONE    150 tablet    Take 60mg daily for 1 week, then continue to decrease by 10mg each week    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)       tiotropium 18 MCG capsule    SPIRIVA    1 capsule    Inhale 1 capsule (18 mcg) into the lungs daily Inhale contents of one capsule    Chronic obstructive  pulmonary disease, unspecified COPD type (H)       VITAMIN B 12 PO      Take 100 mcg by mouth daily        VITAMIN B6 PO      Take 1 tablet by mouth daily.        vitamin D 1000 UNITS capsule      TAKE 2 CAPSULES BY MOUTH DAILY        * Notice:  This list has 2 medication(s) that are the same as other medications prescribed for you. Read the directions carefully, and ask your doctor or other care provider to review them with you.

## 2018-01-10 ENCOUNTER — TELEPHONE (OUTPATIENT)
Dept: ONCOLOGY | Facility: CLINIC | Age: 58
End: 2018-01-10

## 2018-01-10 ENCOUNTER — HOSPITAL ENCOUNTER (INPATIENT)
Facility: CLINIC | Age: 58
LOS: 1 days | Discharge: HOME | DRG: 810 | End: 2018-01-13
Attending: EMERGENCY MEDICINE | Admitting: INTERNAL MEDICINE
Payer: COMMERCIAL

## 2018-01-10 ENCOUNTER — APPOINTMENT (OUTPATIENT)
Dept: GENERAL RADIOLOGY | Facility: CLINIC | Age: 58
DRG: 810 | End: 2018-01-10
Attending: EMERGENCY MEDICINE
Payer: COMMERCIAL

## 2018-01-10 DIAGNOSIS — Z17.1 MALIGNANT NEOPLASM OF UPPER-INNER QUADRANT OF RIGHT BREAST IN FEMALE, ESTROGEN RECEPTOR NEGATIVE (H): ICD-10-CM

## 2018-01-10 DIAGNOSIS — Z17.0 MALIGNANT NEOPLASM OF UPPER-INNER QUADRANT OF RIGHT BREAST IN FEMALE, ESTROGEN RECEPTOR POSITIVE (H): ICD-10-CM

## 2018-01-10 DIAGNOSIS — B37.0 THRUSH: Primary | ICD-10-CM

## 2018-01-10 DIAGNOSIS — D70.9 NEUTROPENIC FEVER (H): ICD-10-CM

## 2018-01-10 DIAGNOSIS — C50.211 MALIGNANT NEOPLASM OF UPPER-INNER QUADRANT OF RIGHT BREAST IN FEMALE, ESTROGEN RECEPTOR POSITIVE (H): ICD-10-CM

## 2018-01-10 DIAGNOSIS — C50.211 MALIGNANT NEOPLASM OF UPPER-INNER QUADRANT OF RIGHT BREAST IN FEMALE, ESTROGEN RECEPTOR NEGATIVE (H): ICD-10-CM

## 2018-01-10 DIAGNOSIS — R50.81 FEVER PRESENTING WITH CONDITIONS CLASSIFIED ELSEWHERE (CODE): ICD-10-CM

## 2018-01-10 DIAGNOSIS — D70.9 AGRANULOCYTOSIS (H): ICD-10-CM

## 2018-01-10 DIAGNOSIS — R50.81 NEUTROPENIC FEVER (H): ICD-10-CM

## 2018-01-10 LAB
ALBUMIN SERPL-MCNC: 2.9 G/DL (ref 3.4–5)
ALBUMIN UR-MCNC: NEGATIVE MG/DL
ALP SERPL-CCNC: 96 U/L (ref 40–150)
ALT SERPL W P-5'-P-CCNC: 26 U/L (ref 0–50)
ANION GAP SERPL CALCULATED.3IONS-SCNC: 12 MMOL/L (ref 3–14)
APPEARANCE UR: CLEAR
AST SERPL W P-5'-P-CCNC: 18 U/L (ref 0–45)
BILIRUB SERPL-MCNC: 1 MG/DL (ref 0.2–1.3)
BILIRUB UR QL STRIP: NEGATIVE
BUN SERPL-MCNC: 11 MG/DL (ref 7–30)
CALCIUM SERPL-MCNC: 8.6 MG/DL (ref 8.5–10.1)
CHLORIDE SERPL-SCNC: 101 MMOL/L (ref 94–109)
CO2 SERPL-SCNC: 21 MMOL/L (ref 20–32)
COLOR UR AUTO: YELLOW
CREAT SERPL-MCNC: 0.72 MG/DL (ref 0.52–1.04)
DIFFERENTIAL METHOD BLD: ABNORMAL
ERYTHROCYTE [DISTWIDTH] IN BLOOD BY AUTOMATED COUNT: 14.4 % (ref 10–15)
GFR SERPL CREATININE-BSD FRML MDRD: 83 ML/MIN/1.7M2
GLUCOSE SERPL-MCNC: 136 MG/DL (ref 70–99)
GLUCOSE UR STRIP-MCNC: NEGATIVE MG/DL
HCT VFR BLD AUTO: 28.6 % (ref 35–47)
HGB BLD-MCNC: 9.4 G/DL (ref 11.7–15.7)
HGB UR QL STRIP: NEGATIVE
KETONES UR STRIP-MCNC: 5 MG/DL
LACTATE BLD-SCNC: 1.4 MMOL/L (ref 0.7–2)
LEUKOCYTE ESTERASE UR QL STRIP: NEGATIVE
MCH RBC QN AUTO: 33.1 PG (ref 26.5–33)
MCHC RBC AUTO-ENTMCNC: 32.9 G/DL (ref 31.5–36.5)
MCV RBC AUTO: 101 FL (ref 78–100)
NITRATE UR QL: NEGATIVE
PH UR STRIP: 6 PH (ref 5–7)
PLATELET # BLD AUTO: 63 10E9/L (ref 150–450)
POTASSIUM SERPL-SCNC: 4.3 MMOL/L (ref 3.4–5.3)
PROCALCITONIN SERPL-MCNC: 0.25 NG/ML
PROT SERPL-MCNC: 6.6 G/DL (ref 6.8–8.8)
RBC # BLD AUTO: 2.84 10E12/L (ref 3.8–5.2)
SODIUM SERPL-SCNC: 134 MMOL/L (ref 133–144)
SOURCE: ABNORMAL
SP GR UR STRIP: 1.02 (ref 1–1.03)
UROBILINOGEN UR STRIP-MCNC: NORMAL MG/DL (ref 0–2)
WBC # BLD AUTO: 0.4 10E9/L (ref 4–11)

## 2018-01-10 PROCEDURE — 71046 X-RAY EXAM CHEST 2 VIEWS: CPT

## 2018-01-10 PROCEDURE — 87633 RESP VIRUS 12-25 TARGETS: CPT | Performed by: INTERNAL MEDICINE

## 2018-01-10 PROCEDURE — 96365 THER/PROPH/DIAG IV INF INIT: CPT | Performed by: EMERGENCY MEDICINE

## 2018-01-10 PROCEDURE — 87804 INFLUENZA ASSAY W/OPTIC: CPT | Performed by: INTERNAL MEDICINE

## 2018-01-10 PROCEDURE — 85025 COMPLETE CBC W/AUTO DIFF WBC: CPT | Performed by: EMERGENCY MEDICINE

## 2018-01-10 PROCEDURE — 99285 EMERGENCY DEPT VISIT HI MDM: CPT | Mod: 25 | Performed by: EMERGENCY MEDICINE

## 2018-01-10 PROCEDURE — 87086 URINE CULTURE/COLONY COUNT: CPT | Performed by: EMERGENCY MEDICINE

## 2018-01-10 PROCEDURE — 81003 URINALYSIS AUTO W/O SCOPE: CPT | Performed by: EMERGENCY MEDICINE

## 2018-01-10 PROCEDURE — 96361 HYDRATE IV INFUSION ADD-ON: CPT | Performed by: EMERGENCY MEDICINE

## 2018-01-10 PROCEDURE — 96366 THER/PROPH/DIAG IV INF ADDON: CPT | Performed by: EMERGENCY MEDICINE

## 2018-01-10 PROCEDURE — 83605 ASSAY OF LACTIC ACID: CPT | Performed by: EMERGENCY MEDICINE

## 2018-01-10 PROCEDURE — 99285 EMERGENCY DEPT VISIT HI MDM: CPT | Mod: Z6 | Performed by: EMERGENCY MEDICINE

## 2018-01-10 PROCEDURE — 84145 PROCALCITONIN (PCT): CPT | Performed by: EMERGENCY MEDICINE

## 2018-01-10 PROCEDURE — 25000128 H RX IP 250 OP 636: Performed by: EMERGENCY MEDICINE

## 2018-01-10 PROCEDURE — 87040 BLOOD CULTURE FOR BACTERIA: CPT | Performed by: EMERGENCY MEDICINE

## 2018-01-10 PROCEDURE — 80053 COMPREHEN METABOLIC PANEL: CPT | Performed by: EMERGENCY MEDICINE

## 2018-01-10 PROCEDURE — 96367 TX/PROPH/DG ADDL SEQ IV INF: CPT | Performed by: EMERGENCY MEDICINE

## 2018-01-10 RX ORDER — SODIUM CHLORIDE 9 MG/ML
1000 INJECTION, SOLUTION INTRAVENOUS CONTINUOUS
Status: DISCONTINUED | OUTPATIENT
Start: 2018-01-10 | End: 2018-01-12

## 2018-01-10 RX ORDER — CEFTAZIDIME 2 G/1
2 INJECTION, POWDER, FOR SOLUTION INTRAVENOUS EVERY 8 HOURS
Status: DISCONTINUED | OUTPATIENT
Start: 2018-01-10 | End: 2018-01-10

## 2018-01-10 RX ORDER — CEFEPIME 1 G/50ML
1 INJECTION, SOLUTION INTRAVENOUS EVERY 12 HOURS
Status: DISCONTINUED | OUTPATIENT
Start: 2018-01-10 | End: 2018-01-10

## 2018-01-10 RX ORDER — CEFEPIME 1 G/50ML
2 INJECTION, SOLUTION INTRAVENOUS EVERY 8 HOURS
Status: DISCONTINUED | OUTPATIENT
Start: 2018-01-10 | End: 2018-01-13 | Stop reason: HOSPADM

## 2018-01-10 RX ADMIN — CEFEPIME 2 G: 1 INJECTION, SOLUTION INTRAVENOUS at 19:51

## 2018-01-10 RX ADMIN — VANCOMYCIN HYDROCHLORIDE 1250 MG: 10 INJECTION, POWDER, LYOPHILIZED, FOR SOLUTION INTRAVENOUS at 17:49

## 2018-01-10 RX ADMIN — SODIUM CHLORIDE 1000 ML: 9 INJECTION, SOLUTION INTRAVENOUS at 15:50

## 2018-01-10 RX ADMIN — SODIUM CHLORIDE 1000 ML: 900 INJECTION, SOLUTION INTRAVENOUS at 17:49

## 2018-01-10 RX ADMIN — SODIUM CHLORIDE 1000 ML: 900 INJECTION, SOLUTION INTRAVENOUS at 23:08

## 2018-01-10 ASSESSMENT — ENCOUNTER SYMPTOMS
ABDOMINAL PAIN: 0
CHILLS: 0
VOMITING: 0
CONSTIPATION: 0
FEVER: 1
DIARRHEA: 0
DIFFICULTY URINATING: 0
FATIGUE: 1
NAUSEA: 0
SHORTNESS OF BREATH: 1
COUGH: 0
MYALGIAS: 0

## 2018-01-10 NOTE — ED PROVIDER NOTES
History     Chief Complaint   Patient presents with     Fever     HPI  Ashleigh Alonzo is a 57 year old female with a history of COPD, and breast cancer currently on chemotherapy (cycle 1 day 1 - Adriamycin, Cytoxan) who presents for evaluation of a fever. Patient reports she woke up at 1 AM this morning with a fever of 102.5  F. She took two tablets of Tylenol for her fever and went back to sleep, but when she woke up this morning and rechecked her temperature she was febrile at 103.3  F. She took two more tablets of Tylenol and ate some ice chips prior to arrival. On arrival she is afebrile with a low-grade temperature of 99.1  F and currently states she feels tired and somewhat more short of breath compared to baseline. She denies any pain, headache, dizziness, cough, sneezing, or sore throat. She says she has a mild runny nose. No difficulty urinating or passing bowel movements. No nausea or vomiting. No rash or skin changes. She has a left anterior upper chest port in place.     I have reviewed the Medications, Allergies, Past Medical and Surgical History, and Social History in the [facility] system.  Past Medical History:   Diagnosis Date     COPD (chronic obstructive pulmonary disease) (H) 2011     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) 8/30/2017     Periodontal disease      Sleep apnea 12/2015     Urticaria        Past Surgical History:   Procedure Laterality Date     APPENDECTOMY  10/6/2015     CATARACT IOL, RT/LT  11/2016    bilateral      COLONOSCOPY  11/15/2011    Procedure:COLONOSCOPY; Colonoscopy, screening; Surgeon:ANASTASIA BUNCH; Location: OR     INSERT PORT VASCULAR ACCESS Left 9/1/2017    Procedure: INSERT PORT VASCULAR ACCESS;  Single Lumen Chest Power Port;  Surgeon: Leif Parkinson PA-C;  Location: [facility] OR     TUBAL LIGATION  12/2004    Bilateral       Family History   Problem Relation Age of Onset     Cardiovascular Father  46     MI     Eye Disorder Father      CEREBROVASCULAR DISEASE Father      Arthritis Sister      Neurologic Disorder Brother      CANCER No family hx of      DIABETES No family hx of      Hypertension No family hx of        Social History   Substance Use Topics     Smoking status: Former Smoker     Packs/day: 1.00     Years: 15.00     Types: Cigarettes     Quit date: 1/1/2000     Smokeless tobacco: Never Used     Alcohol use 8.4 - 12.6 oz/week      Comment: daily       No current facility-administered medications for this encounter.      Current Outpatient Prescriptions   Medication     dexamethasone (DECADRON) 4 MG tablet     calcium carbonate (CALCIUM CARBONATE) 600 MG tablet     predniSONE (DELTASONE) 10 MG tablet     pantoprazole (PROTONIX) 40 MG EC tablet     lidocaine-prilocaine (EMLA) cream     order for DME     LORazepam (ATIVAN) 0.5 MG tablet     hydrOXYzine (ATARAX) 25 MG tablet     guaiFENesin (MUCINEX) 600 MG 12 hr tablet     tiotropium (SPIRIVA) 18 MCG capsule     albuterol (PROAIR HFA/PROVENTIL HFA/VENTOLIN HFA) 108 (90 BASE) MCG/ACT Inhaler     citalopram (CELEXA) 10 MG tablet     darifenacin (ENABLEX) 7.5 MG 24 hr tablet     order for DME     Coenzyme Q10 (CO Q 10 PO)     MULTIPLE VITAMIN PO     Cyanocobalamin (VITAMIN B 12 PO)     Pyridoxine HCl (VITAMIN B6 PO)     aspirin 81 MG tablet     VITAMIN D 1000 UNIT OR CAPS     Facility-Administered Medications Ordered in Other Encounters   Medication     lidocaine 1 % 9 mL     sodium bicarbonate 8.4 % injection 1 mEq     lidocaine-EPINEPHrine 1.5 %-1:[id number] injection 10 mL      No Known Allergies    Review of Systems   Constitutional: Positive for fatigue and fever. Negative for chills.   Respiratory: Positive for shortness of breath. Negative for cough.    Cardiovascular: Negative for chest pain.   Gastrointestinal: Negative for abdominal pain, constipation, diarrhea, nausea and vomiting.   Genitourinary: Negative for difficulty urinating.  "  Musculoskeletal: Negative for myalgias.   All other systems reviewed and are negative.      Physical Exam   BP: 102/59  Heart Rate: 124  Temp: 99.1  F (37.3  C) (Tmax 103.3 at home)  Resp: 16  Height: 162.6 cm (5' 4\")  Weight: 74.8 kg (165 lb)  SpO2: 97 %      Physical Exam   Constitutional: She is oriented to person, place, and time. She appears well-developed and well-nourished.   HENT:   Head: Normocephalic and atraumatic.   Mouth/Throat: Oropharynx is clear and moist.   Neck: Normal range of motion. Neck supple.   Cardiovascular: Normal rate, regular rhythm and normal heart sounds.    Pulmonary/Chest: Effort normal and breath sounds normal. No respiratory distress. She has no wheezes. She has no rales.   Left sided stephan-cath in place   Abdominal: Soft. She exhibits no distension. There is no tenderness. There is no rebound.   Musculoskeletal: She exhibits no edema, tenderness or deformity.   Neurological: She is alert and oriented to person, place, and time. No cranial nerve deficit. Coordination normal.   Skin: Skin is warm and dry.   Psychiatric: She has a normal mood and affect. Her behavior is normal. Thought content normal.       ED Course     ED Course     Procedures       3:05 PM  The patient was seen and examined by Dr. Campbell in Room 10.          Critical Care time:  none     Results for orders placed or performed during the hospital encounter of 01/10/18   XR Chest 2 Views    Narrative    Exam: XR CHEST 2 VW, 1/10/2018 4:00 PM    Indication: fever;     Comparison: 11/13/2017    Findings:   PA and lateral views of the chest. Left internal jugular Port-A-Cath  tip projects at the low SVC. The cardiomediastinal silhouette and  pulmonary vasculature are within normal limits. No pleural effusion or  pneumothorax. Decreased bilateral patchy airspace opacities with  persistent opacity in the peripheral right mid to upper lung.      Impression    Impression: Overall decreased bilateral patchy airspace " opacities  compared to 11/11/2017 chest radiographs 11/13/2017 chest CT, at which  time organizing pneumonia was suspected. Recurrent/ongoing infection  and drug reaction in this patient undergoing chemotherapy remain a  possibility.    I have personally reviewed the examination and initial interpretation  and I agree with the findings.    OCTAVIO ALVES MD   CBC with platelets differential   Result Value Ref Range    WBC 0.4 (LL) 4.0 - 11.0 10e9/L    RBC Count 2.84 (L) 3.8 - 5.2 10e12/L    Hemoglobin 9.4 (L) 11.7 - 15.7 g/dL    Hematocrit 28.6 (L) 35.0 - 47.0 %     (H) 78 - 100 fl    MCH 33.1 (H) 26.5 - 33.0 pg    MCHC 32.9 31.5 - 36.5 g/dL    RDW 14.4 10.0 - 15.0 %    Platelet Count 63 (L) 150 - 450 10e9/L    Diff Method Automated Method    Comprehensive metabolic panel   Result Value Ref Range    Sodium 134 133 - 144 mmol/L    Potassium 4.3 3.4 - 5.3 mmol/L    Chloride 101 94 - 109 mmol/L    Carbon Dioxide 21 20 - 32 mmol/L    Anion Gap 12 3 - 14 mmol/L    Glucose 136 (H) 70 - 99 mg/dL    Urea Nitrogen 11 7 - 30 mg/dL    Creatinine 0.72 0.52 - 1.04 mg/dL    GFR Estimate 83 >60 mL/min/1.7m2    GFR Estimate If Black >90 >60 mL/min/1.7m2    Calcium 8.6 8.5 - 10.1 mg/dL    Bilirubin Total 1.0 0.2 - 1.3 mg/dL    Albumin 2.9 (L) 3.4 - 5.0 g/dL    Protein Total 6.6 (L) 6.8 - 8.8 g/dL    Alkaline Phosphatase 96 40 - 150 U/L    ALT 26 0 - 50 U/L    AST 18 0 - 45 U/L   Procalcitonin   Result Value Ref Range    Procalcitonin 0.25 ng/ml   Lactic acid   Result Value Ref Range    Lactic Acid 1.4 0.7 - 2.0 mmol/L   Blood culture   Result Value Ref Range    Specimen Description Right Arm     Culture Micro No growth after 3 hours      Medications   0.9% sodium chloride BOLUS (0 mLs Intravenous Stopped 1/10/18 5110)     Followed by   0.9% sodium chloride infusion (1,000 mLs Intravenous New Bag 1/10/18 1749)   vancomycin (VANCOCIN) 1,250 mg in NaCl 0.9 % 250 mL intermittent infusion (0 mg Intravenous Stopped 1/10/18 1951)    ceFEPIme (MAXIPIME) intermittent infusion 2 g (2 g Intravenous New Bag 1/10/18 1951)               Labs Ordered and Resulted from Time of ED Arrival Up to the Time of Departure from the ED - No data to display         Assessments & Plan (with Medical Decision Making)   Pt is a 56 yo female with hx of Breast CA who presented to the ER for fever since last night. Pt notes mild SOB but otherwise no acute complaints.  Labs show that pt is neutropenic with a total WBC being 0.4.  Patient has blood cultures pending.  Lactic acid and pro-calcitonin are stable.  Obtain a chest x-ray that shows decreased opacities from November but concern for possible ongoing infection.  Patient started on cefepime and vancomycin.  Discuss case with oncology fellow.  Patient will be admitted to oncology for further care    I have reviewed the nursing notes.    I have reviewed the findings, diagnosis, plan and need for follow up with the patient.    New Prescriptions    No medications on file       Final diagnoses:   Neutropenic fever (H)   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)   IMala, am serving as a trained medical scribe to document services personally performed by Elle Campbell MD, based on the provider's statements to me.   IElle MD, was physically present and have reviewed and verified the accuracy of this note documented by Mala Barlow.      1/10/2018   [facility], [place], EMERGENCY DEPARTMENT     Elle Campbell MD  01/10/18 6950

## 2018-01-10 NOTE — IP AVS SNAPSHOT
MRN:[id number]                      After Visit Summary   1/10/2018    Ashleigh Alonzo    MRN: [id number]           Thank you!     Thank you for choosing [facility] for your care. Our goal is always to provide you with excellent care. Hearing back from our patients is one way we can continue to improve our services. Please take a few minutes to complete the written survey that you may receive in the mail after you visit with us. Thank you!        Patient Information     Date Of Birth          1960        Designated Caregiver       Most Recent Value    Caregiver    Will someone help with your care after discharge? yes    Name of designated caregiver      Phone number of caregiver see chart     Caregiver address see chart       About your hospital stay     You were admitted on:  January 12, 2018 You last received care in the:  [address] [facility]    You were discharged on:  January 13, 2018        Reason for your hospital stay       Febrile neutropenia                  Who to Call     For medical emergencies, please call 911.  For non-urgent questions about your medical care, please call your primary care provider or clinic, [phone]          Attending Provider     Provider Specialty    Elle Campbell MD Emergency Medicine    Nishant Benedict MD Hematology & Oncology       Primary Care Provider Office Phone # Fax #    Radha Cazares [phone]      After Care Instructions     Activity       Your activity upon discharge: activity as tolerated            Diet       Follow this diet upon discharge: Regular                  Your next 10 appointments already scheduled     Jan 16, 2018  8:15 AM CST   [facility] with  [facility]   [facility] ([facility])    [address]  [address]  [facility] [id number] [phone]            Jan 16, 2018  8:45 AM CST   (Arrive by 8:30 AM)   Return  Visit with Fara Bradshaw MD   [facility] [place] ([facility])    [address]  [address]  [facility] [address]   [phone]            Jan 16, 2018  9:30 AM CST   Infusion 120 with UC ONCOLOGY INFUSION, UC 22 ATC   [facility] [place] ([facility])    [address]  [address]  [facility] [address]   [phone]            Jan 29, 2018 10:45 AM CST   [facility] Draw with [facility] DRAW   [facility] ([facility])    [address]  [address]  [facility] [address]   [phone]            Jan 29, 2018 11:20 AM CST   (Arrive by 11:05 AM)   Return Visit with Lilia Livingston PA-C   [facility] [place] ([facility])    [address]  [address]  [facility] [address]   [phone]            Jan 29, 2018 12:00 PM CST   Infusion 120 with UC ONCOLOGY INFUSION, UC 15 ATC   [facility] [place] ([facility])    [address]  [address]  [facility] [address]   [phone]            Feb 13, 2018  9:00 AM CST   [facility] Draw with UC [facility] DRAW   [facility] ([facility])    [address]  [address]  [facility] [address]   [phone]            Feb 13, 2018  9:30 AM CST   (Arrive by 9:15 AM)   Return Visit with Lilia Livingston PA-C   [facility] [place] ([facility])    [address]  [address]  [facility] [address]   [phone]              Pending Results     Date and Time Order Name Status Description    1/12/2018 0336 Blood culture Preliminary     1/10/2018 1513 Blood culture Preliminary     1/10/2018 1513 Blood culture Preliminary             Statement of Approval     Ordered          01/13/18 1109  I have reviewed and agree with all the  "recommendations and orders detailed in this document.  EFFECTIVE NOW     Approved and electronically signed by:  Ciara Chamorro MD             Admission Information     Date & Time Provider Department Dept. Phone    1/10/2018 Nishant Benedict MD [address] [facility] [phone]      Your Vitals Were     Blood Pressure Pulse Temperature Respirations Height Weight    100/58 (BP Location: Left arm) 108 99  F (37.2  C) (Oral) 18 1.626 m (5' 4\") 72.6 kg (160 lb 1.6 oz)    Pulse Oximetry BMI (Body Mass Index)                96% 27.48 kg/m2          MyChart Information     [facility] gives you secure access to your electronic health record. If you see a primary care provider, you can also send messages to your care team and make appointments. If you have questions, please call your primary care clinic.  If you do not have a primary care provider, please call [phone] and they will assist you.        Care EveryWhere ID     This is your Care EveryWhere ID. This could be used by other organizations to access your [facility] medical records  [id number]        Equal Access to Services     [facility] : Hadjose martin Granda, kaden clancy, bang weiss. So [facility] [phone].    ATENCIÓN: Si habla español, tiene a blackman disposición servicios gratuitos de asistencia lingüística. Pauline al [phone].    We comply with applicable federal civil rights laws and [place] laws. We do not discriminate on the basis of race, color, national origin, age, disability, sex, sexual orientation, or gender identity.               Review of your medicines      START taking        Dose / Directions    cefpodoxime 200 MG tablet   Commonly known as:  VANTIN   Used for:  Neutropenic fever (H), 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)        Dose:  200 mg   Take 1 tablet (200 mg) by mouth 2 times daily "   Quantity:  14 tablet   Refills:  0       nystatin 007161 UNIT/ML suspension   Commonly known as:  MYCOSTATIN   Indication:  Candidiasis Fungal Infection of the Oropharynx   Used for:  Neutropenic fever (H), 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), Thrush        Dose:  893547 Units   Take 5 mLs (500,000 Units) by mouth 4 times daily   Quantity:  280 mL   Refills:  3         CONTINUE these medicines which may have CHANGED, or have new prescriptions. If we are uncertain of the size of tablets/capsules you have at home, strength may be listed as something that might have changed.        Dose / Directions    predniSONE 10 MG tablet   Commonly known as:  DELTASONE   This may have changed:    - how much to take  - how to take this  - when to take this  - additional instructions   Used for:  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)        Take 60mg daily for 1 week, then continue to decrease by 10mg each week   Quantity:  150 tablet   Refills:  0         CONTINUE these medicines which have NOT CHANGED        Dose / Directions    albuterol 108 (90 BASE) MCG/ACT Inhaler   Commonly known as:  PROAIR HFA/PROVENTIL HFA/VENTOLIN HFA   Used for:  Chronic obstructive pulmonary disease, unspecified COPD type (H)        Dose:  2 puff   Inhale 2 puffs into the lungs every 6 hours as needed for shortness of breath / dyspnea   Quantity:  1 Inhaler   Refills:  5       aspirin 81 MG tablet        Dose:  1 tablet   Take 1 tablet by mouth daily.   Refills:  3       calcium carbonate 600 MG tablet   Generic drug:  calcium carbonate        Take by mouth 2 times daily (with meals)   Quantity:  60 tablet   Refills:  0       citalopram 10 MG tablet   Commonly known as:  celeXA   Used for:  Anxiety, Depression, unspecified depression type        Dose:  10 mg   Take 1 tablet (10 mg) by mouth daily   Quantity:  90 tablet    Refills:  3       CO Q 10 PO        Dose:  1 tablet   Take 1 tablet by mouth daily   Refills:  0       darifenacin 7.5 MG 24 hr tablet   Commonly known as:  ENABLEX   Used for:  Overactive bladder        Dose:  7.5 mg   Take 1 tablet (7.5 mg) by mouth daily   Quantity:  90 tablet   Refills:  3       guaiFENesin 600 MG 12 hr tablet   Commonly known as:  MUCINEX   Used for:  Cough with sputum        Dose:  1200 mg   Take 2 tablets (1,200 mg) by mouth 2 times daily   Quantity:  180 tablet   Refills:  6       hydrOXYzine 25 MG tablet   Commonly known as:  ATARAX   Used for:  Hives        Take 1-2 tablets (25-50 mg) by mouth every 6 hours as needed for itching   Quantity:  100 tablet   Refills:  2       lidocaine-prilocaine cream   Commonly known as:  EMLA   Used for:  Personal history of malignant neoplasm of breast        Please apply to port site 30 minutes before use prn   Quantity:  30 g   Refills:  1       LORazepam 0.5 MG tablet   Commonly known as:  ATIVAN   Used for:  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)        Dose:  0.5 mg   Take 1 tablet (0.5 mg) by mouth every 4 hours as needed (Anxiety, Nausea/Vomiting or Sleep)   Quantity:  30 tablet   Refills:  2       MULTIPLE VITAMIN PO        Dose:  1 tablet   Take 1 tablet by mouth daily   Refills:  0       * order for DME   Used for:  MERLIN (obstructive sleep apnea)        Respironics Dream Station Auto CPAP 12-18 cm, F&P Simplus FFM small.   Refills:  0       * order for DME   Used for:  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)        Cranial prosthesis   Quantity:  1 Device   Refills:  1       pantoprazole 40 MG EC tablet   Commonly known as:  PROTONIX   Used for:  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)        Dose:  40 mg   Take 1 tablet (40 mg) by mouth every morning   Quantity:   30 tablet   Refills:  0       tiotropium 18 MCG capsule   Commonly known as:  SPIRIVA   Used for:  Chronic obstructive pulmonary disease, unspecified COPD type (H)        Dose:  18 mcg   Inhale 1 capsule (18 mcg) into the lungs daily Inhale contents of one capsule   Quantity:  1 capsule   Refills:  11       VITAMIN B 12 PO        Dose:  100 mcg   Take 100 mcg by mouth daily   Refills:  0       VITAMIN B6 PO        Dose:  1 tablet   Take 1 tablet by mouth daily.   Refills:  0       vitamin D 1000 UNITS capsule        TAKE 2 CAPSULES BY MOUTH DAILY   Refills:  0       * Notice:  This list has 2 medication(s) that are the same as other medications prescribed for you. Read the directions carefully, and ask your doctor or other care provider to review them with you.         Where to get your medicines      These medications were sent to [facility] [address] [address] [address]  [address] [place] [address]     Phone:  [phone]     cefpodoxime 200 MG tablet    nystatin [id number] UNIT/ML suspension               ANTIBIOTIC INSTRUCTION     You've Been Prescribed an Antibiotic - Now What?  Your healthcare team thinks that you or your loved one might have an infection. Some infections can be treated with antibiotics, which are powerful, life-saving drugs. Like all medications, antibiotics have side effects and should only be used when necessary. There are some important things you should know about your antibiotic treatment.      Your healthcare team may run tests before you start taking an antibiotic.    Your team may take samples (e.g., from your blood, urine or other areas) to run tests to look for bacteria. These test can be important to determine if you need an antibiotic at all and, if you do, which antibiotic will work best.      Within a few days, your healthcare team might change or even stop your antibiotic.    Your team may start you on an  antibiotic while they are working to find out what is making you sick.    Your team might change your antibiotic because test results show that a different antibiotic would be better to treat your infection.    In some cases, once your team has more information, they learn that you do not need an antibiotic at all. They may find out that you don't have an infection, or that the antibiotic you're taking won't work against your infection. For example, an infection caused by a virus can't be treated with antibiotics. Staying on an antibiotic when you don't need it is more likely to be harmful than helpful.      You may experience side effects from your antibiotic.    Like all medications, antibiotics have side effects. Some of these can be serious.    Let you healthcare team know if you have any known allergies when you are admitted to the hospital.    One significant side effect of nearly all antibiotics is the risk of severe and sometimes deadly diarrhea caused by Clostridium difficile (C. Difficile). This occurs when a person takes antibiotics because some good germs are destroyed. Antibiotic use allows C. diificile to take over, putting patients at high risk for this serious infection.    As a patient or caregiver, it is important to understand your or your loved one's antibiotic treatment. It is especially important for caregivers to speak up when patients can't speak for themselves. Here are some important questions to ask your healthcare team.    What infection is this antibiotic treating and how do you know I have that infection?    What side effects might occur from this antibiotic?    How long will I need to take this antibiotic?    Is it safe to take this antibiotic with other medications or supplements (e.g., vitamins) that I am taking?     Are there any special directions I need to know about taking this antibiotic? For example, should I take it with food?    How will I be monitored to know whether my  infection is responding to the antibiotic?    What tests may help to make sure the right antibiotic is prescribed for me?      Information provided by:  www.cdc.gov/getsmart  U.S. Department of Health and Human Services  Centers for disease Control and Prevention  National Center for Emerging and Zoonotic Infectious Diseases  Division of Healthcare Quality Promotion         Protect others around you: Learn how to safely use, store and throw away your medicines at www.disposemymeds.org.             Medication List: This is a list of all your medications and when to take them. Check marks below indicate your daily home schedule. Keep this list as a reference.      Medications           Morning Afternoon Evening Bedtime As Needed    albuterol 108 (90 BASE) MCG/ACT Inhaler   Commonly known as:  PROAIR HFA/PROVENTIL HFA/VENTOLIN HFA   Inhale 2 puffs into the lungs every 6 hours as needed for shortness of breath / dyspnea                                aspirin 81 MG tablet   Take 1 tablet by mouth daily.                                calcium carbonate 600 MG tablet   Take by mouth 2 times daily (with meals)   Generic drug:  calcium carbonate                                cefpodoxime 200 MG tablet   Commonly known as:  VANTIN   Take 1 tablet (200 mg) by mouth 2 times daily                                citalopram 10 MG tablet   Commonly known as:  celeXA   Take 1 tablet (10 mg) by mouth daily   Last time this was given:  10 mg on 1/13/2018 10:12 AM                                CO Q 10 PO   Take 1 tablet by mouth daily                                darifenacin 7.5 MG 24 hr tablet   Commonly known as:  ENABLEX   Take 1 tablet (7.5 mg) by mouth daily                                guaiFENesin 600 MG 12 hr tablet   Commonly known as:  MUCINEX   Take 2 tablets (1,200 mg) by mouth 2 times daily   Last time this was given:  1,200 mg on 1/13/2018 10:12 AM                                hydrOXYzine 25 MG tablet   Commonly  known as:  ATARAX   Take 1-2 tablets (25-50 mg) by mouth every 6 hours as needed for itching                                lidocaine-prilocaine cream   Commonly known as:  EMLA   Please apply to port site 30 minutes before use prn                                LORazepam 0.5 MG tablet   Commonly known as:  ATIVAN   Take 1 tablet (0.5 mg) by mouth every 4 hours as needed (Anxiety, Nausea/Vomiting or Sleep)                                MULTIPLE VITAMIN PO   Take 1 tablet by mouth daily                                nystatin 113529 UNIT/ML suspension   Commonly known as:  MYCOSTATIN   Take 5 mLs (500,000 Units) by mouth 4 times daily   Last time this was given:  500,000 Units on 1/13/2018 12:37 PM                                * order for DME   Respironics Dream Station Auto CPAP 12-18 cm, F&P Simplus FFM small.                                * order for DME   Cranial prosthesis                                pantoprazole 40 MG EC tablet   Commonly known as:  PROTONIX   Take 1 tablet (40 mg) by mouth every morning   Last time this was given:  40 mg on 1/13/2018 10:12 AM                                predniSONE 10 MG tablet   Commonly known as:  DELTASONE   Take 60mg daily for 1 week, then continue to decrease by 10mg each week   Last time this was given:  10 mg on 1/13/2018 10:13 AM                                tiotropium 18 MCG capsule   Commonly known as:  SPIRIVA   Inhale 1 capsule (18 mcg) into the lungs daily Inhale contents of one capsule                                VITAMIN B 12 PO   Take 100 mcg by mouth daily                                VITAMIN B6 PO   Take 1 tablet by mouth daily.                                vitamin D 1000 UNITS capsule   TAKE 2 CAPSULES BY MOUTH DAILY                                * Notice:  This list has 2 medication(s) that are the same as other medications prescribed for you. Read the directions carefully, and ask your doctor or other care provider to review them with  you.

## 2018-01-10 NOTE — TELEPHONE ENCOUNTER
Pt received C1D1 adriamycin and cytoxan on 1/2/18. She had been on taxol previously.  Pt received Nuelasta on pro.   Ashleigh vega calls to report she woke up with a fever during the night, 102.5. She took tylenol and went back to bed. Temp is now 103.3. Denies chills, sweats, nausea, shortness of breath, cheat pain, respiratory symptoms, bowel or bladder concerns.  Her mouth is sore but she hasn't inspected it. It feels better with ice.  Pt will report to ER for evaluation. She has a friend who will bring her in.  Will update care team.

## 2018-01-10 NOTE — IP AVS SNAPSHOT
[address] [address]    Phone:  [phone]                                       After Visit Summary   1/10/2018    Ashleigh Alonzo    MRN: [id number]           After Visit Summary Signature Page     I have received my discharge instructions, and my questions have been answered. I have discussed any challenges I see with this plan with the nurse or doctor.    ..........................................................................................................................................  Patient/Patient Representative Signature      ..........................................................................................................................................  Patient Representative Print Name and Relationship to Patient    ..................................................               ................................................  Date                                            Time    ..........................................................................................................................................  Reviewed by Signature/Title    ...................................................              ..............................................  Date                                                            Time

## 2018-01-10 NOTE — ED NOTES
Alert orientated ambulatory patient presents to ER triage with c/o:    1.) Fever - tmax at home 103.3    Hx:  Patient denies cough or urinary symptoms.      Airway WDLs  Breathing WDLs  Circulation HR > 100

## 2018-01-10 NOTE — PHARMACY-VANCOMYCIN DOSING SERVICE
Pharmacy Vancomycin Initial Note  Date of Service January 10, 2018  Patient's  1960  57 year old, female    Indication: Febrile Neutropenia    Current estimated CrCl = Estimated Creatinine Clearance: 85.3 mL/min (based on Cr of 0.72).    Creatinine for last 3 days  1/10/2018:  3:34 PM Creatinine 0.72 mg/dL    Recent Vancomycin Level(s) for last 3 days  No results found for requested labs within last 72 hours.      Vancomycin IV Administrations (past 72 hours)      No vancomycin orders with administrations in past 72 hours.                Nephrotoxins and other renal medications (Future)    Start     Dose/Rate Route Frequency Ordered Stop    01/10/18 1703  vancomycin (VANCOCIN) 1,250 mg in NaCl 0.9 % 250 mL intermittent infusion      1,250 mg  over 90 Minutes Intravenous EVERY 12 HOURS 01/10/18 170            Contrast Orders - past 72 hours     None                Plan:  1.  Start vancomycin  1250 mg IV q12h (17mg/kg using ABW = 74.8kg).   2.  Goal Trough Level: 15-20 mg/L   3.  Pharmacy will check trough levels as appropriate in 1-3 Days.    4. Serum creatinine levels will be ordered daily for the first week of therapy and at least twice weekly for subsequent weeks.    5. [place] method utilized to dose vancomycin therapy: Method 2    Vancomycin is a restricted antibiotic and requires ID/Antimicrobial Management Team (AMT) approval for empiric use beyond 48 hours ().    Yudi Tidwell, PharmD

## 2018-01-11 LAB
ANION GAP SERPL CALCULATED.3IONS-SCNC: 9 MMOL/L (ref 3–14)
BASOPHILS # BLD AUTO: 0 10E9/L (ref 0–0.2)
BASOPHILS NFR BLD AUTO: 4 %
BUN SERPL-MCNC: 7 MG/DL (ref 7–30)
CALCIUM SERPL-MCNC: 7.8 MG/DL (ref 8.5–10.1)
CHLORIDE SERPL-SCNC: 105 MMOL/L (ref 94–109)
CO2 SERPL-SCNC: 21 MMOL/L (ref 20–32)
CREAT SERPL-MCNC: 0.58 MG/DL (ref 0.52–1.04)
DIFFERENTIAL METHOD BLD: ABNORMAL
EOSINOPHIL # BLD AUTO: 0 10E9/L (ref 0–0.7)
EOSINOPHIL NFR BLD AUTO: 0 %
ERYTHROCYTE [DISTWIDTH] IN BLOOD BY AUTOMATED COUNT: 14.4 % (ref 10–15)
FLUAV H1 2009 PAND RNA SPEC QL NAA+PROBE: NEGATIVE
FLUAV H1 RNA SPEC QL NAA+PROBE: NEGATIVE
FLUAV H3 RNA SPEC QL NAA+PROBE: NEGATIVE
FLUAV RNA SPEC QL NAA+PROBE: NEGATIVE
FLUAV+FLUBV AG SPEC QL: NEGATIVE
FLUAV+FLUBV AG SPEC QL: NEGATIVE
FLUBV RNA SPEC QL NAA+PROBE: NEGATIVE
GFR SERPL CREATININE-BSD FRML MDRD: >90 ML/MIN/1.7M2
GLUCOSE SERPL-MCNC: 86 MG/DL (ref 70–99)
HADV DNA SPEC QL NAA+PROBE: NEGATIVE
HADV DNA SPEC QL NAA+PROBE: NEGATIVE
HCT VFR BLD AUTO: 23.9 % (ref 35–47)
HGB BLD-MCNC: 8.1 G/DL (ref 11.7–15.7)
HMPV RNA SPEC QL NAA+PROBE: NEGATIVE
HPIV1 RNA SPEC QL NAA+PROBE: NEGATIVE
HPIV2 RNA SPEC QL NAA+PROBE: NEGATIVE
HPIV3 RNA SPEC QL NAA+PROBE: NEGATIVE
LACTATE BLD-SCNC: 1.5 MMOL/L (ref 0.7–2)
LACTATE BLD-SCNC: 2.4 MMOL/L (ref 0.7–2)
LYMPHOCYTES # BLD AUTO: 0.4 10E9/L (ref 0.8–5.3)
LYMPHOCYTES NFR BLD AUTO: 50 %
MCH RBC QN AUTO: 34 PG (ref 26.5–33)
MCHC RBC AUTO-ENTMCNC: 33.9 G/DL (ref 31.5–36.5)
MCV RBC AUTO: 100 FL (ref 78–100)
MICROBIOLOGIST REVIEW: NORMAL
MONOCYTES # BLD AUTO: 0.1 10E9/L (ref 0–1.3)
MONOCYTES NFR BLD AUTO: 10 %
NEUTROPHILS # BLD AUTO: 0.3 10E9/L (ref 1.6–8.3)
NEUTROPHILS NFR BLD AUTO: 36 %
PLATELET # BLD AUTO: 55 10E9/L (ref 150–450)
POTASSIUM SERPL-SCNC: 3.6 MMOL/L (ref 3.4–5.3)
RBC # BLD AUTO: 2.38 10E12/L (ref 3.8–5.2)
RBC MORPH BLD: NORMAL
RHINOVIRUS RNA SPEC QL NAA+PROBE: NEGATIVE
RSV RNA SPEC QL NAA+PROBE: NEGATIVE
RSV RNA SPEC QL NAA+PROBE: NEGATIVE
SODIUM SERPL-SCNC: 135 MMOL/L (ref 133–144)
SPECIMEN SOURCE: NORMAL
SPECIMEN SOURCE: NORMAL
WBC # BLD AUTO: 0.7 10E9/L (ref 4–11)

## 2018-01-11 PROCEDURE — 25000128 H RX IP 250 OP 636: Performed by: EMERGENCY MEDICINE

## 2018-01-11 PROCEDURE — 25000132 ZZH RX MED GY IP 250 OP 250 PS 637: Performed by: NURSE PRACTITIONER

## 2018-01-11 PROCEDURE — 85025 COMPLETE CBC W/AUTO DIFF WBC: CPT | Performed by: INTERNAL MEDICINE

## 2018-01-11 PROCEDURE — 99233 SBSQ HOSP IP/OBS HIGH 50: CPT | Performed by: INTERNAL MEDICINE

## 2018-01-11 PROCEDURE — 25000128 H RX IP 250 OP 636: Performed by: INTERNAL MEDICINE

## 2018-01-11 PROCEDURE — 25000125 ZZHC RX 250: Performed by: INTERNAL MEDICINE

## 2018-01-11 PROCEDURE — 83605 ASSAY OF LACTIC ACID: CPT | Performed by: NURSE PRACTITIONER

## 2018-01-11 PROCEDURE — 96372 THER/PROPH/DIAG INJ SC/IM: CPT | Mod: XS | Performed by: EMERGENCY MEDICINE

## 2018-01-11 PROCEDURE — 80048 BASIC METABOLIC PNL TOTAL CA: CPT | Performed by: INTERNAL MEDICINE

## 2018-01-11 PROCEDURE — 25000132 ZZH RX MED GY IP 250 OP 250 PS 637: Performed by: INTERNAL MEDICINE

## 2018-01-11 PROCEDURE — 25000128 H RX IP 250 OP 636: Performed by: NURSE PRACTITIONER

## 2018-01-11 PROCEDURE — 96366 THER/PROPH/DIAG IV INF ADDON: CPT | Performed by: EMERGENCY MEDICINE

## 2018-01-11 PROCEDURE — 36415 COLL VENOUS BLD VENIPUNCTURE: CPT | Performed by: NURSE PRACTITIONER

## 2018-01-11 RX ORDER — MULTIVITAMIN WITH IRON
100 TABLET ORAL DAILY
Status: DISCONTINUED | OUTPATIENT
Start: 2018-01-11 | End: 2018-01-13 | Stop reason: HOSPADM

## 2018-01-11 RX ORDER — HYDROXYZINE HYDROCHLORIDE 25 MG/1
25 TABLET, FILM COATED ORAL EVERY 6 HOURS PRN
Status: DISCONTINUED | OUTPATIENT
Start: 2018-01-11 | End: 2018-01-13 | Stop reason: HOSPADM

## 2018-01-11 RX ORDER — TIOTROPIUM BROMIDE 18 UG/1
18 CAPSULE ORAL; RESPIRATORY (INHALATION) DAILY
Status: DISCONTINUED | OUTPATIENT
Start: 2018-01-11 | End: 2018-01-11 | Stop reason: CLARIF

## 2018-01-11 RX ORDER — PANTOPRAZOLE SODIUM 40 MG/1
40 TABLET, DELAYED RELEASE ORAL EVERY MORNING
Status: DISCONTINUED | OUTPATIENT
Start: 2018-01-11 | End: 2018-01-13 | Stop reason: HOSPADM

## 2018-01-11 RX ORDER — SODIUM CHLORIDE 9 MG/ML
INJECTION, SOLUTION INTRAVENOUS CONTINUOUS
Status: DISCONTINUED | OUTPATIENT
Start: 2018-01-11 | End: 2018-01-13 | Stop reason: HOSPADM

## 2018-01-11 RX ORDER — ASCORBIC ACID 1000 MG
TABLET ORAL DAILY
Status: DISCONTINUED | OUTPATIENT
Start: 2018-01-11 | End: 2018-01-11 | Stop reason: CLARIF

## 2018-01-11 RX ORDER — CITALOPRAM HYDROBROMIDE 10 MG/1
10 TABLET ORAL DAILY
Status: DISCONTINUED | OUTPATIENT
Start: 2018-01-11 | End: 2018-01-13 | Stop reason: HOSPADM

## 2018-01-11 RX ORDER — ALBUTEROL SULFATE 90 UG/1
2 AEROSOL, METERED RESPIRATORY (INHALATION) EVERY 6 HOURS PRN
Status: DISCONTINUED | OUTPATIENT
Start: 2018-01-11 | End: 2018-01-13 | Stop reason: HOSPADM

## 2018-01-11 RX ORDER — PREDNISONE 10 MG/1
10 TABLET ORAL DAILY
Status: DISCONTINUED | OUTPATIENT
Start: 2018-01-11 | End: 2018-01-13 | Stop reason: HOSPADM

## 2018-01-11 RX ORDER — TOLTERODINE 4 MG/1
4 CAPSULE, EXTENDED RELEASE ORAL DAILY
Status: DISCONTINUED | OUTPATIENT
Start: 2018-01-11 | End: 2018-01-13 | Stop reason: HOSPADM

## 2018-01-11 RX ORDER — GUAIFENESIN 600 MG/1
1200 TABLET, EXTENDED RELEASE ORAL 2 TIMES DAILY
Status: DISCONTINUED | OUTPATIENT
Start: 2018-01-11 | End: 2018-01-13 | Stop reason: HOSPADM

## 2018-01-11 RX ORDER — ASPIRIN 81 MG/1
81 TABLET ORAL DAILY
Status: DISCONTINUED | OUTPATIENT
Start: 2018-01-11 | End: 2018-01-13 | Stop reason: HOSPADM

## 2018-01-11 RX ORDER — LORAZEPAM 0.5 MG/1
0.5 TABLET ORAL EVERY 4 HOURS PRN
Status: DISCONTINUED | OUTPATIENT
Start: 2018-01-11 | End: 2018-01-13 | Stop reason: HOSPADM

## 2018-01-11 RX ORDER — ACETAMINOPHEN 325 MG/1
650 TABLET ORAL EVERY 4 HOURS PRN
Status: DISCONTINUED | OUTPATIENT
Start: 2018-01-11 | End: 2018-01-13 | Stop reason: HOSPADM

## 2018-01-11 RX ORDER — DARIFENACIN 7.5 MG/1
7.5 TABLET, EXTENDED RELEASE ORAL DAILY
Status: DISCONTINUED | OUTPATIENT
Start: 2018-01-11 | End: 2018-01-11 | Stop reason: CLARIF

## 2018-01-11 RX ADMIN — CEFEPIME 2 G: 1 INJECTION, SOLUTION INTRAVENOUS at 20:23

## 2018-01-11 RX ADMIN — CITALOPRAM HYDROBROMIDE 10 MG: 10 TABLET ORAL at 09:40

## 2018-01-11 RX ADMIN — CEFEPIME 2 G: 1 INJECTION, SOLUTION INTRAVENOUS at 05:09

## 2018-01-11 RX ADMIN — ASPIRIN 81 MG: 81 TABLET, COATED ORAL at 09:39

## 2018-01-11 RX ADMIN — UMECLIDINIUM 1 PUFF: 62.5 AEROSOL, POWDER ORAL at 09:40

## 2018-01-11 RX ADMIN — Medication 100 MG: at 09:40

## 2018-01-11 RX ADMIN — ACETAMINOPHEN 650 MG: 325 TABLET ORAL at 15:06

## 2018-01-11 RX ADMIN — ENOXAPARIN SODIUM 40 MG: 40 INJECTION SUBCUTANEOUS at 09:40

## 2018-01-11 RX ADMIN — SODIUM CHLORIDE: 9 INJECTION, SOLUTION INTRAVENOUS at 05:09

## 2018-01-11 RX ADMIN — VANCOMYCIN HYDROCHLORIDE 1250 MG: 10 INJECTION, POWDER, LYOPHILIZED, FOR SOLUTION INTRAVENOUS at 07:00

## 2018-01-11 RX ADMIN — GUAIFENESIN 1200 MG: 600 TABLET, EXTENDED RELEASE ORAL at 09:40

## 2018-01-11 RX ADMIN — PREDNISONE 10 MG: 5 TABLET ORAL at 09:39

## 2018-01-11 RX ADMIN — SODIUM CHLORIDE 1000 ML: 9 INJECTION, SOLUTION INTRAVENOUS at 13:33

## 2018-01-11 RX ADMIN — VITAMIN B12 0.1 MG ORAL TABLET 100 MCG: 0.1 TABLET ORAL at 09:40

## 2018-01-11 RX ADMIN — GUAIFENESIN 1200 MG: 600 TABLET, EXTENDED RELEASE ORAL at 20:22

## 2018-01-11 RX ADMIN — TOLTERODINE 4 MG: 4 CAPSULE, EXTENDED RELEASE ORAL at 09:40

## 2018-01-11 RX ADMIN — CEFEPIME 2 G: 1 INJECTION, SOLUTION INTRAVENOUS at 11:55

## 2018-01-11 NOTE — PHARMACY-ADMISSION MEDICATION HISTORY
"Admission medication history interview status for the 1/10/2018 admission is complete. See Epic admission navigator for allergy information, pharmacy, prior to admission medications and immunization status.     Medication history interview sources:  Patient    Changes made to PTA medication list (reason)  Added: N/A (per pt)  Deleted:   -dexamethasone tablets (Per pt, had completed prescribed regimen.)  Changed:   *prednisone tablets: Take 60mg daily for 1 week, then continue to decrease by 10mg each week --> Take 10mg by mouth daily. (Per pt, last dose of prednisone overall will be \"next Tuesday\" 1/16/18.)    Additional medication history information (including reliability of information, actions taken by pharmacist):  -Pt was able to confirm medication dosing regimens, last doses taken.  -albuterol inhaler: Pt reported she \"rarely\" needs her albuterol inhaler.  -lorazepam tablets: Pt reported she received a prescription for this medication but has \"not needed\" to take it yet.  -pyridoxine tablets: Pt unable to recall strength of this over-the-counter medication.  -Allergies confirmed, per pt.  -Home pharmacy updated, per pt.  -Per [facility], pt has documented influenza immunization this season.      Prior to Admission medications    Medication Sig Last Dose Taking? Auth Provider   calcium carbonate (CALCIUM CARBONATE) 600 MG tablet Take by mouth 2 times daily (with meals) 1/9/2018 at Unknown time Yes Lilia Livingston PA-C   predniSONE (DELTASONE) 10 MG tablet Take 60mg daily for 1 week, then continue to decrease by 10mg each week  Patient taking differently: Take 10 mg by mouth daily  1/10/2018 at AM Yes Thang Mancilla PA   pantoprazole (PROTONIX) 40 MG EC tablet Take 1 tablet (40 mg) by mouth every morning 1/10/2018 at AM Yes Thang Mancilla PA   hydrOXYzine (ATARAX) 25 MG tablet Take 1-2 tablets (25-50 mg) by mouth every 6 hours as needed for itching 1/9/2018 at Unknown time Yes Radha Cazares P, " MD   guaiFENesin (MUCINEX) 600 MG 12 hr tablet Take 2 tablets (1,200 mg) by mouth 2 times daily 1/9/2018 at Unknown time Yes Radha Cazares MD   tiotropium (SPIRIVA) 18 MCG capsule Inhale 1 capsule (18 mcg) into the lungs daily Inhale contents of one capsule 1/9/2018 at Unknown time Yes Radha Cazares MD   citalopram (CELEXA) 10 MG tablet Take 1 tablet (10 mg) by mouth daily 1/9/2018 at Unknown time Yes Radha Cazares MD   darifenacin (ENABLEX) 7.5 MG 24 hr tablet Take 1 tablet (7.5 mg) by mouth daily 1/9/2018 at Unknown time Yes Radha Cazares MD   Coenzyme Q10 (CO Q 10 PO) Take 1 tablet by mouth daily  1/9/2018 at Unknown time Yes Reported, Patient   MULTIPLE VITAMIN PO Take 1 tablet by mouth daily  1/9/2018 at Unknown time Yes Reported, Patient   Cyanocobalamin (VITAMIN B 12 PO) Take 100 mcg by mouth daily  1/9/2018 at Unknown time Yes Reported, Patient   Pyridoxine HCl (VITAMIN B6 PO) Take 1 tablet by mouth daily. 1/9/2018 at Unknown time Yes Reported, Patient   aspirin 81 MG tablet Take 1 tablet by mouth daily. 1/9/2018 at Unknown time Yes Jennifer Bautista MD   VITAMIN D 1000 UNIT OR CAPS TAKE 2 CAPSULES BY MOUTH DAILY 1/9/2018 at Unknown time Yes Reported, Patient   lidocaine-prilocaine (EMLA) cream Please apply to port site 30 minutes before use prn 1/2/2018  Thang Mancilla PA   order for DME Cranial prosthesis   Fara Bradshaw MD   LORazepam (ATIVAN) 0.5 MG tablet Take 1 tablet (0.5 mg) by mouth every 4 hours as needed (Anxiety, Nausea/Vomiting or Sleep) not needed yet  Fara Bradshaw MD   albuterol (PROAIR HFA/PROVENTIL HFA/VENTOLIN HFA) 108 (90 BASE) MCG/ACT Inhaler Inhale 2 puffs into the lungs every 6 hours as needed for shortness of breath / dyspnea Unknown at Unknown time  Radha Cazares MD   order for [facility] Dream Station Auto CPAP 12-18 cm, F&P Simplus FFM small.   Matt Andrea MD         Medication history completed by: Sherly  Mary

## 2018-01-11 NOTE — PROGRESS NOTES
[facility], [place]    Hematology / Oncology Progress Note    Date of Admission: 1/10/2018     Assessment & Plan   Ashleigh Alonzo is a 57 year old woman with stage IIa ER-/RI-/HER2- right breast cancer currently on I-SPY trial s/p 12 weeks of taxol and cycle 1 AC last week. She is admitted with febrile neutropenia, having had sweats and temp of 102.5 at home.     Febrile neutropenia.  Reports mild rhinorrhea since Tuesday. O/w no new or localizing infectious sx.   -CXR appears improved since Nov 2017.   -F/u cx in process: UA neg, rapid flu neg, RVP/BC/UC in process.   -Received cefepime and vancomycin in ED. Will continue with empiric cefepime.   -MIVF, tylenol prn.   -Daily CBC with diff, BMP.     Stage IIa right breast cancer.   On I-SPY trial. Was initially started on weekly taxol/pembrolizumab but course was complicated by fevers, PNA, pneumonitis, and hepatitis requiring hospitalization. Pembrolizumab was discontinued. She then completed 12 weeks of taxol on 12/26. MRI of breast with near complete resolution of suspicious enhancement of right breast. Plan for 4 cycles AC; received first dose on 1/2 with neulasta support. Next chemo scheduled 1/16.     COPD.   No e/o exacerbation. Continue home meds.     FEN: NS at 125ml/hr, PRN lyte replacement, RDAT   Lines: L chest PAC  Prophylaxis: Lovenox, PPI  Consults: None  Code status: FULL  Disposition: Anticipate d/c home in ~2 days pending resolution of fevers.   Follow-up / Referrals: Scheduled for clinic f/u 1/16    Lola Mena DNP, APRN, CNP  Hematology/Oncology  Pager: [phone]    Addendum:        Pt was seen and evaluated by me independently of the NP.  Reviewed labs and imaging.  On exam, pt is comfortable.  Port site is c/d/i.  Lungs clear.  Admitted with neutropenic fever while on AC chemotherapy.  ANC 0.3.  Panculture.  IV cefepime and vancomycin.    Ciara Chamorro MD      Interval History   Ashleigh Perera reports that she woke up  yesterday feeling sweaty. She checked her temperature and it was high. She took two tylenol and went back to bed. She checked her temperature again when she woke up and it was still high (>102). She called the triage line and was instructed to come to the hospital. She is feeling ok now. She has mild rhinorrhea that started on Tuesday and has the feeling of a tight band around bra line that has been coming and going for some time; this discomfort does not change with position or inspiration. She denies HA, dizziness, chills, SOB, sinus pain/pressure, cough, sore throat, congestion, abd pain, n/v/d/c other than 1 loose stool this AM, leg swelling. No other immediate concerns.     Physical Exam   Temp: 99.4  F (37.4  C) Temp src: Oral BP: 138/71   Heart Rate: 114 Resp: 20 SpO2: 94 %      Vitals:    01/10/18 1319   Weight: 74.8 kg (165 lb)     Vital Signs with Ranges  Temp:  [99.1  F (37.3  C)-100.9  F (38.3  C)] 99.4  F (37.4  C)  Heart Rate:  [] 114  Resp:  [16-22] 20  BP: ()/(55-89) 138/71  SpO2:  [91 %-99 %] 94 %       Constitutional: Pleasant woman seen resting comfortably in bed in NAD. Alert and interactive.   HEENT: NCAT. PERRL, EOMI, anicteric sclera. MMM, no lesions or thrush.   Respiratory: Non-labored breathing on RA. Lungs CTAB.  Cardiovascular: Regular rate and rhythm. No murmur or rub.   GI: Normoactive bowel sounds. Abdomen soft, non-distended, and non-tender.   Skin: Warm and dry. No concerning lesions or rash on exposed surfaces.  Musculoskeletal: Extremities grossly normal, non-tender, no edema. Moves all extremities.   Neurologic: Alert, oriented, CNs 2-12 grossly intact, speech normal. No focal deficits.   Neuropsychiatric: Mentation and affect appear normal/appropriate.  Vascular Access: L chest PAC is CDI without erythema, swelling, or discharge.     Medications   Current Facility-Administered Medications   Medication     albuterol (PROAIR HFA/PROVENTIL HFA/VENTOLIN HFA) Inhaler 2  puff     aspirin EC EC tablet 81 mg     citalopram (celeXA) tablet 10 mg     guaiFENesin (MUCINEX) 12 hr tablet 1,200 mg     hydrOXYzine (ATARAX) tablet 25 mg     LORazepam (ATIVAN) tablet 0.5 mg     pantoprazole (PROTONIX) EC tablet 40 mg     predniSONE (DELTASONE) tablet 10 mg     pyridoxine (VITAMIN B-6) tablet 100 mg     Medication Instruction     enoxaparin (LOVENOX) injection 40 mg     0.9% sodium chloride infusion     cyanocobalamin (vitamin  B-12) tablet 100 mcg     tolterodine (DETROL LA) 24 hr capsule 4 mg     umeclidinium (INCRUSE ELLIPTA) 62.5 MCG/INH oral inhaler 1 puff     0.9% sodium chloride BOLUS     acetaminophen (TYLENOL) tablet 650 mg     0.9% sodium chloride infusion     vancomycin (VANCOCIN) 1,250 mg in NaCl 0.9 % 250 mL intermittent infusion     ceFEPIme (MAXIPIME) intermittent infusion 2 g     Facility-Administered Medications Ordered in Other Encounters   Medication     lidocaine 1 % 9 mL     sodium bicarbonate 8.4 % injection 1 mEq     lidocaine-EPINEPHrine 1.5 %-[id number] injection 10 mL       Data   CBC  Recent Labs  Lab 01/11/18  0620 01/10/18  1534   WBC 0.7* 0.4*   RBC 2.38* 2.84*   HGB 8.1* 9.4*   HCT 23.9* 28.6*    101*   MCH 34.0* 33.1*   MCHC 33.9 32.9   RDW 14.4 14.4   PLT 55* 63*     CMP  Recent Labs  Lab 01/11/18  0620 01/10/18  1534    134   POTASSIUM 3.6 4.3   CHLORIDE 105 101   CO2 21 21   ANIONGAP 9 12   GLC 86 136*   BUN 7 11   CR 0.58 0.72   GFRESTIMATED >90 83   GFRESTBLACK >90 >90   JAVIER 7.8* 8.6   PROTTOTAL  --  6.6*   ALBUMIN  --  2.9*   BILITOTAL  --  1.0   ALKPHOS  --  96   AST  --  18   ALT  --  26     INRNo lab results found in last 7 days.    Results for orders placed or performed during the hospital encounter of 01/10/18   XR Chest 2 Views    Narrative    Exam: XR CHEST 2 VW, 1/10/2018 4:00 PM    Indication: fever;     Comparison: 11/13/2017    Findings:   PA and lateral views of the chest. Left internal jugular Port-A-Cath  tip projects at the low  SVC. The cardiomediastinal silhouette and  pulmonary vasculature are within normal limits. No pleural effusion or  pneumothorax. Decreased bilateral patchy airspace opacities with  persistent opacity in the peripheral right mid to upper lung.      Impression    Impression: Overall decreased bilateral patchy airspace opacities  compared to 11/11/2017 chest radiographs 11/13/2017 chest CT, at which  time organizing pneumonia was suspected. Recurrent/ongoing infection  and drug reaction in this patient undergoing chemotherapy remain a  possibility.    I have personally reviewed the examination and initial interpretation  and I agree with the findings.    OCTAVIO ALVES MD

## 2018-01-11 NOTE — SUMMARY OF CARE
Pt. Arrived to 5B from ED. Pt had blue slippers, jeiron, coat, 2 mittens, blue shirt, mikal with , iphone with , purse with wallet (declined to send to security).

## 2018-01-11 NOTE — H&P
[facility]    Internal Medicine History and Physical - Gold Service       Date of Admission:  1/10/2018    Assessment & Plan   Ashleigh Alonzo is a 57 year old female PMH of of stage IIa ER-/AL-/Her2- right breast cancer, currently on chemotherapy  admitted on 1/10/2018 with neutropenic fever     #Neutropenic fever: No respiratory urinary or GI symptoms for infection source.  Chest x-ray improved compared to prior.  Patient received vancomycin and cefepime in the ED .   -We will continue cefepime and hold Vanco  -We will follow cultures  -Influenza and respiratory viral panel pending    #Stage IIa right breast cancer, currently on treatment with weekly Taxol on I-SPY  Was initially started on weekly Taxol/pembolizumab but course was complicated by fevers, pneumonia, and then pneumonitis and hepatitis requiring hospitalization. Pembrolizumab was discontinued altogether. She completed 12 weeks of Taxol on 12/26/17. MRI of breast as above, with near complete resolution of suspicious enhancement of right breast. Plan for 4 cycles of Adriamycin and Cytoxan   --will follow with Dr. Bradshaw. Next chemo scheduled for 1/16   -check CMP and follow counts     Diet: Regular  Fluids: ns @125/hr   DVT Prophylaxis: Enoxaparin (Lovenox) SQ  Code Status: Full     Patient will be formally staffed in the morning  Mathew Desai MD  Hospitalist/[place]  [facility]    Departments of Medicine   Pager: [phone]    Pt was seen and evaluated by me.  Admitted with neutropenic fever while on AC chemotherapy.  ANC 0.3.  Panculture.  IV cefepime and vancomycin.    Ciara Chamorro MD    ______________________________________________________________________    Chief Complaint   Fever    History is obtained from the patient    History of Present Illness   Ashleigh Alonzo is a 57 year old female PMH of of stage IIa ER-/AL-/Her2- right breast cancer, currently on  chemotherapy  admitted on 1/10/2018 with neutropenic fever   -Patient is relatively doing well with her chemotherapy report having a fever of 102 this morning without improvement with Tylenol.  No other associated upper respiratory tract symptoms no chills no cough no chest pain no abdominal pain no diarrhea or constipation.  She reports mild nausea but has been eating well.  Does not endorse any new skin rash.  She finally decided to come to the ED as fever was not improving.      Review of Systems   The 10 point Review of Systems is negative other than noted in the HPI or here.     Past Medical History    I have reviewed this patient's medical history and updated it with pertinent information if needed.   Past Medical History:   Diagnosis Date     COPD (chronic obstructive pulmonary disease) (H) 2011     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) 8/30/2017     Periodontal disease      Sleep apnea 12/2015     Urticaria         Past Surgical History   I have reviewed this patient's surgical history and updated it with pertinent information if needed.  Past Surgical History:   Procedure Laterality Date     APPENDECTOMY  10/6/2015     CATARACT IOL, RT/LT  11/2016    bilateral      COLONOSCOPY  11/15/2011    Procedure:COLONOSCOPY; Colonoscopy, screening; Surgeon:ANASTASIA BUNCH; Location:[facility] OR     INSERT PORT VASCULAR ACCESS Left 9/1/2017    Procedure: INSERT PORT VASCULAR ACCESS;  Single Lumen Chest Power Port;  Surgeon: Leif Parkinson PA-C;  Location: [facility] OR     TUBAL LIGATION  12/2004    Bilateral        Social History   Social History   Substance Use Topics     Smoking status: Former Smoker     Packs/day: 1.00     Years: 15.00     Types: Cigarettes     Quit date: 1/1/2000     Smokeless tobacco: Never Used     Alcohol use 8.4 - 12.6 oz/week      Comment: daily       Family History   I have reviewed this patient's family history and updated it  with pertinent information if needed.   Family History   Problem Relation Age of Onset     Cardiovascular Father 46     MI     Eye Disorder Father      CEREBROVASCULAR DISEASE Father      Arthritis Sister      Neurologic Disorder Brother      CANCER No family hx of      DIABETES No family hx of      Hypertension No family hx of        Prior to Admission Medications   Prior to Admission Medications   Prescriptions Last Dose Informant Patient Reported? Taking?   Coenzyme Q10 (CO Q 10 PO) 1/9/2018 at Unknown time Self Yes Yes   Sig: Take 1 tablet by mouth daily    Cyanocobalamin (VITAMIN B 12 PO) 1/9/2018 at Unknown time  Yes Yes   Sig: Take 100 mcg by mouth daily    LORazepam (ATIVAN) 0.5 MG tablet not needed yet  No No   Sig: Take 1 tablet (0.5 mg) by mouth every 4 hours as needed (Anxiety, Nausea/Vomiting or Sleep)   MULTIPLE VITAMIN PO 1/9/2018 at Unknown time Self Yes Yes   Sig: Take 1 tablet by mouth daily    Pyridoxine HCl (VITAMIN B6 PO) 1/9/2018 at Unknown time  Yes Yes   Sig: Take 1 tablet by mouth daily.   VITAMIN D 1000 UNIT OR CAPS 1/9/2018 at Unknown time Self Yes Yes   Sig: TAKE 2 CAPSULES BY MOUTH DAILY   albuterol (PROAIR HFA/PROVENTIL HFA/VENTOLIN HFA) 108 (90 BASE) MCG/ACT Inhaler Unknown at Unknown time  No No   Sig: Inhale 2 puffs into the lungs every 6 hours as needed for shortness of breath / dyspnea   aspirin 81 MG tablet 1/9/2018 at Unknown time  Yes Yes   Sig: Take 1 tablet by mouth daily.   calcium carbonate (CALCIUM CARBONATE) 600 MG tablet 1/9/2018 at Unknown time  Yes Yes   Sig: Take by mouth 2 times daily (with meals)   citalopram (CELEXA) 10 MG tablet 1/9/2018 at Unknown time  No Yes   Sig: Take 1 tablet (10 mg) by mouth daily   darifenacin (ENABLEX) 7.5 MG 24 hr tablet 1/9/2018 at Unknown time Self No Yes   Sig: Take 1 tablet (7.5 mg) by mouth daily   guaiFENesin (MUCINEX) 600 MG 12 hr tablet 1/9/2018 at Unknown time  No Yes   Sig: Take 2 tablets (1,200 mg) by mouth 2 times daily    hydrOXYzine (ATARAX) 25 MG tablet 1/9/2018 at Unknown time  No Yes   Sig: Take 1-2 tablets (25-50 mg) by mouth every 6 hours as needed for itching   lidocaine-prilocaine (EMLA) cream 1/2/2018  No No   Sig: Please apply to port site 30 minutes before use prn   order for DME   No No   Sig: Respironics Dream Station Auto CPAP 12-18 cm, F&P Simplus FFM small.   order for DME   No No   Sig: Cranial prosthesis   pantoprazole (PROTONIX) 40 MG EC tablet 1/10/2018 at AM  No Yes   Sig: Take 1 tablet (40 mg) by mouth every morning   predniSONE (DELTASONE) 10 MG tablet 1/10/2018 at AM  No Yes   Sig: Take 60mg daily for 1 week, then continue to decrease by 10mg each week   Patient taking differently: Take 10 mg by mouth daily    tiotropium (SPIRIVA) 18 MCG capsule 1/9/2018 at Unknown time  No Yes   Sig: Inhale 1 capsule (18 mcg) into the lungs daily Inhale contents of one capsule      Facility-Administered Medications: None     Allergies   No Known Allergies    Physical Exam   Vital Signs: Temp: 99.1  F (37.3  C) (Tmax 103.3 at home)   BP: 108/73   Heart Rate: 95 Resp: 19 SpO2: 97 %      Weight: 165 lbs 0 oz  General Appearance: Pleasant not in distress   HEENT: No jaundice, moist BM   Respiratory: CTAB  Cardiovascular: RRR, s1, s2 no m/g   GI: Soft, NABS, NT  Genitourinary: No CVAT   Extremities : No Edema   Neurologic: A&Ox3, moves all extremities equally     Data   Data reviewed today: I reviewed all medications, new labs and imaging results over the last 24 hours.

## 2018-01-11 NOTE — PROGRESS NOTES
[facility], [place]    Sepsis Evaluation Progress Note    Date of Service: 01/11/2018    I was called to see Ashleigh Alonzo due to abnormal vital signs triggering the Sepsis SIRS screening alert. She is not known but suspected to have an infection.     Physical Exam    Vital Signs:  Temp: 99.4  F (37.4  C) Temp src: Oral BP: 138/71   Heart Rate: 114 Resp: 20 SpO2: 94 %        Lab:  Lactic Acid   Date Value Ref Range Status   01/11/2018 2.4 (H) 0.7 - 2.0 mmol/L Final       The patient is at baseline mental status.    The rest of their physical exam is significant for mild rhinorrhea, mild tachycardia.    Assessment and Plan    The SIRS and exam findings are likely due to febrile neutropenia with possible as-yet unidentified infection, there is no sign of sepsis at this time.    Disposition: The patient will remain on the current unit. We will continue to monitor this patient closely. Also ordered 1L NS bolus and will repeat lactate in 2 hours.     JENNIFER Neri CNP

## 2018-01-11 NOTE — PROVIDER NOTIFICATION
Provider notified at pager number [phone] of patient's lactic acid level of 2.4. Provider also notified that patient continues to have loose stools.

## 2018-01-11 NOTE — PLAN OF CARE
Problem: Patient Care Overview  Goal: Plan of Care/Patient Progress Review  Outcome: No Change  Admit from ED to 5B. Upon arrival pt febrile, 's, c/o loose stools. MD at bedside. Droplet and enteric isolation ordered (Cdiff stool sample needed). 1L NS fluid bolus given. PRN tylenol x 1. Up independently in room. No c/o pain. Receiving scheduled IV cefepime.

## 2018-01-12 PROBLEM — D70.9 NEUTROPENIA WITH FEVER (H): Status: ACTIVE | Noted: 2018-01-12

## 2018-01-12 PROBLEM — R50.81 NEUTROPENIA WITH FEVER (H): Status: ACTIVE | Noted: 2018-01-12

## 2018-01-12 LAB
ANION GAP SERPL CALCULATED.3IONS-SCNC: 9 MMOL/L (ref 3–14)
BACTERIA SPEC CULT: NO GROWTH
BASOPHILS # BLD AUTO: 0 10E9/L (ref 0–0.2)
BASOPHILS NFR BLD AUTO: 1.8 %
BUN SERPL-MCNC: 3 MG/DL (ref 7–30)
C DIFF TOX B STL QL: ABNORMAL
CALCIUM SERPL-MCNC: 8.2 MG/DL (ref 8.5–10.1)
CHLORIDE SERPL-SCNC: 110 MMOL/L (ref 94–109)
CO2 SERPL-SCNC: 19 MMOL/L (ref 20–32)
CREAT SERPL-MCNC: 0.62 MG/DL (ref 0.52–1.04)
DIFFERENTIAL METHOD BLD: ABNORMAL
EOSINOPHIL # BLD AUTO: 0 10E9/L (ref 0–0.7)
EOSINOPHIL NFR BLD AUTO: 0 %
ERYTHROCYTE [DISTWIDTH] IN BLOOD BY AUTOMATED COUNT: 14.5 % (ref 10–15)
GFR SERPL CREATININE-BSD FRML MDRD: >90 ML/MIN/1.7M2
GLUCOSE SERPL-MCNC: 82 MG/DL (ref 70–99)
HCT VFR BLD AUTO: 23.2 % (ref 35–47)
HGB BLD-MCNC: 7.6 G/DL (ref 11.7–15.7)
LACTATE BLD-SCNC: 1.6 MMOL/L (ref 0.7–2)
LYMPHOCYTES # BLD AUTO: 0.3 10E9/L (ref 0.8–5.3)
LYMPHOCYTES NFR BLD AUTO: 16.8 %
Lab: NORMAL
MAGNESIUM SERPL-MCNC: 1.9 MG/DL (ref 1.6–2.3)
MCH RBC QN AUTO: 32.9 PG (ref 26.5–33)
MCHC RBC AUTO-ENTMCNC: 32.8 G/DL (ref 31.5–36.5)
MCV RBC AUTO: 100 FL (ref 78–100)
METAMYELOCYTES # BLD: 0 10E9/L
METAMYELOCYTES NFR BLD MANUAL: 0.9 %
MONOCYTES # BLD AUTO: 0.3 10E9/L (ref 0–1.3)
MONOCYTES NFR BLD AUTO: 15.9 %
NEUTROPHILS # BLD AUTO: 1.2 10E9/L (ref 1.6–8.3)
NEUTROPHILS NFR BLD AUTO: 63.7 %
NRBC # BLD AUTO: 0 10*3/UL
NRBC BLD AUTO-RTO: 1 /100
PHOSPHATE SERPL-MCNC: 3 MG/DL (ref 2.5–4.5)
PLATELET # BLD AUTO: 54 10E9/L (ref 150–450)
PLATELET # BLD EST: ABNORMAL 10*3/UL
POIKILOCYTOSIS BLD QL SMEAR: SLIGHT
POTASSIUM SERPL-SCNC: 3.2 MMOL/L (ref 3.4–5.3)
POTASSIUM SERPL-SCNC: 4.1 MMOL/L (ref 3.4–5.3)
PROMYELOCYTES # BLD MANUAL: 0 10E9/L
PROMYELOCYTES NFR BLD MANUAL: 0.9 %
RBC # BLD AUTO: 2.31 10E12/L (ref 3.8–5.2)
SODIUM SERPL-SCNC: 138 MMOL/L (ref 133–144)
SPECIMEN SOURCE: ABNORMAL
SPECIMEN SOURCE: NORMAL
WBC # BLD AUTO: 1.9 10E9/L (ref 4–11)

## 2018-01-12 PROCEDURE — 85025 COMPLETE CBC W/AUTO DIFF WBC: CPT | Performed by: NURSE PRACTITIONER

## 2018-01-12 PROCEDURE — 12000008 ZZH R&B INTERMEDIATE UMMC

## 2018-01-12 PROCEDURE — 25000125 ZZHC RX 250: Performed by: INTERNAL MEDICINE

## 2018-01-12 PROCEDURE — 84132 ASSAY OF SERUM POTASSIUM: CPT | Performed by: INTERNAL MEDICINE

## 2018-01-12 PROCEDURE — 25000132 ZZH RX MED GY IP 250 OP 250 PS 637: Performed by: INTERNAL MEDICINE

## 2018-01-12 PROCEDURE — 87040 BLOOD CULTURE FOR BACTERIA: CPT | Performed by: INTERNAL MEDICINE

## 2018-01-12 PROCEDURE — 25000132 ZZH RX MED GY IP 250 OP 250 PS 637: Performed by: NURSE PRACTITIONER

## 2018-01-12 PROCEDURE — 25000128 H RX IP 250 OP 636: Performed by: INTERNAL MEDICINE

## 2018-01-12 PROCEDURE — 36592 COLLECT BLOOD FROM PICC: CPT | Performed by: INTERNAL MEDICINE

## 2018-01-12 PROCEDURE — 80048 BASIC METABOLIC PNL TOTAL CA: CPT | Performed by: NURSE PRACTITIONER

## 2018-01-12 PROCEDURE — 36415 COLL VENOUS BLD VENIPUNCTURE: CPT | Performed by: INTERNAL MEDICINE

## 2018-01-12 PROCEDURE — 36415 COLL VENOUS BLD VENIPUNCTURE: CPT | Performed by: NURSE PRACTITIONER

## 2018-01-12 PROCEDURE — 83605 ASSAY OF LACTIC ACID: CPT | Performed by: INTERNAL MEDICINE

## 2018-01-12 PROCEDURE — 84100 ASSAY OF PHOSPHORUS: CPT | Performed by: NURSE PRACTITIONER

## 2018-01-12 PROCEDURE — 25000128 H RX IP 250 OP 636: Performed by: EMERGENCY MEDICINE

## 2018-01-12 PROCEDURE — 40000141 ZZH STATISTIC PERIPHERAL IV START W/O US GUIDANCE

## 2018-01-12 PROCEDURE — 99232 SBSQ HOSP IP/OBS MODERATE 35: CPT | Performed by: INTERNAL MEDICINE

## 2018-01-12 PROCEDURE — 83735 ASSAY OF MAGNESIUM: CPT | Performed by: NURSE PRACTITIONER

## 2018-01-12 RX ORDER — POTASSIUM CL/LIDO/0.9 % NACL 10MEQ/0.1L
10 INTRAVENOUS SOLUTION, PIGGYBACK (ML) INTRAVENOUS
Status: DISCONTINUED | OUTPATIENT
Start: 2018-01-12 | End: 2018-01-13 | Stop reason: HOSPADM

## 2018-01-12 RX ORDER — POTASSIUM CHLORIDE 29.8 MG/ML
20 INJECTION INTRAVENOUS
Status: DISCONTINUED | OUTPATIENT
Start: 2018-01-12 | End: 2018-01-13 | Stop reason: HOSPADM

## 2018-01-12 RX ORDER — POTASSIUM CHLORIDE 750 MG/1
20-40 TABLET, EXTENDED RELEASE ORAL
Status: DISCONTINUED | OUTPATIENT
Start: 2018-01-12 | End: 2018-01-13 | Stop reason: HOSPADM

## 2018-01-12 RX ORDER — NYSTATIN 100000/ML
500000 SUSPENSION, ORAL (FINAL DOSE FORM) ORAL 4 TIMES DAILY
Status: DISCONTINUED | OUTPATIENT
Start: 2018-01-12 | End: 2018-01-13 | Stop reason: HOSPADM

## 2018-01-12 RX ORDER — POTASSIUM CHLORIDE 7.45 MG/ML
10 INJECTION INTRAVENOUS
Status: DISCONTINUED | OUTPATIENT
Start: 2018-01-12 | End: 2018-01-13 | Stop reason: HOSPADM

## 2018-01-12 RX ORDER — MAGNESIUM SULFATE HEPTAHYDRATE 40 MG/ML
4 INJECTION, SOLUTION INTRAVENOUS EVERY 4 HOURS PRN
Status: DISCONTINUED | OUTPATIENT
Start: 2018-01-12 | End: 2018-01-13 | Stop reason: HOSPADM

## 2018-01-12 RX ORDER — POTASSIUM CHLORIDE 1.5 G/1.58G
20-40 POWDER, FOR SOLUTION ORAL
Status: DISCONTINUED | OUTPATIENT
Start: 2018-01-12 | End: 2018-01-13 | Stop reason: HOSPADM

## 2018-01-12 RX ADMIN — VANCOMYCIN HYDROCHLORIDE 1250 MG: 10 INJECTION, POWDER, LYOPHILIZED, FOR SOLUTION INTRAVENOUS at 21:40

## 2018-01-12 RX ADMIN — POTASSIUM CHLORIDE 20 MEQ: 750 TABLET, EXTENDED RELEASE ORAL at 18:10

## 2018-01-12 RX ADMIN — ACETAMINOPHEN 650 MG: 325 TABLET ORAL at 04:05

## 2018-01-12 RX ADMIN — NYSTATIN 500000 UNITS: 100000 SUSPENSION ORAL at 18:09

## 2018-01-12 RX ADMIN — VITAMIN B12 0.1 MG ORAL TABLET 100 MCG: 0.1 TABLET ORAL at 09:48

## 2018-01-12 RX ADMIN — TOLTERODINE 4 MG: 4 CAPSULE, EXTENDED RELEASE ORAL at 09:47

## 2018-01-12 RX ADMIN — NYSTATIN 500000 UNITS: 100000 SUSPENSION ORAL at 12:10

## 2018-01-12 RX ADMIN — ENOXAPARIN SODIUM 40 MG: 40 INJECTION SUBCUTANEOUS at 09:47

## 2018-01-12 RX ADMIN — SODIUM CHLORIDE: 9 INJECTION, SOLUTION INTRAVENOUS at 15:18

## 2018-01-12 RX ADMIN — PREDNISONE 10 MG: 5 TABLET ORAL at 09:46

## 2018-01-12 RX ADMIN — CITALOPRAM HYDROBROMIDE 10 MG: 10 TABLET ORAL at 09:47

## 2018-01-12 RX ADMIN — CEFEPIME 2 G: 1 INJECTION, SOLUTION INTRAVENOUS at 20:24

## 2018-01-12 RX ADMIN — NYSTATIN 500000 UNITS: 100000 SUSPENSION ORAL at 09:46

## 2018-01-12 RX ADMIN — GUAIFENESIN 1200 MG: 600 TABLET, EXTENDED RELEASE ORAL at 09:45

## 2018-01-12 RX ADMIN — Medication 100 MG: at 09:47

## 2018-01-12 RX ADMIN — POTASSIUM CHLORIDE 40 MEQ: 750 TABLET, EXTENDED RELEASE ORAL at 09:46

## 2018-01-12 RX ADMIN — ACETAMINOPHEN 650 MG: 325 TABLET ORAL at 12:01

## 2018-01-12 RX ADMIN — CEFEPIME 2 G: 1 INJECTION, SOLUTION INTRAVENOUS at 12:10

## 2018-01-12 RX ADMIN — PANTOPRAZOLE SODIUM 40 MG: 40 TABLET, DELAYED RELEASE ORAL at 09:47

## 2018-01-12 RX ADMIN — CEFEPIME 2 G: 1 INJECTION, SOLUTION INTRAVENOUS at 04:05

## 2018-01-12 RX ADMIN — NYSTATIN 500000 UNITS: 100000 SUSPENSION ORAL at 21:49

## 2018-01-12 RX ADMIN — VANCOMYCIN HYDROCHLORIDE 1250 MG: 10 INJECTION, POWDER, LYOPHILIZED, FOR SOLUTION INTRAVENOUS at 09:54

## 2018-01-12 RX ADMIN — UMECLIDINIUM 1 PUFF: 62.5 AEROSOL, POWDER ORAL at 09:48

## 2018-01-12 RX ADMIN — GUAIFENESIN 1200 MG: 600 TABLET, EXTENDED RELEASE ORAL at 20:27

## 2018-01-12 RX ADMIN — ASPIRIN 81 MG: 81 TABLET, COATED ORAL at 09:47

## 2018-01-12 NOTE — PHARMACY-VANCOMYCIN DOSING SERVICE
Pharmacy Vancomycin Initial Note  Date of Service 2018  Patient's  1960  57 year old, female    Indication: Febrile Neutropenia    Current estimated CrCl = Estimated Creatinine Clearance: 99.1 mL/min (based on Cr of 0.62).    Creatinine for last 3 days  1/10/2018:  3:34 PM Creatinine 0.72 mg/dL  2018:  6:20 AM Creatinine 0.58 mg/dL  2018:  7:36 AM Creatinine 0.62 mg/dL    Recent Vancomycin Level(s) for last 3 days  No results found for requested labs within last 72 hours.      Vancomycin IV Administrations (past 72 hours)                   vancomycin (VANCOCIN) 1,250 mg in NaCl 0.9 % 250 mL intermittent infusion (mg) 1,250 mg New Bag 18 0700     1,250 mg New Bag 01/10/18 1749                Nephrotoxins and other renal medications (Future)    Start     Dose/Rate Route Frequency Ordered Stop    18 0830  vancomycin (VANCOCIN) 1,250 mg in NaCl 0.9 % 250 mL intermittent infusion      1,250 mg  over 90 Minutes Intravenous EVERY 12 HOURS 18 0817            Contrast Orders - past 72 hours     None                Plan:  1.  Start vancomycin  1250 mg IV q12 hrs.   2.  Goal Trough Level: 10-15 mg/L   3.  Pharmacy will check trough levels as appropriate in 1-3 Days.    4. Serum creatinine levels will be ordered daily for the first week of therapy and at least twice weekly for subsequent weeks.    5. Norman method utilized to dose vancomycin therapy: Method 2    Thanks for the consult  Alok Thornton, Pharm.D, BCPS          .

## 2018-01-12 NOTE — PLAN OF CARE
Problem: Patient Care Overview  Goal: Plan of Care/Patient Progress Review  Outcome: No Change  VSS except tachycardia. Temperature remained WDL throughout the evening. Neutropenic precautions. Pt didn't complain of pain or nausea. No loose stools on shift (still need to collect stool sample). UA pending for cultures. Scheduled antibiotic given. Has R PIV.  mL/hr running in port. Will continue to monitor and follow POC.

## 2018-01-12 NOTE — PLAN OF CARE
Problem: Patient Care Overview  Goal: Plan of Care/Patient Progress Review  Outcome: No Change  Pt febrile in afternoon. PRN tylenol x 1 given. IV cefepime and IV vanco. Up ad sundar in room. New PIV placed. Continue POC.

## 2018-01-12 NOTE — PLAN OF CARE
Problem: Patient Care Overview  Goal: Plan of Care/Patient Progress Review  Outcome: No Change  D/I: Pt is alert oriented x4. Spiked a temp of 101.2 and MD notified, tylenol given, blood cultures done. Temp recheck was 99.4. Pt continues to be slightly tachy. WBC is 0.7 and Absolute neutrophils is 0.3 and pt is in neutropenic precautions. Denies pain/nausea.  mL/hr running in port. Slept in between cares.  P: Will continue to monitor and follow POC. No BM this shift and still need to collect stool sample.

## 2018-01-12 NOTE — PROGRESS NOTES
[facility] [place]    Hematology / Oncology Progress Note    Date of Admission: 1/10/2018     Assessment & Plan   Ashleigh Alonzo is a 57 year old woman with stage IIa ER-/NE-/HER2- right breast cancer currently on I-SPY trial s/p 12 weeks of taxol and cycle 1 AC last week. She is admitted with febrile neutropenia, having had sweats and temp of 102.5 at home.     Febrile neutropenia. HD stable. Still febrile with 24-hour tmax 101.2. Reports mild rhinorrhea since Tuesday but not really present overnight. Also had 1 loose stool yesterday AM, none since then. O/w no new or localizing infectious sx.   -CXR appears improved since Nov 2017. However, it is not normal (possibly residual from previous PNA/pneumonitis). Low threshold to obtain CT chest if remains persistently febrile despite abx and improving counts.   -F/u cx in process: UA neg, rapid flu neg, RVP/BC/UC in process.   -Send stool C.diff PCR.   -Received cefepime and vancomycin in ED. Continue empiric cefepime and add back vancomycin given ongoing fevers.   -MIVF, tylenol prn.   -Daily CBC with diff, BMP. ANC is improving - 1200 today.     Stage IIa right breast cancer.   On I-SPY trial. Was initially started on weekly taxol/pembrolizumab but course was complicated by fevers, PNA, pneumonitis, and hepatitis requiring hospitalization. Pembrolizumab was discontinued. She then completed 12 weeks of taxol on 12/26. MRI of breast with near complete resolution of suspicious enhancement of right breast. Plan for 4 cycles AC; received first dose on 1/2/18 with neulasta support. Next chemo scheduled 1/16.     COPD.   No e/o exacerbation. Continue home meds.     Thrush.  Start nystatin rinses.     FEN: NS at 125ml/hr, PRN lyte replacement, RDAT   Lines: L chest PAC  Prophylaxis: Lovenox, PPI  Consults: None  Code status: FULL  Disposition: Anticipate d/c home in ~2 days pending resolution of fevers.   Follow-up / Referrals: Scheduled for  clinic f/u 1/16/18    Lola Mena DNP, APRN, CNP  Hematology/Oncology  Pager: [phone]    Pt was seen and evaluated by me independently of the NP.  Reviewed labs, imaging, and cultures.  Pt still febrile overnight.  On exam, no ulceration, lungs without crackles or wheezes, borderline tachycardia.  Cultures NGTD.  Cefepime and vancomycin.  Await ANC to improve.    Ciara Chamorro MD      Interval History   Ashleigh Perera reports still feeling warm and sweaty. Minimal to no rhinorrhea overnight. No BM since the 1 loose one yesterday AM. O/w feeling fine. No significant pain, nausea, new sx. Denies HA, dizziness, chills, SOB, sinus pain/pressure, cough, sore throat, congestion, abd pain, n/v/d/c, leg swelling.    Physical Exam   Temp: 97.8  F (36.6  C) Temp src: Axillary BP: 130/70   Heart Rate: 100 Resp: 18 SpO2: 91 % O2 Device: None (Room air)    Vitals:    01/10/18 1319 01/11/18 1843   Weight: 74.8 kg (165 lb) 74.6 kg (164 lb 6.4 oz)     Vital Signs with Ranges  Temp:  [96.1  F (35.6  C)-101.2  F (38.4  C)] 97.8  F (36.6  C)  Heart Rate:  [] 100  Resp:  [18-20] 18  BP: (111-150)/(64-99) 130/70  SpO2:  [91 %-97 %] 91 %  I/O last 3 completed shifts:  In: 490 [P.O.:490]  Out: -     Constitutional: Pleasant woman seen resting comfortably in bed in NAD. Alert and interactive.   HEENT: NCAT. PERRL, EOMI, anicteric sclera. MMM, no lesions, mild thrush on upper hard and soft palate.   Respiratory: Non-labored breathing on RA. Lungs CTAB.  Cardiovascular: Regular rate and rhythm. No murmur or rub.   GI: Normoactive bowel sounds. Abdomen soft, non-distended, and non-tender.   Skin: Warm and dry. No concerning lesions or rash on exposed surfaces.  Musculoskeletal: Extremities grossly normal, non-tender, no edema. Moves all extremities independently.   Neurologic: Alert, oriented, speech normal. No focal deficits.   Neuropsychiatric: Mentation and affect appear normal/appropriate.  Vascular Access: L chest PAC is CDI without  erythema, swelling, or discharge.     Medications   Current Facility-Administered Medications   Medication     nystatin (MYCOSTATIN) suspension 500,000 Units     vancomycin (VANCOCIN) 1,250 mg in NaCl 0.9 % 250 mL intermittent infusion     potassium chloride SA (K-DUR/KLOR-CON M) CR tablet 20-40 mEq     potassium chloride (KLOR-CON) Packet 20-40 mEq     potassium chloride 10 mEq in 100 mL sterile water intermittent infusion (premix)     potassium chloride 10 mEq in 100 mL intermittent infusion with 10 mg lidocaine     potassium chloride 20 mEq in 50 mL intermittent infusion     magnesium sulfate 4 g in 100 mL sterile water (premade)     potassium phosphate 15 mmol in D5W 250 mL intermittent infusion     potassium phosphate 20 mmol in D5W 500 mL intermittent infusion     potassium phosphate 20 mmol in D5W 250 mL intermittent infusion     potassium phosphate 25 mmol in D5W 500 mL intermittent infusion     [START ON 1/13/2018] pneumococcal vaccine (PNEUMOVAX 23-eileen) injection 0.5 mL     albuterol (PROAIR HFA/PROVENTIL HFA/VENTOLIN HFA) Inhaler 2 puff     aspirin EC EC tablet 81 mg     citalopram (celeXA) tablet 10 mg     guaiFENesin (MUCINEX) 12 hr tablet 1,200 mg     hydrOXYzine (ATARAX) tablet 25 mg     LORazepam (ATIVAN) tablet 0.5 mg     pantoprazole (PROTONIX) EC tablet 40 mg     predniSONE (DELTASONE) tablet 10 mg     pyridoxine (VITAMIN B-6) tablet 100 mg     Medication Instruction     enoxaparin (LOVENOX) injection 40 mg     0.9% sodium chloride infusion     cyanocobalamin (vitamin  B-12) tablet 100 mcg     tolterodine (DETROL LA) 24 hr capsule 4 mg     umeclidinium (INCRUSE ELLIPTA) 62.5 MCG/INH oral inhaler 1 puff     acetaminophen (TYLENOL) tablet 650 mg     ceFEPIme (MAXIPIME) intermittent infusion 2 g     Facility-Administered Medications Ordered in Other Encounters   Medication     lidocaine 1 % 9 mL     sodium bicarbonate 8.4 % injection 1 mEq     lidocaine-EPINEPHrine 1.5 %-1:[id number] injection 10 mL        Data   CBC    Recent Labs  Lab 01/12/18  0736 01/11/18  0620 01/10/18  1534   WBC 1.9* 0.7* 0.4*   RBC 2.31* 2.38* 2.84*   HGB 7.6* 8.1* 9.4*   HCT 23.2* 23.9* 28.6*    100 101*   MCH 32.9 34.0* 33.1*   MCHC 32.8 33.9 32.9   RDW 14.5 14.4 14.4   PLT 54* 55* 63*     CMP    Recent Labs  Lab 01/12/18  0736 01/11/18  0620 01/10/18  1534    135 134   POTASSIUM 3.2* 3.6 4.3   CHLORIDE 110* 105 101   CO2 19* 21 21   ANIONGAP 9 9 12   GLC 82 86 136*   BUN 3* 7 11   CR 0.62 0.58 0.72   GFRESTIMATED >90 >90 83   GFRESTBLACK >90 >90 >90   JAVIER 8.2* 7.8* 8.6   MAG 1.9  --   --    PHOS 3.0  --   --    PROTTOTAL  --   --  6.6*   ALBUMIN  --   --  2.9*   BILITOTAL  --   --  1.0   ALKPHOS  --   --  96   AST  --   --  18   ALT  --   --  26     INRNo lab results found in last 7 days.    Results for orders placed or performed during the hospital encounter of 01/10/18   XR Chest 2 Views    Narrative    Exam: XR CHEST 2 VW, 1/10/2018 4:00 PM    Indication: fever;     Comparison: 11/13/2017    Findings:   PA and lateral views of the chest. Left internal jugular Port-A-Cath  tip projects at the low SVC. The cardiomediastinal silhouette and  pulmonary vasculature are within normal limits. No pleural effusion or  pneumothorax. Decreased bilateral patchy airspace opacities with  persistent opacity in the peripheral right mid to upper lung.      Impression    Impression: Overall decreased bilateral patchy airspace opacities  compared to 11/11/2017 chest radiographs 11/13/2017 chest CT, at which  time organizing pneumonia was suspected. Recurrent/ongoing infection  and drug reaction in this patient undergoing chemotherapy remain a  possibility.    I have personally reviewed the examination and initial interpretation  and I agree with the findings.    OCTAVIO ALVES MD

## 2018-01-13 VITALS
OXYGEN SATURATION: 96 % | HEART RATE: 108 BPM | WEIGHT: 160 LBS | TEMPERATURE: 99 F | DIASTOLIC BLOOD PRESSURE: 58 MMHG | HEIGHT: 64 IN | SYSTOLIC BLOOD PRESSURE: 100 MMHG | BODY MASS INDEX: 27.31 KG/M2 | RESPIRATION RATE: 18 BRPM

## 2018-01-13 LAB
ANION GAP SERPL CALCULATED.3IONS-SCNC: 10 MMOL/L (ref 3–14)
ANISOCYTOSIS BLD QL SMEAR: SLIGHT
BASOPHILS # BLD AUTO: 0.1 10E9/L (ref 0–0.2)
BASOPHILS NFR BLD AUTO: 0.9 %
BUN SERPL-MCNC: 3 MG/DL (ref 7–30)
C DIFF TOX B STL QL: NEGATIVE
CALCIUM SERPL-MCNC: 8.1 MG/DL (ref 8.5–10.1)
CHLORIDE SERPL-SCNC: 106 MMOL/L (ref 94–109)
CO2 SERPL-SCNC: 19 MMOL/L (ref 20–32)
CREAT SERPL-MCNC: 0.58 MG/DL (ref 0.52–1.04)
DIFFERENTIAL METHOD BLD: ABNORMAL
EOSINOPHIL # BLD AUTO: 0 10E9/L (ref 0–0.7)
EOSINOPHIL NFR BLD AUTO: 0 %
ERYTHROCYTE [DISTWIDTH] IN BLOOD BY AUTOMATED COUNT: 15 % (ref 10–15)
GFR SERPL CREATININE-BSD FRML MDRD: >90 ML/MIN/1.7M2
GLUCOSE SERPL-MCNC: 76 MG/DL (ref 70–99)
HCT VFR BLD AUTO: 22.4 % (ref 35–47)
HGB BLD-MCNC: 7.5 G/DL (ref 11.7–15.7)
LYMPHOCYTES # BLD AUTO: 0.6 10E9/L (ref 0.8–5.3)
LYMPHOCYTES NFR BLD AUTO: 11.4 %
MACROCYTES BLD QL SMEAR: PRESENT
MAGNESIUM SERPL-MCNC: 1.7 MG/DL (ref 1.6–2.3)
MCH RBC QN AUTO: 34.4 PG (ref 26.5–33)
MCHC RBC AUTO-ENTMCNC: 33.5 G/DL (ref 31.5–36.5)
MCV RBC AUTO: 103 FL (ref 78–100)
METAMYELOCYTES # BLD: 0.1 10E9/L
METAMYELOCYTES NFR BLD MANUAL: 0.9 %
MONOCYTES # BLD AUTO: 0.5 10E9/L (ref 0–1.3)
MONOCYTES NFR BLD AUTO: 9.6 %
NEUTROPHILS # BLD AUTO: 4.3 10E9/L (ref 1.6–8.3)
NEUTROPHILS NFR BLD AUTO: 77.2 %
PHOSPHATE SERPL-MCNC: 2.1 MG/DL (ref 2.5–4.5)
PLATELET # BLD AUTO: 99 10E9/L (ref 150–450)
PLATELET # BLD EST: ABNORMAL 10*3/UL
POTASSIUM SERPL-SCNC: 3.6 MMOL/L (ref 3.4–5.3)
RBC # BLD AUTO: 2.18 10E12/L (ref 3.8–5.2)
SODIUM SERPL-SCNC: 135 MMOL/L (ref 133–144)
SPECIMEN SOURCE: NORMAL
WBC # BLD AUTO: 5.6 10E9/L (ref 4–11)

## 2018-01-13 PROCEDURE — 25000132 ZZH RX MED GY IP 250 OP 250 PS 637: Performed by: INTERNAL MEDICINE

## 2018-01-13 PROCEDURE — 87493 C DIFF AMPLIFIED PROBE: CPT | Performed by: INTERNAL MEDICINE

## 2018-01-13 PROCEDURE — 99238 HOSP IP/OBS DSCHRG MGMT 30/<: CPT | Performed by: INTERNAL MEDICINE

## 2018-01-13 PROCEDURE — 25000128 H RX IP 250 OP 636: Performed by: INTERNAL MEDICINE

## 2018-01-13 PROCEDURE — 84100 ASSAY OF PHOSPHORUS: CPT | Performed by: NURSE PRACTITIONER

## 2018-01-13 PROCEDURE — 83735 ASSAY OF MAGNESIUM: CPT | Performed by: NURSE PRACTITIONER

## 2018-01-13 PROCEDURE — 25000125 ZZHC RX 250: Performed by: INTERNAL MEDICINE

## 2018-01-13 PROCEDURE — 25000132 ZZH RX MED GY IP 250 OP 250 PS 637: Performed by: NURSE PRACTITIONER

## 2018-01-13 PROCEDURE — 80048 BASIC METABOLIC PNL TOTAL CA: CPT | Performed by: NURSE PRACTITIONER

## 2018-01-13 PROCEDURE — 25000128 H RX IP 250 OP 636: Performed by: NURSE PRACTITIONER

## 2018-01-13 PROCEDURE — 25000128 H RX IP 250 OP 636: Performed by: EMERGENCY MEDICINE

## 2018-01-13 PROCEDURE — 25000125 ZZHC RX 250: Performed by: NURSE PRACTITIONER

## 2018-01-13 PROCEDURE — 85025 COMPLETE CBC W/AUTO DIFF WBC: CPT | Performed by: NURSE PRACTITIONER

## 2018-01-13 PROCEDURE — 36592 COLLECT BLOOD FROM PICC: CPT | Performed by: NURSE PRACTITIONER

## 2018-01-13 RX ORDER — CEFPODOXIME PROXETIL 200 MG/1
200 TABLET, FILM COATED ORAL 2 TIMES DAILY
Qty: 14 TABLET | Refills: 0 | Status: SHIPPED | OUTPATIENT
Start: 2018-01-13 | End: 2018-01-23

## 2018-01-13 RX ORDER — LIDOCAINE 40 MG/G
CREAM TOPICAL
Status: DISCONTINUED | OUTPATIENT
Start: 2018-01-13 | End: 2018-01-13 | Stop reason: HOSPADM

## 2018-01-13 RX ORDER — HEPARIN SODIUM,PORCINE 10 UNIT/ML
5-10 VIAL (ML) INTRAVENOUS EVERY 24 HOURS
Status: DISCONTINUED | OUTPATIENT
Start: 2018-01-13 | End: 2018-01-13 | Stop reason: HOSPADM

## 2018-01-13 RX ORDER — NYSTATIN 100000/ML
500000 SUSPENSION, ORAL (FINAL DOSE FORM) ORAL 4 TIMES DAILY
Qty: 280 ML | Refills: 3 | Status: ON HOLD | OUTPATIENT
Start: 2018-01-13 | End: 2018-01-29

## 2018-01-13 RX ORDER — HEPARIN SODIUM,PORCINE 10 UNIT/ML
5-10 VIAL (ML) INTRAVENOUS
Status: DISCONTINUED | OUTPATIENT
Start: 2018-01-13 | End: 2018-01-13 | Stop reason: HOSPADM

## 2018-01-13 RX ORDER — SODIUM CHLORIDE 9 MG/ML
INJECTION, SOLUTION INTRAVENOUS CONTINUOUS
Status: DISCONTINUED | OUTPATIENT
Start: 2018-01-13 | End: 2018-01-13 | Stop reason: HOSPADM

## 2018-01-13 RX ORDER — HEPARIN SODIUM (PORCINE) LOCK FLUSH IV SOLN 100 UNIT/ML 100 UNIT/ML
5 SOLUTION INTRAVENOUS
Status: DISCONTINUED | OUTPATIENT
Start: 2018-01-13 | End: 2018-01-13 | Stop reason: HOSPADM

## 2018-01-13 RX ADMIN — CEFEPIME 2 G: 1 INJECTION, SOLUTION INTRAVENOUS at 12:37

## 2018-01-13 RX ADMIN — POTASSIUM PHOSPHATE, MONOBASIC AND POTASSIUM PHOSPHATE, DIBASIC 15 MMOL: 224; 236 INJECTION, SOLUTION INTRAVENOUS at 10:43

## 2018-01-13 RX ADMIN — GUAIFENESIN 1200 MG: 600 TABLET, EXTENDED RELEASE ORAL at 10:12

## 2018-01-13 RX ADMIN — NYSTATIN 500000 UNITS: 100000 SUSPENSION ORAL at 12:37

## 2018-01-13 RX ADMIN — PREDNISONE 10 MG: 5 TABLET ORAL at 10:13

## 2018-01-13 RX ADMIN — CEFEPIME 2 G: 1 INJECTION, SOLUTION INTRAVENOUS at 05:12

## 2018-01-13 RX ADMIN — PANTOPRAZOLE SODIUM 40 MG: 40 TABLET, DELAYED RELEASE ORAL at 10:12

## 2018-01-13 RX ADMIN — ASPIRIN 81 MG: 81 TABLET, COATED ORAL at 10:12

## 2018-01-13 RX ADMIN — TOLTERODINE 4 MG: 4 CAPSULE, EXTENDED RELEASE ORAL at 10:12

## 2018-01-13 RX ADMIN — NYSTATIN 500000 UNITS: 100000 SUSPENSION ORAL at 10:12

## 2018-01-13 RX ADMIN — CITALOPRAM HYDROBROMIDE 10 MG: 10 TABLET ORAL at 10:12

## 2018-01-13 RX ADMIN — Medication 100 MG: at 10:12

## 2018-01-13 RX ADMIN — ENOXAPARIN SODIUM 40 MG: 40 INJECTION SUBCUTANEOUS at 10:12

## 2018-01-13 RX ADMIN — VANCOMYCIN HYDROCHLORIDE 1250 MG: 10 INJECTION, POWDER, LYOPHILIZED, FOR SOLUTION INTRAVENOUS at 07:55

## 2018-01-13 RX ADMIN — SODIUM CHLORIDE, PRESERVATIVE FREE 5 ML: 5 INJECTION INTRAVENOUS at 17:45

## 2018-01-13 RX ADMIN — SODIUM CHLORIDE: 9 INJECTION, SOLUTION INTRAVENOUS at 02:28

## 2018-01-13 RX ADMIN — VITAMIN B12 0.1 MG ORAL TABLET 100 MCG: 0.1 TABLET ORAL at 10:43

## 2018-01-13 RX ADMIN — UMECLIDINIUM 1 PUFF: 62.5 AEROSOL, POWDER ORAL at 10:11

## 2018-01-13 RX ADMIN — SODIUM CHLORIDE, PRESERVATIVE FREE 20 ML/HR: 5 INJECTION INTRAVENOUS at 05:33

## 2018-01-13 NOTE — PLAN OF CARE
Problem: Patient Care Overview  Goal: Plan of Care/Patient Progress Review  Outcome: No Change  Patient denies pain. Good appetite with breakfast. Sleeping in-between cares. Phos 2.1. Currently being replaced and almost completed. Plan to discharge home this afternoon/evening. Continue to assist and follow plan of care.

## 2018-01-13 NOTE — PROGRESS NOTES
[facility], [place]    Hematology / Oncology Progress Note    Date of Admission: 1/10/2018     Assessment & Plan   Ashleigh Alonzo is a 57 year old woman with stage IIa ER-/NC-/HER2- right breast cancer currently on I-SPY trial s/p 12 weeks of taxol and cycle 1 AC last week. She is admitted with febrile neutropenia, having had sweats and temp of 102.5 at home.     Febrile neutropenia. HD stable. She is now afebrile for > 24 hours.    -CXR appears improved since Nov 2017. However, it is not normal (possibly residual from previous PNA/pneumonitis). Low threshold to obtain CT chest if remains persistently febrile despite abx and improving counts.   -F/u cx in process: UA neg, rapid flu neg, RVP/BC/UC negative  -Cdiff negative  -Received cefepime and vancomycin; with recovery of ANC and cultures negative, anticipate d/c today with oral antibiotics to complete ten day course       Stage IIa right breast cancer.   On I-SPY trial. Was initially started on weekly taxol/pembrolizumab but course was complicated by fevers, PNA, pneumonitis, and hepatitis requiring hospitalization. Pembrolizumab was discontinued. She then completed 12 weeks of taxol on 12/26. MRI of breast with near complete resolution of suspicious enhancement of right breast. Plan for 4 cycles AC; received first dose on 1/2/18 with neulasta support. Next chemo scheduled 1/16.   She will see providers in the clinic at that time to determine whether she is ready for this cycle; she may need dose reduction given  with neulasta support.    COPD.   No e/o exacerbation. Continue home meds.     Thrush.  Start nystatin rinses.     FEN: NS at 125ml/hr, PRN lyte replacement, RDAT   Lines: L chest PAC  Prophylaxis: Lovenox, PPI  Consults: None  Code status: FULL  Disposition: Anticipate d/c home today  Follow-up / Referrals: Scheduled for clinic f/u 1/16/18          Interval History   Ashleigh Perera reports feeling much better today.  She is  up ambulating yesterday afternoon, mostly in the afternoon.  She is eating.  No diarrhea.  No fevers.  No significant pain, nausea, new sx. Denies HA, dizziness, chills, SOB, sinus pain/pressure, cough, sore throat, congestion, abd pain, n/v/d/c, leg swelling.    Physical Exam   Temp: 97.4  F (36.3  C) Temp src: Oral BP: 106/60 Pulse: 108 Heart Rate: 113 Resp: 16 SpO2: 97 % O2 Device: None (Room air)    Vitals:    01/10/18 1319 01/11/18 1843 01/12/18 1739   Weight: 74.8 kg (165 lb) 74.6 kg (164 lb 6.4 oz) 72.6 kg (160 lb 1.6 oz)     Vital Signs with Ranges  Temp:  [97.2  F (36.2  C)-100.2  F (37.9  C)] 97.4  F (36.3  C)  Pulse:  [108-110] 108  Heart Rate:  [] 113  Resp:  [16-20] 16  BP: (106-123)/(60-77) 106/60  SpO2:  [93 %-97 %] 97 %  I/O last 3 completed shifts:  In: 2000 [P.O.:900; I.V.:1100]  Out: -     Constitutional: Pleasant woman seen resting comfortably in bed in NAD. Alert and interactive.   HEENT: NCAT. PERRL, EOMI, anicteric sclera. MMM, no lesions, mild thrush on upper hard and soft palate - improving  Respiratory: Non-labored breathing on RA. Lungs CTAB.  Cardiovascular: Regular rate and rhythm. No murmur or rub.   GI: Normoactive bowel sounds. Abdomen soft, non-distended, and non-tender.   Skin: Warm and dry. No concerning lesions or rash on exposed surfaces.  Musculoskeletal: Extremities grossly normal, non-tender, no edema. Moves all extremities independently.   Neurologic: Alert, oriented, speech normal. No focal deficits.   Neuropsychiatric: Mentation and affect appear normal/appropriate.  Vascular Access: L chest PAC is CDI without erythema, swelling, or discharge.     Medications   Current Facility-Administered Medications   Medication     0.9% sodium chloride infusion     nystatin (MYCOSTATIN) suspension 500,000 Units     vancomycin (VANCOCIN) 1,250 mg in NaCl 0.9 % 250 mL intermittent infusion     potassium chloride SA (K-DUR/KLOR-CON M) CR tablet 20-40 mEq     potassium chloride  (KLOR-CON) Packet 20-40 mEq     potassium chloride 10 mEq in 100 mL sterile water intermittent infusion (premix)     potassium chloride 10 mEq in 100 mL intermittent infusion with 10 mg lidocaine     potassium chloride 20 mEq in 50 mL intermittent infusion     magnesium sulfate 4 g in 100 mL sterile water (premade)     potassium phosphate 15 mmol in D5W 250 mL intermittent infusion     potassium phosphate 20 mmol in D5W 500 mL intermittent infusion     potassium phosphate 20 mmol in D5W 250 mL intermittent infusion     potassium phosphate 25 mmol in D5W 500 mL intermittent infusion     albuterol (PROAIR HFA/PROVENTIL HFA/VENTOLIN HFA) Inhaler 2 puff     aspirin EC EC tablet 81 mg     citalopram (celeXA) tablet 10 mg     guaiFENesin (MUCINEX) 12 hr tablet 1,200 mg     hydrOXYzine (ATARAX) tablet 25 mg     LORazepam (ATIVAN) tablet 0.5 mg     pantoprazole (PROTONIX) EC tablet 40 mg     predniSONE (DELTASONE) tablet 10 mg     pyridoxine (VITAMIN B-6) tablet 100 mg     Medication Instruction     enoxaparin (LOVENOX) injection 40 mg     0.9% sodium chloride infusion     cyanocobalamin (vitamin  B-12) tablet 100 mcg     tolterodine (DETROL LA) 24 hr capsule 4 mg     umeclidinium (INCRUSE ELLIPTA) 62.5 MCG/INH oral inhaler 1 puff     acetaminophen (TYLENOL) tablet 650 mg     ceFEPIme (MAXIPIME) intermittent infusion 2 g     Facility-Administered Medications Ordered in Other Encounters   Medication     lidocaine 1 % 9 mL     sodium bicarbonate 8.4 % injection 1 mEq     lidocaine-EPINEPHrine 1.5 %-1:[id number] injection 10 mL       Data   CBC    Recent Labs  Lab 01/13/18  0537 01/12/18  0736 01/11/18  0620 01/10/18  1534   WBC 5.6 1.9* 0.7* 0.4*   RBC 2.18* 2.31* 2.38* 2.84*   HGB 7.5* 7.6* 8.1* 9.4*   HCT 22.4* 23.2* 23.9* 28.6*   * 100 100 101*   MCH 34.4* 32.9 34.0* 33.1*   MCHC 33.5 32.8 33.9 32.9   RDW 15.0 14.5 14.4 14.4   PLT 99* 54* 55* 63*     CMP    Recent Labs  Lab 01/13/18  0537 01/12/18  2212 01/12/18  0736  01/11/18  0620 01/10/18  1534     --  138 135 134   POTASSIUM 3.6 4.1 3.2* 3.6 4.3   CHLORIDE 106  --  110* 105 101   CO2 19*  --  19* 21 21   ANIONGAP 10  --  9 9 12   GLC 76  --  82 86 136*   BUN 3*  --  3* 7 11   CR 0.58  --  0.62 0.58 0.72   GFRESTIMATED >90  --  >90 >90 83   GFRESTBLACK >90  --  >90 >90 >90   JAVIER 8.1*  --  8.2* 7.8* 8.6   MAG 1.7  --  1.9  --   --    PHOS 2.1*  --  3.0  --   --    PROTTOTAL  --   --   --   --  6.6*   ALBUMIN  --   --   --   --  2.9*   BILITOTAL  --   --   --   --  1.0   ALKPHOS  --   --   --   --  96   AST  --   --   --   --  18   ALT  --   --   --   --  26     INRNo lab results found in last 7 days.    Results for orders placed or performed during the hospital encounter of 01/10/18   XR Chest 2 Views    Narrative    Exam: XR CHEST 2 VW, 1/10/2018 4:00 PM    Indication: fever;     Comparison: 11/13/2017    Findings:   PA and lateral views of the chest. Left internal jugular Port-A-Cath  tip projects at the low SVC. The cardiomediastinal silhouette and  pulmonary vasculature are within normal limits. No pleural effusion or  pneumothorax. Decreased bilateral patchy airspace opacities with  persistent opacity in the peripheral right mid to upper lung.      Impression    Impression: Overall decreased bilateral patchy airspace opacities  compared to 11/11/2017 chest radiographs 11/13/2017 chest CT, at which  time organizing pneumonia was suspected. Recurrent/ongoing infection  and drug reaction in this patient undergoing chemotherapy remain a  possibility.    I have personally reviewed the examination and initial interpretation  and I agree with the findings.    OCTAVIO ALVES MD

## 2018-01-13 NOTE — PLAN OF CARE
Problem: Patient Care Overview  Goal: Plan of Care/Patient Progress Review  Outcome: Improving  Slept and appeared comfortable. Up to the bathroom independently. No fever. Phosphorus low this morning, ordered bag from pharmacy, hang when delivered. Continue with current plan of nursing care, and update MD with concerns as needed.

## 2018-01-13 NOTE — PLAN OF CARE
Problem: Infection, Risk/Actual (Adult)  Goal: Identify Related Risk Factors and Signs and Symptoms  Related risk factors and signs and symptoms are identified upon initiation of Human Response Clinical Practice Guideline (CPG).   Outcome: Improving  Patient states feeling better today. Lungs are diminished. Has been afebrile this shift and last dose of tylenol was at noon. She did not need ordered dose of TPA to unclog port a cath as port draws now.Heart rate was in the 80-90's around 6 pm and then 110 at 2130 which along with patient's low WBC flagged the sepsis protocol. Lactic acid was normal at 1.6. Patient is on IV cefipime and vancomycin already. MD on call, Marlen pager ([phone]) was notified that sepsis protocol flagged and results.

## 2018-01-14 NOTE — PROGRESS NOTES
Discharge:  Patient has discharge orders to home. Orders and medications reviewed with patient and her . She has script for antibiotic and nystatin. Port a cath heplocked and needle was removed prior to discharge. Patient knows to call MD if her temp is greater than 100.4 or go to the ER. Her next appointment is on the 16 th of this month. Patient left with  at 1800.

## 2018-01-15 ENCOUNTER — CARE COORDINATION (OUTPATIENT)
Dept: ONCOLOGY | Facility: CLINIC | Age: 58
End: 2018-01-15

## 2018-01-15 RX ORDER — LORAZEPAM 2 MG/ML
0.5 INJECTION INTRAMUSCULAR EVERY 4 HOURS PRN
Status: CANCELLED
Start: 2018-01-16

## 2018-01-15 RX ORDER — DIPHENHYDRAMINE HYDROCHLORIDE 50 MG/ML
50 INJECTION INTRAMUSCULAR; INTRAVENOUS
Status: CANCELLED
Start: 2018-01-16

## 2018-01-15 RX ORDER — PALONOSETRON 0.05 MG/ML
0.25 INJECTION, SOLUTION INTRAVENOUS ONCE
Status: CANCELLED
Start: 2018-01-16

## 2018-01-15 RX ORDER — MEPERIDINE HYDROCHLORIDE 25 MG/ML
25 INJECTION INTRAMUSCULAR; INTRAVENOUS; SUBCUTANEOUS EVERY 30 MIN PRN
Status: CANCELLED | OUTPATIENT
Start: 2018-01-16

## 2018-01-15 RX ORDER — HEPARIN SODIUM (PORCINE) LOCK FLUSH IV SOLN 100 UNIT/ML 100 UNIT/ML
500 SOLUTION INTRAVENOUS ONCE
Status: CANCELLED
Start: 2018-01-16 | End: 2018-01-16

## 2018-01-15 RX ORDER — DOXORUBICIN HYDROCHLORIDE 2 MG/ML
60 INJECTION, SOLUTION INTRAVENOUS ONCE
Status: CANCELLED | OUTPATIENT
Start: 2018-01-16

## 2018-01-15 RX ORDER — ALBUTEROL SULFATE 90 UG/1
1-2 AEROSOL, METERED RESPIRATORY (INHALATION)
Status: CANCELLED
Start: 2018-01-16

## 2018-01-15 RX ORDER — EPINEPHRINE 1 MG/ML
0.3 INJECTION, SOLUTION, CONCENTRATE INTRAVENOUS EVERY 5 MIN PRN
Status: CANCELLED | OUTPATIENT
Start: 2018-01-16

## 2018-01-15 RX ORDER — SODIUM CHLORIDE 9 MG/ML
1000 INJECTION, SOLUTION INTRAVENOUS CONTINUOUS PRN
Status: CANCELLED
Start: 2018-01-16

## 2018-01-15 RX ORDER — EPINEPHRINE 0.3 MG/.3ML
0.3 INJECTION SUBCUTANEOUS EVERY 5 MIN PRN
Status: CANCELLED | OUTPATIENT
Start: 2018-01-16

## 2018-01-15 RX ORDER — ALBUTEROL SULFATE 0.83 MG/ML
2.5 SOLUTION RESPIRATORY (INHALATION)
Status: CANCELLED | OUTPATIENT
Start: 2018-01-16

## 2018-01-15 RX ORDER — METHYLPREDNISOLONE SODIUM SUCCINATE 125 MG/2ML
125 INJECTION, POWDER, LYOPHILIZED, FOR SOLUTION INTRAMUSCULAR; INTRAVENOUS
Status: CANCELLED
Start: 2018-01-16

## 2018-01-15 NOTE — PROGRESS NOTES
No hospital call required as patient is being seen in clinic with in 72 hours of discharge. Clinic appt and infusion scheduled for 1/16/18.    Dionne Goode RNCC BSN CBCN

## 2018-01-16 ENCOUNTER — APPOINTMENT (OUTPATIENT)
Dept: LAB | Facility: CLINIC | Age: 58
End: 2018-01-16
Attending: INTERNAL MEDICINE
Payer: COMMERCIAL

## 2018-01-16 ENCOUNTER — RESEARCH ENCOUNTER (OUTPATIENT)
Dept: ONCOLOGY | Facility: CLINIC | Age: 58
End: 2018-01-16

## 2018-01-16 ENCOUNTER — ONCOLOGY VISIT (OUTPATIENT)
Dept: ONCOLOGY | Facility: CLINIC | Age: 58
End: 2018-01-16
Attending: INTERNAL MEDICINE
Payer: COMMERCIAL

## 2018-01-16 ENCOUNTER — CARE COORDINATION (OUTPATIENT)
Dept: ONCOLOGY | Facility: CLINIC | Age: 58
End: 2018-01-16

## 2018-01-16 VITALS
SYSTOLIC BLOOD PRESSURE: 113 MMHG | WEIGHT: 155.8 LBS | DIASTOLIC BLOOD PRESSURE: 46 MMHG | HEART RATE: 131 BPM | RESPIRATION RATE: 18 BRPM | TEMPERATURE: 99.1 F | OXYGEN SATURATION: 94 % | BODY MASS INDEX: 26.74 KG/M2

## 2018-01-16 DIAGNOSIS — J98.4 PNEUMONITIS: ICD-10-CM

## 2018-01-16 DIAGNOSIS — Z17.1 MALIGNANT NEOPLASM OF UPPER-INNER QUADRANT OF RIGHT BREAST IN FEMALE, ESTROGEN RECEPTOR NEGATIVE (H): ICD-10-CM

## 2018-01-16 DIAGNOSIS — K12.30 MUCOSITIS: Primary | ICD-10-CM

## 2018-01-16 DIAGNOSIS — C50.211 MALIGNANT NEOPLASM OF UPPER-INNER QUADRANT OF RIGHT BREAST IN FEMALE, ESTROGEN RECEPTOR NEGATIVE (H): ICD-10-CM

## 2018-01-16 PROCEDURE — 25000128 H RX IP 250 OP 636: Mod: ZF | Performed by: INTERNAL MEDICINE

## 2018-01-16 PROCEDURE — 99215 OFFICE O/P EST HI 40 MIN: CPT | Mod: ZP | Performed by: INTERNAL MEDICINE

## 2018-01-16 PROCEDURE — G0463 HOSPITAL OUTPT CLINIC VISIT: HCPCS | Mod: ZF

## 2018-01-16 PROCEDURE — 36591 DRAW BLOOD OFF VENOUS DEVICE: CPT

## 2018-01-16 RX ORDER — NYSTATIN 100000/ML
SUSPENSION, ORAL (FINAL DOSE FORM) ORAL
COMMUNITY
Start: 2018-01-13 | End: 2018-01-29

## 2018-01-16 RX ORDER — HEPARIN SODIUM (PORCINE) LOCK FLUSH IV SOLN 100 UNIT/ML 100 UNIT/ML
5 SOLUTION INTRAVENOUS EVERY 8 HOURS PRN
Status: DISCONTINUED | OUTPATIENT
Start: 2018-01-16 | End: 2018-01-17 | Stop reason: HOSPADM

## 2018-01-16 RX ORDER — PREDNISONE 5 MG/1
TABLET ORAL
Qty: 11 TABLET | Refills: 0 | Status: ON HOLD | OUTPATIENT
Start: 2018-01-16 | End: 2018-02-08

## 2018-01-16 RX ORDER — PANTOPRAZOLE SODIUM 40 MG/1
40 TABLET, DELAYED RELEASE ORAL EVERY MORNING
Qty: 60 TABLET | Refills: 0 | Status: SHIPPED | OUTPATIENT
Start: 2018-01-16 | End: 2018-02-21

## 2018-01-16 RX ORDER — PANTOPRAZOLE SODIUM 40 MG/1
40 TABLET, DELAYED RELEASE ORAL EVERY MORNING
Qty: 30 TABLET | Refills: 0 | Status: SHIPPED | OUTPATIENT
Start: 2018-01-16 | End: 2018-01-16

## 2018-01-16 RX ORDER — CEFPODOXIME PROXETIL 200 MG/1
TABLET, FILM COATED ORAL
COMMUNITY
Start: 2018-01-13 | End: 2018-01-23

## 2018-01-16 RX ADMIN — SODIUM CHLORIDE, PRESERVATIVE FREE 5 ML: 5 INJECTION INTRAVENOUS at 08:45

## 2018-01-16 ASSESSMENT — PAIN SCALES - GENERAL: PAINLEVEL: NO PAIN (0)

## 2018-01-16 NOTE — NURSING NOTE
"Oncology Rooming Note    January 16, 2018 9:06 AM   Ashleigh Alonzo is a 57 year old female who presents for:    Chief Complaint   Patient presents with     Port Draw     Labs drawn via port by RN. Line flushed and hep locked. VS taken.     Oncology Clinic Visit     Return: Breast Ca     Initial Vitals: /46 (BP Location: Right arm, Patient Position: Sitting, Cuff Size: Adult Regular)  Pulse 131  Temp 99.1  F (37.3  C) (Oral)  Resp 18  Wt 70.7 kg (155 lb 12.8 oz)  SpO2 94%  BMI 26.74 kg/m2 Estimated body mass index is 26.74 kg/(m^2) as calculated from the following:    Height as of 1/10/18: 1.626 m (5' 4\").    Weight as of this encounter: 70.7 kg (155 lb 12.8 oz). Body surface area is 1.79 meters squared.  No Pain (0) Comment: Data Unavailable   No LMP recorded. Patient is postmenopausal.  Allergies reviewed: Yes  Medications reviewed: No  Patient refused to go through medications.   \"Stated that they are all the same\"    Medications: Medication refills not needed today.  Pharmacy name entered into [employer]:    [facility], [place] - [address]   [facility] [place], [place] - [phone] [address]    Clinical concerns: new concerns are she is very tired today, more tired than usual per . Dr. Bradshaw was NOT notified.    10 minutes for nursing intake (face to face time)     Britney Reid CMA              "

## 2018-01-16 NOTE — PROGRESS NOTES
Oncology On Treatment Visit:  Date on this visit: 1/16/2018    Diagnosis:  Stage IIa, T2N0M0, grade 3 triple negative cancer of the right breast.    Primary Physician: Radha Cazares     History Of Present Illness:  Ms. Alonzo is a 57-year-old postmenopausal female with stage IIa, T2 N0 M0, grade 3, triple-negative invasive carcinoma of the right breast.  Routine bilateral screening mammogram on 07/27/2017 showed possible developing calcifications in the right breast at the 12 o'clock position 6 cm from the nipple.  Mammogram prior to this one had been 1 year prior in 07/2016.  Right breast ultrasound demonstrated a 7-mm, irregular, hypoechoic mass at the 12 o'clock position.  The patient went on to have a contrast-enhanced mammogram which showed a peripherally enhancing, irregular mass measured up to 1.9 cm as well as an additional 6-mm, enhancing focus anteromedial to the dominant mass.  The total area of abnormal enhancement on contrast mammogram measured up to 3.4 cm.  Right breast biopsy on 08/22/2017 demonstrated a grade 3 invasive mammary carcinoma with associated high-grade DCIS.  Invasive carcinoma was stained for estrogen and progesterone receptors.  Estrogen receptor and progesterone receptor staining was negative with 0% of nuclei staining.  HER2 was non-amplified by FISH with a HER2/CEN17 ratio of 1.5 and 2.8 HER2 signals per nucleus.  Breast MRI measured the biopsy proven breast cancer at 3.2 cm.    Ms. Alonzo enrolled in the ISPY-2 clinical trial.  She began treatment with weekly taxol and once every 3 week pembrolizumab on 9/20/17.  Week 3 MRI showed a decrease in size of right breast mass from 3.2 cm to 2.4 cm and overall decrease in enhancement.  She was hospitalized 11/11/17 - 11/17/17 with fevers ranging from 102 - 105.  CT chest was consistent with pneumonitis.  She also had transaminitis in the 150 range.  She was started on corticosteroids.  Pembrolizumab was stopped, she resumed weekly  taxol alone on 11/28/17 and completed a total of 12 weeks of therapy on 12/26/17.  She received first cycle of adriamycin and cyclophosphamide on 1/2/18.  She was hospitalized 1/10/18 - 1/13/18 with neutropenic fever.     Interval History:  Ashleigh Perera comes in to clinic today for evaluation prior to cycle #2 of Adriamycin and cyclophosphamide.  Unfortunately, following cycle #1 she was hospitalized with neutropenic fever.  She continues on cefpodoxime at this time.  A primary source of her fever was not found.  She states that since discharge from the hospital she has continued to have weakness, fatigue and shortness of breath.  She feels these symptoms have slightly improved in the last couple of days.  She slept very poorly in the hospital and feels very fatigued from this.  She is planning on catching up on rest over the next week.  She has no current infectious complaints including fevers, chills, cough, sore throat, rhinorrhea or dysuria.  She denies abdominal complaints including abdominal pain, nausea, vomiting, diarrhea or constipation.  Her appetite has been very poor.  Despite that, she has gained weight secondary to the steroids and reports an overall puffy feeling as well as discoloration of the skin.  She is currently on 10 mg daily of prednisone.  She denies swelling of her lower extremities.  She denies symptoms of depression or anxiety.  The remainder of a 10-point review of systems is otherwise negative.      Past Medical/Surgical History:  Past Medical History:   Diagnosis Date     COPD (chronic obstructive pulmonary disease) (H) 2011     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) 8/30/2017     Periodontal disease      Sleep apnea 12/2015     Urticaria      Past Surgical History:   Procedure Laterality Date     APPENDECTOMY  10/6/2015     CATARACT IOL, RT/LT  11/2016    bilateral      COLONOSCOPY  11/15/2011    Procedure:COLONOSCOPY;  Colonoscopy, screening; Surgeon:ANASTASIA BUNCH; Location:[facility] OR     INSERT PORT VASCULAR ACCESS Left 9/1/2017    Procedure: INSERT PORT VASCULAR ACCESS;  Single Lumen Chest Power Port;  Surgeon: Leif Parkinson PA-C;  Location: [facility] OR     TUBAL LIGATION  12/2004    Bilateral     Allergies:  Allergies as of 01/16/2018     (No Known Allergies)     Current Medications:  Current Outpatient Prescriptions   Medication Sig Dispense Refill     nystatin (MYCOSTATIN) 334680 UNIT/ML suspension Take 5 mLs (500,000 Units) by mouth 4 times daily 280 mL 3     cefpodoxime (VANTIN) 200 MG tablet Take 1 tablet (200 mg) by mouth 2 times daily 14 tablet 0     calcium carbonate (CALCIUM CARBONATE) 600 MG tablet Take by mouth 2 times daily (with meals) 60 tablet      predniSONE (DELTASONE) 10 MG tablet Take 60mg daily for 1 week, then continue to decrease by 10mg each week (Patient taking differently: Take 10 mg by mouth daily ) 150 tablet 0     pantoprazole (PROTONIX) 40 MG EC tablet Take 1 tablet (40 mg) by mouth every morning 30 tablet 0     lidocaine-prilocaine (EMLA) cream Please apply to port site 30 minutes before use prn 30 g 1     order for DME Cranial prosthesis 1 Device 1     LORazepam (ATIVAN) 0.5 MG tablet Take 1 tablet (0.5 mg) by mouth every 4 hours as needed (Anxiety, Nausea/Vomiting or Sleep) 30 tablet 2     hydrOXYzine (ATARAX) 25 MG tablet Take 1-2 tablets (25-50 mg) by mouth every 6 hours as needed for itching 100 tablet 2     guaiFENesin (MUCINEX) 600 MG 12 hr tablet Take 2 tablets (1,200 mg) by mouth 2 times daily 180 tablet 6     tiotropium (SPIRIVA) 18 MCG capsule Inhale 1 capsule (18 mcg) into the lungs daily Inhale contents of one capsule 1 capsule 11     albuterol (PROAIR HFA/PROVENTIL HFA/VENTOLIN HFA) 108 (90 BASE) MCG/ACT Inhaler Inhale 2 puffs into the lungs every 6 hours as needed for shortness of breath / dyspnea 1 Inhaler 5     citalopram (CELEXA) 10 MG tablet Take 1 tablet (10 mg) by mouth  daily 90 tablet 3     darifenacin (ENABLEX) 7.5 MG 24 hr tablet Take 1 tablet (7.5 mg) by mouth daily 90 tablet 3     [place] [facility] [place] Auto CPAP 12-18 cm, F&P Simplus FFM small.       Coenzyme Q10 (CO Q 10 PO) Take 1 tablet by mouth daily        MULTIPLE VITAMIN PO Take 1 tablet by mouth daily        Cyanocobalamin (VITAMIN B 12 PO) Take 100 mcg by mouth daily        Pyridoxine HCl (VITAMIN B6 PO) Take 1 tablet by mouth daily.       aspirin 81 MG tablet Take 1 tablet by mouth daily.  3     VITAMIN D 1000 UNIT OR CAPS TAKE 2 CAPSULES BY MOUTH DAILY        Family and Social History:  Reviewed and unchanged from prior.  Please see initial consultation dated 8/28/17 for further details.    Physical Exam:  /46 (BP Location: Right arm, Patient Position: Sitting, Cuff Size: Adult Regular)  Pulse 131  Temp 99.1  F (37.3  C) (Oral)  Resp 18  Wt 70.7 kg (155 lb 12.8 oz)  SpO2 94%  BMI 26.74 kg/m2  General:  Very fatigued appearing adult female in NAD.  Dyspneic with minimal exertion.  HEENT:  Normocephalic.  Sclera anicteric.  MMM.  No lesions of the oropharynx.  Lymph:  No palpable cervical, supraclavicular, or axillary LAD.  Chest:  Lungs are CTA bilaterally.  Left chest port-a-cath is without surrounding erythema or edema.  Breast exam:  Right breast mass is no longer palpable on today's exam.  CV:  Tachycardic.  Regular rhythm.  Nl S1 and S2.  No m/r/g.  Abd:  Soft/NT/ND.  BSs normoactive.  No hepatosplenomegaly.  Ext:  No pitting edema of the bilateral lower extremities.  Pulses 2+ and symmetric.  Musculo:  Strength 5/5 throughout.  Neuro:  Cranial nerves grossly intact.  Psych:  Mood and affect appear normal.  Skin:  Moon facies and posterior neck fat deposition consistent with prolonged steroid use.    Laboratory/Imaging Studies:  12/28/18 Breast MRI:  FINDINGS:  CLINICAL TRIAL TIMEPOINT: MR #3 Inter-regimen     Breast composition: Scattered fibroglandular tissue  Background  parenchymal enhancement: Minimal     The biopsy clip is seen in the right breast at 12:00 middle depth.  There is near complete resolution of suspicious enhancement. There are  scattered foci of enhancement which may be related to background  parenchyma.     No suspicious enhancement in the left breast. There are stable,  nonspecific internal mammary chain lymph nodes. There is a venous  access port in the left anterior chest. There are areas of bilateral  consolidation, better visualized on CT in November 2017. Unchanged  right greater than left shoulder joint effusion compared to MRI  10/5/2017.    1/10/18 CXR:  Impression: Overall decreased bilateral patchy airspace opacities  compared to 11/11/2017 chest radiographs 11/13/2017 chest CT, at which  time organizing pneumonia was suspected. Recurrent/ongoing infection  and drug reaction in this patient undergoing chemotherapy remain a  possibility.      ASSESSMENT/PLAN:  Ms. Alonzo is a 57-year-old postmenopausal female with a stage IIa, grade 3, triple-negative infiltrating ductal carcinoma of the right breast.     1.  Right breast cancer:  She is s/p treatment with taxol and pembrolizumab (12 weeks taxol given with 3 doses pembrolizumab, then stopped due to immune related adverse events) and now 1 cycle of adriamycin and cyclophosphamide.  Unfortunately cycle #1 AC was complicated by neutropenic fever.  She appears unwell today, visibly fatigued and she is still on antibiotics.  Will hold today's chemotherapy and reschedule in 1 week, however, we discussed that if she still has not recovered clinically at the time of her return visit, we may need to consider forgoing further AC chemotherapy altogether.  We discussed that her breast MRI performed between Taxol/pembro and AC showed near complete resolution of the right breast mass and given very poor tolerance of cycle #1 of AC the potential benefits of further chemotherapy may not outweigh the potential  risks.    Following completion of chemotherapy, she will proceed with surgery.  She has not yet seen a surgeon, we will schedule an appointment with Dr. Gates for her today.  Whether or not she will benefit from adjuvant radiation is unknown at this time and depends on the type of breast surgery, surgical margins, and whether or not there is lymph node involvement at the time of surgery.     2.  Neutropenic fever:  Admitted 1/10/18 - 1/13/18 for neutropenic fever.  Continues on cefpodoxime.  Will complete course on 1/20/18.  Will hold today's chemotherapy as above.    3.  Pneumonitis:  Currently on prednisone 10 mg PO daily.  Given persistent (but improved) lung opacities and ongoing dyspnea (however, this is also likely due to deconditioning), I prefer to continue a slow taper of steroids.  I recommend 10 mg PO daily x 1 week, followed by 5 mg PO daily x 1 week, 5 mg PO QOD x 1 week, then stop.  Given prolonged steroids and h/o pembrolizumab will need to monitor for signs and symptoms adrenal insufficiency on stopping steroids.       4.  Transaminitis:  Secondary to pembrolizumab.  LFTs continue to improve.  Continue prednisone as above.     5.  Neuropathy:  Improved since discontinuation of Taxol.  She will continue to take pyridoxine 100 mg daily.    6.  Poor appetite/weight loss:  Encouraged to drink 2-3 cans of ensure or boost daily.    7.  Followup:  Visit with Dr. Gates within 1 month.  Reschedule patient for labs, provider visit, and today's infusion to 1 week from now (1/23).    Fara Bradshaw MD

## 2018-01-16 NOTE — DISCHARGE SUMMARY
Name:  Ashleigh Alonzo  MRN:  [id number]    Date of Discharge 1/14/18  Discharging Provider:  Ciara Chamorro MD    Discharge Diagnoses:     1. Neutropenic fever  2. Stage 2A breast cancer  3. COPD  4. Thrush    HPI: Ashleigh Alonzo is a 57 year old female PMH of of stage IIa ER-/AK-/Her2- right breast cancer, currently on chemotherapy  admitted on 1/10/2018 with neutropenic fever after her first cycle of dose dense adriamycin and cytoxan.  She reported having a fever of 102 this morning without improvement with Tylenol.  No other associated upper respiratory tract symptoms no chills no cough no chest pain no abdominal pain no diarrhea or constipation.  She reports mild nausea but has been eating well.  Does not endorse any new skin rash.  She finally decided to come to the ED as fever was not improving.    Hospital Course:    1. Neutropenic fever - pt was admitted with neutropenic fever with ANC 0.3 after dose dense adriamycin and cytoxan.  She was pancultured.  CXR nonspecific.  Blood cultures remained negative.  She was treated empirically with IV vancomycin and cefepime.  Flu negative.  Counts and fevers resolved.  Discussed discharge with WBC count of 5.6.  Discharge on antibiotics with cefepime to complete a ten day course.    2. Stage 2 breast cancer - she sees DR Bradshaw in clinic.  She has a f/u appt this upcoming week to discuss next steps with chemotherapy.  We discussed that further chemotherapy will depend upon her recovery.    3. Thrush - nystatin provided x 10 days.      New Discharge medications:      1. Cefepime 200 mg twice daily x 10 days  2. Nystatin swish and spit qid    Followup:    1. [place] Bradshaw [place] [facility] [place]    Pt was advised to call clinic with any new symptoms such as fevers, cough, etc.    Ciara Chamorro MD, MS

## 2018-01-16 NOTE — MR AVS SNAPSHOT
After Visit Summary   1/16/2018    Ashleigh Alonzo    MRN: [id number]           Patient Information     Date Of Birth          1960        Visit Information        Provider Department      1/16/2018 8:45 AM Fara Bradshaw MD [facility]        Today's Diagnoses     Mucositis    -  1    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)        Pneumonitis           Follow-ups after your visit        Your next 10 appointments already scheduled     Jan 18, 2018 10:00 AM CST   (Arrive by 9:45 AM)   New Patient Visit with Atul Gates MD   [facility] ([facility])    [address] [place] [address]  [address]  [facility] [address]   [phone]            Jan 23, 2018  9:00 AM CST   M[facility] Lab Draw with  [facility] LAB DRAW   [facility] Lab Draw ([facility])    [address] [place] [address]  [address]  [facility] [address]   [phone]            Jan 23, 2018  9:30 AM CST   (Arrive by 9:15 AM)   Return Visit with Lilia Livingston PA-C   [facility] ([facility])    [address] [place] [address]  [address]  [facility] [address]   [phone]            Jan 23, 2018 10:30 AM CST   Infusion 120 with UC ONCOLOGY INFUSION, UC 17 ATC   [facility] ([facility])    [address] [place] [address]  [address]  [facility] [address]   [phone]            Feb 06, 2018 11:15 AM CST   Masonic Lab Draw with UC MASONIC LAB DRAW   [facility] Mason[facility] Lab Draw ([facility])    [address] [place] [address]  [address]  [facility] [address]   [phone]            Feb 06, 2018 11:50 AM CST   (Arrive by 11:35 AM)   Return Visit with Denana Blanco PA-C   [facility] ([facility])    [address] [place] [address]  [address]  [place]  [place] [address]   [phone]            Feb 06, 2018 12:30 PM CST   Infusion 120 with UC ONCOLOGY INFUSION, UC 29 ATC   [facility] ([facility])    [address] [place] [address]  [address]  [facility] [id number] [phone]            Feb 20, 2018  1:15 PM CST   Masonic Lab Draw with UC MASONIC LAB DRAW   [facility] Lab Draw ([facility])    [address]  [address]  [facility] [id number] [phone]              Who to contact     If you have questions or need follow up information about today's clinic visit or your schedule please contact [facility] directly at [phone].  Normal or non-critical lab and imaging results will be communicated to you by [employer], letter or phone within 4 business days after the clinic has received the results. If you do not hear from us within 7 days, please contact the clinic through [employer] or phone. If you have a critical or abnormal lab result, we will notify you by phone as soon as possible.  Submit refill requests through [employer] or call your pharmacy and they will forward the refill request to us. Please allow 3 business days for your refill to be completed.          Additional Information About Your Visit        [employer] Information     [employer] gives you secure access to your electronic health record. If you see a primary care provider, you can also send messages to your care team and make appointments. If you have questions, please call your primary care clinic.  If you do not have a primary care provider, please call [phone] and they will assist you.        Care EveryWhere ID     This is your Care EveryWhere ID. This could be used by other organizations to access your [facility] medical records  [id number]        Your Vitals Were     Pulse Temperature Respirations Pulse Oximetry BMI (Body Mass Index)       131 99.1  F (37.3  C) (Oral) 18 94% 26.74 kg/m2         Blood Pressure from Last 3 Encounters:   01/16/18 113/46   01/13/18 100/58   01/02/18 142/86    Weight from Last 3 Encounters:   01/16/18 70.7 kg (155 lb 12.8 oz)   01/13/18 72.6 kg (160 lb)   01/02/18 73.9 kg (162 lb 14.4 oz)              Today, you had the following     No orders found for display         Today's Medication Changes          These changes are accurate as of: 1/16/18 11:59 PM.  If you have any questions, ask your nurse or doctor.               Start taking these medicines.        Dose/Directions    lidocaine visc 2% 2.5mL/5mL & maalox/mylanta w/ simeth 2.5mL/5mL & diphenhydrAMINE 5mg/5mL Susp suspension   Commonly known as:  [facility]   Used for:  Mucositis   Started by:  Fara Bradshaw MD        Dose:  10 mL   Swish and swallow 10 mLs in mouth every 6 hours as needed for mouth sores   Quantity:  1 Bottle   Refills:  3       pantoprazole 40 MG EC tablet   Commonly known as:  PROTONIX   Used for:  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)   Started by:  Fara Bradshaw MD        Dose:  40 mg   Take 1 tablet (40 mg) by mouth every morning   Quantity:  60 tablet   Refills:  0         These medicines have changed or have updated prescriptions.        Dose/Directions    * predniSONE 10 MG tablet   Commonly known as:  DELTASONE   This may have changed:    - how much to take  - how to take this  - when to take this  - additional instructions   Used for:  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)   Changed by:  Lilia Livingston PA-C        Take 60mg daily for 1 week, then continue to decrease by 10mg each week   Quantity:  150 tablet   Refills:  0       * predniSONE 5 MG tablet   Commonly known as:  DELTASONE   This may have changed:  You were already taking a medication with the same name, and this prescription was added. Make sure you  understand how and when to take each.   Used for:  Pneumonitis   Changed by:  Fara Bradshaw MD        1 tab daily x 7 days, followed by every other day x 7 days   Quantity:  11 tablet   Refills:  0       * Notice:  This list has 2 medication(s) that are the same as other medications prescribed for you. Read the directions carefully, and ask your doctor or other care provider to review them with you.         Where to get your medicines      These medications were sent to [facility] [address]  [address], [facility] [address]    Hours:  TRANSPLANT PHONE NUMBER [phone] Phone:  [phone]     pantoprazole 40 MG EC tablet    predniSONE 5 MG tablet         Some of these will need a paper prescription and others can be bought over the counter.  Ask your nurse if you have questions.     Bring a paper prescription for each of these medications     lidocaine visc 2% 2.5mL/5mL & maalox/mylanta w/ simeth 2.5mL/5mL & diphenhydrAMINE 5mg/5mL Susp suspension                Primary Care Provider Office Phone # Fax #    Radha Cazares [phone]       [address] [address]        Equal Access to Services     THERESA HUGHES AH: Hadii candie ku hadasho Soomaali, waaxda luqadaha, qaybta kaalmada adeegyada, waxay idiin haylukaszn kevin pickering. So [facility] [phone].    ATENCIÓN: Si habla español, tiene a blackman disposición servicios gratuitos de asistencia lingüística. Llame al [phone].    We comply with applicable federal civil rights laws and [place] laws. We do not discriminate on the basis of race, color, national origin, age, disability, sex, sexual orientation, or gender identity.            Thank you!     Thank you for choosing [facility]  for your care. Our goal is always to provide you with excellent care. Hearing back from our patients  is one way we can continue to improve our services. Please take a few minutes to complete the written survey that you may receive in the mail after your visit with us. Thank you!             Your Updated Medication List - Protect others around you: Learn how to safely use, store and throw away your medicines at www.disposemymeds.org.          This list is accurate as of: 1/16/18 11:59 PM.  Always use your most recent med list.                   Brand Name Dispense Instructions for use Diagnosis    albuterol 108 (90 BASE) MCG/ACT Inhaler    PROAIR HFA/PROVENTIL HFA/VENTOLIN HFA    1 Inhaler    Inhale 2 puffs into the lungs every 6 hours as needed for shortness of breath / dyspnea    Chronic obstructive pulmonary disease, unspecified COPD type (H)       aspirin 81 MG tablet      Take 1 tablet by mouth daily.        calcium carbonate 600 MG tablet   Generic drug:  calcium carbonate     60 tablet    Take by mouth 2 times daily (with meals)        * cefpodoxime 200 MG tablet    VANTIN    14 tablet    Take 1 tablet (200 mg) by mouth 2 times daily    Neutropenic fever (H), 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)       * cefpodoxime 200 MG tablet    VANTIN          citalopram 10 MG tablet    celeXA    90 tablet    Take 1 tablet (10 mg) by mouth daily    Anxiety, Depression, unspecified depression type       CO Q 10 PO      Take 1 tablet by mouth daily        darifenacin 7.5 MG 24 hr tablet    ENABLEX    90 tablet    Take 1 tablet (7.5 mg) by mouth daily    Overactive bladder       guaiFENesin 600 MG 12 hr tablet    MUCINEX    180 tablet    Take 2 tablets (1,200 mg) by mouth 2 times daily    Cough with sputum       hydrOXYzine 25 MG tablet    ATARAX    100 tablet    Take 1-2 tablets (25-50 mg) by mouth every 6 hours as needed for itching    Hives       lidocaine visc 2% 2.5mL/5mL & maalox/mylanta w/ simeth 2.5mL/5mL & diphenhydrAMINE 5mg/5mL Susp suspension     [facility] Mouthwash [facility]    1 Bottle    Swish and swallow 10 mLs in mouth every 6 hours as needed for mouth sores    Mucositis       lidocaine-prilocaine cream    EMLA    30 g    Please apply to port site 30 minutes before use prn    Personal history of malignant neoplasm of breast       LORazepam 0.5 MG tablet    ATIVAN    30 tablet    Take 1 tablet (0.5 mg) by mouth every 4 hours as needed (Anxiety, Nausea/Vomiting or Sleep)    Malignant neoplasm of upper-inner quadrant of right breast in female, estrogen receptor negative (H)       MULTIPLE VITAMIN PO      Take 1 tablet by mouth daily        * nystatin 235797 UNIT/ML suspension    MYCOSTATIN    280 mL    Take 5 mLs (500,000 Units) by mouth 4 times daily    Neutropenic fever (H), Malignant neoplasm of upper-inner quadrant of right breast in female, estrogen receptor negative (H), Thrush       * nystatin 799357 UNIT/ML suspension    MYCOSTATIN     Take 5 mL by mouth 4 times daily        * order for [facility]      [email or web address] [employer] Auto CPAP 12-18 cm, F&P Simplus FFM small.    MERLIN (obstructive sleep apnea)       * order for [facility]     1 Device    Cranial prosthesis    Malignant neoplasm of upper-inner quadrant of right breast in female, estrogen receptor negative (H)       pantoprazole 40 MG EC tablet    PROTONIX    60 tablet    Take 1 tablet (40 mg) by mouth every morning    Malignant neoplasm of upper-inner quadrant of right breast in female, estrogen receptor negative (H)       * predniSONE 10 MG tablet    DELTASONE    150 tablet    Take 60mg daily for 1 week, then continue to decrease by 10mg each week    Malignant neoplasm of upper-inner quadrant of right breast in female, estrogen receptor negative (H)       * predniSONE 5 MG tablet    DELTASONE    11 tablet    1 tab daily x 7 days, followed by every other day x 7 days    Pneumonitis       tiotropium 18 MCG capsule    SPIRIVA    1 capsule    Inhale 1 capsule (18 mcg) into the lungs daily Inhale contents of one  capsule    Chronic obstructive pulmonary disease, unspecified COPD type (H)       VITAMIN B 12 PO      Take 100 mcg by mouth daily        VITAMIN B6 PO      Take 1 tablet by mouth daily.        vitamin D 1000 UNITS capsule      TAKE 2 CAPSULES BY MOUTH DAILY        * Notice:  This list has 8 medication(s) that are the same as other medications prescribed for you. Read the directions carefully, and ask your doctor or other care provider to review them with you.

## 2018-01-16 NOTE — PROGRESS NOTES
Met with patient and  in clinic visit today.  Patient is still not feeling her best, today.   Pt was inpatient x 4 days after Cycle 1 AC.    Dionne Goode RNCC BSN CBCN

## 2018-01-16 NOTE — NURSING NOTE
Port de-accessed without complication. See flowsheet.  Mira Morales, [facility]

## 2018-01-16 NOTE — NURSING NOTE
Chief Complaint   Patient presents with     Port Draw     Labs drawn via port by RN. Line flushed and hep locked. VS taken.     Yaima Hector RN

## 2018-01-16 NOTE — NURSING NOTE
Research Note for ISPY2 . Patient here for AC cycle 2 but was hospitalized 1/10/18 to 1/13/13 for neutropenic fever. She will not get her chemotherapy today. She reports symptoms of oral thrush affecting ability to eat solids and using nystatin started 1/10/18 and continuing. Continuing on oral antibiotics since discharge as well. Her prednisone is on a taper and currently at 10 mg per day. ECOG score is 1. Patient feels fatigued since admission to hospital. No fevers noted by patient  since discharge.   Plan for AC#2 for next week 1/23/18.  Luzmaria Geiger RN/ Research [phone]

## 2018-01-16 NOTE — LETTER
1/16/2018       RE: Ashleigh Alonzo  [address] [facility] [address] [facility] [place] [address]     Dear Colleague,    Thank you for referring your patient, Ashleigh Alonzo, to the [facility]. Please see a copy of my visit note below.    Oncology On Treatment Visit:  Date on this visit: 1/16/2018    Diagnosis:  Stage IIa, T2N0M0, grade 3 triple negative cancer of the right breast.    Primary Physician: Radha Cazares     History Of Present Illness:  Ms. Alonzo is a 57-year-old postmenopausal female with stage IIa, T2 N0 M0, grade 3, triple-negative invasive carcinoma of the right breast.  Routine bilateral screening mammogram on 07/27/2017 showed possible developing calcifications in the right breast at the 12 o'clock position 6 cm from the nipple.  Mammogram prior to this one had been 1 year prior in 07/2016.  Right breast ultrasound demonstrated a 7-mm, irregular, hypoechoic mass at the 12 o'clock position.  The patient went on to have a contrast-enhanced mammogram which showed a peripherally enhancing, irregular mass measured up to 1.9 cm as well as an additional 6-mm, enhancing focus anteromedial to the dominant mass.  The total area of abnormal enhancement on contrast mammogram measured up to 3.4 cm.  Right breast biopsy on 08/22/2017 demonstrated a grade 3 invasive mammary carcinoma with associated high-grade DCIS.  Invasive carcinoma was stained for estrogen and progesterone receptors.  Estrogen receptor and progesterone receptor staining was negative with 0% of nuclei staining.  HER2 was non-amplified by FISH with a HER2/CEN17 ratio of 1.5 and 2.8 HER2 signals per nucleus.  Breast MRI measured the biopsy proven breast cancer at 3.2 cm.    Ms. Alonzo enrolled in the ISPY-2 clinical trial.  She began treatment with weekly taxol and once every 3 week pembrolizumab on 9/20/17.  Week 3 MRI showed a decrease in size of right breast mass from 3.2 cm to 2.4 cm and overall decrease in enhancement.  She was  hospitalized 11/11/17 - 11/17/17 with fevers ranging from 102 - 105.  CT chest was consistent with pneumonitis.  She also had transaminitis in the 150 range.  She was started on corticosteroids.  Pembrolizumab was stopped, she resumed weekly taxol alone on 11/28/17 and completed a total of 12 weeks of therapy on 12/26/17.  She received first cycle of adriamycin and cyclophosphamide on 1/2/18.  She was hospitalized 1/10/18 - 1/13/18 with neutropenic fever.     Interval History:  Ashleigh Perera comes in to clinic today for evaluation prior to cycle #2 of Adriamycin and cyclophosphamide.  Unfortunately, following cycle #1 she was hospitalized with neutropenic fever.  She continues on cefpodoxime at this time.  A primary source of her fever was not found.  She states that since discharge from the hospital she has continued to have weakness, fatigue and shortness of breath.  She feels these symptoms have slightly improved in the last couple of days.  She slept very poorly in the hospital and feels very fatigued from this.  She is planning on catching up on rest over the next week.  She has no current infectious complaints including fevers, chills, cough, sore throat, rhinorrhea or dysuria.  She denies abdominal complaints including abdominal pain, nausea, vomiting, diarrhea or constipation.  Her appetite has been very poor.  Despite that, she has gained weight secondary to the steroids and reports an overall puffy feeling as well as discoloration of the skin.  She is currently on 10 mg daily of prednisone.  She denies swelling of her lower extremities.  She denies symptoms of depression or anxiety.  The remainder of a 10-point review of systems is otherwise negative.      Past Medical/Surgical History:  Past Medical History:   Diagnosis Date     COPD (chronic obstructive pulmonary disease) (H) 2011     Malignant neoplasm of upper-inner quadrant of right breast in female, estrogen receptor negative (H) 8/30/2017     Periodontal  disease      Sleep apnea 12/2015     Urticaria      Past Surgical History:   Procedure Laterality Date     APPENDECTOMY  10/6/2015     CATARACT IOL, RT/LT  11/2016    bilateral      COLONOSCOPY  11/15/2011    Procedure:COLONOSCOPY; Colonoscopy, screening; Surgeon:ANASTASIA BUNCH; Location:[facility] OR     INSERT PORT VASCULAR ACCESS Left 9/1/2017    Procedure: INSERT PORT VASCULAR ACCESS;  Single Lumen Chest Power Port;  Surgeon: Leif Parkinson PA-C;  Location: [facility] OR     TUBAL LIGATION  12/2004    Bilateral     Allergies:  Allergies as of 01/16/2018     (No Known Allergies)     Current Medications:  Current Outpatient Prescriptions   Medication Sig Dispense Refill     nystatin (MYCOSTATIN) 669349 UNIT/ML suspension Take 5 mLs (500,000 Units) by mouth 4 times daily 280 mL 3     cefpodoxime (VANTIN) 200 MG tablet Take 1 tablet (200 mg) by mouth 2 times daily 14 tablet 0     calcium carbonate (CALCIUM CARBONATE) 600 MG tablet Take by mouth 2 times daily (with meals) 60 tablet      predniSONE (DELTASONE) 10 MG tablet Take 60mg daily for 1 week, then continue to decrease by 10mg each week (Patient taking differently: Take 10 mg by mouth daily ) 150 tablet 0     pantoprazole (PROTONIX) 40 MG EC tablet Take 1 tablet (40 mg) by mouth every morning 30 tablet 0     lidocaine-prilocaine (EMLA) cream Please apply to port site 30 minutes before use prn 30 g 1     order for DME Cranial prosthesis 1 Device 1     LORazepam (ATIVAN) 0.5 MG tablet Take 1 tablet (0.5 mg) by mouth every 4 hours as needed (Anxiety, Nausea/Vomiting or Sleep) 30 tablet 2     hydrOXYzine (ATARAX) 25 MG tablet Take 1-2 tablets (25-50 mg) by mouth every 6 hours as needed for itching 100 tablet 2     guaiFENesin (MUCINEX) 600 MG 12 hr tablet Take 2 tablets (1,200 mg) by mouth 2 times daily 180 tablet 6     tiotropium (SPIRIVA) 18 MCG capsule Inhale 1 capsule (18 mcg) into the lungs daily Inhale contents of one capsule 1 capsule 11     albuterol  (PROAIR HFA/PROVENTIL HFA/VENTOLIN HFA) 108 (90 BASE) MCG/ACT Inhaler Inhale 2 puffs into the lungs every 6 hours as needed for shortness of breath / dyspnea 1 Inhaler 5     citalopram (CELEXA) 10 MG tablet Take 1 tablet (10 mg) by mouth daily 90 tablet 3     darifenacin (ENABLEX) 7.5 MG 24 hr tablet Take 1 tablet (7.5 mg) by mouth daily 90 tablet 3     order for [facility] [facility] [place] Auto CPAP 12-18 cm, F&P Simplus FFM small.       Coenzyme Q10 (CO Q 10 PO) Take 1 tablet by mouth daily        MULTIPLE VITAMIN PO Take 1 tablet by mouth daily        Cyanocobalamin (VITAMIN B 12 PO) Take 100 mcg by mouth daily        Pyridoxine HCl (VITAMIN B6 PO) Take 1 tablet by mouth daily.       aspirin 81 MG tablet Take 1 tablet by mouth daily.  3     VITAMIN D 1000 UNIT OR CAPS TAKE 2 CAPSULES BY MOUTH DAILY        Family and Social History:  Reviewed and unchanged from prior.  Please see initial consultation dated 8/28/17 for further details.    Physical Exam:  /46 (BP Location: Right arm, Patient Position: Sitting, Cuff Size: Adult Regular)  Pulse 131  Temp 99.1  F (37.3  C) (Oral)  Resp 18  Wt 70.7 kg (155 lb 12.8 oz)  SpO2 94%  BMI 26.74 kg/m2  General:  Very fatigued appearing adult female in NAD.  Dyspneic with minimal exertion.  HEENT:  Normocephalic.  Sclera anicteric.  MMM.  No lesions of the oropharynx.  Lymph:  No palpable cervical, supraclavicular, or axillary LAD.  Chest:  Lungs are CTA bilaterally.  Left chest port-a-cath is without surrounding erythema or edema.  Breast exam:  Right breast mass is no longer palpable on today's exam.  CV:  Tachycardic.  Regular rhythm.  Nl S1 and S2.  No m/r/g.  Abd:  Soft/NT/ND.  BSs normoactive.  No hepatosplenomegaly.  Ext:  No pitting edema of the bilateral lower extremities.  Pulses 2+ and symmetric.  Musculo:  Strength 5/5 throughout.  Neuro:  Cranial nerves grossly intact.  Psych:  Mood and affect appear normal.  Skin:  Moon facies and posterior neck fat  deposition consistent with prolonged steroid use.    Laboratory/Imaging Studies:  12/28/18 Breast MRI:  FINDINGS:  CLINICAL TRIAL TIMEPOINT: MR #3 Inter-regimen     Breast composition: Scattered fibroglandular tissue  Background parenchymal enhancement: Minimal     The biopsy clip is seen in the right breast at 12:00 middle depth.  There is near complete resolution of suspicious enhancement. There are  scattered foci of enhancement which may be related to background  parenchyma.     No suspicious enhancement in the left breast. There are stable,  nonspecific internal mammary chain lymph nodes. There is a venous  access port in the left anterior chest. There are areas of bilateral  consolidation, better visualized on CT in November 2017. Unchanged  right greater than left shoulder joint effusion compared to MRI  10/5/2017.    1/10/18 CXR:  Impression: Overall decreased bilateral patchy airspace opacities  compared to 11/11/2017 chest radiographs 11/13/2017 chest CT, at which  time organizing pneumonia was suspected. Recurrent/ongoing infection  and drug reaction in this patient undergoing chemotherapy remain a  possibility.      ASSESSMENT/PLAN:  Ms. Alonzo is a 57-year-old postmenopausal female with a stage IIa, grade 3, triple-negative infiltrating ductal carcinoma of the right breast.     1.  Right breast cancer:  She is s/p treatment with taxol and pembrolizumab (12 weeks taxol given with 3 doses pembrolizumab, then stopped due to immune related adverse events) and now 1 cycle of adriamycin and cyclophosphamide.  Unfortunately cycle #1 AC was complicated by neutropenic fever.  She appears unwell today, visibly fatigued and she is still on antibiotics.  Will hold today's chemotherapy and reschedule in 1 week, however, we discussed that if she still has not recovered clinically at the time of her return visit, we may need to consider forgoing further AC chemotherapy altogether.  We discussed that her breast MRI  performed between Taxol/pembro and AC showed near complete resolution of the right breast mass and given very poor tolerance of cycle #1 of AC the potential benefits of further chemotherapy may not outweigh the potential risks.    Following completion of chemotherapy, she will proceed with surgery.  She has not yet seen a surgeon, we will schedule an appointment with Dr. Gates for her today.  Whether or not she will benefit from adjuvant radiation is unknown at this time and depends on the type of breast surgery, surgical margins, and whether or not there is lymph node involvement at the time of surgery.     2.  Neutropenic fever:  Admitted 1/10/18 - 1/13/18 for neutropenic fever.  Continues on cefpodoxime.  Will complete course on 1/20/18.  Will hold today's chemotherapy as above.    3.  Pneumonitis:  Currently on prednisone 10 mg PO daily.  Given persistent (but improved) lung opacities and ongoing dyspnea (however, this is also likely due to deconditioning), I prefer to continue a slow taper of steroids.  I recommend 10 mg PO daily x 1 week, followed by 5 mg PO daily x 1 week, 5 mg PO QOD x 1 week, then stop.  Given prolonged steroids and h/o pembrolizumab will need to monitor for signs and symptoms adrenal insufficiency on stopping steroids.       4.  Transaminitis:  Secondary to pembrolizumab.  LFTs continue to improve.  Continue prednisone as above.     5.  Neuropathy:  Improved since discontinuation of Taxol.  She will continue to take pyridoxine 100 mg daily.    6.  Poor appetite/weight loss:  Encouraged to drink 2-3 cans of ensure or boost daily.    7.  Followup:  Visit with Dr. Gates within 1 month.  Reschedule patient for labs, provider visit, and today's infusion to 1 week from now (1/23).    Fara Bradshaw MD

## 2018-01-18 ENCOUNTER — ONCOLOGY VISIT (OUTPATIENT)
Dept: ONCOLOGY | Facility: CLINIC | Age: 58
End: 2018-01-18
Attending: SURGERY
Payer: COMMERCIAL

## 2018-01-18 VITALS
BODY MASS INDEX: 26.46 KG/M2 | TEMPERATURE: 97.4 F | DIASTOLIC BLOOD PRESSURE: 71 MMHG | HEIGHT: 64 IN | RESPIRATION RATE: 16 BRPM | WEIGHT: 155 LBS | HEART RATE: 110 BPM | OXYGEN SATURATION: 94 % | SYSTOLIC BLOOD PRESSURE: 118 MMHG

## 2018-01-18 DIAGNOSIS — C50.211 MALIGNANT NEOPLASM OF UPPER-INNER QUADRANT OF RIGHT BREAST IN FEMALE, ESTROGEN RECEPTOR NEGATIVE (H): Primary | ICD-10-CM

## 2018-01-18 DIAGNOSIS — Z17.1 MALIGNANT NEOPLASM OF UPPER-INNER QUADRANT OF RIGHT BREAST IN FEMALE, ESTROGEN RECEPTOR NEGATIVE (H): Primary | ICD-10-CM

## 2018-01-18 LAB
BACTERIA SPEC CULT: NO GROWTH
SPECIMEN SOURCE: NORMAL

## 2018-01-18 PROCEDURE — G0463 HOSPITAL OUTPT CLINIC VISIT: HCPCS | Mod: ZF

## 2018-01-18 ASSESSMENT — PAIN SCALES - GENERAL: PAINLEVEL: NO PAIN (0)

## 2018-01-18 NOTE — PROGRESS NOTES
Ashleigh Alonzo is a 57-year-old woman I was asked to see at the request of Dr. Fara Bradshaw for evaluation of a right breast cancer.  She was diagnosed with a T2 N0 M0 right breast cancer in August by her MRI that was done in August.  She had a roughly 4 cm area that was abnormal.  A needle biopsy showed a grade III triple negative breast cancer.  She was enrolled on the I-SPY 2 trial.  During her chemotherapy, she has had hospitalization for neutropenic fever.  She is feeling better today.  She also underwent genetic testing during her preoperative chemotherapy and does not have any known mutations.  Her last MRI was on 12/28 which showed nearly complete response in her breast.  She has never had any enlarged axillary lymph nodes.  She is now here to discuss treatment options.  She does have 2-3 more cycles of chemotherapy left depending upon how she reacts.  She has had no prior history of any breast problems.      PAST MEDICAL HISTORY:  COPD.  She stopped smoking about 20 years ago.  She has no cardiac disease.      FAMILY HISTORY:  Negative for breast or ovarian cancer.      PHYSICAL EXAMINATION:   GENERAL:  She is a well-appearing woman in no apparent distress.   HEAD AND NECK:  Examination reveals no cervical, supraclavicular or infraclavicular lymphadenopathy.   BREASTS:  Bilateral breast examination reveals no dominant masses, skin changes, nipple changes or axillary lymphadenopathy.      IMPRESSION:  Stage II, triple negative breast cancer.      PLAN:  I talked about the various treatment options.  She would like to have breast conserving surgery.  I think that is reasonable.  We will also do a sentinel lymph node biopsy at the same time.  We will schedule her for a wire localization.  We will try to do her surgery about a month after her last dose of chemotherapy.  She understands that she will need radiation therapy after that.  We will not schedule her surgery until we have a better idea when she is  going to complete her chemotherapy.      cc:   Fara Bradshaw MD   Department of Hematology, Oncology & Transplantation   [address] [place] [address]      TT 45 minutes.  CT 35 minutes.

## 2018-01-18 NOTE — MR AVS SNAPSHOT
After Visit Summary   1/18/2018    Ashleigh Alonzo    MRN: [id number]           Patient Information     Date Of Birth          1960        Visit Information        Provider Department      1/18/2018 10:00 AM Atul Gates MD [facility]        Today's Diagnoses     Malignant neoplasm of upper-inner quadrant of right breast in female, estrogen receptor negative (H)    -  1       Follow-ups after your visit        Your next 10 appointments already scheduled     Jan 23, 2018  9:00 AM CST   Masonic Lab Draw with [facility] MASONIC LAB DRAW   [facility] Masonic Lab Draw ([facility])    [address] [place] [address]  [address]  [facility] [address]   [phone]            Jan 23, 2018  9:30 AM CST   (Arrive by 9:15 AM)   Return Visit with Lilia Livingston PA-C   [facility] ([facility])    [address] [place] [address]  [address]  [facility] [address]   [phone]            Jan 23, 2018 10:30 AM CST   Infusion 120 with UC ONCOLOGY INFUSION, UC 17 ATC   [facility] ([facility])    [address] [place] [address]  [address]  [facility] [address]   [phone]            Feb 06, 2018 11:15 AM CST   Masonic Lab Draw with [facility] MASONIC LAB DRAW   [facility] Masonic Lab Draw ([facility])    [address] [place] [address]  [address]  [facility] [address]   [phone]            Feb 06, 2018 11:50 AM CST   (Arrive by 11:35 AM)   Return Visit with Deanna Blanco PA-C   [facility] ([facility])    [address] [place] [address]  [address]  [facility] [address]   [phone]            Feb 06, 2018 12:30 PM CST   Infusion 120 with UC ONCOLOGY INFUSION, UC 29 ATC   [facility] ([facility])    [address] [place] [address]  [address]  [facility] [address]   [phone]            Feb 20, 2018  1:15 PM  "CST   Masonic Lab Draw with  MASONIC LAB DRAW   [facility] Lab Draw ([facility])    [address]  [address]  [facility] [address] [phone]            Feb 20, 2018  1:50 PM CST   (Arrive by 1:35 PM)   Return Visit with Lilia Livingston PA-C   [facility] ([facility])    [address]  [address]  [facility] [address] [phone]              Who to contact     If you have questions or need follow up information about today's clinic visit or your schedule please contact [facility] directly at [phone].  Normal or non-critical lab and imaging results will be communicated to you by [employer], letter or phone within 4 business days after the clinic has received the results. If you do not hear from us within 7 days, please contact the clinic through [employer] or phone. If you have a critical or abnormal lab result, we will notify you by phone as soon as possible.  Submit refill requests through [employer] or call your pharmacy and they will forward the refill request to us. Please allow 3 business days for your refill to be completed.          Additional Information About Your Visit        [employer] Information     [employer] gives you secure access to your electronic health record. If you see a primary care provider, you can also send messages to your care team and make appointments. If you have questions, please call your primary care clinic.  If you do not have a primary care provider, please call [phone] and they will assist you.        Care EveryWhere ID     This is your Care EveryWhere ID. This could be used by other organizations to access your [place] medical records  [id number]        Your Vitals Were     Pulse Temperature Respirations Height Pulse Oximetry BMI (Body Mass Index)    110 97.4  F (36.3  C) (Oral) 16 1.626 m (5' 4.02\") 94% 26.59 kg/m2       Blood Pressure from Last 3 " Encounters:   01/18/18 118/71   01/16/18 113/46   01/13/18 100/58    Weight from Last 3 Encounters:   01/18/18 70.3 kg (155 lb)   01/16/18 70.7 kg (155 lb 12.8 oz)   01/13/18 72.6 kg (160 lb)              Today, you had the following     No orders found for display         Today's Medication Changes          These changes are accurate as of: 1/18/18 11:59 PM.  If you have any questions, ask your nurse or doctor.               These medicines have changed or have updated prescriptions.        Dose/Directions    * predniSONE 10 MG tablet   Commonly known as:  DELTASONE   This may have changed:    - how much to take  - how to take this  - when to take this  - additional instructions   Used for:  Malignant neoplasm of upper-inner quadrant of right breast in female, estrogen receptor negative (H)   Changed by:  Lilia Livingston PA-C        Take 60mg daily for 1 week, then continue to decrease by 10mg each week   Quantity:  150 tablet   Refills:  0       * predniSONE 5 MG tablet   Commonly known as:  DELTASONE   This may have changed:  Another medication with the same name was changed. Make sure you understand how and when to take each.   Used for:  Pneumonitis   Changed by:  Fara Bradshaw MD        1 tab daily x 7 days, followed by every other day x 7 days   Quantity:  11 tablet   Refills:  0       * Notice:  This list has 2 medication(s) that are the same as other medications prescribed for you. Read the directions carefully, and ask your doctor or other care provider to review them with you.             Primary Care Provider Office Phone # Fax #    Radha Cazares [phone]       [address] [address]        Equal Access to Services     WILMER HUGHES : Abdifatah Granda, kaden clancy, bang weiss. So [facility] [phone].    ATENCIÓN: kimberly Castle  a blackman disposición servicios gratuitos de asistencia lingüística. Pauilne petersen [phone].    We comply with applicable federal civil rights laws and [place] laws. We do not discriminate on the basis of race, color, national origin, age, disability, sex, sexual orientation, or gender identity.            Thank you!     Thank you for choosing [facility]  for your care. Our goal is always to provide you with excellent care. Hearing back from our patients is one way we can continue to improve our services. Please take a few minutes to complete the written survey that you may receive in the mail after your visit with us. Thank you!             Your Updated Medication List - Protect others around you: Learn how to safely use, store and throw away your medicines at www.disposemymeds.org.          This list is accurate as of: 1/18/18 11:59 PM.  Always use your most recent med list.                   Brand Name Dispense Instructions for use Diagnosis    albuterol 108 (90 BASE) MCG/ACT Inhaler    PROAIR HFA/PROVENTIL HFA/VENTOLIN HFA    1 Inhaler    Inhale 2 puffs into the lungs every 6 hours as needed for shortness of breath / dyspnea    Chronic obstructive pulmonary disease, unspecified COPD type (H)       aspirin 81 MG tablet      Take 1 tablet by mouth daily.        calcium carbonate 600 MG tablet   Generic drug:  calcium carbonate     60 tablet    Take by mouth 2 times daily (with meals)        * cefpodoxime 200 MG tablet    VANTIN    14 tablet    Take 1 tablet (200 mg) by mouth 2 times daily    Neutropenic fever (H), Malignant neoplasm of upper-inner quadrant of right breast in female, estrogen receptor negative (H)       * cefpodoxime 200 MG tablet    VANTIN          citalopram 10 MG tablet    celeXA    90 tablet    Take 1 tablet (10 mg) by mouth daily    Anxiety, Depression, unspecified depression type       CO Q 10 PO      Take 1 tablet by mouth daily        darifenacin 7.5 MG 24 hr tablet    ENABLEX    90  tablet    Take 1 tablet (7.5 mg) by mouth daily    Overactive bladder       guaiFENesin 600 MG 12 hr tablet    MUCINEX    180 tablet    Take 2 tablets (1,200 mg) by mouth 2 times daily    Cough with sputum       hydrOXYzine 25 MG tablet    ATARAX    100 tablet    Take 1-2 tablets (25-50 mg) by mouth every 6 hours as needed for itching    Hives       lidocaine visc 2% 2.5mL/5mL & maalox/mylanta w/ simeth 2.5mL/5mL & diphenhydrAMINE 5mg/5mL Susp suspension    [facility] Mouthwash [facility]    1 Bottle    Swish and swallow 10 mLs in mouth every 6 hours as needed for mouth sores    Mucositis       lidocaine-prilocaine cream    EMLA    30 g    Please apply to port site 30 minutes before use prn    Personal history of malignant neoplasm of breast       LORazepam 0.5 MG tablet    ATIVAN    30 tablet    Take 1 tablet (0.5 mg) by mouth every 4 hours as needed (Anxiety, Nausea/Vomiting or Sleep)    Malignant neoplasm of upper-inner quadrant of right breast in female, estrogen receptor negative (H)       MULTIPLE VITAMIN PO      Take 1 tablet by mouth daily        * nystatin 142053 UNIT/ML suspension    MYCOSTATIN    280 mL    Take 5 mLs (500,000 Units) by mouth 4 times daily    Neutropenic fever (H), Malignant neoplasm of upper-inner quadrant of right breast in female, estrogen receptor negative (H), Thrush       * nystatin 298769 UNIT/ML suspension    MYCOSTATIN     Take 5 mL by mouth 4 times daily        * [place] [facility]      Respironics Dream Station Auto CPAP 12-18 cm, F&P Simplus FFM small.    MERLIN (obstructive sleep apnea)       * [place] [facility]     1 Device    Cranial prosthesis    Malignant neoplasm of upper-inner quadrant of right breast in female, estrogen receptor negative (H)       pantoprazole 40 MG EC tablet    PROTONIX    60 tablet    Take 1 tablet (40 mg) by mouth every morning    Malignant neoplasm of upper-inner quadrant of right breast in female, estrogen receptor negative (H)       * predniSONE 10 MG tablet     DELTASONE    150 tablet    Take 60mg daily for 1 week, then continue to decrease by 10mg each week    Malignant neoplasm of upper-inner quadrant of right breast in female, estrogen receptor negative (H)       * predniSONE 5 MG tablet    DELTASONE    11 tablet    1 tab daily x 7 days, followed by every other day x 7 days    Pneumonitis       tiotropium 18 MCG capsule    SPIRIVA    1 capsule    Inhale 1 capsule (18 mcg) into the lungs daily Inhale contents of one capsule    Chronic obstructive pulmonary disease, unspecified COPD type (H)       VITAMIN B 12 PO      Take 100 mcg by mouth daily        VITAMIN B6 PO      Take 1 tablet by mouth daily.        vitamin D 1000 UNITS capsule      TAKE 2 CAPSULES BY MOUTH DAILY        * Notice:  This list has 8 medication(s) that are the same as other medications prescribed for you. Read the directions carefully, and ask your doctor or other care provider to review them with you.

## 2018-01-18 NOTE — NURSING NOTE
"Oncology Rooming Note    January 18, 2018 10:08 AM   Ashleigh Alonzo is a 57 year old female who presents for:    Chief Complaint   Patient presents with     Oncology Clinic Visit     New- Breast CA     Initial Vitals: /71 (BP Location: Right arm, Patient Position: Chair, Cuff Size: Adult Regular)  Pulse 110  Temp 97.4  F (36.3  C) (Oral)  Resp 16  Ht 1.626 m (5' 4.02\")  Wt 70.3 kg (155 lb)  SpO2 94%  BMI 26.59 kg/m2 Estimated body mass index is 26.59 kg/(m^2) as calculated from the following:    Height as of this encounter: 1.626 m (5' 4.02\").    Weight as of this encounter: 70.3 kg (155 lb). Body surface area is 1.78 meters squared.  No Pain (0) Comment: Data Unavailable   No LMP recorded. Patient is postmenopausal.  Allergies reviewed: No  Medications reviewed: No    Medications: Medication refills not needed today.  Pharmacy name entered into [facility]:    [facility] [place], [place] - [address] [place] [facility] - [place], [place] - [address] [address]    Clinical concerns: no new concerns.  Pt received flu shot elsewhere. See Immunizations     6 minutes for nursing intake (face to face time)     Mira Morales CMA                "

## 2018-01-22 DIAGNOSIS — Z17.1 MALIGNANT NEOPLASM OF UPPER-INNER QUADRANT OF RIGHT BREAST IN FEMALE, ESTROGEN RECEPTOR NEGATIVE (H): Primary | ICD-10-CM

## 2018-01-22 DIAGNOSIS — C50.211 MALIGNANT NEOPLASM OF UPPER-INNER QUADRANT OF RIGHT BREAST IN FEMALE, ESTROGEN RECEPTOR NEGATIVE (H): Primary | ICD-10-CM

## 2018-01-23 ENCOUNTER — ONCOLOGY VISIT (OUTPATIENT)
Dept: ONCOLOGY | Facility: CLINIC | Age: 58
End: 2018-01-23
Attending: PHYSICIAN ASSISTANT
Payer: COMMERCIAL

## 2018-01-23 ENCOUNTER — APPOINTMENT (OUTPATIENT)
Dept: LAB | Facility: CLINIC | Age: 58
End: 2018-01-23
Attending: INTERNAL MEDICINE
Payer: COMMERCIAL

## 2018-01-23 ENCOUNTER — RESEARCH ENCOUNTER (OUTPATIENT)
Dept: ONCOLOGY | Facility: CLINIC | Age: 58
End: 2018-01-23

## 2018-01-23 ENCOUNTER — INFUSION THERAPY VISIT (OUTPATIENT)
Dept: ONCOLOGY | Facility: CLINIC | Age: 58
End: 2018-01-23
Attending: INTERNAL MEDICINE
Payer: COMMERCIAL

## 2018-01-23 VITALS
SYSTOLIC BLOOD PRESSURE: 139 MMHG | OXYGEN SATURATION: 94 % | RESPIRATION RATE: 16 BRPM | DIASTOLIC BLOOD PRESSURE: 84 MMHG | HEART RATE: 133 BPM | TEMPERATURE: 98.2 F | BODY MASS INDEX: 26.26 KG/M2 | HEIGHT: 64 IN | WEIGHT: 153.8 LBS

## 2018-01-23 DIAGNOSIS — Z17.1 MALIGNANT NEOPLASM OF UPPER-INNER QUADRANT OF RIGHT BREAST IN FEMALE, ESTROGEN RECEPTOR NEGATIVE (H): ICD-10-CM

## 2018-01-23 DIAGNOSIS — Z51.11 ENCOUNTER FOR ANTINEOPLASTIC CHEMOTHERAPY: Primary | ICD-10-CM

## 2018-01-23 DIAGNOSIS — C50.211 MALIGNANT NEOPLASM OF UPPER-INNER QUADRANT OF RIGHT BREAST IN FEMALE, ESTROGEN RECEPTOR NEGATIVE (H): ICD-10-CM

## 2018-01-23 LAB
ALBUMIN SERPL-MCNC: 2.8 G/DL (ref 3.4–5)
ALP SERPL-CCNC: 134 U/L (ref 40–150)
ALT SERPL W P-5'-P-CCNC: 41 U/L (ref 0–50)
ANION GAP SERPL CALCULATED.3IONS-SCNC: 11 MMOL/L (ref 3–14)
AST SERPL W P-5'-P-CCNC: 39 U/L (ref 0–45)
BASOPHILS # BLD AUTO: 0.1 10E9/L (ref 0–0.2)
BASOPHILS NFR BLD AUTO: 0.8 %
BILIRUB SERPL-MCNC: 0.5 MG/DL (ref 0.2–1.3)
BUN SERPL-MCNC: 4 MG/DL (ref 7–30)
CALCIUM SERPL-MCNC: 8.7 MG/DL (ref 8.5–10.1)
CHLORIDE SERPL-SCNC: 101 MMOL/L (ref 94–109)
CO2 SERPL-SCNC: 21 MMOL/L (ref 20–32)
CREAT SERPL-MCNC: 0.64 MG/DL (ref 0.52–1.04)
DIFFERENTIAL METHOD BLD: ABNORMAL
EOSINOPHIL # BLD AUTO: 0 10E9/L (ref 0–0.7)
EOSINOPHIL NFR BLD AUTO: 0.1 %
ERYTHROCYTE [DISTWIDTH] IN BLOOD BY AUTOMATED COUNT: 15.4 % (ref 10–15)
GFR SERPL CREATININE-BSD FRML MDRD: >90 ML/MIN/1.7M2
GLUCOSE SERPL-MCNC: 119 MG/DL (ref 70–99)
HCT VFR BLD AUTO: 33.6 % (ref 35–47)
HGB BLD-MCNC: 11 G/DL (ref 11.7–15.7)
IMM GRANULOCYTES # BLD: 0.4 10E9/L (ref 0–0.4)
IMM GRANULOCYTES NFR BLD: 2.5 %
LYMPHOCYTES # BLD AUTO: 1 10E9/L (ref 0.8–5.3)
LYMPHOCYTES NFR BLD AUTO: 6.5 %
MCH RBC QN AUTO: 33.5 PG (ref 26.5–33)
MCHC RBC AUTO-ENTMCNC: 32.7 G/DL (ref 31.5–36.5)
MCV RBC AUTO: 102 FL (ref 78–100)
MONOCYTES # BLD AUTO: 2.3 10E9/L (ref 0–1.3)
MONOCYTES NFR BLD AUTO: 14.3 %
NEUTROPHILS # BLD AUTO: 12.1 10E9/L (ref 1.6–8.3)
NEUTROPHILS NFR BLD AUTO: 75.8 %
NRBC # BLD AUTO: 0 10*3/UL
NRBC BLD AUTO-RTO: 0 /100
PLATELET # BLD AUTO: 473 10E9/L (ref 150–450)
POTASSIUM SERPL-SCNC: 3.9 MMOL/L (ref 3.4–5.3)
PROT SERPL-MCNC: 7.4 G/DL (ref 6.8–8.8)
RBC # BLD AUTO: 3.28 10E12/L (ref 3.8–5.2)
SODIUM SERPL-SCNC: 133 MMOL/L (ref 133–144)
WBC # BLD AUTO: 15.9 10E9/L (ref 4–11)

## 2018-01-23 PROCEDURE — 85025 COMPLETE CBC W/AUTO DIFF WBC: CPT | Performed by: PHYSICIAN ASSISTANT

## 2018-01-23 PROCEDURE — 25000128 H RX IP 250 OP 636: Mod: ZF | Performed by: PHYSICIAN ASSISTANT

## 2018-01-23 PROCEDURE — 80053 COMPREHEN METABOLIC PANEL: CPT | Performed by: PHYSICIAN ASSISTANT

## 2018-01-23 PROCEDURE — 96377 APPLICATON ON-BODY INJECTOR: CPT | Mod: 59

## 2018-01-23 PROCEDURE — 96361 HYDRATE IV INFUSION ADD-ON: CPT

## 2018-01-23 PROCEDURE — 99214 OFFICE O/P EST MOD 30 MIN: CPT | Mod: ZP | Performed by: PHYSICIAN ASSISTANT

## 2018-01-23 PROCEDURE — 96367 TX/PROPH/DG ADDL SEQ IV INF: CPT

## 2018-01-23 PROCEDURE — 36593 DECLOT VASCULAR DEVICE: CPT

## 2018-01-23 PROCEDURE — G0463 HOSPITAL OUTPT CLINIC VISIT: HCPCS | Mod: ZF

## 2018-01-23 PROCEDURE — 36415 COLL VENOUS BLD VENIPUNCTURE: CPT

## 2018-01-23 PROCEDURE — 96360 HYDRATION IV INFUSION INIT: CPT

## 2018-01-23 PROCEDURE — 96413 CHEMO IV INFUSION 1 HR: CPT

## 2018-01-23 PROCEDURE — 96375 TX/PRO/DX INJ NEW DRUG ADDON: CPT

## 2018-01-23 PROCEDURE — 96411 CHEMO IV PUSH ADDL DRUG: CPT

## 2018-01-23 RX ORDER — DEXAMETHASONE 4 MG/1
8 TABLET ORAL DAILY
Qty: 18 TABLET | Refills: 0 | Status: ON HOLD | OUTPATIENT
Start: 2018-01-23 | End: 2018-02-08

## 2018-01-23 RX ORDER — METHYLPREDNISOLONE SODIUM SUCCINATE 125 MG/2ML
125 INJECTION, POWDER, LYOPHILIZED, FOR SOLUTION INTRAMUSCULAR; INTRAVENOUS
Status: CANCELLED
Start: 2018-01-23

## 2018-01-23 RX ORDER — ALBUTEROL SULFATE 90 UG/1
1-2 AEROSOL, METERED RESPIRATORY (INHALATION)
Status: CANCELLED
Start: 2018-01-23

## 2018-01-23 RX ORDER — EPINEPHRINE 1 MG/ML
0.3 INJECTION, SOLUTION, CONCENTRATE INTRAVENOUS EVERY 5 MIN PRN
Status: CANCELLED | OUTPATIENT
Start: 2018-01-23

## 2018-01-23 RX ORDER — EPINEPHRINE 0.3 MG/.3ML
0.3 INJECTION SUBCUTANEOUS EVERY 5 MIN PRN
Status: CANCELLED | OUTPATIENT
Start: 2018-01-23

## 2018-01-23 RX ORDER — DOXORUBICIN HYDROCHLORIDE 2 MG/ML
60 INJECTION, SOLUTION INTRAVENOUS ONCE
Status: COMPLETED | OUTPATIENT
Start: 2018-01-23 | End: 2018-01-23

## 2018-01-23 RX ORDER — SODIUM CHLORIDE 9 MG/ML
1000 INJECTION, SOLUTION INTRAVENOUS CONTINUOUS PRN
Status: CANCELLED
Start: 2018-01-23

## 2018-01-23 RX ORDER — MEPERIDINE HYDROCHLORIDE 25 MG/ML
25 INJECTION INTRAMUSCULAR; INTRAVENOUS; SUBCUTANEOUS EVERY 30 MIN PRN
Status: CANCELLED | OUTPATIENT
Start: 2018-01-23

## 2018-01-23 RX ORDER — PALONOSETRON 0.05 MG/ML
0.25 INJECTION, SOLUTION INTRAVENOUS ONCE
Status: CANCELLED
Start: 2018-01-23

## 2018-01-23 RX ORDER — PALONOSETRON 0.05 MG/ML
0.25 INJECTION, SOLUTION INTRAVENOUS ONCE
Status: COMPLETED | OUTPATIENT
Start: 2018-01-23 | End: 2018-01-23

## 2018-01-23 RX ORDER — HEPARIN SODIUM (PORCINE) LOCK FLUSH IV SOLN 100 UNIT/ML 100 UNIT/ML
5 SOLUTION INTRAVENOUS ONCE
Status: COMPLETED | OUTPATIENT
Start: 2018-01-23 | End: 2018-01-23

## 2018-01-23 RX ORDER — DOXORUBICIN HYDROCHLORIDE 2 MG/ML
60 INJECTION, SOLUTION INTRAVENOUS ONCE
Status: CANCELLED | OUTPATIENT
Start: 2018-01-23

## 2018-01-23 RX ORDER — HEPARIN SODIUM (PORCINE) LOCK FLUSH IV SOLN 100 UNIT/ML 100 UNIT/ML
500 SOLUTION INTRAVENOUS ONCE
Status: CANCELLED
Start: 2018-01-23 | End: 2018-01-23

## 2018-01-23 RX ORDER — DIPHENHYDRAMINE HYDROCHLORIDE 50 MG/ML
50 INJECTION INTRAMUSCULAR; INTRAVENOUS
Status: CANCELLED
Start: 2018-01-23

## 2018-01-23 RX ORDER — LORAZEPAM 2 MG/ML
0.5 INJECTION INTRAMUSCULAR EVERY 4 HOURS PRN
Status: CANCELLED
Start: 2018-01-23

## 2018-01-23 RX ORDER — HEPARIN SODIUM (PORCINE) LOCK FLUSH IV SOLN 100 UNIT/ML 100 UNIT/ML
500 SOLUTION INTRAVENOUS ONCE
Status: COMPLETED | OUTPATIENT
Start: 2018-01-23 | End: 2018-01-23

## 2018-01-23 RX ORDER — ALBUTEROL SULFATE 0.83 MG/ML
2.5 SOLUTION RESPIRATORY (INHALATION)
Status: CANCELLED | OUTPATIENT
Start: 2018-01-23

## 2018-01-23 RX ADMIN — SODIUM CHLORIDE, PRESERVATIVE FREE 500 UNITS: 5 INJECTION INTRAVENOUS at 14:21

## 2018-01-23 RX ADMIN — PALONOSETRON HYDROCHLORIDE 0.25 MG: 0.25 INJECTION INTRAVENOUS at 11:58

## 2018-01-23 RX ADMIN — DEXAMETHASONE SODIUM PHOSPHATE: 10 INJECTION, SOLUTION INTRAMUSCULAR; INTRAVENOUS at 12:01

## 2018-01-23 RX ADMIN — DOXORUBICIN HYDROCHLORIDE 110 MG: 2 INJECTION, SOLUTION INTRAVENOUS at 12:39

## 2018-01-23 RX ADMIN — ALTEPLASE 2 MG: 2.2 INJECTION, POWDER, LYOPHILIZED, FOR SOLUTION INTRAVENOUS at 10:17

## 2018-01-23 RX ADMIN — SODIUM CHLORIDE, PRESERVATIVE FREE 5 ML: 5 INJECTION INTRAVENOUS at 09:04

## 2018-01-23 RX ADMIN — CYCLOPHOSPHAMIDE 1100 MG: 1 INJECTION, POWDER, FOR SOLUTION INTRAVENOUS; ORAL at 13:09

## 2018-01-23 RX ADMIN — PEGFILGRASTIM 6 MG: KIT SUBCUTANEOUS at 13:38

## 2018-01-23 RX ADMIN — SODIUM CHLORIDE 1000 ML: 9 INJECTION, SOLUTION INTRAVENOUS at 11:57

## 2018-01-23 ASSESSMENT — PAIN SCALES - GENERAL: PAINLEVEL: NO PAIN (0)

## 2018-01-23 NOTE — PROGRESS NOTES
Infusion Nursing Note:  Ashleigh Alonzo presents today for cycle 14 day 1 Adriamycin, cytoxan.    Patient seen by provider today: Yes: Lilia MORGAN   present during visit today: Not Applicable.    Note: Pt arrived with no blood return noted in port. TPA instilled at 1018 and monitored q30 mins until blood return noted at 1150.     Intravenous Access:  Implanted Port.    Treatment Conditions:  Lab Results   Component Value Date    HGB 11.0 01/23/2018     Lab Results   Component Value Date    WBC 15.9 01/23/2018      Lab Results   Component Value Date    ANEU 12.1 01/23/2018     Lab Results   Component Value Date     01/23/2018      Lab Results   Component Value Date     01/23/2018                   Lab Results   Component Value Date    POTASSIUM 3.9 01/23/2018           Lab Results   Component Value Date    MAG 1.7 01/13/2018            Lab Results   Component Value Date    CR 0.64 01/23/2018                   Lab Results   Component Value Date    JAVIER 8.7 01/23/2018                Lab Results   Component Value Date    BILITOTAL 0.5 01/23/2018           Lab Results   Component Value Date    ALBUMIN 2.8 01/23/2018                    Lab Results   Component Value Date    ALT 41 01/23/2018           Lab Results   Component Value Date    AST 39 01/23/2018     Results reviewed, labs MET treatment parameters, ok to proceed with treatment.  Echo comleted 12/28/17 EF 60-65%    Neulasta Onpro On-Body injector applied to Left upper quadrant abdomen at 1340 with light facing in.  Writer discussed Neulasta injection would start on 1/24/18 at 1640, approximately 27 hours after application applied today.  Written and Verbal instruction reviewed with patient.  Pt instructed when the dose delivery starts, it will take about 45 minutes to complete.  Pt aware Neulasta Onpro On-Body should have green flashing light and to call triage or on-call MD if injector flashes red or appears to be leaking. Pt aware to  keep Onpro On-Body Neulasta 4 inches away from electrical equipment and to avoid showering 4 hours prior to injection.   Neulasta Onpro Lot number: [id number]      Post Infusion Assessment:  Patient tolerated infusion without incident.  Blood return noted pre and post infusion.  Blood return noted during administration every 2 cc.  Site patent and intact, free from redness, edema or discomfort.  No evidence of extravasations.  Access discontinued per protocol.    Discharge Plan:   Patient declined prescription refills.  Discharge instructions reviewed with: patient, Family.  Patient and/or family verbalized understanding of discharge instructions and all questions answered.  Copy of AVS reviewed with patient and/or family.  Patient will return 2/6/18 for next appointment.  Patient discharged in stable condition accompanied by: .  Departure Mode: Ambulatory.    BARBER MILAN RN

## 2018-01-23 NOTE — PROGRESS NOTES
Oncology/Hematology Visit Note  Jan 23, 2018    Reason for Visit: Follow up of breast cancer     History of Present Illness: Ashleigh Alonzo is a 57 year old female with past medical history of COPD, sleep apnea, periodontal disease with stage IIa, T2N0M0, grade 3, triple negative right breast cancer. She was diagnosed via abnormal screening mammogram. She ultimately had US, contrast-enhanced mammo, and biopsy showing 3.4 cm mass and pathology showed grade 3 invasive mammary carcinoma with associated high-grade DCIS. ER/GA was negative and HER2 negative. She enrolled in ISPY-2 clinical trial and began treatment with weekly taxol and once every 3 week pembrolizumab on 9/2/17. Please see notes from Dr. Bradshaw for further details of patient's oncology history.      Course has been complicated by fevers with PNA and then UTI. Week 7 was held due to transaminitis. She was given week 7 on 11/7 with pembrolizumab and Taxol. She was admitted 11/11-11/17 with high fever (105), cough, and dyspnea and was found to have HCAP and pneumonitis secondary to pembro. 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 She received 2 doses of methylpred, antibiotics, and d/c on prednisone 70 mg daily. Had follow-up on 11/21 with Dr. Bradshaw and resumed weekly Taxol on 11/28/17. She completed 12 weeks on 12/26. She started AC on 1/2. Cycle 2 was deferred due to neutropenic fever (hospitalized 1/10-1/14).     Interval History:  Ms. Alonzo returns to clinic today with her . She is feeling better today. Energy has improved. She is up and moving around the house more. She continues to have dyspnea on exertion which is stable. No cough, fevers/chills. She is eating and drinking well. Her edema from steroids has improved. She did have 3 random emesis prior to coming today. Did not have preceding nausea, abdominal pain. Feels okay now. Has not had much to drink yet today. No mucositis, diarrhea, difficulties  with urination. She is finishing 10 mg prednisone and will start 5 mg of prednisone in a few days. Finished antibiotics on Sunday.     Review of Systems:  Patient denies fevers, chills, night sweats, unexplained weight changes, headaches, dizziness, vision or hearing changes, new lumps or bumps, chest pain, shortness of breath, cough, abdominal pain, nausea, vomiting, changes to bowel or bladder, swelling of extremities, bleeding issues, or rash.    Current Outpatient Prescriptions   Medication Sig Dispense Refill     dexamethasone (DECADRON) 4 MG tablet Take 2 tablets (8 mg) by mouth daily Start on Day 2 of first cycle of doxorubicin / cyclophosphamide. 18 tablet 0     pantoprazole (PROTONIX) 40 MG EC tablet Take 1 tablet (40 mg) by mouth every morning 60 tablet 0     predniSONE (DELTASONE) 5 MG tablet 1 tab daily x 7 days, followed by every other day x 7 days 11 tablet 0     calcium carbonate (CALCIUM CARBONATE) 600 MG tablet Take by mouth 2 times daily (with meals) 60 tablet      predniSONE (DELTASONE) 10 MG tablet Take 60mg daily for 1 week, then continue to decrease by 10mg each week (Patient taking differently: Take 10 mg by mouth daily ) 150 tablet 0     lidocaine-prilocaine (EMLA) cream Please apply to port site 30 minutes before use prn 30 g 1     hydrOXYzine (ATARAX) 25 MG tablet Take 1-2 tablets (25-50 mg) by mouth every 6 hours as needed for itching 100 tablet 2     guaiFENesin (MUCINEX) 600 MG 12 hr tablet Take 2 tablets (1,200 mg) by mouth 2 times daily 180 tablet 6     tiotropium (SPIRIVA) 18 MCG capsule Inhale 1 capsule (18 mcg) into the lungs daily Inhale contents of one capsule 1 capsule 11     albuterol (PROAIR HFA/PROVENTIL HFA/VENTOLIN HFA) 108 (90 BASE) MCG/ACT Inhaler Inhale 2 puffs into the lungs every 6 hours as needed for shortness of breath / dyspnea 1 Inhaler 5     citalopram (CELEXA) 10 MG tablet Take 1 tablet (10 mg) by mouth daily 90 tablet 3     darifenacin (ENABLEX) 7.5 MG 24 hr  "tablet Take 1 tablet (7.5 mg) by mouth daily 90 tablet 3     Coenzyme Q10 (CO Q 10 PO) Take 1 tablet by mouth daily        MULTIPLE VITAMIN PO Take 1 tablet by mouth daily        Cyanocobalamin (VITAMIN B 12 PO) Take 100 mcg by mouth daily        Pyridoxine HCl (VITAMIN B6 PO) Take 1 tablet by mouth daily.       aspirin 81 MG tablet Take 1 tablet by mouth daily.  3     VITAMIN D 1000 UNIT OR CAPS TAKE 2 CAPSULES BY MOUTH DAILY       nystatin (MYCOSTATIN) 981483 UNIT/ML suspension Take 5 mL by mouth 4 times daily       magic mouthwash suspension (diphenhydrAMINE, lidocaine, aluminum-magnesium & simethicone) Swish and swallow 10 mLs in mouth every 6 hours as needed for mouth sores (Patient not taking: Reported on 1/23/2018) 1 Bottle 3     nystatin (MYCOSTATIN) 877376 UNIT/ML suspension Take 5 mLs (500,000 Units) by mouth 4 times daily (Patient not taking: Reported on 1/23/2018) 280 mL 3     order for DME Cranial prosthesis (Patient not taking: Reported on 1/23/2018) 1 Device 1     LORazepam (ATIVAN) 0.5 MG tablet Take 1 tablet (0.5 mg) by mouth every 4 hours as needed (Anxiety, Nausea/Vomiting or Sleep) (Patient not taking: Reported on 1/23/2018) 30 tablet 2     order for DME Respironics Dream Station Auto CPAP 12-18 cm, F&P Simplus FFM small. (Patient not taking: Reported on 1/23/2018)         Physical Examination:  /84 (BP Location: Right arm, Cuff Size: Adult Regular)  Pulse 133  Temp 98.2  F (36.8  C) (Oral)  Resp 16  Ht 1.626 m (5' 4.02\")  Wt 69.8 kg (153 lb 12.8 oz)  SpO2 94%  BMI 26.39 kg/m2  Wt Readings from Last 10 Encounters:   01/23/18 69.8 kg (153 lb 12.8 oz)   01/18/18 70.3 kg (155 lb)   01/16/18 70.7 kg (155 lb 12.8 oz)   01/13/18 72.6 kg (160 lb)   01/02/18 73.9 kg (162 lb 14.4 oz)   12/26/17 74.3 kg (163 lb 12.8 oz)   12/18/17 76 kg (167 lb 8 oz)   12/12/17 74.9 kg (165 lb 1.6 oz)   12/05/17 72.5 kg (159 lb 14.4 oz)   11/28/17 71.5 kg (157 lb 9.6 oz)     Constitutional: Well-appearing " female in no acute distress.  Eyes: EOMI, PERRL. No scleral icterus.  ENT: Oral mucosa is moist without lesions or thrush.   Lymphatic: Neck is supple without cervical or supraclavicular lymphadenopathy. No axillary lymphadenopathy.  Cardiovascular: Tachycardic rate and rhythm. No murmurs, gallops, or rubs. No edema   Respiratory: Clear to auscultation bilaterally. No wheezes or crackles.  Gastrointestinal: Bowel sounds present. Abdomen soft, non-tender. No palpable hepatosplenomegaly or masses.   Neurologic: Cranial nerves II through XII are grossly intact.  Skin: No rashes, petechiae, or bruising noted on exposed skin.    Laboratory Data:   1/23/2018 09:19   Sodium 133   Potassium 3.9   Chloride 101   Carbon Dioxide 21   Urea Nitrogen 4 (L)   Creatinine 0.64   GFR Estimate >90   GFR Estimate If Black >90   Calcium 8.7   Anion Gap 11   Albumin 2.8 (L)   Protein Total 7.4   Bilirubin Total 0.5   Alkaline Phosphatase 134   ALT 41   AST 39   Glucose 119 (H)   WBC 15.9 (H)   Hemoglobin 11.0 (L)   Hematocrit 33.6 (L)   Platelet Count 473 (H)   RBC Count 3.28 (L)    (H)   MCH 33.5 (H)   MCHC 32.7   RDW 15.4 (H)   Diff Method Automated Method   % Neutrophils 75.8   % Lymphocytes 6.5   % Monocytes 14.3   % Eosinophils 0.1   % Basophils 0.8   % Immature Granulocytes 2.5   Nucleated RBCs 0   Absolute Neutrophil 12.1 (H)   Absolute Lymphocytes 1.0   Absolute Monocytes 2.3 (H)   Absolute Eosinophils 0.0   Absolute Basophils 0.1   Abs Immature Granulocytes 0.4   Absolute Nucleated RBC 0.0         Assessment and Plan:  1. Stage IIa right breast cancer, currently on treatment with weekly Taxol on I-SPY  Was initially started on weekly Taxol/pembolizumab but course was complicated by fevers, pneumonia, and then pneumonitis and hepatitis requiring hospitalization. Pembrolizumab was discontinued altogether and she completed 12 weeks of Taxol alone. Cycle 1 of AC was complicated by neutropenic fever. Cycle 2 was deferred last  week due to recovery from this. She has recovered enough to receive cycle 2 with Neulasta support. If she has any complications with this cycle, plan to forego further cycles of AC given near CR seen on breast MRI after 12 cycles of Taxol. She is planning on lumpectomy with Dr. Gates so will need adjuvant radiation.     2. Pneumonitis, resolved  No pulmonary concerns other than stable SUAZO and lung exam clear. She finished Vantin on Saturday for neutropenic fever. She has a few days left of prednisone 10 mg daily and then will taper to 5 mg x 1 week, then 5 mg QOD x 1 week, then stop. Continue Protonix for any steroid associated GERD.    3. Transaminitis, improving  LFTs now normalized. Continue prednisone as above.     4. Tachycardia: Related to deconditioning and dehydration from emesis this AM. 1 L IVF bolus today.     Lilia Livingston PA-C  [facility]  [address], [place] [address] [phone]

## 2018-01-23 NOTE — NURSING NOTE
"Chief Complaint   Patient presents with     Port Draw     Port accessed by RN. No blood return; line flushed with saline and heparin; TPA ordered.  Vs taken and pt checked in for appt. Labs collected from vpt by RN.     Port accessed with 20g 3/4\" gripper needle by RN; no blood return; line flushed with saline and heparin; TPA ordered. Labs collected from vpt. Vitals taken. Pt checked in for appointment(s).    Luana Teran RN  "

## 2018-01-23 NOTE — LETTER
1/23/2018      RE: Ashleigh Alonzo  [address] [place] [address]       Oncology/Hematology Visit Note  Jan 23, 2018    Reason for Visit: Follow up of breast cancer     History of Present Illness: Ashleigh Alonzo is a 57 year old female with past medical history of COPD, sleep apnea, periodontal disease with stage IIa, T2N0M0, grade 3, triple negative right breast cancer. She was diagnosed via abnormal screening mammogram. She ultimately had US, contrast-enhanced mammo, and biopsy showing 3.4 cm mass and pathology showed grade 3 invasive mammary carcinoma with associated high-grade DCIS. ER/NH was negative and HER2 negative. She enrolled in ISPY-2 clinical trial and began treatment with weekly taxol and once every 3 week pembrolizumab on 9/2/17. Please see notes from Dr. Bradshaw for further details of patient's oncology history.      Course has been complicated by fevers with PNA and then UTI. Week 7 was held due to transaminitis. She was given week 7 on 11/7 with pembrolizumab and Taxol. She was admitted 11/11-11/17 with high fever (105), cough, and dyspnea and was found to have HCAP and pneumonitis secondary to pembro. 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 She received 2 doses of methylpred, antibiotics, and d/c on prednisone 70 mg daily. Had follow-up on 11/21 with Dr. Bradshaw and resumed weekly Taxol on 11/28/17. She completed 12 weeks on 12/26. She started AC on 1/2. Cycle 2 was deferred due to neutropenic fever (hospitalized 1/10-1/14).     Interval History:  Ms. Alonzo returns to clinic today with her . She is feeling better today. Energy has improved. She is up and moving around the house more. She continues to have dyspnea on exertion which is stable. No cough, fevers/chills. She is eating and drinking well. Her edema from steroids has improved. She did have 3 random emesis prior to coming today. Did not have preceding nausea, abdominal pain. Feels  okay now. Has not had much to drink yet today. No mucositis, diarrhea, difficulties with urination. She is finishing 10 mg prednisone and will start 5 mg of prednisone in a few days. Finished antibiotics on Sunday.     Review of Systems:  Patient denies fevers, chills, night sweats, unexplained weight changes, headaches, dizziness, vision or hearing changes, new lumps or bumps, chest pain, shortness of breath, cough, abdominal pain, nausea, vomiting, changes to bowel or bladder, swelling of extremities, bleeding issues, or rash.    Current Outpatient Prescriptions   Medication Sig Dispense Refill     dexamethasone (DECADRON) 4 MG tablet Take 2 tablets (8 mg) by mouth daily Start on Day 2 of first cycle of doxorubicin / cyclophosphamide. 18 tablet 0     pantoprazole (PROTONIX) 40 MG EC tablet Take 1 tablet (40 mg) by mouth every morning 60 tablet 0     predniSONE (DELTASONE) 5 MG tablet 1 tab daily x 7 days, followed by every other day x 7 days 11 tablet 0     calcium carbonate (CALCIUM CARBONATE) 600 MG tablet Take by mouth 2 times daily (with meals) 60 tablet      predniSONE (DELTASONE) 10 MG tablet Take 60mg daily for 1 week, then continue to decrease by 10mg each week (Patient taking differently: Take 10 mg by mouth daily ) 150 tablet 0     lidocaine-prilocaine (EMLA) cream Please apply to port site 30 minutes before use prn 30 g 1     hydrOXYzine (ATARAX) 25 MG tablet Take 1-2 tablets (25-50 mg) by mouth every 6 hours as needed for itching 100 tablet 2     guaiFENesin (MUCINEX) 600 MG 12 hr tablet Take 2 tablets (1,200 mg) by mouth 2 times daily 180 tablet 6     tiotropium (SPIRIVA) 18 MCG capsule Inhale 1 capsule (18 mcg) into the lungs daily Inhale contents of one capsule 1 capsule 11     albuterol (PROAIR HFA/PROVENTIL HFA/VENTOLIN HFA) 108 (90 BASE) MCG/ACT Inhaler Inhale 2 puffs into the lungs every 6 hours as needed for shortness of breath / dyspnea 1 Inhaler 5     citalopram (CELEXA) 10 MG tablet Take 1  "tablet (10 mg) by mouth daily 90 tablet 3     darifenacin (ENABLEX) 7.5 MG 24 hr tablet Take 1 tablet (7.5 mg) by mouth daily 90 tablet 3     Coenzyme Q10 (CO Q 10 PO) Take 1 tablet by mouth daily        MULTIPLE VITAMIN PO Take 1 tablet by mouth daily        Cyanocobalamin (VITAMIN B 12 PO) Take 100 mcg by mouth daily        Pyridoxine HCl (VITAMIN B6 PO) Take 1 tablet by mouth daily.       aspirin 81 MG tablet Take 1 tablet by mouth daily.  3     VITAMIN D 1000 UNIT OR CAPS TAKE 2 CAPSULES BY MOUTH DAILY       nystatin (MYCOSTATIN) 040746 UNIT/ML suspension Take 5 mL by mouth 4 times daily       magic mouthwash suspension (diphenhydrAMINE, lidocaine, aluminum-magnesium & simethicone) Swish and swallow 10 mLs in mouth every 6 hours as needed for mouth sores (Patient not taking: Reported on 1/23/2018) 1 Bottle 3     nystatin (MYCOSTATIN) 798209 UNIT/ML suspension Take 5 mLs (500,000 Units) by mouth 4 times daily (Patient not taking: Reported on 1/23/2018) 280 mL 3     order for DME Cranial prosthesis (Patient not taking: Reported on 1/23/2018) 1 Device 1     LORazepam (ATIVAN) 0.5 MG tablet Take 1 tablet (0.5 mg) by mouth every 4 hours as needed (Anxiety, Nausea/Vomiting or Sleep) (Patient not taking: Reported on 1/23/2018) 30 tablet 2     order for DME Respironics Dream Station Auto CPAP 12-18 cm, F&P Simplus FFM small. (Patient not taking: Reported on 1/23/2018)         Physical Examination:  /84 (BP Location: Right arm, Cuff Size: Adult Regular)  Pulse 133  Temp 98.2  F (36.8  C) (Oral)  Resp 16  Ht 1.626 m (5' 4.02\")  Wt 69.8 kg (153 lb 12.8 oz)  SpO2 94%  BMI 26.39 kg/m2  Wt Readings from Last 10 Encounters:   01/23/18 69.8 kg (153 lb 12.8 oz)   01/18/18 70.3 kg (155 lb)   01/16/18 70.7 kg (155 lb 12.8 oz)   01/13/18 72.6 kg (160 lb)   01/02/18 73.9 kg (162 lb 14.4 oz)   12/26/17 74.3 kg (163 lb 12.8 oz)   12/18/17 76 kg (167 lb 8 oz)   12/12/17 74.9 kg (165 lb 1.6 oz)   12/05/17 72.5 kg (159 lb " 14.4 oz)   11/28/17 71.5 kg (157 lb 9.6 oz)     Constitutional: Well-appearing female in no acute distress.  Eyes: EOMI, PERRL. No scleral icterus.  ENT: Oral mucosa is moist without lesions or thrush.   Lymphatic: Neck is supple without cervical or supraclavicular lymphadenopathy. No axillary lymphadenopathy.  Cardiovascular: Tachycardic rate and rhythm. No murmurs, gallops, or rubs. No edema   Respiratory: Clear to auscultation bilaterally. No wheezes or crackles.  Gastrointestinal: Bowel sounds present. Abdomen soft, non-tender. No palpable hepatosplenomegaly or masses.   Neurologic: Cranial nerves II through XII are grossly intact.  Skin: No rashes, petechiae, or bruising noted on exposed skin.    Laboratory Data:   1/23/2018 09:19   Sodium 133   Potassium 3.9   Chloride 101   Carbon Dioxide 21   Urea Nitrogen 4 (L)   Creatinine 0.64   GFR Estimate >90   GFR Estimate If Black >90   Calcium 8.7   Anion Gap 11   Albumin 2.8 (L)   Protein Total 7.4   Bilirubin Total 0.5   Alkaline Phosphatase 134   ALT 41   AST 39   Glucose 119 (H)   WBC 15.9 (H)   Hemoglobin 11.0 (L)   Hematocrit 33.6 (L)   Platelet Count 473 (H)   RBC Count 3.28 (L)    (H)   MCH 33.5 (H)   MCHC 32.7   RDW 15.4 (H)   Diff Method Automated Method   % Neutrophils 75.8   % Lymphocytes 6.5   % Monocytes 14.3   % Eosinophils 0.1   % Basophils 0.8   % Immature Granulocytes 2.5   Nucleated RBCs 0   Absolute Neutrophil 12.1 (H)   Absolute Lymphocytes 1.0   Absolute Monocytes 2.3 (H)   Absolute Eosinophils 0.0   Absolute Basophils 0.1   Abs Immature Granulocytes 0.4   Absolute Nucleated RBC 0.0         Assessment and Plan:  1. Stage IIa right breast cancer, currently on treatment with weekly Taxol on I-SPY  Was initially started on weekly Taxol/pembolizumab but course was complicated by fevers, pneumonia, and then pneumonitis and hepatitis requiring hospitalization. Pembrolizumab was discontinued altogether and she completed 12 weeks of Taxol alone.  Cycle 1 of AC was complicated by neutropenic fever. Cycle 2 was deferred last week due to recovery from this. She has recovered enough to receive cycle 2 with Neulasta support. If she has any complications with this cycle, plan to forego further cycles of AC given near CR seen on breast MRI after 12 cycles of Taxol. She is planning on lumpectomy with Dr. Gates so will need adjuvant radiation.     2. Pneumonitis, resolved  No pulmonary concerns other than stable SUAZO and lung exam clear. She finished Vantin on Saturday for neutropenic fever. She has a few days left of prednisone 10 mg daily and then will taper to 5 mg x 1 week, then 5 mg QOD x 1 week, then stop. Continue Protonix for any steroid associated GERD.    3. Transaminitis, improving  LFTs now normalized. Continue prednisone as above.     4. Tachycardia: Related to deconditioning and dehydration from emesis this AM. 1 L IVF bolus today.     Lilia Livingston PA-C  [facility]  [address], [place] [id number] [phone]

## 2018-01-23 NOTE — MR AVS SNAPSHOT
After Visit Summary   1/23/2018    Ashleigh Alonzo    MRN: [id number]           Patient Information     Date Of Birth          1960        Visit Information        Provider Department      1/23/2018 9:30 AM Lilia Livingston PA-C Formerly [facility]        Today's Diagnoses     Encounter for antineoplastic chemotherapy    -  1    Malignant neoplasm of upper-inner quadrant of right breast in female, estrogen receptor negative (H)           Follow-ups after your visit        Your next 10 appointments already scheduled     Feb 06, 2018 11:15 AM CST   [facility] with [facility]   [facility] ([facility])    [address]  [address]  [facility] [address]   [phone]            Feb 06, 2018 11:50 AM CST   (Arrive by 11:35 AM)   Return Visit with Deanna Blanco PA-C   [facility] ([facility])    [address] [place] [address] Se  [address]  [facility] [address]   [phone]            Feb 06, 2018 12:30 PM CST   Infusion 120 with UC ONCOLOGY INFUSION, UC 29 ATC   [facility] ([facility])    [address]  [address]  [facility] [address]   [phone]            Feb 20, 2018  1:15 PM CST   [facility] Draw with [facility] DRAW   [facility] ([facility])    [address]  [address]  [facility] [address]   [phone]            Feb 20, 2018  1:50 PM CST   (Arrive by 1:35 PM)   Return Visit with Lilia Livingston PA-C   [facility] ([facility])    [address] [place] [address]  [address]  [facility] [address]   [phone]            Feb 20, 2018  2:30 PM CST   Infusion 120 with UC ONCOLOGY INFUSION, UC 20 ATC   [facility] ([facility])    [address] [place] [address]  [address]  [facility] [address] [phone]            Apr 02, 2018  3:45 PM CDT   (Arrive by 3:30 PM)   Return Visit with Fara Bradshaw MD   [facility] ([facility] [place])    [address]  [address]  [facility] [id number] [phone]              Who to contact     If you have questions or need follow up information about today's clinic visit or your schedule please contact [facility] directly at [phone].  Normal or non-critical lab and imaging results will be communicated to you by [employer], letter or phone within 4 business days after the clinic has received the results. If you do not hear from us within 7 days, please contact the clinic through [employer] or phone. If you have a critical or abnormal lab result, we will notify you by phone as soon as possible.  Submit refill requests through [employer] or call your pharmacy and they will forward the refill request to us. Please allow 3 business days for your refill to be completed.          Additional Information About Your Visit        MyChart Information     [employer] gives you secure access to your electronic health record. If you see a primary care provider, you can also send messages to your care team and make appointments. If you have questions, please call your primary care clinic.  If you do not have a primary care provider, please call [phone] and they will assist you.        Care EveryWhere ID     This is your Care EveryWhere ID. This could be used by other organizations to access your [place] medical records  [id number]        Your Vitals Were     Pulse Temperature Respirations Height Pulse Oximetry BMI (Body Mass Index)    133 98.2  F (36.8  C) (Oral) 16 1.626 m (5' 4.02\") 94% 26.39 kg/m2       Blood Pressure from Last 3 Encounters:   01/23/18 139/84   01/18/18 118/71   01/16/18 113/46    Weight from Last 3 Encounters:   01/23/18 69.8 kg (153 lb 12.8 oz) "   01/18/18 70.3 kg (155 lb)   01/16/18 70.7 kg (155 lb 12.8 oz)              We Performed the Following     CBC with platelets differential     Comprehensive metabolic panel          Today's Medication Changes          These changes are accurate as of: 1/23/18 11:13 AM.  If you have any questions, ask your nurse or doctor.               Start taking these medicines.        Dose/Directions    dexamethasone 4 MG tablet   Commonly known as:  DECADRON   Used for:  Malignant neoplasm of upper-inner quadrant of right breast in female, estrogen receptor negative (H)   Started by:  Lilia Livingston PA-C        Dose:  8 mg   Take 2 tablets (8 mg) by mouth daily Start on Day 2 of first cycle of doxorubicin / cyclophosphamide.   Quantity:  18 tablet   Refills:  0         These medicines have changed or have updated prescriptions.        Dose/Directions    * predniSONE 10 MG tablet   Commonly known as:  DELTASONE   This may have changed:    - how much to take  - how to take this  - when to take this  - additional instructions   Used for:  Malignant neoplasm of upper-inner quadrant of right breast in female, estrogen receptor negative (H)   Changed by:  Lilia Livingston PA-C        Take 60mg daily for 1 week, then continue to decrease by 10mg each week   Quantity:  150 tablet   Refills:  0       * predniSONE 5 MG tablet   Commonly known as:  DELTASONE   This may have changed:  Another medication with the same name was changed. Make sure you understand how and when to take each.   Used for:  Pneumonitis   Changed by:  Fara Bradshaw MD        1 tab daily x 7 days, followed by every other day x 7 days   Quantity:  11 tablet   Refills:  0       * Notice:  This list has 2 medication(s) that are the same as other medications prescribed for you. Read the directions carefully, and ask your doctor or other care provider to review them with you.         Where to get your medicines      These medications were  sent to [place], [place] - [address]  [address], [facility] [address]    Hours:  TRANSPLANT PHONE NUMBER [phone] Phone:  [phone]     dexamethasone 4 MG tablet                Primary Care Provider Office Phone # Fax #    Radha Cazares [phone]       [address] [address]        Equal Access to Services     THERESA HUGHES : Hadii aad ku hadasho Soomaali, waaxda luqadaha, qaybta kaalmada adeegyada, waxay idiin hayaan adeeg nemo duran . So [facility] [phone].    ATENCIÓN: Si habla español, tiene a blackman disposición servicios gratuitos de asistencia lingüística. Bassemame al [phone].    We comply with applicable federal civil rights laws and [place] laws. We do not discriminate on the basis of race, color, national origin, age, disability, sex, sexual orientation, or gender identity.            Thank you!     Thank you for choosing [facility]  for your care. Our goal is always to provide you with excellent care. Hearing back from our patients is one way we can continue to improve our services. Please take a few minutes to complete the written survey that you may receive in the mail after your visit with us. Thank you!             Your Updated Medication List - Protect others around you: Learn how to safely use, store and throw away your medicines at www.disposemymeds.org.          This list is accurate as of: 1/23/18 11:13 AM.  Always use your most recent med list.                   Brand Name Dispense Instructions for use Diagnosis    albuterol 108 (90 BASE) MCG/ACT Inhaler    PROAIR HFA/PROVENTIL HFA/VENTOLIN HFA    1 Inhaler    Inhale 2 puffs into the lungs every 6 hours as needed for shortness of breath / dyspnea    Chronic obstructive pulmonary disease, unspecified COPD type (H)       aspirin 81 MG tablet      Take 1 tablet by mouth daily.         calcium carbonate 600 MG tablet   Generic drug:  calcium carbonate     60 tablet    Take by mouth 2 times daily (with meals)        citalopram 10 MG tablet    celeXA    90 tablet    Take 1 tablet (10 mg) by mouth daily    Anxiety, Depression, unspecified depression type       CO Q 10 PO      Take 1 tablet by mouth daily        darifenacin 7.5 MG 24 hr tablet    ENABLEX    90 tablet    Take 1 tablet (7.5 mg) by mouth daily    Overactive bladder       dexamethasone 4 MG tablet    DECADRON    18 tablet    Take 2 tablets (8 mg) by mouth daily Start on Day 2 of first cycle of doxorubicin / cyclophosphamide.    Malignant neoplasm of upper-inner quadrant of right breast in female, estrogen receptor negative (H)       guaiFENesin 600 MG 12 hr tablet    MUCINEX    180 tablet    Take 2 tablets (1,200 mg) by mouth 2 times daily    Cough with sputum       hydrOXYzine 25 MG tablet    ATARAX    100 tablet    Take 1-2 tablets (25-50 mg) by mouth every 6 hours as needed for itching    Hives       lidocaine visc 2% 2.5mL/5mL & maalox/mylanta w/ simeth 2.5mL/5mL & diphenhydrAMINE 5mg/5mL Susp suspension    [facility] Mouthwash [facility]    1 Bottle    Swish and swallow 10 mLs in mouth every 6 hours as needed for mouth sores    Mucositis       lidocaine-prilocaine cream    EMLA    30 g    Please apply to port site 30 minutes before use prn    Personal history of malignant neoplasm of breast       LORazepam 0.5 MG tablet    ATIVAN    30 tablet    Take 1 tablet (0.5 mg) by mouth every 4 hours as needed (Anxiety, Nausea/Vomiting or Sleep)    Malignant neoplasm of upper-inner quadrant of right breast in female, estrogen receptor negative (H)       MULTIPLE VITAMIN PO      Take 1 tablet by mouth daily        * nystatin 053742 UNIT/ML suspension    MYCOSTATIN    280 mL    Take 5 mLs (500,000 Units) by mouth 4 times daily    Neutropenic fever (H), Malignant neoplasm of upper-inner quadrant of right breast in female, estrogen receptor  negative (H), Thrush       * nystatin 671170 UNIT/ML suspension    MYCOSTATIN     Take 5 mL by mouth 4 times daily        * order for [facility]      Respironics Dream Station Auto CPAP 12-18 cm, F&P Simplus FFM small.    MERLIN (obstructive sleep apnea)       * order for [facility]     1 Device    Cranial prosthesis    Malignant neoplasm of upper-inner quadrant of right breast in female, estrogen receptor negative (H)       pantoprazole 40 MG EC tablet    PROTONIX    60 tablet    Take 1 tablet (40 mg) by mouth every morning    Malignant neoplasm of upper-inner quadrant of right breast in female, estrogen receptor negative (H)       * predniSONE 10 MG tablet    DELTASONE    150 tablet    Take 60mg daily for 1 week, then continue to decrease by 10mg each week    Malignant neoplasm of upper-inner quadrant of right breast in female, estrogen receptor negative (H)       * predniSONE 5 MG tablet    DELTASONE    11 tablet    1 tab daily x 7 days, followed by every other day x 7 days    Pneumonitis       tiotropium 18 MCG capsule    SPIRIVA    1 capsule    Inhale 1 capsule (18 mcg) into the lungs daily Inhale contents of one capsule    Chronic obstructive pulmonary disease, unspecified COPD type (H)       VITAMIN B 12 PO      Take 100 mcg by mouth daily        VITAMIN B6 PO      Take 1 tablet by mouth daily.        vitamin D 1000 UNITS capsule      TAKE 2 CAPSULES BY MOUTH DAILY        * Notice:  This list has 6 medication(s) that are the same as other medications prescribed for you. Read the directions carefully, and ask your doctor or other care provider to review them with you.

## 2018-01-23 NOTE — MR AVS SNAPSHOT
After Visit Summary   1/23/2018    Ashleigh Alonzo    MRN: [id number]           Patient Information     Date Of Birth          1960        Visit Information        Provider Department      1/23/2018 10:30 AM CARLINE 17 ATC;  ONCOLOGY INFUSION [facility]        Today's Diagnoses     Encounter for antineoplastic chemotherapy    -  1    Malignant neoplasm of upper-inner quadrant of right breast in female, estrogen receptor negative (H)          Care Instructions    Contact Numbers    [facility] Main Line: [phone]  [facility] Triage:  [phone]    Call triage with chills and/or temperature greater than or equal to 100.5, uncontrolled nausea/vomiting, diarrhea, constipation, dizziness, shortness of breath, chest pain, bleeding, unexplained bruising, or any new/concerning symptoms, questions/concerns.     If you are having any concerning symptoms or wish to speak to a provider before your next infusion visit, please call your care coordinator or triage to notify them so we can adequately serve you.       After Hours: [phone]    If after hours, weekends, or holidays, call main hospital  and ask for Oncology doctor on call.     Neulasta Onpro On-Body injector applied at 1:40pm   Neulasta injection will start on 1/24/18 at 4:40pm, approximately 27 hours after application  When the dose delivery starts, it will take about 45 minutes to complete.  Neulasta Onpro On-Body should have green flashing light, call triage or on-call MD if injector flashes red or appears to be leaking.   Keep Onpro On-Body Neulasta 4 inches away from electrical equipment and avoid showering 4 hours prior to injection.         January 2018 Sunday Monday Tuesday Wednesday Thursday Friday Saturday        1     2     [facility] MASONIC LAB DRAW   10:45 AM   (15 min.)    MASONIC LAB DRAW   [facility] Lab Draw     [facility] RETURN   10:55 AM   (50 min.)   Caren Colindres PA-C   [facility]      P ONC INFUSION 120   12:30 PM   (120 min.)   UC ONCOLOGY INFUSION   [facility] 3     4     5     6       7     8     9     10     Admission    2:42 PM   Ciara Chamorro MD   [address] [facility]   (Discharge: 1/13/2018)     XR CHEST 2 VIEWS    3:00 PM   (15 min.)   UUXR3   [facility] [place],  Radiology 11     12     13       14     15     16     [facility] LAB DRAW    8:15 AM   (15 min.)   [facility] MASONIC LAB DRAW   [facility] Masonic Lab Draw     [facility] RETURN    8:30 AM   (30 min.)   Fara Bradshaw MD   [facility] 17     18     [facility] NEW    9:45 AM   (30 min.)   Atul Gates MD   [facility] 19     20       21     22     23     [facility] LAB DRAW    9:00 AM   (15 min.)   [facility] MASONIC LAB DRAW   [facility] Masonic Lab Draw     [facility] RETURN    9:15 AM   (50 min.)   [facility]   [facility]   10:30 AM   (120 min.)   UC ONCOLOGY INFUSION   [facility] 24     25     26     27       28     29     30     31 February 2018 Sunday Monday Tuesday Wednesday Thursday Friday Saturday                       1     2     3       4     5     6     [facility] LAB DRAW   11:15 AM   (15 min.)   [facility]IC LAB DRAW   [facility] Masonic Lab Draw     [facility] RETURN   11:35 AM   (50 min.)   De[facility] Blanco [facility]   [facility]   12:30 PM   (120 min.)   UC ONCOLOGY INFUSION   [facility] 7     8     9     10       11     12     13     14     15     16     17       18     19     20     [facility] LAB DRAW    1:15 PM   (15 min.)   [facility] MASONIC LAB DRAW   [facility] Masonic Lab Draw     [facility] RETURN    1:35 PM   (50 min.)   [facility]    2:30 PM   (120 min.)   UC ONCOLOGY INFUSION   [facility] 21     22     23     24        25     26     27     28                                Recent Results (from the past 24 hour(s))   CBC with platelets differential    Collection Time: 01/23/18  9:19 AM   Result Value Ref Range    WBC 15.9 (H) 4.0 - 11.0 10e9/L    RBC Count 3.28 (L) 3.8 - 5.2 10e12/L    Hemoglobin 11.0 (L) 11.7 - 15.7 g/dL    Hematocrit 33.6 (L) 35.0 - 47.0 %     (H) 78 - 100 fl    MCH 33.5 (H) 26.5 - 33.0 pg    MCHC 32.7 31.5 - 36.5 g/dL    RDW 15.4 (H) 10.0 - 15.0 %    Platelet Count 473 (H) 150 - 450 10e9/L    Diff Method Automated Method     % Neutrophils 75.8 %    % Lymphocytes 6.5 %    % Monocytes 14.3 %    % Eosinophils 0.1 %    % Basophils 0.8 %    % Immature Granulocytes 2.5 %    Nucleated RBCs 0 0 /100    Absolute Neutrophil 12.1 (H) 1.6 - 8.3 10e9/L    Absolute Lymphocytes 1.0 0.8 - 5.3 10e9/L    Absolute Monocytes 2.3 (H) 0.0 - 1.3 10e9/L    Absolute Eosinophils 0.0 0.0 - 0.7 10e9/L    Absolute Basophils 0.1 0.0 - 0.2 10e9/L    Abs Immature Granulocytes 0.4 0 - 0.4 10e9/L    Absolute Nucleated RBC 0.0    Comprehensive metabolic panel    Collection Time: 01/23/18  9:19 AM   Result Value Ref Range    Sodium 133 133 - 144 mmol/L    Potassium 3.9 3.4 - 5.3 mmol/L    Chloride 101 94 - 109 mmol/L    Carbon Dioxide 21 20 - 32 mmol/L    Anion Gap 11 3 - 14 mmol/L    Glucose 119 (H) 70 - 99 mg/dL    Urea Nitrogen 4 (L) 7 - 30 mg/dL    Creatinine 0.64 0.52 - 1.04 mg/dL    GFR Estimate >90 >60 mL/min/1.7m2    GFR Estimate If Black >90 >60 mL/min/1.7m2    Calcium 8.7 8.5 - 10.1 mg/dL    Bilirubin Total 0.5 0.2 - 1.3 mg/dL    Albumin 2.8 (L) 3.4 - 5.0 g/dL    Protein Total 7.4 6.8 - 8.8 g/dL    Alkaline Phosphatase 134 40 - 150 U/L    ALT 41 0 - 50 U/L    AST 39 0 - 45 U/L                 Follow-ups after your visit        Your next 10 appointments already scheduled     Feb 06, 2018 11:15 AM [facility]   Masonic Lab Draw with  MASONIC LAB DRAW   [facility] Masonic Lab Draw ([facility])    [address]  [address]  [facility] [place] [address]   [phone]            Feb 06, 2018 11:50 AM CST   (Arrive by 11:35 AM)   Return Visit with Deanna Blanco PA-C   [facility] ([facility])    [address] [place] [address]  [address]  [facility] [place] [address]   [phone]            Feb 06, 2018 12:30 PM CST   Infusion 120 with UC ONCOLOGY INFUSION, UC 29 ATC   [facility] ([facility])    [address] [place] [address]  [address]  [facility] [place] [address]   [phone]            Feb 20, 2018  1:15 PM CST   Masonic Lab Draw with  MASONIC LAB DRAW   [facility] Lab Draw ([facility])    [address] [place] [address]  [address]  [facility] [place] [address]   [phone]            Feb 20, 2018  1:50 PM CST   (Arrive by 1:35 PM)   Return Visit with Lilia Livingston PA-C   [facility] ([facility])    [address] [place] [address]  [address]  [facility] [place] [address]   [phone]            Feb 20, 2018  2:30 PM CST   Infusion 120 with UC ONCOLOGY INFUSION, UC 20 ATC   [facility] ([facility])    [address] [place] [address]  [address]  [facility] [place] [address]   [phone]            Apr 02, 2018  3:45 PM CDT   (Arrive by 3:30 PM)   Return Visit with Fara Bradshaw MD   [facility] ([facility])    [address] [place] [address]  [address]  [facility] [place] [address]   [phone]              Who to contact     If you have questions or need follow up information about today's clinic visit or your schedule please contact [facility] directly at [phone].  Normal or non-critical lab and imaging results will be communicated to you by MyChart, letter or phone within 4 business days after the clinic has received the results. If you do not hear from us within 7 days, please  contact the clinic through [employer] or phone. If you have a critical or abnormal lab result, we will notify you by phone as soon as possible.  Submit refill requests through [employer] or call your pharmacy and they will forward the refill request to us. Please allow 3 business days for your refill to be completed.          Additional Information About Your Visit        GetYourGuidehart Information     [employer] gives you secure access to your electronic health record. If you see a primary care provider, you can also send messages to your care team and make appointments. If you have questions, please call your primary care clinic.  If you do not have a primary care provider, please call [phone] and they will assist you.        Care EveryWhere ID     This is your Care EveryWhere ID. This could be used by other organizations to access your [place] medical records  [id number]         Blood Pressure from Last 3 Encounters:   01/23/18 139/84   01/18/18 118/71   01/16/18 113/46    Weight from Last 3 Encounters:   01/23/18 69.8 kg (153 lb 12.8 oz)   01/18/18 70.3 kg (155 lb)   01/16/18 70.7 kg (155 lb 12.8 oz)              Today, you had the following     No orders found for display         Today's Medication Changes          These changes are accurate as of: 1/23/18  1:46 PM.  If you have any questions, ask your nurse or doctor.               Start taking these medicines.        Dose/Directions    dexamethasone 4 MG tablet   Commonly known as:  DECADRON   Used for:  Malignant neoplasm of upper-inner quadrant of right breast in female, estrogen receptor negative (H)   Started by:  Lilia Livingston PA-C        Dose:  8 mg   Take 2 tablets (8 mg) by mouth daily Start on Day 2 of first cycle of doxorubicin / cyclophosphamide.   Quantity:  18 tablet   Refills:  0         These medicines have changed or have updated prescriptions.        Dose/Directions    * predniSONE 10 MG tablet   Commonly known as:  DELTASONE   This may have  changed:    - how much to take  - how to take this  - when to take this  - additional instructions   Used for:  Malignant neoplasm of upper-inner quadrant of right breast in female, estrogen receptor negative (H)   Changed by:  Lilia Livingston PA-C        Take 60mg daily for 1 week, then continue to decrease by 10mg each week   Quantity:  150 tablet   Refills:  0       * predniSONE 5 MG tablet   Commonly known as:  DELTASONE   This may have changed:  Another medication with the same name was changed. Make sure you understand how and when to take each.   Used for:  Pneumonitis   Changed by:  Fara Bradshaw MD        1 tab daily x 7 days, followed by every other day x 7 days   Quantity:  11 tablet   Refills:  0       * Notice:  This list has 2 medication(s) that are the same as other medications prescribed for you. Read the directions carefully, and ask your doctor or other care provider to review them with you.         Where to get your medicines      These medications were sent to [address] [place] [address] [address] [place], [place] [id number]    Hours:  TRANSPLANT PHONE NUMBER [phone] Phone:  [phone]     dexamethasone 4 MG tablet                Primary Care Provider Office Phone # Fax #    Radha Cazares [phone]       [address] [address]        Equal Access to Services     THERESA HUGHES AH: Hadjose martin finley Somarian, waaxda luqadaha, qaybta kaalmada adekelly, bang pickering. So [facility] [phone].    ATENCIÓN: Si habla español, tiene a blackman disposición servicios gratuitos de asistencia lingüística. Pauline al [phone].    We comply with applicable federal civil rights laws and [place] laws. We do not discriminate on the basis of race, color, national origin, age, disability, sex, sexual orientation, or gender  identity.            Thank you!     Thank you for choosing [facility]  for your care. Our goal is always to provide you with excellent care. Hearing back from our patients is one way we can continue to improve our services. Please take a few minutes to complete the written survey that you may receive in the mail after your visit with us. Thank you!             Your Updated Medication List - Protect others around you: Learn how to safely use, store and throw away your medicines at www.disposemymeds.org.          This list is accurate as of: 1/23/18  1:46 PM.  Always use your most recent med list.                   Brand Name Dispense Instructions for use Diagnosis    albuterol 108 (90 BASE) MCG/ACT Inhaler    PROAIR HFA/PROVENTIL HFA/VENTOLIN HFA    1 Inhaler    Inhale 2 puffs into the lungs every 6 hours as needed for shortness of breath / dyspnea    Chronic obstructive pulmonary disease, unspecified COPD type (H)       aspirin 81 MG tablet      Take 1 tablet by mouth daily.        calcium carbonate 600 MG tablet   Generic drug:  calcium carbonate     60 tablet    Take by mouth 2 times daily (with meals)        citalopram 10 MG tablet    celeXA    90 tablet    Take 1 tablet (10 mg) by mouth daily    Anxiety, Depression, unspecified depression type       CO Q 10 PO      Take 1 tablet by mouth daily        darifenacin 7.5 MG 24 hr tablet    ENABLEX    90 tablet    Take 1 tablet (7.5 mg) by mouth daily    Overactive bladder       dexamethasone 4 MG tablet    DECADRON    18 tablet    Take 2 tablets (8 mg) by mouth daily Start on Day 2 of first cycle of doxorubicin / cyclophosphamide.    Malignant neoplasm of upper-inner quadrant of right breast in female, estrogen receptor negative (H)       guaiFENesin 600 MG 12 hr tablet    MUCINEX    180 tablet    Take 2 tablets (1,200 mg) by mouth 2 times daily    Cough with sputum       hydrOXYzine 25 MG tablet    ATARAX    100 tablet    Take 1-2 tablets (25-50  mg) by mouth every 6 hours as needed for itching    Hives       lidocaine visc 2% 2.5mL/5mL & maalox/mylanta w/ simeth 2.5mL/5mL & diphenhydrAMINE 5mg/5mL Susp suspension    [facility] Mouthwash [facility]    1 Bottle    Swish and swallow 10 mLs in mouth every 6 hours as needed for mouth sores    Mucositis       lidocaine-prilocaine cream    EMLA    30 g    Please apply to port site 30 minutes before use prn    Personal history of malignant neoplasm of breast       LORazepam 0.5 MG tablet    ATIVAN    30 tablet    Take 1 tablet (0.5 mg) by mouth every 4 hours as needed (Anxiety, Nausea/Vomiting or Sleep)    Malignant neoplasm of upper-inner quadrant of right breast in female, estrogen receptor negative (H)       MULTIPLE VITAMIN PO      Take 1 tablet by mouth daily        * nystatin 418184 UNIT/ML suspension    MYCOSTATIN    280 mL    Take 5 mLs (500,000 Units) by mouth 4 times daily    Neutropenic fever (H), Malignant neoplasm of upper-inner quadrant of right breast in female, estrogen receptor negative (H), Thrush       * nystatin 744207 UNIT/ML suspension    MYCOSTATIN     Take 5 mL by mouth 4 times daily        * order for [facility]      Respironics Dream Station Auto CPAP 12-18 cm, F&P Simplus FFM small.    MERLIN (obstructive sleep apnea)       * order for [facility]     1 Device    Cranial prosthesis    Malignant neoplasm of upper-inner quadrant of right breast in female, estrogen receptor negative (H)       pantoprazole 40 MG EC tablet    PROTONIX    60 tablet    Take 1 tablet (40 mg) by mouth every morning    Malignant neoplasm of upper-inner quadrant of right breast in female, estrogen receptor negative (H)       * predniSONE 10 MG tablet    DELTASONE    150 tablet    Take 60mg daily for 1 week, then continue to decrease by 10mg each week    Malignant neoplasm of upper-inner quadrant of right breast in female, estrogen receptor negative (H)       * predniSONE 5 MG tablet    DELTASONE    11 tablet    1 tab daily x 7 days,  followed by every other day x 7 days    Pneumonitis       tiotropium 18 MCG capsule    SPIRIVA    1 capsule    Inhale 1 capsule (18 mcg) into the lungs daily Inhale contents of one capsule    Chronic obstructive pulmonary disease, unspecified COPD type (H)       VITAMIN B 12 PO      Take 100 mcg by mouth daily        VITAMIN B6 PO      Take 1 tablet by mouth daily.        vitamin D 1000 UNITS capsule      TAKE 2 CAPSULES BY MOUTH DAILY        * Notice:  This list has 6 medication(s) that are the same as other medications prescribed for you. Read the directions carefully, and ask your doctor or other care provider to review them with you.

## 2018-01-23 NOTE — PROGRESS NOTES
Research AC#2-- pt here after dose #2 held last week 2' pt still not doing well after hospitalization for neutrapenic fevers. Reporting she is feeling generally much better but still having decreased appetite, vomited liquid this morning just liquid 3 times. Denies nausea otherwise. Still having labored breathing and significant fatigue.   Was on abx from hospital and those were finished on 1/20.   Labs are ok ALT is 145 (grade 1) AST 70 (gr 1). Labs ok to proceed with treatment today. Pt reporting she feels better compared with last week when treatment held. Going to go ahead with treatment today, reinforced that if she spikes a fever she needs to call and or come to ER. Pt verbalized understanding.   Checked in with infusion RN and gave contact info.   Steffany Nix RN  [phone]

## 2018-01-23 NOTE — PATIENT INSTRUCTIONS
Contact Numbers    [facility] [place]: [phone]  [facility] Triage:  [phone]    Call triage with chills and/or temperature greater than or equal to 100.5, uncontrolled nausea/vomiting, diarrhea, constipation, dizziness, shortness of breath, chest pain, bleeding, unexplained bruising, or any new/concerning symptoms, questions/concerns.     If you are having any concerning symptoms or wish to speak to a provider before your next infusion visit, please call your care coordinator or triage to notify them so we can adequately serve you.       After Hours: [phone]    If after hours, weekends, or holidays, call main hospital  and ask for Oncology doctor on call.     Neulasta Onpro On-Body injector applied at 1:40pm   Neulasta injection will start on 1/24/18 at 4:40pm, approximately 27 hours after application  When the dose delivery starts, it will take about 45 minutes to complete.  Neulasta Onpro On-Body should have green flashing light, call triage or on-call MD if injector flashes red or appears to be leaking.   Keep Onpro On-Body Neulasta 4 inches away from electrical equipment and avoid showering 4 hours prior to injection.         January 2018 Sunday Monday Tuesday Wednesday Thursday Friday Saturday        1     2     [facility] MASONIC LAB DRAW   10:45 AM   (15 min.)    MASONIC LAB DRAW   [facility] Lab Draw     [facility] RETURN   10:55 AM   (50 min.)   Caren Colindres PA-C   [facility] ONC INFUSION 120   12:30 PM   (120 min.)    ONCOLOGY INFUSION   [facility] 3     4     5     6       7     8     9     10     Admission    2:42 PM   Ciara Chamorro MD   [address] [facility]   (Discharge: 1/13/2018)     XR CHEST 2 VIEWS    3:00 PM   (15 min.)   UUXR3   [facility],  Radiology 11     12     13       14     15     16     [facility] MASONIC LAB DRAW    8:15 AM   (15 min.)    [facility]     [facility] RETURN    8:30 AM   (30  min.)   Fara Bradshaw MD   [facility] 17     18     [facility] NEW    9:45 AM   (30 min.)   Atul Gates MD   [facility] 19     20       21     22     23     [facility] MASONIC LAB DRAW    9:00 AM   (15 min.)   [facility] MASONIC LAB DRAW   [facility] Masonic Lab Draw     UMP RETURN    9:15 AM   (50 min.)   Lilia Livingston PA-C   [facility]     UMP ONC INFUSION 120   10:30 AM   (120 min.)   UC ONCOLOGY INFUSION   [facility] 24     25     26     27       28     29     30     31 February 2018 Sunday Monday Tuesday Wednesday Thursday Friday Saturday                       1     2     3       4     5     6     [facility] MASONIC LAB DRAW   11:15 AM   (15 min.)    MASONIC LAB DRAW   [facility] Masonic Lab Draw     [facility] RETURN   11:35 AM   (50 min.)   Deanna Blanco PA-C   [facility]     [facility] ONC INFUSION 120   12:30 PM   (120 min.)   UC ONCOLOGY INFUSION   [facility] 7     8     9     10       11     12     13     14     15     16     17       18     19     20     [facility] MASONIC LAB DRAW    1:15 PM   (15 min.)    MASONIC LAB DRAW   [facility] Masonic Lab Draw     [facility] RETURN    1:35 PM   (50 min.)   [facility]    2:30 PM   (120 min.)    ONCOLOGY INFUSION   [facility] 21     22     23     24       25     26     27     28                                Recent Results (from the past 24 hour(s))   CBC with platelets differential    Collection Time: 01/23/18  9:19 AM   Result Value Ref Range    WBC 15.9 (H) 4.0 - 11.0 10e9/L    RBC Count 3.28 (L) 3.8 - 5.2 10e12/L    Hemoglobin 11.0 (L) 11.7 - 15.7 g/dL    Hematocrit 33.6 (L) 35.0 - 47.0 %     (H) 78 - 100 fl    MCH 33.5 (H) 26.5 - 33.0 pg    MCHC 32.7 31.5 - 36.5 g/dL    RDW 15.4 (H) 10.0 - 15.0 %    Platelet Count 473 (H) 150 - 450  10e9/L    Diff Method Automated Method     % Neutrophils 75.8 %    % Lymphocytes 6.5 %    % Monocytes 14.3 %    % Eosinophils 0.1 %    % Basophils 0.8 %    % Immature Granulocytes 2.5 %    Nucleated RBCs 0 0 /100    Absolute Neutrophil 12.1 (H) 1.6 - 8.3 10e9/L    Absolute Lymphocytes 1.0 0.8 - 5.3 10e9/L    Absolute Monocytes 2.3 (H) 0.0 - 1.3 10e9/L    Absolute Eosinophils 0.0 0.0 - 0.7 10e9/L    Absolute Basophils 0.1 0.0 - 0.2 10e9/L    Abs Immature Granulocytes 0.4 0 - 0.4 10e9/L    Absolute Nucleated RBC 0.0    Comprehensive metabolic panel    Collection Time: 01/23/18  9:19 AM   Result Value Ref Range    Sodium 133 133 - 144 mmol/L    Potassium 3.9 3.4 - 5.3 mmol/L    Chloride 101 94 - 109 mmol/L    Carbon Dioxide 21 20 - 32 mmol/L    Anion Gap 11 3 - 14 mmol/L    Glucose 119 (H) 70 - 99 mg/dL    Urea Nitrogen 4 (L) 7 - 30 mg/dL    Creatinine 0.64 0.52 - 1.04 mg/dL    GFR Estimate >90 >60 mL/min/1.7m2    GFR Estimate If Black >90 >60 mL/min/1.7m2    Calcium 8.7 8.5 - 10.1 mg/dL    Bilirubin Total 0.5 0.2 - 1.3 mg/dL    Albumin 2.8 (L) 3.4 - 5.0 g/dL    Protein Total 7.4 6.8 - 8.8 g/dL    Alkaline Phosphatase 134 40 - 150 U/L    ALT 41 0 - 50 U/L    AST 39 0 - 45 U/L

## 2018-01-23 NOTE — NURSING NOTE
"Oncology Rooming Note    January 23, 2018 9:26 AM   Ashleigh Alonzo is a 57 year old female who presents for:    Chief Complaint   Patient presents with     Port Draw     Port accessed by RN. No blood return; line flushed with saline and heparin; TPA ordered.  Vs taken and pt checked in for appt. Labs collected from vpt by RN.     Initial Vitals: /84 (BP Location: Right arm, Cuff Size: Adult Regular)  Pulse 133  Temp 98.2  F (36.8  C) (Oral)  Resp 16  Ht 1.626 m (5' 4.02\")  Wt 69.8 kg (153 lb 12.8 oz)  SpO2 94%  BMI 26.39 kg/m2 Estimated body mass index is 26.39 kg/(m^2) as calculated from the following:    Height as of this encounter: 1.626 m (5' 4.02\").    Weight as of this encounter: 69.8 kg (153 lb 12.8 oz). Body surface area is 1.78 meters squared.  No Pain (0) Comment: Data Unavailable   No LMP recorded. Patient is postmenopausal.  Allergies reviewed: Yes  Medications reviewed: Yes    Medications: Medication refills not needed today.  Pharmacy name entered into [facility]:    [facility] [place], [place] - [address] [place] [facility] - [place], [place] - [address] [address]    Clinical concerns: No new concerns. Provider was notified.    10 minutes for nursing intake (face to face time)     Ciara Chaudhry LPN            "

## 2018-01-25 LAB — INTERPRETATION ECG - MUSE: NORMAL

## 2018-01-29 ENCOUNTER — TELEPHONE (OUTPATIENT)
Dept: ONCOLOGY | Facility: CLINIC | Age: 58
End: 2018-01-29

## 2018-01-29 ENCOUNTER — HOSPITAL ENCOUNTER (INPATIENT)
Facility: CLINIC | Age: 58
LOS: 10 days | Discharge: HOME | DRG: 853 | End: 2018-02-08
Attending: EMERGENCY MEDICINE | Admitting: INTERNAL MEDICINE
Payer: COMMERCIAL

## 2018-01-29 ENCOUNTER — APPOINTMENT (OUTPATIENT)
Dept: GENERAL RADIOLOGY | Facility: CLINIC | Age: 58
DRG: 853 | End: 2018-01-29
Attending: EMERGENCY MEDICINE
Payer: COMMERCIAL

## 2018-01-29 DIAGNOSIS — R50.81 FEBRILE NEUTROPENIA (H): ICD-10-CM

## 2018-01-29 DIAGNOSIS — D61.810 ANTINEOPLASTIC CHEMOTHERAPY INDUCED PANCYTOPENIA (H): ICD-10-CM

## 2018-01-29 DIAGNOSIS — R65.20 SEVERE SEPSIS (H): ICD-10-CM

## 2018-01-29 DIAGNOSIS — D70.9 FEBRILE NEUTROPENIA (H): ICD-10-CM

## 2018-01-29 DIAGNOSIS — C50.211 MALIGNANT NEOPLASM OF UPPER-INNER QUADRANT OF RIGHT BREAST IN FEMALE, ESTROGEN RECEPTOR NEGATIVE (H): ICD-10-CM

## 2018-01-29 DIAGNOSIS — T45.1X5A ANTINEOPLASTIC CHEMOTHERAPY INDUCED PANCYTOPENIA (H): ICD-10-CM

## 2018-01-29 DIAGNOSIS — A41.9 SEVERE SEPSIS (H): ICD-10-CM

## 2018-01-29 DIAGNOSIS — Z17.1 MALIGNANT NEOPLASM OF UPPER-INNER QUADRANT OF RIGHT BREAST IN FEMALE, ESTROGEN RECEPTOR NEGATIVE (H): ICD-10-CM

## 2018-01-29 LAB
ALBUMIN SERPL-MCNC: 2.7 G/DL (ref 3.4–5)
ALBUMIN UR-MCNC: NEGATIVE MG/DL
ALP SERPL-CCNC: 99 U/L (ref 40–150)
ALT SERPL W P-5'-P-CCNC: 39 U/L (ref 0–50)
ANION GAP SERPL CALCULATED.3IONS-SCNC: 11 MMOL/L (ref 3–14)
APPEARANCE UR: CLEAR
AST SERPL W P-5'-P-CCNC: 23 U/L (ref 0–45)
BACTERIA #/AREA URNS HPF: ABNORMAL /HPF
BILIRUB SERPL-MCNC: 1.2 MG/DL (ref 0.2–1.3)
BILIRUB UR QL STRIP: NEGATIVE
BLD PROD TYP BPU: NORMAL
BLD UNIT ID BPU: 0
BLOOD PRODUCT CODE: NORMAL
BPU ID: NORMAL
BUN SERPL-MCNC: 10 MG/DL (ref 7–30)
CA-I BLD-SCNC: 4.6 MG/DL (ref 4.4–5.2)
CALCIUM SERPL-MCNC: 8.2 MG/DL (ref 8.5–10.1)
CHLORIDE SERPL-SCNC: 99 MMOL/L (ref 94–109)
CO2 BLDCOV-SCNC: 24 MMOL/L (ref 21–28)
CO2 BLDCOV-SCNC: 24 MMOL/L (ref 21–28)
CO2 SERPL-SCNC: 24 MMOL/L (ref 20–32)
COLOR UR AUTO: YELLOW
CREAT SERPL-MCNC: 0.58 MG/DL (ref 0.52–1.04)
DIFFERENTIAL METHOD BLD: ABNORMAL
DIFFERENTIAL METHOD BLD: NORMAL
ERYTHROCYTE [DISTWIDTH] IN BLOOD BY AUTOMATED COUNT: 14.7 % (ref 10–15)
ERYTHROCYTE [DISTWIDTH] IN BLOOD BY AUTOMATED COUNT: 14.7 % (ref 10–15)
ERYTHROCYTE [DISTWIDTH] IN BLOOD BY AUTOMATED COUNT: 14.8 % (ref 10–15)
ERYTHROCYTE [DISTWIDTH] IN BLOOD BY AUTOMATED COUNT: NORMAL % (ref 10–15)
FLUAV+FLUBV AG SPEC QL: NEGATIVE
FLUAV+FLUBV AG SPEC QL: NEGATIVE
GFR SERPL CREATININE-BSD FRML MDRD: >90 ML/MIN/1.7M2
GLUCOSE BLD-MCNC: 83 MG/DL (ref 70–99)
GLUCOSE BLDC GLUCOMTR-MCNC: 90 MG/DL (ref 70–99)
GLUCOSE SERPL-MCNC: 101 MG/DL (ref 70–99)
GLUCOSE UR STRIP-MCNC: NEGATIVE MG/DL
HCT VFR BLD AUTO: 13.4 % (ref 35–47)
HCT VFR BLD AUTO: 20.2 % (ref 35–47)
HCT VFR BLD AUTO: 21.7 % (ref 35–47)
HCT VFR BLD AUTO: NORMAL % (ref 35–47)
HCT VFR BLD CALC: 20 %PCV (ref 35–47)
HGB BLD CALC-MCNC: 6.8 G/DL (ref 11.7–15.7)
HGB BLD-MCNC: 4.2 G/DL (ref 11.7–15.7)
HGB BLD-MCNC: 6.5 G/DL (ref 11.7–15.7)
HGB BLD-MCNC: 7.3 G/DL (ref 11.7–15.7)
HGB BLD-MCNC: NORMAL G/DL (ref 11.7–15.7)
HGB UR QL STRIP: NEGATIVE
KETONES UR STRIP-MCNC: NEGATIVE MG/DL
LACTATE BLD-SCNC: 2.3 MMOL/L (ref 0.7–2.1)
LACTATE BLD-SCNC: 3.9 MMOL/L (ref 0.7–2)
LACTATE BLD-SCNC: 4 MMOL/L (ref 0.7–2)
LEUKOCYTE ESTERASE UR QL STRIP: NEGATIVE
MCH RBC QN AUTO: 31.8 PG (ref 26.5–33)
MCH RBC QN AUTO: 32.7 PG (ref 26.5–33)
MCH RBC QN AUTO: 33.6 PG (ref 26.5–33)
MCH RBC QN AUTO: NORMAL PG (ref 26.5–33)
MCHC RBC AUTO-ENTMCNC: 31.3 G/DL (ref 31.5–36.5)
MCHC RBC AUTO-ENTMCNC: 32.2 G/DL (ref 31.5–36.5)
MCHC RBC AUTO-ENTMCNC: 33.6 G/DL (ref 31.5–36.5)
MCHC RBC AUTO-ENTMCNC: NORMAL G/DL (ref 31.5–36.5)
MCV RBC AUTO: 100 FL (ref 78–100)
MCV RBC AUTO: 102 FL (ref 78–100)
MCV RBC AUTO: 102 FL (ref 78–100)
MCV RBC AUTO: NORMAL FL (ref 78–100)
NITRATE UR QL: NEGATIVE
PCO2 BLDV: 29 MM HG (ref 40–50)
PCO2 BLDV: 31 MM HG (ref 40–50)
PH BLDV: 7.51 PH (ref 7.32–7.43)
PH BLDV: 7.53 PH (ref 7.32–7.43)
PH UR STRIP: 7.5 PH (ref 5–7)
PLATELET # BLD AUTO: 115 10E9/L (ref 150–450)
PLATELET # BLD AUTO: 57 10E9/L (ref 150–450)
PLATELET # BLD AUTO: 62 10E9/L (ref 150–450)
PLATELET # BLD AUTO: NORMAL 10E9/L (ref 150–450)
PLATELET # BLD EST: ABNORMAL 10*3/UL
PO2 BLDV: 21 MM HG (ref 25–47)
PO2 BLDV: 24 MM HG (ref 25–47)
POTASSIUM BLD-SCNC: 3.9 MMOL/L (ref 3.4–5.3)
POTASSIUM SERPL-SCNC: 3.8 MMOL/L (ref 3.4–5.3)
PROT SERPL-MCNC: 6 G/DL (ref 6.8–8.8)
RBC # BLD AUTO: 1.32 10E12/L (ref 3.8–5.2)
RBC # BLD AUTO: 1.99 10E12/L (ref 3.8–5.2)
RBC # BLD AUTO: 2.17 10E12/L (ref 3.8–5.2)
RBC # BLD AUTO: NORMAL 10E12/L (ref 3.8–5.2)
RBC #/AREA URNS AUTO: <1 /HPF (ref 0–2)
SAO2 % BLDV FROM PO2: 42 %
SAO2 % BLDV FROM PO2: 51 %
SODIUM BLD-SCNC: 133 MMOL/L (ref 133–144)
SODIUM SERPL-SCNC: 134 MMOL/L (ref 133–144)
SOURCE: ABNORMAL
SP GR UR STRIP: 1.01 (ref 1–1.03)
SPECIMEN SOURCE: NORMAL
SQUAMOUS #/AREA URNS AUTO: 1 /HPF (ref 0–1)
TRANS CELLS #/AREA URNS HPF: <1 /HPF (ref 0–1)
TRANSFUSION STATUS PATIENT QL: NORMAL
TRANSFUSION STATUS PATIENT QL: NORMAL
UROBILINOGEN UR STRIP-MCNC: NORMAL MG/DL (ref 0–2)
WBC # BLD AUTO: 0.1 10E9/L (ref 4–11)
WBC # BLD AUTO: 0.1 10E9/L (ref 4–11)
WBC # BLD AUTO: 0.3 10E9/L (ref 4–11)
WBC # BLD AUTO: NORMAL 10E9/L (ref 4–11)
WBC #/AREA URNS AUTO: 1 /HPF (ref 0–2)

## 2018-01-29 PROCEDURE — 71046 X-RAY EXAM CHEST 2 VIEWS: CPT

## 2018-01-29 PROCEDURE — 81001 URINALYSIS AUTO W/SCOPE: CPT | Performed by: EMERGENCY MEDICINE

## 2018-01-29 PROCEDURE — 00000146 ZZHCL STATISTIC GLUCOSE BY METER IP

## 2018-01-29 PROCEDURE — 40000498 ZZHCL STATISTIC POTASSIUM ED POCT

## 2018-01-29 PROCEDURE — P9016 RBC LEUKOCYTES REDUCED: HCPCS | Performed by: EMERGENCY MEDICINE

## 2018-01-29 PROCEDURE — 40000501 ZZHCL STATISTIC HEMATOCRIT ED POCT

## 2018-01-29 PROCEDURE — 80053 COMPREHEN METABOLIC PANEL: CPT | Performed by: EMERGENCY MEDICINE

## 2018-01-29 PROCEDURE — 87804 INFLUENZA ASSAY W/OPTIC: CPT | Performed by: EMERGENCY MEDICINE

## 2018-01-29 PROCEDURE — 86850 RBC ANTIBODY SCREEN: CPT | Performed by: EMERGENCY MEDICINE

## 2018-01-29 PROCEDURE — 25000128 H RX IP 250 OP 636: Performed by: INTERNAL MEDICINE

## 2018-01-29 PROCEDURE — 25000125 ZZHC RX 250: Performed by: INTERNAL MEDICINE

## 2018-01-29 PROCEDURE — 25000132 ZZH RX MED GY IP 250 OP 250 PS 637: Performed by: EMERGENCY MEDICINE

## 2018-01-29 PROCEDURE — 96366 THER/PROPH/DIAG IV INF ADDON: CPT | Performed by: EMERGENCY MEDICINE

## 2018-01-29 PROCEDURE — 40000502 ZZHCL STATISTIC GLUCOSE ED POCT

## 2018-01-29 PROCEDURE — 83605 ASSAY OF LACTIC ACID: CPT | Performed by: EMERGENCY MEDICINE

## 2018-01-29 PROCEDURE — 36592 COLLECT BLOOD FROM PICC: CPT | Performed by: INTERNAL MEDICINE

## 2018-01-29 PROCEDURE — 84145 PROCALCITONIN (PCT): CPT | Performed by: INTERNAL MEDICINE

## 2018-01-29 PROCEDURE — 99285 EMERGENCY DEPT VISIT HI MDM: CPT | Mod: 25 | Performed by: EMERGENCY MEDICINE

## 2018-01-29 PROCEDURE — 96361 HYDRATE IV INFUSION ADD-ON: CPT | Performed by: EMERGENCY MEDICINE

## 2018-01-29 PROCEDURE — 87086 URINE CULTURE/COLONY COUNT: CPT | Performed by: EMERGENCY MEDICINE

## 2018-01-29 PROCEDURE — 86900 BLOOD TYPING SEROLOGIC ABO: CPT | Performed by: EMERGENCY MEDICINE

## 2018-01-29 PROCEDURE — 83605 ASSAY OF LACTIC ACID: CPT | Performed by: INTERNAL MEDICINE

## 2018-01-29 PROCEDURE — 85025 COMPLETE CBC W/AUTO DIFF WBC: CPT | Performed by: EMERGENCY MEDICINE

## 2018-01-29 PROCEDURE — 99291 CRITICAL CARE FIRST HOUR: CPT | Mod: Z6 | Performed by: EMERGENCY MEDICINE

## 2018-01-29 PROCEDURE — 86901 BLOOD TYPING SEROLOGIC RH(D): CPT | Performed by: EMERGENCY MEDICINE

## 2018-01-29 PROCEDURE — 87040 BLOOD CULTURE FOR BACTERIA: CPT | Performed by: EMERGENCY MEDICINE

## 2018-01-29 PROCEDURE — 12000006 ZZH R&B IMCU INTERMEDIATE UMMC

## 2018-01-29 PROCEDURE — 83605 ASSAY OF LACTIC ACID: CPT

## 2018-01-29 PROCEDURE — 82803 BLOOD GASES ANY COMBINATION: CPT

## 2018-01-29 PROCEDURE — 86923 COMPATIBILITY TEST ELECTRIC: CPT | Performed by: EMERGENCY MEDICINE

## 2018-01-29 PROCEDURE — 96368 THER/DIAG CONCURRENT INF: CPT | Performed by: EMERGENCY MEDICINE

## 2018-01-29 PROCEDURE — 96365 THER/PROPH/DIAG IV INF INIT: CPT | Performed by: EMERGENCY MEDICINE

## 2018-01-29 PROCEDURE — 99223 1ST HOSP IP/OBS HIGH 75: CPT | Mod: AI | Performed by: INTERNAL MEDICINE

## 2018-01-29 PROCEDURE — 82330 ASSAY OF CALCIUM: CPT

## 2018-01-29 PROCEDURE — 25000128 H RX IP 250 OP 636: Performed by: EMERGENCY MEDICINE

## 2018-01-29 PROCEDURE — 40000497 ZZHCL STATISTIC SODIUM ED POCT

## 2018-01-29 PROCEDURE — 25000132 ZZH RX MED GY IP 250 OP 250 PS 637: Performed by: INTERNAL MEDICINE

## 2018-01-29 RX ORDER — LORAZEPAM 2 MG/ML
.5-1 INJECTION INTRAMUSCULAR EVERY 6 HOURS PRN
Status: DISCONTINUED | OUTPATIENT
Start: 2018-01-29 | End: 2018-02-08 | Stop reason: HOSPADM

## 2018-01-29 RX ORDER — PANTOPRAZOLE SODIUM 40 MG/1
40 TABLET, DELAYED RELEASE ORAL EVERY MORNING
Status: DISCONTINUED | OUTPATIENT
Start: 2018-01-30 | End: 2018-02-08 | Stop reason: HOSPADM

## 2018-01-29 RX ORDER — ALBUTEROL SULFATE 90 UG/1
2 AEROSOL, METERED RESPIRATORY (INHALATION) EVERY 6 HOURS PRN
Status: DISCONTINUED | OUTPATIENT
Start: 2018-01-29 | End: 2018-02-08 | Stop reason: HOSPADM

## 2018-01-29 RX ORDER — ACETAMINOPHEN 325 MG/1
650 TABLET ORAL EVERY 4 HOURS PRN
Status: DISCONTINUED | OUTPATIENT
Start: 2018-01-29 | End: 2018-02-08 | Stop reason: HOSPADM

## 2018-01-29 RX ORDER — LORAZEPAM 0.5 MG/1
.5-1 TABLET ORAL EVERY 6 HOURS PRN
Status: DISCONTINUED | OUTPATIENT
Start: 2018-01-29 | End: 2018-02-08 | Stop reason: HOSPADM

## 2018-01-29 RX ORDER — SODIUM CHLORIDE, SODIUM LACTATE, POTASSIUM CHLORIDE, CALCIUM CHLORIDE 600; 310; 30; 20 MG/100ML; MG/100ML; MG/100ML; MG/100ML
INJECTION, SOLUTION INTRAVENOUS CONTINUOUS
Status: DISCONTINUED | OUTPATIENT
Start: 2018-01-29 | End: 2018-01-29

## 2018-01-29 RX ORDER — CALCIUM CARBONATE 500(1250)
1250 TABLET ORAL 2 TIMES DAILY WITH MEALS
Status: DISCONTINUED | OUTPATIENT
Start: 2018-01-29 | End: 2018-02-08 | Stop reason: HOSPADM

## 2018-01-29 RX ORDER — UBIDECARENONE 75 MG
100 CAPSULE ORAL DAILY
Status: DISCONTINUED | OUTPATIENT
Start: 2018-01-30 | End: 2018-02-08 | Stop reason: HOSPADM

## 2018-01-29 RX ORDER — ONDANSETRON 2 MG/ML
8 INJECTION INTRAMUSCULAR; INTRAVENOUS EVERY 8 HOURS PRN
Status: DISCONTINUED | OUTPATIENT
Start: 2018-01-29 | End: 2018-02-07

## 2018-01-29 RX ORDER — PREDNISONE 10 MG/1
10 TABLET ORAL DAILY
Status: DISCONTINUED | OUTPATIENT
Start: 2018-01-29 | End: 2018-01-30

## 2018-01-29 RX ORDER — CEFEPIME 1 G/50ML
2 INJECTION, SOLUTION INTRAVENOUS ONCE
Status: COMPLETED | OUTPATIENT
Start: 2018-01-29 | End: 2018-01-29

## 2018-01-29 RX ORDER — SODIUM CHLORIDE 9 MG/ML
INJECTION, SOLUTION INTRAVENOUS CONTINUOUS
Status: DISCONTINUED | OUTPATIENT
Start: 2018-01-29 | End: 2018-01-30

## 2018-01-29 RX ORDER — HYDROXYZINE HYDROCHLORIDE 25 MG/1
25-50 TABLET, FILM COATED ORAL EVERY 6 HOURS PRN
Status: DISCONTINUED | OUTPATIENT
Start: 2018-01-29 | End: 2018-02-08 | Stop reason: HOSPADM

## 2018-01-29 RX ORDER — CITALOPRAM HYDROBROMIDE 10 MG/1
10 TABLET ORAL DAILY
Status: DISCONTINUED | OUTPATIENT
Start: 2018-01-30 | End: 2018-02-08 | Stop reason: HOSPADM

## 2018-01-29 RX ORDER — ACETAMINOPHEN 325 MG/1
650 TABLET ORAL ONCE
Status: COMPLETED | OUTPATIENT
Start: 2018-01-29 | End: 2018-01-29

## 2018-01-29 RX ORDER — LIDOCAINE 40 MG/G
CREAM TOPICAL
Status: DISCONTINUED | OUTPATIENT
Start: 2018-01-29 | End: 2018-01-29 | Stop reason: CLARIF

## 2018-01-29 RX ORDER — ONDANSETRON 4 MG/1
8 TABLET, ORALLY DISINTEGRATING ORAL EVERY 8 HOURS PRN
Status: DISCONTINUED | OUTPATIENT
Start: 2018-01-29 | End: 2018-02-08 | Stop reason: HOSPADM

## 2018-01-29 RX ORDER — ONDANSETRON 8 MG/1
8 TABLET, FILM COATED ORAL EVERY 8 HOURS PRN
Status: DISCONTINUED | OUTPATIENT
Start: 2018-01-29 | End: 2018-02-07

## 2018-01-29 RX ORDER — GUAIFENESIN 600 MG/1
1200 TABLET, EXTENDED RELEASE ORAL 2 TIMES DAILY
Status: DISCONTINUED | OUTPATIENT
Start: 2018-01-29 | End: 2018-01-31

## 2018-01-29 RX ORDER — PROCHLORPERAZINE MALEATE 5 MG
5-10 TABLET ORAL EVERY 6 HOURS PRN
Status: DISCONTINUED | OUTPATIENT
Start: 2018-01-29 | End: 2018-02-08 | Stop reason: HOSPADM

## 2018-01-29 RX ORDER — PREDNISONE 10 MG/1
10 TABLET ORAL DAILY
Status: DISCONTINUED | OUTPATIENT
Start: 2018-01-30 | End: 2018-01-29

## 2018-01-29 RX ORDER — TOLTERODINE 4 MG/1
4 CAPSULE, EXTENDED RELEASE ORAL DAILY
Status: DISCONTINUED | OUTPATIENT
Start: 2018-01-30 | End: 2018-02-08 | Stop reason: HOSPADM

## 2018-01-29 RX ADMIN — CEFEPIME 2 G: 1 INJECTION, SOLUTION INTRAVENOUS at 16:21

## 2018-01-29 RX ADMIN — VANCOMYCIN 1750 MG: 1 INJECTION, SOLUTION INTRAVENOUS at 16:18

## 2018-01-29 RX ADMIN — SODIUM CHLORIDE, POTASSIUM CHLORIDE, SODIUM LACTATE AND CALCIUM CHLORIDE 2109 ML: 600; 310; 30; 20 INJECTION, SOLUTION INTRAVENOUS at 14:13

## 2018-01-29 RX ADMIN — PREDNISONE 10 MG: 10 TABLET ORAL at 21:49

## 2018-01-29 RX ADMIN — SODIUM CHLORIDE, POTASSIUM CHLORIDE, SODIUM LACTATE AND CALCIUM CHLORIDE: 600; 310; 30; 20 INJECTION, SOLUTION INTRAVENOUS at 15:12

## 2018-01-29 RX ADMIN — ACETAMINOPHEN 650 MG: 325 TABLET, FILM COATED ORAL at 21:50

## 2018-01-29 RX ADMIN — GUAIFENESIN 1200 MG: 600 TABLET, EXTENDED RELEASE ORAL at 21:49

## 2018-01-29 RX ADMIN — SODIUM CHLORIDE 1000 ML: 9 INJECTION, SOLUTION INTRAVENOUS at 21:59

## 2018-01-29 RX ADMIN — ACETAMINOPHEN 650 MG: 325 TABLET ORAL at 15:11

## 2018-01-29 RX ADMIN — SODIUM CHLORIDE, POTASSIUM CHLORIDE, SODIUM LACTATE AND CALCIUM CHLORIDE 500 ML: 600; 310; 30; 20 INJECTION, SOLUTION INTRAVENOUS at 16:56

## 2018-01-29 ASSESSMENT — ENCOUNTER SYMPTOMS
DYSURIA: 0
VOMITING: 0
ABDOMINAL PAIN: 0
DIARRHEA: 0
RHINORRHEA: 1
DIFFICULTY URINATING: 0
FREQUENCY: 0
NAUSEA: 0
COUGH: 0
HEMATURIA: 0
FEVER: 1

## 2018-01-29 ASSESSMENT — ACTIVITIES OF DAILY LIVING (ADL)
AMBULATION: 0-->INDEPENDENT
DRESS: 0-->INDEPENDENT
FALL_HISTORY_WITHIN_LAST_SIX_MONTHS: NO
RETIRED_EATING: 0-->INDEPENDENT
COGNITION: 0 - NO COGNITION ISSUES REPORTED
BATHING: 0-->INDEPENDENT
TOILETING: 0-->INDEPENDENT
RETIRED_COMMUNICATION: 0-->UNDERSTANDS/COMMUNICATES WITHOUT DIFFICULTY
TRANSFERRING: 0-->INDEPENDENT
SWALLOWING: 0-->SWALLOWS FOODS/LIQUIDS WITHOUT DIFFICULTY

## 2018-01-29 ASSESSMENT — PAIN DESCRIPTION - DESCRIPTORS: DESCRIPTORS: PRESSURE

## 2018-01-29 NOTE — TELEPHONE ENCOUNTER
Patient called while en route to the [facility] ED. She developed a fever of 100.4 with fatigue. Last infusion 1/23. She was previously discharged on 1/14 after being admitted for 4 days with neutropenic fever. Her  is transporting her.

## 2018-01-29 NOTE — IP AVS SNAPSHOT
[address] [address]    Phone:  [phone]                                       After Visit Summary   1/29/2018    Ashleigh Alonzo    MRN: [id number]           After Visit Summary Signature Page     I have received my discharge instructions, and my questions have been answered. I have discussed any challenges I see with this plan with the nurse or doctor.    ..........................................................................................................................................  Patient/Patient Representative Signature      ..........................................................................................................................................  Patient Representative Print Name and Relationship to Patient    ..................................................               ................................................  Date                                            Time    ..........................................................................................................................................  Reviewed by Signature/Title    ...................................................              ..............................................  Date                                                            Time

## 2018-01-29 NOTE — IP AVS SNAPSHOT
MRN:[id number]                      After Visit Summary   1/29/2018    Ashleigh Alonzo    MRN: [id number]           Thank you!     Thank you for choosing [facility] for your care. Our goal is always to provide you with excellent care. Hearing back from our patients is one way we can continue to improve our services. Please take a few minutes to complete the written survey that you may receive in the mail after you visit with us. Thank you!        Patient Information     Date Of Birth          1960        Designated Caregiver       Most Recent Value    Caregiver    Will someone help with your care after discharge? yes    Name of designated caregiver Aly    Phone number of caregiver [phone]    Caregiver address [address]      About your hospital stay     You were admitted on:  January 29, 2018 You last received care in the:  [facility]    You were discharged on:  February 8, 2018        Reason for your hospital stay       Admitted for fevers, concerning chest CT, s/p bronchoscopy, plan for 2 weeks Augmentin, 3 weeks PO vanco (r/t documented C diff).                  Who to Call     For medical emergencies, please call 911.  For non-urgent questions about your medical care, please call your primary care provider or clinic, [phone]  For questions related to your surgery, please call your surgery clinic        Attending Provider     Provider Carlos Bolivar MD Emergency Medicine    [facility], Fara Luna MD Internal Medicine       Primary Care Provider Office Phone # Fax #    Radha Cazares [phone]       When to contact your care team       Please call the [facility] Triage RN Line [phone] ([facility] RN available M-F 8-5, after 5 pm the RN Advisor will page the On-Call Oncology Fellow who will return your call) for temperature greater than 100.4 F, uncontrolled nausea/vomiting/diarrhea or unrelieved  constipation, pain not relieved by medications, bleeding not stopped by pressure, dizziness, chest pain, shortness of breath, changes in level of consciousness, or any other new concerning symptoms.                  After Care Instructions     Activity       Your activity upon discharge: activity as tolerated            Diet       Follow this diet upon discharge: regular diet as tolerated            Discharge Instructions       - Please take Augmentin for next 14 days.  - Please take PO vanco for next 21 days.  - Please taper off prednisone (5 mg every other day for four more doses).  - Please f/u with Lilia ALBARRAN as scheduled next week.                  Follow-up Appointments     Adult [facility] Follow-up and recommended labs and tests       - Please follow up with KAI Livingston next week as scheduled, CBC with differential & BMP prior.     Appointments on [place] and/or [facility] (with [facility] or [facility] provider or service). Call [phone] if you haven't heard regarding these appointments within 7 days of discharge.                  Your next 10 appointments already scheduled     Feb 15, 2018 11:15 AM CST   [facility] Lab Draw with  [employer] LAB DRAW   [facility] ([facility])    [address] [place] [address]  [address]  [facility] [id number] [phone]            Feb 15, 2018 11:50 AM CST   (Arrive by 11:35 AM)   Return Visit with Lilia Livingston PA-C   [facility] ([facility])    [address] [place] [address]  [address]  [facility] [id number] [phone]            Feb 16, 2018  9:30 AM CST   (Arrive by 5:00 AM)   MR BREAST BILATERAL W/O & W CONTRAST with [id number]   [facility] ([facility])    [address] [facility] [address]   [phone]           Take your medicines as usual, unless your doctor tells you not to. Bring a list of your current medicines to your exam  (including vitamins, minerals and over-the-counter drugs).  The timing of your exam may depend on the start of your last period. If you re in menopause, you may have the exam anytime.  Please bring any previous mammograms or breast MRIs from other facilities to the MRI dept. Do not mail these items to us.   You will have IV contrast for this exam.  You do not need to do anything special to prepare.  The MRI machine uses a strong magnet. Please wear clothes without metal (snaps, zippers). A sweatsuit works well, or we may give you a hospital gown.  Please remove any body piercings and hair extensions before you arrive. You will also remove watches, jewelry, hairpins, wallets, dentures, partial dental plates and hearing aids. You may wear contact lenses, and you may be able to wear your rings. We have a safe place to keep your personal items, but it is safer to leave them at home.  **IMPORTANT** THE INSTRUCTIONS BELOW ARE ONLY FOR THOSE PATIENTS WHO HAVE BEEN PRESCRIBED SEDATION OR GENERAL ANESTHESIA DURING THEIR MRI PROCEDURE:  IF YOUR DOCTOR PRESCRIBED ORAL SEDATION (take medicine to help you relax during your exam):   You must get the medicine from your doctor (oral medication) before you arrive. Bring the medicine to the exam. Do not take it at home. You ll be told when to take it upon arriving for your exam.   Arrive one hour early. Bring someone who can take you home after the test. Your medicine will make you sleepy. After the exam, you may not drive, take a bus or take a taxi by yourself.  IF YOUR DOCTOR PRESCRIBED IV SEDATION:   Arrive one hour early. Bring someone who can take you home after the test. Your medicine will make you sleepy. After the exam, you may not drive, take a bus or take a taxi by yourself.   No eating 6 hours before your exam. You may have clear liquids up until 4 hours before your exam. (Clear liquids include water, clear tea, black coffee and fruit juice without pulp.)  IF YOUR DOCTOR  PRESCRIBED ANESTHESIA (be asleep for your exam):   Arrive 1 1/2 hours early. Bring someone who can take you home after the test. You may not drive, take a bus or take a taxi by yourself.   No eating 8 hours before your exam. You may have clear liquids up until 4 hours before your exam. (Clear liquids include water, clear tea, black coffee and fruit juice without pulp.)   You will spend four to five hours in the recovery room.  If you have any questions, please contact your Imaging Department exam site.            Feb 28, 2018  9:30 AM CST   (Arrive by 9:15 AM)   NM SENTINEL NODE INJECTION BILATERAL with [id number]   [facility] [place], Nuclear Medicine ([facility])    [address] [facility] [address] [phone]           Please bring a list of your medicines to the exam. (Include vitamins, minerals and over-the-counter drugs.) You should wear comfortable clothes. Leave your valuables at home. Please bring related prior results and films.  Tell your doctor:   If you are breastfeeding or may be pregnant.   If you have had a barium test within the past few days. Barium may change the results of certain exams.   If you think you may need sedation (medicine to help you relax).  You may eat and drink as normal.  Please call your Imaging Department at your exam site with any questions.            Feb 28, 2018   Procedure with Atul Gates MD   [facility] ([facility])    [address] [place] [address]  5th [facility] [id number] [phone]           Located in the Clinics and Surgery Center at [address] [place] [address].   parking is very convenient and highly recommended.  is a $6 flat rate fee.  Both  and self parkers should enter the main arrival plaza from [facility]; parking attendants will direct you based on your parking preference.            Apr 02, 2018  3:45 PM CDT  "  (Arrive by 3:30 PM)   Return Visit with Fara Bradshaw MD   [facility] ([facility])    [address] [facility]  [address]  [facility] [address] [phone]              Future tests that were ordered for you     Basic metabolic panel           CBC with platelets differential       Last Lab Result: Hemoglobin (g/dL)       Date                     Value                 02/08/2018               8.1 (L)          ----------                  Pending Results     Date and Time Order Name Status Description    2/6/2018 1207 Blood culture Preliminary     2/6/2018 0820 Nocardia culture Preliminary     2/6/2018 0820 Legionella culture Preliminary     2/6/2018 0820 Fungus Culture, non-blood Preliminary     2/6/2018 0820 AFB Stain Non Blood In process     2/6/2018 0820 AFB Culture Non Blood In process     2/5/2018 1312 Aspergillus Galactomannan Antigen In process             Statement of Approval     Ordered          02/08/18 1001  I have reviewed and agree with all the recommendations and orders detailed in this document.  EFFECTIVE NOW     Approved and electronically signed by:  Chelsi Yi APRN CNP             Admission Information     Date & Time Provider Department Dept. Phone    1/29/2018 Fara Bradshaw MD [address] [facility] [phone]      Your Vitals Were     Blood Pressure Pulse Temperature Respirations Height Weight    110/70 (BP Location: Left arm) 91 98.9  F (37.2  C) (Oral) 16 1.626 m (5' 4\") 66.5 kg (146 lb 8 oz)    Pulse Oximetry BMI (Body Mass Index)                95% 25.15 kg/m2          MyChart Information     [employer] gives you secure access to your electronic health record. If you see a primary care provider, you can also send messages to your care team and make appointments. If you have questions, please call your primary care clinic.  If you do not have a primary care provider, please call [phone] and " they will assist you.        Care EveryWhere ID     This is your Care EveryWhere ID. This could be used by other organizations to access your [facility] medical records  [id number]        Equal Access to Services     THERESA HUGHES : Abdifatah Granda, kaden clancy, sophiata kaneginda ricardokelly, waxck edward jeffkenny silvaanmol domingakristiansoham pickering. So [facility] [phone].    ATENCIÓN: Si habla español, tiene a blackman disposición servicios gratuitos de asistencia lingüística. Llame al [phone].    We comply with applicable federal civil rights laws and [place] laws. We do not discriminate on the basis of race, color, national origin, age, disability, sex, sexual orientation, or gender identity.               Review of your medicines      START taking        Dose / Directions    amoxicillin-clavulanate 875-125 MG per tablet   Commonly known as:  AUGMENTIN   Indication:  Healthcare-Associated Pneumonia   Used for:  Malignant neoplasm of upper-inner quadrant of right breast in female, estrogen receptor negative (H)        Dose:  1 tablet   Take 1 tablet by mouth every 12 hours for 14 days   Quantity:  28 tablet   Refills:  0       simethicone 40 MG/0.6ML suspension   Commonly known as:  MYLICON   Used for:  Malignant neoplasm of upper-inner quadrant of right breast in female, estrogen receptor negative (H)        Dose:  40 mg   Take 0.6 mLs (40 mg) by mouth every 6 hours as needed for cramping   Quantity:  45 mL   Refills:  1       vancomycin 50 mg/mL Liqd solution   Commonly known as:  VANOCIN   Indication:  Clostridium difficile Bacteria   Used for:  Malignant neoplasm of upper-inner quadrant of right breast in female, estrogen receptor negative (H)        Dose:  125 mg   Take 2.5 mLs (125 mg) by mouth 4 times daily for 21 days   Quantity:  210 mL   Refills:  0         CONTINUE these medicines which may have CHANGED, or have new prescriptions. If we are uncertain of the size of tablets/capsules you have at home,  strength may be listed as something that might have changed.        Dose / Directions    darifenacin 7.5 MG 24 hr tablet   Commonly known as:  ENABLEX   This may have changed:    - when to take this  - reasons to take this   Used for:  Overactive bladder        Dose:  7.5 mg   Take 1 tablet (7.5 mg) by mouth daily   Quantity:  90 tablet   Refills:  3       predniSONE 5 MG tablet   Commonly known as:  DELTASONE   This may have changed:    - medication strength  - how much to take  - how to take this  - when to take this  - additional instructions  - Another medication with the same name was removed. Continue taking this medication, and follow the directions you see here.   Used for:  Malignant neoplasm of upper-inner quadrant of right breast in female, estrogen receptor negative (H)        Dose:  5 mg   Start taking on:  2/9/2018   Take 1 tablet (5 mg) by mouth every other day for 4 doses   Quantity:  4 tablet   Refills:  0         CONTINUE these medicines which have NOT CHANGED        Dose / Directions    albuterol 108 (90 BASE) MCG/ACT Inhaler   Commonly known as:  PROAIR HFA/PROVENTIL HFA/VENTOLIN HFA   Used for:  Chronic obstructive pulmonary disease, unspecified COPD type (H)        Dose:  2 puff   Inhale 2 puffs into the lungs every 6 hours as needed for shortness of breath / dyspnea   Quantity:  1 Inhaler   Refills:  5       calcium carbonate 600 MG tablet   Generic drug:  calcium carbonate        Take by mouth 2 times daily (with meals)   Quantity:  60 tablet   Refills:  0       citalopram 10 MG tablet   Commonly known as:  celeXA   Used for:  Anxiety, Depression, unspecified depression type        Dose:  10 mg   Take 1 tablet (10 mg) by mouth daily   Quantity:  90 tablet   Refills:  3       CO Q 10 PO        Dose:  1 tablet   Take 1 tablet by mouth daily   Refills:  0       guaiFENesin 600 MG 12 hr tablet   Commonly known as:  MUCINEX   Used for:  Cough with sputum        Dose:  1200 mg   Take 2 tablets  (1,200 mg) by mouth 2 times daily   Quantity:  180 tablet   Refills:  6       hydrOXYzine 25 MG tablet   Commonly known as:  ATARAX   Used for:  Hives        Take 1-2 tablets (25-50 mg) by mouth every 6 hours as needed for itching   Quantity:  100 tablet   Refills:  2       lidocaine visc 2% 2.5mL/5mL & maalox/mylanta w/ simeth 2.5mL/5mL & diphenhydrAMINE 5mg/5mL Susp suspension   Commonly known as:  MAGIC Mouthwash [facility]   Used for:  Mucositis        Dose:  10 mL   Swish and swallow 10 mLs in mouth every 6 hours as needed for mouth sores   Quantity:  1 Bottle   Refills:  3       lidocaine-prilocaine cream   Commonly known as:  EMLA   Used for:  Personal history of malignant neoplasm of breast        Please apply to port site 30 minutes before use prn   Quantity:  30 g   Refills:  1       LORazepam 0.5 MG tablet   Commonly known as:  ATIVAN   Used for:  Malignant neoplasm of upper-inner quadrant of right breast in female, estrogen receptor negative (H)        Dose:  0.5 mg   Take 1 tablet (0.5 mg) by mouth every 4 hours as needed (Anxiety, Nausea/Vomiting or Sleep)   Quantity:  30 tablet   Refills:  2       MULTIPLE VITAMIN PO        Dose:  1 tablet   Take 1 tablet by mouth daily   Refills:  0       order for DME   Used for:  MERLIN (obstructive sleep apnea)        Respironics Dream Station Auto CPAP 12-18 cm, F&P Simplus FFM small.   Refills:  0       pantoprazole 40 MG EC tablet   Commonly known as:  PROTONIX   Used for:  Malignant neoplasm of upper-inner quadrant of right breast in female, estrogen receptor negative (H)        Dose:  40 mg   Take 1 tablet (40 mg) by mouth every morning   Quantity:  60 tablet   Refills:  0       tiotropium 18 MCG capsule   Commonly known as:  SPIRIVA   Used for:  Chronic obstructive pulmonary disease, unspecified COPD type (H)        Dose:  18 mcg   Inhale 1 capsule (18 mcg) into the lungs daily Inhale contents of one capsule   Quantity:  1 capsule   Refills:  11       VITAMIN B  12 PO        Dose:  100 mcg   Take 100 mcg by mouth daily   Refills:  0       VITAMIN B6 PO        Dose:  1 tablet   Take 1 tablet by mouth daily.   Refills:  0       vitamin D 1000 UNITS capsule        TAKE 2 CAPSULES BY MOUTH DAILY   Refills:  0         STOP taking     dexamethasone 4 MG tablet   Commonly known as:  DECADRON                Where to get your medicines      These medications were sent to [facility] [place], [place] - [address]  [address], [facility] [address]    Hours:  TRANSPLANT PHONE NUMBER [phone] Phone:  [phone]     amoxicillin-clavulanate 875-125 MG per tablet    simethicone 40 MG/0.6ML suspension    vancomycin 50 mg/mL Liqd solution         Some of these will need a paper prescription and others can be bought over the counter. Ask your nurse if you have questions.     You don't need a prescription for these medications     predniSONE 5 MG tablet                Protect others around you: Learn how to safely use, store and throw away your medicines at www.disposemymeds.org.        ANTIBIOTIC INSTRUCTION     You've Been Prescribed an Antibiotic - Now What?  Your healthcare team thinks that you or your loved one might have an infection. Some infections can be treated with antibiotics, which are powerful, life-saving drugs. Like all medications, antibiotics have side effects and should only be used when necessary. There are some important things you should know about your antibiotic treatment.      Your healthcare team may run tests before you start taking an antibiotic.    Your team may take samples (e.g., from your blood, urine or other areas) to run tests to look for bacteria. These test can be important to determine if you need an antibiotic at all and, if you do, which antibiotic will work best.      Within a few days, your healthcare team might change or even stop your antibiotic.    Your team may start you on an  antibiotic while they are working to find out what is making you sick.    Your team might change your antibiotic because test results show that a different antibiotic would be better to treat your infection.    In some cases, once your team has more information, they learn that you do not need an antibiotic at all. They may find out that you don't have an infection, or that the antibiotic you're taking won't work against your infection. For example, an infection caused by a virus can't be treated with antibiotics. Staying on an antibiotic when you don't need it is more likely to be harmful than helpful.      You may experience side effects from your antibiotic.    Like all medications, antibiotics have side effects. Some of these can be serious.    Let you healthcare team know if you have any known allergies when you are admitted to the hospital.    One significant side effect of nearly all antibiotics is the risk of severe and sometimes deadly diarrhea caused by Clostridium difficile (C. Difficile). This occurs when a person takes antibiotics because some good germs are destroyed. Antibiotic use allows C. diificile to take over, putting patients at high risk for this serious infection.    As a patient or caregiver, it is important to understand your or your loved one's antibiotic treatment. It is especially important for caregivers to speak up when patients can't speak for themselves. Here are some important questions to ask your healthcare team.    What infection is this antibiotic treating and how do you know I have that infection?    What side effects might occur from this antibiotic?    How long will I need to take this antibiotic?    Is it safe to take this antibiotic with other medications or supplements (e.g., vitamins) that I am taking?     Are there any special directions I need to know about taking this antibiotic? For example, should I take it with food?    How will I be monitored to know whether my  infection is responding to the antibiotic?    What tests may help to make sure the right antibiotic is prescribed for me?      Information provided by:  www.cdc.gov/getsmart  U.S. Department of Health and Human Services  Centers for disease Control and Prevention  National Center for Emerging and Zoonotic Infectious Diseases  Division of Healthcare Quality Promotion             Medication List: This is a list of all your medications and when to take them. Check marks below indicate your daily home schedule. Keep this list as a reference.      Medications           Morning Afternoon Evening Bedtime As Needed    albuterol 108 (90 BASE) MCG/ACT Inhaler   Commonly known as:  PROAIR HFA/PROVENTIL HFA/VENTOLIN HFA   Inhale 2 puffs into the lungs every 6 hours as needed for shortness of breath / dyspnea                                amoxicillin-clavulanate 875-125 MG per tablet   Commonly known as:  AUGMENTIN   Take 1 tablet by mouth every 12 hours for 14 days   Last time this was given:  1 tablet on 2/8/2018  8:21 AM                                calcium carbonate 600 MG tablet   Take by mouth 2 times daily (with meals)   Generic drug:  calcium carbonate                                citalopram 10 MG tablet   Commonly known as:  celeXA   Take 1 tablet (10 mg) by mouth daily   Last time this was given:  10 mg on 2/8/2018  8:21 AM                                CO Q 10 PO   Take 1 tablet by mouth daily                                darifenacin 7.5 MG 24 hr tablet   Commonly known as:  ENABLEX   Take 1 tablet (7.5 mg) by mouth daily                                guaiFENesin 600 MG 12 hr tablet   Commonly known as:  MUCINEX   Take 2 tablets (1,200 mg) by mouth 2 times daily   Last time this was given:  1,200 mg on 1/31/2018  9:44 AM                                hydrOXYzine 25 MG tablet   Commonly known as:  ATARAX   Take 1-2 tablets (25-50 mg) by mouth every 6 hours as needed for itching                                 lidocaine visc 2% 2.5mL/5mL & maalox/mylanta w/ simeth 2.5mL/5mL & diphenhydrAMINE 5mg/5mL Susp suspension   Commonly known as:  [facility]   Swish and swallow 10 mLs in mouth every 6 hours as needed for mouth sores                                lidocaine-prilocaine cream   Commonly known as:  EMLA   Please apply to port site 30 minutes before use prn                                LORazepam 0.5 MG tablet   Commonly known as:  ATIVAN   Take 1 tablet (0.5 mg) by mouth every 4 hours as needed (Anxiety, Nausea/Vomiting or Sleep)                                MULTIPLE VITAMIN PO   Take 1 tablet by mouth daily                                [place] [facility]   RespirActiwaves Dream Station Auto CPAP 12-18 cm, F&P Simplus FFM small.                                pantoprazole 40 MG EC tablet   Commonly known as:  PROTONIX   Take 1 tablet (40 mg) by mouth every morning   Last time this was given:  40 mg on 2/8/2018  8:22 AM                                predniSONE 5 MG tablet   Commonly known as:  DELTASONE   Take 1 tablet (5 mg) by mouth every other day for 4 doses   Start taking on:  2/9/2018   Last time this was given:  5 mg on 2/8/2018  8:28 AM                                simethicone 40 MG/0.6ML suspension   Commonly known as:  MYLICON   Take 0.6 mLs (40 mg) by mouth every 6 hours as needed for cramping   Last time this was given:  40 mg on 2/1/2018  2:12 AM                                tiotropium 18 MCG capsule   Commonly known as:  SPIRIVA   Inhale 1 capsule (18 mcg) into the lungs daily Inhale contents of one capsule                                vancomycin 50 mg/mL Liqd solution   Commonly known as:  VANOCIN   Take 2.5 mLs (125 mg) by mouth 4 times daily for 21 days   Last time this was given:  125 mg on 2/7/2018 11:12 PM                                VITAMIN B 12 PO   Take 100 mcg by mouth daily                                VITAMIN B6 PO   Take 1 tablet by mouth daily.                                 vitamin D 1000 UNITS capsule   TAKE 2 CAPSULES BY MOUTH DAILY                                          More Information      About C. diff Infection  For Patients, Family and Visitors  What is C. diff (clostridium difficile)?  C. diff are germs (bacteria). These germs can live in the guts of healthy people. Antibiotic medicine can change the balance of germs in the gut, causing C. diff infection and diarrhea ([facility]).  You can also get C. diff infection during a hospital stay, after surgery, or if you have a weak immune system or IBD (inflammatory bowel disease).  What are the symptoms?  If you have these symptoms, your doctor will ask for a stool (poop) sample for testing:    Diarrhea (loose, watery stools)    Belly pain, tenderness and cramping    Fever  How does it spread?  C. diff leaves your body through your stool. You can get infected when :  1. You touch a surface that has this germ, then  2. You touch food or something else that you put in your mouth.  Here's how you, your family and visitors can help prevent the infection from spreading:     Wash hands often, especially in the hospital, after using the bathroom and before you eat.    Use antibiotics only when you need them. Don't ask for them if your doctor says you have a virus.    If you take antibiotics, follow the directions. Finish all the pills, even if you feel better.    If you have C. diff infection, try to use a separate restroom until you are well.    At home, clean countertops, sinks, faucets, bathroom doorknobs and toilets often. Use warm water with soap or cleaning products with bleach. (Don't use pure bleach. It's too strong.)    In the hospital, your care team should wear gloves and gowns. They should clean their hands before touching you and before leaving the room. If they don't, please remind them.  Hand washing  For this illness, soap and water works better than hand .    Wash hands with warm water and plenty of  soap. Wash for 15 to 20 seconds.    Clean under nails, between fingers and up the wrists.    Rinse hands, letting water run down your fingers.    Dry hands well. Use a paper towel to turn off the faucet and open the door.   How is it treated?  Your doctor may change your antibiotics and give you medicine for diarrhea. Don't take other anti-diarrhea medicines. They will make things worse.  You may get extra fluids through an IV (small tube in the arm or hand).  Sometimes, the infection will come back. If you have symptoms, please call your doctor.   For informational purposes only. Not to replace the advice of your health care provider. Copyright   2014 [employer]. All rights reserved. [employer] 199518 - REV 05/16.            Clostridium Difficile Infection  Clostridium difficile (C. diff) bacteria can be very harmful. They affect the intestinal tract. They can cause symptoms ranging from mild diarrhea to severe inflammation of the large intestine (colon). C. diff infection is most common during the days and weeks after treatment with antibiotics. Anyone can become infected. But the risk is greatly increased for people in hospitals and for people living in nursing homes or long-term care facilities. This is because antibiotic use is common there. Germs also spread easily in these places.    What causes C. diff infection?  The stomach and intestines have hundreds of kinds of bacteria. Many of these bacteria actually help keep harmful bacteria like C. diff from causing problems. Small amounts of C. diff are normal in the intestine and don t cause problems. When you take an antibiotic, the normal balance of good and bad bacteria may be affected. There may be too few good bacteria and too many harmful bacteria like C diff. In hospitals and nursing homes, C. diff may be spread from an infected person to others. This can happen when staff or visitors touch infected people or objects such as bed rails,  stethoscopes, or bedpans and then touch other people or surfaces.  What are the symptoms of C. diff infection?  About half of people with C. diff infection have no symptoms. Yet they can still pass the infection to others. Others do have symptoms. These include:    Watery diarrhea, which may contain mucus    Pain and cramping    Fever  Some who are infected develop serious problems. Symptoms include:    Belly (abdominal) pain    Abdominal swelling    Nausea and vomiting    Little or no diarrhea  How is C. diff infection diagnosed?  To confirm the infection, a sample of stool is tested for the bacteria or the toxins made by the bacteria.  How is C. diff infection treated?  Your healthcare provider will tell you to stop taking any antibiotics you have been prescribed, based on your healthcare needs. He or she may prescribe different medicines as needed. In certain cases, you may be given an antibiotic directed at the C. diff infection. Talk with your healthcare provider before stopping or starting any medicines.    Fluids are often given by IV (intravenously) through a vein. This helps replace fluids lost through diarrhea.    In rare cases you may need surgery if treatment doesn t cure severe symptoms  To lessen symptoms:    Drink plenty of fluids to replace water lost through diarrhea. Talk with your healthcare provider or nurse about which fluids are best.    Follow your healthcare provider s instructions for when and what to eat.    Unless your healthcare provider tells you to do so, don't take medicines for diarrhea.    Tell your healthcare provider if symptoms return. Even after treatment, C. diff may come back.  Your doctor may give you an additional medicine if your symptoms come back or you are at risk for another C. diff infection. This medicine is called bezlotoxumab. It is not an antibiotic, but it can help keep your C. diff symptoms from returning.  What are the complications of C. diff  infection?  Complications include:    Dehydration    Electrolyte imbalances    Low protein in the blood    Severe widening (dilation) of the large intestine    A hole (perforation) in the bowel    Low blood pressure    Kidney failure    Inflammation or infection all over the body    Death  How is C. diff prevented?  Hospitals and nursing homes take these steps to help prevent C. diff infections:    Limiting use of antibiotics. Giving antibiotics only when needed can help reduce C. diff infections.    Handwashing. Hospital staff should wash their hands before and after treating each person. They should also wash their hands after touching any surface in someone's' room. Soap and water work better than alcohol-based hand .    Protective clothing. Healthcare workers should wear gloves and a gown when entering the room of someone with C. diff infection. They should remove these items before leaving and then wash their hands.    Private rooms. People with C. diff should be in private rooms. Or they may share rooms with others who have the same infection.    Thorough cleaning. Equipment and rooms should be cleaned and disinfected every day.    Education. Everyone should be shown the best ways to avoid infection.  You can do the following to help prevent C. diff:    Take antibiotics only when you really need them. Antibiotics don t help treat illnesses caused by viruses. This includes colds and the flu. Don t ask for antibiotics from your healthcare provider if he or she says they won t work.    When you are given antibiotics, take them as directed. Don t take more or less than the dosage prescribed. Do not take them for shorter or longer than your provider tells you to, even if you feel better.    Wash your hands carefully. Do this after using the bathroom and before eating. Use plenty of soap and warm water. Alcohol-based hand  may not work against C. diff germs.  Everyone can help prevent C. diff:  In a  hospital or care facility:    Wash your hands well before and after visiting someone who has C. diff infection. Use soap and water. Alcohol-based hand  may not work against C. diff.    If the staff asks you to, wear gloves. Take any other steps you are asked to follow to help prevent infection.  At home:    If instructed, wear gloves when caring for a family member with C. diff infection. Throw the gloves away after each use. Then wash your hands well.    Wash the person s clothes, bed linen, and towels separately. Use hot water. Use both detergent and liquid bleach.    Disinfect surfaces in the person s room. This includes the phone, light switches, and remote controls.  Practice good handwashing:    Use warm water and plenty of soap. Rub your hands together well.    Clean your whole hand. Wash under nails, between fingers, and up your wrists.    Wash for at least 15 seconds to 20 seconds.     Rinse. Let the water run down your fingers, not up your wrists.    Dry your hands well. Then use a paper towel to turn off the faucet and open the door.  Date Last Reviewed: 1/1/2017 2000-2017 [employer]. [address] [place] [address]. All rights reserved. This information is not intended as a substitute for professional medical care. Always follow your healthcare professional's instructions.                Patient Education    Amoxicillin Trihydrate, Clavulanate Potassium Chewable tablet    Amoxicillin Trihydrate, Clavulanate Potassium Oral suspension    Amoxicillin Trihydrate, Clavulanate Potassium Oral tablet    Amoxicillin Trihydrate, Clavulanate Potassium Oral tablet, extended-release  Amoxicillin Trihydrate, Clavulanate Potassium Chewable tablet  What is this medicine?  AMOXICILLIN; CLAVULANIC ACID (a mox i CHRISTIAN in; MAGALYS gutierres ic AS id) is a penicillin antibiotic. It is used to treat certain kinds of bacterial infections. It It will not work for colds, flu, or other viral  infections.  This medicine may be used for other purposes; ask your health care provider or pharmacist if you have questions.  What should I tell my health care provider before I take this medicine?  They need to know if you have any of these conditions:    bowel disease, like colitis    kidney disease    liver disease    mononucleosis    phenylketonuria    an unusual or allergic reaction to amoxicillin, penicillin, cephalosporin, other antibiotics, clavulanic acid, other medicines, foods, dyes, or preservatives    pregnant or trying to get pregnant    breast-feeding  How should I use this medicine?  Take this medicine by mouth. Chew it completely before swallowing. Follow the directions on the prescription label. Take this medicine at the start of a meal or snack. Take your medicine at regular intervals. Do not take your medicine more often than directed. Take all of your medicine as directed even if you think you are better. Do not skip doses or stop your medicine early.  Talk to your pediatrician regarding the use of this medicine in children. While this drug may be prescribed for selected conditions, precautions do apply.  Overdosage: If you think you have taken too much of this medicine contact a poison control center or emergency room at once.  NOTE: This medicine is only for you. Do not share this medicine with others.  What if I miss a dose?  If you miss a dose, take it as soon as you can. If it is almost time for your next dose, take only that dose. Do not take double or extra doses.  What may interact with this medicine?    allopurinol    anticoagulants    birth control pills    methotrexate    probenecid  This list may not describe all possible interactions. Give your health care provider a list of all the medicines, herbs, non-prescription drugs, or dietary supplements you use. Also tell them if you smoke, drink alcohol, or use illegal drugs. Some items may interact with your medicine.  What should I  watch for while using this medicine?  Tell your doctor or health care professional if your symptoms do not improve.  Do not treat diarrhea with over the counter products. Contact your doctor if you have diarrhea that lasts more than 2 days or if it is severe and watery.  If you have diabetes, you may get a false-positive result for sugar in your urine. Check with your doctor or health care professional.  Birth control pills may not work properly while you are taking this medicine. Talk to your doctor about using an extra method of birth control.  What side effects may I notice from receiving this medicine?  Side effects that you should report to your doctor or health care professional as soon as possible:    allergic reactions like skin rash, itching or hives, swelling of the face, lips, or tongue    breathing problems    dark urine    fever or chills, sore throat    redness, blistering, peeling or loosening of the skin, including inside the mouth    seizures    trouble passing urine or change in the amount of urine    unusual bleeding, bruising    unusually weak or tired    white patches or sores in the mouth or throat  Side effects that usually do not require medical attention (report to your doctor or health care professional if they continue or are bothersome):    diarrhea    dizziness    headache    nausea, vomiting    stomach upset    vaginal or anal irritation  This list may not describe all possible side effects. Call your doctor for medical advice about side effects. You may report side effects to FDA at 8-693-FDA-9191.  Where should I keep my medicine?  Keep out of the reach of children.  Store at room temperature below 25 degrees C (77 degrees F). Keep container tightly closed. Throw away any unused medicine after the expiration date.  NOTE:This sheet is a summary. It may not cover all possible information. If you have questions about this medicine, talk to your doctor, pharmacist, or health care  provider. Copyright  2016 [employer]                Vancomycin oral solution  What is this medicine?  VANCOMYCIN (van koe MYE sin) is a glycopeptide antibiotic. It is used to treat certain kinds of bacterial infections in the bowel. It will not work for colds, flu, or other viral infections.  How should I use this medicine?  Take this medicine by mouth. Follow the directions on the prescription label. Shake well before using. Use a specially marked spoon or dropper to measure each dose. Ask your pharmacist if you do not have one. Household spoons are not accurate. Take your medicine at regular intervals. Do not take your medicine more often than directed. Take all of your medicine as directed even if you think you are better. Do not skip doses or stop your medicine early.  Talk to your pediatrician regarding the use of this medicine in children. Special care may be needed.  What side effects may I notice from receiving this medicine?  Side effects that you should report to your doctor or health care professional as soon as possible:    allergic reactions like skin rash, itching or hives, swelling of the face, lips, or tongue    breathing difficulty    change in amount, color of urine    change in hearing    dizziness    fever, infection    redness, blistering, peeling or loosening of the skin, including inside the mouth    unusual bleeding or bruising    unusually weak or tired  Side effects that usually do not require medical attention (report to your doctor or health care professional if they continue or are bothersome):    nausea, vomiting    stomach cramps  What may interact with this medicine?    birth control pills    cholestyramine    colestipol    vancomycin injection  What if I miss a dose?  If you miss a dose, take it as soon as you can. If it is almost time for your next dose, take only that dose. Do not take double or extra doses.  Where should I keep my medicine?  Keep out of the reach of  children.  After this medicine is mixed by your pharmacist, store it in a refrigerator between 2 and 8 degrees C (36 and 46 degrees F). Do not freeze. Throw away any unused medicine after 14 days.  What should I tell my health care provider before I take this medicine?  They need to know if you have any of these conditions:    dehydration    inflammatory bowel disease    kidney disease    other chronic illness    an unusual or allergic reaction to vancomycin, other medicines, foods, dyes, or preservatives    pregnant or trying to get pregnant    breast-feeding  What should I watch for while using this medicine?  Tell your doctor or health care professional if your symptoms do not improve or if you get new symptoms.  Avoid taking this medicine within 3 or 4 hours of taking cholestyramine or colestipol.  NOTE:This sheet is a summary. It may not cover all possible information. If you have questions about this medicine, talk to your doctor, pharmacist, or health care provider. Copyright  2017 [employer]

## 2018-01-29 NOTE — PHARMACY-VANCOMYCIN DOSING SERVICE
Pharmacy Vancomycin Initial Note  Date of Service 2018  Patient's  1960  57 year old, female    Indication: Febrile Neutropenia    Current estimated CrCl = Estimated Creatinine Clearance: 102.9 mL/min (based on Cr of 0.58).    Creatinine for last 3 days  2018:  1:48 PM Creatinine 0.58 mg/dL    Recent Vancomycin Level(s) for last 3 days  No results found for requested labs within last 72 hours.      Vancomycin IV Administrations (past 72 hours)      No vancomycin orders with administrations in past 72 hours.                Nephrotoxins and other renal medications (Future)    Start     Dose/Rate Route Frequency Ordered Stop    18 0400  vancomycin (VANCOCIN) 1,250 mg in NaCl 0.9 % 250 mL intermittent infusion      1,250 mg  over 90 Minutes Intravenous EVERY 12 HOURS 18 1520      18 1521  vancomycin (VANCOCIN) 1,750 mg in NaCl 0.9 % 500 mL intermittent infusion      1,750 mg  over 2 Hours Intravenous ONCE 18 1520            Contrast Orders - past 72 hours     None                Plan:  1.  Give vancomycin 1750mg (~25mg/kg) IV x1 now, then continue vancomycin 1250mg IV q12 hours.   2.  Goal Trough Level: 15-20 mg/L   3.  Pharmacy will check trough levels as appropriate in 1-3 Days.    4. Serum creatinine levels will be ordered daily for the first week of therapy and at least twice weekly for subsequent weeks.    5. [place] method utilized to dose vancomycin therapy: Method 2    Kael Miguel, PharmD, BCPS

## 2018-01-29 NOTE — H&P
[facility], [place]    Hematology / Oncology  Admission History & Physical     Date of Admission:  01/29/18  Date of Service: 01/29/18  Primary Hematologist/Oncologist: Dr. Bradshaw    Assessment & Plan   Ashleigh Alonzo is a 57 year old female with history of stage IIa triple negative right breast cancer, currently on chemotherapy with TAC and cycle 2 started on 1/23/2018.  Her course has been complicated by pneumonitis thought to be related to pembrolizumab and febrile neutropenia requiring hospitalizations in November 2017 and earlier this month (with no clear infectious source identified).    Neutropenic fever.  Patient presented for evaluation of fever to 100.4 F at home, but was afebrile upon arrival.  She is initially tachycardic and hypotensive, which improved nicely with IV fluids.  She has rhinorrhea, though no other localizing symptoms to suggest possible infectious sources.  Influenza was negative.  UA does not suggest infection.  Chest x-ray shows patchy bilateral opacities and a possible right upper lobe opacity concerning for infection, though could also represent scarring or pneumonitis.  Initial lab work revealed WBC 0.1 and an elevated lactate of 2.3, which was later found to be 3.9 after 2 L IV fluids and initiation of IV antibiotics.    Continue vancomycin and cefepime for now.    Follow-up blood and urine cultures.    Bolus IV fluids, and recheck lactate.  If still elevated may require more fluids.    Check procalcitonin.  May be helpful to trend.    Severe anemia and thrombocytopenia.  Likely related to current chemotherapy regimen.  Initial hemoglobin was 4.2 on admission, with subsequent rechecks closer to 7.  Thought to be a lab error.  She was transfused 1 unit PRBCs in the ED prior to admission.    Daily CBC with differential.    Transfuse for goal hgb >7 and plts >10.    Stage IIa right breast cancer, triple negative.  Previously on the I-SPY trial, and also  treated with weekly Taxol with pembrolizumab but course was complicated by fevers, pneumonia, pneumonitis and hepatitis requiring hospitalization.  Pembrolizumab was discontinued.  She completed 12 weeks of Taxol on 12/26/2017 and MRI of the breast showed near complete resolution of the suspicious enhancement of the right breast.  Tentatively planning for 4 cycles AC, with first dose on 1/2/2018, though was later admitted with febrile neutropenia despite Neulasta support.  Cycle 2 was delayed, but given on 1/23/2018 with Neulasta support again.  She currently follows with Dr. Bradshaw.    COPD.  No evidence of current exacerbation.    Continue home meds.    History of pneumonitis.  Thought related to pembrolizumab, currently on a long prednisone taper.    Continue prednisone 10 mg daily for now.    FEN: regular diet, bolus IV fluids, replete lytes PRN  Prophylaxis: mechanical (thrombocytopenia)  Code: FULL  Disposition: Admitted to the oncology service for evaluation and treatment of febrile neutropenia.  Anticipate discharge to home in the next several days pending clinical improvement.    Pearl Mayes MD/PhD  Heme/Onc/Transplant Hospitalist    Chief Complaint   Fever to 100.4 this morning and runny nose.  - History obtained from the patient and through chart review.    History of Present Illness   Ashleigh Alonzo is a 57 year old female with history of COPD, sleep apnea, and triple negative right breast cancer.  She is currently on cycle 2 of Taxol + AC started on 1/23/2018, but her course has also been complicated by neutropenic fevers requiring hospitalizations in November 2017 and earlier this month, as well as pneumonitis thought to be related to pembrolizumab.  She presented to the hospital today for evaluation of low-grade fevers at home, noting a temperature of 100.4 F this morning.  She does have a runny nose and is overall feeling quite poorly, though specifically denies any cough, sore throat,  headache, or chest pain.  She has no urinary symptoms, diarrhea, abdominal pain, nausea or vomiting.  She has no rashes or skin lesions.  She has a power port in place but notes no tenderness or redness.  She did receive Neulasta with this last cycle.    Upon arrival in the ED, she was noted to be afebrile but had a heart rate in the 130s and BP 89/64.  She was saturating well on room air.  Tachycardia and hypotension improved with IV fluids.  Lab workup revealed neutropenia with WBC 0.1, and elevated lactate of 2.3.  Initial CBC demonstrated hemoglobin 4.2, but was 6.8 on subsequent recheck (felt to be a lab error).  UA did not suggest infection.  Chest x-ray showed a focal airspace opacity in the right upper lobe, felt to be more pronounced than prior and diffuse bilateral patchy airspace opacities which are largely unchanged.  She was started on vancomycin and cefepime for febrile neutropenia, possibly pulmonary source.  Repeat lactate was further elevated at 3.9, and at this point she was admitted to the oncology service for further cares.    Heme/Onc History (adapted from most recent clinic note, dated 1/23/2018):  Ashleigh Alonzo is a 57 year old female with past medical history of COPD, sleep apnea, periodontal disease with stage IIa, T2N0M0, grade 3, triple negative right breast cancer. She was diagnosed via abnormal screening mammogram. She ultimately had US, contrast-enhanced mammo, and biopsy showing 3.4 cm mass and pathology showed grade 3 invasive mammary carcinoma with associated high-grade DCIS. ER/SD was negative and HER2 negative. She enrolled in ISPY-2 clinical trial and began treatment with weekly taxol and once every 3 week pembrolizumab on 9/2/17.      Course has been complicated by fevers with PNA and then UTI. Week 7 was held due to transaminitis. She was given week 7 on 11/7 with pembrolizumab and Taxol. She was admitted 11/11-11/17 with high fever (105), cough, and dyspnea and was found to  have HCAP and pneumonitis secondary to pembro. 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 She received 2 doses of methylpred, antibiotics, and d/c on prednisone 70 mg daily. Had follow-up on 11/21 with Dr. Bradshaw and resumed weekly Taxol on 11/28/17. She completed 12 weeks on 12/26. She started AC on 1/2. Cycle 2 was deferred due to neutropenic fever (hospitalized 1/10-1/14), but was started on 1/23/18..     Past Medical History    I have reviewed this patient's medical history and updated it with pertinent information if needed.   Past Medical History:   Diagnosis Date     COPD (chronic obstructive pulmonary disease) (H) 2011     Malignant neoplasm of upper-inner quadrant of right breast in female, estrogen receptor negative (H) 8/30/2017     Periodontal disease      Sleep apnea 12/2015     Urticaria        Past Surgical History   I have reviewed this patient's surgical history and updated it with pertinent information if needed.  Past Surgical History:   Procedure Laterality Date     APPENDECTOMY  10/6/2015     CATARACT IOL, RT/LT  11/2016    bilateral      COLONOSCOPY  11/15/2011    Procedure:COLONOSCOPY; Colonoscopy, screening; Surgeon:ANASTASIA BUNCH; Location:[facility] OR     INSERT PORT VASCULAR ACCESS Left 9/1/2017    Procedure: INSERT PORT VASCULAR ACCESS;  Single Lumen Chest Power Port;  Surgeon: Leif Parkinson PA-C;  Location: [facility] OR     TUBAL LIGATION  12/2004    Bilateral       Prior to Admission Medications   Prior to Admission Medications   Prescriptions Last Dose Informant Patient Reported? Taking?   Coenzyme Q10 (CO Q 10 PO)  Self Yes No   Sig: Take 1 tablet by mouth daily    Cyanocobalamin (VITAMIN B 12 PO)   Yes No   Sig: Take 100 mcg by mouth daily    LORazepam (ATIVAN) 0.5 MG tablet   No No   Sig: Take 1 tablet (0.5 mg) by mouth every 4 hours as needed (Anxiety, Nausea/Vomiting or Sleep)   Patient not taking: Reported on 1/23/2018   MULTIPLE VITAMIN  PO  Self Yes No   Sig: Take 1 tablet by mouth daily    Pyridoxine HCl (VITAMIN B6 PO)   Yes No   Sig: Take 1 tablet by mouth daily.   VITAMIN D 1000 UNIT OR CAPS  Self Yes No   Sig: TAKE 2 CAPSULES BY MOUTH DAILY   albuterol (PROAIR HFA/PROVENTIL HFA/VENTOLIN HFA) 108 (90 BASE) MCG/ACT Inhaler   No No   Sig: Inhale 2 puffs into the lungs every 6 hours as needed for shortness of breath / dyspnea   aspirin 81 MG tablet   Yes No   Sig: Take 1 tablet by mouth daily.   calcium carbonate (CALCIUM CARBONATE) 600 MG tablet   Yes No   Sig: Take by mouth 2 times daily (with meals)   citalopram (CELEXA) 10 MG tablet   No No   Sig: Take 1 tablet (10 mg) by mouth daily   darifenacin (ENABLEX) 7.5 MG 24 hr tablet  Self No No   Sig: Take 1 tablet (7.5 mg) by mouth daily   dexamethasone (DECADRON) 4 MG tablet   No No   Sig: Take 2 tablets (8 mg) by mouth daily Start on Day 2 of first cycle of doxorubicin / cyclophosphamide.   guaiFENesin (MUCINEX) 600 MG 12 hr tablet   No No   Sig: Take 2 tablets (1,200 mg) by mouth 2 times daily   hydrOXYzine (ATARAX) 25 MG tablet   No No   Sig: Take 1-2 tablets (25-50 mg) by mouth every 6 hours as needed for itching   lidocaine-prilocaine (EMLA) cream   No No   Sig: Please apply to port site 30 minutes before use prn   magic mouthwash suspension (diphenhydrAMINE, lidocaine, aluminum-magnesium & simethicone)   No No   Sig: Swish and swallow 10 mLs in mouth every 6 hours as needed for mouth sores   Patient not taking: Reported on 1/23/2018   nystatin (MYCOSTATIN) 551690 UNIT/ML suspension   No No   Sig: Take 5 mLs (500,000 Units) by mouth 4 times daily   Patient not taking: Reported on 1/23/2018   nystatin (MYCOSTATIN) 426798 UNIT/ML suspension   Yes No   Sig: Take 5 mL by mouth 4 times daily   order for DME   No No   Sig: Respironics Dream Station Auto CPAP 12-18 cm, F&P Simplus FFM small.   Patient not taking: Reported on 1/23/2018   order for DME   No No   Sig: Cranial prosthesis   Patient not  taking: Reported on 2018   pantoprazole (PROTONIX) 40 MG EC tablet   No No   Sig: Take 1 tablet (40 mg) by mouth every morning   predniSONE (DELTASONE) 10 MG tablet   No No   Sig: Take 60mg daily for 1 week, then continue to decrease by 10mg each week   Patient taking differently: Take 10 mg by mouth daily    predniSONE (DELTASONE) 5 MG tablet   No No   Si tab daily x 7 days, followed by every other day x 7 days   tiotropium (SPIRIVA) 18 MCG capsule   No No   Sig: Inhale 1 capsule (18 mcg) into the lungs daily Inhale contents of one capsule      Facility-Administered Medications: None     Allergies   No Known Allergies    Social History   Social History     Social History     Marital status: Single     Spouse name: N/A     Number of children: N/A     Years of education: N/A     Occupational History     Equipment dispatcher [employer]      Social History Main Topics     Smoking status: Former Smoker     Packs/day: 1.00     Years: 15.00     Types: Cigarettes     Quit date: 2000     Smokeless tobacco: Never Used     Alcohol use 8.4 - 12.6 oz/week      Comment: daily     Drug use: No     Sexual activity: Not Currently     Partners: Male     Other Topics Concern     Parent/Sibling W/ Cabg, Mi Or Angioplasty Before 65f 55m? Yes     Social History Narrative       Family History   I have reviewed this patient's family history and updated it with pertinent information if needed.   Family History   Problem Relation Age of Onset     Cardiovascular Father 46     MI     Eye Disorder Father      CEREBROVASCULAR DISEASE Father      Arthritis Sister      Neurologic Disorder Brother      CANCER No family hx of      DIABETES No family hx of      Hypertension No family hx of        Review of Systems   A comprehensive ROS was performed with the patient and was found to be negative or non-contributory with the exception of that noted in the HPI above.    Physical Exam   Temp:  [97.1  F (36.2  C)-100.6  F (38.1  C)]  100.6  F (38.1  C)  Pulse:  [103-139] 103  Heart Rate:  [] 98  Resp:  [10-28] 22  BP: ()/(44-87) 125/74  SpO2:  [92 %-100 %] 95 %  CONSTITUTIONAL: Alert and interactive. Lying in bed in no acute distress.  HEENT: NC/AT, PERRLA, EOMI, anicteric sclera, oropharynx is pink and moist without erythema/exudates/lesions/thrush.  NECK: Supple, full ROM.  CARDIOVASCULAR: RRR. Normal S1/S2. No murmurs. 2+ equal and bilateral radial and pedal pulses.   RESPIRATORY: CTAB. No wheezes appreciated. Normal respiratory effort on ambient air.  GASTROINTESTINAL: Soft, non-tender, non-distended, normoactive bowel sounds.  MUSCULOSKELETAL: No joint swelling or tenderness.  EXTREMITIES: No lower extremity edema.   SKIN: Warm and intact. No concerning lesions or rashes on exposed skin surfaces. No jaundice.  NEUROLOGIC: Alert and fully oriented, appropriately responsive during interview.   VASCULAR ACCESS: Port in left upper chest, C/D/I, non-tender.    Data   I have personally reviewed the following labs/imaging:  Results for orders placed or performed during the hospital encounter of 01/29/18 (from the past 24 hour(s))   Comprehensive metabolic panel   Result Value Ref Range    Sodium 134 133 - 144 mmol/L    Potassium 3.8 3.4 - 5.3 mmol/L    Chloride 99 94 - 109 mmol/L    Carbon Dioxide 24 20 - 32 mmol/L    Anion Gap 11 3 - 14 mmol/L    Glucose 101 (H) 70 - 99 mg/dL    Urea Nitrogen 10 7 - 30 mg/dL    Creatinine 0.58 0.52 - 1.04 mg/dL    GFR Estimate >90 >60 mL/min/1.7m2    GFR Estimate If Black >90 >60 mL/min/1.7m2    Calcium 8.2 (L) 8.5 - 10.1 mg/dL    Bilirubin Total 1.2 0.2 - 1.3 mg/dL    Albumin 2.7 (L) 3.4 - 5.0 g/dL    Protein Total 6.0 (L) 6.8 - 8.8 g/dL    Alkaline Phosphatase 99 40 - 150 U/L    ALT 39 0 - 50 U/L    AST 23 0 - 45 U/L   CBC with platelets differential   Result Value Ref Range    WBC Canceled, Test credited 4.0 - 11.0 10e9/L    RBC Count Canceled, Test credited 3.8 - 5.2 10e12/L    Hemoglobin  Canceled, Test credited 11.7 - 15.7 g/dL    Hematocrit Canceled, Test credited 35.0 - 47.0 %    MCV Canceled, Test credited 78 - 100 fl    MCH Canceled, Test credited 26.5 - 33.0 pg    MCHC Canceled, Test credited 31.5 - 36.5 g/dL    RDW Canceled, Test credited 10.0 - 15.0 %    Platelet Count Canceled, Test credited 150 - 450 10e9/L    Diff Method Canceled, Test credited    Blood culture   Result Value Ref Range    Specimen Description Blood     Culture Micro No growth after 1 hour    ISTAT gases lactate gloria POCT   Result Value Ref Range    Ph Venous 7.51 (H) 7.32 - 7.43 pH    PCO2 Venous 31 (L) 40 - 50 mm Hg    PO2 Venous 21 (L) 25 - 47 mm Hg    Bicarbonate Venous 24 21 - 28 mmol/L    O2 Sat Venous 42 %    Lactic Acid 2.3 (H) 0.7 - 2.1 mmol/L   XR Chest 2 Views    Narrative    Exam:  Chest X-ray 1/29/2018 1:59 PM    History: Fever;     Comparison: 1/10/2018    Findings: PA and lateral chest radiograph is obtained. Left chest wall  Port-A-Cath with access needle in place and tip projecting over the  distal SVC. Cardiomediastinal silhouette is within normal limits.  Pulmonary vasculature is distinct. No pleural effusion or  pneumothorax. Focal airspace opacity projecting over the peripheral  right upper lobe, more prominent compared to prior. Diffuse bilateral  patchy airspace opacities are not significantly changed compared to  prior. The upper abdomen is unremarkable. Chronic degenerative changes  of thoracic spine.      Impression    Impression:   Focal airspace opacity projecting over the peripheral right upper lobe  is more pronounced compared to prior. Diffuse bilateral patchy  airspace opacities are not significantly changed compared to prior.  Findings may represent scarring/atelectasis associated with prior  infection or pneumonitis, however cannot rule out new superimposed  infection.    I have personally reviewed the examination and initial interpretation  and I agree with the findings.    BON LU,  MD   CBC with platelets differential   Result Value Ref Range    WBC 0.3 (LL) 4.0 - 11.0 10e9/L    RBC Count 1.32 (L) 3.8 - 5.2 10e12/L    Hemoglobin 4.2 (LL) 11.7 - 15.7 g/dL    Hematocrit 13.4 (L) 35.0 - 47.0 %     (H) 78 - 100 fl    MCH 31.8 26.5 - 33.0 pg    MCHC 31.3 (L) 31.5 - 36.5 g/dL    RDW 14.8 10.0 - 15.0 %    Platelet Count 115 (L) 150 - 450 10e9/L    Diff Method PENDING    ABO/Rh type and screen   Result Value Ref Range    ABO O     RH(D) Pos     Antibody Screen Neg     Test Valid Only At          [facility]    Specimen Expires 02/01/2018    Influenza A/B antigen   Result Value Ref Range    Influenza A/B Agn Specimen Nasal     Influenza A Negative NEG^Negative    Influenza B Negative NEG^Negative   ISTAT gases elec ica gluc gloria POCT   Result Value Ref Range    Ph Venous 7.53 (H) 7.32 - 7.43 pH    PCO2 Venous 29 (L) 40 - 50 mm Hg    PO2 Venous 24 (L) 25 - 47 mm Hg    Bicarbonate Venous 24 21 - 28 mmol/L    O2 Sat Venous 51 %    Sodium 133 133 - 144 mmol/L    Potassium 3.9 3.4 - 5.3 mmol/L    Glucose 83 70 - 99 mg/dL    Calcium Ionized 4.6 4.4 - 5.2 mg/dL    Hemoglobin 6.8 (LL) 11.7 - 15.7 g/dL    Hematocrit - POCT 20 (L) 35.0 - 47.0 %PCV   CBC with platelets differential   Result Value Ref Range    WBC 0.1 (LL) 4.0 - 11.0 10e9/L    RBC Count 2.17 (L) 3.8 - 5.2 10e12/L    Hemoglobin 7.3 (L) 11.7 - 15.7 g/dL    Hematocrit 21.7 (L) 35.0 - 47.0 %     78 - 100 fl    MCH 33.6 (H) 26.5 - 33.0 pg    MCHC 33.6 31.5 - 36.5 g/dL    RDW 14.7 10.0 - 15.0 %    Platelet Count 62 (L) 150 - 450 10e9/L    Diff Method PENDING    CBC with platelets differential   Result Value Ref Range    WBC 0.1 (LL) 4.0 - 11.0 10e9/L    RBC Count 1.99 (L) 3.8 - 5.2 10e12/L    Hemoglobin 6.5 (LL) 11.7 - 15.7 g/dL    Hematocrit 20.2 (L) 35.0 - 47.0 %     (H) 78 - 100 fl    MCH 32.7 26.5 - 33.0 pg    MCHC 32.2 31.5 - 36.5 g/dL    RDW 14.7 10.0 - 15.0 %    Platelet Count 57  (L) 150 - 450 10e9/L    Diff Method PENDING    Lactic acid   Result Value Ref Range    Lactic Acid 3.9 (H) 0.7 - 2.0 mmol/L

## 2018-01-29 NOTE — PHARMACY-ADMISSION MEDICATION HISTORY
Admission medication history interview status for the 1/29/2018 admission is complete. See Epic admission navigator for allergy information, pharmacy, prior to admission medications and immunization status.     Medication history interview sources:  the patient    Changes made to PTA medication list (reason)  Changed: updated dosing for darifenacin (from daily scheduled to daily PRN)    Additional medication history information (including reliability of information, actions taken by pharmacist):  -Pt is a very reliable historian.  -Currently taking prednisone 10 mg daily; has 5 doses left of this strength and will then decrease to 5 mg daily x7 days, then 5 mg every other day x7 days, then stop.    Prior to Admission medications    Medication Sig Last Dose Taking? Auth Provider   dexamethasone (DECADRON) 4 MG tablet Take 2 tablets (8 mg) by mouth daily Start on Day 2 of first cycle of doxorubicin / cyclophosphamide. 1/26/2018 at Unknown time Yes Lilia Livingston PA-C   pantoprazole (PROTONIX) 40 MG EC tablet Take 1 tablet (40 mg) by mouth every morning 1/28/2018 at AM Yes Fara Bradshaw MD   nystatin (MYCOSTATIN) 350469 UNIT/ML suspension Take 5 mLs (500,000 Units) by mouth 4 times daily - swish and spit Past Month at Unknown time Yes Ciara Chamorro MD   calcium carbonate (CALCIUM CARBONATE) 600 MG tablet Take by mouth 2 times daily (with meals) 1/28/2018 at PM Yes Lilia Livingston PA-C   predniSONE (DELTASONE) 10 MG tablet Take 60mg daily for 1 week, then continue to decrease by 10mg each week  Patient taking differently: Take 10 mg by mouth daily  1/28/2018 at AM Yes Thang Mancilla PA   lidocaine-prilocaine (EMLA) cream Please apply to port site 30 minutes before use prn PRN at n/a Yes Thang Mancilla PA   hydrOXYzine (ATARAX) 25 MG tablet Take 1-2 tablets (25-50 mg) by mouth every 6 hours as needed for itching PRN at n/a Yes Radha Cazares MD   guaiFENesin (MUCINEX)  600 MG 12 hr tablet Take 2 tablets (1,200 mg) by mouth 2 times daily 1/28/2018 at PM Yes Radha Cazares MD   tiotropium (SPIRIVA) 18 MCG capsule Inhale 1 capsule (18 mcg) into the lungs daily Inhale contents of one capsule 1/28/2018 at Unknown time Yes Radha Cazares MD   albuterol (PROAIR HFA/PROVENTIL HFA/VENTOLIN HFA) 108 (90 BASE) MCG/ACT Inhaler Inhale 2 puffs into the lungs every 6 hours as needed for shortness of breath / dyspnea PRN at n/a Yes Radha Cazares MD   citalopram (CELEXA) 10 MG tablet Take 1 tablet (10 mg) by mouth daily 1/28/2018 at AM Yes Radha Cazares MD   darifenacin (ENABLEX) 7.5 MG 24 hr tablet Take 1 tablet (7.5 mg) by mouth daily  Patient taking differently: Take 7.5 mg by mouth daily as needed  Past Month at n/a Yes Radha Cazares MD   Coenzyme Q10 (CO Q 10 PO) Take 1 tablet by mouth daily  1/28/2018 at AM Yes Reported, Patient   MULTIPLE VITAMIN PO Take 1 tablet by mouth daily  1/28/2018 at Unknown time Yes Reported, Patient   Cyanocobalamin (VITAMIN B 12 PO) Take 100 mcg by mouth daily  1/28/2018 at AM Yes Reported, Patient   Pyridoxine HCl (VITAMIN B6 PO) Take 1 tablet by mouth daily. 1/28/2018 at Unknown time Yes Reported, Patient   aspirin 81 MG tablet Take 1 tablet by mouth daily. 1/28/2018 at AM Yes Jennifer Bautista MD   VITAMIN D 1000 UNIT OR CAPS TAKE 2 CAPSULES BY MOUTH DAILY 1/28/2018 at AM Yes Reported, Patient   magic mouthwash suspension (diphenhydrAMINE, lidocaine, aluminum-magnesium & simethicone) Swish and swallow 10 mLs in mouth every 6 hours as needed for mouth sores Not started at n/a  Fara Bradshaw MD   predniSONE (DELTASONE) 5 MG tablet 1 tab daily x 7 days, followed by every other day x 7 days NOT STARTED  Fara Bradshaw MD   LORazepam (ATIVAN) 0.5 MG tablet Take 1 tablet (0.5 mg) by mouth every 4 hours as needed (Anxiety, Nausea/Vomiting or Sleep) Not started at n/a  Fara Bradshaw MD      This patient obtains medications from [facility] pharmacy and [facility] pharmacy.    Medication history completed by: Sabiha GutiérrezD

## 2018-01-29 NOTE — ED NOTES
[facility], [place]   ED Nurse to Floor Handoff     Ashleigh Alonzo is a 57 year old female who speaks English and lives with a spouse,  in a home  They arrived in the ED by car from home    ED Chief Complaint: Fever    ED Dx;   Final diagnoses:   Febrile neutropenia (H)         Needed?: No    Allergies: No Known Allergies.  Past Medical Hx:   Past Medical History:   Diagnosis Date     COPD (chronic obstructive pulmonary disease) (H) 2011     Malignant neoplasm of upper-inner quadrant of right breast in female, estrogen receptor negative (H) 8/30/2017     Periodontal disease      Sleep apnea 12/2015     Urticaria       Baseline Mental status: WDL  Current Mental Status changes: at basesline    Infection: No  Sepsis suspected: Yes  Isolation type: No active isolations     Activity level - Baseline/Home:  Independent  Activity Level - Current:   Independent    Bariatric equipment needed?: No    In the ED these meds were given:   Medications   sodium chloride (PF) 0.9% PF flush 3 mL (3 mLs Intravenous Not Given 1/29/18 1520)   lactated ringers infusion (150 mLs Intravenous Rate/Dose Verify 1/29/18 1718)   vancomycin (VANCOCIN) 1,750 mg in NaCl 0.9 % 500 mL intermittent infusion (1,750 mg Intravenous New Bag 1/29/18 1618)   vancomycin (VANCOCIN) 1,250 mg in NaCl 0.9 % 250 mL intermittent infusion (not administered)   lactated ringers BOLUS 500 mL (500 mLs Intravenous New Bag 1/29/18 1656)   lactated ringers BOLUS 2,109 mL (0 mLs Intravenous Stopped 1/29/18 1512)   acetaminophen (TYLENOL) tablet 650 mg (650 mg Oral Given 1/29/18 1511)   ceFEPIme (MAXIPIME) intermittent infusion 2 g (0 g Intravenous Stopped 1/29/18 1714)       Drips running?  Yes blood and vanco     Home pump or pre-existing LDA's present? No    Labs results:   Labs Ordered and Resulted from Time of ED Arrival Up to the Time of Departure from the ED   COMPREHENSIVE METABOLIC PANEL - Abnormal; Notable for the  following:        Result Value    Glucose 101 (*)     Calcium 8.2 (*)     Albumin 2.7 (*)     Protein Total 6.0 (*)     All other components within normal limits   ROUTINE UA WITH MICROSCOPIC - Abnormal; Notable for the following:     pH Urine 7.5 (*)     Bacteria Urine Few (*)     All other components within normal limits   CBC WITH PLATELETS DIFFERENTIAL - Abnormal; Notable for the following:     WBC 0.3 (*)     RBC Count 1.32 (*)     Hemoglobin 4.2 (*)     Hematocrit 13.4 (*)      (*)     MCHC 31.3 (*)     Platelet Count 115 (*)     All other components within normal limits   CBC WITH PLATELETS DIFFERENTIAL - Abnormal; Notable for the following:     WBC 0.1 (*)     RBC Count 2.17 (*)     Hemoglobin 7.3 (*)     Hematocrit 21.7 (*)     MCH 33.6 (*)     Platelet Count 62 (*)     All other components within normal limits   CBC WITH PLATELETS DIFFERENTIAL - Abnormal; Notable for the following:     WBC 0.1 (*)     RBC Count 1.99 (*)     Hemoglobin 6.5 (*)     Hematocrit 20.2 (*)      (*)     Platelet Count 57 (*)     All other components within normal limits   LACTIC ACID WHOLE BLOOD - Abnormal; Notable for the following:     Lactic Acid 3.9 (*)     All other components within normal limits   ISTAT  GASES LACTATE JAMARI POCT - Abnormal; Notable for the following:     Ph Venous 7.51 (*)     PCO2 Venous 31 (*)     PO2 Venous 21 (*)     Lactic Acid 2.3 (*)     All other components within normal limits   ISTAT GASES ELEC ICA GLUC JAMARI POCT - Abnormal; Notable for the following:     Ph Venous 7.53 (*)     PCO2 Venous 29 (*)     PO2 Venous 24 (*)     Hemoglobin 6.8 (*)     Hematocrit - POCT 20 (*)     All other components within normal limits   CBC WITH PLATELETS DIFFERENTIAL   PULSE OXIMETRY NURSING   CARDIAC CONTINUOUS MONITORING   NURSING DRAW AND HOLD   NURSING DRAW AND HOLD   NURSING DRAW AND HOLD   ISTAT CG4 GASES LACTATE JAMARI NURSING POCT   PATIENT CARE ORDER   MEASURE URINE OUTPUT   ABO/RH TYPE AND SCREEN  "  RED BLOOD CELL PREPARE ORDER UNIT   BLOOD COMPONENT   URINE CULTURE AEROBIC BACTERIAL   BLOOD CULTURE   BLOOD CULTURE   INFLUENZA A/B ANTIGEN       Imaging Studies:   Recent Results (from the past 24 hour(s))   XR Chest 2 Views    Narrative    Exam:  Chest X-ray 1/29/2018 1:59 PM    History: Fever;     Comparison: 1/10/2018    Findings: PA and lateral chest radiograph is obtained. Left chest wall  Port-A-Cath with access needle in place and tip projecting over the  distal SVC. Cardiomediastinal silhouette is within normal limits.  Pulmonary vasculature is distinct. No pleural effusion or  pneumothorax. Focal airspace opacity projecting over the peripheral  right upper lobe, more prominent compared to prior. Diffuse bilateral  patchy airspace opacities are not significantly changed compared to  prior. The upper abdomen is unremarkable. Chronic degenerative changes  of thoracic spine.      Impression    Impression:   Focal airspace opacity projecting over the peripheral right upper lobe  is more pronounced compared to prior. Diffuse bilateral patchy  airspace opacities are not significantly changed compared to prior.  Findings may represent scarring/atelectasis associated with prior  infection or pneumonitis, however cannot rule out new superimposed  infection.    I have personally reviewed the examination and initial interpretation  and I agree with the findings.    BON UL MD       Recent vital signs:   BP 98/57  Pulse 103  Temp 99.4  F (37.4  C) (Oral)  Resp 14  Ht 1.626 m (5' 4\")  Wt 70.3 kg (155 lb)  SpO2 94%  Breastfeeding? No  BMI 26.61 kg/m2    Cardiac Rhythm: Normal Sinus  Pt needs tele? No  Skin/wound Issues: None    Code Status: Full Code    Pain control: good    Nausea control: pt had none    Abnormal labs/tests/findings requiring intervention: WBC 0.1, Hgb 6.8    Family present during ED course? Yes   Family Comments/Social Situation comments:  very kind and asks appropriate " questions.     Tasks needing completion: None    Rod Badillo, RN  [phone] [facility]

## 2018-01-29 NOTE — ED PROVIDER NOTES
[place] EMERGENCY DEPARTMENT ([facility])  1/29/18   History     Chief Complaint   Patient presents with     Fever     HPI  Ashleigh Alonzo is a 57 year old female with a medical history significant for breast cancer currently undergoing chemotherapy treatment, COPD, hyperlipidemia and pneumonia.  Per review of patient's chart, patient was recently hospitalized here from 1/10/2018 to 1/14/2018 after presenting with a neutropenic fever that spiked to 102 F.  Patient was pancultured.  Her chest x-ray was nonspecific.  Her blood cultures remained negative at discharge.  Her influenza was negative.  She was treated empirically with IV vancomycin and cefepime.  While in the hospital her counts and fevers resolved, she was discharged with a 10 day course of cefepime.    Patient presents to the Emergency Department today for evaluation of fever.  Patient reports that she started her second cycle of her chemotherapy treatment on 1/23/2018.  Today she had an elevated temperature to 100.4 F at 11:15 AM this morning.  Patient notes that she was on day 8 of cycle 1 when she was admitted to the hospital last, today is day 6 of cycle 2 for her.  Patient denies any cough, urinary symptoms, diarrhea, abdominal pain, nausea or vomiting.  She does note that she has a runny nose.  She does not believe that she has been exposed to influenza as she has only stayed at home.  Patient does have a power port in place on the left side.  Patient is receiving Neulasta injections.    I have reviewed the Medications, Allergies, Past Medical and Surgical History, and Social History in the [facility] system.    Past Medical History:   Diagnosis Date     COPD (chronic obstructive pulmonary disease) (H) 2011     Malignant neoplasm of upper-inner quadrant of right breast in female, estrogen receptor negative (H) 8/30/2017     Periodontal disease      Sleep apnea 12/2015     Urticaria        Past Surgical History:   Procedure Laterality Date      APPENDECTOMY  10/6/2015     CATARACT IOL, RT/LT  11/2016    bilateral      COLONOSCOPY  11/15/2011    Procedure:COLONOSCOPY; Colonoscopy, screening; Surgeon:ANASTASIA BUNCH; Location:[facility] OR     INSERT PORT VASCULAR ACCESS Left 9/1/2017    Procedure: INSERT PORT VASCULAR ACCESS;  Single Lumen Chest Power Port;  Surgeon: Leif Parkinson PA-C;  Location: [facility] OR     TUBAL LIGATION  12/2004    Bilateral       Family History   Problem Relation Age of Onset     Cardiovascular Father 46     MI     Eye Disorder Father      CEREBROVASCULAR DISEASE Father      Arthritis Sister      Neurologic Disorder Brother      CANCER No family hx of      DIABETES No family hx of      Hypertension No family hx of        Social History   Substance Use Topics     Smoking status: Former Smoker     Packs/day: 1.00     Years: 15.00     Types: Cigarettes     Quit date: 1/1/2000     Smokeless tobacco: Never Used     Alcohol use 8.4 - 12.6 oz/week      Comment: daily       Current Facility-Administered Medications   Medication     ceFEPIme (MAXIPIME) intermittent infusion 2 g     polyethylene glycol (MIRALAX/GLYCOLAX) Packet 17 g     senna-docusate (SENOKOT-S;PERICOLACE) 8.6-50 MG per tablet 1-2 tablet     predniSONE (DELTASONE) tablet 5 mg     0.9% sodium chloride infusion     sodium chloride (PF) 0.9% PF flush 3 mL     vancomycin (VANCOCIN) 1,250 mg in NaCl 0.9 % 250 mL intermittent infusion     albuterol (PROAIR HFA/PROVENTIL HFA/VENTOLIN HFA) Inhaler 2 puff     calcium carbonate (OS-JAVIER 500 mg [place]. Ca) tablet 1,250 mg     citalopram (celeXA) tablet 10 mg     cyanocolbalamin (vitamin  B-12) tablet 100 mcg     tolterodine (DETROL LA) 24 hr capsule 4 mg     guaiFENesin (MUCINEX) 12 hr tablet 1,200 mg     hydrOXYzine (ATARAX) tablet 25-50 mg     pantoprazole (PROTONIX) EC tablet 40 mg     cholecalciferol (vitamin D3) tablet 2,000 Units     umeclidinium (INCRUSE ELLIPTA) 62.5 MCG/INH oral inhaler 1 puff     Medication Instruction      "prochlorperazine (COMPAZINE) tablet 5-10 mg    Or     prochlorperazine (COMPAZINE) injection 5 mg     ondansetron (ZOFRAN) injection 8 mg    Or     ondansetron (ZOFRAN-ODT) ODT tab 8 mg    Or     ondansetron (ZOFRAN) tablet 8 mg     LORazepam (ATIVAN) tablet 0.5-1 mg    Or     LORazepam (ATIVAN) injection 0.5-1 mg     acetaminophen (TYLENOL) tablet 650 mg     Facility-Administered Medications Ordered in Other Encounters   Medication     lidocaine 1 % 9 mL     sodium bicarbonate 8.4 % injection 1 mEq     lidocaine-EPINEPHrine 1.5 %-1:[id number] injection 10 mL      No Known Allergies      Review of Systems   Constitutional: Positive for fever.   HENT: Positive for rhinorrhea.    Respiratory: Negative for cough.    Gastrointestinal: Negative for abdominal pain, diarrhea, nausea and vomiting.   Genitourinary: Negative for difficulty urinating, dysuria, frequency, hematuria and urgency.   All other systems reviewed and are negative.      Physical Exam   BP: (!) 89/64  Pulse: 139  Heart Rate: 101  Temp: 97.1  F (36.2  C)  Resp: 28  Height: 162.6 cm (5' 4\")  Weight: 70.3 kg (155 lb)  SpO2: 99 %      Physical Exam   Constitutional: She is oriented to person, place, and time. She appears well-developed and well-nourished.  Non-toxic appearance. She appears ill. No distress.   Patient is awake and alert, she is mentating normally but appears washed out.  She is protecting her airway without difficulty.   HENT:   Head: Normocephalic and atraumatic.   Mouth/Throat: Oropharynx is clear and moist. No oropharyngeal exudate.   Eyes: Conjunctivae and EOM are normal. Pupils are equal, round, and reactive to light. No scleral icterus.   Neck: Normal range of motion. Neck supple. No JVD present. No tracheal deviation present. No thyromegaly present.   No meningeal signs noted.   Cardiovascular: Regular rhythm, normal heart sounds and intact distal pulses.  Tachycardia present.  Exam reveals no gallop and no friction rub.    No murmur " heard.  Pulmonary/Chest: Effort normal and breath sounds normal. No respiratory distress.   Left upper chest wall subcutaneous port site without erythema, or swelling.   Abdominal: Soft. Bowel sounds are normal. She exhibits no distension and no mass. There is no tenderness. There is no rebound and no guarding.   Musculoskeletal: Normal range of motion. She exhibits no edema or tenderness.   Lymphadenopathy:     She has no cervical adenopathy.   Neurological: She is alert and oriented to person, place, and time. She has normal strength. No cranial nerve deficit or sensory deficit.   Skin: Skin is warm and dry. No rash noted. No erythema. No pallor.   Psychiatric: She has a normal mood and affect. Her behavior is normal.   Nursing note and vitals reviewed.      ED Course   1:26 PM  The patient was seen and examined by Carlos Melo MD in Room ED09.     ED Course     Procedures            Critical Care time:  was 40 minutes for this patient excluding procedures.      The patient has signs of Severe Sepsis as evidenced by:    1. 2 SIRS criteria, AND  2. Suspected infection, AND   3. Organ dysfunction: Lactic Acid >2    Time severe sepsis diagnosis confirmed = 1356 as this was the time when Lactate resulted, and the level was >2      3 Hour Severe Sepsis Bundle Completion:  1. Initial Lactic Acid Result:   Recent Labs   Lab Test  01/31/18   0501  01/30/18   0213  01/29/18   2113   LACT  0.7  0.7  4.0*     2. Blood Cultures before Antibiotics: Yes  3. Broad Spectrum Antibiotics Administered: Yes     Anti-infectives (Future)    Start     Dose/Rate Route Frequency Ordered Stop    01/31/18 0230  ceFEPIme (MAXIPIME) intermittent infusion 2 g      2 g  over 30 Minutes Intravenous EVERY 8 HOURS 01/31/18 0229      01/30/18 0400  vancomycin (VANCOCIN) 1,250 mg in NaCl 0.9 % 250 mL intermittent infusion      1,250 mg  over 90 Minutes Intravenous EVERY 12 HOURS 01/29/18 1520          4. 2500 ml of IV fluids.    the patient was  transferred out of the ED prior to the 6 hour vikas.        Labs Ordered and Resulted from Time of ED Arrival Up to the Time of Departure from the ED   COMPREHENSIVE METABOLIC PANEL - Abnormal; Notable for the following:        Result Value    Glucose 101 (*)     Calcium 8.2 (*)     Albumin 2.7 (*)     Protein Total 6.0 (*)     All other components within normal limits   ROUTINE UA WITH MICROSCOPIC - Abnormal; Notable for the following:     pH Urine 7.5 (*)     Bacteria Urine Few (*)     All other components within normal limits   CBC WITH PLATELETS DIFFERENTIAL - Abnormal; Notable for the following:     WBC 0.3 (*)     RBC Count 1.32 (*)     Hemoglobin 4.2 (*)     Hematocrit 13.4 (*)      (*)     MCHC 31.3 (*)     Platelet Count 115 (*)     All other components within normal limits   CBC WITH PLATELETS DIFFERENTIAL - Abnormal; Notable for the following:     WBC 0.1 (*)     RBC Count 2.17 (*)     Hemoglobin 7.3 (*)     Hematocrit 21.7 (*)     MCH 33.6 (*)     Platelet Count 62 (*)     All other components within normal limits   CBC WITH PLATELETS DIFFERENTIAL - Abnormal; Notable for the following:     WBC 0.1 (*)     RBC Count 1.99 (*)     Hemoglobin 6.5 (*)     Hematocrit 20.2 (*)      (*)     Platelet Count 57 (*)     All other components within normal limits   LACTIC ACID WHOLE BLOOD - Abnormal; Notable for the following:     Lactic Acid 3.9 (*)     All other components within normal limits   ISTAT  GASES LACTATE JAMARI POCT - Abnormal; Notable for the following:     Ph Venous 7.51 (*)     PCO2 Venous 31 (*)     PO2 Venous 21 (*)     Lactic Acid 2.3 (*)     All other components within normal limits   ISTAT GASES ELEC ICA GLUC JAMARI POCT - Abnormal; Notable for the following:     Ph Venous 7.53 (*)     PCO2 Venous 29 (*)     PO2 Venous 24 (*)     Hemoglobin 6.8 (*)     Hematocrit - POCT 20 (*)     All other components within normal limits   CBC WITH PLATELETS DIFFERENTIAL   PULSE OXIMETRY NURSING   CARDIAC  CONTINUOUS MONITORING   NURSING DRAW AND HOLD   NURSING DRAW AND HOLD   NURSING DRAW AND HOLD   ISTAT CG4 GASES LACTATE JAMARI NURSING POCT   PATIENT CARE ORDER   MEASURE URINE OUTPUT   ABO/RH TYPE AND SCREEN   RED BLOOD CELL PREPARE ORDER UNIT   INFLUENZA A/B ANTIGEN     XR Chest 2 Views   Final Result   Impression:    Focal airspace opacity projecting over the peripheral right upper lobe   is more pronounced compared to prior. Diffuse bilateral patchy   airspace opacities are not significantly changed compared to prior.   Findings may represent scarring/atelectasis associated with prior   infection or pneumonitis, however cannot rule out new superimposed   infection.      I have personally reviewed the examination and initial interpretation   and I agree with the findings.      BON LU MD                 Assessments & Plan (with Medical Decision Making)   This patient presented to the emergency department with fever in the setting of chemotherapy for breast cancer.  She is at a point where she was concerned she may be neutropenic and is found to have a white count of 0.1 as well as hemoglobin of between 6 and 7 and a low platelet count.  She was hypotensive at presentation and but was mentating normally and appeared washed out but not in acute distress.  She has been maintaining good oxygen saturations, and initial lactate was 2.3, but this did seem to raise somewhat to 3.9 after a 2 L fluid bolus.  Blood pressure remains with systolics in the 90s to low 100s and the patient did spike up to 100.4 and was given Tylenol.  She was consented for blood transfusion and broad-spectrum antibiotics were started as there is not a clear source of fever and at this point time.  She was written for cefepime and vancomycin.  I discussed the case with the on-call oncology fellow and the patient will be admitted to the oncology service.  Patient was able to urinate and had no evidence of infection in the urine.  Rapid  influenza testing was negative.  At this point time she is awaiting bed placement and then will be transferred to the appropriate bed when it is available.    This part of the document was transcribed by Ancelmo Humphries, Medical Scribe.       I have reviewed the nursing notes.    I have reviewed the findings, diagnosis, plan and need for follow up with the patient.    Current Discharge Medication List          Final diagnoses:   Febrile neutropenia (H)   Severe sepsis (H)   Antineoplastic chemotherapy induced pancytopenia (H)     I, Dwayne Fernández, am serving as a trained medical scribe to document services personally performed by Adam Melo MD, based on the provider's statements to me.   I, Adam Melo, was physically present and have reviewed and verified the accuracy of this note documented by Dwayne Fernández.    1/29/2018   [facility], [place], EMERGENCY DEPARTMENT     Carlos Melo MD  01/31/18 0936

## 2018-01-29 NOTE — ED NOTES
"Deedee is brought in for a fever this morning of 100.4 and she tells me that her numbers may be neutropenic like they were 2 weeks ago. She denies a cough but complains of a \"drippy nose\".  "

## 2018-01-30 LAB
ALBUMIN SERPL-MCNC: 2.1 G/DL (ref 3.4–5)
ALP SERPL-CCNC: 76 U/L (ref 40–150)
ALT SERPL W P-5'-P-CCNC: 29 U/L (ref 0–50)
ANION GAP SERPL CALCULATED.3IONS-SCNC: 6 MMOL/L (ref 3–14)
AST SERPL W P-5'-P-CCNC: 18 U/L (ref 0–45)
BACTERIA SPEC CULT: NORMAL
BILIRUB SERPL-MCNC: 0.8 MG/DL (ref 0.2–1.3)
BUN SERPL-MCNC: 8 MG/DL (ref 7–30)
CALCIUM SERPL-MCNC: 7.7 MG/DL (ref 8.5–10.1)
CHLORIDE SERPL-SCNC: 106 MMOL/L (ref 94–109)
CO2 SERPL-SCNC: 25 MMOL/L (ref 20–32)
CREAT SERPL-MCNC: 0.45 MG/DL (ref 0.52–1.04)
DIFFERENTIAL METHOD BLD: ABNORMAL
ERYTHROCYTE [DISTWIDTH] IN BLOOD BY AUTOMATED COUNT: 16.4 % (ref 10–15)
GFR SERPL CREATININE-BSD FRML MDRD: >90 ML/MIN/1.7M2
GLUCOSE SERPL-MCNC: 92 MG/DL (ref 70–99)
HCT VFR BLD AUTO: 27.8 % (ref 35–47)
HGB BLD-MCNC: 8.9 G/DL (ref 11.7–15.7)
LACTATE BLD-SCNC: 0.7 MMOL/L (ref 0.7–2)
LACTATE BLD-SCNC: 3.1 MMOL/L (ref 0.4–1.9)
Lab: NORMAL
MAGNESIUM SERPL-MCNC: 1.9 MG/DL (ref 1.6–2.3)
MCH RBC QN AUTO: 31.3 PG (ref 26.5–33)
MCHC RBC AUTO-ENTMCNC: 32 G/DL (ref 31.5–36.5)
MCV RBC AUTO: 98 FL (ref 78–100)
PLATELET # BLD AUTO: 52 10E9/L (ref 150–450)
PLATELET # BLD EST: ABNORMAL 10*3/UL
POTASSIUM SERPL-SCNC: 4.1 MMOL/L (ref 3.4–5.3)
PROCALCITONIN SERPL-MCNC: 0.1 NG/ML
PROT SERPL-MCNC: 5.2 G/DL (ref 6.8–8.8)
RBC # BLD AUTO: 2.84 10E12/L (ref 3.8–5.2)
SODIUM SERPL-SCNC: 137 MMOL/L (ref 133–144)
SPECIMEN SOURCE: NORMAL
WBC # BLD AUTO: 0.1 10E9/L (ref 4–11)

## 2018-01-30 PROCEDURE — 25000132 ZZH RX MED GY IP 250 OP 250 PS 637: Performed by: INTERNAL MEDICINE

## 2018-01-30 PROCEDURE — 85025 COMPLETE CBC W/AUTO DIFF WBC: CPT | Performed by: INTERNAL MEDICINE

## 2018-01-30 PROCEDURE — 25000125 ZZHC RX 250: Performed by: INTERNAL MEDICINE

## 2018-01-30 PROCEDURE — 83735 ASSAY OF MAGNESIUM: CPT | Performed by: INTERNAL MEDICINE

## 2018-01-30 PROCEDURE — 83605 ASSAY OF LACTIC ACID: CPT | Performed by: INTERNAL MEDICINE

## 2018-01-30 PROCEDURE — 36415 COLL VENOUS BLD VENIPUNCTURE: CPT | Performed by: INTERNAL MEDICINE

## 2018-01-30 PROCEDURE — 99233 SBSQ HOSP IP/OBS HIGH 50: CPT | Performed by: INTERNAL MEDICINE

## 2018-01-30 PROCEDURE — 80053 COMPREHEN METABOLIC PANEL: CPT | Performed by: INTERNAL MEDICINE

## 2018-01-30 PROCEDURE — 25000132 ZZH RX MED GY IP 250 OP 250 PS 637: Performed by: PHYSICIAN ASSISTANT

## 2018-01-30 PROCEDURE — 36592 COLLECT BLOOD FROM PICC: CPT | Performed by: INTERNAL MEDICINE

## 2018-01-30 PROCEDURE — 25000128 H RX IP 250 OP 636: Performed by: INTERNAL MEDICINE

## 2018-01-30 PROCEDURE — 25000128 H RX IP 250 OP 636: Performed by: EMERGENCY MEDICINE

## 2018-01-30 PROCEDURE — 12000006 ZZH R&B IMCU INTERMEDIATE UMMC

## 2018-01-30 RX ORDER — PREDNISONE 5 MG/1
5 TABLET ORAL DAILY
Status: DISCONTINUED | OUTPATIENT
Start: 2018-01-30 | End: 2018-01-30 | Stop reason: ALTCHOICE

## 2018-01-30 RX ORDER — PREDNISONE 10 MG/1
10 TABLET ORAL ONCE
Status: COMPLETED | OUTPATIENT
Start: 2018-01-30 | End: 2018-01-30

## 2018-01-30 RX ORDER — PREDNISONE 5 MG/1
5 TABLET ORAL DAILY
Status: COMPLETED | OUTPATIENT
Start: 2018-01-31 | End: 2018-02-06

## 2018-01-30 RX ORDER — POLYETHYLENE GLYCOL 3350 17 G/17G
17 POWDER, FOR SOLUTION ORAL DAILY
Status: DISCONTINUED | OUTPATIENT
Start: 2018-01-30 | End: 2018-02-02

## 2018-01-30 RX ORDER — SODIUM CHLORIDE 9 MG/ML
INJECTION, SOLUTION INTRAVENOUS CONTINUOUS
Status: DISCONTINUED | OUTPATIENT
Start: 2018-01-30 | End: 2018-02-05

## 2018-01-30 RX ORDER — AMOXICILLIN 250 MG
1-2 CAPSULE ORAL 2 TIMES DAILY PRN
Status: DISCONTINUED | OUTPATIENT
Start: 2018-01-30 | End: 2018-02-07

## 2018-01-30 RX ADMIN — VITAMIN B12 0.1 MG ORAL TABLET 100 MCG: 0.1 TABLET ORAL at 10:00

## 2018-01-30 RX ADMIN — POLYETHYLENE GLYCOL 3350 17 G: 17 POWDER, FOR SOLUTION ORAL at 10:07

## 2018-01-30 RX ADMIN — VANCOMYCIN HYDROCHLORIDE 1250 MG: 10 INJECTION, POWDER, LYOPHILIZED, FOR SOLUTION INTRAVENOUS at 04:08

## 2018-01-30 RX ADMIN — TOLTERODINE 4 MG: 4 CAPSULE, EXTENDED RELEASE ORAL at 09:59

## 2018-01-30 RX ADMIN — CALCIUM 1250 MG: 500 TABLET ORAL at 18:24

## 2018-01-30 RX ADMIN — SODIUM CHLORIDE 500 ML: 9 INJECTION, SOLUTION INTRAVENOUS at 21:34

## 2018-01-30 RX ADMIN — SODIUM CHLORIDE: 9 INJECTION, SOLUTION INTRAVENOUS at 01:46

## 2018-01-30 RX ADMIN — UMECLIDINIUM 1 PUFF: 62.5 AEROSOL, POWDER ORAL at 10:00

## 2018-01-30 RX ADMIN — PANTOPRAZOLE SODIUM 40 MG: 40 TABLET, DELAYED RELEASE ORAL at 09:59

## 2018-01-30 RX ADMIN — VANCOMYCIN HYDROCHLORIDE 1250 MG: 10 INJECTION, POWDER, LYOPHILIZED, FOR SOLUTION INTRAVENOUS at 20:11

## 2018-01-30 RX ADMIN — GUAIFENESIN 1200 MG: 600 TABLET, EXTENDED RELEASE ORAL at 10:00

## 2018-01-30 RX ADMIN — CEFEPIME HYDROCHLORIDE 2 G: 2 INJECTION, POWDER, FOR SOLUTION INTRAVENOUS at 08:14

## 2018-01-30 RX ADMIN — PREDNISONE 10 MG: 10 TABLET ORAL at 10:08

## 2018-01-30 RX ADMIN — CEFEPIME HYDROCHLORIDE 2 G: 2 INJECTION, POWDER, FOR SOLUTION INTRAVENOUS at 18:20

## 2018-01-30 RX ADMIN — VITAMIN D, TAB 1000IU (100/BT) 2000 UNITS: 25 TAB at 09:59

## 2018-01-30 RX ADMIN — SODIUM CHLORIDE: 9 INJECTION, SOLUTION INTRAVENOUS at 23:44

## 2018-01-30 RX ADMIN — GUAIFENESIN 1200 MG: 600 TABLET, EXTENDED RELEASE ORAL at 20:11

## 2018-01-30 RX ADMIN — CEFEPIME HYDROCHLORIDE 2 G: 2 INJECTION, POWDER, FOR SOLUTION INTRAVENOUS at 00:14

## 2018-01-30 RX ADMIN — CITALOPRAM HYDROBROMIDE 10 MG: 10 TABLET ORAL at 09:59

## 2018-01-30 RX ADMIN — SODIUM CHLORIDE: 9 INJECTION, SOLUTION INTRAVENOUS at 11:59

## 2018-01-30 RX ADMIN — CALCIUM 1250 MG: 500 TABLET ORAL at 09:58

## 2018-01-30 ASSESSMENT — ACTIVITIES OF DAILY LIVING (ADL)
ADLS_ACUITY_SCORE: 11

## 2018-01-30 NOTE — PROGRESS NOTES
[facility] Progress Note         Assessment and Plan:   Assessment:   56 yo female with stage IIa, T2N0M0, triple negative breast cancer s/p 12 weeks taxol + 3 cycle of pembrolizumab and 2 cycles of adriamycin and cyclophosphamide admitted with neutropenic fever.      Plan:   1.  Neutropenic fever/CAP:  Day #7 s/p adriamycin and cyclophosphamide.  WBC 0.1.  Tmax 100.6.  CXR with focal airspace opacity over RUL.  UA neg.  Blood cultures NGTD.  - s/p Neulasta on 1/24  - continue IV cefepime and vancomycin  - if blood cultures NGTD, consider transition to PO antibiotics tomorrow.    2.  Severe pancytopenia:  Secondary to chemotherapy  - s/p neulasta  - s/p transfusion 1 UpRBC on 1/29/18.  - continue to monitor daily blood counts.    3.  Pneumonitis secondary to pembrolizumab:  Continues on steroid taper.  No evidence of respiratory compromise.  - pembrolizumab discontinued.  - decrease prednisone to 5 mg PO daily.  Will do for 1 week, then every other day for 1 week, then stop.    4.  Right breast cancer:  Enrolled on the ISPY2 clinical trial.  Randomized to Taxol + pembrolizumab followed by adriamycin, cyclophosphamide, and pembrolizumab.  Pembrolizumab discontinued after 3 cycles due to pneumonitis.  Week 12 breast MRI shows no residual breast tumor.  Both cycles #1 and #2 of adriamycin and cyclophosphamide complicated by neutropenic fever.  Discussed with patient today that given good response to treatment thus far and poor tolerance of chemotherapy, will forgo further chemotherapy and proceed with surgery.  - will notify surgical oncology and move surgery up to 3-5 weeks from now    FEN: regular diet, bolus IV fluids, replete lytes PRN  Prophylaxis: mechanical (thrombocytopenia)  Code: FULL  Disposition: Admitted to the oncology service for evaluation and treatment of febrile neutropenia.  Anticipate discharge to home in the next several days pending clinical improvement.            Interval History:    Patient reports ongoing fatigue.  Has cough with deep breaths.  Denies shortness of breath or chest pain. Denies feeling febrile, but checked temp yesterday because she thought she should.  Has not felt febrile since admission, however, Tmax was 100.6.  No bowel movement for 2-3 days.  Denies nausea.  Appetite is good.  The remainder of a review of systems is otherwise negative.          Significant Problems:     Patient Active Problem List   Diagnosis     Anxiety     Anisocoria     Hyperlipidemia LDL goal <130     COPD (chronic obstructive pulmonary disease)     Lack of libido     Family history of ischemic heart disease     Mild major depression (H)     Hives     Lung nodule, multiple     MERLIN (obstructive sleep apnea)- moderate (AHI 21)     Urticaria     Chronic obstructive pulmonary disease, unspecified COPD type (H)     Depression, unspecified depression type     Malignant neoplasm of upper-inner quadrant of right breast in female, estrogen receptor negative (H)     Acute cystitis without hematuria     Pneumonia     Encounter for antineoplastic chemotherapy     Neutropenia with fever (H)     Neutropenic sepsis (H)             Review of Systems:   A comprehensive review of systems was performed and found to be negative except as described in this note           Medications:     Current Facility-Administered Medications   Medication     polyethylene glycol (MIRALAX/GLYCOLAX) Packet 17 g     senna-docusate (SENOKOT-S;PERICOLACE) 8.6-50 MG per tablet 1-2 tablet     [START ON 1/31/2018] predniSONE (DELTASONE) tablet 5 mg     sodium chloride (PF) 0.9% PF flush 3 mL     vancomycin (VANCOCIN) 1,250 mg in NaCl 0.9 % 250 mL intermittent infusion     ceFEPIme (MAXIPIME) 2 g in NaCl 0.9 % 100 mL intermittent infusion     albuterol (PROAIR HFA/PROVENTIL HFA/VENTOLIN HFA) Inhaler 2 puff     calcium carbonate (OS-JAVIER 500 mg [place]. Ca) tablet 1,250 mg     citalopram (celeXA) tablet 10 mg     cyanocolbalamin (vitamin  B-12)  tablet 100 mcg     tolterodine (DETROL LA) 24 hr capsule 4 mg     guaiFENesin (MUCINEX) 12 hr tablet 1,200 mg     hydrOXYzine (ATARAX) tablet 25-50 mg     pantoprazole (PROTONIX) EC tablet 40 mg     cholecalciferol (vitamin D3) tablet 2,000 Units     umeclidinium (INCRUSE ELLIPTA) 62.5 MCG/INH oral inhaler 1 puff     Medication Instruction     prochlorperazine (COMPAZINE) tablet 5-10 mg    Or     prochlorperazine (COMPAZINE) injection 5 mg     ondansetron (ZOFRAN) injection 8 mg    Or     ondansetron (ZOFRAN-ODT) ODT tab 8 mg    Or     ondansetron (ZOFRAN) tablet 8 mg     LORazepam (ATIVAN) tablet 0.5-1 mg    Or     LORazepam (ATIVAN) injection 0.5-1 mg     acetaminophen (TYLENOL) tablet 650 mg     0.9% sodium chloride infusion     Facility-Administered Medications Ordered in Other Encounters   Medication     lidocaine 1 % 9 mL     sodium bicarbonate 8.4 % injection 1 mEq     lidocaine-EPINEPHrine 1.5 %-1:[id number] injection 10 mL     General:  Fatigued appearing, adult female in NAD.  Moon facies.  HEENT:  Normocephalic.  Sclera anicteric.  MMM.  No lesions of the oropharynx.  Chest:  CTA bilaterally.  No wheezes or crackles.  Left chest port-a-cath is without surrounding erythema or edema.  CV:  RRR.  Nl S1 and S2.  No m/r/g.  Abd:  Soft/NT/ND.  BSs normoactive.  No hepatosplenomegaly.  Ext:  No pitting edema of the bilateral lower extremities.  Pulses 2+ and symmetric.  Musculo:  Strength 5/5 throughout.  Neuro:  Cranial nerves grossly intact.  Psych:  Mood and affect appear normal.            Data:   Results for YUNI CALIXTO (MRN [id number]) as of 1/30/2018 14:23   Ref. Range 1/30/2018 06:19   Sodium Latest Ref Range: 133 - 144 mmol/L 137   Potassium Latest Ref Range: 3.4 - 5.3 mmol/L 4.1   Chloride Latest Ref Range: 94 - 109 mmol/L 106   Carbon Dioxide Latest Ref Range: 20 - 32 mmol/L 25   Urea Nitrogen Latest Ref Range: 7 - 30 mg/dL 8   Creatinine Latest Ref Range: 0.52 - 1.04 mg/dL 0.45 (L)   GFR Estimate  Latest Ref Range: >60 mL/min/1.7m2 >90   GFR Estimate If Black Latest Ref Range: >60 mL/min/1.7m2 >90   Calcium Latest Ref Range: 8.5 - 10.1 mg/dL 7.7 (L)   Anion Gap Latest Ref Range: 3 - 14 mmol/L 6   Magnesium Latest Ref Range: 1.6 - 2.3 mg/dL 1.9   Albumin Latest Ref Range: 3.4 - 5.0 g/dL 2.1 (L)   Protein Total Latest Ref Range: 6.8 - 8.8 g/dL 5.2 (L)   Bilirubin Total Latest Ref Range: 0.2 - 1.3 mg/dL 0.8   Alkaline Phosphatase Latest Ref Range: 40 - 150 U/L 76   ALT Latest Ref Range: 0 - 50 U/L 29   AST Latest Ref Range: 0 - 45 U/L 18   Results for YUNI CALIXTO (MRN [id number]) as of 1/30/2018 14:23   Ref. Range 1/30/2018 06:19   WBC Latest Ref Range: 4.0 - 11.0 10e9/L 0.1 (LL)   Hemoglobin Latest Ref Range: 11.7 - 15.7 g/dL 8.9 (L)   Hematocrit Latest Ref Range: 35.0 - 47.0 % 27.8 (L)   Platelet Count Latest Ref Range: 150 - 450 10e9/L 52 (L)     1/29/18 CXR:  Impression:   Focal airspace opacity projecting over the peripheral right upper lobe  is more pronounced compared to prior. Diffuse bilateral patchy  airspace opacities are not significantly changed compared to prior.  Findings may represent scarring/atelectasis associated with prior  infection or pneumonitis, however cannot rule out new superimposed  infection.     Fara Bradshaw MD

## 2018-01-30 NOTE — PLAN OF CARE
"Problem: Patient Care Overview  Goal: Plan of Care/Patient Progress Review  Outcome: Improving  /61 (BP Location: Left arm)  Pulse 103  Temp 98.1  F (36.7  C) (Oral)  Resp 20  Ht 1.626 m (5' 4\")  Wt 70.3 kg (155 lb)  SpO2 100%  Breastfeeding? No  BMI 26.61 kg/m2    Neuro: A&Ox4.   Cardiac: Initially S-tach 100s treated with NS MIVF. 1L NS bolus. T-max 100.4 SR. VSS.   Respiratory: Sating 100% on 2L N/C. SUAZO.  GI/: Adequate urine output. BM X1  Diet/appetite: Tolerating regular diet.   Activity:  Assist of stand-by, up to chair and in bathroom.   Pain: At acceptable level on current regimen.   Skin: Intact, no new deficits noted. 2RN skin assessment completed with Capri AVALOS RN.  LDA's: Left hand peripheral IV saline locked. Left  Port currently infusing.     Plan: Continue with POC. Notify primary team with changes.      "

## 2018-01-30 NOTE — PROGRESS NOTES
Admission          1/29/2018  1:23 PM  -----------------------------------------------------------  Reason for admission: neutropenic fever  Primary team notified of pt arrival. Heme/Onc.   Admitted from: ED  Via: stretcher  Accompanied by: Aly-  Belongings: Placed in closet; valuables sent home with family  Admission Profile: complete  Teaching: orientation to unit and call light- call light within reach, call don't fall, use of console, meal times, when to call for the RN, and enforced importance of safety   Access: PIV. Port o cath L chest.   Telemetry:Placed on pt  Ht./Wt.: complete  2 RN Skin Assessment Completed By: Unable to complete. Reported to oncoming nurse that this needs to be completed.   Pt status: Patient is pleasant and knowledgeable about her health. A/O, Sinus tach, BP WDL, bowels normoactive, complains of fullness in abdomen.     Temp:  [97.1  F (36.2  C)-100.6  F (38.1  C)] 100.6  F (38.1  C)  Pulse:  [103-139] 103  Heart Rate:  [] 98  Resp:  [10-28] 22  BP: ()/(44-87) 125/74  SpO2:  [92 %-100 %] 99 %

## 2018-01-30 NOTE — PROVIDER NOTIFICATION
01/29/18 2100   Call Information   Date of Call 01/29/18   Time of Call 2134   Name of person requesting the team Frannie   Title of person requesting team RN   RRT Arrival time 2136   Time RRT ended 2151   Reason for call   Type of RRT Adult   Primary reason for call Sepsis suspected   Sepsis Suspected Elevated Lactate level;WBC <4 or >12;BMT/Oncology patient with WBC<0.5 or >12 or ANC <500   Was patient transferred from the ED, ICU, or PACU within last 24 hours prior to RRT call? Yes   SBAR   Situation Pt lactic acid level 4.0, temp 100.6, neutropenic   Background Pt admit with enutropenic fevers started today; last chemo 1 week ago for mets breast cancer   Notable History/Conditions COPD;Cancer   Assessment Pt alert and oriented, pale,, slight chills, denies any sx other than fevers that started at home; lungs clear   Interventions Fluid bolus;IV fluids;Labs   Adjustments to Recommend Plan is for fluid bolus, change IVF to NS instead of LR, repeat lactic acid level at 2 a.m.   Patient Outcome   Patient Outcome Stabilized on unit   RRT Team   Attending/Primary/Covering Physician Pearl Mayes   Date Attending Physician notified 01/29/18   Time Attending Physician notified 2135   Lead RN Zoe Kathleen   RT Qian Villatoro   Post RRT Intervention Assessment   Post RRT Assessment Stable/Improved   Date Follow Up Done 01/29/18   Time Follow Up Done 5286   Comments vitals remain stable, temp 100.4, pt resting comfortably

## 2018-01-31 ENCOUNTER — HOSPITAL ENCOUNTER (OUTPATIENT)
Facility: AMBULATORY SURGERY CENTER | Age: 58
End: 2018-01-31
Attending: SURGERY
Payer: COMMERCIAL

## 2018-01-31 DIAGNOSIS — C50.911 MALIGNANT NEOPLASM OF RIGHT BREAST (H): Primary | ICD-10-CM

## 2018-01-31 LAB
DIFFERENTIAL METHOD BLD: ABNORMAL
ERYTHROCYTE [DISTWIDTH] IN BLOOD BY AUTOMATED COUNT: 15.5 % (ref 10–15)
HCT VFR BLD AUTO: 23.5 % (ref 35–47)
HGB BLD-MCNC: 7.6 G/DL (ref 11.7–15.7)
LACTATE BLD-SCNC: 0.7 MMOL/L (ref 0.4–1.9)
LACTATE BLD-SCNC: 0.7 MMOL/L (ref 0.7–2)
MCH RBC QN AUTO: 31.3 PG (ref 26.5–33)
MCHC RBC AUTO-ENTMCNC: 32.3 G/DL (ref 31.5–36.5)
MCV RBC AUTO: 97 FL (ref 78–100)
PLATELET # BLD AUTO: 24 10E9/L (ref 150–450)
RBC # BLD AUTO: 2.43 10E12/L (ref 3.8–5.2)
VANCOMYCIN SERPL-MCNC: 11.7 MG/L
WBC # BLD AUTO: 0.1 10E9/L (ref 4–11)

## 2018-01-31 PROCEDURE — 83605 ASSAY OF LACTIC ACID: CPT | Performed by: INTERNAL MEDICINE

## 2018-01-31 PROCEDURE — 25000132 ZZH RX MED GY IP 250 OP 250 PS 637: Performed by: STUDENT IN AN ORGANIZED HEALTH CARE EDUCATION/TRAINING PROGRAM

## 2018-01-31 PROCEDURE — 99233 SBSQ HOSP IP/OBS HIGH 50: CPT | Mod: GC | Performed by: INTERNAL MEDICINE

## 2018-01-31 PROCEDURE — 25000132 ZZH RX MED GY IP 250 OP 250 PS 637: Performed by: PHYSICIAN ASSISTANT

## 2018-01-31 PROCEDURE — 12000006 ZZH R&B IMCU INTERMEDIATE UMMC

## 2018-01-31 PROCEDURE — 25000128 H RX IP 250 OP 636: Performed by: EMERGENCY MEDICINE

## 2018-01-31 PROCEDURE — 36415 COLL VENOUS BLD VENIPUNCTURE: CPT | Performed by: INTERNAL MEDICINE

## 2018-01-31 PROCEDURE — 93010 ELECTROCARDIOGRAM REPORT: CPT | Performed by: INTERNAL MEDICINE

## 2018-01-31 PROCEDURE — 25000132 ZZH RX MED GY IP 250 OP 250 PS 637: Performed by: INTERNAL MEDICINE

## 2018-01-31 PROCEDURE — 85027 COMPLETE CBC AUTOMATED: CPT

## 2018-01-31 PROCEDURE — 25000125 ZZHC RX 250: Performed by: INTERNAL MEDICINE

## 2018-01-31 PROCEDURE — 25000128 H RX IP 250 OP 636: Performed by: INTERNAL MEDICINE

## 2018-01-31 PROCEDURE — 80202 ASSAY OF VANCOMYCIN: CPT | Performed by: INTERNAL MEDICINE

## 2018-01-31 PROCEDURE — 93005 ELECTROCARDIOGRAM TRACING: CPT

## 2018-01-31 PROCEDURE — 36592 COLLECT BLOOD FROM PICC: CPT | Performed by: INTERNAL MEDICINE

## 2018-01-31 RX ORDER — LEVOFLOXACIN 750 MG/1
750 TABLET, FILM COATED ORAL EVERY 24 HOURS
Status: DISCONTINUED | OUTPATIENT
Start: 2018-01-31 | End: 2018-02-02

## 2018-01-31 RX ORDER — CEFEPIME 1 G/50ML
2 INJECTION, SOLUTION INTRAVENOUS EVERY 8 HOURS
Status: DISCONTINUED | OUTPATIENT
Start: 2018-01-31 | End: 2018-01-31

## 2018-01-31 RX ORDER — GUAIFENESIN/DEXTROMETHORPHAN 100-10MG/5
5 SYRUP ORAL EVERY 4 HOURS PRN
Status: DISCONTINUED | OUTPATIENT
Start: 2018-01-31 | End: 2018-02-08 | Stop reason: HOSPADM

## 2018-01-31 RX ADMIN — SODIUM CHLORIDE: 9 INJECTION, SOLUTION INTRAVENOUS at 16:21

## 2018-01-31 RX ADMIN — CITALOPRAM HYDROBROMIDE 10 MG: 10 TABLET ORAL at 09:44

## 2018-01-31 RX ADMIN — CALCIUM 1250 MG: 500 TABLET ORAL at 09:43

## 2018-01-31 RX ADMIN — ACETAMINOPHEN 650 MG: 325 TABLET, FILM COATED ORAL at 03:09

## 2018-01-31 RX ADMIN — GUAIFENESIN 1200 MG: 600 TABLET, EXTENDED RELEASE ORAL at 09:44

## 2018-01-31 RX ADMIN — CEFEPIME 2 G: 1 INJECTION, SOLUTION INTRAVENOUS at 11:46

## 2018-01-31 RX ADMIN — CEFEPIME 2 G: 1 INJECTION, SOLUTION INTRAVENOUS at 02:49

## 2018-01-31 RX ADMIN — VITAMIN B12 0.1 MG ORAL TABLET 100 MCG: 0.1 TABLET ORAL at 09:43

## 2018-01-31 RX ADMIN — LEVOFLOXACIN 750 MG: 750 TABLET, FILM COATED ORAL at 16:19

## 2018-01-31 RX ADMIN — VITAMIN D, TAB 1000IU (100/BT) 2000 UNITS: 25 TAB at 09:44

## 2018-01-31 RX ADMIN — PREDNISONE 5 MG: 5 TABLET ORAL at 09:45

## 2018-01-31 RX ADMIN — TOLTERODINE 4 MG: 4 CAPSULE, EXTENDED RELEASE ORAL at 09:43

## 2018-01-31 RX ADMIN — CALCIUM 1250 MG: 500 TABLET ORAL at 18:25

## 2018-01-31 RX ADMIN — SODIUM CHLORIDE: 9 INJECTION, SOLUTION INTRAVENOUS at 05:17

## 2018-01-31 RX ADMIN — PANTOPRAZOLE SODIUM 40 MG: 40 TABLET, DELAYED RELEASE ORAL at 09:44

## 2018-01-31 RX ADMIN — POLYETHYLENE GLYCOL 3350 17 G: 17 POWDER, FOR SOLUTION ORAL at 09:45

## 2018-01-31 RX ADMIN — VANCOMYCIN HYDROCHLORIDE 1250 MG: 10 INJECTION, POWDER, LYOPHILIZED, FOR SOLUTION INTRAVENOUS at 09:45

## 2018-01-31 ASSESSMENT — ACTIVITIES OF DAILY LIVING (ADL)
ADLS_ACUITY_SCORE: 11

## 2018-01-31 ASSESSMENT — PAIN DESCRIPTION - DESCRIPTORS
DESCRIPTORS: DISCOMFORT
DESCRIPTORS: DISCOMFORT

## 2018-01-31 NOTE — PLAN OF CARE
"Problem: Patient Care Overview  Goal: Plan of Care/Patient Progress Review  Outcome: Improving  /64  Pulse 103  Temp 98.3  F (36.8  C) (Oral)  Resp 20  Ht 1.626 m (5' 4\")  Wt 70.3 kg (155 lb)  SpO2 95%  Breastfeeding? No  BMI 26.61 kg/m2    AOx4, up independently in room, VSS, on room air, Tmax 100.1, no complaints of pain.  Good urine output, IVF decreased to 75mLhr, PO intake encouraged.   at bedside, updated on plan of care.      "

## 2018-01-31 NOTE — PROGRESS NOTES
[facility], [place]    Sepsis Evaluation Progress Note    Date of Service: 01/30/2018    I was called to see Ashleigh Alonzo due to abnormal vital signs triggering the Sepsis SIRS screening alert. She is not known to have an infection, but is being treated with broad-spectrum antibiotics for febrile neutropenia.     Physical Exam    Vital Signs:  Temp: 97.8  F (36.6  C) Temp src: Axillary BP: 117/72   Heart Rate: 87 Resp: 22 SpO2: 100 % O2 Device: None (Room air) Oxygen Delivery: 2 LPM    Lab:  Lactic Acid   Date Value Ref Range Status   01/30/2018 0.7 0.7 - 2.0 mmol/L Final     Lactate 3.1 at 20:31 on 01/30/2018.    The patient is at baseline mental status.    The rest of their physical exam is significant for:  Sleeping comfortably under lots of blankets.  Alert and conversant when awoken.  Non-labored respirations on ambient air. CTAB.  RRR. No murmurs.  Abdomen is soft and non-tender.    Assessment and Plan    The SIRS and exam findings are likely due to sepsis.     ID: The patient is currently on the following antibiotics:  Anti-infectives (Future)    Start     Dose/Rate Route Frequency Ordered Stop    01/30/18 0400  vancomycin (VANCOCIN) 1,250 mg in NaCl 0.9 % 250 mL intermittent infusion      1,250 mg  over 90 Minutes Intravenous EVERY 12 HOURS 01/29/18 1520      01/30/18 0000  ceFEPIme (MAXIPIME) 2 g in NaCl 0.9 % 100 mL intermittent infusion      2 g  200 mL/hr over 30 Minutes Intravenous EVERY 8 HOURS 01/29/18 2053          Current antibiotic coverage is appropriate for source of infection.    Fluid: Fluid bolus ordered.    Lab: Repeat lactic acid ordered for AM.    Disposition: The patient will remain on the current unit. We will continue to monitor this patient closely.    Pearl Mayes MD/PhD

## 2018-01-31 NOTE — PROVIDER NOTIFICATION
"/72 (BP Location: Left arm)  Pulse 103  Temp 97.8  F (36.6  C) (Axillary)  Resp 22  Ht 1.626 m (5' 4\")  Wt 70.3 kg (155 lb)  SpO2 100%  Breastfeeding? No  BMI 26.61 kg/m2  Heme/onc notified at pt triggered sepsis protocol for WBC 0.1, and respirations of 22. LA resulted at 3.1. Plan for MD to assess at bedside. No new orders at this time. Will follow POC and update MD team with concerns.    "

## 2018-01-31 NOTE — PHARMACY-VANCOMYCIN DOSING SERVICE
Pharmacy Vancomycin Note  Date of Service 2018  Patient's  1960   57 year old, female    Indication: Febrile Neutropenia  Goal Trough Level: 15-20 mg/L  Day of Therapy: 3  Current Vancomycin regimen:  1250 mg IV q12h    Creatinine for last 3 days  2018:  1:48 PM Creatinine 0.58 mg/dL  2018:  6:19 AM Creatinine 0.45 mg/dL    Current estimated CrCl = Estimated Creatinine Clearance: 133 mL/min (based on Cr of 0.45).    Recent Vancomycin Levels (past 3 days)  2018:  7:43 AM Vancomycin Level 11.7 mg/L (drawn ~12 hr post dose)    Vancomycin IV Administrations (past 72 hours)                   vancomycin (VANCOCIN) 1,250 mg in NaCl 0.9 % 250 mL intermittent infusion (mg) 1,250 mg New Bag 18 0945     1,250 mg New Bag 18     1,250 mg New Bag  0408    vancomycin (VANCOCIN) 1,750 mg in NaCl 0.9 % 500 mL intermittent infusion (mg) 1,750 mg New Bag 18 1618                Nephrotoxins and other renal medications (Future)    Start     Dose/Rate Route Frequency Ordered Stop    18 1800  vancomycin (VANCOCIN) 1,500 mg in NaCl 0.9 % 250 mL intermittent infusion      1,500 mg  over 90 Minutes Intravenous EVERY 12 HOURS 18 1053               Contrast Orders - past 72 hours     None          Interpretation of levels and current regimen:  Trough level is  Subtherapeutic    Has serum creatinine changed > 50% in last 72 hours: No    Urine output:  good urine output    Renal Function: Stable    Plan:  1.  Increase Dose to 1500 mg IV q12hr.  2.  Pharmacy will check trough levels as appropriate in 1-3 Days.    3. Serum creatinine levels will be ordered daily for the first week of therapy and at least twice weekly for subsequent weeks.     Leslie Rodriguez, PD4  [employer] Pharmacy Student           .

## 2018-01-31 NOTE — PLAN OF CARE
"Problem: Patient Care Overview  Goal: Plan of Care/Patient Progress Review  Outcome: Improving  /72 (BP Location: Left arm)  Pulse 103  Temp 99.7  F (37.6  C) (Oral)  Resp 20  Ht 1.626 m (5' 4\")  Wt 70.8 kg (156 lb)  SpO2 100%  Breastfeeding? No  BMI 26.78 kg/m2    Neuro: A&Ox4.   Cardiac: SR. VSS. Tmax 100.4 resolved after PRN Tylenol.    Respiratory: Sating 100% on RA.  GI/: Adequate urine output. No BM on overnights.   Diet/appetite: Tolerating regular diet. Eating well.  Activity: Up independently in room and to the bathroom.  Pain: Denies   Skin: Intact, no new deficits noted.  LDA's: Left port infusing. Left hand peripheral IV saline locked.     Plan: Continue with POC. Notify primary team with changes.      "

## 2018-01-31 NOTE — PROGRESS NOTES
[facility] Progress Note         Assessment and Plan:   Assessment:   56 yo female with stage IIa, T2N0M0, triple negative breast cancer s/p 12 weeks taxol + 3 cycle of pembrolizumab and 2 cycles of adriamycin and cyclophosphamide admitted with neutropenic fever.      Plan:   1.  Neutropenic fever/CAP:  Day #7 s/p adriamycin and cyclophosphamide.  WBC 0.1.  Tmax 100.6.  CXR with focal airspace opacity over RUL.  UA neg.  Blood cultures NGTD.  - s/p Neulasta on 1/24  - Stop IV cefepime and vancomycin  - Transition to PO Levaquin 750 mg daily (will check Qtc as pt on SSRI and previous Qtc was 470)  2.  Severe pancytopenia:  Secondary to chemotherapy  - s/p neulasta  - s/p transfusion 1 UpRBC on 1/29/18.  - continue to monitor daily blood counts.    3.  Pneumonitis secondary to pembrolizumab:  Continues on steroid taper.  No evidence of respiratory compromise.  - pembrolizumab discontinued.  - continue prednisone to 5 mg PO daily.  Will do for 1 week, then every other day for 1 week, then stop.    4.  Right breast cancer:  Enrolled on the ISPY2 clinical trial.  Randomized to Taxol + pembrolizumab followed by adriamycin, cyclophosphamide, and pembrolizumab.  Pembrolizumab discontinued after 3 cycles due to pneumonitis.  Week 12 breast MRI shows no residual breast tumor.  Both cycles #1 and #2 of adriamycin and cyclophosphamide complicated by neutropenic fever.  Discussed with patient today that given good response to treatment thus far and poor tolerance of chemotherapy, will forgo further chemotherapy and proceed with surgery.  - per Dr. Bradshaw, she will notify surgical oncology and move surgery up to 3-5 weeks from now    FEN: regular diet, bolus IV fluids, replete lytes PRN  Prophylaxis: mechanical (thrombocytopenia)  Code: FULL  Disposition: Admitted to the oncology service for evaluation and treatment of febrile neutropenia.  Anticipate discharge to home in the next several days pending clinical  improvement.            Interval History:   Patient reports ongoing fatigue.  Dry Cough has improved. Denies shortness of breath or chest pain. Denies feeling febrile, but checked temp yesterday because she thought she should.  Has not felt febrile since admission, however, Tmax was 100.4.  No bowel movement for 2-3 days.  Denies nausea.  Appetite is good.  The remainder of a review of systems is otherwise negative.          Significant Problems:     Patient Active Problem List   Diagnosis     Anxiety     Anisocoria     Hyperlipidemia LDL goal <130     COPD (chronic obstructive pulmonary disease)     Lack of libido     Family history of ischemic heart disease     Mild major depression (H)     Hives     Lung nodule, multiple     MERLIN (obstructive sleep apnea)- moderate (AHI 21)     Urticaria     Chronic obstructive pulmonary disease, unspecified COPD type (H)     Depression, unspecified depression type     Malignant neoplasm of upper-inner quadrant of right breast in female, estrogen receptor negative (H)     Acute cystitis without hematuria     Pneumonia     Encounter for antineoplastic chemotherapy     Neutropenia with fever (H)     Neutropenic sepsis (H)             Review of Systems:   A comprehensive review of systems was performed and found to be negative except as described in this note           Medications:     Current Facility-Administered Medications   Medication     ceFEPIme (MAXIPIME) intermittent infusion 2 g     polyethylene glycol (MIRALAX/GLYCOLAX) Packet 17 g     senna-docusate (SENOKOT-S;PERICOLACE) 8.6-50 MG per tablet 1-2 tablet     predniSONE (DELTASONE) tablet 5 mg     0.9% sodium chloride infusion     sodium chloride (PF) 0.9% PF flush 3 mL     vancomycin (VANCOCIN) 1,250 mg in NaCl 0.9 % 250 mL intermittent infusion     albuterol (PROAIR HFA/PROVENTIL HFA/VENTOLIN HFA) Inhaler 2 puff     calcium carbonate (OS-JVAIER 500 mg [place]. Ca) tablet 1,250 mg     citalopram (celeXA) tablet 10 mg      "cyanocolbalamin (vitamin  B-12) tablet 100 mcg     tolterodine (DETROL LA) 24 hr capsule 4 mg     guaiFENesin (MUCINEX) 12 hr tablet 1,200 mg     hydrOXYzine (ATARAX) tablet 25-50 mg     pantoprazole (PROTONIX) EC tablet 40 mg     cholecalciferol (vitamin D3) tablet 2,000 Units     umeclidinium (INCRUSE ELLIPTA) 62.5 MCG/INH oral inhaler 1 puff     Medication Instruction     prochlorperazine (COMPAZINE) tablet 5-10 mg    Or     prochlorperazine (COMPAZINE) injection 5 mg     ondansetron (ZOFRAN) injection 8 mg    Or     ondansetron (ZOFRAN-ODT) ODT tab 8 mg    Or     ondansetron (ZOFRAN) tablet 8 mg     LORazepam (ATIVAN) tablet 0.5-1 mg    Or     LORazepam (ATIVAN) injection 0.5-1 mg     acetaminophen (TYLENOL) tablet 650 mg     Facility-Administered Medications Ordered in Other Encounters   Medication     lidocaine 1 % 9 mL     sodium bicarbonate 8.4 % injection 1 mEq     lidocaine-EPINEPHrine 1.5 %-1:[id number] injection 10 mL     Vital signs:  Temp: 99.2  F (37.3  C) Temp src: Oral BP: 108/74   Heart Rate: 100 Resp: 18 SpO2: 95 % O2 Device: None (Room air) Oxygen Delivery: 1.5 LPM Height: 162.6 cm (5' 4\") Weight: 70.8 kg (156 lb)  Estimated body mass index is 26.78 kg/(m^2) as calculated from the following:    Height as of this encounter: 1.626 m (5' 4\").    Weight as of this encounter: 70.8 kg (156 lb).        General:  Fatigued appearing, adult female in NAD.  Moon facies.  HEENT:  Normocephalic.  Sclera anicteric.  MMM.  No lesions of the oropharynx.  Chest:  CTA bilaterally.  No wheezes or crackles.  Left chest port-a-cath is without surrounding erythema or edema.  CV:  RRR.  Nl S1 and S2.  No m/r/g.  Abd:  Soft/NT/ND.  BSs normoactive.  No hepatosplenomegaly.  Ext:  No pitting edema of the bilateral lower extremities.  Pulses 2+ and symmetric.  Musculo:  Strength 5/5 throughout.  Neuro:  Cranial nerves grossly intact.  Psych:  Mood and affect appear normal.            Data:   Results for YUNI CALIXTO (MRN " [id number]) as of 1/30/2018 14:23   Ref. Range 1/30/2018 06:19   Sodium Latest Ref Range: 133 - 144 mmol/L 137   Potassium Latest Ref Range: 3.4 - 5.3 mmol/L 4.1   Chloride Latest Ref Range: 94 - 109 mmol/L 106   Carbon Dioxide Latest Ref Range: 20 - 32 mmol/L 25   Urea Nitrogen Latest Ref Range: 7 - 30 mg/dL 8   Creatinine Latest Ref Range: 0.52 - 1.04 mg/dL 0.45 (L)   GFR Estimate Latest Ref Range: >60 mL/min/1.7m2 >90   GFR Estimate If Black Latest Ref Range: >60 mL/min/1.7m2 >90   Calcium Latest Ref Range: 8.5 - 10.1 mg/dL 7.7 (L)   Anion Gap Latest Ref Range: 3 - 14 mmol/L 6   Magnesium Latest Ref Range: 1.6 - 2.3 mg/dL 1.9   Albumin Latest Ref Range: 3.4 - 5.0 g/dL 2.1 (L)   Protein Total Latest Ref Range: 6.8 - 8.8 g/dL 5.2 (L)   Bilirubin Total Latest Ref Range: 0.2 - 1.3 mg/dL 0.8   Alkaline Phosphatase Latest Ref Range: 40 - 150 U/L 76   ALT Latest Ref Range: 0 - 50 U/L 29   AST Latest Ref Range: 0 - 45 U/L 18   Results for YUNI CALIXTO (MRN [id number]) as of 1/30/2018 14:23   Ref. Range 1/30/2018 06:19   WBC Latest Ref Range: 4.0 - 11.0 10e9/L 0.1 (LL)   Hemoglobin Latest Ref Range: 11.7 - 15.7 g/dL 8.9 (L)   Hematocrit Latest Ref Range: 35.0 - 47.0 % 27.8 (L)   Platelet Count Latest Ref Range: 150 - 450 10e9/L 52 (L)     1/29/18 CXR:  Impression:   Focal airspace opacity projecting over the peripheral right upper lobe  is more pronounced compared to prior. Diffuse bilateral patchy  airspace opacities are not significantly changed compared to prior.  Findings may represent scarring/atelectasis associated with prior  infection or pneumonitis, however cannot rule out new superimposed  infection.     Greg Cooney MD

## 2018-02-01 ENCOUNTER — APPOINTMENT (OUTPATIENT)
Dept: GENERAL RADIOLOGY | Facility: CLINIC | Age: 58
DRG: 853 | End: 2018-02-01
Attending: INTERNAL MEDICINE
Payer: COMMERCIAL

## 2018-02-01 LAB
ABO + RH BLD: NORMAL
ABO + RH BLD: NORMAL
APTT PPP: 37 SEC (ref 22–37)
BLD GP AB SCN SERPL QL: NORMAL
BLD PROD TYP BPU: NORMAL
BLD PROD TYP BPU: NORMAL
BLD UNIT ID BPU: 0
BLOOD BANK CMNT PATIENT-IMP: NORMAL
BLOOD PRODUCT CODE: NORMAL
BPU ID: NORMAL
COPATH REPORT: NORMAL
D DIMER PPP FEU-MCNC: 2.8 UG/ML FEU (ref 0–0.5)
DIFFERENTIAL METHOD BLD: ABNORMAL
ERYTHROCYTE [DISTWIDTH] IN BLOOD BY AUTOMATED COUNT: 14.9 % (ref 10–15)
ERYTHROCYTE [DISTWIDTH] IN BLOOD BY AUTOMATED COUNT: 15.1 % (ref 10–15)
FIBRINOGEN PPP-MCNC: 647 MG/DL (ref 200–420)
HAPTOGLOB SERPL-MCNC: 249 MG/DL (ref 15–200)
HCT VFR BLD AUTO: 21 % (ref 35–47)
HCT VFR BLD AUTO: 22.6 % (ref 35–47)
HGB BLD-MCNC: 7.1 G/DL (ref 11.7–15.7)
HGB BLD-MCNC: 7.6 G/DL (ref 11.7–15.7)
INR PPP: 1.12 (ref 0.86–1.14)
LDH SERPL L TO P-CCNC: 157 U/L (ref 81–234)
MCH RBC QN AUTO: 32.1 PG (ref 26.5–33)
MCH RBC QN AUTO: 32.3 PG (ref 26.5–33)
MCHC RBC AUTO-ENTMCNC: 33.6 G/DL (ref 31.5–36.5)
MCHC RBC AUTO-ENTMCNC: 33.8 G/DL (ref 31.5–36.5)
MCV RBC AUTO: 95 FL (ref 78–100)
MCV RBC AUTO: 96 FL (ref 78–100)
NUM BPU REQUESTED: 2
PLATELET # BLD AUTO: 14 10E9/L (ref 150–450)
PLATELET # BLD AUTO: 16 10E9/L (ref 150–450)
RBC # BLD AUTO: 2.2 10E12/L (ref 3.8–5.2)
RBC # BLD AUTO: 2.37 10E12/L (ref 3.8–5.2)
RETICS # AUTO: 5.4 10E9/L (ref 25–95)
RETICS/RBC NFR AUTO: 0.2 % (ref 0.5–2)
SPECIMEN EXP DATE BLD: NORMAL
TRANSFUSION STATUS PATIENT QL: NORMAL
TRANSFUSION STATUS PATIENT QL: NORMAL
WBC # BLD AUTO: 0.1 10E9/L (ref 4–11)
WBC # BLD AUTO: 0.2 10E9/L (ref 4–11)

## 2018-02-01 PROCEDURE — 83010 ASSAY OF HAPTOGLOBIN QUANT: CPT | Performed by: STUDENT IN AN ORGANIZED HEALTH CARE EDUCATION/TRAINING PROGRAM

## 2018-02-01 PROCEDURE — 85025 COMPLETE CBC W/AUTO DIFF WBC: CPT | Performed by: STUDENT IN AN ORGANIZED HEALTH CARE EDUCATION/TRAINING PROGRAM

## 2018-02-01 PROCEDURE — 85027 COMPLETE CBC AUTOMATED: CPT

## 2018-02-01 PROCEDURE — 74018 RADEX ABDOMEN 1 VIEW: CPT

## 2018-02-01 PROCEDURE — 40000611 ZZHCL STATISTIC MORPHOLOGY W/INTERP HEMEPATH TC 85060: Performed by: STUDENT IN AN ORGANIZED HEALTH CARE EDUCATION/TRAINING PROGRAM

## 2018-02-01 PROCEDURE — 25000132 ZZH RX MED GY IP 250 OP 250 PS 637: Performed by: INTERNAL MEDICINE

## 2018-02-01 PROCEDURE — 25000132 ZZH RX MED GY IP 250 OP 250 PS 637: Performed by: STUDENT IN AN ORGANIZED HEALTH CARE EDUCATION/TRAINING PROGRAM

## 2018-02-01 PROCEDURE — 12000006 ZZH R&B IMCU INTERMEDIATE UMMC

## 2018-02-01 PROCEDURE — 85610 PROTHROMBIN TIME: CPT | Performed by: STUDENT IN AN ORGANIZED HEALTH CARE EDUCATION/TRAINING PROGRAM

## 2018-02-01 PROCEDURE — 83615 LACTATE (LD) (LDH) ENZYME: CPT | Performed by: STUDENT IN AN ORGANIZED HEALTH CARE EDUCATION/TRAINING PROGRAM

## 2018-02-01 PROCEDURE — 85045 AUTOMATED RETICULOCYTE COUNT: CPT | Performed by: STUDENT IN AN ORGANIZED HEALTH CARE EDUCATION/TRAINING PROGRAM

## 2018-02-01 PROCEDURE — 85379 FIBRIN DEGRADATION QUANT: CPT | Performed by: STUDENT IN AN ORGANIZED HEALTH CARE EDUCATION/TRAINING PROGRAM

## 2018-02-01 PROCEDURE — 85027 COMPLETE CBC AUTOMATED: CPT | Performed by: STUDENT IN AN ORGANIZED HEALTH CARE EDUCATION/TRAINING PROGRAM

## 2018-02-01 PROCEDURE — 36592 COLLECT BLOOD FROM PICC: CPT | Performed by: STUDENT IN AN ORGANIZED HEALTH CARE EDUCATION/TRAINING PROGRAM

## 2018-02-01 PROCEDURE — 25000125 ZZHC RX 250: Performed by: INTERNAL MEDICINE

## 2018-02-01 PROCEDURE — 25000132 ZZH RX MED GY IP 250 OP 250 PS 637: Performed by: PHYSICIAN ASSISTANT

## 2018-02-01 PROCEDURE — P9016 RBC LEUKOCYTES REDUCED: HCPCS | Performed by: EMERGENCY MEDICINE

## 2018-02-01 PROCEDURE — 99233 SBSQ HOSP IP/OBS HIGH 50: CPT | Mod: GC | Performed by: INTERNAL MEDICINE

## 2018-02-01 PROCEDURE — 85384 FIBRINOGEN ACTIVITY: CPT | Performed by: STUDENT IN AN ORGANIZED HEALTH CARE EDUCATION/TRAINING PROGRAM

## 2018-02-01 PROCEDURE — 85730 THROMBOPLASTIN TIME PARTIAL: CPT | Performed by: STUDENT IN AN ORGANIZED HEALTH CARE EDUCATION/TRAINING PROGRAM

## 2018-02-01 PROCEDURE — 25000128 H RX IP 250 OP 636: Performed by: INTERNAL MEDICINE

## 2018-02-01 RX ORDER — SIMETHICONE 40MG/0.6ML
40 SUSPENSION, DROPS(FINAL DOSAGE FORM)(ML) ORAL EVERY 6 HOURS PRN
Status: DISCONTINUED | OUTPATIENT
Start: 2018-02-01 | End: 2018-02-08 | Stop reason: HOSPADM

## 2018-02-01 RX ADMIN — VITAMIN B12 0.1 MG ORAL TABLET 100 MCG: 0.1 TABLET ORAL at 08:46

## 2018-02-01 RX ADMIN — PREDNISONE 5 MG: 5 TABLET ORAL at 08:47

## 2018-02-01 RX ADMIN — LEVOFLOXACIN 750 MG: 750 TABLET, FILM COATED ORAL at 14:56

## 2018-02-01 RX ADMIN — VITAMIN D, TAB 1000IU (100/BT) 2000 UNITS: 25 TAB at 08:47

## 2018-02-01 RX ADMIN — PANTOPRAZOLE SODIUM 40 MG: 40 TABLET, DELAYED RELEASE ORAL at 08:46

## 2018-02-01 RX ADMIN — CITALOPRAM HYDROBROMIDE 10 MG: 10 TABLET ORAL at 08:47

## 2018-02-01 RX ADMIN — TOLTERODINE 4 MG: 4 CAPSULE, EXTENDED RELEASE ORAL at 08:46

## 2018-02-01 RX ADMIN — SIMETHICONE 40 MG: 20 SUSPENSION/ DROPS ORAL at 02:12

## 2018-02-01 RX ADMIN — UMECLIDINIUM 1 PUFF: 62.5 AEROSOL, POWDER ORAL at 08:47

## 2018-02-01 RX ADMIN — SODIUM CHLORIDE: 9 INJECTION, SOLUTION INTRAVENOUS at 12:15

## 2018-02-01 RX ADMIN — CALCIUM 1250 MG: 500 TABLET ORAL at 08:46

## 2018-02-01 RX ADMIN — CALCIUM 1250 MG: 500 TABLET ORAL at 17:32

## 2018-02-01 RX ADMIN — SODIUM CHLORIDE: 9 INJECTION, SOLUTION INTRAVENOUS at 02:12

## 2018-02-01 RX ADMIN — POLYETHYLENE GLYCOL 3350 17 G: 17 POWDER, FOR SOLUTION ORAL at 08:46

## 2018-02-01 RX ADMIN — ACETAMINOPHEN 650 MG: 325 TABLET, FILM COATED ORAL at 02:18

## 2018-02-01 ASSESSMENT — ACTIVITIES OF DAILY LIVING (ADL)
ADLS_ACUITY_SCORE: 11

## 2018-02-01 ASSESSMENT — PAIN DESCRIPTION - DESCRIPTORS
DESCRIPTORS: DISCOMFORT
DESCRIPTORS: DISCOMFORT

## 2018-02-01 NOTE — PROVIDER NOTIFICATION
[facility] onc moonlighter notified at 0310 of pt having mid/upper abdominal pain, rating pain 8/10. Repositioned and applied heat with minimal relief. Tylenol and simethicone ineffective as well. Order placed for STAT abdominal xray and 1mg IV dilaudid one time dose.     Pt subsequently had large bowel movement and reported significant decrease in pain. Dilaudid not given.

## 2018-02-01 NOTE — PROVIDER NOTIFICATION
[facility] onc cornel notified at 0630 of critical lab value: platelets= 16. MD notified via telephone and acknowledged lab value. No new orders at this time.

## 2018-02-01 NOTE — PROGRESS NOTES
[facility] Progress Note         Assessment and Plan:   Assessment:   56 yo female with stage IIa, T2N0M0, triple negative breast cancer s/p 12 weeks taxol + 3 cycle of pembrolizumab and 2 cycles of adriamycin and cyclophosphamide admitted with neutropenic fever.      Plan:   1.  Neutropenic fever:  Day #9 s/p adriamycin and cyclophosphamide.  WBC 0.1->0.2.  Now afebrile for 36 hrs, last Tmax was 100.4.  CXR with focal airspace opacity over RUL.  UA neg.  Blood cultures NGTD.  - s/p Neulasta on 1/24  - s/p cefepime and vancomycin(1/29-1/30)  - Continue PO Levaquin 750 mg daily (Qtc was 423)    2.  Severe pancytopenia:  Secondary to chemotherapy. DIC less likely as she has normal PT/INR, fibrinogen, mildly elevated D-dimer which is non specific. Platelets are at 14 but no bleeding,Hb at 7.3, WBC 0.2. Normal LDH and haptoglobin, suggesting no hemolysis or concern for TMA.Peripheral smear shows no schistocytes or pseudothrmbocytopenia.  - s/p neulasta 1/24  - s/p transfusion 1 UpRBC on 1/29/18. Will transfuse one more unit today (2/1)  - continue to monitor daily blood counts.  -Hematology consult for the need for further work up    3.  Pneumonitis secondary to pembrolizumab:  Continues on steroid taper.  No evidence of respiratory compromise.  - pembrolizumab discontinued.  - continue prednisone to 5 mg PO daily.  Will do for 1 week (upto 2/5), then every other day for 1 week, then stop.    4.  Right breast cancer:  Enrolled on the ISPY2 clinical trial.  Randomized to Taxol + pembrolizumab followed by adriamycin, cyclophosphamide, and pembrolizumab.  Pembrolizumab discontinued after 3 cycles due to pneumonitis.  Week 12 breast MRI shows no residual breast tumor.  Both cycles #1 and #2 of adriamycin and cyclophosphamide complicated by neutropenic fever.  Discussed with patient today that given good response to treatment thus far and poor tolerance of chemotherapy, will forgo further chemotherapy and proceed with  surgery.  - per Dr. Bradshaw, she will notify surgical oncology and move surgery up to 3-5 weeks from now    5. Abdominal pain: non specific, Abd X ray showed increased colonc stool, her pain was relieved after 2 big BM.  -Monitor and use heat pads which help her pain.    FEN: regular diet, bolus IV fluids, replete lytes PRN  Prophylaxis: mechanical (thrombocytopenia)  Code: FULL  Disposition: Admitted to the oncology service for evaluation and treatment of febrile neutropenia.  Anticipate discharge to home in the next several days pending clinical improvement.            Interval History:   Patient reports ongoing fatigue.  No new symptoms except on going fatigue. Denies shortness of breath or chest pain. No fever reported. She had some abdominal pain which was relieved after BM yesterday. Denies nausea.  Appetite is good.  The remainder of a review of systems is otherwise negative.          Significant Problems:     Patient Active Problem List   Diagnosis     Anxiety     Anisocoria     Hyperlipidemia LDL goal <130     COPD (chronic obstructive pulmonary disease)     Lack of libido     Family history of ischemic heart disease     Mild major depression (H)     Hives     Lung nodule, multiple     MERLIN (obstructive sleep apnea)- moderate (AHI 21)     Urticaria     Chronic obstructive pulmonary disease, unspecified COPD type (H)     Depression, unspecified depression type     Malignant neoplasm of upper-inner quadrant of right breast in female, estrogen receptor negative (H)     Acute cystitis without hematuria     Pneumonia     Encounter for antineoplastic chemotherapy     Neutropenia with fever (H)     Neutropenic sepsis (H)             Review of Systems:   A comprehensive review of systems was performed and found to be negative except as described in this note           Medications:     Current Facility-Administered Medications   Medication     simethicone (MYLICON) suspension 40 mg     HYDROmorphone (DILAUDID)  "injection 1 mg     levofloxacin (LEVAQUIN) tablet 750 mg     guaiFENesin-dextromethorphan (ROBITUSSIN DM) 100-10 MG/5ML syrup 5 mL     polyethylene glycol (MIRALAX/GLYCOLAX) Packet 17 g     senna-docusate (SENOKOT-S;PERICOLACE) 8.6-50 MG per tablet 1-2 tablet     predniSONE (DELTASONE) tablet 5 mg     0.9% sodium chloride infusion     sodium chloride (PF) 0.9% PF flush 3 mL     albuterol (PROAIR HFA/PROVENTIL HFA/VENTOLIN HFA) Inhaler 2 puff     calcium carbonate (OS-JAVIER 500 mg [place] Ca) tablet 1,250 mg     citalopram (celeXA) tablet 10 mg     cyanocolbalamin (vitamin  B-12) tablet 100 mcg     tolterodine (DETROL LA) 24 hr capsule 4 mg     hydrOXYzine (ATARAX) tablet 25-50 mg     pantoprazole (PROTONIX) EC tablet 40 mg     cholecalciferol (vitamin D3) tablet 2,000 Units     umeclidinium (INCRUSE ELLIPTA) 62.5 MCG/INH oral inhaler 1 puff     Medication Instruction     prochlorperazine (COMPAZINE) tablet 5-10 mg    Or     prochlorperazine (COMPAZINE) injection 5 mg     ondansetron (ZOFRAN) injection 8 mg    Or     ondansetron (ZOFRAN-ODT) ODT tab 8 mg    Or     ondansetron (ZOFRAN) tablet 8 mg     LORazepam (ATIVAN) tablet 0.5-1 mg    Or     LORazepam (ATIVAN) injection 0.5-1 mg     acetaminophen (TYLENOL) tablet 650 mg     Facility-Administered Medications Ordered in Other Encounters   Medication     lidocaine 1 % 9 mL     sodium bicarbonate 8.4 % injection 1 mEq     lidocaine-EPINEPHrine 1.5 %-1:[id number] injection 10 mL     Vital signs:  Temp: 98  F (36.7  C) Temp src: Oral BP: 100/55   Heart Rate: 92 Resp: 18 SpO2: 98 % O2 Device: None (Room air) Oxygen Delivery: 1.5 LPM Height: 162.6 cm (5' 4\") Weight: 69.5 kg (153 lb 4.8 oz)  Estimated body mass index is 26.31 kg/(m^2) as calculated from the following:    Height as of this encounter: 1.626 m (5' 4\").    Weight as of this encounter: 69.5 kg (153 lb 4.8 oz).        General:  Fatigued appearing, adult female in NAD.  Moon facies.  HEENT:  Normocephalic.  Sclera " anicteric.  MMM.  No lesions of the oropharynx.  Chest:  CTA bilaterally.  No wheezes or crackles.  Left chest port-a-cath is without surrounding erythema or edema.  CV:  RRR.  Nl S1 and S2.  No m/r/g.  Abd:  Soft/NT/ND.  BSs normoactive.  No hepatosplenomegaly.  Ext:  No pitting edema of the bilateral lower extremities.  Pulses 2+ and symmetric.  Musculo:  Strength 5/5 throughout.  Neuro:  Cranial nerves grossly intact.  Psych:  Mood and affect appear normal.            Data:   Results for YUNI CALIXTO (MRN [id number]) as of 1/30/2018 14:23   Ref. Range 1/30/2018 06:19   Sodium Latest Ref Range: 133 - 144 mmol/L 137   Potassium Latest Ref Range: 3.4 - 5.3 mmol/L 4.1   Chloride Latest Ref Range: 94 - 109 mmol/L 106   Carbon Dioxide Latest Ref Range: 20 - 32 mmol/L 25   Urea Nitrogen Latest Ref Range: 7 - 30 mg/dL 8   Creatinine Latest Ref Range: 0.52 - 1.04 mg/dL 0.45 (L)   GFR Estimate Latest Ref Range: >60 mL/min/1.7m2 >90   GFR Estimate If Black Latest Ref Range: >60 mL/min/1.7m2 >90   Calcium Latest Ref Range: 8.5 - 10.1 mg/dL 7.7 (L)   Anion Gap Latest Ref Range: 3 - 14 mmol/L 6   Magnesium Latest Ref Range: 1.6 - 2.3 mg/dL 1.9   Albumin Latest Ref Range: 3.4 - 5.0 g/dL 2.1 (L)   Protein Total Latest Ref Range: 6.8 - 8.8 g/dL 5.2 (L)   Bilirubin Total Latest Ref Range: 0.2 - 1.3 mg/dL 0.8   Alkaline Phosphatase Latest Ref Range: 40 - 150 U/L 76   ALT Latest Ref Range: 0 - 50 U/L 29   AST Latest Ref Range: 0 - 45 U/L 18   Results for YUNI CALIXTO (MRN [id number]) as of 1/30/2018 14:23   Ref. Range 1/30/2018 06:19   WBC Latest Ref Range: 4.0 - 11.0 10e9/L 0.1 (LL)   Hemoglobin Latest Ref Range: 11.7 - 15.7 g/dL 8.9 (L)   Hematocrit Latest Ref Range: 35.0 - 47.0 % 27.8 (L)   Platelet Count Latest Ref Range: 150 - 450 10e9/L 52 (L)     1/29/18 CXR:  Impression:   Focal airspace opacity projecting over the peripheral right upper lobe  is more pronounced compared to prior. Diffuse bilateral patchy  airspace  opacities are not significantly changed compared to prior.  Findings may represent scarring/atelectasis associated with prior  infection or pneumonitis, however cannot rule out new superimposed  infection.     Greg Cooney MD

## 2018-02-01 NOTE — PLAN OF CARE
Problem: Patient Care Overview  Goal: Plan of Care/Patient Progress Review  Outcome: No Change  A/O x4. Tmax 99.4, VSS, SR/ST with HR 80-100s, but up to 130s with activity. Room air. Regular diet with poor appetite. Complaining of significant abdominal pain, relieved after 2 large bowel movements. Voiding adequately. Skin intact. NS infusing at 100 mL/hr. Triggered sepsis protocol, lactic= 0.7. Up independently. Continue with POC.

## 2018-02-01 NOTE — PLAN OF CARE
"Problem: Patient Care Overview  Goal: Plan of Care/Patient Progress Review  Outcome: Improving  /74 (BP Location: Left arm)  Pulse 103  Temp 99.2  F (37.3  C) (Oral)  Resp 18  Ht 1.626 m (5' 4\")  Wt 70.8 kg (156 lb)  SpO2 95%  Breastfeeding? No  BMI 26.78 kg/m2    AOx4, up independently in room, VSS, on room air, Tmax 99.2, no complaints of pain.  Good urine output, IVF at 100 mL/hr, PO intake encouraged, poor appetite.  IV antibiotics discontinued, started on PO Levaquin.  at bedside, updated on plan of care.      "

## 2018-02-02 ENCOUNTER — APPOINTMENT (OUTPATIENT)
Dept: GENERAL RADIOLOGY | Facility: CLINIC | Age: 58
DRG: 853 | End: 2018-02-02
Payer: COMMERCIAL

## 2018-02-02 ENCOUNTER — APPOINTMENT (OUTPATIENT)
Dept: CT IMAGING | Facility: CLINIC | Age: 58
DRG: 853 | End: 2018-02-02
Payer: COMMERCIAL

## 2018-02-02 LAB
ALBUMIN SERPL-MCNC: 1.9 G/DL (ref 3.4–5)
ALP SERPL-CCNC: 68 U/L (ref 40–150)
ALT SERPL W P-5'-P-CCNC: 49 U/L (ref 0–50)
ANION GAP SERPL CALCULATED.3IONS-SCNC: 8 MMOL/L (ref 3–14)
AST SERPL W P-5'-P-CCNC: 43 U/L (ref 0–45)
BILIRUB SERPL-MCNC: 0.7 MG/DL (ref 0.2–1.3)
BUN SERPL-MCNC: 3 MG/DL (ref 7–30)
CALCIUM SERPL-MCNC: 7.8 MG/DL (ref 8.5–10.1)
CHLORIDE SERPL-SCNC: 108 MMOL/L (ref 94–109)
CO2 SERPL-SCNC: 23 MMOL/L (ref 20–32)
CREAT SERPL-MCNC: 0.46 MG/DL (ref 0.52–1.04)
ERYTHROCYTE [DISTWIDTH] IN BLOOD BY AUTOMATED COUNT: 15.6 % (ref 10–15)
GFR SERPL CREATININE-BSD FRML MDRD: >90 ML/MIN/1.7M2
GLUCOSE SERPL-MCNC: 77 MG/DL (ref 70–99)
HCT VFR BLD AUTO: 24.5 % (ref 35–47)
HEMOCCULT STL QL: NEGATIVE
HGB BLD-MCNC: 8.1 G/DL (ref 11.7–15.7)
INTERPRETATION ECG - MUSE: NORMAL
LACTATE BLD-SCNC: 0.8 MMOL/L (ref 0.4–1.9)
MCH RBC QN AUTO: 30.8 PG (ref 26.5–33)
MCHC RBC AUTO-ENTMCNC: 33.1 G/DL (ref 31.5–36.5)
MCV RBC AUTO: 93 FL (ref 78–100)
MRSA DNA SPEC QL NAA+PROBE: NEGATIVE
PLATELET # BLD AUTO: 12 10E9/L (ref 150–450)
POTASSIUM SERPL-SCNC: 3.3 MMOL/L (ref 3.4–5.3)
PROT SERPL-MCNC: 5.1 G/DL (ref 6.8–8.8)
RBC # BLD AUTO: 2.63 10E12/L (ref 3.8–5.2)
SODIUM SERPL-SCNC: 140 MMOL/L (ref 133–144)
SPECIMEN SOURCE: NORMAL
WBC # BLD AUTO: 0.3 10E9/L (ref 4–11)

## 2018-02-02 PROCEDURE — 99233 SBSQ HOSP IP/OBS HIGH 50: CPT | Mod: GC | Performed by: INTERNAL MEDICINE

## 2018-02-02 PROCEDURE — 85027 COMPLETE CBC AUTOMATED: CPT | Performed by: STUDENT IN AN ORGANIZED HEALTH CARE EDUCATION/TRAINING PROGRAM

## 2018-02-02 PROCEDURE — 87641 MR-STAPH DNA AMP PROBE: CPT | Performed by: STUDENT IN AN ORGANIZED HEALTH CARE EDUCATION/TRAINING PROGRAM

## 2018-02-02 PROCEDURE — 25000128 H RX IP 250 OP 636: Performed by: INTERNAL MEDICINE

## 2018-02-02 PROCEDURE — 87529 HSV DNA AMP PROBE: CPT | Performed by: STUDENT IN AN ORGANIZED HEALTH CARE EDUCATION/TRAINING PROGRAM

## 2018-02-02 PROCEDURE — 71045 X-RAY EXAM CHEST 1 VIEW: CPT

## 2018-02-02 PROCEDURE — 12000006 ZZH R&B IMCU INTERMEDIATE UMMC

## 2018-02-02 PROCEDURE — 83605 ASSAY OF LACTIC ACID: CPT | Performed by: INTERNAL MEDICINE

## 2018-02-02 PROCEDURE — 25000125 ZZHC RX 250: Performed by: RADIOLOGY

## 2018-02-02 PROCEDURE — 25000132 ZZH RX MED GY IP 250 OP 250 PS 637: Performed by: INTERNAL MEDICINE

## 2018-02-02 PROCEDURE — 80053 COMPREHEN METABOLIC PANEL: CPT | Performed by: STUDENT IN AN ORGANIZED HEALTH CARE EDUCATION/TRAINING PROGRAM

## 2018-02-02 PROCEDURE — 74177 CT ABD & PELVIS W/CONTRAST: CPT

## 2018-02-02 PROCEDURE — 36592 COLLECT BLOOD FROM PICC: CPT | Performed by: INTERNAL MEDICINE

## 2018-02-02 PROCEDURE — 25000132 ZZH RX MED GY IP 250 OP 250 PS 637: Performed by: PHYSICIAN ASSISTANT

## 2018-02-02 PROCEDURE — 25000128 H RX IP 250 OP 636: Performed by: STUDENT IN AN ORGANIZED HEALTH CARE EDUCATION/TRAINING PROGRAM

## 2018-02-02 PROCEDURE — 36592 COLLECT BLOOD FROM PICC: CPT | Performed by: STUDENT IN AN ORGANIZED HEALTH CARE EDUCATION/TRAINING PROGRAM

## 2018-02-02 PROCEDURE — 25000125 ZZHC RX 250: Performed by: INTERNAL MEDICINE

## 2018-02-02 PROCEDURE — 87040 BLOOD CULTURE FOR BACTERIA: CPT | Performed by: INTERNAL MEDICINE

## 2018-02-02 PROCEDURE — 87633 RESP VIRUS 12-25 TARGETS: CPT | Performed by: STUDENT IN AN ORGANIZED HEALTH CARE EDUCATION/TRAINING PROGRAM

## 2018-02-02 PROCEDURE — 82272 OCCULT BLD FECES 1-3 TESTS: CPT | Performed by: STUDENT IN AN ORGANIZED HEALTH CARE EDUCATION/TRAINING PROGRAM

## 2018-02-02 PROCEDURE — 87640 STAPH A DNA AMP PROBE: CPT | Performed by: STUDENT IN AN ORGANIZED HEALTH CARE EDUCATION/TRAINING PROGRAM

## 2018-02-02 PROCEDURE — 25000128 H RX IP 250 OP 636: Performed by: RADIOLOGY

## 2018-02-02 RX ORDER — DIPHENHYDRAMINE HYDROCHLORIDE AND LIDOCAINE HYDROCHLORIDE AND ALUMINUM HYDROXIDE AND MAGNESIUM HYDRO
10 KIT EVERY 6 HOURS PRN
Status: DISCONTINUED | OUTPATIENT
Start: 2018-02-02 | End: 2018-02-08 | Stop reason: HOSPADM

## 2018-02-02 RX ORDER — CEFEPIME 1 G/50ML
2 INJECTION, SOLUTION INTRAVENOUS EVERY 8 HOURS
Status: DISCONTINUED | OUTPATIENT
Start: 2018-02-02 | End: 2018-02-06

## 2018-02-02 RX ORDER — POTASSIUM CHLORIDE 750 MG/1
20 TABLET, EXTENDED RELEASE ORAL 3 TIMES DAILY
Status: DISCONTINUED | OUTPATIENT
Start: 2018-02-02 | End: 2018-02-08 | Stop reason: HOSPADM

## 2018-02-02 RX ORDER — CEFEPIME 1 G/50ML
1 INJECTION, SOLUTION INTRAVENOUS EVERY 12 HOURS
Status: DISCONTINUED | OUTPATIENT
Start: 2018-02-02 | End: 2018-02-02

## 2018-02-02 RX ORDER — POTASSIUM CHLORIDE 20MEQ/15ML
20 LIQUID (ML) ORAL 3 TIMES DAILY
Status: DISCONTINUED | OUTPATIENT
Start: 2018-02-02 | End: 2018-02-02 | Stop reason: ALTCHOICE

## 2018-02-02 RX ORDER — POLYETHYLENE GLYCOL 3350 17 G/17G
17 POWDER, FOR SOLUTION ORAL DAILY PRN
Status: DISCONTINUED | OUTPATIENT
Start: 2018-02-02 | End: 2018-02-07

## 2018-02-02 RX ORDER — IOPAMIDOL 755 MG/ML
95 INJECTION, SOLUTION INTRAVASCULAR ONCE
Status: COMPLETED | OUTPATIENT
Start: 2018-02-02 | End: 2018-02-02

## 2018-02-02 RX ADMIN — PANTOPRAZOLE SODIUM 40 MG: 40 TABLET, DELAYED RELEASE ORAL at 10:28

## 2018-02-02 RX ADMIN — SODIUM CHLORIDE: 9 INJECTION, SOLUTION INTRAVENOUS at 00:16

## 2018-02-02 RX ADMIN — CALCIUM 1250 MG: 500 TABLET ORAL at 10:28

## 2018-02-02 RX ADMIN — PREDNISONE 5 MG: 5 TABLET ORAL at 10:28

## 2018-02-02 RX ADMIN — CITALOPRAM HYDROBROMIDE 10 MG: 10 TABLET ORAL at 10:28

## 2018-02-02 RX ADMIN — CALCIUM 1250 MG: 500 TABLET ORAL at 18:10

## 2018-02-02 RX ADMIN — CEFEPIME 1 G: 1 INJECTION, SOLUTION INTRAVENOUS at 14:36

## 2018-02-02 RX ADMIN — ACETAMINOPHEN 650 MG: 325 TABLET, FILM COATED ORAL at 11:31

## 2018-02-02 RX ADMIN — POLYETHYLENE GLYCOL 3350 17 G: 17 POWDER, FOR SOLUTION ORAL at 10:28

## 2018-02-02 RX ADMIN — VITAMIN B12 0.1 MG ORAL TABLET 100 MCG: 0.1 TABLET ORAL at 10:28

## 2018-02-02 RX ADMIN — UMECLIDINIUM 1 PUFF: 62.5 AEROSOL, POWDER ORAL at 10:27

## 2018-02-02 RX ADMIN — VITAMIN D, TAB 1000IU (100/BT) 2000 UNITS: 25 TAB at 10:27

## 2018-02-02 RX ADMIN — CEFEPIME 2 G: 1 INJECTION, SOLUTION INTRAVENOUS at 21:53

## 2018-02-02 RX ADMIN — POTASSIUM CHLORIDE 20 MEQ: 750 TABLET, EXTENDED RELEASE ORAL at 16:43

## 2018-02-02 RX ADMIN — SODIUM CHLORIDE, PRESERVATIVE FREE 64 ML: 5 INJECTION INTRAVENOUS at 12:32

## 2018-02-02 RX ADMIN — IOPAMIDOL 95 ML: 755 INJECTION, SOLUTION INTRAVENOUS at 12:32

## 2018-02-02 RX ADMIN — POTASSIUM CHLORIDE 20 MEQ: 750 TABLET, EXTENDED RELEASE ORAL at 20:09

## 2018-02-02 RX ADMIN — SODIUM CHLORIDE: 9 INJECTION, SOLUTION INTRAVENOUS at 10:15

## 2018-02-02 RX ADMIN — ACETAMINOPHEN 650 MG: 325 TABLET, FILM COATED ORAL at 00:22

## 2018-02-02 RX ADMIN — VANCOMYCIN HYDROCHLORIDE 1500 MG: 10 INJECTION, POWDER, LYOPHILIZED, FOR SOLUTION INTRAVENOUS at 15:01

## 2018-02-02 RX ADMIN — SODIUM CHLORIDE: 9 INJECTION, SOLUTION INTRAVENOUS at 21:53

## 2018-02-02 RX ADMIN — TOLTERODINE 4 MG: 4 CAPSULE, EXTENDED RELEASE ORAL at 10:28

## 2018-02-02 ASSESSMENT — ACTIVITIES OF DAILY LIVING (ADL)
ADLS_ACUITY_SCORE: 11

## 2018-02-02 NOTE — PROGRESS NOTES
[facility] Progress Note         Assessment and Plan:   Assessment:   56 yo female with stage IIa, T2N0M0, triple negative breast cancer s/p 12 weeks taxol + 3 cycle of pembrolizumab and 2 cycles of adriamycin and cyclophosphamide admitted with neutropenic fever.      Plan:   1.  Neutropenic fever:  Day #10 s/p adriamycin and cyclophosphamide.  WBC 0.1->0.2->0.3.  Was afebrile for 48 hrs but spiked fever today at 102 and last night on 100.8. UA neg.  Blood cultures NGTD. CT CAP scan today shows overall improving lung opacities but some new opacities in rt UL.   - s/p Neulasta on 1/24  - s/p cefepime and vancomycin(1/29-1/30) (restarted today 2/2-)  - s/pLevaquin 750 mg daily (1/31-2/1)(Qtc was 423)  - ID consulted today   -Below are recommendations:- Respiratory viral panel, CMV PCR, MRSA nares, increase cefepime to 2 gm Q8hr and if MRSA nares neg can DC Vancomycin    2.  Severe pancytopenia:  Probably secondary to chemotherapy. WBC improving but Plt count is decreasing. Plt count has never been so low with previous cycles of chemo. DIC less likely as she has normal PT/INR, fibrinogen, mildly elevated D-dimer which is non specific. Platelets are at 14-> but no bleeding,Hb at 7.3, WBC 0.2. Normal LDH and haptoglobin, suggesting no hemolysis or concern for TMA.Peripheral smear shows no schistocytes or pseudothrmbocytopenia.  - s/p neulasta 1/24  - s/p transfusion 2 UpRBC (1/29/18 and 2/1)  -Goal Hb>8 and Goal Plt >10, if bleeding Goal of Plt is 20K  -continue to monitor daily blood counts.  -Hematology consult for the need for further work up    3.  Pneumonitis secondary to pembrolizumab:  Continues on steroid taper.  No evidence of respiratory compromise.  - pembrolizumab discontinued.  - continue prednisone to 5 mg PO daily.  Will do for 1 week (upto 2/5), then every other day for 1 week, then stop.    4. Mucositis from chemo: small ulcer on tongue.  -will swab for HSV and magic mouth wash prn    4.  Right  breast cancer:  Enrolled on the ISPY2 clinical trial.  Randomized to Taxol + pembrolizumab followed by adriamycin, cyclophosphamide, and pembrolizumab.  Pembrolizumab discontinued after 3 cycles due to pneumonitis.  Week 12 breast MRI shows no residual breast tumor.  Both cycles #1 and #2 of adriamycin and cyclophosphamide complicated by neutropenic fever.  Discussed with patient today that given good response to treatment thus far and poor tolerance of chemotherapy, will forgo further chemotherapy and proceed with surgery.  - per Dr. Bradshaw, she will notify surgical oncology and move surgery up to 3-5 weeks from now    5. Abdominal pain: non specific, Abd X ray showed increased colonc stool, her pain was relieved after 2 big BM.CT abd today was unremarkable.  -Monitor and use heat pads which help her pain.    FEN: regular diet, bolus IV fluids, replete lytes PRN  Prophylaxis: mechanical (thrombocytopenia)  Code: FULL  Disposition: Admitted to the oncology service for evaluation and treatment of febrile neutropenia.  Anticipate discharge to home in the next several days pending clinical improvement.            Interval History:   Patient reports ongoing fatigue.  No new symptoms except on going fatigue. She had 2 episodes of fever today.  Denies shortness of breath or chest pain. No fever reported. She had some abdominal pain which was relieved after BM yesterday. Denies nausea.  Appetite is good.  Mild mouth sore/ulcer. The remainder of a review of systems is otherwise negative.          Significant Problems:     Patient Active Problem List   Diagnosis     Anxiety     Anisocoria     Hyperlipidemia LDL goal <130     COPD (chronic obstructive pulmonary disease)     Lack of libido     Family history of ischemic heart disease     Mild major depression (H)     Hives     Lung nodule, multiple     MERLIN (obstructive sleep apnea)- moderate (AHI 21)     Urticaria     Chronic obstructive pulmonary disease, unspecified COPD  type (H)     Depression, unspecified depression type     Malignant neoplasm of upper-inner quadrant of right breast in female, estrogen receptor negative (H)     Acute cystitis without hematuria     Pneumonia     Encounter for antineoplastic chemotherapy     Neutropenia with fever (H)     Neutropenic sepsis (H)             Review of Systems:   A comprehensive review of systems was performed and found to be negative except as described in this note           Medications:     Current Facility-Administered Medications   Medication     polyethylene glycol (MIRALAX/GLYCOLAX) Packet 17 g     sodium chloride 0.9 % bag 500mL for CT scan flush use     vancomycin (VANCOCIN) 1,500 mg in NaCl 0.9 % 250 mL intermittent infusion     potassium chloride (K-TAB,KLOR-CON) CR tablet 20 mEq     ceFEPIme (MAXIPIME) intermittent infusion 2 g     simethicone (MYLICON) suspension 40 mg     HYDROmorphone (DILAUDID) injection 1 mg     guaiFENesin-dextromethorphan (ROBITUSSIN DM) 100-10 MG/5ML syrup 5 mL     senna-docusate (SENOKOT-S;PERICOLACE) 8.6-50 MG per tablet 1-2 tablet     predniSONE (DELTASONE) tablet 5 mg     0.9% sodium chloride infusion     sodium chloride (PF) 0.9% PF flush 3 mL     albuterol (PROAIR HFA/PROVENTIL HFA/VENTOLIN HFA) Inhaler 2 puff     calcium carbonate (OS-JAVIER 500 mg [place]. Ca) tablet 1,250 mg     citalopram (celeXA) tablet 10 mg     cyanocolbalamin (vitamin  B-12) tablet 100 mcg     tolterodine (DETROL LA) 24 hr capsule 4 mg     hydrOXYzine (ATARAX) tablet 25-50 mg     pantoprazole (PROTONIX) EC tablet 40 mg     cholecalciferol (vitamin D3) tablet 2,000 Units     umeclidinium (INCRUSE ELLIPTA) 62.5 MCG/INH oral inhaler 1 puff     Medication Instruction     prochlorperazine (COMPAZINE) tablet 5-10 mg    Or     prochlorperazine (COMPAZINE) injection 5 mg     ondansetron (ZOFRAN) injection 8 mg    Or     ondansetron (ZOFRAN-ODT) ODT tab 8 mg    Or     ondansetron (ZOFRAN) tablet 8 mg     LORazepam (ATIVAN) tablet  "0.5-1 mg    Or     LORazepam (ATIVAN) injection 0.5-1 mg     acetaminophen (TYLENOL) tablet 650 mg     Facility-Administered Medications Ordered in Other Encounters   Medication     lidocaine 1 % 9 mL     sodium bicarbonate 8.4 % injection 1 mEq     lidocaine-EPINEPHrine 1.5 %-1:[id number] injection 10 mL     Vital signs:  Temp: 97.9  F (36.6  C) Temp src: Oral BP: 99/68   Heart Rate: 94 Resp: 19 SpO2: 97 % O2 Device: None (Room air) Oxygen Delivery: 2 LPM Height: 162.6 cm (5' 4\") Weight: 70.1 kg (154 lb 8 oz)  Estimated body mass index is 26.52 kg/(m^2) as calculated from the following:    Height as of this encounter: 1.626 m (5' 4\").    Weight as of this encounter: 70.1 kg (154 lb 8 oz).        General:  Fatigued appearing, adult female in NAD.  Moon facies.  HEENT:  Normocephalic.  Sclera anicteric.  MMM.  No lesions of the oropharynx.  Chest:  CTA bilaterally.  No wheezes or crackles.  Left chest port-a-cath is without surrounding erythema or edema.  CV:  RRR.  Nl S1 and S2.  No m/r/g.  Abd:  Soft/NT/ND.  BSs normoactive.  No hepatosplenomegaly.  Ext:  No pitting edema of the bilateral lower extremities.  Pulses 2+ and symmetric.  Musculo:  Strength 5/5 throughout.  Neuro:  Cranial nerves grossly intact.  Psych:  Mood and affect appear normal.            Data:   Results for YUNI CALIXTO (MRN [id number]) as of 1/30/2018 14:23   Ref. Range 1/30/2018 06:19   Sodium Latest Ref Range: 133 - 144 mmol/L 137   Potassium Latest Ref Range: 3.4 - 5.3 mmol/L 4.1   Chloride Latest Ref Range: 94 - 109 mmol/L 106   Carbon Dioxide Latest Ref Range: 20 - 32 mmol/L 25   Urea Nitrogen Latest Ref Range: 7 - 30 mg/dL 8   Creatinine Latest Ref Range: 0.52 - 1.04 mg/dL 0.45 (L)   GFR Estimate Latest Ref Range: >60 mL/min/1.7m2 >90   GFR Estimate If Black Latest Ref Range: >60 mL/min/1.7m2 >90   Calcium Latest Ref Range: 8.5 - 10.1 mg/dL 7.7 (L)   Anion Gap Latest Ref Range: 3 - 14 mmol/L 6   Magnesium Latest Ref Range: 1.6 - 2.3 mg/dL " 1.9   Albumin Latest Ref Range: 3.4 - 5.0 g/dL 2.1 (L)   Protein Total Latest Ref Range: 6.8 - 8.8 g/dL 5.2 (L)   Bilirubin Total Latest Ref Range: 0.2 - 1.3 mg/dL 0.8   Alkaline Phosphatase Latest Ref Range: 40 - 150 U/L 76   ALT Latest Ref Range: 0 - 50 U/L 29   AST Latest Ref Range: 0 - 45 U/L 18   Results for YUNI CALIXTO (MRN [id number]) as of 1/30/2018 14:23   Ref. Range 1/30/2018 06:19   WBC Latest Ref Range: 4.0 - 11.0 10e9/L 0.1 (LL)   Hemoglobin Latest Ref Range: 11.7 - 15.7 g/dL 8.9 (L)   Hematocrit Latest Ref Range: 35.0 - 47.0 % 27.8 (L)   Platelet Count Latest Ref Range: 150 - 450 10e9/L 52 (L)     1/29/18 CXR:  Impression:   Focal airspace opacity projecting over the peripheral right upper lobe  is more pronounced compared to prior. Diffuse bilateral patchy  airspace opacities are not significantly changed compared to prior.  Findings may represent scarring/atelectasis associated with prior  infection or pneumonitis, however cannot rule out new superimposed  infection.     Greg Cooney MD

## 2018-02-02 NOTE — PLAN OF CARE
Problem: Infection, Risk/Actual (Adult)  Goal: Identify Related Risk Factors and Signs and Symptoms  Related risk factors and signs and symptoms are identified upon initiation of Human Response Clinical Practice Guideline (CPG).   Outcome: No Change  Pt A&Ox4. Tmax 102.3 oral this shift. Tylenol given. Other VSS, pt on RA. CT scan complete and waiting on ID consult. Started cefepime and vanc. Waiting on potassium pills from pharmacy. Oral solution painful to tongue.  at bedside and helpful with cares. Will cont to monitor.

## 2018-02-02 NOTE — PROVIDER NOTIFICATION
Notified Dr. Cooney of temp 102.3 oral. Ok to give tylenol. Pt will have CT scan shortly and ID consulted. Will cont to monitor.

## 2018-02-02 NOTE — PLAN OF CARE
Problem: Patient Care Overview  Goal: Plan of Care/Patient Progress Review  Outcome: No Change  A/O x4. Tmax 100.8, afebrile after tylenol. Blood cultures ordered per conditional order set. VSS, SR/ST with HR 80-110s. RA while awake, 2L overnight d/t desats to upper 70s while asleep. Regular diet, taking in only water overnight. Small BM x1. Voiding adequately. NS infusing at 100 mL/hr. Triggered sepsis protocol, lactic acid= 0.8. Up independently. Continue with POC.

## 2018-02-02 NOTE — PLAN OF CARE
Problem: Infection, Risk/Actual (Adult)  Goal: Identify Related Risk Factors and Signs and Symptoms  Related risk factors and signs and symptoms are identified upon initiation of Human Response Clinical Practice Guideline (CPG).   Outcome: No Change  Neuro: A&Ox4.   Cardiac: SR-ST. HR 's  Respiratory: Sating 97% on RA. LS clear  GI/: Voiding adequate amounts, ambulating to bathroom. BM X1  Diet/appetite: Regular diet. Very poor appetite. Had jello and ice cream. Eating ice chips throughout day.   Activity:  Up independently in room  Pain: At acceptable level on current regimen. Some abdominal discomfort this AM still, resolved.   Skin: Intact, no new deficits noted.  LDA's: Port infusing NS at 100mL/hr    MD order to give 1 UPRBC. Currently infusing.     Plan: Continue with POC. Notify primary team with changes.

## 2018-02-02 NOTE — PLAN OF CARE
"Problem: Patient Care Overview  Goal: Plan of Care/Patient Progress Review  Outcome: No Change  /87  Pulse 103  Temp 98.4  F (36.9  C) (Oral)  Resp 18  Ht 1.626 m (5' 4\")  Wt 69.5 kg (153 lb 4.8 oz)  SpO2 97%  Breastfeeding? No  BMI 26.31 kg/m2    AAOx4, VSS, SR, ST with activity.  Adequate O2 sats on RA.  Poor appetite, BM today.  Adequate UOP.  Up independently.  Denies pain.  MIVF infusing via port.        "

## 2018-02-02 NOTE — CONSULTS
[facility]  Transplant Infectious Disease Consult Note:  New Patient     Patient:  Ashleigh Alonzo, Date of birth 1960, Medical record number [id number]  Date of Visit:  02/02/2018  Consult requested by Dr. Cooney for evaluation of febrile neutropenia.         Assessment and Recommendations:   Recommendations:  1. Continue with cefepime  2. Get MRSA nares, if negative, DC vancomycin  3. Obtain a full respiratory viral panel (RVP), CMV DNA in blood, toxo IgG and a HSV swab from small ulcer on her tongue    Thank you for the consultation. Transplant ID will follow along. Dr. Flanagan ([id number]) will follow starting tomorrow. Please call if any questions arise.     Assessment:  This is a 58 yo female with h/o COPD and stage IIa breast cancer on TAC (cycle 2 1/23/18) and previous use of prembrolizumab c/b pneumonitis who was admitted with febrile neutropenia.     Infectious Disease issues include:  - Febrile neutropenia: Unclear etiology. Patient with only rhinorrhea as localizing symptom. CT chest showed overall improvement of her GGO (2/2 pneumonitis) but two new areas of consolidation of unclear etiology. Patient has risk factor for fungal pneumonia but infiltrates are not compatible and most importantly, patient is not having any respiratory symptoms. Given rhinorrhea, will get a full respiratory viral panel. On physical examination, she had two small ulcer on her tongue, that could be from rubbing against her teeth, but will swab those for HSV. Blood cultures and urine culture negative thus far. Will continue with cefepime. Please see w/u as above.    - PCP prophylaxis: None  - Serostatus: Not in records   - Immunization status: Up to date  - Gamma globulin status: Not in records.   - Isolation status:  Good hand hygiene.    Attestation:  I have reviewed today's vital signs, medications, labs and imaging. I personally reviewed the imaging. Reviewed medical history, surgical history, and  family history in Epic History tab. No updates needed to be made.     Maggie Hodges MD  Transplant Infectious Diseases   [phone]         History of the Infectious Disease lllness:   This is a 56 yo female with h/o COPD, sleep apnea, periodontal disease and stage IIa breast cancer on TAC (cycle 2 1/23/18) and previous use of prembrolizumab c/b pneumonitis who was admitted with febrile neutropenia.        She was diagnosed after an abnormal mammogram. Pathology showed grade 3 invasive mammary carcinoma with associated high-grade DCIS. ER/MO was negative and HER2 negative. She enrolled in ISPY-2 clinical trial and began treatment with weekly taxol and once every 3 week pembrolizumab on 9/2/17. Her course has been complicated by neutropenic fevers, PNA and a UTI. She was admitted 11/11-11/17 with high fever (105), cough, and dyspnea and was found to have HCAP and pneumonitis secondary to pembrolizumab. 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 She received 2 doses of methylpred, antibiotics, and d/c on a prednisone taper (now at 5 mg daily). She completed 12 weeks on 12/26. She started AC on 1/2. She was again admitted from 1/10-1/14 for neutropenic fevers. She is currently on cycle 2 of Taxol + AC started on 1/23/2018.     She presented to the hospital for evaluation of low-grade fevers at home, noting a temperature of 100.4 F this morning.  She does have a runny nose and is overall feeling tired but denies any cough, sore throat, dyspnea, ear pain, dysphagia, abdominal pain, diarrhea, headache, urinary symptoms. She has no rashes or skin lesions.  She has a port but tenderness or redness.     Exposure history:   Patient lives in [facility]. [place] . 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 No recent travel. No international travel. No sick contacts or exposure to small children (she has small grand  children, 1-1 y/o but has not seen them in a few weeks). They have two dogs and a cat, and she takes care of the litter box sometimes, however she has not clean after the cat since her cancer diagnosis.      Review of Systems:  CONSTITUTIONAL:  No fevers or chills. No night sweats.  EYES: negative for icterus  ENT:  negative for hearing loss, tinnitus or sore throat  RESPIRATORY:  Positive for rhinorrhea but negative for cough, sputum, dyspnea  CARDIOVASCULAR:  negative for chest pain, palpitations  GASTROINTESTINAL:  negative for nausea, vomiting, diarrhea or constipation  GENITOURINARY:  negative for dysuria  HEME:  No easy bruising  INTEGUMENT:  negative for rash or pruritus  NEURO:  Negative for headache    Past Medical History:   Diagnosis Date     COPD (chronic obstructive pulmonary disease) (H) 2011     Malignant neoplasm of upper-inner quadrant of right breast in female, estrogen receptor negative (H) 8/30/2017     Periodontal disease      Sleep apnea 12/2015     Urticaria        Past Surgical History:   Procedure Laterality Date     APPENDECTOMY  10/6/2015     CATARACT IOL, RT/LT  11/2016    bilateral      COLONOSCOPY  11/15/2011    Procedure:COLONOSCOPY; Colonoscopy, screening; Surgeon:ANASTASIA BUNCH; Location:[facility] OR     INSERT PORT VASCULAR ACCESS Left 9/1/2017    Procedure: INSERT PORT VASCULAR ACCESS;  Single Lumen Chest Power Port;  Surgeon: Leif Parkinson PA-C;  Location: [facility] OR     TUBAL LIGATION  12/2004    Bilateral       Family History   Problem Relation Age of Onset     Cardiovascular Father 46     MI     Eye Disorder Father      CEREBROVASCULAR DISEASE Father      Arthritis Sister      Neurologic Disorder Brother      CANCER No family hx of      DIABETES No family hx of      Hypertension No family hx of        Social History     Social History Narrative     Social History   Substance Use Topics     Smoking status: Former Smoker     Packs/day: 1.00     Years: 15.00     Types:  Cigarettes     Quit date: 1/1/2000     Smokeless tobacco: Never Used     Alcohol use 8.4 - 12.6 oz/week      Comment: daily       Immunization History   Administered Date(s) Administered     HEPA 07/10/2008, 12/15/2008     HepB 07/10/2008, 12/05/2008     Influenza (IIV3) PF 12/21/2010, 10/20/2011, 10/20/2012, 09/09/2014, 10/20/2016     Influenza Vaccine IM 3yrs+ 4 Valent IIV4 09/26/2017     Pneumococcal 23 valent 12/21/2010     TD (ADULT, 7+) 05/13/2004     TDAP Vaccine (Adacel) 06/22/2011       Patient Active Problem List   Diagnosis     Anxiety     Anisocoria     Hyperlipidemia LDL goal <130     COPD (chronic obstructive pulmonary disease)     Lack of libido     Family history of ischemic heart disease     Mild major depression (H)     Hives     Lung nodule, multiple     MERLIN (obstructive sleep apnea)- moderate (AHI 21)     Urticaria     Chronic obstructive pulmonary disease, unspecified COPD type (H)     Depression, unspecified depression type     Malignant neoplasm of upper-inner quadrant of right breast in female, estrogen receptor negative (H)     Acute cystitis without hematuria     Pneumonia     Encounter for antineoplastic chemotherapy     Neutropenia with fever (H)     Neutropenic sepsis (H)       Current Facility-Administered Medications   Medication     polyethylene glycol (MIRALAX/GLYCOLAX) Packet 17 g     sodium chloride 0.9 % bag 500mL for CT scan flush use     ceFEPIme (MAXIPIME) intermittent infusion 1 g     vancomycin (VANCOCIN) 1,500 mg in NaCl 0.9 % 250 mL intermittent infusion     potassium chloride (K-TAB,KLOR-CON) CR tablet 20 mEq     simethicone (MYLICON) suspension 40 mg     HYDROmorphone (DILAUDID) injection 1 mg     guaiFENesin-dextromethorphan (ROBITUSSIN DM) 100-10 MG/5ML syrup 5 mL     senna-docusate (SENOKOT-S;PERICOLACE) 8.6-50 MG per tablet 1-2 tablet     predniSONE (DELTASONE) tablet 5 mg     0.9% sodium chloride infusion     sodium chloride (PF) 0.9% PF flush 3 mL     albuterol  "(PROAIR HFA/PROVENTIL HFA/VENTOLIN HFA) Inhaler 2 puff     calcium carbonate (OS-JAVIER 500 mg "[employer]. Ca) tablet 1,250 mg     citalopram (celeXA) tablet 10 mg     cyanocolbalamin (vitamin  B-12) tablet 100 mcg     tolterodine (DETROL LA) 24 hr capsule 4 mg     hydrOXYzine (ATARAX) tablet 25-50 mg     pantoprazole (PROTONIX) EC tablet 40 mg     cholecalciferol (vitamin D3) tablet 2,000 Units     umeclidinium (INCRUSE ELLIPTA) 62.5 MCG/INH oral inhaler 1 puff     Medication Instruction     prochlorperazine (COMPAZINE) tablet 5-10 mg    Or     prochlorperazine (COMPAZINE) injection 5 mg     ondansetron (ZOFRAN) injection 8 mg    Or     ondansetron (ZOFRAN-ODT) ODT tab 8 mg    Or     ondansetron (ZOFRAN) tablet 8 mg     LORazepam (ATIVAN) tablet 0.5-1 mg    Or     LORazepam (ATIVAN) injection 0.5-1 mg     acetaminophen (TYLENOL) tablet 650 mg     Facility-Administered Medications Ordered in Other Encounters   Medication     lidocaine 1 % 9 mL     sodium bicarbonate 8.4 % injection 1 mEq     lidocaine-EPINEPHrine 1.5 %-1:[id number] injection 10 mL       No Known Allergies           Physical Exam:   Vitals were reviewed.  All vitals stable  BP 99/68 (BP Location: Left arm)  Pulse 103  Temp 97.9  F (36.6  C) (Oral)  Resp 19  Ht 1.626 m (5' 4\")  Wt 70.1 kg (154 lb 8 oz)  SpO2 97%  Breastfeeding? No  BMI 26.52 kg/m2    Exam:  GENERAL:  well-developed, well-nourished, alert, oriented, in no acute distress.  HEENT:  Head is normocephalic, atraumatic   EYES:  Eyes have anicteric sclerae.    ENT:  Oropharynx is moist without exudates or ulcers. Tongue slightly swollen with two small ulcer on the left. No sinus tenderness.  NECK:  Supple. No LAD  LUNGS:  Clear to auscultation. No rales  CARDIOVASCULAR:  Regular rate and rhythm with no murmurs, gallops or rubs.  ABDOMEN:  Normal bowel sounds, soft, nontender.  SKIN:  No acute rashes.  Line is in place without any surrounding erythema.  NEUROLOGIC:  Grossly nonfocal.         " Laboratory Data:     No results found for: ACD4    Inflammatory Markers    Recent Labs   Lab Test  10/12/17   1905   CRP  84.0*       Metabolic Studies    Recent Labs   Lab Test  02/02/18   0647  01/31/18   0501  01/30/18   0619   01/29/18   1520   01/29/18   1348   01/13/18   0537   NA  140   --   137   --   133   --   134   < >  135   POTASSIUM  3.3*   --   4.1   --   3.9   --   3.8   < >  3.6   CHLORIDE  108   --   106   --    --    --   99   < >  106   CO2  23   --   25   --    --    --   24   < >  19*   ANIONGAP  8   --   6   --    --    --   11   < >  10   BUN  3*   --   8   --    --    --   10   < >  3*   CR  0.46*   --   0.45*   --    --    --   0.58   < >  0.58   GFRESTIMATED  >90   --   >90   --    --    --   >90   < >  >90   GLC  77   --   92   --   83   --   101*   < >  76   JAVIER  7.8*   --   7.7*   --    --    --   8.2*   < >  8.1*   PHOS   --    --    --    --    --    --    --    --   2.1*   MAG   --    --   1.9   --    --    --    --    --   1.7   LACT   --   0.7   --    < >   --    < >   --    --    --     < > = values in this interval not displayed.       Hepatic Studies    Recent Labs   Lab Test  02/02/18   0647  02/01/18   1050  01/30/18   0619  01/29/18   1348   09/05/17   1240  06/25/12   1438   BILITOTAL  0.7   --   0.8  1.2   < >  0.5  0.4  0.9   BILIDELTA   --    --    --    --    --    --   0.2   BILICONJ   --    --    --    --    --    --   0.0   DBIL   --    --    --    --    --   0.1   --    ALKPHOS  68   --   76  99   < >  146  148  125   PROTTOTAL  5.1*   --   5.2*  6.0*   < >  7.8  7.8  8.5   ALBUMIN  1.9*   --   2.1*  2.7*   < >  3.6  3.6  4.9   AST  43   --   18  23   < >  34  33  48*   ALT  49   --   29  39   < >  50  49  33   LDH   --   157   --    --    --    --    --     < > = values in this interval not displayed.       Hematology Studies     Recent Labs   Lab Test  02/02/18   0647  02/01/18   1050  02/01/18   0504  01/31/18   1034   01/23/18   0919  01/16/18   0856   WBC   0.3*  0.2*  0.1*  0.1*   < >  15.9*  15.4*   ANEU   --    --    --    --    --   12.1*  13.0*   ALYM   --    --    --    --    --   1.0  0.1*   STONEY   --    --    --    --    --   2.3*  1.6*   AEOS   --    --    --    --    --   0.0  0.0   HGB  8.1*  7.6*  7.1*  7.6*   < >  11.0*  9.2*   HCT  24.5*  22.6*  21.0*  23.5*   < >  33.6*  27.7*   PLT  12*  14*  16*  24*   < >  473*  315    < > = values in this interval not displayed.       Urine Studies     Recent Labs   Lab Test  01/29/18   1653  01/10/18   2318  11/11/17   1830  10/26/17   1500  10/12/17   2149  09/05/17   1240   URINEPH  7.5*  6.0  6.0  5.0  5.5  5.0   NITRITE  Negative  Negative  Negative  Negative  Negative  Negative   LEUKEST  Negative  Negative  Large*  Large*  Negative  Small*   WBCU  1   --   18*  158*  <1  6*       Microbiology:  Last 6 Culture results with specimen source  Culture Micro   Date Value Ref Range Status   02/02/2018 No growth after 6 hours  Preliminary   01/29/2018   Final    <10,000 colonies/mL  urogenital ariela  Susceptibility testing not routinely done     01/29/2018 No growth after 4 days  Preliminary   01/29/2018 No growth after 4 days  Preliminary   01/12/2018 No growth  Final   01/10/2018 No growth  Final    Specimen Description   Date Value Ref Range Status   02/02/2018 Blood Unspecified Site  Final   01/29/2018 Midstream Urine  Final   01/29/2018 Blood Right Arm  Final   01/29/2018 Blood Portacath  Final   01/13/2018 Feces  Final   01/12/2018 Feces  Final          Last check of C difficile  C Diff Toxin B PCR   Date Value Ref Range Status   01/13/2018 Negative NEG^Negative Final     Comment:     Negative: Clostridium difficile target DNA sequences NOT detected, presumed   negative for Clostridium difficile toxin B or the number of bacteria present   may be below the limit of detection for the test.  FDA approved assay performed using [employer] real-time PCR.  A negative result does not exclude actual disease due to  Clostridium difficile   and may be due to improper collection, handling and storage of the specimen   or the number of organisms in the specimen is below the detection limit of the   assay.         Imaging:  Results for orders placed or performed during the hospital encounter of 01/29/18   XR Chest 2 Views    Narrative    Exam:  Chest X-ray 1/29/2018 1:59 PM    History: Fever;     Comparison: 1/10/2018    Findings: PA and lateral chest radiograph is obtained. Left chest wall  Port-A-Cath with access needle in place and tip projecting over the  distal SVC. Cardiomediastinal silhouette is within normal limits.  Pulmonary vasculature is distinct. No pleural effusion or  pneumothorax. Focal airspace opacity projecting over the peripheral  right upper lobe, more prominent compared to prior. Diffuse bilateral  patchy airspace opacities are not significantly changed compared to  prior. The upper abdomen is unremarkable. Chronic degenerative changes  of thoracic spine.      Impression    Impression:   Focal airspace opacity projecting over the peripheral right upper lobe  is more pronounced compared to prior. Diffuse bilateral patchy  airspace opacities are not significantly changed compared to prior.  Findings may represent scarring/atelectasis associated with prior  infection or pneumonitis, however cannot rule out new superimposed  infection.    I have personally reviewed the examination and initial interpretation  and I agree with the findings.    BON LU MD   XR Abdomen Port 1 View    Narrative    XR ABDOMEN PORT F1 VW  2/1/2018 3:39 AM      HISTORY: worsening abd pain. PLease assess for obstruction.    COMPARISON: None    FINDINGS: Moderate to large amount of stool in the colon. Bowel gas is  seen in the distal colon. No pneumatosis or portal venous gas.  Distended bladder. No acute fracture.      Impression    IMPRESSION: Moderate to large colonic stool burden. Nonobstructive  bowel gas pattern.    I have  personally reviewed the examination and initial interpretation  and I agree with the findings.    OCTAVIO ALVES MD   CT Chest/Abdomen/Pelvis w Contrast    Narrative    EXAMINATION: CT CHEST/ABDOMEN/PELVIS W CONTRAST, 2/2/2018 12:41 PM    TECHNIQUE: Helical CT images from the thoracic inlet through the  symphysis pubis were obtained with intravenous contrast, including  images through the pelvis in the late arterial phase. Contrast dose:  isovue 370    COMPARISON: 11/13/2017 CT chest; 10/6/2015 CT abdomen/pelvis    HISTORY: Persistent fever, on Abx, Hx of Breast cancer, on chemo,  pancytopenia, Hx of pneumonitis;     FINDINGS:    Chest: Overall decreased extent of numerous bilateral peripheral  predominant peribronchovascular consolidative and groundglass  opacities, however, new groundglass opacities are noted in the lateral  right upper lobe (for example series 8, image 43) and paramediastinal  left upper lobe (series 8, image 56). Slightly decreased small  bilateral pleural effusions. No pneumothorax. The central  tracheobronchial tree is clear. Numerous scattered pulmonary nodules  are unchanged, for example measuring 4 mm in the subpleural posterior  left lower lobe (series 8, image 126). Several scattered calcified  granulomas.    Normal heart size. No pericardial effusion. Normal caliber ascending  aorta and main pulmonary artery. No central pulmonary embolism. No  thoracic lymphadenopathy. Bilateral calcified hilar and subcarinal  lymph nodes. Left chest wall internal jugular Port-A-Cath tip at the  low SVC. Small hiatal hernia.    Abdomen and pelvis: No suspicious arterial enhancing hepatic lesion.  Punctate hypodensities in the liver are too small to fully  characterize but are unchanged. The gallbladder and biliary tree are  within normal limits. The spleen, pancreas, adrenal glands, and  kidneys are unremarkable. No hydronephrosis, hydroureter, or urinary  tract stone. The bladder is unremarkable.  Bilateral tubal ligation  clips. The uterus and ovaries are unremarkable. Trace perihepatic  ascites along the anterosuperior aspect of the left hepatic lobe. No  free air. No bowel obstruction. Mild prominence of the wall of the  cecum/proximal ascending colon and the rectum. No pneumatosis or  portal venous gas. The major abdominal vessels are patent. Moderate  atherosclerotic calcification of the abdominal aorta without  aneurysmal dilation.    Bones and soft tissues: No aggressive osseous lesion. Transitional  anatomy with lumbarization of S1. Mild anterior right superior  endplate compression deformity at L5, new since 10/6/2015. Unchanged  mild mid thoracic superior endplate compression fractures with a new  mild superior endplate anterior wedge compression fracture at L1 since  11/13/2017. Herniation of a small amount of peritoneal fat through a  anterolateral left abdominal wall defect measuring approximately 7 mm  (series 7, image 466). Prominent fat in the left inguinal canal.      Impression    IMPRESSION:   1. Overall decreased/improved bilateral peripheral predominant  peribronchovascular consolidative and groundglass opacities, however,  2 newly affected regions are noted in the lateral right upper lobe and  paramediastinal left upper lobe, which may represent focal worsening  of an ongoing pathologic process since 11/13/2017 (please for to that  report for further details), or new superimposed infection.  2. Slightly decreased small bilateral pleural effusions.  3. Age-indeterminate mild superior endplate compression fractures are  noted at L1 (new since 11/13/2017) and L5 (new since 10/6/2015).    I have personally reviewed the examination and initial interpretation  and I agree with the findings.    PAWAN JOSHI MD

## 2018-02-03 LAB
ALBUMIN SERPL-MCNC: 1.9 G/DL (ref 3.4–5)
ALP SERPL-CCNC: 80 U/L (ref 40–150)
ALT SERPL W P-5'-P-CCNC: 70 U/L (ref 0–50)
ANION GAP SERPL CALCULATED.3IONS-SCNC: 10 MMOL/L (ref 3–14)
AST SERPL W P-5'-P-CCNC: 61 U/L (ref 0–45)
BILIRUB SERPL-MCNC: 0.6 MG/DL (ref 0.2–1.3)
BUN SERPL-MCNC: 2 MG/DL (ref 7–30)
CALCIUM SERPL-MCNC: 7.7 MG/DL (ref 8.5–10.1)
CHLORIDE SERPL-SCNC: 105 MMOL/L (ref 94–109)
CMV DNA SPEC NAA+PROBE-ACNC: NORMAL [IU]/ML
CMV DNA SPEC NAA+PROBE-LOG#: NORMAL {LOG_IU}/ML
CO2 SERPL-SCNC: 22 MMOL/L (ref 20–32)
CREAT SERPL-MCNC: 0.47 MG/DL (ref 0.52–1.04)
ERYTHROCYTE [DISTWIDTH] IN BLOOD BY AUTOMATED COUNT: 15.4 % (ref 10–15)
FLUAV H1 2009 PAND RNA SPEC QL NAA+PROBE: NEGATIVE
FLUAV H1 RNA SPEC QL NAA+PROBE: NEGATIVE
FLUAV H3 RNA SPEC QL NAA+PROBE: NEGATIVE
FLUAV RNA SPEC QL NAA+PROBE: NEGATIVE
FLUBV RNA SPEC QL NAA+PROBE: NEGATIVE
GFR SERPL CREATININE-BSD FRML MDRD: >90 ML/MIN/1.7M2
GLUCOSE SERPL-MCNC: 73 MG/DL (ref 70–99)
HADV DNA SPEC QL NAA+PROBE: NEGATIVE
HADV DNA SPEC QL NAA+PROBE: NEGATIVE
HCT VFR BLD AUTO: 22.8 % (ref 35–47)
HGB BLD-MCNC: 7.5 G/DL (ref 11.7–15.7)
HMPV RNA SPEC QL NAA+PROBE: NEGATIVE
HPIV1 RNA SPEC QL NAA+PROBE: NEGATIVE
HPIV2 RNA SPEC QL NAA+PROBE: NEGATIVE
HPIV3 RNA SPEC QL NAA+PROBE: NEGATIVE
LACTATE BLD-SCNC: 0.7 MMOL/L (ref 0.4–1.9)
MCH RBC QN AUTO: 30.5 PG (ref 26.5–33)
MCHC RBC AUTO-ENTMCNC: 32.9 G/DL (ref 31.5–36.5)
MCV RBC AUTO: 93 FL (ref 78–100)
MICROBIOLOGIST REVIEW: NORMAL
PLATELET # BLD AUTO: 13 10E9/L (ref 150–450)
POTASSIUM SERPL-SCNC: 3.1 MMOL/L (ref 3.4–5.3)
PROT SERPL-MCNC: 5.2 G/DL (ref 6.8–8.8)
RBC # BLD AUTO: 2.46 10E12/L (ref 3.8–5.2)
RHINOVIRUS RNA SPEC QL NAA+PROBE: NEGATIVE
RSV RNA SPEC QL NAA+PROBE: NEGATIVE
RSV RNA SPEC QL NAA+PROBE: NEGATIVE
SODIUM SERPL-SCNC: 137 MMOL/L (ref 133–144)
SPECIMEN SOURCE: NORMAL
SPECIMEN SOURCE: NORMAL
WBC # BLD AUTO: 0.5 10E9/L (ref 4–11)

## 2018-02-03 PROCEDURE — 25000128 H RX IP 250 OP 636: Performed by: INTERNAL MEDICINE

## 2018-02-03 PROCEDURE — 25000128 H RX IP 250 OP 636: Performed by: STUDENT IN AN ORGANIZED HEALTH CARE EDUCATION/TRAINING PROGRAM

## 2018-02-03 PROCEDURE — 80053 COMPREHEN METABOLIC PANEL: CPT | Performed by: STUDENT IN AN ORGANIZED HEALTH CARE EDUCATION/TRAINING PROGRAM

## 2018-02-03 PROCEDURE — 25000125 ZZHC RX 250: Performed by: INTERNAL MEDICINE

## 2018-02-03 PROCEDURE — 12000006 ZZH R&B IMCU INTERMEDIATE UMMC

## 2018-02-03 PROCEDURE — 85027 COMPLETE CBC AUTOMATED: CPT | Performed by: STUDENT IN AN ORGANIZED HEALTH CARE EDUCATION/TRAINING PROGRAM

## 2018-02-03 PROCEDURE — 25000132 ZZH RX MED GY IP 250 OP 250 PS 637: Performed by: INTERNAL MEDICINE

## 2018-02-03 PROCEDURE — 36592 COLLECT BLOOD FROM PICC: CPT | Performed by: STUDENT IN AN ORGANIZED HEALTH CARE EDUCATION/TRAINING PROGRAM

## 2018-02-03 PROCEDURE — 86777 TOXOPLASMA ANTIBODY: CPT | Performed by: STUDENT IN AN ORGANIZED HEALTH CARE EDUCATION/TRAINING PROGRAM

## 2018-02-03 PROCEDURE — 83605 ASSAY OF LACTIC ACID: CPT | Performed by: INTERNAL MEDICINE

## 2018-02-03 PROCEDURE — 99233 SBSQ HOSP IP/OBS HIGH 50: CPT | Mod: GC | Performed by: INTERNAL MEDICINE

## 2018-02-03 PROCEDURE — 36592 COLLECT BLOOD FROM PICC: CPT | Performed by: INTERNAL MEDICINE

## 2018-02-03 RX ORDER — POTASSIUM CHLORIDE 29.8 MG/ML
20 INJECTION INTRAVENOUS
Status: DISCONTINUED | OUTPATIENT
Start: 2018-02-03 | End: 2018-02-08 | Stop reason: HOSPADM

## 2018-02-03 RX ORDER — POTASSIUM CHLORIDE 750 MG/1
20-40 TABLET, EXTENDED RELEASE ORAL
Status: DISCONTINUED | OUTPATIENT
Start: 2018-02-03 | End: 2018-02-08 | Stop reason: HOSPADM

## 2018-02-03 RX ORDER — POTASSIUM CHLORIDE 7.45 MG/ML
10 INJECTION INTRAVENOUS
Status: DISCONTINUED | OUTPATIENT
Start: 2018-02-03 | End: 2018-02-03 | Stop reason: RX

## 2018-02-03 RX ORDER — POTASSIUM CHLORIDE 1.5 G/1.58G
20-40 POWDER, FOR SOLUTION ORAL
Status: DISCONTINUED | OUTPATIENT
Start: 2018-02-03 | End: 2018-02-08 | Stop reason: HOSPADM

## 2018-02-03 RX ORDER — POTASSIUM CL/LIDO/0.9 % NACL 10MEQ/0.1L
10 INTRAVENOUS SOLUTION, PIGGYBACK (ML) INTRAVENOUS
Status: DISCONTINUED | OUTPATIENT
Start: 2018-02-03 | End: 2018-02-03 | Stop reason: RX

## 2018-02-03 RX ADMIN — UMECLIDINIUM 1 PUFF: 62.5 AEROSOL, POWDER ORAL at 10:02

## 2018-02-03 RX ADMIN — TOLTERODINE 4 MG: 4 CAPSULE, EXTENDED RELEASE ORAL at 10:01

## 2018-02-03 RX ADMIN — CALCIUM 1250 MG: 500 TABLET ORAL at 10:00

## 2018-02-03 RX ADMIN — PREDNISONE 5 MG: 5 TABLET ORAL at 10:02

## 2018-02-03 RX ADMIN — SODIUM CHLORIDE: 9 INJECTION, SOLUTION INTRAVENOUS at 11:45

## 2018-02-03 RX ADMIN — SODIUM CHLORIDE: 9 INJECTION, SOLUTION INTRAVENOUS at 21:36

## 2018-02-03 RX ADMIN — PANTOPRAZOLE SODIUM 40 MG: 40 TABLET, DELAYED RELEASE ORAL at 10:00

## 2018-02-03 RX ADMIN — CEFEPIME 2 G: 1 INJECTION, SOLUTION INTRAVENOUS at 05:30

## 2018-02-03 RX ADMIN — VANCOMYCIN HYDROCHLORIDE 1500 MG: 10 INJECTION, POWDER, LYOPHILIZED, FOR SOLUTION INTRAVENOUS at 02:59

## 2018-02-03 RX ADMIN — CITALOPRAM HYDROBROMIDE 10 MG: 10 TABLET ORAL at 10:02

## 2018-02-03 RX ADMIN — CEFEPIME 2 G: 1 INJECTION, SOLUTION INTRAVENOUS at 21:36

## 2018-02-03 RX ADMIN — VITAMIN B12 0.1 MG ORAL TABLET 100 MCG: 0.1 TABLET ORAL at 10:01

## 2018-02-03 RX ADMIN — POTASSIUM CHLORIDE 20 MEQ: 750 TABLET, EXTENDED RELEASE ORAL at 10:00

## 2018-02-03 RX ADMIN — POTASSIUM CHLORIDE 20 MEQ: 750 TABLET, EXTENDED RELEASE ORAL at 14:14

## 2018-02-03 RX ADMIN — VITAMIN D, TAB 1000IU (100/BT) 2000 UNITS: 25 TAB at 10:00

## 2018-02-03 RX ADMIN — CEFEPIME 2 G: 1 INJECTION, SOLUTION INTRAVENOUS at 14:15

## 2018-02-03 RX ADMIN — POTASSIUM CHLORIDE 20 MEQ: 750 TABLET, EXTENDED RELEASE ORAL at 21:35

## 2018-02-03 ASSESSMENT — ACTIVITIES OF DAILY LIVING (ADL)
ADLS_ACUITY_SCORE: 11

## 2018-02-03 NOTE — PLAN OF CARE
"Problem: Patient Care Overview  Goal: Plan of Care/Patient Progress Review  Outcome: No Change  Neuro: A&Ox4.   Cardiac: SR-Sinus tachycardia, HR 's. -140's.    Respiratory: Sating >96% on 1 lpm via NC. Clear and diminished throughout.   GI/: Adequate urine output. No BM.   Diet/appetite: Regular diet, fair appetite.   Activity:  Independent.  Pain: Denies.   Skin: Intact, no new deficits noted.  LDA's: L port a cath,  ml/hr.     Plan: Sepsis triggered, lactic acid resulted in 0.7. Sputum needs to still be collected. Continue with POC. Notify primary team with changes.  /78 (BP Location: Left arm)  Pulse 103  Temp 99.1  F (37.3  C) (Oral)  Resp 20  Ht 1.626 m (5' 4\")  Wt 70.1 kg (154 lb 8 oz)  SpO2 97%  Breastfeeding? No  BMI 26.52 kg/m2          "

## 2018-02-03 NOTE — PROGRESS NOTES
[facility] Progress Note         Assessment and Plan:   Assessment:   56 yo female with stage IIa, T2N0M0, triple negative breast cancer s/p 12 weeks taxol + 3 cycle of pembrolizumab and 2 cycles of adriamycin and cyclophosphamide admitted with neutropenic fever.      Plan:   1.  Neutropenic fever:  Day #11 s/p adriamycin and cyclophosphamide.  WBC 0.1->0.2->0.3->0.5.  Was afebrile for 48 hrs but later spiked fever of 102 2/2. UA neg.  Blood cultures NGTD. CT CAP scan shows overall improving lung opacities but some new opacities in rt UL.   - s/p Neulasta on 1/24  - continue cefepime 2gm Q8hrs (1/29-1/30) (restarted 2/2-)  - DC Vancomycin today as MRSA swab neg  - s/pLevaquin 750 mg daily (1/31-2/1)([id number])  - ID consulted, appreciate recs  -F/u Respiratory viral panel, CMV PCR,     HSV swab  2.  Severe pancytopenia:  Probably secondary to chemotherapy. WBC improving but Plt count is decreasing. Plt count has never been so low with previous cycles of chemo. DIC less likely as she has normal PT/INR, fibrinogen, mildly elevated D-dimer which is non specific. Platelets are at 14-> but no bleeding,Hb at 7.3, WBC 0.2. Normal LDH and haptoglobin, suggesting no hemolysis or concern for TMA.Peripheral smear shows no schistocytes or pseudothrmbocytopenia.  - s/p neulasta 1/24  - s/p transfusion 2 UpRBC (1/29/18 and 2/1)  -Goal Hb>8 and Goal Plt >10, if bleeding Goal of Plt is 20K  -continue to monitor daily blood counts.  -Hematology consult for the need for further work up    3.  Pneumonitis secondary to pembrolizumab:  Continues on steroid taper.  No evidence of respiratory compromise.  - pembrolizumab discontinued.  - continue prednisone to 5 mg PO daily.  Will do for 1 week (upto 2/5), then every other day for 1 week, then stop.    4. Mucositis from chemo: small ulcer on tongue.  -will swab for HSV and magic mouth wash prn  -viscous lidocaine today    4.  Right breast cancer:  Enrolled on the ISPY2 clinical  trial.  Randomized to Taxol + pembrolizumab followed by adriamycin, cyclophosphamide, and pembrolizumab.  Pembrolizumab discontinued after 3 cycles due to pneumonitis.  Week 12 breast MRI shows no residual breast tumor.  Both cycles #1 and #2 of adriamycin and cyclophosphamide complicated by neutropenic fever.  Discussed with patient today that given good response to treatment thus far and poor tolerance of chemotherapy, will forgo further chemotherapy and proceed with surgery.  - per Dr. Bradshaw, she will notify surgical oncology and move surgery up to 3-5 weeks from now    5. Abdominal pain: non specific, Abd X ray showed increased colonc stool, her pain was relieved after 2 big BM.CT abd today was unremarkable.  -Monitor and use heat pads which help her pain.    FEN: regular diet, bolus IV fluids, replete lytes PRN  Prophylaxis: mechanical (thrombocytopenia)  Code: FULL  Disposition: Admitted to the oncology service for evaluation and treatment of febrile neutropenia.  Anticipate discharge to home in the next several days pending clinical improvement.            Interval History:   Patient reports ongoing fatigue.  No new symptoms. Afebrile overnight. Denies shortness of breath or chest pain. Denies nausea.  Has oral sores that are bothering her. Appetite is good.  The remainder of a review of systems is otherwise negative.          Significant Problems:     Patient Active Problem List   Diagnosis     Anxiety     Anisocoria     Hyperlipidemia LDL goal <130     COPD (chronic obstructive pulmonary disease)     Lack of libido     Family history of ischemic heart disease     Mild major depression (H)     Hives     Lung nodule, multiple     MERLIN (obstructive sleep apnea)- moderate (AHI 21)     Urticaria     Chronic obstructive pulmonary disease, unspecified COPD type (H)     Depression, unspecified depression type     Malignant neoplasm of upper-inner quadrant of right breast in female, estrogen receptor negative (H)      Acute cystitis without hematuria     Pneumonia     Encounter for antineoplastic chemotherapy     Neutropenia with fever (H)     Neutropenic sepsis (H)             Review of Systems:   A comprehensive review of systems was performed and found to be negative except as described in this note           Medications:     Current Facility-Administered Medications   Medication     polyethylene glycol (MIRALAX/GLYCOLAX) Packet 17 g     vancomycin (VANCOCIN) 1,500 mg in NaCl 0.9 % 250 mL intermittent infusion     potassium chloride (K-TAB,KLOR-CON) CR tablet 20 mEq     ceFEPIme (MAXIPIME) intermittent infusion 2 g     magic mouthwash suspension (diphenhydramine, lidocaine, aluminum-magnesium & simethicone)     simethicone (MYLICON) suspension 40 mg     HYDROmorphone (DILAUDID) injection 1 mg     guaiFENesin-dextromethorphan (ROBITUSSIN DM) 100-10 MG/5ML syrup 5 mL     senna-docusate (SENOKOT-S;PERICOLACE) 8.6-50 MG per tablet 1-2 tablet     predniSONE (DELTASONE) tablet 5 mg     0.9% sodium chloride infusion     sodium chloride (PF) 0.9% PF flush 3 mL     albuterol (PROAIR HFA/PROVENTIL HFA/VENTOLIN HFA) Inhaler 2 puff     calcium carbonate (OS-JAVIER 500 mg [place]. Ca) tablet 1,250 mg     citalopram (celeXA) tablet 10 mg     cyanocolbalamin (vitamin  B-12) tablet 100 mcg     tolterodine (DETROL LA) 24 hr capsule 4 mg     hydrOXYzine (ATARAX) tablet 25-50 mg     pantoprazole (PROTONIX) EC tablet 40 mg     cholecalciferol (vitamin D3) tablet 2,000 Units     umeclidinium (INCRUSE ELLIPTA) 62.5 MCG/INH oral inhaler 1 puff     Medication Instruction     prochlorperazine (COMPAZINE) tablet 5-10 mg    Or     prochlorperazine (COMPAZINE) injection 5 mg     ondansetron (ZOFRAN) injection 8 mg    Or     ondansetron (ZOFRAN-ODT) ODT tab 8 mg    Or     ondansetron (ZOFRAN) tablet 8 mg     LORazepam (ATIVAN) tablet 0.5-1 mg    Or     LORazepam (ATIVAN) injection 0.5-1 mg     acetaminophen (TYLENOL) tablet 650 mg     Facility-Administered  "Medications Ordered in Other Encounters   Medication     lidocaine 1 % 9 mL     sodium bicarbonate 8.4 % injection 1 mEq     lidocaine-EPINEPHrine 1.5 %-1:200000 injection 10 mL     Vital signs:  Temp: 99.3  F (37.4  C) Temp src: Oral BP: 121/63   Heart Rate: 103 Resp: 18 SpO2: 98 % O2 Device: Nasal cannula Oxygen Delivery: 1 LPM Height: 162.6 cm (5' 4\") Weight: 70.1 kg (154 lb 8 oz)  Estimated body mass index is 26.52 kg/(m^2) as calculated from the following:    Height as of this encounter: 1.626 m (5' 4\").    Weight as of this encounter: 70.1 kg (154 lb 8 oz).        General:  Fatigued appearing, adult female in NAD.  Moon facies.  HEENT:  Normocephalic.  Sclera anicteric.  MMM.  Sore in rt buccal area and toungue, No lesions of the oropharynx.  Chest:  CTA bilaterally.  No wheezes or crackles.  Left chest port-a-cath is without surrounding erythema or edema.  CV:  RRR.  Nl S1 and S2.  No m/r/g.  Abd:  Soft/NT/ND.  BSs normoactive.  No hepatosplenomegaly.  Ext:  No pitting edema of the bilateral lower extremities.  Pulses 2+ and symmetric.  Musculo:  Strength 5/5 throughout.  Neuro:  Cranial nerves grossly intact.  Psych:  Mood and affect appear normal.            Data:   CBC RESULTS:   Recent Labs   Lab Test  02/03/18   0522   WBC  0.5*   RBC  2.46*   HGB  7.5*   HCT  22.8*   MCV  93   MCH  30.5   MCHC  32.9   RDW  15.4*   PLT  13*     Last Basic Metabolic Panel:  Lab Results   Component Value Date     02/03/2018      Lab Results   Component Value Date    POTASSIUM 3.1 02/03/2018     Lab Results   Component Value Date    CHLORIDE 105 02/03/2018     Lab Results   Component Value Date    JAVIER 7.7 02/03/2018     Lab Results   Component Value Date    CO2 22 02/03/2018     Lab Results   Component Value Date    BUN 2 02/03/2018     Lab Results   Component Value Date    CR 0.47 02/03/2018     Lab Results   Component Value Date    GLC 73 02/03/2018       Recent Results (from the past 24 hour(s))   XR Chest Port 1 " View    Narrative    EXAM: XR CHEST PORT 1 VW  2/2/2018 11:06 AM      HISTORY: r/o new PNA;     COMPARISON: 1/29/2018    FINDINGS: Single AP view of the chest. Left IJ central venous catheter  with tip over superior atriocaval junction. Normal cardiac silhouette.  Unremarkable upper abdomen and visualized bones.    Multifocal bilateral airspace opacities, with increase in right upper  lung and left upper lung airspace opacities. No pleural  effusions/pneumothorax. Possible cavitary change in the right upper  lung. Decreased right basilar airspace opacities.      Impression    IMPRESSION: Increased right upper lung and left upper lung airspace  opacities compared to prior, decreased right basilar airspace  opacities. Possible cavitary change in the right upper lung. Findings  suggest infectious etiology.    I have personally reviewed the examination and initial interpretation  and I agree with the findings.    OCTAVIO ALVES MD   CT Chest/Abdomen/Pelvis w Contrast    Narrative    EXAMINATION: CT CHEST/ABDOMEN/PELVIS W CONTRAST, 2/2/2018 12:41 PM    TECHNIQUE: Helical CT images from the thoracic inlet through the  symphysis pubis were obtained with intravenous contrast, including  images through the pelvis in the late arterial phase. Contrast dose:  isovue 370    COMPARISON: 11/13/2017 CT chest; 10/6/2015 CT abdomen/pelvis    HISTORY: Persistent fever, on Abx, Hx of Breast cancer, on chemo,  pancytopenia, Hx of pneumonitis;     FINDINGS:    Chest: Overall decreased extent of numerous bilateral peripheral  predominant peribronchovascular consolidative and groundglass  opacities, however, new groundglass opacities are noted in the lateral  right upper lobe (for example series 8, image 43) and paramediastinal  left upper lobe (series 8, image 56). Slightly decreased small  bilateral pleural effusions. No pneumothorax. The central  tracheobronchial tree is clear. Numerous scattered pulmonary nodules  are unchanged, for  example measuring 4 mm in the subpleural posterior  left lower lobe (series 8, image 126). Several scattered calcified  granulomas.    Normal heart size. No pericardial effusion. Normal caliber ascending  aorta and main pulmonary artery. No central pulmonary embolism. No  thoracic lymphadenopathy. Bilateral calcified hilar and subcarinal  lymph nodes. Left chest wall internal jugular Port-A-Cath tip at the  low SVC. Small hiatal hernia.    Abdomen and pelvis: No suspicious arterial enhancing hepatic lesion.  Punctate hypodensities in the liver are too small to fully  characterize but are unchanged. The gallbladder and biliary tree are  within normal limits. The spleen, pancreas, adrenal glands, and  kidneys are unremarkable. No hydronephrosis, hydroureter, or urinary  tract stone. The bladder is unremarkable. Bilateral tubal ligation  clips. The uterus and ovaries are unremarkable. Trace perihepatic  ascites along the anterosuperior aspect of the left hepatic lobe. No  free air. No bowel obstruction. Mild prominence of the wall of the  cecum/proximal ascending colon and the rectum. No pneumatosis or  portal venous gas. The major abdominal vessels are patent. Moderate  atherosclerotic calcification of the abdominal aorta without  aneurysmal dilation.    Bones and soft tissues: No aggressive osseous lesion. Transitional  anatomy with lumbarization of S1. Mild anterior right superior  endplate compression deformity at L5, new since 10/6/2015. Unchanged  mild mid thoracic superior endplate compression fractures with a new  mild superior endplate anterior wedge compression fracture at L1 since  11/13/2017. Herniation of a small amount of peritoneal fat through a  anterolateral left abdominal wall defect measuring approximately 7 mm  (series 7, image 466). Prominent fat in the left inguinal canal.      Impression    IMPRESSION:   1. Overall decreased/improved bilateral peripheral predominant  peribronchovascular  consolidative and groundglass opacities, however,  2 newly affected regions are noted in the lateral right upper lobe and  paramediastinal left upper lobe, which may represent focal worsening  of an ongoing pathologic process since 11/13/2017 (please for to that  report for further details), or new superimposed infection.  2. Slightly decreased small bilateral pleural effusions.  3. Age-indeterminate mild superior endplate compression fractures are  noted at L1 (new since 11/13/2017) and L5 (new since 10/6/2015).    I have personally reviewed the examination and initial interpretation  and I agree with the findings.    MD Greg LEWIS MD

## 2018-02-03 NOTE — PLAN OF CARE
Problem: Patient Care Overview  Goal: Plan of Care/Patient Progress Review  Outcome: No Change  [id number]:    Neuro: A&Ox4.   Cardiac: SR. VSS. Afebrile this shift                     Respiratory: LS clear, O2 97% on RA  GI/: Adequate urine output. Small, loose, icteric stool. Sent for occult stool test per pt request, pending  Diet/appetite: Tolerating regular diet. Appetite fair  Activity: Up independently in room and to the bathroom.  Pain: Denies   Skin: Intact, no new deficits noted.  LDAs: Port, infusing NS at 100 mL/hr     Plan: ID consulting. MRSA, RSV swabs sent; HSV culture sent on open mouth sores. Provided soft toothbrush, saline for mouth rinses.

## 2018-02-04 LAB
BACTERIA SPEC CULT: NO GROWTH
BACTERIA SPEC CULT: NO GROWTH
BACTERIA SPEC CULT: NORMAL
BACTERIA SPEC CULT: NORMAL
BASOPHILS # BLD AUTO: 0 10E9/L (ref 0–0.2)
BASOPHILS NFR BLD AUTO: 0 %
DIFFERENTIAL METHOD BLD: ABNORMAL
EOSINOPHIL # BLD AUTO: 0 10E9/L (ref 0–0.7)
EOSINOPHIL NFR BLD AUTO: 0 %
ERYTHROCYTE [DISTWIDTH] IN BLOOD BY AUTOMATED COUNT: 15.4 % (ref 10–15)
GLUCOSE BLDC GLUCOMTR-MCNC: 86 MG/DL (ref 70–99)
HCT VFR BLD AUTO: 23.4 % (ref 35–47)
HGB BLD-MCNC: 7.6 G/DL (ref 11.7–15.7)
HSV1 DNA SPEC QL NAA+PROBE: NEGATIVE
HSV2 DNA SPEC QL NAA+PROBE: NEGATIVE
LACTATE BLD-SCNC: 0.8 MMOL/L (ref 0.4–1.9)
LYMPHOCYTES # BLD AUTO: 0.2 10E9/L (ref 0.8–5.3)
LYMPHOCYTES NFR BLD AUTO: 15.9 %
MCH RBC QN AUTO: 30.5 PG (ref 26.5–33)
MCHC RBC AUTO-ENTMCNC: 32.5 G/DL (ref 31.5–36.5)
MCV RBC AUTO: 94 FL (ref 78–100)
MONOCYTES # BLD AUTO: 0.1 10E9/L (ref 0–1.3)
MONOCYTES NFR BLD AUTO: 11.5 %
NEUTROPHILS # BLD AUTO: 0.7 10E9/L (ref 1.6–8.3)
NEUTROPHILS NFR BLD AUTO: 72.6 %
NRBC # BLD AUTO: 0 10*3/UL
NRBC BLD AUTO-RTO: 2 /100
PLATELET # BLD AUTO: 22 10E9/L (ref 150–450)
PLATELET # BLD EST: ABNORMAL 10*3/UL
POTASSIUM SERPL-SCNC: 3.5 MMOL/L (ref 3.4–5.3)
RBC # BLD AUTO: 2.49 10E12/L (ref 3.8–5.2)
SPECIMEN SOURCE: NORMAL
WBC # BLD AUTO: 1 10E9/L (ref 4–11)

## 2018-02-04 PROCEDURE — 36415 COLL VENOUS BLD VENIPUNCTURE: CPT | Performed by: COLON & RECTAL SURGERY

## 2018-02-04 PROCEDURE — 25000128 H RX IP 250 OP 636: Performed by: STUDENT IN AN ORGANIZED HEALTH CARE EDUCATION/TRAINING PROGRAM

## 2018-02-04 PROCEDURE — 25000125 ZZHC RX 250: Performed by: INTERNAL MEDICINE

## 2018-02-04 PROCEDURE — 85027 COMPLETE CBC AUTOMATED: CPT | Performed by: STUDENT IN AN ORGANIZED HEALTH CARE EDUCATION/TRAINING PROGRAM

## 2018-02-04 PROCEDURE — 25000128 H RX IP 250 OP 636: Performed by: INTERNAL MEDICINE

## 2018-02-04 PROCEDURE — 00000146 ZZHCL STATISTIC GLUCOSE BY METER IP

## 2018-02-04 PROCEDURE — 36592 COLLECT BLOOD FROM PICC: CPT | Performed by: STUDENT IN AN ORGANIZED HEALTH CARE EDUCATION/TRAINING PROGRAM

## 2018-02-04 PROCEDURE — 25000132 ZZH RX MED GY IP 250 OP 250 PS 637: Performed by: INTERNAL MEDICINE

## 2018-02-04 PROCEDURE — 36592 COLLECT BLOOD FROM PICC: CPT | Performed by: INTERNAL MEDICINE

## 2018-02-04 PROCEDURE — 85004 AUTOMATED DIFF WBC COUNT: CPT | Performed by: STUDENT IN AN ORGANIZED HEALTH CARE EDUCATION/TRAINING PROGRAM

## 2018-02-04 PROCEDURE — 84132 ASSAY OF SERUM POTASSIUM: CPT | Performed by: INTERNAL MEDICINE

## 2018-02-04 PROCEDURE — 12000006 ZZH R&B IMCU INTERMEDIATE UMMC

## 2018-02-04 PROCEDURE — 99233 SBSQ HOSP IP/OBS HIGH 50: CPT | Mod: GC | Performed by: INTERNAL MEDICINE

## 2018-02-04 PROCEDURE — 83605 ASSAY OF LACTIC ACID: CPT | Performed by: COLON & RECTAL SURGERY

## 2018-02-04 RX ADMIN — POTASSIUM CHLORIDE 20 MEQ: 750 TABLET, EXTENDED RELEASE ORAL at 10:24

## 2018-02-04 RX ADMIN — TOLTERODINE 4 MG: 4 CAPSULE, EXTENDED RELEASE ORAL at 10:08

## 2018-02-04 RX ADMIN — CEFEPIME 2 G: 1 INJECTION, SOLUTION INTRAVENOUS at 13:19

## 2018-02-04 RX ADMIN — CITALOPRAM HYDROBROMIDE 10 MG: 10 TABLET ORAL at 10:09

## 2018-02-04 RX ADMIN — VITAMIN B12 0.1 MG ORAL TABLET 100 MCG: 0.1 TABLET ORAL at 10:08

## 2018-02-04 RX ADMIN — POTASSIUM CHLORIDE 20 MEQ: 750 TABLET, EXTENDED RELEASE ORAL at 15:05

## 2018-02-04 RX ADMIN — CEFEPIME 2 G: 1 INJECTION, SOLUTION INTRAVENOUS at 21:00

## 2018-02-04 RX ADMIN — PANTOPRAZOLE SODIUM 40 MG: 40 TABLET, DELAYED RELEASE ORAL at 10:07

## 2018-02-04 RX ADMIN — ACETAMINOPHEN 650 MG: 325 TABLET, FILM COATED ORAL at 13:18

## 2018-02-04 RX ADMIN — POTASSIUM CHLORIDE 20 MEQ: 750 TABLET, EXTENDED RELEASE ORAL at 20:49

## 2018-02-04 RX ADMIN — CALCIUM 1250 MG: 500 TABLET ORAL at 10:08

## 2018-02-04 RX ADMIN — ACETAMINOPHEN 650 MG: 325 TABLET, FILM COATED ORAL at 01:46

## 2018-02-04 RX ADMIN — CEFEPIME 2 G: 1 INJECTION, SOLUTION INTRAVENOUS at 05:33

## 2018-02-04 RX ADMIN — CALCIUM 1250 MG: 500 TABLET ORAL at 18:05

## 2018-02-04 RX ADMIN — PREDNISONE 5 MG: 5 TABLET ORAL at 10:07

## 2018-02-04 RX ADMIN — UMECLIDINIUM 1 PUFF: 62.5 AEROSOL, POWDER ORAL at 10:11

## 2018-02-04 RX ADMIN — ACETAMINOPHEN 650 MG: 325 TABLET, FILM COATED ORAL at 23:19

## 2018-02-04 RX ADMIN — VITAMIN D, TAB 1000IU (100/BT) 2000 UNITS: 25 TAB at 10:07

## 2018-02-04 RX ADMIN — SODIUM CHLORIDE: 9 INJECTION, SOLUTION INTRAVENOUS at 10:09

## 2018-02-04 RX ADMIN — SODIUM CHLORIDE: 9 INJECTION, SOLUTION INTRAVENOUS at 20:57

## 2018-02-04 ASSESSMENT — ACTIVITIES OF DAILY LIVING (ADL)
ADLS_ACUITY_SCORE: 11

## 2018-02-04 NOTE — PROGRESS NOTES
[facility] Progress Note         Assessment and Plan:   Assessment:   56 yo female with stage IIa, T2N0M0, triple negative breast cancer s/p 12 weeks taxol + 3 cycle of pembrolizumab and 2 cycles of adriamycin and cyclophosphamide admitted with neutropenic fever.      Plan:   1.  Neutropenic fever:  Resolved. Day #12 s/p adriamycin and cyclophosphamide.  WBC 0.1->0.2->0.3->0.5->1.  Was afebrile for 48 hrs but later spiked fever of 102 2/2. UA neg.  Blood cultures NGTD. CT CAP scan shows overall improving lung opacities but some new opacities in rt UL. Respiratory viral panel, CMV PCR neg. Last fever was 100 on 2/4.    - s/p Neulasta on 1/24  - continue cefepime 2gm Q8hrs (1/29-1/30) (restarted 2/2-)  -Plan to DC tomorrow on oral ABx (spoke to Dr. Flanagan on 2/4-ID will see her tomorrow morning and leave recommendations)  - DC Vancomycin today as MRSA swab neg  - s/pLevaquin 750 mg daily (1/31-2/1)([id number])  - ID consulted, appreciate recs  -F/u  HSV swab and Toxoplasma IgG    2.  Severe pancytopenia:  Improving. Probably secondary to chemotherapy.Plt count has never been so low with previous cycles of chemo. DIC less likely as she has normal PT/INR, fibrinogen, mildly elevated D-dimer which is non specific. Platelets are at 14->23, but no bleeding,Hb at 7.3, WBC 0.2. Normal LDH and haptoglobin, suggesting no hemolysis or concern for TMA.Peripheral smear shows no schistocytes or pseudothrmbocytopenia.  - s/p neulasta 1/24  - s/p transfusion 2 UpRBC (1/29/18 and 2/1)  -Goal Hb>8 and Goal Plt >10, if bleeding Goal of Plt is 20K  -continue to monitor daily blood counts.      3.  Pneumonitis secondary to pembrolizumab:  Continues on steroid taper.  No evidence of respiratory compromise.  - pembrolizumab discontinued.  - continue prednisone to 5 mg PO daily.  Will do for 1 week (upto 2/5), then every other day for 1 week, then stop.    4. Mucositis from chemo: small ulcer on tongue.  -will swab for HSV and magic  mouth wash prn  -viscous lidocaine     4.  Right breast cancer:  Enrolled on the ISPY2 clinical trial.  Randomized to Taxol + pembrolizumab followed by adriamycin, cyclophosphamide, and pembrolizumab.  Pembrolizumab discontinued after 3 cycles due to pneumonitis.  Week 12 breast MRI shows no residual breast tumor.  Both cycles #1 and #2 of adriamycin and cyclophosphamide complicated by neutropenic fever.  Discussed with patient today that given good response to treatment thus far and poor tolerance of chemotherapy, will forgo further chemotherapy and proceed with surgery.  - per Dr. Bradshaw, she will notify surgical oncology and move surgery up to 3-5 weeks from now    5. Abdominal pain: non specific, Abd X ray showed increased colonc stool, her pain was relieved after 2 big BM.CT abd today was unremarkable.  -Monitor and use heat pads which help her pain.    FEN: regular diet, replete lytes PRN  Prophylaxis: mechanical (thrombocytopenia)  Code: FULL  Disposition: DC tomorrow with Oral ABx. , Has PA appt on 2/6            Interval History:   Patient reports ongoing fatigue that is stable.  Had mild fever of 100.4. Otherwise feels well and thinks she is ready for DC tomorrow. Oral sores are improving with lidocaine. Denies shortness of breath or chest pain. Denies nausea.  Appetite is good.  The remainder of a review of systems is otherwise negative.          Significant Problems:     Patient Active Problem List   Diagnosis     Anxiety     Anisocoria     Hyperlipidemia LDL goal <130     COPD (chronic obstructive pulmonary disease)     Lack of libido     Family history of ischemic heart disease     Mild major depression (H)     Hives     Lung nodule, multiple     MERLIN (obstructive sleep apnea)- moderate (AHI 21)     Urticaria     Chronic obstructive pulmonary disease, unspecified COPD type (H)     Depression, unspecified depression type     Malignant neoplasm of upper-inner quadrant of right breast in female, estrogen  receptor negative (H)     Acute cystitis without hematuria     Pneumonia     Encounter for antineoplastic chemotherapy     Neutropenia with fever (H)     Neutropenic sepsis (H)             Review of Systems:   A comprehensive review of systems was performed and found to be negative except as described in this note           Medications:     Current Facility-Administered Medications   Medication     potassium chloride SA (K-DUR/KLOR-CON M) CR tablet 20-40 mEq     potassium chloride (KLOR-CON) Packet 20-40 mEq     potassium chloride 20 mEq in 50 mL intermittent infusion     lidocaine (viscous) (XYLOCAINE) 2 % solution 5 mL     polyethylene glycol (MIRALAX/GLYCOLAX) Packet 17 g     potassium chloride (K-TAB,KLOR-CON) CR tablet 20 mEq     ceFEPIme (MAXIPIME) intermittent infusion 2 g     magic mouthwash suspension (diphenhydramine, lidocaine, aluminum-magnesium & simethicone)     simethicone (MYLICON) suspension 40 mg     HYDROmorphone (DILAUDID) injection 1 mg     guaiFENesin-dextromethorphan (ROBITUSSIN DM) 100-10 MG/5ML syrup 5 mL     senna-docusate (SENOKOT-S;PERICOLACE) 8.6-50 MG per tablet 1-2 tablet     predniSONE (DELTASONE) tablet 5 mg     0.9% sodium chloride infusion     sodium chloride (PF) 0.9% PF flush 3 mL     albuterol (PROAIR HFA/PROVENTIL HFA/VENTOLIN HFA) Inhaler 2 puff     calcium carbonate (OS-JAVIER 500 mg [place]) tablet 1,250 mg     citalopram (celeXA) tablet 10 mg     cyanocolbalamin (vitamin  B-12) tablet 100 mcg     tolterodine (DETROL LA) 24 hr capsule 4 mg     hydrOXYzine (ATARAX) tablet 25-50 mg     pantoprazole (PROTONIX) EC tablet 40 mg     cholecalciferol (vitamin D3) tablet 2,000 Units     umeclidinium (INCRUSE ELLIPTA) 62.5 MCG/INH oral inhaler 1 puff     Medication Instruction     prochlorperazine (COMPAZINE) tablet 5-10 mg    Or     prochlorperazine (COMPAZINE) injection 5 mg     ondansetron (ZOFRAN) injection 8 mg    Or     ondansetron (ZOFRAN-ODT) ODT tab 8 mg    Or     ondansetron  "(ZOFRAN) tablet 8 mg     LORazepam (ATIVAN) tablet 0.5-1 mg    Or     LORazepam (ATIVAN) injection 0.5-1 mg     acetaminophen (TYLENOL) tablet 650 mg     Facility-Administered Medications Ordered in Other Encounters   Medication     lidocaine 1 % 9 mL     sodium bicarbonate 8.4 % injection 1 mEq     lidocaine-EPINEPHrine 1.5 %-[id number] injection 10 mL     Vital signs:  Temp: 98.1  F (36.7  C) Temp src: Oral BP: 108/56   Heart Rate: 94 Resp: 16 SpO2: 96 % O2 Device: Nasal cannula Oxygen Delivery: 1 LPM Height: 162.6 cm (5' 4\") Weight: 71.1 kg (156 lb 11.2 oz)  Estimated body mass index is 26.9 kg/(m^2) as calculated from the following:    Height as of this encounter: 1.626 m (5' 4\").    Weight as of this encounter: 71.1 kg (156 lb 11.2 oz).        General:  Fatigued appearing, adult female in NAD.  Moon facies.  HEENT:  Normocephalic.  Sclera anicteric.  MMM.  Sore in rt buccal area and toungue, No lesions of the oropharynx.  Chest:  CTA bilaterally.  No wheezes or crackles.  Left chest port-a-cath is without surrounding erythema or edema.  CV:  RRR.  Nl S1 and S2.  No m/r/g.  Abd:  Soft/NT/ND.  BSs normoactive.  No hepatosplenomegaly.  Ext:  No pitting edema of the bilateral lower extremities.  Pulses 2+ and symmetric.  Musculo:  Strength 5/5 throughout.  Neuro:  Cranial nerves grossly intact.  Psych:  Mood and affect appear normal.            Data:   CBC RESULTS:   Recent Labs   Lab Test  02/03/18   0522   WBC  0.5*   RBC  2.46*   HGB  7.5*   HCT  22.8*   MCV  93   MCH  30.5   MCHC  32.9   RDW  15.4*   PLT  13*     Last Basic Metabolic Panel:  Lab Results   Component Value Date     02/03/2018      Lab Results   Component Value Date    POTASSIUM 3.1 02/03/2018     Lab Results   Component Value Date    CHLORIDE 105 02/03/2018     Lab Results   Component Value Date    JAVIER 7.7 02/03/2018     Lab Results   Component Value Date    CO2 22 02/03/2018     Lab Results   Component Value Date    BUN 2 02/03/2018     Lab " Results   Component Value Date    CR 0.47 02/03/2018     Lab Results   Component Value Date    GLC 73 02/03/2018       Recent Results (from the past 24 hour(s))   XR Chest Port 1 View    Narrative    EXAM: XR CHEST PORT 1 VW  2/2/2018 11:06 AM      HISTORY: r/o new PNA;     COMPARISON: 1/29/2018    FINDINGS: Single AP view of the chest. Left IJ central venous catheter  with tip over superior atriocaval junction. Normal cardiac silhouette.  Unremarkable upper abdomen and visualized bones.    Multifocal bilateral airspace opacities, with increase in right upper  lung and left upper lung airspace opacities. No pleural  effusions/pneumothorax. Possible cavitary change in the right upper  lung. Decreased right basilar airspace opacities.      Impression    IMPRESSION: Increased right upper lung and left upper lung airspace  opacities compared to prior, decreased right basilar airspace  opacities. Possible cavitary change in the right upper lung. Findings  suggest infectious etiology.    I have personally reviewed the examination and initial interpretation  and I agree with the findings.    OCTAVIO ALVES MD   CT Chest/Abdomen/Pelvis w Contrast    Narrative    EXAMINATION: CT CHEST/ABDOMEN/PELVIS W CONTRAST, 2/2/2018 12:41 PM    TECHNIQUE: Helical CT images from the thoracic inlet through the  symphysis pubis were obtained with intravenous contrast, including  images through the pelvis in the late arterial phase. Contrast dose:  isovue 370    COMPARISON: 11/13/2017 CT chest; 10/6/2015 CT abdomen/pelvis    HISTORY: Persistent fever, on Abx, Hx of Breast cancer, on chemo,  pancytopenia, Hx of pneumonitis;     FINDINGS:    Chest: Overall decreased extent of numerous bilateral peripheral  predominant peribronchovascular consolidative and groundglass  opacities, however, new groundglass opacities are noted in the lateral  right upper lobe (for example series 8, image 43) and paramediastinal  left upper lobe (series 8, image  56). Slightly decreased small  bilateral pleural effusions. No pneumothorax. The central  tracheobronchial tree is clear. Numerous scattered pulmonary nodules  are unchanged, for example measuring 4 mm in the subpleural posterior  left lower lobe (series 8, image 126). Several scattered calcified  granulomas.    Normal heart size. No pericardial effusion. Normal caliber ascending  aorta and main pulmonary artery. No central pulmonary embolism. No  thoracic lymphadenopathy. Bilateral calcified hilar and subcarinal  lymph nodes. Left chest wall internal jugular Port-A-Cath tip at the  low SVC. Small hiatal hernia.    Abdomen and pelvis: No suspicious arterial enhancing hepatic lesion.  Punctate hypodensities in the liver are too small to fully  characterize but are unchanged. The gallbladder and biliary tree are  within normal limits. The spleen, pancreas, adrenal glands, and  kidneys are unremarkable. No hydronephrosis, hydroureter, or urinary  tract stone. The bladder is unremarkable. Bilateral tubal ligation  clips. The uterus and ovaries are unremarkable. Trace perihepatic  ascites along the anterosuperior aspect of the left hepatic lobe. No  free air. No bowel obstruction. Mild prominence of the wall of the  cecum/proximal ascending colon and the rectum. No pneumatosis or  portal venous gas. The major abdominal vessels are patent. Moderate  atherosclerotic calcification of the abdominal aorta without  aneurysmal dilation.    Bones and soft tissues: No aggressive osseous lesion. Transitional  anatomy with lumbarization of S1. Mild anterior right superior  endplate compression deformity at L5, new since 10/6/2015. Unchanged  mild mid thoracic superior endplate compression fractures with a new  mild superior endplate anterior wedge compression fracture at L1 since  11/13/2017. Herniation of a small amount of peritoneal fat through a  anterolateral left abdominal wall defect measuring approximately 7 mm  (series 7,  image 466). Prominent fat in the left inguinal canal.      Impression    IMPRESSION:   1. Overall decreased/improved bilateral peripheral predominant  peribronchovascular consolidative and groundglass opacities, however,  2 newly affected regions are noted in the lateral right upper lobe and  paramediastinal left upper lobe, which may represent focal worsening  of an ongoing pathologic process since 11/13/2017 (please for to that  report for further details), or new superimposed infection.  2. Slightly decreased small bilateral pleural effusions.  3. Age-indeterminate mild superior endplate compression fractures are  noted at L1 (new since 11/13/2017) and L5 (new since 10/6/2015).    I have personally reviewed the examination and initial interpretation  and I agree with the findings.    MD Greg LEWIS MD

## 2018-02-04 NOTE — PLAN OF CARE
"Problem: Patient Care Overview  Goal: Plan of Care/Patient Progress Review  Outcome: Improving  Neuro: A&Ox4. Patient lethargic most of today, but arousable. Patient stated she \"was just tired\" and \"want to sleep\".   Cardiac: Sinus Tach. SBP-WNL  Respiratory: Sating 98% on RA. But put on 1LNC while sleeping. Lungs clear/diminished.   GI/: Adequate urine output. BM X1   Diet/appetite: Tolerating reg diet. No appetite. Encouraged patient to order food and offered fluids frequently throughout the day. No nausea reported.   Activity:  Independent up to chair and in halls.  Pain: At acceptable level on current regimen. No c/o pain.  Skin: Intact, no new deficits noted.  LDA's: CVC chemo port infusing NS at 100ml/hour    Plan: No fevers today.       "

## 2018-02-05 ENCOUNTER — RESEARCH ENCOUNTER (OUTPATIENT)
Dept: ONCOLOGY | Facility: CLINIC | Age: 58
End: 2018-02-05

## 2018-02-05 PROBLEM — R91.8 ABNORMAL RADIOLOGIC FINDING OF LUNG FIELD: Status: ACTIVE | Noted: 2018-02-05

## 2018-02-05 PROBLEM — Z79.52 LONG TERM (CURRENT) USE OF SYSTEMIC STEROIDS: Status: ACTIVE | Noted: 2018-02-05

## 2018-02-05 PROBLEM — K14.0 TONGUE ULCER: Status: ACTIVE | Noted: 2018-02-05

## 2018-02-05 LAB
ANION GAP SERPL CALCULATED.3IONS-SCNC: 7 MMOL/L (ref 3–14)
BUN SERPL-MCNC: 4 MG/DL (ref 7–30)
CALCIUM SERPL-MCNC: 8.2 MG/DL (ref 8.5–10.1)
CHLORIDE SERPL-SCNC: 105 MMOL/L (ref 94–109)
CO2 SERPL-SCNC: 25 MMOL/L (ref 20–32)
CREAT SERPL-MCNC: 0.52 MG/DL (ref 0.52–1.04)
ERYTHROCYTE [DISTWIDTH] IN BLOOD BY AUTOMATED COUNT: 15.4 % (ref 10–15)
GFR SERPL CREATININE-BSD FRML MDRD: >90 ML/MIN/1.7M2
GLUCOSE BLDC GLUCOMTR-MCNC: 76 MG/DL (ref 70–99)
GLUCOSE SERPL-MCNC: 71 MG/DL (ref 70–99)
HCT VFR BLD AUTO: 23.7 % (ref 35–47)
HGB BLD-MCNC: 7.7 G/DL (ref 11.7–15.7)
LACTATE BLD-SCNC: 0.6 MMOL/L (ref 0.4–1.9)
MCH RBC QN AUTO: 30.8 PG (ref 26.5–33)
MCHC RBC AUTO-ENTMCNC: 32.5 G/DL (ref 31.5–36.5)
MCV RBC AUTO: 95 FL (ref 78–100)
PLATELET # BLD AUTO: 42 10E9/L (ref 150–450)
POTASSIUM SERPL-SCNC: 3.8 MMOL/L (ref 3.4–5.3)
RBC # BLD AUTO: 2.5 10E12/L (ref 3.8–5.2)
SODIUM SERPL-SCNC: 137 MMOL/L (ref 133–144)
T GONDII IGG SER QL: <3 IU/ML (ref 0–7.1)
WBC # BLD AUTO: 1.6 10E9/L (ref 4–11)

## 2018-02-05 PROCEDURE — 36592 COLLECT BLOOD FROM PICC: CPT | Performed by: COLON & RECTAL SURGERY

## 2018-02-05 PROCEDURE — 82784 ASSAY IGA/IGD/IGG/IGM EACH: CPT | Performed by: NURSE PRACTITIONER

## 2018-02-05 PROCEDURE — 85027 COMPLETE CBC AUTOMATED: CPT | Performed by: STUDENT IN AN ORGANIZED HEALTH CARE EDUCATION/TRAINING PROGRAM

## 2018-02-05 PROCEDURE — 99233 SBSQ HOSP IP/OBS HIGH 50: CPT | Performed by: INTERNAL MEDICINE

## 2018-02-05 PROCEDURE — 25000132 ZZH RX MED GY IP 250 OP 250 PS 637: Performed by: STUDENT IN AN ORGANIZED HEALTH CARE EDUCATION/TRAINING PROGRAM

## 2018-02-05 PROCEDURE — 36592 COLLECT BLOOD FROM PICC: CPT | Performed by: NURSE PRACTITIONER

## 2018-02-05 PROCEDURE — 82785 ASSAY OF IGE: CPT | Performed by: NURSE PRACTITIONER

## 2018-02-05 PROCEDURE — 25000132 ZZH RX MED GY IP 250 OP 250 PS 637: Performed by: INTERNAL MEDICINE

## 2018-02-05 PROCEDURE — 87449 NOS EACH ORGANISM AG IA: CPT | Performed by: NURSE PRACTITIONER

## 2018-02-05 PROCEDURE — 25000125 ZZHC RX 250: Performed by: INTERNAL MEDICINE

## 2018-02-05 PROCEDURE — 12000006 ZZH R&B IMCU INTERMEDIATE UMMC

## 2018-02-05 PROCEDURE — 86696 HERPES SIMPLEX TYPE 2 TEST: CPT | Performed by: NURSE PRACTITIONER

## 2018-02-05 PROCEDURE — 36592 COLLECT BLOOD FROM PICC: CPT | Performed by: STUDENT IN AN ORGANIZED HEALTH CARE EDUCATION/TRAINING PROGRAM

## 2018-02-05 PROCEDURE — 87305 ASPERGILLUS AG IA: CPT | Performed by: NURSE PRACTITIONER

## 2018-02-05 PROCEDURE — 80048 BASIC METABOLIC PNL TOTAL CA: CPT | Performed by: STUDENT IN AN ORGANIZED HEALTH CARE EDUCATION/TRAINING PROGRAM

## 2018-02-05 PROCEDURE — 25000128 H RX IP 250 OP 636: Performed by: STUDENT IN AN ORGANIZED HEALTH CARE EDUCATION/TRAINING PROGRAM

## 2018-02-05 PROCEDURE — 86694 HERPES SIMPLEX NES ANTBDY: CPT | Performed by: NURSE PRACTITIONER

## 2018-02-05 PROCEDURE — 00000146 ZZHCL STATISTIC GLUCOSE BY METER IP

## 2018-02-05 PROCEDURE — 0CJY8ZZ INSPECTION OF MOUTH AND THROAT, VIA NATURAL OR ARTIFICIAL OPENING ENDOSCOPIC: ICD-10-PCS | Performed by: OTOLARYNGOLOGY

## 2018-02-05 PROCEDURE — 86695 HERPES SIMPLEX TYPE 1 TEST: CPT | Performed by: NURSE PRACTITIONER

## 2018-02-05 PROCEDURE — 83605 ASSAY OF LACTIC ACID: CPT | Performed by: COLON & RECTAL SURGERY

## 2018-02-05 RX ADMIN — CITALOPRAM HYDROBROMIDE 10 MG: 10 TABLET ORAL at 08:59

## 2018-02-05 RX ADMIN — CEFEPIME 2 G: 1 INJECTION, SOLUTION INTRAVENOUS at 21:52

## 2018-02-05 RX ADMIN — POTASSIUM CHLORIDE 20 MEQ: 750 TABLET, EXTENDED RELEASE ORAL at 13:10

## 2018-02-05 RX ADMIN — POTASSIUM CHLORIDE 20 MEQ: 750 TABLET, EXTENDED RELEASE ORAL at 08:59

## 2018-02-05 RX ADMIN — VITAMIN B12 0.1 MG ORAL TABLET 100 MCG: 0.1 TABLET ORAL at 08:58

## 2018-02-05 RX ADMIN — PANTOPRAZOLE SODIUM 40 MG: 40 TABLET, DELAYED RELEASE ORAL at 08:58

## 2018-02-05 RX ADMIN — CEFEPIME 2 G: 1 INJECTION, SOLUTION INTRAVENOUS at 13:10

## 2018-02-05 RX ADMIN — CALCIUM 1250 MG: 500 TABLET ORAL at 08:59

## 2018-02-05 RX ADMIN — ACETAMINOPHEN 650 MG: 325 TABLET, FILM COATED ORAL at 20:42

## 2018-02-05 RX ADMIN — POTASSIUM CHLORIDE 20 MEQ: 750 TABLET, EXTENDED RELEASE ORAL at 20:41

## 2018-02-05 RX ADMIN — SODIUM CHLORIDE: 9 INJECTION, SOLUTION INTRAVENOUS at 09:10

## 2018-02-05 RX ADMIN — UMECLIDINIUM 1 PUFF: 62.5 AEROSOL, POWDER ORAL at 09:00

## 2018-02-05 RX ADMIN — CEFEPIME 2 G: 1 INJECTION, SOLUTION INTRAVENOUS at 05:09

## 2018-02-05 RX ADMIN — PREDNISONE 5 MG: 5 TABLET ORAL at 08:58

## 2018-02-05 RX ADMIN — VITAMIN D, TAB 1000IU (100/BT) 2000 UNITS: 25 TAB at 08:59

## 2018-02-05 RX ADMIN — TOLTERODINE 4 MG: 4 CAPSULE, EXTENDED RELEASE ORAL at 08:59

## 2018-02-05 ASSESSMENT — ACTIVITIES OF DAILY LIVING (ADL)
ADLS_ACUITY_SCORE: 11

## 2018-02-05 NOTE — CONSULTS
Otolaryngology Consult Note  February 5, 2018      CC: Nasal crusting    HPI: Ashleigh Alonzo is a 57 year old female with a past medical history of  COPD and stage IIa breast cancer who was admitted with febrile neutropenia. Patient states that since her most recent bout of chemotherapy she has had intermittent fevers. She has been on a prednisone taper, as well as chemotherapy for breast cancer. She doesn't report nasal pain, however states that along with chemotherapy has come some occasional spotty bleeds, and increased nasal crusting. She does not use saline spray or other nasal sprays. She reports no facial numbness or changes in nasal sensations. She has no blurry vision or changes in vision. Her teeth are sensate as well as her oral cavity and mouth. She reports no sinus pressure symptoms and no facial pain. She isn't particularly concerned with the nasal crusting, but states that one of the doctors this morning showed concerned that behind the crust may be hiding a source of her fever. Other workup is in process for a pneumonitis vs pneumonia which could be a side effect of one of the chemotherapy agents she is taking. She understands our discussion and need for nasal endoscopy.    Past Medical History:   Diagnosis Date     COPD (chronic obstructive pulmonary disease) (H) 2011     Malignant neoplasm of upper-inner quadrant of right breast in female, estrogen receptor negative (H) 8/30/2017     Periodontal disease      Sleep apnea 12/2015     Urticaria        Past Surgical History:   Procedure Laterality Date     APPENDECTOMY  10/6/2015     CATARACT IOL, RT/LT  11/2016    bilateral      COLONOSCOPY  11/15/2011    Procedure:COLONOSCOPY; Colonoscopy, screening; Surgeon:ANASTASIA BUNCH; Location: OR     INSERT PORT VASCULAR ACCESS Left 9/1/2017    Procedure: INSERT PORT VASCULAR ACCESS;  Single Lumen Chest Power Port;  Surgeon: Leif Parkinson PA-C;  Location: [facility] OR     TUBAL LIGATION  12/2004     "Bilateral       No current outpatient prescriptions on file.        No Known Allergies    Social History     Social History     Marital status: Single     Spouse name: N/A     Number of children: N/A     Years of education: N/A     Occupational History     Equipment dispatcher [employer]      Social History Main Topics     Smoking status: Former Smoker     Packs/day: 1.00     Years: 15.00     Types: Cigarettes     Quit date: 1/1/2000     Smokeless tobacco: Never Used     Alcohol use 8.4 - 12.6 oz/week      Comment: daily     Drug use: No     Sexual activity: Not Currently     Partners: Male     Other Topics Concern     Parent/Sibling W/ Cabg, Mi Or Angioplasty Before 65f 55m? Yes     Social History Narrative    Patient [place] [facility]. She [place] in [place] with her . 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 No recent travel. No international travel. No sick contacts or exposure to small children (she has small grand children, 1-3 y/o but has not seen them in a few weeks). They have two dogs and a cat, and she takes care of the litter box sometimes, however she has not clean after the cat since her cancer diagnosis.       Family History   Problem Relation Age of Onset     Cardiovascular Father 46     MI     Eye Disorder Father      CEREBROVASCULAR DISEASE Father      Arthritis Sister      Neurologic Disorder Brother      CANCER No family hx of      DIABETES No family hx of      Hypertension No family hx of        ROS: 12 point review of systems is negative unless noted in HPI.    PHYSICAL EXAM:  General: laying in bed, no acute distress  /74 (BP Location: Left arm)  Pulse 89  Temp 98.8  F (37.1  C) (Oral)  Resp 18  Ht 1.626 m (5' 4\")  Wt 70.2 kg (154 lb 11.2 oz)  SpO2 93%  Breastfeeding? No  BMI 26.55 kg/m2  HEAD: normocephalic, atraumatic  Face: symmetrical, CN VII intact bilaterally (HB 1), no swelling, edema, or erythema. " Sensation V1-V3 intact and equal bilaterally.   Eyes: EOMI without spontaneous or gaze evoked nystagmus, PERRL, clear sclera  Ears: no tragal tenderness, external ear canal open and clear bilaterally, TMs clear bilaterally  Nose: no anterior drainage, intact and midline septum without perforation or hematoma, mild amount of nasal crusting easily removed with a bayonet forceps, health mucosa demonstrated underneath with sensation intact  Mouth: moist, no ulcers, no jaw or tooth tenderness, tongue midline and symmetric, small dental trauma lesion on the right lateral tongue that appears to be healing  Oropharynx: tonsils within normal limits, uvula midline, no oropharyngeal erythema  Neck: no LAD, trachea midline  Neuro: cranial nerves 2-12 grossly intact    RIGID NASAL ENDOSCOPY:  Due to nasal crusting, rigid nasal endoscopy was indicated. After obtaining verbal consent, the nose was topically decongested with normal saline. The endoscope was passed into the bilateral nasal passage. The turbinates were normal. The inferior and middle meati were clear bilaterally without purulence, masses, or polyps. The nasopharynx was clear. The eustachian tubes were clear. Sensation was intact throughout and bleeding was demonstrated with tissue manipulation    ROUTINE IP LABS (Last four results)  BMP  Recent Labs  Lab 02/05/18  0655 02/04/18  1231 02/03/18  0522 02/02/18  0647 01/30/18  0619     --  137 140 137   POTASSIUM 3.8 3.5 3.1* 3.3* 4.1   CHLORIDE 105  --  105 108 106   JAVIER 8.2*  --  7.7* 7.8* 7.7*   CO2 25  --  22 23 25   BUN 4*  --  2* 3* 8   CR 0.52  --  0.47* 0.46* 0.45*   GLC 71  --  73 77 92     CBC  Recent Labs  Lab 02/05/18  0655 02/04/18  0648 02/03/18  0522 02/02/18  0647   WBC 1.6* 1.0* 0.5* 0.3*   RBC 2.50* 2.49* 2.46* 2.63*   HGB 7.7* 7.6* 7.5* 8.1*   HCT 23.7* 23.4* 22.8* 24.5*   MCV 95 94 93 93   MCH 30.8 30.5 30.5 30.8   MCHC 32.5 32.5 32.9 33.1   RDW 15.4* 15.4* 15.4* 15.6*   PLT 42* 22* 13* 12*      INR  Recent Labs  Lab 02/01/18  1050   INR 1.12       Assessment and Plan  Ashleigh Alonzo is a 57 year old female with a past medical history as above who appears to have chemotherapy related mucositis/vestibulitis vs a normal nose with poor nasal hygiene. A thorough exam demonstrates no concern for fungal, or other infectious causes of excessive crusting.    - no evidence of invasive fungals sinusitis  - recommending increased nasal hygiene regiment with ocean nasal spray, Q2h while awake is a good start  - would also recommend topical Bactroban BID for 7 days for possible vestibulitis of the right nare    Fausto Nam MD PGY - 1  Otolaryngology-Head & Neck Surgery  Please page ENT with questions by dialing [phone] and entering job [phone] when prompted.    Discussed with on call staff Dr Landa

## 2018-02-05 NOTE — PLAN OF CARE
Problem: Patient Care Overview  Goal: Plan of Care/Patient Progress Review  Outcome: Improving  Neuro: A&Ox4.   Cardiac: Sinus Tach for a brief period today otherwise NSR. One temp of 100.7 early afternoon. RN gave tylenol and patient recovered within the hour.   Respiratory: Sating 96% on RA.  GI/: Adequate urine output. BM X1, mixed in urine, loose watery.   Diet/appetite: Tolerating reg diet. Eating fair. Drinking well. No nausea today.  Activity:  Independent, up to chair and in halls.  Pain: At acceptable level on current regimen. No complaints.   Skin: Intact, no new deficits noted. Had short episode of diaphoresis without fever late afternoon. Gave cold wash clothes and patient   LDA's: Port a Cath infusing maintenance fluid.     Plan: Continue with POC. Notify primary team with changes.

## 2018-02-05 NOTE — PROGRESS NOTES
Hematology / Oncology Progress Note  02/05/2018  Assessment & Plan    Ashleigh Alonzo is a 56 y/o PMH COPD, MERLIN, Depression, & stage IIa T2N0M0 Triple negative breast cancer s/p 12 weeks taxol, 3 cycles, pembrolizumab (hx. Autoimmune pneumonitis), & 2 cycles of adriamycin/cyclophosphamide admitted with NF & HCAP (RUL PNA).     # Neutropenic fever likely 2/2 HCAP.  # Neutropenia.   - Had last cycle of adriamycin & taxol on . S/P neulasta on 1/24/18. Last fever was 102 F on 2/2 (100 on 2/4), UA NTD, Blood culture NTD, CT CAP 2/2 (CXR 2/2 with increased RUL & SARA opacities, possible cavitary change RUL) shows overall decreased extent of numerous bilateral peripheral predominant peribronchovascular consolidative and GGO, however, NEW GGO are noted in the lateral RUL & paramediastinal SARA. RVP negative. CMV PCR negative. MRSA swab negative d/c'ed Vanc. S/P levaquin (750 mg PO daily 1/31-2/1, qtc 423).   - Infectious disease following, Jennifer Flanagan will leave recommendation 2/5 AM. Would like pulmonary to evaluate for bronchoscopy (r/t on steroids since 11/2017, no PCP ppx no fungal ppx, concerning CT imaging findings). Would like ENT to evaluate R nare scabbing & persistent blood mucous. Send immunoglobulins. Send AspG & 1,3BDF. HSV IgG & HSV IgM  - On cefepime 2g q 8 (1/29-1/30, 2/2-->). WBC 1.6 today, no differential sent.   - HSV swab negative & toxoplasma IgG pending.  - Sputum culture (aerobic bacterial was never collected).     # Pancytopenia. Likely cumulative r/t chemotherapy & infection. Thrombocytopenia more severe than with previous cycles. Normal LDH & haptoglobin. Peripheral smear without schistocytes or pseudothrombocytopenia.   - s/p Neulasta 1/24/18. S/p transfusion 2 PRBC (1/29 & 2/1).  - Goal is to transfuse HgB < 7, or PLT < 10. If bleeding transfuse to keep PLT > 20.   - CBC with platelets daily.     # Pneumonitis 2/2 to pembrolizumab. On steroid taper.  - Continue prednisone 5 mg PO daily (until 2/5,  then every other day for 7 days, then d/c).    # Mucositis 2/2 to chemotherapy & neutropenia.  - HSV swab pending.  - MMW, viscous lidocaine.    # Triple negative breast cancer (stage IIa, T2N0,M0). Enrolled on the ISPY2 clinical trial.  Randomized to Taxol + pembrolizumab followed by adriamycin, cyclophosphamide, and pembrolizumab.  Pembrolizumab discontinued after 3 cycles due to pneumonitis.  Week 12 breast MRI shows no residual breast tumor.  Both cycles #1 and #2 of adriamycin and cyclophosphamide complicated by neutropenic fever.  Discussed with patient today that given good response to treatment thus far and poor tolerance of chemotherapy, will forgo further chemotherapy and proceed with surgery.  - Attending Leeann DUNLAP to contact surgical oncology & move up surgery 3-5 weeks from now.     # Abdominal pain 2/2 to constipation. Relived with BM. ABD XR showed increased stool burden. CT Abdomen was unremarkable.   - Heating pads PRN.  - Bowel regimen. Senna S 1-2 tabs PRN BID. Miralax daily PRN.     FEN: Encourage PO intake. Potassium SS. RDAT.   PPX (DVT/GI): Protonix 40 mg PO daily (r/t steroid), & Mechanical DVT/ambulation encouraged (r/t severe TCP).   LINES/DRAINS: Port L chest wall 9/1/17.    DISPO: unclear, competing infectious work up. PT consult for generalized weakness & deconditioning.     Interval history  - WBC improved to 1.6, no differential is pending. PLT up to 42. HgB 7.7. Lactic acid 0.6.   - Toxoplasma still in process, HSV 1 & 2 DNA by PCR were negative from mouth swab.   - Tmax 102 at 2310 on 2/4/18. Overnight improved to 100.5. No vitals in from 7 am yet. HD stable -148, HR 80, 110 with fevers. On 1 LPM NC. Wt stable at 70 kg. Total output 4.4 yesterday (3.6 is mixed urine & stool). Patient is net negative 1 L (2/4-5 thus far). 500 ml urine ON.   - Gaps in antibiotic coverage include atypicals (?), & MRSA coverage (was on Vanc 1/29-1/31, & 2/2-2/3). Drug fever (cephalosporins?).    - She used 2 G tylenol on 2/4/18.   - I followed up ID rec's, discussed case with pulmonary & ENT.  - On my exam Ashleigh Perera reports feeling fine. No chest pain, no SOB. She still has O2 in place from last night. Says she uses CPAP at home with O2. She still has cough with clear sputum. No fever chills this AM but t max was 102 last night. Reports feels achy & fatigued with fevers. She hasn't been up in the room much (will order PT). Reports having loose stool s/p laxative. No abdominal pain/cramping. No N/V. Not that hungry, but taking in fluids. Voiding regularly.   - Updated Ashleigh Perera via the telephone about labs (ordered with ID guidance), ENT visit & pulm visit. No questions/concerns at this time.     Physical Exam  Constitutional: Awake, alert, cooperative, in NAD. Has O2 in place. Slightly flushed.   Eyes: PERRL, EOMI, sclera clear, conjunctiva normal.  ENT: Normocephalic, without obvious abnormality, moist mucus membranes has a white long lesion along R side of her tongue. Has scabbing R nare.   Respiratory: Non-labored breathing, bibasilar rales.   Cardiovascular: RRR, no murmur noted.  GI: +BS, soft, non-distended, non-tender, no masses palpated, no hepatosplenomegaly.  Skin: No concerning lesions or rash on exposed areas. Left chest wall port C/D/I.   Musculoskeletal: No edema ame LEs.  Neurologic: Awake, A&O x 3. Cranial nerves II-XII are grossly intact.   Neuropsychiatric: Calm, normal affect.     Rounding:  Temp:  [97.8  F (36.6  C)-102  F (38.9  C)] 98.4  F (36.9  C)  Pulse:  [97] 97  Heart Rate:  [] 84  Resp:  [16-18] 16  BP: (114-148)/(70-90) 117/74  SpO2:  [96 %-100 %] 100 %  ----------------------------------  I/O last 3 completed shifts:  In: 3266.5 [P.O.:1174; I.V.:2092.5]  Out: 4200 [Urine:1300; Other:2900]  ----------------------------------  Vitals:    01/31/18 0153 02/01/18 0647 02/02/18 0449 02/04/18 0625   Weight: 70.8 kg (156 lb) 69.5 kg (153 lb 4.8 oz) 70.1 kg (154 lb 8 oz) 71.1 kg  (156 lb 11.2 oz)    02/05/18 0450   Weight: 70.2 kg (154 lb 11.2 oz)     ----------------------------------  CBC  Recent Labs  Lab 02/04/18  0648 02/03/18  0522 02/02/18  0647 02/01/18  1050   WBC 1.0* 0.5* 0.3* 0.2*   RBC 2.49* 2.46* 2.63* 2.37*   HGB 7.6* 7.5* 8.1* 7.6*   HCT 23.4* 22.8* 24.5* 22.6*   MCV 94 93 93 95   MCH 30.5 30.5 30.8 32.1   MCHC 32.5 32.9 33.1 33.6   RDW 15.4* 15.4* 15.6* 15.1*   PLT 22* 13* 12* 14*     CMP  Recent Labs  Lab 02/04/18  1231 02/03/18  0522 02/02/18  0647 01/30/18  0619 01/29/18  1520 01/29/18  1348   NA  --  137 140 137 133 134   POTASSIUM 3.5 3.1* 3.3* 4.1 3.9 3.8   CHLORIDE  --  105 108 106  --  99   CO2  --  22 23 25  --  24   ANIONGAP  --  10 8 6  --  11   GLC  --  73 77 92 83 101*   BUN  --  2* 3* 8  --  10   CR  --  0.47* 0.46* 0.45*  --  0.58   GFRESTIMATED  --  >90 >90 >90  --  >90   GFRESTBLACK  --  >90 >90 >90  --  >90   JAVIER  --  7.7* 7.8* 7.7*  --  8.2*   MAG  --   --   --  1.9  --   --    PROTTOTAL  --  5.2* 5.1* 5.2*  --  6.0*   ALBUMIN  --  1.9* 1.9* 2.1*  --  2.7*   BILITOTAL  --  0.6 0.7 0.8  --  1.2   ALKPHOS  --  80 68 76  --  99   AST  --  61* 43 18  --  23   ALT  --  70* 49 29  --  39     INR  Recent Labs  Lab 02/01/18  1050   INR 1.12     ----------------------------------  No results found for this or any previous visit (from the past 24 hour(s)).  ----------------------------------  Anti-infectives (Future)    Start     Dose/Rate Route Frequency Ordered Stop    02/02/18 2200  ceFEPIme (MAXIPIME) intermittent infusion 2 g      2 g  over 30 Minutes Intravenous EVERY 8 HOURS 02/02/18 1718          ----------------------------------    potassium chloride SA  20 mEq Oral TID     ceFEPIme (MAXIPIME) IV  2 g Intravenous Q8H     HYDROmorphone  1 mg Intravenous Once     predniSONE  5 mg Oral Daily     sodium chloride (PF)  3 mL Intravenous Q8H     calcium carbonate  1,250 mg Oral BID w/meals     citalopram  10 mg Oral Daily     cyanocolbalamin  100 mcg Oral Daily      tolterodine  4 mg Oral Daily     pantoprazole  40 mg Oral QAM     cholecalciferol  2,000 Units Oral Daily     umeclidinium  1 puff Inhalation Daily       - MEDICATION INSTRUCTIONS -       Chelsi Yi, [facility], [phone].  Hematology/Oncology, February 5, 2018.

## 2018-02-05 NOTE — PLAN OF CARE
"Problem: Patient Care Overview  Goal: Plan of Care/Patient Progress Review  Outcome: Improving  Neuro: A&Ox4. Tmax of 102, relived by tylenol.   Cardiac: SR-Sinus tachycardia, HR 's. -130's.                    Respiratory: Sating >96% on 1 lpm via NC. Clear and diminished throughout. Infrequent/nonproductive cough.   GI/: Adequate urine output. 1 BM, diarrhea.    Diet/appetite: Regular diet.   Activity:  Independent.  Pain: Denies.   Skin: Intact, no new deficits noted.  LDA's: L port a cath, NS 75 ml/hr.       Plan: Sepsis triggered, lactic acid resulted in 0.6.  Continue with POC. Notify primary team with changes.  /74 (BP Location: Left arm)  Pulse 97  Temp 98.4  F (36.9  C) (Oral)  Resp 16  Ht 1.626 m (5' 4\")  Wt 70.2 kg (154 lb 11.2 oz)  SpO2 100%  Breastfeeding? No  BMI 26.55 kg/m2        "

## 2018-02-05 NOTE — CONSULTS
PULMONARY CONSULT  Date of service: 2018    Patient: Ashleigh Alonzo      : 1960      MRN: [id number]    We were consulted byMonica Elam CNP (Oncology) for evaluation of pulmonary infiltrate in setting of febrile neutropenia.        Impressions/Recommendations:   57 year old former smoker, with PMHx most significant for triple negative breast cancer on chemotherapy w/ adriamycin & cytoxan (s/p taxol), COPD w/ moderately-severe airflow obstruction, moderate MERLIN w/ CPAP non-adherence, admitted 2018 w/ acute onset fever, rhinorrhea, cough, & fatigue. She is being evaluated for pulmonary infiltrate in setting of febrile neutropenia.    #. Infiltrate in setting of febrile neutropenia -- diffuse patchy GGO in 2017 presumptively diagnosed as drug-induced pneumonitis & started on a prednisone taper. While some lung regions have improved, there appear to be newly affected areas when current CT chest is compared to that of 2017. Differential diagnosis includes a new or resistant infection in an immunocompromised patient (possible fungal, patient is also at risk of PCP w/o relevant ppx over the last 2 months) vs organizing pneumonia that is flaring in setting of decreased steroid dose  #. COPD w/ moderately severe obstruction w/o h/o recent exacerbations.   #. MERLIN w/ CPAP non-adherence & nocturnal hypoxia.    -- plan for bronchoscopy w/ BAL to rule out an acute infection; will determine further diagnostic interventions (such as a lung biopsy) based on BAL results  -- make patient NPO at [facility]  -- continue Incruse & PRN albuterol  -- please, request RT to provide patient w/ CPAP w/ humidifying equipment for her MERLIN    Patient seen & discussed w/  Dr. Hodan M.D., who is in agreement. Please, see staff addendum for changes/additions to the plan.    Chanda Goodwin MD  Pulmonary & Critical Care FL3  [phone]          History of Present Illness:   Ashleigh Alonzo is a 57 year old former smoker w/ h/o recently  diagnosed stage II breast cancer on therapy w/ adriamycin & cytoxan, presumed pembrolizumab pneumonitis on a steroid taper, moderately severe obstruction COPD, MERLIN on CPAP (non-adherent recently), who [facility]2018 with complaints of acute onset fever, rhinorrhea, & malaise 8 days after her most recent AC administration. On admission patient was found to be neutropenic w/ fevers, & was started on cefepime & vancomycin. As she failed to improve w/ persistent fevers, she got a CT chest that revealed new & old opacities that prompted further work-up.    Relevantly, patient was hospitalized in 11/2017 w/ fever (non-neutropenic), chills, & dry cough. Her imaging at the time was notable for new patchy opacities, & after patient's infectious work-up (negative RVP, blood cultures, no sputum cx d/t lack of phlegm) did not yield any results, she was diagnosed w/ presumed pneumonitis 2/2 pembrolizumab & was started on prednisone 70 mg qDAY w/ a slow taper. Her pembrolizumab was stopped, & she started on adriamycin + Cytoxan (AC). She was hospitalized in 01/2017 w/ febrile neutropenia shortly after her 1st cycle of AC, which is similar in presentation to her current admission. At the time of admission patient has been completing a week of prednisone 5 mg qDAY w/ plan to continue her steroid taper. Patient was not on PCP ppx during her steroid taper.    Patient reports that she has baseline SUAZO after about 1 block; she does not take the stairs. She is not sure if her limitations are due to fatigue or dyspnea as they go hand in hand. She denies needing oxygen in either November or January. For her COPD she has been on Spiriva for a few years, & she uses her albuterol only very rarely. She has a diagnosis of moderate MERLIN & has an Rx for a CPAP. She is not currently using her CPAP b/c she feels that it dries out her nose & mouth, & she did not want to be stressed about this while she is getting treatment for her  cancer.     Currently patient reports rhinorrhea  & non-productive cough. She denies cough at baseline. She is not producing any phlegm. She denies any wheezing or chest pain. She denies PND or orthopnea. She at times feels that her chest is congested.    Patient is a former smoker w/ a 15 pack-yr history; she quit in 2000. She lives w/ her . They have 3 dogs & 1 cat. Patient does not clean the litter box. The couple does not keep birds. Denies using hot tubs or saunas. Recently tested their house for mold (reasons unrelated to patient's illness), & results were negative.           Review of Symptoms:   10-point ROS reviewed, & found negative w/ exceptions noted in the HPI.          Past Medical History:     Past Medical History:   Diagnosis Date     COPD (chronic obstructive pulmonary disease) (H) 2011     Malignant neoplasm of upper-inner quadrant of right breast in female, estrogen receptor negative (H) 8/30/2017     Periodontal disease      Sleep apnea 12/2015     Urticaria        Past Surgical History:   Procedure Laterality Date     APPENDECTOMY  10/6/2015     CATARACT IOL, RT/LT  11/2016    bilateral      COLONOSCOPY  11/15/2011    Procedure:COLONOSCOPY; Colonoscopy, screening; Surgeon:ANASTASIA BUNCH; Location:[facility] OR     INSERT PORT VASCULAR ACCESS Left 9/1/2017    Procedure: INSERT PORT VASCULAR ACCESS;  Single Lumen Chest Power Port;  Surgeon: Leif Parkinson PA-C;  Location: [facility] OR     TUBAL LIGATION  12/2004    Bilateral            Allergies:     No Known Allergies          Outpatient Medications:     dexamethasone (DECADRON) 4 MG tablet Take 2 tablets (8 mg) by mouth daily Start on Day 2 of first cycle of doxorubicin / cyclophosphamide.   pantoprazole (PROTONIX) 40 MG EC tablet Take 1 tablet (40 mg) by mouth every morning   predniSONE (DELTASONE) 10 MG tablet Take 60mg daily for 1 week, then continue to decrease by 10mg each week (Patient taking differently: Take 10 mg by mouth daily  )   lidocaine-prilocaine (EMLA) cream Please apply to port site 30 minutes before use prn   hydrOXYzine (ATARAX) 25 MG tablet Take 1-2 tablets (25-50 mg) by mouth every 6 hours as needed for itching   guaiFENesin (MUCINEX) 600 MG 12 hr tablet Take 2 tablets (1,200 mg) by mouth 2 times daily   magic mouthwash suspension (diphenhydrAMINE, lidocaine, aluminum-magnesium & simethicone) Swish and swallow 10 mLs in mouth every 6 hours as needed for mouth sores   predniSONE (DELTASONE) 5 MG tablet 1 tab daily x 7 days, followed by every other day x 7 days   calcium carbonate (CALCIUM CARBONATE) 600 MG tablet Take by mouth 2 times daily (with meals)   LORazepam (ATIVAN) 0.5 MG tablet Take 1 tablet (0.5 mg) by mouth every 4 hours as needed (Anxiety, Nausea/Vomiting or Sleep)   tiotropium (SPIRIVA) 18 MCG capsule Inhale 1 capsule (18 mcg) into the lungs daily Inhale contents of one capsule   albuterol (PROAIR HFA/PROVENTIL HFA/VENTOLIN HFA) 108 (90 BASE) MCG/ACT Inhaler Inhale 2 puffs into the lungs every 6 hours as needed for shortness of breath / dyspnea   citalopram (CELEXA) 10 MG tablet Take 1 tablet (10 mg) by mouth daily   darifenacin (ENABLEX) 7.5 MG 24 hr tablet Take 1 tablet (7.5 mg) by mouth daily (Patient taking differently: Take 7.5 mg by mouth daily as needed )   [facility] 12-18 cm, F&P Simplus FFM small. (Patient not taking: Reported on 1/23/2018)   Coenzyme Q10 (CO Q 10 PO) Take 1 tablet by mouth daily    MULTIPLE VITAMIN PO Take 1 tablet by mouth daily    Cyanocobalamin (VITAMIN B 12 PO) Take 100 mcg by mouth daily    Pyridoxine HCl (VITAMIN B6 PO) Take 1 tablet by mouth daily.   VITAMIN D 1000 UNIT OR CAPS TAKE 2 CAPSULES BY MOUTH DAILY             Family History:     Family History   Problem Relation Age of Onset     Cardiovascular Father 46     MI     Eye Disorder Father      CEREBROVASCULAR DISEASE Father      Arthritis Sister      Neurologic Disorder Brother       "CANCER No family hx of      DIABETES No family hx of      Hypertension No family hx of              Social History:     Social History   Substance Use Topics     Smoking status: Former Smoker     Packs/day: 1.00     Years: 15.00     Types: Cigarettes     Quit date: 1/1/2000     Smokeless tobacco: Never Used     Alcohol use 8.4 - 12.6 oz/week      Comment: daily             Physical Exam:   /74 (BP Location: Left arm)  Pulse 89  Temp 98.8  F (37.1  C) (Oral)  Resp 18  Ht 1.626 m (5' 4\")  Wt 70.2 kg (154 lb 11.2 oz)  SpO2 93%  Breastfeeding? No  BMI 26.55 kg/m2    Intake/Output Summary (Last 24 hours) at 02/05/18 1558  Last data filed at 02/05/18 1500   Gross per 24 hour   Intake             2095 ml   Output             3000 ml   Net             -905 ml     General: pleasant, conversant, bald woman, NAD  HEENT: anicteric sclera, MMM, OP unobstructed, w/o erythema/discharge; no cervical LAD. Mallampati 3-4  CV: RRR, nl S1/S2, no m/r/g; intact & symmetric 3+ pulses (radial)  Lungs: equal b/l air entry no, no wheezing, diffuse b/l crackles, no rhonchi, no cough  Abd: soft, NT, ND  Ext: WWP, no BLE edema  Skin: no rashes, cyanosis, or jaundice  Neuro: attentive, independently ambulatory          Data:   Labs (all laboratory studies reviewed by me): BUN 4, Cr 0.52, WBC 1.6, Plt 42, Hgb 7.7  RVP negative 2/02, HSV 1&2 negative    Imaging (all imaging studies reviewed by me):  #. CT chest, 2/02:  Chest: Overall decreased extent of numerous bilateral peripheral predominant peribronchovascular consolidative and groundglass opacities, however, new groundglass opacities are noted in the lateral right upper lobe (for example series 8, image 43) and paramediastinal left upper lobe (series 8, image 56). Slightly decreased small bilateral pleural effusions. No pneumothorax. The central tracheobronchial tree is clear. Numerous scattered pulmonary nodules are unchanged, for example measuring 4 mm in the subpleural " posterior left lower lobe (series 8, image 126). Several scattered calcified granulomas.  Normal heart size. No pericardial effusion. Normal caliber ascending aorta and main pulmonary artery. No central pulmonary embolism. No thoracic lymphadenopathy. Bilateral calcified hilar and subcarinal lymph nodes. Left chest wall internal jugular Port-A-Cath tip at the low SVC. Small hiatal hernia.  Abdomen and pelvis: No suspicious arterial enhancing hepatic lesion. Punctate hypodensities in the liver are too small to fully characterize but are unchanged. The gallbladder and biliary tree are within normal limits. The spleen, pancreas, adrenal glands, and kidneys are unremarkable. No hydronephrosis, hydroureter, or urinary tract stone. The bladder is unremarkable. Bilateral tubal ligation clips. The uterus and ovaries are unremarkable. Trace perihepatic ascites along the anterosuperior aspect of the left hepatic lobe. No free air. No bowel obstruction. Mild prominence of the wall of the  cecum/proximal ascending colon and the rectum. No pneumatosis or portal venous gas. The major abdominal vessels are patent. Moderate  atherosclerotic calcification of the abdominal aorta without aneurysmal dilation.  Bones and soft tissues: No aggressive osseous lesion. Transitional anatomy with lumbarization of S1. Mild anterior right superior endplate compression deformity at L5, new since 10/6/2015. Unchanged mild mid thoracic superior endplate compression fractures with a new mild superior endplate anterior wedge compression fracture at L1 since 11/13/2017. Herniation of a small amount of peritoneal fat through a anterolateral left abdominal wall defect measuring approximately 7 mm (series 7, image 466). Prominent fat in the left inguinal canal.    #. PFT, 11/2016: moderately severe obstruction w/ normal diffusion capacity.

## 2018-02-05 NOTE — PROGRESS NOTES
[facility]  Transplant Infectious Disease Progress Note     Patient:  Ashleigh Alonzo, Date of birth 1960, Medical record number [id number]  Date of Visit:  02/05/2018         Assessment and Recommendations:   Recommendations:  - Check Fungitell, Aspergillus galactomannan ag, HSV IgG, HSV IgM, IgG, IgA, IgM, IgE.  - Consider ENT consult to evaluate persistent scabbing of right nare.  - Consider pulmonary consult to assess for bronchoscopy.   - Consider adding Pneumocystis prophylaxis given 3-month+ duration of steroid use.   - Await pending Toxoplasma IgG serology.    Transplant Infectious Disease will continue to follow with you.    Assessment:  Ashleigh Perera is a 56 yo female with h/o COPD and stage IIa breast cancer on TAC (cycle 2 1/23/18) and previous use of prembrolizumab c/b pneumonitis who was admitted with febrile neutropenia.   Infectious Disease issues include:  - Febrile neutropenia. Neutropenia is better, but still with fever. Blood cultures and urine culture negative thus far. Rhinorrhea is a localizing symptom, but 2/2/2018 RVP neg. 2/2/2018 CT chest showed overall improvement of her ground glass opacities (2/2 treatment-related pneumonitis, with neg CMV PCR of blood) but two new areas of consolidation of unclear etiology. She has risk factors for fungal pneumonia with steroid use since 11/2017. Her only respiratory symptom is the need for low grade supplemental oxygen. She needs to be evaluated for fungal pneumonia and right nare fungal infection (with recurrent scabbing debris in right nare).   - Tongue ulcer. Would check HSV serostatus even though HSV PCR of ulcer was neg.   - PCP prophylaxis: None, but is needed with long term steroid use.   - Serostatus: Await pending Toxoplasma IgG serology.  - Immunization status: Up to date  - Gamma globulin status: Unknown  - Isolation status:  Good hand hygiene.    Jillian Flanagan MD. Pager [phone]         Interval History:    Since Ashleigh Perera was last seen by my ID colleague on 2/2/2018, she continues to have fevers. She is a 56 yo female with h/o COPD, sleep apnea, periodontal disease and stage IIa breast cancer on TAC (cycle 2 1/23/18). Previous use of prembrolizumab was complicated by pneumonitis who was admitted with febrile neutropenia. She has been on steroids since 11/2017, starting the first 2 weeks at ~ 70 mg daily, but is now tapered down to 5 mg daily. She was admitted 11/11-11/17 with high fever (105), cough, and dyspnea and was found to have HCAP and pneumonitis secondary to pembrolizumab. 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 She was again admitted from 1/10-1/14 for neutropenic fevers. She is currently on cycle 2 of Taxol + AC started on 1/23/2018. Since admission she has had vanco (off since 2/3/2017) and cefepime (with some levaquin). Her right lateral tongue swabbed neg for HSV. Toxo IgG antibody still pending.      Review of Systems:  CONSTITUTIONAL:  + fever, no chills or sweats.  EYES: negative for icterus or acute vision changes.  ENT:  negative for hearing loss, tinnitus or sore throat. She's had a drippy nose x 10 days. She blows bloody debris from her nose.   RESPIRATORY:  Positive for cough, no sputum, mild dyspnea (wears supplemental oxygen 1L at night as she sats when she sleeps; she has known COPD).   CARDIOVASCULAR:  negative for chest pain, palpitations  GASTROINTESTINAL:  negative for nausea, vomiting, but has had diarrhea since laxatives were started for constipation  GENITOURINARY:  negative for dysuria  HEME:  + easy bruising, + nosebleeds  INTEGUMENT:  negative for rash or pruritus  NEURO:  Negative for headache or dizziness    Current Medications & Allergies:    potassium chloride SA  20 mEq Oral TID     ceFEPIme (MAXIPIME) IV  2 g Intravenous Q8H     HYDROmorphone  1 mg Intravenous Once     predniSONE  5 mg Oral Daily     sodium chloride (PF)  3 mL Intravenous  Q8H     calcium carbonate  1,250 mg Oral BID w/meals     citalopram  10 mg Oral Daily     cyanocolbalamin  100 mcg Oral Daily     tolterodine  4 mg Oral Daily     pantoprazole  40 mg Oral QAM     cholecalciferol  2,000 Units Oral Daily     umeclidinium  1 puff Inhalation Daily       Infusions/drips:    NaCl 75 mL/hr at 02/04/18 2057     - MEDICATION INSTRUCTIONS -         No Known Allergies           Physical Exam:   Vitals were reviewed.  All vitals stable.    Patient Vitals for the past 24 hrs:   BP Temp Temp src Pulse Resp SpO2 Weight   02/05/18 0733 121/77 98.3  F (36.8  C) Oral 89 16 98 % -   02/05/18 0450 - - - - - - 70.2 kg (154 lb 11.2 oz)   02/05/18 0322 117/74 98.4  F (36.9  C) Oral - 16 100 % -   02/05/18 0133 - 99.1  F (37.3  C) Oral - 16 - -   02/05/18 0021 - 99.6  F (37.6  C) Oral - - - -   02/05/18 0000 - 100.5  F (38.1  C) Oral - - - -   02/04/18 2310 136/89 102  F (38.9  C) Oral - 18 97 % -   02/04/18 2037 129/77 98.8  F (37.1  C) Oral - 16 98 % -   02/04/18 1555 123/70 97.8  F (36.6  C) Oral - 16 96 % -   02/04/18 1506 - 98.1  F (36.7  C) Oral - - - -   02/04/18 1157 148/90 100.7  F (38.2  C) Oral - 17 98 % -   02/04/18 0826 114/77 98.5  F (36.9  C) Oral 97 18 - -     Vitals:    02/02/18 0449 02/04/18 0625 02/05/18 0450   Weight: 70.1 kg (154 lb 8 oz) 71.1 kg (156 lb 11.2 oz) 70.2 kg (154 lb 11.2 oz)       Exam:  GENERAL:  well-developed, well-nourished woman, alert, oriented, in no acute distress.  HEENT:  Head is normocephalic, atraumatic, alopecia  EYES:  Eyes have anicteric sclerae.    ENT:  Oropharynx is moist without exudate. Tongue with lateral ulceration on the right side, appears to be in a healing stage as the edges are not well demarcated. No sinus tenderness. There is an interior scab blocking visualization of the right nare interior.  NECK:  Supple. No LAD  LUNGS:  Bibasilar rales.   CARDIOVASCULAR:  Regular rate and rhythm with no murmur  ABDOMEN:  Normal bowel sounds, soft,  nontender.  SKIN:  No acute rashes. Left chest wall port-a-cath is accessed without any surrounding erythema.  NEUROLOGIC:  Grossly nonfocal.         Laboratory Data:     Inflammatory Markers    Recent Labs   Lab Test  10/12/17   1905   CRP  84.0*       Metabolic Studies    Recent Labs   Lab Test  02/05/18   0655   02/03/18   0522  02/02/18   0647  01/31/18   0501  01/30/18   0619   01/13/18   0537   NA  137   --   137  140   --   137   < >  135   POTASSIUM  3.8   < >  3.1*  3.3*   --   4.1   < >  3.6   CHLORIDE  105   --   105  108   --   106   < >  106   CO2  25   --   22  23   --   25   < >  19*   ANIONGAP  7   --   10  8   --   6   < >  10   BUN  4*   --   2*  3*   --   8   < >  3*   CR  0.52   --   0.47*  0.46*   --   0.45*   < >  0.58   GFRESTIMATED  >90   --   >90  >90   --   >90   < >  >90   GLC  71   --   73  77   --   92   < >  76   JAVIER  8.2*   --   7.7*  7.8*   --   7.7*   < >  8.1*   PHOS   --    --    --    --    --    --    --   2.1*   MAG   --    --    --    --    --   1.9   --   1.7   LACT   --    --    --    --   0.7   --    < >   --     < > = values in this interval not displayed.       Hepatic Studies    Recent Labs   Lab Test  02/03/18   0522  02/02/18   0647  02/01/18   1050  01/30/18   0619   09/05/17   1240  06/25/12   1438   BILITOTAL  0.6  0.7   --   0.8   < >  0.5  0.4  0.9   BILIDELTA   --    --    --    --    --    --   0.2   BILICONJ   --    --    --    --    --    --   0.0   DBIL   --    --    --    --    --   0.1   --    ALKPHOS  80  68   --   76   < >  146  148  125   PROTTOTAL  5.2*  5.1*   --   5.2*   < >  7.8  7.8  8.5   ALBUMIN  1.9*  1.9*   --   2.1*   < >  3.6  3.6  4.9   AST  61*  43   --   18   < >  34  33  48*   ALT  70*  49   --   29   < >  50  49  33   LDH   --    --   157   --    --    --    --     < > = values in this interval not displayed.       Hematology Studies     Recent Labs   Lab Test  02/05/18   0655  02/04/18   0648  02/03/18   0522  02/02/18   0647    01/23/18   0919   WBC  1.6*  1.0*  0.5*  0.3*   < >  15.9*   ANEU   --   0.7*   --    --    --   12.1*   ALYM   --   0.2*   --    --    --   1.0   STONEY   --   0.1   --    --    --   2.3*   AEOS   --   0.0   --    --    --   0.0   HGB  7.7*  7.6*  7.5*  8.1*   < >  11.0*   HCT  23.7*  23.4*  22.8*  24.5*   < >  33.6*   PLT  42*  22*  13*  12*   < >  473*    < > = values in this interval not displayed.       Urine Studies     Recent Labs   Lab Test  01/29/18   1653  01/10/18   2318  11/11/17   1830  10/26/17   1500  10/12/17   2149  09/05/17   1240   URINEPH  7.5*  6.0  6.0  5.0  5.5  5.0   NITRITE  Negative  Negative  Negative  Negative  Negative  Negative   LEUKEST  Negative  Negative  Large*  Large*  Negative  Small*   WBCU  1   --   18*  158*  <1  6*       Medication levels    Recent Labs   Lab Test  01/31/18   0743   VANCOMYCIN  11.7       Microbiology:  Last 6 Culture results with specimen source  Culture Micro   Date Value Ref Range Status   02/02/2018 No growth after 3 days  Preliminary   02/02/2018 Canceled, Test credited  Final   02/02/2018 Specimen not received  Final   01/29/2018   Final    <10,000 colonies/mL  urogenital ariela  Susceptibility testing not routinely done     01/29/2018 No growth  Final   01/29/2018 No growth  Final    Specimen Description   Date Value Ref Range Status   02/02/2018 Nares  Final   02/02/2018 Blood Unspecified Site  Final   02/02/2018 Blood  Final   01/29/2018 Midstream Urine  Final   01/29/2018 Blood Right Arm  Final   01/29/2018 Blood Portacath  Final        Last check of C difficile  C Diff Toxin B PCR   Date Value Ref Range Status   01/13/2018 Negative NEG^Negative Final     Comment:     Negative: Clostridium difficile target DNA sequences NOT detected, presumed   negative for Clostridium difficile toxin B or the number of bacteria present   may be below the limit of detection for the test.  FDA approved assay performed using [employer] real-time PCR.  A negative  result does not exclude actual disease due to Clostridium difficile   and may be due to improper collection, handling and storage of the specimen   or the number of organisms in the specimen is below the detection limit of the   assay.         Virology:  Respiratory virus testing    Recent Labs   Lab Test  02/02/18   1813  01/10/18   2355  11/12/17   1015  11/12/17   0325  10/26/17   1427   HMPV  Negative  Negative  Negative   --   Negative   PIV3  Negative  Negative  Negative   --   Negative   HRVS  Negative  Negative  Negative   --   Negative   RVSPEC  Nasopharyngeal  Nasopharyngeal  Nasopharyngeal   --   Nasal   RSVA  Negative  Negative  Negative   --   Negative   RSVB  Negative  Negative  Negative   --   Negative   IRSV   --    --    --   Negative   --        CMV viral loads    Recent Labs   Lab Test  02/03/18   0522   CSPEC  Plasma, EDTA anticoagulant   CMVLOG  Not Calculated       Herpes Simplex Testing     Recent Labs   Lab Test  02/02/18   1813   HSPEC  Mouth   HSDNA1  Negative   HSDNA2  Negative       Imaging:   Exam:  Chest X-ray 1/29/2018 1:59 PM  History: Fever; Comparison: 1/10/2018  Findings: PA and lateral chest radiograph is obtained. Left chest wall  Port-A-Cath with access needle in place and tip projecting over the  distal SVC. Cardiomediastinal silhouette is within normal limits.  Pulmonary vasculature is distinct. No pleural effusion or  pneumothorax. Focal airspace opacity projecting over the peripheral  right upper lobe, more prominent compared to prior. Diffuse bilateral  patchy airspace opacities are not significantly changed compared to  prior. The upper abdomen is unremarkable. Chronic degenerative changes  of thoracic spine.    Impression    Impression:   Focal airspace opacity projecting over the peripheral right upper lobe  is more pronounced compared to prior. Diffuse bilateral patchy  airspace opacities are not significantly changed compared to prior.  Findings may represent  scarring/atelectasis associated with prior  infection or pneumonitis, however cannot rule out new superimposed infection.     XR Abdomen Port 1 View    Narrative    XR ABDOMEN PORT F1 VW  2/1/2018 3:39 AM    HISTORY: worsening abd pain. PLease assess for obstruction.  FINDINGS: Moderate to large amount of stool in the colon. Bowel gas is  seen in the distal colon. No pneumatosis or portal venous gas.  Distended bladder. No acute fracture.    Impression    IMPRESSION: Moderate to large colonic stool burden. Nonobstructive  bowel gas pattern.     CT Chest/Abdomen/Pelvis w Contrast    Narrative    EXAMINATION: CT CHEST/ABDOMEN/PELVIS W CONTRAST, 2/2/2018 12:41 PM  COMPARISON: 11/13/2017 CT chest; 10/6/2015 CT abdomen/pelvis  FINDINGS:  Chest: Overall decreased extent of numerous bilateral peripheral  predominant peribronchovascular consolidative and groundglass  opacities, however, new groundglass opacities are noted in the lateral  right upper lobe (for example series 8, image 43) and paramediastinal  left upper lobe (series 8, image 56). Slightly decreased small  bilateral pleural effusions. No pneumothorax. The central  tracheobronchial tree is clear. Numerous scattered pulmonary nodules  are unchanged, for example measuring 4 mm in the subpleural posterior  left lower lobe (series 8, image 126). Several scattered calcified granulomas.    Normal heart size. No pericardial effusion. Normal caliber ascending  aorta and main pulmonary artery. No central pulmonary embolism. No  thoracic lymphadenopathy. Bilateral calcified hilar and subcarinal  lymph nodes. Left chest wall internal jugular Port-A-Cath tip at the  low SVC. Small hiatal hernia.    Abdomen and pelvis: No suspicious arterial enhancing hepatic lesion.  Punctate hypodensities in the liver are too small to fully  characterize but are unchanged. The gallbladder and biliary tree are  within normal limits. The spleen, pancreas, adrenal glands, and  kidneys are  unremarkable. No hydronephrosis, hydroureter, or urinary  tract stone. The bladder is unremarkable. Bilateral tubal ligation  clips. The uterus and ovaries are unremarkable. Trace perihepatic  ascites along the anterosuperior aspect of the left hepatic lobe. No  free air. No bowel obstruction. Mild prominence of the wall of the  cecum/proximal ascending colon and the rectum. No pneumatosis or  portal venous gas. The major abdominal vessels are patent. Moderate  atherosclerotic calcification of the abdominal aorta without  aneurysmal dilation.    Bones and soft tissues: No aggressive osseous lesion. Transitional  anatomy with lumbarization of S1. Mild anterior right superior  endplate compression deformity at L5, new since 10/6/2015. Unchanged  mild mid thoracic superior endplate compression fractures with a new  mild superior endplate anterior wedge compression fracture at L1 since  11/13/2017. Herniation of a small amount of peritoneal fat through a  anterolateral left abdominal wall defect measuring approximately 7 mm  (series 7, image 466). Prominent fat in the left inguinal canal.      Impression    IMPRESSION:   1. Overall decreased/improved bilateral peripheral predominant  peribronchovascular consolidative and groundglass opacities, however,  2 newly affected regions are noted in the lateral right upper lobe and  paramediastinal left upper lobe, which may represent focal worsening  of an ongoing pathologic process since 11/13/2017 (please for to that  report for further details), or new superimposed infection.  2. Slightly decreased small bilateral pleural effusions.  3. Age-indeterminate mild superior endplate compression fractures are  noted at L1 (new since 11/13/2017) and L5 (new since 10/6/2015).

## 2018-02-05 NOTE — PLAN OF CARE
"Problem: Patient Care Overview  Goal: Plan of Care/Patient Progress Review  Outcome: Improving  /84 (BP Location: Left arm)  Pulse 89  Temp 98.9  F (37.2  C) (Oral)  Resp 16  Ht 1.626 m (5' 4\")  Wt 70.2 kg (154 lb 11.2 oz)  SpO2 91%  Breastfeeding? No  BMI 26.55 kg/m2    Neuro: A&Ox4.   Cardiac: SR-ST. Afebrile during day shift. VSS.   Respiratory: Sating 95% on N/C 1L.  GI/: Adequate urine output. BM X1  Diet/appetite: Tolerating regular diet. Pt stated having \"not much appetite\"   Activity:  Independent in room and to the bathroom.  Pain: Denies pain.    Skin: Intact, no new deficits noted.  LDA's: Right port-a-cath currently infusing NS 75mL/hr.    Plan: Continue with POC. Notify primary team with changes.      "

## 2018-02-05 NOTE — PROGRESS NOTES
Research note- was getting orders and things ready for tomorrow for patient and when looking in chart noticed she had been admitted to hospital on 1/29 because she spiked a fever 100.4. When blood drawn found to be anemic and thrombocytopenic. Pt on ISPY research study.  Steffany Ma  [phone]

## 2018-02-06 LAB
ANION GAP SERPL CALCULATED.3IONS-SCNC: 10 MMOL/L (ref 3–14)
ANISOCYTOSIS BLD QL SMEAR: SLIGHT
APPEARANCE FLD: NORMAL
BASOPHILS # BLD AUTO: 0 10E9/L (ref 0–0.2)
BASOPHILS NFR BLD AUTO: 0 %
BASOPHILS NFR FLD MANUAL: 1 %
BRONCHOSCOPY: NORMAL
BUN SERPL-MCNC: 4 MG/DL (ref 7–30)
C DIFF TOX B STL QL: POSITIVE
CALCIUM SERPL-MCNC: 8.4 MG/DL (ref 8.5–10.1)
CHLORIDE SERPL-SCNC: 106 MMOL/L (ref 94–109)
CO2 SERPL-SCNC: 22 MMOL/L (ref 20–32)
COLOR FLD: NORMAL
COPATH REPORT: NORMAL
CREAT SERPL-MCNC: 0.49 MG/DL (ref 0.52–1.04)
DIFFERENTIAL METHOD BLD: ABNORMAL
EOSINOPHIL # BLD AUTO: 0 10E9/L (ref 0–0.7)
EOSINOPHIL NFR BLD AUTO: 0 %
ERYTHROCYTE [DISTWIDTH] IN BLOOD BY AUTOMATED COUNT: 15.7 % (ref 10–15)
GFR SERPL CREATININE-BSD FRML MDRD: >90 ML/MIN/1.7M2
GLUCOSE SERPL-MCNC: 91 MG/DL (ref 70–99)
GRAM STN SPEC: NORMAL
GRAM STN SPEC: NORMAL
HCT VFR BLD AUTO: 26.4 % (ref 35–47)
HGB BLD-MCNC: 8.5 G/DL (ref 11.7–15.7)
HSV 1+2 IGM SER-IMP: 0.5 INDEX VALUE (ref 0–0.89)
HSV1 IGG SERPL QL IA: >8 AI (ref 0–0.8)
HSV2 IGG SERPL QL IA: <0.2 AI (ref 0–0.8)
IGA SERPL-MCNC: 216 MG/DL (ref 70–380)
IGE SERPL-ACNC: 68 KIU/L (ref 0–114)
IGG SERPL-MCNC: 335 MG/DL (ref 695–1620)
IGM SERPL-MCNC: 33 MG/DL (ref 60–265)
LACTATE BLD-SCNC: 0.9 MMOL/L (ref 0.4–1.9)
LYMPHOCYTES # BLD AUTO: 0.2 10E9/L (ref 0.8–5.3)
LYMPHOCYTES NFR BLD AUTO: 5.3 %
LYMPHOCYTES NFR FLD MANUAL: 14 %
MCH RBC QN AUTO: 30.8 PG (ref 26.5–33)
MCHC RBC AUTO-ENTMCNC: 32.2 G/DL (ref 31.5–36.5)
MCV RBC AUTO: 96 FL (ref 78–100)
METAMYELOCYTES # BLD: 0 10E9/L
METAMYELOCYTES NFR BLD MANUAL: 0.9 %
MONOCYTES # BLD AUTO: 0.4 10E9/L (ref 0–1.3)
MONOCYTES NFR BLD AUTO: 13.2 %
NEUTROPHILS # BLD AUTO: 2.5 10E9/L (ref 1.6–8.3)
NEUTROPHILS NFR BLD AUTO: 80.6 %
OTHER CELLS FLD MANUAL: 85 %
PLATELET # BLD AUTO: 75 10E9/L (ref 150–450)
PLATELET # BLD EST: ABNORMAL 10*3/UL
POTASSIUM SERPL-SCNC: 3.9 MMOL/L (ref 3.4–5.3)
RBC # BLD AUTO: 2.76 10E12/L (ref 3.8–5.2)
SODIUM SERPL-SCNC: 139 MMOL/L (ref 133–144)
SPECIMEN SOURCE FLD: NORMAL
SPECIMEN SOURCE: ABNORMAL
SPECIMEN SOURCE: NORMAL
WBC # BLD AUTO: 3.1 10E9/L (ref 4–11)
WBC # FLD AUTO: 80 /UL

## 2018-02-06 PROCEDURE — 87305 ASPERGILLUS AG IA: CPT | Performed by: INTERNAL MEDICINE

## 2018-02-06 PROCEDURE — 87081 CULTURE SCREEN ONLY: CPT | Performed by: INTERNAL MEDICINE

## 2018-02-06 PROCEDURE — 25000128 H RX IP 250 OP 636: Performed by: NURSE PRACTITIONER

## 2018-02-06 PROCEDURE — 87102 FUNGUS ISOLATION CULTURE: CPT | Performed by: INTERNAL MEDICINE

## 2018-02-06 PROCEDURE — 31624 DX BRONCHOSCOPE/LAVAGE: CPT | Performed by: INTERNAL MEDICINE

## 2018-02-06 PROCEDURE — 25000132 ZZH RX MED GY IP 250 OP 250 PS 637: Performed by: INTERNAL MEDICINE

## 2018-02-06 PROCEDURE — 88312 SPECIAL STAINS GROUP 1: CPT | Performed by: INTERNAL MEDICINE

## 2018-02-06 PROCEDURE — 89051 BODY FLUID CELL COUNT: CPT | Performed by: INTERNAL MEDICINE

## 2018-02-06 PROCEDURE — 80048 BASIC METABOLIC PNL TOTAL CA: CPT | Performed by: NURSE PRACTITIONER

## 2018-02-06 PROCEDURE — 25000128 H RX IP 250 OP 636: Performed by: STUDENT IN AN ORGANIZED HEALTH CARE EDUCATION/TRAINING PROGRAM

## 2018-02-06 PROCEDURE — 99152 MOD SED SAME PHYS/QHP 5/>YRS: CPT | Performed by: INTERNAL MEDICINE

## 2018-02-06 PROCEDURE — 83605 ASSAY OF LACTIC ACID: CPT | Performed by: INTERNAL MEDICINE

## 2018-02-06 PROCEDURE — 25000132 ZZH RX MED GY IP 250 OP 250 PS 637: Performed by: DERMATOLOGY

## 2018-02-06 PROCEDURE — 85025 COMPLETE CBC W/AUTO DIFF WBC: CPT | Performed by: NURSE PRACTITIONER

## 2018-02-06 PROCEDURE — 87205 SMEAR GRAM STAIN: CPT | Performed by: INTERNAL MEDICINE

## 2018-02-06 PROCEDURE — 87116 MYCOBACTERIA CULTURE: CPT | Performed by: INTERNAL MEDICINE

## 2018-02-06 PROCEDURE — 25000128 H RX IP 250 OP 636: Performed by: INTERNAL MEDICINE

## 2018-02-06 PROCEDURE — 12000006 ZZH R&B IMCU INTERMEDIATE UMMC

## 2018-02-06 PROCEDURE — 87070 CULTURE OTHR SPECIMN AEROBIC: CPT | Performed by: INTERNAL MEDICINE

## 2018-02-06 PROCEDURE — 25000125 ZZHC RX 250: Performed by: INTERNAL MEDICINE

## 2018-02-06 PROCEDURE — 87633 RESP VIRUS 12-25 TARGETS: CPT | Performed by: INTERNAL MEDICINE

## 2018-02-06 PROCEDURE — 0B9G8ZX DRAINAGE OF LEFT UPPER LUNG LOBE, VIA NATURAL OR ARTIFICIAL OPENING ENDOSCOPIC, DIAGNOSTIC: ICD-10-PCS | Performed by: INTERNAL MEDICINE

## 2018-02-06 PROCEDURE — 27210995 ZZH RX 272: Performed by: INTERNAL MEDICINE

## 2018-02-06 PROCEDURE — 25000132 ZZH RX MED GY IP 250 OP 250 PS 637: Performed by: NURSE PRACTITIONER

## 2018-02-06 PROCEDURE — 36415 COLL VENOUS BLD VENIPUNCTURE: CPT | Performed by: INTERNAL MEDICINE

## 2018-02-06 PROCEDURE — 87206 SMEAR FLUORESCENT/ACID STAI: CPT | Performed by: INTERNAL MEDICINE

## 2018-02-06 PROCEDURE — 88108 CYTOPATH CONCENTRATE TECH: CPT | Performed by: INTERNAL MEDICINE

## 2018-02-06 PROCEDURE — 87040 BLOOD CULTURE FOR BACTERIA: CPT | Performed by: INTERNAL MEDICINE

## 2018-02-06 PROCEDURE — 36592 COLLECT BLOOD FROM PICC: CPT | Performed by: INTERNAL MEDICINE

## 2018-02-06 PROCEDURE — 87015 SPECIMEN INFECT AGNT CONCNTJ: CPT | Performed by: INTERNAL MEDICINE

## 2018-02-06 PROCEDURE — 99232 SBSQ HOSP IP/OBS MODERATE 35: CPT | Performed by: INTERNAL MEDICINE

## 2018-02-06 PROCEDURE — 87493 C DIFF AMPLIFIED PROBE: CPT | Performed by: DERMATOLOGY

## 2018-02-06 RX ORDER — FENTANYL CITRATE 50 UG/ML
INJECTION, SOLUTION INTRAMUSCULAR; INTRAVENOUS PRN
Status: DISCONTINUED | OUTPATIENT
Start: 2018-02-06 | End: 2018-02-06 | Stop reason: HOSPADM

## 2018-02-06 RX ORDER — LIDOCAINE HYDROCHLORIDE 40 MG/ML
INJECTION, SOLUTION RETROBULBAR PRN
Status: DISCONTINUED | OUTPATIENT
Start: 2018-02-06 | End: 2018-02-06 | Stop reason: HOSPADM

## 2018-02-06 RX ORDER — LIDOCAINE 40 MG/G
CREAM TOPICAL
Status: DISCONTINUED | OUTPATIENT
Start: 2018-02-06 | End: 2018-02-07

## 2018-02-06 RX ORDER — MAGNESIUM HYDROXIDE 1200 MG/15ML
LIQUID ORAL PRN
Status: DISCONTINUED | OUTPATIENT
Start: 2018-02-06 | End: 2018-02-06 | Stop reason: HOSPADM

## 2018-02-06 RX ORDER — HEPARIN SODIUM (PORCINE) LOCK FLUSH IV SOLN 100 UNIT/ML 100 UNIT/ML
5 SOLUTION INTRAVENOUS
Status: DISCONTINUED | OUTPATIENT
Start: 2018-02-06 | End: 2018-02-07

## 2018-02-06 RX ORDER — ONDANSETRON 2 MG/ML
INJECTION INTRAMUSCULAR; INTRAVENOUS PRN
Status: DISCONTINUED | OUTPATIENT
Start: 2018-02-06 | End: 2018-02-06 | Stop reason: HOSPADM

## 2018-02-06 RX ORDER — SULFAMETHOXAZOLE AND TRIMETHOPRIM 400; 80 MG/1; MG/1
1 TABLET ORAL DAILY
Status: DISCONTINUED | OUTPATIENT
Start: 2018-02-06 | End: 2018-02-08 | Stop reason: HOSPADM

## 2018-02-06 RX ORDER — HEPARIN SODIUM,PORCINE 10 UNIT/ML
5-10 VIAL (ML) INTRAVENOUS
Status: DISCONTINUED | OUTPATIENT
Start: 2018-02-06 | End: 2018-02-08 | Stop reason: HOSPADM

## 2018-02-06 RX ORDER — HEPARIN SODIUM,PORCINE 10 UNIT/ML
5-10 VIAL (ML) INTRAVENOUS EVERY 24 HOURS
Status: DISCONTINUED | OUTPATIENT
Start: 2018-02-06 | End: 2018-02-07

## 2018-02-06 RX ORDER — ALBUTEROL SULFATE 0.83 MG/ML
SOLUTION RESPIRATORY (INHALATION) PRN
Status: DISCONTINUED | OUTPATIENT
Start: 2018-02-06 | End: 2018-02-06 | Stop reason: HOSPADM

## 2018-02-06 RX ADMIN — AMOXICILLIN AND CLAVULANATE POTASSIUM 1 TABLET: 875; 125 TABLET, FILM COATED ORAL at 19:24

## 2018-02-06 RX ADMIN — UMECLIDINIUM 1 PUFF: 62.5 AEROSOL, POWDER ORAL at 09:53

## 2018-02-06 RX ADMIN — CALCIUM 1250 MG: 500 TABLET ORAL at 17:28

## 2018-02-06 RX ADMIN — TOLTERODINE 4 MG: 4 CAPSULE, EXTENDED RELEASE ORAL at 09:45

## 2018-02-06 RX ADMIN — CALCIUM 1250 MG: 500 TABLET ORAL at 09:46

## 2018-02-06 RX ADMIN — SODIUM CHLORIDE 1000 ML: 9 INJECTION, SOLUTION INTRAVENOUS at 13:09

## 2018-02-06 RX ADMIN — POTASSIUM CHLORIDE 20 MEQ: 29.8 INJECTION, SOLUTION INTRAVENOUS at 06:41

## 2018-02-06 RX ADMIN — Medication 125 MG: at 22:10

## 2018-02-06 RX ADMIN — PREDNISONE 5 MG: 5 TABLET ORAL at 09:45

## 2018-02-06 RX ADMIN — Medication 125 MG: at 09:47

## 2018-02-06 RX ADMIN — VITAMIN B12 0.1 MG ORAL TABLET 100 MCG: 0.1 TABLET ORAL at 09:45

## 2018-02-06 RX ADMIN — POTASSIUM CHLORIDE 20 MEQ: 750 TABLET, EXTENDED RELEASE ORAL at 19:24

## 2018-02-06 RX ADMIN — Medication 125 MG: at 17:28

## 2018-02-06 RX ADMIN — VITAMIN D, TAB 1000IU (100/BT) 2000 UNITS: 25 TAB at 09:45

## 2018-02-06 RX ADMIN — POTASSIUM CHLORIDE 20 MEQ: 750 TABLET, EXTENDED RELEASE ORAL at 09:46

## 2018-02-06 RX ADMIN — SODIUM CHLORIDE, PRESERVATIVE FREE 5 ML: 5 INJECTION INTRAVENOUS at 19:24

## 2018-02-06 RX ADMIN — ACETAMINOPHEN 650 MG: 325 TABLET, FILM COATED ORAL at 02:56

## 2018-02-06 RX ADMIN — ACETAMINOPHEN 650 MG: 325 TABLET, FILM COATED ORAL at 12:03

## 2018-02-06 RX ADMIN — SULFAMETHOXAZOLE AND TRIMETHOPRIM 1 TABLET: 400; 80 TABLET ORAL at 14:01

## 2018-02-06 RX ADMIN — CEFEPIME 2 G: 1 INJECTION, SOLUTION INTRAVENOUS at 05:15

## 2018-02-06 RX ADMIN — CITALOPRAM HYDROBROMIDE 10 MG: 10 TABLET ORAL at 09:46

## 2018-02-06 RX ADMIN — POTASSIUM CHLORIDE 20 MEQ: 750 TABLET, EXTENDED RELEASE ORAL at 14:01

## 2018-02-06 RX ADMIN — Medication 125 MG: at 14:01

## 2018-02-06 RX ADMIN — PANTOPRAZOLE SODIUM 40 MG: 40 TABLET, DELAYED RELEASE ORAL at 09:47

## 2018-02-06 ASSESSMENT — ACTIVITIES OF DAILY LIVING (ADL)
ADLS_ACUITY_SCORE: 11

## 2018-02-06 ASSESSMENT — PAIN DESCRIPTION - DESCRIPTORS
DESCRIPTORS: ACHING
DESCRIPTORS: ACHING;DISCOMFORT
DESCRIPTORS: ACHING

## 2018-02-06 NOTE — PROGRESS NOTES
"CLINICAL NUTRITION SERVICES - ASSESSMENT NOTE     Nutrition Prescription    RECOMMENDATIONS FOR MDs/PROVIDERS TO ORDER:  Encourage PO intakes     Malnutrition Status:    Severe malnutrition in the context of acute on chronic illness    Recommendations already ordered by Registered Dietitian (RD):  RD to d/c Ensure Clear and order Ensure Plus (chocolate and strawberry BID)    Future/Additional Recommendations:  If pt is unable to meet at least 75% of lower end needs, may need to consider nutrition support.  Order anitha counts if pt does not leave by 2/7     REASON FOR ASSESSMENT  Ashleigh Alonzo is a/an 57 year old female assessed by the dietitian for LOS    NUTRITION HISTORY  Per chart: Ashleigh Perera is a 56 yo female with h/o COPD and stage IIa breast cancer on TAC (cycle 2 1/23/18) and previous use of prembrolizumab c/b pneumonitis who was admitted with febrile neutropenia.    Pt reports having a very poor appetite since admit 1 week ago and PTA, her PO intakes were almost subpar.  reports that he would cook anything she wanted, and would sometimes be able to eat it. She had been consuming a lot of soft, bland foods. She states that she will order food that sound pretty good, and then the food will come up and it will make her very nauseous. She states between the medications, fatigue, high fever and tongue sore (which is healing), that it has been difficult to get enough calories in a day. She did not like the Ensure Clear, but would drink the chocolate and strawberry Ensure Plus.     CURRENT NUTRITION ORDERS  Diet: NPO + Ensure Clear b/t meals  Intake/Tolerance: Tolerating reg diet. No appetite on some days and others pt eats well. Pt is nauseous on and off.     LABS  C. Diff positive, BUN: 4 (L), Cr: .49 (L), Labs reviewed    MEDICATIONS  PO vanco, B12, Vit D3, Medications reviewed    ANTHROPOMETRICS  Height: 162.6 cm (5' 4\")  Most Recent Weight: 68.2 kg (150 lb 4.8 oz)    IBW: 54.5 kg  BMI: Overweight BMI " 25-29.9  Weight History: ~7% wt loss in the past 1 month  Wt Readings from Last 10 Encounters:   18 68.2 kg (150 lb 4.8 oz)   18 69.8 kg (153 lb 12.8 oz)   18 70.3 kg (155 lb)   18 70.7 kg (155 lb 12.8 oz)   18 72.6 kg (160 lb)   18 73.9 kg (162 lb 14.4 oz)   17 74.3 kg (163 lb 12.8 oz)   17 76 kg (167 lb 8 oz)   17 74.9 kg (165 lb 1.6 oz)   17 72.5 kg (159 lb 14.4 oz)     Dosing Weight: 58 kg (adjusted, IBW; 54.5 lowest wt this admit: 150#)    ASSESSED NUTRITION NEEDS  Estimated Energy Needs: 3349-7108+ kcals/day (25 - 30+ kcals/kg)  Justification: Increased needs and Repletion  Estimated Protein Needs:  grams protein/day (1.5 - 2 grams of pro/kg)  Justification: Hypercatabolism with acute illness, Increased needs and Repletion  Estimated Fluid Needs: 1 mL/kcal/day  Justification: Maintenance    PHYSICAL FINDINGS  See malnutrition section below.     MALNUTRITION  % Intake: </= 50% for >/= 5 days (severe)  % Weight Loss: > 5% in 1 month (severe)  Subcutaneous Fat Loss: Facial region:  mild  Muscle Loss: Temporal, Thoracic region (clavicle, acromium bone, deltoid, trapezius, pectoral), Upper arm (bicep, tricep), Upper leg (quadricep, hamstring) and Posterior calf: mild-moderate  Fluid Accumulation/Edema: None noted  Malnutrition Diagnosis: Severe malnutrition in the context of acute on chronic illness    NUTRITION DIAGNOSIS  Inadequate oral intake related to fatigue,  appetite, nausea, tongue sore, fever and medications as evidenced by 7% wt loss in the past 1 month, and pt report.       INTERVENTIONS  Implementation  Nutrition Education: Provided education on role of RD and nutrition POC, Recommend at least 2 ensure/ day  + 5-6 small meals per day and encouraged high protein foods. Also encouraged bland/ colder foods as this sometimes helps with nausea.   Medical food supplement therapy     Goals  Total avg nutritional intake to meet a minimum  of 25 kcal/kg and 1.5 g PRO/kg daily (per dosing wt 58 kg).     Monitoring/Evaluation  Progress toward goals will be monitored and evaluated per protocol.      Dennise Mc, RD, MS, LD  6B- Pager: [phone]

## 2018-02-06 NOTE — PROGRESS NOTES
"PULMONARY FOLLOW-UP NOTE  2/6/2018    Impression/Recommendations:  57 year old former smoker, with PMHx most significant for triple negative breast cancer on chemotherapy w/ adriamycin & cytoxan (s/p taxol), COPD w/ moderately-severe airflow obstruction, moderate MERLIN w/ CPAP non-adherence, admitted 1/29/2018 w/ acute onset fever, rhinorrhea, cough, & fatigue. She is being evaluated for pulmonary infiltrate in setting of febrile neutropenia.     #. Infiltrate in setting of febrile neutropenia -- diffuse patchy GGO in 11/2017 presumptively diagnosed as drug-induced pneumonitis & started on a prednisone taper. While some lung regions have improved, there appear to be newly affected areas when current CT chest is compared to that of 11/2017. Differential diagnosis includes a new or resistant infection in an immunocompromised patient (possible fungal, patient is also at risk of PCP w/o relevant ppx over the last 2 months) vs organizing pneumonia that is flaring in setting of decreased steroid dose  #. COPD w/ moderately severe obstruction w/o h/o recent exacerbations.   #. MERLIN w/ CPAP non-adherence & nocturnal hypoxia.     -- bronchoscopy w/ BAL SARA performed 2/6/2018, awaiting results. Cultures over the next 48 hours will determine next steps in the work-up (abx, biopsy)  -- NPO x 2 hours post-procedure  -- continue Incruse & PRN albuterol  -- please, request RT to provide patient w/ CPAP w/ humidifying equipment for her MERLIN     Patient seen & discussed w/  Dr. Hodan M.D., who is in agreement.     Chanda Goodwin MD  Pulmonary & Critical Care [facility]  [phone]  ===================================================================    Interval history: MUSA o/n. Diagnosed w/ C.difficile overnight, has abdominal pain & cramping, will start on PO vancomycin today. No respiratory complaints.    Nursing notes reviewed.    Objective:  /76  Pulse 89  Temp 98.1  F (36.7  C) (Oral)  Resp 30  Ht 1.626 m (5' 4\")  Wt 68.2 kg " (150 lb 4.8 oz)  SpO2 95%  Breastfeeding? No  BMI 25.8 kg/m2  Temp (24hrs), Av.7  F (37.1  C), Min:98  F (36.7  C), Max:99.7  F (37.6  C)    Intake/Output Summary (Last 24 hours) at 18 0824  Last data filed at 18 0517   Gross per 24 hour   Intake             1120 ml   Output             3000 ml   Net            -1880 ml     General: pleasant, bald woman, appears to be in moderate distress d/t abdominal pain  HEENT: anicteric sclera, MMM, OP unobstructed, w/o erythema/discharge; no cervical LAD. Mallampati 3-4  CV: RRR, nl S1/S2, no m/r/g; intact & symmetric 3+ pulses (radial)  Lungs: equal b/l air entry no, no wheezing, diffuse b/l crackles, no rhonchi, no cough  Abd: soft, NT, ND  Ext: WWP, no BLE edema  Skin: no rashes, cyanosis, or jaundice  Neuro: attentive    Labs: personally reviewed in EMR. WBC 3.1, C.difficile positive    Imaging: personally reviewed in EMR.

## 2018-02-06 NOTE — PROVIDER NOTIFICATION
Time of notification: 12:00 PM  MD notified: Jayashree KUHN   Patient status:Pt with elevated heart rates and temp, otherwise asymptomatic  Temp:  [97.6  F (36.4  C)-103.1  F (39.5  C)] 103.1  F (39.5  C)  Heart Rate:  [] 134  Resp:  [16-35] 25  BP: (102-156)/(68-97) 125/81  SpO2:  [87 %-99 %] 94 %  Orders received:   Blood cultures released to be collected, frequent vitals obtained, tylenol given, Ice packs placed.  Will recheck vitals     1235: still awaiting return phone call  1300: NP re-paged.  Slow bolus ordered and started.  Abx changed as well.

## 2018-02-06 NOTE — PROGRESS NOTES
Hematology / Oncology Progress Note  02/06/2018  Assessment & Plan    Ashleigh Alonzo is a 58 y/o PMH COPD, MERLIN, Depression, & stage IIa T2N0M0 Triple negative breast cancer s/p 12 weeks taxol, 3 cycles, pembrolizumab (hx. Autoimmune pneumonitis), & 2 cycles of adriamycin/cyclophosphamide admitted with NF & HCAP (RUL PNA).     # Neutropenic fever likely 2/2 HCAP.  # Neutropenia. RESOLVED WBC 3.1, ANC 2.5.  - Had last cycle of adriamycin & taxol on . S/P neulasta on 1/24/18. Last fever was 102 F on 2/2 (100 on 2/4), UA NTD, Blood culture NTD, CT CAP 2/2 (CXR 2/2 with increased RUL & SARA opacities, possible cavitary change RUL) shows overall decreased extent of numerous bilateral peripheral predominant peribronchovascular consolidative and GGO, however, NEW GGO are noted in the lateral RUL & paramediastinal SARA. RVP negative. CMV PCR negative. MRSA swab negative d/c'ed Vanc. S/P levaquin (750 mg PO daily 1/31-2/1, qtc 423). HSV swab negative & toxoplasma IgG negative.  >> Infectious disease following. Send AspG & 1,3BDF. HSV IgG & HSV Ig, Immunoglobulins (all in process).   >> ENT saw the patient no evidence fungal sinusitis, recommend nasal hygeiene (ocean spray q 2 hours), & bactroban for 7 days.  >> Pulmonary saw the patient bronchoscopy for BAL to r/o acute infection.   - On cefepime 2g q 8 (1/29-1/30, 2/2-->).   - Sputum culture (aerobic bacterial was never collected).   - Discuss with MD Jennifer Flanagan, started SS bactrim for PCP ppx. Transition to Augmentin (covers anaerobes/mouth ariela, less disruptive to GI tract than antibiotics covering GN).     # Pancytopenia, resolving no longer neutropenic. Likely cumulative r/t chemotherapy & infection. Thrombocytopenia more severe than with previous cycles. Normal LDH & haptoglobin. Peripheral smear without schistocytes or pseudothrombocytopenia.   - s/p Neulasta 1/24/18. S/p transfusion 2 PRBC (1/29 & 2/1).  - Goal is to transfuse HgB < 7, or PLT < 10. If bleeding  transfuse to keep PLT > 20.   - CBC with platelets daily.     # Pneumonitis 2/2 to pembrolizumab. On steroid taper.  - Continue prednisone 5 mg PO daily (until 2/5, then every other day for 7 days, then d/c).    # Mucositis 2/2 to chemotherapy & neutropenia.  - HSV swab negative.  - MMW, viscous lidocaine.    # Triple negative breast cancer (stage IIa, T2N0,M0). Enrolled on the ISPY2 clinical trial.  Randomized to Taxol + pembrolizumab followed by adriamycin, cyclophosphamide, and pembrolizumab.  Pembrolizumab discontinued after 3 cycles due to pneumonitis.  Week 12 breast MRI shows no residual breast tumor.  Both cycles #1 and #2 of adriamycin and cyclophosphamide complicated by neutropenic fever.  Discussed with patient today that given good response to treatment thus far and poor tolerance of chemotherapy, will forgo further chemotherapy and proceed with surgery.  - Attending Leeann DUNLAP to contact surgical oncology & move up surgery 3-5 weeks from now.     # Abdominal pain 2/2 to constipation. Relived with BM. ABD XR showed increased stool burden. CT Abdomen was unremarkable.   - Heating pads PRN.  - Bowel regimen. Senna S 1-2 tabs PRN BID. Miralax daily PRN.     # Clostridium difficile infection. C diff positive 2/5/18.   - Started PO vancomycin 125 mg 4x daily (2/6-).     FEN: Encourage PO intake. Potassium SS. RDAT.   PPX (DVT/GI): Protonix 40 mg PO daily (r/t steroid), & Mechanical DVT/ambulation encouraged (r/t severe TCP).   LINES/DRAINS: Port L chest wall 9/1/17.    DISPO: unclear, competing infectious work up. PT consult for generalized weakness & deconditioning.     Interval history  T max yesterday 99.7. More diarrhea, c diff was checked & was positive. Saw by ENT/Pulm. ENT scoped, no findings. Pulm planning bronchoscopy this AM. 2.9 L UOP yesterday. 2 L intake. Net negative 1500 for last 24/hours. Weight down 68.2 kg. From 70 on admission. She's on RA, stable BP, HR . Might need some fluid post  procedure, NPO at midnight (& now with c diff). Toxoplasma negative. Other labs in process, will have new bronch labs to follow.  Took tylenol x2 yesterday. Discussed with RN, doesn't need 6 B level of care, but needs private r/t C diff positive. Put in transfer order but wonder if they will have trouble locating a bed.   - Spiked to 103 this afternoon. Gave tylenol 1 L NS IVF.   - On my exam this AM (post bronch but pre fever). Feeling alright. No throat pain. Nose feels better less crusted. No N/V. Had diarrhea overnight. No abdominal pain/cramping. Agreeable to stay additional day. Doesn't want to spike & have to come back.   - Paged Fatou Flanagan MD r/t transition to PO antibiotic.     Physical Exam  Constitutional: Awake, alert, cooperative, in NAD. Has O2 in place. Slightly flushed.   Eyes: PERRL, EOMI, sclera clear, conjunctiva normal.  ENT: Normocephalic, without obvious abnormality, moist mucus membranes has a white long lesion along R side of her tongue. Has scabbing R nare.   Respiratory: Non-labored breathing, bibasilar rales.   Cardiovascular: RRR, no murmur noted.  GI: +BS, soft, non-distended, non-tender, no masses palpated, no hepatosplenomegaly.  Skin: No concerning lesions or rash on exposed areas. Left chest wall port C/D/I.   Musculoskeletal: No edema ame LEs.  Neurologic: Awake, A&O x 3. Cranial nerves II-XII are grossly intact.   Neuropsychiatric: Calm, normal affect.     Rounding:  Temp:  [98  F (36.7  C)-99.7  F (37.6  C)] 98.1  F (36.7  C)  Heart Rate:  [] 103  Resp:  [16-20] 18  BP: (118-140)/(68-91) 138/91  SpO2:  [91 %-97 %] 97 %    I/O last 3 completed shifts:  In: 1420 [P.O.:580; I.V.:840]  Out: 3000 [Urine:3000]    Vitals:    02/01/18 0647 02/02/18 0449 02/04/18 0625 02/05/18 0450   Weight: 69.5 kg (153 lb 4.8 oz) 70.1 kg (154 lb 8 oz) 71.1 kg (156 lb 11.2 oz) 70.2 kg (154 lb 11.2 oz)    02/06/18 0029   Weight: 68.2 kg (150 lb 4.8 oz)       Recent Labs  Lab 02/06/18  0453  02/05/18  0655 02/04/18  0648 02/03/18  0522   WBC 3.1* 1.6* 1.0* 0.5*   RBC 2.76* 2.50* 2.49* 2.46*   HGB 8.5* 7.7* 7.6* 7.5*   HCT 26.4* 23.7* 23.4* 22.8*   MCV 96 95 94 93   MCH 30.8 30.8 30.5 30.5   MCHC 32.2 32.5 32.5 32.9   RDW 15.7* 15.4* 15.4* 15.4*   PLT 75* 42* 22* 13*       Recent Labs  Lab 02/06/18  0453 02/05/18  0655 02/04/18  1231 02/03/18  0522 02/02/18  0647    137  --  137 140   POTASSIUM 3.9 3.8 3.5 3.1* 3.3*   CHLORIDE 106 105  --  105 108   CO2 22 25  --  22 23   ANIONGAP 10 7  --  10 8   GLC 91 71  --  73 77   BUN 4* 4*  --  2* 3*   CR 0.49* 0.52  --  0.47* 0.46*   GFRESTIMATED >90 >90  --  >90 >90   GFRESTBLACK >90 >90  --  >90 >90   JAVIER 8.4* 8.2*  --  7.7* 7.8*   PROTTOTAL  --   --   --  5.2* 5.1*   ALBUMIN  --   --   --  1.9* 1.9*   BILITOTAL  --   --   --  0.6 0.7   ALKPHOS  --   --   --  80 68   AST  --   --   --  61* 43   ALT  --   --   --  70* 49       Recent Labs  Lab 02/01/18  1050   INR 1.12         Anti-infectives (Future)    Start     Dose/Rate Route Frequency Ordered Stop    02/06/18 0800  vancomycin (VANOCIN) solution 125 mg      125 mg Oral 4 TIMES DAILY 02/06/18 0434      02/02/18 2200  ceFEPIme (MAXIPIME) intermittent infusion 2 g      2 g  over 30 Minutes Intravenous EVERY 8 HOURS 02/02/18 1718            vancomycin  125 mg Oral 4x Daily     potassium chloride SA  20 mEq Oral TID     ceFEPIme (MAXIPIME) IV  2 g Intravenous Q8H     HYDROmorphone  1 mg Intravenous Once     predniSONE  5 mg Oral Daily     sodium chloride (PF)  3 mL Intravenous Q8H     calcium carbonate  1,250 mg Oral BID w/meals     citalopram  10 mg Oral Daily     cyanocolbalamin  100 mcg Oral Daily     tolterodine  4 mg Oral Daily     pantoprazole  40 mg Oral QAM     cholecalciferol  2,000 Units Oral Daily     umeclidinium  1 puff Inhalation Daily       - MEDICATION INSTRUCTIONS -       Chelsi Yi, [facility], [phone].  Hematology/Oncology, February 6, 2018.

## 2018-02-06 NOTE — PLAN OF CARE
Problem: Patient Care Overview  Goal: Plan of Care/Patient Progress Review  Outcome: No Change  Pt A&O X4, T-max of 99.7 (received PRN Tylenol X1 overnight with relief), HR sinus rhythm/tachy 's, BP's 110-130's/60-80's, O2 sats > 90% on RA. LS clear/diminished, BS+ X4, CMS intact. Pt slept majority of overnight shift, had few requests/complaints. Reported abdominal pain, received PRN Tylenol X1 with relief. Port in L chest patent & infusing @ TKO between antibiotics, K replaced this AM. Gets up ad sundar, ambulates independently to restroom. Voiding clear yellow urine adequately, passing gas, having frequent loose/watery brown BM's (C.Diff sample sent, Enteric precautions initiated). Has tolerated regular diet with no nausea/emesis, has been NPO since midnight for bronch this AM. Will continue to monitor per POC.

## 2018-02-06 NOTE — PROGRESS NOTES
[facility]  Transplant Infectious Disease Progress Note     Patient:  Ashleigh Alonzo, Date of birth 1960, Medical record number [id number]  Date of Visit:  02/06/2018         Assessment and Recommendations:   Recommendations:  - Please check LFTs with am labs tomorrow.   - Await pending Fungitell, Aspergillus galactomannan ag, HSV IgG, HSV IgM, IgG, IgA, IgM, IgE, bronchoscopy studies.   - Consider adding Pneumocystis prophylaxis given 3-month+ duration of steroid use.     Transplant Infectious Disease will continue to follow with you.    Assessment:  Ashleigh Perera is a 56 yo female with h/o COPD and stage IIa breast cancer on TAC (cycle 2 1/23/18) and previous use of prembrolizumab c/b pneumonitis who was admitted with febrile neutropenia.   Infectious Disease issues include:  - C difficile colitis diagnosed 2/6/2018. Started po vanco 2/6/2018.  - Abnormal lung fields. Was also febrile with neutropenia. Blood cultures and urine culture negative thus far. Rhinorrhea is a localizing symptom, but 2/2/2018 RVP neg. 2/2/2018 CT chest showed overall improvement of her ground glass opacities (2/2 treatment-related pneumonitis, with neg CMV PCR of blood) but two new areas of consolidation of unclear etiology. She has risk factors for fungal pneumonia with steroid use since 11/2017. 2/6/2017 bronchoscopy done to evaluate for fungal pneumonia.  - Tongue ulcer. Would check HSV serostatus even though HSV PCR of ulcer was neg.   - PCP prophylaxis: None, but is needed with long term steroid use.   - Serostatus: Toxoplasma IgG serology neg.  - Immunization status: Up to date  - Gamma globulin status: Unknown  - Isolation status:  Good hand hygiene.    Jillian Flanagan MD. Pager [phone]         Interval History:   Since Ashleigh Perera was last seen by me on 2/5/2018, she has less fever. Nasal endoscopy removed multiple crusty bloody clots from nose at nasal endoscopy 2/5/2018, good tissue underneath; she can  breathe much better. Bronchoscopy this morning done for lung field infilrates. She was up all night with diarrhea, a C diff was checked and was +, so she was started on po vanco.     Review of Systems:  CONSTITUTIONAL:  Fever curve improved, no chills or sweats.  EYES: negative for icterus or acute vision changes.  ENT:  negative for hearing loss, tinnitus or sore throat. ENT removed multiple crusty bloody clots from nose at nasal endoscopy 2/5/2018.   RESPIRATORY:  no sputum, mild dyspnea (wears supplemental oxygen 1L at night as she sats when she sleeps; she has known COPD).   CARDIOVASCULAR:  negative for chest pain, palpitations  GASTROINTESTINAL:  + abd cramping. Up all night with diarrhea.  GENITOURINARY:  negative for dysuria  HEME:  + easy bruising  INTEGUMENT:  negative for rash or pruritus  NEURO:  Negative for headache or dizziness    Current Medications & Allergies:    vancomycin  125 mg Oral 4x Daily     potassium chloride SA  20 mEq Oral TID     ceFEPIme (MAXIPIME) IV  2 g Intravenous Q8H     HYDROmorphone  1 mg Intravenous Once     predniSONE  5 mg Oral Daily     sodium chloride (PF)  3 mL Intravenous Q8H     calcium carbonate  1,250 mg Oral BID w/meals     citalopram  10 mg Oral Daily     cyanocolbalamin  100 mcg Oral Daily     tolterodine  4 mg Oral Daily     pantoprazole  40 mg Oral QAM     cholecalciferol  2,000 Units Oral Daily     umeclidinium  1 puff Inhalation Daily       Infusions/drips:    - MEDICATION INSTRUCTIONS -         No Known Allergies           Physical Exam:   Vitals were reviewed.  All vitals stable.    Patient Vitals for the past 24 hrs:   BP Temp Temp src Resp SpO2 Weight   02/06/18 0840 (!) 102/97 97.6  F (36.4  C) Oral 24 - -   02/06/18 0820 106/76 - - 30 95 % -   02/06/18 0819 - - - 28 92 % -   02/06/18 0815 114/87 - - (!) 35 95 % -   02/06/18 0814 - - - 26 (!) 87 % -   02/06/18 0810 (!) 147/96 - - 25 97 % -   02/06/18 0805 156/90 - - 26 99 % -   02/06/18 0804 - - - 28 99 % -    02/06/18 0720 (!) 138/91 - - 18 97 % -   02/06/18 0300 120/82 98.1  F (36.7  C) Oral 20 94 % -   02/06/18 0029 - - - - - 68.2 kg (150 lb 4.8 oz)   02/06/18 0017 118/68 98  F (36.7  C) Oral 16 97 % -   02/05/18 2204 - 98.6  F (37  C) Oral - - -   02/05/18 1858 131/82 99.7  F (37.6  C) Oral 18 92 % -   02/05/18 1542 118/74 98.8  F (37.1  C) Oral 18 93 % -   02/05/18 1214 140/84 98.9  F (37.2  C) Oral 16 91 % -     Vitals:    02/04/18 0625 02/05/18 0450 02/06/18 0029   Weight: 71.1 kg (156 lb 11.2 oz) 70.2 kg (154 lb 11.2 oz) 68.2 kg (150 lb 4.8 oz)       Exam:  GENERAL:  well-developed, well-nourished woman, alert, oriented, in no acute distress.  HEENT:  Head is normocephalic, atraumatic, alopecia  EYES:  Eyes have anicteric sclerae.    ENT:  Oropharynx is moist without exudate. Tongue with lateral ulceration on the right side, appears to be in a healing stage as the edges are not well demarcated. No sinus tenderness. Visualization of the right nare interior with normal mucosa.  NECK:  Supple. No LAD  LUNGS:  Bibasilar rales.   CARDIOVASCULAR:  Regular rate and rhythm with no murmur  ABDOMEN:  Normal bowel sounds, soft, nontender.  SKIN:  No acute rashes. Left chest wall port-a-cath is accessed without any surrounding erythema.  NEUROLOGIC:  Grossly nonfocal.         Laboratory Data:     Inflammatory Markers    Recent Labs   Lab Test  10/12/17   1905   CRP  84.0*       Metabolic Studies    Recent Labs   Lab Test  02/06/18   0453  02/05/18   0655   02/03/18   0522   01/31/18   0501  01/30/18   0619   01/13/18   0537   NA  139  137   --   137   < >   --   137   < >  135   POTASSIUM  3.9  3.8   < >  3.1*   < >   --   4.1   < >  3.6   CHLORIDE  106  105   --   105   < >   --   106   < >  106   CO2  22  25   --   22   < >   --   25   < >  19*   ANIONGAP  10  7   --   10   < >   --   6   < >  10   BUN  4*  4*   --   2*   < >   --   8   < >  3*   CR  0.49*  0.52   --   0.47*   < >   --   0.45*   < >  0.58   GFRESTIMATED   >90  >90   --   >90   < >   --   >90   < >  >90   GLC  91  71   --   73   < >   --   92   < >  76   JAVIER  8.4*  8.2*   --   7.7*   < >   --   7.7*   < >  8.1*   PHOS   --    --    --    --    --    --    --    --   2.1*   MAG   --    --    --    --    --    --   1.9   --   1.7   LACT   --    --    --    --    --   0.7   --    < >   --     < > = values in this interval not displayed.       Hepatic Studies    Recent Labs   Lab Test  02/03/18   0522  02/02/18   0647  02/01/18   1050  01/30/18   0619   09/05/17   1240  06/25/12   1438   BILITOTAL  0.6  0.7   --   0.8   < >  0.5  0.4  0.9   BILIDELTA   --    --    --    --    --    --   0.2   BILICONJ   --    --    --    --    --    --   0.0   DBIL   --    --    --    --    --   0.1   --    ALKPHOS  80  68   --   76   < >  146  148  125   PROTTOTAL  5.2*  5.1*   --   5.2*   < >  7.8  7.8  8.5   ALBUMIN  1.9*  1.9*   --   2.1*   < >  3.6  3.6  4.9   AST  61*  43   --   18   < >  34  33  48*   ALT  70*  49   --   29   < >  50  49  33   LDH   --    --   157   --    --    --    --     < > = values in this interval not displayed.       Hematology Studies     Recent Labs   Lab Test  02/06/18   0453  02/05/18   0655  02/04/18   0648  02/03/18   0522   WBC  3.1*  1.6*  1.0*  0.5*   ANEU  2.5   --   0.7*   --    ALYM  0.2*   --   0.2*   --    STONEY  0.4   --   0.1   --    AEOS  0.0   --   0.0   --    HGB  8.5*  7.7*  7.6*  7.5*   HCT  26.4*  23.7*  23.4*  22.8*   PLT  75*  42*  22*  13*       Urine Studies     Recent Labs   Lab Test  01/29/18   1653  01/10/18   2318  11/11/17   1830  10/26/17   1500  10/12/17   2149  09/05/17   1240   URINEPH  7.5*  6.0  6.0  5.0  5.5  5.0   NITRITE  Negative  Negative  Negative  Negative  Negative  Negative   LEUKEST  Negative  Negative  Large*  Large*  Negative  Small*   WBCU  1   --   18*  158*  <1  6*       Medication levels    Recent Labs   Lab Test  01/31/18   0743   VANCOMYCIN  11.7       Microbiology:  Last 6 Culture results with  specimen source  Culture Micro   Date Value Ref Range Status   02/02/2018 No growth after 4 days  Preliminary   02/02/2018 Canceled, Test credited  Final   02/02/2018 Specimen not received  Final   01/29/2018   Final    <10,000 colonies/mL  urogenital ariela  Susceptibility testing not routinely done     01/29/2018 No growth  Final   01/29/2018 No growth  Final    Specimen Description   Date Value Ref Range Status   02/06/2018 Feces  Final   02/02/2018 Nares  Final   02/02/2018 Blood Unspecified Site  Final   02/02/2018 Blood  Final   01/29/2018 Midstream Urine  Final   01/29/2018 Blood Right Arm  Final        Last check of C difficile  C Diff Toxin B PCR   Date Value Ref Range Status   02/06/2018 Positive (A) NEG^Negative Final     Comment:     Positive: Toxin producing Clostridium difficile target DNA sequences detected,   presumed positive for Clostridium difficile toxin B.  Clostridium difficile (Requires Enteric Isolation)  FDA approved assay performed using [employer] GeneXpert real-time PCR.  Critical Value/Significant Value called to and read back by  AZIZA SANCHEZ RN (6B).  02.06.18 0427 S         Virology:  Respiratory virus testing    Recent Labs   Lab Test  02/02/18   1813  01/10/18   2355  11/12/17   1015  11/12/17   0325  10/26/17   1427   HMPV  Negative  Negative  Negative   --   Negative   PIV3  Negative  Negative  Negative   --   Negative   HRVS  Negative  Negative  Negative   --   Negative   RVSPEC  Nasopharyngeal  Nasopharyngeal  Nasopharyngeal   --   Nasal   RSVA  Negative  Negative  Negative   --   Negative   RSVB  Negative  Negative  Negative   --   Negative   IRSV   --    --    --   Negative   --        CMV viral loads    Recent Labs   Lab Test  02/03/18   0522   CSPEC  Plasma, EDTA anticoagulant   CMVLOG  Not Calculated       Herpes Simplex Testing     Recent Labs   Lab Test  02/02/18   1813   HSPEC  Mouth   HSDNA1  Negative   HSDNA2  Negative       Imaging:   Exam:  Chest X-ray 1/29/2018  1:59 PM  History: Fever; Comparison: 1/10/2018  Findings: PA and lateral chest radiograph is obtained. Left chest wall  Port-A-Cath with access needle in place and tip projecting over the  distal SVC. Cardiomediastinal silhouette is within normal limits.  Pulmonary vasculature is distinct. No pleural effusion or  pneumothorax. Focal airspace opacity projecting over the peripheral  right upper lobe, more prominent compared to prior. Diffuse bilateral  patchy airspace opacities are not significantly changed compared to  prior. The upper abdomen is unremarkable. Chronic degenerative changes  of thoracic spine.    Impression    Impression:   Focal airspace opacity projecting over the peripheral right upper lobe  is more pronounced compared to prior. Diffuse bilateral patchy  airspace opacities are not significantly changed compared to prior.  Findings may represent scarring/atelectasis associated with prior  infection or pneumonitis, however cannot rule out new superimposed infection.     XR Abdomen Port 1 View    Narrative    XR ABDOMEN PORT F1 VW  2/1/2018 3:39 AM    HISTORY: worsening abd pain. PLease assess for obstruction.  FINDINGS: Moderate to large amount of stool in the colon. Bowel gas is  seen in the distal colon. No pneumatosis or portal venous gas.  Distended bladder. No acute fracture.    Impression    IMPRESSION: Moderate to large colonic stool burden. Nonobstructive  bowel gas pattern.     CT Chest/Abdomen/Pelvis w Contrast    Narrative    EXAMINATION: CT CHEST/ABDOMEN/PELVIS W CONTRAST, 2/2/2018 12:41 PM  COMPARISON: 11/13/2017 CT chest; 10/6/2015 CT abdomen/pelvis  FINDINGS:  Chest: Overall decreased extent of numerous bilateral peripheral  predominant peribronchovascular consolidative and groundglass  opacities, however, new groundglass opacities are noted in the lateral  right upper lobe (for example series 8, image 43) and paramediastinal  left upper lobe (series 8, image 56). Slightly decreased  small  bilateral pleural effusions. No pneumothorax. The central  tracheobronchial tree is clear. Numerous scattered pulmonary nodules  are unchanged, for example measuring 4 mm in the subpleural posterior  left lower lobe (series 8, image 126). Several scattered calcified granulomas.    Normal heart size. No pericardial effusion. Normal caliber ascending  aorta and main pulmonary artery. No central pulmonary embolism. No  thoracic lymphadenopathy. Bilateral calcified hilar and subcarinal  lymph nodes. Left chest wall internal jugular Port-A-Cath tip at the  low SVC. Small hiatal hernia.    Abdomen and pelvis: No suspicious arterial enhancing hepatic lesion.  Punctate hypodensities in the liver are too small to fully  characterize but are unchanged. The gallbladder and biliary tree are  within normal limits. The spleen, pancreas, adrenal glands, and  kidneys are unremarkable. No hydronephrosis, hydroureter, or urinary  tract stone. The bladder is unremarkable. Bilateral tubal ligation  clips. The uterus and ovaries are unremarkable. Trace perihepatic  ascites along the anterosuperior aspect of the left hepatic lobe. No  free air. No bowel obstruction. Mild prominence of the wall of the  cecum/proximal ascending colon and the rectum. No pneumatosis or  portal venous gas. The major abdominal vessels are patent. Moderate  atherosclerotic calcification of the abdominal aorta without  aneurysmal dilation.    Bones and soft tissues: No aggressive osseous lesion. Transitional  anatomy with lumbarization of S1. Mild anterior right superior  endplate compression deformity at L5, new since 10/6/2015. Unchanged  mild mid thoracic superior endplate compression fractures with a new  mild superior endplate anterior wedge compression fracture at L1 since  11/13/2017. Herniation of a small amount of peritoneal fat through a  anterolateral left abdominal wall defect measuring approximately 7 mm  (series 7, image 466). Prominent fat  in the left inguinal canal.      Impression    IMPRESSION:   1. Overall decreased/improved bilateral peripheral predominant  peribronchovascular consolidative and groundglass opacities, however,  2 newly affected regions are noted in the lateral right upper lobe and  paramediastinal left upper lobe, which may represent focal worsening  of an ongoing pathologic process since 11/13/2017 (please for to that  report for further details), or new superimposed infection.  2. Slightly decreased small bilateral pleural effusions.  3. Age-indeterminate mild superior endplate compression fractures are  noted at L1 (new since 11/13/2017) and L5 (new since 10/6/2015).

## 2018-02-06 NOTE — PLAN OF CARE
Problem: Patient Care Overview  Goal: Plan of Care/Patient Progress Review  PT 6B: CX - Orders received. Pt off unit at endoscope this AM. Will re-attempt as able.

## 2018-02-06 NOTE — PLAN OF CARE
Problem: Patient Care Overview  Goal: Plan of Care/Patient Progress Review  Outcome: No Change  Neuro: A&Ox4.   Cardiac: ST. OVSS.   Respiratory: Sating >92% on 0-2 LPM.  GI/: Adequate urine output. BM X2  Diet/appetite: Tolerating diet. Eating poorly.  Activity:  Stand by assist, up in room  Pain: Denies   Skin: No new deficits noted.    Plan: Pt needing port needle change today.  Fever has decreased, slow bolus infusing.   at the bedside. Continue with POC. Notify primary team with changes.

## 2018-02-06 NOTE — PLAN OF CARE
Problem: Patient Care Overview  Goal: Plan of Care/Patient Progress Review  Pt alert and oriented x4. VSS on room air. No fevers today. ENT scoped her nose due to drainage and scabbing nothing found. Pulm consulted plan for bronch tomorrow. Pt is to be NPO after MN. Poor appetite today. Good UOP. Neutropenic precautions maintained. Pt up ind in the room. Will continue to monitor per plan of care.

## 2018-02-06 NOTE — PROVIDER NOTIFICATION
Paged overnight Heme/Onc MD Dr. Milad Bingham with update: patient's stool sample came back positive for C.Diff. Enteric precautions in place. MD stated he would place orders for PO Vanco. Will continue to monitor per POC.

## 2018-02-06 NOTE — OR NURSING
Procedure: Bronchoscopy with lavage  Sedation: Conscious sedation (1 mg versed, 75 mcg fentanyl, 4 mg zofran)  O2: 2-6 LPM NC, 3 LPM NC post  Tolerated: VS stable during and post procedure. No abd or chest pain noted.    Specimens: Specimen(s) handled by RT  Report: Given to Abbie MATHUR RN  Pt to recovery area in stable condition, accompanied by SHAUNA Garnett, RN

## 2018-02-07 LAB
1,3 BETA GLUCAN SER-MCNC: 86 PG/ML
ANION GAP SERPL CALCULATED.3IONS-SCNC: 10 MMOL/L (ref 3–14)
ASPERGILLUS GALACTOMANNAN ANTIGEN BAL: NEGATIVE
B-D GLUCAN INTERPRETATION (1,3): POSITIVE
BASOPHILS # BLD AUTO: 0 10E9/L (ref 0–0.2)
BASOPHILS NFR BLD AUTO: 0 %
BUN SERPL-MCNC: 4 MG/DL (ref 7–30)
CALCIUM SERPL-MCNC: 8.4 MG/DL (ref 8.5–10.1)
CHLORIDE SERPL-SCNC: 103 MMOL/L (ref 94–109)
CMV DNA SPEC NAA+PROBE-ACNC: NORMAL [IU]/ML
CMV DNA SPEC NAA+PROBE-LOG#: NORMAL {LOG_IU}/ML
CO2 SERPL-SCNC: 23 MMOL/L (ref 20–32)
CREAT SERPL-MCNC: 0.54 MG/DL (ref 0.52–1.04)
DIFFERENTIAL METHOD BLD: ABNORMAL
EOSINOPHIL # BLD AUTO: 0 10E9/L (ref 0–0.7)
EOSINOPHIL NFR BLD AUTO: 0 %
ERYTHROCYTE [DISTWIDTH] IN BLOOD BY AUTOMATED COUNT: 16.2 % (ref 10–15)
FLUAV H1 2009 PAND RNA SPEC QL NAA+PROBE: NEGATIVE
FLUAV H1 RNA SPEC QL NAA+PROBE: NEGATIVE
FLUAV H3 RNA SPEC QL NAA+PROBE: NEGATIVE
FLUAV RNA SPEC QL NAA+PROBE: NEGATIVE
FLUBV RNA SPEC QL NAA+PROBE: NEGATIVE
GALACTOMANNAN AG SERPL-ACNC: 0.05
GFR SERPL CREATININE-BSD FRML MDRD: >90 ML/MIN/1.7M2
GLUCOSE SERPL-MCNC: 78 MG/DL (ref 70–99)
HADV DNA SPEC QL NAA+PROBE: NEGATIVE
HADV DNA SPEC QL NAA+PROBE: NEGATIVE
HCT VFR BLD AUTO: 26.5 % (ref 35–47)
HGB BLD-MCNC: 8.4 G/DL (ref 11.7–15.7)
HMPV RNA SPEC QL NAA+PROBE: NEGATIVE
HPIV1 RNA SPEC QL NAA+PROBE: NEGATIVE
HPIV2 RNA SPEC QL NAA+PROBE: NEGATIVE
HPIV3 RNA SPEC QL NAA+PROBE: NEGATIVE
LACTATE BLD-SCNC: 0.8 MMOL/L (ref 0.4–1.9)
LYMPHOCYTES # BLD AUTO: 0.4 10E9/L (ref 0.8–5.3)
LYMPHOCYTES NFR BLD AUTO: 7.8 %
MCH RBC QN AUTO: 30.3 PG (ref 26.5–33)
MCHC RBC AUTO-ENTMCNC: 31.7 G/DL (ref 31.5–36.5)
MCV RBC AUTO: 96 FL (ref 78–100)
MICROBIOLOGIST REVIEW: NORMAL
MONOCYTES # BLD AUTO: 0.3 10E9/L (ref 0–1.3)
MONOCYTES NFR BLD AUTO: 5.2 %
NEUTROPHILS # BLD AUTO: 4.9 10E9/L (ref 1.6–8.3)
NEUTROPHILS NFR BLD AUTO: 86.1 %
PLATELET # BLD AUTO: 107 10E9/L (ref 150–450)
PLATELET # BLD EST: ABNORMAL 10*3/UL
POTASSIUM SERPL-SCNC: 4.1 MMOL/L (ref 3.4–5.3)
PROMYELOCYTES # BLD MANUAL: 0.1 10E9/L
PROMYELOCYTES NFR BLD MANUAL: 0.9 %
RBC # BLD AUTO: 2.77 10E12/L (ref 3.8–5.2)
RHINOVIRUS RNA SPEC QL NAA+PROBE: NEGATIVE
RSV RNA SPEC QL NAA+PROBE: NEGATIVE
RSV RNA SPEC QL NAA+PROBE: NEGATIVE
SODIUM SERPL-SCNC: 136 MMOL/L (ref 133–144)
SPECIMEN SOURCE: NORMAL
SPECIMEN SOURCE: NORMAL
WBC # BLD AUTO: 5.7 10E9/L (ref 4–11)

## 2018-02-07 PROCEDURE — 40000893 ZZH STATISTIC PT IP EVAL DEFER: Performed by: REHABILITATION PRACTITIONER

## 2018-02-07 PROCEDURE — 83605 ASSAY OF LACTIC ACID: CPT | Performed by: INTERNAL MEDICINE

## 2018-02-07 PROCEDURE — 25000128 H RX IP 250 OP 636: Performed by: INTERNAL MEDICINE

## 2018-02-07 PROCEDURE — 36592 COLLECT BLOOD FROM PICC: CPT | Performed by: INTERNAL MEDICINE

## 2018-02-07 PROCEDURE — 25000132 ZZH RX MED GY IP 250 OP 250 PS 637: Performed by: NURSE PRACTITIONER

## 2018-02-07 PROCEDURE — 25000132 ZZH RX MED GY IP 250 OP 250 PS 637: Performed by: INTERNAL MEDICINE

## 2018-02-07 PROCEDURE — 99232 SBSQ HOSP IP/OBS MODERATE 35: CPT | Performed by: INTERNAL MEDICINE

## 2018-02-07 PROCEDURE — 12000006 ZZH R&B IMCU INTERMEDIATE UMMC

## 2018-02-07 PROCEDURE — 80048 BASIC METABOLIC PNL TOTAL CA: CPT | Performed by: NURSE PRACTITIONER

## 2018-02-07 PROCEDURE — 25000128 H RX IP 250 OP 636: Performed by: NURSE PRACTITIONER

## 2018-02-07 PROCEDURE — 85025 COMPLETE CBC W/AUTO DIFF WBC: CPT | Performed by: NURSE PRACTITIONER

## 2018-02-07 PROCEDURE — 25000132 ZZH RX MED GY IP 250 OP 250 PS 637: Performed by: DERMATOLOGY

## 2018-02-07 RX ORDER — ACETAMINOPHEN 325 MG/1
650 TABLET ORAL
Status: DISCONTINUED | OUTPATIENT
Start: 2018-02-07 | End: 2018-02-08 | Stop reason: HOSPADM

## 2018-02-07 RX ORDER — HEPARIN SODIUM,PORCINE 10 UNIT/ML
5-10 VIAL (ML) INTRAVENOUS
Status: DISCONTINUED | OUTPATIENT
Start: 2018-02-07 | End: 2018-02-08 | Stop reason: HOSPADM

## 2018-02-07 RX ORDER — DIPHENHYDRAMINE HYDROCHLORIDE 50 MG/ML
50 INJECTION INTRAMUSCULAR; INTRAVENOUS
Status: DISCONTINUED | OUTPATIENT
Start: 2018-02-07 | End: 2018-02-08 | Stop reason: HOSPADM

## 2018-02-07 RX ORDER — DIPHENHYDRAMINE HYDROCHLORIDE 50 MG/ML
50 INJECTION INTRAMUSCULAR; INTRAVENOUS ONCE
Status: COMPLETED | OUTPATIENT
Start: 2018-02-07 | End: 2018-02-07

## 2018-02-07 RX ORDER — METHYLPREDNISOLONE SODIUM SUCCINATE 125 MG/2ML
125 INJECTION, POWDER, LYOPHILIZED, FOR SOLUTION INTRAMUSCULAR; INTRAVENOUS
Status: DISCONTINUED | OUTPATIENT
Start: 2018-02-07 | End: 2018-02-08 | Stop reason: HOSPADM

## 2018-02-07 RX ORDER — ACETAMINOPHEN 325 MG/1
650 TABLET ORAL ONCE
Status: COMPLETED | OUTPATIENT
Start: 2018-02-07 | End: 2018-02-07

## 2018-02-07 RX ORDER — DIPHENHYDRAMINE HCL 25 MG
50 CAPSULE ORAL
Status: DISCONTINUED | OUTPATIENT
Start: 2018-02-07 | End: 2018-02-08 | Stop reason: HOSPADM

## 2018-02-07 RX ORDER — HEPARIN SODIUM (PORCINE) LOCK FLUSH IV SOLN 100 UNIT/ML 100 UNIT/ML
5 SOLUTION INTRAVENOUS EVERY 8 HOURS PRN
Status: DISCONTINUED | OUTPATIENT
Start: 2018-02-07 | End: 2018-02-08 | Stop reason: HOSPADM

## 2018-02-07 RX ORDER — PREDNISONE 5 MG/1
5 TABLET ORAL EVERY OTHER DAY
Status: DISCONTINUED | OUTPATIENT
Start: 2018-02-08 | End: 2018-02-08 | Stop reason: HOSPADM

## 2018-02-07 RX ORDER — DIPHENHYDRAMINE HCL 25 MG
50 CAPSULE ORAL ONCE
Status: COMPLETED | OUTPATIENT
Start: 2018-02-07 | End: 2018-02-07

## 2018-02-07 RX ADMIN — SODIUM CHLORIDE, PRESERVATIVE FREE 5 ML: 5 INJECTION INTRAVENOUS at 04:09

## 2018-02-07 RX ADMIN — Medication 125 MG: at 23:12

## 2018-02-07 RX ADMIN — AMOXICILLIN AND CLAVULANATE POTASSIUM 1 TABLET: 875; 125 TABLET, FILM COATED ORAL at 07:18

## 2018-02-07 RX ADMIN — ACETAMINOPHEN 650 MG: 325 TABLET, FILM COATED ORAL at 16:29

## 2018-02-07 RX ADMIN — UMECLIDINIUM 1 PUFF: 62.5 AEROSOL, POWDER ORAL at 07:20

## 2018-02-07 RX ADMIN — POTASSIUM CHLORIDE 20 MEQ: 750 TABLET, EXTENDED RELEASE ORAL at 19:25

## 2018-02-07 RX ADMIN — SODIUM CHLORIDE, PRESERVATIVE FREE 5 ML: 5 INJECTION INTRAVENOUS at 20:26

## 2018-02-07 RX ADMIN — AMOXICILLIN AND CLAVULANATE POTASSIUM 1 TABLET: 875; 125 TABLET, FILM COATED ORAL at 19:24

## 2018-02-07 RX ADMIN — PANTOPRAZOLE SODIUM 40 MG: 40 TABLET, DELAYED RELEASE ORAL at 07:19

## 2018-02-07 RX ADMIN — TOLTERODINE 4 MG: 4 CAPSULE, EXTENDED RELEASE ORAL at 07:19

## 2018-02-07 RX ADMIN — CALCIUM 1250 MG: 500 TABLET ORAL at 17:50

## 2018-02-07 RX ADMIN — POTASSIUM CHLORIDE 20 MEQ: 750 TABLET, EXTENDED RELEASE ORAL at 07:18

## 2018-02-07 RX ADMIN — Medication 125 MG: at 17:50

## 2018-02-07 RX ADMIN — CALCIUM 1250 MG: 500 TABLET ORAL at 07:19

## 2018-02-07 RX ADMIN — DIPHENHYDRAMINE HYDROCHLORIDE 50 MG: 25 CAPSULE ORAL at 16:29

## 2018-02-07 RX ADMIN — SODIUM CHLORIDE 1000 ML: 9 INJECTION, SOLUTION INTRAVENOUS at 08:24

## 2018-02-07 RX ADMIN — SULFAMETHOXAZOLE AND TRIMETHOPRIM 1 TABLET: 400; 80 TABLET ORAL at 07:19

## 2018-02-07 RX ADMIN — Medication 125 MG: at 14:56

## 2018-02-07 RX ADMIN — CITALOPRAM HYDROBROMIDE 10 MG: 10 TABLET ORAL at 07:19

## 2018-02-07 RX ADMIN — HUMAN IMMUNOGLOBULIN G 35 G: 20 LIQUID INTRAVENOUS at 17:15

## 2018-02-07 RX ADMIN — VITAMIN B12 0.1 MG ORAL TABLET 100 MCG: 0.1 TABLET ORAL at 07:18

## 2018-02-07 RX ADMIN — VITAMIN D, TAB 1000IU (100/BT) 2000 UNITS: 25 TAB at 07:18

## 2018-02-07 RX ADMIN — Medication 125 MG: at 10:26

## 2018-02-07 RX ADMIN — POTASSIUM CHLORIDE 20 MEQ: 750 TABLET, EXTENDED RELEASE ORAL at 14:56

## 2018-02-07 ASSESSMENT — ACTIVITIES OF DAILY LIVING (ADL)
ADLS_ACUITY_SCORE: 11

## 2018-02-07 ASSESSMENT — PAIN DESCRIPTION - DESCRIPTORS: DESCRIPTORS: ACHING

## 2018-02-07 NOTE — PROGRESS NOTES
[facility]  Transplant Infectious Disease Progress Note     Patient:  Ashleigh Alonzo, Date of birth 1960, Medical record number [id number]  Date of Visit:  02/07/2018         Assessment and Recommendations:   Recommendations:  - Please check LFTs with am labs tomorrow.   - Await pending Fungitell, Aspergillus galactomannan ag, bronchoscopy studies.     Transplant Infectious Disease will continue to follow with you.    Assessment:  Ashleigh Perera is a 58 yo female with h/o COPD and stage IIa breast cancer on TAC (cycle 2 1/23/18) and previous use of prembrolizumab c/b pneumonitis who was admitted with febrile neutropenia.   Infectious Disease issues include:  - C difficile colitis diagnosed 2/6/2018. Started po vanco 2/6/2018.  - Abnormal lung fields. Was also febrile with neutropenia. Blood cultures and urine culture negative. Rhinorrhea had been a localizing symptom, but 2/2/2018 RVP neg. 2/2/2018 CT chest showed overall improvement of her ground glass opacities (2/2 treatment-related pneumonitis, with neg CMV PCR of blood) but two new areas of consolidation of unclear etiology. She has risk factors for fungal pneumonia with steroid use since 11/2017. 2/6/2017 bronchoscopy done to evaluate for fungal pneumonia. Transitioned from empiric cefepime to augmentin 2/6/2018.  - Tongue ulcer. Would check HSV serostatus even though HSV PCR of ulcer was neg.   - PCP prophylaxis: bactrim (needed with long term steroid use).   - Serostatus: Toxoplasma IgG serology neg. HSV1+, HSV2 neg.   - Immunization status: Up to date  - Gamma globulin status: moderate to severe hypogammaglobulinemia.   - Isolation status:  Good hand hygiene.    Jillian Flanagan MD. Pager [phone]         Interval History:   Since Ashleigh Perera was last seen by me on 2/6/2018, she had a single temp spike to 103F, took tylenol, did not feel the fever, and now is afebrile. Her  is at the bedside and sees continued improvement: a  little more strength to ambulate to the bathroom in the room. No appetite. Transitioned from empiric cefepime to augmentin. WBC rising.      Review of Systems:  CONSTITUTIONAL:  Fever curve with one big spike, no chills or sweats.  EYES: negative for icterus or acute vision changes.  ENT:  negative for hearing loss, tinnitus or sore throat.   RESPIRATORY:  no sputum, no dyspnea (not wearing supplemental oxygen this morning, although she uses 1L at night as she desats when she sleeps; she has known COPD).   CARDIOVASCULAR:  negative for chest pain, palpitations  GASTROINTESTINAL:  Improved diarrhea. No appetite.   GENITOURINARY:  negative for dysuria  HEME:  + easy bruising  INTEGUMENT:  negative for rash or pruritus  NEURO:  Negative for headache or dizziness    Current Medications & Allergies:    sodium chloride 0.9%  1,000 mL Intravenous Once     vancomycin  125 mg Oral 4x Daily     sulfamethoxazole-trimethoprim  1 tablet Oral Daily     amoxicillin-clavulanate  1 tablet Oral Q12H CANDELARIA     heparin lock flush  5-10 mL Intracatheter Q24H     heparin  5 mL Intracatheter Q28 Days     potassium chloride SA  20 mEq Oral TID     HYDROmorphone  1 mg Intravenous Once     sodium chloride (PF)  3 mL Intravenous Q8H     calcium carbonate  1,250 mg Oral BID w/meals     citalopram  10 mg Oral Daily     cyanocolbalamin  100 mcg Oral Daily     tolterodine  4 mg Oral Daily     pantoprazole  40 mg Oral QAM     cholecalciferol  2,000 Units Oral Daily     umeclidinium  1 puff Inhalation Daily       Infusions/drips:    - MEDICATION INSTRUCTIONS -         No Known Allergies           Physical Exam:   Vitals were reviewed.  All vitals stable.    Patient Vitals for the past 24 hrs:   BP Temp Temp src Resp SpO2 Weight   02/07/18 0717 108/69 98.2  F (36.8  C) Oral 18 96 % -   02/07/18 0411 - - - - - 66.9 kg (147 lb 7.8 oz)   02/07/18 0328 104/68 98.2  F (36.8  C) Oral 22 92 % -   02/07/18 0300 - - - - 91 % -   02/06/18 2328 120/69 98.4  F  (36.9  C) Oral 24 90 % -   02/06/18 1914 125/85 99.1  F (37.3  C) Oral 30 96 % -   02/06/18 1627 129/78 98.3  F (36.8  C) Oral 24 94 % -   02/06/18 1544 107/59 98.7  F (37.1  C) Oral 22 94 % -   02/06/18 1300 - 100.6  F (38.1  C) Oral 22 - -   02/06/18 1240 - 103.1  F (39.5  C) Oral 24 - -   02/06/18 1211 125/81 - - - - -   02/06/18 1205 126/73 - - 25 94 % -   02/06/18 1154 149/88 103.1  F (39.5  C) Oral 25 96 % -   02/06/18 0946 127/82 - - - 99 % -     Vitals:    02/05/18 0450 02/06/18 0029 02/07/18 0411   Weight: 70.2 kg (154 lb 11.2 oz) 68.2 kg (150 lb 4.8 oz) 66.9 kg (147 lb 7.8 oz)       Exam:  GENERAL:  well-developed, well-nourished woman, alert, oriented, in no acute distress.  HEENT:  Head is normocephalic, atraumatic, alopecia  EYES:  Eyes have anicteric sclerae.    ENT:  Oropharynx is moist without exudate. Tongue with lateral ulceration on the right side, appears to be in a healing stage as the edges are not well demarcated. No sinus tenderness. Visualization of the right nare interior with normal mucosa.  NECK:  Supple. No LAD  LUNGS:  Bibasilar rales.   CARDIOVASCULAR:  Regular rate and rhythm with no murmur  ABDOMEN:  Normal bowel sounds, soft, nontender.  SKIN:  No acute rashes. Left chest wall port-a-cath is accessed without any surrounding erythema.  NEUROLOGIC:  Grossly nonfocal.         Laboratory Data:     Inflammatory Markers    Recent Labs   Lab Test  10/12/17   1905   CRP  84.0*       Immune Globulin Studies    Recent Labs   Lab Test  02/05/18   1417   IGG  335*   IGM  33*   IGE  68   IGA  216       Metabolic Studies    Recent Labs   Lab Test  02/07/18   0412  02/06/18   0453  02/05/18   0655   01/31/18   0501  01/30/18   0619   01/13/18   0537   NA  136  139  137   < >   --   137   < >  135   POTASSIUM  4.1  3.9  3.8   < >   --   4.1   < >  3.6   CHLORIDE  103  106  105   < >   --   106   < >  106   CO2  23  22  25   < >   --   25   < >  19*   ANIONGAP  10  10  7   < >   --   6   < >  10    BUN  4*  4*  4*   < >   --   8   < >  3*   CR  0.54  0.49*  0.52   < >   --   0.45*   < >  0.58   GFRESTIMATED  >90  >90  >90   < >   --   >90   < >  >90   GLC  78  91  71   < >   --   92   < >  76   JAVIER  8.4*  8.4*  8.2*   < >   --   7.7*   < >  8.1*   PHOS   --    --    --    --    --    --    --   2.1*   MAG   --    --    --    --    --   1.9   --   1.7   LACT   --    --    --    --   0.7   --    < >   --     < > = values in this interval not displayed.       Hepatic Studies    Recent Labs   Lab Test  02/03/18   0522  02/02/18   0647  02/01/18   1050  01/30/18   0619   09/05/17   1240  06/25/12   1438   BILITOTAL  0.6  0.7   --   0.8   < >  0.5  0.4  0.9   BILIDELTA   --    --    --    --    --    --   0.2   BILICONJ   --    --    --    --    --    --   0.0   DBIL   --    --    --    --    --   0.1   --    ALKPHOS  80  68   --   76   < >  146  148  125   PROTTOTAL  5.2*  5.1*   --   5.2*   < >  7.8  7.8  8.5   ALBUMIN  1.9*  1.9*   --   2.1*   < >  3.6  3.6  4.9   AST  61*  43   --   18   < >  34  33  48*   ALT  70*  49   --   29   < >  50  49  33   LDH   --    --   157   --    --    --    --     < > = values in this interval not displayed.       Hematology Studies     Recent Labs   Lab Test  02/07/18   0412  02/06/18   0453  02/05/18   0655  02/04/18   0648   WBC  5.7  3.1*  1.6*  1.0*   ANEU  4.9  2.5   --   0.7*   ALYM  0.4*  0.2*   --   0.2*   STONEY  0.3  0.4   --   0.1   AEOS  0.0  0.0   --   0.0   HGB  8.4*  8.5*  7.7*  7.6*   HCT  26.5*  26.4*  23.7*  23.4*   PLT  107*  75*  42*  22*       Urine Studies     Recent Labs   Lab Test  01/29/18   1653  01/10/18   2318  11/11/17   1830  10/26/17   1500  10/12/17   2149  09/05/17   1240   URINEPH  7.5*  6.0  6.0  5.0  5.5  5.0   NITRITE  Negative  Negative  Negative  Negative  Negative  Negative   LEUKEST  Negative  Negative  Large*  Large*  Negative  Small*   WBCU  1   --   18*  158*  <1  6*       Medication levels    Recent Labs   Lab Test  01/31/18    0743   VANCOMYCIN  11.7       Microbiology:  Last Culture results with specimen source  Culture Micro   Date Value Ref Range Status   02/06/2018 No growth after 16 hours  Preliminary   02/02/2018 No growth after 5 days  Preliminary   02/02/2018 Canceled, Test credited  Final   02/02/2018 Specimen not received  Final   01/29/2018   Final    <10,000 colonies/mL  urogenital ariela  Susceptibility testing not routinely done     01/29/2018 No growth  Final   01/29/2018 No growth  Final   01/12/2018 No growth  Final   01/10/2018 No growth  Final   01/10/2018 No growth  Final   01/10/2018 No growth  Final   11/13/2017 No growth  Final   11/13/2017 No growth  Final   11/11/2017 No growth  Final   11/11/2017 No growth  Final   11/11/2017 No growth  Final   10/26/2017 <10,000 colonies/mL  urogenital ariela    Final   10/26/2017 Susceptibility testing not routinely done  Final   10/26/2017 No growth  Final   10/26/2017 No growth  Final   10/12/2017 No growth  Final   10/12/2017 No growth  Final   10/12/2017 No growth  Final   04/04/2017 No Beta Streptococcus isolated  Final    Specimen Description   Date Value Ref Range Status   02/06/2018 Blood Left Arm  Final   02/06/2018 Bronchoalveolar Lavage Left upper lobe  Final   02/06/2018 Feces  Final   02/02/2018 Nares  Final   02/02/2018 Blood Unspecified Site  Final   02/02/2018 Blood  Final   01/29/2018 Midstream Urine  Final   01/29/2018 Blood Right Arm  Final   01/29/2018 Blood Portacath  Final   01/13/2018 Feces  Final   01/12/2018 Feces  Final   01/12/2018 Blood Left Hand  Final   01/10/2018 Midstream Urine  Final   01/10/2018 Blood Left Arm  Final   01/10/2018 Blood Right Arm  Final   11/13/2017 Feces  Final   11/13/2017 Blood Left Hand  Final   11/13/2017 Blood Portacath  Final   11/12/2017 Nasopharyngeal  Final   11/11/2017 Midstream Urine  Final   11/11/2017 Portacath Blood  Final   11/11/2017 Blood PTC  Final   10/26/2017 Midstream Urine  Final   10/26/2017 Blood Left Arm   Final   10/26/2017 Blood Port  Final   10/12/2017 Midstream Urine  Final   10/12/2017 Blood Right Hand  Final   10/12/2017 Blood Portacath  Final   04/04/2017 Throat  Final   04/04/2017 Throat  Final        Last check of C difficile  C Diff Toxin B PCR   Date Value Ref Range Status   02/06/2018 Positive (A) NEG^Negative Final     Comment:     Positive: Toxin producing Clostridium difficile target DNA sequences detected,   presumed positive for Clostridium difficile toxin B.  Clostridium difficile (Requires Enteric Isolation)  FDA approved assay performed using [employer] GeneXpert real-time PCR.  Critical Value/Significant Value called to and read back by  AZIZA SANCHEZ RN (6B).  02.06.18 0427 GJS         Virology:  Respiratory virus testing    Recent Labs   Lab Test  02/02/18   1813  01/10/18   2355  11/12/17   1015  11/12/17   0325  10/26/17   1427   HMPV  Negative  Negative  Negative   --   Negative   PIV3  Negative  Negative  Negative   --   Negative   HRVS  Negative  Negative  Negative   --   Negative   RVSPEC  Nasopharyngeal  Nasopharyngeal  Nasopharyngeal   --   Nasal   RSVA  Negative  Negative  Negative   --   Negative   RSVB  Negative  Negative  Negative   --   Negative   IRSV   --    --    --   Negative   --        CMV viral loads    Recent Labs   Lab Test  02/03/18   0522   CSPEC  Plasma, EDTA anticoagulant   CMVLOG  Not Calculated       Herpes Simplex Testing     Recent Labs   Lab Test  02/05/18   1417  02/02/18   1813   HSPEC   --   Mouth   HSIM  0.50   --    H1IGG  >8.0*   --    H2IGG  <0.2   --    HSDNA1   --   Negative   HSDNA2   --   Negative       Imaging:   EXAMINATION: CT CHEST/ABDOMEN/PELVIS W CONTRAST, 2/2/2018 12:41 PM  COMPARISON: 11/13/2017 CT chest; 10/6/2015 CT abdomen/pelvis  FINDINGS:  Chest: Overall decreased extent of numerous bilateral peripheral  predominant peribronchovascular consolidative and groundglass  opacities, however, new groundglass opacities are noted in the  lateral  right upper lobe (for example series 8, image 43) and paramediastinal  left upper lobe (series 8, image 56). Slightly decreased small  bilateral pleural effusions. No pneumothorax. The central  tracheobronchial tree is clear. Numerous scattered pulmonary nodules  are unchanged, for example measuring 4 mm in the subpleural posterior  left lower lobe (series 8, image 126). Several scattered calcified granulomas.    Normal heart size. No pericardial effusion. Normal caliber ascending  aorta and main pulmonary artery. No central pulmonary embolism. No  thoracic lymphadenopathy. Bilateral calcified hilar and subcarinal  lymph nodes. Left chest wall internal jugular Port-A-Cath tip at the  low SVC. Small hiatal hernia.    Abdomen and pelvis: No suspicious arterial enhancing hepatic lesion.  Punctate hypodensities in the liver are too small to fully  characterize but are unchanged. The gallbladder and biliary tree are  within normal limits. The spleen, pancreas, adrenal glands, and  kidneys are unremarkable. No hydronephrosis, hydroureter, or urinary  tract stone. The bladder is unremarkable. Bilateral tubal ligation  clips. The uterus and ovaries are unremarkable. Trace perihepatic  ascites along the anterosuperior aspect of the left hepatic lobe. No  free air. No bowel obstruction. Mild prominence of the wall of the  cecum/proximal ascending colon and the rectum. No pneumatosis or  portal venous gas. The major abdominal vessels are patent. Moderate  atherosclerotic calcification of the abdominal aorta without  aneurysmal dilation.    Bones and soft tissues: No aggressive osseous lesion. Transitional  anatomy with lumbarization of S1. Mild anterior right superior  endplate compression deformity at L5, new since 10/6/2015. Unchanged  mild mid thoracic superior endplate compression fractures with a new  mild superior endplate anterior wedge compression fracture at L1 since  11/13/2017. Herniation of a small amount  of peritoneal fat through a  anterolateral left abdominal wall defect measuring approximately 7 mm  (series 7, image 466). Prominent fat in the left inguinal canal.      Impression    IMPRESSION:   1. Overall decreased/improved bilateral peripheral predominant  peribronchovascular consolidative and groundglass opacities, however,  2 newly affected regions are noted in the lateral right upper lobe and  paramediastinal left upper lobe, which may represent focal worsening  of an ongoing pathologic process since 11/13/2017 (please for to that  report for further details), or new superimposed infection.  2. Slightly decreased small bilateral pleural effusions.  3. Age-indeterminate mild superior endplate compression fractures are  noted at L1 (new since 11/13/2017) and L5 (new since 10/6/2015).

## 2018-02-07 NOTE — PLAN OF CARE
Problem: Patient Care Overview  Goal: Plan of Care/Patient Progress Review  PT 6B - Cancel/defer, per chart review and discussion with interdisciplinary team, pt does not require skilled inpatient physical therapy at this time. Pt modified independent with bed mobility mimicking home set up. IND sit <> stand, ambulation, and stairs (3 steps) without railing. Pt's  confirms that he is able to provide 24/7 assistance if needed and no concerns/history of falls. Pt and  in agreement with deferral of PT at this time. Please re-consult as needed if patient experiences a change in functional mobility or goals requiring skilled inpatient physical therapy.    Pt encouraged to ambulate halls, however may need nursing set-up to maintain infection precautions.

## 2018-02-07 NOTE — PLAN OF CARE
Problem: Patient Care Overview  Goal: Plan of Care/Patient Progress Review  Outcome: No Change  Pt A&O X4, T-max of 98.4, HR sinus rhythm/tachy 's, BP's 100-120's/60's, O2 sats > 90% on RA. LS clear/diminished, BS+ X4, CMS intact. Pt slept majority of overnight shift, had few requests/complaints, reported tolerable abdominal pain, declined medications. Port in L chest patent & SL. Gets up ad sundar, ambulates independently to restroom. Voiding clear yellow urine adequately, passing gas, having loose/watery brown/green BM's. Has tolerated regular diet with no nausea/emesis. Will continue to monitor per POC.

## 2018-02-07 NOTE — PLAN OF CARE
Problem: Patient Care Overview  Goal: Plan of Care/Patient Progress Review  Outcome: No Change  Neuro: A&Ox4.   Cardiac: ST. VSS.   Respiratory: Sating 94 on RA.  GI/: Adequate urine output. Diarrhea  Diet/appetite: Poor appetite.  Activity:  Up c SBA  Pain: At acceptable level on current regimen.   Skin: Intact, no new deficits noted.  LDA's: Port hep locked    Pt received 1L bolus of NS following bronchoscopy and elevated temp - pt remained afebrile this shift.    Plan: Continue with POC. Notify primary team with changes.

## 2018-02-07 NOTE — PROGRESS NOTES
Hematology / Oncology Progress Note  02/07/2018  Assessment & Plan    Ashleigh Alonzo is a 58 y/o PMH COPD, MERLIN, Depression, & stage IIa T2N0M0 Triple negative breast cancer s/p 12 weeks taxol, 3 cycles, pembrolizumab (hx. Autoimmune pneumonitis), & 2 cycles of adriamycin/cyclophosphamide admitted with NF & HCAP (RUL PNA).  S/P bronchoscopy on 2/6. 1,3 BD +. IgG low getting repleted. C diff + on PO vanc. Not yet on antifungal coverage, on bactrim PPX & augmentin. Monitoring overnight for fevers, giving fluids for persistent diarrhea, would like to monitor bronchoscopy labs again tomorrow & then d/c home (2/8).     # Neutropenic fever likely 2/2 HCAP.  # Neutropenia. RESOLVED WBC 3.1, ANC 2.5.  - Had last cycle of adriamycin & taxol on . S/P neulasta on 1/24/18. Last fever was 102 F on 2/2 (100 on 2/4), UA NTD, Blood culture NTD, CT CAP 2/2 (CXR 2/2 with increased RUL & SARA opacities, possible cavitary change RUL) shows overall decreased extent of numerous bilateral peripheral predominant peribronchovascular consolidative and GGO, however, NEW GGO are noted in the lateral RUL & paramediastinal SARA. RVP negative. CMV PCR negative. MRSA swab negative d/c'ed Vanc. S/P levaquin (750 mg PO daily 1/31-2/1, qtc 423). HSV swab negative & toxoplasma IgG negative.  >> Infectious disease following.   >> ENT saw the patient no evidence fungal sinusitis, recommend nasal hygeiene (ocean spray q 2 hours), & bactroban for 7 days.  >> Pulmonary saw the patient bronchoscopy for BAL to r/o acute infection (cytology was negative for malignancy, negative fungal, negative PCP, negative viral cytopathic effect, pink/hazy with 80 WBC 1% basophil, 14% lymph, 85% other cells, relatively benign per Jennifer Flanagan).   - On cefepime 2g q 8 (1/29-1/30, 2/2-->). On 2/6 started SS bactrim for PCP ppx. & transition to Augmentin (covers anaerobes/mouth ariela, less disruptive to GI tract than antibiotics covering GN).   - 1,3 BD F positive at 86. Will wait  for fungal culture tomorrow (likely start something at d/c). Will replete IVIG r/t low IgG level.     # Pancytopenia, resolving no longer neutropenic. Likely cumulative r/t chemotherapy & infection. Thrombocytopenia more severe than with previous cycles. Normal LDH & haptoglobin. Peripheral smear without schistocytes or pseudothrombocytopenia.   - s/p Neulasta 1/24/18. S/p transfusion 2 PRBC (1/29 & 2/1).  - Goal is to transfuse HgB < 7, or PLT < 10. If bleeding transfuse to keep PLT > 20.   - CBC with platelets daily.     # Pneumonitis 2/2 to pembrolizumab. On steroid taper.  - Continue prednisone 5 mg PO daily (until 2/5, then every other day for 7 days, then d/c). Every other day to start tomorrow (today will be an off day).     # Mucositis 2/2 to chemotherapy & neutropenia.  - HSV swab negative.  - MMW, viscous lidocaine.    # Triple negative breast cancer (stage IIa, T2N0,M0). Enrolled on the ISPY2 clinical trial.  Randomized to Taxol + pembrolizumab followed by adriamycin, cyclophosphamide, and pembrolizumab.  Pembrolizumab discontinued after 3 cycles due to pneumonitis.  Week 12 breast MRI shows no residual breast tumor.  Both cycles #1 and #2 of adriamycin and cyclophosphamide complicated by neutropenic fever.  Discussed with patient today that given good response to treatment thus far and poor tolerance of chemotherapy, will forgo further chemotherapy and proceed with surgery.  - Attending Leeann DUNLAP to contact surgical oncology & move up surgery 3-5 weeks from now.     # Clostridium difficile infection. C diff positive 2/5/18.   - Started PO vancomycin 125 mg 4x daily (2/6-).   - Lots of diarrhea, use PRN simethicone & give IVF bolus again today.     # Severe malnutrition in the setting of chronic illness.   - Encourage snacks & supplements.  - Calorie counts.  - RD following.     FEN: Encourage PO intake. Potassium SS. RDAT.   PPX (DVT/GI): Protonix 40 mg PO daily (r/t steroid), & Mechanical  DVT/ambulation encouraged (r/t severe TCP).   LINES/DRAINS: Port L chest wall 9/1/17.    DISPO: unclear, competing infectious work up. PT consult for generalized weakness & deconditioning. But consult was deferred (2/7 note). Will go home, no placement issues, attentive .     Interval history  T max likely post procedure was 103.1, was from 12-1 pm. Mildly tachy 100-110. Lots of diarrhea recorded (& reported by patient). Poor appetite. Just not hungry. Will try drinking more supplements and ordering small frequent meals. Needs encouragement. Plan is to repeat bolus today (with increased stool, poor PO intake, weight down). Going to replete IVIG r/t low IgG level 335. Going to continue bactrim/augmentin. Not starting antifungal coverage yet, want to give fungal culture on bronchoscopy another day (per MD Flanagan).     Physical Exam  Constitutional: Awake, alert, cooperative, in NAD. No O2, up in room, steady on feet.   Eyes: PERRL, EOMI, sclera clear, conjunctiva normal.  ENT: Normocephalic, without obvious abnormality, moist mucus membranes has a white long lesion along R side of her tongue. Clear nares.   Respiratory: Non-labored breathing, clear upper lobes, diminished bases.   Cardiovascular: RRR, no murmur noted.  GI: +BS, soft, non-distended, non-tender, no masses palpated, no hepatosplenomegaly.  Skin: No concerning lesions or rash on exposed areas. Left chest wall port C/D/I.   Musculoskeletal: No edema ame LEs.  Neurologic: Awake, A&O x 3. Cranial nerves II-XII are grossly intact.   Neuropsychiatric: Calm, normal affect.     Rounding:  Temp:  [98.2  F (36.8  C)-99.1  F (37.3  C)] 98.2  F (36.8  C)  Heart Rate:  [] 106  Resp:  [18-30] 22  BP: (104-129)/(59-85) 119/75  SpO2:  [90 %-96 %] 92 %    I/O last 3 completed shifts:  In: 1640 [P.O.:620; I.V.:20; IV Piggyback:1000]  Out: 2650 [Urine:675; Other:1925; Stool:50]    Vitals:    02/02/18 0449 02/04/18 0625 02/05/18 0450 02/06/18 0029   Weight: 70.1  kg (154 lb 8 oz) 71.1 kg (156 lb 11.2 oz) 70.2 kg (154 lb 11.2 oz) 68.2 kg (150 lb 4.8 oz)    02/07/18 0411   Weight: 66.9 kg (147 lb 7.8 oz)       Recent Labs  Lab 02/07/18 0412 02/06/18  0453 02/05/18  0655 02/04/18  0648   WBC 5.7 3.1* 1.6* 1.0*   RBC 2.77* 2.76* 2.50* 2.49*   HGB 8.4* 8.5* 7.7* 7.6*   HCT 26.5* 26.4* 23.7* 23.4*   MCV 96 96 95 94   MCH 30.3 30.8 30.8 30.5   MCHC 31.7 32.2 32.5 32.5   RDW 16.2* 15.7* 15.4* 15.4*   * 75* 42* 22*       Recent Labs  Lab 02/07/18 0412 02/06/18 0453 02/05/18  0655 02/04/18  1231 02/03/18  0522 02/02/18  0647    139 137  --  137 140   POTASSIUM 4.1 3.9 3.8 3.5 3.1* 3.3*   CHLORIDE 103 106 105  --  105 108   CO2 23 22 25  --  22 23   ANIONGAP 10 10 7  --  10 8   GLC 78 91 71  --  73 77   BUN 4* 4* 4*  --  2* 3*   CR 0.54 0.49* 0.52  --  0.47* 0.46*   GFRESTIMATED >90 >90 >90  --  >90 >90   GFRESTBLACK >90 >90 >90  --  >90 >90   JAVIER 8.4* 8.4* 8.2*  --  7.7* 7.8*   PROTTOTAL  --   --   --   --  5.2* 5.1*   ALBUMIN  --   --   --   --  1.9* 1.9*   BILITOTAL  --   --   --   --  0.6 0.7   ALKPHOS  --   --   --   --  80 68   AST  --   --   --   --  61* 43   ALT  --   --   --   --  70* 49       Recent Labs  Lab 02/01/18  1050   INR 1.12       Results for YUNI CALIXTO (MRN [id number]) as of 2/7/2018 15:42   2/5/2018 14:17       (L)   IGM 33 (L)     Anti-infectives (Future)    Start     Dose/Rate Route Frequency Ordered Stop    02/06/18 2000  amoxicillin-clavulanate (AUGMENTIN) 875-125 MG per tablet 1 tablet      1 tablet Oral EVERY 12 HOURS SCHEDULED 02/06/18 1316      02/06/18 1400  sulfamethoxazole-trimethoprim (BACTRIM/SEPTRA) 400-80 MG per tablet 1 tablet      1 tablet Oral DAILY 02/06/18 1311      02/06/18 0800  vancomycin (VANOCIN) solution 125 mg      125 mg Oral 4 TIMES DAILY 02/06/18 0434            acetaminophen  650 mg Oral Once     diphenhydrAMINE  50 mg Oral Once    Or     diphenhydrAMINE  50 mg Intravenous Once     immune globulin -  sucrose free  0.5 g/kg Intravenous Once     vancomycin  125 mg Oral 4x Daily     sulfamethoxazole-trimethoprim  1 tablet Oral Daily     amoxicillin-clavulanate  1 tablet Oral Q12H CANDELARIA     heparin lock flush  5-10 mL Intracatheter Q24H     heparin  5 mL Intracatheter Q28 Days     potassium chloride SA  20 mEq Oral TID     HYDROmorphone  1 mg Intravenous Once     sodium chloride (PF)  3 mL Intravenous Q8H     calcium carbonate  1,250 mg Oral BID w/meals     citalopram  10 mg Oral Daily     cyanocolbalamin  100 mcg Oral Daily     tolterodine  4 mg Oral Daily     pantoprazole  40 mg Oral QAM     cholecalciferol  2,000 Units Oral Daily     umeclidinium  1 puff Inhalation Daily       - MEDICATION INSTRUCTIONS -       Chelsi Yi, [facility], [phone].  Hematology/Oncology, February 7, 2018.

## 2018-02-07 NOTE — PLAN OF CARE
Problem: Infection, Risk/Actual (Adult)  Goal: Identify Related Risk Factors and Signs and Symptoms  Related risk factors and signs and symptoms are identified upon initiation of Human Response Clinical Practice Guideline (CPG).   Outcome: Improving  Doing well. No acute issues today. Afebrile. Vitals stable. Mildly tachypneic. O2 sats while sleeping 91-92% but patient declined oxygen and denies shortness of breath.  very involved and helpful at bedside. Planning for IViG infusion this afternoon.

## 2018-02-07 NOTE — PROGRESS NOTES
CLINICAL NUTRITION SERVICES - BRIEF NOTE  **See RD note from 2/6 for full assessment    RD ordered calorie counts starting today (2/7-2/9) per RD recommendations to quantify PO intake. Per RD recommendations, if pt unable to meet at least 75% of lower end needs (1090 kcal/day and 65 g PRO/day), may need to consider nutrition support. If nutrition support warranted pending team POC, consult nutrition to assess and order tube feed per protocol.     RD will continue to monitor per protocol.     Elena Ramirez RD, LD  Unit [address] pgr: [phone]

## 2018-02-08 VITALS
RESPIRATION RATE: 16 BRPM | OXYGEN SATURATION: 95 % | HEIGHT: 64 IN | TEMPERATURE: 98.9 F | BODY MASS INDEX: 25.01 KG/M2 | DIASTOLIC BLOOD PRESSURE: 70 MMHG | HEART RATE: 91 BPM | SYSTOLIC BLOOD PRESSURE: 110 MMHG | WEIGHT: 146.5 LBS

## 2018-02-08 PROBLEM — A04.72 CLOSTRIDIUM DIFFICILE DIARRHEA: Status: ACTIVE | Noted: 2018-01-12

## 2018-02-08 LAB
ALBUMIN SERPL-MCNC: 1.9 G/DL (ref 3.4–5)
ALP SERPL-CCNC: 99 U/L (ref 40–150)
ALT SERPL W P-5'-P-CCNC: 54 U/L (ref 0–50)
ANION GAP SERPL CALCULATED.3IONS-SCNC: 8 MMOL/L (ref 3–14)
AST SERPL W P-5'-P-CCNC: 29 U/L (ref 0–45)
BACTERIA SPEC CULT: NO GROWTH
BACTERIA SPEC CULT: NO GROWTH
BASOPHILS # BLD AUTO: 0 10E9/L (ref 0–0.2)
BASOPHILS NFR BLD AUTO: 0.3 %
BILIRUB DIRECT SERPL-MCNC: <0.1 MG/DL (ref 0–0.2)
BILIRUB SERPL-MCNC: 0.3 MG/DL (ref 0.2–1.3)
BUN SERPL-MCNC: 4 MG/DL (ref 7–30)
CALCIUM SERPL-MCNC: 8.1 MG/DL (ref 8.5–10.1)
CHLORIDE SERPL-SCNC: 105 MMOL/L (ref 94–109)
CO2 SERPL-SCNC: 22 MMOL/L (ref 20–32)
CREAT SERPL-MCNC: 0.51 MG/DL (ref 0.52–1.04)
DIFFERENTIAL METHOD BLD: ABNORMAL
EOSINOPHIL # BLD AUTO: 0 10E9/L (ref 0–0.7)
EOSINOPHIL NFR BLD AUTO: 0 %
ERYTHROCYTE [DISTWIDTH] IN BLOOD BY AUTOMATED COUNT: 16.7 % (ref 10–15)
GALACTOMANNAN AG SERPL QL IA: NEGATIVE
GALACTOMANNAN AG SERPL-ACNC: 0.04
GFR SERPL CREATININE-BSD FRML MDRD: >90 ML/MIN/1.7M2
GLUCOSE SERPL-MCNC: 62 MG/DL (ref 70–99)
HCT VFR BLD AUTO: 25.7 % (ref 35–47)
HGB BLD-MCNC: 8.1 G/DL (ref 11.7–15.7)
IMM GRANULOCYTES # BLD: 0.1 10E9/L (ref 0–0.4)
IMM GRANULOCYTES NFR BLD: 2.6 %
LACTATE BLD-SCNC: 0.8 MMOL/L (ref 0.4–1.9)
LYMPHOCYTES # BLD AUTO: 0.4 10E9/L (ref 0.8–5.3)
LYMPHOCYTES NFR BLD AUTO: 9.2 %
MCH RBC QN AUTO: 30.3 PG (ref 26.5–33)
MCHC RBC AUTO-ENTMCNC: 31.5 G/DL (ref 31.5–36.5)
MCV RBC AUTO: 96 FL (ref 78–100)
MONOCYTES # BLD AUTO: 0.8 10E9/L (ref 0–1.3)
MONOCYTES NFR BLD AUTO: 20.7 %
NEUTROPHILS # BLD AUTO: 2.6 10E9/L (ref 1.6–8.3)
NEUTROPHILS NFR BLD AUTO: 67.2 %
NRBC # BLD AUTO: 0 10*3/UL
NRBC BLD AUTO-RTO: 0 /100
PLATELET # BLD AUTO: 132 10E9/L (ref 150–450)
POTASSIUM SERPL-SCNC: 3.8 MMOL/L (ref 3.4–5.3)
PROT SERPL-MCNC: 6.4 G/DL (ref 6.8–8.8)
RBC # BLD AUTO: 2.67 10E12/L (ref 3.8–5.2)
SODIUM SERPL-SCNC: 135 MMOL/L (ref 133–144)
SPECIMEN SOURCE: NORMAL
SPECIMEN SOURCE: NORMAL
WBC # BLD AUTO: 3.9 10E9/L (ref 4–11)

## 2018-02-08 PROCEDURE — 25000128 H RX IP 250 OP 636: Performed by: NURSE PRACTITIONER

## 2018-02-08 PROCEDURE — 80076 HEPATIC FUNCTION PANEL: CPT | Performed by: NURSE PRACTITIONER

## 2018-02-08 PROCEDURE — 25000132 ZZH RX MED GY IP 250 OP 250 PS 637: Performed by: NURSE PRACTITIONER

## 2018-02-08 PROCEDURE — 25000132 ZZH RX MED GY IP 250 OP 250 PS 637: Performed by: INTERNAL MEDICINE

## 2018-02-08 PROCEDURE — 36592 COLLECT BLOOD FROM PICC: CPT | Performed by: INTERNAL MEDICINE

## 2018-02-08 PROCEDURE — 25000125 ZZHC RX 250: Performed by: NURSE PRACTITIONER

## 2018-02-08 PROCEDURE — 25000132 ZZH RX MED GY IP 250 OP 250 PS 637: Performed by: STUDENT IN AN ORGANIZED HEALTH CARE EDUCATION/TRAINING PROGRAM

## 2018-02-08 PROCEDURE — 80048 BASIC METABOLIC PNL TOTAL CA: CPT | Performed by: NURSE PRACTITIONER

## 2018-02-08 PROCEDURE — 85025 COMPLETE CBC W/AUTO DIFF WBC: CPT | Performed by: NURSE PRACTITIONER

## 2018-02-08 PROCEDURE — 83605 ASSAY OF LACTIC ACID: CPT | Performed by: INTERNAL MEDICINE

## 2018-02-08 PROCEDURE — 25000132 ZZH RX MED GY IP 250 OP 250 PS 637: Performed by: DERMATOLOGY

## 2018-02-08 PROCEDURE — 99239 HOSP IP/OBS DSCHRG MGMT >30: CPT | Performed by: INTERNAL MEDICINE

## 2018-02-08 PROCEDURE — 36592 COLLECT BLOOD FROM PICC: CPT | Performed by: NURSE PRACTITIONER

## 2018-02-08 RX ORDER — PREDNISONE 5 MG/1
5 TABLET ORAL EVERY OTHER DAY
Qty: 4 TABLET | Refills: 0
Start: 2018-02-09 | End: 2019-01-30

## 2018-02-08 RX ORDER — SIMETHICONE 40MG/0.6ML
40 SUSPENSION, DROPS(FINAL DOSAGE FORM)(ML) ORAL EVERY 6 HOURS PRN
Qty: 45 ML | Refills: 1 | Status: SHIPPED | OUTPATIENT
Start: 2018-02-08 | End: 2018-02-21

## 2018-02-08 RX ADMIN — AMOXICILLIN AND CLAVULANATE POTASSIUM 1 TABLET: 875; 125 TABLET, FILM COATED ORAL at 08:21

## 2018-02-08 RX ADMIN — SODIUM CHLORIDE, PRESERVATIVE FREE 5 ML: 5 INJECTION INTRAVENOUS at 09:14

## 2018-02-08 RX ADMIN — POTASSIUM CHLORIDE 20 MEQ: 750 TABLET, EXTENDED RELEASE ORAL at 06:43

## 2018-02-08 RX ADMIN — PANTOPRAZOLE SODIUM 40 MG: 40 TABLET, DELAYED RELEASE ORAL at 08:22

## 2018-02-08 RX ADMIN — SULFAMETHOXAZOLE AND TRIMETHOPRIM 1 TABLET: 400; 80 TABLET ORAL at 08:21

## 2018-02-08 RX ADMIN — VITAMIN B12 0.1 MG ORAL TABLET 100 MCG: 0.1 TABLET ORAL at 08:22

## 2018-02-08 RX ADMIN — POTASSIUM CHLORIDE 20 MEQ: 750 TABLET, EXTENDED RELEASE ORAL at 08:21

## 2018-02-08 RX ADMIN — TOLTERODINE 4 MG: 4 CAPSULE, EXTENDED RELEASE ORAL at 08:21

## 2018-02-08 RX ADMIN — SODIUM CHLORIDE, PRESERVATIVE FREE 10 ML: 5 INJECTION INTRAVENOUS at 04:41

## 2018-02-08 RX ADMIN — UMECLIDINIUM 1 PUFF: 62.5 AEROSOL, POWDER ORAL at 08:20

## 2018-02-08 RX ADMIN — Medication 125 MG: at 10:41

## 2018-02-08 RX ADMIN — PREDNISONE 5 MG: 5 TABLET ORAL at 08:28

## 2018-02-08 RX ADMIN — VITAMIN D, TAB 1000IU (100/BT) 2000 UNITS: 25 TAB at 08:21

## 2018-02-08 RX ADMIN — CITALOPRAM HYDROBROMIDE 10 MG: 10 TABLET ORAL at 08:21

## 2018-02-08 RX ADMIN — CALCIUM 1250 MG: 500 TABLET ORAL at 08:22

## 2018-02-08 ASSESSMENT — ACTIVITIES OF DAILY LIVING (ADL)
ADLS_ACUITY_SCORE: 11

## 2018-02-08 NOTE — PLAN OF CARE
"Problem: Patient Care Overview  Goal: Plan of Care/Patient Progress Review  Outcome: Therapy, progress towards functional goals is fair  /71  Pulse 89  Temp 98  F (36.7  C)  Resp 20  Ht 1.626 m (5' 4\")  Wt 66.9 kg (147 lb 7.8 oz)  SpO2 93%  Breastfeeding? No  BMI 25.32 kg/m2  Goal: Plan of Care/Patient Progress Review  Outcome: No Change  Neuro: A&Ox4.   Cardiac: ST. VSS.                  Respiratory: RA.  GI/: Adequate urine output. Diarrhea- C-diff on oral vanco  Diet/appetite: Poor appetite.  Activity:  Up c SBA  Pain: At acceptable level on current regimen.   Skin: Intact, no new deficits noted.  LDA's: Port hep locked     Received IVIG this evening.      Plan: Continue with POC. Notify primary team with changes.         "

## 2018-02-08 NOTE — PLAN OF CARE
Problem: Patient Care Overview  Goal: Plan of Care/Patient Progress Review  Outcome: Improving  Pt A&O X4, T-max of 98.5, HR sinus rhythm 90's, BP's 's/60-70's, O2 sats > 90% on RA. LS clear/diminished, BS+ X4, CMS intact. Pt slept majority of overnight shift, had few requests/complaints, reported tolerable abdominal pain, declined medications. Port in L chest patent & Heparin locked. Gets up ad sundar, ambulates independently to restroom. Voiding clear yellow urine adequately, passing gas, having loose/watery brown/green BM's. Has tolerated regular diet with no nausea/emesis. Will continue to monitor per POC.

## 2018-02-08 NOTE — PROGRESS NOTES
Calorie Counts  Intake recorded for: 2/7  Kcals: 352  Protein: 13g  # Meals Recorded: 100% coffee  # Supplements Recorded: 100% 1 Ensure Plus

## 2018-02-08 NOTE — DISCHARGE SUMMARY
[facility], [place] -- Discharge Summary -- Hematology / Oncology  Date of Admission:  1/29/2018  Date of Discharge:  2/8/2018  Discharging Provider: Chelsi Yi  Date of Service (when I saw the patient): 02/08/18    Discharge Diagnoses   Active Problems:    Lung nodule, multiple    Neutropenia with fever (H)    Neutropenic sepsis (H)    Abnormal radiologic finding of lung field    Long term (current) use of systemic steroids    Tongue ulcer    History of Present Illness   Ashleigh Alonzo is a 58 y/o PMH COPD, MERLIN, Depression, & stage IIa T2N0M0 Triple negative breast cancer s/p 12 weeks taxol, 3 cycles, pembrolizumab (hx. Autoimmune pneumonitis), & 2 cycles of adriamycin/cyclophosphamide admitted with NF & HCAP (RUL PNA).     Hospital Course  Ashleigh was admitted on 1/29/18. Had last cycle of adriamycin & taxol on . S/P neulasta on 1/24/18. UA NTD, Blood culture NTD, CT CAP 2/2 (CXR 2/2 with increased RUL & SARA opacities, possible cavitary change RUL) shows overall decreased extent of numerous bilateral peripheral predominant peribronchovascular consolidative and GGO, however, NEW GGO are noted in the lateral RUL & paramediastinal SARA. RVP negative. CMV PCR negative. MRSA swab negative d/c'ed Vanc. S/P levaquin (750 mg PO daily 1/31-2/1, qtc 423). HSV swab negative & toxoplasma IgG negative.Pulmonary saw the patient bronchoscopy for BAL to r/o acute infection (cytology was negative for malignancy, negative fungal, negative PCP, negative viral cytopathic effect, pink/hazy with 80 WBC 1% basophil, 14% lymph, 85% other cells, relatively benign per Jennifer Flanagan). ENT saw the patient no evidence fungal sinusitis, recommend nasal hygeiene (ocean spray q 2 hours), & bactroban for 7 day). Initially on cefepime 2g q 8 (1/29-1/30, 2/2-->). On 2/6 started SS bactrim for PCP ppx. & transition to Augmentin (covers anaerobes/mouth ariela, less disruptive to GI tract than antibiotics covering GN).  1,3 BD F positive at 86 (only mildly positive per Jennifer won't treat at this time, waiting on fungal cultures from Bronch may take a few weeks to come back. Positive C diff 2/6.     Plan is to treat this pulmonary process (while weaning off prednisone that had been on since November) with an additional 2 weeks of Augmentin & three weeks of PO vanco (cover C diff, & GI ppx while on augementin.     Problem list below:  # Neutropenic fever likely 2/2 HCAP.  # Neutropenia. RESOLVED WBC 3.1, ANC 2.5  # Hypogammaglobulinemia    # Pancytopenia, resolving no longer neutropenic. Likely cumulative r/t chemotherapy & infection. Thrombocytopenia more severe than with previous cycles. Normal LDH & haptoglobin. Peripheral smear without schistocytes or pseudothrombocytopenia.   - s/p Neulasta 1/24/18. S/p transfusion 2 PRBC (1/29 & 2/1).  - Goal is to transfuse HgB < 7, or PLT < 10. If bleeding transfuse to keep PLT > 20.   - CBC with platelets daily.       # Pneumonitis 2/2 to pembrolizumab. On steroid taper.  - Continue prednisone 5 mg PO daily (until 2/5, then every other day for 7 days, then d/c). Every other day to start tomorrow (today will be an off day).      # Mucositis 2/2 to chemotherapy & neutropenia.  - HSV swab negative.  - MMW, viscous lidocaine.     # Triple negative breast cancer (stage IIa, T2N0,M0). Enrolled on the ISPY2 clinical trial.  Randomized to Taxol + pembrolizumab followed by adriamycin, cyclophosphamide, and pembrolizumab.  Pembrolizumab discontinued after 3 cycles due to pneumonitis.  Week 12 breast MRI shows no residual breast tumor.  Both cycles #1 and #2 of adriamycin and cyclophosphamide complicated by neutropenic fever.  Discussed with patient today that given good response to treatment thus far and poor tolerance of chemotherapy, will forgo further chemotherapy and proceed with surgery.  - Attending Leeann DUNLAP to contact surgical oncology & move up surgery 3-5 weeks from now.     # Clostridium  difficile infection. C diff positive 2/5/18.   - Started PO vancomycin 125 mg 4x daily (2/6-). Treat for three weeks.   - Lots of diarrhea, use PRN simethicone & give IVF bolus again today.      # Severe malnutrition in the setting of chronic illness.   - Encourage snacks & supplements.  - Calorie counts. Eating better at time of d/c.   - RD following.     Pending Results   These results will be followed up by Lilia Livingston PA-C & Fara Bradshaw MD.   Unresulted Labs Ordered in the Past 30 Days of this Admission     Date and Time Order Name Status Description    2/8/2018 0808 Lactic acid level STAT for sepsis protocol In process     2/6/2018 1207 Blood culture Preliminary     2/6/2018 0820 Nocardia culture Preliminary     2/6/2018 0820 Legionella culture Preliminary     2/6/2018 0820 Fungus Culture, non-blood Preliminary     2/6/2018 0820 AFB Stain Non Blood In process     2/6/2018 0820 AFB Culture Non Blood In process     2/5/2018 1312 Aspergillus Galactomannan Antigen In process         Code Status  FULL    Primary Care Physician   HARLEY LEDBETTER    Physical Exam   Vital Signs with Ranges  Temp:  [97.8  F (36.6  C)-98.9  F (37.2  C)] 98.9  F (37.2  C)  Pulse:  [91-94] 91  Heart Rate:  [] 109  Resp:  [16-22] 16  BP: ()/(61-79) 110/70  SpO2:  [91 %-95 %] 95 %  Constitutional: Awake, alert, cooperative, in NAD. No O2, up in room, steady on feet.   Eyes: PERRL, EOMI, sclera clear, conjunctiva normal.  ENT: Normocephalic, without obvious abnormality, moist mucus membranes has a white long lesion along R side of her tongue. Clear nares.   Respiratory: Non-labored breathing, clear upper lobes, diminished bases.   Cardiovascular: RRR, no murmur noted.  GI: +BS, soft, non-distended, non-tender, no masses palpated, no hepatosplenomegaly.  Skin: No concerning lesions or rash on exposed areas. Left chest wall port C/D/I.   Musculoskeletal: No edema ame LEs.  Neurologic: Awake, A&O x 3. Cranial nerves  II-XII are grossly intact.   Neuropsychiatric: Calm, normal affect.     Discharge Disposition  Home & in stable condition (improving).   Discharge Orders     CBC with platelets differential   Last Lab Result: Hemoglobin (g/dL)      Date                     Value                02/08/2018               8.1 (L)          ----------     Basic metabolic panel     Reason for your hospital stay   Admitted for fevers, concerning chest CT, s/p bronchoscopy, plan for 2 weeks Augmentin, 3 weeks PO vanco (r/t documented C diff).     Adult [facility] Follow-up and recommended labs and tests   - Please follow up with KAI Livingston next week as scheduled, CBC with differential & BMP prior.     Appointments on [place] and/or [facility] (with [facility] or [facility] provider or service). Call [phone] if you haven't heard regarding these appointments within 7 days of discharge.     Activity   Your activity upon discharge: activity as tolerated     When to contact your care team   Please call the [facility] Triage RN Line [phone] ([facility] RN available M-F 8-5, after 5 pm the RN Advisor will page the On-Call Oncology Fellow who will return your call) for temperature greater than 100.4 F, uncontrolled nausea/vomiting/diarrhea or unrelieved constipation, pain not relieved by medications, bleeding not stopped by pressure, dizziness, chest pain, shortness of breath, changes in level of consciousness, or any other new concerning symptoms.     Discharge Instructions   - Please take Augmentin for next 14 days.  - Please take PO vanco for next 21 days.  - Please taper off prednisone (5 mg every other day for four more doses).  - Please f/u with Lilia ALBARRAN as scheduled next week.     Full Code     Diet   Follow this diet upon discharge: regular diet as tolerated       Discharge Medications   Current Discharge Medication List      START taking these medications    Details   vancomycin (VANOCIN) 50 mg/mL LIQD solution Take 2.5  mLs (125 mg) by mouth 4 times daily for 21 days  Qty: 210 mL, Refills: 0    Associated Diagnoses: Malignant neoplasm of upper-inner quadrant of right breast in female, estrogen receptor negative (H)      simethicone (MYLICON) 40 MG/0.6ML suspension Take 0.6 mLs (40 mg) by mouth every 6 hours as needed for cramping  Qty: 45 mL, Refills: 1    Associated Diagnoses: Malignant neoplasm of upper-inner quadrant of right breast in female, estrogen receptor negative (H)      amoxicillin-clavulanate (AUGMENTIN) 875-125 MG per tablet Take 1 tablet by mouth every 12 hours for 14 days  Qty: 28 tablet, Refills: 0    Associated Diagnoses: Malignant neoplasm of upper-inner quadrant of right breast in female, estrogen receptor negative (H)         CONTINUE these medications which have CHANGED    Details   predniSONE (DELTASONE) 5 MG tablet Take 1 tablet (5 mg) by mouth every other day for 4 doses  Qty: 4 tablet, Refills: 0    Associated Diagnoses: Malignant neoplasm of upper-inner quadrant of right breast in female, estrogen receptor negative (H)         CONTINUE these medications which have NOT CHANGED    Details   pantoprazole (PROTONIX) 40 MG EC tablet Take 1 tablet (40 mg) by mouth every morning  Qty: 60 tablet, Refills: 0    Associated Diagnoses: Malignant neoplasm of upper-inner quadrant of right breast in female, estrogen receptor negative (H)      lidocaine-prilocaine (EMLA) cream Please apply to port site 30 minutes before use prn  Qty: 30 g, Refills: 1    Associated Diagnoses: Personal history of malignant neoplasm of breast      hydrOXYzine (ATARAX) 25 MG tablet Take 1-2 tablets (25-50 mg) by mouth every 6 hours as needed for itching  Qty: 100 tablet, Refills: 2    Associated Diagnoses: Hives      guaiFENesin (MUCINEX) 600 MG 12 hr tablet Take 2 tablets (1,200 mg) by mouth 2 times daily  Qty: 180 tablet, Refills: 6    Associated Diagnoses: Cough with sputum      magic mouthwash suspension (diphenhydrAMINE, lidocaine,  aluminum-magnesium & simethicone) Swish and swallow 10 mLs in mouth every 6 hours as needed for mouth sores  Qty: 1 Bottle, Refills: 3    Associated Diagnoses: Mucositis      calcium carbonate (CALCIUM CARBONATE) 600 MG tablet Take by mouth 2 times daily (with meals)  Qty: 60 tablet      LORazepam (ATIVAN) 0.5 MG tablet Take 1 tablet (0.5 mg) by mouth every 4 hours as needed (Anxiety, Nausea/Vomiting or Sleep)  Qty: 30 tablet, Refills: 2    Associated Diagnoses: Malignant neoplasm of upper-inner quadrant of right breast in female, estrogen receptor negative (H)      tiotropium (SPIRIVA) 18 MCG capsule Inhale 1 capsule (18 mcg) into the lungs daily Inhale contents of one capsule  Qty: 1 capsule, Refills: 11    Associated Diagnoses: Chronic obstructive pulmonary disease, unspecified COPD type (H)      albuterol (PROAIR HFA/PROVENTIL HFA/VENTOLIN HFA) 108 (90 BASE) MCG/ACT Inhaler Inhale 2 puffs into the lungs every 6 hours as needed for shortness of breath / dyspnea  Qty: 1 Inhaler, Refills: 5    Associated Diagnoses: Chronic obstructive pulmonary disease, unspecified COPD type (H)      citalopram (CELEXA) 10 MG tablet Take 1 tablet (10 mg) by mouth daily  Qty: 90 tablet, Refills: 3    Associated Diagnoses: Anxiety; Depression, unspecified depression type      darifenacin (ENABLEX) 7.5 MG 24 hr tablet Take 1 tablet (7.5 mg) by mouth daily  Qty: 90 tablet, Refills: 3    Associated Diagnoses: Overactive bladder      order for [facility] Auto CPAP 12-18 cm, F&P Simplus FFM small.    Associated Diagnoses: MERLIN (obstructive sleep apnea)      Coenzyme Q10 (CO Q 10 PO) Take 1 tablet by mouth daily       MULTIPLE VITAMIN PO Take 1 tablet by mouth daily       Cyanocobalamin (VITAMIN B 12 PO) Take 100 mcg by mouth daily       Pyridoxine HCl (VITAMIN B6 PO) Take 1 tablet by mouth daily.      VITAMIN D 1000 UNIT OR CAPS TAKE 2 CAPSULES BY MOUTH DAILY         STOP taking these medications       dexamethasone  (DECADRON) 4 MG tablet Comments:   Reason for Stopping:             Allergies   No Known Allergies  Data   Most Recent 3 CBC's:  Recent Labs   Lab Test  02/08/18   0445  02/07/18   0412  02/06/18   0453   WBC  3.9*  5.7  3.1*   HGB  8.1*  8.4*  8.5*   MCV  96  96  96   PLT  132*  107*  75*      Most Recent 3 BMP's:  Recent Labs   Lab Test  02/08/18   0445  02/07/18   0412  02/06/18   0453   NA  135  136  139   POTASSIUM  3.8  4.1  3.9   CHLORIDE  105  103  106   CO2  22  23  22   BUN  4*  4*  4*   CR  0.51*  0.54  0.49*   ANIONGAP  8  10  10   JAVIER  8.1*  8.4*  8.4*   GLC  62*  78  91     Most Recent 2 LFT's:  Recent Labs   Lab Test  02/03/18   0522  02/02/18   0647   AST  61*  43   ALT  70*  49   ALKPHOS  80  68   BILITOTAL  0.6  0.7     Most Recent INR's and Anticoagulation Dosing History:  Anticoagulation Dose History     Recent Dosing and Labs Latest Ref Rng & Units 9/1/2017 9/5/2017 10/12/2017 2/1/2018    INR 0.86 - 1.14 1.06 0.94 1.10 1.12        Most Recent 3 Troponin's:No lab results found.  Most Recent Cholesterol Panel:  Recent Labs   Lab Test  07/27/17   1416   CHOL  198   LDL  131*   HDL  51   TRIG  78     Most Recent 6 Bacteria Isolates From Any Culture (See EPIC Reports for Culture Details):  Recent Labs   Lab Test  02/06/18   1311  02/06/18   0739  02/02/18   0648  02/02/18   0551  01/29/18   1653  01/29/18   1441   CULT  No growth after 2 days  No growth  No growth after 20 hours  Culture negative after 20 hours  Culture negative monitoring continues  No growth  Canceled, Test credited  Specimen not received  <10,000 colonies/mL  urogenital ariela  Susceptibility testing not routinely done    No growth     Most Recent TSH, T4 and A1c Labs:  Recent Labs   Lab Test  11/07/17   0656   09/05/17   1240   TSH  2.31   < >  2.11   A1C   --    --   5.4    < > = values in this interval not displayed.     Chelsi DAVIS-BC  [phone]  Hematology/Oncology  February 8, 2018

## 2018-02-08 NOTE — PROGRESS NOTES
[facility]  Transplant Infectious Disease Progress Note     Patient:  Ashleigh Alonzo, Date of birth 1960, Medical record number [id number]  Date of Visit:  02/08/2018         Assessment and Recommendations:   Recommendations:  - Continue Augmentin x 2 more weeks.   - Continue oral vanco for 3 more weeks (one week following course of Augmentin).   - Continue Pneumocystis prophylaxis with bactrim while on prednisone.   - Await pending bronchoscopy studies.     Transplant Infectious Disease will continue to follow with you, as long as she remains in house.    Assessment:  Ashleigh Perera is a 58 yo female with h/o COPD and stage IIa breast cancer on TAC (cycle 2 1/23/18) and previous use of prembrolizumab c/b pneumonitis who was admitted with febrile neutropenia.   Infectious Disease issues include:  - C difficile colitis diagnosed 2/6/2018. Started po vanco 2/6/2018. Continue oral vanco for 3 more weeks (one week following course of Augmentin).   - Abnormal lung fields. Was also febrile with neutropenia. Blood cultures and urine culture negative. Rhinorrhea had been a localizing symptom, but 2/2/2018 RVP neg. 2/2/2018 CT chest showed overall improvement of her ground glass opacities (2/2 treatment-related pneumonitis, with neg CMV PCR of blood) but two new areas of consolidation of unclear etiology. She has risk factors for fungal pneumonia with steroid use since 11/2017. 2/6/2017 bronchoscopy done to evaluate for fungal pneumonia; Fungitell was mildly + at 86, although Asp GM ag was neg. Transitioned from empiric cefepime to augmentin 2/6/2018. Continue Augmentin x 2 more weeks.   - Tongue ulcer. Would check HSV serostatus even though HSV PCR of ulcer was neg.   - PCP prophylaxis: bactrim (needed with long term steroid use).   - Serostatus: Toxoplasma IgG serology neg. HSV1+, HSV2 neg.   - Immunization status: Up to date  - Gamma globulin status: moderate to severe hypogammaglobulinemia.  Repleted with IV IG on 2/7/2018.  - Isolation status:  Good hand hygiene.    JillianTaina Flanagan MD. Pager [phone]         Interval History:   Since Ashleigh Perera was last seen by me on 2/7/2018, she had IV IG. She feels good. Appetite better. No fever. Her  is at the bedside and again sees continued improvement. Doing well with transition from empiric cefepime to augmentin. She would like to discharge.      Review of Systems:  CONSTITUTIONAL:  No fever   EYES: negative for icterus or acute vision changes.  ENT:  negative for hearing loss, tinnitus or sore throat.   RESPIRATORY:  no sputum, no dyspnea (not wearing supplemental oxygen this morning, although she uses 1L at night as she desats when she sleeps; she has known COPD).   CARDIOVASCULAR:  negative for chest pain, palpitations  GASTROINTESTINAL:  Improved diarrhea, but it is still diarrhea (loose to watery). Improved appetite.   GENITOURINARY:  negative for dysuria  HEME:  + easy bruising  INTEGUMENT:  negative for rash or pruritus  NEURO:  Negative for headache or dizziness    Current Medications & Allergies:    predniSONE  5 mg Oral Every Other Day     vancomycin  125 mg Oral 4x Daily     sulfamethoxazole-trimethoprim  1 tablet Oral Daily     amoxicillin-clavulanate  1 tablet Oral Q12H CANDELARIA     potassium chloride SA  20 mEq Oral TID     HYDROmorphone  1 mg Intravenous Once     sodium chloride (PF)  3 mL Intravenous Q8H     calcium carbonate  1,250 mg Oral BID w/meals     citalopram  10 mg Oral Daily     cyanocolbalamin  100 mcg Oral Daily     tolterodine  4 mg Oral Daily     pantoprazole  40 mg Oral QAM     cholecalciferol  2,000 Units Oral Daily     umeclidinium  1 puff Inhalation Daily       Infusions/drips:    - MEDICATION INSTRUCTIONS -         No Known Allergies           Physical Exam:   Vitals were reviewed.  All vitals stable.    Patient Vitals for the past 24 hrs:   BP Temp Temp src Pulse Resp SpO2 Weight   02/08/18 0809 - 98.9  F (37.2  C) Oral -  16 - -   02/08/18 0752 110/70 98.6  F (37  C) Oral - 16 95 % -   02/08/18 0455 99/61 98.4  F (36.9  C) Oral - 20 91 % -   02/08/18 0232 - - - - - - 66.5 kg (146 lb 8 oz)   02/07/18 2311 105/66 98.2  F (36.8  C) Oral - 20 93 % -   02/07/18 2026 116/74 98.5  F (36.9  C) Oral - 20 92 % -   02/07/18 2000 112/67 - - - - - -   02/07/18 1935 109/79 - - - - - -   02/07/18 1920 119/77 98  F (36.7  C) Oral 91 18 93 % -   02/07/18 1905 109/74 97.8  F (36.6  C) Oral 94 20 - -   02/07/18 1752 - 98  F (36.7  C) - - - - -   02/07/18 1750 118/71 98  F (36.7  C) Oral - 20 - -   02/07/18 1725 104/65 98  F (36.7  C) Oral - 20 - -   02/07/18 1718 110/76 97.9  F (36.6  C) Oral - 20 - -   02/07/18 1556 114/75 98.3  F (36.8  C) Oral - 20 93 % -   02/07/18 1330 - 98.2  F (36.8  C) Oral - 22 92 % -   02/07/18 1100 119/75 98.4  F (36.9  C) Oral - 18 95 % -     Vitals:    02/06/18 0029 02/07/18 0411 02/08/18 0232   Weight: 68.2 kg (150 lb 4.8 oz) 66.9 kg (147 lb 7.8 oz) 66.5 kg (146 lb 8 oz)       Exam:  GENERAL:  well-developed, well-nourished woman, alert, oriented, in no acute distress.  HEENT:  Head is normocephalic, atraumatic, alopecia  EYES:  Eyes have anicteric sclerae.    ENT:  Oropharynx is moist without exudate. Tongue with lateral ulceration on the right side, appears to be in a healing stage as the edges are not well demarcated. No sinus tenderness. Visualization of the right nare interior with normal mucosa.  NECK:  Supple. No LAD  LUNGS:  Clear.  CARDIOVASCULAR:  Regular rate and rhythm with no murmur  ABDOMEN:  Normal bowel sounds, soft, nontender.  SKIN:  No acute rashes. Left chest wall port-a-cath is accessed without any surrounding erythema.  NEUROLOGIC:  Grossly nonfocal.         Laboratory Data:     Inflammatory Markers    Recent Labs   Lab Test  10/12/17   1905   CRP  84.0*       Immune Globulin Studies    Recent Labs   Lab Test  02/05/18   1417   IGG  335*   IGM  33*   IGE  68   IGA  216       Metabolic Studies     Recent Labs   Lab Test  02/08/18   0445  02/07/18   0412  02/06/18   0453   01/31/18   0501  01/30/18   0619   01/13/18   0537   NA  135  136  139   < >   --   137   < >  135   POTASSIUM  3.8  4.1  3.9   < >   --   4.1   < >  3.6   CHLORIDE  105  103  106   < >   --   106   < >  106   CO2  22  23  22   < >   --   25   < >  19*   ANIONGAP  8  10  10   < >   --   6   < >  10   BUN  4*  4*  4*   < >   --   8   < >  3*   CR  0.51*  0.54  0.49*   < >   --   0.45*   < >  0.58   GFRESTIMATED  >90  >90  >90   < >   --   >90   < >  >90   GLC  62*  78  91   < >   --   92   < >  76   JAVIER  8.1*  8.4*  8.4*   < >   --   7.7*   < >  8.1*   PHOS   --    --    --    --    --    --    --   2.1*   MAG   --    --    --    --    --   1.9   --   1.7   LACT   --    --    --    --   0.7   --    < >   --     < > = values in this interval not displayed.       Hepatic Studies    Recent Labs   Lab Test  02/03/18   0522  02/02/18   0647  02/01/18   1050  01/30/18   0619   09/05/17   1240  06/25/12   1438   BILITOTAL  0.6  0.7   --   0.8   < >  0.5  0.4  0.9   BILIDELTA   --    --    --    --    --    --   0.2   BILICONJ   --    --    --    --    --    --   0.0   DBIL   --    --    --    --    --   0.1   --    ALKPHOS  80  68   --   76   < >  146  148  125   PROTTOTAL  5.2*  5.1*   --   5.2*   < >  7.8  7.8  8.5   ALBUMIN  1.9*  1.9*   --   2.1*   < >  3.6  3.6  4.9   AST  61*  43   --   18   < >  34  33  48*   ALT  70*  49   --   29   < >  50  49  33   LDH   --    --   157   --    --    --    --     < > = values in this interval not displayed.       Hematology Studies     Recent Labs   Lab Test  02/08/18   0445  02/07/18   0412  02/06/18   0453  02/05/18   0655   WBC  3.9*  5.7  3.1*  1.6*   ANEU  2.6  4.9  2.5   --    ALYM  0.4*  0.4*  0.2*   --    STONEY  0.8  0.3  0.4   --    AEOS  0.0  0.0  0.0   --    HGB  8.1*  8.4*  8.5*  7.7*   HCT  25.7*  26.5*  26.4*  23.7*   PLT  132*  107*  75*  42*       Urine Studies     Recent Labs   Lab  Test  01/29/18   1653  01/10/18   2318  11/11/17   1830  10/26/17   1500  10/12/17   2149  09/05/17   1240   URINEPH  7.5*  6.0  6.0  5.0  5.5  5.0   NITRITE  Negative  Negative  Negative  Negative  Negative  Negative   LEUKEST  Negative  Negative  Large*  Large*  Negative  Small*   WBCU  1   --   18*  158*  <1  6*       Medication levels    Recent Labs   Lab Test  01/31/18   0743   VANCOMYCIN  11.7       Microbiology:  Last Culture results with specimen source  Culture Micro   Date Value Ref Range Status   02/06/2018 No growth after 2 days  Preliminary   02/06/2018 Culture negative after 20 hours  Preliminary   02/06/2018 Culture negative monitoring continues  Preliminary   02/06/2018 No growth after 20 hours  Preliminary   02/06/2018 No growth  Final   02/02/2018 No growth  Final   02/02/2018 Canceled, Test credited  Final   02/02/2018 Specimen not received  Final   01/29/2018   Final    <10,000 colonies/mL  urogenital ariela  Susceptibility testing not routinely done     01/29/2018 No growth  Final   01/29/2018 No growth  Final   01/12/2018 No growth  Final   01/10/2018 No growth  Final   01/10/2018 No growth  Final   01/10/2018 No growth  Final   11/13/2017 No growth  Final   11/13/2017 No growth  Final   11/11/2017 No growth  Final   11/11/2017 No growth  Final   11/11/2017 No growth  Final   10/26/2017 <10,000 colonies/mL  urogenital ariela    Final   10/26/2017 Susceptibility testing not routinely done  Final   10/26/2017 No growth  Final   10/26/2017 No growth  Final   10/12/2017 No growth  Final   10/12/2017 No growth  Final   10/12/2017 No growth  Final   04/04/2017 No Beta Streptococcus isolated  Final    Specimen Description   Date Value Ref Range Status   02/06/2018 Blood Left Arm  Final   02/06/2018 Bronchoalveolar Lavage Left upper lobe  Final   02/06/2018 Bronchoalveolar Lavage Left upper lobe  Final   02/06/2018 Bronchoalveolar Lavage Left upper lobe  Final   02/06/2018 Bronchoalveolar Lavage Left  upper lobe  Final   02/06/2018 Bronchoalveolar Lavage Left upper lobe  Final   02/06/2018 Feces  Final   02/02/2018 Nares  Final   02/02/2018 Blood Unspecified Site  Final   02/02/2018 Blood  Final   01/29/2018 Midstream Urine  Final   01/29/2018 Blood Right Arm  Final   01/29/2018 Blood Portacath  Final   01/13/2018 Feces  Final   01/12/2018 Feces  Final   01/12/2018 Blood Left Hand  Final   01/10/2018 Midstream Urine  Final   01/10/2018 Blood Left Arm  Final   01/10/2018 Blood Right Arm  Final   11/13/2017 Feces  Final   11/13/2017 Blood Left Hand  Final   11/13/2017 Blood Portacath  Final   11/12/2017 Nasopharyngeal  Final   11/11/2017 Midstream Urine  Final   11/11/2017 Portacath Blood  Final   11/11/2017 Blood PTC  Final   10/26/2017 Midstream Urine  Final   10/26/2017 Blood Left Arm  Final   10/26/2017 Blood Port  Final   10/12/2017 Midstream Urine  Final   10/12/2017 Blood Right Hand  Final   10/12/2017 Blood Portacath  Final   04/04/2017 Throat  Final   04/04/2017 Throat  Final        Last check of C difficile  C Diff Toxin B PCR   Date Value Ref Range Status   02/06/2018 Positive (A) NEG^Negative Final     Comment:     Positive: Toxin producing Clostridium difficile target DNA sequences detected,   presumed positive for Clostridium difficile toxin B.  Clostridium difficile (Requires Enteric Isolation)  FDA approved assay performed using "[employer] GeneXpert real-time PCR.  Critical Value/Significant Value called to and read back by  AZIZA SANCHEZ RN (6B).  02.06.18 0427 S         Virology:  Respiratory virus testing    Recent Labs   Lab Test  02/06/18   0739  02/02/18   1813  01/10/18   2355  11/12/17   1015  11/12/17   0325  10/26/17   1427   HMPV  Negative  Negative  Negative  Negative   --   Negative   PIV3  Negative  Negative  Negative  Negative   --   Negative   HRVS  Negative  Negative  Negative  Negative   --   Negative   RVSPEC  Bronchoalveolar Lavage  Nasopharyngeal  Nasopharyngeal   Nasopharyngeal   --   Nasal   RSVA  Negative  Negative  Negative  Negative   --   Negative   RSVB  Negative  Negative  Negative  Negative   --   Negative   IRSV   --    --    --    --   Negative   --        CMV viral loads    Recent Labs   Lab Test  02/06/18   0739  02/03/18   0522   CSPEC  Bronchoalveolar Lavage  Plasma, EDTA anticoagulant   CMVLOG  Not Calculated  Not Calculated       Herpes Simplex Testing     Recent Labs   Lab Test  02/05/18   1417  02/02/18   1813   HSPEC   --   Mouth   HSIM  0.50   --    H1IGG  >8.0*   --    H2IGG  <0.2   --    HSDNA1   --   Negative   HSDNA2   --   Negative       Imaging:   EXAMINATION: CT CHEST/ABDOMEN/PELVIS W CONTRAST, 2/2/2018 12:41 PM  COMPARISON: 11/13/2017 CT chest; 10/6/2015 CT abdomen/pelvis  FINDINGS:  Chest: Overall decreased extent of numerous bilateral peripheral  predominant peribronchovascular consolidative and groundglass  opacities, however, new groundglass opacities are noted in the lateral  right upper lobe (for example series 8, image 43) and paramediastinal  left upper lobe (series 8, image 56). Slightly decreased small  bilateral pleural effusions. No pneumothorax. The central  tracheobronchial tree is clear. Numerous scattered pulmonary nodules  are unchanged, for example measuring 4 mm in the subpleural posterior  left lower lobe (series 8, image 126). Several scattered calcified granulomas.    Normal heart size. No pericardial effusion. Normal caliber ascending  aorta and main pulmonary artery. No central pulmonary embolism. No  thoracic lymphadenopathy. Bilateral calcified hilar and subcarinal  lymph nodes. Left chest wall internal jugular Port-A-Cath tip at the  low SVC. Small hiatal hernia.    Abdomen and pelvis: No suspicious arterial enhancing hepatic lesion.  Punctate hypodensities in the liver are too small to fully  characterize but are unchanged. The gallbladder and biliary tree are  within normal limits. The spleen, pancreas, adrenal glands,  and  kidneys are unremarkable. No hydronephrosis, hydroureter, or urinary  tract stone. The bladder is unremarkable. Bilateral tubal ligation  clips. The uterus and ovaries are unremarkable. Trace perihepatic  ascites along the anterosuperior aspect of the left hepatic lobe. No  free air. No bowel obstruction. Mild prominence of the wall of the  cecum/proximal ascending colon and the rectum. No pneumatosis or  portal venous gas. The major abdominal vessels are patent. Moderate  atherosclerotic calcification of the abdominal aorta without  aneurysmal dilation.    Bones and soft tissues: No aggressive osseous lesion. Transitional  anatomy with lumbarization of S1. Mild anterior right superior  endplate compression deformity at L5, new since 10/6/2015. Unchanged  mild mid thoracic superior endplate compression fractures with a new  mild superior endplate anterior wedge compression fracture at L1 since  11/13/2017. Herniation of a small amount of peritoneal fat through a  anterolateral left abdominal wall defect measuring approximately 7 mm  (series 7, image 466). Prominent fat in the left inguinal canal.      Impression    IMPRESSION:   1. Overall decreased/improved bilateral peripheral predominant  peribronchovascular consolidative and groundglass opacities, however,  2 newly affected regions are noted in the lateral right upper lobe and  paramediastinal left upper lobe, which may represent focal worsening  of an ongoing pathologic process since 11/13/2017 (please for to that  report for further details), or new superimposed infection.  2. Slightly decreased small bilateral pleural effusions.  3. Age-indeterminate mild superior endplate compression fractures are  noted at L1 (new since 11/13/2017) and L5 (new since 10/6/2015).

## 2018-02-09 ENCOUNTER — TELEPHONE (OUTPATIENT)
Dept: PHARMACY | Facility: OTHER | Age: 58
End: 2018-02-09

## 2018-02-09 ENCOUNTER — TELEPHONE (OUTPATIENT)
Dept: ONCOLOGY | Facility: CLINIC | Age: 58
End: 2018-02-09

## 2018-02-09 LAB
ACID FAST STN SPEC QL: NORMAL
ACID FAST STN SPEC QL: NORMAL
SPECIMEN SOURCE: NORMAL

## 2018-02-09 NOTE — TELEPHONE ENCOUNTER
Post Hospital Call    Pt admitted on 1/29/18 for neutropenia fever.  Following adriamycin and taxol with neulasta support 1/24/18.    Fever/Chills:  T 99.1 this am, does not report any chills  Pain: denies any pain rates 0   Nausea/Vomiting: no nausea/vomiting  Eating/Drinking: eating regular diet and drinking fluids  Bowel Habits: BM before hospital discharge yesterday  Urinary output: normal  Drains: none  Activity/Restrictions: up ad sundar    Reviewed all discharge medications and follow up appt information on 2/15/18 with Lilia.    Whom and When to Call:  Patient verbally acknowledges how to seek emergent and after hours medical care.  Call [phone] and ask to speak to Surgical Oncology physician on call.      Dionne Goode RNCC BSN CBCN

## 2018-02-09 NOTE — TELEPHONE ENCOUNTER
MTM referral from: Transitions of Care (recent hospital discharge or ED visit)    MTM referral outreach attempt #1 on February 9, 2018 at 11:12 AM      Outcome: Left Message    Nicole Cole MTM Coordinator

## 2018-02-10 ENCOUNTER — TELEPHONE (OUTPATIENT)
Dept: MEDSURG UNIT | Facility: CLINIC | Age: 58
End: 2018-02-10

## 2018-02-10 ENCOUNTER — NURSE TRIAGE (OUTPATIENT)
Dept: NURSING | Facility: CLINIC | Age: 58
End: 2018-02-10

## 2018-02-10 NOTE — TELEPHONE ENCOUNTER
Pt called for fever. She is well known to me (Hx of breast ca s/p Taxol and AC, pneumonitis from pembro) as she was just discharged from hospital 2 days ago. Today she noticed cold symptoms including runny nose, and nasal congestion. Her diarrhea is getting better with 2 well formed BM today. Her fever today was 100.9 and after tylenol she defervesced. Denies other symptoms cough, SOB, neck pain/stiffness, CP, abdominla pain, urinary symptoms.I told her to take nasal decongestants and tylenol and measure temp later today. Last White count was 3.9.  If still high asked her to go to nearest ER for evaluation

## 2018-02-10 NOTE — TELEPHONE ENCOUNTER
"  Reason for Disposition    [1] Neutropenia known or suspected (e.g., recent cancer chemotherapy) AND [2] fever > 100.4 F (38.0 C)     \"I was just discharged from the hospital less than 48 hours ago for C diff and fever(see epic). Today my temp was 100.9. I took Tylenol it's now 99.7. \" Is currently taking medication for C diff. Denies other or new sx. Advised ER. Patient prefers call from on call MD. Regis Cooney([facility] clinic group)at 12:51 to call Ashleigh Perera at [phone].    Additional Information    Negative: Shock suspected (e.g., cold/pale/clammy skin, too weak to stand, low BP, rapid pulse)    Negative: Difficult to awaken or acting confused  (e.g., disoriented, slurred speech)    Negative: Bluish lips, tongue, or face now    Negative: New onset rash with many purple (or blood-colored) spots or dots    Negative: Sounds like a life-threatening emergency to the triager    Negative: Other symptom is present, see that guideline  (e.g., symptoms of cough, runny nose, sore throat, earache, abdominal pain, diarrhea, vomiting)    Negative: Fever > 104 F (40 C)    Protocols used: CANCER - FEVER-ADULT-    "

## 2018-02-12 ENCOUNTER — CARE COORDINATION (OUTPATIENT)
Dept: ONCOLOGY | Facility: CLINIC | Age: 58
End: 2018-02-12

## 2018-02-12 LAB
BACTERIA SPEC CULT: NO GROWTH
SPECIMEN SOURCE: NORMAL

## 2018-02-12 NOTE — PROGRESS NOTES
Pt called oncall over the weekend.  Pt on 2/11 took T 101.0 at 1am.  2/11 at 12:41pm T100.4, 2/12 at midnight T 100.9 took 2 500 mg Tylenol and at 1:00 am it was T 100.9.  This morning it was T 98.4.   Pt does have a cold, but has not taken anything for her nasal congestion.  Pt stated she is still feeling fatigued from her hospital stay. Pt appetite is decreased, but is  taking in good amounts of fluids.  Pt also is drinking protein shakes.    Pt aware of how  To contact triage and oncall after hours.  Pt has scheduled f/u appt this week with Lilia. Dr. Bradshaw, updated.

## 2018-02-12 NOTE — TELEPHONE ENCOUNTER
MTM referral from: Transitions of Care (recent hospital discharge or ED visit)    MTM referral outreach attempt #2 on February 12, 2018 at 10:31 AM      Outcome: Patient not reachable after several attempts, will route to MTM Pharmacist/Provider as an FYI. Thank you for the referral.    Nicole Cole, MTM Coordinator

## 2018-02-15 ENCOUNTER — ONCOLOGY VISIT (OUTPATIENT)
Dept: ONCOLOGY | Facility: CLINIC | Age: 58
End: 2018-02-15
Attending: PHYSICIAN ASSISTANT
Payer: COMMERCIAL

## 2018-02-15 ENCOUNTER — RESEARCH ENCOUNTER (OUTPATIENT)
Dept: ONCOLOGY | Facility: CLINIC | Age: 58
End: 2018-02-15

## 2018-02-15 ENCOUNTER — APPOINTMENT (OUTPATIENT)
Dept: LAB | Facility: CLINIC | Age: 58
End: 2018-02-15
Attending: INTERNAL MEDICINE
Payer: COMMERCIAL

## 2018-02-15 VITALS
BODY MASS INDEX: 24.92 KG/M2 | TEMPERATURE: 98.8 F | DIASTOLIC BLOOD PRESSURE: 65 MMHG | HEART RATE: 129 BPM | HEIGHT: 64 IN | RESPIRATION RATE: 16 BRPM | SYSTOLIC BLOOD PRESSURE: 114 MMHG | OXYGEN SATURATION: 97 % | WEIGHT: 146 LBS

## 2018-02-15 DIAGNOSIS — Z17.1 MALIGNANT NEOPLASM OF UPPER-INNER QUADRANT OF RIGHT BREAST IN FEMALE, ESTROGEN RECEPTOR NEGATIVE (H): ICD-10-CM

## 2018-02-15 DIAGNOSIS — C50.211 MALIGNANT NEOPLASM OF UPPER-INNER QUADRANT OF RIGHT BREAST IN FEMALE, ESTROGEN RECEPTOR NEGATIVE (H): ICD-10-CM

## 2018-02-15 LAB
ANION GAP SERPL CALCULATED.3IONS-SCNC: 13 MMOL/L (ref 3–14)
BASOPHILS # BLD AUTO: 0.1 10E9/L (ref 0–0.2)
BASOPHILS NFR BLD AUTO: 0.6 %
BUN SERPL-MCNC: 6 MG/DL (ref 7–30)
CALCIUM SERPL-MCNC: 8.6 MG/DL (ref 8.5–10.1)
CHLORIDE SERPL-SCNC: 104 MMOL/L (ref 94–109)
CO2 SERPL-SCNC: 23 MMOL/L (ref 20–32)
CREAT SERPL-MCNC: 0.56 MG/DL (ref 0.52–1.04)
DIFFERENTIAL METHOD BLD: ABNORMAL
EOSINOPHIL # BLD AUTO: 0 10E9/L (ref 0–0.7)
EOSINOPHIL NFR BLD AUTO: 0 %
ERYTHROCYTE [DISTWIDTH] IN BLOOD BY AUTOMATED COUNT: 16.7 % (ref 10–15)
GFR SERPL CREATININE-BSD FRML MDRD: >90 ML/MIN/1.7M2
GLUCOSE SERPL-MCNC: 120 MG/DL (ref 70–99)
HCT VFR BLD AUTO: 33 % (ref 35–47)
HGB BLD-MCNC: 10.6 G/DL (ref 11.7–15.7)
IMM GRANULOCYTES # BLD: 0.2 10E9/L (ref 0–0.4)
IMM GRANULOCYTES NFR BLD: 2.1 %
LYMPHOCYTES # BLD AUTO: 0.7 10E9/L (ref 0.8–5.3)
LYMPHOCYTES NFR BLD AUTO: 8.5 %
MCH RBC QN AUTO: 31.5 PG (ref 26.5–33)
MCHC RBC AUTO-ENTMCNC: 32.1 G/DL (ref 31.5–36.5)
MCV RBC AUTO: 98 FL (ref 78–100)
MONOCYTES # BLD AUTO: 1 10E9/L (ref 0–1.3)
MONOCYTES NFR BLD AUTO: 11.6 %
NEUTROPHILS # BLD AUTO: 6.7 10E9/L (ref 1.6–8.3)
NEUTROPHILS NFR BLD AUTO: 77.2 %
NRBC # BLD AUTO: 0 10*3/UL
NRBC BLD AUTO-RTO: 0 /100
PLATELET # BLD AUTO: 276 10E9/L (ref 150–450)
POTASSIUM SERPL-SCNC: 3.4 MMOL/L (ref 3.4–5.3)
RBC # BLD AUTO: 3.36 10E12/L (ref 3.8–5.2)
SODIUM SERPL-SCNC: 140 MMOL/L (ref 133–144)
WBC # BLD AUTO: 8.7 10E9/L (ref 4–11)

## 2018-02-15 PROCEDURE — 36591 DRAW BLOOD OFF VENOUS DEVICE: CPT

## 2018-02-15 PROCEDURE — 80048 BASIC METABOLIC PNL TOTAL CA: CPT | Performed by: NURSE PRACTITIONER

## 2018-02-15 PROCEDURE — 85025 COMPLETE CBC W/AUTO DIFF WBC: CPT | Performed by: NURSE PRACTITIONER

## 2018-02-15 PROCEDURE — 25000128 H RX IP 250 OP 636: Mod: ZF | Performed by: PHYSICIAN ASSISTANT

## 2018-02-15 PROCEDURE — G0463 HOSPITAL OUTPT CLINIC VISIT: HCPCS | Mod: ZF

## 2018-02-15 PROCEDURE — 99214 OFFICE O/P EST MOD 30 MIN: CPT | Mod: ZP | Performed by: PHYSICIAN ASSISTANT

## 2018-02-15 RX ORDER — HEPARIN SODIUM (PORCINE) LOCK FLUSH IV SOLN 100 UNIT/ML 100 UNIT/ML
5 SOLUTION INTRAVENOUS
Status: COMPLETED | OUTPATIENT
Start: 2018-02-15 | End: 2018-02-15

## 2018-02-15 RX ORDER — HEPARIN SODIUM (PORCINE) LOCK FLUSH IV SOLN 100 UNIT/ML 100 UNIT/ML
5 SOLUTION INTRAVENOUS
Status: DISCONTINUED | OUTPATIENT
Start: 2018-02-15 | End: 2018-02-15 | Stop reason: CLARIF

## 2018-02-15 RX ADMIN — SODIUM CHLORIDE, PRESERVATIVE FREE 5 ML: 5 INJECTION INTRAVENOUS at 11:35

## 2018-02-15 ASSESSMENT — PAIN SCALES - GENERAL: PAINLEVEL: NO PAIN (0)

## 2018-02-15 NOTE — PROGRESS NOTES
Oncology/Hematology Visit Note  Feb 15, 2018    Reason for Visit: Follow up of breast cancer     History of Present Illness: Ashleigh Alonzo is a 57 year old female with past medical history of COPD, sleep apnea, periodontal disease with stage IIa, T2N0M0, grade 3, triple negative right breast cancer. She was diagnosed via abnormal screening mammogram. She ultimately had US, contrast-enhanced mammo, and biopsy showing 3.4 cm mass and pathology showed grade 3 invasive mammary carcinoma with associated high-grade DCIS. ER/UT was negative and HER2 negative. She enrolled in ISPY-2 clinical trial and began treatment with weekly taxol and once every 3 week pembrolizumab on 9/2/17. Please see notes from Dr. Bradshaw for further details of patient's oncology history.      Course has been complicated by fevers with PNA and then UTI. Week 7 was held due to transaminitis. She was given week 7 on 11/7 with pembrolizumab and Taxol. She was admitted 11/11-11/17 with high fever (105), cough, and dyspnea and was found to have HCAP and pneumonitis secondary to pembro. 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 She received 2 doses of methylpred, antibiotics, and d/c on prednisone 70 mg daily. Had follow-up on 11/21 with Dr. Bradshaw and resumed weekly Taxol on 11/28/17. She completed 12 weeks on 12/26. She started AC on 1/2. Cycle 2 was deferred due to neutropenic fever (hospitalized 1/10-1/14). She had cycle 2 on 1/23/18. She was admitted via ED 1/29-2/8 for neutropenic fever of unknown etiology and c diff colitis.     She presents in hospital follow-up.     Interval History:  Ms. Alonzo returns to clinic today with her . She is feeling much better today. She is having 4-5 bowel movements per day. In the hospital she was probably having more than 10 per day. Stools are still watery but starting to have more of a pudding consistency. She denies any abdominal cramping. No further fevers since  Sunday. She denies cough, sore throat, congestion, SOB, CP. Her energy is picking up. She is eating much better. Drinking >60 oz of fluids per day. No nausea with Augmentin or vancomycin. She is consistent with meds. Takes last dose of prednisone tomorrow.     Review of Systems:  Patient denies fevers, chills, night sweats, unexplained weight changes, headaches, dizziness, vision or hearing changes, new lumps or bumps, chest pain, shortness of breath, cough, abdominal pain, nausea, vomiting, changes to bowel or bladder, swelling of extremities, bleeding issues, or rash.    Current Outpatient Prescriptions   Medication Sig Dispense Refill     vancomycin (VANOCIN) 50 mg/mL LIQD solution Take 2.5 mLs (125 mg) by mouth 4 times daily for 21 days 210 mL 0     amoxicillin-clavulanate (AUGMENTIN) 875-125 MG per tablet Take 1 tablet by mouth every 12 hours for 14 days 28 tablet 0     calcium carbonate (CALCIUM CARBONATE) 600 MG tablet Take by mouth 2 times daily (with meals) 60 tablet      lidocaine-prilocaine (EMLA) cream Please apply to port site 30 minutes before use prn 30 g 1     hydrOXYzine (ATARAX) 25 MG tablet Take 1-2 tablets (25-50 mg) by mouth every 6 hours as needed for itching 100 tablet 2     guaiFENesin (MUCINEX) 600 MG 12 hr tablet Take 2 tablets (1,200 mg) by mouth 2 times daily 180 tablet 6     tiotropium (SPIRIVA) 18 MCG capsule Inhale 1 capsule (18 mcg) into the lungs daily Inhale contents of one capsule 1 capsule 11     albuterol (PROAIR HFA/PROVENTIL HFA/VENTOLIN HFA) 108 (90 BASE) MCG/ACT Inhaler Inhale 2 puffs into the lungs every 6 hours as needed for shortness of breath / dyspnea 1 Inhaler 5     citalopram (CELEXA) 10 MG tablet Take 1 tablet (10 mg) by mouth daily 90 tablet 3     darifenacin (ENABLEX) 7.5 MG 24 hr tablet Take 1 tablet (7.5 mg) by mouth daily (Patient taking differently: Take 7.5 mg by mouth daily as needed ) 90 tablet 3     Coenzyme Q10 (CO Q 10 PO) Take 1 tablet by mouth daily     "    MULTIPLE VITAMIN PO Take 1 tablet by mouth daily        Cyanocobalamin (VITAMIN B 12 PO) Take 100 mcg by mouth daily        Pyridoxine HCl (VITAMIN B6 PO) Take 1 tablet by mouth daily.       VITAMIN D 1000 UNIT OR CAPS TAKE 2 CAPSULES BY MOUTH DAILY       predniSONE (DELTASONE) 5 MG tablet Take 1 tablet (5 mg) by mouth every other day for 4 doses (Patient not taking: Reported on 2/15/2018) 4 tablet 0     simethicone (MYLICON) 40 MG/0.6ML suspension Take 0.6 mLs (40 mg) by mouth every 6 hours as needed for cramping (Patient not taking: Reported on 2/15/2018) 45 mL 1     magic mouthwash suspension (diphenhydrAMINE, lidocaine, aluminum-magnesium & simethicone) Swish and swallow 10 mLs in mouth every 6 hours as needed for mouth sores (Patient not taking: Reported on 2/15/2018) 1 Bottle 3     pantoprazole (PROTONIX) 40 MG EC tablet Take 1 tablet (40 mg) by mouth every morning (Patient not taking: Reported on 2/15/2018) 60 tablet 0     LORazepam (ATIVAN) 0.5 MG tablet Take 1 tablet (0.5 mg) by mouth every 4 hours as needed (Anxiety, Nausea/Vomiting or Sleep) (Patient not taking: Reported on 2/15/2018) 30 tablet 2     [facility] for [facility] [employer] [place] Auto CPAP 12-18 cm, F&P Simplus FFM small. (Patient not taking: Reported on 1/23/2018)         Physical Examination:  /65 (BP Location: Right arm, Patient Position: Sitting, Cuff Size: Adult Regular)  Pulse 129  Temp 98.8  F (37.1  C) (Oral)  Resp 16  Ht 1.626 m (5' 4.02\")  Wt 66.2 kg (146 lb)  SpO2 97%  BMI 25.05 kg/m2  Wt Readings from Last 10 Encounters:   02/15/18 66.2 kg (146 lb)   02/08/18 66.5 kg (146 lb 8 oz)   01/23/18 69.8 kg (153 lb 12.8 oz)   01/18/18 70.3 kg (155 lb)   01/16/18 70.7 kg (155 lb 12.8 oz)   01/13/18 72.6 kg (160 lb)   01/02/18 73.9 kg (162 lb 14.4 oz)   12/26/17 74.3 kg (163 lb 12.8 oz)   12/18/17 76 kg (167 lb 8 oz)   12/12/17 74.9 kg (165 lb 1.6 oz)     Constitutional: Well-appearing female in no acute distress.  Eyes: EOMI, " PERRL. No scleral icterus.  ENT: Oral mucosa is moist without lesions or thrush.   Lymphatic: Neck is supple without cervical or supraclavicular lymphadenopathy. No axillary lymphadenopathy.  Cardiovascular: Mild tachycardic rate and rhythm. No murmurs, gallops, or rubs. No edema   Respiratory: Clear to auscultation bilaterally. No wheezes or crackles.  Gastrointestinal: Bowel sounds present. Abdomen soft, non-tender. No palpable hepatosplenomegaly or masses.   Neurologic: Cranial nerves II through XII are grossly intact.  Skin: No rashes, petechiae, or bruising noted on exposed skin.    Laboratory Data:   2/15/2018 11:29   Sodium 140   Potassium 3.4   Chloride 104   Carbon Dioxide 23   Urea Nitrogen 6 (L)   Creatinine 0.56   GFR Estimate >90   GFR Estimate If Black >90   Calcium 8.6   Anion Gap 13   Glucose 120 (H)   WBC 8.7   Hemoglobin 10.6 (L)   Hematocrit 33.0 (L)   Platelet Count 276   RBC Count 3.36 (L)   MCV 98   MCH 31.5   MCHC 32.1   RDW 16.7 (H)   Diff Method Automated Method   % Neutrophils 77.2   % Lymphocytes 8.5   % Monocytes 11.6   % Eosinophils 0.0   % Basophils 0.6   % Immature Granulocytes 2.1   Nucleated RBCs 0   Absolute Neutrophil 6.7   Absolute Lymphocytes 0.7 (L)   Absolute Monocytes 1.0   Absolute Eosinophils 0.0   Absolute Basophils 0.1   Abs Immature Granulocytes 0.2   Absolute Nucleated RBC 0.0         Assessment and Plan:  1. Stage IIa right breast cancer, recently completed neoadjuvant chemotherapy.   Was initially started on weekly Taxol/pembolizumab but course was complicated by fevers, pneumonia, and then pneumonitis and hepatitis requiring hospitalization. Pembrolizumab was discontinued altogether and she completed 12 weeks of Taxol alone. Cycle 1 of AC was complicated by neutropenic fever. Cycle 2 was complicated again by neutropenic fever and c diff colitis. Further chemotherapy was cancelled due to last MRI showing near CR. She has end of treatment breast MRI tomorrow. Scheduled  for surgery on 2/28 with Dr. Gates. She is planning on lumpectomy so will need adjuvant radiation. Follow-up with Dr. Bradshaw on 3/12.     2. Pneumonitis, resolved  No pulmonary concerns other than stable SUAZO and lung exam clear. Her bronchoscopy results remain negative, AFB is still pending. She will complete prednisone 5 mg tomorrow. Continue Protonix for any steroid associated GERD.    3. Transaminitis, improving  LFTs now normalized. Completing prednisone tomorrow.     4. C diff colitis: Clinically improving. Continue vancomycin to complete course.     5. Neutropenic fever: ANC recovered, etiology unclear beyond c diff. She will complete Augmentin for GI ppx and to cover lung pathogens.     Lilia Livingston PA-C  [facility]  [address] [place] [address] [phone]

## 2018-02-15 NOTE — NURSING NOTE
"Oncology Rooming Note    February 15, 2018 11:39 AM   Ashleigh Alonzo is a 57 year old female who presents for:    Chief Complaint   Patient presents with     Port Draw     port accessed and labs drawn by rn.  vs taken.     Oncology Clinic Visit     Return: Breast ca      Initial Vitals: /65 (BP Location: Right arm, Patient Position: Sitting, Cuff Size: Adult Regular)  Pulse 129  Temp 98.8  F (37.1  C) (Oral)  Resp 16  Ht 1.626 m (5' 4.02\")  Wt 66.2 kg (146 lb)  SpO2 97%  BMI 25.05 kg/m2 Estimated body mass index is 25.05 kg/(m^2) as calculated from the following:    Height as of this encounter: 1.626 m (5' 4.02\").    Weight as of this encounter: 66.2 kg (146 lb). Body surface area is 1.73 meters squared.  No Pain (0) Comment: Data Unavailable   No LMP recorded. Patient is postmenopausal.  Allergies reviewed: YES  Medications reviewed: YES    Medications: Medication refills not needed today.  Pharmacy name entered into [facility]:    [facility], [place] - [address] [place] [facility] - [place], [place] - [address] [address]    Clinical concerns: no new concerns.  Pt received flu shot elsewhere. See Immunizations     6 minutes for nursing intake (face to face time)     Mira Morales CMA                "

## 2018-02-15 NOTE — MR AVS SNAPSHOT
After Visit Summary   2/15/2018    Ashleigh Alonzo    MRN: [id number]           Patient Information     Date Of Birth          1960        Visit Information        Provider Department      2/15/2018 11:50 AM Lilia Livingston PA-C [facility]        Today's Diagnoses     Malignant neoplasm of upper-inner quadrant of right breast in female, estrogen receptor negative (H)           Follow-ups after your visit        Your next 10 appointments already scheduled     Feb 16, 2018  9:30 AM CST   (Arrive by 5:00 AM)   MR BREAST BILATERAL W/O & W CONTRAST with [id number]   [facility] ([facility])    [address] [facility] [address]   [phone]           Take your medicines as usual, unless your doctor tells you not to. Bring a list of your current medicines to your exam (including vitamins, minerals and over-the-counter drugs).  The timing of your exam may depend on the start of your last period. If you re in menopause, you may have the exam anytime.  Please bring any previous mammograms or breast MRIs from other facilities to the MRI dept. Do not mail these items to us.   You will have IV contrast for this exam.  You do not need to do anything special to prepare.  The MRI machine uses a strong magnet. Please wear clothes without metal (snaps, zippers). A sweatsuit works well, or we may give you a hospital gown.  Please remove any body piercings and hair extensions before you arrive. You will also remove watches, jewelry, hairpins, wallets, dentures, partial dental plates and hearing aids. You may wear contact lenses, and you may be able to wear your rings. We have a safe place to keep your personal items, but it is safer to leave them at home.  **IMPORTANT** THE INSTRUCTIONS BELOW ARE ONLY FOR THOSE PATIENTS WHO HAVE BEEN PRESCRIBED SEDATION OR GENERAL ANESTHESIA DURING THEIR MRI PROCEDURE:  IF YOUR DOCTOR PRESCRIBED ORAL SEDATION (take  medicine to help you relax during your exam):   You must get the medicine from your doctor (oral medication) before you arrive. Bring the medicine to the exam. Do not take it at home. You ll be told when to take it upon arriving for your exam.   Arrive one hour early. Bring someone who can take you home after the test. Your medicine will make you sleepy. After the exam, you may not drive, take a bus or take a taxi by yourself.  IF YOUR DOCTOR PRESCRIBED IV SEDATION:   Arrive one hour early. Bring someone who can take you home after the test. Your medicine will make you sleepy. After the exam, you may not drive, take a bus or take a taxi by yourself.   No eating 6 hours before your exam. You may have clear liquids up until 4 hours before your exam. (Clear liquids include water, clear tea, black coffee and fruit juice without pulp.)  IF YOUR DOCTOR PRESCRIBED ANESTHESIA (be asleep for your exam):   Arrive 1 1/2 hours early. Bring someone who can take you home after the test. You may not drive, take a bus or take a taxi by yourself.   No eating 8 hours before your exam. You may have clear liquids up until 4 hours before your exam. (Clear liquids include water, clear tea, black coffee and fruit juice without pulp.)   You will spend four to five hours in the recovery room.  If you have any questions, please contact your Imaging Department exam site.            Feb 28, 2018  8:30 AM CST   US BREAST WIRE PLACEMENT RIGHT with  Breast Rad, [email or web address],  BREAST NURSE   [facility] ([facility])    [address] [address] [phone]           Please bring a list of your medicines (including vitamins, minerals and over-the-counter drugs). Also, tell your doctor about any allergies you may have. Wear comfortable clothes and leave your valuables at home.  You do not need to do anything special to prepare for your exam.  Please call the Imaging  Department at your exam site with any questions.            Feb 28, 2018  9:30 AM CST   (Arrive by 9:15 AM)   NM SENTINEL NODE INJECTION BILATERAL with [email or web address]   [facility] [place] ([facility] [place])    [address] [id number] [phone]           Please bring a list of your medicines to the exam. (Include vitamins, minerals and over-the-counter drugs.) You should wear comfortable clothes. Leave your valuables at home. Please bring related prior results and films.  Tell your doctor:   If you are breastfeeding or may be pregnant.   If you have had a barium test within the past few days. Barium may change the results of certain exams.   If you think you may need sedation (medicine to help you relax).  You may eat and drink as normal.  Please call your Imaging Department at your exam site with any questions.            Feb 28, 2018  9:30 AM CST   MA POST PROCEDURE RIGHT with [id number]   [facility] ([facility])    [address] [place] [address]  2nd Floor  [facility] [address] [phone]           Do not use any powder, lotion or deodorant under your arms or on your breast. If you do, we will ask you to remove it before your exam.  Wear comfortable, two-piece clothing.  If you have any allergies, tell your care team.  Bring any previous mammograms from other facilities or have them mailed to the breast center.            Feb 28, 2018   Procedure with Atul Gates MD   [facility] ([facility])    [address] [place] [address]  5th Floor  [facility] [id number] [phone]           Located in the Clinics and Surgery Center at [address] [place] [address].   parking is very convenient and highly recommended.  is a $6 flat rate fee.  Both  and self parkers should enter the main arrival plaza from [facility]; parking  attendants will direct you based on your parking preference.            Feb 28, 2018  1:00 PM CST   MA BREAST SPECIMEN RIGHT with [email or web address]   [facility] ([facility])    [address]  2nd Floor  [facility] [id number] [phone]           Do not use any powder, lotion or deodorant under your arms or on your breast. If you do, we will ask you to remove it before your exam.  Wear comfortable, two-piece clothing.  If you have any allergies, tell your care team.  Bring any previous mammograms from other facilities or have them mailed to the breast center.            Mar 12, 2018  8:45 AM CDT   (Arrive by 8:30 AM)   Return Visit with Fara Bradshaw MD   [facility] ([facility])    [address]  [address]  [facility] [id number] [phone]              Who to contact     If you have questions or need follow up information about today's clinic visit or your schedule please contact [facility] directly at [phone].  Normal or non-critical lab and imaging results will be communicated to you by MyChart, letter or phone within 4 business days after the clinic has received the results. If you do not hear from us within 7 days, please contact the clinic through [employer] or phone. If you have a critical or abnormal lab result, we will notify you by phone as soon as possible.  Submit refill requests through [employer] or call your pharmacy and they will forward the refill request to us. Please allow 3 business days for your refill to be completed.          Additional Information About Your Visit        [employer] Information     [employer] gives you secure access to your electronic health record. If you see a primary care provider, you can also send messages to your care team and make appointments. If you have questions, please call your primary care clinic.  If you do not have a primary  "care provider, please call [phone] and they will assist you.        Care EveryWhere ID     This is your Care EveryWhere ID. This could be used by other organizations to access your [place] medical records  [id number]        Your Vitals Were     Pulse Temperature Respirations Height Pulse Oximetry BMI (Body Mass Index)    129 98.8  F (37.1  C) (Oral) 16 1.626 m (5' 4.02\") 97% 25.05 kg/m2       Blood Pressure from Last 3 Encounters:   02/15/18 114/65   02/08/18 110/70   01/23/18 139/84    Weight from Last 3 Encounters:   02/15/18 66.2 kg (146 lb)   02/08/18 66.5 kg (146 lb 8 oz)   01/23/18 69.8 kg (153 lb 12.8 oz)              We Performed the Following     Basic metabolic panel     CBC with platelets differential          Today's Medication Changes          These changes are accurate as of 2/15/18  5:00 PM.  If you have any questions, ask your nurse or doctor.               These medicines have changed or have updated prescriptions.        Dose/Directions    darifenacin 7.5 MG 24 hr tablet   Commonly known as:  ENABLEX   This may have changed:    - when to take this  - reasons to take this   Used for:  Overactive bladder        Dose:  7.5 mg   Take 1 tablet (7.5 mg) by mouth daily   Quantity:  90 tablet   Refills:  3                Primary Care Provider Office Phone # Fax #    Radha Cazares [phone]       [address] [address]        Equal Access to Services     THERESA HUGHES AH: Abdifatha Granda, wabridgerda luwarren, qaybta kaalbang hollis. So [facility] [phone].    ATENCIÓN: Si habla español, tiene a blackman disposición servicios gratuitos de asistencia lingüística. Llame al [phone].    We comply with applicable federal civil rights laws and [place] laws. We do not discriminate on the basis of race, color, national origin, age, disability, sex, sexual orientation, or gender " identity.            Thank you!     Thank you for choosing [facility]  for your care. Our goal is always to provide you with excellent care. Hearing back from our patients is one way we can continue to improve our services. Please take a few minutes to complete the written survey that you may receive in the mail after your visit with us. Thank you!             Your Updated Medication List - Protect others around you: Learn how to safely use, store and throw away your medicines at www.disposemymeds.org.          This list is accurate as of 2/15/18  5:00 PM.  Always use your most recent med list.                   Brand Name Dispense Instructions for use Diagnosis    albuterol 108 (90 BASE) MCG/ACT Inhaler    PROAIR HFA/PROVENTIL HFA/VENTOLIN HFA    1 Inhaler    Inhale 2 puffs into the lungs every 6 hours as needed for shortness of breath / dyspnea    Chronic obstructive pulmonary disease, unspecified COPD type (H)       amoxicillin-clavulanate 875-125 MG per tablet    AUGMENTIN    28 tablet    Take 1 tablet by mouth every 12 hours for 14 days    Malignant neoplasm of upper-inner quadrant of right breast in female, estrogen receptor negative (H)       calcium carbonate 600 MG tablet   Generic drug:  calcium carbonate     60 tablet    Take by mouth 2 times daily (with meals)        citalopram 10 MG tablet    celeXA    90 tablet    Take 1 tablet (10 mg) by mouth daily    Anxiety, Depression, unspecified depression type       CO Q 10 PO      Take 1 tablet by mouth daily        darifenacin 7.5 MG 24 hr tablet    ENABLEX    90 tablet    Take 1 tablet (7.5 mg) by mouth daily    Overactive bladder       guaiFENesin 600 MG 12 hr tablet    MUCINEX    180 tablet    Take 2 tablets (1,200 mg) by mouth 2 times daily    Cough with sputum       hydrOXYzine 25 MG tablet    ATARAX    100 tablet    Take 1-2 tablets (25-50 mg) by mouth every 6 hours as needed for itching    Hives       lidocaine visc 2% 2.5mL/5mL &  maalox/mylanta w/ simeth 2.5mL/5mL & diphenhydrAMINE 5mg/5mL Susp suspension    [facility]    1 Bottle    Swish and swallow 10 mLs in mouth every 6 hours as needed for mouth sores    Mucositis       lidocaine-prilocaine cream    EMLA    30 g    Please apply to port site 30 minutes before use prn    Personal history of malignant neoplasm of breast       LORazepam 0.5 MG tablet    ATIVAN    30 tablet    Take 1 tablet (0.5 mg) by mouth every 4 hours as needed (Anxiety, Nausea/Vomiting or Sleep)    Malignant neoplasm of upper-inner quadrant of right breast in female, estrogen receptor negative (H)       MULTIPLE VITAMIN PO      Take 1 tablet by mouth daily        [place] [facility]      [employer] Auto CPAP 12-18 cm, F&P Simplus FFM small.    MERLIN (obstructive sleep apnea)       pantoprazole 40 MG EC tablet    PROTONIX    60 tablet    Take 1 tablet (40 mg) by mouth every morning    Malignant neoplasm of upper-inner quadrant of right breast in female, estrogen receptor negative (H)       predniSONE 5 MG tablet    DELTASONE    4 tablet    Take 1 tablet (5 mg) by mouth every other day for 4 doses    Malignant neoplasm of upper-inner quadrant of right breast in female, estrogen receptor negative (H)       simethicone 40 MG/0.6ML suspension    MYLICON    45 mL    Take 0.6 mLs (40 mg) by mouth every 6 hours as needed for cramping    Malignant neoplasm of upper-inner quadrant of right breast in female, estrogen receptor negative (H)       tiotropium 18 MCG capsule    SPIRIVA    1 capsule    Inhale 1 capsule (18 mcg) into the lungs daily Inhale contents of one capsule    Chronic obstructive pulmonary disease, unspecified COPD type (H)       vancomycin 50 mg/mL Liqd solution    VANOCIN    210 mL    Take 2.5 mLs (125 mg) by mouth 4 times daily for 21 days    Malignant neoplasm of upper-inner quadrant of right breast in female, estrogen receptor negative (H)       VITAMIN B 12 PO      Take 100 mcg by mouth  daily        VITAMIN B6 PO      Take 1 tablet by mouth daily.        vitamin D 1000 UNITS capsule      TAKE 2 CAPSULES BY MOUTH DAILY

## 2018-02-15 NOTE — PROGRESS NOTES
Research: pt here for f/u, had labs done but is not receiving anymore chemo. She had 2 dose of the AC treatment and didn't tolerate either as her counts dropped and she had neutrapenic fevers which she was hospitalized for. Most recently she was d/c'd on 2/8. cultues came back negative except for the fungal one and that takes a long time to get results from. She did have a positive C-diff test result and is currently finishing up treatment for that. She continues to have some diarrhea 4-5 bout/day. Not as urgent as when started usually when she has to void she will also have some diarrhea.   Pt is generally feeling better the further out from this last hospitalization she gets. Energy is coming back, she is feeling more like herself. She only has 1 more dose of the prednisone- LFTs are decreased- AST slightly elevated the AST is WNL. Appetite good, drinking fluids. Sleeping ok. Denies N/V.   She will have her MRI #4 tomorrow, she had research labs done today for ISPY. Surgery will be on 2/28.  Steffany Nix RN   [phone]

## 2018-02-15 NOTE — NURSING NOTE
"Chief Complaint   Patient presents with     Port Draw     port accessed and labs drawn by rn.  vs taken.     Port accessed with 20g 3/4\" gripper needle, labs drawn, port flushed with saline and heparin, port de-accessed.  vitals checked.  Lay Grier RN    "

## 2018-02-15 NOTE — LETTER
2/15/2018      RE: Ashleigh Alonzo  [address] [facility] [place] [address]       Oncology/Hematology Visit Note  Feb 15, 2018    Reason for Visit: Follow up of breast cancer     History of Present Illness: Ashleigh Alonzo is a 57 year old female with past medical history of COPD, sleep apnea, periodontal disease with stage IIa, T2N0M0, grade 3, triple negative right breast cancer. She was diagnosed via abnormal screening mammogram. She ultimately had US, contrast-enhanced mammo, and biopsy showing 3.4 cm mass and pathology showed grade 3 invasive mammary carcinoma with associated high-grade DCIS. ER/NE was negative and HER2 negative. She enrolled in ISPY-2 clinical trial and began treatment with weekly taxol and once every 3 week pembrolizumab on 9/2/17. Please see notes from Dr. Bradshaw for further details of patient's oncology history.      Course has been complicated by fevers with PNA and then UTI. Week 7 was held due to transaminitis. She was given week 7 on 11/7 with pembrolizumab and Taxol. She was admitted 11/11-11/17 with high fever (105), cough, and dyspnea and was found to have HCAP and pneumonitis secondary to pembro. 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 She received 2 doses of methylpred, antibiotics, and d/c on prednisone 70 mg daily. Had follow-up on 11/21 with Dr. Bradshaw and resumed weekly Taxol on 11/28/17. She completed 12 weeks on 12/26. She started AC on 1/2. Cycle 2 was deferred due to neutropenic fever (hospitalized 1/10-1/14). She had cycle 2 on 1/23/18. She was admitted via ED 1/29-2/8 for neutropenic fever of unknown etiology and c diff colitis.     She presents in hospital follow-up.     Interval History:  Ms. Alonzo returns to clinic today with her . She is feeling much better today. She is having 4-5 bowel movements per day. In the hospital she was probably having more than 10 per day. Stools are still watery but starting to have more of  a pudding consistency. She denies any abdominal cramping. No further fevers since Sunday. She denies cough, sore throat, congestion, SOB, CP. Her energy is picking up. She is eating much better. Drinking >60 oz of fluids per day. No nausea with Augmentin or vancomycin. She is consistent with meds. Takes last dose of prednisone tomorrow.     Review of Systems:  Patient denies fevers, chills, night sweats, unexplained weight changes, headaches, dizziness, vision or hearing changes, new lumps or bumps, chest pain, shortness of breath, cough, abdominal pain, nausea, vomiting, changes to bowel or bladder, swelling of extremities, bleeding issues, or rash.    Current Outpatient Prescriptions   Medication Sig Dispense Refill     vancomycin (VANOCIN) 50 mg/mL LIQD solution Take 2.5 mLs (125 mg) by mouth 4 times daily for 21 days 210 mL 0     amoxicillin-clavulanate (AUGMENTIN) 875-125 MG per tablet Take 1 tablet by mouth every 12 hours for 14 days 28 tablet 0     calcium carbonate (CALCIUM CARBONATE) 600 MG tablet Take by mouth 2 times daily (with meals) 60 tablet      lidocaine-prilocaine (EMLA) cream Please apply to port site 30 minutes before use prn 30 g 1     hydrOXYzine (ATARAX) 25 MG tablet Take 1-2 tablets (25-50 mg) by mouth every 6 hours as needed for itching 100 tablet 2     guaiFENesin (MUCINEX) 600 MG 12 hr tablet Take 2 tablets (1,200 mg) by mouth 2 times daily 180 tablet 6     tiotropium (SPIRIVA) 18 MCG capsule Inhale 1 capsule (18 mcg) into the lungs daily Inhale contents of one capsule 1 capsule 11     albuterol (PROAIR HFA/PROVENTIL HFA/VENTOLIN HFA) 108 (90 BASE) MCG/ACT Inhaler Inhale 2 puffs into the lungs every 6 hours as needed for shortness of breath / dyspnea 1 Inhaler 5     citalopram (CELEXA) 10 MG tablet Take 1 tablet (10 mg) by mouth daily 90 tablet 3     darifenacin (ENABLEX) 7.5 MG 24 hr tablet Take 1 tablet (7.5 mg) by mouth daily (Patient taking differently: Take 7.5 mg by mouth daily as  "needed ) 90 tablet 3     Coenzyme Q10 (CO Q 10 PO) Take 1 tablet by mouth daily        MULTIPLE VITAMIN PO Take 1 tablet by mouth daily        Cyanocobalamin (VITAMIN B 12 PO) Take 100 mcg by mouth daily        Pyridoxine HCl (VITAMIN B6 PO) Take 1 tablet by mouth daily.       VITAMIN D 1000 UNIT OR CAPS TAKE 2 CAPSULES BY MOUTH DAILY       predniSONE (DELTASONE) 5 MG tablet Take 1 tablet (5 mg) by mouth every other day for 4 doses (Patient not taking: Reported on 2/15/2018) 4 tablet 0     simethicone (MYLICON) 40 MG/0.6ML suspension Take 0.6 mLs (40 mg) by mouth every 6 hours as needed for cramping (Patient not taking: Reported on 2/15/2018) 45 mL 1     magic mouthwash suspension (diphenhydrAMINE, lidocaine, aluminum-magnesium & simethicone) Swish and swallow 10 mLs in mouth every 6 hours as needed for mouth sores (Patient not taking: Reported on 2/15/2018) 1 Bottle 3     pantoprazole (PROTONIX) 40 MG EC tablet Take 1 tablet (40 mg) by mouth every morning (Patient not taking: Reported on 2/15/2018) 60 tablet 0     LORazepam (ATIVAN) 0.5 MG tablet Take 1 tablet (0.5 mg) by mouth every 4 hours as needed (Anxiety, Nausea/Vomiting or Sleep) (Patient not taking: Reported on 2/15/2018) 30 tablet 2     [facility] [place] [facility] 12-18 cm, F&P Simplus FFM small. (Patient not taking: Reported on 1/23/2018)         Physical Examination:  /65 (BP Location: Right arm, Patient Position: Sitting, Cuff Size: Adult Regular)  Pulse 129  Temp 98.8  F (37.1  C) (Oral)  Resp 16  Ht 1.626 m (5' 4.02\")  Wt 66.2 kg (146 lb)  SpO2 97%  BMI 25.05 kg/m2  Wt Readings from Last 10 Encounters:   02/15/18 66.2 kg (146 lb)   02/08/18 66.5 kg (146 lb 8 oz)   01/23/18 69.8 kg (153 lb 12.8 oz)   01/18/18 70.3 kg (155 lb)   01/16/18 70.7 kg (155 lb 12.8 oz)   01/13/18 72.6 kg (160 lb)   01/02/18 73.9 kg (162 lb 14.4 oz)   12/26/17 74.3 kg (163 lb 12.8 oz)   12/18/17 76 kg (167 lb 8 oz)   12/12/17 74.9 kg (165 lb " 1.6 oz)     Constitutional: Well-appearing female in no acute distress.  Eyes: EOMI, PERRL. No scleral icterus.  ENT: Oral mucosa is moist without lesions or thrush.   Lymphatic: Neck is supple without cervical or supraclavicular lymphadenopathy. No axillary lymphadenopathy.  Cardiovascular: Mild tachycardic rate and rhythm. No murmurs, gallops, or rubs. No edema   Respiratory: Clear to auscultation bilaterally. No wheezes or crackles.  Gastrointestinal: Bowel sounds present. Abdomen soft, non-tender. No palpable hepatosplenomegaly or masses.   Neurologic: Cranial nerves II through XII are grossly intact.  Skin: No rashes, petechiae, or bruising noted on exposed skin.    Laboratory Data:   2/15/2018 11:29   Sodium 140   Potassium 3.4   Chloride 104   Carbon Dioxide 23   Urea Nitrogen 6 (L)   Creatinine 0.56   GFR Estimate >90   GFR Estimate If Black >90   Calcium 8.6   Anion Gap 13   Glucose 120 (H)   WBC 8.7   Hemoglobin 10.6 (L)   Hematocrit 33.0 (L)   Platelet Count 276   RBC Count 3.36 (L)   MCV 98   MCH 31.5   MCHC 32.1   RDW 16.7 (H)   Diff Method Automated Method   % Neutrophils 77.2   % Lymphocytes 8.5   % Monocytes 11.6   % Eosinophils 0.0   % Basophils 0.6   % Immature Granulocytes 2.1   Nucleated RBCs 0   Absolute Neutrophil 6.7   Absolute Lymphocytes 0.7 (L)   Absolute Monocytes 1.0   Absolute Eosinophils 0.0   Absolute Basophils 0.1   Abs Immature Granulocytes 0.2   Absolute Nucleated RBC 0.0         Assessment and Plan:  1. Stage IIa right breast cancer, recently completed neoadjuvant chemotherapy.   Was initially started on weekly Taxol/pembolizumab but course was complicated by fevers, pneumonia, and then pneumonitis and hepatitis requiring hospitalization. Pembrolizumab was discontinued altogether and she completed 12 weeks of Taxol alone. Cycle 1 of AC was complicated by neutropenic fever. Cycle 2 was complicated again by neutropenic fever and c diff colitis. Further chemotherapy was cancelled due  to last MRI showing near CR. She has end of treatment breast MRI tomorrow. Scheduled for surgery on 2/28 with Dr. Gates. She is planning on lumpectomy so will need adjuvant radiation. Follow-up with Dr. Bradshaw on 3/12.     2. Pneumonitis, resolved  No pulmonary concerns other than stable SUAZO and lung exam clear. Her bronchoscopy results remain negative, AFB is still pending. She will complete prednisone 5 mg tomorrow. Continue Protonix for any steroid associated GERD.    3. Transaminitis, improving  LFTs now normalized. Completing prednisone tomorrow.     4. C diff colitis: Clinically improving. Continue vancomycin to complete course.     5. Neutropenic fever: ANC recovered, etiology unclear beyond c diff. She will complete Augmentin for GI ppx and to cover lung pathogens.     Lilia Livingston PA-C  [facility]  [address], [place] [address] [phone]

## 2018-02-16 DIAGNOSIS — C50.919 BREAST CANCER (H): Primary | ICD-10-CM

## 2018-02-19 ENCOUNTER — TELEPHONE (OUTPATIENT)
Dept: ONCOLOGY | Facility: CLINIC | Age: 58
End: 2018-02-19

## 2018-02-19 ENCOUNTER — RADIANT APPOINTMENT (OUTPATIENT)
Dept: MRI IMAGING | Facility: CLINIC | Age: 58
End: 2018-02-19
Attending: INTERNAL MEDICINE
Payer: COMMERCIAL

## 2018-02-19 DIAGNOSIS — Z17.1 MALIGNANT NEOPLASM OF UPPER-INNER QUADRANT OF RIGHT BREAST IN FEMALE, ESTROGEN RECEPTOR NEGATIVE (H): ICD-10-CM

## 2018-02-19 DIAGNOSIS — C50.211 MALIGNANT NEOPLASM OF UPPER-INNER QUADRANT OF RIGHT BREAST IN FEMALE, ESTROGEN RECEPTOR NEGATIVE (H): ICD-10-CM

## 2018-02-19 DIAGNOSIS — Z00.6 EXAMINATION OF PARTICIPANT IN CLINICAL TRIAL: ICD-10-CM

## 2018-02-19 RX ORDER — GADOBUTROL 604.72 MG/ML
6.6 INJECTION INTRAVENOUS ONCE
Status: COMPLETED | OUTPATIENT
Start: 2018-02-19 | End: 2018-02-19

## 2018-02-19 RX ADMIN — GADOBUTROL 6.6 ML: 604.72 INJECTION INTRAVENOUS at 10:19

## 2018-02-20 LAB
BACTERIA SPEC CULT: NORMAL
SPECIMEN SOURCE: NORMAL

## 2018-02-21 ENCOUNTER — APPOINTMENT (OUTPATIENT)
Dept: SURGERY | Facility: CLINIC | Age: 58
End: 2018-02-21
Payer: COMMERCIAL

## 2018-02-21 ENCOUNTER — ALLIED HEALTH/NURSE VISIT (OUTPATIENT)
Dept: SURGERY | Facility: CLINIC | Age: 58
End: 2018-02-21
Payer: COMMERCIAL

## 2018-02-21 ENCOUNTER — OFFICE VISIT (OUTPATIENT)
Dept: SURGERY | Facility: CLINIC | Age: 58
End: 2018-02-21
Payer: COMMERCIAL

## 2018-02-21 ENCOUNTER — ANESTHESIA EVENT (OUTPATIENT)
Dept: SURGERY | Facility: CLINIC | Age: 58
End: 2018-02-21

## 2018-02-21 VITALS
RESPIRATION RATE: 16 BRPM | TEMPERATURE: 98.3 F | BODY MASS INDEX: 25.18 KG/M2 | OXYGEN SATURATION: 97 % | WEIGHT: 151.1 LBS | HEIGHT: 65 IN | SYSTOLIC BLOOD PRESSURE: 114 MMHG | DIASTOLIC BLOOD PRESSURE: 79 MMHG | HEART RATE: 116 BPM

## 2018-02-21 DIAGNOSIS — Z01.818 PRE-OP EXAMINATION: Primary | ICD-10-CM

## 2018-02-21 RX ORDER — AA/PROT/LYSINE/METHIO/VIT C/B6 50-12.5 MG
TABLET ORAL EVERY MORNING
COMMUNITY
End: 2018-07-31

## 2018-02-21 ASSESSMENT — COPD QUESTIONNAIRES
COPD: 1
CAT_SEVERITY: MODERATE

## 2018-02-21 NOTE — ANESTHESIA PREPROCEDURE EVALUATION
Anesthesia Evaluation     . Pt has had prior anesthetic. Type: General and MAC    No history of anesthetic complications          ROS/MED HX    ENT/Pulmonary:     (+)sleep apnea (Has not used recently .), Past use 1 PPD packs/day  moderate COPD, doesn't use CPAP , . Other pulmonary disease H/o Pneuminitis in last six months. .   Tobacco use: Quit in 2000.  Smoked 1 PPD for 20 years.     Neurologic:     (+)neuropathy - Toes post chemotherapy- intermittent,     Cardiovascular:  - neg cardiovascular ROS       METS/Exercise Tolerance: Comment: METS<4    Hematologic:     (+) History of Transfusion no previous transfusion reaction -      Musculoskeletal:  - neg musculoskeletal ROS       GI/Hepatic:     (+) appendicitis (S/P appendectomy),       Renal/Genitourinary:  - ROS Renal section negative   (+) Other Renal/ Genitourinary, Urinary incontinence.       Endo:  - neg endo ROS       Psychiatric:     (+) psychiatric history depression and anxiety      Infectious Disease:  - neg infectious disease ROS       Malignancy:   (+) Malignancy History of Breast  Breast CA Active status post Chemo.         Other: Comment: Hospitalized from 1/29-2/8/2018 with neutropenic fever of unknown etiology and C Diff colitis.   Continues on Augmentin and Vancomycin.    (+) No chance of pregnancy C-spine cleared: N/A, no H/O Chronic Pain,                   Physical Exam  Normal systems: dental    Airway   Mallampati: I  TM distance: >3 FB  Neck ROM: full    Dental     Cardiovascular   Rhythm and rate: regular and normal      Pulmonary   PE comment: Diminished lung sounds with fine crackles in the bases bilaterally.  Upper lobes clear.     Other findings: Lab Results      Component                Value               Date                      WBC                      8.7                 02/15/2018            Lab Results      Component                Value               Date                      RBC                      3.36                 02/15/2018            Lab Results      Component                Value               Date                      HGB                      10.6                02/15/2018            Lab Results      Component                Value               Date                      HCT                      33.0                02/15/2018            Lab Results      Component                Value               Date                      MCV                      98                  02/15/2018            Lab Results      Component                Value               Date                      MCH                      31.5                02/15/2018            Lab Results      Component                Value               Date                      MCHC                     32.1                02/15/2018            Lab Results      Component                Value               Date                      RDW                      16.7                02/15/2018            Lab Results      Component                Value               Date                      PLT                      276                 02/15/2018              Last Basic Metabolic Panel:  Lab Results      Component                Value               Date                      NA                       140                 02/15/2018             Lab Results      Component                Value               Date                      POTASSIUM                3.4                 02/15/2018            Lab Results      Component                Value               Date                      CHLORIDE                 104                 02/15/2018            Lab Results      Component                Value               Date                      JAVIER                      8.6                 02/15/2018            Lab Results      Component                Value               Date                      CO2                      23                  02/15/2018            Lab Results      Component                 Value               Date                      BUN                      6                   02/15/2018            Lab Results      Component                Value               Date                      CR                       0.56                02/15/2018            Lab Results      Component                Value               Date                      GLC                      120                 02/15/2018                Procedures  ECG SR 88 bpm     Echocardiogram 12/28/2017  Interpretation Summary  Global and regional left ventricular function is normal with an EF of 60-65%  (calculated, 70%.)  Global peak LV longitudinal strain is averaged at -18.7%. This is within  reported normal limits (normal <-18%).  Global right ventricular function is normal.  Pulmonary artery systolic pressure is normal.  The inferior vena cava is normal.  No pericardial effusion is present.  This study was compared with the study from 9/1/2017. There has been no  Change.    PFTs 11/2016  FVC-Pred 3.19   L  11/15/2016  2:10   FVC-Pre 2.45   L  11/15/2016  2:10   FVC-%Pred-Pre 76   %  11/15/2016  2:10   FEV1-Pre 1.33   L  11/15/2016  2:10   FEV1-%Pred-Pre 52   %  11/15/2016  2:10   FEV1FVC-Pred 80   %  11/15/2016  2:10   FEV1FVC-Pre 54   %  11/15/2016  2:10   FEFMax-Pred 6.36   L/sec  11/15/2016  2:10   FEFMax-Pre 4.70   L/sec  11/15/2016  2:10   FEFMax-%Pred-Pre 73   %  11/15/2016  2:10   FEF2575-Pred 2.39   L/sec  11/15/2016  2:10   FEF2575-Pre 0.45   L/sec  11/15/2016  2:10   SAB0476-%Pred-Pre 18   %  11/15/2016  2:10   ExpTime-Pre 11.46   sec  11/15/2016  2:10   FIFMax-Pre 2.92   L/sec  11/15/2016  2:10   VC-Pred 3.25   L  11/15/2016  2:10   VC-Pre 2.36   L  11/15/2016  2:10   VC-%Pred-Pre 72   %  11/15/2016  2:10   IC-Pred 2.65   L  11/15/2016  2:10   IC-Pre 2.13   L  11/15/2016  2:10    IC-%Pred-Pre 80   %  11/15/2016  2:10   ERV-Pred 0.60   L  11/15/2016  2:10   ERV-Pre 0.23   L  11/15/2016  2:10   ERV-%Pred-Pre 38   %  11/15/2016  2:10   FEV1FEV6-Pred 81   %  11/15/2016  2:10   FEV1FEV6-Pre 60   %  11/15/2016  2:10   DLCOunc-Pred 21.71   ml/min/mmHg  11/15/2016  2:10   DLCOunc-Pre 21.80   ml/min/mmHg  11/15/2016  2:10   DLCOunc-%Pred-Pre 100   %  11/15/2016  2:10   VA-Pre 4.16   L  11/15/2016  2:10   VA-%Pred-Pre 84   %  11/15/2016  2:10   FEV1SVC-Pred 78   %  11/15/2016  2:10   FEV1SVC-Pre 56   %  11/15/2016  2:10   Narrative   IMPRESSION:  Moderately-Severe  Airflow Obstruction   Normal diffusing capacity.  Compared with testing from 11/7/2014 there has been no significant change in the FEV1 or FVC.             PAC Discussion and Assessment    ASA Classification: 3  Case is suitable for: ASC  Anesthetic techniques and relevant risks discussed: GA  Invasive monitoring and risk discussed:   Types:   Possibility and Risk of blood transfusion discussed:   NPO instructions given:   Additional anesthetic preparation and risks discussed:   Needs early admission to pre-op area:   Other:     PAC Resident/NP Anesthesia Assessment:  Ashleigh Perera is a 57 year old female scheduled for Right Wire Localized Lumpectomy, Right Waldron Lymph Node Biopsy And Freddy Cath Removal on 2/28/2018 with Dr. Gates with [facility] TBD under general anesthesia.  Ms. Alonzo has stage IIa right breast cancer and recently completed neoadjuvant chemotherapy.  Of significance, she was hospitalized from 1/29-2/8/2018 for neutropenic fever of unknown etiology and C. Diff colitis. During that hospitalization she also received 2 u PRBCs.  Please see Lilia Livingston's note from 2/15/2018 for further details.  The above procedure was recommended for ongoing treatment and management of her breast cancer. PAC referral for risk assessment and optimization of  anesthesia with comorbid conditions of: as above; MERLIN; COPD; h/o pneumonitis; depression/anxiety;  peridontal disease; and recent blood transfusion.     She has the following specific operative considerations:     1.  Cardiology - Denies cardiac history or symptoms.  EF 60-65% without valvular disease on echo, 12/28/17.  METS<4. RCRI : No serious cardiac risks.  0.4 % risk of major adverse cardiac event.       2.  Pulmonary - 20 pack years smoking hx.  Quit 2000.       - COPD, no change in  SUAZO and denies cough or phlegm production.  Use inhalers as prescribed and bring with DOS.  PFTs 2016>>>Moderately-Severe  Airflow Obstruction and Normal diffusing capacity.  Compared with testing from 11/7/2014 there has been no significant change in the FEV1 or FVC.       - Known MERLIN - reports not using CPAP on regular basis.  Discussed the importance of using CPAP as directed leading up to surgery and to bring with DOS.        - Pneumonitis has resolved.  Last dose of prednisone 2/16/2018.  Was on PPI for any steroid associated GERD.   3.  Hematology/Oncology - VTE risk:  1.8%       - Stage IIa breast cancer>>>above surgery planned.   4.  GI - Risk of PONV score = 2.  If > 2, anti-emetic intervention recommended.     -   5.  Neuro - anxiety/depression - take antianxiety/antidepressant DOS  6.  Hospitalized for neutropenic fever and C. Diff colitis 1/29-2/8/2018.  No clear etiology for neutropenic fever beyond C. Diff.  Bronchoscopy negative  and AFB still pending. C. Diff colitis - since antibiotics and return of appetite, diarrhea has resolved.  Continue Augmentin and Vancomycin as prescribed.          - Anesthesia considerations:  Refer to PAC assessment in anesthesia records  - Airway:  Appears feasible  - Recommend case be done at [facility] given MERLIN (untreated since starting chemotherapy), COPD, and recent hospitalization of neutropenic fever and C. Diff.     Arrival time, NPO, shower and medication instructions  provided by nursing staff today.  Preparing For Your Surgery handout given.  Patient was discussed with Dr Sheets. I spent 20 minutes face to face with patient assessing, educating, counseling and/or coordinating care and examining the patient.  Of that 20 minutes, I spent greater than 50% of my time counseling and/or coordinating care.  All of the above labs and procedures I personally reviewed.      Reviewed and Signed by PAC Mid-Level Provider/Resident  Mid-Level Provider/Resident: Berna RODRIGUEZ CNP  Date: 2/21/2018  Time: 13:24    Attending Anesthesiologist Anesthesia Assessment:  I have examined the patient and reviewed the medical record.  I have discussed the patient with the NAI and concur with her assessment  The patient is scheduled for lumpectomy and portacath removal after chemotherapy for Stage IIa breast cancer.  (Her chemotherapy course has been tempestuous with 3 hospitalizations including one from 1/28 - 2/8 for neutropenic fever and c difficile infection.  Her other co mordities include moderate to severe COPD, MERLIN (not using her CPAP), recent significant steroid use    PE:  Frail appearing female in NAD.  MPC 2, 3 FBTMD, decreased extension.  Lungs crackles and rhonchi at bases.  CV  RRR without murmur  SpO2 97%  RR 16    Given moderate to severe COPD (FeV1/FVC 52%) untreated MERLIN and recent hospitalization with fever patient is not appropriate for ASC  Move to UU.  No further testing necessary per protocol.  Final plan per attending anesthesiologist the day of surgery.  Consider stress dose steroids  Instructed in IS and to bring CPAP with her to hosptial    Reviewed and Signed by PAC Anesthesiologist  Anesthesiologist: Denny Sheets MD  Date: 2/21/2018  Time:   Pass/Fail:   Disposition:     PAC Pharmacist Assessment:        Pharmacist:   Date:   Time:                           .

## 2018-02-21 NOTE — MR AVS SNAPSHOT
After Visit Summary   2018    Ashleigh Alonzo    MRN: [id number]           Patient Information     Date Of Birth          1960        Visit Information        Provider Department      2018 2:00 PM Rn, [facility]        Care Instructions    Preparing for Your Surgery      Name:  Ashleigh Alonzo   MRN:  [id number]   :  1960   Today's Date:  2018     Arriving for surgery:  Surgery date:  TBD - A nurse will call you 1-2 days before surgery to confirm date, time, and location  Arrival time:  TBD    Please come to:     TBD    What can I eat or drink?  -  You may have solid food or milk products until 8 hours before surgery.  -  You may have water, gatorade, apple juice or 7up/Sprite until 2 hours before surgery.    Which medicines can I take?    -  Hold Aspirin and all vitamins/supplements until after surgery.    -  Take these medications the day of surgery:  ALL other regular morning medications and inhalers.           -  Do not bring your own medications to the hospital, except for inhalers and eye drops.    How do I prepare myself?  -  Take two showers: one the night before surgery; and one the morning of surgery.         Use Scrubcare or Hibiclens to wash from neck down.  You may use your own shampoo and conditioner. No other hair products.   -  Do NOT use lotion, powder, deodorant, or antiperspirant the day of your surgery.  -  Do NOT wear any makeup, fingernail polish or jewelry.        -  Bring your CPAP machine if you use one.    -  Bring your ID and insurance card.    AFTER YOUR SURGERY  Breathing exercises   Breathing exercises help you recover faster. Take deep breaths and let the air out slowly. This will:     Help you wake up after surgery.    Help prevent complications like pneumonia.  Preventing complications will help you go home sooner.   Nausea and vomiting   You may feel sick to your stomach after surgery; if so, let your nurse  know.    Pain control:  After surgery, you may have pain. Our goal is to help you manage your pain. Pain medicine will help you feel comfortable enough to do activities that will help you heal.  These activities may include breathing exercises, walking and physical therapy.   To help your health care team treat your pain we will ask: 1) If you have pain  2) where it is located 3) describe your pain in your words  Methods of pain control include medications given by mouth, vein or by nerve block for some surgeries.  Sequential Compression Device (SCD) or Pneumo Boots:  You may need to wear SCD S on your legs or feet. These are wraps connected to a machine that pumps in air and releases it. The repeated pumping helps prevent blood clots from forming.     Questions or Concerns:  If you have questions or concerns, please call the  Preoperative Assessment Center, Monday-Friday 7AM-7PM:  [phone].          Follow-ups after your visit        Your next 10 appointments already scheduled     Feb 21, 2018  2:00 PM CST   (Arrive by 1:45 PM)   PAC RN ASSESSMENT with Esther Pac Rn   [facility] ([facility])    [address] [place] [address]  [address] [facility] [address] [phone]            Feb 21, 2018  2:30 PM CST   LAB with  LAB   [facility] Lab ([facility])    [address] [place] [id number] [phone]           Please do not eat 10-12 hours before your appointment if you are coming in fasting for labs on lipids, cholesterol, or glucose (sugar). This does not apply to pregnant women. Water, hot tea and black coffee (with nothing added) are okay. Do not drink other fluids, diet soda or chew gum.            Feb 28, 2018  8:30 AM CST   US BREAST WIRE PLACEMENT RIGHT with  Breast Rad, [email or web address],  BREAST NURSE   [facility] ([facility])    [address] [place] [address]  [facility] [id number] [phone]           Please bring a list of your medicines (including vitamins, minerals and over-the-counter drugs). Also, tell your doctor about any allergies you may have. Wear comfortable clothes and leave your valuables at home.  You do not need to do anything special to prepare for your exam.  Please call the Imaging Department at your exam site with any questions.            Feb 28, 2018  9:30 AM CST   (Arrive by 9:15 AM)   NM SENTINEL NODE INJECTION BILATERAL with [email or web address]   [facility] [place], Nuclear Medicine ([facility])    [address] [facility] [id number] [phone]           Please bring a list of your medicines to the exam. (Include vitamins, minerals and over-the-counter drugs.) You should wear comfortable clothes. Leave your valuables at home. Please bring related prior results and films.  Tell your doctor:   If you are breastfeeding or may be pregnant.   If you have had a barium test within the past few days. Barium may change the results of certain exams.   If you think you may need sedation (medicine to help you relax).  You may eat and drink as normal.  Please call your Imaging Department at your exam site with any questions.            Feb 28, 2018  9:30 AM CST   MA POST PROCEDURE RIGHT with [email or web address]   [facility] ([facility])    [address] [id number] [phone]           Do not use any powder, lotion or deodorant under your arms or on your breast. If you do, we will ask you to remove it before your exam.  Wear comfortable, two-piece clothing.  If you have any allergies, tell your care team.  Bring any previous mammograms from other facilities or have them mailed to the breast center.            Feb 28, 2018   Procedure with Atul Gates MD   [facility] ([facility])    [address] [place] [address]  5th Floor  [facility] [id number] [phone]           Located in the [facility] at [address] [place] [address], [place] [address].   parking is very convenient and highly recommended.  is a $6 flat rate fee.  Both  and self parkers should enter the main arrival plaza from [facility]; parking attendants will direct you based on your parking preference.            Feb 28, 2018  1:00 PM CST   MA BREAST SPECIMEN RIGHT with [email or web address]   [facility] ([facility])    [address] [place] [address]  2nd Floor  [facility] [id number] [phone]           Do not use any powder, lotion or deodorant under your arms or on your breast. If you do, we will ask you to remove it before your exam.  Wear comfortable, two-piece clothing.  If you have any allergies, tell your care team.  Bring any previous mammograms from other facilities or have them mailed to the breast center.            Mar 12, 2018  8:45 AM CDT   (Arrive by 8:30 AM)   Return Visit with Fara Bradshaw MD   [facility] ([facility])    [address] [place] [address]  [address]  [facility] [address] [phone]              Who to contact     Please call your clinic at [phone] to:    Ask questions about your health    Make or cancel appointments    Discuss your medicines    Learn about your test results    Speak to your doctor            Additional Information About Your Visit        [employer] Information     [employer] gives you secure access to your electronic health record. If you see a primary care provider, you can also send messages to your care team and make appointments. If you have questions, please call your primary care clinic.  If you do not have a primary care provider, please call [phone] and they will assist you.      [employer] is an electronic gateway that provides easy, online access to your  medical records. With [employer], you can request a clinic appointment, read your test results, renew a prescription or communicate with your care team.     To access your existing account, please contact your [facility] or call [phone] for assistance.        Care EveryWhere ID     This is your Care EveryWhere ID. This could be used by other organizations to access your [place] medical records  [id number]         Blood Pressure from Last 3 Encounters:   02/21/18 114/79   02/15/18 114/65   02/08/18 110/70    Weight from Last 3 Encounters:   02/21/18 68.5 kg (151 lb 1.6 oz)   02/15/18 66.2 kg (146 lb)   02/08/18 66.5 kg (146 lb 8 oz)              Today, you had the following     No orders found for display         Today's Medication Changes          These changes are accurate as of 2/21/18  1:50 PM.  If you have any questions, ask your nurse or doctor.               These medicines have changed or have updated prescriptions.        Dose/Directions    citalopram 10 MG tablet   Commonly known as:  celeXA   This may have changed:  when to take this   Used for:  Anxiety, Depression, unspecified depression type        Dose:  10 mg   Take 1 tablet (10 mg) by mouth daily   Quantity:  90 tablet   Refills:  3       darifenacin 7.5 MG 24 hr tablet   Commonly known as:  ENABLEX   This may have changed:    - when to take this  - reasons to take this   Used for:  Overactive bladder        Dose:  7.5 mg   Take 1 tablet (7.5 mg) by mouth daily   Quantity:  90 tablet   Refills:  3       tiotropium 18 MCG capsule   Commonly known as:  SPIRIVA   This may have changed:    - when to take this  - additional instructions   Used for:  Chronic obstructive pulmonary disease, unspecified COPD type (H)        Dose:  18 mcg   Inhale 1 capsule (18 mcg) into the lungs daily Inhale contents of one capsule   Quantity:  1 capsule   Refills:  11                Primary Care Provider Office Phone # Fax #    Radha METZGER  MD Debora [phone]       [address] [address]        Equal Access to Services     THERESA HUGHES : Hadii aad ku hadbeckfacundo Karenali, wabridgerda luwarren, qaelianata kadonaldo lopez, bang aguilarkenny orrkristiansoham pickering. So [facility] [phone].    ATENCIÓN: Si habla español, tiene a blackman disposición servicios gratuitos de asistencia lingüística. Llame al [phone].    We comply with applicable federal civil rights laws and [place] laws. We do not discriminate on the basis of race, color, national origin, age, disability, sex, sexual orientation, or gender identity.            Thank you!     Thank you for choosing [facility]OPER[facility]  for your care. Our goal is always to provide you with excellent care. Hearing back from our patients is one way we can continue to improve our services. Please take a few minutes to complete the written survey that you may receive in the mail after your visit with us. Thank you!             Your Updated Medication List - Protect others around you: Learn how to safely use, store and throw away your medicines at www.disposemymeds.org.          This list is accurate as of 2/21/18  1:50 PM.  Always use your most recent med list.                   Brand Name Dispense Instructions for use Diagnosis    albuterol 108 (90 BASE) MCG/ACT Inhaler    PROAIR HFA/PROVENTIL HFA/VENTOLIN HFA    1 Inhaler    Inhale 2 puffs into the lungs every 6 hours as needed for shortness of breath / dyspnea    Chronic obstructive pulmonary disease, unspecified COPD type (H)       amoxicillin-clavulanate 875-125 MG per tablet    AUGMENTIN    28 tablet    Take 1 tablet by mouth every 12 hours for 14 days    Malignant neoplasm of upper-inner quadrant of right breast in female, estrogen receptor negative (H)       calcium carbonate 600 MG tablet   Generic drug:  calcium carbonate     60 tablet    Take by mouth 2 times daily (with  meals)        citalopram 10 MG tablet    celeXA    90 tablet    Take 1 tablet (10 mg) by mouth daily    Anxiety, Depression, unspecified depression type       co-enzyme Q-10 30 MG/5ML Liqd liquid      Take by mouth every morning        darifenacin 7.5 MG 24 hr tablet    ENABLEX    90 tablet    Take 1 tablet (7.5 mg) by mouth daily    Overactive bladder       guaiFENesin 600 MG 12 hr tablet    MUCINEX    180 tablet    Take 2 tablets (1,200 mg) by mouth 2 times daily    Cough with sputum       hydrOXYzine 25 MG tablet    ATARAX    100 tablet    Take 1-2 tablets (25-50 mg) by mouth every 6 hours as needed for itching    Hives       lidocaine visc 2% 2.5mL/5mL & maalox/mylanta w/ simeth 2.5mL/5mL & diphenhydrAMINE 5mg/5mL Susp suspension    [facility] Mouthwash [facility]    1 Bottle    Swish and swallow 10 mLs in mouth every 6 hours as needed for mouth sores    Mucositis       lidocaine-prilocaine cream    EMLA    30 g    Please apply to port site 30 minutes before use prn    Personal history of malignant neoplasm of breast       LORazepam 0.5 MG tablet    ATIVAN    30 tablet    Take 1 tablet (0.5 mg) by mouth every 4 hours as needed (Anxiety, Nausea/Vomiting or Sleep)    Malignant neoplasm of upper-inner quadrant of right breast in female, estrogen receptor negative (H)       MULTIPLE VITAMIN PO      Take 1 tablet by mouth every morning        order for [facility]      [facility] 12-18 cm, F&P Simplus FFM small.    MERLIN (obstructive sleep apnea)       tiotropium 18 MCG capsule    SPIRIVA    1 capsule    Inhale 1 capsule (18 mcg) into the lungs daily Inhale contents of one capsule    Chronic obstructive pulmonary disease, unspecified COPD type (H)       vancomycin 50 mg/mL Liqd solution    VANOCIN    210 mL    Take 2.5 mLs (125 mg) by mouth 4 times daily for 21 days    Malignant neoplasm of upper-inner quadrant of right breast in female, estrogen receptor negative (H)       VITAMIN B 12 PO      Take 100 mcg  by mouth every morning        VITAMIN B6 PO      Take 1 tablet by mouth every morning        vitamin D 1000 UNITS capsule      TAKE 2 CAPSULES BY MOUTH EVERY MORNING

## 2018-02-21 NOTE — H&P
Pre-Operative H & P     CC:  Preoperative exam to assess for increased cardiopulmonary risk while undergoing surgery and anesthesia.    Date of Encounter: 2/21/2018  Primary Care Physician:  Radha Cazares  Ashleigh Alonzo is a 57 year old female who presents for pre-operative H & P in preparation for Right Wire Localized Lumpectomy, Right [place] Lymph Node Biopsy And Freddy Cath Removal on 2/28/2018 with Dr. Gates with location TBD under general anesthesia.  Ms. Alonzo has stage IIa right breast cancer and recently completed neoadjuvant chemotherapy.  Of significance, she was hospitalized from 1/29-2/8/2018 for neutropenic fever of unknown etiology and C. Diff colitis. During that hospitalization she also received 2 u PRBCs.  Please see Lilia Livingston's note from 2/15/2018 for further details.  The above procedure was recommended for ongoing treatment and management of her breast cancer. PAC referral for risk assessment and optimization of anesthesia with comorbid conditions of: as above; MERLIN; COPD; h/o pneumonitis; depression/anxiety;  peridontal disease; and recent blood transfusion.     History is obtained from the patient and EMR.     Past Medical History  Past Medical History:   Diagnosis Date     Anxiety      COPD (chronic obstructive pulmonary disease) (H) 2011     Depression      Malignant neoplasm of upper-inner quadrant of right breast in female, estrogen receptor negative (H) 8/30/2017     Obstructive sleep apnea      Periodontal disease      Sleep apnea 12/2015     Urticaria        Past Surgical History  Past Surgical History:   Procedure Laterality Date     APPENDECTOMY  10/6/2015     BRONCHOSCOPY (RIGID OR FLEXIBLE), DIAGNOSTIC N/A 2/6/2018    Procedure: COMBINED BRONCHOSCOPY (RIGID OR FLEXIBLE), LAVAGE;  COMBINED BRONCOSCOY (RIGID OR FLEXIBLE), LAVAGE;  Surgeon: Samir Pettit MD;  Location:  GI     CATARACT IOL, RT/LT  11/2016    bilateral      COLONOSCOPY  11/15/2011     Procedure:COLONOSCOPY; Colonoscopy, screening; Surgeon:ANASTASIA BUNCH; Location:[facility]     INSERT PORT VASCULAR ACCESS Left 9/1/2017    Procedure: INSERT PORT VASCULAR ACCESS;  Single Lumen Chest Power Port;  Surgeon: Leif Parkinson PA-C;  Location: [facility]     TUBAL LIGATION  12/2004    Bilateral       Hx of Blood transfusions/reactions: yes without reaction     Hx of abnormal bleeding or anti-platelet use: denies    Menstrual history: No LMP recorded. Patient is postmenopausal.    Steroid use in the last year: yes >>> complete prednisone on 2/16/18    Personal or FH with difficulty with Anesthesia:  denies    Prior to Admission Medications  Current Outpatient Prescriptions   Medication Sig Dispense Refill     co-enzyme Q-10 30 MG/5ML LIQD liquid Take by mouth every morning       vancomycin (VANOCIN) 50 mg/mL LIQD solution Take 2.5 mLs (125 mg) by mouth 4 times daily for 21 days 210 mL 0     amoxicillin-clavulanate (AUGMENTIN) 875-125 MG per tablet Take 1 tablet by mouth every 12 hours for 14 days 28 tablet 0     calcium carbonate (CALCIUM CARBONATE) 600 MG tablet Take by mouth 2 times daily (with meals) 60 tablet      hydrOXYzine (ATARAX) 25 MG tablet Take 1-2 tablets (25-50 mg) by mouth every 6 hours as needed for itching 100 tablet 2     guaiFENesin (MUCINEX) 600 MG 12 hr tablet Take 2 tablets (1,200 mg) by mouth 2 times daily 180 tablet 6     tiotropium (SPIRIVA) 18 MCG capsule Inhale 1 capsule (18 mcg) into the lungs daily Inhale contents of one capsule (Patient taking differently: Inhale 18 mcg into the lungs every morning Inhale contents of one capsule) 1 capsule 11     citalopram (CELEXA) 10 MG tablet Take 1 tablet (10 mg) by mouth daily (Patient taking differently: Take 10 mg by mouth every evening ) 90 tablet 3     MULTIPLE VITAMIN PO Take 1 tablet by mouth every morning        Cyanocobalamin (VITAMIN B 12 PO) Take 100 mcg by mouth every morning        Pyridoxine HCl (VITAMIN B6 PO) Take 1  tablet by mouth every morning        VITAMIN D 1000 UNIT OR CAPS TAKE 2 CAPSULES BY MOUTH EVERY MORNING       ASPIRIN PO Take 81 mg by mouth daily       magic mouthwash suspension (diphenhydrAMINE, lidocaine, aluminum-magnesium & simethicone) Swish and swallow 10 mLs in mouth every 6 hours as needed for mouth sores (Patient not taking: Reported on 2/15/2018) 1 Bottle 3     lidocaine-prilocaine (EMLA) cream Please apply to port site 30 minutes before use prn 30 g 1     LORazepam (ATIVAN) 0.5 MG tablet Take 1 tablet (0.5 mg) by mouth every 4 hours as needed (Anxiety, Nausea/Vomiting or Sleep) (Patient not taking: Reported on 2/15/2018) 30 tablet 2     albuterol (PROAIR HFA/PROVENTIL HFA/VENTOLIN HFA) 108 (90 BASE) MCG/ACT Inhaler Inhale 2 puffs into the lungs every 6 hours as needed for shortness of breath / dyspnea 1 Inhaler 5     darifenacin (ENABLEX) 7.5 MG 24 hr tablet Take 1 tablet (7.5 mg) by mouth daily (Patient taking differently: Take 7.5 mg by mouth daily as needed ) 90 tablet 3     order for [facility] [place] Auto CPAP 12-18 cm, F&P Simplus FFM small. (Patient not taking: Reported on 1/23/2018)         Allergies  No Known Allergies    Social History  Social History     Social History     Marital status: Single     Spouse name: Aly     Number of children: 2     Years of education: N/A     Occupational History     Equipment dispatcher [employer]      Social History Main Topics     Smoking status: Former Smoker     Packs/day: 1.00     Years: 20.00     Types: Cigarettes     Quit date: 1/1/2000     Smokeless tobacco: Never Used     Alcohol use 0.6 - 1.2 oz/week     1 - 2 Cans of beer per week      Comment: daily     Drug use: No     Sexual activity: Not Currently     Partners: Male     Other Topics Concern     Parent/Sibling W/ Cabg, Mi Or Angioplasty Before 65f 55m? Yes     Social History Narrative    Patient [place] in [facility]. She [place] in [place] with [place]  . 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 No recent travel. No international travel. No sick contacts or exposure to small children (she has small grand children, 1-3 y/o but has not seen them in a few weeks). They have two dogs and a cat, and she takes care of the litter box sometimes, however she has not clean after the cat since her cancer diagnosis.       Family History  Family History   Problem Relation Age of Onset     Cardiovascular Father 46     MI     Eye Disorder Father      CEREBROVASCULAR DISEASE Father      Arthritis Sister      Neurologic Disorder Brother      CANCER No family hx of      DIABETES No family hx of      Hypertension No family hx of      ROS/MED HX    ENT/Pulmonary:     (+)sleep apnea (Has not used recently .), Past use 1 PPD packs/day  moderate COPD, doesn't use CPAP , . Other pulmonary disease H/o Pneuminitis in last six months. .   Tobacco use: Quit in 2000.  Smoked 1 PPD for 20 years.     Neurologic:     (+)neuropathy - Toes post chemotherapy- intermittent,     Cardiovascular:  - neg cardiovascular ROS       METS/Exercise Tolerance: Comment: METS<4    Hematologic:     (+) History of Transfusion no previous transfusion reaction -      Musculoskeletal:  - neg musculoskeletal ROS       GI/Hepatic:     (+) appendicitis (S/P appendectomy),       Renal/Genitourinary:  - ROS Renal section negative   (+) Other Renal/ Genitourinary, Urinary incontinence.       Endo:  - neg endo ROS       Psychiatric:     (+) psychiatric history depression and anxiety      Infectious Disease:  - neg infectious disease ROS       Malignancy:   (+) Malignancy History of Breast  Breast CA Active status post Chemo.         Other: Comment: Hospitalized from 1/29-2/8/2018 with neutropenic fever of unknown etiology and C Diff colitis.   Continues on Augmentin and Vancomycin.    (+) No chance of pregnancy C-spine cleared: N/A, no H/O Chronic Pain,          Temp: 98.3  F (36.8  C) Temp  "src: Oral BP: 114/79 Pulse: 116   Resp: 16 SpO2: 97 %         151 lbs 1.6 oz  5' 4.5\"   Body mass index is 25.54 kg/(m^2).       Physical Exam  Constitutional: Awake, alert, cooperative, no apparent distress, and appears frail and older than stated age.  Eyes: Pupils equal, round and reactive to light, extra ocular muscles intact, sclera clear, conjunctiva normal.  HENT: Normocephalic, oral pharynx with moist mucus membranes, good dentition. No goiter appreciated.   Respiratory: Diminished lung sounds with fine crackles in the bases bilaterally.  Upper lobes clear.   Cardiovascular: Regular rate and rhythm, normal S1 and S2, and no murmur noted.  Carotids +2, no bruits. No edema. Palpable pulses to radial  DP and PT arteries.   GI: Normal bowel sounds, soft, non-distended, non-tender, no masses palpated, no hepatosplenomegaly.    Lymph/Hematologic: No cervical lymphadenopathy and no supraclavicular lymphadenopathy.  Genitourinary:  na  Skin: Warm and dry.    Musculoskeletal: Full ROM of neck. There is no redness, warmth, or swelling of the joints. Gross motor strength is normal.    Neurologic: Awake, alert, oriented to name, place and time. Cranial nerves II-XII are grossly intact. Gait is normal.   Neuropsychiatric: Calm, cooperative. Normal affect.     Labs: (personally reviewed)  Lab Results   Component Value Date    WBC 8.7 02/15/2018     Lab Results   Component Value Date    RBC 3.36 02/15/2018     Lab Results   Component Value Date    HGB 10.6 02/15/2018     Lab Results   Component Value Date    HCT 33.0 02/15/2018     Lab Results   Component Value Date    MCV 98 02/15/2018     Lab Results   Component Value Date    MCH 31.5 02/15/2018     Lab Results   Component Value Date    MCHC 32.1 02/15/2018     Lab Results   Component Value Date    RDW 16.7 02/15/2018     Lab Results   Component Value Date     02/15/2018       Last Basic Metabolic Panel:  Lab Results   Component Value Date     02/15/2018    "   Lab Results   Component Value Date    POTASSIUM 3.4 02/15/2018     Lab Results   Component Value Date    CHLORIDE 104 02/15/2018     Lab Results   Component Value Date    JAVIER 8.6 02/15/2018     Lab Results   Component Value Date    CO2 23 02/15/2018     Lab Results   Component Value Date    BUN 6 02/15/2018     Lab Results   Component Value Date    CR 0.56 02/15/2018     Lab Results   Component Value Date     02/15/2018     Procedures  ECG SR 88 bpm 1/31/2018    Echocardiogram 12/28/2017  Interpretation Summary  Global and regional left ventricular function is normal with an EF of 60-65%  (calculated, 70%.)  Global peak LV longitudinal strain is averaged at -18.7%. This is within  reported normal limits (normal <-18%).  Global right ventricular function is normal.  Pulmonary artery systolic pressure is normal.  The inferior vena cava is normal.  No pericardial effusion is present.  This study was compared with the study from 9/1/2017. There has been no  Change.    PFTs 11/2016  FVC-Pred 3.19   L  11/15/2016  2:10   FVC-Pre 2.45   L  11/15/2016  2:10   FVC-%Pred-Pre 76   %  11/15/2016  2:10   FEV1-Pre 1.33   L  11/15/2016  2:10   FEV1-%Pred-Pre 52   %  11/15/2016  2:10   FEV1FVC-Pred 80   %  11/15/2016  2:10   FEV1FVC-Pre 54   %  11/15/2016  2:10   FEFMax-Pred 6.36   L/sec  11/15/2016  2:10   FEFMax-Pre 4.70   L/sec  11/15/2016  2:10   FEFMax-%Pred-Pre 73   %  11/15/2016  2:10   FEF2575-Pred 2.39   L/sec  11/15/2016  2:10   FEF2575-Pre 0.45   L/sec  11/15/2016  2:10   PER5592-%Pred-Pre 18   %  11/15/2016  2:10   ExpTime-Pre 11.46   sec  11/15/2016  2:10   FIFMax-Pre 2.92   L/sec  11/15/2016  2:10   VC-Pred 3.25   L  11/15/2016  2:10   VC-Pre 2.36   L  11/15/2016  2:10   VC-%Pred-Pre 72   %  11/15/2016  2:10   IC-Pred 2.65   L  11/15/2016  2:10   IC-Pre 2.13   L  11/15/2016  2:10    IC-%Pred-Pre 80   %  11/15/2016  2:10   ERV-Pred 0.60   L  11/15/2016  2:10   ERV-Pre 0.23   L  11/15/2016  2:10   ERV-%Pred-Pre 38   %  11/15/2016  2:10   FEV1FEV6-Pred 81   %  11/15/2016  2:10   FEV1FEV6-Pre 60   %  11/15/2016  2:10   DLCOunc-Pred 21.71   ml/min/mmHg  11/15/2016  2:10   DLCOunc-Pre 21.80   ml/min/mmHg  11/15/2016  2:10   DLCOunc-%Pred-Pre 100   %  11/15/2016  2:10   VA-Pre 4.16   L  11/15/2016  2:10   VA-%Pred-Pre 84   %  11/15/2016  2:10   FEV1SVC-Pred 78   %  11/15/2016  2:10   FEV1SVC-Pre 56   %  11/15/2016  2:10   Narrative   IMPRESSION:  Moderately-Severe  Airflow Obstruction   Normal diffusing capacity.  Compared with testing from 11/7/2014 there has been no significant change in the FEV1 or FVC.      ASSESSMENT and PLAN  Ashleigh Alonzo is a 57 year old female scheduled to undergo Right Wire Localized Lumpectomy, Right [place] Lymph Node Biopsy And Freddy Cath Removal on 2/28/2018 with Dr. Gates with location TBD under general anesthesia.      She has the following specific operative considerations:     1.  Cardiology - Denies cardiac history or symptoms.  EF 60-65% without valvular disease on echo, 12/28/17.  METS<4. RCRI : No serious cardiac risks.  0.4 % risk of major adverse cardiac event.       2.  Pulmonary - 20 pack years smoking hx.  Quit 2000.       - COPD, no change in  SUAZO and denies cough or phlegm production.  Use inhalers as prescribed and bring with DOS.  PFTs 2016>>>Moderately-Severe  Airflow Obstruction and Normal diffusing capacity.  Compared with testing from 11/7/2014 there has been no significant change in the FEV1 or FVC.       - Known MERLIN - reports not using CPAP on regular basis.  Discussed the importance of using CPAP as directed leading up to surgery and to bring with DOS.        - Pneumonitis >>> resolved.  Last dose of prednisone 2/16/2018.  Was on PPI for steroid associated  GERD.   3.  Hematology/Oncology - VTE risk:  1.8%       - Stage IIa breast cancer with neoadjuvant chemotherapy recently completed>>>above surgery planned.   4.  GI - Risk of PONV score = 2.  If > 2, anti-emetic intervention recommended.       5.  Neuro - anxiety/depression - take antianxiety/antidepressant DOS  6.  Hospitalized for neutropenic fever and C. Diff colitis 1/29-2/8/2018.  No clear etiology for neutropenic fever beyond C. Diff.  Bronchoscopy negative  and AFB still pending. C. Diff colitis - since antibiotics and return of appetite, diarrhea has resolved.  Continue Augmentin and Vancomycin as prescribed.          - Anesthesia considerations:  Refer to PAC assessment in anesthesia records  - Airway:  Appears feasible  - Recommend case be done at [facility] given MERLIN (untreated since starting chemotherapy), COPD, and recent hospitalization of neutropenic fever and C. Diff.     Arrival time, NPO, shower and medication instructions provided by nursing staff today.  Preparing For Your Surgery handout given.  Patient was discussed with Dr Sheets. I spent 20 minutes face to face with patient assessing, educating, counseling and/or coordinating care and examining the patient.  Of that 20 minutes, I spent greater than 50% of my time counseling and/or coordinating care.  All of the above labs and procedures I personally reviewed.      JENNIFER Lewis CNP  Preoperative Assessment Center  [facility]  Phone: [phone]  Fax: [phone]

## 2018-02-21 NOTE — PATIENT INSTRUCTIONS
Preparing for Your Surgery      Name:  Ashleigh Alonzo   MRN:  [id number]   :  1960   Today's Date:  2018     Arriving for surgery:  Surgery date:  TBD - A nurse will call you 1-2 days before surgery to confirm date, time, and location  Arrival time:  TBD    Please come to:     TBD    What can I eat or drink?  -  You may have solid food or milk products until 8 hours before surgery.  -  You may have water, gatorade, apple juice or 7up/Sprite until 2 hours before surgery.    Which medicines can I take?    -  Hold Aspirin and all vitamins/supplements until after surgery.    -  Take these medications the day of surgery:  ALL other regular morning medications and inhalers.           -  Do not bring your own medications to the hospital, except for inhalers and eye drops.    How do I prepare myself?  -  Take two showers: one the night before surgery; and one the morning of surgery.         Use Scrubcare or Hibiclens to wash from neck down.  You may use your own shampoo and conditioner. No other hair products.   -  Do NOT use lotion, powder, deodorant, or antiperspirant the day of your surgery.  -  Do NOT wear any makeup, fingernail polish or jewelry.        -  Bring your CPAP machine if you use one.    -  Bring your ID and insurance card.    AFTER YOUR SURGERY  Breathing exercises   Breathing exercises help you recover faster. Take deep breaths and let the air out slowly. This will:     Help you wake up after surgery.    Help prevent complications like pneumonia.  Preventing complications will help you go home sooner.   Nausea and vomiting   You may feel sick to your stomach after surgery; if so, let your nurse know.    Pain control:  After surgery, you may have pain. Our goal is to help you manage your pain. Pain medicine will help you feel comfortable enough to do activities that will help you heal.  These activities may include breathing exercises, walking and physical therapy.   To help your health care  team treat your pain we will ask: 1) If you have pain  2) where it is located 3) describe your pain in your words  Methods of pain control include medications given by mouth, vein or by nerve block for some surgeries.  Sequential Compression Device (SCD) or Pneumo Boots:  You may need to wear SCD S on your legs or feet. These are wraps connected to a machine that pumps in air and releases it. The repeated pumping helps prevent blood clots from forming.     Questions or Concerns:  If you have questions or concerns, please call the  Preoperative Assessment Center, Monday-Friday 7AM-7PM:  [phone].

## 2018-02-22 ENCOUNTER — TELEPHONE (OUTPATIENT)
Dept: ONCOLOGY | Facility: CLINIC | Age: 58
End: 2018-02-22

## 2018-02-22 NOTE — TELEPHONE ENCOUNTER
Call to [facility] transport @ [phone] to arrange transport [facility] [place] [facility] on 2/27. Pt's schedule for 2/27 surgery day is: wire localization procedure @ [facility] @ 8:30 am, [facility] to pick-up for transport @ 9:15 to [facility], they will take pt to  for her surgery.

## 2018-02-26 DIAGNOSIS — Z17.1 MALIGNANT NEOPLASM OF UPPER-INNER QUADRANT OF RIGHT BREAST IN FEMALE, ESTROGEN RECEPTOR NEGATIVE (H): Primary | ICD-10-CM

## 2018-02-26 DIAGNOSIS — C50.211 MALIGNANT NEOPLASM OF UPPER-INNER QUADRANT OF RIGHT BREAST IN FEMALE, ESTROGEN RECEPTOR NEGATIVE (H): Primary | ICD-10-CM

## 2018-02-27 ENCOUNTER — HOSPITAL ENCOUNTER (OUTPATIENT)
Facility: CLINIC | Age: 58
Discharge: HOME | End: 2018-02-27
Attending: SURGERY | Admitting: SURGERY
Payer: COMMERCIAL

## 2018-02-27 ENCOUNTER — RADIANT APPOINTMENT (OUTPATIENT)
Dept: MAMMOGRAPHY | Facility: CLINIC | Age: 58
End: 2018-02-27
Attending: SURGERY
Payer: COMMERCIAL

## 2018-02-27 ENCOUNTER — SURGERY (OUTPATIENT)
Age: 58
End: 2018-02-27

## 2018-02-27 ENCOUNTER — ANESTHESIA (OUTPATIENT)
Dept: SURGERY | Facility: CLINIC | Age: 58
End: 2018-02-27
Payer: COMMERCIAL

## 2018-02-27 ENCOUNTER — HOSPITAL ENCOUNTER (OUTPATIENT)
Dept: NUCLEAR MEDICINE | Facility: CLINIC | Age: 58
Setting detail: OBSERVATION
End: 2018-02-27
Attending: SURGERY | Admitting: SURGERY
Payer: COMMERCIAL

## 2018-02-27 ENCOUNTER — ANESTHESIA EVENT (OUTPATIENT)
Dept: SURGERY | Facility: CLINIC | Age: 58
End: 2018-02-27
Payer: COMMERCIAL

## 2018-02-27 VITALS
HEIGHT: 65 IN | RESPIRATION RATE: 14 BRPM | BODY MASS INDEX: 24.43 KG/M2 | TEMPERATURE: 97 F | SYSTOLIC BLOOD PRESSURE: 122 MMHG | WEIGHT: 146.61 LBS | OXYGEN SATURATION: 98 % | DIASTOLIC BLOOD PRESSURE: 78 MMHG

## 2018-02-27 DIAGNOSIS — Z17.1 MALIGNANT NEOPLASM OF UPPER-INNER QUADRANT OF RIGHT BREAST IN FEMALE, ESTROGEN RECEPTOR NEGATIVE (H): ICD-10-CM

## 2018-02-27 DIAGNOSIS — C50.211 MALIGNANT NEOPLASM OF UPPER-INNER QUADRANT OF RIGHT BREAST IN FEMALE, ESTROGEN RECEPTOR NEGATIVE (H): ICD-10-CM

## 2018-02-27 DIAGNOSIS — Z17.1 MALIGNANT NEOPLASM OF UPPER-INNER QUADRANT OF RIGHT BREAST IN FEMALE, ESTROGEN RECEPTOR NEGATIVE (H): Primary | ICD-10-CM

## 2018-02-27 DIAGNOSIS — C50.211 MALIGNANT NEOPLASM OF UPPER-INNER QUADRANT OF RIGHT BREAST IN FEMALE, ESTROGEN RECEPTOR NEGATIVE (H): Primary | ICD-10-CM

## 2018-02-27 DIAGNOSIS — C50.911 MALIGNANT NEOPLASM OF RIGHT BREAST (H): ICD-10-CM

## 2018-02-27 LAB
CORTIS SERPL-MCNC: 18.3 UG/DL (ref 4–22)
GLUCOSE BLDC GLUCOMTR-MCNC: 100 MG/DL (ref 70–99)

## 2018-02-27 PROCEDURE — 38792 RA TRACER ID OF SENTINL NODE: CPT

## 2018-02-27 PROCEDURE — 25000128 H RX IP 250 OP 636: Performed by: SURGERY

## 2018-02-27 PROCEDURE — 25000128 H RX IP 250 OP 636: Performed by: ANESTHESIOLOGY

## 2018-02-27 PROCEDURE — 25000128 H RX IP 250 OP 636: Performed by: NURSE ANESTHETIST, CERTIFIED REGISTERED

## 2018-02-27 PROCEDURE — 88342 IMHCHEM/IMCYTCHM 1ST ANTB: CPT | Performed by: SURGERY

## 2018-02-27 PROCEDURE — 88307 TISSUE EXAM BY PATHOLOGIST: CPT | Performed by: SURGERY

## 2018-02-27 PROCEDURE — 82962 GLUCOSE BLOOD TEST: CPT

## 2018-02-27 PROCEDURE — 27210794 ZZH OR GENERAL SUPPLY STERILE: Performed by: SURGERY

## 2018-02-27 PROCEDURE — 40000170 ZZH STATISTIC PRE-PROCEDURE ASSESSMENT II: Performed by: SURGERY

## 2018-02-27 PROCEDURE — C9399 UNCLASSIFIED DRUGS OR BIOLOG: HCPCS | Performed by: NURSE ANESTHETIST, CERTIFIED REGISTERED

## 2018-02-27 PROCEDURE — 25000125 ZZHC RX 250: Performed by: SURGERY

## 2018-02-27 PROCEDURE — 25000566 ZZH SEVOFLURANE, EA 15 MIN: Performed by: SURGERY

## 2018-02-27 PROCEDURE — 88341 IMHCHEM/IMCYTCHM EA ADD ANTB: CPT | Performed by: SURGERY

## 2018-02-27 PROCEDURE — 71000014 ZZH RECOVERY PHASE 1 LEVEL 2 FIRST HR: Performed by: SURGERY

## 2018-02-27 PROCEDURE — 25000125 ZZHC RX 250: Performed by: NURSE ANESTHETIST, CERTIFIED REGISTERED

## 2018-02-27 PROCEDURE — 37000009 ZZH ANESTHESIA TECHNICAL FEE, EACH ADDTL 15 MIN: Performed by: SURGERY

## 2018-02-27 PROCEDURE — 88305 TISSUE EXAM BY PATHOLOGIST: CPT | Performed by: SURGERY

## 2018-02-27 PROCEDURE — 25000132 ZZH RX MED GY IP 250 OP 250 PS 637: Performed by: STUDENT IN AN ORGANIZED HEALTH CARE EDUCATION/TRAINING PROGRAM

## 2018-02-27 PROCEDURE — 37000008 ZZH ANESTHESIA TECHNICAL FEE, 1ST 30 MIN: Performed by: SURGERY

## 2018-02-27 PROCEDURE — 71000027 ZZH RECOVERY PHASE 2 EACH 15 MINS: Performed by: SURGERY

## 2018-02-27 PROCEDURE — 36000061 ZZH SURGERY LEVEL 3 W FLUORO 1ST 30 MIN - UMMC: Performed by: SURGERY

## 2018-02-27 PROCEDURE — 36000059 ZZH SURGERY LEVEL 3 EA 15 ADDTL MIN UMMC: Performed by: SURGERY

## 2018-02-27 RX ORDER — ONDANSETRON 4 MG/1
4 TABLET, ORALLY DISINTEGRATING ORAL
Status: DISCONTINUED | OUTPATIENT
Start: 2018-02-27 | End: 2018-02-27 | Stop reason: HOSPADM

## 2018-02-27 RX ORDER — CEFAZOLIN SODIUM 1 G/3ML
1 INJECTION, POWDER, FOR SOLUTION INTRAMUSCULAR; INTRAVENOUS SEE ADMIN INSTRUCTIONS
Status: DISCONTINUED | OUTPATIENT
Start: 2018-02-27 | End: 2018-02-27 | Stop reason: HOSPADM

## 2018-02-27 RX ORDER — ISOSULFAN BLUE 50 MG/5ML
INJECTION, SOLUTION SUBCUTANEOUS PRN
Status: DISCONTINUED | OUTPATIENT
Start: 2018-02-27 | End: 2018-02-27 | Stop reason: HOSPADM

## 2018-02-27 RX ORDER — ONDANSETRON 2 MG/ML
4 INJECTION INTRAMUSCULAR; INTRAVENOUS EVERY 30 MIN PRN
Status: DISCONTINUED | OUTPATIENT
Start: 2018-02-27 | End: 2018-02-27 | Stop reason: HOSPADM

## 2018-02-27 RX ORDER — FENTANYL CITRATE 50 UG/ML
25-50 INJECTION, SOLUTION INTRAMUSCULAR; INTRAVENOUS
Status: DISCONTINUED | OUTPATIENT
Start: 2018-02-27 | End: 2018-02-27 | Stop reason: HOSPADM

## 2018-02-27 RX ORDER — SODIUM CHLORIDE, SODIUM LACTATE, POTASSIUM CHLORIDE, CALCIUM CHLORIDE 600; 310; 30; 20 MG/100ML; MG/100ML; MG/100ML; MG/100ML
INJECTION, SOLUTION INTRAVENOUS CONTINUOUS PRN
Status: DISCONTINUED | OUTPATIENT
Start: 2018-02-27 | End: 2018-02-27

## 2018-02-27 RX ORDER — SODIUM CHLORIDE, SODIUM LACTATE, POTASSIUM CHLORIDE, CALCIUM CHLORIDE 600; 310; 30; 20 MG/100ML; MG/100ML; MG/100ML; MG/100ML
INJECTION, SOLUTION INTRAVENOUS CONTINUOUS
Status: DISCONTINUED | OUTPATIENT
Start: 2018-02-27 | End: 2018-02-27 | Stop reason: HOSPADM

## 2018-02-27 RX ORDER — OXYCODONE HYDROCHLORIDE 5 MG/1
5-10 TABLET ORAL EVERY 4 HOURS PRN
Qty: 10 TABLET | Refills: 0 | Status: SHIPPED | OUTPATIENT
Start: 2018-02-27 | End: 2018-03-19

## 2018-02-27 RX ORDER — PROPOFOL 10 MG/ML
INJECTION, EMULSION INTRAVENOUS PRN
Status: DISCONTINUED | OUTPATIENT
Start: 2018-02-27 | End: 2018-02-27

## 2018-02-27 RX ORDER — ONDANSETRON 4 MG/1
4 TABLET, ORALLY DISINTEGRATING ORAL EVERY 30 MIN PRN
Status: DISCONTINUED | OUTPATIENT
Start: 2018-02-27 | End: 2018-02-27 | Stop reason: HOSPADM

## 2018-02-27 RX ORDER — LIDOCAINE HYDROCHLORIDE 20 MG/ML
INJECTION, SOLUTION INFILTRATION; PERINEURAL PRN
Status: DISCONTINUED | OUTPATIENT
Start: 2018-02-27 | End: 2018-02-27

## 2018-02-27 RX ORDER — DEXAMETHASONE SODIUM PHOSPHATE 4 MG/ML
INJECTION, SOLUTION INTRA-ARTICULAR; INTRALESIONAL; INTRAMUSCULAR; INTRAVENOUS; SOFT TISSUE PRN
Status: DISCONTINUED | OUTPATIENT
Start: 2018-02-27 | End: 2018-02-27

## 2018-02-27 RX ORDER — ACETAMINOPHEN 325 MG/1
650 TABLET ORAL
Status: DISCONTINUED | OUTPATIENT
Start: 2018-02-27 | End: 2018-02-27 | Stop reason: HOSPADM

## 2018-02-27 RX ORDER — CEFAZOLIN SODIUM 2 G/100ML
2 INJECTION, SOLUTION INTRAVENOUS
Status: COMPLETED | OUTPATIENT
Start: 2018-02-27 | End: 2018-02-27

## 2018-02-27 RX ORDER — FENTANYL CITRATE 50 UG/ML
INJECTION, SOLUTION INTRAMUSCULAR; INTRAVENOUS PRN
Status: DISCONTINUED | OUTPATIENT
Start: 2018-02-27 | End: 2018-02-27

## 2018-02-27 RX ORDER — NALOXONE HYDROCHLORIDE 0.4 MG/ML
.1-.4 INJECTION, SOLUTION INTRAMUSCULAR; INTRAVENOUS; SUBCUTANEOUS
Status: DISCONTINUED | OUTPATIENT
Start: 2018-02-27 | End: 2018-02-27 | Stop reason: HOSPADM

## 2018-02-27 RX ORDER — LIDOCAINE 40 MG/G
CREAM TOPICAL
Status: DISCONTINUED | OUTPATIENT
Start: 2018-02-27 | End: 2018-02-27 | Stop reason: HOSPADM

## 2018-02-27 RX ORDER — OXYCODONE HYDROCHLORIDE 10 MG/1
10 TABLET ORAL
Status: DISCONTINUED | OUTPATIENT
Start: 2018-02-27 | End: 2018-02-27 | Stop reason: HOSPADM

## 2018-02-27 RX ORDER — ONDANSETRON 2 MG/ML
INJECTION INTRAMUSCULAR; INTRAVENOUS PRN
Status: DISCONTINUED | OUTPATIENT
Start: 2018-02-27 | End: 2018-02-27

## 2018-02-27 RX ADMIN — SODIUM CHLORIDE, POTASSIUM CHLORIDE, SODIUM LACTATE AND CALCIUM CHLORIDE: 600; 310; 30; 20 INJECTION, SOLUTION INTRAVENOUS at 11:30

## 2018-02-27 RX ADMIN — ACETAMINOPHEN 650 MG: 325 TABLET, FILM COATED ORAL at 16:06

## 2018-02-27 RX ADMIN — MIDAZOLAM 1 MG: 1 INJECTION INTRAMUSCULAR; INTRAVENOUS at 13:12

## 2018-02-27 RX ADMIN — CEFAZOLIN SODIUM 2 G: 2 INJECTION, SOLUTION INTRAVENOUS at 13:38

## 2018-02-27 RX ADMIN — ONDANSETRON 4 MG: 2 INJECTION INTRAMUSCULAR; INTRAVENOUS at 14:52

## 2018-02-27 RX ADMIN — DEXAMETHASONE SODIUM PHOSPHATE 4 MG: 4 INJECTION, SOLUTION INTRA-ARTICULAR; INTRALESIONAL; INTRAMUSCULAR; INTRAVENOUS; SOFT TISSUE at 13:53

## 2018-02-27 RX ADMIN — FENTANYL CITRATE 25 MCG: 50 INJECTION, SOLUTION INTRAMUSCULAR; INTRAVENOUS at 15:43

## 2018-02-27 RX ADMIN — PHENYLEPHRINE HYDROCHLORIDE 50 MCG: 10 INJECTION, SOLUTION INTRAMUSCULAR; INTRAVENOUS; SUBCUTANEOUS at 13:53

## 2018-02-27 RX ADMIN — PROPOFOL 150 MG: 10 INJECTION, EMULSION INTRAVENOUS at 13:28

## 2018-02-27 RX ADMIN — Medication 0.3 MG: at 15:53

## 2018-02-27 RX ADMIN — ROCURONIUM BROMIDE 10 MG: 10 INJECTION INTRAVENOUS at 14:24

## 2018-02-27 RX ADMIN — ROCURONIUM BROMIDE 50 MG: 10 INJECTION INTRAVENOUS at 13:28

## 2018-02-27 RX ADMIN — SUGAMMADEX 130 MG: 100 INJECTION, SOLUTION INTRAVENOUS at 15:06

## 2018-02-27 RX ADMIN — BUPIVACAINE HYDROCHLORIDE 39 ML: 2.5 INJECTION, SOLUTION INFILTRATION; PERINEURAL at 15:02

## 2018-02-27 RX ADMIN — FENTANYL CITRATE 150 MCG: 50 INJECTION, SOLUTION INTRAMUSCULAR; INTRAVENOUS at 13:28

## 2018-02-27 RX ADMIN — LIDOCAINE HYDROCHLORIDE 100 MG: 10 INJECTION, SOLUTION EPIDURAL; INFILTRATION; INTRACAUDAL; PERINEURAL at 09:26

## 2018-02-27 RX ADMIN — LIDOCAINE HYDROCHLORIDE 100 MG: 20 INJECTION, SOLUTION INFILTRATION; PERINEURAL at 13:28

## 2018-02-27 RX ADMIN — PHENYLEPHRINE HYDROCHLORIDE 100 MCG: 10 INJECTION, SOLUTION INTRAMUSCULAR; INTRAVENOUS; SUBCUTANEOUS at 13:40

## 2018-02-27 RX ADMIN — ISOSULFAN BLUE 4 ML: 10 INJECTION, SOLUTION SUBCUTANEOUS at 13:35

## 2018-02-27 RX ADMIN — FENTANYL CITRATE 50 MCG: 50 INJECTION, SOLUTION INTRAMUSCULAR; INTRAVENOUS at 14:17

## 2018-02-27 ASSESSMENT — COPD QUESTIONNAIRES
COPD: 1
CAT_SEVERITY: MODERATE

## 2018-02-27 NOTE — ANESTHESIA POSTPROCEDURE EVALUATION
Patient: Ashleigh Alonzo    Procedure(s):  Right Wire Localized Lumpectomy, Right [place] Lymph Node Biopsy And Freddy Cath Removal - Wound Class: I-Clean   - Wound Class: I-Clean    Diagnosis:Malignant Neoplasm Of Right Breast  Diagnosis Additional Information: No value filed.    Anesthesia Type:  General, ETT    Note:  Anesthesia Post Evaluation    Patient location during evaluation: PACU  Patient participation: Able to fully participate in evaluation  Level of consciousness: awake and alert  Pain management: adequate  Airway patency: patent  Cardiovascular status: acceptable  Respiratory status: acceptable  Hydration status: acceptable  PONV: none     Anesthetic complications: None          Last vitals:  Vitals:    02/27/18 1520 02/27/18 1530 02/27/18 1545   BP: 110/71 122/68 119/76   Resp: 17 12 13   Temp: 36.6  C (97.9  F)     SpO2: 99% 97% 97%         Electronically Signed By: Char Aranda MD  February 27, 2018  3:52 PM

## 2018-02-27 NOTE — IP AVS SNAPSHOT
MRN:[id number]                      After Visit Summary   2/27/2018    Ashleigh Alonzo    MRN: [id number]           Thank you!     Thank you for choosing [facility] for your care. Our goal is always to provide you with excellent care. Hearing back from our patients is one way we can continue to improve our services. Please take a few minutes to complete the written survey that you may receive in the mail after you visit with us. Thank you!        Patient Information     Date Of Birth          1960        About your hospital stay     You were admitted on:  February 27, 2018 You last received care in the:  Post Anesthesia Care Unit [facility]    You were discharged on:  February 27, 2018       Who to Call     For medical emergencies, please call 911.  For non-urgent questions about your medical care, please call your primary care provider or clinic, [phone]  For questions related to your surgery, please call your surgery clinic        Attending Provider     Provider Specialty    Atul Gates MD General Surgery       Primary Care Provider Office Phone # Fax #    Radha Cazares [phone]      After Care Instructions     Diet Instructions       Resume pre-procedure diet            Discharge Instructions       Patient to follow up with appointment in 2 weeks            Dressing       Keep dressing clean and dry.  Dressing / incisional care as instructed by RN and or Surgeon            No Alcohol       For 24 hours post procedure            Shower       No shower for 24 hours post procedure. May shower Postoperative Day (POD)  1            Weight bearing status - As tolerated                 Your next 10 appointments already scheduled     Mar 12, 2018  8:45 AM CDT   (Arrive by 8:30 AM)   Return Visit with Fara Bradshaw MD   [facility] ([facility])    [address]  [address]  [place] [address]    [phone]              Further instructions from your care team       If you have obstructive sleep apnea     You were given anesthesia medicine during surgery to keep you comfortable and free of pain. After surgery, you may have more apnea spells because of this medicine and other medicines you were given. The spells may last longer than usual.     At home:        Keep using the continuous positive airway pressure (CPAP) device when you sleep. Unless your health care provider tells you not to, use it when you sleep, day or night. CPAP is a common device used to treat obstructive sleep apnea.           Tips for taking pain medications  To get the best pain relief possible , remember these points:      Take pain medications as directed, before pain becomes severe      Pain medication can upset your stomach: taking it with food may help      Constipation is a common side effect of pain medication. Drink plenty of  Fluids      Eat foods high in fiber. Take a stool softener  if recommended by your doctor or  Pharmacist.        Do not drink alcohol, drive or operate machinery while taking pain medications.      Ask about other ways to control pain, such as with heat, ice or relaxation.     [facility]  Same-Day Surgery   Adult Discharge Orders & Instructions     For 24 hours after surgery    1. Get plenty of rest.  A responsible adult must stay with you for at least 24 hours after you leave the hospital.   2. Do not drive or use heavy equipment.  If you have weakness or tingling, don't drive or use heavy equipment until this feeling goes away.  3. Do not drink alcohol.  4. Avoid strenuous or risky activities.  Ask for help when climbing stairs.   5. You may feel lightheaded.  IF so, sit for a few minutes before standing.  Have someone help you get up.   6. If you have nausea (feel sick to your stomach): Drink only clear liquids such as apple juice, ginger ale, broth or 7-Up.   Rest may also help.  Be sure to drink enough fluids.  Move to a regular diet as you feel able.  7. You may have a slight fever. Call the doctor if your fever is over 100 F (37.7 C) (taken under the tongue) or lasts longer than 24 hours.  8. You may have a dry mouth, a sore throat, muscle aches or trouble sleeping.  These should go away after 24 hours.  9. Do not make important or legal decisions.   Call your doctor for any of the followin.  Signs of infection (fever, growing tenderness at the surgery site, a large amount of drainage or bleeding, severe pain, foul-smelling drainage, redness, swelling).    2. It has been over 8 to 10 hours since surgery and you are still not able to urinate (pass water).    3.  Headache for over 24 hours.    To contact a doctor, call Dr Gates's office at [phone] ([facility] clinic) or:    X   [phone] and ask for the resident on call for   ________General Surgery______ (answered 24 hours a day)  X   Emergency Department:    [facility]: [phone]       (TTY for hearing impaired: [phone])            Additional Information     If you use hormonal birth control (such as the pill, patch, ring or implants): You'll need a second form of birth control for 7 days (condoms, a diaphragm or contraceptive foam). While in the hospital, you received a medicine called Bridion. Your normal birth control will not work as well for a week after taking this medicine.          Pending Results     Date and Time Order Name Status Description    2018 1432 Surgical pathology exam In process     2018 0944 MA BREAST WIRE PLACEMENT, EA ADDL LESION RIGHT In process     2018 0944 MA BREAST WIRE PLACEMENT, EA ADDL LESION RIGHT In process             Admission Information     Date & Time Provider Department Dept. Phone    2018 Atul Gates MD Post Anesthesia Care Unit [facility] [phone]      Your Vitals Were     Blood Pressure Temperature Respirations  "Height Weight Pulse Oximetry    117/83 97.7  F (36.5  C) (Oral) 14 1.638 m (5' 4.5\") 66.5 kg (146 lb 9.7 oz) 93%    BMI (Body Mass Index)                   24.78 kg/m2           MyChart Information     [facility] gives you secure access to your electronic health record. If you see a primary care provider, you can also send messages to your care team and make appointments. If you have questions, please call your primary care clinic.  If you do not have a primary care provider, please call [phone] and they will assist you.        Care EveryWhere ID     This is your Care EveryWhere ID. This could be used by other organizations to access your [facility] medical records  [id number]        Equal Access to Services     THERESA HUGHES : Abdifatah Granda, kaden clancy, terrence lopez, bang pickering. So [facility] [phone].    ATENCIÓN: Si habla español, tiene a blackman disposición servicios gratuitos de asistencia lingüística. Llame al [phone].    We comply with applicable federal civil rights laws and [place] laws. We do not discriminate on the basis of race, color, national origin, age, disability, sex, sexual orientation, or gender identity.               Review of your medicines      START taking        Dose / Directions    oxyCODONE IR 5 MG tablet   Commonly known as:  ROXICODONE   Used for:  Malignant neoplasm of upper-inner quadrant of right breast in female, estrogen receptor negative (H)        Dose:  5-10 mg   Take 1-2 tablets (5-10 mg) by mouth every 4 hours as needed for pain or other (Moderate to Severe)   Quantity:  10 tablet   Refills:  0         CONTINUE these medicines which may have CHANGED, or have new prescriptions. If we are uncertain of the size of tablets/capsules you have at home, strength may be listed as something that might have changed.        Dose / Directions    citalopram 10 MG tablet   Commonly known as:  celeXA   This may have changed:  " when to take this   Used for:  Anxiety, Depression, unspecified depression type        Dose:  10 mg   Take 1 tablet (10 mg) by mouth daily   Quantity:  90 tablet   Refills:  3       darifenacin 7.5 MG 24 hr tablet   Commonly known as:  ENABLEX   This may have changed:    - when to take this  - reasons to take this   Used for:  Overactive bladder        Dose:  7.5 mg   Take 1 tablet (7.5 mg) by mouth daily   Quantity:  90 tablet   Refills:  3       tiotropium 18 MCG capsule   Commonly known as:  SPIRIVA   This may have changed:    - when to take this  - additional instructions   Used for:  Chronic obstructive pulmonary disease, unspecified COPD type (H)        Dose:  18 mcg   Inhale 1 capsule (18 mcg) into the lungs daily Inhale contents of one capsule   Quantity:  1 capsule   Refills:  11         CONTINUE these medicines which have NOT CHANGED        Dose / Directions    albuterol 108 (90 BASE) MCG/ACT Inhaler   Commonly known as:  PROAIR HFA/PROVENTIL HFA/VENTOLIN HFA   Used for:  Chronic obstructive pulmonary disease, unspecified COPD type (H)        Dose:  2 puff   Inhale 2 puffs into the lungs every 6 hours as needed for shortness of breath / dyspnea   Quantity:  1 Inhaler   Refills:  5       ASPIRIN PO        Dose:  81 mg   Take 81 mg by mouth daily   Refills:  0       calcium carbonate 600 MG tablet   Generic drug:  calcium carbonate        Take by mouth 2 times daily (with meals)   Quantity:  60 tablet   Refills:  0       co-enzyme Q-10 30 MG/5ML Liqd liquid        Take by mouth every morning   Refills:  0       guaiFENesin 600 MG 12 hr tablet   Commonly known as:  MUCINEX   Used for:  Cough with sputum        Dose:  1200 mg   Take 2 tablets (1,200 mg) by mouth 2 times daily   Quantity:  180 tablet   Refills:  6       hydrOXYzine 25 MG tablet   Commonly known as:  ATARAX   Used for:  Hives        Take 1-2 tablets (25-50 mg) by mouth every 6 hours as needed for itching   Quantity:  100 tablet   Refills:  2        lidocaine visc 2% 2.5mL/5mL & maalox/mylanta w/ simeth 2.5mL/5mL & diphenhydrAMINE 5mg/5mL Susp suspension   Commonly known as:  [facility]IC Mouthwash [facility]   Used for:  Mucositis        Dose:  10 mL   Swish and swallow 10 mLs in mouth every 6 hours as needed for mouth sores   Quantity:  1 Bottle   Refills:  3       lidocaine-prilocaine cream   Commonly known as:  EMLA   Used for:  Personal history of malignant neoplasm of breast        Please apply to port site 30 minutes before use prn   Quantity:  30 g   Refills:  1       LORazepam 0.5 MG tablet   Commonly known as:  ATIVAN   Used for:  Malignant neoplasm of upper-inner quadrant of right breast in female, estrogen receptor negative (H)        Dose:  0.5 mg   Take 1 tablet (0.5 mg) by mouth every 4 hours as needed (Anxiety, Nausea/Vomiting or Sleep)   Quantity:  30 tablet   Refills:  2       MULTIPLE VITAMIN PO        Dose:  1 tablet   Take 1 tablet by mouth every morning   Refills:  0       order for DME   Used for:  MERLIN (obstructive sleep apnea)        [employer]pir[employer] Dream Station Auto CPAP 12-18 cm, F&P Simplus FFM small.   Refills:  0       vancomycin 50 mg/mL Liqd solution   Commonly known as:  VANOCIN   Indication:  Clostridium difficile Bacteria   Used for:  Malignant neoplasm of upper-inner quadrant of right breast in female, estrogen receptor negative (H)        Dose:  125 mg   Take 2.5 mLs (125 mg) by mouth 4 times daily for 21 days   Quantity:  210 mL   Refills:  0       VITAMIN B 12 PO        Dose:  100 mcg   Take 100 mcg by mouth every morning   Refills:  0       VITAMIN B6 PO        Dose:  1 tablet   Take 1 tablet by mouth every morning   Refills:  0       vitamin D 1000 UNITS capsule        TAKE 2 CAPSULES BY MOUTH EVERY MORNING   Refills:  0            Where to get your medicines      Some of these will need a paper prescription and others can be bought over the counter. Ask your nurse if you have questions.     Bring a paper prescription for each of  these medications     oxyCODONE IR 5 MG tablet                Protect others around you: Learn how to safely use, store and throw away your medicines at www.disposemymeds.org.        Information about OPIOIDS     PRESCRIPTION OPIOIDS: WHAT YOU NEED TO KNOW    Prescription opioids can be used to help relieve moderate to severe pain and are often prescribed following a surgery or injury, or for certain health conditions. These medications can be an important part of treatment but also come with serious risks. It is important to work with your health care provider to make sure you are getting the safest, most effective care.    WHAT ARE THE RISKS AND SIDE EFFECTS OF OPIOID USE?  Prescription opioids carry serious risks of addiction and overdose, especially with prolonged use. An opioid overdose, often marked by slowed breathing can cause sudden death. The use of prescription opioids can have a number of side effects as well, even when taken as directed:      Tolerance - meaning you might need to take more of a medication for the same pain relief    Physical dependence - meaning you have symptoms of withdrawal when a medication is stopped    Increased sensitivity to pain    Constipation    Nausea, vomiting, and dry mouth    Sleepiness and dizziness    Confusion    Depression    Low levels of testosterone that can result in lower sex drive, energy, and strength    Itching and sweating    RISKS ARE GREATER WITH:    History of drug misuse, substance use disorder, or overdose    Mental health conditions (such as depression or anxiety)    Sleep apnea    Older age (65 years or older)    Pregnancy    Avoid alcohol while taking prescription opioids.   Also, unless specifically advised by your health care provider, medications to avoid include:    Benzodiazepines (such as Xanax or Valium)    Muscle relaxants (such as Soma or Flexeril)    Hypnotics (such as Ambien or Lunesta)    Other prescription opioids    KNOW YOUR  OPTIONS:  Talk to your health care provider about ways to manage your pain that do not involve prescription opioids. Some of these options may actually work better and have fewer risks and side effects:    Pain relievers such as acetaminophen, ibuprofen, and naproxen    Some medications that are also used for depression or seizures    Physical therapy and exercise    Cognitive behavioral therapy, a psychological, goal-directed approach, in which patients learn how to modify physical, behavioral, and emotional triggers of pain and stress    IF YOU ARE PRESCRIBED OPIOIDS FOR PAIN:    Never take opioids in greater amounts or more often than prescribed    Follow up with your primary health care provider and work together to create a plan on how to manage your pain.    Talk about ways to help manage your pain that do not involve prescription opioids    Talk about all concerns and side effects    Help prevent misuse and abuse    Never sell or share prescription opioids    Never use another person's prescription opioids    Store prescription opioids in a secure place and out of reach of others (this may include visitors, children, friends, and family)    Visit www.cdc.gov/drugoverdose to learn about risks of opioid abuse and overdose    If you believe you may be struggling with addiction, tell your health care provider and ask for guidance or call [facility]'s National Helpline at 0-285-465-HELP    LEARN MORE / www.cdc.gov/drugoverdose/prescribing/guideline.html    Safely dispose of unused prescription opioids: Find your local drug take-back programs and more information about the importance of safe disposal at www.doseofreality.mn.gov             Medication List: This is a list of all your medications and when to take them. Check marks below indicate your daily home schedule. Keep this list as a reference.      Medications           Morning Afternoon Evening Bedtime As Needed    albuterol 108 (90 BASE) MCG/ACT Inhaler    Commonly known as:  PROAIR HFA/PROVENTIL HFA/VENTOLIN HFA   Inhale 2 puffs into the lungs every 6 hours as needed for shortness of breath / dyspnea                                ASPIRIN PO   Take 81 mg by mouth daily                                calcium carbonate 600 MG tablet   Take by mouth 2 times daily (with meals)   Generic drug:  calcium carbonate                                citalopram 10 MG tablet   Commonly known as:  celeXA   Take 1 tablet (10 mg) by mouth daily                                co-enzyme Q-10 30 MG/5ML Liqd liquid   Take by mouth every morning                                darifenacin 7.5 MG 24 hr tablet   Commonly known as:  ENABLEX   Take 1 tablet (7.5 mg) by mouth daily                                guaiFENesin 600 MG 12 hr tablet   Commonly known as:  MUCINEX   Take 2 tablets (1,200 mg) by mouth 2 times daily                                hydrOXYzine 25 MG tablet   Commonly known as:  ATARAX   Take 1-2 tablets (25-50 mg) by mouth every 6 hours as needed for itching                                lidocaine visc 2% 2.5mL/5mL & maalox/mylanta w/ simeth 2.5mL/5mL & diphenhydrAMINE 5mg/5mL Susp suspension   Commonly known as:  MAGIC Mouthwash [facility]   Swish and swallow 10 mLs in mouth every 6 hours as needed for mouth sores                                lidocaine-prilocaine cream   Commonly known as:  EMLA   Please apply to port site 30 minutes before use prn                                LORazepam 0.5 MG tablet   Commonly known as:  ATIVAN   Take 1 tablet (0.5 mg) by mouth every 4 hours as needed (Anxiety, Nausea/Vomiting or Sleep)                                MULTIPLE VITAMIN PO   Take 1 tablet by mouth every morning                                [facility] Dream [facility] Auto CPAP 12-18 cm, F&P Simplus FFM small.                                oxyCODONE IR 5 MG tablet   Commonly known as:  ROXICODONE   Take 1-2 tablets (5-10 mg) by mouth every 4 hours as  needed for pain or other (Moderate to Severe)                                tiotropium 18 MCG capsule   Commonly known as:  SPIRIVA   Inhale 1 capsule (18 mcg) into the lungs daily Inhale contents of one capsule                                vancomycin 50 mg/mL Liqd solution   Commonly known as:  VANOCIN   Take 2.5 mLs (125 mg) by mouth 4 times daily for 21 days                                VITAMIN B 12 PO   Take 100 mcg by mouth every morning                                VITAMIN B6 PO   Take 1 tablet by mouth every morning                                vitamin D 1000 UNITS capsule   TAKE 2 CAPSULES BY MOUTH EVERY MORNING

## 2018-02-27 NOTE — PROGRESS NOTES
[facility] BREAST CENTER PROCEDURE NOTE    PROCEDURE: Wire localization: right    SCHEDULED DATE/TIME of PROCEDURE: February 27, 2018 - 9:29 AM    ARRIVAL: Accompanied by family    BREAST HEALTH HISTORY:   Area discovered on Mammogram    PSYCHOSOCIAL:  alert / oriented  Wristband applied: Yes    PRE-PROCEDURE VITAL SIGNS:   There were no vitals taken for this visit.  Pain Score (patient stated): 0/10    PROCEDURE: Minimal bleeding <5cc   Clip placed: no  Local Anesthetic: 1% Lidocaine  Pain Level: (1-10) : 2/10    POST PROCEDURE:   Pressure to biopsy site for 5 minutes  Site marking removed    DISCHARGE CONDITION:   Pain level: 1/10  Pain Assessment: Pain at tolerable level  Discharged in Stable Condition: Yes    DISCHARGE DOCUMENTS:  Patient received and verbalized understanding of discharge instructions and ambulated off unit in stable condition      Grecia Rosales, ARRT   2/27/2018, 9:29 AM

## 2018-02-27 NOTE — IP AVS SNAPSHOT
Post Anesthesia Care Unit [address] [address]    Phone:  [phone]                                       After Visit Summary   2/27/2018    Ashleigh Alonzo    MRN: [id number]           After Visit Summary Signature Page     I have received my discharge instructions, and my questions have been answered. I have discussed any challenges I see with this plan with the nurse or doctor.    ..........................................................................................................................................  Patient/Patient Representative Signature      ..........................................................................................................................................  Patient Representative Print Name and Relationship to Patient    ..................................................               ................................................  Date                                            Time    ..........................................................................................................................................  Reviewed by Signature/Title    ...................................................              ..............................................  Date                                                            Time

## 2018-02-27 NOTE — DISCHARGE INSTRUCTIONS
If you have obstructive sleep apnea     You were given anesthesia medicine during surgery to keep you comfortable and free of pain. After surgery, you may have more apnea spells because of this medicine and other medicines you were given. The spells may last longer than usual.     At home:        Keep using the continuous positive airway pressure (CPAP) device when you sleep. Unless your health care provider tells you not to, use it when you sleep, day or night. CPAP is a common device used to treat obstructive sleep apnea.           Tips for taking pain medications  To get the best pain relief possible , remember these points:      Take pain medications as directed, before pain becomes severe      Pain medication can upset your stomach: taking it with food may help      Constipation is a common side effect of pain medication. Drink plenty of  Fluids      Eat foods high in fiber. Take a stool softener  if recommended by your doctor or  Pharmacist.        Do not drink alcohol, drive or operate machinery while taking pain medications.      Ask about other ways to control pain, such as with heat, ice or relaxation.     [facility] [place]  Same-Day Surgery   Adult Discharge Orders & Instructions     For 24 hours after surgery    1. Get plenty of rest.  A responsible adult must stay with you for at least 24 hours after you leave the hospital.   2. Do not drive or use heavy equipment.  If you have weakness or tingling, don't drive or use heavy equipment until this feeling goes away.  3. Do not drink alcohol.  4. Avoid strenuous or risky activities.  Ask for help when climbing stairs.   5. You may feel lightheaded.  IF so, sit for a few minutes before standing.  Have someone help you get up.   6. If you have nausea (feel sick to your stomach): Drink only clear liquids such as apple juice, ginger ale, broth or 7-Up.  Rest may also help.  Be sure to drink enough fluids.  Move to a regular diet  as you feel able.  7. You may have a slight fever. Call the doctor if your fever is over 100 F (37.7 C) (taken under the tongue) or lasts longer than 24 hours.  8. You may have a dry mouth, a sore throat, muscle aches or trouble sleeping.  These should go away after 24 hours.  9. Do not make important or legal decisions.   Call your doctor for any of the followin.  Signs of infection (fever, growing tenderness at the surgery site, a large amount of drainage or bleeding, severe pain, foul-smelling drainage, redness, swelling).    2. It has been over 8 to 10 hours since surgery and you are still not able to urinate (pass water).    3.  Headache for over 24 hours.    To contact a doctor, call Dr Gates's office at [phone] ([facility] clinic) or:    X   [phone] and ask for the resident on call for   ________General Surgery______ (answered 24 hours a day)  X   Emergency Department:    [facility]: [phone]       (TTY for hearing impaired: [phone])

## 2018-02-27 NOTE — PROGRESS NOTES
SBAR Wire Localization  Patient to [facility] for imaging guided wire localizations without sentinel node injection.    SITUATION:  Patient to [facility] for imaging guided wire localizations before breast lumpectomy or excision biopsy without sentinel node injection.    BACKGROUND:  Breast cancer, breast abnormality  Ordered procedure completed: Yes  Special needs identified: Yes      ASSESSMENT:  SBAR report called to patient care unit because of unexpected event in radiology: No  Allergies and medication list reviewed prior to procedure. Yes  Pre procedure pain level: 0/10 stated.   Skin cleansed with ChloraPrep One-Step.  Anesthesia: approximately 5cc of 1% Lidocaine injection with bicarbonate buffer added subcutaneous before wire insertion administered by the radiologist.  Gauze dressing over insertion site(s).  Post procedure mammogram completed: Yes  Post procedure pain level: 2/10 stated.   Patient tolerence:   Summary: Uncomplicated wire localization x 3 of the right breast     RECOMMENDATIONS:  Patient transferred to unit 3c in stable condition via wheelchair by transport.   Two Copies of note given to patient and instructions to hand this note to nuclear medicine staff and surgical staff.    Please call Grecia Rosales at [phone] (inside line) if questions.

## 2018-02-27 NOTE — NURSING NOTE
Chief Complaint   Patient presents with     Blood Draw     venipuncture labs drawn.     Britney Reid CMA

## 2018-02-27 NOTE — BRIEF OP NOTE
[facility], [place]    Brief Operative Note    Pre-operative diagnosis: Malignant Neoplasm Of Right Breast  Post-operative diagnosis Same  Procedure: Procedure(s):  Right Wire Localized Lumpectomy, Right [place] Lymph Node Biopsy And Freddy Cath Removal - Wound Class: I-Clean   - Wound Class: I-Clean  Surgeon: Surgeon(s) and Role:     * Atul Gates MD - Primary     * Lilia Cedeño MD - Resident - Assisting  Anesthesia: General   Estimated blood loss: 20 cc  Drains: None  Specimens:   ID Type Source Tests Collected by Time Destination   A : Right Lumpectomy  Tissue Breast, Right SURGICAL PATHOLOGY EXAM Atul Gates MD 2/27/2018  2:32 PM    B : Right breast new margin superior  Tissue Breast, Right SURGICAL PATHOLOGY EXAM Atul Gates MD 2/27/2018  2:42 PM    C : Right Axillary Lymph Node  Tissue Axilla, Right SURGICAL PATHOLOGY EXAM Atul Gates MD 2/27/2018  2:43 PM    D : Right Breast new margin inferior  Tissue Breast, Right SURGICAL PATHOLOGY EXAM Atul Gates MD 2/27/2018  2:46 PM    E : right breast new margin lateral  Tissue Breast, Right SURGICAL PATHOLOGY EXAM Atul Gates MD 2/27/2018  2:46 PM    F : right breast new margin anterior  Tissue Breast, Right SURGICAL PATHOLOGY EXAM Atul Gates MD 2/27/2018  2:47 PM    G : right breast new margin deep  Tissue Breast, Right SURGICAL PATHOLOGY EXAM Atul Gates MD 2/27/2018  2:47 PM    H : right breast new margin medial  Tissue Breast, Right SURGICAL PATHOLOGY EXAM Atul Gates MD 2/27/2018  2:47 PM      Findings:   Breast lumpectomy, SLN X 1.  Complications: None.  Implants: None.

## 2018-02-27 NOTE — ANESTHESIA PREPROCEDURE EVALUATION
Anesthesia Evaluation     . Pt has had prior anesthetic. Type: General and MAC    No history of anesthetic complications          ROS/MED HX    ENT/Pulmonary:     (+)sleep apnea (Has not used recently .), Past use 1 PPD packs/day  moderate COPD, doesn't use CPAP , . Other pulmonary disease H/o Pneuminitis in last six months. .   Tobacco use: Quit in 2000.  Smoked 1 PPD for 20 years.     Neurologic:     (+)neuropathy - Toes post chemotherapy- intermittent,     Cardiovascular:  - neg cardiovascular ROS       METS/Exercise Tolerance: Comment: METS<4    Hematologic:     (+) History of Transfusion no previous transfusion reaction -      Musculoskeletal:  - neg musculoskeletal ROS       GI/Hepatic:     (+) appendicitis (S/P appendectomy),       Renal/Genitourinary:  - ROS Renal section negative   (+) Other Renal/ Genitourinary, Urinary incontinence.       Endo:  - neg endo ROS       Psychiatric:     (+) psychiatric history depression and anxiety      Infectious Disease:  - neg infectious disease ROS       Malignancy:   (+) Malignancy History of Breast  Breast CA Active status post Chemo.         Other: Comment: Hospitalized from 1/29-2/8/2018 with neutropenic fever of unknown etiology and C Diff colitis.   Continues on Augmentin and Vancomycin.    (+) No chance of pregnancy C-spine cleared: N/A, no H/O Chronic Pain,                   Physical Exam  Normal systems: dental    Airway   Mallampati: I  TM distance: >3 FB  Neck ROM: full    Dental     Cardiovascular   Rhythm and rate: regular and normal      Pulmonary   PE comment: Diminished lung sounds with fine crackles in the bases bilaterally.  Upper lobes clear.     Other findings: Lab Results      Component                Value               Date                      WBC                      8.7                 02/15/2018            Lab Results      Component                Value               Date                      RBC                      3.36                 02/15/2018            Lab Results      Component                Value               Date                      HGB                      10.6                02/15/2018            Lab Results      Component                Value               Date                      HCT                      33.0                02/15/2018            Lab Results      Component                Value               Date                      MCV                      98                  02/15/2018            Lab Results      Component                Value               Date                      MCH                      31.5                02/15/2018            Lab Results      Component                Value               Date                      MCHC                     32.1                02/15/2018            Lab Results      Component                Value               Date                      RDW                      16.7                02/15/2018            Lab Results      Component                Value               Date                      PLT                      276                 02/15/2018              Last Basic Metabolic Panel:  Lab Results      Component                Value               Date                      NA                       140                 02/15/2018             Lab Results      Component                Value               Date                      POTASSIUM                3.4                 02/15/2018            Lab Results      Component                Value               Date                      CHLORIDE                 104                 02/15/2018            Lab Results      Component                Value               Date                      JAVIER                      8.6                 02/15/2018            Lab Results      Component                Value               Date                      CO2                      23                  02/15/2018            Lab Results      Component                 Value               Date                      BUN                      6                   02/15/2018            Lab Results      Component                Value               Date                      CR                       0.56                02/15/2018            Lab Results      Component                Value               Date                      GLC                      120                 02/15/2018                Procedures  ECG SR 88 bpm     Echocardiogram 12/28/2017  Interpretation Summary  Global and regional left ventricular function is normal with an EF of 60-65%  (calculated, 70%.)  Global peak LV longitudinal strain is averaged at -18.7%. This is within  reported normal limits (normal <-18%).  Global right ventricular function is normal.  Pulmonary artery systolic pressure is normal.  The inferior vena cava is normal.  No pericardial effusion is present.  This study was compared with the study from 9/1/2017. There has been no  Change.    PFTs 11/2016  FVC-Pred 3.19   L  11/15/2016  2:10   FVC-Pre 2.45   L  11/15/2016  2:10   FVC-%Pred-Pre 76   %  11/15/2016  2:10   FEV1-Pre 1.33   L  11/15/2016  2:10   FEV1-%Pred-Pre 52   %  11/15/2016  2:10   FEV1FVC-Pred 80   %  11/15/2016  2:10   FEV1FVC-Pre 54   %  11/15/2016  2:10   FEFMax-Pred 6.36   L/sec  11/15/2016  2:10   FEFMax-Pre 4.70   L/sec  11/15/2016  2:10   FEFMax-%Pred-Pre 73   %  11/15/2016  2:10   FEF2575-Pred 2.39   L/sec  11/15/2016  2:10   FEF2575-Pre 0.45   L/sec  11/15/2016  2:10   RHX2948-%Pred-Pre 18   %  11/15/2016  2:10   ExpTime-Pre 11.46   sec  11/15/2016  2:10   FIFMax-Pre 2.92   L/sec  11/15/2016  2:10   VC-Pred 3.25   L  11/15/2016  2:10   VC-Pre 2.36   L  11/15/2016  2:10   VC-%Pred-Pre 72   %  11/15/2016  2:10   IC-Pred 2.65   L  11/15/2016  2:10   IC-Pre 2.13   L  11/15/2016  2:10    IC-%Pred-Pre 80   %  11/15/2016  2:10   ERV-Pred 0.60   L  11/15/2016  2:10   ERV-Pre 0.23   L  11/15/2016  2:10   ERV-%Pred-Pre 38   %  11/15/2016  2:10   FEV1FEV6-Pred 81   %  11/15/2016  2:10   FEV1FEV6-Pre 60   %  11/15/2016  2:10   DLCOunc-Pred 21.71   ml/min/mmHg  11/15/2016  2:10   DLCOunc-Pre 21.80   ml/min/mmHg  11/15/2016  2:10   DLCOunc-%Pred-Pre 100   %  11/15/2016  2:10   VA-Pre 4.16   L  11/15/2016  2:10   VA-%Pred-Pre 84   %  11/15/2016  2:10   FEV1SVC-Pred 78   %  11/15/2016  2:10   FEV1SVC-Pre 56   %  11/15/2016  2:10   Narrative   IMPRESSION:  Moderately-Severe  Airflow Obstruction   Normal diffusing capacity.  Compared with testing from 11/7/2014 there has been no significant change in the FEV1 or FVC.               PAC Discussion and Assessment    ASA Classification: 3  Case is suitable for: ASC  Anesthetic techniques and relevant risks discussed: GA  Invasive monitoring and risk discussed:   Types:   Possibility and Risk of blood transfusion discussed:   NPO instructions given:   Additional anesthetic preparation and risks discussed:   Needs early admission to pre-op area:   Other:     PAC Resident/NP Anesthesia Assessment:  Ashleigh Perera is a 57 year old female scheduled for Right Wire Localized Lumpectomy, Right [place] Lymph Node Biopsy And Freddy Cath Removal on 2/28/2018 with Dr. Gates with [facility] TBD under general anesthesia.  Ms. Alonzo has stage IIa right breast cancer and recently completed neoadjuvant chemotherapy.  Of significance, she was hospitalized from 1/29-2/8/2018 for neutropenic fever of unknown etiology and C. Diff colitis. During that hospitalization she also received 2 u PRBCs.  Please see Lilia Livingston's note from 2/15/2018 for further details.  The above procedure was recommended for ongoing treatment and management of her breast cancer. PAC referral for risk assessment and optimization  of anesthesia with comorbid conditions of: as above; MERLIN; COPD; h/o pneumonitis; depression/anxiety;  peridontal disease; and recent blood transfusion.     She has the following specific operative considerations:     1.  Cardiology - Denies cardiac history or symptoms.  EF 60-65% without valvular disease on echo, 12/28/17.  METS<4. RCRI : No serious cardiac risks.  0.4 % risk of major adverse cardiac event.       2.  Pulmonary - 20 pack years smoking hx.  Quit 2000.       - COPD, no change in  SUAZO and denies cough or phlegm production.  Use inhalers as prescribed and bring with DOS.  PFTs 2016>>>Moderately-Severe  Airflow Obstruction and Normal diffusing capacity.  Compared with testing from 11/7/2014 there has been no significant change in the FEV1 or FVC.       - Known MERLIN - reports not using CPAP on regular basis.  Discussed the importance of using CPAP as directed leading up to surgery and to bring with DOS.        - Pneumonitis has resolved.  Last dose of prednisone 2/16/2018.  Was on PPI for any steroid associated GERD.   3.  Hematology/Oncology - VTE risk:  1.8%       - Stage IIa breast cancer>>>above surgery planned.   4.  GI - Risk of PONV score = 2.  If > 2, anti-emetic intervention recommended.     -   5.  Neuro - anxiety/depression - take antianxiety/antidepressant DOS  6.  Hospitalized for neutropenic fever and C. Diff colitis 1/29-2/8/2018.  No clear etiology for neutropenic fever beyond C. Diff.  Bronchoscopy negative  and AFB still pending. C. Diff colitis - since antibiotics and return of appetite, diarrhea has resolved.  Continue Augmentin and Vancomycin as prescribed.          - Anesthesia considerations:  Refer to PAC assessment in anesthesia records  - Airway:  Appears feasible  - Recommend case be done at [facility] given MERLIN (untreated since starting chemotherapy), COPD, and recent hospitalization of neutropenic fever and C. Diff.     Arrival time, NPO, shower and medication  instructions provided by nursing staff today.  Preparing For Your Surgery handout given.  Patient was discussed with Dr Sheets. I spent 20 minutes face to face with patient assessing, educating, counseling and/or coordinating care and examining the patient.  Of that 20 minutes, I spent greater than 50% of my time counseling and/or coordinating care.  All of the above labs and procedures I personally reviewed.      Reviewed and Signed by PAC Mid-Level Provider/Resident  Mid-Level Provider/Resident: Berna RODRIGUEZ CNP  Date: 2/21/2018  Time: 13:24    Attending Anesthesiologist Anesthesia Assessment:  I have examined the patient and reviewed the medical record.  I have discussed the patient with the NAI and concur with her assessment  The patient is scheduled for lumpectomy and portacath removal after chemotherapy for Stage IIa breast cancer.  (Her chemotherapy course has been tempestuous with 3 hospitalizations including one from 1/28 - 2/8 for neutropenic fever and c difficile infection.  Her other co mordities include moderate to severe COPD, MERLIN (not using her CPAP), recent significant steroid use    PE:  Frail appearing female in NAD.  MPC 2, 3 FBTMD, decreased extension.  Lungs crackles and rhonchi at bases.  CV  RRR without murmur  SpO2 97%  RR 16    Given moderate to severe COPD (FeV1/FVC 52%) untreated MERLIN and recent hospitalization with fever patient is not appropriate for ASC  Move to UU.  No further testing necessary per protocol.  Final plan per attending anesthesiologist the day of surgery.  Consider stress dose steroids  Instructed in IS and to bring CPAP with her to hosptial    Reviewed and Signed by PAC Anesthesiologist  Anesthesiologist: Denny Sheets MD  Date: 2/21/2018  Time:   Pass/Fail:   Disposition:     PAC Pharmacist Assessment:        Pharmacist:   Date:   Time:      Anesthesia Plan      History & Physical Review  History and physical reviewed and following examination; no interval  change.    ASA Status:  3 .    NPO Status:  > 2 hours (Water at 08:00.  )    Plan for General and ETT with Intravenous induction. Maintenance will be Balanced.    PONV prophylaxis:  Ondansetron (or other 5HT-3) and Dexamethasone or Solumedrol       Postoperative Care  Postoperative pain management:  Multi-modal analgesia.      Consents  Anesthetic plan, risks, benefits and alternatives discussed with:  Patient.  Use of blood products discussed: Yes.   Use of blood products discussed with Patient.  Consented to blood products.  .      Sebastien Gay MD  Anesthesiologist  10:53 AM  February 27, 2018                        .

## 2018-02-27 NOTE — ANESTHESIA CARE TRANSFER NOTE
Patient: Ashleigh Alonzo    Procedure(s):  Right Wire Localized Lumpectomy, Right [place] Lymph Node Biopsy And [place] Cath Removal - Wound Class: I-Clean   - Wound Class: I-Clean    Diagnosis: Malignant Neoplasm Of Right Breast  Diagnosis Additional Information: No value filed.    Anesthesia Type:   General, ETT     Note:  Airway :Nasal Cannula  Patient transferred to:PACU  Comments: Patient transferred to PACU spontaneously breathing.  VSS.  Report to RN. Handoff Report: Identifed the Patient, Identified the Reponsible Provider, Reviewed the pertinent medical history, Discussed the surgical course, Reviewed Intra-OP anesthesia mangement and issues during anesthesia, Set expectations for post-procedure period and Allowed opportunity for questions and acknowledgement of understanding      Vitals: (Last set prior to Anesthesia Care Transfer)    CRNA VITALS  2/27/2018 1444 - 2/27/2018 1523      2/27/2018             Pulse: 125    SpO2: 100 %                Electronically Signed By: JENNIFER Reno CRNA  February 27, 2018  3:23 PM

## 2018-02-28 NOTE — OP NOTE
Procedure Date: 02/27/2018      PREOPERATIVE DIAGNOSIS:  Right breast cancer.      POSTOPERATIVE DIAGNOSIS:  Right breast cancer.      PROCEDURES:  Lymphatic mapping, right axillary sentinel lymph node biopsy x 1, right wire-localized lumpectomy and removal of left subclavian PowerPort.      ATTENDING SURGEON:  Atul Gates MD      RESIDENT SURGEON:  Lilia Cedeño MD      ANESTHESIA:  General with endotracheal tube intubation.      INDICATIONS FOR SURGERY:  The patient is a 57-year-old woman who presented with a triple-negative breast cancer.  She was treated with preoperative chemotherapy.  She had a complete radiographic response and now presents for surgical treatment.      PROCEDURE IN DETAIL:  After informed consent, the patient was brought to the operating room, given a general anesthetic and orotracheally intubated.  I injected technetium sulfur colloid beneath the areola and isosulfan blue in the peritumoral location.  The patient was prepped and draped in the usual fashion.  We started with a lumpectomy.  There were 3 guidewires placed into the breast tissue.  A local anesthetic was administered to the superior aspect of the right breast.  A curvilinear incision was made at the superior edge of the nipple-areolar complex.  The Bovie cautery was used to incise the subcutaneous tissues.  We dissected superiorly to intercept the guidewires.  The 3 guidewires were removed from the skin and left within the breast tissue.  We then dissected the 3 guidewires circumferentially.  The specimen was removed and a specimen radiograph was obtained, which demonstrated complete excision of the 3 guidewires and the surgical clip.  I did perform shave margin excisions on 6 sides.  These were all oriented and sent to pathology.  Strict hemostasis was obtained with the Bovie cautery.  We then identified a transcutaneous hot spot in the right axilla.  A local anesthetic was administered, and a transverse axillary incision was  made with a scalpel.  The Bovie cautery was used to incise the subcutaneous tissues.  We identified a non-blue radioactive lymph node.  [place] lymph node #1 was removed and had ex vivo counts of 150 counts per second.  After removal of this lymph node, there were no additional blue radioactive or palpable lymph nodes.  Strict hemostasis was obtained with the Bovie cautery.  Both incisions were closed with interrupted 3-0 Vicryl suture for the dermal layer and a running 4-0 PDS subcuticular stitch for the skin.  Next, I administered a local anesthetic to the left chest.  A transverse incision was made over her previous Port-A-Cath incision.  The Bovie cautery was used to incise the subcutaneous tissues.  The catheter was removed from the vein without any backbleeding.  The PowerPort pocket was opened and the PowerPort was easily removed.  All 3 components of the PowerPort were present.  Strict hemostasis was obtained.  The dermis was closed with interrupted 3-0 Vicryl suture.  The skin was closed with a running 4-0 PDS subcuticular stitch, Dermabond was placed and the patient was taken to the recovery room in stable condition.         LIZETH HAYNES MD             D: 2018   T: 2018   MT: TREVOR      Name:     YUNI CALIXTO   MRN:      [id number]        Account:        [id number]   :      1960           Procedure Date: 2018      Document: [id number]

## 2018-03-02 ENCOUNTER — TELEPHONE (OUTPATIENT)
Dept: SURGERY | Facility: CLINIC | Age: 58
End: 2018-03-02

## 2018-03-02 NOTE — TELEPHONE ENCOUNTER
POST-OP CALL  Mar 2, 2018    Ashleigh Alonzo is a 57 year old female s/p right lumpectomy and axillary lymph node dissection.     No concerns.   Fevers/chills: Patient denies fever/chills.  Eating/drinking: Patient is able to eat and drink without any complaints.   Bowel habits: Patient reports having a normal bowel movement.  Urine output: Voiding without difficulty.   Incisions: Patient denies any signs and symptoms of infection. No erythema, swelling or drainag3  Pain: Reports pain at surgery site. Taking oxycodone for pain. She asked for a refill but did not want to come to pick one up. She is also taking tylenol.   Patient will call with any questions or concerns.    Trina Vera PA-C

## 2018-03-06 LAB
BACTERIA SPEC CULT: NORMAL
FUNGUS SPEC CULT: NORMAL
SPECIMEN SOURCE: NORMAL
SPECIMEN SOURCE: NORMAL

## 2018-03-07 LAB — COPATH REPORT: NORMAL

## 2018-03-11 NOTE — PROGRESS NOTES
Oncology On Treatment Visit:  Date on this visit: 3/12/2018    Diagnosis:  Stage IIa, T2N0M0, grade 3 triple negative cancer of the right breast.    Primary Physician: Radha Cazares     History Of Present Illness:  Ms. Alonzo is a 57-year-old postmenopausal female with stage IIa, T2 N0 M0, grade 3, triple-negative invasive carcinoma of the right breast.  Routine bilateral screening mammogram on 07/27/2017 showed developing calcifications in the right breast at the 12 o'clock position 6 cm from the nipple.  Right breast ultrasound demonstrated a 7-mm, irregular, hypoechoic mass at the 12 o'clock position.  Contrast-enhanced mammogram showed a peripherally enhancing, irregular mass measuring 1.9 cm as well as an additional 6-mm, enhancing focus anteromedial to the dominant mass.  The total area of abnormal enhancement on contrast mammogram measured up to 3.4 cm.  Right breast biopsy demonstrated a grade 3 invasive mammary carcinoma with associated high-grade DCIS.  Estrogen receptor and progesterone receptor staining were negative with 0% of nuclei staining.  HER2 was non-amplified by FISH with a HER2/CEN17 ratio of 1.5 and 2.8 HER2 signals per nucleus.  Breast MRI measured the biopsy proven breast cancer at 3.2 cm.    Ms. Alonzo enrolled in the ISPY-2 clinical trial.  She began treatment with weekly taxol and once every 3 week pembrolizumab on 9/20/17.  She was hospitalized 11/11/17 - 11/17/17 with fevers ranging from 102 - 105.  CT chest was consistent with pneumonitis.  She also had transaminitis in the 150 range.  She was started on corticosteroids.  Pembrolizumab was stopped and she resumed weekly taxol alone on 11/28/17.  She completed a total of 12 weeks of therapy on 12/26/17.  She received first cycle of adriamycin and cyclophosphamide on 1/2/18.  She was hospitalized 1/10/18 - 1/13/18 with neutropenic fever.  She was hospitalized again  from 1/29/18 - 2/8/18 with neutropenic fever, mucositis, and C.  difficile colitis following cycle #2.  Given poor clinical status and frequent hospitalizations, plan was made to forgo the planned final 2 cycles of AC and proceed with chemotherapy.       On 2/27/18 she underwent right breast lumpectomy and sentinel lymph node procedure.  Pathology showed a grade 2, 6 mm residual invasive mammary carcinoma with an associated 8 mm area of high grade DCIS with comedonecrosis and ADH.  Invasive tumor cellularity was 10%.  There was lymphovascular invasion.  Surgical margins were negative for both invasive and noninvasive disease.  A single sentinel lymph node was benign.  MDA residual cancer burden was class I.       Interval History:  Ashleigh Perera comes into the clinic today for routine breast cancer followup.  She had a right breast lumpectomy and sentinel lymph node procedure approximately 2 weeks ago.  She has had significant soreness of the breast since the procedure.  She describes a hard lump under the arm that is particularly tender.  It does limit some range of movement.  She overall has been feeling well.  She has noted low degree fevers present daily since her surgery.  The maximum was 101.1 on 03/10.  They have otherwise been in the .4 range.  She has not had any erythema or rashes.  She denies cough, shortness of breath.  She has some thin nasal drainage.  She has no urinary complaints.  She states that her energy is returning.  In fact, she is planning on returning to work this coming Monday.  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  Her appetite is much improved and she has been able to keep a stable weight.  She denies increased dyspnea.  In fact, she feels her breathing is overall improved.  However, her  mentions she is not walking more than 5 minutes at a time.  The remainder of a 10-point review of systems is otherwise negative.      Past Medical/Surgical History:  Past Medical History:   Diagnosis Date     Anxiety       COPD (chronic obstructive pulmonary disease) (H) 2011     Depression      Malignant neoplasm of upper-inner quadrant of right breast in female, estrogen receptor negative (H) 8/30/2017     Obstructive sleep apnea      Periodontal disease      Sleep apnea 12/2015     Urticaria      Past Surgical History:   Procedure Laterality Date     APPENDECTOMY  10/6/2015     BRONCHOSCOPY (RIGID OR FLEXIBLE), DIAGNOSTIC N/A 2/6/2018    Procedure: COMBINED BRONCHOSCOPY (RIGID OR FLEXIBLE), LAVAGE;  COMBINED BRONCOSCOY (RIGID OR FLEXIBLE), LAVAGE;  Surgeon: Samir Pettit MD;  Location: [facility]     CATARACT IOL, RT/LT  11/2016    bilateral      COLONOSCOPY  11/15/2011    Procedure:COLONOSCOPY; Colonoscopy, screening; Surgeon:ANASTASIA BUNCH; Location:[facility]     INSERT PORT VASCULAR ACCESS Left 9/1/2017    Procedure: INSERT PORT VASCULAR ACCESS;  Single Lumen Chest Power Port;  Surgeon: Leif Parkinson PA-C;  Location: [facility]     LUMPECTOMY BREAST WITH SENTINEL NODE, COMBINED Right 2/27/2018    Procedure: COMBINED LUMPECTOMY BREAST WITH SENTINEL NODE;  Right Wire Localized Lumpectomy, Right [place] Lymph Node Biopsy And Freddy Cath Removal;  Surgeon: Atul Gates MD;  Location: [facility] OR     REMOVE PORT VASCULAR ACCESS N/A 2/27/2018    Procedure: REMOVE PORT VASCULAR ACCESS;;  Surgeon: Atul Gates MD;  Location: [facility]     TUBAL LIGATION  12/2004    Bilateral     Allergies:  Allergies as of 03/12/2018     (No Known Allergies)     Current Medications:  Current Outpatient Prescriptions   Medication Sig Dispense Refill     oxyCODONE IR (ROXICODONE) 5 MG tablet Take 1-2 tablets (5-10 mg) by mouth every 4 hours as needed for pain or other (Moderate to Severe) 10 tablet 0     co-enzyme Q-10 30 MG/5ML LIQD liquid Take by mouth every morning       ASPIRIN PO Take 81 mg by mouth daily       magic mouthwash suspension (diphenhydrAMINE, lidocaine, aluminum-magnesium & simethicone) Swish and swallow 10 mLs in mouth every 6  hours as needed for mouth sores (Patient not taking: Reported on 2/15/2018) 1 Bottle 3     calcium carbonate (CALCIUM CARBONATE) 600 MG tablet Take by mouth 2 times daily (with meals) 60 tablet      lidocaine-prilocaine (EMLA) cream Please apply to port site 30 minutes before use prn 30 g 1     LORazepam (ATIVAN) 0.5 MG tablet Take 1 tablet (0.5 mg) by mouth every 4 hours as needed (Anxiety, Nausea/Vomiting or Sleep) 30 tablet 2     hydrOXYzine (ATARAX) 25 MG tablet Take 1-2 tablets (25-50 mg) by mouth every 6 hours as needed for itching 100 tablet 2     guaiFENesin (MUCINEX) 600 MG 12 hr tablet Take 2 tablets (1,200 mg) by mouth 2 times daily 180 tablet 6     tiotropium (SPIRIVA) 18 MCG capsule Inhale 1 capsule (18 mcg) into the lungs daily Inhale contents of one capsule (Patient taking differently: Inhale 18 mcg into the lungs every morning Inhale contents of one capsule) 1 capsule 11     albuterol (PROAIR HFA/PROVENTIL HFA/VENTOLIN HFA) 108 (90 BASE) MCG/ACT Inhaler Inhale 2 puffs into the lungs every 6 hours as needed for shortness of breath / dyspnea 1 Inhaler 5     citalopram (CELEXA) 10 MG tablet Take 1 tablet (10 mg) by mouth daily (Patient taking differently: Take 10 mg by mouth every evening ) 90 tablet 3     darifenacin (ENABLEX) 7.5 MG 24 hr tablet Take 1 tablet (7.5 mg) by mouth daily (Patient taking differently: Take 7.5 mg by mouth daily as needed ) 90 tablet 3     order for [facility] Respir[facility] [place] Auto CPAP 12-18 cm, F&P Simplus FFM small. (Patient not taking: Reported on 1/23/2018)       MULTIPLE VITAMIN PO Take 1 tablet by mouth every morning        Cyanocobalamin (VITAMIN B 12 PO) Take 100 mcg by mouth every morning        Pyridoxine HCl (VITAMIN B6 PO) Take 1 tablet by mouth every morning        VITAMIN D 1000 UNIT OR CAPS TAKE 2 CAPSULES BY MOUTH EVERY MORNING        Family and Social History:  Reviewed and unchanged from prior.  Please see initial consultation dated 8/28/17 for further  "details.    Physical Exam:  /76 (BP Location: Right arm, Patient Position: Chair, Cuff Size: Adult Regular)  Pulse 120  Temp 97.1  F (36.2  C) (Oral)  Resp 16  Ht 1.638 m (5' 4.49\")  Wt 67 kg (147 lb 11.2 oz)  SpO2 97%  BMI 24.97 kg/m2  General:  Well appearing adult female in NAD.    HEENT:  Normocephalic.  Sclera anicteric.  MMM.  No lesions of the oropharynx.  Lymph:  No palpable cervical, supraclavicular, or axillary LAD.  There is a 4 x 2.5 cm firm seroma palpable beneath the right axillary incision.  Chest:  Lungs are CTA bilaterally.    Breast exam:  Right breast periareolar incision appears to be healing well.  There is no surrounding erythema.  There is a 2.5 x 2 cm area of swelling beneath the medial aspect.    CV:  Tachycardic.  Regular rhythm.  Nl S1 and S2.  No m/r/g.  Abd:  Soft/NT/ND.  BSs normoactive.  No hepatosplenomegaly.  Ext:  No pitting edema of the bilateral lower extremities.  Pulses 2+ and symmetric.  Musculo:  Strength 5/5 throughout.  Neuro:  Cranial nerves grossly intact.  Psych:  Mood and affect appear normal.  Skin:  No visible rashes or skin lesions    Laboratory/Imaging Studies:  2/27/18 Right breast lumpectomy and SLN biopsy:  FINAL DIAGNOSIS:   A. BREAST, RIGHT, WIRE LOCALIZED LUMPECTOMY:   - INVASIVE MAMMARY CARCINOMA OF NO SPECIAL TYPE (INVASIVE DUCTAL   CARCINOMA), [place] grade 2, size 6 x 0.7   mm, residual after neoadjuvant chemotherapy   - Ductal carcinoma in situ (DCIS), nuclear grade 3, cribriform type with   comedonecrosis   - DCIS is adjacent to invasive carcinoma, and spans 8 mm   - No lymphovascular invasion identified   - Margins are uninvolved by invasive carcinoma   - Invasive carcinoma is 8 mm from the nearest (medial) margin of this   specimen (not a final margin, see   specimen D)   - Margins are uninvolved by DCIS   - DCIS is 6 mm from the nearest (superior) margin of this specimen (not a   final margin, see specimen B)   - Treatment related " changes   - Flat epithelial atypia   - Other findings: columnar cell change, fibrocystic change (including   microcysts and apocrine metaplasia)   - Calcifications associated with invasive carcinoma, DCIS, and benign   ducts and acini   - Invasive carcinoma is estrogen receptor and progesterone receptor   negative by immunohistochemistry and is   HER2 not amplified by FISH (performed on right breast core biopsy, prior   to neoadjuvant chemotherapy, see   reports [id number] and [id number])   - See comment for tumor synoptic   - See microscopic description     B. BREAST, RIGHT, NEW SUPERIOR MARGIN, EXCISION:   - Fibrocystic change (including microcysts)   - No atypical or malignant findings     C. LYMPH NODE, RIGHT AXILLARY, SENTINEL, EXCISION:   - One benign lymph node (0/1)     D. BREAST, RIGHT, NEW MARGIN INFERIOR, EXCISION:   - Fibrocystic change (including microcysts)   - No atypical or malignant findings     E. BREAST, RIGHT, NEW LATERAL MARGIN, EXCISION:   - Fibrocystic change (including microcysts)   - Benign luminal calcifications   - No atypical or malignant findings     F. BREAST, RIGHT, NEW ANTERIOR MARGIN, EXCISION:   - Atypical ductal hyperplasia   - Fibrocystic change (including microcysts)   - Benign luminal and stromal calcifications   - No malignant findings     G. BREAST, RIGHT, NEW DEEP MARGIN, EXCISION:   - Benign fibroadipose tissue     H. BREAST, RIGHT, MEDIAL NEW MARGIN, EXCISION:   - Benign breast tissue   - No atypical or malignant findings     COMMENT:   In the right breast lumpectomy specimen (specimen A), the invasive   carcinoma is composed of clusters of tumor   cells concentrated in a 6.0 x 0.7 mm fibrotic area.  The invasive tumor   cellularity is 10%.  The carcinoma in   situ is adjacent to the invasive carcinoma, and is therefore not included   in the tumor cellularity   calculation.  The sentinel lymph node shows no metastatic carcinoma and no    definite treatment related changes.     The pathologic stage is [id number](sn).  The [facility] residual cancer   burden is calculated as 1.069 (RCB   Class I).       ASSESSMENT/PLAN:  Ms. Alonzo is a 57-year-old female with a stage IIa, grade 3, triple-negative infiltrating ductal carcinoma of the right breast s/p 12 weeks of Taxol along with 3 cycles pembrolizumab, 4 cycles of adriamycin and cyclophosphamide, and lumpectomy.     1.  Right breast cancer:   We reviewed the pathology from her lumpectomy and sentinel lymph node procedure which shows residual 6 mm carcinoma in the right and no lymph node involvement.  [facility] RCB Class I.  According to the April, 2017 Hari et al paper in [employer], her five year risk of recurrence is approximately 10-15%.  We discussed the option of 6 months of adjuvant Xeloda per the results of the CREATE-X clinical trial.  I estimate benefit at most would be a 2-5% risk reduction in future risk of recurrence.  She states chemotherapy was incredibly hard for her and she is not wanting further treatment at this time.  Given RCB I disease, I am okay proceeding without further adjuvant chemotherapy.    We then discussed plan to proceed with radiation therapy.  I have referred her to see Dr. Zhong or Dr. Sevilla at [facility].  I would like her to be seen in Survivor clinic at the end of radiation.  I will then see her back inapproximately 4 months.  She will be due for annual mammograms in 07/2017.    We reviewed surveillance schedule following a breast cancer diagnosis.  This will consist of a clinic visit once every 3-4 months for three years and every 6 months in years four and five.  In addition, we will continue annual screening mammograms.  We reviewed recommendations suggested to prevent breast cancer including 150 minutes of cardiovascular exercise per week, a diet low in saturated fat, and reduced alcohol intake.    2.  Pneumonitis:  Completed an approximate 3 month course of steroids in 02/2018.  Last imaging on 2/2/2018 showed  persistent, but improving bilateral infiltrates.    3.  Low grade fever:  No clinical evidence of infection.  I suspect may be residual due to pneumonitis vs Keytruda.  Will continue to monitor.  Consider repeating chest CT in approximately 3 months to re-evaluate pneumonitis.    4.  Neuropathy:  Grade I.  Continue to take pyridoxine 100 mg daily.    5.  Fatigue:  Referral placed to cancer rehab.    6.  Followup:  Radiation consultation within 1-2 weeks.  Survivor clinic visit in approximately 2 months.  Return in approximately 4 months for bilateral diagnostic mammograms and visit with me.    It was a pleasure to see Ms. Alonzo in clinic today.  A total of 35 minutes of our 40 minute face to face visit was spent in counseling.    Fara Bradshaw MD

## 2018-03-12 ENCOUNTER — ONCOLOGY VISIT (OUTPATIENT)
Dept: ONCOLOGY | Facility: CLINIC | Age: 58
End: 2018-03-12
Attending: INTERNAL MEDICINE
Payer: COMMERCIAL

## 2018-03-12 VITALS
WEIGHT: 147.7 LBS | HEART RATE: 120 BPM | HEIGHT: 64 IN | SYSTOLIC BLOOD PRESSURE: 121 MMHG | OXYGEN SATURATION: 97 % | BODY MASS INDEX: 25.22 KG/M2 | RESPIRATION RATE: 16 BRPM | TEMPERATURE: 97.1 F | DIASTOLIC BLOOD PRESSURE: 76 MMHG

## 2018-03-12 DIAGNOSIS — R53.83 OTHER FATIGUE: ICD-10-CM

## 2018-03-12 DIAGNOSIS — N64.4 BREAST PAIN: ICD-10-CM

## 2018-03-12 DIAGNOSIS — R68.89 DECREASED EXERCISE TOLERANCE: ICD-10-CM

## 2018-03-12 DIAGNOSIS — Z17.1 MALIGNANT NEOPLASM OF UPPER-INNER QUADRANT OF RIGHT BREAST IN FEMALE, ESTROGEN RECEPTOR NEGATIVE (H): Primary | ICD-10-CM

## 2018-03-12 DIAGNOSIS — C50.211 MALIGNANT NEOPLASM OF UPPER-INNER QUADRANT OF RIGHT BREAST IN FEMALE, ESTROGEN RECEPTOR NEGATIVE (H): Primary | ICD-10-CM

## 2018-03-12 PROCEDURE — 99215 OFFICE O/P EST HI 40 MIN: CPT | Mod: ZP | Performed by: INTERNAL MEDICINE

## 2018-03-12 PROCEDURE — G0463 HOSPITAL OUTPT CLINIC VISIT: HCPCS | Mod: ZF

## 2018-03-12 ASSESSMENT — PAIN SCALES - GENERAL: PAINLEVEL: MODERATE PAIN (4)

## 2018-03-12 NOTE — MR AVS SNAPSHOT
"              After Visit Summary   3/12/2018    Ashleigh Alonzo    MRN: [id number]           Patient Information     Date Of Birth          1960        Visit Information        Provider Department      3/12/2018 8:45 AM Fara Bradshaw MD [facility]        Today's Diagnoses     Malignant neoplasm of upper-inner quadrant of right breast in female, estrogen receptor negative (H)    -  1    Breast pain        Other fatigue        Decreased exercise tolerance           Follow-ups after your visit        Additional Services     PHYSICAL THERAPY REFERRAL       *This therapy referral will be filtered to a centralized scheduling office at [facility] and the patient will receive a call to schedule an appointment at a [facility] location most convenient for them. *     [facility] provides Physical Therapy evaluation and treatment and many specialty services across the [facility] system.  If requesting a specialty program, please choose from the list below.    If you have not heard from the scheduling office within 2 business days, please call [phone] for all locations, with the exception of [place], please call [phone] and [facility], please call [phone]  Treatment: Evaluation & Treatment  Special Instructions/Modalities: cancer rehab for fatigue and diminished exercise tolerance following chemotherapy  Special Programs: Cancer Rehabilitation (PT and OT Evaluate & Treat)    Please be aware that coverage of these services is subject to the terms and limitations of your health insurance plan.  Call member services at your health plan with any benefit or coverage questions.      **Note to Provider:  If you are referring outside of [facility] for the therapy appointment, please list the name of the location in the \"special instructions\" above, print the referral and give to the patient to schedule the appointment.            RADIATION " THERAPY REFERRAL       Your provider has referred you to: [facility]: Radiation Oncology Clinic - [place] [phone]   [email or web address]    Please be aware that coverage of these services is subject to the terms and limitations of your health insurance plan.  Call member services at your health plan with any benefit or coverage questions.      Please bring the following to your appointment:    >>   Any x-rays, CTs or MRIs which have been performed.  Contact the facility where they were done to arrange for  prior to your scheduled appointment.   >>   List of current medications   >>   This referral request   >>   Any documents/labs given to you for this referral                  Your next 10 appointments already scheduled     Mar 16, 2018 11:00 AM CDT   (Arrive by 10:45 AM)   New Patient Visit with Trina Vera PA-C   [facility] ([facility])    [address] [place] [address]  [address]  [facility] [id number] [phone]            May 16, 2018  3:15 PM CDT   (Arrive by 3:00 PM)   Survivorship Visit with Ivana Gonzales PA-C   [facility] ([facility])    [address] [place] [address]  [address]  [facility] [id number] [phone]            Jul 16, 2018  3:00 PM CDT   (Arrive by 2:45 PM)   MA DIAGNOSTIC DIGITAL BILATERAL with [id number]   [facility] ([facility])    [address] [place] [address]  2nd Floor  [facility] [id number] [phone]           Do not use any powder, lotion or deodorant under your arms or on your breast. If you do, we will ask you to remove it before your exam.  Wear comfortable, two-piece clothing.  If you have any allergies, tell your care team.  Bring any previous mammograms from other facilities or have them mailed to the breast center.  Three-dimensional (3D) mammograms are available at [facility] locations in  "[place], [place], [place], [place], [facility], [place], [place], and [place]. [facility] locations include [place] and [facility] in [place]. Benefits of 3D mammograms include: - Improved rate of cancer detection - Decreases your chance of having to go back for more tests, which means fewer: - \"False-positive\" results (This means that there is an abnormal area but it isn't cancer.) - Invasive testing procedures, such as a biopsy or surgery - Can provide clearer images of the breast if you have dense breast tissue. 3D mammography is an optional exam that anyone can have with a 2D mammogram. It doesn't replace or take the place of a 2D mammogram. 2D mammograms remain an effective screening test for all women.  Not all insurance companies cover the cost of a 3D mammogram. Check with your insurance.            Jul 16, 2018  3:45 PM CDT   (Arrive by 3:30 PM)   Return Visit with Fara Bradshaw MD   [facility] ([facility])    [address] [place] [address]  [address] [place] [id number] [phone]              Future tests that were ordered for you today     Open Future Orders        Priority Expected Expires Ordered    MA Diagnostic Digital Bilateral Routine 7/16/2018 3/11/2019 3/11/2018            Who to contact     If you have questions or need follow up information about today's clinic visit or your schedule please contact [facility] directly at [phone].  Normal or non-critical lab and imaging results will be communicated to you by MyChart, letter or phone within 4 business days after the clinic has received the results. If you do not hear from us within 7 days, please contact the clinic through [email or web address] or phone. If you have a critical or abnormal lab result, we will notify you by phone as soon as possible.  Submit refill requests through [employer] or call your pharmacy and they will forward the refill request " "to us. Please allow 3 business days for your refill to be completed.          Additional Information About Your Visit        [employer] Information     "[employer] gives you secure access to your electronic health record. If you see a primary care provider, you can also send messages to your care team and make appointments. If you have questions, please call your primary care clinic.  If you do not have a primary care provider, please call [phone] and they will assist you.        Care EveryWhere ID     This is your Care EveryWhere ID. This could be used by other organizations to access your [facility] medical records  [id number]        Your Vitals Were     Pulse Temperature Respirations Height Pulse Oximetry BMI (Body Mass Index)    120 97.1  F (36.2  C) (Oral) 16 1.638 m (5' 4.49\") 97% 24.97 kg/m2       Blood Pressure from Last 3 Encounters:   03/12/18 121/76   02/27/18 122/78   02/21/18 114/79    Weight from Last 3 Encounters:   03/12/18 67 kg (147 lb 11.2 oz)   02/27/18 66.5 kg (146 lb 9.7 oz)   02/21/18 68.5 kg (151 lb 1.6 oz)              We Performed the Following     PHYSICAL THERAPY REFERRAL     RADIATION THERAPY REFERRAL          Today's Medication Changes          These changes are accurate as of 3/12/18  9:52 AM.  If you have any questions, ask your nurse or doctor.               Start taking these medicines.        Dose/Directions    acetaminophen-codeine 300-30 MG per tablet   Commonly known as:  TYLENOL #3   Used for:  Breast pain   Started by:  Fara Bradshaw MD        Dose:  1-2 tablet   Take 1-2 tablets by mouth every 6 hours as needed for moderate pain   Quantity:  60 tablet   Refills:  0         These medicines have changed or have updated prescriptions.        Dose/Directions    citalopram 10 MG tablet   Commonly known as:  celeXA   This may have changed:  when to take this   Used for:  Anxiety, Depression, unspecified depression type        Dose:  10 mg   Take 1 tablet (10 mg) by " mouth daily   Quantity:  90 tablet   Refills:  3       darifenacin 7.5 MG 24 hr tablet   Commonly known as:  ENABLEX   This may have changed:    - when to take this  - reasons to take this   Used for:  Overactive bladder        Dose:  7.5 mg   Take 1 tablet (7.5 mg) by mouth daily   Quantity:  90 tablet   Refills:  3       tiotropium 18 MCG capsule   Commonly known as:  SPIRIVA   This may have changed:    - when to take this  - additional instructions   Used for:  Chronic obstructive pulmonary disease, unspecified COPD type (H)        Dose:  18 mcg   Inhale 1 capsule (18 mcg) into the lungs daily Inhale contents of one capsule   Quantity:  1 capsule   Refills:  11            Where to get your medicines      Some of these will need a paper prescription and others can be bought over the counter.  Ask your nurse if you have questions.     Bring a paper prescription for each of these medications     acetaminophen-codeine 300-30 MG per tablet                Primary Care Provider Office Phone # Fax #    Radha Cazares [phone]       [address] [address]        Equal Access to Services     [facility] : Hadii candie baileyo Somarian, waaxda luqadaha, qaybta kaalmajacqui lopez, bang duran . [facility] [phone].    ATENCIÓN: Si habla español, tiene a blackman disposición servicios gratuitos de asistencia lingüística. [facility] [phone].    We comply with applicable federal civil rights laws and [place] laws. We do not discriminate on the basis of race, color, national origin, age, disability, sex, sexual orientation, or gender identity.            Thank you!     Thank you for choosing [facility]  for your care. Our goal is always to provide you with excellent care. Hearing back from our patients is one way we can continue to improve our services. Please take a few minutes to complete the written  survey that you may receive in the mail after your visit with us. Thank you!             Your Updated Medication List - Protect others around you: Learn how to safely use, store and throw away your medicines at www.disposemymeds.org.          This list is accurate as of 3/12/18  9:52 AM.  Always use your most recent med list.                   Brand Name Dispense Instructions for use Diagnosis    acetaminophen-codeine 300-30 MG per tablet    TYLENOL #3    60 tablet    Take 1-2 tablets by mouth every 6 hours as needed for moderate pain    Breast pain       albuterol 108 (90 BASE) MCG/ACT Inhaler    PROAIR HFA/PROVENTIL HFA/VENTOLIN HFA    1 Inhaler    Inhale 2 puffs into the lungs every 6 hours as needed for shortness of breath / dyspnea    Chronic obstructive pulmonary disease, unspecified COPD type (H)       ASPIRIN PO      Take 81 mg by mouth daily        calcium carbonate 600 MG tablet   Generic drug:  calcium carbonate     60 tablet    Take by mouth 2 times daily (with meals)        citalopram 10 MG tablet    celeXA    90 tablet    Take 1 tablet (10 mg) by mouth daily    Anxiety, Depression, unspecified depression type       co-enzyme Q-10 30 MG/5ML Liqd liquid      Take by mouth every morning        darifenacin 7.5 MG 24 hr tablet    ENABLEX    90 tablet    Take 1 tablet (7.5 mg) by mouth daily    Overactive bladder       guaiFENesin 600 MG 12 hr tablet    MUCINEX    180 tablet    Take 2 tablets (1,200 mg) by mouth 2 times daily    Cough with sputum       hydrOXYzine 25 MG tablet    ATARAX    100 tablet    Take 1-2 tablets (25-50 mg) by mouth every 6 hours as needed for itching    Hives       lidocaine visc 2% 2.5mL/5mL & maalox/mylanta w/ simeth 2.5mL/5mL & diphenhydrAMINE 5mg/5mL Susp suspension    [facility] Mouthwash [facility]    1 Bottle    Swish and swallow 10 mLs in mouth every 6 hours as needed for mouth sores    Mucositis       lidocaine-prilocaine cream    EMLA    30 g    Please apply to port site 30  minutes before use prn    Personal history of malignant neoplasm of breast       LORazepam 0.5 MG tablet    ATIVAN    30 tablet    Take 1 tablet (0.5 mg) by mouth every 4 hours as needed (Anxiety, Nausea/Vomiting or Sleep)    Malignant neoplasm of upper-inner quadrant of right breast in female, estrogen receptor negative (H)       MULTIPLE VITAMIN PO      Take 1 tablet by mouth every morning        order for [facility]      RespirCatapooolts [employer] Auto CPAP 12-18 cm, F&P Simplus FFM small.    MERLIN (obstructive sleep apnea)       oxyCODONE IR 5 MG tablet    ROXICODONE    10 tablet    Take 1-2 tablets (5-10 mg) by mouth every 4 hours as needed for pain or other (Moderate to Severe)    Malignant neoplasm of upper-inner quadrant of right breast in female, estrogen receptor negative (H)       tiotropium 18 MCG capsule    SPIRIVA    1 capsule    Inhale 1 capsule (18 mcg) into the lungs daily Inhale contents of one capsule    Chronic obstructive pulmonary disease, unspecified COPD type (H)       VITAMIN B 12 PO      Take 100 mcg by mouth every morning        VITAMIN B6 PO      Take 1 tablet by mouth every morning        vitamin D 1000 UNITS capsule      TAKE 2 CAPSULES BY MOUTH EVERY MORNING

## 2018-03-12 NOTE — LETTER
3/12/2018       RE: Ashleigh Alonzo  [address] [place] [address]     Dear Colleague,    Thank you for referring your patient, Ashleigh Alonzo, to the [facility]. Please see a copy of my visit note below.    Oncology On Treatment Visit:  Date on this visit: 3/12/2018    Diagnosis:  Stage IIa, T2N0M0, grade 3 triple negative cancer of the right breast.    Primary Physician: Radha Cazares     History Of Present Illness:  Ms. Alonzo is a 57-year-old postmenopausal female with stage IIa, T2 N0 M0, grade 3, triple-negative invasive carcinoma of the right breast.  Routine bilateral screening mammogram on 07/27/2017 showed developing calcifications in the right breast at the 12 o'clock position 6 cm from the nipple.  Right breast ultrasound demonstrated a 7-mm, irregular, hypoechoic mass at the 12 o'clock position.  Contrast-enhanced mammogram showed a peripherally enhancing, irregular mass measuring 1.9 cm as well as an additional 6-mm, enhancing focus anteromedial to the dominant mass.  The total area of abnormal enhancement on contrast mammogram measured up to 3.4 cm.  Right breast biopsy demonstrated a grade 3 invasive mammary carcinoma with associated high-grade DCIS.  Estrogen receptor and progesterone receptor staining were negative with 0% of nuclei staining.  HER2 was non-amplified by FISH with a HER2/CEN17 ratio of 1.5 and 2.8 HER2 signals per nucleus.  Breast MRI measured the biopsy proven breast cancer at 3.2 cm.    Ms. Alonzo enrolled in the ISPY-2 clinical trial.  She began treatment with weekly taxol and once every 3 week pembrolizumab on 9/20/17.  She was hospitalized 11/11/17 - 11/17/17 with fevers ranging from 102 - 105.  CT chest was consistent with pneumonitis.  She also had transaminitis in the 150 range.  She was started on corticosteroids.  Pembrolizumab was stopped and she resumed weekly taxol alone on 11/28/17.  She completed a total of 12 weeks of therapy on  12/26/17.  She received first cycle of adriamycin and cyclophosphamide on 1/2/18.  She was hospitalized 1/10/18 - 1/13/18 with neutropenic fever.  She was hospitalized again  from 1/29/18 - 2/8/18 with neutropenic fever, mucositis, and C. difficile colitis following cycle #2.  Given poor clinical status and frequent hospitalizations, plan was made to forgo the planned final 2 cycles of AC and proceed with chemotherapy.       On 2/27/18 she underwent right breast lumpectomy and sentinel lymph node procedure.  Pathology showed a grade 2, 6 mm residual invasive mammary carcinoma with an associated 8 mm area of high grade DCIS with comedonecrosis and ADH.  Invasive tumor cellularity was 10%.  There was lymphovascular invasion.  Surgical margins were negative for both invasive and noninvasive disease.  A single sentinel lymph node was benign.  MDA residual cancer burden was class I.       Interval History:  Ashleigh Perera comes into the clinic today for routine breast cancer followup.  She had a right breast lumpectomy and sentinel lymph node procedure approximately 2 weeks ago.  She has had significant soreness of the breast since the procedure.  She describes a hard lump under the arm that is particularly tender.  It does limit some range of movement.  She overall has been feeling well.  She has noted low degree fevers present daily since her surgery.  The maximum was 101.1 on 03/10.  They have otherwise been in the .4 range.  She has not had any erythema or rashes.  She denies cough, shortness of breath.  She has some thin nasal drainage.  She has no urinary complaints.  She states that her energy is returning.  In fact, she is planning on returning to work this coming Monday.  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  Her appetite is much improved and she has been able to keep a stable weight.  She denies increased dyspnea.  In fact, she feels her breathing is overall improved.   However, her  mentions she is not walking more than 5 minutes at a time.  The remainder of a 10-point review of systems is otherwise negative.      Past Medical/Surgical History:  Past Medical History:   Diagnosis Date     Anxiety      COPD (chronic obstructive pulmonary disease) (H) 2011     Depression      Malignant neoplasm of upper-inner quadrant of right breast in female, estrogen receptor negative (H) 8/30/2017     Obstructive sleep apnea      Periodontal disease      Sleep apnea 12/2015     Urticaria      Past Surgical History:   Procedure Laterality Date     APPENDECTOMY  10/6/2015     BRONCHOSCOPY (RIGID OR FLEXIBLE), DIAGNOSTIC N/A 2/6/2018    Procedure: COMBINED BRONCHOSCOPY (RIGID OR FLEXIBLE), LAVAGE;  COMBINED BRONCOSCOY (RIGID OR FLEXIBLE), LAVAGE;  Surgeon: Samir Pettit MD;  Location: [facility]     CATARACT IOL, RT/LT  11/2016    bilateral      COLONOSCOPY  11/15/2011    Procedure:COLONOSCOPY; Colonoscopy, screening; Surgeon:ANASTASIA BUNCH; Location:[facility]     INSERT PORT VASCULAR ACCESS Left 9/1/2017    Procedure: INSERT PORT VASCULAR ACCESS;  Single Lumen Chest Power Port;  Surgeon: Leif Parkinson PA-C;  Location: [facility]     LUMPECTOMY BREAST WITH SENTINEL NODE, COMBINED Right 2/27/2018    Procedure: COMBINED LUMPECTOMY BREAST WITH SENTINEL NODE;  Right Wire Localized Lumpectomy, Right [place] Lymph Node Biopsy And Freddy Cath Removal;  Surgeon: Atul Gates MD;  Location: [facility] OR     REMOVE PORT VASCULAR ACCESS N/A 2/27/2018    Procedure: REMOVE PORT VASCULAR ACCESS;;  Surgeon: Atul Gates MD;  Location: [facility]     TUBAL LIGATION  12/2004    Bilateral     Allergies:  Allergies as of 03/12/2018     (No Known Allergies)     Current Medications:  Current Outpatient Prescriptions   Medication Sig Dispense Refill     oxyCODONE IR (ROXICODONE) 5 MG tablet Take 1-2 tablets (5-10 mg) by mouth every 4 hours as needed for pain or other (Moderate to Severe) 10 tablet 0      co-enzyme Q-10 30 MG/5ML LIQD liquid Take by mouth every morning       ASPIRIN PO Take 81 mg by mouth daily       magic mouthwash suspension (diphenhydrAMINE, lidocaine, aluminum-magnesium & simethicone) Swish and swallow 10 mLs in mouth every 6 hours as needed for mouth sores (Patient not taking: Reported on 2/15/2018) 1 Bottle 3     calcium carbonate (CALCIUM CARBONATE) 600 MG tablet Take by mouth 2 times daily (with meals) 60 tablet      lidocaine-prilocaine (EMLA) cream Please apply to port site 30 minutes before use prn 30 g 1     LORazepam (ATIVAN) 0.5 MG tablet Take 1 tablet (0.5 mg) by mouth every 4 hours as needed (Anxiety, Nausea/Vomiting or Sleep) 30 tablet 2     hydrOXYzine (ATARAX) 25 MG tablet Take 1-2 tablets (25-50 mg) by mouth every 6 hours as needed for itching 100 tablet 2     guaiFENesin (MUCINEX) 600 MG 12 hr tablet Take 2 tablets (1,200 mg) by mouth 2 times daily 180 tablet 6     tiotropium (SPIRIVA) 18 MCG capsule Inhale 1 capsule (18 mcg) into the lungs daily Inhale contents of one capsule (Patient taking differently: Inhale 18 mcg into the lungs every morning Inhale contents of one capsule) 1 capsule 11     albuterol (PROAIR HFA/PROVENTIL HFA/VENTOLIN HFA) 108 (90 BASE) MCG/ACT Inhaler Inhale 2 puffs into the lungs every 6 hours as needed for shortness of breath / dyspnea 1 Inhaler 5     citalopram (CELEXA) 10 MG tablet Take 1 tablet (10 mg) by mouth daily (Patient taking differently: Take 10 mg by mouth every evening ) 90 tablet 3     darifenacin (ENABLEX) 7.5 MG 24 hr tablet Take 1 tablet (7.5 mg) by mouth daily (Patient taking differently: Take 7.5 mg by mouth daily as needed ) 90 tablet 3     order for [facility] [place] Auto CPAP 12-18 cm, F&P Simplus FFM small. (Patient not taking: Reported on 1/23/2018)       MULTIPLE VITAMIN PO Take 1 tablet by mouth every morning        Cyanocobalamin (VITAMIN B 12 PO) Take 100 mcg by mouth every morning        Pyridoxine HCl (VITAMIN B6  "PO) Take 1 tablet by mouth every morning        VITAMIN D 1000 UNIT OR CAPS TAKE 2 CAPSULES BY MOUTH EVERY MORNING        Family and Social History:  Reviewed and unchanged from prior.  Please see initial consultation dated 8/28/17 for further details.    Physical Exam:  /76 (BP Location: Right arm, Patient Position: Chair, Cuff Size: Adult Regular)  Pulse 120  Temp 97.1  F (36.2  C) (Oral)  Resp 16  Ht 1.638 m (5' 4.49\")  Wt 67 kg (147 lb 11.2 oz)  SpO2 97%  BMI 24.97 kg/m2  General:  Well appearing adult female in NAD.    HEENT:  Normocephalic.  Sclera anicteric.  MMM.  No lesions of the oropharynx.  Lymph:  No palpable cervical, supraclavicular, or axillary LAD.  There is a 4 x 2.5 cm firm seroma palpable beneath the right axillary incision.  Chest:  Lungs are CTA bilaterally.    Breast exam:  Right breast periareolar incision appears to be healing well.  There is no surrounding erythema.  There is a 2.5 x 2 cm area of swelling beneath the medial aspect.    CV:  Tachycardic.  Regular rhythm.  Nl S1 and S2.  No m/r/g.  Abd:  Soft/NT/ND.  BSs normoactive.  No hepatosplenomegaly.  Ext:  No pitting edema of the bilateral lower extremities.  Pulses 2+ and symmetric.  Musculo:  Strength 5/5 throughout.  Neuro:  Cranial nerves grossly intact.  Psych:  Mood and affect appear normal.  Skin:  No visible rashes or skin lesions    Laboratory/Imaging Studies:  2/27/18 Right breast lumpectomy and SLN biopsy:  FINAL DIAGNOSIS:   A. BREAST, RIGHT, WIRE LOCALIZED LUMPECTOMY:   - INVASIVE MAMMARY CARCINOMA OF NO SPECIAL TYPE (INVASIVE DUCTAL   CARCINOMA), [place] grade 2, size 6 x 0.7   mm, residual after neoadjuvant chemotherapy   - Ductal carcinoma in situ (DCIS), nuclear grade 3, cribriform type with   comedonecrosis   - DCIS is adjacent to invasive carcinoma, and spans 8 mm   - No lymphovascular invasion identified   - Margins are uninvolved by invasive carcinoma   - Invasive carcinoma is 8 mm from the nearest " (medial) margin of this   specimen (not a final margin, see   specimen D)   - Margins are uninvolved by DCIS   - DCIS is 6 mm from the nearest (superior) margin of this specimen (not a   final margin, see specimen B)   - Treatment related changes   - Flat epithelial atypia   - Other findings: columnar cell change, fibrocystic change (including   microcysts and apocrine metaplasia)   - Calcifications associated with invasive carcinoma, DCIS, and benign   ducts and acini   - Invasive carcinoma is estrogen receptor and progesterone receptor   negative by immunohistochemistry and is   HER2 not amplified by FISH (performed on right breast core biopsy, prior   to neoadjuvant chemotherapy, see   reports [id number] and [id number])   - See comment for tumor synoptic   - See microscopic description     B. BREAST, RIGHT, NEW SUPERIOR MARGIN, EXCISION:   - Fibrocystic change (including microcysts)   - No atypical or malignant findings     C. LYMPH NODE, RIGHT AXILLARY, SENTINEL, EXCISION:   - One benign lymph node (0/1)     D. BREAST, RIGHT, NEW MARGIN INFERIOR, EXCISION:   - Fibrocystic change (including microcysts)   - No atypical or malignant findings     E. BREAST, RIGHT, NEW LATERAL MARGIN, EXCISION:   - Fibrocystic change (including microcysts)   - Benign luminal calcifications   - No atypical or malignant findings     F. BREAST, RIGHT, NEW ANTERIOR MARGIN, EXCISION:   - Atypical ductal hyperplasia   - Fibrocystic change (including microcysts)   - Benign luminal and stromal calcifications   - No malignant findings     G. BREAST, RIGHT, NEW DEEP MARGIN, EXCISION:   - Benign fibroadipose tissue     H. BREAST, RIGHT, MEDIAL NEW MARGIN, EXCISION:   - Benign breast tissue   - No atypical or malignant findings     COMMENT:   In the right breast lumpectomy specimen (specimen A), the invasive   carcinoma is composed of clusters of tumor   cells concentrated in a 6.0 x 0.7 mm fibrotic area.  The invasive tumor   cellularity is 10%.   The carcinoma in   situ is adjacent to the invasive carcinoma, and is therefore not included   in the tumor cellularity   calculation.  The sentinel lymph node shows no metastatic carcinoma and no    definite treatment related changes.    The pathologic stage is [id number](sn).  [facility] residual cancer   burden is calculated as 1.069 (RCB   Class I).       ASSESSMENT/PLAN:  Ms. Alonzo is a 57-year-old female with a stage IIa, grade 3, triple-negative infiltrating ductal carcinoma of the right breast s/p 12 weeks of Taxol along with 3 cycles pembrolizumab, 4 cycles of adriamycin and cyclophosphamide, and lumpectomy.     1.  Right breast cancer:   We reviewed the pathology from her lumpectomy and sentinel lymph node procedure which shows residual 6 mm carcinoma in the right and no lymph node involvement.  [facility] RCB Class I.  According to the April, 2017 Hari et al paper in [employer], her five year risk of recurrence is approximately 10-15%.  We discussed the option of 6 months of adjuvant Xeloda per the results of the CREATE-X clinical trial.  I estimate benefit at most would be a 2-5% risk reduction in future risk of recurrence.  She states chemotherapy was incredibly hard for her and she is not wanting further treatment at this time.  Given RCB I disease, I am okay proceeding without further adjuvant chemotherapy.    We then discussed plan to proceed with radiation therapy.  I have referred her to see Dr. Zhong or Dr. Sevilla at [facility].  I would like her to be seen in Survivor clinic at the end of radiation.  I will then see her back inapproximately 4 months.  She will be due for annual mammograms in 07/2017.    We reviewed surveillance schedule following a breast cancer diagnosis.  This will consist of a clinic visit once every 3-4 months for three years and every 6 months in years four and five.  In addition, we will continue annual screening mammograms.  We reviewed recommendations suggested to prevent breast cancer  including 150 minutes of cardiovascular exercise per week, a diet low in saturated fat, and reduced alcohol intake.    2.  Pneumonitis:  Completed an approximate 3 month course of steroids in 02/2018.  Last imaging on 2/2/2018 showed persistent, but improving bilateral infiltrates.    3.  Low grade fever:  No clinical evidence of infection.  I suspect may be residual due to pneumonitis vs Keytruda.  Will continue to monitor.  Consider repeating chest CT in approximately 3 months to re-evaluate pneumonitis.    4.  Neuropathy:  Grade I.  Continue to take pyridoxine 100 mg daily.    5.  Fatigue:  Referral placed to cancer rehab.    6.  Followup:  Radiation consultation within 1-2 weeks.  Survivor clinic visit in approximately 2 months.  Return in approximately 4 months for bilateral diagnostic mammograms and visit with me.    It was a pleasure to see Ms. Alonzo in clinic today.  A total of 35 minutes of our 40 minute face to face visit was spent in counseling.    Fara Bradshaw MD

## 2018-03-12 NOTE — NURSING NOTE
"Oncology Rooming Note    March 12, 2018 8:27 AM   Ashleigh Alonzo is a 57 year old female who presents for:    Chief Complaint   Patient presents with     Oncology Clinic Visit     Return: Breast CA     Initial Vitals: /76 (BP Location: Right arm, Patient Position: Chair, Cuff Size: Adult Regular)  Pulse 120  Temp 97.1  F (36.2  C) (Oral)  Resp 16  Ht 1.638 m (5' 4.49\")  Wt 67 kg (147 lb 11.2 oz)  SpO2 97%  BMI 24.97 kg/m2 Estimated body mass index is 24.97 kg/(m^2) as calculated from the following:    Height as of this encounter: 1.638 m (5' 4.49\").    Weight as of this encounter: 67 kg (147 lb 11.2 oz). Body surface area is 1.75 meters squared.  Moderate Pain (4) Comment: lymph nodes    No LMP recorded. Patient is postmenopausal.  Allergies reviewed: Yes  Medications reviewed: Yes    Medications: Medication refills not needed today.  Pharmacy name entered into [facility]:    [facility] [place], [place] - [address] [facility] - [place], [place] - [address] [address] [facility]    Clinical concerns: pain level 4 in lymph nodes.  I informed pt to remind the Provider/NAI about  pain level in case i dont touch bases with them, if the provider was in the exam room while i attend on rooming the next pt. Pt verbalized understandings.  Mira Morales CMA  Pt received flu shot elsewhere. See Immunizations        6 minutes for nursing intake (face to face time)     Mira Morales CMA                  "

## 2018-03-14 NOTE — PROGRESS NOTES
"FOLLOW-UP  Mar 16, 2018    Ashleigh Alonzo is a 57 year old female who returns for her first post-operative follow-up visit.    HPI:    She was diagnosed with triple negative T2N0 right breast cancer, treated with preoperative chemotherapy. She had a complete radiographic response.     On 2/27 she underwent a right wire-localized lumpectomy, right axillary sentinel lymph node biopsy and left subclavian PowerPort removal. Pathology revealed 0.6 cm invasive ductal carcinoma, grade 2, ER negative, AR negative, HER 2 negative. There was an 0.8 cm area of DCIS. Margins were negative.     She developed a seroma in the right axillary incision that is now resolving. She has some axillary pain that is improving.     /71 (BP Location: Right arm, Patient Position: Chair, Cuff Size: Adult Regular)  Pulse 105  Temp 97.5  F (36.4  C) (Oral)  Resp 16  Ht 1.638 m (5' 4.49\")  Wt 67 kg (147 lb 11.2 oz)  SpO2 99%  BMI 24.97 kg/m2   Physical Exam  Right axillary incision is well healed with 2 cm seroma. There is no erythema or bruising. Right breast incision is well healed.     ASSESSMENT:    Ashleigh Alonzo is a 57 year old female with right breast cancer s/p pre-operative chemotherapy, lumpectomy, axillary lymp node dissection and port removal.     PLAN:  1. Follow up with Oncology  2. Follow up with Radiation Oncology.     Trina Vera PA-C  "

## 2018-03-16 ENCOUNTER — OFFICE VISIT (OUTPATIENT)
Dept: SURGERY | Facility: CLINIC | Age: 58
End: 2018-03-16
Attending: PHYSICIAN ASSISTANT
Payer: COMMERCIAL

## 2018-03-16 ENCOUNTER — TELEPHONE (OUTPATIENT)
Dept: RADIATION ONCOLOGY | Facility: CLINIC | Age: 58
End: 2018-03-16

## 2018-03-16 VITALS
SYSTOLIC BLOOD PRESSURE: 110 MMHG | RESPIRATION RATE: 16 BRPM | HEART RATE: 105 BPM | HEIGHT: 64 IN | OXYGEN SATURATION: 99 % | DIASTOLIC BLOOD PRESSURE: 71 MMHG | TEMPERATURE: 97.5 F | BODY MASS INDEX: 25.22 KG/M2 | WEIGHT: 147.7 LBS

## 2018-03-16 DIAGNOSIS — Z98.890 S/P LUMPECTOMY OF BREAST: Primary | ICD-10-CM

## 2018-03-16 PROCEDURE — G0463 HOSPITAL OUTPT CLINIC VISIT: HCPCS | Mod: ZF

## 2018-03-16 PROCEDURE — 40000114 ZZH STATISTIC NO CHARGE CLINIC VISIT

## 2018-03-16 PROCEDURE — 99024 POSTOP FOLLOW-UP VISIT: CPT | Mod: ZP | Performed by: PHYSICIAN ASSISTANT

## 2018-03-16 ASSESSMENT — PAIN SCALES - GENERAL: PAINLEVEL: MILD PAIN (2)

## 2018-03-16 NOTE — MR AVS SNAPSHOT
After Visit Summary   3/16/2018    Ashleigh Alonzo    MRN: [id number]           Patient Information     Date Of Birth          1960        Visit Information        Provider Department      3/16/2018 11:00 AM Trina Vera PA-C [facility]         Follow-ups after your visit        Your next 10 appointments already scheduled     Mar 19, 2018 12:00 PM CDT   New Visit with Ligia Zhong MD   [facility])    [address] [place] [id number]   [phone]            Mar 19, 2018  1:30 PM CDT   Ct Sim with Ligia Zhong MD, MG RT SIMULATION   [facility] ([facility])    [address] [place] [address]   [phone]            Apr 06, 2018  1:45 PM CDT   Cancer Rehab Eval with Lisa Blanchard, PT   [facility] ([facility]    [address] [place] [address]   [phone]            Apr 06, 2018  2:45 PM CDT   Cancer Rehab Eval with Carlotta Joyner, OT   [facility] ([facility]    [address] [place] [address]   [phone]            May 16, 2018  3:15 PM CDT   (Arrive by 3:00 PM)   Survivorship Visit with Ivana Gonzales PA-C   [facility] ([facility])    [address] [place] [address]  [address] [place] [id number] [phone]            Jul 16, 2018  3:00 PM CDT   (Arrive by 2:45 PM)   MA DIAGNOSTIC DIGITAL BILATERAL with [id number]   [facility] ([facility])    [address] [place] [address]  2nd Floor  [facility] [id number] [phone]           Do not use any powder, lotion or deodorant under your arms or on your breast. If you do, we will ask you to remove it before your exam.  Wear comfortable, two-piece clothing.  If you have any allergies, tell your care  "team.  Bring any previous mammograms from other facilities or have them mailed to the breast center.  Three-dimensional (3D) mammograms are available at [facility] locations in [facility], [place], [place], [facility], [place], [place], and [place]. [facility] locations include [place] and [facility] in [place]. Benefits of 3D mammograms include: - Improved rate of cancer detection - Decreases your chance of having to go back for more tests, which means fewer: - \"False-positive\" results (This means that there is an abnormal area but it isn't cancer.) - Invasive testing procedures, such as a biopsy or surgery - Can provide clearer images of the breast if you have dense breast tissue. 3D mammography is an optional exam that anyone can have with a 2D mammogram. It doesn't replace or take the place of a 2D mammogram. 2D mammograms remain an effective screening test for all women.  Not all insurance companies cover the cost of a 3D mammogram. Check with your insurance.            Jul 16, 2018  3:45 PM CDT   (Arrive by 3:30 PM)   Return Visit with Fara Bradshaw MD   [facility] ([facility])    [address]  [address]  [facility] [address] [phone]              Who to contact     If you have questions or need follow up information about today's clinic visit or your schedule please contact [facility] directly at [phone].  Normal or non-critical lab and imaging results will be communicated to you by MyChart, letter or phone within 4 business days after the clinic has received the results. If you do not hear from us within 7 days, please contact the clinic through [employer] or phone. If you have a critical or abnormal lab result, we will notify you by phone as soon as possible.  Submit refill requests through Linkurious or call your pharmacy and they will forward the refill request to us. Please " "allow 3 business days for your refill to be completed.          Additional Information About Your Visit        [facility] Information     [facility] gives you secure access to your electronic health record. If you see a primary care provider, you can also send messages to your care team and make appointments. If you have questions, please call your primary care clinic.  If you do not have a primary care provider, please call [phone] and they will assist you.        Care EveryWhere ID     This is your Care EveryWhere ID. This could be used by other organizations to access your [facility] medical records  [id number]        Your Vitals Were     Pulse Temperature Respirations Height Pulse Oximetry BMI (Body Mass Index)    105 97.5  F (36.4  C) (Oral) 16 1.638 m (5' 4.49\") 99% 24.97 kg/m2       Blood Pressure from Last 3 Encounters:   03/16/18 110/71   03/12/18 121/76   02/27/18 122/78    Weight from Last 3 Encounters:   03/16/18 67 kg (147 lb 11.2 oz)   03/12/18 67 kg (147 lb 11.2 oz)   02/27/18 66.5 kg (146 lb 9.7 oz)              Today, you had the following     No orders found for display         Today's Medication Changes          These changes are accurate as of 3/16/18 11:33 AM.  If you have any questions, ask your nurse or doctor.               These medicines have changed or have updated prescriptions.        Dose/Directions    citalopram 10 MG tablet   Commonly known as:  celeXA   This may have changed:  when to take this   Used for:  Anxiety, Depression, unspecified depression type        Dose:  10 mg   Take 1 tablet (10 mg) by mouth daily   Quantity:  90 tablet   Refills:  3       darifenacin 7.5 MG 24 hr tablet   Commonly known as:  ENABLEX   This may have changed:    - when to take this  - reasons to take this   Used for:  Overactive bladder        Dose:  7.5 mg   Take 1 tablet (7.5 mg) by mouth daily   Quantity:  90 tablet   Refills:  3       tiotropium 18 MCG capsule   Commonly known as:  SPIRIVA   This " may have changed:    - when to take this  - additional instructions   Used for:  Chronic obstructive pulmonary disease, unspecified COPD type (H)        Dose:  18 mcg   Inhale 1 capsule (18 mcg) into the lungs daily Inhale contents of one capsule   Quantity:  1 capsule   Refills:  11                Primary Care Provider Office Phone # Fax #    Radha Cazares [phone]       [address] [address]        Equal Access to Services     THERESA HUGHES : Hadii aad ku hadasho Soomaali, waaxda luqadaha, qaybta kaalmada adeegyada, waxay idiin hayaan adeanmol duran . So [facility] [phone].    ATENCIÓN: Si habla español, tiene a blackman disposición servicios gratuitos de asistencia lingüística. [facility] [phone].    We comply with applicable federal civil rights laws and [place] laws. We do not discriminate on the basis of race, color, national origin, age, disability, sex, sexual orientation, or gender identity.            Thank you!     Thank you for choosing [facility]  for your care. Our goal is always to provide you with excellent care. Hearing back from our patients is one way we can continue to improve our services. Please take a few minutes to complete the written survey that you may receive in the mail after your visit with us. Thank you!             Your Updated Medication List - Protect others around you: Learn how to safely use, store and throw away your medicines at www.disposemymeds.org.          This list is accurate as of 3/16/18 11:33 AM.  Always use your most recent med list.                   Brand Name Dispense Instructions for use Diagnosis    acetaminophen-codeine 300-30 MG per tablet    TYLENOL #3    60 tablet    Take 1-2 tablets by mouth every 6 hours as needed for moderate pain    Breast pain       albuterol 108 (90 BASE) MCG/ACT Inhaler    PROAIR HFA/PROVENTIL HFA/VENTOLIN HFA    1 Inhaler    Inhale 2  puffs into the lungs every 6 hours as needed for shortness of breath / dyspnea    Chronic obstructive pulmonary disease, unspecified COPD type (H)       ASPIRIN PO      Take 81 mg by mouth daily        calcium carbonate 600 MG tablet   Generic drug:  calcium carbonate     60 tablet    Take by mouth 2 times daily (with meals)        citalopram 10 MG tablet    celeXA    90 tablet    Take 1 tablet (10 mg) by mouth daily    Anxiety, Depression, unspecified depression type       co-enzyme Q-10 30 MG/5ML Liqd liquid      Take by mouth every morning        darifenacin 7.5 MG 24 hr tablet    ENABLEX    90 tablet    Take 1 tablet (7.5 mg) by mouth daily    Overactive bladder       guaiFENesin 600 MG 12 hr tablet    MUCINEX    180 tablet    Take 2 tablets (1,200 mg) by mouth 2 times daily    Cough with sputum       hydrOXYzine 25 MG tablet    ATARAX    100 tablet    Take 1-2 tablets (25-50 mg) by mouth every 6 hours as needed for itching    Hives       lidocaine visc 2% 2.5mL/5mL & maalox/mylanta w/ simeth 2.5mL/5mL & diphenhydrAMINE 5mg/5mL Susp suspension    [facility] Mouthwash [facility]    1 Bottle    Swish and swallow 10 mLs in mouth every 6 hours as needed for mouth sores    Mucositis       lidocaine-prilocaine cream    EMLA    30 g    Please apply to port site 30 minutes before use prn    Personal history of malignant neoplasm of breast       LORazepam 0.5 MG tablet    ATIVAN    30 tablet    Take 1 tablet (0.5 mg) by mouth every 4 hours as needed (Anxiety, Nausea/Vomiting or Sleep)    Malignant neoplasm of upper-inner quadrant of right breast in female, estrogen receptor negative (H)       MULTIPLE VITAMIN PO      Take 1 tablet by mouth every morning        order for [facility]      Respironics Dream Station Auto CPAP 12-18 cm, F&P Simplus FFM small.    MERLIN (obstructive sleep apnea)       oxyCODONE IR 5 MG tablet    ROXICODONE    10 tablet    Take 1-2 tablets (5-10 mg) by mouth every 4 hours as needed for pain or other (Moderate to  Severe)    Malignant neoplasm of upper-inner quadrant of right breast in female, estrogen receptor negative (H)       tiotropium 18 MCG capsule    SPIRIVA    1 capsule    Inhale 1 capsule (18 mcg) into the lungs daily Inhale contents of one capsule    Chronic obstructive pulmonary disease, unspecified COPD type (H)       VITAMIN B 12 PO      Take 100 mcg by mouth every morning        VITAMIN B6 PO      Take 1 tablet by mouth every morning        vitamin D 1000 UNITS capsule      TAKE 2 CAPSULES BY MOUTH EVERY MORNING

## 2018-03-16 NOTE — TELEPHONE ENCOUNTER
Left voicemail on patients mobile number with consult date,time/length of visit, and location with Dr Zhong. This writer asked patient to call back with any questions    Leif PHAM

## 2018-03-16 NOTE — NURSING NOTE
"Oncology Rooming Note    March 16, 2018 11:17 AM   Ashleigh Alonzo is a 57 year old female who presents for:    Chief Complaint   Patient presents with     Oncology Clinic Visit     Return: post of breast ca      Initial Vitals: /74 (BP Location: Right arm, Patient Position: Chair, Cuff Size: Adult Regular)  Pulse 105  Temp 97.5  F (36.4  C) (Oral)  Resp 16  Ht 1.638 m (5' 4.49\")  Wt 67 kg (147 lb 11.2 oz)  SpO2 99%  BMI 24.97 kg/m2 Estimated body mass index is 24.97 kg/(m^2) as calculated from the following:    Height as of this encounter: 1.638 m (5' 4.49\").    Weight as of this encounter: 67 kg (147 lb 11.2 oz). Body surface area is 1.75 meters squared.  Data Unavailable Comment: Data Unavailable   No LMP recorded. Patient is postmenopausal.  Allergies reviewed: YES  Medications reviewed: YES    Medications: Medication refills not needed today.  Pharmacy name entered into [facility]:    [facility] [place], [place] - [address] [place] [facility] - [place], [place] - [address] [address]    Clinical concerns: no new concerns.  Pt received flu shot elsewhere. See Immunizations     6 minutes for nursing intake (face to face time)     Mira Morales CMA                "

## 2018-03-16 NOTE — LETTER
"3/16/2018       RE: Ashleigh Alonzo  [address] [place] [address]     Dear Colleague,    Thank you for referring your patient, Ashleigh Alonzo, to the [facility]. Please see a copy of my visit note below.    FOLLOW-UP  Mar 16, 2018    Ashleigh Alonzo is a 57 year old female who returns for her first post-operative follow-up visit.    HPI:    She was diagnosed with triple negative T2N0 right breast cancer, treated with preoperative chemotherapy. She had a complete radiographic response.     On 2/27 she underwent a right wire-localized lumpectomy, right axillary sentinel lymph node biopsy and left subclavian PowerPort removal. Pathology revealed 0.6 cm invasive ductal carcinoma, grade 2, ER negative, NE negative, HER 2 negative. There was an 0.8 cm area of DCIS. Margins were negative.     She developed a seroma in the right axillary incision that is now resolving. She has some axillary pain that is improving.     /71 (BP Location: Right arm, Patient Position: Chair, Cuff Size: Adult Regular)  Pulse 105  Temp 97.5  F (36.4  C) (Oral)  Resp 16  Ht 1.638 m (5' 4.49\")  Wt 67 kg (147 lb 11.2 oz)  SpO2 99%  BMI 24.97 kg/m2   Physical Exam  Right axillary incision is well healed with 2 cm seroma. There is no erythema or bruising. Right breast incision is well healed.     ASSESSMENT:    Ashleigh Alonzo is a 57 year old female with right breast cancer s/p pre-operative chemotherapy, lumpectomy, axillary lymp node dissection and port removal.     PLAN:  1. Follow up with Oncology  2. Follow up with Radiation Oncology.     Trina Vera PA-C        "

## 2018-03-19 ENCOUNTER — APPOINTMENT (OUTPATIENT)
Dept: RADIATION ONCOLOGY | Facility: CLINIC | Age: 58
End: 2018-03-19
Payer: COMMERCIAL

## 2018-03-19 ENCOUNTER — OFFICE VISIT (OUTPATIENT)
Dept: RADIATION ONCOLOGY | Facility: CLINIC | Age: 58
End: 2018-03-19
Payer: COMMERCIAL

## 2018-03-19 VITALS
TEMPERATURE: 98.3 F | RESPIRATION RATE: 16 BRPM | HEART RATE: 122 BPM | DIASTOLIC BLOOD PRESSURE: 69 MMHG | BODY MASS INDEX: 24.37 KG/M2 | SYSTOLIC BLOOD PRESSURE: 120 MMHG | OXYGEN SATURATION: 97 % | HEIGHT: 65 IN | WEIGHT: 146.3 LBS

## 2018-03-19 DIAGNOSIS — C50.211 MALIGNANT NEOPLASM OF UPPER-INNER QUADRANT OF RIGHT BREAST IN FEMALE, ESTROGEN RECEPTOR NEGATIVE (H): Primary | ICD-10-CM

## 2018-03-19 DIAGNOSIS — Z17.1 MALIGNANT NEOPLASM OF UPPER-INNER QUADRANT OF RIGHT BREAST IN FEMALE, ESTROGEN RECEPTOR NEGATIVE (H): Primary | ICD-10-CM

## 2018-03-19 PROCEDURE — 77263 THER RADIOLOGY TX PLNG CPLX: CPT | Performed by: RADIOLOGY

## 2018-03-19 PROCEDURE — 99205 OFFICE O/P NEW HI 60 MIN: CPT | Mod: 25 | Performed by: RADIOLOGY

## 2018-03-19 PROCEDURE — 77290 THER RAD SIMULAJ FIELD CPLX: CPT | Performed by: RADIOLOGY

## 2018-03-19 RX ORDER — ACETAMINOPHEN 325 MG/1
325-650 TABLET ORAL EVERY 6 HOURS PRN
COMMUNITY
End: 2018-04-18

## 2018-03-19 ASSESSMENT — PAIN SCALES - GENERAL: PAINLEVEL: NO PAIN (0)

## 2018-03-19 NOTE — PROGRESS NOTES
Dear Colleagues,  Today I had the pleasure of seeing this patient in consultation for her diagnosis of breast cancer.      IDENTIFICATION: Ms. Alonzo is a 58yo woman with G3 IDCA of the right breast, cT2(m)N0M0, ER-/CA-/Her2- s/p Taxol x12, Pembrolizumab x3, AC x4 and lumpectomy plus SLNBx completed on 2/27/18 revealing [id number] disease referred for adjuvant radiation therapy. She is enrolled on the ISPY Trial.      HISTORY OF PRESENT ILLNESS: Ms. Alonzo is a 57 year-old woman who was in her usual state of health this past summer. She specifically denies having any new masses or skin changes during this time. She was due for screening mammogram and had been getting them for about 2 decades.      On 7/27/17, bilateral screening MMG showed developing calcifications in the right breast at 12:00 position, 6 cm FN.  No significant change in the left breast.  This was confirmed on 8/17/1 by digital diagnostic MMG.     On 8/22/17, contrast enhanced digital MMG of the right breast at the 12 oclock position revealed a peripherally enhancing, irregular mass measuring up to 1.9 cm in length. Additional 6 cm enhancing focus is seen anterior and medial to the dominant mass which corresponds to additional or focal asymmetry with associated grouped microcalcifications. The total area of abnormal enhancement measures up to 3.4 cm. No suspicious area of enhancement seen on the left.    On 8/22/17, focused ultrasound of the right breast at 12 oclock  position 4 cm FN demonstrated an irregular hypoechoic mass with indistinct margins and hyperechoic rim, measuring 7 mm in greatest dimension. Punctate echogenic foci within this mass, favored to represent calcifications seen on mammogram. Same day US-guided core needle biopsy at the same position confirmed 7mm G3 IDCA, triple negative, with HG DCIS. Postbiopsy mammogram confirmed marker deployment.    On 8/30/17, bilateral breast MRI showed the 12 oclock lesion to be 3.2 cm with a  satellite suspicious mass in the right breast at 1 oclock measuring 2.0 cm in AP dimension. The total area of suspicious enhancement measures about 3.4 x 4.0 cm.    Ms. Alonzo enrolled in the ISPY-2 clinical trial.     On 9/20/17, she began treatment with weekly taxol and once every 3 week pembrolizumab. She developed pneumonitis/transaminitis while on this regimen requiring hospitalization in November.  She was started on corticosteroids and Pembro was stopped.  She completed a total of Taxol x12 on 12/26/17.  She received AC x2 from 1/2/18 to 1/23/18 which was complicated by neutropenic fever, mucositis, and C. difficile colitis.    Repeat MRI on 2/19/18 showed no residual abnormal enhancement No abnormal axillary lymph nodes. Nonspecific internal mammary chain lymph nodes are unchanged.     On 2/27/18 Dr. Gates took her to the OR for a right breast wire localized lumpectomy and SLNBx that showed a single focus of 6mm G2 IDCA, -LVSI, G3 DCIS, -margins and adequate DCIS margins. 0/1+ LN giving her a pathologic stage [id number], ER-/NV-/Her2-. Specimen radiograph following surgery demonstated the calcifications, clip and 3 wires in the biopsy specimen. Her postoperative course has been uneventful.    Currently she has had some low grade fevers ~99 or 100, which has been attributed to residual side effects from Pembro.  She takes tylenol as needed.  She has some tenderness and numbness of right breast.  Her postop seroma is improving. She otherwise feels well and has no other complaints.  She specifically denies any significant pain or range of motion issues, new masses, skin changes, shortness of breath, chest or bony pain, or other new neurologic symptoms. She is being seen here today accompanied by her  to discuss postoperative radiotherapy.    REVIEW OF SYSTEMS: As per HPI, a 14-point review of system is otherwise negative.     PAST RADIATION THERAPY:  Denies.     PAST CTD/PACEMAKER: Denies.     BREAST RISK  FACTORS:  She started her menses as a teenager, age uncertain.   with her first delivery at age 21. Breast fed for ~6 months. She is postmenopausal with last menstrual period being in her 40s. 10-15 years of OCP and then IUD after that.  No history of HRT. No history of prior breast biopsies.  No h/o ovarian or breast cancer in her family.     Past Medical History:   Diagnosis Date     Anxiety      Breast cancer (H) 2017    Right breast, invasive carcinoma, grade 3, ER (-) OH (-) HER 2 (-)     COPD (chronic obstructive pulmonary disease) (H)      Depression      Obstructive sleep apnea      Periodontal disease      Sleep apnea 2015     Urticaria        Past Surgical History:   Procedure Laterality Date     APPENDECTOMY  10/06/2015    [facility]     BREAST BIOPSY, CORE RT/LT Right 2017     BRONCHOSCOPY (RIGID OR FLEXIBLE), DIAGNOSTIC N/A 2018    Procedure: COMBINED BRONCHOSCOPY (RIGID OR FLEXIBLE), LAVAGE;  COMBINED BRONCOSCOY (RIGID OR FLEXIBLE), LAVAGE;  Surgeon: Samir Pettit MD;  Location: [facility]     CLEAR LENS REPLACEMENT Bilateral 2016     COLONOSCOPY  11/15/2011    Procedure:COLONOSCOPY; Colonoscopy, screening; Surgeon:ANASTASIA BUNCH; Location:[facility] OR     INSERT PORT VASCULAR ACCESS Left 2017    Procedure: INSERT PORT VASCULAR ACCESS;  Single Lumen Chest Power Port;  Surgeon: Leif Parkinson PA-C;  Location: [facility]     LUMPECTOMY BREAST WITH SENTINEL NODE, COMBINED Right 2018    Procedure: COMBINED LUMPECTOMY BREAST WITH SENTINEL NODE;  Right Wire Localized Lumpectomy, Right Reliance Lymph Node Biopsy And Freddy Cath Removal;  Surgeon: Atul Gates MD;  Location: [facility] OR     REMOVE PORT VASCULAR ACCESS N/A 2018    Procedure: REMOVE PORT VASCULAR ACCESS;;  Surgeon: Atul Gates MD;  Location: [facility]     TUBAL LIGATION  2004       Family History   Problem Relation Age of Onset     Cardiovascular Father 46     MI     Eye Disorder Father       "CEREBROVASCULAR DISEASE Father      Arthritis Sister      Neurologic Disorder Brother      Prostate Cancer Maternal Grandfather      Prostate Cancer Maternal Uncle      CANCER Maternal Uncle      Throat cancer     DIABETES No family hx of      Hypertension No family hx of        Social History   Substance Use Topics     Smoking status: Former Smoker     Packs/day: 0.75     Years: 20.00     Types: Cigarettes     Quit date: 1/1/2000     Smokeless tobacco: Never Used     Alcohol use Yes      Comment: Daily to weekly use (beer)     PHYSICAL EXAMINATION:  /69 (BP Location: Left arm, Patient Position: Chair, Cuff Size: Adult Regular)  Pulse 122  Temp 98.3  F (36.8  C) (Oral)  Resp 16  Ht 5' 4.5\"  Wt 146 lb 4.8 oz  SpO2 97%  BMI 24.72 kg/m2  GENERAL                  Well-appearing middle-aged woman in no acute distress.  HEENT                       Normocephalic, atraumatic. Sclerae anicteric. Diffuse alopecia.  She wears glasses.  CVR                            Regular rate and rhythm.  No murmurs, rubs, or gallops.  LUNGS                       Clear to auscultation bilaterally.  BREASTS                  Breasts are examined in the supine and upright position.  The breasts are large and symmetric. The right breast and axilla have ~3cm well healed incisions. There is no other erythema, ulceration or suspicious nodularity within the right or left breast. There is no erythema, retraction, desquamation or discharge appreciated within the right or left nipple areolar complex.    LYMPH NODES         No cervical, supraclavicular, infraclavicular, or axillary lymphadenopathy appreciated bilaterally.  ABDOMEN                 Soft.  No hepatosplenomegaly.  Positive bowel sounds.  EXTREMITIES           No clubbing cyanosis or edema.    MSK                           Good ROM  NEUROLOGICAL      Cranial nerves II-XII intact.  5/5 strength in bilateral upper and lower proximal and distal extremities. Sensation intact in all " areas tested.  SKIN                           Warm and well perfused.  PSYCHIATRIC           Alert and oriented x 3    IMPRESSION AND PLAN: Ms. Alonzo is a 58yo woman with G3 IDCA of the right breast, cT2(m)N0M0, ER-/NH-/Her2- s/p Taxol x12, Pembrolizumab x3, AC x4 and lumpectomy plus SLNBx completed on 2/27/18 revealing [id number] disease referred for adjuvant radiation therapy. She is enrolled on the ISPY Trial. I recommend adjuvant XRT to the right breast to improve local control and overall survival.      Treatment is based on multiple randomized prospective data which show decreased risk of local recurrence by 2/3rds with the addition of XRT to BCS and the EBCTCG metaanalysis published in Lancet 2011 which shows that for every 4 recurrences avoided by year 10 one breast cancer death is avoided by year 15. Treatment is also based on two major phase III US neoadjuvant chemo trials, NSABP B-18 and NSABP B27, which enrolled patients with T1-1N1-4E7, and [id number] or T1-3N1M0, respectively who received lumpectomy + adjuvant radiation therapy.  Analyses of these studies show that clinical tumor size and dana status before neoadjuvant chemotherapy and pathologic dana status/pCR in the breast after neoadjuvant chemotherapy and surgery are significant predictors of LRR by univariate analysis. These studies collectively show her risk of local regional recurrence at 10 years, given residual disease in the breast, with the addition of XRT to be ~8.3%.       The risks, benefits, treatment rationale and regimen of radiation therapy to the right breast were discussed with the patient and her .  Risks include but are not limited to skin erythema, desquamation, hyperpigmentation, lymphedema, fibrosis, telengectasia, pneumonitis, cardiac toxicity, esophagitis, nerve damage, rib fracture and secondary malignancy. The patient consented to therapy and had a CT simulation completed today.  XRT will start within the next 1-2  weeks.     Additional problem list to be addressed in the following manner:  1. Systemic Treatment: s/p neoadjuvant chemo with Dr. Bradshaw  2. Lymphedema Risk: She has been referred already.     There was ample time for questions and all were answered to the patient's satisfaction. Thank you for allowing me to participate in the care of this pleasant patient. If you have any questions, please do not hesitate to contact my office.     Sincerely,  Dianne Zhong MD     Adjunct   Dept of Radiation Oncology  [facility]

## 2018-03-19 NOTE — MR AVS SNAPSHOT
After Visit Summary   3/19/2018    Ashleigh Alonzo    MRN: [id number]           Patient Information     Date Of Birth          1960        Visit Information        Provider Department      3/19/2018 12:00 PM Ligia Zhong MD [facility]        Today's Diagnoses     Malignant neoplasm of upper-inner quadrant of right breast in female, estrogen receptor negative (H)    -  1      Care Instructions          What to expect at your Simulation visit:    You will meet with a Radiation Therapist and other team members who will be doing a planning session called a  simulation  with you. This process will determine your daily treatment.    ~ You will lie on a flat table and have a treatment planning CT scan.  It is important during the scan to hold very still and breathe normally.    ~ Your therapist may construct a body mold to help you hold still for your treatments.    ~ If you are having treatment to the head or neck area you will be fitted with a plastic mesh mask that fits very snugly over your face and neck.     ~ Your therapist will be taking some digital photos that will go in your treatment chart.      ~Your therapist will make marks on your skin and take measurements. Your therapist may ask you about making small tattoos (a permanent small dot) over these marks.  These marks are used to position you daily for your radiation therapy treatments. Please do not wash off any marks until all of your radiation therapy treatments are complete unless you are instructed to do so by your therapist.    ~ Once the simulation is completed it can take from 3 to 10 business days before you start radiation therapy treatments.    ~ You may meet with a nurse who will go over management of treatment side effects and self care during your treatments. The nurse will help to plan care with other departments and physicians if needed.      Please contact [facility] RN with questions  or concerns following today's appointment: [phone].    Thank you!            Follow-ups after your visit        Your next 10 appointments already scheduled     Mar 30, 2018  2:00 PM CDT   Verification Sim with Ligia Zhong MD, RADIATION THERAPIST   [facility]    [address] [facility] [address]   [phone]            Apr 02, 2018  2:45 PM CDT   TREATMENT with RADIATION THERAPIST   [facility]    [address] [facility] [address]   [phone]            Apr 03, 2018  2:45 PM CDT   TREATMENT with RADIATION THERAPIST   [facility]    [address] [facility] [address]   [phone]            Apr 04, 2018  2:45 PM CDT   TREATMENT with RADIATION THERAPIST   [facility]    [address] [facility] [address]   [phone]            Apr 04, 2018  3:00 PM CDT   on treatment visit with Ligia Zhong MD   [facility]    [address] [facility] [address]   [phone]            Apr 05, 2018  1:30 PM CDT   TREATMENT with RADIATION THERAPIST   [facility]    [address] [facility] [address]   [phone]            Apr 06, 2018  1:45 PM CDT   Cancer Rehab Eval with Lisa Blanchard, PT   [facility] ([facility])    [address] [facility] [address]   [phone]            Apr 06, 2018  2:45 PM CDT   Cancer Rehab Eval with Carlotta Joyner, OT   [facility] ([facility])    [address] [facility] [address]   [phone]            Apr 06, 2018  2:45 PM CDT   TREATMENT with RADIATION THERAPIST   [facility] ([facility]  "[facility]    [address] [place] [address] [phone]              Who to contact     If you have questions or need follow up information about today's clinic visit or your schedule please contact [facility] directly at [phone].  Normal or non-critical lab and imaging results will be communicated to you by MyChart, letter or phone within 4 business days after the clinic has received the results. If you do not hear from us within 7 days, please contact the clinic through [employer] or phone. If you have a critical or abnormal lab result, we will notify you by phone as soon as possible.  Submit refill requests through [employer] or call your pharmacy and they will forward the refill request to us. Please allow 3 business days for your refill to be completed.          Additional Information About Your Visit        [employer] Information     [employer] gives you secure access to your electronic health record. If you see a primary care provider, you can also send messages to your care team and make appointments. If you have questions, please call your primary care clinic.  If you do not have a primary care provider, please call [phone] and they will assist you.      [employer] is an electronic gateway that provides easy, online access to your medical records. With [employer], you can request a clinic appointment, read your test results, renew a prescription or communicate with your care team.     To access your existing account, please contact your [facility] or call [phone] for assistance.        Care EveryWhere ID     This is your Care EveryWhere ID. This could be used by other organizations to access your [facility] medical records  [id number]        Your Vitals Were     Pulse Temperature Respirations Height Pulse Oximetry BMI (Body Mass Index)    122 98.3  F (36.8  C) (Oral) 16 5' 4.5\" 97% 24.72 kg/m2       Blood Pressure from Last 3 Encounters: "   03/19/18 120/69   03/16/18 110/71   03/12/18 121/76    Weight from Last 3 Encounters:   03/19/18 146 lb 4.8 oz   03/16/18 147 lb 11.2 oz   03/12/18 147 lb 11.2 oz              Today, you had the following     No orders found for display         Today's Medication Changes          These changes are accurate as of 3/19/18  3:36 PM.  If you have any questions, ask your nurse or doctor.               These medicines have changed or have updated prescriptions.        Dose/Directions    citalopram 10 MG tablet   Commonly known as:  celeXA   This may have changed:  when to take this   Used for:  Anxiety, Depression, unspecified depression type        Dose:  10 mg   Take 1 tablet (10 mg) by mouth daily   Quantity:  90 tablet   Refills:  3       darifenacin 7.5 MG 24 hr tablet   Commonly known as:  ENABLEX   This may have changed:    - when to take this  - reasons to take this   Used for:  Overactive bladder        Dose:  7.5 mg   Take 1 tablet (7.5 mg) by mouth daily   Quantity:  90 tablet   Refills:  3       tiotropium 18 MCG capsule   Commonly known as:  SPIRIVA   This may have changed:    - when to take this  - additional instructions   Used for:  Chronic obstructive pulmonary disease, unspecified COPD type (H)        Dose:  18 mcg   Inhale 1 capsule (18 mcg) into the lungs daily Inhale contents of one capsule   Quantity:  1 capsule   Refills:  11                Primary Care Provider Office Phone # Fax #    Radha Cazares [phone]       [address] [address]        Equal Access to Services     THERESA HUGHES AH: Abdifatah finley Somarian, waaxda luqadaha, qaybta kaalmada bang lopez. So [facility] [phone].    ATENCIÓN: Si habla español, tiene a blackman disposición servicios gratuitos de asistencia lingüística. Llame al [phone].    We comply with applicable federal civil rights laws and [place] laws. We  do not discriminate on the basis of race, color, national origin, age, disability, sex, sexual orientation, or gender identity.            Thank you!     Thank you for choosing [facility]  for your care. Our goal is always to provide you with excellent care. Hearing back from our patients is one way we can continue to improve our services. Please take a few minutes to complete the written survey that you may receive in the mail after your visit with us. Thank you!             Your Updated Medication List - Protect others around you: Learn how to safely use, store and throw away your medicines at www.disposemymeds.org.          This list is accurate as of 3/19/18  3:36 PM.  Always use your most recent med list.                   Brand Name Dispense Instructions for use Diagnosis    acetaminophen-codeine 300-30 MG per tablet    TYLENOL #3    60 tablet    Take 1-2 tablets by mouth every 6 hours as needed for moderate pain    Breast pain       albuterol 108 (90 BASE) MCG/ACT Inhaler    PROAIR HFA/PROVENTIL HFA/VENTOLIN HFA    1 Inhaler    Inhale 2 puffs into the lungs every 6 hours as needed for shortness of breath / dyspnea    Chronic obstructive pulmonary disease, unspecified COPD type (H)       ASPIRIN PO      Take 81 mg by mouth daily        calcium carbonate 600 MG tablet   Generic drug:  calcium carbonate     60 tablet    Take by mouth 2 times daily (with meals)        citalopram 10 MG tablet    celeXA    90 tablet    Take 1 tablet (10 mg) by mouth daily    Anxiety, Depression, unspecified depression type       co-enzyme Q-10 30 MG/5ML Liqd liquid      Take by mouth every morning        darifenacin 7.5 MG 24 hr tablet    ENABLEX    90 tablet    Take 1 tablet (7.5 mg) by mouth daily    Overactive bladder       guaiFENesin 600 MG 12 hr tablet    MUCINEX    180 tablet    Take 2 tablets (1,200 mg) by mouth 2 times daily    Cough with sputum       hydrOXYzine 25 MG tablet    ATARAX    100 tablet     Take 1-2 tablets (25-50 mg) by mouth every 6 hours as needed for itching    Hives       LORazepam 0.5 MG tablet    ATIVAN    30 tablet    Take 1 tablet (0.5 mg) by mouth every 4 hours as needed (Anxiety, Nausea/Vomiting or Sleep)    Malignant neoplasm of upper-inner quadrant of right breast in female, estrogen receptor negative (H)       MULTIPLE VITAMIN PO      Take 1 tablet by mouth every morning        order for [facility]      Respir[employer]lecks Dream Station Auto CPAP 12-18 cm, F&P Simplus FFM small.    MERLIN (obstructive sleep apnea)       tiotropium 18 MCG capsule    SPIRIVA    1 capsule    Inhale 1 capsule (18 mcg) into the lungs daily Inhale contents of one capsule    Chronic obstructive pulmonary disease, unspecified COPD type (H)       TYLENOL 325 MG tablet   Generic drug:  acetaminophen      Take 325-650 mg by mouth every 6 hours as needed for mild pain        VITAMIN B 12 PO      Take 100 mcg by mouth every morning        VITAMIN B6 PO      Take 1 tablet by mouth every morning        vitamin D 1000 UNITS capsule      TAKE 2 CAPSULES BY MOUTH EVERY MORNING

## 2018-03-19 NOTE — PATIENT INSTRUCTIONS
What to expect at your Simulation visit:    You will meet with a Radiation Therapist and other team members who will be doing a planning session called a  simulation  with you. This process will determine your daily treatment.    ~ You will lie on a flat table and have a treatment planning CT scan.  It is important during the scan to hold very still and breathe normally.    ~ Your therapist may construct a body mold to help you hold still for your treatments.    ~ If you are having treatment to the head or neck area you will be fitted with a plastic mesh mask that fits very snugly over your face and neck.     ~ Your therapist will be taking some digital photos that will go in your treatment chart.      ~Your therapist will make marks on your skin and take measurements. Your therapist may ask you about making small tattoos (a permanent small dot) over these marks.  These marks are used to position you daily for your radiation therapy treatments. Please do not wash off any marks until all of your radiation therapy treatments are complete unless you are instructed to do so by your therapist.    ~ Once the simulation is completed it can take from 3 to 10 business days before you start radiation therapy treatments.    ~ You may meet with a nurse who will go over management of treatment side effects and self care during your treatments. The nurse will help to plan care with other departments and physicians if needed.      Please contact [facility] RN with questions or concerns following today's appointment: [phone].    Thank you!

## 2018-03-19 NOTE — LETTER
3/19/2018        RE: Ashleigh Alonzo  [address] [place] [address]          Dear Colleagues,  Today I had the pleasure of seeing this patient in consultation for her diagnosis of breast cancer.      IDENTIFICATION: Ms. Alonzo is a 56yo woman with G3 IDCA of the right breast, cT2(m)N0M0, ER-/ND-/Her2- s/p Taxol x12, Pembrolizumab x3, AC x4 and lumpectomy plus SLNBx completed on 2/27/18 revealing [id number] disease referred for adjuvant radiation therapy. She is enrolled on the ISPY Trial.      HISTORY OF PRESENT ILLNESS: Ms. Alonzo is a 57 year-old woman who was in her usual state of health this past summer. She specifically denies having any new masses or skin changes during this time. She was due for screening mammogram and had been getting them for about 2 decades.      On 7/27/17, bilateral screening MMG showed developing calcifications in the right breast at 12:00 position, 6 cm FN.  No significant change in the left breast.  This was confirmed on 8/17/1 by digital diagnostic MMG.     On 8/22/17, contrast enhanced digital MMG of the right breast at the 12 oclock position revealed a peripherally enhancing, irregular mass measuring up to 1.9 cm in length. Additional 6 cm enhancing focus is seen anterior and medial to the dominant mass which corresponds to additional or focal asymmetry with associated grouped microcalcifications. The total area of abnormal enhancement measures up to 3.4 cm. No suspicious area of enhancement seen on the left.    On 8/22/17, focused ultrasound of the right breast at 12 oclock  position 4 cm FN demonstrated an irregular hypoechoic mass with indistinct margins and hyperechoic rim, measuring 7 mm in greatest dimension. Punctate echogenic foci within this mass, favored to represent calcifications seen on mammogram. Same day US-guided core needle biopsy at the same position confirmed 7mm G3 IDCA, triple negative, with HG DCIS. Postbiopsy mammogram confirmed marker  deployment.    On 8/30/17, bilateral breast MRI showed the 12 oclock lesion to be 3.2 cm with a satellite suspicious mass in the right breast at 1 oclock measuring 2.0 cm in AP dimension. The total area of suspicious enhancement measures about 3.4 x 4.0 cm.    Ms. Alonzo enrolled in the ISPY-2 clinical trial.     On 9/20/17, she began treatment with weekly taxol and once every 3 week pembrolizumab. She developed pneumonitis/transaminitis while on this regimen requiring hospitalization in November.  She was started on corticosteroids and Pembro was stopped.  She completed a total of Taxol x12 on 12/26/17.  She received AC x2 from 1/2/18 to 1/23/18 which was complicated by neutropenic fever, mucositis, and C. difficile colitis.    Repeat MRI on 2/19/18 showed no residual abnormal enhancement No abnormal axillary lymph nodes. Nonspecific internal mammary chain lymph nodes are unchanged.     On 2/27/18 Dr. Gates took her to the OR for a right breast wire localized lumpectomy and SLNBx that showed a single focus of 6mm G2 IDCA, -LVSI, G3 DCIS, -margins and adequate DCIS margins. 0/1+ LN giving her a pathologic stage [id number], ER-/ME-/Her2-. Specimen radiograph following surgery demonstated the calcifications, clip and 3 wires in the biopsy specimen. Her postoperative course has been uneventful.    Currently she has had some low grade fevers ~99 or 100, which has been attributed to residual side effects from Pembro.  She takes tylenol as needed.  She has some tenderness and numbness of right breast.  Her postop seroma is improving. She otherwise feels well and has no other complaints.  She specifically denies any significant pain or range of motion issues, new masses, skin changes, shortness of breath, chest or bony pain, or other new neurologic symptoms. She is being seen here today accompanied by her  to discuss postoperative radiotherapy.    REVIEW OF SYSTEMS: As per HPI, a 14-point review of system is otherwise  negative.     PAST RADIATION THERAPY:  Denies.     PAST CTD/PACEMAKER: Denies.     BREAST RISK FACTORS:  She started her menses as a teenager, age uncertain.   with her first delivery at age 21. Breast fed for ~6 months. She is postmenopausal with last menstrual period being in her 40s. 10-15 years of OCP and then IUD after that.  No history of HRT. No history of prior breast biopsies.  No h/o ovarian or breast cancer in her family.     Past Medical History:   Diagnosis Date     Anxiety      Breast cancer (H) 2017    Right breast, invasive carcinoma, grade 3, ER (-) NH (-) HER 2 (-)     COPD (chronic obstructive pulmonary disease) (H)      Depression      Obstructive sleep apnea      Periodontal disease      Sleep apnea 2015     Urticaria        Past Surgical History:   Procedure Laterality Date     APPEN[facility]CT[facility]Y  10/06/2015    [facility]     BREAST BIOPSY, CORE RT/LT Right 2017     BRONCHOSCOPY (RIGID OR FLEXIBLE), DIAGNOSTIC N/A 2018    Procedure: COMBINED BRONCHOSCOPY (RIGID OR FLEXIBLE), LAVAGE;  COMBINED BRONCOSCOY (RIGID OR FLEXIBLE), LAVAGE;  Surgeon: Samir Pettit MD;  Location: [facility]     CLEAR LENS REPLACEMENT Bilateral 2016     COLONOSCOPY  11/15/2011    Procedure:COLONOSCOPY; Colonoscopy, screening; Surgeon:ANASTASIA [facility]; Location:[facility] OR     INSERT PORT VASCULAR ACCESS Left 2017    Procedure: INSERT PORT VASCULAR ACCESS;  Single Lumen Chest Power Port;  Surgeon: Leif Parkinson PA-C;  Location: [facility]     LUMPECTOMY BREAST WITH SENTINEL NODE, COMBINED Right 2018    Procedure: COMBINED LUMPECTOMY BREAST WITH SENTINEL NODE;  Right Wire Localized Lumpectomy, Right [place] Lymph Node Biopsy And Freddy Cath Removal;  Surgeon: Atul Gates MD;  Location: [facility] OR     REMOVE PORT VASCULAR ACCESS N/A 2018    Procedure: REMOVE PORT VASCULAR ACCESS;;  Surgeon: Atul Gates MD;  Location: [facility] OR     TUBAL LIGATION  2004       Family History  "  Problem Relation Age of Onset     Cardiovascular Father 46     MI     Eye Disorder Father      CEREBROVASCULAR DISEASE Father      Arthritis Sister      Neurologic Disorder Brother      Prostate Cancer Maternal Grandfather      Prostate Cancer Maternal Uncle      CANCER Maternal Uncle      Throat cancer     DIABETES No family hx of      Hypertension No family hx of        Social History   Substance Use Topics     Smoking status: Former Smoker     Packs/day: 0.75     Years: 20.00     Types: Cigarettes     Quit date: 1/1/2000     Smokeless tobacco: Never Used     Alcohol use Yes      Comment: Daily to weekly use (beer)     PHYSICAL EXAMINATION:  /69 (BP Location: Left arm, Patient Position: Chair, Cuff Size: Adult Regular)  Pulse 122  Temp 98.3  F (36.8  C) (Oral)  Resp 16  Ht 5' 4.5\"  Wt 146 lb 4.8 oz  SpO2 97%  BMI 24.72 kg/m2  GENERAL                  Well-appearing middle-aged woman in no acute distress.  HEENT                       Normocephalic, atraumatic. Sclerae anicteric. Diffuse alopecia.  She wears glasses.  CVR                            Regular rate and rhythm.  No murmurs, rubs, or gallops.  LUNGS                       Clear to auscultation bilaterally.  BREASTS                  Breasts are examined in the supine and upright position.  The breasts are large and symmetric. The right breast and axilla have ~3cm well healed incisions. There is no other erythema, ulceration or suspicious nodularity within the right or left breast. There is no erythema, retraction, desquamation or discharge appreciated within the right or left nipple areolar complex.    LYMPH NODES         No cervical, supraclavicular, infraclavicular, or axillary lymphadenopathy appreciated bilaterally.  ABDOMEN                 Soft.  No hepatosplenomegaly.  Positive bowel sounds.  EXTREMITIES           No clubbing cyanosis or edema.    MSK                           Good ROM  NEUROLOGICAL      Cranial nerves II-XII intact. "  5/5 strength in bilateral upper and lower proximal and distal extremities. Sensation intact in all areas tested.  SKIN                           Warm and well perfused.  PSYCHIATRIC           Alert and oriented x 3    IMPRESSION AND PLAN: Ms. Alonzo is a 58yo woman with G3 IDCA of the right breast, cT2(m)N0M0, ER-/[place]-/Her2- s/p Taxol x12, Pembrolizumab x3, AC x4 and lumpectomy plus SLNBx completed on 2/27/18 revealing [id number] disease referred for adjuvant radiation therapy. She is enrolled on the ISPY Trial. I recommend adjuvant XRT to the right breast to improve local control and overall survival.      Treatment is based on multiple randomized prospective data which show decreased risk of local recurrence by 2/3rds with the addition of XRT to BCS and the EBCTCG metaanalysis published in Lancet 2011 which shows that for every 4 recurrences avoided by year 10 one breast cancer death is avoided by year 15. Treatment is also based on two major phase III US neoadjuvant chemo trials, NSABP B-18 and NSABP B27, which enrolled patients with T1-8H2-2L6, and G8p-8X4Y9 or T1-3N1M0, respectively who received lumpectomy + adjuvant radiation therapy.  Analyses of these studies show that clinical tumor size and dana status before neoadjuvant chemotherapy and pathologic dana status/pCR in the breast after neoadjuvant chemotherapy and surgery are significant predictors of LRR by univariate analysis. These studies collectively show her risk of local regional recurrence at 10 years, given residual disease in the breast, with the addition of XRT to be ~8.3%.       The risks, benefits, treatment rationale and regimen of radiation therapy to the right breast were discussed with the patient and her .  Risks include but are not limited to skin erythema, desquamation, hyperpigmentation, lymphedema, fibrosis, telengectasia, pneumonitis,  cardiac toxicity, esophagitis, nerve damage, rib fracture and secondary malignancy. The patient  consented to therapy and had a CT simulation completed today.  XRT will start within the next 1-2 weeks.     Additional problem list to be addressed in the following manner:  1. Systemic Treatment: s/p neoadjuvant chemo with Dr. Bradshaw  2. Lymphedema Risk: She has been referred already.     There was ample time for questions and all were answered to the patient's satisfaction. Thank you for allowing me to participate in the care of this pleasant patient. If you have any questions, please do not hesitate to contact my office.     Sincerely,  Dianne Zhong MD     Adjunct   Dept of Radiation Oncology  [facility]

## 2018-03-19 NOTE — NURSING NOTE
"INITIAL PATIENT ASSESSMENT    Diagnosis: Breast cancer (right breast)    Prior radiation therapy: None    Prior chemotherapy:   Protocol: Taxol + Keytruda (I-Spy Trial)  Facility: [facility] - Dr. Bradshaw  Dates: 9/20/2017 - 12/26/2017 (Keytruda discontinued starting 11/28/2017)      Protocol: Adriamycin/Cytoxan  Facility: [facility] - Dr. Bradshaw  Dates: 1/2/2018 - 1/23/2018 (discontined after two cycles to due poor tolerance)      Prior hormonal therapy: Patient reports history of oral birth control use for approximately 10-15 years, history of IUD, denies HRT.    Pain Eval:  Denies at current visit, reports intermittent tenderness and numbness of right breast and right axilla.  Patient reports slight pulling through axilla with range of motion of right upper extremity, scheduled for cancer rehab and lymphedema therapy evaluation on 4/6/2018.    Psychosocial  Living arrangements: Lives at home in [place] with , patient works as a dispatcher for [employer].  Fall Risk: independent   referral needs: Not needed    Advanced Directive: No  Implantable Cardiac Device? No    Onset of menarche: Unknown age per patient  LMP: No LMP recorded. Patient is postmenopausal.  Onset of menopause: \"mid 40s\"  Abnormal vaginal bleeding/discharge: No  Are you pregnant? No  Reproductive note: Patient reports history of two pregnancies, two living daughters, first live birth at age 21, history of breastfeeding for approximately six months.    Review of Systems     Constitutional: Positive for fever. Negative for chills, diaphoresis, malaise/fatigue and weight loss.        Patient reports on-going low grade fevers of 99.0 daily, taking Tylenol 650 mg po as needed for fevers.  Patient and  report they were informed this was most likely related to previous history of Keytruda.   HENT: Negative.    Eyes: Negative.    Respiratory: Negative.  "   Cardiovascular: Negative.    Gastrointestinal: Negative.    Genitourinary: Negative.    Musculoskeletal: Negative.    Skin: Negative.    Neurological: Negative.  Negative for weakness.   Endo/Heme/Allergies: Negative.    Psychiatric/Behavioral: Negative.      Nurse face-to-face time: Level 5:  over 15 min face to face time

## 2018-03-19 NOTE — NURSING NOTE
Spoke with patient about radiation therapy schedule. PT/OT appointment for 4/6/18 1345 & 1445 confirmed with patient and that this writer will look for different time slots for radiation therapy. Patient stated that she could possibly move those times. This writer stated that message was sent about moving times for patient to fit radiation therapy. Patient verbalized understanding to schedule and possible time change on 4/6/18    Leif PHAM

## 2018-03-29 ENCOUNTER — APPOINTMENT (OUTPATIENT)
Dept: RADIATION ONCOLOGY | Facility: CLINIC | Age: 58
End: 2018-03-29
Payer: COMMERCIAL

## 2018-03-29 PROCEDURE — 77334 RADIATION TREATMENT AID(S): CPT | Performed by: RADIOLOGY

## 2018-03-29 PROCEDURE — 77295 3-D RADIOTHERAPY PLAN: CPT | Performed by: RADIOLOGY

## 2018-03-29 PROCEDURE — 77300 RADIATION THERAPY DOSE PLAN: CPT | Performed by: RADIOLOGY

## 2018-03-30 ENCOUNTER — APPOINTMENT (OUTPATIENT)
Dept: RADIATION ONCOLOGY | Facility: CLINIC | Age: 58
End: 2018-03-30
Payer: COMMERCIAL

## 2018-03-30 PROCEDURE — 77280 THER RAD SIMULAJ FIELD SMPL: CPT | Performed by: RADIOLOGY

## 2018-04-02 ENCOUNTER — APPOINTMENT (OUTPATIENT)
Dept: RADIATION ONCOLOGY | Facility: CLINIC | Age: 58
End: 2018-04-02
Payer: COMMERCIAL

## 2018-04-02 PROCEDURE — 77412 RADIATION TX DELIVERY LVL 3: CPT | Performed by: RADIOLOGY

## 2018-04-03 ENCOUNTER — APPOINTMENT (OUTPATIENT)
Dept: RADIATION ONCOLOGY | Facility: CLINIC | Age: 58
End: 2018-04-03
Payer: COMMERCIAL

## 2018-04-03 LAB
MYCOBACTERIUM SPEC CULT: NORMAL
MYCOBACTERIUM SPEC CULT: NORMAL
SPECIMEN SOURCE: NORMAL

## 2018-04-03 PROCEDURE — 77412 RADIATION TX DELIVERY LVL 3: CPT | Performed by: RADIOLOGY

## 2018-04-04 ENCOUNTER — OFFICE VISIT (OUTPATIENT)
Dept: RADIATION ONCOLOGY | Facility: CLINIC | Age: 58
End: 2018-04-04
Payer: COMMERCIAL

## 2018-04-04 ENCOUNTER — APPOINTMENT (OUTPATIENT)
Dept: RADIATION ONCOLOGY | Facility: CLINIC | Age: 58
End: 2018-04-04
Payer: COMMERCIAL

## 2018-04-04 ENCOUNTER — DOCUMENTATION ONLY (OUTPATIENT)
Dept: SPIRITUAL SERVICES | Facility: CLINIC | Age: 58
End: 2018-04-04

## 2018-04-04 VITALS
SYSTOLIC BLOOD PRESSURE: 110 MMHG | WEIGHT: 146 LBS | OXYGEN SATURATION: 97 % | BODY MASS INDEX: 24.67 KG/M2 | HEART RATE: 126 BPM | RESPIRATION RATE: 18 BRPM | DIASTOLIC BLOOD PRESSURE: 72 MMHG | TEMPERATURE: 99.5 F

## 2018-04-04 DIAGNOSIS — I89.0 LYMPHEDEMA: Primary | ICD-10-CM

## 2018-04-04 DIAGNOSIS — Z17.1 MALIGNANT NEOPLASM OF UPPER-INNER QUADRANT OF RIGHT BREAST IN FEMALE, ESTROGEN RECEPTOR NEGATIVE (H): ICD-10-CM

## 2018-04-04 DIAGNOSIS — C50.211 MALIGNANT NEOPLASM OF UPPER-INNER QUADRANT OF RIGHT BREAST IN FEMALE, ESTROGEN RECEPTOR NEGATIVE (H): ICD-10-CM

## 2018-04-04 DIAGNOSIS — Z71.81 SPIRITUAL OR RELIGIOUS COUNSELING: Primary | ICD-10-CM

## 2018-04-04 PROCEDURE — 99207 ZZC DROP WITH A PROCEDURE: CPT | Performed by: RADIOLOGY

## 2018-04-04 PROCEDURE — 77412 RADIATION TX DELIVERY LVL 3: CPT | Performed by: RADIOLOGY

## 2018-04-04 ASSESSMENT — PAIN SCALES - GENERAL: PAINLEVEL: NO PAIN (0)

## 2018-04-04 NOTE — PATIENT INSTRUCTIONS
Please contact [facility] RN with questions or concerns following today's appointment: [phone].    Thank you!

## 2018-04-04 NOTE — NURSING NOTE
Teaching Flowsheet   Relevant Diagnosis: breast cancer  Teaching Topic: radiation therapy     Person(s) involved in teaching:   Patient and      Motivation Level:  Asks Questions: Yes  Eager to Learn: Yes  Cooperative: Yes  Receptive (willing/able to accept information): Yes  Any cultural factors/Mormon beliefs that may influence understanding or compliance? No       Patient and  demonstrates understanding of the following:  Reason for the appointment, diagnosis and treatment plan: Yes  Knowledge of proper use of medications and conditions for which they are ordered (with special attention to potential side effects or drug interactions): Yes  Which situations necessitate calling provider and whom to contact: Yes       Teaching Concerns Addressed:   Comments: Radiation therapy side effects: fatigue, skin changes and skin cares, daily ROM stretching exercises     Proper use and care of  (medical equip, care aids, etc.): Yes  Nutritional needs and diet plan: Yes  Pain management techniques: Yes  Wound Care: Yes  How and/when to access community resources: Yes     Instructional Materials Used/Given: Radiation Therapy and You booklet, educational handouts on fatigue, skin changes and skin cares, daily ROM stretching exercises     Time spent with patient: 15 minutes.

## 2018-04-04 NOTE — MR AVS SNAPSHOT
"              After Visit Summary   4/4/2018    Ashleigh Alonzo    MRN: [id number]           Patient Information     Date Of Birth          1960        Visit Information        Provider Department      4/4/2018 3:00 PM Ligia Zhong MD [facility]        Today's Diagnoses     Lymphedema    -  1    Malignant neoplasm of upper-inner quadrant of right breast in female, estrogen receptor negative (H)          Care Instructions    Please contact [facility] RN with questions or concerns following today's appointment: [phone].    Thank you!            Follow-ups after your visit        Additional Services     LYMPHEDEMA THERAPY REFERRAL       *This therapy referral will be filtered to a centralized scheduling office at [facility] and the patient will receive a call to schedule an appointment at a [place] location most convenient for them. *   If you have not heard from the scheduling office within 2 business days, please call [phone] for all locations, with the exception of [place], please call [phone].     Treatment: PT or OT Evaluation & Treatment  Special Instructions: Evaluation and treatment, s/p chemotherapy, surgery and currently receiving radiation treatment  PT/OT Treatment Diagnosis: Lymphedema    Please be aware that coverage of these services is subject to the terms and limitations of your health insurance plan.  Call member services at your health plan with any benefit or coverage questions.      **Note to Provider:  If you are referring outside of [place] for the therapy appointment, please list the name of the location in the \"special instructions\" above, print the referral and give to the patient to schedule the appointment.                  Your next 10 appointments already scheduled     Apr 05, 2018  1:30 PM CDT   [facility] RAD[facility] THERAPIST   [facility]    [address] [facility] [address]   [phone]            Apr 06, 2018  2:45 PM CDT   TREATMENT with RADIATION THERAPIST   [facility]    [address] [facility] [address]   [phone]            Apr 09, 2018  2:45 PM CDT   TREATMENT with RADIATION THERAPIST   [facility]    [address] [facility] [address]   [phone]            Apr 10, 2018  2:15 PM CDT   TREATMENT with RADIATION THERAPIST   [facility]    [address] [facility] [address]   [phone]            Apr 11, 2018  2:45 PM CDT   TREATMENT with RADIATION THERAPIST   [facility]    [address] [facility] [address]   [phone]            Apr 11, 2018  3:00 PM CDT   on treatment visit with Ligia Zhong MD   [facility]    [address] [facility] [address]   [phone]            Apr 12, 2018  2:45 PM CDT   TREATMENT with RADIATION THERAPIST   [facility]    [address] [facility] [address]   [phone]            Apr 13, 2018  2:45 PM CDT   TREATMENT with RADIATION THERAPIST   [facility]    [address] [facility] [address]   [phone]            Apr 16, 2018  2:30 PM CDT   TREATMENT with RADIATION THERAPIST   [facility]    [address] [facility] [address]   [phone]            Apr 17, 2018  2:30 PM CDT   TREATMENT with RADIATION THERAPIST   [facility]    [address] [facility] [address]   [phone]              Who to contact     If you have questions or need follow up  information about today's clinic visit or your schedule please contact [facility] directly at [phone].  Normal or non-critical lab and imaging results will be communicated to you by [employer], letter or phone within 4 business days after the clinic has received the results. If you do not hear from us within 7 days, please contact the clinic through [employer] or phone. If you have a critical or abnormal lab result, we will notify you by phone as soon as possible.  Submit refill requests through [employer] or call your pharmacy and they will forward the refill request to us. Please allow 3 business days for your refill to be completed.          Additional Information About Your Visit        [employer] Information     [employer] gives you secure access to your electronic health record. If you see a primary care provider, you can also send messages to your care team and make appointments. If you have questions, please call your primary care clinic.  If you do not have a primary care provider, please call [phone] and they will assist you.      [employer]X is an electronic gateway that provides easy, online access to your medical records. With [employer], you can request a clinic appointment, read your test results, renew a prescription or communicate with your care team.     To access your existing account, please contact your [facility] or call [phone] for assistance.        Care EveryWhere ID     This is your Care EveryWhere ID. This could be used by other organizations to access your [place] medical records  [id number]        Your Vitals Were     Pulse Temperature Respirations Pulse Oximetry BMI (Body Mass Index)       126 99.5  F (37.5  C) (Oral) 18 97% 24.67 kg/m2        Blood Pressure from Last 3 Encounters:   04/04/18 110/72   03/19/18 120/69   03/16/18 110/71    Weight from Last 3 Encounters:   04/04/18 146 lb   03/19/18 146 lb 4.8 oz   03/16/18 147 lb 11.2 oz               We Performed the Following     LYMPHEDEMA THERAPY REFERRAL          Today's Medication Changes          These changes are accurate as of 4/4/18  4:00 PM.  If you have any questions, ask your nurse or doctor.               These medicines have changed or have updated prescriptions.        Dose/Directions    citalopram 10 MG tablet   Commonly known as:  celeXA   This may have changed:  when to take this   Used for:  Anxiety, Depression, unspecified depression type        Dose:  10 mg   Take 1 tablet (10 mg) by mouth daily   Quantity:  90 tablet   Refills:  3       darifenacin 7.5 MG 24 hr tablet   Commonly known as:  ENABLEX   This may have changed:    - when to take this  - reasons to take this   Used for:  Overactive bladder        Dose:  7.5 mg   Take 1 tablet (7.5 mg) by mouth daily   Quantity:  90 tablet   Refills:  3       tiotropium 18 MCG capsule   Commonly known as:  SPIRIVA   This may have changed:    - when to take this  - additional instructions   Used for:  Chronic obstructive pulmonary disease, unspecified COPD type (H)        Dose:  18 mcg   Inhale 1 capsule (18 mcg) into the lungs daily Inhale contents of one capsule   Quantity:  1 capsule   Refills:  11                Primary Care Provider Office Phone # Fax #    Radha Cazares [phone]       [address] [address]        Equal Access to Services     THERESA HUGHES AH: Abdifatah finley Somarian, waaxda luqadaha, qaybta kaalmada adeegyada, bang pickering. So [facility] [phone].    ATENCIÓN: Si habla español, tiene a blackman disposición servicios gratuitos de asistencia lingüística. Pauline al [phone].    We comply with applicable federal civil rights laws and [place] laws. We do not discriminate on the basis of race, color, national origin, age, disability, sex, sexual orientation, or gender identity.            Thank you!     Thank you for  [facility]  for your care. Our goal is always to provide you with excellent care. Hearing back from our patients is one way we can continue to improve our services. Please take a few minutes to complete the written survey that you may receive in the mail after your visit with us. Thank you!             Your Updated Medication List - Protect others around you: Learn how to safely use, store and throw away your medicines at www.disposemymeds.org.          This list is accurate as of 4/4/18  4:00 PM.  Always use your most recent med list.                   Brand Name Dispense Instructions for use Diagnosis    acetaminophen-codeine 300-30 MG per tablet    TYLENOL #3    60 tablet    Take 1-2 tablets by mouth every 6 hours as needed for moderate pain    Breast pain       albuterol 108 (90 BASE) MCG/ACT Inhaler    PROAIR HFA/PROVENTIL HFA/VENTOLIN HFA    1 Inhaler    Inhale 2 puffs into the lungs every 6 hours as needed for shortness of breath / dyspnea    Chronic obstructive pulmonary disease, unspecified COPD type (H)       ASPIRIN PO      Take 81 mg by mouth daily        calcium carbonate 600 MG tablet   Generic drug:  calcium carbonate     60 tablet    Take by mouth 2 times daily (with meals)        citalopram 10 MG tablet    celeXA    90 tablet    Take 1 tablet (10 mg) by mouth daily    Anxiety, Depression, unspecified depression type       co-enzyme Q-10 30 MG/5ML Liqd liquid      Take by mouth every morning        darifenacin 7.5 MG 24 hr tablet    ENABLEX    90 tablet    Take 1 tablet (7.5 mg) by mouth daily    Overactive bladder       guaiFENesin 600 MG 12 hr tablet    MUCINEX    180 tablet    Take 2 tablets (1,200 mg) by mouth 2 times daily    Cough with sputum       hydrOXYzine 25 MG tablet    ATARAX    100 tablet    Take 1-2 tablets (25-50 mg) by mouth every 6 hours as needed for itching    Hives       LORazepam 0.5 MG tablet    ATIVAN    30 tablet    Take 1 tablet (0.5 mg) by mouth  every 4 hours as needed (Anxiety, Nausea/Vomiting or Sleep)    Malignant neoplasm of upper-inner quadrant of right breast in female, estrogen receptor negative (H)       MULTIPLE VITAMIN PO      Take 1 tablet by mouth every morning        order for [facility]      [employer] Dream Station Auto CPAP 12-18 cm, F&P Simplus FFM small.    MERLIN (obstructive sleep apnea)       tiotropium 18 MCG capsule    SPIRIVA    1 capsule    Inhale 1 capsule (18 mcg) into the lungs daily Inhale contents of one capsule    Chronic obstructive pulmonary disease, unspecified COPD type (H)       TYLENOL 325 MG tablet   Generic drug:  acetaminophen      Take 325-650 mg by mouth every 6 hours as needed for mild pain        VITAMIN B 12 PO      Take 100 mcg by mouth every morning        VITAMIN B6 PO      Take 1 tablet by mouth every morning        vitamin D 1000 UNITS capsule      TAKE 2 CAPSULES BY MOUTH EVERY MORNING

## 2018-04-04 NOTE — PROGRESS NOTES
SPIRITUAL HEALTH SERVICES  SPIRITUAL ASSESSMENT Progress Note  [facility]     introduced himself to Ashleigh Schrader and informed her of his availability.    Donnie Castillo M.Div., [facility]  Staff   Office tel: [phone]

## 2018-04-04 NOTE — PROGRESS NOTES
[facility]  SPECIALIZING IN BREAKTHROUGHS  Radiation Oncology    On Treatment Visit Note      Ashleigh Alonzo      Date: 2018   MRN: [id number]   : 1960  Diagnosis: breast cancer      Reason for Visit:  On Radiation Treatment Visit     Treatment Summary to Date  Treatment Site: right breast Current Dose: 798/5256 cGy Fractions: 3/20      Chemotherapy  Chemo concurrent with radx?: No (chemo completed 2018 with Dr. Bradshaw)    Subjective:     Doing well with no complaints    Nursing ROS:   Nutrition Alteration  Diet Type: Patient's Preference  Skin  Skin Reaction: 0 - No changes  Skin Intervention: skin changes and skin cares reviewed, patient has Aquaphor  Cardiovascular  Respiratory effort: 1 - Normal - without distress   Psychosocial  Mood - Anxiety: 0 - Normal  Mood - Depression: 0 - Normal  Pyschosocial Note: fatigue at baseline  Pain Assessment  0-10 Pain Scale: 0      Objective:   /72 (BP Location: Left arm, Patient Position: Chair, Cuff Size: Adult Regular)  Pulse 126  Temp 99.5  F (37.5  C) (Oral)  Resp 18  Wt 146 lb  SpO2 97%  BMI 24.67 kg/m2  Gen: Appears well, in no acute distress  Skin: No erythema    Labs:  CBC RESULTS:   Recent Labs   Lab Test  02/15/18   1129   WBC  8.7   RBC  3.36*   HGB  10.6*   HCT  33.0*   MCV  98   MCH  31.5   MCHC  32.1   RDW  16.7*   PLT  276     ELECTROLYTES:  Recent Labs   Lab Test  02/15/18   1129   NA  140   POTASSIUM  3.4   CHLORIDE  104   JAVIER  8.6   CO2  23   BUN  6*   CR  0.56   GLC  120*       Assessment:    Tolerating radiation therapy well.  All questions and concerns addressed.    Toxicities:  Fatigue: Grade 1: Fatigue relieved by rest  Dermatitis: Grade 0: No toxicity    Plan:   1. Continue current therapy.    2. Aquaphor BID      Mosaiq chart and setup information reviewed  Ports checked    Medication Review  Med list reviewed with patient?: Yes  Med list printed and given: Offered and declined    Educational Topic  "Discussed  Additional Instructions: patient feeling \"under the weather\" today, reports stayed home from work today due to fatigue, low grade fever of 99.5, vitals assessed, denies headaches, denies cough or sore throat  Education Instructions: Radiation therapy side effects: fatigue, skin changes and skin cares, daily ROM Stretching exercises      Dianne Zhong MD      "

## 2018-04-05 ENCOUNTER — APPOINTMENT (OUTPATIENT)
Dept: RADIATION ONCOLOGY | Facility: CLINIC | Age: 58
End: 2018-04-05
Payer: COMMERCIAL

## 2018-04-05 PROCEDURE — 77412 RADIATION TX DELIVERY LVL 3: CPT | Performed by: RADIOLOGY

## 2018-04-06 ENCOUNTER — APPOINTMENT (OUTPATIENT)
Dept: RADIATION ONCOLOGY | Facility: CLINIC | Age: 58
End: 2018-04-06
Payer: COMMERCIAL

## 2018-04-06 PROCEDURE — 77427 RADIATION TX MANAGEMENT X5: CPT | Performed by: RADIOLOGY

## 2018-04-06 PROCEDURE — 77336 RADIATION PHYSICS CONSULT: CPT | Performed by: RADIOLOGY

## 2018-04-06 PROCEDURE — 77412 RADIATION TX DELIVERY LVL 3: CPT | Performed by: RADIOLOGY

## 2018-04-06 PROCEDURE — 77417 THER RADIOLOGY PORT IMAGE(S): CPT | Performed by: RADIOLOGY

## 2018-04-09 ENCOUNTER — APPOINTMENT (OUTPATIENT)
Dept: RADIATION ONCOLOGY | Facility: CLINIC | Age: 58
End: 2018-04-09
Payer: COMMERCIAL

## 2018-04-09 PROCEDURE — 77412 RADIATION TX DELIVERY LVL 3: CPT | Performed by: RADIOLOGY

## 2018-04-10 ENCOUNTER — APPOINTMENT (OUTPATIENT)
Dept: RADIATION ONCOLOGY | Facility: CLINIC | Age: 58
End: 2018-04-10
Payer: COMMERCIAL

## 2018-04-10 PROCEDURE — 77412 RADIATION TX DELIVERY LVL 3: CPT | Performed by: RADIOLOGY

## 2018-04-11 ENCOUNTER — OFFICE VISIT (OUTPATIENT)
Dept: RADIATION ONCOLOGY | Facility: CLINIC | Age: 58
End: 2018-04-11
Payer: COMMERCIAL

## 2018-04-11 ENCOUNTER — APPOINTMENT (OUTPATIENT)
Dept: RADIATION ONCOLOGY | Facility: CLINIC | Age: 58
End: 2018-04-11
Payer: COMMERCIAL

## 2018-04-11 VITALS — OXYGEN SATURATION: 100 %

## 2018-04-11 DIAGNOSIS — C50.211 MALIGNANT NEOPLASM OF UPPER-INNER QUADRANT OF RIGHT BREAST IN FEMALE, ESTROGEN RECEPTOR NEGATIVE (H): Primary | ICD-10-CM

## 2018-04-11 DIAGNOSIS — Z17.1 MALIGNANT NEOPLASM OF UPPER-INNER QUADRANT OF RIGHT BREAST IN FEMALE, ESTROGEN RECEPTOR NEGATIVE (H): Primary | ICD-10-CM

## 2018-04-11 PROCEDURE — 99207 ZZC DROP WITH A PROCEDURE: CPT | Performed by: RADIOLOGY

## 2018-04-11 PROCEDURE — 77412 RADIATION TX DELIVERY LVL 3: CPT | Performed by: RADIOLOGY

## 2018-04-11 ASSESSMENT — PAIN SCALES - GENERAL: PAINLEVEL: NO PAIN (0)

## 2018-04-11 NOTE — PATIENT INSTRUCTIONS
Please contact [facility] RN with questions or concerns following today's appointment: [phone].    Thank you!

## 2018-04-11 NOTE — MR AVS SNAPSHOT
After Visit Summary   4/11/2018    Ashleigh Alonzo    MRN: [id number]           Patient Information     Date Of Birth          1960        Visit Information        Provider Department      4/11/2018 3:00 PM Ligia Zhong MD [facility]        Today's Diagnoses     Malignant neoplasm of upper-inner quadrant of right breast in female, estrogen receptor negative (H)    -  1      Care Instructions    Please contact [facility] RN with questions or concerns following today's appointment: [phone].    Thank you!            Follow-ups after your visit        Your next 10 appointments already scheduled     Apr 12, 2018  2:45 PM CDT   TREATMENT with RADIATION THERAPIST   [facility]    [address] [facility] [address]   [phone]            Apr 13, 2018  2:45 PM CDT   TREATMENT with RADIATION THERAPIST   [facility] ([facility])    [address] [facility] [address]   [phone]            Apr 16, 2018  2:30 PM CDT   TREATMENT with RADIATION THERAPIST   [facility] ([facility])    [address] [facility] [address]   [phone]            Apr 17, 2018  2:30 PM CDT   TREATMENT with RADIATION THERAPIST   [facility] ([facility])    [address] [facility] [address]   [phone]            Apr 18, 2018  2:30 PM CDT   TREATMENT with RADIATION THERAPIST   [facility] ([facility])    [address] [facility] [address]   [phone]            Apr 18, 2018  2:45 PM CDT   on treatment visit with Ligia Zhong MD   [facility] ([facility])    [address] [facility] [address]   [phone]            Apr 19, 2018  2:45 PM CDT   TREATMENT with RADIATION THERAPIST    [facility] ([facility])    [address] [facility] [address]   [phone]            Apr 20, 2018  2:45 PM CDT   TREATMENT with RADIATION THERAPIST   [facility] ([facility])    [address] [facility] [address]   [phone]            Apr 23, 2018  2:45 PM CDT   TREATMENT with RADIATION THERAPIST   [facility] ([facility])    [address] [facility] [address]   [phone]            Apr 23, 2018  3:15 PM CDT   Lymphedema Evaluation with Carlotta Joyner, OT   [facility] ([facility])    [address] [facility] [address] [phone]              Who to contact     If you have questions or need follow up information about today's clinic visit or your schedule please contact [facility] directly at [phone].  Normal or non-critical lab and imaging results will be communicated to you by [employer], letter or phone within 4 business days after the clinic has received the results. If you do not hear from us within 7 days, please contact the clinic through [employer] or phone. If you have a critical or abnormal lab result, we will notify you by phone as soon as possible.  Submit refill requests through [employer] or call your pharmacy and they will forward the refill request to us. Please allow 3 business days for your refill to be completed.          Additional Information About Your Visit        [employer] Information     [employer] gives you secure access to your electronic health record. If you see a primary care provider, you can also send messages to your care team and make appointments. If you have questions, please call your primary care clinic.  If you do not have a primary care provider, please call [phone] and they will assist you.      [employer] is an electronic gateway that provides easy,  online access to your medical records. With [employer], you can request a clinic appointment, read your test results, renew a prescription or communicate with your care team.     To access your existing account, please contact your [facility] or call [phone] for assistance.        Care EveryWhere ID     This is your Care EveryWhere ID. This could be used by other organizations to access your [place] medical records  [id number]        Your Vitals Were     Pulse Oximetry                   100%            Blood Pressure from Last 3 Encounters:   04/04/18 110/72   03/19/18 120/69   03/16/18 110/71    Weight from Last 3 Encounters:   04/04/18 146 lb   03/19/18 146 lb 4.8 oz   03/16/18 147 lb 11.2 oz              Today, you had the following     No orders found for display         Today's Medication Changes          These changes are accurate as of 4/11/18  4:53 PM.  If you have any questions, ask your nurse or doctor.               These medicines have changed or have updated prescriptions.        Dose/Directions    citalopram 10 MG tablet   Commonly known as:  celeXA   This may have changed:  when to take this   Used for:  Anxiety, Depression, unspecified depression type        Dose:  10 mg   Take 1 tablet (10 mg) by mouth daily   Quantity:  90 tablet   Refills:  3       darifenacin 7.5 MG 24 hr tablet   Commonly known as:  ENABLEX   This may have changed:    - when to take this  - reasons to take this   Used for:  Overactive bladder        Dose:  7.5 mg   Take 1 tablet (7.5 mg) by mouth daily   Quantity:  90 tablet   Refills:  3       tiotropium 18 MCG capsule   Commonly known as:  SPIRIVA   This may have changed:    - when to take this  - additional instructions   Used for:  Chronic obstructive pulmonary disease, unspecified COPD type (H)        Dose:  18 mcg   Inhale 1 capsule (18 mcg) into the lungs daily Inhale contents of one capsule   Quantity:  1 capsule   Refills:  11                 Primary Care Provider Office Phone # Fax #    Radha Cazares [phone]       [address] [address]        Equal Access to Services     THREESA HUGHES : Abdifatah candie mcdaniel tushar Somariaaali, wabridgerda luqadaha, qaybta kaalmada adekelly, bang silvanoin hayaakenny silvaanmol chester renee pickering. So [facility] [phone].    ATENCIÓN: Si habla español, tiene a blackman disposición servicios gratuitos de asistencia lingüística. Llame al [phone].    We comply with applicable federal civil rights laws and [place] laws. We do not discriminate on the basis of race, color, national origin, age, disability, sex, sexual orientation, or gender identity.            Thank you!     Thank you for choosing [facility]  for your care. Our goal is always to provide you with excellent care. Hearing back from our patients is one way we can continue to improve our services. Please take a few minutes to complete the written survey that you may receive in the mail after your visit with us. Thank you!             Your Updated Medication List - Protect others around you: Learn how to safely use, store and throw away your medicines at www.disposemymeds.org.          This list is accurate as of 4/11/18  4:53 PM.  Always use your most recent med list.                   Brand Name Dispense Instructions for use Diagnosis    acetaminophen-codeine 300-30 MG per tablet    TYLENOL #3    60 tablet    Take 1-2 tablets by mouth every 6 hours as needed for moderate pain    Breast pain       albuterol 108 (90 Base) MCG/ACT Inhaler    PROAIR HFA/PROVENTIL HFA/VENTOLIN HFA    1 Inhaler    Inhale 2 puffs into the lungs every 6 hours as needed for shortness of breath / dyspnea    Chronic obstructive pulmonary disease, unspecified COPD type (H)       ASPIRIN PO      Take 81 mg by mouth daily        calcium carbonate 600 MG tablet   Generic drug:  calcium carbonate     60 tablet    Take by  mouth 2 times daily (with meals)        citalopram 10 MG tablet    celeXA    90 tablet    Take 1 tablet (10 mg) by mouth daily    Anxiety, Depression, unspecified depression type       co-enzyme Q-10 30 MG/5ML Liqd liquid      Take by mouth every morning        darifenacin 7.5 MG 24 hr tablet    ENABLEX    90 tablet    Take 1 tablet (7.5 mg) by mouth daily    Overactive bladder       guaiFENesin 600 MG 12 hr tablet    MUCINEX    180 tablet    Take 2 tablets (1,200 mg) by mouth 2 times daily    Cough with sputum       hydrOXYzine 25 MG tablet    ATARAX    100 tablet    Take 1-2 tablets (25-50 mg) by mouth every 6 hours as needed for itching    Hives       LORazepam 0.5 MG tablet    ATIVAN    30 tablet    Take 1 tablet (0.5 mg) by mouth every 4 hours as needed (Anxiety, Nausea/Vomiting or Sleep)    Malignant neoplasm of upper-inner quadrant of right breast in female, estrogen receptor negative (H)       MULTIPLE VITAMIN PO      Take 1 tablet by mouth every morning        [facility] for [facility] [place] [facility]      Identification [employer] Dream Station Auto CPAP 12-18 cm, F&P Simplus FFM small.    MERLIN (obstructive sleep apnea)       tiotropium 18 MCG capsule    SPIRIVA    1 capsule    Inhale 1 capsule (18 mcg) into the lungs daily Inhale contents of one capsule    Chronic obstructive pulmonary disease, unspecified COPD type (H)       TYLENOL 325 MG tablet   Generic drug:  acetaminophen      Take 325-650 mg by mouth every 6 hours as needed for mild pain        VITAMIN B 12 PO      Take 100 mcg by mouth every morning        VITAMIN B6 PO      Take 1 tablet by mouth every morning        vitamin D 1000 units capsule      TAKE 2 CAPSULES BY MOUTH EVERY MORNING

## 2018-04-11 NOTE — PROGRESS NOTES
"[facility] PHYSICIANS  SPECIALIZING IN BREAKTHROUGHS  Radiation Oncology    On Treatment Visit Note      Ashleigh Alonzo      Date: 2018   MRN: [id number]   : 1960  Diagnosis: breast cancer      Reason for Visit:  On Radiation Treatment Visit     Treatment Summary to Date  Treatment Site: right breast Current Dose: 2128/5256 cGy Fractions:       Chemotherapy  Chemo concurrent with radx?: No    Subjective:     Has some fatigue and hyperpigmentation but otherwise doing well with no complaints    Nursing ROS:   Nutrition Alteration  Diet Type: Patient's Preference  Skin  Skin Intervention: patient using aquaphor   Skin Note: patient reports skin is looking dark \"brown\":        Cardiovascular  Respiratory effort: 1 - Normal - without distress        Psychosocial  Pyschosocial Note: patient reports feeling fatigue  Pain Assessment  0-10 Pain Scale: 0      Objective:   SpO2 100%  Gen: Appears well, in no acute distress  Skin: Mild diffuse erythema over treatment field    Labs:  CBC RESULTS:   Recent Labs   Lab Test  02/15/18   1129   WBC  8.7   RBC  3.36*   HGB  10.6*   HCT  33.0*   MCV  98   MCH  31.5   MCHC  32.1   RDW  16.7*   PLT  276     ELECTROLYTES:  Recent Labs   Lab Test  02/15/18   1129   NA  140   POTASSIUM  3.4   CHLORIDE  104   JAVIER  8.6   CO2  23   BUN  6*   CR  0.56   GLC  120*       Assessment:    Tolerating radiation therapy well.  All questions and concerns addressed.    Toxicities:  Fatigue: Grade 1: Fatigue relieved by rest  Headache: Grade 0: No toxicity  Pain: Grade 0: No toxicity  Dermatitis: Grade 1: Faint erythema or dry desquamation    Plan:   1. Continue current therapy.    2. Continue Aquaphor BID  3. Increase ROM exercises    Mosaiq chart and setup information reviewed  Ports checked    Medication Review  Med list reviewed with patient?: Yes    Educational Topic Discussed  Additional Instructions: patient reports limiited ROM in right shoulder         Dianne Zhong" MD

## 2018-04-12 ENCOUNTER — APPOINTMENT (OUTPATIENT)
Dept: RADIATION ONCOLOGY | Facility: CLINIC | Age: 58
End: 2018-04-12
Payer: COMMERCIAL

## 2018-04-12 PROCEDURE — 77412 RADIATION TX DELIVERY LVL 3: CPT | Performed by: RADIOLOGY

## 2018-04-12 PROCEDURE — 77307 TELETHX ISODOSE PLAN CPLX: CPT | Performed by: RADIOLOGY

## 2018-04-12 PROCEDURE — 77334 RADIATION TREATMENT AID(S): CPT | Performed by: RADIOLOGY

## 2018-04-13 ENCOUNTER — APPOINTMENT (OUTPATIENT)
Dept: RADIATION ONCOLOGY | Facility: CLINIC | Age: 58
End: 2018-04-13
Payer: COMMERCIAL

## 2018-04-13 PROCEDURE — 77417 THER RADIOLOGY PORT IMAGE(S): CPT | Performed by: RADIOLOGY

## 2018-04-13 PROCEDURE — 77412 RADIATION TX DELIVERY LVL 3: CPT | Performed by: RADIOLOGY

## 2018-04-13 PROCEDURE — 77427 RADIATION TX MANAGEMENT X5: CPT | Performed by: RADIOLOGY

## 2018-04-13 PROCEDURE — 77336 RADIATION PHYSICS CONSULT: CPT | Performed by: RADIOLOGY

## 2018-04-16 ENCOUNTER — APPOINTMENT (OUTPATIENT)
Dept: RADIATION ONCOLOGY | Facility: CLINIC | Age: 58
End: 2018-04-16
Payer: COMMERCIAL

## 2018-04-16 PROCEDURE — 77412 RADIATION TX DELIVERY LVL 3: CPT | Performed by: RADIOLOGY

## 2018-04-17 ENCOUNTER — APPOINTMENT (OUTPATIENT)
Dept: RADIATION ONCOLOGY | Facility: CLINIC | Age: 58
End: 2018-04-17
Payer: COMMERCIAL

## 2018-04-17 PROCEDURE — 77412 RADIATION TX DELIVERY LVL 3: CPT | Performed by: RADIOLOGY

## 2018-04-18 ENCOUNTER — OFFICE VISIT (OUTPATIENT)
Dept: RADIATION ONCOLOGY | Facility: CLINIC | Age: 58
End: 2018-04-18
Payer: COMMERCIAL

## 2018-04-18 ENCOUNTER — APPOINTMENT (OUTPATIENT)
Dept: RADIATION ONCOLOGY | Facility: CLINIC | Age: 58
End: 2018-04-18
Payer: COMMERCIAL

## 2018-04-18 VITALS — WEIGHT: 142 LBS | BODY MASS INDEX: 24 KG/M2

## 2018-04-18 DIAGNOSIS — C50.211 MALIGNANT NEOPLASM OF UPPER-INNER QUADRANT OF RIGHT BREAST IN FEMALE, ESTROGEN RECEPTOR NEGATIVE (H): Primary | ICD-10-CM

## 2018-04-18 DIAGNOSIS — Z17.1 MALIGNANT NEOPLASM OF UPPER-INNER QUADRANT OF RIGHT BREAST IN FEMALE, ESTROGEN RECEPTOR NEGATIVE (H): Primary | ICD-10-CM

## 2018-04-18 PROCEDURE — 77412 RADIATION TX DELIVERY LVL 3: CPT | Performed by: RADIOLOGY

## 2018-04-18 PROCEDURE — 99207 ZZC DROP WITH A PROCEDURE: CPT | Performed by: RADIOLOGY

## 2018-04-18 ASSESSMENT — PAIN SCALES - GENERAL: PAINLEVEL: NO PAIN (0)

## 2018-04-18 NOTE — MR AVS SNAPSHOT
After Visit Summary   4/18/2018    Ashleigh Alonzo    MRN: [id number]           Patient Information     Date Of Birth          1960        Visit Information        Provider Department      4/18/2018 2:45 PM Ligia Zhong MD [facility]        Today's Diagnoses     Malignant neoplasm of upper-inner quadrant of right breast in female, estrogen receptor negative (H)    -  1      Care Instructions    Please contact [facility] RN with questions or concerns following today's appointment: [phone].    Thank you!            Follow-ups after your visit        Your next 10 appointments already scheduled     Apr 19, 2018  2:45 PM CDT   TREATMENT with RADIATION THERAPIST   [facility] ([facility])    [address] [facility] [address]   [phone]            Apr 20, 2018  2:45 PM CDT   TREATMENT with RADIATION THERAPIST   [facility] ([facility])    [address] [facility] [address]   [phone]            Apr 23, 2018  2:45 PM CDT   TREATMENT with RADIATION THERAPIST   [facility] ([facility])    [address] [facility] [address]   [phone]            Apr 24, 2018  2:45 PM CDT   TREATMENT with RADIATION THERAPIST   [facility] ([facility])    [address] [facility] [address]   [phone]            Apr 25, 2018  2:45 PM CDT   TREATMENT with RADIATION THERAPIST   [facility] ([facility])    [address] [facility] [address]   [phone]            Apr 25, 2018  3:00 PM CDT   on treatment visit with Ligia Zhong MD   [facility] ([facility])    [address] [facility] [address]   [phone]            Apr 26, 2018  2:45 PM CDT   TREATMENT with RADIATION THERAPIST    [facility] ([facility])    [address] [facility] [address]   [phone]            Apr 27, 2018  2:45 PM CDT   TREATMENT with RADIATION THERAPIST   [facility] ([facility])    [address] [facility] [address]   [phone]            May 02, 2018  2:00 PM CDT   Lymphedema Evaluation with Carlotta Joyner OT   [facility] ([facility])    [address] [facility] [address]   [phone]            May 16, 2018  3:15 PM CDT   (Arrive by 3:00 PM)   Survivorship Visit with Ivana Gonzales PA-C   [facility] ([facility])    [address]  [address]  [place] [address] [phone]              Who to contact     If you have questions or need follow up information about today's clinic visit or your schedule please contact [facility] directly at [phone].  Normal or non-critical lab and imaging results will be communicated to you by MyChart, letter or phone within 4 business days after the clinic has received the results. If you do not hear from us within 7 days, please contact the clinic through [employer] or phone. If you have a critical or abnormal lab result, we will notify you by phone as soon as possible.  Submit refill requests through [employer] or call your pharmacy and they will forward the refill request to us. Please allow 3 business days for your refill to be completed.          Additional Information About Your Visit        [employer] Information     [employer] gives you secure access to your electronic health record. If you see a primary care provider, you can also send messages to your care team and make appointments. If you have questions, please call your primary care clinic.  If you do not have a primary care provider, please call [phone] and they will assist you.       [employer] is an electronic gateway that provides easy, online access to your medical records. With [employer], you can request a clinic appointment, read your test results, renew a prescription or communicate with your care team.     To access your existing account, please contact your [facility] or call [phone] for assistance.        Care EveryWhere ID     This is your Care EveryWhere ID. This could be used by other organizations to access your [place] medical records  [id number]        Your Vitals Were     BMI (Body Mass Index)                   24 kg/m2            Blood Pressure from Last 3 Encounters:   04/04/18 110/72   03/19/18 120/69   03/16/18 110/71    Weight from Last 3 Encounters:   04/18/18 142 lb   04/04/18 146 lb   03/19/18 146 lb 4.8 oz              Today, you had the following     No orders found for display         Today's Medication Changes          These changes are accurate as of 4/18/18  3:16 PM.  If you have any questions, ask your nurse or doctor.               These medicines have changed or have updated prescriptions.        Dose/Directions    citalopram 10 MG tablet   Commonly known as:  celeXA   This may have changed:  when to take this   Used for:  Anxiety, Depression, unspecified depression type        Dose:  10 mg   Take 1 tablet (10 mg) by mouth daily   Quantity:  90 tablet   Refills:  3                Primary Care Provider Office Phone # Fax #    Radha Cazares [phone]       [address] [address]        Equal Access to Services     THERESA HUGHES AH: Hadii candie finley Somarian, waaxda luqadaha, qaybta kaalmada bang lopez. So [facility] [phone].    ATENCIÓN: Si habla español, tiene a blackman disposición servicios gratuitos de asistencia lingüística. Llame al [phone].    We comply with applicable federal civil rights laws and [place]  laws. We do not discriminate on the basis of race, color, national origin, age, disability, sex, sexual orientation, or gender identity.            Thank you!     Thank you for choosing [facility]  for your care. Our goal is always to provide you with excellent care. Hearing back from our patients is one way we can continue to improve our services. Please take a few minutes to complete the written survey that you may receive in the mail after your visit with us. Thank you!             Your Updated Medication List - Protect others around you: Learn how to safely use, store and throw away your medicines at www.disposemymeds.org.          This list is accurate as of 4/18/18  3:16 PM.  Always use your most recent med list.                   Brand Name Dispense Instructions for use Diagnosis    albuterol 108 (90 Base) MCG/ACT Inhaler    PROAIR HFA/PROVENTIL HFA/VENTOLIN HFA    1 Inhaler    Inhale 2 puffs into the lungs every 6 hours as needed for shortness of breath / dyspnea    Chronic obstructive pulmonary disease, unspecified COPD type (H)       ASPIRIN PO      Take 81 mg by mouth daily        citalopram 10 MG tablet    celeXA    90 tablet    Take 1 tablet (10 mg) by mouth daily    Anxiety, Depression, unspecified depression type       co-enzyme Q-10 30 MG/5ML Liqd liquid      Take by mouth every morning        darifenacin 7.5 MG 24 hr tablet    ENABLEX    90 tablet    Take 1 tablet (7.5 mg) by mouth daily    Overactive bladder       guaiFENesin 600 MG 12 hr tablet    MUCINEX    180 tablet    Take 2 tablets (1,200 mg) by mouth 2 times daily    Cough with sputum       hydrOXYzine 25 MG tablet    ATARAX    100 tablet    Take 1-2 tablets (25-50 mg) by mouth every 6 hours as needed for itching    Hives       MULTIPLE VITAMIN PO      Take 1 tablet by mouth every morning        order for DME      Respironics Dream Station Auto CPAP 12-18 cm, F&P Simplus FFM small.    MERLIN (obstructive sleep apnea)        tiotropium 18 MCG capsule    SPIRIVA    1 capsule    Inhale 1 capsule (18 mcg) into the lungs daily Inhale contents of one capsule    Chronic obstructive pulmonary disease, unspecified COPD type (H)       VITAMIN B 12 PO      Take 100 mcg by mouth every morning        VITAMIN B6 PO      Take 1 tablet by mouth every morning        vitamin D 1000 units capsule      TAKE 2 CAPSULES BY MOUTH EVERY MORNING

## 2018-04-18 NOTE — PATIENT INSTRUCTIONS
Please contact [facility] RN with questions or concerns following today's appointment: [phone].    Thank you!

## 2018-04-18 NOTE — PROGRESS NOTES
[facility] PHYSICIANS  SPECIALIZING IN BREAKTHROUGHS  Radiation Oncology    On Treatment Visit Note      Ashleigh Alonzo      Date: 2018   MRN: [id number]   : 1960  Diagnosis: breast cancer      Reason for Visit:  On Radiation Treatment Visit     Treatment Summary to Date  Treatment Site: right breast Current Dose: 3458/5256 cGy Fractions:       Chemotherapy  Chemo concurrent with radx?: No    Subjective:   Fatigued but otherwise doing well with no complaints    Nursing ROS:   Nutrition Alteration  Diet Type: Patient's Preference  Skin  Skin Reaction: 0 - No changes  Skin Intervention: patient using Aquaphor two times daily      Cardiovascular  Respiratory effort: 1 - Normal - without distress     Psychosocial  Mood - Anxiety: 0 - Normal  Mood - Depression: 0 - Normal  Pyschosocial Note: slight fatigue  Pain Assessment  0-10 Pain Scale: 0      Objective:   Wt 142 lb  BMI 24 kg/m2  Gen: Appears well, in no acute distress  Skin: No erythema    Labs:  CBC RESULTS:   Recent Labs   Lab Test  02/15/18   1129   WBC  8.7   RBC  3.36*   HGB  10.6*   HCT  33.0*   MCV  98   MCH  31.5   MCHC  32.1   RDW  16.7*   PLT  276     ELECTROLYTES:  Recent Labs   Lab Test  02/15/18   1129   NA  140   POTASSIUM  3.4   CHLORIDE  104   JAVIER  8.6   CO2  23   BUN  6*   CR  0.56   GLC  120*       Assessment:    Tolerating radiation therapy well.  All questions and concerns addressed.    Toxicities:  Fatigue: Grade 1: Fatigue relieved by rest  Headache: Grade 0: No toxicity  Pain: Grade 0: No toxicity  Dermatitis: Grade 0: No toxicity    Plan:   1. Continue current therapy.    2. Continue skin care.      Mosaiq chart and setup information reviewed  Ports checked    Medication Review  Med list reviewed with patient?: Yes  Med list printed and given: Offered and declined    Educational Topic Discussed  Education Instructions: Reviewed continued skin cares using Aquaphor during the week and on weekend        Dianne Zhong  MD    Please do not send letter to referring physician.

## 2018-04-19 ENCOUNTER — APPOINTMENT (OUTPATIENT)
Dept: RADIATION ONCOLOGY | Facility: CLINIC | Age: 58
End: 2018-04-19
Payer: COMMERCIAL

## 2018-04-19 PROCEDURE — 77412 RADIATION TX DELIVERY LVL 3: CPT | Performed by: RADIOLOGY

## 2018-04-19 PROCEDURE — 77280 THER RAD SIMULAJ FIELD SMPL: CPT | Performed by: RADIOLOGY

## 2018-04-20 ENCOUNTER — APPOINTMENT (OUTPATIENT)
Dept: RADIATION ONCOLOGY | Facility: CLINIC | Age: 58
End: 2018-04-20
Payer: COMMERCIAL

## 2018-04-20 PROCEDURE — 77412 RADIATION TX DELIVERY LVL 3: CPT | Performed by: RADIOLOGY

## 2018-04-20 PROCEDURE — 77336 RADIATION PHYSICS CONSULT: CPT | Performed by: RADIOLOGY

## 2018-04-20 PROCEDURE — 77427 RADIATION TX MANAGEMENT X5: CPT | Performed by: RADIOLOGY

## 2018-04-23 ENCOUNTER — APPOINTMENT (OUTPATIENT)
Dept: RADIATION ONCOLOGY | Facility: CLINIC | Age: 58
End: 2018-04-23
Payer: COMMERCIAL

## 2018-04-23 PROCEDURE — 77412 RADIATION TX DELIVERY LVL 3: CPT | Performed by: RADIOLOGY

## 2018-04-24 ENCOUNTER — APPOINTMENT (OUTPATIENT)
Dept: RADIATION ONCOLOGY | Facility: CLINIC | Age: 58
End: 2018-04-24
Payer: COMMERCIAL

## 2018-04-24 PROCEDURE — 77412 RADIATION TX DELIVERY LVL 3: CPT | Performed by: RADIOLOGY

## 2018-04-25 ENCOUNTER — APPOINTMENT (OUTPATIENT)
Dept: RADIATION ONCOLOGY | Facility: CLINIC | Age: 58
End: 2018-04-25
Payer: COMMERCIAL

## 2018-04-25 ENCOUNTER — OFFICE VISIT (OUTPATIENT)
Dept: RADIATION ONCOLOGY | Facility: CLINIC | Age: 58
End: 2018-04-25
Payer: COMMERCIAL

## 2018-04-25 VITALS — WEIGHT: 142 LBS | BODY MASS INDEX: 24 KG/M2

## 2018-04-25 DIAGNOSIS — C50.211 MALIGNANT NEOPLASM OF UPPER-INNER QUADRANT OF RIGHT BREAST IN FEMALE, ESTROGEN RECEPTOR NEGATIVE (H): Primary | ICD-10-CM

## 2018-04-25 DIAGNOSIS — Z17.1 MALIGNANT NEOPLASM OF UPPER-INNER QUADRANT OF RIGHT BREAST IN FEMALE, ESTROGEN RECEPTOR NEGATIVE (H): Primary | ICD-10-CM

## 2018-04-25 PROCEDURE — 99207 ZZC DROP WITH A PROCEDURE: CPT | Performed by: RADIOLOGY

## 2018-04-25 PROCEDURE — 77412 RADIATION TX DELIVERY LVL 3: CPT | Performed by: RADIOLOGY

## 2018-04-25 ASSESSMENT — PAIN SCALES - GENERAL: PAINLEVEL: NO PAIN (0)

## 2018-04-25 NOTE — MR AVS SNAPSHOT
After Visit Summary   4/25/2018    Ashleigh Alonzo    MRN: [id number]           Patient Information     Date Of Birth          1960        Visit Information        Provider Department      4/25/2018 3:00 PM Ligia Zhong MD [facility]        Today's Diagnoses     Malignant neoplasm of upper-inner quadrant of right breast in female, estrogen receptor negative (H)    -  1      Care Instructions          Managing radiation side effects after treatment has ended    The side effects of radiation therapy should gradually decrease in 2 to 3 weeks after you have finished radiation.  Some effects take longer to resolve.    Fatigue caused by radiation therapy will decrease and your energy will improve.    Skin reactions:  Skin changes (such as redness or irritation) will slowly begin to get better. Some people may have a permanent change in skin color.  Their skin may be more pink or  tan  than the untreated skin. The skin may be thinner or more fragile than before treatment.  Continue to use a gentle moisturizing lotion for several months.  You should always protect the skin in the area that was treated by using sunscreen of SPF30 or higher.      Other skin care instructions: continue with daily skin cares using Aquaphor for the next one month.        For concerns or questions call [facility] [phone]          Follow-ups after your visit        Your next 10 appointments already scheduled     Apr 26, 2018  2:45 PM CDT   TREATMENT with RADIATION THERAPIST   [facility])    [address] [place] [address]   [phone]            Apr 27, 2018  2:45 PM CDT   TREATMENT with RADIATION THERAPIST   [facility])    [address] [place] [address]   [phone]            May 02, 2018  2:00 PM CDT   Lymphedema Evaluation with Carlotta Joyner, OT  "  [facility] ([facility]    [address] [facility] [address]   [phone]            May 16, 2018  3:15 PM CDT   (Arrive by 3:00 PM)   Survivorship Visit with Ivana Gonzales PA-C   [facility] ([facility])    [address]  [address]  [facility] [address]   [phone]            Jun 28, 2018  3:00 PM CDT   [facility] MD   [facility]    [address] [place] [address]   [phone]            Jul 16, 2018  3:00 PM CDT   (Arrive by 2:45 PM)   MA DIAGNOSTIC DIGITAL BILATERAL with [id number]   [facility] ([facility])    [address]  [address]  [place] [address]   [phone]           Do not use any powder, lotion or deodorant under your arms or on your breast. If you do, we will ask you to remove it before your exam.  Wear comfortable, two-piece clothing.  If you have any allergies, tell your care team.  Bring any previous mammograms from other facilities or have them mailed to the breast center.  Three-dimensional (3D) mammograms are available at [place] locations in [facility], [facility], [place], [place], and [place]. [facility] locations include [place] and [facility] in [place]. Benefits of 3D mammograms include: - Improved rate of cancer detection - Decreases your chance of having to go back for more tests, which means fewer: - \"False-positive\" results (This means that there is an abnormal area but it isn't cancer.) - Invasive testing procedures, such as a biopsy or surgery - Can provide clearer images of the breast if you have dense breast tissue. 3D mammography is an optional exam that anyone can have with a 2D mammogram. It doesn't replace or take the place of a 2D mammogram. 2D mammograms " remain an effective screening test for all women.  Not all insurance companies cover the cost of a 3D mammogram. Check with your insurance.            Jul 16, 2018  3:45 PM CDT   (Arrive by 3:30 PM)   Return Visit with Fara Bradshaw MD   [facility] ([facility])    [address]  [address]  [facility] [address] [phone]              Who to contact     If you have questions or need follow up information about today's clinic visit or your schedule please contact [facility] directly at [phone].  Normal or non-critical lab and imaging results will be communicated to you by [email or web address], letter or phone within 4 business days after the clinic has received the results. If you do not hear from us within 7 days, please contact the clinic through [employer] or phone. If you have a critical or abnormal lab result, we will notify you by phone as soon as possible.  Submit refill requests through [employer] or call your pharmacy and they will forward the refill request to us. Please allow 3 business days for your refill to be completed.          Additional Information About Your Visit        [employer] Information     [employer] gives you secure access to your electronic health record. If you see a primary care provider, you can also send messages to your care team and make appointments. If you have questions, please call your primary care clinic.  If you do not have a primary care provider, please call [phone] and they will assist you.      [employer] is an electronic gateway that provides easy, online access to your medical records. With [employer], you can request a clinic appointment, read your test results, renew a prescription or communicate with your care team.     To access your existing account, please contact your [facility] or call [phone] for assistance.        Care EveryWhere ID     This is your Care  EveryWhere ID. This could be used by other organizations to access your [place] medical records  [id number]        Your Vitals Were     BMI (Body Mass Index)                   24 kg/m2            Blood Pressure from Last 3 Encounters:   04/04/18 110/72   03/19/18 120/69   03/16/18 110/71    Weight from Last 3 Encounters:   04/25/18 142 lb   04/18/18 142 lb   04/04/18 146 lb              Today, you had the following     No orders found for display         Today's Medication Changes          These changes are accurate as of 4/25/18 11:59 PM.  If you have any questions, ask your nurse or doctor.               These medicines have changed or have updated prescriptions.        Dose/Directions    citalopram 10 MG tablet   Commonly known as:  celeXA   This may have changed:  when to take this   Used for:  Anxiety, Depression, unspecified depression type        Dose:  10 mg   Take 1 tablet (10 mg) by mouth daily   Quantity:  90 tablet   Refills:  3                Primary Care Provider Office Phone # Fax #    Radha Cazares [phone]       [address] [address]        Equal Access to Services     THERESA HUGHES AH: Hadii candie finley Somarian, waaxda luqadaha, qaybta kaalmada adeanmolyada, bang duran . So [facility] [phone].    ATENCIÓN: Si habla español, tiene a blackman disposición servicios gratuitos de asistencia lingüística. Llame al [phone].    We comply with applicable federal civil rights laws and [place] laws. We do not discriminate on the basis of race, color, national origin, age, disability, sex, sexual orientation, or gender identity.            Thank you!     Thank you for choosing [facility]  for your care. Our goal is always to provide you with excellent care. Hearing back from our patients is one way we can continue to improve our services. Please take a few minutes to complete the written  survey that you may receive in the mail after your visit with us. Thank you!             Your Updated Medication List - Protect others around you: Learn how to safely use, store and throw away your medicines at www.disposemymeds.org.          This list is accurate as of 4/25/18 11:59 PM.  Always use your most recent med list.                   Brand Name Dispense Instructions for use Diagnosis    albuterol 108 (90 Base) MCG/ACT Inhaler    PROAIR HFA/PROVENTIL HFA/VENTOLIN HFA    1 Inhaler    Inhale 2 puffs into the lungs every 6 hours as needed for shortness of breath / dyspnea    Chronic obstructive pulmonary disease, unspecified COPD type (H)       ASPIRIN PO      Take 81 mg by mouth daily        citalopram 10 MG tablet    celeXA    90 tablet    Take 1 tablet (10 mg) by mouth daily    Anxiety, Depression, unspecified depression type       co-enzyme Q-10 30 MG/5ML Liqd liquid      Take by mouth every morning        darifenacin 7.5 MG 24 hr tablet    ENABLEX    90 tablet    Take 1 tablet (7.5 mg) by mouth daily    Overactive bladder       guaiFENesin 600 MG 12 hr tablet    MUCINEX    180 tablet    Take 2 tablets (1,200 mg) by mouth 2 times daily    Cough with sputum       hydrOXYzine 25 MG tablet    ATARAX    100 tablet    Take 1-2 tablets (25-50 mg) by mouth every 6 hours as needed for itching    Hives       MULTIPLE VITAMIN PO      Take 1 tablet by mouth every morning        order for [facility]      Respironics Dream Station Auto CPAP 12-18 cm, F&P Simplus FFM small.    MERLIN (obstructive sleep apnea)       tiotropium 18 MCG capsule    SPIRIVA    1 capsule    Inhale 1 capsule (18 mcg) into the lungs daily Inhale contents of one capsule    Chronic obstructive pulmonary disease, unspecified COPD type (H)       VITAMIN B 12 PO      Take 100 mcg by mouth every morning        VITAMIN B6 PO      Take 1 tablet by mouth every morning        vitamin D 1000 units capsule      TAKE 2 CAPSULES BY MOUTH EVERY MORNING

## 2018-04-25 NOTE — PATIENT INSTRUCTIONS
Managing radiation side effects after treatment has ended    The side effects of radiation therapy should gradually decrease in 2 to 3 weeks after you have finished radiation.  Some effects take longer to resolve.    Fatigue caused by radiation therapy will decrease and your energy will improve.    Skin reactions:  Skin changes (such as redness or irritation) will slowly begin to get better. Some people may have a permanent change in skin color.  Their skin may be more pink or  tan  than the untreated skin. The skin may be thinner or more fragile than before treatment.  Continue to use a gentle moisturizing lotion for several months.  You should always protect the skin in the area that was treated by using sunscreen of SPF30 or higher.      Other skin care instructions: continue with daily skin cares using Aquaphor for the next one month.        For concerns or questions call [facility] [phone]

## 2018-04-26 ENCOUNTER — APPOINTMENT (OUTPATIENT)
Dept: RADIATION ONCOLOGY | Facility: CLINIC | Age: 58
End: 2018-04-26
Payer: COMMERCIAL

## 2018-04-26 PROCEDURE — 77412 RADIATION TX DELIVERY LVL 3: CPT | Performed by: RADIOLOGY

## 2018-04-26 NOTE — PROGRESS NOTES
[facility] PHYSICIANS  SPECIALIZING IN BREAKTHROUGHS  Radiation Oncology    On Treatment Visit Note      Ashleigh Alonzo      Date: 2018   MRN: [id number]   : 1960  Diagnosis: breast cancer      Reason for Visit:  On Radiation Treatment Visit     Treatment Summary to Date  Treatment Site: right breast Current Dose: 4756/5256 cGy Fractions:       Chemotherapy  Chemo concurrent with radx?: No    Subjective:     Fatigue and skin redness otherwise doing well with no complaints    Nursing ROS:   Nutrition Alteration  Diet Type: Patient's Preference  Skin  Skin Reaction: 1 - Faint erythema or dry desquamation  Skin Intervention: patient using Aquaphor two times daily      Cardiovascular  Respiratory effort: 1 - Normal - without distress     Psychosocial  Mood - Anxiety: 0 - Normal  Mood - Depression: 0 - Normal  Pyschosocial Note: slight fatigue  Pain Assessment  0-10 Pain Scale: 0      Objective:   Wt 142 lb  BMI 24 kg/m2  Gen: Appears well, in no acute distress  Skin: Mild diffuse erythema over treatment field    Labs:  CBC RESULTS:   Recent Labs   Lab Test  02/15/18   1129   WBC  8.7   RBC  3.36*   HGB  10.6*   HCT  33.0*   MCV  98   MCH  31.5   MCHC  32.1   RDW  16.7*   PLT  276     ELECTROLYTES:  Recent Labs   Lab Test  02/15/18   1129   NA  140   POTASSIUM  3.4   CHLORIDE  104   JAVIER  8.6   CO2  23   BUN  6*   CR  0.56   GLC  120*       Assessment:    Tolerating radiation therapy well.  All questions and concerns addressed.    Toxicities:  Fatigue: Grade 1: Fatigue relieved by rest  Headache: Grade 0: No toxicity  Pain: Grade 0: No toxicity  Dermatitis: Grade 1: Faint erythema or dry desquamation    Plan:   1. Continue current therapy.    2. Continue skin care.    Mosaiq chart and setup information reviewed  Ports checked    Medication Review  Med list reviewed with patient?: Yes  Med list printed and given: Offered and declined    Educational Topic Discussed  Additional Instructions:  patient scheduled for follow-up with Dr. Zhong on 6/28/2018 and Dr. Bradshaw on 7/16/2018  Education Instructions: reviewed continued skin cares for the next one month        Dianne Zhong MD    Please do not send letter to referring physician.

## 2018-04-27 ENCOUNTER — APPOINTMENT (OUTPATIENT)
Dept: RADIATION ONCOLOGY | Facility: CLINIC | Age: 58
End: 2018-04-27
Payer: COMMERCIAL

## 2018-04-27 PROCEDURE — 77427 RADIATION TX MANAGEMENT X5: CPT | Performed by: RADIOLOGY

## 2018-04-27 PROCEDURE — 77336 RADIATION PHYSICS CONSULT: CPT | Performed by: RADIOLOGY

## 2018-04-27 PROCEDURE — 77412 RADIATION TX DELIVERY LVL 3: CPT | Performed by: RADIOLOGY

## 2018-05-02 ENCOUNTER — HOSPITAL ENCOUNTER (OUTPATIENT)
Dept: OCCUPATIONAL THERAPY | Facility: CLINIC | Age: 58
Setting detail: THERAPIES SERIES
End: 2018-05-02
Attending: RADIOLOGY
Payer: COMMERCIAL

## 2018-05-02 PROCEDURE — 40000445 ZZHC STATISTIC OT VISIT, LYMPHEDEMA: Performed by: OCCUPATIONAL THERAPIST

## 2018-05-02 PROCEDURE — 97535 SELF CARE MNGMENT TRAINING: CPT | Mod: GO | Performed by: OCCUPATIONAL THERAPIST

## 2018-05-02 PROCEDURE — 97165 OT EVAL LOW COMPLEX 30 MIN: CPT | Mod: GO | Performed by: OCCUPATIONAL THERAPIST

## 2018-05-03 ENCOUNTER — ONCOLOGY VISIT (OUTPATIENT)
Dept: RADIATION ONCOLOGY | Facility: CLINIC | Age: 58
End: 2018-05-03

## 2018-05-03 NOTE — PROGRESS NOTES
"   05/02/18 1411   Rehab Discipline   Discipline OT   Type of Visit   Type of visit Initial Edema Evaluation   General Information   Start of care 05/02/18   Referring physician Ligia Zhong MD   Orders Evaluate and treat as indicated   Order date 04/04/18   Medical diagnosis H/o right breast cancer with lumpectomy and SLND 2/27/18.  Pt presents with firm seroma of right breast.   Onset of illness / date of surgery 02/27/18   Edema onset 04/19/18   Affected body parts RUE;Trunk  (right breast)   Edema etiology Cancer with lymph node dissection;Radiation   Location - Cancer with lymph node dissection right axilla   Location - Radiation RUQ   Radiation comments ended 4/2718   Pertinent history of current problem (PT: include personal factors and/or comorbidities that impact the POC; OT: include additional occupational profile info) Pt had a seroma of inferior axilla that resolved.  Presents iwth seroma of right breast.   Surgical / medical history reviewed Yes   Prior level of functional mobility IND   Community support Family / friend caregiver   Patient role / employment history Employed  (dispatcher, office work)   Living environment House / [facility]   Living environment comments lives iwth spouse, farrah, no concerns   General observations spouse present during evaluation   System Outcome Measures   Outcome Measures (NT as edema is of right breast)   Subjective Report   Patient report of symptoms sensitive \"water pocket\" of right breast   Patient / Family Goals   Patient / family goals statement to learn about lymphedmea and how to prevent it, decrease \"water pocket\" in right breast   Pain   Pain comments discomfort of right breast at seroma   Cognitive Status   Orientation Orientation to person, place and time   Level of consciousness Alert   Follows commands and answers questions 100% of the time   Edema Exam / Assessment   Skin condition comments No edema of RUE or RUQ.  Firmness noted at the 12 o'clock location " on right breast.    Scar Yes   Location right axilla and lateral, right breast from lumpectomy and SLND   Scar comments healed   Ulceration No   Girth Measurements   Girth Measurements Refer to separate girth measurement flowsheet   Range of Motion   ROM comments RUE AROM WNL for all joints   Posture   Posture Normal   Activities of Daily Living   Activities of Daily Living IND   Bed Mobility   Bed mobility IND   Transfers   Transfers IND   Gait / Locomotion   Gait / Locomotion IND   Sensory   Sensory perception comments numbness on lateral border of RUE   Planned Edema Interventions   Planned edema interventions Manual lymph drainage;Gradient compression bandaging;Fit for compression garment;Exercises;Precautions to prevent infection / exacerbation;Education;Manual therapy;ADL training;Skin care / precautions;Home management program development   Clinical Impression   Criteria for skilled therapeutic intervention met Yes   Therapy diagnosis seroma of right breast   Assessment of Occupational Performance 1-3 Performance Deficits   Identified Performance Deficits seroma of right breast, at risk for the development of lymphedmea   Clinical Decision Making (Complexity) Low complexity   Treatment frequency (2 visits)   Treatment duration x9 weeks; 7/4/18   Patient / family and/or staff in agreement with plan of care Yes   Risks and benefits of therapy have been explained Yes   Clinical impression comments Pt presents with seroma of right breast and the risk for developing lymphedema due to SLND and radiation.  Pt will benefit from continued skilled OT intervention to address seroma and to provide education on lymphedema and prevention.     Goals   Edema Eval Goals 1;2   Goal 1   Goal identifier seroma   Goal description With wear of compression to right breast, seroma will resolve as indicated by no firmness at this location (12 o'clock of right breast) and no pain reported by pt.     Target date 07/04/18   Goal 2    Goal identifier signs/symptoms   Goal description Pt will, independently, verbalize the signs/symptoms of lymphedema, precautions and how to obtain a future lymphedema referral if edema presents to preserve skin integrity, prevent infection and preserve functional mobility.      Target date 07/04/18   Total Evaluation Time   Total evaluation time 20

## 2018-05-04 NOTE — PROCEDURES
Radiotherapy Treatment Summary          Date of Report: May 03, 2018     PATIENT: YUNI CALIXTO  MEDICAL RECORD NO: [id number]  : 1960     DIAGNOSIS: C50.211 Malignant neoplasm of upper-inner quadrant of right female breast  INTENT OF RADIOTHERAPY: Cure  PATHOLOGY: G3 IDCA,  ER-/WA-/Her2-                                   STAGE: cT2(m)N0M0, svT0hZ4  CONCURRENT SYSTEMIC THERAPY: No concurrent systemic therapy.  s/p Taxol x12, Pembrolizumab   x3, AC x4 completed 18.              Details of the treatments summarized below are found in records kept in the Department of Radiation Oncology at [facility].     Treatment Summary:  Radiation Oncology - Course: 1 Protocol:   Treatment Site Current Dose Modality From  To Elapsed Days Fx.  1 Rt Breast   4,256 cGy 10x/18x  4/02/2018  2018  21 16  1 Rt Breast Boost  1,000 cGy 6X/10X  4/24/2018  2018   3  4          Dose per Fraction: 266 cGy (250 cGy boost)       Total Dose: 5,256 cGy             COMMENTS:                      Fatigue: Grade 1: Fatigue relieved by rest  Headache: Grade 0: No toxicity  Pain: Grade 0: No toxicity  Dermatitis: Grade 1: Faint erythema or dry desquamation   (Acute Toxicity Profile by CTC v4.0)     PAIN MANAGEMENT:  Pt did not require pain medication.                             FOLLOW UP PLAN: patient scheduled for follow-up with Dr. Zhong on 2018 and Dr. Bardshaw on 2018                          Resident Physician: Ivana Monterroso M.D.   Staff Physician: Dianne Zhong M.D.  Physicist: Beronica Vallejo, PhD     CC: MD Atul Nicholas MD                                    Radiation Oncology:  [facility] [address] [place] [address]

## 2018-05-16 ENCOUNTER — ONCOLOGY SURVIVORSHIP VISIT (OUTPATIENT)
Dept: ONCOLOGY | Facility: CLINIC | Age: 58
End: 2018-05-16
Attending: PHYSICIAN ASSISTANT
Payer: COMMERCIAL

## 2018-05-16 VITALS
BODY MASS INDEX: 24.14 KG/M2 | HEART RATE: 96 BPM | RESPIRATION RATE: 18 BRPM | DIASTOLIC BLOOD PRESSURE: 87 MMHG | WEIGHT: 142.86 LBS | TEMPERATURE: 98.6 F | OXYGEN SATURATION: 96 % | SYSTOLIC BLOOD PRESSURE: 123 MMHG

## 2018-05-16 DIAGNOSIS — R68.89 DECREASED EXERCISE TOLERANCE: ICD-10-CM

## 2018-05-16 DIAGNOSIS — C50.211 MALIGNANT NEOPLASM OF UPPER-INNER QUADRANT OF RIGHT BREAST IN FEMALE, ESTROGEN RECEPTOR NEGATIVE (H): Primary | ICD-10-CM

## 2018-05-16 DIAGNOSIS — Z17.1 MALIGNANT NEOPLASM OF UPPER-INNER QUADRANT OF RIGHT BREAST IN FEMALE, ESTROGEN RECEPTOR NEGATIVE (H): Primary | ICD-10-CM

## 2018-05-16 PROCEDURE — G0463 HOSPITAL OUTPT CLINIC VISIT: HCPCS | Mod: ZF

## 2018-05-16 PROCEDURE — 99215 OFFICE O/P EST HI 40 MIN: CPT | Mod: ZP | Performed by: PHYSICIAN ASSISTANT

## 2018-05-16 ASSESSMENT — PAIN SCALES - GENERAL: PAINLEVEL: NO PAIN (0)

## 2018-05-16 NOTE — MR AVS SNAPSHOT
"              After Visit Summary   5/16/2018    Ashleigh Alonzo    MRN: [id number]           Patient Information     Date Of Birth          1960        Visit Information        Provider Department      5/16/2018 3:15 PM Ivana Gonzales PA-C [facility]        Today's Diagnoses     Malignant neoplasm of upper-inner quadrant of right breast in female, estrogen receptor negative (H)    -  1    Decreased exercise tolerance           Follow-ups after your visit        Additional Services     PHYSICAL THERAPY REFERRAL       *This therapy referral will be filtered to a centralized scheduling office at [facility] and the patient will receive a call to schedule an appointment at a [facility] location most convenient for them. *     [facility] provides Physical Therapy evaluation and treatment and many specialty services across the [facility] system.  If requesting a specialty program, please choose from the list below.    If you have not heard from the scheduling office within 2 business days, please call [phone] for all locations, with the exception of [place], please call [phone] and [facility], please call [phone]  Treatment: Evaluation & Treatment  Special Instructions/Modalities: Evaluation and treatment  Special Programs: Cancer Rehabilitation (PT and OT Evaluate & Treat)    Please be aware that coverage of these services is subject to the terms and limitations of your health insurance plan.  Call member services at your health plan with any benefit or coverage questions.      **Note to Provider:  If you are referring outside of [facility] for the therapy appointment, please list the name of the location in the \"special instructions\" above, print the referral and give to the patient to schedule the appointment.                  Your next 10 appointments already scheduled     Jun 05, 2018  2:20 PM CDT   Return Sleep Patient with Matt Andrea, " MD [facility] ([facility])    [address]  [facility] [address]   [phone]            Jun 28, 2018  2:15 PM CDT   Lymphedema Treatment with Carlotta Joyner OT   [facility] ([facility])    [address] [facility] [address]   [phone]            Jun 28, 2018  3:00 PM CDT   Return Visit with Ligia Zhong MD   [facility] ([facility])    [address] [facility] [address]   [phone]            Jul 13, 2018  2:00 PM CDT   CT CHEST W/O CONTRAST with UCCT1   [facility] ([facility])    [address] [id number] [phone]           Please bring any scans or X-rays taken at other hospitals, if similar tests were done. Also bring a list of your medicines, including vitamins, minerals and over-the-counter drugs. It is safest to leave personal items at home.  Be sure to tell your doctor:   If you have any allergies.   If there s any chance you are pregnant.   If you are breastfeeding.  You do not need to do anything special to prepare for this exam.  Please wear loose clothing, such as a sweat suit or jogging clothes. Avoid snaps, zippers and other metal. We may ask you to undress and put on a hospital gown.            Jul 16, 2018  3:00 PM CDT   MA DIAGNOSTIC DIGITAL BILATERAL with UCBCMA2   [facility] ([facility])    [address] [address] [facility] [id number] [phone]           Do not use any powder, lotion or deodorant under your arms or on your breast. If you do, we will ask you to remove it before your exam.  Wear comfortable, two-piece clothing.  If you have any allergies, tell your care team.  Bring any previous mammograms from other facilities or have them mailed to the breast center.   "Three-dimensional (3D) mammograms are available at [place] locations in [place], [place], [place], [place], [facility], [place], [place], and [place]. [facility] locations include [facility] in [place]. Benefits of 3D mammograms include: - Improved rate of cancer detection - Decreases your chance of having to go back for more tests, which means fewer: - \"False-positive\" results (This means that there is an abnormal area but it isn't cancer.) - Invasive testing procedures, such as a biopsy or surgery - Can provide clearer images of the breast if you have dense breast tissue. 3D mammography is an optional exam that anyone can have with a 2D mammogram. It doesn't replace or take the place of a 2D mammogram. 2D mammograms remain an effective screening test for all women.  Not all insurance companies cover the cost of a 3D mammogram. Check with your insurance.            Jul 16, 2018  3:45 PM CDT   (Arrive by 3:30 PM)   Return Visit with Fara Bradshaw MD   [facility] ([facility])    [address] [place] [address]   [phone]            Jul 31, 2018  1:30 PM CDT   PHYSICAL with Radha Cazares MD   [facility] ([facility])    [address] [place] [address]   [phone]            Aug 01, 2018  2:00 PM CDT   Cancer Rehab Eval with Angelika Cardona PT   [facility] ([facility])    [address] [place] [id number] [phone]            Oct 11, 2018 11:00 AM CDT   Return Visit with Sara Owen MD   [facility] ([facility])    [address] [place] [id number] [phone]              Who to contact     If you have questions or need follow up information about today's clinic visit or your schedule please contact [facility] " [facility] directly at [phone].  Normal or non-critical lab and imaging results will be communicated to you by MyChart, letter or phone within 4 business days after the clinic has received the results. If you do not hear from us within 7 days, please contact the clinic through [employer] or phone. If you have a critical or abnormal lab result, we will notify you by phone as soon as possible.  Submit refill requests through [employer] or call your pharmacy and they will forward the refill request to us. Please allow 3 business days for your refill to be completed.          Additional Information About Your Visit        [employer] Information     [employer] gives you secure access to your electronic health record. If you see a primary care provider, you can also send messages to your care team and make appointments. If you have questions, please call your primary care clinic.  If you do not have a primary care provider, please call [phone] and they will assist you.        Care EveryWhere ID     This is your Care EveryWhere ID. This could be used by other organizations to access your [place] medical records  [id number]        Your Vitals Were     Pulse Temperature Respirations Pulse Oximetry BMI (Body Mass Index)       96 98.6  F (37  C) (Oral) 18 96% 24.14 kg/m2        Blood Pressure from Last 3 Encounters:   05/16/18 123/87   04/04/18 110/72   03/19/18 120/69    Weight from Last 3 Encounters:   05/16/18 64.8 kg (142 lb 13.7 oz)   04/25/18 64.4 kg (142 lb)   04/18/18 64.4 kg (142 lb)              We Performed the Following     PHYSICAL THERAPY REFERRAL          Where to get your medicines      These medications were sent to On license of [facility] [place] [address] [address] [place] [facility] [address]  [address] [facility] [address]     Phone:  [phone]     hydrOXYzine 25 MG tablet          Primary Care Provider Office Phone # Fax #    Radha Cazares [phone]        [address] [facility] [address]        Equal Access to Services     THERESA SAUL : Hadii candie mcdaniel lynnfacundo Somariaaali, wabridgerda luqadaha, qaybta candeneginjacqui brownmukundjacqui, bang leon jeffkenny orrkristiansoham pickering. So [facility] [phone].    ATENCIÓN: Si habla español, tiene a blackman disposición servicios gratuitos de asistencia lingüística. Llame al [phone].    We comply with applicable federal civil rights laws and [place] laws. We do not discriminate on the basis of race, color, national origin, age, disability, sex, sexual orientation, or gender identity.            Thank you!     Thank you for choosing [facility]  for your care. Our goal is always to provide you with excellent care. Hearing back from our patients is one way we can continue to improve our services. Please take a few minutes to complete the written survey that you may receive in the mail after your visit with us. Thank you!             Your Updated Medication List - Protect others around you: Learn how to safely use, store and throw away your medicines at www.disposemymeds.org.          This list is accurate as of 5/16/18 11:59 PM.  Always use your most recent med list.                   Brand Name Dispense Instructions for use Diagnosis    albuterol 108 (90 Base) MCG/ACT Inhaler    PROAIR HFA/PROVENTIL HFA/VENTOLIN HFA    1 Inhaler    Inhale 2 puffs into the lungs every 6 hours as needed for shortness of breath / dyspnea    Chronic obstructive pulmonary disease, unspecified COPD type (H)       ASPIRIN PO      Take 81 mg by mouth daily        citalopram 10 MG tablet    celeXA    90 tablet    Take 1 tablet (10 mg) by mouth daily    Anxiety, Depression, unspecified depression type       co-enzyme Q-10 30 MG/5ML Liqd liquid      Take by mouth every morning        darifenacin 7.5 MG 24 hr tablet    ENABLEX    90 tablet    Take 1 tablet (7.5 mg) by mouth daily    Overactive bladder       guaiFENesin 600 MG 12 hr tablet     MUCINEX    180 tablet    Take 2 tablets (1,200 mg) by mouth 2 times daily    Cough with sputum       hydrOXYzine 25 MG tablet    ATARAX    100 tablet    Take 1-2 tablets (25-50 mg) by mouth every 6 hours as needed for itching    Hives       MULTIPLE VITAMIN PO      Take 1 tablet by mouth every morning        order for [facility]      Respironics Dream Station Auto CPAP 12-18 cm, F&P Simplus FFM small.    MERLIN (obstructive sleep apnea)       tiotropium 18 MCG capsule    SPIRIVA    1 capsule    Inhale 1 capsule (18 mcg) into the lungs daily Inhale contents of one capsule    Chronic obstructive pulmonary disease, unspecified COPD type (H)       VITAMIN B 12 PO      Take 100 mcg by mouth every morning        VITAMIN B6 PO      Take 1 tablet by mouth every morning        vitamin D 1000 units capsule      TAKE 2 CAPSULES BY MOUTH EVERY MORNING

## 2018-05-16 NOTE — PROGRESS NOTES
REASON FOR VISIT:  I am seeing Ms. Alonzo in the Cancer Survivorship Program for her diagnosis of right breast cancer.      Ms. Alonzo had a right breast biopsy on 08/22/2017 showing invasive mammary carcinoma, Dex grade 3.  DCIS was present, ER/MD, HER2 negative.  She was diagnosed with a clinical stage II (T2 N0 M0) right breast cancer.  She enrolled in I-SPY-2 clinical trial.  She received pembrolizumab with Taxol.  Pembrolizumab was discontinued after 2 doses due to pneumonitis and transaminitis.  Treatment was from 09/20/2017-12/26/2017.  This was followed by Adriamycin and Cytoxan x2 cycles from 01/02/2018-01/23/2018. Total accumulation dose of Adriamycin was 120 mg/m2.  Chemotherapy was discontinued secondary to poor tolerance to treatment.    She underwent a right lumpectomy and sentinel lymph node biopsy on 02/27/2018.  This showed a residual infiltrating ductal carcinoma, 6 mm tumor, [place] grade 2.  DCIS was present.  Zero out of 1 lymph nodes positive.  Residual class burden was 1 .  She received right breast radiation, completing 5256 cGy on 04/27/2018.      Ms. Alonzo states that she is feeling well at this time.  She notes no new concerns since her visit with Dr. Bradshaw in 03/2018.     CANCER TREATMENT SUMMARY:  *Right breast biopsy on 08/22/2017 showing invasive mammary carcinoma, [place] grade 3.  DCIS was present, ER/MD, HER2 negative.   *Clinical stage II (T2 N0 M0) right breast cancer.    *I-SPY-2 clinical trial.   *Pembrolizumab with Taxol.  Pembrolizumab was discontinued after 2 doses due to pneumonitis and transaminitis.  Treatment was from 09/20/2017-12/26/2017.  *Adriamycin and Cytoxan x2 cycles from 01/02/2018-01/23/2018. Total accumulation dose of Adriamycin was 120 mg/m2.    *Right lumpectomy and sentinel lymph node biopsy on 02/27/2018.  This showed a residual infiltrating ductal carcinoma, 6 mm tumor, Dex grade 2.  DCIS was present.  Zero out of 1 lymph nodes  positive.  Residual class burden was 1 .    *She received right breast radiation, completing 5256 cGy on 2018.     MEDICATIONS:   Current Outpatient Prescriptions   Medication Sig Dispense Refill     albuterol (PROAIR HFA/PROVENTIL HFA/VENTOLIN HFA) 108 (90 BASE) MCG/ACT Inhaler Inhale 2 puffs into the lungs every 6 hours as needed for shortness of breath / dyspnea 1 Inhaler 5     ASPIRIN PO Take 81 mg by mouth daily       darifenacin (ENABLEX) 7.5 MG 24 hr tablet Take 1 tablet (7.5 mg) by mouth daily 90 tablet 3     guaiFENesin (MUCINEX) 600 MG 12 hr tablet Take 2 tablets (1,200 mg) by mouth 2 times daily 180 tablet 6     MULTIPLE VITAMIN PO Take 1 tablet by mouth every morning        order for [facility] [place] [place] Respir[employer]ideobots Dream Station Auto CPAP 12-18 cm, F&P Simplus FFM small.       Pyridoxine HCl (VITAMIN B6 PO) Take 1 tablet by mouth every morning        tiotropium (SPIRIVA) 18 MCG capsule Inhale 1 capsule (18 mcg) into the lungs daily Inhale contents of one capsule 1 capsule 11     VITAMIN D 1000 UNIT OR CAPS TAKE 2 CAPSULES BY MOUTH EVERY MORNING       citalopram (CELEXA) 10 MG tablet Take 1 tablet (10 mg) by mouth daily (Patient not taking: Reported on 2018) 90 tablet 3     co-enzyme Q-10 30 MG/5ML LIQD liquid Take by mouth every morning       Cyanocobalamin (VITAMIN B 12 PO) Take 100 mcg by mouth every morning        hydrOXYzine (ATARAX) 25 MG tablet Take 1-2 tablets (25-50 mg) by mouth every 6 hours as needed for itching (Patient not taking: Reported on 2018) 100 tablet 1       ALLERGIES: No Known Allergies    FAMILY HISTORY:  Mother is alive, age 79, no health concerns.  Father is  at age 56 from a stroke.  No cancer family history.     PHYSICAL EXAM:  Vitals: /87  Pulse 96  Temp 98.6  F (37  C) (Oral)  Resp 18  Wt 64.8 kg (142 lb 13.7 oz)  SpO2 96%  BMI 24.14 kg/m2  GENERAL:  A pleasant person in no acute distress.   HEENT:  Sclerae are nonicteric.    NECK:  Supple.   NEUROLOGICAL:   Alert/orientated/able to answer all questions.  CN grossly intact.  PSYCH:  Normal affect.  SKIN:  No suspicious lesions on areas of exposed skin.    ASSESSMENT/PLAN:  I had the pleasure of seeing Ms. Alonzo in the Cancer Survivorship Program for a diagnosis of right breast cancer.  Given her diagnosis and treatment, I gave her the following recommendations:    1.  Clinical stage II (T2 N0 M0) invasive ductal carcinoma of the right breast, ER/FL, HER2 negative.  Treated as above with neoadjuvant chemotherapy, surgery and radiation.  Ms. Alonzo continues to do well at this time.  She is not having any new signs or symptoms that would suggest recurrence of her breast carcinoma.  She will continue seeing Medical Oncology every 3-4 months for the first 2-3 years, then every 6 months until she is 5 years out from the diagnosis.  After 5 years, she will discuss further followup with Dr. Bradshaw.  She is already scheduled with Dr. Bradshaw in 07/2018 and is to be scheduled for her yearly mammogram at that time.   2.  Coping.  Overall, Mr. Alonzo is coping well with her breast cancer diagnosis with no concerns.   3.  Energy Level.  She states she is now back to baseline.  She does not have a formal exercise program but understands that exercise is important.  I recommend 150 minutes of cardiovascular exercise per week.  I discussed cancer rehab program and she is interested.  A referral was sent out today.  She did take time off work during her treatment, but she is now back to work.   3.  Lymphedema,  She is currently undergoing evaluation with lymphedema at this time and has a followup appointment scheduled in June.   4.  Pembrolizumab.  She developed pneumonitis and last CT was on 01/28 showing improvement of the lungs.  I will ask Dr. Bradshaw about whether she needs to have a followup CT scan.  Prior to the pneumonitis, she was getting periodic CT scans secondary to pulmonary nodules that were found in 2015.  She also  developed a transaminitis, but the last AST/ALT have been improved in 02/2018.   5.  Peripheral neuropathy.  She has had peripheral neuropathy in her feet secondary to the treatment.  It is stable since the completion of the chemotherapy.  She has no pain with the peripheral neuropathy.  She understands that when she is about 2 years out from completion of the chemotherapy, that if the peripheral neuropathy is still present, it is a likely a permanent effect.  No functional difficulties with the neuropathy.   6.  Bone marrow.  Given her exposure to the chemotherapy, she is at a small risk for MDS or leukemia.  CBC in 02/2018 showed no concerns.   7.  Heart complications.  She was exposed to a lower dose of Adriamycin only given her 2 cycles of the chemotherapy.  She did have an echocardiogram in 12/2017 with an EF of 60%-65%.  She quit smoking greater than 25 years ago.  She should have a formal exercise program of 150 minutes per week.  She should see her primary care provider for treatment if needed, management of her cholesterol, blood pressure, glucose.   8.  Bladder.  She should report hematuria, urinary urgency, urinary frequency or dysuria due to the exposure of Cytoxan.   9.  Radiation effects.  If there are any new skin lesions in the area of the radiation, she should have them evaluated.  Bones in the area of radiation are at risk for fractures.  Lungs were in the field of radiation and unless we are doing CT imaging for other reasons, no formal screening is needed.   10.  Cancer screening.  She does need to continue to undergo routine cancer screening for women of her age group.  She should have Pap and pelvic exams as directed by her primary care provider.  She understands that vaginal bleeding would be abnormal and needs to have this evaluated.  She had a colonoscopy in 2011 with recommendations for followup in 10 years.  She should limit her sun exposure and use sunscreens.   11.  Healthy lifestyle.   She should refrain from tobacco.  She should maintain a healthy weight with a BMI between 20 and 25.  She should exercise 150 minutes of cardiovascular exercise per week.  Diet should include low fats.  She should continue to see her primary care provider for general health maintenance.  She should see her eye doctor and dentist routinely.  I recommend the annual influenza vaccine.  She has received 1 pneumococcal vaccine.      If there are any concerns, the patient will follow up with Dr. Bradshaw in July.       I spent 40 minutes with this patient, over 50% in counseling.

## 2018-05-16 NOTE — LETTER
May 23, 2018      TO: Ashleigh Alonzo  [address] [place] [address]         Dear Ms. Ashleigh Alonzo,    It was my pleasure seeing you in the Cancer Survivorship Clinic.   Due to your cancer treatment, here are considerations/recommendations based on your treatment:    Follow up for your cancer.  You will be seen every 3-4 months for the first 2-3 years, then every 6 months thereafter out until 5 years from your diagnosis.  After 5 years, you will discuss further follow up with your cancer team.  You will continue with yearly mammograms.    Lymphedema risk. You are currently seeing the lymphedema specialists.    Peripheral neuropathy.  You may have numbness and/or tingling in your hands and/or feet from the chemotherapy.  This may be a permanent side effect depending on how long it has been since your treatment.  Please talk to your care team if you are having pain with the neuropathy or difficulty with balance.    Heart.  There maybe effects from your treatment that affect your heart.  You need to see your primary care provider for aggressive screening and if needed treatment of cholesterol, blood pressure and glucose.  You should exercise 150 minutes of cardiovascular exercise per week.  You should refrain from tobacco.      Due to exposure to Cytoxan (cyclophosphamide), please report painful urination, blood in your urine, urinary frequency or urinary urgency to your care team.    Bone health.  I recommend 1200 mg of calcium daily along with Vitamin D and weight bearing exercises 2-3 days a week.  You may need a bone density scan, please discuss this with your cancer provider.    Risk of developing a blood cancer. Given the exposure to certain chemotherapy agents, you are at risk of MDS (bone marrow disorder) or leukemia.  You could have a CBC (complete blood count) done yearly until 10 years out from completion of your chemotherapy.      Radiation effects.  If there are new skin lesions in the area of  radiation, please have them evaluated.  Bones in the area of radiation are at risk for fractures.  Please report new/change in cough, coughing up blood, new/change in breathing to your PCP or oncology/hematology team.      Cancer screening.  You should undergo routine screening for your age group.  Routine pap and pelvic examinations as directed by your PCP or GYN.  If in menopause, vaginal bleeding would be abnormal and if present needs to be evaluated.  Colorectal cancer screening begins at age 50 with a colonoscopy, then as directed.  You should limit your sun exposure and use sunscreens.      Healthy Lifestyle.  Do not use tobacco in any form. Maintain a healthy weight with a BMI 20-25.  Diet should include lots of fruits and vegetables, low fat.  If you are struggling with your diet, please ask about a referral to Nutrition.  Your exercise program should be 150 minutes of cardiovascular exercise per week. I have referred you to Cancer Rehabilitation.  You should have regular check-ups with your primary care provider for general health maintenance.  You should receive the annual influenza vaccine, unless contraindicated.  You should receive the pneumococcal vaccine. You should see your eye doctor and dentist routinely.    If you have any questions, please feel free to contact me at [phone].    Sincerely,            Ivana Gonzales PA-C

## 2018-05-16 NOTE — NURSING NOTE
"Oncology Rooming Note    May 16, 2018 3:19 PM   Ashleigh Alonzo is a 58 year old female who presents for:    Chief Complaint   Patient presents with     Oncology Clinic Visit     New for Breast Ca Surviviorship      Initial Vitals: /87  Pulse 96  Temp 98.6  F (37  C) (Oral)  Resp 18  Wt 64.8 kg (142 lb 13.7 oz)  SpO2 96%  BMI 24.14 kg/m2 Estimated body mass index is 24.14 kg/(m^2) as calculated from the following:    Height as of 3/19/18: 1.638 m (5' 4.5\").    Weight as of this encounter: 64.8 kg (142 lb 13.7 oz). Body surface area is 1.72 meters squared.  No Pain (0) Comment: Data Unavailable   No LMP recorded. Patient is postmenopausal.  Allergies reviewed: Yes  Medications reviewed: Yes    Medications: Medication refills not needed today.  Pharmacy name entered into [facility]:    [facility] [place], [place] - [address] [place] [facility] - [place], [place] - [address] [address]    Clinical concerns: Results  [place]  was notified.    7  minutes for nursing intake (face to face time)     Debbie Olivo MA              "

## 2018-05-16 NOTE — LETTER
5/16/2018      RE: Ashleigh Alonzo  [address]  [place] [address]       REASON FOR VISIT:  I am seeing Ms. Alonzo in the Cancer Survivorship Program for her diagnosis of right breast cancer.      Ms. Alonzo had a right breast biopsy on 08/22/2017 showing invasive mammary carcinoma, Dex grade 3.  DCIS was present, ER/ID, HER2 negative.  She was diagnosed with a clinical stage II (T2 N0 M0) right breast cancer.  She enrolled in I-SPY-2 clinical trial.  She received pembrolizumab with Taxol.  Pembrolizumab was discontinued after 2 doses due to pneumonitis and transaminitis.  Treatment was from 09/20/2017-12/26/2017.  This was followed by Adriamycin and Cytoxan x2 cycles from 01/02/2018-01/23/2018. Total accumulation dose of Adriamycin was 120 mg/m2.  Chemotherapy was discontinued secondary to poor tolerance to treatment.    She underwent a right lumpectomy and sentinel lymph node biopsy on 02/27/2018.  This showed a residual infiltrating ductal carcinoma, 6 mm tumor, Dex grade 2.  DCIS was present.  Zero out of 1 lymph nodes positive.  Residual class burden was 1 .  She received right breast radiation, completing 5256 cGy on 04/27/2018.      Ms. Alonzo states that she is feeling well at this time.  She notes no new concerns since her visit with Dr. Bradshaw in 03/2018.     CANCER TREATMENT SUMMARY:  *Right breast biopsy on 08/22/2017 showing invasive mammary carcinoma, [place] grade 3.  DCIS was present, ER/ID, HER2 negative.   *Clinical stage II (T2 N0 M0) right breast cancer.    *I-SPY-2 clinical trial.   *Pembrolizumab with Taxol.  Pembrolizumab was discontinued after 2 doses due to pneumonitis and transaminitis.  Treatment was from 09/20/2017-12/26/2017.  *Adriamycin and Cytoxan x2 cycles from 01/02/2018-01/23/2018. Total accumulation dose of Adriamycin was 120 mg/m2.    *Right lumpectomy and sentinel lymph node biopsy on 02/27/2018.  This showed a residual infiltrating ductal carcinoma, 6  mm tumor, Dex grade 2.  DCIS was present.  Zero out of 1 lymph nodes positive.  Residual class burden was 1 .    *She received right breast radiation, completing 5256 cGy on 2018.     MEDICATIONS:   Current Outpatient Prescriptions   Medication Sig Dispense Refill     albuterol (PROAIR HFA/PROVENTIL HFA/VENTOLIN HFA) 108 (90 BASE) MCG/ACT Inhaler Inhale 2 puffs into the lungs every 6 hours as needed for shortness of breath / dyspnea 1 Inhaler 5     ASPIRIN PO Take 81 mg by mouth daily       darifenacin (ENABLEX) 7.5 MG 24 hr tablet Take 1 tablet (7.5 mg) by mouth daily 90 tablet 3     guaiFENesin (MUCINEX) 600 MG 12 hr tablet Take 2 tablets (1,200 mg) by mouth 2 times daily 180 tablet 6     MULTIPLE VITAMIN PO Take 1 tablet by mouth every morning        [place] [facility] [employer] Dream Station Auto CPAP 12-18 cm, F&P Simplus FFM small.       Pyridoxine HCl (VITAMIN B6 PO) Take 1 tablet by mouth every morning        tiotropium (SPIRIVA) 18 MCG capsule Inhale 1 capsule (18 mcg) into the lungs daily Inhale contents of one capsule 1 capsule 11     VITAMIN D 1000 UNIT OR CAPS TAKE 2 CAPSULES BY MOUTH EVERY MORNING       citalopram (CELEXA) 10 MG tablet Take 1 tablet (10 mg) by mouth daily (Patient not taking: Reported on 2018) 90 tablet 3     co-enzyme Q-10 30 MG/5ML LIQD liquid Take by mouth every morning       Cyanocobalamin (VITAMIN B 12 PO) Take 100 mcg by mouth every morning        hydrOXYzine (ATARAX) 25 MG tablet Take 1-2 tablets (25-50 mg) by mouth every 6 hours as needed for itching (Patient not taking: Reported on 2018) 100 tablet 1       ALLERGIES: No Known Allergies    FAMILY HISTORY:  Mother is alive, age 79, no health concerns.  Father is  at age 56 from a stroke.  No cancer family history.     PHYSICAL EXAM:  Vitals: /87  Pulse 96  Temp 98.6  F (37  C) (Oral)  Resp 18  Wt 64.8 kg (142 lb 13.7 oz)  SpO2 96%  BMI 24.14 kg/m2  GENERAL:  A pleasant person in no acute  distress.   HEENT:  Sclerae are nonicteric.    NECK:  Supple.   NEUROLOGICAL:  Alert/orientated/able to answer all questions.  CN grossly intact.  PSYCH:  Normal affect.  SKIN:  No suspicious lesions on areas of exposed skin.    ASSESSMENT/PLAN:  I had the pleasure of seeing Ms. Alonzo in the Cancer Survivorship Program for a diagnosis of right breast cancer.  Given her diagnosis and treatment, I gave her the following recommendations:    1.  Clinical stage II (T2 N0 M0) invasive ductal carcinoma of the right breast, ER/[place], HER2 negative.  Treated as above with neoadjuvant chemotherapy, surgery and radiation.  Ms. Alonzo continues to do well at this time.  She is not having any new signs or symptoms that would suggest recurrence of her breast carcinoma.  She will continue seeing Medical Oncology every 3-4 months for the first 2-3 years, then every 6 months until she is 5 years out from the diagnosis.  After 5 years, she will discuss further followup with Dr. Bradshaw.  She is already scheduled with Dr. Bradshaw in 07/2018 and is to be scheduled for her yearly mammogram at that time.   2.  Coping.  Overall, Mr. Alonzo is coping well with her breast cancer diagnosis with no concerns.   3.  Energy Level.  She states she is now back to baseline.  She does not have a formal exercise program but understands that exercise is important.  I recommend 150 minutes of cardiovascular exercise per week.  I discussed cancer rehab program and she is interested.  A referral was sent out today.  She did take time off work during her treatment, but she is now back to work.   3.  Lymphedema,  She is currently undergoing evaluation with lymphedema at this time and has a followup appointment scheduled in June.   4.  Pembrolizumab.  She developed pneumonitis and last CT was on 01/28 showing improvement of the lungs.  I will ask Dr. Bradshaw about whether she needs to have a followup CT scan.  Prior to the pneumonitis, she was getting  periodic CT scans secondary to pulmonary nodules that were found in 2015.  She also developed a transaminitis, but the last AST/ALT have been improved in 02/2018.   5.  Peripheral neuropathy.  She has had peripheral neuropathy in her feet secondary to the treatment.  It is stable since the completion of the chemotherapy.  She has no pain with the peripheral neuropathy.  She understands that when she is about 2 years out from completion of the chemotherapy, that if the peripheral neuropathy is still present, it is a likely a permanent effect.  No functional difficulties with the neuropathy.   6.  Bone marrow.  Given her exposure to the chemotherapy, she is at a small risk for MDS or leukemia.  CBC in 02/2018 showed no concerns.   7.  Heart complications.  She was exposed to a lower dose of Adriamycin only given her 2 cycles of the chemotherapy.  She did have an echocardiogram in 12/2017 with an EF of 60%-65%.  She quit smoking greater than 25 years ago.  She should have a formal exercise program of 150 minutes per week.  She should see her primary care provider for treatment if needed, management of her cholesterol, blood pressure, glucose.   8.  Bladder.  She should report hematuria, urinary urgency, urinary frequency or dysuria due to the exposure of Cytoxan.   9.  Radiation effects.  If there are any new skin lesions in the area of the radiation, she should have them evaluated.  Bones in the area of radiation are at risk for fractures.  Lungs were in the field of radiation and unless we are doing CT imaging for other reasons, no formal screening is needed.   10.  Cancer screening.  She does need to continue to undergo routine cancer screening for women of her age group.  She should have Pap and pelvic exams as directed by her primary care provider.  She understands that vaginal bleeding would be abnormal and needs to have this evaluated.  She had a colonoscopy in 2011 with recommendations for followup in 10 years.   She should limit her sun exposure and use sunscreens.   11.  Healthy lifestyle.  She should refrain from tobacco.  She should maintain a healthy weight with a BMI between 20 and 25.  She should exercise 150 minutes of cardiovascular exercise per week.  Diet should include low fats.  She should continue to see her primary care provider for general health maintenance.  She should see her eye doctor and dentist routinely.  I recommend the annual influenza vaccine.  She has received 1 pneumococcal vaccine.      If there are any concerns, the patient will follow up with Dr. Bradshaw in July.       I spent 40 minutes with this patient, over 50% in counseling.        Ivana Gonzales PA-C

## 2018-05-17 DIAGNOSIS — T50.905A DRUG-INDUCED PNEUMONITIS: Primary | ICD-10-CM

## 2018-05-17 DIAGNOSIS — J98.4 DRUG-INDUCED PNEUMONITIS: Primary | ICD-10-CM

## 2018-05-30 PROBLEM — D70.9 NEUTROPENIC SEPSIS (H): Status: RESOLVED | Noted: 2018-01-29 | Resolved: 2018-05-30

## 2018-05-30 PROBLEM — Z17.1 MALIGNANT NEOPLASM OF UPPER-INNER QUADRANT OF RIGHT BREAST IN FEMALE, ESTROGEN RECEPTOR NEGATIVE (H): Chronic | Status: ACTIVE | Noted: 2017-08-22

## 2018-05-30 PROBLEM — N30.00 ACUTE CYSTITIS WITHOUT HEMATURIA: Status: RESOLVED | Noted: 2017-10-26 | Resolved: 2018-05-30

## 2018-05-30 PROBLEM — Z51.11 ENCOUNTER FOR ANTINEOPLASTIC CHEMOTHERAPY: Status: RESOLVED | Noted: 2018-01-02 | Resolved: 2018-05-30

## 2018-05-30 PROBLEM — A41.9 NEUTROPENIC SEPSIS (H): Status: RESOLVED | Noted: 2018-01-29 | Resolved: 2018-05-30

## 2018-05-30 PROBLEM — C50.211 MALIGNANT NEOPLASM OF UPPER-INNER QUADRANT OF RIGHT BREAST IN FEMALE, ESTROGEN RECEPTOR NEGATIVE (H): Chronic | Status: ACTIVE | Noted: 2017-08-22

## 2018-05-30 PROBLEM — A04.72 CLOSTRIDIUM DIFFICILE DIARRHEA: Status: RESOLVED | Noted: 2018-01-12 | Resolved: 2018-05-30

## 2018-05-30 PROBLEM — J18.9 PNEUMONIA: Status: RESOLVED | Noted: 2017-11-11 | Resolved: 2018-05-30

## 2018-06-05 ENCOUNTER — OFFICE VISIT (OUTPATIENT)
Dept: SLEEP MEDICINE | Facility: CLINIC | Age: 58
End: 2018-06-05
Payer: COMMERCIAL

## 2018-06-05 VITALS — HEART RATE: 48 BPM | OXYGEN SATURATION: 96 % | HEIGHT: 65 IN | WEIGHT: 144 LBS | BODY MASS INDEX: 23.99 KG/M2

## 2018-06-05 DIAGNOSIS — C50.211 MALIGNANT NEOPLASM OF UPPER-INNER QUADRANT OF RIGHT BREAST IN FEMALE, ESTROGEN RECEPTOR NEGATIVE (H): Chronic | ICD-10-CM

## 2018-06-05 DIAGNOSIS — Z17.1 MALIGNANT NEOPLASM OF UPPER-INNER QUADRANT OF RIGHT BREAST IN FEMALE, ESTROGEN RECEPTOR NEGATIVE (H): Chronic | ICD-10-CM

## 2018-06-05 DIAGNOSIS — G47.33 OSA (OBSTRUCTIVE SLEEP APNEA): Primary | Chronic | ICD-10-CM

## 2018-06-05 PROCEDURE — 99213 OFFICE O/P EST LOW 20 MIN: CPT | Performed by: INTERNAL MEDICINE

## 2018-06-05 NOTE — PATIENT INSTRUCTIONS

## 2018-06-05 NOTE — NURSING NOTE
"Chief Complaint   Patient presents with     RECHECK       Initial Pulse (!) 48  Ht 1.638 m (5' 4.5\")  Wt 65.3 kg (144 lb)  SpO2 96%  BMI 24.34 kg/m2 Estimated body mass index is 24.34 kg/(m^2) as calculated from the following:    Height as of this encounter: 1.638 m (5' 4.5\").    Weight as of this encounter: 65.3 kg (144 lb).    Medication Reconciliation: complete      "

## 2018-06-05 NOTE — MR AVS SNAPSHOT
After Visit Summary   6/5/2018    Ashleigh Alonzo    MRN: [id number]           Patient Information     Date Of Birth          1960        Visit Information        Provider Department      6/5/2018 2:20 PM Matt Andrea MD [facility]        Today's Diagnoses     MERLIN (obstructive sleep apnea)- moderate (AHI 21)    -  1    Malignant neoplasm of upper-inner quadrant of right breast in female, estrogen receptor negative (H)          Care Instructions      Your BMI is Body mass index is 24.34 kg/(m^2).  Weight management is a personal decision.  If you are interested in exploring weight loss strategies, the following discussion covers the approaches that may be successful. Body mass index (BMI) is one way to tell whether you are at a healthy weight, overweight, or obese. It measures your weight in relation to your height.  A BMI of 18.5 to 24.9 is in the healthy range. A person with a BMI of 25 to 29.9 is considered overweight, and someone with a BMI of 30 or greater is considered obese. More than two-thirds of American adults are considered overweight or obese.  Being overweight or obese increases the risk for further weight gain. Excess weight may lead to heart disease and diabetes.  Creating and following plans for healthy eating and physical activity may help you improve your health.  Weight control is part of healthy lifestyle and includes exercise, emotional health, and healthy eating habits. Careful eating habits lifelong are the mainstay of weight control. Though there are significant health benefits from weight loss, long-term weight loss with diet alone may be very difficult to achieve- studies show long-term success with dietary management in less than 10% of people. Attaining a healthy weight may be especially difficult to achieve in those with severe obesity. In some cases, medications, devices and surgical management might be considered.  What can you do?  If you are  overweight or obese and are interested in methods for weight loss, you should discuss this with your provider.     Consider reducing daily calorie intake by 500 calories.     Keep a food journal.     Avoiding skipping meals, consider cutting portions instead.    Diet combined with exercise helps maintain muscle while optimizing fat loss. Strength training is particularly important for building and maintaining muscle mass. Exercise helps reduce stress, increase energy, and improves fitness. Increasing exercise without diet control, however, may not burn enough calories to loose weight.       Start walking three days a week 10-20 minutes at a time    Work towards walking thirty minutes five days a week     Eventually, increase the speed of your walking for 1-2 minutes at time    In addition, we recommend that you review healthy lifestyles and methods for weight loss available through the National Institutes of Health patient information sites:  http://win.niddk.nih.gov/publications/index.htm    And look into health and wellness programs that may be available through your health insurance provider, employer, local community center, or rodney club.                  Follow-ups after your visit        Follow-up notes from your care team     Return in about 4 weeks (around 7/3/2018), or if symptoms worsen or fail to improve, for Routine Visit.      Your next 10 appointments already scheduled     Jun 28, 2018  2:15 PM CDT   Lymphedema Treatment with Carlotta Joyner OT   [facility] [place] [place]    [address] [place] [address]   [phone]            Jun 28, 2018  3:00 PM CDT   Return Visit with Ligia Zhong MD   [email or web address] ([email or web address]    [address] [place] [address]   [phone]            Jul 13, 2018  2:00 PM CDT   CT CHEST W/O CONTRAST with UCCT1   [facility] ([facility])    [address]  1st Floor  [facility] [id number] [phone]           Please bring any scans or X-rays taken at other hospitals, if similar tests were done. Also bring a list of your medicines, including vitamins, minerals and over-the-counter drugs. It is safest to leave personal items at home.  Be sure to tell your doctor:   If you have any allergies.   If there s any chance you are pregnant.   If you are breastfeeding.  You do not need to do anything special to prepare for this exam.  Please wear loose clothing, such as a sweat suit or jogging clothes. Avoid snaps, zippers and other metal. We may ask you to undress and put on a hospital gown.            Jul 16, 2018  3:00 PM CDT   MA DIAGNOSTIC DIGITAL BILATERAL with UCBCMA2   [facility] ([facility])    [address] [place] [address]  2nd [facility] [id number] [phone]           Do not use any powder, lotion or deodorant under your arms or on your breast. If you do, we will ask you to remove it before your exam.  Wear comfortable, two-piece clothing.  If you have any allergies, tell your care team.  Bring any previous mammograms from other facilities or have them mailed to the breast center.  Three-dimensional (3D) mammograms are available at [facility] locations in [facility], [facility], [facility], and [place]. [facility] locations include [facility] in [place]. Benefits of 3D mammograms include: - Improved rate of cancer detection - Decreases your chance of having to go back for more tests, which means fewer: - \"False-positive\" results (This means that there is an abnormal area but it isn't cancer.) - Invasive testing procedures, such as a biopsy or surgery - Can provide clearer images of the breast if you have dense breast tissue. 3D mammography is an optional exam that anyone can have with a 2D mammogram. " It doesn't replace or take the place of a 2D mammogram. 2D mammograms remain an effective screening test for all women.  Not all insurance companies cover the cost of a 3D mammogram. Check with your insurance.            Jul 16, 2018  3:45 PM CDT   (Arrive by 3:30 PM)   Return Visit with Fara Bradshaw MD   [facility] ([facility])    [address]  [address]  [facility] [address]   [phone]            Jul 31, 2018  1:30 PM CDT   PHYSICAL [facility] Rad[facility]ares [facility]    [address] [address]   [phone]            Aug 01, 2018  2:00 PM CDT   Cancer Rehab Eval with Angelika Cardona PT   [facility] ([facility] [place])    [address] [facility] [address] [phone]            Sep 06, 2018  8:00 PM CDT   PSG Split with BK BED 1   [facility] [place]    [address]  [facility] [id number] [phone]            Sep 20, 2018  2:30 PM CDT   Return Sleep Patient with Matt Andrea MD   [facility] [place]    [address]  [facility] [id number] [phone]            Oct 11, 2018 11:00 AM CDT   Return Visit with Sara Owen MD   [facility] ([facility])    [address] [facility] [address]   [phone]              Future tests that were ordered for you today     Open Future Orders        Priority Expected Expires Ordered    Comprehensive Sleep Study Routine  12/2/2018 6/5/2018            Who to contact     If you have questions or need follow up information about today's clinic visit or your schedule please contact [facility] directly at [phone].  Normal or non-critical lab and imaging results  "will be communicated to you by MyChart, letter or phone within 4 business days after the clinic has received the results. If you do not hear from us within 7 days, please contact the clinic through [employer] or phone. If you have a critical or abnormal lab result, we will notify you by phone as soon as possible.  Submit refill requests through [employer] or call your pharmacy and they will forward the refill request to us. Please allow 3 business days for your refill to be completed.          Additional Information About Your Visit        [employer] Information     [employer] gives you secure access to your electronic health record. If you see a primary care provider, you can also send messages to your care team and make appointments. If you have questions, please call your primary care clinic.  If you do not have a primary care provider, please call [phone] and they will assist you.        Care EveryWhere ID     This is your Care EveryWhere ID. This could be used by other organizations to access your [facility] medical records  [id number]        Your Vitals Were     Pulse Height Pulse Oximetry BMI (Body Mass Index)          48 1.638 m (5' 4.5\") 96% 24.34 kg/m2         Blood Pressure from Last 3 Encounters:   05/16/18 123/87   04/04/18 110/72   03/19/18 120/69    Weight from Last 3 Encounters:   06/05/18 65.3 kg (144 lb)   05/16/18 64.8 kg (142 lb 13.7 oz)   04/25/18 64.4 kg (142 lb)               Primary Care Provider Office Phone # Fax #    Radha Cazares [phone] [address] [facility] [address]        Equal Access to Services     WILMER HUGHES : Hadjose martin Granda, kaden clancy, bang weiss. So [facility] [phone].    ATENCIÓN: Si habla español, tiene a blackman disposición servicios gratuitos de asistencia lingüística. Pauline al [phone].    We comply with applicable federal civil rights laws and [place] " laws. We do not discriminate on the basis of race, color, national origin, age, disability, sex, sexual orientation, or gender identity.            Thank you!     Thank you for choosing [facility]  for your care. Our goal is always to provide you with excellent care. Hearing back from our patients is one way we can continue to improve our services. Please take a few minutes to complete the written survey that you may receive in the mail after your visit with us. Thank you!             Your Updated Medication List - Protect others around you: Learn how to safely use, store and throw away your medicines at www.disposemymeds.org.          This list is accurate as of 6/5/18  2:44 PM.  Always use your most recent med list.                   Brand Name Dispense Instructions for use Diagnosis    albuterol 108 (90 Base) MCG/ACT Inhaler    PROAIR HFA/PROVENTIL HFA/VENTOLIN HFA    1 Inhaler    Inhale 2 puffs into the lungs every 6 hours as needed for shortness of breath / dyspnea    Chronic obstructive pulmonary disease, unspecified COPD type (H)       ASPIRIN PO      Take 81 mg by mouth daily        citalopram 10 MG tablet    celeXA    90 tablet    Take 1 tablet (10 mg) by mouth daily    Anxiety, Depression, unspecified depression type       co-enzyme Q-10 30 MG/5ML Liqd liquid      Take by mouth every morning        darifenacin 7.5 MG 24 hr tablet    ENABLEX    90 tablet    Take 1 tablet (7.5 mg) by mouth daily    Overactive bladder       guaiFENesin 600 MG 12 hr tablet    MUCINEX    180 tablet    Take 2 tablets (1,200 mg) by mouth 2 times daily    Cough with sputum       hydrOXYzine 25 MG tablet    ATARAX    100 tablet    Take 1-2 tablets (25-50 mg) by mouth every 6 hours as needed for itching    Hives       MULTIPLE VITAMIN PO      Take 1 tablet by mouth every morning        order for [facility] [place]      Respironics Dream Station Auto CPAP 12-18 cm, F&P Simplus FFM small.    MERLIN (obstructive sleep apnea)        tiotropium 18 MCG capsule    SPIRIVA    1 capsule    Inhale 1 capsule (18 mcg) into the lungs daily Inhale contents of one capsule    Chronic obstructive pulmonary disease, unspecified COPD type (H)       VITAMIN B 12 PO      Take 100 mcg by mouth every morning        VITAMIN B6 PO      Take 1 tablet by mouth every morning        vitamin D 1000 units capsule      TAKE 2 CAPSULES BY MOUTH EVERY MORNING

## 2018-06-05 NOTE — PROGRESS NOTES
Obstructive Sleep Apnea- PAP Follow-Up Visit:    Chief Complaint   Patient presents with     RECHECK       Ashleigh Alonzo comes in today for follow-up of their moderate sleep apnea, managed with CPAP.      Initial sleep study done on 12/4/15 (155#) at the [facility] for Loud socially disruptive snoring, narrowed oropharynx, COPD. Sleep onset difficulties, suspect principally related to poor sleep hygiene, delayed sleep phase syndrome. AHI was 21, with significant desaturations down to 61%. RDI 21.6. REM AHI 57.3, consistent with severe REM MERLIN. Supine AHI 50.6, consistent with severe SUPINE MERLIN. Periodic Limb Movement Index 0/hour.      Study Date: 1/29/2016- CPAP final pressure achieved was 15 cmH2O with a residual AHI of 2.2 events per hour. Time in REM supine on final pressure was 16.0 minutes. Supine REM was also seen at 10 cm with fair control of events. Further titration principally done for airflow limitations. Transcutaneous CO2 Monitoring (TCM) was within normal limits.      She has not used her CPAP for a year. She was diagnosed with breast cancer, she developed pneumonitis. She woke up and found her machine was laying on the floor.     She has lost 11# sunce her sleep study      Auto-PAP 10 - 18 cmH2O 30 day usage data:  n/a          Past medical/surgical history, family history, social history, medications and allergies were reviewed.      Problem List:  Patient Active Problem List    Diagnosis Date Noted     Malignant neoplasm of upper-inner quadrant of right breast in female, estrogen receptor negative (H) 08/22/2017     Priority: High      Stage IIa, T2 N0 M0, grade 3, triple-negative invasive carcinoma of the right breast.  Routine bilateral screening mammogram on 07/27/2017. Right breast biopsy demonstrated a grade 3 invasive mammary carcinoma with associated high-grade DCIS.       Ms. Alonzo enrolled in the ISPY-2 clinical trial.  She began treatment with weekly taxol and  once every 3 week pembrolizumab on 9/20/17.  She was hospitalized 11/11/17 - 11/17/17 with fevers ranging from 102 - 105.  CT chest was consistent with pneumonitis.  She also had transaminitis in the 150 range.  She was started on corticosteroids.  Pembrolizumab was stopped and she resumed weekly taxol alone on 11/28/17.  She completed a total of 12 weeks of therapy on 12/26/17.  She received first cycle of adriamycin and cyclophosphamide on 1/2/18.  She was hospitalized 1/10/18 - 1/13/18 with neutropenic fever.  She was hospitalized again  from 1/29/18 - 2/8/18 with neutropenic fever, mucositis, and C. difficile colitis following cycle #2.  Given poor clinical status and frequent hospitalizations, plan was made to forgo the planned final 2 cycles of AC and proceed with chemotherapy.        On 2/27/18 she underwent right breast lumpectomy and sentinel lymph node procedure.  Pathology showed a grade 2, 6 mm residual invasive mammary carcinoma with an associated 8 mm area of high grade DCIS with comedonecrosis and ADH.  Invasive tumor cellularity was 10%.  There was lymphovascular invasion.  Surgical margins were negative for both invasive and noninvasive disease.  A single sentinel lymph node was benign.  MDA residual cancer burden was class I.      Elected radiation therapy            Long term (current) use of systemic steroids 02/05/2018     Priority: Medium     Chronic obstructive pulmonary disease, unspecified COPD type (H) 07/25/2016     Priority: Medium     PFTS 11/2014 - FEV1- 1.36 (52%), ratio 0.55, DLCO 86% Dr Singh       Depression, unspecified depression type 07/25/2016     Priority: Medium     MERLIN (obstructive sleep apnea)- moderate (AHI 21) 12/11/2015     Priority: Medium     Study Date: 12/4/2015-  (155.0 lbs) apnea/hypopnea index was 21.0 events per hour.  The REM AHI was 57.3 events per hour.  The supine AHI was 50.6 events per hour.  Transcutaneous carbon dioxide monitoring was not used, however  "hypoventilation was suggested by oximetry.. Lowest oxygen saturation was 60.7%.  Time spent below 89% was 26.8 minutes.  Study Date: 1/29/2016- CPAP final  pressure achieved was 15 cmH2O with a residual AHI of 2.2 events per hour.  Time in REM supine on final pressure was 16.0 minutes. Supine REM was also seen at 10 cm with  fair control of events. Further titration principally done for airflow limitations.        Lung nodule, multiple 11/17/2015     Priority: Medium     Mild major depression (H) 06/25/2012     Priority: Medium     Hyperlipidemia LDL goal <130 10/03/2010     Priority: Medium     Anxiety 04/08/2010     Priority: Medium     Periodontal disease      Priority: Low     Tongue ulcer 02/05/2018     Priority: Low     Urticaria      Priority: Low     Hives 11/17/2015     Priority: Low     Family history of ischemic heart disease 06/25/2012     Priority: Low     Lack of libido 06/22/2011     Priority: Low     Anisocoria 05/21/2010     Priority: Low        Pulse (!) 48  Ht 1.638 m (5' 4.5\")  Wt 65.3 kg (144 lb)  SpO2 96%  BMI 24.34 kg/m2        Impression/Plan:    Severe Sleep apnea. Not using CPAP< 11# weight loss since diagnosis. Options discussed,  Elect repeat Polysomnogram (using 4% desaturation/Medicare/2012 AASM 1B scoring rules) for to reassess presence/severity of obstructive sleep due to lack of symptoms and comorbidities. May need to get to bed early due to advanced sleep phase.     20 minutes spent with patient, all of which were spent face-to-face counseling, consulting, coordinating plan of care.      "

## 2018-06-26 ENCOUNTER — CARE COORDINATION (OUTPATIENT)
Dept: RADIATION ONCOLOGY | Facility: CLINIC | Age: 58
End: 2018-06-26

## 2018-07-11 ENCOUNTER — OFFICE VISIT (OUTPATIENT)
Dept: RADIATION ONCOLOGY | Facility: CLINIC | Age: 58
End: 2018-07-11
Payer: COMMERCIAL

## 2018-07-11 VITALS
BODY MASS INDEX: 24.31 KG/M2 | TEMPERATURE: 97.8 F | WEIGHT: 145.9 LBS | HEART RATE: 117 BPM | SYSTOLIC BLOOD PRESSURE: 118 MMHG | OXYGEN SATURATION: 97 % | RESPIRATION RATE: 16 BRPM | HEIGHT: 65 IN | DIASTOLIC BLOOD PRESSURE: 68 MMHG

## 2018-07-11 DIAGNOSIS — C50.211 MALIGNANT NEOPLASM OF UPPER-INNER QUADRANT OF RIGHT BREAST IN FEMALE, ESTROGEN RECEPTOR NEGATIVE (H): Primary | ICD-10-CM

## 2018-07-11 DIAGNOSIS — Z17.1 MALIGNANT NEOPLASM OF UPPER-INNER QUADRANT OF RIGHT BREAST IN FEMALE, ESTROGEN RECEPTOR NEGATIVE (H): Primary | ICD-10-CM

## 2018-07-11 PROCEDURE — 99024 POSTOP FOLLOW-UP VISIT: CPT | Performed by: RADIOLOGY

## 2018-07-11 ASSESSMENT — PAIN SCALES - GENERAL: PAINLEVEL: MILD PAIN (3)

## 2018-07-11 NOTE — MR AVS SNAPSHOT
After Visit Summary   7/11/2018    Ashleigh Alonzo    MRN: [id number]           Patient Information     Date Of Birth          1960        Visit Information        Provider Department      7/11/2018 1:00 PM Ligia Zhong MD [facility]        Today's Diagnoses     Malignant neoplasm of upper-inner quadrant of right breast in female, estrogen receptor negative (H)    -  1      Care Instructions    Please contact [facility] RN with questions or concerns following today's appointment: [phone].    Thank you!            Follow-ups after your visit        Your next 10 appointments already scheduled     Jul 31, 2018  1:30 PM CDT   PHYSICAL with Radha Cazares MD   [facility] ([facility])    [address] [address] [phone]            Aug 01, 2018  2:00 PM CDT   Cancer Rehab Eval with Angelika Cardona PT   [facility] ([facility])    [address] [address] [address] [phone]            Aug 13, 2018 11:15 AM CDT   (Arrive by 11:00 AM)   Return Visit with Fara Bradshaw MD   [facility] ([facility])    [address] [address] [phone]            Sep 06, 2018  8:00 PM CDT   PSG Split with BK BED 1   [facility] ([facility])    [address]  [facility] [id number] [phone]            Sep 20, 2018  2:30 PM CDT   Return Sleep Patient with Matt Adnrea MD   [facility] ([facility])    [address]  [facility] [id number] [phone]            Oct 11, 2018 11:00 AM CDT   Return Visit with Sara Owen MD   [facility] ([facility])    [address] [place] [address]  [place] [address]   [phone]            Oct 11, 2018 12:30 PM CDT   Return Visit with Ligia Zhong MD   [facility] ([facility])    [address] [place] [address]   [phone]              Who to contact     If you have questions or need follow up information about today's clinic visit or your schedule please contact [facility] directly at [phone].  Normal or non-critical lab and imaging results will be communicated to you by [employer], letter or phone within 4 business days after the clinic has received the results. If you do not hear from us within 7 days, please contact the clinic through [employer] or phone. If you have a critical or abnormal lab result, we will notify you by phone as soon as possible.  Submit refill requests through [employer] or call your pharmacy and they will forward the refill request to us. Please allow 3 business days for your refill to be completed.          Additional Information About Your Visit        [employer] Information     [employer] gives you secure access to your electronic health record. If you see a primary care provider, you can also send messages to your care team and make appointments. If you have questions, please call your primary care clinic.  If you do not have a primary care provider, please call [phone] and they will assist you.      [employer] is an electronic gateway that provides easy, online access to your medical records. With [employer], you can request a clinic appointment, read your test results, renew a prescription or communicate with your care team.     To access your existing account, please contact your [facility] or call [phone] for assistance.        Care EveryWhere ID     This is your Care EveryWhere ID. This could be used by other organizations to access your [place] medical records  [id number]        Your Vitals Were     Pulse Temperature  "Respirations Height Pulse Oximetry BMI (Body Mass Index)    117 97.8  F (36.6  C) (Oral) 16 5' 4.5\" 97% 24.66 kg/m2       Blood Pressure from Last 3 Encounters:   07/11/18 118/68   05/16/18 123/87   04/04/18 110/72    Weight from Last 3 Encounters:   07/11/18 145 lb 14.4 oz   06/05/18 144 lb   05/16/18 142 lb 13.7 oz              Today, you had the following     No orders found for display       Primary Care Provider Office Phone # Fax #    Radha Cazares [phone] [address] [facility] [address]        Equal Access to Services     THERESA HUGHES : Abdifatah baileyo Jesus Alberto, waaxda luqadaha, qaybta kaalmada adeegyada, bang pickering. So [facility] [phone].    ATENCIÓN: Si habla español, tiene a blackman disposición servicios gratuitos de asistencia lingüística. [facility] [phone].    We comply with applicable federal civil rights laws and [place] laws. We do not discriminate on the basis of race, color, national origin, age, disability, sex, sexual orientation, or gender identity.            Thank you!     Thank you for choosing [facility]  for your care. Our goal is always to provide you with excellent care. Hearing back from our patients is one way we can continue to improve our services. Please take a few minutes to complete the written survey that you may receive in the mail after your visit with us. Thank you!             Your Updated Medication List - Protect others around you: Learn how to safely use, store and throw away your medicines at www.disposemymeds.org.          This list is accurate as of 7/11/18 11:59 PM.  Always use your most recent med list.                   Brand Name Dispense Instructions for use Diagnosis    albuterol 108 (90 Base) MCG/ACT Inhaler    PROAIR HFA/PROVENTIL HFA/VENTOLIN HFA    1 Inhaler    Inhale 2 puffs into the lungs every 6 hours as needed for shortness of breath / dyspnea    Chronic " obstructive pulmonary disease, unspecified COPD type (H)       ASPIRIN PO      Take 81 mg by mouth daily        citalopram 10 MG tablet    celeXA    90 tablet    Take 1 tablet (10 mg) by mouth daily    Anxiety, Depression, unspecified depression type       co-enzyme Q-10 30 MG/5ML Liqd liquid      Take by mouth every morning        darifenacin 7.5 MG 24 hr tablet    ENABLEX    90 tablet    Take 1 tablet (7.5 mg) by mouth daily    Overactive bladder       guaiFENesin 600 MG 12 hr tablet    MUCINEX    180 tablet    Take 2 tablets (1,200 mg) by mouth 2 times daily    Cough with sputum       hydrOXYzine 25 MG tablet    ATARAX    100 tablet    Take 1-2 tablets (25-50 mg) by mouth every 6 hours as needed for itching    Hives       MULTIPLE VITAMIN PO      Take 1 tablet by mouth every morning        order for [facility]      RespirOptony Dream Station Auto CPAP 12-18 cm, F&P Simplus FFM small.    MERLIN (obstructive sleep apnea)       tiotropium 18 MCG capsule    SPIRIVA    1 capsule    Inhale 1 capsule (18 mcg) into the lungs daily Inhale contents of one capsule    Chronic obstructive pulmonary disease, unspecified COPD type (H)       UNKNOWN TO PATIENT      Cholesterol medication - unknown to patient        VITAMIN B 12 PO      Take 100 mcg by mouth every morning        VITAMIN B6 PO      Take 1 tablet by mouth every morning        vitamin D 1000 units capsule      TAKE 2 CAPSULES BY MOUTH EVERY MORNING

## 2018-07-11 NOTE — NURSING NOTE
"FOLLOW-UP VISIT    Patient Name: Ashleigh Alonzo      : 1960     Age: 58 year old        ______________________________________________________________________________     Chief Complaint   Patient presents with     Cancer     Radiation oncology return visit with Dr. Zhong     /68 (BP Location: Left arm, Patient Position: Chair, Cuff Size: Adult Regular)  Pulse 117  Temp 97.8  F (36.6  C) (Oral)  Resp 16  Ht 5' 4.5\"  Wt 145 lb 14.4 oz  SpO2 97%  BMI 24.66 kg/m2     Date Radiation Completed: 5,256 cGy completed on 2018 to right breast    Pain  Current history of pain associated with this visit:   Intensity: 3/10  Current: numbness, \"it hurts\"  Location: right axilla  Treatment: denies use of medication management    Labs  Other Labs: No    Imaging  CT Chest: Scheduled 2018  Mammogram: Scheduled 2018      Other Appointments:     MD Name: Dr. Bradshaw Appointment Date: 2018   MD Name: Appointment Date:   MD Name: Appointment Date:   Other Appointment Notes: Patient was seen by lymphedema/OT cancer rehab on 2018, scheduled for PT cancer rehab on 2018    Patient reports she is feeling okay, continues to experience fatigue, reports has had difficulty completing a full work week and is unsure of her limits due to fatigue.  She reports numbness pain of right axilla, also reports numbness of pads of feet and toes.  She is no longer using a moisturizer to the right breast, reviewed continued use of moisturizer, no longer needs to be Aquaphor and patient reports she has Eucerin at home.    Nurse face-to-face time: Level 5:  over 15 min face to face time        "

## 2018-07-13 ENCOUNTER — RADIANT APPOINTMENT (OUTPATIENT)
Dept: MAMMOGRAPHY | Facility: CLINIC | Age: 58
End: 2018-07-13
Attending: INTERNAL MEDICINE
Payer: COMMERCIAL

## 2018-07-13 ENCOUNTER — RADIANT APPOINTMENT (OUTPATIENT)
Dept: CT IMAGING | Facility: CLINIC | Age: 58
End: 2018-07-13
Attending: INTERNAL MEDICINE
Payer: COMMERCIAL

## 2018-07-13 DIAGNOSIS — C50.211 MALIGNANT NEOPLASM OF UPPER-INNER QUADRANT OF RIGHT BREAST IN FEMALE, ESTROGEN RECEPTOR NEGATIVE (H): ICD-10-CM

## 2018-07-13 DIAGNOSIS — T50.905A DRUG-INDUCED PNEUMONITIS: ICD-10-CM

## 2018-07-13 DIAGNOSIS — Z17.1 MALIGNANT NEOPLASM OF UPPER-INNER QUADRANT OF RIGHT BREAST IN FEMALE, ESTROGEN RECEPTOR NEGATIVE (H): ICD-10-CM

## 2018-07-13 DIAGNOSIS — J98.4 DRUG-INDUCED PNEUMONITIS: ICD-10-CM

## 2018-07-16 NOTE — PROGRESS NOTES
"Dear Colleagues,  Today I had the pleasure of seeing this patient in follow up      IDENTIFICATION: Ms. Alonzo is a 56yo woman with G3 IDCA of the right breast, cT2(m)N0M0, ER-/ME-/Her2- s/p Taxol x12, Pembrolizumab x3, AC x4 and lumpectomy plus SLNBx completed on 2/27/18 revealing [id number] disease.  She completed adjuvant radiation therapy on 4/27/18.   INTERVAL HISTORY: Ashleigh Alonzo was last seen in our clinic on her last day of treatment. While on treatment she had complaints of mild fatigue and developed diffuse skin erythema (grade 1).  Post treatment she states that all of her skin symptoms have resolved but she still has fatigue. She also has persistent discomfort in her axilla.  However her fatigue and pain are improving with time.  She returns today in follow up and has no other complaints. Specifically denies n/v/ha/sob/cp  REVIEW OF SYSTEMS: As per HPI, a 14-point review of systems is otherwise negative.      PHYSICAL EXAMINATION:  /68 (BP Location: Left arm, Patient Position: Chair, Cuff Size: Adult Regular)  Pulse 117  Temp 97.8  F (36.6  C) (Oral)  Resp 16  Ht 5' 4.5\"  Wt 145 lb 14.4 oz  SpO2 97%  BMI 24.66 kg/m2  GENERAL                  Well-appearing middle-aged woman in no acute distress.  HEENT                       Normocephalic, atraumatic. Sclerae anicteric. Full head of hair.  She wears glasses.  CVR                            Regular rate and rhythm.  No murmurs, rubs, or gallops.  LUNGS                       Clear to auscultation bilaterally.  BREASTS                  Breasts are examined in the supine and upright position.  The breasts are large and symmetric. The right breast and axilla have ~3cm well healed incisions. There is no other erythema, ulceration or suspicious nodularity within the right or left breast. However right breast has residual hyperpigmentation within treatment field.  There is no erythema, retraction, desquamation or discharge appreciated within the right " or left nipple areolar complex.    LYMPH NODES         No supraclavicular, infraclavicular, or axillary lymphadenopathy appreciated bilaterally.  ABDOMEN                 Soft.  No hepatosplenomegaly.  Positive bowel sounds.  EXTREMITIES           No clubbing cyanosis or edema.    MSK                           Good ROM  NEUROLOGICAL      Cranial nerves grossly intact.  5/5 strength in bilateral upper and lower proximal and distal extremities. Sensation intact in all areas tested.  SKIN                           Warm and well perfused.  PSYCHIATRIC           Alert and oriented x 3          IMAGING: No new imaging.        IMPRESSION/PLAN:  Ms. Alonzo is a 58yo woman with G3 IDCA of the right breast, cT2(m)N0M0, ER-/DC-/Her2- s/p Taxol x12, Pembrolizumab x3, AC x4 and lumpectomy plus SLNBx completed on 2/27/18 revealing [id number] disease.  She completed adjuvant radiation therapy on 4/27/18. She has had resolution of most of her acute radiation induced side effects and only has some residual skin dryness, hyperpigmentation and fatigue within the treatment field.  She also has resolving discomfort in her axilla.  This is to be expected. She should continue to use sunblock and moisturizer in the previously treated area generously. She can continue to follow up with me prn.    Additional problem list to be addressed in the following manner:  1) Systemic Treatment: s/p neoadjuvant chemo.    2) Hormonal therapy: triple negative.  3) Follow up with Surg Onc and Med Onc as previously directed.  Scans per Med Onc.  4) Follow up with Rad Onc in 3 months.  There was ample time for questions and all were answered to the patient's satisfaction. Thank you for allowing me to participate in the care of this pleasant patient. If you have any questions, please do not hesitate to contact my office.      Sincerely,  Dianne Zhong MD  Adjunct   Dept of Radiation Oncology  [facility]

## 2018-07-24 NOTE — PATIENT INSTRUCTIONS
Preventive Health Recommendations  Female Ages 50 - 64    Yearly exam: See your health care provider every year in order to  o Review health changes.   o Discuss preventive care.    o Review your medicines if your doctor has prescribed any.      Get a Pap test every three years (unless you have an abnormal result and your provider advises testing more often).    If you get Pap tests with HPV test, you only need to test every 5 years, unless you have an abnormal result.     You do not need a Pap test if your uterus was removed (hysterectomy) and you have not had cancer.    You should be tested each year for STDs (sexually transmitted diseases) if you're at risk.     Have a mammogram every 1 to 2 years.    Have a colonoscopy at age 50, or have a yearly FIT test (stool test). These exams screen for colon cancer.      Have a cholesterol test every 5 years, or more often if advised.    Have a diabetes test (fasting glucose) every three years. If you are at risk for diabetes, you should have this test more often.     If you are at risk for osteoporosis (brittle bone disease), think about having a bone density scan (DEXA).    Shots: Get a flu shot each year. Get a tetanus shot every 10 years.    Nutrition:     Eat at least 5 servings of fruits and vegetables each day.    Eat whole-grain bread, whole-wheat pasta and brown rice instead of white grains and rice.    Get adequate Calcium and Vitamin D.     Lifestyle    Exercise at least 150 minutes a week (30 minutes a day, 5 days a week). This will help you control your weight and prevent disease.    Limit alcohol to one drink per day.    No smoking.     Wear sunscreen to prevent skin cancer.     See your dentist every six months for an exam and cleaning.  See your eye doctor every 1 to 2 years.    [facility]    If you have any questions regarding to your visit please contact your care team:       Team Purple:   Clinic Hours Telephone Number   Dr. Garcia  Debora Lassiter   7am-7pm  Monday - Thursday   7am-5pm  Fridays  [phone]  (Appointment scheduling available 24/7)    Questions about your recent visit?   Team Line:  [phone]   Urgent Care - [place] - 11am-9pm Monday-Friday Saturday-Sunday- 9am-5pm   [place] - 5pm-9pm Monday-Friday Saturday-Sunday- 9am-5pm  [phone] - [place]  [phone] - [place]       What options do I have for a visit other than an office visit? We offer electronic visits (e-visits) and telephone visits, when medically appropriate.  Please check with your medical insurance to see if these types of visits are covered, as you will be responsible for any charges that are not paid by your insurance.      You can use [employer] (secure electronic communication) to access to your chart, send your primary care provider a message, or make an appointment. Ask a team member how to get started.     For a price quote for your services, please call our Consumer Price Line at [phone] or our Imaging Cost estimation line at [phone] (for imaging tests).    [facility]    If you have any questions regarding to your visit please contact your care team:       Team Purple:   Clinic Hours Telephone Number   Dr. Radha Lassiter   7am-7pm  Monday - Thursday   7am-5pm  Fridays  [phone]  (Appointment scheduling available 24/7)    Questions about your recent visit?   Team Line:  [phone]   Urgent Care - [place] - 11am-9pm Monday-Friday Saturday-Sunday- 9am-5pm   [place] - 5pm-9pm Monday-Friday Saturday-Sunday- 9am-5pm  [phone] - [place]  [phone] - [place]       What options do I have for a visit other than an office visit? We offer electronic visits (e-visits) and telephone visits, when medically appropriate.  Please check with your medical insurance to see  if these types of visits are covered, as you will be responsible for any charges that are not paid by your insurance.      You can use [employer] (secure electronic communication) to access to your chart, send your primary care provider a message, or make an appointment. Ask a team member how to get started.     For a price quote for your services, please call our Consumer Price Line at [phone] or our Imaging Cost estimation line at [phone] (for imaging tests).    Zamzam Croft MA

## 2018-07-31 ENCOUNTER — OFFICE VISIT (OUTPATIENT)
Dept: FAMILY MEDICINE | Facility: CLINIC | Age: 58
End: 2018-07-31
Payer: COMMERCIAL

## 2018-07-31 VITALS
TEMPERATURE: 98.6 F | DIASTOLIC BLOOD PRESSURE: 76 MMHG | OXYGEN SATURATION: 97 % | BODY MASS INDEX: 25.78 KG/M2 | SYSTOLIC BLOOD PRESSURE: 118 MMHG | WEIGHT: 145.5 LBS | HEART RATE: 123 BPM | HEIGHT: 63 IN

## 2018-07-31 DIAGNOSIS — L50.9 URTICARIA: ICD-10-CM

## 2018-07-31 DIAGNOSIS — C50.211 MALIGNANT NEOPLASM OF UPPER-INNER QUADRANT OF RIGHT BREAST IN FEMALE, ESTROGEN RECEPTOR NEGATIVE (H): Chronic | ICD-10-CM

## 2018-07-31 DIAGNOSIS — Z00.00 ENCOUNTER FOR ROUTINE ADULT HEALTH EXAMINATION WITHOUT ABNORMAL FINDINGS: Primary | ICD-10-CM

## 2018-07-31 DIAGNOSIS — Z17.1 MALIGNANT NEOPLASM OF UPPER-INNER QUADRANT OF RIGHT BREAST IN FEMALE, ESTROGEN RECEPTOR NEGATIVE (H): Chronic | ICD-10-CM

## 2018-07-31 DIAGNOSIS — Z23 NEED FOR SHINGLES VACCINE: ICD-10-CM

## 2018-07-31 PROCEDURE — 99396 PREV VISIT EST AGE 40-64: CPT | Mod: 25 | Performed by: FAMILY MEDICINE

## 2018-07-31 PROCEDURE — 90471 IMMUNIZATION ADMIN: CPT | Performed by: FAMILY MEDICINE

## 2018-07-31 PROCEDURE — 90750 HZV VACC RECOMBINANT IM: CPT | Performed by: FAMILY MEDICINE

## 2018-07-31 RX ORDER — SIMVASTATIN 40 MG
40 TABLET ORAL AT BEDTIME
COMMUNITY
End: 2018-10-02

## 2018-07-31 RX ORDER — HYDROXYZINE HYDROCHLORIDE 25 MG/1
25-50 TABLET, FILM COATED ORAL EVERY 6 HOURS PRN
Qty: 60 TABLET | Refills: 3 | Status: SHIPPED | OUTPATIENT
Start: 2018-07-31 | End: 2019-05-30

## 2018-07-31 ASSESSMENT — ENCOUNTER SYMPTOMS
HEADACHES: 0
NERVOUS/ANXIOUS: 0
JOINT SWELLING: 0
HEMATURIA: 0
FEVER: 0
FREQUENCY: 0
WEAKNESS: 0
SHORTNESS OF BREATH: 0
HEARTBURN: 0
ARTHRALGIAS: 0
COUGH: 0
HEMATOCHEZIA: 0
DIARRHEA: 0
CONSTIPATION: 0
SORE THROAT: 0
EYE PAIN: 0
PARESTHESIAS: 0
CHILLS: 0
ABDOMINAL PAIN: 0
BREAST MASS: 1
DIZZINESS: 0
NAUSEA: 0
PALPITATIONS: 0
MYALGIAS: 0
DYSURIA: 0

## 2018-07-31 ASSESSMENT — PAIN SCALES - GENERAL: PAINLEVEL: NO PAIN (0)

## 2018-07-31 NOTE — LETTER
My Depression Action Plan  Name: Ashleigh Alonzo   Date of Birth 1960  Date: 7/24/2018    My doctor: Radha Cazares   My clinic: [address]  [place] [address]  [phone]          GREEN    ZONE   Good Control    What it looks like:     Things are going generally well. You have normal up s and down s. You may even feel depressed from time to time, but bad moods usually last less than a day.   What you need to do:  1. Continue to care for yourself (see self care plan)  2. Check your depression survival kit and update it as needed  3. Follow your physician s recommendations including any medication.  4. Do not stop taking medication unless you consult with your physician first.           YELLOW         ZONE Getting Worse    What it looks like:     Depression is starting to interfere with your life.     It may be hard to get out of bed; you may be starting to isolate yourself from others.    Symptoms of depression are starting to last most all day and this has happened for several days.     You may have suicidal thoughts but they are not constant.   What you need to do:     1. Call your care team, your response to treatment will improve if you keep your care team informed of your progress. Yellow periods are signs an adjustment may need to be made.     2. Continue your self-care, even if you have to fake it!    3. Talk to someone in your support network    4. Open up your depression survival kit           RED    ZONE Medical Alert - Get Help    What it looks like:     Depression is seriously interfering with your life.     You may experience these or other symptoms: You can t get out of bed most days, can t work or engage in other necessary activities, you have trouble taking care of basic hygiene, or basic responsibilities, thoughts of suicide or death that will not go away, self-injurious behavior.     What you need to do:  1. Call your care team and  request a same-day appointment. If they are not available (weekends or after hours) call your local crisis line, emergency room or 911.            Depression Self Care Plan / Survival Kit    Self-Care for Depression  Here s the deal. Your body and mind are really not as separate as most people think.  What you do and think affects how you feel and how you feel influences what you do and think. This means if you do things that people who feel good do, it will help you feel better.  Sometimes this is all it takes.  There is also a place for medication and therapy depending on how severe your depression is, so be sure to consult with your medical provider and/ or Behavioral Health Consultant if your symptoms are worsening or not improving.     In order to better manage my stress, I will:    Exercise  Get some form of exercise, every day. This will help reduce pain and release endorphins, the  feel good  chemicals in your brain. This is almost as good as taking antidepressants!  This is not the same as joining a gym and then never going! (they count on that by the way ) It can be as simple as just going for a walk or doing some gardening, anything that will get you moving.      Hygiene   Maintain good hygiene (Get out of bed in the morning, Make your bed, Brush your teeth, Take a shower, and Get dressed like you were going to work, even if you are unemployed).  If your clothes don't fit try to get ones that do.    Diet  I will strive to eat foods that are good for me, drink plenty of water, and avoid excessive sugar, caffeine, alcohol, and other mood-altering substances.  Some foods that are helpful in depression are: complex carbohydrates, B vitamins, flaxseed, fish or fish oil, fresh fruits and vegetables.    Psychotherapy  I agree to participate in Individual Therapy (if recommended).    Medication  If prescribed medications, I agree to take them.  Missing doses can result in serious side effects.  I understand that  drinking alcohol, or other illicit drug use, may cause potential side effects.  I will not stop my medication abruptly without first discussing it with my provider.    Staying Connected With Others  I will stay in touch with my friends, family members, and my primary care provider/team.    Use your imagination  Be creative.  We all have a creative side; it doesn t matter if it s oil painting, sand castles, or mud pies! This will also kick up the endorphins.    Witness Beauty  (AKA stop and smell the roses) Take a look outside, even in mid-winter. Notice colors, textures. Watch the squirrels and birds.     Service to others  Be of service to others.  There is always someone else in need.  By helping others we can  get out of ourselves  and remember the really important things.  This also provides opportunities for practicing all the other parts of the program.    Humor  Laugh and be silly!  Adjust your TV habits for less news and crime-drama and more comedy.    Control your stress  Try breathing deep, massage therapy, biofeedback, and meditation. Find time to relax each day.     My support system    Clinic Contact:  Phone number:    Contact 1:  Phone number:    Contact 2:  Phone number:    Restoration/:  Phone number:    Therapist:  Phone number:    Local crisis center:    Phone number:    Other community support:  Phone number:

## 2018-07-31 NOTE — PROGRESS NOTES
SUBJECTIVE:   CC: Ashleigh Alonzo is an 58 year old woman who presents for preventive health visit.     Physical   Annual:     Getting at least 3 servings of Calcium per day:  Yes    Bi-annual eye exam:  Yes    Dental care twice a year:  Yes    Sleep apnea or symptoms of sleep apnea:  Sleep apnea    Diet:  Regular (no restrictions) and Gluten-free/reduced    Frequency of exercise:  1 day/week    Duration of exercise:  N/A    Taking medications regularly:  Yes    Medication side effects:  Not applicable    Additional concerns today:  No             Today's PHQ-2 Score:   PHQ-2 ( 1999 [employer]) 7/31/2018   Q1: Little interest or pleasure in doing things 0   Q2: Feeling down, depressed or hopeless 0   PHQ-2 Score 0   Q1: Little interest or pleasure in doing things Not at all   Q2: Feeling down, depressed or hopeless Not at all   PHQ-2 Score 0       Abuse: Current or Past(Physical, Sexual or Emotional)- No  Do you feel safe in your environment - Yes    Social History   Substance Use Topics     Smoking status: Former Smoker     Packs/day: 0.75     Years: 20.00     Types: Cigarettes     Quit date: 1/1/2000     Smokeless tobacco: Never Used     Alcohol use Yes      Comment: Daily to weekly use (beer)     Alcohol Use 7/31/2018   If you drink alcohol do you typically have greater than 3 drinks per day OR greater than 7 drinks per week? No       Reviewed orders with patient.  Reviewed health maintenance and updated orders accordingly - Yes  Labs reviewed in EPIC  BP Readings from Last 3 Encounters:   07/31/18 118/76   07/11/18 118/68   05/16/18 123/87    Wt Readings from Last 3 Encounters:   07/31/18 145 lb 8 oz (66 kg)   07/11/18 145 lb 14.4 oz (66.2 kg)   06/05/18 144 lb (65.3 kg)                  Patient Active Problem List   Diagnosis     Anxiety     Anisocoria     Hyperlipidemia LDL goal <130     Lack of libido     Family history of ischemic heart disease     Mild major depression (H)     Hives     Lung nodule, multiple      MERLIN (obstructive sleep apnea)- moderate (AHI 21)     Urticaria     Chronic obstructive pulmonary disease, unspecified COPD type (H)     Malignant neoplasm of upper-inner quadrant of right breast in female, estrogen receptor negative (H)     Long term (current) use of systemic steroids     Tongue ulcer     Periodontal disease     Past Surgical History:   Procedure Laterality Date     APPENDECTOMY  10/06/2015    [facility]     BREAST BIOPSY, CORE RT/LT Right 08/22/2017     BRONCHOSCOPY (RIGID OR FLEXIBLE), DIAGNOSTIC N/A 2/6/2018    Procedure: COMBINED BRONCHOSCOPY (RIGID OR FLEXIBLE), LAVAGE;  COMBINED BRONCOSCOY (RIGID OR FLEXIBLE), LAVAGE;  Surgeon: Samri Pettit MD;  Location: [facility]     CLEAR LENS REPLACEMENT Bilateral 11/2016     COLONOSCOPY  11/15/2011    Procedure:COLONOSCOPY; Colonoscopy, screening; Surgeon:ANASTASIA BUNCH; Location:[facility] OR     INSERT PORT VASCULAR ACCESS Left 9/1/2017    Procedure: INSERT PORT VASCULAR ACCESS;  Single Lumen Chest Power Port;  Surgeon: Leif Parkinson PA-C;  Location: [facility]     LUMPECTOMY BREAST WITH SENTINEL NODE, COMBINED Right 2/27/2018    Procedure: COMBINED LUMPECTOMY BREAST WITH SENTINEL NODE;  Right Wire Localized Lumpectomy, Right Leslie Lymph Node Biopsy And Freddy Cath Removal;  Surgeon: Atul Gates MD;  Location: [facility]     REMOVE PORT VASCULAR ACCESS N/A 2/27/2018    Procedure: REMOVE PORT VASCULAR ACCESS;;  Surgeon: Atul Gates MD;  Location: [facility]     TUBAL LIGATION  12/2004       Social History   Substance Use Topics     Smoking status: Former Smoker     Packs/day: 0.75     Years: 20.00     Types: Cigarettes     Quit date: 1/1/2000     Smokeless tobacco: Never Used     Alcohol use Yes      Comment: Daily to weekly use (beer)     Family History   Problem Relation Age of Onset     Cardiovascular Father 46     MI     Eye Disorder Father      Cerebrovascular Disease Father      Arthritis Sister      Neurologic Disorder Brother       Prostate Cancer Maternal Grandfather      Prostate Cancer Maternal Uncle      Cancer Maternal Uncle      Throat cancer     Diabetes No family hx of      Hypertension No family hx of          Current Outpatient Prescriptions   Medication Sig Dispense Refill     albuterol (PROAIR HFA/PROVENTIL HFA/VENTOLIN HFA) 108 (90 BASE) MCG/ACT Inhaler Inhale 2 puffs into the lungs every 6 hours as needed for shortness of breath / dyspnea 1 Inhaler 5     ASPIRIN PO Take 81 mg by mouth daily       guaiFENesin (MUCINEX) 600 MG 12 hr tablet Take 2 tablets (1,200 mg) by mouth 2 times daily 180 tablet 6     hydrOXYzine (ATARAX) 25 MG tablet Take 1-2 tablets (25-50 mg) by mouth every 6 hours as needed for itching 60 tablet 3     simvastatin (ZOCOR) 40 MG tablet Take 40 mg by mouth At Bedtime       tiotropium (SPIRIVA) 18 MCG capsule Inhale 1 capsule (18 mcg) into the lungs daily Inhale contents of one capsule 1 capsule 11     VITAMIN D 1000 UNIT OR CAPS TAKE 2 CAPSULES BY MOUTH EVERY MORNING       No Known Allergies    Patient over age 50, mutual decision to screen reflected in health maintenance.    Pertinent mammograms are reviewed under the imaging tab.  History of abnormal Pap smear: NO - age 30-65 PAP every 5 years with negative HPV co-testing recommended  PAP / HPV Latest Ref Rng & Units 7/27/2017 6/26/2014 6/22/2011   PAP - NIL NIL NIL   HPV 16 DNA NEG Negative - -   HPV 18 DNA NEG Negative - -   OTHER HR HPV NEG Negative - -     Reviewed and updated as needed this visit by clinical staff  Tobacco  Allergies  Meds  Problems  Med Hx  Surg Hx         Reviewed and updated as needed this visit by Provider  Allergies  Meds  Problems  Med Hx  Surg Hx        Immunization History   Administered Date(s) Administered     HEPA 07/10/2008, 12/15/2008     HepB 07/10/2008, 12/05/2008     Influenza (IIV3) PF 12/21/2010, 10/20/2011, 10/20/2012, 09/09/2014, 10/20/2016     Influenza Vaccine IM 3yrs+ 4 Valent IIV4 09/26/2017      "Pneumococcal 23 valent 12/21/2010     TD (ADULT, 7+) 05/13/2004     TDAP Vaccine (Adacel) 06/22/2011        This 58 year old female is here today for annual female exam. She was diagnosed 1 year ago with right breast cancer. She underwent lumpectomy and chemotherapy plus radiation therapy. She was hospitalized with severe sepsis in January and is just finally feeling well enough to go back to work. Still gets tired easily. Has been back to work the past 2 weeks. 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 Has some fluid accumulation in her right breast. Other than that: she is feeling so much better.   Review of Systems  CONSTITUTIONAL: NEGATIVE for fever, chills, change in weight  INTEGUMENTARU/SKIN: NEGATIVE for worrisome rashes, moles or lesions, but she still gets itchy skin with some hives off and on. Would like a refill of atarax.   EYES: NEGATIVE for vision changes or irritation  ENT: NEGATIVE for ear, mouth and throat problems  RESP: NEGATIVE for significant cough or SOB  BREAST: NEGATIVE for masses, tenderness or discharge  CV: NEGATIVE for chest pain, palpitations or peripheral edema  GI: NEGATIVE for nausea, abdominal pain, heartburn, or change in bowel habits  : NEGATIVE for unusual urinary or vaginal symptoms. Periods are regular.  MUSCULOSKELETAL: NEGATIVE for significant arthralgias or myalgia  NEURO: NEGATIVE for weakness, dizziness or paresthesias  PSYCHIATRIC: NEGATIVE for changes in mood or affect   she stopped her celexa. Doesn't think she needs it.  OBJECTIVE:   /76  Pulse 123  Temp 98.6  F (37  C) (Oral)  Ht 5' 2.87\" (1.597 m)  Wt 145 lb 8 oz (66 kg)  SpO2 97%  BMI 25.88 kg/m2  Physical Exam  GENERAL APPEARANCE: healthy, alert and no distress  EYES: Eyes grossly normal to inspection, PERRL and conjunctivae and sclerae normal  HENT: ear canals and TM's normal, nose and mouth without ulcers or lesions, oropharynx clear and oral mucous membranes moist  NECK: no adenopathy, no asymmetry, masses, or " "scars and thyroid normal to palpation  RESP: lungs clear to auscultation - no rales, rhonchi or wheezes  BREAST: normal without masses, tenderness or nipple discharge and no palpable axillary masses or adenopathy  CV: regular rate and rhythm, normal S1 S2, no S3 or S4, no murmur, click or rub, no peripheral edema and peripheral pulses strong  ABDOMEN: soft, nontender, no hepatosplenomegaly, no masses and bowel sounds normal  MS: no musculoskeletal defects are noted and gait is age appropriate without ataxia  SKIN: no suspicious lesions or rashes  NEURO: Normal strength and tone, sensory exam grossly normal, mentation intact and speech normal  PSYCH: mentation appears normal and affect normal/bright    Diagnostic Test Results:  none   Answers for HPI/ROS submitted by the patient on 7/31/2018   PHQ-2 Score: 0    ASSESSMENT/PLAN:   1. Encounter for routine adult health examination without abnormal findings  Healthy lady     2. Malignant neoplasm of upper-inner quadrant of right breast in female, estrogen receptor negative (H)  As above. Managed by oncology     3. Urticaria  As above   - hydrOXYzine (ATARAX) 25 MG tablet; Take 1-2 tablets (25-50 mg) by mouth every 6 hours as needed for itching  Dispense: 60 tablet; Refill: 3    4. Need for shingles vaccine  Due   - ZOSTER VACCINE RECOMBINANT ADJUVANTED IM NJX    COUNSELING:  Reviewed preventive health counseling, as reflected in patient instructions       Regular exercise       Healthy diet/nutrition    BP Readings from Last 1 Encounters:   07/31/18 118/76     Estimated body mass index is 25.88 kg/(m^2) as calculated from the following:    Height as of this encounter: 5' 2.87\" (1.597 m).    Weight as of this encounter: 145 lb 8 oz (66 kg).      Weight management plan: Discussed healthy diet and exercise guidelines and patient will follow up in 12 months in clinic to re-evaluate.     reports that she quit smoking about 18 years ago. Her smoking use included Cigarettes. " She has a 15.00 pack-year smoking history. She has never used smokeless tobacco.      Counseling Resources:  ATP IV Guidelines  Pooled Cohorts Equation Calculator  Breast Cancer Risk Calculator  FRAX Risk Assessment  ICSI Preventive Guidelines  Dietary Guidelines for Americans, 2010  USDA's MyPlate  ASA Prophylaxis  Lung CA Screening    HARLEY LEDBETTER MD  [facility]

## 2018-07-31 NOTE — LETTER
My COPD Action Plan     Name: Ashleigh Alonzo    YOB: 1960   Date: 7/24/2018    My doctor: HARLEY LEDBETTER MD   My clinic: [address]  [place] [address]  [phone]  My Controller Medicine: { :958025}   Dose: ***     My Rescue Medicine: { :482937}   Dose: ***     My Flare Up Medicine: { :275099}   Dose: *** FEV-1 (no units)   Date Value   06/25/2012 104     FEV1/FVC (no units)   Date Value   06/25/2012 84      My COPD Severity: { :990394}      Use of Oxygen: { :082083}     Make sure you've had your pneumonia   vaccines.          GREEN ZONE       Doing well today      Usual level of activity and exercise    Usual amount of cough and mucus    No shortness of breath    Usual level of health (thinking clearly, sleeping well, feel like eating) Actions:      Take daily medicines    Use oxygen as prescribed    Follow regular exercise and diet plan    Avoid cigarette smoke and other irritants that harm the lungs           YELLOW ZONE          Having a bad day or flare up      Short of breath more than usual    A lot more sputum (mucus) than usual    Sputum looks yellow, green, tan, brown or bloody    More coughing or wheezing    Fever or chills    Less energy; trouble completing activities    Trouble thinking or focusing    Using quick relief inhaler or nebulizer more often    Poor sleep; symptoms wake me up    Do not feel like eating Actions:      Get plenty of rest    Take daily medicines    Use quick relief inhaler every *** hours    If you use oxygen, call you doctor to see if you should adjust your oxygen    Do breathing exercises or other things to help you relax    Let a loved one, friend or neighbor know you are feeling worse    Call your care team if you have 2 or more symptoms.  Start taking steroids or antibiotics if directed by your care team           RED ZONE       Need medical care now      Severe shortness of breath (feel you can't  breathe)    Fever, chills    Not enough breath to do any activity    Trouble coughing up mucus, walking or talking    Blood in mucus    Frequent coughing   Rescue medicines are not working    Not able to sleep because of breathing    Feel confused or drowsy    Chest pain    Actions:      Call your health care team.  If you cannot reach your care team, call 911 or go to the emergency room.        Annual Reminders:  Meet with Care Team, Flu Shot every Fall  Pharmacy:    [facility] - [place], [place] - [address] [facility] - [place], [place] - [phone] [address] [facility] [address]

## 2018-07-31 NOTE — MR AVS SNAPSHOT
After Visit Summary   7/31/2018    Ashleigh Alonzo    MRN: [id number]           Patient Information     Date Of Birth          1960        Visit Information        Provider Department      7/31/2018 1:30 PM Radha Cazares MD [facility]        Today's Diagnoses     Encounter for routine adult health examination without abnormal findings    -  1    Malignant neoplasm of upper-inner quadrant of right breast in female, estrogen receptor negative (H)        Urticaria        Need for shingles vaccine          Care Instructions      Preventive Health Recommendations  Female Ages 50 - 64    Yearly exam: See your health care provider every year in order to  o Review health changes.   o Discuss preventive care.    o Review your medicines if your doctor has prescribed any.      Get a Pap test every three years (unless you have an abnormal result and your provider advises testing more often).    If you get Pap tests with HPV test, you only need to test every 5 years, unless you have an abnormal result.     You do not need a Pap test if your uterus was removed (hysterectomy) and you have not had cancer.    You should be tested each year for STDs (sexually transmitted diseases) if you're at risk.     Have a mammogram every 1 to 2 years.    Have a colonoscopy at age 50, or have a yearly FIT test (stool test). These exams screen for colon cancer.      Have a cholesterol test every 5 years, or more often if advised.    Have a diabetes test (fasting glucose) every three years. If you are at risk for diabetes, you should have this test more often.     If you are at risk for osteoporosis (brittle bone disease), think about having a bone density scan (DEXA).    Shots: Get a flu shot each year. Get a tetanus shot every 10 years.    Nutrition:     Eat at least 5 servings of fruits and vegetables each day.    Eat whole-grain bread, whole-wheat pasta and brown rice instead of white grains and  rice.    Get adequate Calcium and Vitamin D.     Lifestyle    Exercise at least 150 minutes a week (30 minutes a day, 5 days a week). This will help you control your weight and prevent disease.    Limit alcohol to one drink per day.    No smoking.     Wear sunscreen to prevent skin cancer.     See your dentist every six months for an exam and cleaning.  See your eye doctor every 1 to 2 years.    [facility]    If you have any questions regarding to your visit please contact your care team:       Team Purple:   Clinic Hours Telephone Number   Dr. Radha Lassiter   7am-7pm  Monday - Thursday   7am-5pm  Fridays  [phone]  (Appointment scheduling available 24/7)    Questions about your recent visit?   Team Line:  [phone]   Urgent Care - [place] and [facility] - 11am-9pm Monday-Friday Saturday-Sunday- 9am-5pm   [place] - 5pm-9pm Monday-Friday Saturday-Sunday- 9am-5pm  [phone] - [place]  [phone] [facility]       What options do I have for a visit other than an office visit? We offer electronic visits (e-visits) and telephone visits, when medically appropriate.  Please check with your medical insurance to see if these types of visits are covered, as you will be responsible for any charges that are not paid by your insurance.      You can use [employer] (secure electronic communication) to access to your chart, send your primary care provider a message, or make an appointment. Ask a team member how to get started.     For a price quote for your services, please call our Consumer Price Line at [phone] or our Imaging Cost estimation line at [phone] (for imaging tests).    [facility]    If you have any questions regarding to your visit please contact your care team:       Team Purple:   Clinic Hours Telephone Number   Dr. Radha Lassiter   7am-7pm  Monday - Thursday    7am-5pm  Fridays  [phone]  (Appointment scheduling available 24/7)    Questions about your recent visit?   Team Line:  [phone]   Urgent Care - [place] and [facility] - 11am-9pm Monday-Friday Saturday-Sunday- 9am-5pm   [place] - 5pm-9pm Monday-Friday Saturday-Sunday- 9am-5pm  [phone] - [place]  [phone] - [place]       What options do I have for a visit other than an office visit? We offer electronic visits (e-visits) and telephone visits, when medically appropriate.  Please check with your medical insurance to see if these types of visits are covered, as you will be responsible for any charges that are not paid by your insurance.      You can use [employer] (secure electronic communication) to access to your chart, send your primary care provider a message, or make an appointment. Ask a team member how to get started.     For a price quote for your services, please call our Consumer Price Line at [phone] or our Imaging Cost estimation line at [phone] (for imaging tests).    Zamzam Croft MA            Follow-ups after your visit        Your next 10 appointments already scheduled     Aug 01, 2018  2:00 PM CDT   Cancer Rehab Eval with Angelika Cardona PT   [facility] ([facility]    [address] [facility] [address] [phone]            Aug 13, 2018 11:15 AM CDT   (Arrive by 11:00 AM)   Return Visit with Fara Bradshaw MD   [facility] ([facility])    [address]  [address]  [facility] [address] [phone]            Sep 06, 2018  8:00 PM CDT   PSG Split with BK BED 1   [facility] ([facility])    [address]  [facility] [id number] [phone]            Sep 20, 2018  2:30 PM CDT   Return Sleep Patient with Matt Andrea MD   [facility] ([facility])    [address]  [facility] [address]   [phone]            Oct 11, 2018 11:00 AM CDT   Return Visit with Sara Owen MD   [facility] ([facility])    [address] [place] [id number] [phone]            Oct 11, 2018 12:30 PM CDT   Return Visit with Ligia Zhong MD   [facility] ([facility])    [address] [place] [id number] [phone]              Who to contact     If you have questions or need follow up information about today's clinic visit or your schedule please contact [facility]] [facility] directly at [phone].  Normal or non-critical lab and imaging results will be communicated to you by MyChart, letter or phone within 4 business days after the clinic has received the results. If you do not hear from us within 7 days, please contact the clinic through [employer] or phone. If you have a critical or abnormal lab result, we will notify you by phone as soon as possible.  Submit refill requests through [employer] or call your pharmacy and they will forward the refill request to us. Please allow 3 business days for your refill to be completed.          Additional Information About Your Visit        MyChart Information     [employer] gives you secure access to your electronic health record. If you see a primary care provider, you can also send messages to your care team and make appointments. If you have questions, please call your primary care clinic.  If you do not have a primary care provider, please call [phone] and they will assist you.        Care EveryWhere ID     This is your Care EveryWhere ID. This could be used by other organizations to access your [facility] medical records  [id number]        Your Vitals Were     Pulse Temperature Height Pulse Oximetry BMI (Body Mass Index)       123 98.6  F (37  C) (Oral) 5'  "2.87\" (1.597 m) 97% 25.88 kg/m2        Blood Pressure from Last 3 Encounters:   07/31/18 118/76   07/11/18 118/68   05/16/18 123/87    Weight from Last 3 Encounters:   07/31/18 145 lb 8 oz (66 kg)   07/11/18 145 lb 14.4 oz (66.2 kg)   06/05/18 144 lb (65.3 kg)              We Performed the Following     ZOSTER VACCINE RECOMBINANT ADJUVANTED IM NJX          Today's Medication Changes          These changes are accurate as of 7/31/18  1:56 PM.  If you have any questions, ask your nurse or doctor.               Start taking these medicines.        Dose/Directions    hydrOXYzine 25 MG tablet   Commonly known as:  ATARAX   Used for:  Urticaria   Started by:  Radha Cazares MD        Dose:  25-50 mg   Take 1-2 tablets (25-50 mg) by mouth every 6 hours as needed for itching   Quantity:  60 tablet   Refills:  3            Where to get your medicines      These medications were sent to [facility] [address] [place] [address]  [address] [place] [address]     Phone:  [phone]     hydrOXYzine 25 MG tablet                Primary Care Provider Office Phone # Fax #    Radha Cazares [phone]       [address] [facility] [address]        Equal Access to Services     [facility] AH: Hadii candie mcdaniel hadasho Somariaaali, waaxda luqadaha, qaybta kaalmada adeanmolyada, bang duran . So [facility] [phone].    ATENCIÓN: Si habla español, tiene a blackman disposición servicios gratuitos de asistencia lingüística. Llame al [phone].    We comply with applicable federal civil rights laws and [place] laws. We do not discriminate on the basis of race, color, national origin, age, disability, sex, sexual orientation, or gender identity.            Thank you!     Thank you for choosing [facility]  for your care. Our goal is always to provide you with excellent care. Hearing back from our patients " is one way we can continue to improve our services. Please take a few minutes to complete the written survey that you may receive in the mail after your visit with us. Thank you!             Your Updated Medication List - Protect others around you: Learn how to safely use, store and throw away your medicines at www.disposemymeds.org.          This list is accurate as of 7/31/18  1:56 PM.  Always use your most recent med list.                   Brand Name Dispense Instructions for use Diagnosis    albuterol 108 (90 Base) MCG/ACT Inhaler    PROAIR HFA/PROVENTIL HFA/VENTOLIN HFA    1 Inhaler    Inhale 2 puffs into the lungs every 6 hours as needed for shortness of breath / dyspnea    Chronic obstructive pulmonary disease, unspecified COPD type (H)       ASPIRIN PO      Take 81 mg by mouth daily        guaiFENesin 600 MG 12 hr tablet    MUCINEX    180 tablet    Take 2 tablets (1,200 mg) by mouth 2 times daily    Cough with sputum       hydrOXYzine 25 MG tablet    ATARAX    60 tablet    Take 1-2 tablets (25-50 mg) by mouth every 6 hours as needed for itching    Urticaria       simvastatin 40 MG tablet    ZOCOR     Take 40 mg by mouth At Bedtime        tiotropium 18 MCG capsule    SPIRIVA    1 capsule    Inhale 1 capsule (18 mcg) into the lungs daily Inhale contents of one capsule    Chronic obstructive pulmonary disease, unspecified COPD type (H)       vitamin D 1000 units capsule      TAKE 2 CAPSULES BY MOUTH EVERY MORNING

## 2018-08-01 ASSESSMENT — PATIENT HEALTH QUESTIONNAIRE - PHQ9: SUM OF ALL RESPONSES TO PHQ QUESTIONS 1-9: 1

## 2018-08-08 ENCOUNTER — TELEPHONE (OUTPATIENT)
Dept: FAMILY MEDICINE | Facility: CLINIC | Age: 58
End: 2018-08-08

## 2018-08-08 DIAGNOSIS — E78.5 HYPERLIPIDEMIA LDL GOAL <130: Primary | Chronic | ICD-10-CM

## 2018-08-08 NOTE — TELEPHONE ENCOUNTER
Reason for Call:  Referral needed    Detailed comments: Wants referral for a CT calcium scan.     Phone Number Patient can be reached at: Home number on file [phone] (home)    Best Time: any    Can we leave a detailed message on this number? YES    Call taken on 8/8/2018 at 3:44 PM by Luiza Lunsford

## 2018-08-09 NOTE — TELEPHONE ENCOUNTER
"Patient called back and stated that she found out the right name for the scan that she is wanting done and it is not called CT Calcium scan  She has a family history of CAD and a coworker had a CT angiogram completed of the heart \"and it saved him from a heart attack, so I would just like to have it done anyway\"    Patient requesting CT angiogram completed (Coronary?)    Lakshmi Bingham RN    "

## 2018-08-09 NOTE — TELEPHONE ENCOUNTER
Spoke with spouse he will have patient call back RN hotline at [phone].     Mindy Barrera RN

## 2018-08-09 NOTE — TELEPHONE ENCOUNTER
Call her. A cat scan angiogram is an invasive procedure done by cardiologists only if there is a suspicion for a heart attack. Those tests are not just done randomly. They are not without their risks. I won't be ordering it. Only a cardiologist orders them and there has to be strict reasons to order them.   HARLEY LEDBETTER M.D.

## 2018-08-09 NOTE — TELEPHONE ENCOUNTER
RN: call patient.   Most insurances don't cover CT calcium scans.   Cardiologists don't order them as there are no accepted standards for reading them and determining what is normal.   Patient is on a statin and her lipid panel was very good last July.  I don't order the CT calcium scans, but I understand that patients can pay cash for them and get them done at most imaging centers. If she knows where she wants to go for the scan and still wants to pay cash to have it done, I would not be able to interpret the report for her. With that information, she can let me know if she still wants the scan.   HARLEY LEDBETTER M.D.

## 2018-08-09 NOTE — TELEPHONE ENCOUNTER
Patient notified of Provider's message as written.  Patient verbalized understanding.    She will call the [employer]eline to check cost and willing to pay out of pocket if affordable  She would still like the CT calcium scan ordered to be done at the [place] Location      Lakshmi Bingham RN

## 2018-08-12 NOTE — PROGRESS NOTES
Oncology On Treatment Visit:  Date on this visit: 8/13/2018    Diagnosis:  Stage IIb, T2N0M0, grade 3 triple negative cancer of the right breast.    Primary Physician: Radha Cazares     History Of Present Illness:  Ms. Alonzo is a 58-year-old female with h/o stage IIb, T2 N0 M0, grade 3, triple-negative invasive carcinoma of the right breast.  Routine bilateral screening mammogram on 07/27/2017 showed developing calcifications in the right breast at the 12 o'clock position 6 cm from the nipple.  Ultrasound demonstrated a 7-mm, irregular, hypoechoic mass at the 12 o'clock position.  Contrast-enhanced mammogram showed a peripherally enhancing, irregular mass measuring 1.9 cm as well as an additional 6-mm, enhancing focus anteromedial to the dominant mass.  The total area of abnormal enhancement on contrast mammogram measured up to 3.4 cm.  Right breast biopsy demonstrated a grade 3 invasive mammary carcinoma with associated high-grade DCIS.  Estrogen and progesterone receptor staining were negative.  HER2 was non-amplified by FISH.  Breast MRI measured the biopsy proven breast cancer at 3.2 cm.    Ms. Alonzo enrolled in the ISPY-2 clinical trial.  She began treatment with weekly taxol and once every 3 week pembrolizumab on 9/20/17.  Her course was complicated by pneumonitis and hepatitis.  Pembrolizumab was stopped and she resumed weekly taxol alone on 11/28/17.  She completed a total of 12 weeks of therapy on 12/26/17.  She completed 2 cycles of adriamycin and cyclophosphamide.  She was hospitalized both cycles due to neutropenic fever and also developed C. Difficile colitis.  Decision was made to forgo further chemotherapy.       Right breast lumpectomy and sentinel lymph node procedure on 2/27/18 showed a grade 2, 6 mm residual invasive mammary carcinoma with an associated 8 mm area of high grade DCIS with comedonecrosis and ADH.  Invasive tumor cellularity was 10%.  There was lymphovascular invasion.  Surgical  "margins were negative.  A single sentinel lymph node was benign.  Classified as MDA RCB I.  She completed adjuvant radiation (4256 cGy to the right breast with additional 1000 cGy to the lumpectomy cavity) on 4/27/18.     Interval History:  Ms. Alonzo comes into clinic today for routine breast cancer followup.  Since our last visit, she completed adjuvant radiation to the right breast.  The treatment overall went well for her, and she feels she has recovered from the radiation.  Most bothersome to her at this time remains fatigue.  She returned to work in March.  Since returning to work, she has not been able to work a full 5 days per week.  She estimates she is working 32-40 hours per week.  When she comes home at the end of the day, she \"crashes\" due to the fatigue.  She would like to be able to have more energy to do more activities.  She is having some difficulty sleeping at night.  She is waking up multiple times throughout the night and is unsure of what is waking her.  She has no longer been sleeping with her CPAP; she believes this is due to weight loss.  She does have a sleep study coming up.  She also has an appointment with cancer rehab coming up.  She had previously postponed this due to left shoulder pain, which is now resolved.  She denies new bone or joint aches or pains.  She has had no fevers, chills or infectious complaints.  Her dyspnea on exertion is resolved.  She does have neuropathy in the balls of the bilateral feet as well as the toes.  It is not interfering with her ability to walk.  She is not purposefully performing daily exercise.  She has no headaches, visual changes or focal neurologic complaints.  She denies new lumps or masses; however, does report a persistent seroma in the upper outer right breast.  The remainder of a complete 12 point review of systems was completed and was negative with the exception of that mentioned above.      Past Medical/Surgical History:  Past Medical " History:   Diagnosis Date     Acute cystitis without hematuria 10/26/2017     Clostridium difficile diarrhea 1/12/2018     Neutropenic fever (H) 01/10/2018     She was hospitalized 1/10/18 - 1/13/18 with neutropenic fever     Neutropenic sepsis (H) 01/29/2018    hospitalized again  from 1/29/18 - 2/8/18 with neutropenic fever, mucositis, and C. difficile colitis      Pneumonia 11/11/2017    She was hospitalized 11/11/17 - 11/17/17 with fevers ranging from 102 - 105.  CT chest was consistent with pneumonitis.  She also had transaminitis in the 150 range.  She was started on corticosteroids.  Pembrolizumab was stopped      S/P radiation therapy     5[facility]     Past Surgical History:   Procedure Laterality Date     APPENDECTOMY  10/06/2015    [facility]     BREAST BIOPSY, CORE RT/LT Right 08/22/2017     BRONCHOSCOPY (RIGID OR FLEXIBLE), DIAGNOSTIC N/A 2/6/2018    Procedure: COMBINED BRONCHOSCOPY (RIGID OR FLEXIBLE), LAVAGE;  COMBINED BRONCOSCOY (RIGID OR FLEXIBLE), LAVAGE;  Surgeon: Samir Pettit MD;  [facility]: [facility]     CLEAR LENS REPLACEMENT Bilateral 11/2016     COLONOSCOPY  11/15/2011    Procedure:COLONOSCOPY; Colonoscopy, screening; Surgeon:ANASTASIA BUNCH; [facility]     INSERT PORT VASCULAR ACCESS Left 9/1/2017    Procedure: INSERT PORT VASCULAR ACCESS;  Single Lumen Chest Power Port;  Surgeon: Leif Parkinson PA-C;  [facility]     LUMPECTOMY BREAST WITH SENTINEL NODE, COMBINED Right 2/27/2018    Procedure: COMBINED LUMPECTOMY BREAST WITH SENTINEL NODE;  Right Wire Localized Lumpectomy, Right [place] Lymph Node Biopsy And Freddy Cath Removal;  Surgeon: Atul Gates MD;  [facility]     REMOVE PORT VASCULAR ACCESS N/A 2/27/2018    Procedure: REMOVE PORT VASCULAR ACCESS;;  Surgeon: Atul Gates MD;  [facility]     TUBAL LIGATION  12/2004     Allergies:  Allergies as of 08/13/2018     (No  "Known Allergies)     Current Medications:  Current Outpatient Prescriptions   Medication Sig Dispense Refill     albuterol (PROAIR HFA/PROVENTIL HFA/VENTOLIN HFA) 108 (90 BASE) MCG/ACT Inhaler Inhale 2 puffs into the lungs every 6 hours as needed for shortness of breath / dyspnea 1 Inhaler 5     ASPIRIN PO Take 81 mg by mouth daily       guaiFENesin (MUCINEX) 600 MG 12 hr tablet Take 2 tablets (1,200 mg) by mouth 2 times daily 180 tablet 6     hydrOXYzine (ATARAX) 25 MG tablet Take 1-2 tablets (25-50 mg) by mouth every 6 hours as needed for itching 60 tablet 3     simvastatin (ZOCOR) 40 MG tablet Take 40 mg by mouth At Bedtime       tiotropium (SPIRIVA) 18 MCG capsule Inhale 1 capsule (18 mcg) into the lungs daily Inhale contents of one capsule 1 capsule 11     VITAMIN D 1000 UNIT OR CAPS TAKE 2 CAPSULES BY MOUTH EVERY MORNING        Family and Social History:  Reviewed and unchanged from prior.  Please see initial consultation dated 8/28/17 for further details.    Physical Exam:  /77 (BP Location: Right arm, Patient Position: Chair, Cuff Size: Adult Regular)  Pulse 87  Temp 98.6  F (37  C) (Oral)  Resp 16  Ht 1.597 m (5' 2.87\")  Wt 66.7 kg (147 lb)  SpO2 98%  BMI 26.14 kg/m2  General:  Well appearing adult female in NAD.  Tearful when discussing risk of recurrence or risk of new breast cancers.  HEENT:  Normocephalic.  Sclera anicteric.  MMM.  No lesions of the oropharynx.  Lymph:  No palpable cervical, supraclavicular, or axillary LAD.    Chest:  Lungs are CTA bilaterally.    Breast exam:  Bilateral breasts are of normal fibroglandular density.  Right breast periareolar incision.  There is a 2 cm area of swelling beneath the medial aspect.  There are no discretely palpable masses in either breast.  Bilateral nipples are everted.  CV:  Tachycardic.  Regular rhythm.  Nl S1 and S2.  No m/r/g.  Abd:  Soft/NT/ND.  BSs normoactive.  No hepatosplenomegaly.  Ext:  No pitting edema of the bilateral lower " extremities.  Pulses 2+ and symmetric.  Musculo:  Strength 5/5 throughout.  Neuro:  Cranial nerves grossly intact.  Gait stable.  Psych:  Mood and affect appear normal.  Skin:  No visible rashes or skin lesions    Laboratory/Imaging Studies:  7/13/18 Bilateral diagnostic mammograms:  Hypoechoic collection in the surgical bed measures 2.6 cm c/w postoperative collection.  No suspicious findings.    7/13/18 CT chest:  Mild decrease in upper lobe predominant peribronchovascular and peripheral reticular opacities.  Mild postradiation fibrosis anterior right lung.  No new or enlarging pulmonary nodules.  Unchanged mild superior compression deformities of T11 and L1.    ASSESSMENT/PLAN:  Ms. Alonzo is a 58-year-old female with a stage IIb, grade 3, triple-negative infiltrating ductal carcinoma of the right breast s/p 12 weeks of Taxol along with 3 cycles pembrolizumab, 2 cycles of adriamycin and cyclophosphamide, lumpectomy, and radiation.     1.  Right breast cancer:   Ms. Alonzo is approximately 5.5 months out from excision of a right breast cancer.  There is no evidence of disease recurrence on either exam performed today or mammograms performed one month ago.  I will see her back in clinic in 3 months.    We discussed that due to ADH on her lumpectomy excision specimen, her risk of developing a new breast cancer is approximately 1% per year, or 30% in the next 30 years.  As such, I recommend high risk screening with both annual mammogram and breast MRI spaced so that she is having some form of imaging every 6 months.  Will plan to obtain a breast MRI around 01/2019.    2.  Fatigue:  Likely treatment related.  Counseled that this can take up to a year following treatment to recover.  Recommended to proceed with cancer rehab.    3.  Insomnia:  Agree with sleep study.  If no organic cause, would then consider a sleep aide.    4.  Pneumonitis:  Last imaging in 07/2018 with improving bilateral apical infiltrates.  Symptoms  also improving.  Would only repeat imaging if symptomatic.    5.  T11 and L1 compression deformities:  Seen on CT.  We reviewed that DEXA in 2011 showed osteopenia with a lowest T-score of -1.5.  I recommended that she discuss repeat bone density screening with her PCP.    6.  Neuropathy:  Grade I.  Continue to take pyridoxine 100 mg daily.  She declines gabapentin due to potential for drowsiness.  She also declines cymbalta as does not want to take additional medications at this time.  Advised to massage feet twice daily.    7.  Coping:  Became tearful when discussing future risk of breast cancer.  Offered referral to see Dr. Ta of psychiatry at the [facility] [place], [place].  She declined at this time, but will call the clinic if she changes her mind.    8.  Routine health maintenance:  Colonoscopy in 2011 was without concerning findings, next due in 2021.  Continue annual skin examinations and pap smears per recommendation of PCP.  Given compression deformities seen on CT scan, recommended discussing potential repeat in bone density scan with her PCP.    9.  Followup:  Return to clinic in approximately 3 months for visit with me.

## 2018-08-13 ENCOUNTER — ONCOLOGY VISIT (OUTPATIENT)
Dept: ONCOLOGY | Facility: CLINIC | Age: 58
End: 2018-08-13
Attending: INTERNAL MEDICINE
Payer: COMMERCIAL

## 2018-08-13 VITALS
RESPIRATION RATE: 16 BRPM | WEIGHT: 147 LBS | HEIGHT: 63 IN | TEMPERATURE: 98.6 F | DIASTOLIC BLOOD PRESSURE: 77 MMHG | HEART RATE: 87 BPM | BODY MASS INDEX: 26.05 KG/M2 | OXYGEN SATURATION: 98 % | SYSTOLIC BLOOD PRESSURE: 120 MMHG

## 2018-08-13 DIAGNOSIS — Z17.1 MALIGNANT NEOPLASM OF UPPER-INNER QUADRANT OF RIGHT BREAST IN FEMALE, ESTROGEN RECEPTOR NEGATIVE (H): Primary | ICD-10-CM

## 2018-08-13 DIAGNOSIS — C50.211 MALIGNANT NEOPLASM OF UPPER-INNER QUADRANT OF RIGHT BREAST IN FEMALE, ESTROGEN RECEPTOR NEGATIVE (H): Primary | ICD-10-CM

## 2018-08-13 PROCEDURE — G0463 HOSPITAL OUTPT CLINIC VISIT: HCPCS | Mod: ZF

## 2018-08-13 PROCEDURE — 99214 OFFICE O/P EST MOD 30 MIN: CPT | Mod: ZP | Performed by: INTERNAL MEDICINE

## 2018-08-13 RX ORDER — HYDROXYZINE HYDROCHLORIDE 25 MG/1
TABLET, FILM COATED ORAL
COMMUNITY
Start: 2018-07-31 | End: 2018-10-11

## 2018-08-13 ASSESSMENT — PAIN SCALES - GENERAL: PAINLEVEL: NO PAIN (0)

## 2018-08-13 NOTE — LETTER
8/13/2018       RE: Ashleigh Alonzo  [address] [place] [address]     Dear Colleague,    Thank you for referring your patient, Ashleigh Alonzo, to the [facility]. Please see a copy of my visit note below.    Oncology On Treatment Visit:  Date on this visit: 8/13/2018    Diagnosis:  Stage IIb, T2N0M0, grade 3 triple negative cancer of the right breast.    Primary Physician: Radha Cazares     History Of Present Illness:  Ms. Alonzo is a 58-year-old female with h/o stage IIb, T2 N0 M0, grade 3, triple-negative invasive carcinoma of the right breast.  Routine bilateral screening mammogram on 07/27/2017 showed developing calcifications in the right breast at the 12 o'clock position 6 cm from the nipple.  Ultrasound demonstrated a 7-mm, irregular, hypoechoic mass at the 12 o'clock position.  Contrast-enhanced mammogram showed a peripherally enhancing, irregular mass measuring 1.9 cm as well as an additional 6-mm, enhancing focus anteromedial to the dominant mass.  The total area of abnormal enhancement on contrast mammogram measured up to 3.4 cm.  Right breast biopsy demonstrated a grade 3 invasive mammary carcinoma with associated high-grade DCIS.  Estrogen and progesterone receptor staining were negative.  HER2 was non-amplified by FISH.  Breast MRI measured the biopsy proven breast cancer at 3.2 cm.    Ms. Alonzo enrolled in the ISPY-2 clinical trial.  She began treatment with weekly taxol and once every 3 week pembrolizumab on 9/20/17.  Her course was complicated by pneumonitis and hepatitis.  Pembrolizumab was stopped and she resumed weekly taxol alone on 11/28/17.  She completed a total of 12 weeks of therapy on 12/26/17.  She completed 2 cycles of adriamycin and cyclophosphamide.  She was hospitalized both cycles due to neutropenic fever and also developed C. Difficile colitis.  Decision was made to forgo further chemotherapy.       Right breast lumpectomy and sentinel lymph node  "procedure on 2/27/18 showed a grade 2, 6 mm residual invasive mammary carcinoma with an associated 8 mm area of high grade DCIS with comedonecrosis and ADH.  Invasive tumor cellularity was 10%.  There was lymphovascular invasion.  Surgical margins were negative.  A single sentinel lymph node was benign.  Classified as MDA RCB I.  She completed adjuvant radiation (4256 cGy to the right breast with additional 1000 cGy to the lumpectomy cavity) on 4/27/18.     Interval History:  Ms. Alonzo comes into clinic today for routine breast cancer followup.  Since our last visit, she completed adjuvant radiation to the right breast.  The treatment overall went well for her, and she feels she has recovered from the radiation.  Most bothersome to her at this time remains fatigue.  She returned to work in March.  Since returning to work, she has not been able to work a full 5 days per week.  She estimates she is working 32-40 hours per week.  When she comes home at the end of the day, she \"crashes\" due to the fatigue.  She would like to be able to have more energy to do more activities.  She is having some difficulty sleeping at night.  She is waking up multiple times throughout the night and is unsure of what is waking her.  She has no longer been sleeping with her CPAP; she believes this is due to weight loss.  She does have a sleep study coming up.  She also has an appointment with cancer rehab coming up.  She had previously postponed this due to left shoulder pain, which is now resolved.  She denies new bone or joint aches or pains.  She has had no fevers, chills or infectious complaints.  Her dyspnea on exertion is resolved.  She does have neuropathy in the balls of the bilateral feet as well as the toes.  It is not interfering with her ability to walk.  She is not purposefully performing daily exercise.  She has no headaches, visual changes or focal neurologic complaints.  She denies new lumps or masses; however, does report " a persistent seroma in the upper outer right breast.  The remainder of a complete 12 point review of systems was completed and was negative with the exception of that mentioned above.      Past Medical/Surgical History:  Past Medical History:   Diagnosis Date     Acute cystitis without hematuria 10/26/2017     Clostridium difficile diarrhea 1/12/2018     Neutropenic fever (H) 01/10/2018     She was hospitalized 1/10/18 - 1/13/18 with neutropenic fever     Neutropenic sepsis (H) 01/29/2018    hospitalized again  from 1/29/18 - 2/8/18 with neutropenic fever, mucositis, and C. difficile colitis      Pneumonia 11/11/2017    She was hospitalized 11/11/17 - 11/17/17 with fevers ranging from 102 - 105.  CT chest was consistent with pneumonitis.  She also had transaminitis in the 150 range.  She was started on corticosteroids.  Pembrolizumab was stopped      S/P radiation therapy     5,256 cGy completed on 4/27/2018 to [facility] with Dr. Zhong     Past Surgical History:   Procedure Laterality Date     APPENDECTOMY  10/06/2015    [facility]     BREAST BIOPSY, CORE RT/LT Right 08/22/2017     BRONCHOSCOPY (RIGID OR FLEXIBLE), DIAGNOSTIC N/A 2/6/2018    Procedure: COMBINED BRONCHOSCOPY (RIGID OR FLEXIBLE), LAVAGE;  COMBINED BRONCOSCOY (RIGID OR FLEXIBLE), LAVAGE;  Surgeon: Samir Pettit MD;  [facility]: [facility]     CLEAR LENS REPLACEMENT Bilateral 11/2016     COLONOSCOPY  11/15/2011    Procedure:COLONOSCOPY; Colonoscopy, screening; Surgeon:ANASTASIA BUNCH; Location:[facility]     INSERT PORT VASCULAR ACCESS Left 9/1/2017    Procedure: INSERT PORT VASCULAR ACCESS;  Single Lumen Chest Power Port;  Surgeon: Leif Parkinson PA-C;  [facility]     LUMPECTOMY BREAST WITH SENTINEL NODE, COMBINED Right 2/27/2018    Procedure: COMBINED LUMPECTOMY BREAST WITH SENTINEL NODE;  Right Wire Localized Lumpectomy, Right [place] Lymph Node Biopsy And Freddy Cath Removal;  Surgeon: Atul Gates MD;   "Location: [facility]     REMOVE PORT VASCULAR ACCESS N/A 2/27/2018    Procedure: REMOVE PORT VASCULAR ACCESS;;  Surgeon: Atul Gates MD;  [facility]     TUBAL LIGATION  12/2004     Allergies:  Allergies as of 08/13/2018     (No Known Allergies)     Current Medications:  Current Outpatient Prescriptions   Medication Sig Dispense Refill     albuterol (PROAIR HFA/PROVENTIL HFA/VENTOLIN HFA) 108 (90 BASE) MCG/ACT Inhaler Inhale 2 puffs into the lungs every 6 hours as needed for shortness of breath / dyspnea 1 Inhaler 5     ASPIRIN PO Take 81 mg by mouth daily       guaiFENesin (MUCINEX) 600 MG 12 hr tablet Take 2 tablets (1,200 mg) by mouth 2 times daily 180 tablet 6     hydrOXYzine (ATARAX) 25 MG tablet Take 1-2 tablets (25-50 mg) by mouth every 6 hours as needed for itching 60 tablet 3     simvastatin (ZOCOR) 40 MG tablet Take 40 mg by mouth At Bedtime       tiotropium (SPIRIVA) 18 MCG capsule Inhale 1 capsule (18 mcg) into the lungs daily Inhale contents of one capsule 1 capsule 11     VITAMIN D 1000 UNIT OR CAPS TAKE 2 CAPSULES BY MOUTH EVERY MORNING        Family and Social History:  Reviewed and unchanged from prior.  Please see initial consultation dated 8/28/17 for further details.    Physical Exam:  /77 (BP Location: Right arm, Patient Position: Chair, Cuff Size: Adult Regular)  Pulse 87  Temp 98.6  F (37  C) (Oral)  Resp 16  Ht 1.597 m (5' 2.87\")  Wt 66.7 kg (147 lb)  SpO2 98%  BMI 26.14 kg/m2  General:  Well appearing adult female in NAD.  Tearful when discussing risk of recurrence or risk of new breast cancers.  HEENT:  Normocephalic.  Sclera anicteric.  MMM.  No lesions of the oropharynx.  Lymph:  No palpable cervical, supraclavicular, or axillary LAD.    Chest:  Lungs are CTA bilaterally.    Breast exam:  Bilateral breasts are of normal fibroglandular density.  Right breast periareolar incision.  There is a 2 cm area of swelling beneath the medial aspect.  There are no discretely palpable " masses in either breast.  Bilateral nipples are everted.  CV:  Tachycardic.  Regular rhythm.  Nl S1 and S2.  No m/r/g.  Abd:  Soft/NT/ND.  BSs normoactive.  No hepatosplenomegaly.  Ext:  No pitting edema of the bilateral lower extremities.  Pulses 2+ and symmetric.  Musculo:  Strength 5/5 throughout.  Neuro:  Cranial nerves grossly intact.  Gait stable.  Psych:  Mood and affect appear normal.  Skin:  No visible rashes or skin lesions    Laboratory/Imaging Studies:  7/13/18 Bilateral diagnostic mammograms:  Hypoechoic collection in the surgical bed measures 2.6 cm c/w postoperative collection.  No suspicious findings.    7/13/18 CT chest:  Mild decrease in upper lobe predominant peribronchovascular and peripheral reticular opacities.  Mild postradiation fibrosis anterior right lung.  No new or enlarging pulmonary nodules.  Unchanged mild superior compression deformities of T11 and L1.    ASSESSMENT/PLAN:  Ms. Alonzo is a 58-year-old female with a stage IIb, grade 3, triple-negative infiltrating ductal carcinoma of the right breast s/p 12 weeks of Taxol along with 3 cycles pembrolizumab, 2 cycles of adriamycin and cyclophosphamide, lumpectomy, and radiation.     1.  Right breast cancer:   Ms. Alonzo is approximately 5.5 months out from excision of a right breast cancer.  There is no evidence of disease recurrence on either exam performed today or mammograms performed one month ago.  I will see her back in clinic in 3 months.    We discussed that due to ADH on her lumpectomy excision specimen, her risk of developing a new breast cancer is approximately 1% per year, or 30% in the next 30 years.  As such, I recommend high risk screening with both annual mammogram and breast MRI spaced so that she is having some form of imaging every 6 months.  Will plan to obtain a breast MRI around 01/2019.    2.  Fatigue:  Likely treatment related.  Counseled that this can take up to a year following treatment to recover.  Recommended to  proceed with cancer rehab.    3.  Insomnia:  Agree with sleep study.  If no organic cause, would then consider a sleep aide.    4.  Pneumonitis:  Last imaging in 07/2018 with improving bilateral apical infiltrates.  Symptoms also improving.  Would only repeat imaging if symptomatic.    5.  T11 and L1 compression deformities:  Seen on CT.  We reviewed that DEXA in 2011 showed osteopenia with a lowest T-score of -1.5.  I recommended that she discuss repeat bone density screening with her PCP.    6.  Neuropathy:  Grade I.  Continue to take pyridoxine 100 mg daily.  She declines gabapentin due to potential for drowsiness.  She also declines cymbalta as does not want to take additional medications at this time.  Advised to massage feet twice daily.    7.  Coping:  Became tearful when discussing future risk of breast cancer.  Offered referral to see Dr. Ta of psychiatry at the [facility] [place].  She declined at this time, but will call the clinic if she changes her mind.    8.  Routine health maintenance:  Colonoscopy in 2011 was without concerning findings, next due in 2021.  Continue annual skin examinations and pap smears per recommendation of PCP.  Given compression deformities seen on CT scan, recommended discussing potential repeat in bone density scan with her PCP.    9.  Followup:  Return to clinic in approximately 3 months for visit with me.      Sincerely,    Fara Bradshaw MD

## 2018-08-13 NOTE — MR AVS SNAPSHOT
After Visit Summary   8/13/2018    Ashleigh Alonzo    MRN: [id number]           Patient Information     Date Of Birth          1960        Visit Information        Provider Department      8/13/2018 11:15 AM Fara Bradshaw MD [facility]        Today's Diagnoses     Malignant neoplasm of upper-inner quadrant of right breast in female, estrogen receptor negative (H)    -  1       Follow-ups after your visit        Your next 10 appointments already scheduled     Aug 22, 2018  8:15 AM CDT   CT CALCIUM SCREENING with MGCT1, MG IMAGING NURSE   [facility]    [address] [place] [address] [phone]           It is best to avoid caffeine on the day of your test.  Be sure to tell your doctor:   If there s any chance you are pregnant.   If you have any special needs.  Please wear loose clothing, such as a sweat suit or jogging clothes. Avoid snaps, zippers and other metal. We may ask you to undress and put on a hospital gown.  If you have any questions, please call the Imaging Department where you will have your exam.            Aug 27, 2018  2:30 PM CDT   Cancer Rehab Eval with Lisa Blanchard PT   [facility] ([facility])    [address] [place] [id number] [phone]            Sep 06, 2018  8:00 PM CDT   PSG Split with BK BED 1   [facility] ([facility])    [address] [facility] [address]  [facility] [address] [phone]            Sep 20, 2018  2:30 PM CDT   Return Sleep Patient with Matt Andrea MD   [facility] ([facility])    [address] [facility] [address]  [facility] [address]   [phone]            Oct 11, 2018 11:00 AM CDT   Return Visit with Sara Owen MD   [facility] ([facility] [place])    [address] [place] [address]   [phone]            Oct 11, 2018 12:30 PM CDT   Return Visit with Ligia Zhong MD   [facility] ([facility])    [address] [place] [address]   [phone]            Nov 12, 2018 11:45 AM CST   (Arrive by 11:30 AM)   Return Visit with Fara Bradshaw MD   [facility] ([facility])    [address] [facility]  [address]  [facility] [id number] [phone]              Who to contact     If you have questions or need follow up information about today's clinic visit or your schedule please contact [facility] directly at [phone].  Normal or non-critical lab and imaging results will be communicated to you by MyChart, letter or phone within 4 business days after the clinic has received the results. If you do not hear from us within 7 days, please contact the clinic through Filaohart or phone. If you have a critical or abnormal lab result, we will notify you by phone as soon as possible.  Submit refill requests through Truecaller or call your pharmacy and they will forward the refill request to us. Please allow 3 business days for your refill to be completed.          Additional Information About Your Visit        MyChart Information     Truecaller gives you secure access to your electronic health record. If you see a primary care provider, you can also send messages to your care team and make appointments. If you have questions, please call your primary care clinic.  If you do not have a primary care provider, please call [phone] and they will assist you.        Care EveryWhere ID     This is your Care EveryWhere ID. This could be used by other organizations to access your [facility] medical records  [id number]        Your Vitals Were     Pulse Temperature Respirations Height Pulse Oximetry BMI (Body Mass Index)    87 98.6  " F (37  C) (Oral) 16 1.597 m (5' 2.87\") 98% 26.14 kg/m2       Blood Pressure from Last 3 Encounters:   08/13/18 120/77   07/31/18 118/76   07/11/18 118/68    Weight from Last 3 Encounters:   08/13/18 66.7 kg (147 lb)   07/31/18 66 kg (145 lb 8 oz)   07/11/18 66.2 kg (145 lb 14.4 oz)              Today, you had the following     No orders found for display       Primary Care Provider Office Phone # Fax #    Radha Cazares [phone] [address] [facility] [address]        Equal Access to Services     WILMER HUGHES : Abdifatah baileyo Somarian, waaxda luqadaha, qaybta kaalmada adeegyajacqui, bang duran . So [facility] [phone].    ATENCIÓN: Si habla español, tiene a blackman disposición servicios gratuitos de asistencia lingüística. [facility] [phone].    We comply with applicable federal civil rights laws and [place] laws. We do not discriminate on the basis of race, color, national origin, age, disability, sex, sexual orientation, or gender identity.            Thank you!     Thank you for choosing [facility]  for your care. Our goal is always to provide you with excellent care. Hearing back from our patients is one way we can continue to improve our services. Please take a few minutes to complete the written survey that you may receive in the mail after your visit with us. Thank you!             Your Updated Medication List - Protect others around you: Learn how to safely use, store and throw away your medicines at www.disposemymeds.org.          This list is accurate as of 8/13/18 12:12 PM.  Always use your most recent med list.                   Brand Name Dispense Instructions for use Diagnosis    albuterol 108 (90 Base) MCG/ACT inhaler    PROAIR HFA/PROVENTIL HFA/VENTOLIN HFA    1 Inhaler    Inhale 2 puffs into the lungs every 6 hours as needed for shortness of breath / dyspnea    Chronic obstructive pulmonary disease, " unspecified COPD type (H)       ASPIRIN PO      Take 81 mg by mouth daily        guaiFENesin 600 MG 12 hr tablet    MUCINEX    180 tablet    Take 2 tablets (1,200 mg) by mouth 2 times daily    Cough with sputum       * hydrOXYzine 25 MG tablet    ATARAX    60 tablet    Take 1-2 tablets (25-50 mg) by mouth every 6 hours as needed for itching    Urticaria       * hydrOXYzine 25 MG tablet    ATARAX     Take 1-2 tablets (25-50 mg) by mouth every 6 hours as needed for itching        simvastatin 40 MG tablet    ZOCOR     Take 40 mg by mouth At Bedtime        tiotropium 18 MCG capsule    SPIRIVA    1 capsule    Inhale 1 capsule (18 mcg) into the lungs daily Inhale contents of one capsule    Chronic obstructive pulmonary disease, unspecified COPD type (H)       vitamin D 1000 units capsule      TAKE 2 CAPSULES BY MOUTH EVERY MORNING        * Notice:  This list has 2 medication(s) that are the same as other medications prescribed for you. Read the directions carefully, and ask your doctor or other care provider to review them with you.

## 2018-08-13 NOTE — NURSING NOTE
"Oncology Rooming Note    August 13, 2018 11:03 AM   Ashleigh Alonzo is a 58 year old female who presents for:    Chief Complaint   Patient presents with     Oncology Clinic Visit     retrun - breast ca      Initial Vitals: /77 (BP Location: Right arm, Patient Position: Chair, Cuff Size: Adult Regular)  Pulse 87  Temp 98.6  F (37  C) (Oral)  Resp 16  Ht 1.597 m (5' 2.87\")  Wt 66.7 kg (147 lb)  SpO2 98%  BMI 26.14 kg/m2 Estimated body mass index is 26.14 kg/(m^2) as calculated from the following:    Height as of this encounter: 1.597 m (5' 2.87\").    Weight as of this encounter: 66.7 kg (147 lb). Body surface area is 1.72 meters squared.  No Pain (0) Comment: Data Unavailable   No LMP recorded. Patient is postmenopausal.  Allergies reviewed: Yes  Medications reviewed: Yes    Medications: Medication refills not needed today.  Pharmacy name entered into [facility]:    [facility] [place], [place] - [address] [place] [facility] [place], [place] - [address] [facility]    Clinical concerns: has multiple      6 minutes for nursing intake (face to face time)     Margaret Steinberg CMA            "

## 2018-08-14 ENCOUNTER — TELEPHONE (OUTPATIENT)
Dept: FAMILY MEDICINE | Facility: CLINIC | Age: 58
End: 2018-08-14

## 2018-08-14 DIAGNOSIS — C50.211 MALIGNANT NEOPLASM OF UPPER-INNER QUADRANT OF RIGHT BREAST IN FEMALE, ESTROGEN RECEPTOR NEGATIVE (H): Primary | Chronic | ICD-10-CM

## 2018-08-14 DIAGNOSIS — Z17.1 MALIGNANT NEOPLASM OF UPPER-INNER QUADRANT OF RIGHT BREAST IN FEMALE, ESTROGEN RECEPTOR NEGATIVE (H): Primary | Chronic | ICD-10-CM

## 2018-08-14 NOTE — TELEPHONE ENCOUNTER
Reason for Call:  Other call back    Detailed comments: Patient states she met with her oncology doctor who noticed compression deformity and bone density in spine. Recommending Dr. Cazares to get a Dexa order.     Phone Number Patient can be reached at: Home number on file [phone] (home)    Best Time: any    Can we leave a detailed message on this number? YES    Call taken on 8/14/2018 at 9:15 AM by Jean Walden

## 2018-08-14 NOTE — TELEPHONE ENCOUNTER
Called and spoke with Ashleigh Perera. She will call back to schedule tomorrow 8/15/18 to schedule. Clarita Morin,

## 2018-08-14 NOTE — TELEPHONE ENCOUNTER
Detailed message left for patient with providers message as written.  Advised to call back if any questions.   Mindy Barrera RN

## 2018-08-20 ENCOUNTER — RADIANT APPOINTMENT (OUTPATIENT)
Dept: BONE DENSITY | Facility: CLINIC | Age: 58
End: 2018-08-20
Attending: FAMILY MEDICINE
Payer: COMMERCIAL

## 2018-08-20 DIAGNOSIS — Z17.1 MALIGNANT NEOPLASM OF UPPER-INNER QUADRANT OF RIGHT BREAST IN FEMALE, ESTROGEN RECEPTOR NEGATIVE (H): Chronic | ICD-10-CM

## 2018-08-20 DIAGNOSIS — C50.211 MALIGNANT NEOPLASM OF UPPER-INNER QUADRANT OF RIGHT BREAST IN FEMALE, ESTROGEN RECEPTOR NEGATIVE (H): Chronic | ICD-10-CM

## 2018-08-20 PROCEDURE — 77085 DXA BONE DENSITY AXL VRT FX: CPT | Performed by: INTERNAL MEDICINE

## 2018-08-21 DIAGNOSIS — J44.9 CHRONIC OBSTRUCTIVE PULMONARY DISEASE, UNSPECIFIED COPD TYPE (H): ICD-10-CM

## 2018-08-21 DIAGNOSIS — R05.8 COUGH WITH SPUTUM: ICD-10-CM

## 2018-08-21 NOTE — TELEPHONE ENCOUNTER
Reason for Call:  Medication or medication refill:    Do you use a [facility]?  Name of the pharmacy and phone number for the current request:   See below    Name of the medication requested:  All medications. Tiotropium.     Other request:  na    Can we leave a detailed message on this number? YES    Phone number patient can be reached at: Cell number on file:    Telephone Information:   Mobile [phone]       Best Time: any    Call taken on 8/21/2018 at 8:40 AM by Litzy Fernando

## 2018-08-22 ENCOUNTER — RADIANT APPOINTMENT (OUTPATIENT)
Dept: CT IMAGING | Facility: CLINIC | Age: 58
End: 2018-08-22
Attending: FAMILY MEDICINE
Payer: COMMERCIAL

## 2018-08-22 DIAGNOSIS — E78.5 HYPERLIPIDEMIA LDL GOAL <130: Chronic | ICD-10-CM

## 2018-08-22 PROCEDURE — 75571 CT HRT W/O DYE W/CA TEST: CPT | Performed by: INTERNAL MEDICINE

## 2018-08-22 RX ORDER — TIOTROPIUM BROMIDE 18 UG/1
18 CAPSULE ORAL; RESPIRATORY (INHALATION) DAILY
Qty: 1 CAPSULE | Refills: 11 | Status: SHIPPED | OUTPATIENT
Start: 2018-08-22 | End: 2019-07-31

## 2018-08-22 RX ORDER — GUAIFENESIN 600 MG/1
1200 TABLET, EXTENDED RELEASE ORAL 2 TIMES DAILY
Qty: 180 TABLET | Refills: 6 | Status: SHIPPED | OUTPATIENT
Start: 2018-08-22 | End: 2019-07-17

## 2018-08-22 RX ORDER — ALBUTEROL SULFATE 90 UG/1
2 AEROSOL, METERED RESPIRATORY (INHALATION) EVERY 6 HOURS PRN
Qty: 1 INHALER | Refills: 5 | Status: SHIPPED | OUTPATIENT
Start: 2018-08-22 | End: 2020-12-17

## 2018-08-22 NOTE — TELEPHONE ENCOUNTER
Routing refill request to provider for review/approval because:  Drug not on the G refill protocol       Requested Prescriptions   Pending Prescriptions Disp Refills     guaiFENesin (MUCINEX) 600 MG 12 hr tablet  Last Written Prescription Date:  7/27/17  Last Fill Quantity: 180,  # refills: 6   Last office visit: 7/31/2018 with prescribing provider:  0  Future Office Visit:   Next 5 appointments (look out 90 days)     Oct 11, 2018 11:00 AM CDT   Return Visit with Sara Owen MD   [facility])    [address] [place] [address]   [phone]            Oct 11, 2018 12:30 PM CDT   Return Visit with Ligia Zhong MD   [facility])    [address] [place] [address]   [phone]                  180 tablet 6     Sig: Take 2 tablets (1,200 mg) by mouth 2 times daily    There is no refill protocol information for this order        Saige Henao RN - BC

## 2018-08-27 ENCOUNTER — HOSPITAL ENCOUNTER (OUTPATIENT)
Dept: PHYSICAL THERAPY | Facility: CLINIC | Age: 58
Setting detail: THERAPIES SERIES
End: 2018-08-27
Attending: PHYSICIAN ASSISTANT
Payer: COMMERCIAL

## 2018-08-27 PROCEDURE — 40000360 ZZHC STATISTIC PT CANCER REHAB VISIT: Performed by: PHYSICAL THERAPIST

## 2018-08-27 PROCEDURE — 97161 PT EVAL LOW COMPLEX 20 MIN: CPT | Mod: GP | Performed by: PHYSICAL THERAPIST

## 2018-08-27 PROCEDURE — 97110 THERAPEUTIC EXERCISES: CPT | Mod: GP | Performed by: PHYSICAL THERAPIST

## 2018-08-27 ASSESSMENT — 6 MINUTE WALK TEST (6MWT): TOTAL DISTANCE WALKED (METERS): 411

## 2018-09-06 ENCOUNTER — THERAPY VISIT (OUTPATIENT)
Dept: SLEEP MEDICINE | Facility: CLINIC | Age: 58
End: 2018-09-06
Payer: COMMERCIAL

## 2018-09-06 DIAGNOSIS — G47.33 OSA (OBSTRUCTIVE SLEEP APNEA): Chronic | ICD-10-CM

## 2018-09-06 PROCEDURE — 95810 POLYSOM 6/> YRS 4/> PARAM: CPT | Performed by: INTERNAL MEDICINE

## 2018-09-06 NOTE — MR AVS SNAPSHOT
After Visit Summary   9/6/2018    Ashleigh Alonzo    MRN: [id number]           Patient Information     Date Of Birth          1960        Visit Information        Provider Department      9/6/2018 8:00 PM BK BED 1 [facility]        Today's Diagnoses     MERLIN (obstructive sleep apnea)- moderate (AHI 21)           Follow-ups after your visit        Your next 10 appointments already scheduled     Sep 20, 2018  2:30 PM CDT   Return Sleep Patient with MD [facility] ([facility])    [address] [facility] [address]  [facility] [address]   [phone]            Oct 11, 2018 11:00 AM CDT   Return Visit with Sara Owen MD   [facility])    [address] [place] [address]   [phone]            Oct 11, 2018 12:30 PM CDT   Return Visit with Ligia Zhong MD   [facility]    [address] [place] [address]   [phone]            Oct 11, 2018  1:15 PM CDT   Cancer Rehab Treatment with Lisa Blanchard PT   [facility] ([facility])    [address] [place] [address]   [phone]            Nov 12, 2018 11:45 AM CST   (Arrive by 11:30 AM)   Return Visit with Fara Bradshaw MD   [facility] ([facility])    [address] [address] [phone]              Who to contact     If you have questions or need follow up information about today's clinic visit or your schedule please contact [facility] directly at [phone].  Normal or non-critical lab and imaging results will be communicated to you by MyChart, letter or phone within 4 business days after the clinic has received the results. If you do not hear from us within 7  days, please contact the clinic through SmartFocus or phone. If you have a critical or abnormal lab result, we will notify you by phone as soon as possible.  Submit refill requests through SmartFocus or call your pharmacy and they will forward the refill request to us. Please allow 3 business days for your refill to be completed.          Additional Information About Your Visit        FilmTrackhart Information     SmartFocus gives you secure access to your electronic health record. If you see a primary care provider, you can also send messages to your care team and make appointments. If you have questions, please call your primary care clinic.  If you do not have a primary care provider, please call [phone] and they will assist you.        Care EveryWhere ID     This is your Care EveryWhere ID. This could be used by other organizations to access your [facility] medical records  [id number]         Blood Pressure from Last 3 Encounters:   08/13/18 120/77   07/31/18 118/76   07/11/18 118/68    Weight from Last 3 Encounters:   08/13/18 66.7 kg (147 lb)   07/31/18 66 kg (145 lb 8 oz)   07/11/18 66.2 kg (145 lb 14.4 oz)              We Performed the Following     Comprehensive Sleep Study        Primary Care Provider Office Phone # Fax #    Radha Cazares [phone]       [address] [facility] [address]        Equal Access to Services     THERESA HUGHES : Hadii candie mcdaniel hadasho Somarian, waaxda luqadaha, qaybta kaalmada jessica, bang pickering. So [facility] [phone].    ATENCIÓN: Si habla español, tiene a blackman disposición servicios gratuitos de asistencia lingüística. Pauline al [phone].    We comply with applicable federal civil rights laws and [place] laws. We do not discriminate on the basis of race, color, national origin, age, disability, sex, sexual orientation, or gender identity.            Thank you!     Thank you for choosing [facility]   for your care. Our goal is always to provide you with excellent care. Hearing back from our patients is one way we can continue to improve our services. Please take a few minutes to complete the written survey that you may receive in the mail after your visit with us. Thank you!             Your Updated Medication List - Protect others around you: Learn how to safely use, store and throw away your medicines at www.disposemymeds.org.          This list is accurate as of 9/6/18 11:59 PM.  Always use your most recent med list.                   Brand Name Dispense Instructions for use Diagnosis    albuterol 108 (90 Base) MCG/ACT inhaler    PROAIR HFA/PROVENTIL HFA/VENTOLIN HFA    1 Inhaler    Inhale 2 puffs into the lungs every 6 hours as needed for shortness of breath / dyspnea    Chronic obstructive pulmonary disease, unspecified COPD type (H)       ASPIRIN PO      Take 81 mg by mouth daily        guaiFENesin 600 MG 12 hr tablet    MUCINEX    180 tablet    Take 2 tablets (1,200 mg) by mouth 2 times daily    Cough with sputum       * hydrOXYzine 25 MG tablet    ATARAX    60 tablet    Take 1-2 tablets (25-50 mg) by mouth every 6 hours as needed for itching    Urticaria       * hydrOXYzine 25 MG tablet    ATARAX     Take 1-2 tablets (25-50 mg) by mouth every 6 hours as needed for itching        simvastatin 40 MG tablet    ZOCOR     Take 40 mg by mouth At Bedtime        tiotropium 18 MCG capsule    SPIRIVA    1 capsule    Inhale 1 capsule (18 mcg) into the lungs daily Inhale contents of one capsule    Chronic obstructive pulmonary disease, unspecified COPD type (H)       vitamin D 1000 units capsule      TAKE 2 CAPSULES BY MOUTH EVERY MORNING        * Notice:  This list has 2 medication(s) that are the same as other medications prescribed for you. Read the directions carefully, and ask your doctor or other care provider to review them with you.

## 2018-09-07 NOTE — PROGRESS NOTES
Arrived BK sleep      Diagnostic PSG completed per provider order.  Patient did not meet criteria for PAP therapy.

## 2018-09-09 NOTE — PROCEDURES
" SLEEP STUDY INTERPRETATION  DIAGNOSTIC POLYSOMNOGRAPHY REPORT      Patient: YUNI CALIXTO  YOB: 1960  Study Date: 9/6/2018  MRN: [id number]  Ordering Provider: Matt Andrea MD    Indications for Polysomnography: The patient is a 58 y old Female who is 5' 4\" and weighs 144.0 lbs. Her BMI is 24.6, Burnett sleepiness scale 3.0 and neck circumference is 37.0 cm. A diagnostic polysomnogram was performed to evaluate for history of severe sleep apnea. Not using CPAP, < 11# weight loss since diagnosis. Polysomnogram to reassess presence/severity of obstructive sleep due to lack of symptoms and comorbidities.     Polysomnogram Data: A full night polysomnogram recorded the standard physiologic parameters including EEG, EOG, EMG, ECG, nasal and oral airflow. Respiratory parameters of chest and abdominal movements were recorded with respiratory inductance plethysmography. Oxygen saturation was recorded by pulse oximetry. Hypopnea scoring rule used: 1B 4%.    Sleep Architecture:   The total recording time of the polysomnogram was 511.8 minutes. The total sleep time was 455.5 minutes. Sleep latency was normal at 12.9 minutes without the use of a sleep aid. REM latency was 75.5 minutes. Arousal index was 20.9 arousals per hour. Sleep efficiency was normal at 89.0%. Wake after sleep onset was 43.0 minutes. The patient spent 6.5% of total sleep time in Stage N1, 32.8% in Stage N2, 23.9% in Stage N3, and 36.8% in REM. Time in REM supine was 14.0 minutes.    Respiration:     Events ? The polysomnogram revealed a presence of 30 obstructive, 7 central, and 2 mixed apneas resulting in an apnea index of 5.1 events per hour. There were 35 obstructive hypopneas and 0 central hypopneas resulting in an obstructive hypopnea index of 4.6 and central hypopnea index of - events per hour. The combined apnea/hypopnea index was 9.7 events per hour (central apnea/hypopnea index was 0.9 events per hour). The REM AHI was 21.9 events per " hour. The supine AHI was 19.1 events per hour. The RERA index was 10.9 events per hour.  The RDI was 20.7 events per hour.    Snoring - was reported as moderate-loud.    Respiratory rate and pattern - was notable for normal respiratory rate and pattern.    Sustained Sleep Associated Hypoventilation - Transcutaneous carbon dioxide monitoring was not used, however significant hypoventilation was not suggested by oximetry    Sleep Associated Hypoxemia - (Greater than 5 minutes O2 sat at or below 88%) was not present. Baseline oxygen saturation was 95.6%. Lowest oxygen saturation was 63.3%. Time spent less than or equal to 88% was 6.4 minutes. Time spent less than or equal to 89% was 8.0 minutes.    Movement Activity:     Periodic Limb Activity - There were 0 PLMs during the entire study.     REM EMG Activity - Excessive transient/sustained muscle activity was not present.    Nocturnal Behavior - Abnormal sleep related behaviors were not noted     Bruxism - None apparent.    Cardiac Summary:   The average pulse rate was 89.4 bpm. The minimum pulse rate was 71.2 bpm while the maximum pulse rate was 117.1 bpm.  Arrhythmias were not noted.      Assessment:     Mild obstructive sleep apnea in the lateral position principally REM-related with sleep-related hypoxemia     Supine worsening is suspected but very little supine sleep seen    Recommendations:    Based on the presence of mild/moderate obstructive sleep apnea and hypoxemia, treatment could be empirically initiated with Auto?titrating PAP therapy with a range of 5 to 18 cmH2O. Recommend clinical follow up with sleep management team.    Patient may be a candidate for dental appliance through referral to Sleep Dentistry for the treatment of obstructive sleep apnea and/or socially disruptive snoring.    If devices are not acceptable or effective, patient may benefit from evaluation of possible surgical options. If she is interested, would recommend referral to  specialized ENT-Sleep provider.    If no treatment utilized  would recommend avoidance of supine position    Diagnostic Codes:   Obstructive Sleep Apnea G47.33  Sleep Hypoxemia/Hypoventilation G47.36       _____________________________________   Electronically Signed By: Matt Andrea MD 9/9/18           Range(%) Time in range (min)   0.0 - 89.0 8.0   0.0 - 88.0 6.4         Stage Min(mm Hg) Max(mm Hg)   Wake - -   NREM(1+2+3) - -   REM - -       Range(mmHg) Time in range (min)   55.0 - 100.0 -   Excluded data <20.0 & >65.0 512.0

## 2018-09-10 LAB — SLPCOMP: NORMAL

## 2018-09-20 ENCOUNTER — OFFICE VISIT (OUTPATIENT)
Dept: SLEEP MEDICINE | Facility: CLINIC | Age: 58
End: 2018-09-20
Payer: COMMERCIAL

## 2018-09-20 VITALS
WEIGHT: 146.6 LBS | DIASTOLIC BLOOD PRESSURE: 72 MMHG | BODY MASS INDEX: 25.98 KG/M2 | HEART RATE: 98 BPM | OXYGEN SATURATION: 96 % | SYSTOLIC BLOOD PRESSURE: 112 MMHG | HEIGHT: 63 IN

## 2018-09-20 DIAGNOSIS — G47.33 OSA (OBSTRUCTIVE SLEEP APNEA): Primary | ICD-10-CM

## 2018-09-20 PROCEDURE — 99214 OFFICE O/P EST MOD 30 MIN: CPT | Performed by: INTERNAL MEDICINE

## 2018-09-20 NOTE — PATIENT INSTRUCTIONS
Positioning Device, zzoma, slumber bump, tennis ball t-shirt, back pack  This example shows a pillow that straps around the waist. It may be appropriate for those whose sleep study shows milder sleep apnea that occurs primarily when lying flat on one's back. Preliminary studies have shown benefit but effectiveness at home should be verified.                         Your BMI is Body mass index is 25.97 kg/(m^2).  Weight management is a personal decision.  If you are interested in exploring weight loss strategies, the following discussion covers the approaches that may be successful. Body mass index (BMI) is one way to tell whether you are at a healthy weight, overweight, or obese. It measures your weight in relation to your height.  A BMI of 18.5 to 24.9 is in the healthy range. A person with a BMI of 25 to 29.9 is considered overweight, and someone with a BMI of 30 or greater is considered obese. More than two-thirds of American adults are considered overweight or obese.  Being overweight or obese increases the risk for further weight gain. Excess weight may lead to heart disease and diabetes.  Creating and following plans for healthy eating and physical activity may help you improve your health.  Weight control is part of healthy lifestyle and includes exercise, emotional health, and healthy eating habits. Careful eating habits lifelong are the mainstay of weight control. Though there are significant health benefits from weight loss, long-term weight loss with diet alone may be very difficult to achieve- studies show long-term success with dietary management in less than 10% of people. Attaining a healthy weight may be especially difficult to achieve in those with severe obesity. In some cases, medications, devices and surgical management might be considered.  What can you do?  If you are overweight or obese and are interested in methods for weight loss, you should discuss this with your provider.     Consider  reducing daily calorie intake by 500 calories.     Keep a food journal.     Avoiding skipping meals, consider cutting portions instead.    Diet combined with exercise helps maintain muscle while optimizing fat loss. Strength training is particularly important for building and maintaining muscle mass. Exercise helps reduce stress, increase energy, and improves fitness. Increasing exercise without diet control, however, may not burn enough calories to loose weight.       Start walking three days a week 10-20 minutes at a time    Work towards walking thirty minutes five days a week     Eventually, increase the speed of your walking for 1-2 minutes at time    In addition, we recommend that you review healthy lifestyles and methods for weight loss available through the National Institutes of Health patient information sites:  http://win.niddk.nih.gov/publications/index.htm    And look into health and wellness programs that may be available through your health insurance provider, employer, local community center, or [employer].    Weight management plan: Patient was referred to their PCP to discuss a diet and exercise plan.

## 2018-09-20 NOTE — MR AVS SNAPSHOT
After Visit Summary   9/20/2018    Ashleigh Alonzo    MRN: [id number]           Patient Information     Date Of Birth          1960        Visit Information        Provider Department      9/20/2018 2:30 PM Matt Andrea MD [facility]        Today's Diagnoses     MERLIN (obstructive sleep apnea)    -  1      Care Instructions    Positioning Device, zzoma, slumber bump, tennis ball t-shirt, back pack  This example shows a pillow that straps around the waist. It may be appropriate for those whose sleep study shows milder sleep apnea that occurs primarily when lying flat on one's back. Preliminary studies have shown benefit but effectiveness at home should be verified.                         Your BMI is Body mass index is 25.97 kg/(m^2).  Weight management is a personal decision.  If you are interested in exploring weight loss strategies, the following discussion covers the approaches that may be successful. Body mass index (BMI) is one way to tell whether you are at a healthy weight, overweight, or obese. It measures your weight in relation to your height.  A BMI of 18.5 to 24.9 is in the healthy range. A person with a BMI of 25 to 29.9 is considered overweight, and someone with a BMI of 30 or greater is considered obese. More than two-thirds of American adults are considered overweight or obese.  Being overweight or obese increases the risk for further weight gain. Excess weight may lead to heart disease and diabetes.  Creating and following plans for healthy eating and physical activity may help you improve your health.  Weight control is part of healthy lifestyle and includes exercise, emotional health, and healthy eating habits. Careful eating habits lifelong are the mainstay of weight control. Though there are significant health benefits from weight loss, long-term weight loss with diet alone may be very difficult to achieve- studies show long-term success with dietary management in  less than 10% of people. Attaining a healthy weight may be especially difficult to achieve in those with severe obesity. In some cases, medications, devices and surgical management might be considered.  What can you do?  If you are overweight or obese and are interested in methods for weight loss, you should discuss this with your provider.     Consider reducing daily calorie intake by 500 calories.     Keep a food journal.     Avoiding skipping meals, consider cutting portions instead.    Diet combined with exercise helps maintain muscle while optimizing fat loss. Strength training is particularly important for building and maintaining muscle mass. Exercise helps reduce stress, increase energy, and improves fitness. Increasing exercise without diet control, however, may not burn enough calories to loose weight.       Start walking three days a week 10-20 minutes at a time    Work towards walking thirty minutes five days a week     Eventually, increase the speed of your walking for 1-2 minutes at time    In addition, we recommend that you review healthy lifestyles and methods for weight loss available through the National Institutes of Health patient information sites:  http://win.niddk.nih.gov/publications/index.htm    And look into health and wellness programs that may be available through your health insurance provider, employer, local community center, or [employer].    Weight management plan: Patient was referred to their PCP to discuss a diet and exercise plan.              Follow-ups after your visit        Follow-up notes from your care team     Return in about 2 months (around 11/20/2018) for Routine Visit.      Your next 10 appointments already scheduled     Oct 11, 2018 11:00 AM CDT   [facility]   [facility]    [address] [place] [id number]   [phone]            Oct 11, 2018 12:30 PM CDT   Return  Visit with Ligia Zhong MD   [facility]    [address] [place] [id number] [phone]            Oct 11, 2018  1:15 PM CDT   Cancer Rehab Treatment with Lisa Blanchard PT   [facility] ([facility]    [address] [address] [facility] [id number] [phone]            Nov 12, 2018 11:45 AM CST   (Arrive by 11:30 AM)   Return Visit with Fara Bradshaw MD   [facility] ([facility])    [address]  [address]  [facility] [address] [phone]              Who to contact     If you have questions or need follow up information about today's clinic visit or your schedule please contact [facility] directly at [phone].  Normal or non-critical lab and imaging results will be communicated to you by "[employer]hart, letter or phone within 4 business days after the clinic has received the results. If you do not hear from us within 7 days, please contact the clinic through [employer] or phone. If you have a critical or abnormal lab result, we will notify you by phone as soon as possible.  Submit refill requests through [employer] or call your pharmacy and they will forward the refill request to us. Please allow 3 business days for your refill to be completed.          Additional Information About Your Visit        "[employer] Information     [employer] gives you secure access to your electronic health record. If you see a primary care provider, you can also send messages to your care team and make appointments. If you have questions, please call your primary care clinic.  If you do not have a primary care provider, please call [phone] and they will assist you.        Care EveryWhere ID     This is your Care EveryWhere ID. This could be used by other organizations to access your [facility] medical records  [id number]        Your Vitals  "Were     Pulse Height Pulse Oximetry BMI (Body Mass Index)          98 1.6 m (5' 3\") 96% 25.97 kg/m2         Blood Pressure from Last 3 Encounters:   09/20/18 112/72   08/13/18 120/77   07/31/18 118/76    Weight from Last 3 Encounters:   09/20/18 66.5 kg (146 lb 9.6 oz)   08/13/18 66.7 kg (147 lb)   07/31/18 66 kg (145 lb 8 oz)              We Performed the Following     Comprehensive DME          Today's Medication Changes          These changes are accurate as of 9/20/18  3:18 PM.  If you have any questions, ask your nurse or doctor.               Start taking these medicines.        Dose/Directions    order for DME   Used for:  MERLIN (obstructive sleep apnea)   Started by:  Matt Andrea MD        autoCPAP 5-18 cmH20   Quantity:  1 Device   Refills:  0            Where to get your medicines      Information about where to get these medications is not yet available     ! Ask your nurse or doctor about these medications     order for DME                Primary Care Provider Office Phone # Fax #    Radha Cazares [phone]       [address] [facility] [address]        Equal Access to Services     THERESA HUGHES AH: Hadjose martin finley Somarian, waaxda luqadaha, qaybta kaalmada adeegyada, bang pickering. So [facility] [phone].    ATENCIÓN: Si habla español, tiene a blackman disposición servicios gratuitos de asistencia lingüística. [facility] [phone].    We comply with applicable federal civil rights laws and [place] laws. We do not discriminate on the basis of race, color, national origin, age, disability, sex, sexual orientation, or gender identity.            Thank you!     Thank you for choosing [facility]  for your care. Our goal is always to provide you with excellent care. Hearing back from our patients is one way we can continue to improve our services. Please take a few minutes to complete the written survey that you may receive in " the mail after your visit with us. Thank you!             Your Updated Medication List - Protect others around you: Learn how to safely use, store and throw away your medicines at www.disposemymeds.org.          This list is accurate as of 9/20/18  3:18 PM.  Always use your most recent med list.                   Brand Name Dispense Instructions for use Diagnosis    albuterol 108 (90 Base) MCG/ACT inhaler    PROAIR HFA/PROVENTIL HFA/VENTOLIN HFA    1 Inhaler    Inhale 2 puffs into the lungs every 6 hours as needed for shortness of breath / dyspnea    Chronic obstructive pulmonary disease, unspecified COPD type (H)       ASPIRIN PO      Take 81 mg by mouth daily        guaiFENesin 600 MG 12 hr tablet    MUCINEX    180 tablet    Take 2 tablets (1,200 mg) by mouth 2 times daily    Cough with sputum       * hydrOXYzine 25 MG tablet    ATARAX    60 tablet    Take 1-2 tablets (25-50 mg) by mouth every 6 hours as needed for itching    Urticaria       * hydrOXYzine 25 MG tablet    ATARAX     Take 1-2 tablets (25-50 mg) by mouth every 6 hours as needed for itching        order for DME     1 Device    autoCPAP 5-18 cmH20    MERLIN (obstructive sleep apnea)       simvastatin 40 MG tablet    ZOCOR     Take 40 mg by mouth At Bedtime        tiotropium 18 MCG capsule    SPIRIVA    1 capsule    Inhale 1 capsule (18 mcg) into the lungs daily Inhale contents of one capsule    Chronic obstructive pulmonary disease, unspecified COPD type (H)       vitamin D 1000 units capsule      TAKE 2 CAPSULES BY MOUTH EVERY MORNING        * Notice:  This list has 2 medication(s) that are the same as other medications prescribed for you. Read the directions carefully, and ask your doctor or other care provider to review them with you.

## 2018-09-20 NOTE — NURSING NOTE
"Chief Complaint   Patient presents with     Study Results       Initial There were no vitals taken for this visit. Estimated body mass index is 26.14 kg/(m^2) as calculated from the following:    Height as of 8/13/18: 1.597 m (5' 2.87\").    Weight as of 8/13/18: 66.7 kg (147 lb).    Medication Reconciliation: complete        "

## 2018-09-20 NOTE — PROGRESS NOTES
"  Sleep Study Follow-Up Visit:    Date on this visit: 9/20/2018    Ashleigh Alonzo comes in today for follow-up of her sleep study done on 9/6/18 at the [facility].    Ashleigh Alonzo comes in today for follow-up of their moderate sleep apnea, managed with CPAP.      Initial sleep study done on 12/4/15 (155#) at the [facility] for Loud socially disruptive snoring, narrowed oropharynx, COPD. Sleep onset difficulties, suspect principally related to poor sleep hygiene, delayed sleep phase syndrome. AHI was 21, with significant desaturations down to 61%. RDI 21.6. REM AHI 57.3, consistent with severe REM MERLIN. Supine AHI 50.6, consistent with severe SUPINE MERLIN. Periodic Limb Movement Index 0/hour.      Study Date: 1/29/2016- CPAP final pressure achieved was 15 cmH2O with a residual AHI of 2.2 events per hour. Time in REM supine on final pressure was 16.0 minutes. Supine REM was also seen at 10 cm with fair control of events. Further titration principally done for airflow limitations. Transcutaneous CO2 Monitoring (TCM) was within normal limits.    At follow-up visit 6/2018 she was not using not using CPAP with < 11# weight loss since diagnosis. Elected restudy due to lack of symptoms and comorbidities.    Study Date: 9/6/2018  MRN: [id number]  Ordering Provider: Matt Andrea MD     Indications for Polysomnography: The patient is a 58 y old Female who is 5' 4\" and weighs 144.0 lbs. Her BMI is 24.6, Perkins sleepiness scale 3.0 and neck circumference is 37.0 cm. A diagnostic polysomnogram was performed to evaluate for history of severe sleep apnea. Not using CPAP, < 11# weight loss since diagnosis. Polysomnogram to reassess presence/severity of obstructive sleep due to lack of symptoms and comorbidities.      Polysomnogram Data: A full night polysomnogram recorded the standard physiologic parameters including EEG, EOG, EMG, ECG, nasal and oral airflow. Respiratory parameters of " chest and abdominal movements were recorded with respiratory inductance plethysmography. Oxygen saturation was recorded by pulse oximetry. Hypopnea scoring rule used: 1B 4%.     Sleep Architecture:   The total recording time of the polysomnogram was 511.8 minutes. The total sleep time was 455.5 minutes. Sleep latency was normal at 12.9 minutes without the use of a sleep aid. REM latency was 75.5 minutes. Arousal index was 20.9 arousals per hour. Sleep efficiency was normal at 89.0%. Wake after sleep onset was 43.0 minutes. The patient spent 6.5% of total sleep time in Stage N1, 32.8% in Stage N2, 23.9% in Stage N3, and 36.8% in REM. Time in REM supine was 14.0 minutes.     Respiration:     Events ? The polysomnogram revealed a presence of 30 obstructive, 7 central, and 2 mixed apneas resulting in an apnea index of 5.1 events per hour. There were 35 obstructive hypopneas and 0 central hypopneas resulting in an obstructive hypopnea index of 4.6 and central hypopnea index of - events per hour. The combined apnea/hypopnea index was 9.7 events per hour (central apnea/hypopnea index was 0.9 events per hour). The REM AHI was 21.9 events per hour. The supine AHI was 19.1 events per hour. The RERA index was 10.9 events per hour.  The RDI was 20.7 events per hour.    Snoring - was reported as moderate-loud.    Respiratory rate and pattern - was notable for normal respiratory rate and pattern.    Sustained Sleep Associated Hypoventilation - Transcutaneous carbon dioxide monitoring was not used, however significant hypoventilation was not suggested by oximetry    Sleep Associated Hypoxemia - (Greater than 5 minutes O2 sat at or below 88%) was not present. Baseline oxygen saturation was 95.6%. Lowest oxygen saturation was 63.3%. Time spent less than or equal to 88% was 6.4 minutes. Time spent less than or equal to 89% was 8.0 minutes.     Movement Activity:     Periodic Limb Activity - There were 0 PLMs during the entire study.      REM EMG Activity - Excessive transient/sustained muscle activity was not present.    Nocturnal Behavior - Abnormal sleep related behaviors were not noted     Bruxism - None apparent.     Cardiac Summary:   The average pulse rate was 89.4 bpm. The minimum pulse rate was 71.2 bpm while the maximum pulse rate was 117.1 bpm.  Arrhythmias were not noted.     These findings were reviewed with patient.     Past medical/surgical history, family history, social history, medications and allergies were reviewed.      Problem List:  Patient Active Problem List    Diagnosis Date Noted     Hyperlipidemia LDL goal <130 10/03/2010     Priority: High     Periodontal disease      Priority: Medium     Long term (current) use of systemic steroids 02/05/2018     Priority: Medium     Tongue ulcer 02/05/2018     Priority: Medium     Malignant neoplasm of upper-inner quadrant of right breast in female, estrogen receptor negative (H) 08/22/2017     Priority: Medium      Stage IIa, T2 N0 M0, grade 3, triple-negative invasive carcinoma of the right breast.  Routine bilateral screening mammogram on 07/27/2017. Right breast biopsy demonstrated a grade 3 invasive mammary carcinoma with associated high-grade DCIS.       Ms. Alonzo enrolled in the ISPY-2 clinical trial.  She began treatment with weekly taxol and once every 3 week pembrolizumab on 9/20/17.  She was hospitalized 11/11/17 - 11/17/17 with fevers ranging from 102 - 105.  CT chest was consistent with pneumonitis.  She also had transaminitis in the 150 range.  She was started on corticosteroids.  Pembrolizumab was stopped and she resumed weekly taxol alone on 11/28/17.  She completed a total of 12 weeks of therapy on 12/26/17.  She received first cycle of adriamycin and cyclophosphamide on 1/2/18.  She was hospitalized 1/10/18 - 1/13/18 with neutropenic fever.  She was hospitalized again  from 1/29/18 - 2/8/18 with neutropenic fever, mucositis, and C. difficile colitis following cycle  #2.  Given poor clinical status and frequent hospitalizations, plan was made to forgo the planned final 2 cycles of AC and proceed with chemotherapy.        On 2/27/18 she underwent right breast lumpectomy and sentinel lymph node procedure.  Pathology showed a grade 2, 6 mm residual invasive mammary carcinoma with an associated 8 mm area of high grade DCIS with comedonecrosis and ADH.  Invasive tumor cellularity was 10%.  There was lymphovascular invasion.  Surgical margins were negative for both invasive and noninvasive disease.  A single sentinel lymph node was benign.  MDA residual cancer burden was class I.      Elected radiation therapy, finished 4/2018.             Chronic obstructive pulmonary disease, unspecified COPD type (H) 07/25/2016     Priority: Medium     PFTS 11/2014 - FEV1- 1.36 (52%), ratio 0.55, DLCO 86% Dr Francisco Vicente      Priority: Medium     MERLIN (obstructive sleep apnea)- 'mild' (AHI 9) 12/11/2015     Priority: Medium     Study Date: 12/4/2015-  (155.0 lbs) apnea/hypopnea index was 21.0 events per hour.  The REM AHI was 57.3 events per hour.  The supine AHI was 50.6 events per hour.  Transcutaneous carbon dioxide monitoring was not used, however hypoventilation was suggested by oximetry.. Lowest oxygen saturation was 60.7%.  Time spent below 89% was 26.8 minutes.  Study Date: 1/29/2016- CPAP final  pressure achieved was 15 cmH2O with a residual AHI of 2.2 events per hour.  Time in REM supine on final pressure was 16.0 minutes. Supine REM was also seen at 10 cm with  fair control of events. Further titration principally done for airflow limitations.   9/6/2018 [facility] Study (144.0 lbs) - AHI 9.7, RDI 20.7, Supine AHI 19.1, REM AHI 21.9, Low O2 63.3%, Time Spent ?88% 6.4 minutes / Time Spent ?89% 8.0 minutes.       Lung nodule, multiple 11/17/2015     Priority: Medium     Family history of ischemic heart disease 06/25/2012     Priority: Medium     Mild major  depression (H) 06/25/2012     Priority: Medium     Lack of libido 06/22/2011     Priority: Medium     Anisocoria 05/21/2010     Priority: Medium     Anxiety 04/08/2010     Priority: Medium        Impression/Plan:    Mild obstructive sleep apnea in lateral position, principally REM-related when hypoxemia occurs. Supine worsening suspected.  Options discussed.   Elect restart CPAP 5-18 cmH20    She will follow up with me in about 2 month(s).     Twenty-five minutes spent with patient, all of which were spent face-to-face counseling, consulting, coordinating plan of care.      Matt Andrea     CC: Radha Cazares

## 2018-09-21 ENCOUNTER — TELEPHONE (OUTPATIENT)
Dept: SLEEP MEDICINE | Facility: CLINIC | Age: 58
End: 2018-09-21

## 2018-09-21 DIAGNOSIS — G47.33 OSA (OBSTRUCTIVE SLEEP APNEA): Primary | ICD-10-CM

## 2018-09-21 NOTE — TELEPHONE ENCOUNTER
Patient called requesting an order for a dental device as well as a list of places for her to go get this. Please advise.

## 2018-10-02 DIAGNOSIS — E78.5 HYPERLIPIDEMIA LDL GOAL <130: Primary | Chronic | ICD-10-CM

## 2018-10-02 RX ORDER — SIMVASTATIN 40 MG
40 TABLET ORAL AT BEDTIME
Qty: 90 TABLET | Refills: 4 | Status: SHIPPED | OUTPATIENT
Start: 2018-10-02 | End: 2019-07-31

## 2018-10-02 NOTE — TELEPHONE ENCOUNTER
"Requested Prescriptions   Pending Prescriptions Disp Refills     simvastatin (ZOCOR) 40 MG tablet 30 tablet      Sig: Take 1 tablet (40 mg) by mouth At Bedtime    Statins Protocol Failed    10/2/2018  8:46 AM       Failed - LDL on file in past 12 months    Recent Labs   Lab Test  07/27/17   1416   LDL  131*            Passed - No abnormal creatine kinase in past 12 months    No lab results found.            Passed - Recent (12 mo) or future (30 days) visit within the authorizing provider's specialty    Patient had office visit in the last 12 months or has a visit in the next 30 days with authorizing provider or within the authorizing provider's specialty.  See \"Patient Info\" tab in inbasket, or \"Choose Columns\" in Meds & Orders section of the refill encounter.           Passed - Patient is age 18 or older       Passed - No active pregnancy on record       Passed - No positive pregnancy test in past 12 months        Routing refill request to provider for review/approval because:  Labs not current:  LDL    Debby Pryor RN          "

## 2018-10-11 ENCOUNTER — OFFICE VISIT (OUTPATIENT)
Dept: NURSING | Facility: CLINIC | Age: 58
End: 2018-10-11
Payer: COMMERCIAL

## 2018-10-11 ENCOUNTER — OFFICE VISIT (OUTPATIENT)
Dept: RADIATION ONCOLOGY | Facility: CLINIC | Age: 58
End: 2018-10-11
Payer: COMMERCIAL

## 2018-10-11 ENCOUNTER — OFFICE VISIT (OUTPATIENT)
Dept: PULMONOLOGY | Facility: CLINIC | Age: 58
End: 2018-10-11
Payer: COMMERCIAL

## 2018-10-11 ENCOUNTER — HOSPITAL ENCOUNTER (OUTPATIENT)
Dept: PHYSICAL THERAPY | Facility: CLINIC | Age: 58
Setting detail: THERAPIES SERIES
End: 2018-10-11
Attending: PHYSICIAN ASSISTANT
Payer: COMMERCIAL

## 2018-10-11 VITALS
TEMPERATURE: 98.4 F | HEIGHT: 63 IN | BODY MASS INDEX: 26.26 KG/M2 | OXYGEN SATURATION: 97 % | DIASTOLIC BLOOD PRESSURE: 79 MMHG | SYSTOLIC BLOOD PRESSURE: 122 MMHG | RESPIRATION RATE: 18 BRPM | WEIGHT: 148.2 LBS | HEART RATE: 102 BPM

## 2018-10-11 VITALS
BODY MASS INDEX: 26.56 KG/M2 | OXYGEN SATURATION: 97 % | HEIGHT: 63 IN | WEIGHT: 149.89 LBS | HEART RATE: 102 BPM | SYSTOLIC BLOOD PRESSURE: 122 MMHG | RESPIRATION RATE: 18 BRPM | TEMPERATURE: 98.4 F | DIASTOLIC BLOOD PRESSURE: 79 MMHG

## 2018-10-11 DIAGNOSIS — T50.901D PULMONARY DRUG TOXICITY, ACCIDENTAL OR UNINTENTIONAL, SUBSEQUENT ENCOUNTER: Primary | ICD-10-CM

## 2018-10-11 DIAGNOSIS — J43.2 CENTRILOBULAR EMPHYSEMA (H): ICD-10-CM

## 2018-10-11 DIAGNOSIS — T50.901D PULMONARY DRUG TOXICITY, ACCIDENTAL OR UNINTENTIONAL, SUBSEQUENT ENCOUNTER: ICD-10-CM

## 2018-10-11 DIAGNOSIS — C50.211 MALIGNANT NEOPLASM OF UPPER-INNER QUADRANT OF RIGHT BREAST IN FEMALE, ESTROGEN RECEPTOR NEGATIVE (H): Primary | ICD-10-CM

## 2018-10-11 DIAGNOSIS — R91.8 LUNG NODULE, MULTIPLE: ICD-10-CM

## 2018-10-11 DIAGNOSIS — J98.4 PULMONARY DRUG TOXICITY, ACCIDENTAL OR UNINTENTIONAL, SUBSEQUENT ENCOUNTER: Primary | ICD-10-CM

## 2018-10-11 DIAGNOSIS — Z17.1 MALIGNANT NEOPLASM OF UPPER-INNER QUADRANT OF RIGHT BREAST IN FEMALE, ESTROGEN RECEPTOR NEGATIVE (H): Primary | ICD-10-CM

## 2018-10-11 DIAGNOSIS — J98.4 PULMONARY DRUG TOXICITY, ACCIDENTAL OR UNINTENTIONAL, SUBSEQUENT ENCOUNTER: ICD-10-CM

## 2018-10-11 PROCEDURE — 99207 ZZC DROP WITH A PROCEDURE: CPT | Performed by: INTERNAL MEDICINE

## 2018-10-11 PROCEDURE — 94726 PLETHYSMOGRAPHY LUNG VOLUMES: CPT | Performed by: INTERNAL MEDICINE

## 2018-10-11 PROCEDURE — 40000360 ZZHC STATISTIC PT CANCER REHAB VISIT: Performed by: PHYSICAL THERAPIST

## 2018-10-11 PROCEDURE — 97110 THERAPEUTIC EXERCISES: CPT | Mod: GP | Performed by: PHYSICAL THERAPIST

## 2018-10-11 PROCEDURE — 94729 DIFFUSING CAPACITY: CPT | Performed by: INTERNAL MEDICINE

## 2018-10-11 PROCEDURE — 99214 OFFICE O/P EST MOD 30 MIN: CPT | Mod: 25 | Performed by: INTERNAL MEDICINE

## 2018-10-11 PROCEDURE — 99214 OFFICE O/P EST MOD 30 MIN: CPT | Performed by: RADIOLOGY

## 2018-10-11 PROCEDURE — 94375 RESPIRATORY FLOW VOLUME LOOP: CPT | Performed by: INTERNAL MEDICINE

## 2018-10-11 ASSESSMENT — PAIN SCALES - GENERAL
PAINLEVEL: NO PAIN (0)
PAINLEVEL: NO PAIN (0)

## 2018-10-11 NOTE — MR AVS SNAPSHOT
After Visit Summary   10/11/2018    Ashleigh Alonzo    MRN: [id number]           Patient Information     Date Of Birth          1960        Visit Information        Provider Department      10/11/2018 12:30 PM Ligia Zhong MD [facility]        Today's Diagnoses     Malignant neoplasm of upper-inner quadrant of right breast in female, estrogen receptor negative (H)    -  1      Care Instructions    Please contact [facility] RN with questions or concerns following today's appointment: [phone].    Thank you!            Follow-ups after your visit        Your next 10 appointments already scheduled     Nov 12, 2018 11:45 AM CST   (Arrive by 11:30 AM)   Return Visit with Fara Bradshaw MD   [facility] ([facility])    [address] [place] [address]  [address] [place] [id number] [phone]              Future tests that were ordered for you today     Open Future Orders        Priority Expected Expires Ordered    CT Chest w/o Contrast Routine  10/11/2019 10/11/2018            Who to contact     If you have questions or need follow up information about today's clinic visit or your schedule please contact [facility] directly at [phone].  Normal or non-critical lab and imaging results will be communicated to you by MyChart, letter or phone within 4 business days after the clinic has received the results. If you do not hear from us within 7 days, please contact the clinic through MyChart or phone. If you have a critical or abnormal lab result, we will notify you by phone as soon as possible.  Submit refill requests through [email or web address] or call your pharmacy and they will forward the refill request to us. Please allow 3 business days for your refill to be completed.          Additional Information About Your Visit        [employer] Information     [email or web address] gives you secure access to your  "electronic health record. If you see a primary care provider, you can also send messages to your care team and make appointments. If you have questions, please call your primary care clinic.  If you do not have a primary care provider, please call [phone] and they will assist you.      [employer] is an electronic gateway that provides easy, online access to your medical records. With [employer], you can request a clinic appointment, read your test results, renew a prescription or communicate with your care team.     To access your existing account, please contact your [facility] or call [phone] for assistance.        Care EveryWhere ID     This is your Care EveryWhere ID. This could be used by other organizations to access your [facility] medical records  [id number]        Your Vitals Were     Pulse Temperature Respirations Height Pulse Oximetry BMI (Body Mass Index)    102 98.4  F (36.9  C) (Oral) 18 5' 3\" 97% 26.55 kg/m2       Blood Pressure from Last 3 Encounters:   10/11/18 122/79   10/11/18 122/79   09/20/18 112/72    Weight from Last 3 Encounters:   10/11/18 149 lb 14.2 oz   10/11/18 148 lb 3.2 oz   09/20/18 146 lb 9.6 oz              Today, you had the following     No orders found for display       Primary Care Provider Office Phone # Fax #    Radha Cazares [phone]       [address] [facility] [address]        Equal Access to Services     THERESA HUGHES AH: Hadii candie mcdaniel hadasho Soomaali, waaxda luqadaha, qaybta kaalmada adeegyada, bang duran . So [facility] [phone].    ATENCIÓN: Si habla ghanshyamañol, tiene a blcakman disposición servicios gratuitos de asistencia lingüística. Llame al [phone].    We comply with applicable federal civil rights laws and [place] laws. We do not discriminate on the basis of race, color, national origin, age, disability, sex, sexual orientation, or gender identity.            Thank " you!     Thank you for choosing [facility]  for your care. Our goal is always to provide you with excellent care. Hearing back from our patients is one way we can continue to improve our services. Please take a few minutes to complete the written survey that you may receive in the mail after your visit with us. Thank you!             Your Updated Medication List - Protect others around you: Learn how to safely use, store and throw away your medicines at www.disposemymeds.org.          This list is accurate as of 10/11/18 11:59 PM.  Always use your most recent med list.                   Brand Name Dispense Instructions for use Diagnosis    albuterol 108 (90 Base) MCG/ACT inhaler    PROAIR HFA/PROVENTIL HFA/VENTOLIN HFA    1 Inhaler    Inhale 2 puffs into the lungs every 6 hours as needed for shortness of breath / dyspnea    Chronic obstructive pulmonary disease, unspecified COPD type (H)       ASPIRIN PO      Take 81 mg by mouth daily        guaiFENesin 600 MG 12 hr tablet    MUCINEX    180 tablet    Take 2 tablets (1,200 mg) by mouth 2 times daily    Cough with sputum       hydrOXYzine 25 MG tablet    ATARAX    60 tablet    Take 1-2 tablets (25-50 mg) by mouth every 6 hours as needed for itching    Urticaria       order for DME     1 Device    autoCPAP 5-18 cmH20    MERLIN (obstructive sleep apnea)       simvastatin 40 MG tablet    ZOCOR    90 tablet    Take 1 tablet (40 mg) by mouth At Bedtime    Hyperlipidemia LDL goal <130       tiotropium 18 MCG capsule    SPIRIVA    1 capsule    Inhale 1 capsule (18 mcg) into the lungs daily Inhale contents of one capsule    Chronic obstructive pulmonary disease, unspecified COPD type (H)       vitamin D 1000 units capsule      TAKE 2 CAPSULES BY MOUTH EVERY MORNING

## 2018-10-11 NOTE — PROGRESS NOTES
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 Has made it through two week where she gets through 5 days- otherwise, has been making it through 3-4 days per week. Was on chemo and keytruda-- at the same time, this was trouble some for her and ended in hospital 3 times because of this. Running fevers as her main issue. No treatment at this time-- finished in April. Middle of march started work. She just feels that she is still very fatigued and notices she has more weakness. She would like this to improve. Does have decreased ROM in her R arm from radiation.    Pertinent Visual History  wears glasses.    Prior level of function comment was working full time- now working average of 4 days per week; doing house work and iADLs    Current Community Support Family/friend caregiver   Patient role/Employment history Employed   Living environment [facility]   Home/Community Accessibility Comments [place] with [place]; stairs and more area to move around; had TM, does not use this, no formal exercise program    Patient/Family Goals Statement improve her functional endurance and strength    Fall Risk Screen   Fall screen completed by PT   Have you fallen 2 or more times in the past year? No   Have you fallen and had an injury in the past year? No   Timed Up and Go score  (seconds) not indicated   Is patient a fall risk? No   System Outcome Measures   Outcome Measures Cancer Rehab   FACIT Fatigue Subscale (score out of 52). The higher the score, the better the QOL. 33   Six Minute Walk (meters). An increase of 70 or more meters indicates statistically significant change. 411  (1347 feet )   Pain   Pain comments soreness under R axilla to palpation    Cognitive Status Examination   Orientation orientation to person, place and time   Level of Consciousness alert   Follows Commands and Answers Questions 100% of the time   Personal Safety and Judgment intact   Memory intact   Posture   Posture Comments mild foward shoulder posture    Range of Motion (ROM)   ROM Comment slight decreased ROM in R shoulder-- flexion, ABD, IR and ER by 10-15 degrees from other side with some pain in anterior and inferior portion of shoulder at end range    Strength   Strength Comments grossly 4+/5-5/5 in her UE and LEs; decreased activity tolerance from baseline    Bed Mobility   Bed Mobility Comments denies issues    Transfer Skills   Transfer Comments sit to stand with and without UEs    Gait   Gait Comments ambulates with overall steady gait, adequate foot clearance, consistent step length    Gait Special Tests   Gait Special Tests SIX MINUTE WALK TEST   Balance   Balance Comments steady balance overall with functional movement- did not indicate further testing today    Balance Special Tests   Balance Special Tests Sit to stand reps   Balance Special Tests Sit to Stand Reps in 30 Seconds   Reps in 30 seconds 11   Height 18   Sensory Examination   Sensory Perception Comments neuropathy in her feet; numbness under R arm near arm pit (radiation site)    Planned Therapy Interventions   Planned Therapy Interventions gait training;ROM;strengthening;stretching;transfer training;IADL retraining;neuromuscular re-education   Clinical Impression   Criteria for Skilled Therapeutic Interventions Met yes, treatment  indicated   PT Diagnosis decreased functional endurance    Influenced by the following impairments decreased activity tolerance, weakness, decreased ROM    Functional limitations due to impairments decreased energy and endurance for full time work position    Clinical Presentation Stable/Uncomplicated   Clinical Presentation Rationale stable medically, PT impairments, co morbidities, clinical judgement    Clinical Decision Making (Complexity) Low complexity   Therapy Frequency other (see comments)  (3-4 more visits for follow up on HEP )   Predicted Duration of Therapy Intervention (days/wks) up to 90 days   Risk & Benefits of therapy have been explained Yes   Patient, Family & other staff in agreement with plan of care Yes   Clinical Impression Comments Patient presents with decreased activity tolerance, weakness and decreased ROM in R arm from radiation. She will benefit from PT to progress her HEP in order to improve her function in these areas.    GOALS   PT Eval Goals 1;2   Goal 1   Goal Identifier walking program    Goal Description Ashleigh Perera will participate in 150 minutes of exercise and/or walking each week in order to improve her endurance and get  to baseline function to reduce her level of fatigue during the day.    Target Date 11/25/18   Goal 2   Goal Identifier shoulder ROM    Goal Description Ashleigh Perera will demonstrate full shoulder ROM into flexion, ABD, IR and ER in order to improve her ability to perfrom ADLS and iADLS with less restriction and pain.    Target Date 11/25/18   Total Evaluation Time   Total Evaluation Time (Minutes) 30

## 2018-10-11 NOTE — PROGRESS NOTES
Outpatient Physical Therapy Discharge Note     Patient: Ashleigh Alonzo  : 1960    Beginning/End Dates of Reporting Period:  18 to 10/11/2018    Referring Provider: Radha Cazares MD     Therapy Diagnosis: decreased functional endurance.     Client Self Report: Ashleigh Perera reports that she has not been doing any formal stretching. Has been working on raising her arms up in the air when her shoulder get tight. She has been back to work now full time.     Goals:  Goal Identifier walking program- not met    Goal Description Ashleigh Perera will participate in 150 minutes of exercise and/or walking each week in order to improve her endurance and get  to baseline function to reduce her level of fatigue during the day.    Target Date 18   Date Met      Progress: is back to work full time now but has not been doing a walking program.     Goal Identifier shoulder ROM- near full ROM   Goal Description Ashleigh Perera will demonstrate full shoulder ROM into flexion, ABD, IR and ER in order to improve her ability to perfrom ADLS and iADLS with less restriction and pain.    Target Date 18   Date Met      Progress: she is near full ROM in both shoulders- working on stretching program at this time.       Progress Toward Goals:   Patient is showing improved shoulder ROM- had not been doing her stretches so encouraged her to do so. She has been back to work full time for the last month showing improved endurance but encouraged her to try and do some more walking as she has TM at home.    Plan:  Discharge from therapy.    Discharge:    Reason for Discharge: No further expectation of progress.    Equipment Issued: none    Discharge Plan: Patient to continue home program.

## 2018-10-11 NOTE — NURSING NOTE
"Ashleigh Alonzo's goals for this visit include: Return  She requests these members of her care team be copied on today's visit information: PCP    PCP: Radha Cazares    Referring Provider:  No referring provider defined for this encounter.    /79  Pulse 102  Temp 98.4  F (36.9  C)  Resp 18  Ht 1.6 m (5' 3\")  Wt 67.2 kg (148 lb 3.2 oz)  SpO2 97%  BMI 26.25 kg/m2    Do you need any medication refills at today's visit? N    "

## 2018-10-11 NOTE — PATIENT INSTRUCTIONS
It was nice to meet you today.    We should do pulmonary function testing now to make sure that the drug reaction hasn't caused any scarring or change in the size of your lungs within your chest.    I would recommend following up with a CT scan of your chest next July unless there is an urgent need for more aggressive evaluation (meaning: your symptoms change significantly). Come back and see me then so we can talk about the CT.     Sara Owen

## 2018-10-11 NOTE — PATIENT INSTRUCTIONS
Please contact [facility] RN with questions or concerns following today's appointment: [phone].    Thank you!

## 2018-10-11 NOTE — PROGRESS NOTES
Visit Date:   10/11/2018      CHIEF COMPLAINT:  COPD.      HISTORY OF PRESENT ILLNESS:  The patient is a 58-year-old woman previously followed by my colleague, Dr. Singh.  She was followed by Dr. Singh for COPD and pulmonary nodules.  Serial CT imaging had revealed stability of the pulmonary nodules indicating likely benign etiology.  She has been taking tiotropium for COPD.  Unfortunately, she was diagnosed with breast cancer last year.  She was started on chemotherapy and had several clinic visits and hospitalizations for neutropenic fever.  In 2018 during an admission for neutropenic fever, she was found to have bilateral perivascular consolidations on CT imaging.  Pulmonary consult was obtained.  She had a bronchoscopy which showed 85 white cells, all cultures were negative.  She was started on high-dose methylprednisolone and eventually just discharged on prednisone.  This was slowly tapered prior to her mastectomy.  By the time of followup for the mastectomy, respiratory symptoms had resolved as had neutropenia.  Presumed etiology for the peribronchovascular consolidations was the chemotherapeutic agent pembrolizumab.        She has been doing relatively well recently.  She has minimal shortness of breath symptoms.  She went with a friend to be [place], [place] last week and was able to walk around all day without stopping until she had to walk uphill to her car.  At that time, she had to stop once en route to catch her breath.  She does continue to take tiotropium.  She has an albuterol inhaler, but uses it only rarely.  She has a radiation oncology visit scheduled for after this visit.  She is also asking about a booster for her shingles vaccination.      PAST MEDICAL HISTORY:   1.  Breast cancer 2018.   2.  Depression.   3.  Diabetes.   4.  Hypertension.   5.  COPD.      FAMILY HISTORY:  She has a sister with arthritis.   Two daughters.  Mother is alive.  Father is  with an MI at age 46.       REVIEW OF SYSTEMS:  A full 14-point review of systems was done and is negative except as noted above in the HPI.      PHYSICAL EXAMINATION:   VITAL SIGNS:  123/79, pulse is 102, respiratory rate is 18, SpO2 is 97% on room air.  BMI 26.25.   GENERAL APPEARANCE:  Appears stated age, no distress.   EYES:  PERRL.  No conjunctival injections.   HEENT:  Nasal mucosa is within normal limits.  Mouth:   Oral mucosa is moist, no posterior oropharyngeal erythema or exudate.   RESPIRATORY:  Good air movement bilaterally.  No wheezes, rales or rhonchi.   CARDIAC:  Regular rate and rhythm, normal S1, S2, with no murmurs, rubs, or gallops.   MUSCULOSKELETAL:  No clubbing or cyanosis.   SKIN:  No rash on limited exam.      RESULTS:  Pulmonary function testing from 2016 shows moderate obstruction on spirometry, normal lung volumes, normal DLCO.      IMAGING:  Reviewed.  Most recent dedicated chest CT scan is from 07/2018, shows mildly decreased upper lobe predominant peribronchovascular ground-glass opacities consistent with organizing pneumonia, possibly secondary to drug reaction versus chronic interstitial pneumonia.  Unchanged mild compression deformities of T11 and L1.      ASSESSMENT AND PLAN:  The patient is a 58-year-old woman here for followup of complex pulmonary issues.     1.  Chronic obstructive pulmonary disease.  The patient has a previous diagnosis of COPD.  She quit smoking in 1999.  She has been on tiotropium only and has minimal COPD symptoms.  She is on reasonable medications to manage her COPD and needs no additional interventions at this time  specifically for COPD.       2.  Pulmonary nodules: She has a remote history of pulmonary nodules, have been stable on prior imaging indicating likely benign etiology.  Given duration of time since quitting smoking, she is no longer eligible for lung cancer screening with low-dose CT scan.     3.  Drug reaction/pneumonitis possibly related to pembrolizumab, treated  with high-dose steroids.  Over the course of followup between February and 2018, there was significant improvement in imaging abnormalities.  She has not had recent pulmonary function testing.  It may be reasonable to repeat PFTs at this time.  Given her lack of symptoms, I think that ongoing steroids or intervention for potential drug toxicity is unnecessary.  Would consider repeat CT scan in 2019, one year following most recent CT scan to evaluate for stability of pulmonary parenchymal abnormalities.  At that time I will follow up with her in clinic.         ELYSSA CARRANZA MD             D: 10/11/2018   T: 10/11/2018   MT: JOLANTA      Name:     YUNI CALIXTO   MRN:      [id number]        Account:      [id number]   :      1960           Visit Date:   10/11/2018      Document: [id number]

## 2018-10-11 NOTE — MR AVS SNAPSHOT
After Visit Summary   10/11/2018    Ashleigh Alonzo    MRN: [id number]           Patient Information     Date Of Birth          1960        Visit Information        Provider Department      10/11/2018 11:00 AM Sara Owen MD [facility]        Today's Diagnoses     Pulmonary drug toxicity, accidental or unintentional, subsequent encounter    -  1    Centrilobular emphysema (H)          Care Instructions    It was nice to meet you today.    We should do pulmonary function testing now to make sure that the drug reaction hasn't caused any scarring or change in the size of your lungs within your chest.    I would recommend following up with a CT scan of your chest next July unless there is an urgent need for more aggressive evaluation (meaning: your symptoms change significantly). Come back and see me then so we can talk about the CT.     Sara Owen           Follow-ups after your visit        Follow-up notes from your care team     Return in about 9 months (around 7/11/2019).      Your next 10 appointments already scheduled     Oct 11, 2018 12:30 PM CDT   Return Visit with Ligia Zhong MD   [facility])    [address] [place] [address]   [phone]            Oct 11, 2018  1:15 PM CDT   Cancer Rehab Treatment with Lisa Blanchard PT   [facility] ([facility])    [address] [place] [address]   [phone]            Oct 11, 2018  2:00 PM CDT   Office Visit with PFT LAB   [facility])    [address] [place] [address]   [phone]           Bring a current list of meds and any records pertaining to this visit. For Physicals, please bring immunization records and any forms needing to be filled out. Please arrive 10 minutes early to complete paperwork.             Nov 12, 2018 11:45 AM CST   (Arrive by 11:30 AM)   Return Visit with Fara Bradshaw MD   [facility] ([facility])    [address]  [address]  [facility] [address] [phone]              Future tests that were ordered for you today     Open Future Orders        Priority Expected Expires Ordered    CT Chest w/o Contrast Routine  10/11/2019 10/11/2018    General PFT Lab (Please always keep checked) Routine  10/11/2019 10/11/2018    Pulmonary Function Test Routine  10/11/2019 10/11/2018            Who to contact     If you have questions or need follow up information about today's clinic visit or your schedule please contact [facility] directly at [phone].  Normal or non-critical lab and imaging results will be communicated to you by CallYourPricehart, letter or phone within 4 business days after the clinic has received the results. If you do not hear from us within 7 days, please contact the clinic through CallYourPricehart or phone. If you have a critical or abnormal lab result, we will notify you by phone as soon as possible.  Submit refill requests through [employer] or call your pharmacy and they will forward the refill request to us. Please allow 3 business days for your refill to be completed.          Additional Information About Your Visit        [employer] Information     [employer] gives you secure access to your electronic health record. If you see a primary care provider, you can also send messages to your care team and make appointments. If you have questions, please call your primary care clinic.  If you do not have a primary care provider, please call [phone] and they will assist you.      [employer] is an electronic gateway that provides easy, online access to your medical records. With [employer], you can request a clinic appointment, read your test results, renew a prescription or communicate with your care team.     To access your  "existing account, please contact your [facility] or call [phone] for assistance.        Care EveryWhere ID     This is your Care EveryWhere ID. This could be used by other organizations to access your [place] medical records  [id number]        Your Vitals Were     Pulse Temperature Respirations Height Pulse Oximetry BMI (Body Mass Index)    102 98.4  F (36.9  C) 18 1.6 m (5' 3\") 97% 26.25 kg/m2       Blood Pressure from Last 3 Encounters:   10/11/18 122/79   09/20/18 112/72   08/13/18 120/77    Weight from Last 3 Encounters:   10/11/18 67.2 kg (148 lb 3.2 oz)   09/20/18 66.5 kg (146 lb 9.6 oz)   08/13/18 66.7 kg (147 lb)               Primary Care Provider Office Phone # Fax #    Radha Cazares [phone] [address] [address]        Equal Access to Services     WILMER HUGHES : Hadii candie ku hadasho Soomaali, waaxda luqadaha, qaybta kaalmada adeegyada, bang duran . So [facility] [phone].    ATENCIÓN: Si habla español, tiene a blackman disposición servicios gratuitos de asistencia lingüística. [facility] [phone].    We comply with applicable federal civil rights laws and [place] laws. We do not discriminate on the basis of race, color, national origin, age, disability, sex, sexual orientation, or gender identity.            Thank you!     Thank you for choosing [facility]  for your care. Our goal is always to provide you with excellent care. Hearing back from our patients is one way we can continue to improve our services. Please take a few minutes to complete the written survey that you may receive in the mail after your visit with us. Thank you!             Your Updated Medication List - Protect others around you: Learn how to safely use, store and throw away your medicines at www.disposemymeds.org.          This list is accurate as of 10/11/18 11:56 AM.  Always use your most " recent med list.                   Brand Name Dispense Instructions for use Diagnosis    albuterol 108 (90 Base) MCG/ACT inhaler    PROAIR HFA/PROVENTIL HFA/VENTOLIN HFA    1 Inhaler    Inhale 2 puffs into the lungs every 6 hours as needed for shortness of breath / dyspnea    Chronic obstructive pulmonary disease, unspecified COPD type (H)       ASPIRIN PO      Take 81 mg by mouth daily        guaiFENesin 600 MG 12 hr tablet    MUCINEX    180 tablet    Take 2 tablets (1,200 mg) by mouth 2 times daily    Cough with sputum       hydrOXYzine 25 MG tablet    ATARAX    60 tablet    Take 1-2 tablets (25-50 mg) by mouth every 6 hours as needed for itching    Urticaria       order for DME     1 Device    autoCPAP 5-18 cmH20    MERLIN (obstructive sleep apnea)       simvastatin 40 MG tablet    ZOCOR    90 tablet    Take 1 tablet (40 mg) by mouth At Bedtime    Hyperlipidemia LDL goal <130       tiotropium 18 MCG capsule    SPIRIVA    1 capsule    Inhale 1 capsule (18 mcg) into the lungs daily Inhale contents of one capsule    Chronic obstructive pulmonary disease, unspecified COPD type (H)       vitamin D 1000 units capsule      TAKE 2 CAPSULES BY MOUTH EVERY MORNING

## 2018-10-11 NOTE — NURSING NOTE
"FOLLOW-UP VISIT    Patient Name: Ashleigh Alonzo      : 1960     Age: 58 year old        ______________________________________________________________________________     Chief Complaint   Patient presents with     Cancer     Radiation Oncology visit with Dr. Zhong     /79 (BP Location: Left arm, Patient Position: Sitting, Cuff Size: Adult Regular)  Pulse 102  Temp 98.4  F (36.9  C) (Oral)  Resp 18  Ht 5' 3\"  Wt 149 lb 14.2 oz  SpO2 97%  BMI 26.55 kg/m2     Date Radiation Completed: 5,256 cGy to right breast completed on 2018.     Pain  Denies    Labs  Other Labs: No    Imaging  None          Other Appointments:     MD Name: Dr. Cihlel Appointment Date: 2018   MD Name: Appointment Date:   MD Name: Appointment Date:   Other Appointment Notes:             Nurse face-to-face time: Level 2:  5 min face to face time        "

## 2018-10-11 NOTE — MR AVS SNAPSHOT
After Visit Summary   10/11/2018    Ashleigh Alonzo    MRN: [id number]           Patient Information     Date Of Birth          1960        Visit Information        Provider Department      10/11/2018 2:00 PM PFT LAB [facility]        Today's Diagnoses     Pulmonary drug toxicity, accidental or unintentional, subsequent encounter        Centrilobular emphysema (H)           Follow-ups after your visit        Your next 10 appointments already scheduled     Nov 12, 2018 11:45 AM CST   (Arrive by 11:30 AM)   Return Visit with Fara Bradshaw MD   [facility])    [address] [place] [address]  [address]  [facility] [address] [phone]              Future tests that were ordered for you today     Open Future Orders        Priority Expected Expires Ordered    CT Chest w/o Contrast Routine  10/11/2019 10/11/2018            Who to contact     If you have questions or need follow up information about today's clinic visit or your schedule please contact [facility] directly at [phone].  Normal or non-critical lab and imaging results will be communicated to you by Robert Applebaum MDhart, letter or phone within 4 business days after the clinic has received the results. If you do not hear from us within 7 days, please contact the clinic through Robert Applebaum MDhart or phone. If you have a critical or abnormal lab result, we will notify you by phone as soon as possible.  Submit refill requests through [employer] or call your pharmacy and they will forward the refill request to us. Please allow 3 business days for your refill to be completed.          Additional Information About Your Visit        Robert Applebaum MDhart Information     [employer] gives you secure access to your electronic health record. If you see a primary care provider, you can also send messages to your care team and make appointments. If you have questions, please call your primary  [facility] clinic.  If you do not have a primary care provider, please call [phone] and they will assist you.      [employer] is an electronic gateway that provides easy, online access to your medical records. With [employer], you can request a clinic appointment, read your test results, renew a prescription or communicate with your care team.     To access your existing account, please contact your [facility] or call [phone] for assistance.        Care EveryWhere ID     This is your Care EveryWhere ID. This could be used by other organizations to access your [place] medical records  [id number]         Blood Pressure from Last 3 Encounters:   10/11/18 122/79   10/11/18 122/79   09/20/18 112/72    Weight from Last 3 Encounters:   10/11/18 68 kg (149 lb 14.2 oz)   10/11/18 67.2 kg (148 lb 3.2 oz)   09/20/18 66.5 kg (146 lb 9.6 oz)              We Performed the Following     General PFT Lab (Please always keep checked)     HC DIFFUSING CAPACITY     HC PLETHYSMOGRAPHY LUNG VOLUMES W/WO AIRWAY RESIST     Pulmonary Function Test     RESPIRATORY FLOW VOLUME LOOP        Primary Care Provider Office Phone # Fax #    Radha Cazares [phone]       [address] [facility] [address]        Equal Access to Services     THERESA HUGHES : Hadii aad ku hadasho Soomaali, waaxda luqadaha, qaybta kaalmada adeegyada, waxay idiin hayaan kevin pickering. So [facility] [phone].    ATENCIÓN: Si habla español, tiene a blackman disposición servicios gratuitos de asistencia lingüística. Llame al [phone].    We comply with applicable federal civil rights laws and [place] laws. We do not discriminate on the basis of race, color, national origin, age, disability, sex, sexual orientation, or gender identity.            Thank you!     Thank you for choosing [facility]  for your care. Our goal is always to provide you with excellent care. Hearing back from  our patients is one way we can continue to improve our services. Please take a few minutes to complete the written survey that you may receive in the mail after your visit with us. Thank you!             Your Updated Medication List - Protect others around you: Learn how to safely use, store and throw away your medicines at www.disposemymeds.org.          This list is accurate as of 10/11/18  2:04 PM.  Always use your most recent med list.                   Brand Name Dispense Instructions for use Diagnosis    albuterol 108 (90 Base) MCG/ACT inhaler    PROAIR HFA/PROVENTIL HFA/VENTOLIN HFA    1 Inhaler    Inhale 2 puffs into the lungs every 6 hours as needed for shortness of breath / dyspnea    Chronic obstructive pulmonary disease, unspecified COPD type (H)       ASPIRIN PO      Take 81 mg by mouth daily        guaiFENesin 600 MG 12 hr tablet    MUCINEX    180 tablet    Take 2 tablets (1,200 mg) by mouth 2 times daily    Cough with sputum       hydrOXYzine 25 MG tablet    ATARAX    60 tablet    Take 1-2 tablets (25-50 mg) by mouth every 6 hours as needed for itching    Urticaria       order for DME     1 Device    autoCPAP 5-18 cmH20    MERLIN (obstructive sleep apnea)       simvastatin 40 MG tablet    ZOCOR    90 tablet    Take 1 tablet (40 mg) by mouth At Bedtime    Hyperlipidemia LDL goal <130       tiotropium 18 MCG capsule    SPIRIVA    1 capsule    Inhale 1 capsule (18 mcg) into the lungs daily Inhale contents of one capsule    Chronic obstructive pulmonary disease, unspecified COPD type (H)       vitamin D 1000 units capsule      TAKE 2 CAPSULES BY MOUTH EVERY MORNING

## 2018-10-11 NOTE — ADDENDUM NOTE
Encounter addended by: Lisa Blanchard PT on: 10/11/2018  2:44 PM<BR>     Actions taken: Sign clinical note, Flowsheet accepted

## 2018-10-11 NOTE — LETTER
10/11/2018         RE: Ashleigh Alonzo  [address] [place] [address]        Dear Colleague,    Thank you for referring your patient, Ashleigh Alonzo, to the [facility]. Please see a copy of my visit note below.    Visit Date:   10/11/2018      CHIEF COMPLAINT:  COPD.      HISTORY OF PRESENT ILLNESS:  The patient is a 58-year-old woman previously followed by my colleague, Dr. Singh.  She was followed by Dr. Singh for COPD and pulmonary nodules.  Serial CT imaging had revealed stability of the pulmonary nodules indicating likely benign etiology.  She has been taking tiotropium for COPD.  Unfortunately, she was diagnosed with breast cancer last year.  She was started on chemotherapy and had several clinic visits and hospitalizations for neutropenic fever.  In 02/2018 during an admission for neutropenic fever, she was found to have bilateral perivascular consolidations on CT imaging.  Pulmonary consult was obtained.  She had a bronchoscopy which showed 85 white cells, all cultures were negative.  She was started on high-dose methylprednisolone and eventually just discharged on prednisone.  This was slowly tapered prior to her mastectomy.  By the time of followup for the mastectomy, respiratory symptoms had resolved as had neutropenia.  Presumed etiology for the peribronchovascular consolidations was the chemotherapeutic agent pembrolizumab.        She has been doing relatively well recently.  She has minimal shortness of breath symptoms.  She went with a friend to be [place], [place] last week and was able to walk around all day without stopping until she had to walk uphill to her car.  At that time, she had to stop once en route to catch her breath.  She does continue to take tiotropium.  She has an albuterol inhaler, but uses it only rarely.  She has a radiation oncology visit scheduled for after this visit.  She is also asking about a booster for her shingles vaccination.       PAST MEDICAL HISTORY:   1.  Breast cancer 2018.   2.  Depression.   3.  Diabetes.   4.  Hypertension.   5.  COPD.      FAMILY HISTORY:  She has a sister with arthritis.   Two daughters.  Mother is alive.  Father is  with an MI at age 46.      REVIEW OF SYSTEMS:  A full 14-point review of systems was done and is negative except as noted above in the HPI.      PHYSICAL EXAMINATION:   VITAL SIGNS:  123/79, pulse is 102, respiratory rate is 18, SpO2 is 97% on room air.  BMI 26.25.   GENERAL APPEARANCE:  Appears stated age, no distress.   EYES:  PERRL.  No conjunctival injections.   HEENT:  Nasal mucosa is within normal limits.  Mouth:   Oral mucosa is moist, no posterior oropharyngeal erythema or exudate.   RESPIRATORY:  Good air movement bilaterally.  No wheezes, rales or rhonchi.   CARDIAC:  Regular rate and rhythm, normal S1, S2, with no murmurs, rubs, or gallops.   MUSCULOSKELETAL:  No clubbing or cyanosis.   SKIN:  No rash on limited exam.      RESULTS:  Pulmonary function testing from 2016 shows moderate obstruction on spirometry, normal lung volumes, normal DLCO.      IMAGING:  Reviewed.  Most recent dedicated chest CT scan is from 2018, shows mildly decreased upper lobe predominant peribronchovascular ground-glass opacities consistent with organizing pneumonia, possibly secondary to drug reaction versus chronic interstitial pneumonia.  Unchanged mild compression deformities of T11 and L1.      ASSESSMENT AND PLAN:  The patient is a 58-year-old woman here for followup of complex pulmonary issues.     1.  Chronic obstructive pulmonary disease.  The patient has a previous diagnosis of COPD.  She quit smoking in .  She has been on tiotropium only and has minimal COPD symptoms.  She is on reasonable medications to manage her COPD and needs no additional interventions at this time  specifically for COPD.       2.  Pulmonary nodules: She has a remote history of pulmonary nodules, have been stable on  prior imaging indicating likely benign etiology.  Given duration of time since quitting smoking, she is no longer eligible for lung cancer screening with low-dose CT scan.     3.  Drug reaction/pneumonitis possibly related to pembrolizumab, treated with high-dose steroids.  Over the course of followup between February and 2018, there was significant improvement in imaging abnormalities.  She has not had recent pulmonary function testing.  It may be reasonable to repeat PFTs at this time.  Given her lack of symptoms, I think that ongoing steroids or intervention for potential drug toxicity is unnecessary.  Would consider repeat CT scan in 2019, one year following most recent CT scan to evaluate for stability of pulmonary parenchymal abnormalities.  At that time I will follow up with her in clinic.         SARA CARRANZA MD             D: 10/11/2018   T: 10/11/2018   MT: JOLANTA      Name:     YUNI CALIXTO   MRN:      [id number]        Account:      [id number]   :      1960           Visit Date:   10/11/2018      Document: [id number]        Again, thank you for allowing me to participate in the care of your patient.        Sincerely,        Sara Carranza MD

## 2018-10-12 ENCOUNTER — TELEPHONE (OUTPATIENT)
Dept: FAMILY MEDICINE | Facility: CLINIC | Age: 58
End: 2018-10-12

## 2018-10-12 DIAGNOSIS — Z23 NEED FOR SHINGLES VACCINE: Primary | ICD-10-CM

## 2018-10-15 LAB
DLCOUNC-%PRED-PRE: 76 %
DLCOUNC-PRE: 16.39 ML/MIN/MMHG
DLCOUNC-PRED: 21.5 ML/MIN/MMHG
ERV-%PRED-PRE: 58 %
ERV-PRE: 0.46 L
ERV-PRED: 0.78 L
EXPTIME-PRE: 7.48 SEC
FEF2575-%PRED-PRE: 41 %
FEF2575-PRE: 0.96 L/SEC
FEF2575-PRED: 2.3 L/SEC
FEFMAX-%PRED-PRE: 85 %
FEFMAX-PRE: 5.34 L/SEC
FEFMAX-PRED: 6.26 L/SEC
FEV1-%PRED-PRE: 66 %
FEV1-PRE: 1.65 L
FEV1FEV6-PRE: 69 %
FEV1FEV6-PRED: 81 %
FEV1FVC-PRE: 69 %
FEV1FVC-PRED: 78 %
FEV1SVC-PRE: 69 %
FEV1SVC-PRED: 77 %
FIFMAX-PRE: 3.49 L/SEC
FRCPLETH-%PRED-PRE: 97 %
FRCPLETH-PRE: 2.58 L
FRCPLETH-PRED: 2.66 L
FVC-%PRED-PRE: 76 %
FVC-PRE: 2.41 L
FVC-PRED: 3.13 L
IC-%PRED-PRE: 79 %
IC-PRE: 1.93 L
IC-PRED: 2.43 L
RVPLETH-%PRED-PRE: 115 %
RVPLETH-PRE: 2.13 L
RVPLETH-PRED: 1.84 L
TLCPLETH-%PRED-PRE: 93 %
TLCPLETH-PRE: 4.51 L
TLCPLETH-PRED: 4.81 L
VA-%PRED-PRE: 74 %
VA-PRE: 3.67 L
VC-%PRED-PRE: 74 %
VC-PRE: 2.38 L
VC-PRED: 3.21 L

## 2018-11-09 NOTE — PROGRESS NOTES
Oncology On Treatment Visit:  Date on this visit: 11/12/2018    Diagnosis:  Stage IIb, T2N0M0, grade 3 triple negative cancer of the right breast.    Primary Physician: Radha Cazares     History Of Present Illness:  Ms. Alonzo is a 58-year-old female with h/o stage IIb, T2 N0 M0, grade 3, triple-negative invasive carcinoma of the right breast.  Routine screening mammogram on 07/27/2017 showed developing calcifications in the right breast at the 12 o'clock position 6 cm from the nipple.  Ultrasound demonstrated a 7-mm, irregular, hypoechoic mass at the 12 o'clock position.  Contrast-enhanced mammogram showed a peripherally enhancing, irregular mass measuring 1.9 cm as well as an additional 6-mm, enhancing focus anteromedial to the dominant mass.  The total area of abnormal enhancement on contrast mammogram measured up to 3.4 cm.  Right breast biopsy demonstrated a grade 3 invasive mammary carcinoma with associated high-grade DCIS.  Estrogen and progesterone receptor staining were negative.  HER2 was non-amplified by FISH.  Breast MRI measured the biopsy proven breast cancer at 3.2 cm.    Ms. Alonzo enrolled in the ISPY-2 clinical trial.  She began treatment with weekly taxol and once every 3 week pembrolizumab on 9/20/17.  Her course was complicated by pneumonitis and hepatitis.  Pembrolizumab was stopped and she resumed weekly taxol alone on 11/28/17.  She completed a total of 12 weeks of therapy on 12/26/17.  She completed 2 cycles of adriamycin and cyclophosphamide.  She was hospitalized both cycles due to neutropenic fever and also developed C. Difficile colitis.  Decision was made to forgo further chemotherapy.       Right breast lumpectomy and sentinel lymph node procedure on 2/27/18 showed a grade 2, 6 mm residual invasive mammary carcinoma with an associated 8 mm area of high grade DCIS with comedonecrosis and ADH.  Invasive tumor cellularity was 10%.  There was lymphovascular invasion.  Surgical margins  were negative.  A single sentinel lymph node was benign.  Classified as MDA RCB I.  She completed adjuvant radiation (4256 cGy to the right breast with additional 1000 cGy to the lumpectomy cavity) on 4/27/18.     Interval History:  Ms. Alonzo comes in to clinic today for routine breast cancer followup.  Most bothersome to her at this time remains fatigue.  She is working full-time.  She states this last month was the first time that she has not missed a day of work since her breast cancer treatment.  However, she has days where she is completely wiped out and feels almost the inability to function.  This has been present since her breast cancer treatment.  Since our last visit, she had a sleep study and it was recommended she either sleep with a CPAP or have a dental device made.  She elected for the dental device.  She has been using it for 2 weeks now and has not noted any improvement in sleep.  She is going to have a repeat sleep study performed with the device.  She denies cough, shortness of breath or chest pain; however, admits that she does not intentionally exercise and gets very little walking in her daily routine.  She reports a more nodular area in the outside of the right breast that is tender.  She denies other breast masses.  She has no concerning abdominal complaints.  She has no headaches, visual changes or focal neurologic complaints.  She denies new bone or joint aches or pains.  She has many questions today involving her future risk of recurrence, her future risk of new breast cancers and the atypical ductal hyperplasia that we talked about at last visit.  The remainder of a complete 12-point review of systems was reviewed and was otherwise negative.      Past Medical/Surgical History:  Past Medical History:   Diagnosis Date     Acute cystitis without hematuria 10/26/2017     Clostridium difficile diarrhea 1/12/2018     Neutropenic fever (H) 01/10/2018     She was hospitalized 1/10/18 - 1/13/18  with neutropenic fever     Neutropenic sepsis (H) 01/29/2018    hospitalized again  from 1/29/18 - 2/8/18 with neutropenic fever, mucositis, and C. difficile colitis      Pneumonia 11/11/2017    She was hospitalized 11/11/17 - 11/17/17 with fevers ranging from 102 - 105.  CT chest was consistent with pneumonitis.  She also had transaminitis in the 150 range.  She was started on corticosteroids.  Pembrolizumab was stopped      S/P radiation therapy     5,256 cGy completed on 4/27/2018 to [facility] with Dr. Zhong     Past Surgical History:   Procedure Laterality Date     APPENDECTOMY  10/06/2015    [facility]     BREAST BIOPSY, CORE RT/LT Right 08/22/2017     BRONCHOSCOPY (RIGID OR FLEXIBLE), DIAGNOSTIC N/A 2/6/2018    Procedure: COMBINED BRONCHOSCOPY (RIGID OR FLEXIBLE), LAVAGE;  COMBINED BRONCOSCOY (RIGID OR FLEXIBLE), LAVAGE;  Surgeon: Samir Pettit MD;  Location: [facility]     CLEAR LENS REPLACEMENT Bilateral 11/2016     COLONOSCOPY  11/15/2011    Procedure:COLONOSCOPY; Colonoscopy, screening; Surgeon:ANASTASIA BUNCH; Location:[facility] OR     INSERT PORT VASCULAR ACCESS Left 9/1/2017    Procedure: INSERT PORT VASCULAR ACCESS;  Single Lumen Chest Power Port;  Surgeon: Leif Parkinson PA-C;  [facility]     LUMPECTOMY BREAST WITH SENTINEL NODE, COMBINED Right 2/27/2018    Procedure: COMBINED LUMPECTOMY BREAST WITH SENTINEL NODE;  Right Wire Localized Lumpectomy, Right [place] Lymph Node Biopsy And Freddy Cath Removal;  Surgeon: Atul Gates MD;  Location: [facility]     REMOVE PORT VASCULAR ACCESS N/A 2/27/2018    Procedure: REMOVE PORT VASCULAR ACCESS;;  Surgeon: Atul Gates MD;  [facility]     TUBAL LIGATION  12/2004     Allergies:  Allergies as of 11/12/2018     (No Known Allergies)     Current Medications:  Current Outpatient Prescriptions   Medication Sig Dispense Refill     albuterol (PROAIR HFA/PROVENTIL HFA/VENTOLIN HFA) 108 (90 Base) MCG/ACT inhaler Inhale 2  "puffs into the lungs every 6 hours as needed for shortness of breath / dyspnea 1 Inhaler 5     ASPIRIN PO Take 81 mg by mouth daily       guaiFENesin (MUCINEX) 600 MG 12 hr tablet Take 2 tablets (1,200 mg) by mouth 2 times daily 180 tablet 6     hydrOXYzine (ATARAX) 25 MG tablet Take 1-2 tablets (25-50 mg) by mouth every 6 hours as needed for itching 60 tablet 3     order for DME autoCPAP 5-18 cmH20 1 Device 0     simvastatin (ZOCOR) 40 MG tablet Take 1 tablet (40 mg) by mouth At Bedtime 90 tablet 4     tiotropium (SPIRIVA) 18 MCG capsule Inhale 1 capsule (18 mcg) into the lungs daily Inhale contents of one capsule 1 capsule 11     VITAMIN D 1000 UNIT OR CAPS TAKE 2 CAPSULES BY MOUTH EVERY MORNING        Family and Social History:  Reviewed and unchanged from prior.  Please see initial consultation dated 8/28/17 for further details.    Physical Exam:  /85  Pulse 111  Temp 96.7  F (35.9  C) (Tympanic)  Resp 16  Ht 1.6 m (5' 3\")  Wt 67.3 kg (148 lb 5 oz)  SpO2 98%  Breastfeeding? No  BMI 26.27 kg/m2  General:  Well appearing, well nourished adult female in NAD.    HEENT:  Normocephalic.  Sclera anicteric.  MMM.  No lesions of the oropharynx.  Lymph:  No palpable cervical, supraclavicular, or axillary LAD.    Chest:  Lungs are CTA bilaterally.  No wheezes or crackles.  Breast exam:  Bilateral breasts are of normal fibroglandular density.  There is a subcentimeter mass palpable inferomedial to the upper outer breast incision.  No other discretely palpable masses in either breast.  Bilateral nipples are everted.  CV:  Tachycardic.  Regular rhythm.  Nl S1 and S2.  No m/r/g.  Abd:  Soft/NT/ND.  BSs normoactive.  No hepatosplenomegaly.  Ext:  No pitting edema of the bilateral lower extremities.  Pulses 2+ and symmetric.  Musculo:  Strength 5/5 throughout.  Neuro:  Cranial nerves grossly intact.  Gait stable.  Psych:  Mood and affect appear normal.  Skin:  No visible concerning skin rashes or skin " lesions    Laboratory/Imaging Studies:  8/2018 DEXA:  Lowest T-score of -2.0    ASSESSMENT/PLAN:  Ms. Alonzo is a 58-year-old female with a h/o grade 3, T2N0M0, triple-negative infiltrating ductal carcinoma of the right breast s/p 12 weeks of Taxol along with 3 cycles pembrolizumab, 2 cycles of adriamycin and cyclophosphamide, lumpectomy, and radiation.     1.  Right breast cancer:   Ms. Alonzo is approximately 8.5 months out from excision of a right breast cancer.  She has a subcentimeter mass inferomedial to her right breast lumpectomy site.  I suspect this is surgical change, however, will obtain a right breast diagnostic mammogram and ultrasound today to evaluate further.  As long as imaging is without concerning findings, I will see her back in clinic in 3 months.    She had numerous questions regarding risk of recurrence of her previous breast cancer, risk of new breast cancers, prophylactic mastectomies, an d screening moving forward.  We reviewed that based on the amount of residual disease at the time of surgery, her risk of recurrence of the previous breast cancer is approximately 15-20% in the next five years.  We reviewed that her lumpectomy specimen also showed atypical ductal hyperplasia which is a marker of risk for new breast cancers.  This risk is approximately 1% per year, or 10% in 10 years, 20% in 20 years, etc. Due to a h/o ADH, I have recommended high risk screening with both annual mammogram and breast MRI, spacing the two studies so that she is having some form of imaging every 6 months.  Will plan to obtain a breast MRI around 01/2019.  We discussed that quality of life after mastectomy has been shown to be inferior to lumpectomy due to a number of reasons, and therefore I would not necessarily encourage mastectomies at this time.    2.  Fatigue:  Likely treatment related.  S/p cancer rehab.  Will have repeat sleep study with new dental device.  We will fill out paperwork for her for work as  she is still requiring an occasional day off due to fatigue.  Encouraged her to start walking 20-30 minutes daily.    3.  Pneumonitis:  Chronic COPD.  Had pneumonitis secondary to pembrolizumab during breast cancer treatment.  Last imaging in 07/2018 with improving bilateral apical infiltrates.  Symptoms and PFTs also improving.  Seen by pulmonology and repeat CT in 07/2019 planned to re-evaluate pulmonary infiltrates.    4.  Neuropathy:  Grade I.  She declines cymbalta.  Gabapentin unlikely to be helpful given numbness and not pain, and may have potential side effect of drowsiness.      5.  Routine health maintenance:  Colonoscopy in 2011 was without concerning findings, next due in 2021.  Continue annual skin examinations and pap smears per recommendation of PCP.      6.  Followup:  Labs, MRI breast and visit with me in approximately 3 months.    It was a pleasure to meet with Ashleigh Perera, her , and her daughter in clinic today.  They had multiple questions which were answered to their satisfaction.  A total of 30 minutes of our 40 minute face to face visit was spent in counseling.

## 2018-11-12 ENCOUNTER — RADIANT APPOINTMENT (OUTPATIENT)
Dept: MAMMOGRAPHY | Facility: CLINIC | Age: 58
End: 2018-11-12
Attending: INTERNAL MEDICINE
Payer: COMMERCIAL

## 2018-11-12 ENCOUNTER — ONCOLOGY VISIT (OUTPATIENT)
Dept: ONCOLOGY | Facility: CLINIC | Age: 58
End: 2018-11-12
Attending: INTERNAL MEDICINE
Payer: COMMERCIAL

## 2018-11-12 VITALS
DIASTOLIC BLOOD PRESSURE: 85 MMHG | WEIGHT: 148.31 LBS | HEIGHT: 63 IN | OXYGEN SATURATION: 98 % | HEART RATE: 111 BPM | SYSTOLIC BLOOD PRESSURE: 130 MMHG | BODY MASS INDEX: 26.28 KG/M2 | RESPIRATION RATE: 16 BRPM | TEMPERATURE: 96.7 F

## 2018-11-12 DIAGNOSIS — N63.0 LUMP OR MASS IN BREAST: ICD-10-CM

## 2018-11-12 DIAGNOSIS — Z17.1 MALIGNANT NEOPLASM OF UPPER-INNER QUADRANT OF RIGHT BREAST IN FEMALE, ESTROGEN RECEPTOR NEGATIVE (H): Primary | ICD-10-CM

## 2018-11-12 DIAGNOSIS — Z12.39 BREAST CANCER SCREENING, HIGH RISK PATIENT: ICD-10-CM

## 2018-11-12 DIAGNOSIS — C50.211 MALIGNANT NEOPLASM OF UPPER-INNER QUADRANT OF RIGHT BREAST IN FEMALE, ESTROGEN RECEPTOR NEGATIVE (H): Primary | ICD-10-CM

## 2018-11-12 DIAGNOSIS — Z87.42 HISTORY OF ATYPICAL HYPERPLASIA OF BREAST: ICD-10-CM

## 2018-11-12 PROCEDURE — G0463 HOSPITAL OUTPT CLINIC VISIT: HCPCS | Mod: ZF

## 2018-11-12 PROCEDURE — 99215 OFFICE O/P EST HI 40 MIN: CPT | Mod: ZP | Performed by: INTERNAL MEDICINE

## 2018-11-12 ASSESSMENT — PAIN SCALES - GENERAL: PAINLEVEL: NO PAIN (0)

## 2018-11-12 NOTE — NURSING NOTE
"Oncology Rooming Note    November 12, 2018 11:55 AM   Ashleigh Alonzo is a 58 year old female who presents for:    Chief Complaint   Patient presents with     Oncology Clinic Visit     Return: Breast Ca     Initial Vitals: /85  Pulse 111  Temp 96.7  F (35.9  C) (Tympanic)  Resp 16  Ht 1.6 m (5' 3\")  Wt 67.3 kg (148 lb 5 oz)  SpO2 98%  Breastfeeding? No  BMI 26.27 kg/m2 Estimated body mass index is 26.27 kg/(m^2) as calculated from the following:    Height as of this encounter: 1.6 m (5' 3\").    Weight as of this encounter: 67.3 kg (148 lb 5 oz). Body surface area is 1.73 meters squared.  No Pain (0) Comment: Data Unavailable   No LMP recorded. Patient is postmenopausal.  Allergies reviewed: Yes  Medications reviewed: Yes    Medications: Medication refills not needed today.  Pharmacy name entered into [facility]:    [facility] [place], [place] - [address] [facility] - [place], [place] - [address] [address]    Clinical concerns: no new concerns Dr. Bradshaw was notified.    10 minutes for nursing intake (face to face time)     Britney Reid CMA              "

## 2018-11-12 NOTE — LETTER
11/12/2018       RE: Ashleigh Alonzo  [address] [place] [address]     Dear Colleague,    Thank you for referring your patient, Ashleigh Alonzo, to the [facility]. Please see a copy of my visit note below.    Oncology On Treatment Visit:  Date on this visit: 11/12/2018    Diagnosis:  Stage IIb, T2N0M0, grade 3 triple negative cancer of the right breast.    Primary Physician: Radha Cazares     History Of Present Illness:  Ms. Alonzo is a 58-year-old female with h/o stage IIb, T2 N0 M0, grade 3, triple-negative invasive carcinoma of the right breast.  Routine screening mammogram on 07/27/2017 showed developing calcifications in the right breast at the 12 o'clock position 6 cm from the nipple.  Ultrasound demonstrated a 7-mm, irregular, hypoechoic mass at the 12 o'clock position.  Contrast-enhanced mammogram showed a peripherally enhancing, irregular mass measuring 1.9 cm as well as an additional 6-mm, enhancing focus anteromedial to the dominant mass.  The total area of abnormal enhancement on contrast mammogram measured up to 3.4 cm.  Right breast biopsy demonstrated a grade 3 invasive mammary carcinoma with associated high-grade DCIS.  Estrogen and progesterone receptor staining were negative.  HER2 was non-amplified by FISH.  Breast MRI measured the biopsy proven breast cancer at 3.2 cm.    Ms. Alonzo enrolled in the ISPY-2 clinical trial.  She began treatment with weekly taxol and once every 3 week pembrolizumab on 9/20/17.  Her course was complicated by pneumonitis and hepatitis.  Pembrolizumab was stopped and she resumed weekly taxol alone on 11/28/17.  She completed a total of 12 weeks of therapy on 12/26/17.  She completed 2 cycles of adriamycin and cyclophosphamide.  She was hospitalized both cycles due to neutropenic fever and also developed C. Difficile colitis.  Decision was made to forgo further chemotherapy.       Right breast lumpectomy and sentinel lymph node  procedure on 2/27/18 showed a grade 2, 6 mm residual invasive mammary carcinoma with an associated 8 mm area of high grade DCIS with comedonecrosis and ADH.  Invasive tumor cellularity was 10%.  There was lymphovascular invasion.  Surgical margins were negative.  A single sentinel lymph node was benign.  Classified as MDA RCB I.  She completed adjuvant radiation (4256 cGy to the right breast with additional 1000 cGy to the lumpectomy cavity) on 4/27/18.     Interval History:  Ms. Alonzo comes in to clinic today for routine breast cancer followup.  Most bothersome to her at this time remains fatigue.  She is working full-time.  She states this last month was the first time that she has not missed a day of work since her breast cancer treatment.  However, she has days where she is completely wiped out and feels almost the inability to function.  This has been present since her breast cancer treatment.  Since our last visit, she had a sleep study and it was recommended she either sleep with a CPAP or have a dental device made.  She elected for the dental device.  She has been using it for 2 weeks now and has not noted any improvement in sleep.  She is going to have a repeat sleep study performed with the device.  She denies cough, shortness of breath or chest pain; however, admits that she does not intentionally exercise and gets very little walking in her daily routine.  She reports a more nodular area in the outside of the right breast that is tender.  She denies other breast masses.  She has no concerning abdominal complaints.  She has no headaches, visual changes or focal neurologic complaints.  She denies new bone or joint aches or pains.  She has many questions today involving her future risk of recurrence, her future risk of new breast cancers and the atypical ductal hyperplasia that we talked about at last visit.  The remainder of a complete 12-point review of systems was reviewed and was otherwise negative.       Past Medical/Surgical History:  Past Medical History:   Diagnosis Date     Acute cystitis without hematuria 10/26/2017     Clostridium difficile diarrhea 1/12/2018     Neutropenic fever (H) 01/10/2018     She was hospitalized 1/10/18 - 1/13/18 with neutropenic fever     Neutropenic sepsis (H) 01/29/2018    hospitalized again  from 1/29/18 - 2/8/18 with neutropenic fever, mucositis, and C. difficile colitis      Pneumonia 11/11/2017    She was hospitalized 11/11/17 - 11/17/17 with fevers ranging from 102 - 105.  CT chest was consistent with pneumonitis.  She also had transaminitis in the 150 range.  She was started on corticosteroids.  Pembrolizumab was stopped      S/P radiation therapy     5,256 cGy completed on 4/27/2018 to [facility] with Dr. Zhong     Past Surgical History:   Procedure Laterality Date     APPENDECTOMY  10/06/2015    [facility]     BREAST BIOPSY, CORE RT/LT Right 08/22/2017     BRONCHOSCOPY (RIGID OR FLEXIBLE), DIAGNOSTIC N/A 2/6/2018    Procedure: COMBINED BRONCHOSCOPY (RIGID OR FLEXIBLE), LAVAGE;  COMBINED BRONCOSCOY (RIGID OR FLEXIBLE), LAVAGE;  Surgeon: Samir Pettit MD;  Location: [facility]     CLEAR LENS REPLACEMENT Bilateral 11/2016     COLONOSCOPY  11/15/2011    Procedure:COLONOSCOPY; Colonoscopy, screening; Surgeon:ANASTASIA BUNCH; Location:[facility]     INSERT PORT VASCULAR ACCESS Left 9/1/2017    Procedure: INSERT PORT VASCULAR ACCESS;  Single Lumen Chest Power Port;  Surgeon: Leif Parkinson PA-C;  [facility]     LUMPECTOMY BREAST WITH SENTINEL NODE, COMBINED Right 2/27/2018    Procedure: COMBINED LUMPECTOMY BREAST WITH SENTINEL NODE;  Right Wire Localized Lumpectomy, Right [place] Lymph Node Biopsy And Freddy Cath Removal;  Surgeon: Atul Gates MD;  [facility]     REMOVE PORT VASCULAR ACCESS N/A 2/27/2018    Procedure: REMOVE PORT VASCULAR ACCESS;;  Surgeon: Atul Gates MD;  [facility]     TUBAL LIGATION  12/2004  "    Allergies:  Allergies as of 11/12/2018     (No Known Allergies)     Current Medications:  Current Outpatient Prescriptions   Medication Sig Dispense Refill     albuterol (PROAIR HFA/PROVENTIL HFA/VENTOLIN HFA) 108 (90 Base) MCG/ACT inhaler Inhale 2 puffs into the lungs every 6 hours as needed for shortness of breath / dyspnea 1 Inhaler 5     ASPIRIN PO Take 81 mg by mouth daily       guaiFENesin (MUCINEX) 600 MG 12 hr tablet Take 2 tablets (1,200 mg) by mouth 2 times daily 180 tablet 6     hydrOXYzine (ATARAX) 25 MG tablet Take 1-2 tablets (25-50 mg) by mouth every 6 hours as needed for itching 60 tablet 3     order for DME autoCPAP 5-18 cmH20 1 Device 0     simvastatin (ZOCOR) 40 MG tablet Take 1 tablet (40 mg) by mouth At Bedtime 90 tablet 4     tiotropium (SPIRIVA) 18 MCG capsule Inhale 1 capsule (18 mcg) into the lungs daily Inhale contents of one capsule 1 capsule 11     VITAMIN D 1000 UNIT OR CAPS TAKE 2 CAPSULES BY MOUTH EVERY MORNING        Family and Social History:  Reviewed and unchanged from prior.  Please see initial consultation dated 8/28/17 for further details.    Physical Exam:  /85  Pulse 111  Temp 96.7  F (35.9  C) (Tympanic)  Resp 16  Ht 1.6 m (5' 3\")  Wt 67.3 kg (148 lb 5 oz)  SpO2 98%  Breastfeeding? No  BMI 26.27 kg/m2  General:  Well appearing, well nourished adult female in NAD.    HEENT:  Normocephalic.  Sclera anicteric.  MMM.  No lesions of the oropharynx.  Lymph:  No palpable cervical, supraclavicular, or axillary LAD.    Chest:  Lungs are CTA bilaterally.  No wheezes or crackles.  Breast exam:  Bilateral breasts are of normal fibroglandular density.  There is a subcentimeter mass palpable inferomedial to the upper outer breast incision.  No other discretely palpable masses in either breast.  Bilateral nipples are everted.  CV:  Tachycardic.  Regular rhythm.  Nl S1 and S2.  No m/r/g.  Abd:  Soft/NT/ND.  BSs normoactive.  No hepatosplenomegaly.  Ext:  No pitting edema of " the bilateral lower extremities.  Pulses 2+ and symmetric.  Musculo:  Strength 5/5 throughout.  Neuro:  Cranial nerves grossly intact.  Gait stable.  Psych:  Mood and affect appear normal.  Skin:  No visible concerning skin rashes or skin lesions    Laboratory/Imaging Studies:  8/2018 DEXA:  Lowest T-score of -2.0    ASSESSMENT/PLAN:  Ms. Alonzo is a 58-year-old female with a h/o grade 3, T2N0M0, triple-negative infiltrating ductal carcinoma of the right breast s/p 12 weeks of Taxol along with 3 cycles pembrolizumab, 2 cycles of adriamycin and cyclophosphamide, lumpectomy, and radiation.     1.  Right breast cancer:   Ms. Alonzo is approximately 8.5 months out from excision of a right breast cancer.  She has a subcentimeter mass inferomedial to her right breast lumpectomy site.  I suspect this is surgical change, however, will obtain a right breast diagnostic mammogram and ultrasound today to evaluate further.  As long as imaging is without concerning findings, I will see her back in clinic in 3 months.    She had numerous questions regarding risk of recurrence of her previous breast cancer, risk of new breast cancers, prophylactic mastectomies, an d screening moving forward.  We reviewed that based on the amount of residual disease at the time of surgery, her risk of recurrence of the previous breast cancer is approximately 15-20% in the next five years.  We reviewed that her lumpectomy specimen also showed atypical ductal hyperplasia which is a marker of risk for new breast cancers.  This risk is approximately 1% per year, or 10% in 10 years, 20% in 20 years, etc. Due to a h/o ADH, I have recommended high risk screening with both annual mammogram and breast MRI, spacing the two studies so that she is having some form of imaging every 6 months.  Will plan to obtain a breast MRI around 01/2019.  We discussed that quality of life after mastectomy has been shown to be inferior to lumpectomy due to a number of reasons,  and therefore I would not necessarily encourage mastectomies at this time.    2.  Fatigue:  Likely treatment related.  S/p cancer rehab.  Will have repeat sleep study with new dental device.  We will fill out paperwork for her for work as she is still requiring an occasional day off due to fatigue.  Encouraged her to start walking 20-30 minutes daily.    3.  Pneumonitis:  Chronic COPD.  Had pneumonitis secondary to pembrolizumab during breast cancer treatment.  Last imaging in 07/2018 with improving bilateral apical infiltrates.  Symptoms and PFTs also improving.  Seen by pulmonology and repeat CT in 07/2019 planned to re-evaluate pulmonary infiltrates.    4.  Neuropathy:  Grade I.  She declines cymbalta.  Gabapentin unlikely to be helpful given numbness and not pain, and may have potential side effect of drowsiness.      5.  Routine health maintenance:  Colonoscopy in 2011 was without concerning findings, next due in 2021.  Continue annual skin examinations and pap smears per recommendation of PCP.      6.  Followup:  Labs, MRI breast and visit with me in approximately 3 months.    It was a pleasure to meet with Ashleigh Perera, her , and her daughter in clinic today.  They had multiple questions which were answered to their satisfaction.  A total of 30 minutes of our 40 minute face to face visit was spent in counseling.      Again, thank you for allowing me to participate in the care of your patient.      Sincerely,    Fara Bradshaw MD

## 2018-11-12 NOTE — MR AVS SNAPSHOT
After Visit Summary   11/12/2018    Ashleigh Alonzo    MRN: [id number]           Patient Information     Date Of Birth          1960        Visit Information        Provider Department      11/12/2018 11:45 AM Fara Bradshaw MD [facility]        Today's Diagnoses     Malignant neoplasm of upper-inner quadrant of right breast in female, estrogen receptor negative (H)    -  1    Breast cancer screening, high risk patient        History of atypical hyperplasia of breast        Lump or mass in breast           Follow-ups after your visit        Your next 10 appointments already scheduled     Feb 18, 2019 10:00 AM CST   MR BREAST BILATERAL W/O & W CONTRAST with [address] [facility] [place] ([facility])    [address]  [facility] [address] [phone]           How do I prepare for my exam? (Food and drink instructions) **If you will be receiving sedation or general anesthesia, please see special notes below.**  How do I prepare for my exam? (Other instructions) Take your medicines as usual, unless your doctor tells you not to. The timing of your exam may depend on the start of your last period. If you re in menopause, you may have the exam anytime.  You will have IV contrast for this exam.  You do not need to do anything special to prepare. **If you will be receiving sedation or general anesthesia, please see special notes below.**  What should I wear:  The MRI machine uses a strong magnet. Please wear clothes without metal (snaps, zippers). A sweatsuit works well, or we may give you a hospital gown. Please remove any body piercings and hair extensions before you arrive. You will also remove watches, jewelry, hairpins, wallets, dentures, partial dental plates and hearing aids. You may wear contact lenses, and you may be able to wear your rings. We have a safe place to keep your personal items, but it is safer to  leave them at home.  How long does the exam take:  Most tests take 30 to 60 minutes.  HOWEVER, IF YOUR DOCTOR PRESCRIBES ANESTHESIA please plan on spending four to five hours in the recovery room.  What should I bring: Bring a list of your current medicines to your exam (including vitamins, minerals and over-the-counter drugs). Please bring any previous mammograms or breast MRIs from other facilities to the MRI dept. Do not mail these items to us.  Do I need a : **If you will be receiving sedation or general anesthesia, please see special notes below.**  What should I do after the exam: No Restrictions, You may resume normal activities.  What is this test: MRI (magnetic resonance imaging) uses a strong magnet and radio waves to look inside the body. An MRA (magnetic resonance angiogram) does the same thing, but it lets us look at your blood vessels. A computer turns the radio waves into pictures showing cross sections of the body, much like slices of bread. This helps us see any problems more clearly. You may receive fluid (called  contrast ) before or during your scan. The fluid helps us see the pictures better. We give the fluid through an IV (small needle in your arm).  Who should I call with questions: If you have any questions, please contact your Imaging Department exam site. Directions, parking instructions, and other information is available on our website, [email or web address].  How do I prepare if I m having sedation or anesthesia? **IMPORTANT** THE INSTRUCTIONS BELOW ARE ONLY FOR THOSE PATIENTS WHO HAVE BEEN TOLD THEY WILL RECEIVE SEDATION OR GENERAL ANESTHESIA DURING THEIR MRI PROCEDURE:  IF YOU WILL RECEIVE SEDATION (take medicine to help you relax during your exam) You must get the medicine from your doctor before you arrive. Bring the medicine to the exam. Do not take it at home. Arrive one hour early. Bring someone who can take you home after the test. Your medicine will make you sleepy. After  the exam, you may not drive, take a bus or take a taxi by yourself. No eating 8 hours before your exam. You may have clear liquids up until 4 hours before your exam. (Clear liquids include water, clear tea, black coffee and fruit juice without pulp.)  IF YOU WILL RECEIVE ANESTHESIA (be asleep for your exam) Arrive 1 1/2 hours early. Bring someone who can take you home after the test. You may not drive, take a bus or take a taxi by yourself. No eating 8 hours before your exam. You may have clear liquids up until 4 hours before your exam. (Clear liquids include water, clear tea, black coffee and fruit juice without pulp.)            Feb 18, 2019  2:00 PM CST   US BREAST RIGHT LIMITED 1-3 QUAD with [id number]   [facility] ([facility])    [address]  [facility] [address] [phone]           How do I prepare for my exam? (Food and drink instructions) No Food and Drink Restrictions.  How do I prepare for my exam? (Other instructions) You do not need to do anything special to prepare for your exam.  What should I wear: Wear comfortable clothes.  How long does the exam take: Most ultrasounds take 30 to 60 minutes.  What should I bring: Bring a list of your medicines, including vitamins, minerals and over-the-counter drugs. It is safest to leave personal items at home.  Do I need a :  No  is needed.  What do I need to tell my doctor: Tell your doctor about any allergies you may have.  What should I do after the exam: No restrictions, You may resume normal activities.  What is this test: An ultrasound uses sound waves to make pictures of the body. Sound waves do not cause pain. The only discomfort may be the pressure of the wand against your skin or full bladder.  Who should I call with questions: If you have any questions, please call the Imaging Department where you will have your exam. Directions, parking instructions, and other  information is available on our website, [email or web address].            Feb 18, 2019  2:45 PM CST   (Arrive by 2:30 PM)   Return Visit with Fara Bradshaw MD   [facility] ([facility])    [address] [facility]  [address] [place] [address] [phone]              Future tests that were ordered for you today     Open Future Orders        Priority Expected Expires Ordered    US Breast Unilateral Right Routine  6/10/2019 11/12/2018    CBC with platelets differential Routine 2/11/2019 11/9/2019 11/9/2018    Comprehensive metabolic panel Routine 2/11/2019 11/9/2019 11/9/2018    MRI Breast Bilateral w/o & w Contrast Routine  11/9/2019 11/9/2018            Who to contact     If you have questions or need follow up information about today's clinic visit or your schedule please contact [facility] directly at [phone].  Normal or non-critical lab and imaging results will be communicated to you by [employer], letter or phone within 4 business days after the clinic has received the results. If you do not hear from us within 7 days, please contact the clinic through [employer] or phone. If you have a critical or abnormal lab result, we will notify you by phone as soon as possible.  Submit refill requests through [employer] or call your pharmacy and they will forward the refill request to us. Please allow 3 business days for your refill to be completed.          Additional Information About Your Visit        [employer] Information     [employer] gives you secure access to your electronic health record. If you see a primary care provider, you can also send messages to your care team and make appointments. If you have questions, please call your primary care clinic.  If you do not have a primary care provider, please call [phone] and they will assist you.        Care EveryWhere ID     This is your Care EveryWhere ID. This could be used by other  "organizations to access your [facility] medical records  [id number]        Your Vitals Were     Pulse Temperature Respirations Height Pulse Oximetry Breastfeeding?    111 96.7  F (35.9  C) (Tympanic) 16 1.6 m (5' 3\") 98% No    BMI (Body Mass Index)                   26.27 kg/m2            Blood Pressure from Last 3 Encounters:   11/12/18 130/85   10/11/18 122/79   10/11/18 122/79    Weight from Last 3 Encounters:   11/12/18 67.3 kg (148 lb 5 oz)   10/11/18 68 kg (149 lb 14.2 oz)   10/11/18 67.2 kg (148 lb 3.2 oz)               Primary Care Provider Office Phone # Fax #    Radha Cazares [phone] [address] [facility] [address]        Equal Access to Services     [facility]: Hadii candie mcdaniel hadasho Soomaali, waaxda luqadaha, qaybta kaalmada adeegyada, bang leon hayrainer duran . So [facility] [phone].    ATENCIÓN: Si habla español, tiene a blackman disposición servicios gratuitos de asistencia lingüística. Paulnie al [phone].    We comply with applicable federal civil rights laws and [place] laws. We do not discriminate on the basis of race, color, national origin, age, disability, sex, sexual orientation, or gender identity.            Thank you!     Thank you for choosing [facility]  for your care. Our goal is always to provide you with excellent care. Hearing back from our patients is one way we can continue to improve our services. Please take a few minutes to complete the written survey that you may receive in the mail after your visit with us. Thank you!             Your Updated Medication List - Protect others around you: Learn how to safely use, store and throw away your medicines at www.disposemymeds.org.          This list is accurate as of 11/12/18  2:28 PM.  Always use your most recent med list.                   Brand Name Dispense Instructions for use Diagnosis    albuterol 108 (90 Base) MCG/ACT inhaler    PROAIR " HFA/PROVENTIL HFA/VENTOLIN HFA    1 Inhaler    Inhale 2 puffs into the lungs every 6 hours as needed for shortness of breath / dyspnea    Chronic obstructive pulmonary disease, unspecified COPD type (H)       ASPIRIN PO      Take 81 mg by mouth daily        Dental Guard Misc      1 Units        guaiFENesin 600 MG 12 hr tablet    MUCINEX    180 tablet    Take 2 tablets (1,200 mg) by mouth 2 times daily    Cough with sputum       hydrOXYzine 25 MG tablet    ATARAX    60 tablet    Take 1-2 tablets (25-50 mg) by mouth every 6 hours as needed for itching    Urticaria       order for DME     1 Device    autoCPAP 5-18 cmH20    MERLIN (obstructive sleep apnea)       simvastatin 40 MG tablet    ZOCOR    90 tablet    Take 1 tablet (40 mg) by mouth At Bedtime    Hyperlipidemia LDL goal <130       tiotropium 18 MCG capsule    SPIRIVA    1 capsule    Inhale 1 capsule (18 mcg) into the lungs daily Inhale contents of one capsule    Chronic obstructive pulmonary disease, unspecified COPD type (H)       vitamin D 1000 units capsule      TAKE 2 CAPSULES BY MOUTH EVERY MORNING

## 2018-11-15 ENCOUNTER — MYC MEDICAL ADVICE (OUTPATIENT)
Dept: ONCOLOGY | Facility: CLINIC | Age: 58
End: 2018-11-15

## 2018-12-05 NOTE — TELEPHONE ENCOUNTER
Form Request Documentation    Date Received in Clinic:  11/12/2018  Name/Type of Form: FMLA  Questions that need to be addressed:   Current Employment Status: currently working on a full time basis 8 hours per  day     Amount of Leave Requested: Intermittent Leave beginning 11/1/2018 for 6 days per Month   Other: None  Date Completed: 12/4/2018  Copy Mailed to patient: No on 12/4/2018  Disposition of Form: Email to [email or web address]               [employer] fax to   [phone]

## 2018-12-17 DIAGNOSIS — G47.00 INSOMNIA, UNSPECIFIED TYPE: Primary | ICD-10-CM

## 2018-12-17 NOTE — TELEPHONE ENCOUNTER
Reason for call:  Medication   If this is a refill request, has the caller requested the refill from the pharmacy already? No  Will the patient be using a [facility]? No  Name of the pharmacy and phone number for the current request: [facility]    Name of the medication requested: Trazodone, new Rx 50 mg 2 tablets at night for sleep    Other request: na    Phone number to reach patient:  Cell number on file:    Telephone Information:   Mobile [phone]       Best Time:  Before 2 pm    Can we leave a detailed message on this number?  YES

## 2018-12-18 NOTE — TELEPHONE ENCOUNTER
TraZODone 50 mg tablet is not on med list. Was discontinued    Justyn Gomez CMA on 12/18/2018 at 1:09 PM

## 2018-12-19 RX ORDER — TRAZODONE HYDROCHLORIDE 50 MG/1
TABLET, FILM COATED ORAL
Qty: 90 TABLET | Refills: 3 | Status: SHIPPED | OUTPATIENT
Start: 2018-12-19 | End: 2019-02-19

## 2018-12-19 NOTE — TELEPHONE ENCOUNTER
"Routing refill request to provider for review/approval because:  Drug not active on patient's medication list      Requested Prescriptions   Pending Prescriptions Disp Refills     traZODone (DESYREL) 50 MG tablet 90 tablet 3     Sig: Take 1-2 tablets ( mg) by mouth for sleep    Serotonin Modulators Passed - 12/19/2018  8:38 AM       Passed - Recent (12 mo) or future (30 days) visit within the authorizing provider's specialty    Patient had office visit in the last 12 months or has a visit in the next 30 days with authorizing provider or within the authorizing provider's specialty.  See \"Patient Info\" tab in inbasket, or \"Choose Columns\" in Meds & Orders section of the refill encounter.             Passed - Patient is age 18 or older       Passed - No active pregnancy on record       Passed - No positive pregnancy test in past 12 months        Saige Henao RN - BC      "

## 2018-12-21 NOTE — ADDENDUM NOTE
Encounter addended by: Carlotta Joyner OT on: 12/21/2018 8:44 AM   Actions taken: Sign clinical note, Episode resolved

## 2018-12-21 NOTE — PROGRESS NOTES
Outpatient Occupational Therapy Discharge Note     Patient: Ashleigh Alonzo  : 1960    Beginning/End Dates of Reporting Period:  18-initial eval and tx only    Referring Provider: Dr. Ligia Zhong    Therapy Diagnosis: seroma of right breast    Client Self Report:   0    Objective Measurements:     Objective Measure: skin assessment              Goals:   Goal Identifier seroma   Goal Description With wear of compression to right breast, seroma will resolve as indicated by no firmness at this location (12 o'clock of right breast) and no pain reported by pt.     Target Date 18   Date Met      Progress:     Goal Identifier signs/symptoms   Goal Description Pt will, independently, verbalize the signs/symptoms of lymphedema, precautions and how to obtain a future lymphedema referral if edema presents to preserve skin integrity, prevent infection and preserve functional mobility.      Target Date 18   Date Met      Progress:     Goal Identifier     Goal Description     Target Date     Date Met      Progress:     Goal Identifier     Goal Description     Target Date     Date Met      Progress:     Goal Identifier     Goal Description     Target Date     Date Met      Progress:     Goal Identifier     Goal Description     Target Date     Date Met      Progress:     Goal Identifier     Goal Description     Target Date     Date Met      Progress:     Goal Identifier     Goal Description     Target Date     Date Met      Progress:     Progress Toward Goals:   Goals not met as pt only attended initial eval and tx.      Plan:  Discharge from therapy.    Discharge:    Reason for Discharge: Patient has failed to schedule further appointments.    Equipment Issued: 0    Discharge Plan: Pt to obtain future lymphedema referral should lymphedema persist.

## 2019-01-01 NOTE — PROGRESS NOTES
Infusion Nursing Note:    Patient presents today for Cycle 7 Day 1 Study Pembrolizumab, Taxol.  Arrived with spouse.  Patient met with Lilia MORGAN prior to infusion.    Lab Results   Component Value Date    HGB 11.1 11/07/2017     Lab Results   Component Value Date    WBC 5.7 11/07/2017      Lab Results   Component Value Date    ANEU 3.3 11/07/2017     Lab Results   Component Value Date     11/07/2017      Lab Results   Component Value Date     11/07/2017                   Lab Results   Component Value Date    POTASSIUM 3.8 11/07/2017           Lab Results   Component Value Date    MAG 2.1 09/05/2017            Lab Results   Component Value Date    CR 0.58 11/07/2017                   Lab Results   Component Value Date    JAVIER 8.9 11/07/2017                Lab Results   Component Value Date    BILITOTAL 0.6 11/07/2017           Lab Results   Component Value Date    ALBUMIN 2.9 11/07/2017                    Lab Results   Component Value Date    ALT 69 11/07/2017           Lab Results   Component Value Date    AST 64 11/07/2017     Results reviewed, labs MET treatment parameters, ok to proceed with treatment.        Note: N/A.    Intravenous Access:  Implanted Port.    Post Infusion Assessment:  Patient tolerated infusion without incident.  Patient observed for 30 minutes post pembrolizumab per protocol.  No evidence of extravasations.  Access discontinued per protocol.    Discharge Plan:   Patient declined prescription refills.  AVS to patient via [employer].  Patient will return 11/14 for next appointment.   Patient discharged in stable condition accompanied by: .  Departure Mode: Ambulatory.  Face to Face time: 0.    
Statement Selected

## 2019-01-30 ENCOUNTER — OFFICE VISIT (OUTPATIENT)
Dept: PODIATRY | Facility: CLINIC | Age: 59
End: 2019-01-30
Payer: COMMERCIAL

## 2019-01-30 VITALS
BODY MASS INDEX: 26.57 KG/M2 | WEIGHT: 150 LBS | SYSTOLIC BLOOD PRESSURE: 112 MMHG | DIASTOLIC BLOOD PRESSURE: 76 MMHG | HEART RATE: 86 BPM

## 2019-01-30 DIAGNOSIS — B35.1 ONYCHOMYCOSIS: Primary | ICD-10-CM

## 2019-01-30 LAB
ALBUMIN SERPL-MCNC: 3.8 G/DL (ref 3.4–5)
ALP SERPL-CCNC: 152 U/L (ref 40–150)
ALT SERPL W P-5'-P-CCNC: 23 U/L (ref 0–50)
AST SERPL W P-5'-P-CCNC: 21 U/L (ref 0–45)
BILIRUB DIRECT SERPL-MCNC: <0.1 MG/DL (ref 0–0.2)
BILIRUB SERPL-MCNC: 0.4 MG/DL (ref 0.2–1.3)
PROT SERPL-MCNC: 7.5 G/DL (ref 6.8–8.8)

## 2019-01-30 PROCEDURE — 80076 HEPATIC FUNCTION PANEL: CPT | Performed by: PODIATRIST

## 2019-01-30 PROCEDURE — 36415 COLL VENOUS BLD VENIPUNCTURE: CPT | Performed by: PODIATRIST

## 2019-01-30 PROCEDURE — 99204 OFFICE O/P NEW MOD 45 MIN: CPT | Performed by: PODIATRIST

## 2019-01-30 RX ORDER — TERBINAFINE HYDROCHLORIDE 250 MG/1
250 TABLET ORAL DAILY
Qty: 30 TABLET | Refills: 2 | Status: SHIPPED | OUTPATIENT
Start: 2019-01-30 | End: 2019-02-27

## 2019-01-30 NOTE — LETTER
1/30/2019         RE: Ashleigh Alonzo  [address] [place] [address]        Dear Colleague,    Thank you for referring your patient, Ashleigh Alonzo, to the [facility]. Please see a copy of my visit note below.    Patient complains of thick right and left first toenails(s) .  Has had this for many  years.  It is slowly getting worse.  Has had pain in the past which is aggravated by activity and relieved by rest.  Tried over the counter medications without success.  Does not like the look of the nail and is wondering about options.  Patient denies heavy OH use.  Denies high cholesterol or being on any statin medication.  Patient does have other family member with this problem.  History of breast cancer, but patient no longer taking chemo or radiation.  States father had this same problem.        ROS:  A 10-point review of systems was performed and is positive for that noted in the HPI and as seen below.  All other areas are negative.        No Known Allergies    Current Outpatient Medications   Medication Sig Dispense Refill     albuterol (PROAIR HFA/PROVENTIL HFA/VENTOLIN HFA) 108 (90 Base) MCG/ACT inhaler Inhale 2 puffs into the lungs every 6 hours as needed for shortness of breath / dyspnea 1 Inhaler 5     ASPIRIN PO Take 81 mg by mouth daily       guaiFENesin (MUCINEX) 600 MG 12 hr tablet Take 2 tablets (1,200 mg) by mouth 2 times daily 180 tablet 6     hydrOXYzine (ATARAX) 25 MG tablet Take 1-2 tablets (25-50 mg) by mouth every 6 hours as needed for itching 60 tablet 3     simvastatin (ZOCOR) 40 MG tablet Take 1 tablet (40 mg) by mouth At Bedtime 90 tablet 4     terbinafine (LAMISIL) 250 MG tablet Take 1 tablet (250 mg) by mouth daily 30 tablet 2     tiotropium (SPIRIVA) 18 MCG capsule Inhale 1 capsule (18 mcg) into the lungs daily Inhale contents of one capsule 1 capsule 11     traZODone (DESYREL) 50 MG tablet ONE TO TWO TABLETS AT BEDTIME AS NEEDED FOR SLEEP 90 tablet 3     VITAMIN  D 1000 UNIT OR CAPS TAKE 2 CAPSULES BY MOUTH EVERY MORNING       order for DME autoCPAP 5-18 cmH20 1 Device 0       Patient Active Problem List   Diagnosis     Anxiety     Anisocoria     Hyperlipidemia LDL goal <130     Lack of libido     Family history of ischemic heart disease     Mild major depression (H)     Lung nodule, multiple     MERLIN (obstructive sleep apnea)- 'mild' (AHI 9)     Urticaria     Chronic obstructive pulmonary disease, unspecified COPD type (H)     Malignant neoplasm of upper-inner quadrant of right breast in female, estrogen receptor negative (H)     Long term (current) use of systemic steroids     Tongue ulcer     Periodontal disease       Past Medical History:   Diagnosis Date     Acute cystitis without hematuria 10/26/2017     Clostridium difficile diarrhea 1/12/2018     Neutropenic fever (H) 01/10/2018     She was hospitalized 1/10/18 - 1/13/18 with neutropenic fever     Neutropenic sepsis (H) 01/29/2018    hospitalized again  from 1/29/18 - 2/8/18 with neutropenic fever, mucositis, and C. difficile colitis      Pneumonia 11/11/2017    She was hospitalized 11/11/17 - 11/17/17 with fevers ranging from 102 - 105.  CT chest was consistent with pneumonitis.  She also had transaminitis in the 150 range.  She was started on corticosteroids.  Pembrolizumab was stopped      S/P radiation therapy     5,256 cGy completed on 4/27/2018 to [facility] with Dr. Zhong       Past Surgical History:   Procedure Laterality Date     APPENDECTOMY  10/06/2015    [facility]     BREAST BIOPSY, CORE RT/LT Right 08/22/2017     BRONCHOSCOPY (RIGID OR FLEXIBLE), DIAGNOSTIC N/A 2/6/2018    Procedure: COMBINED BRONCHOSCOPY (RIGID OR FLEXIBLE), LAVAGE;  COMBINED BRONCOSCOY (RIGID OR FLEXIBLE), LAVAGE;  Surgeon: Samir Pettit MD;  Location:  GI     CLEAR LENS REPLACEMENT Bilateral 11/2016     COLONOSCOPY  11/15/2011    Procedure:COLONOSCOPY; Colonoscopy, screening; Surgeon:ALIVIA  ANASTASIA; Location:[facility] OR     INSERT PORT VASCULAR ACCESS Left 2017    Procedure: INSERT PORT VASCULAR ACCESS;  Single Lumen Chest Power Port;  Surgeon: Leif Parkinson PA-C;  [facility]     LUMPECTOMY BREAST WITH SENTINEL NODE, COMBINED Right 2018    Procedure: COMBINED LUMPECTOMY BREAST WITH SENTINEL NODE;  Right Wire Localized Lumpectomy, Right [place] Lymph Node Biopsy And Freddy Cath Removal;  Surgeon: Atul Gates MD;  [facility]: [facility]     REMOVE PORT VASCULAR ACCESS N/A 2018    Procedure: REMOVE PORT VASCULAR ACCESS;;  Surgeon: Atul Gates MD;  [facility]     TUBAL LIGATION  2004       Family History   Problem Relation Age of Onset     Cardiovascular Father 46        MI     Eye Disorder Father      Cerebrovascular Disease Father      Arthritis Sister      Neurologic Disorder Brother      Prostate Cancer Maternal Grandfather      Prostate Cancer Maternal Uncle      Cancer Maternal Uncle         Throat cancer     Diabetes No family hx of      Hypertension No family hx of        Social History     Tobacco Use     Smoking status: Former Smoker     Packs/day: 0.75     Years: 20.00     Pack years: 15.00     Types: Cigarettes     Last attempt to quit: 2000     Years since quittin.0     Smokeless tobacco: Never Used   Substance Use Topics     Alcohol use: Yes     Comment: Daily to weekly use (beer)         /76 (BP Location: Right arm)   Pulse 86   Wt 68 kg (150 lb)   BMI 26.57 kg/m   .      VConstitutional/ general:  Pt is in no apparent distress, appears well-nourished.  Cooperative with history and physical exam.     Psych:  The patient answered questions appropriately.  Normal affect.  Seems to have reasonable expectations, in terms of treatment.    Eyes:  Visual scanning/ tracking without deficit.    Ears:  Response to auditory stimuli is normal.  negative hearing aid devices.  Auricles in proper alignment.     Lymphatic:  Popliteal lymph nodes not  enlarged.     Lungs:  Non labored breathing, non labored speech. No cough.  No audible wheezing. Even, quiet breathing.       Vascular:  Pedal pulses are palpable bilaterally for both the DP and PT arteries.  CFT < 3 sec.  No edema.  Pedal hair growth noted.     Neuro:  Alert and oriented x 3. Coordinated gait.  Light touch sensation is intact to the L4, L5, S1 distributions. No obvious deficits.  No evidence of neurological-based weakness, spasticity, or contracture in the lower extremities.    Derm: Normal texture and turgor.  No erythema, ecchymosis, or cyanosis.  No open lesions. right and left first toenails(s)  thickened, elongated, discolored, with subungual debris.  No erythema, edema, drainage, pain on palpation.  No signs of subungual masses or exostosis.    Musculoskeletal:    Lower extremity muscle strength is normal.  Patient is ambulatory without an assistive device or brace.  No gross deformities.  MS 5/5 all compartments.  Normal arch with weightbearing.         A/P  Onychomycosis    Discussed all options with patient.  Would like to try lamisil.  Risks, complications, and efficacy of going on this pill were discussed with the patient.  Will start lamisil 250mg, dispense 30, one per oral every day.  Two refils.  Will get LFTs today for baseline.  RTC in 3 months.    Juan Celestin DPM, FACFAS                  Again, thank you for allowing me to participate in the care of your patient.        Sincerely,        Juan Celestin DPM

## 2019-01-30 NOTE — PATIENT INSTRUCTIONS
Weight management plan: Patient was referred to their PCP to discuss a diet and exercise plan.   We wish you continued good healing. If you have any questions or concerns, please do not hesitate to contact us at [phone]    Please remember to call and schedule a follow up appointment if one was recommended at your earliest convenience.   PODIATRY CLINIC HOURS  TELEPHONE NUMBER    Dr. Juan Celestin D.P.M [facility]    Clinics:  [facility]    [facility]   Tuesday 1PM-6PM  [place]  Wednesday 7AM-2PM  [facility]  Thursday 10AM-6PM  [place]  Friday 7AM-3PM  [place]  Specialty schedulers:   [phone] to make an appointment with any Specialty Provider.        Urgent Care locations:    [facility] Monday-Friday 5 pm - 9 pm. Saturday-Sunday 9 am -5pm    Monday-Friday 11 am - 9 pm Saturday 9 am - 5 pm     Monday-Sunday 12 noon-8PM [phone]     If you need a medication refill, please contact us you may need lab work and/or a follow up visit prior to your refill (i.e. Antifungal medications).    [employer] (secure e-mail communication and access to your chart) to send a message or to make an appointment.    If MRI needed please call [place] at [phone]

## 2019-01-30 NOTE — PROGRESS NOTES
Patient complains of thick right and left first toenails(s) .  Has had this for many  years.  It is slowly getting worse.  Has had pain in the past which is aggravated by activity and relieved by rest.  Tried over the counter medications without success.  Does not like the look of the nail and is wondering about options.  Patient denies heavy OH use.  Denies high cholesterol or being on any statin medication.  Patient does have other family member with this problem.  History of breast cancer, but patient no longer taking chemo or radiation.  States father had this same problem.        ROS:  A 10-point review of systems was performed and is positive for that noted in the HPI and as seen below.  All other areas are negative.        No Known Allergies    Current Outpatient Medications   Medication Sig Dispense Refill     albuterol (PROAIR HFA/PROVENTIL HFA/VENTOLIN HFA) 108 (90 Base) MCG/ACT inhaler Inhale 2 puffs into the lungs every 6 hours as needed for shortness of breath / dyspnea 1 Inhaler 5     ASPIRIN PO Take 81 mg by mouth daily       guaiFENesin (MUCINEX) 600 MG 12 hr tablet Take 2 tablets (1,200 mg) by mouth 2 times daily 180 tablet 6     hydrOXYzine (ATARAX) 25 MG tablet Take 1-2 tablets (25-50 mg) by mouth every 6 hours as needed for itching 60 tablet 3     simvastatin (ZOCOR) 40 MG tablet Take 1 tablet (40 mg) by mouth At Bedtime 90 tablet 4     terbinafine (LAMISIL) 250 MG tablet Take 1 tablet (250 mg) by mouth daily 30 tablet 2     tiotropium (SPIRIVA) 18 MCG capsule Inhale 1 capsule (18 mcg) into the lungs daily Inhale contents of one capsule 1 capsule 11     traZODone (DESYREL) 50 MG tablet ONE TO TWO TABLETS AT BEDTIME AS NEEDED FOR SLEEP 90 tablet 3     VITAMIN D 1000 UNIT OR CAPS TAKE 2 CAPSULES BY MOUTH EVERY MORNING       order for DME autoCPAP 5-18 cmH20 1 Device 0       Patient Active Problem List   Diagnosis     Anxiety     Anisocoria     Hyperlipidemia LDL goal <130     Lack of libido      Family history of ischemic heart disease     Mild major depression (H)     Lung nodule, multiple     MERLIN (obstructive sleep apnea)- 'mild' (AHI 9)     Urticaria     Chronic obstructive pulmonary disease, unspecified COPD type (H)     Malignant neoplasm of upper-inner quadrant of right breast in female, estrogen receptor negative (H)     Long term (current) use of systemic steroids     Tongue ulcer     Periodontal disease       Past Medical History:   Diagnosis Date     Acute cystitis without hematuria 10/26/2017     Clostridium difficile diarrhea 1/12/2018     Neutropenic fever (H) 01/10/2018     She was hospitalized 1/10/18 - 1/13/18 with neutropenic fever     Neutropenic sepsis (H) 01/29/2018    hospitalized again  from 1/29/18 - 2/8/18 with neutropenic fever, mucositis, and C. difficile colitis      Pneumonia 11/11/2017    She was hospitalized 11/11/17 - 11/17/17 with fevers ranging from 102 - 105.  CT chest was consistent with pneumonitis.  She also had transaminitis in the 150 range.  She was started on corticosteroids.  Pembrolizumab was stopped      S/P radiation therapy     5,256 cGy completed on 4/27/2018 to [facility] with Dr. Zhong       Past Surgical History:   Procedure Laterality Date     APPENDECTOMY  10/06/2015    [facility]     BREAST BIOPSY, CORE RT/LT Right 08/22/2017     BRONCHOSCOPY (RIGID OR FLEXIBLE), DIAGNOSTIC N/A 2/6/2018    Procedure: COMBINED BRONCHOSCOPY (RIGID OR FLEXIBLE), LAVAGE;  COMBINED BRONCOSCOY (RIGID OR FLEXIBLE), LAVAGE;  Surgeon: Samir Pettit MD;  [facility]     CLEAR LENS REPLACEMENT Bilateral 11/2016     COLONOSCOPY  11/15/2011    Procedure:COLONOSCOPY; Colonoscopy, screening; Surgeon:ANASTASIA BUNCH; Location[facility]     INSERT PORT VASCULAR ACCESS Left 9/1/2017    Procedure: INSERT PORT VASCULAR ACCESS;  Single Lumen Chest Power Port;  Surgeon: Leif Parkinson PA-C;  [facility]     LUMPECTOMY BREAST WITH SENTINEL  NODE, COMBINED Right 2018    Procedure: COMBINED LUMPECTOMY BREAST WITH SENTINEL NODE;  Right Wire Localized Lumpectomy, Right [place] Lymph Node Biopsy And Freddy Cath Removal;  Surgeon: Atul Gates MD;  [facility]     REMOVE PORT VASCULAR ACCESS N/A 2018    Procedure: REMOVE PORT VASCULAR ACCESS;;  Surgeon: Atul Gates MD;  [facility]     TUBAL LIGATION  2004       Family History   Problem Relation Age of Onset     Cardiovascular Father 46        MI     Eye Disorder Father      Cerebrovascular Disease Father      Arthritis Sister      Neurologic Disorder Brother      Prostate Cancer Maternal Grandfather      Prostate Cancer Maternal Uncle      Cancer Maternal Uncle         Throat cancer     Diabetes No family hx of      Hypertension No family hx of        Social History     Tobacco Use     Smoking status: Former Smoker     Packs/day: 0.75     Years: 20.00     Pack years: 15.00     Types: Cigarettes     Last attempt to quit: 2000     Years since quittin.0     Smokeless tobacco: Never Used   Substance Use Topics     Alcohol use: Yes     Comment: Daily to weekly use (beer)         /76 (BP Location: Right arm)   Pulse 86   Wt 68 kg (150 lb)   BMI 26.57 kg/m  .      VConstitutional/ general:  Pt is in no apparent distress, appears well-nourished.  Cooperative with history and physical exam.     Psych:  The patient answered questions appropriately.  Normal affect.  Seems to have reasonable expectations, in terms of treatment.    Eyes:  Visual scanning/ tracking without deficit.    Ears:  Response to auditory stimuli is normal.  negative hearing aid devices.  Auricles in proper alignment.     Lymphatic:  Popliteal lymph nodes not enlarged.     Lungs:  Non labored breathing, non labored speech. No cough.  No audible wheezing. Even, quiet breathing.       Vascular:  Pedal pulses are palpable bilaterally for both the DP and PT arteries.  CFT < 3 sec.  No edema.  Pedal hair  growth noted.     Neuro:  Alert and oriented x 3. Coordinated gait.  Light touch sensation is intact to the L4, L5, S1 distributions. No obvious deficits.  No evidence of neurological-based weakness, spasticity, or contracture in the lower extremities.    Derm: Normal texture and turgor.  No erythema, ecchymosis, or cyanosis.  No open lesions. right and left first toenails(s)  thickened, elongated, discolored, with subungual debris.  No erythema, edema, drainage, pain on palpation.  No signs of subungual masses or exostosis.    Musculoskeletal:    Lower extremity muscle strength is normal.  Patient is ambulatory without an assistive device or brace.  No gross deformities.  MS 5/5 all compartments.  Normal arch with weightbearing.         A/P  Onychomycosis    Discussed all options with patient.  Would like to try lamisil.  Risks, complications, and efficacy of going on this pill were discussed with the patient.  Will start lamisil 250mg, dispense 30, one per oral every day.  Two refils.  Will get LFTs today for baseline.  RTC in 3 months.    Juan Celestin DPM, FACFAS

## 2019-02-16 NOTE — PROGRESS NOTES
Oncology On Treatment Visit:  Date on this visit: 2/18/2019    Diagnosis:  Stage IIb, T2N0M0, grade 3 triple negative cancer of the right breast.    Primary Physician: Radha Cazares     History Of Present Illness:  Ms. Alonzo is a 58-year-old female with h/o stage IIb, T2 N0 M0, grade 3, triple-negative invasive carcinoma of the right breast.  Routine screening mammogram on 07/27/2017 showed developing calcifications in the right breast at the 12 o'clock position 6 cm from the nipple.  Ultrasound demonstrated a 7-mm, irregular, hypoechoic mass at the 12 o'clock position.  Contrast-enhanced mammogram showed a peripherally enhancing, irregular mass measuring 1.9 cm as well as an additional 6-mm, enhancing focus anteromedial to the dominant mass.  The total area of abnormal enhancement on contrast mammogram measured up to 3.4 cm.  Right breast biopsy demonstrated a grade 3 invasive mammary carcinoma with associated high-grade DCIS.  Estrogen and progesterone receptor staining were negative.  HER2 was non-amplified by FISH.  Breast MRI measured the biopsy proven breast cancer at 3.2 cm.    Ms. Alonzo enrolled in the ISPY-2 clinical trial.  She began treatment with weekly taxol and once every 3 week pembrolizumab on 9/20/17.  Her course was complicated by pneumonitis and hepatitis.  Pembrolizumab was stopped and she resumed weekly taxol alone on 11/28/17.  She completed a total of 12 weeks of therapy on 12/26/17.  She completed 2 cycles of adriamycin and cyclophosphamide.  She was hospitalized both cycles due to neutropenic fever and also developed C. Difficile colitis.  Decision was made to forgo further chemotherapy.       Right breast lumpectomy and sentinel lymph node procedure on 2/27/18 showed a grade 2, 6 mm residual invasive mammary carcinoma with an associated 8 mm area of high grade DCIS with comedonecrosis and ADH.  Invasive tumor cellularity was 10%.  There was lymphovascular invasion.  Surgical margins  were negative.  A single sentinel lymph node was benign.  Classified as MDA RCB I.  She completed adjuvant radiation (4256 cGy to the right breast with additional 1000 cGy to the lumpectomy cavity) on 4/27/18.     Interval History:  Patient last seen in Oncology clinic on 11/12/18. Discussed patient's breast MRI and US done today, which showed no changes or suspicious lesions. A 3 mm oil cyst was visualized in the right breast which was unchanged from previous ultrasounds.     Patient still experiencing some significant fatigue and exhaustion. Patient states fatigue began following her radiation treatment, which was completed on 4/27/18. Patient states she's had unchanged fatigue/exhaustion, usually at the end of the day. Patient had been taking 1 day off a week from work due to this fatigue for the last couple months, only recently started working 5 day weeks. Patient does have a history of sleep apnea, and had her CPAP replaced with a dental device back in November. Patient states her sleeping seems greatly improved, denies feeling tired/groggy in the morning and denies issues with sleeping. Unsure if she is still snoring, as her  has hearing difficulty. Patient's appetite has been unchanged and she denies any recent chest pain, dyspnea on exertion or paroxysmal nocturnal dyspnea. Patient denies any recent fevers/chills, night sweats, weight loss or other signs/symptoms of focal infection (resp symptoms, changes in urination, skin changes, nausea/vomiting/diarrhea). Patient has a history of pneumonitis and hepatitis with Keytruda use but denies any respiratory symptoms or abdominal pain. Patient denies any symptoms consistent with hypothyroidism (constipation, cold intolerance) and has no history of thyroid dysfunction. She has started walking 20-30 minutes a couple of days per week on their treadmill, without improvement in fatigue.  Patient has a history of being on an anti-depressant (Citalopram 10mg)  that was discontinued immediately prior to her breast cancer diagnosis, but she cannot remember when she was prescribed or why she was taken off.   Per her daughter, she shows signs of depression at this time.  The remainder of a complete 12 point review of systems was reviewed with the patient and was negative with the exception of that mentioned above.       Past Medical/Surgical History:  Past Medical History:   Diagnosis Date     Acute cystitis without hematuria 10/26/2017     Clostridium difficile diarrhea 1/12/2018     Neutropenic fever (H) 01/10/2018     She was hospitalized 1/10/18 - 1/13/18 with neutropenic fever     Neutropenic sepsis (H) 01/29/2018    hospitalized again  from 1/29/18 - 2/8/18 with neutropenic fever, mucositis, and C. difficile colitis      Pneumonia 11/11/2017    She was hospitalized 11/11/17 - 11/17/17 with fevers ranging from 102 - 105.  CT chest was consistent with pneumonitis.  She also had transaminitis in the 150 range.  She was started on corticosteroids.  Pembrolizumab was stopped      S/P radiation therapy     5,256 cGy completed on 4/27/2018 to [facility] with Dr. Zhong     Past Surgical History:   Procedure Laterality Date     APPENDECTOMY  10/06/2015    [facility]     BREAST BIOPSY, CORE RT/LT Right 08/22/2017     BRONCHOSCOPY (RIGID OR FLEXIBLE), DIAGNOSTIC N/A 2/6/2018    Procedure: COMBINED BRONCHOSCOPY (RIGID OR FLEXIBLE), LAVAGE;  COMBINED BRONCOSCOY (RIGID OR FLEXIBLE), LAVAGE;  Surgeon: Samir Pettit MD;  Location: [facility]     CLEAR LENS REPLACEMENT Bilateral 11/2016     COLONOSCOPY  11/15/2011    Procedure:COLONOSCOPY; Colonoscopy, screening; Surgeon:ANASTASIA BUNCH; Location:[facility] OR     INSERT PORT VASCULAR ACCESS Left 9/1/2017    Procedure: INSERT PORT VASCULAR ACCESS;  Single Lumen Chest Power Port;  Surgeon: Leif Parkinson PA-C;  Location: [facility]     LUMPECTOMY BREAST WITH SENTINEL NODE, COMBINED Right 2/27/2018     "Procedure: COMBINED LUMPECTOMY BREAST WITH SENTINEL NODE;  Right Wire Localized Lumpectomy, Right [place] Lymph Node Biopsy And Freddy Cath Removal;  Surgeon: Atul Gates MD;  Location: [facility] OR     REMOVE PORT VASCULAR ACCESS N/A 2/27/2018    Procedure: REMOVE PORT VASCULAR ACCESS;;  Surgeon: Atul Gates MD;  Location: [facility] OR     TUBAL LIGATION  12/2004     Allergies:  Allergies as of 02/18/2019     (No Known Allergies)     Current Medications:  Current Outpatient Medications   Medication Sig Dispense Refill     albuterol (PROAIR HFA/PROVENTIL HFA/VENTOLIN HFA) 108 (90 Base) MCG/ACT inhaler Inhale 2 puffs into the lungs every 6 hours as needed for shortness of breath / dyspnea 1 Inhaler 5     ASPIRIN PO Take 81 mg by mouth daily       guaiFENesin (MUCINEX) 600 MG 12 hr tablet Take 2 tablets (1,200 mg) by mouth 2 times daily 180 tablet 6     hydrOXYzine (ATARAX) 25 MG tablet Take 1-2 tablets (25-50 mg) by mouth every 6 hours as needed for itching 60 tablet 3     order for DME autoCPAP 5-18 cmH20 1 Device 0     simvastatin (ZOCOR) 40 MG tablet Take 1 tablet (40 mg) by mouth At Bedtime 90 tablet 4     terbinafine (LAMISIL) 250 MG tablet Take 1 tablet (250 mg) by mouth daily 30 tablet 2     tiotropium (SPIRIVA) 18 MCG capsule Inhale 1 capsule (18 mcg) into the lungs daily Inhale contents of one capsule 1 capsule 11     traZODone (DESYREL) 50 MG tablet ONE TO TWO TABLETS AT BEDTIME AS NEEDED FOR SLEEP 90 tablet 3     VITAMIN D 1000 UNIT OR CAPS TAKE 2 CAPSULES BY MOUTH EVERY MORNING        Family and Social History:  Reviewed and unchanged from prior.  Please see initial consultation dated 8/28/17 for further details.    Physical Exam:  /65   Pulse 80   Temp 98.1  F (36.7  C) (Oral)   Resp 16   Ht 1.6 m (5' 3\")   Wt 68.1 kg (150 lb 2 oz)   SpO2 99%   Breastfeeding? No   BMI 26.59 kg/m    General:  Well appearing, well nourished adult female in NAD.    HEENT:  Normocephalic.  Sclera anicteric.  MMM.  No " lesions of the oropharynx.  Lymph:  No palpable cervical, supraclavicular, or axillary LAD.    Chest:  Lungs are CTA bilaterally.  No wheezes or crackles.  Breast exam:  Bilateral breasts are of normal fibroglandular density.  There is a subcentimeter density palpable inferomedial to the upper outer breast incision.  There are no dominant palpable masses in either breast.  Bilateral nipples are everted.  CV:  Tachycardic.  Regular rhythm.  Nl S1 and S2.  No m/r/g.  Abd:  Soft/NT/ND.  BSs normoactive.  No hepatosplenomegaly.  Ext:  No pitting edema of the bilateral lower extremities.  Pulses 2+ and symmetric.  Musculo:  Strength 5/5 throughout.  Neuro:  Cranial nerves grossly intact.  Gait stable.  Psych:  Mood and affect appear normal.  Skin:  No visible concerning skin rashes or skin lesions    Laboratory/Imaging Studies:  11/12/18 Right breast diagnostic mammogram and ultrasound:  In the area of palpable concern at 10:00, 9 cm from the nipple adjacent to the axillary incision is an anechoic cyst with adjacent slight increased echogenicity of the subcutaneous fat, favored to represent fat necrosis with oil cyst.    2/18/19 Right breast diagnostic mammogram:  FINDINGS: Targeted right breast ultrasound was performed  demonstrating involution of oil cyst at the 10:00 position, 9 cm from  the nipple with surrounding hyperechogenicity, now measuring up to 3  mm. On MRI performed same date, there is a small oil cyst in this  area. No suspicious finding.                                                                      IMPRESSION: BI-RADS CATEGORY: 2 - Benign Finding(s)     RECOMMENDED FOLLOW-UP: Annual Mammography.       2/18/19 Breast MRI:  Findings: Conservation therapy changes on the right. No suspicious  enhancement in either breast. Prominent left internal mammary lymph  node is unchanged from prior examinations. No lymphadenopathy.                                                                      Impression:  BI-RADS CATEGORY: 2 - Benign Finding(s).     Recommendation: Continue age and risk-based breast cancer screening.       ASSESSMENT/PLAN:  Ms. Alonzo is a 58-year-old female with a h/o grade 3, T2N0M0, triple-negative infiltrating ductal carcinoma of the right breast s/p 12 weeks of Taxol along with 3 cycles pembrolizumab, 2 cycles of adriamycin and cyclophosphamide, lumpectomy, and radiation.     1.  Right breast cancer:   Ms. Alonzo is nearly 1 year out from excision of a right breast cancer.  There is no evidence of disease recurrence on exam today.  Right breast mammogram is also without concerning finding.  I will see her back in 3 months for her next visit.      Due to a h/o ADH, I have recommended high risk screening with both annual mammogram and breast MRI, spacing the two studies so that she is having some form of imaging every 6 months.  Breast MRI today was without concerning finding.  Will plan for bilateral mammograms in 08/2019.    She had a number of questions regarding screening today.  She was under the impression she would have every 3 month imaging.  We discussed rationale for annual mammogram and breast MRI.  We also again reviewed prognosis and risk of recurrence.    2.  Fatigue: We discussed this extensively today.  Patient has had continued fatigue following her radiation therapy, which was concluded 4/27/18. Patient has history of sleep apnea which she claims is currently well controlled with dental device, denies any trouble sleeping or staying asleep. Patient's appetite is good, denies decreased intake and labs/exam show no evidence of anemia or chronic infection. Patient has a history of being on antidepressant and never declined PT/cancer rehab during her initial recovery from radiation. Current ddx for patient's fatigue includes prolonged recovery from breast cancer therapy vs hypothyroidism vs depression    - Check TSH and reflex free T4 today  - Will follow up with patient regarding  thyroid results:  - If TSH is normal, consider restarting Citalopram at 10 mg daily  - She declines referral for PT/cancer rehab at this time.      3. Pneumonitis:  Chronic COPD.  Had pneumonitis secondary to pembrolizumab during breast cancer treatment.  CT in 07/2018 with improving bilateral apical infiltrates.  Patient denies any concerning symptoms, PFTs also improving.  Seen by pulmonology and repeat CT in 07/2019 planned to re-evaluate pulmonary infiltrates.    4.  Neuropathy:  Grade I, non-bothersome at this time.  She declines treatment with cymbalta or gabapentin.    5.  Routine health maintenance:  Colonoscopy in 2011 was without concerning findings, next due in 2021.  Continue annual skin examinations and pap smears per recommendation of PCP.  Will obtain CBC every 6 months given h/o anthracycline exposure.    6.  Followup:  Visit with me in approximately 3 months.      Manan Harmon - MS3    Patient was seen in conjunction with the medical student.  I have edited the above note to reflect our joint assessment and plan.  The patient and her family had numerous questions which were answered to their satisfaction today.  A total of 30 minutes of my 40 minute face to face visit was spent in counseling.    Fara Bradshaw MD

## 2019-02-18 ENCOUNTER — ANCILLARY PROCEDURE (OUTPATIENT)
Dept: MAMMOGRAPHY | Facility: CLINIC | Age: 59
End: 2019-02-18
Attending: INTERNAL MEDICINE
Payer: COMMERCIAL

## 2019-02-18 ENCOUNTER — ANCILLARY PROCEDURE (OUTPATIENT)
Dept: MRI IMAGING | Facility: CLINIC | Age: 59
End: 2019-02-18
Attending: INTERNAL MEDICINE
Payer: COMMERCIAL

## 2019-02-18 ENCOUNTER — ONCOLOGY VISIT (OUTPATIENT)
Dept: ONCOLOGY | Facility: CLINIC | Age: 59
End: 2019-02-18
Attending: INTERNAL MEDICINE
Payer: COMMERCIAL

## 2019-02-18 VITALS
RESPIRATION RATE: 16 BRPM | HEART RATE: 80 BPM | BODY MASS INDEX: 26.6 KG/M2 | OXYGEN SATURATION: 99 % | HEIGHT: 63 IN | WEIGHT: 150.13 LBS | SYSTOLIC BLOOD PRESSURE: 106 MMHG | TEMPERATURE: 98.1 F | DIASTOLIC BLOOD PRESSURE: 65 MMHG

## 2019-02-18 DIAGNOSIS — C50.211 MALIGNANT NEOPLASM OF UPPER-INNER QUADRANT OF RIGHT BREAST IN FEMALE, ESTROGEN RECEPTOR NEGATIVE (H): Primary | ICD-10-CM

## 2019-02-18 DIAGNOSIS — N63.0 LUMP OR MASS IN BREAST: ICD-10-CM

## 2019-02-18 DIAGNOSIS — T73.2XXD FATIGUE DUE TO EXPOSURE, SUBSEQUENT ENCOUNTER: ICD-10-CM

## 2019-02-18 DIAGNOSIS — Z87.42 HISTORY OF ATYPICAL HYPERPLASIA OF BREAST: ICD-10-CM

## 2019-02-18 DIAGNOSIS — C50.211 MALIGNANT NEOPLASM OF UPPER-INNER QUADRANT OF RIGHT BREAST IN FEMALE, ESTROGEN RECEPTOR NEGATIVE (H): ICD-10-CM

## 2019-02-18 DIAGNOSIS — Z12.39 BREAST CANCER SCREENING, HIGH RISK PATIENT: ICD-10-CM

## 2019-02-18 DIAGNOSIS — Z51.12 ENCOUNTER FOR ANTINEOPLASTIC IMMUNOTHERAPY: ICD-10-CM

## 2019-02-18 DIAGNOSIS — Z17.1 MALIGNANT NEOPLASM OF UPPER-INNER QUADRANT OF RIGHT BREAST IN FEMALE, ESTROGEN RECEPTOR NEGATIVE (H): ICD-10-CM

## 2019-02-18 DIAGNOSIS — Z17.1 MALIGNANT NEOPLASM OF UPPER-INNER QUADRANT OF RIGHT BREAST IN FEMALE, ESTROGEN RECEPTOR NEGATIVE (H): Primary | ICD-10-CM

## 2019-02-18 LAB
ALBUMIN SERPL-MCNC: 3.4 G/DL (ref 3.4–5)
ALP SERPL-CCNC: 144 U/L (ref 40–150)
ALT SERPL W P-5'-P-CCNC: 29 U/L (ref 0–50)
ANION GAP SERPL CALCULATED.3IONS-SCNC: 6 MMOL/L (ref 3–14)
AST SERPL W P-5'-P-CCNC: 25 U/L (ref 0–45)
BASOPHILS # BLD AUTO: 0 10E9/L (ref 0–0.2)
BASOPHILS NFR BLD AUTO: 0.2 %
BILIRUB SERPL-MCNC: 0.3 MG/DL (ref 0.2–1.3)
BUN SERPL-MCNC: 13 MG/DL (ref 7–30)
CALCIUM SERPL-MCNC: 8.6 MG/DL (ref 8.5–10.1)
CHLORIDE SERPL-SCNC: 108 MMOL/L (ref 94–109)
CO2 SERPL-SCNC: 27 MMOL/L (ref 20–32)
CREAT SERPL-MCNC: 0.74 MG/DL (ref 0.52–1.04)
DIFFERENTIAL METHOD BLD: ABNORMAL
EOSINOPHIL # BLD AUTO: 0.2 10E9/L (ref 0–0.7)
EOSINOPHIL NFR BLD AUTO: 4.3 %
ERYTHROCYTE [DISTWIDTH] IN BLOOD BY AUTOMATED COUNT: 11.5 % (ref 10–15)
GFR SERPL CREATININE-BSD FRML MDRD: 89 ML/MIN/{1.73_M2}
GLUCOSE SERPL-MCNC: 107 MG/DL (ref 70–99)
HCT VFR BLD AUTO: 37.2 % (ref 35–47)
HGB BLD-MCNC: 13.1 G/DL (ref 11.7–15.7)
IMM GRANULOCYTES # BLD: 0 10E9/L (ref 0–0.4)
IMM GRANULOCYTES NFR BLD: 0.2 %
LYMPHOCYTES # BLD AUTO: 1.1 10E9/L (ref 0.8–5.3)
LYMPHOCYTES NFR BLD AUTO: 19.9 %
MCH RBC QN AUTO: 35.3 PG (ref 26.5–33)
MCHC RBC AUTO-ENTMCNC: 35.2 G/DL (ref 31.5–36.5)
MCV RBC AUTO: 100 FL (ref 78–100)
MONOCYTES # BLD AUTO: 0.5 10E9/L (ref 0–1.3)
MONOCYTES NFR BLD AUTO: 8.1 %
NEUTROPHILS # BLD AUTO: 3.7 10E9/L (ref 1.6–8.3)
NEUTROPHILS NFR BLD AUTO: 67.3 %
NRBC # BLD AUTO: 0 10*3/UL
NRBC BLD AUTO-RTO: 0 /100
PLATELET # BLD AUTO: 277 10E9/L (ref 150–450)
POTASSIUM SERPL-SCNC: 3.9 MMOL/L (ref 3.4–5.3)
PROT SERPL-MCNC: 6.8 G/DL (ref 6.8–8.8)
RBC # BLD AUTO: 3.71 10E12/L (ref 3.8–5.2)
SODIUM SERPL-SCNC: 141 MMOL/L (ref 133–144)
WBC # BLD AUTO: 5.5 10E9/L (ref 4–11)

## 2019-02-18 PROCEDURE — 80053 COMPREHEN METABOLIC PANEL: CPT | Performed by: INTERNAL MEDICINE

## 2019-02-18 PROCEDURE — 84443 ASSAY THYROID STIM HORMONE: CPT | Performed by: INTERNAL MEDICINE

## 2019-02-18 PROCEDURE — 36415 COLL VENOUS BLD VENIPUNCTURE: CPT | Performed by: INTERNAL MEDICINE

## 2019-02-18 PROCEDURE — 99215 OFFICE O/P EST HI 40 MIN: CPT | Mod: ZP | Performed by: INTERNAL MEDICINE

## 2019-02-18 PROCEDURE — 85025 COMPLETE CBC W/AUTO DIFF WBC: CPT | Performed by: INTERNAL MEDICINE

## 2019-02-18 PROCEDURE — G0463 HOSPITAL OUTPT CLINIC VISIT: HCPCS | Mod: ZF

## 2019-02-18 RX ORDER — GADOBUTROL 604.72 MG/ML
7.5 INJECTION INTRAVENOUS ONCE
Status: COMPLETED | OUTPATIENT
Start: 2019-02-18 | End: 2019-02-18

## 2019-02-18 RX ADMIN — GADOBUTROL 7.5 ML: 604.72 INJECTION INTRAVENOUS at 10:30

## 2019-02-18 ASSESSMENT — PAIN SCALES - GENERAL: PAINLEVEL: NO PAIN (0)

## 2019-02-18 ASSESSMENT — MIFFLIN-ST. JEOR: SCORE: 1230.09

## 2019-02-18 NOTE — DISCHARGE INSTRUCTIONS
MRI Contrast Discharge Instructions    The IV contrast you received today will pass out of your body in your  urine. This will happen in the next 24 hours. You will not feel this process.  Your urine will not change color.    Drink at least 4 extra glasses of water or juice today (unless your doctor  has restricted your fluids). This reduces the stress on your kidneys.  You may take your regular medicines.    If you are on dialysis: It is best to have dialysis today.    If you have a reaction: Most reactions happen right away. If you have  any new symptoms after leaving the hospital (such as hives or swelling),  call your hospital at the correct number below. Or call your family doctor.  If you have breathing distress or wheezing, call 911.    Special instructions: ***    I have read and understand the above information.    Signature:______________________________________ Date:___________    Staff:__________________________________________ Date:___________     Time:__________    [place] [facility] Departments:    ___[place]: [phone]  ___[facility]: [phone]  ___[place]: [phone] ___[facility]: [phone]  ___[facility]: [phone]  ___[facility]: [phone]  ___[phone]  ___[facility]: [phone]  ___[phone]

## 2019-02-19 DIAGNOSIS — F33.1 MODERATE EPISODE OF RECURRENT MAJOR DEPRESSIVE DISORDER (H): Primary | ICD-10-CM

## 2019-02-19 DIAGNOSIS — T73.2XXD FATIGUE DUE TO EXPOSURE, SUBSEQUENT ENCOUNTER: ICD-10-CM

## 2019-02-19 DIAGNOSIS — Z51.12 ENCOUNTER FOR ANTINEOPLASTIC IMMUNOTHERAPY: ICD-10-CM

## 2019-02-19 LAB — TSH SERPL DL<=0.005 MIU/L-ACNC: 1.51 MU/L (ref 0.4–4)

## 2019-02-19 RX ORDER — CITALOPRAM HYDROBROMIDE 10 MG/1
10 TABLET ORAL DAILY
Qty: 30 TABLET | Refills: 11 | Status: SHIPPED | OUTPATIENT
Start: 2019-02-19 | End: 2019-02-20

## 2019-02-20 DIAGNOSIS — F33.1 MODERATE EPISODE OF RECURRENT MAJOR DEPRESSIVE DISORDER (H): ICD-10-CM

## 2019-02-20 RX ORDER — CITALOPRAM HYDROBROMIDE 10 MG/1
10 TABLET ORAL DAILY
Qty: 30 TABLET | Refills: 11 | Status: SHIPPED | OUTPATIENT
Start: 2019-02-20 | End: 2020-08-07

## 2019-02-27 ENCOUNTER — MYC REFILL (OUTPATIENT)
Dept: OTHER | Age: 59
End: 2019-02-27

## 2019-02-27 DIAGNOSIS — B35.1 ONYCHOMYCOSIS: ICD-10-CM

## 2019-02-27 RX ORDER — TERBINAFINE HYDROCHLORIDE 250 MG/1
250 TABLET ORAL DAILY
Qty: 30 TABLET | Refills: 2 | Status: SHIPPED | OUTPATIENT
Start: 2019-02-27 | End: 2019-07-31

## 2019-02-27 NOTE — TELEPHONE ENCOUNTER
Patient should have sufficient supply.  MA please verify with the pharmacy and/or patient.    Bakari Terrell RN....2/27/2019 9:16 AM

## 2019-03-04 NOTE — LETTER
11/28/2017      RE: Ashleigh Alonzo  [address] [facility] [place] [address]       Hematology-Oncology Visit  Nov 28, 2017    Reason for Visit: follow-up breast cancer     HPI: Ashleigh Alonzo is a 57 year old female with past medical history of COPD, sleep apnea, periodontal disease with stage IIa, T2N0M0, grade 3, triple negative right breast cancer. She was diagnosed via abnormal screening mammogram. She ultimately had US, contrast-enhanced mammo, and biopsy showing 3.4 cm mass and pathology showed grade 3 invasive mammary carcinoma with associated high-grade DCIS. ER/NH was negative and HER2 negative. She enrolled in ISPY-2 clinical trial and began treatment with weekly taxol and once every 3 week pembrolizumab on 9/2/17. Please see notes from Dr. Bradshaw for further details of patient's oncology history.     Course has been complicated by fevers with PNA and then UTI. Week 7 was held due to transaminitis. She was given week 7 on 11/7 with pembrolizumab and Taxol. She was admitted 11/11-11/17 with high fever (105), cough, and dyspnea and was found to have HCAP and pneumonitis secondary to pembro. 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 She received 2 doses of methylpred, antibiotics, and d/c on prednisone 70 mg daily. Had follow-up last week on 11/21 with Dr. Bradshaw. Plan to resume Taxol this week.     Interval History: Ashleigh Perera is here with her  today. She is feeling great. She denies any fevers/chills, cough, SOB. Her energy level has been great. She has a good appetite, no nausea, or abdominal pain. No issues with bowel movements, edema, or urination. She has been sleeping okay on steroids. ROS otherwise negative.     Current Outpatient Prescriptions   Medication     predniSONE (DELTASONE) 10 MG tablet     pantoprazole (PROTONIX) 40 MG EC tablet     order for DME     lidocaine-prilocaine (EMLA) cream     hydrOXYzine (ATARAX) 25 MG tablet     guaiFENesin  Appears resting easy resp and eyes closed    "(MUCINEX) 600 MG 12 hr tablet     tiotropium (SPIRIVA) 18 MCG capsule     albuterol (PROAIR HFA/PROVENTIL HFA/VENTOLIN HFA) 108 (90 BASE) MCG/ACT Inhaler     citalopram (CELEXA) 10 MG tablet     order for DME     Coenzyme Q10 (CO Q 10 PO)     MULTIPLE VITAMIN PO     Cyanocobalamin (VITAMIN B 12 PO)     Pyridoxine HCl (VITAMIN B6 PO)     aspirin 81 MG tablet     VITAMIN D 1000 UNIT OR CAPS     LORazepam (ATIVAN) 0.5 MG tablet     LORazepam (ATIVAN) 0.5 MG tablet     darifenacin (ENABLEX) 7.5 MG 24 hr tablet     No current facility-administered medications for this visit.      Facility-Administered Medications Ordered in Other Visits   Medication     sodium chloride (PF) 0.9% PF flush 10 mL     heparin 100 UNIT/ML injection 500 Units     PACLitaxel (TAXOL) 150 mg in NaCl 0.9 % 300 mL CHEMOTHERAPY     lidocaine 1 % 9 mL     sodium bicarbonate 8.4 % injection 1 mEq     lidocaine-EPINEPHrine 1.5 %-[id number] injection 10 mL       PHYSICAL EXAM:  /85 (BP Location: Right arm, Patient Position: Sitting, Cuff Size: Adult Regular)  Pulse 86  Temp 97.9  F (36.6  C) (Oral)  Resp 16  Ht 1.626 m (5' 4.02\")  Wt 71.5 kg (157 lb 9.6 oz)  SpO2 94%  BMI 27.04 kg/m2  General: Alert, oriented, pleasant, NAD  Skin: No focal rash on exposed skin   HEENT: Normocephalic, atraumatic, PERRLA, EOMI. Moist mucus membranes, no lesions or thrush  Lungs: CTA bilaterally, normal work of breathing. Clear with coughing.   Cardiac: RRR, S1, S2, no murmurs  Abdomen: Soft, nontender, nondistended. Normoactive bowel sounds. No hepatosplenomegaly, masses  Neuro: CN II-XII grossly nonfocal   Extremities: No pedal edema    Labs:    11/15/2017 06:01 11/21/2017 11:37 11/28/2017 06:42   Sodium 139 138 138   Potassium 3.7 3.6 4.0   Chloride 106 103 102   Carbon Dioxide 23 24 26   Urea Nitrogen 12 11 11   Creatinine 0.71 0.65 0.73   GFR Estimate 84 >90 82   GFR Estimate If Black >90 >90 >90   Calcium 8.7 8.6 8.4 (L)   Anion Gap 10 11 9   Albumin 2.1 " (L) 2.7 (L) 2.9 (L)   Protein Total 6.6 (L) 6.7 (L) 6.6 (L)   Bilirubin Total 0.4 0.5 0.4   Alkaline Phosphatase 115 144 159 (H)   ALT 84 (H) 118 (H) 151 (H)   AST 95 (H) 46 (H) 71 (H)   Glucose 168 (H) 158 (H) 68 (L)   WBC 4.1 9.6 17.2 (H)   Hemoglobin 9.4 (L) 11.6 (L) 12.4   Hematocrit 28.6 (L) 34.3 (L) 38.4   Platelet Count 332 384 280   RBC Count 2.92 (L) 3.49 (L) 3.75 (L)   MCV 98 98 102 (H)   MCH 32.2 33.2 (H) 33.1 (H)   MCHC 32.9 33.8 32.3   RDW 14.0 15.7 (H) 15.9 (H)   Diff Method Automated Method Automated Method Automated Method   % Neutrophils 84.6 85.0 67.3   % Lymphocytes 13.3 8.7 22.3   % Monocytes 1.9 4.4 8.3   % Eosinophils 0.0 0.0 0.2   % Basophils 0.0 0.1 0.2   % Immature Granulocytes 0.2 1.8 1.7   Nucleated RBCs 0 0 0   Absolute Neutrophil 3.5 8.2 11.6 (H)   Absolute Lymphocytes 0.6 (L) 0.8 3.8   Absolute Monocytes 0.1 0.4 1.4 (H)   Absolute Eosinophils 0.0 0.0 0.0   Absolute Basophils 0.0 0.0 0.0   Abs Immature Granulocytes 0.0 0.2 0.3   Absolute Nucleated RBC 0.0 0.0 0.0       Assessment & Plan:     1.Stage IIa, T2N0M0, grade 3, triple negative breast cancer: S/p 7 weeks of weekly Taxol and every 3 week pembro. Week 7 was held due to grade 2 transaminitis. Last treatment was given 11/7 prior to developing pneumonitis and continued elevation of LFTs. Pembro has been d/c. She has grade 2 transaminitis with ALT of 151 but given the parameter is 150 or less and she is feeling well, will proceed with Taxol today.     Plan for 12 weeks of Taxol followed by 4 cycles of AC and pembrolizumab. She will then proceed with surgery and potentially adjuvant radiation. She will be seen weekly on the clinical trial.      2. Transaminitis: Normal bili, alk phos 159, , AST 71. Thought to be immune mediated hepatitis as well. Discussed with Dr. Rajput who recommended monitoring another week and if LFTs are not improving, to proceed with liver biopsy. Will keep her on prednisone 70 mg daily for now and then  consider taper next week if LFTs have improved.    3. Pneumonitis: Symptoms resolved. O2 saturation is normal.       Lilia Livingston PA-C    [facility] [address] [id number] [phone]

## 2019-03-05 ENCOUNTER — OFFICE VISIT (OUTPATIENT)
Dept: OPTOMETRY | Facility: CLINIC | Age: 59
End: 2019-03-05
Payer: COMMERCIAL

## 2019-03-05 DIAGNOSIS — H57.02 ANISOCORIA: ICD-10-CM

## 2019-03-05 DIAGNOSIS — H52.12 MYOPIA, LEFT: ICD-10-CM

## 2019-03-05 DIAGNOSIS — H52.222 REGULAR ASTIGMATISM OF LEFT EYE: ICD-10-CM

## 2019-03-05 DIAGNOSIS — H52.4 PRESBYOPIA: Primary | ICD-10-CM

## 2019-03-05 PROCEDURE — 92004 COMPRE OPH EXAM NEW PT 1/>: CPT | Performed by: OPTOMETRIST

## 2019-03-05 PROCEDURE — 92015 DETERMINE REFRACTIVE STATE: CPT | Performed by: OPTOMETRIST

## 2019-03-05 ASSESSMENT — KERATOMETRY
OD_K2POWER_DIOPTERS: 44.75
OS_K1POWER_DIOPTERS: 43.25
OD_K1POWER_DIOPTERS: 43.50
OS_AXISANGLE2_DEGREES: 6
OS_K2POWER_DIOPTERS: 45.00
OD_AXISANGLE2_DEGREES: 2

## 2019-03-05 ASSESSMENT — REFRACTION_WEARINGRX
OS_SPHERE: -1.00
OS_AXIS: 117
OD_CYLINDER: +0.50
OS_ADD: +2.50
OD_SPHERE: -0.75
OS_CYLINDER: +0.75
OD_AXIS: 025
SPECS_TYPE: PAL
OD_ADD: +2.50

## 2019-03-05 ASSESSMENT — VISUAL ACUITY
OS_CC: 20/30-1
METHOD: SNELLEN - LINEAR
OS_PH_CC: 20/25
OD_CC: 20/30
OS_CC: 20/100
CORRECTION_TYPE: GLASSES
OS_PH_CC+: -1
OD_CC: 20/25-1
OD_CC+: -1

## 2019-03-05 ASSESSMENT — REFRACTION_MANIFEST
OS_CYLINDER: +1.00
OS_SPHERE: -2.00
OD_CYLINDER: SPHERE
OS_SPHERE: -2.00
OS_ADD: +2.50
OD_ADD: +2.50
OS_CYLINDER: +1.00
OD_SPHERE: -0.25
OS_AXIS: 122
METHOD_AUTOREFRACTION: 1
OD_SPHERE: PLANO
OS_AXIS: 125

## 2019-03-05 ASSESSMENT — CONF VISUAL FIELD
OD_NORMAL: 1
METHOD: COUNTING FINGERS
OS_NORMAL: 1

## 2019-03-05 ASSESSMENT — CUP TO DISC RATIO
OS_RATIO: 0.2
OD_RATIO: 0.2

## 2019-03-05 ASSESSMENT — TONOMETRY
OS_IOP_MMHG: 15
OD_IOP_MMHG: 15
IOP_METHOD: APPLANATION

## 2019-03-05 ASSESSMENT — SLIT LAMP EXAM - LIDS
COMMENTS: NORMAL
COMMENTS: NORMAL

## 2019-03-05 NOTE — PATIENT INSTRUCTIONS
Patient was advised of today's exam findings.  Fill glasses prescription  Allow 2 weeks to adapt to change in glasses  Return in 1 year for eye exam    Maria C Velasco O.D.  [facility]   [address] [place] [address] [phone]

## 2019-03-05 NOTE — PROGRESS NOTES
Chief Complaint   Patient presents with     Annual Eye Exam         Last Eye Exam: 2-3 years ago   Dilated Previously: Yes    What are you currently using to see?  Glasses, wears the glasses all of the time        Distance Vision Acuity: Satisfied with vision, no changes, glasses seem to be working     Near Vision Acuity: Satisfied with vision while reading and using computer with glasses, thinks that there is starting to be some change to her vision     Eye Comfort: good  Do you use eye drops? 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       OBJECTIVE: See Ophthalmology exam    ASSESSMENT:    ICD-10-CM    1. Presbyopia H52.4 EYE EXAM (SIMPLE-NONBILLABLE)     REFRACTION   2. Myopia, left H52.12 EYE EXAM (SIMPLE-NONBILLABLE)     REFRACTION   3. Regular astigmatism of left eye H52.222 EYE EXAM (SIMPLE-NONBILLABLE)     REFRACTION   4. Anisocoria H57.02 EYE EXAM (SIMPLE-NONBILLABLE)     REFRACTION      PLAN:     Patient Instructions   Patient was advised of today's exam findings.  Fill glasses prescription  Allow 2 weeks to adapt to change in glasses  Return in 1 year for eye exam    Maria C Velasco O.D.  [facility]   [address] [place] [address] [phone]

## 2019-04-26 ENCOUNTER — MYC MEDICAL ADVICE (OUTPATIENT)
Dept: ONCOLOGY | Facility: CLINIC | Age: 59
End: 2019-04-26

## 2019-04-26 ENCOUNTER — TELEPHONE (OUTPATIENT)
Dept: FAMILY MEDICINE | Facility: CLINIC | Age: 59
End: 2019-04-26

## 2019-04-26 DIAGNOSIS — Z11.59 SCREENING FOR MEASLES: Primary | ICD-10-CM

## 2019-04-26 NOTE — TELEPHONE ENCOUNTER
Called patient and gave providers message/ instructions.  Patient states she understands this.     She is choosing to have the blood test to see if she has immunity.   Routing to PCP to put in future lab order.      Junie Flanagan RN, BSN

## 2019-04-26 NOTE — TELEPHONE ENCOUNTER
RN checked [facility] and there is no record of patient receiving an MMR.    Routing to PCP to advise if patient should have one now that she just finished radiation for breast CA.    Junie Flanagan RN, BSN

## 2019-04-26 NOTE — TELEPHONE ENCOUNTER
Patient is call to speak with nurse she has just finished cancer treatment for breast cancer and wondering if she should get a measles vaccine has been hearing on the new about if you had one in the past you maybe needing the new one.     Please call to advice     Thank you

## 2019-04-26 NOTE — TELEPHONE ENCOUNTER
Given her age, I am sure she had one  If she just finished treatments for chemo she is probably immunsuppressed  Measles is a live vaccine and I would not recommend it at this time  I would recommend she be tested to see if she is immune as she most likely either had the measles as a child or was vaccinated.    Susanne Delgado

## 2019-04-29 NOTE — TELEPHONE ENCOUNTER
I have never seen her before and she does not have an appointment with me for 3 months. Per current guideline per CDC about current  Measles outbreak, if born after 1957, should be screened for immunity. Please have her see me.    Susanne Delgado MD

## 2019-04-29 NOTE — TELEPHONE ENCOUNTER
Patient calling to check on status of lab orders Dr Delgado was going to put in for her. Spoke to nurse on Friday. No orders in system. Please call to advise.

## 2019-04-29 NOTE — TELEPHONE ENCOUNTER
Called patient and made appointment to see Dr Church to discuss this and her ear pain 5/1/19.    Junie Flanagan RN, BSN

## 2019-04-30 NOTE — PROGRESS NOTES
"  SUBJECTIVE:   Ashleigh Alonzo is a 58 year old female who presents to clinic today for the following   health issues:          Ear pain       Duration: 2 weeks     Description (location/character/radiation): both ears, sore and tender     Intensity:  mild, moderate    Accompanying signs and symptoms: \"feels like she is fighting something\"     History (similar episodes/previous evaluation): None    Precipitating or alleviating factors: None    Therapies tried and outcome: None      Would like to check measles immunity given recent outbreaks    Ears have been hurting intermittently. Not at the moment  No hearing loss or sinus symptoms  Hurting for last few weeks       Finished radiation about one year ago for cancer  Still feels tired    Additional history: as documented    Reviewed  and updated as needed this visit by clinical staff         Reviewed and updated as needed this visit by Provider         Labs reviewed in EPIC    ROS:  Constitutional, HEENT, cardiovascular, pulmonary, gi and gu systems are negative, except as otherwise noted.    OBJECTIVE:     /77   Pulse 86   Temp 98.9  F (37.2  C) (Oral)   Resp 16   Ht 1.6 m (5' 3\")   Wt 67.6 kg (149 lb)   SpO2 97%   BMI 26.39 kg/m    Body mass index is 26.39 kg/m .  GENERAL: healthy, alert and no distress  HENT: ear canals and TM's normal, nose and mouth without ulcers or lesions  NECK: no adenopathy, no asymmetry, masses, or scars and thyroid normal to palpation    Diagnostic Test Results:  none     ASSESSMENT/PLAN:     1. Immunity status testing  Will order as requested  - Rubeola Antibody IgG    2. Acute ear pain, bilateral  No obvious cause, no pain at this time. suspece eustachian tube dysfunction          Susanne Delgado MD  [facility]        "

## 2019-05-01 ENCOUNTER — OFFICE VISIT (OUTPATIENT)
Dept: FAMILY MEDICINE | Facility: CLINIC | Age: 59
End: 2019-05-01
Payer: COMMERCIAL

## 2019-05-01 ENCOUNTER — OFFICE VISIT (OUTPATIENT)
Dept: PODIATRY | Facility: CLINIC | Age: 59
End: 2019-05-01
Payer: COMMERCIAL

## 2019-05-01 VITALS
OXYGEN SATURATION: 97 % | BODY MASS INDEX: 26.4 KG/M2 | HEIGHT: 63 IN | SYSTOLIC BLOOD PRESSURE: 127 MMHG | DIASTOLIC BLOOD PRESSURE: 77 MMHG | HEART RATE: 86 BPM | TEMPERATURE: 98.9 F | RESPIRATION RATE: 16 BRPM | WEIGHT: 149 LBS

## 2019-05-01 VITALS — SYSTOLIC BLOOD PRESSURE: 124 MMHG | DIASTOLIC BLOOD PRESSURE: 78 MMHG | WEIGHT: 150 LBS | BODY MASS INDEX: 26.57 KG/M2

## 2019-05-01 DIAGNOSIS — Z01.84 IMMUNITY STATUS TESTING: Primary | ICD-10-CM

## 2019-05-01 DIAGNOSIS — B35.1 ONYCHOMYCOSIS: Primary | ICD-10-CM

## 2019-05-01 DIAGNOSIS — H92.03 ACUTE EAR PAIN, BILATERAL: ICD-10-CM

## 2019-05-01 LAB
ALBUMIN SERPL-MCNC: 3.8 G/DL (ref 3.4–5)
ALP SERPL-CCNC: 207 U/L (ref 40–150)
ALT SERPL W P-5'-P-CCNC: 59 U/L (ref 0–50)
AST SERPL W P-5'-P-CCNC: 45 U/L (ref 0–45)
BILIRUB DIRECT SERPL-MCNC: 0.2 MG/DL (ref 0–0.2)
BILIRUB SERPL-MCNC: 0.6 MG/DL (ref 0.2–1.3)
PROT SERPL-MCNC: 7.5 G/DL (ref 6.8–8.8)

## 2019-05-01 PROCEDURE — 99213 OFFICE O/P EST LOW 20 MIN: CPT | Performed by: FAMILY MEDICINE

## 2019-05-01 PROCEDURE — 36415 COLL VENOUS BLD VENIPUNCTURE: CPT | Performed by: FAMILY MEDICINE

## 2019-05-01 PROCEDURE — 99213 OFFICE O/P EST LOW 20 MIN: CPT | Performed by: PODIATRIST

## 2019-05-01 PROCEDURE — 80076 HEPATIC FUNCTION PANEL: CPT | Performed by: PODIATRIST

## 2019-05-01 PROCEDURE — 86765 RUBEOLA ANTIBODY: CPT | Performed by: FAMILY MEDICINE

## 2019-05-01 RX ORDER — TERBINAFINE HYDROCHLORIDE 250 MG/1
250 TABLET ORAL DAILY
Qty: 30 TABLET | Refills: 2 | Status: SHIPPED | OUTPATIENT
Start: 2019-05-01 | End: 2019-05-01

## 2019-05-01 ASSESSMENT — MIFFLIN-ST. JEOR: SCORE: 1224.99

## 2019-05-01 NOTE — LETTER
5/1/2019         RE: Ashleigh Aolnzo  [address] [place] [address]        Dear Colleague,    Thank you for referring your patient, Ashleigh Alonzo, to the [facility]. Please see a copy of my visit note below.    Subjective:    Pt is seen today as a f/u pt for Lamisil treatment.  Has been on meds for 3 months.  Denies any s/e w/ meds and has been tolerating them well.      ROS:  Denies headaches or gi distress.     No Known Allergies    Current Outpatient Medications   Medication Sig Dispense Refill     albuterol (PROAIR HFA/PROVENTIL HFA/VENTOLIN HFA) 108 (90 Base) MCG/ACT inhaler Inhale 2 puffs into the lungs every 6 hours as needed for shortness of breath / dyspnea 1 Inhaler 5     ASPIRIN PO Take 81 mg by mouth daily       citalopram (CELEXA) 10 MG tablet Take 1 tablet (10 mg) by mouth daily 30 tablet 11     eszopiclone (LUNESTA) 1 MG tablet Take 1 tablet (1 mg) by mouth nightly as needed for sleep 30 tablet 3     guaiFENesin (MUCINEX) 600 MG 12 hr tablet Take 2 tablets (1,200 mg) by mouth 2 times daily 180 tablet 6     hydrOXYzine (ATARAX) 25 MG tablet Take 1-2 tablets (25-50 mg) by mouth every 6 hours as needed for itching 60 tablet 3     order for DME autoCPAP 5-18 cmH20 1 Device 0     simvastatin (ZOCOR) 40 MG tablet Take 1 tablet (40 mg) by mouth At Bedtime 90 tablet 4     terbinafine (LAMISIL) 250 MG tablet Take 1 tablet (250 mg) by mouth daily 30 tablet 2     terbinafine (LAMISIL) 250 MG tablet Take 1 tablet (250 mg) by mouth daily 30 tablet 2     tiotropium (SPIRIVA) 18 MCG capsule Inhale 1 capsule (18 mcg) into the lungs daily Inhale contents of one capsule 1 capsule 11     VITAMIN D 1000 UNIT OR CAPS TAKE 2 CAPSULES BY MOUTH EVERY MORNING       zolpidem (AMBIEN) 5 MG tablet Take 1 tablet (5 mg) by mouth nightly as needed for sleep 60 tablet 0       Patient Active Problem List   Diagnosis     Anxiety     Anisocoria     Hyperlipidemia LDL goal <130     Lack of libido     Family  history of ischemic heart disease     Mild major depression (H)     Lung nodule, multiple     MERLIN (obstructive sleep apnea)- 'mild' (AHI 9)     Urticaria     Chronic obstructive pulmonary disease, unspecified COPD type (H)     Malignant neoplasm of upper-inner quadrant of right breast in female, estrogen receptor negative (H)     Long term (current) use of systemic steroids     Tongue ulcer     Periodontal disease       Past Medical History:   Diagnosis Date     Acute cystitis without hematuria 10/26/2017     Clostridium difficile diarrhea 1/12/2018     Neutropenic fever (H) 01/10/2018     She was hospitalized 1/10/18 - 1/13/18 with neutropenic fever     Neutropenic sepsis (H) 01/29/2018    hospitalized again  from 1/29/18 - 2/8/18 with neutropenic fever, mucositis, and C. difficile colitis      Pneumonia 11/11/2017    She was hospitalized 11/11/17 - 11/17/17 with fevers ranging from 102 - 105.  CT chest was consistent with pneumonitis.  She also had transaminitis in the 150 range.  She was started on corticosteroids.  Pembrolizumab was stopped      S/P radiation therapy     5,256 cGy completed on 4/27/2018 to [facility] with Dr. Zhong       Past Surgical History:   Procedure Laterality Date     APPENDECTOMY  10/06/2015    [facility]     BREAST BIOPSY, CORE RT/LT Right 08/22/2017     BRONCHOSCOPY (RIGID OR FLEXIBLE), DIAGNOSTIC N/A 2/6/2018    Procedure: COMBINED BRONCHOSCOPY (RIGID OR FLEXIBLE), LAVAGE;  COMBINED BRONCOSCOY (RIGID OR FLEXIBLE), LAVAGE;  Surgeon: Samir Pettit MD;  Location: [facility]     CLEAR LENS REPLACEMENT Bilateral 11/2016     COLONOSCOPY  11/15/2011    Procedure:COLONOSCOPY; Colonoscopy, screening; Surgeon:ANASTASIA BUNCH; Location:[facility]     INSERT PORT VASCULAR ACCESS Left 9/1/2017    Procedure: INSERT PORT VASCULAR ACCESS;  Single Lumen Chest Power Port;  Surgeon: Leif Parkinson PA-C;  [facility]: [facility]     LUMPECTOMY BREAST WITH SENTINEL NODE,  COMBINED Right 2018    Procedure: COMBINED LUMPECTOMY BREAST WITH SENTINEL NODE;  Right Wire Localized Lumpectomy, Right Nimitz Lymph Node Biopsy And Freddy Cath Removal;  Surgeon: Atul Gates MD;  Location: [facility]     REMOVE PORT VASCULAR ACCESS N/A 2018    Procedure: REMOVE PORT VASCULAR ACCESS;;  Surgeon: Atul Gates MD;  Location: [facility]     TUBAL LIGATION  2004       Family History   Problem Relation Age of Onset     Cardiovascular Father 46        MI     Eye Disorder Father      Cerebrovascular Disease Father      Arthritis Sister      Neurologic Disorder Brother      Eye Surgery Maternal Grandmother         cataract     Prostate Cancer Maternal Grandfather      Prostate Cancer Maternal Uncle      Cancer Maternal Uncle         Throat cancer     Diabetes No family hx of      Hypertension No family hx of      Glaucoma No family hx of      Macular Degeneration No family hx of        Social History     Tobacco Use     Smoking status: Former Smoker     Packs/day: 0.75     Years: 20.00     Pack years: 15.00     Types: Cigarettes     Last attempt to quit: 2000     Years since quittin.3     Smokeless tobacco: Never Used   Substance Use Topics     Alcohol use: Yes     Comment: Daily to weekly use (beer)         Objective:    /78   Wt 68 kg (150 lb)   BMI 26.57 kg/m   .  Patient pleasant to talk with and in no distress.  Pulses are palpable DP & PT bilateral.  Sensation to light touch is intact.  no forefoot deformities.  Normal ROM all forefoot joints.  Skin intact bilateral and not dry.  Mycotic hallux nails now clearing proximally.  No evidence of deep abscess, erythema, or infection noted.  No paronychia.  No pain upon palpation to the toe nails.  Nailbed healthy.  No subungual masses noted.  Last LFT's normal.        Assessment:  Onychomycosis     Plan:  Discussed etiology and treatment options with the pt.  Since the medication appears to be effective.  A decision was made to  continue Lamisil.  Risks complications and efficacy again discussed.  Continue Lamisil treatment for 3 more months and check LFT's.  rtc 3 months.    Juan Celestin DPM, FACFAS         Again, thank you for allowing me to participate in the care of your patient.        Sincerely,        Juan Celestin DPM

## 2019-05-01 NOTE — PROGRESS NOTES
Subjective:    Pt is seen today as a f/u pt for Lamisil treatment.  Has been on meds for 3 months.  Denies any s/e w/ meds and has been tolerating them well.      ROS:  Denies headaches or gi distress.     No Known Allergies    Current Outpatient Medications   Medication Sig Dispense Refill     albuterol (PROAIR HFA/PROVENTIL HFA/VENTOLIN HFA) 108 (90 Base) MCG/ACT inhaler Inhale 2 puffs into the lungs every 6 hours as needed for shortness of breath / dyspnea 1 Inhaler 5     ASPIRIN PO Take 81 mg by mouth daily       citalopram (CELEXA) 10 MG tablet Take 1 tablet (10 mg) by mouth daily 30 tablet 11     eszopiclone (LUNESTA) 1 MG tablet Take 1 tablet (1 mg) by mouth nightly as needed for sleep 30 tablet 3     guaiFENesin (MUCINEX) 600 MG 12 hr tablet Take 2 tablets (1,200 mg) by mouth 2 times daily 180 tablet 6     hydrOXYzine (ATARAX) 25 MG tablet Take 1-2 tablets (25-50 mg) by mouth every 6 hours as needed for itching 60 tablet 3     order for DME autoCPAP 5-18 cmH20 1 Device 0     simvastatin (ZOCOR) 40 MG tablet Take 1 tablet (40 mg) by mouth At Bedtime 90 tablet 4     terbinafine (LAMISIL) 250 MG tablet Take 1 tablet (250 mg) by mouth daily 30 tablet 2     terbinafine (LAMISIL) 250 MG tablet Take 1 tablet (250 mg) by mouth daily 30 tablet 2     tiotropium (SPIRIVA) 18 MCG capsule Inhale 1 capsule (18 mcg) into the lungs daily Inhale contents of one capsule 1 capsule 11     VITAMIN D 1000 UNIT OR CAPS TAKE 2 CAPSULES BY MOUTH EVERY MORNING       zolpidem (AMBIEN) 5 MG tablet Take 1 tablet (5 mg) by mouth nightly as needed for sleep 60 tablet 0       Patient Active Problem List   Diagnosis     Anxiety     Anisocoria     Hyperlipidemia LDL goal <130     Lack of libido     Family history of ischemic heart disease     Mild major depression (H)     Lung nodule, multiple     MERLIN (obstructive sleep apnea)- 'mild' (AHI 9)     Urticaria     Chronic obstructive pulmonary disease, unspecified COPD type (H)     Malignant  neoplasm of upper-inner quadrant of right breast in female, estrogen receptor negative (H)     Long term (current) use of systemic steroids     Tongue ulcer     Periodontal disease       Past Medical History:   Diagnosis Date     Acute cystitis without hematuria 10/26/2017     Clostridium difficile diarrhea 1/12/2018     Neutropenic fever (H) 01/10/2018     She was hospitalized 1/10/18 - 1/13/18 with neutropenic fever     Neutropenic sepsis (H) 01/29/2018    hospitalized again  from 1/29/18 - 2/8/18 with neutropenic fever, mucositis, and C. difficile colitis      Pneumonia 11/11/2017    She was hospitalized 11/11/17 - 11/17/17 with fevers ranging from 102 - 105.  CT chest was consistent with pneumonitis.  She also had transaminitis in the 150 range.  She was started on corticosteroids.  Pembrolizumab was stopped      S/P radiation therapy     5,256 cGy completed on 4/27/2018 to [facility] with Dr. Zhong       Past Surgical History:   Procedure Laterality Date     APPENDECTOMY  10/06/2015    [facility]     BREAST BIOPSY, CORE RT/LT Right 08/22/2017     BRONCHOSCOPY (RIGID OR FLEXIBLE), DIAGNOSTIC N/A 2/6/2018    Procedure: COMBINED BRONCHOSCOPY (RIGID OR FLEXIBLE), LAVAGE;  COMBINED BRONCOSCOY (RIGID OR FLEXIBLE), LAVAGE;  Surgeon: Samir Pettit MD;  [facility]     CLEAR LENS REPLACEMENT Bilateral 11/2016     COLONOSCOPY  11/15/2011    Procedure:COLONOSCOPY; Colonoscopy, screening; Surgeon:ANASTASIA BUNCH; [facility]     INSERT PORT VASCULAR ACCESS Left 9/1/2017    Procedure: INSERT PORT VASCULAR ACCESS;  Single Lumen Chest Power Port;  Surgeon: Leif Parkinson PA-C;  [facility]     LUMPECTOMY BREAST WITH SENTINEL NODE, COMBINED Right 2/27/2018    Procedure: COMBINED LUMPECTOMY BREAST WITH SENTINEL NODE;  Right Wire Localized Lumpectomy, Right [place] Lymph Node Biopsy And Freddy Cath Removal;  Surgeon: Atul Gates MD;  [facility]     REMOVE PORT  VASCULAR ACCESS N/A 2018    Procedure: REMOVE PORT VASCULAR ACCESS;;  Surgeon: Atul Gates MD;  [facility]     TUBAL LIGATION  2004       Family History   Problem Relation Age of Onset     Cardiovascular Father 46        MI     Eye Disorder Father      Cerebrovascular Disease Father      Arthritis Sister      Neurologic Disorder Brother      Eye Surgery Maternal Grandmother         cataract     Prostate Cancer Maternal Grandfather      Prostate Cancer Maternal Uncle      Cancer Maternal Uncle         Throat cancer     Diabetes No family hx of      Hypertension No family hx of      Glaucoma No family hx of      Macular Degeneration No family hx of        Social History     Tobacco Use     Smoking status: Former Smoker     Packs/day: 0.75     Years: 20.00     Pack years: 15.00     Types: Cigarettes     Last attempt to quit: 2000     Years since quittin.3     Smokeless tobacco: Never Used   Substance Use Topics     Alcohol use: Yes     Comment: Daily to weekly use (beer)         Objective:    /78   Wt 68 kg (150 lb)   BMI 26.57 kg/m  .  Patient pleasant to talk with and in no distress.  Pulses are palpable DP & PT bilateral.  Sensation to light touch is intact.  no forefoot deformities.  Normal ROM all forefoot joints.  Skin intact bilateral and not dry.  Mycotic hallux nails now clearing proximally.  No evidence of deep abscess, erythema, or infection noted.  No paronychia.  No pain upon palpation to the toe nails.  Nailbed healthy.  No subungual masses noted.  Last LFT's normal.        Assessment:  Onychomycosis     Plan:  Discussed etiology and treatment options with the pt.  Since the medication appears to be effective.  A decision was made to continue Lamisil.  Risks complications and efficacy again discussed.  Continue Lamisil treatment for 3 more months and check LFT's.  rtc 3 months.    Juan Celestin DPM, FACFAS

## 2019-05-01 NOTE — PATIENT INSTRUCTIONS
We wish you continued good healing. If you have any questions or concerns, please do not hesitate to contact us at [phone]    Please remember to call and schedule a follow up appointment if one was recommended at your earliest convenience.   PODIATRY CLINIC HOURS  TELEPHONE NUMBER    Dr. Juan Celestin D.P.M [facility]    Clinics:  [facility]    Toya Spears [facility]   Tuesday 1PM-6PM  [place]  Wednesday 7AM-2PM  [facility]  Thursday 10AM-6PM  [place]  Friday 7AM-3PM  [place]  Specialty schedulers:   [phone] to make an appointment with any Specialty Provider.        Urgent Care locations:    [facility] Monday-Friday 5 pm - 9 pm. Saturday-Sunday 9 am -5pm    Monday-Friday 11 am - 9 pm Saturday 9 am - 5 pm     Monday-Sunday 12 noon-8PM [phone]     If you need a medication refill, please contact us you may need lab work and/or a follow up visit prior to your refill (i.e. Antifungal medications).    [employer] (secure e-mail communication and access to your chart) to send a message or to make an appointment.    If MRI needed please call [place] at [phone]        Weight management plan: Patient was referred to their PCP to discuss a diet and exercise plan.

## 2019-05-02 LAB — MEV IGG SER QL IA: 5.1 AI (ref 0–0.8)
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
## 2019-05-20 ENCOUNTER — ONCOLOGY VISIT (OUTPATIENT)
Dept: ONCOLOGY | Facility: CLINIC | Age: 59
End: 2019-05-20
Attending: INTERNAL MEDICINE
Payer: COMMERCIAL

## 2019-05-20 VITALS
SYSTOLIC BLOOD PRESSURE: 114 MMHG | HEIGHT: 63 IN | OXYGEN SATURATION: 98 % | RESPIRATION RATE: 16 BRPM | TEMPERATURE: 98.4 F | BODY MASS INDEX: 27.64 KG/M2 | DIASTOLIC BLOOD PRESSURE: 77 MMHG | HEART RATE: 71 BPM | WEIGHT: 156 LBS

## 2019-05-20 DIAGNOSIS — Z12.39 BREAST CANCER SCREENING, HIGH RISK PATIENT: ICD-10-CM

## 2019-05-20 DIAGNOSIS — Z92.21 STATUS POST CHEMOTHERAPY: ICD-10-CM

## 2019-05-20 DIAGNOSIS — Z17.1 MALIGNANT NEOPLASM OF UPPER-INNER QUADRANT OF RIGHT BREAST IN FEMALE, ESTROGEN RECEPTOR NEGATIVE (H): Primary | ICD-10-CM

## 2019-05-20 DIAGNOSIS — C50.211 MALIGNANT NEOPLASM OF UPPER-INNER QUADRANT OF RIGHT BREAST IN FEMALE, ESTROGEN RECEPTOR NEGATIVE (H): Primary | ICD-10-CM

## 2019-05-20 PROCEDURE — 99214 OFFICE O/P EST MOD 30 MIN: CPT | Mod: ZP | Performed by: INTERNAL MEDICINE

## 2019-05-20 PROCEDURE — G0463 HOSPITAL OUTPT CLINIC VISIT: HCPCS | Mod: ZF

## 2019-05-20 ASSESSMENT — PAIN SCALES - GENERAL: PAINLEVEL: NO PAIN (0)

## 2019-05-20 ASSESSMENT — MIFFLIN-ST. JEOR: SCORE: 1251.74
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
## 2019-05-20 NOTE — NURSING NOTE
"Oncology Rooming Note    May 20, 2019 3:45 PM   Ashleigh Alonzo is a 59 year old female who presents for:    Chief Complaint   Patient presents with     Oncology Clinic Visit     RETURN VISIT; BREAST CA 3 MONTH FOLLOW UP; VITALS COMPLETED BY [facility]      Initial Vitals: /77   Pulse 71   Temp 98.4  F (36.9  C) (Oral)   Resp 16   Ht 1.6 m (5' 3\")   Wt 70.8 kg (156 lb)   SpO2 98%   BMI 27.63 kg/m   Estimated body mass index is 27.63 kg/m  as calculated from the following:    Height as of this encounter: 1.6 m (5' 3\").    Weight as of this encounter: 70.8 kg (156 lb). Body surface area is 1.77 meters squared.  No Pain (0) Comment: Data Unavailable   No LMP recorded. Patient is postmenopausal.  Allergies reviewed: Yes  Medications reviewed: Yes    Medications: Medication refills not needed today.  Pharmacy name entered into [facility]:    [facility] [place], [place] - [address] [place] [facility] - [place], [place] - [address] [address]    Clinical concerns: No new concerns today  Dr. Bradshaw  was notified.      Kasandra Abraham              "

## 2019-05-30 DIAGNOSIS — L50.9 URTICARIA: ICD-10-CM

## 2019-05-30 RX ORDER — HYDROXYZINE HYDROCHLORIDE 25 MG/1
25-50 TABLET, FILM COATED ORAL EVERY 6 HOURS PRN
Qty: 60 TABLET | Refills: 3 | Status: SHIPPED | OUTPATIENT
Start: 2019-05-30 | End: 2020-05-28

## 2019-06-12 ENCOUNTER — MYC MEDICAL ADVICE (OUTPATIENT)
Dept: PODIATRY | Facility: CLINIC | Age: 59
End: 2019-06-12

## 2019-06-12 ENCOUNTER — TELEPHONE (OUTPATIENT)
Dept: PULMONOLOGY | Facility: CLINIC | Age: 59
End: 2019-06-12

## 2019-06-12 NOTE — TELEPHONE ENCOUNTER
YASMIN for patient to call back.     Okay for patient to schedule with Dr. Valdez in July with a CT prior. Orders for CT were placed by Dr. Owen.     Dom De La Cruz RN   Pulmonary/CORE Care Coordinator  [facility]

## 2019-06-12 NOTE — TELEPHONE ENCOUNTER
FYI message: Pt needs to be seen yearly due to insurance. She will call back in a month or so to hopefully schedule for 10/14/2019 with Dr. Valdez when his schedule is available.    Writer transferred her to imaging scheduling to shedule her CT for 10/11/2019.

## 2019-06-12 NOTE — TELEPHONE ENCOUNTER
[facility] Call Center    Phone Message    May a detailed message be left on voicemail: yes    Reason for Call: Other: Pt called to follow up on a letter she received about Dr. Owen leaving our clinic. She is inquiring if Dr. Valdez could be her Dr. since she has a lung nodule. If he is not able to see her, she is requesting to discuss other options. Please advise.     Action Taken: Message routed to:  Adult Clinics: Pulmonology p [phone]

## 2019-07-02 ENCOUNTER — MYC MEDICAL ADVICE (OUTPATIENT)
Dept: PODIATRY | Facility: CLINIC | Age: 59
End: 2019-07-02

## 2019-07-03 ENCOUNTER — OFFICE VISIT (OUTPATIENT)
Dept: PODIATRY | Facility: CLINIC | Age: 59
End: 2019-07-03
Payer: COMMERCIAL

## 2019-07-03 VITALS
SYSTOLIC BLOOD PRESSURE: 125 MMHG | BODY MASS INDEX: 28.17 KG/M2 | DIASTOLIC BLOOD PRESSURE: 80 MMHG | WEIGHT: 159 LBS | HEART RATE: 98 BPM

## 2019-07-03 DIAGNOSIS — B35.1 ONYCHOMYCOSIS: Primary | ICD-10-CM

## 2019-07-03 LAB
ALBUMIN SERPL-MCNC: 4 G/DL (ref 3.4–5)
ALP SERPL-CCNC: 174 U/L (ref 40–150)
ALT SERPL W P-5'-P-CCNC: 50 U/L (ref 0–50)
AST SERPL W P-5'-P-CCNC: 36 U/L (ref 0–45)
BILIRUB DIRECT SERPL-MCNC: <0.1 MG/DL (ref 0–0.2)
BILIRUB SERPL-MCNC: 0.2 MG/DL (ref 0.2–1.3)
PROT SERPL-MCNC: 7.7 G/DL (ref 6.8–8.8)

## 2019-07-03 PROCEDURE — 36415 COLL VENOUS BLD VENIPUNCTURE: CPT | Performed by: PODIATRIST

## 2019-07-03 PROCEDURE — 99213 OFFICE O/P EST LOW 20 MIN: CPT | Performed by: PODIATRIST

## 2019-07-03 PROCEDURE — 80076 HEPATIC FUNCTION PANEL: CPT | Performed by: PODIATRIST

## 2019-07-03 RX ORDER — TERBINAFINE HYDROCHLORIDE 250 MG/1
250 TABLET ORAL DAILY
Qty: 30 TABLET | Refills: 2 | Status: SHIPPED | OUTPATIENT
Start: 2019-07-03 | End: 2019-10-14

## 2019-07-03 NOTE — PROGRESS NOTES
Subjective:    Pt is seen today as a f/u pt for Lamisil treatment.  Has been on meds for 5 months.  Denies any s/e w/ meds and has been tolerating them well.  She is coming in one month early since one of her LFTs was slightly high last visit.        ROS:  Denies headaches or gi distress.     No Known Allergies    Current Outpatient Medications   Medication Sig Dispense Refill     albuterol (PROAIR HFA/PROVENTIL HFA/VENTOLIN HFA) 108 (90 Base) MCG/ACT inhaler Inhale 2 puffs into the lungs every 6 hours as needed for shortness of breath / dyspnea 1 Inhaler 5     ASPIRIN PO Take 81 mg by mouth daily       citalopram (CELEXA) 10 MG tablet Take 1 tablet (10 mg) by mouth daily 30 tablet 11     eszopiclone (LUNESTA) 1 MG tablet Take 1 tablet (1 mg) by mouth nightly as needed for sleep 30 tablet 3     guaiFENesin (MUCINEX) 600 MG 12 hr tablet Take 2 tablets (1,200 mg) by mouth 2 times daily 180 tablet 6     hydrOXYzine (ATARAX) 25 MG tablet Take 1-2 tablets (25-50 mg) by mouth every 6 hours as needed for itching 60 tablet 3     order for DME autoCPAP 5-18 cmH20 1 Device 0     simvastatin (ZOCOR) 40 MG tablet Take 1 tablet (40 mg) by mouth At Bedtime 90 tablet 4     terbinafine (LAMISIL) 250 MG tablet Take 1 tablet (250 mg) by mouth daily 30 tablet 2     tiotropium (SPIRIVA) 18 MCG capsule Inhale 1 capsule (18 mcg) into the lungs daily Inhale contents of one capsule 1 capsule 11     VITAMIN D 1000 UNIT OR CAPS TAKE 2 CAPSULES BY MOUTH EVERY MORNING         Patient Active Problem List   Diagnosis     Anxiety     Anisocoria     Hyperlipidemia LDL goal <130     Lack of libido     Family history of ischemic heart disease     Mild major depression (H)     Lung nodule, multiple     MERLIN (obstructive sleep apnea)- 'mild' (AHI 9)     Urticaria     Chronic obstructive pulmonary disease, unspecified COPD type (H)     Malignant neoplasm of upper-inner quadrant of right breast in female, estrogen receptor negative (H)     Long term  (current) use of systemic steroids     Tongue ulcer     Periodontal disease       Past Medical History:   Diagnosis Date     Acute cystitis without hematuria 10/26/2017     Clostridium difficile diarrhea 1/12/2018     Neutropenic fever (H) 01/10/2018     She was hospitalized 1/10/18 - 1/13/18 with neutropenic fever     Neutropenic sepsis (H) 01/29/2018    hospitalized again  from 1/29/18 - 2/8/18 with neutropenic fever, mucositis, and C. difficile colitis      Pneumonia 11/11/2017    She was hospitalized 11/11/17 - 11/17/17 with fevers ranging from 102 - 105.  CT chest was consistent with pneumonitis.  She also had transaminitis in the 150 range.  She was started on corticosteroids.  Pembrolizumab was stopped      S/P radiation therapy     5,256 cGy completed on 4/27/2018 to [facility] with Dr. Zhong       Past Surgical History:   Procedure Laterality Date     APPENDECTOMY  10/06/2015    [facility]     BREAST BIOPSY, CORE RT/LT Right 08/22/2017     BRONCHOSCOPY (RIGID OR FLEXIBLE), DIAGNOSTIC N/A 2/6/2018    Procedure: COMBINED BRONCHOSCOPY (RIGID OR FLEXIBLE), LAVAGE;  COMBINED BRONCOSCOY (RIGID OR FLEXIBLE), LAVAGE;  Surgeon: Samir Pettit MD;  Location: [facility]     CLEAR LENS REPLACEMENT Bilateral 11/2016     COLONOSCOPY  11/15/2011    Procedure:COLONOSCOPY; Colonoscopy, screening; Surgeon:ANASTASIA BUNCH; Location:[facility]     INSERT PORT VASCULAR ACCESS Left 9/1/2017    Procedure: INSERT PORT VASCULAR ACCESS;  Single Lumen Chest Power Port;  Surgeon: Leif Parkinson PA-C;  [facility]     LUMPECTOMY BREAST WITH SENTINEL NODE, COMBINED Right 2/27/2018    Procedure: COMBINED LUMPECTOMY BREAST WITH SENTINEL NODE;  Right Wire Localized Lumpectomy, Right [place] Lymph Node Biopsy And Freddy Cath Removal;  Surgeon: Atul Gates MD;  [facility]: [facility]     REMOVE PORT VASCULAR ACCESS N/A 2/27/2018    Procedure: REMOVE PORT VASCULAR ACCESS;;  Surgeon: Atul Gates MD;   [facility]: [facility]     TUBAL LIGATION  2004       Family History   Problem Relation Age of Onset     Cardiovascular Father 46        MI     Eye Disorder Father      Cerebrovascular Disease Father      Arthritis Sister      Neurologic Disorder Brother      Eye Surgery Maternal Grandmother         cataract     Prostate Cancer Maternal Grandfather      Prostate Cancer Maternal Uncle      Cancer Maternal Uncle         Throat cancer     Diabetes No family hx of      Hypertension No family hx of      Glaucoma No family hx of      Macular Degeneration No family hx of        Social History     Tobacco Use     Smoking status: Former Smoker     Packs/day: 0.75     Years: 20.00     Pack years: 15.00     Types: Cigarettes     Last attempt to quit: 2000     Years since quittin.5     Smokeless tobacco: Never Used   Substance Use Topics     Alcohol use: Yes     Comment: Daily to weekly use (beer)         Objective:    There were no vitals taken for this visit..  Patient pleasant to talk with and in no distress.  Pulses are palpable DP & PT bilateral.  Sensation to light touch is intact.  no forefoot deformities.  Normal ROM all forefoot joints.  Skin intact bilateral and not dry.  Right mycotic nail now clearing proximally buit left unchanged.  No evidence of deep abscess, erythema, or infection noted.  No paronychia.  No pain upon palpation to the toe nails.  Nailbed healthy.  No subungual masses noted.     ALT 59  High   0 - 50 U/L Final 2019  1:46 PM MG   AST 45   0 - 45 U/L Final 2019  1:46 PM MG             Assessment:  Onychomycosis     Plan:  Discussed etiology and treatment options with the pt.  Since the medication appears to be effective.  A decision was made to continue Lamisil.  Risks complications and efficacy again discussed.  Continue Lamisil treatment for 3 more months and check LFT's and if high patient will return sooner.  rtc 3 months.    Juan HEADM, FACFAS

## 2019-07-03 NOTE — LETTER
7/3/2019         RE: Ashleigh Alonzo  [address] [place] [address]        Dear Colleague,    Thank you for referring your patient, Ashleigh Alonzo, to the [facility]. Please see a copy of my visit note below.    Subjective:    Pt is seen today as a f/u pt for Lamisil treatment.  Has been on meds for 5 months.  Denies any s/e w/ meds and has been tolerating them well.  She is coming in one month early since one of her LFTs was slightly high last visit.        ROS:  Denies headaches or gi distress.     No Known Allergies    Current Outpatient Medications   Medication Sig Dispense Refill     albuterol (PROAIR HFA/PROVENTIL HFA/VENTOLIN HFA) 108 (90 Base) MCG/ACT inhaler Inhale 2 puffs into the lungs every 6 hours as needed for shortness of breath / dyspnea 1 Inhaler 5     ASPIRIN PO Take 81 mg by mouth daily       citalopram (CELEXA) 10 MG tablet Take 1 tablet (10 mg) by mouth daily 30 tablet 11     eszopiclone (LUNESTA) 1 MG tablet Take 1 tablet (1 mg) by mouth nightly as needed for sleep 30 tablet 3     guaiFENesin (MUCINEX) 600 MG 12 hr tablet Take 2 tablets (1,200 mg) by mouth 2 times daily 180 tablet 6     hydrOXYzine (ATARAX) 25 MG tablet Take 1-2 tablets (25-50 mg) by mouth every 6 hours as needed for itching 60 tablet 3     order for DME autoCPAP 5-18 cmH20 1 Device 0     simvastatin (ZOCOR) 40 MG tablet Take 1 tablet (40 mg) by mouth At Bedtime 90 tablet 4     terbinafine (LAMISIL) 250 MG tablet Take 1 tablet (250 mg) by mouth daily 30 tablet 2     tiotropium (SPIRIVA) 18 MCG capsule Inhale 1 capsule (18 mcg) into the lungs daily Inhale contents of one capsule 1 capsule 11     VITAMIN D 1000 UNIT OR CAPS TAKE 2 CAPSULES BY MOUTH EVERY MORNING         Patient Active Problem List   Diagnosis     Anxiety     Anisocoria     Hyperlipidemia LDL goal <130     Lack of libido     Family history of ischemic heart disease     Mild major depression (H)     Lung nodule, multiple     MERLIN  (obstructive sleep apnea)- 'mild' (AHI 9)     Urticaria     Chronic obstructive pulmonary disease, unspecified COPD type (H)     Malignant neoplasm of upper-inner quadrant of right breast in female, estrogen receptor negative (H)     Long term (current) use of systemic steroids     Tongue ulcer     Periodontal disease       Past Medical History:   Diagnosis Date     Acute cystitis without hematuria 10/26/2017     Clostridium difficile diarrhea 1/12/2018     Neutropenic fever (H) 01/10/2018     She was hospitalized 1/10/18 - 1/13/18 with neutropenic fever     Neutropenic sepsis (H) 01/29/2018    hospitalized again  from 1/29/18 - 2/8/18 with neutropenic fever, mucositis, and C. difficile colitis      Pneumonia 11/11/2017    She was hospitalized 11/11/17 - 11/17/17 with fevers ranging from 102 - 105.  CT chest was consistent with pneumonitis.  She also had transaminitis in the 150 range.  She was started on corticosteroids.  Pembrolizumab was stopped      S/P radiation therapy     5,256 cGy completed on 4/27/2018 to [facility] with Dr. Zhong       Past Surgical History:   Procedure Laterality Date     APPENDECTOMY  10/06/2015    [facility]     BREAST BIOPSY, CORE RT/LT Right 08/22/2017     BRONCHOSCOPY (RIGID OR FLEXIBLE), DIAGNOSTIC N/A 2/6/2018    Procedure: COMBINED BRONCHOSCOPY (RIGID OR FLEXIBLE), LAVAGE;  COMBINED BRONCOSCOY (RIGID OR FLEXIBLE), LAVAGE;  Surgeon: Samir Pettit MD;  [facility]     CLEAR LENS REPLACEMENT Bilateral 11/2016     COLONOSCOPY  11/15/2011    Procedure:COLONOSCOPY; Colonoscopy, screening; Surgeon:ANASTASIA BUNCH; [facility]     INSERT PORT VASCULAR ACCESS Left 9/1/2017    Procedure: INSERT PORT VASCULAR ACCESS;  Single Lumen Chest Power Port;  Surgeon: Leif Parkinson PA-C;  [facility]     LUMPECTOMY BREAST WITH SENTINEL NODE, COMBINED Right 2/27/2018    Procedure: COMBINED LUMPECTOMY BREAST WITH SENTINEL NODE;  Right Wire  Localized Lumpectomy, Right [place] Lymph Node Biopsy And Freddy Cath Removal;  Surgeon: Atul Gates MD;  [facility]     REMOVE PORT VASCULAR ACCESS N/A 2018    Procedure: REMOVE PORT VASCULAR ACCESS;;  Surgeon: Atul Gates MD;  [facility]     TUBAL LIGATION  2004       Family History   Problem Relation Age of Onset     Cardiovascular Father 46        MI     Eye Disorder Father      Cerebrovascular Disease Father      Arthritis Sister      Neurologic Disorder Brother      Eye Surgery Maternal Grandmother         cataract     Prostate Cancer Maternal Grandfather      Prostate Cancer Maternal Uncle      Cancer Maternal Uncle         Throat cancer     Diabetes No family hx of      Hypertension No family hx of      Glaucoma No family hx of      Macular Degeneration No family hx of        Social History     Tobacco Use     Smoking status: Former Smoker     Packs/day: 0.75     Years: 20.00     Pack years: 15.00     Types: Cigarettes     Last attempt to quit: 2000     Years since quittin.5     Smokeless tobacco: Never Used   Substance Use Topics     Alcohol use: Yes     Comment: Daily to weekly use (beer)         Objective:    There were no vitals taken for this visit..  Patient pleasant to talk with and in no distress.  Pulses are palpable DP & PT bilateral.  Sensation to light touch is intact.  no forefoot deformities.  Normal ROM all forefoot joints.  Skin intact bilateral and not dry.  Right mycotic nail now clearing proximally buit left unchanged.  No evidence of deep abscess, erythema, or infection noted.  No paronychia.  No pain upon palpation to the toe nails.  Nailbed healthy.  No subungual masses noted.     ALT 59  High   0 - 50 U/L Final 2019  1:46 PM MG   AST 45   0 - 45 U/L Final 2019  1:46 PM MG             Assessment:  Onychomycosis     Plan:  Discussed etiology and treatment options with the pt.  Since the medication appears to be effective.  A decision was  made to continue Lamisil.  Risks complications and efficacy again discussed.  Continue Lamisil treatment for 3 more months and check LFT's and if high patient will return sooner.  rtc 3 months.    Juan Celestin DPM, FACFAS         Again, thank you for allowing me to participate in the care of your patient.        Sincerely,        Juan Celestin DPM

## 2019-07-03 NOTE — PATIENT INSTRUCTIONS
Weight management plan: Patient was referred to their PCP to discuss a diet and exercise plan.  We wish you continued good healing. If you have any questions or concerns, please do not hesitate to contact us at [phone]    Please remember to call and schedule a follow up appointment if one was recommended at your earliest convenience.   PODIATRY CLINIC HOURS  TELEPHONE NUMBER    Dr. Juan Celestin D.P.M [facility]    Clinics:  [facility]    Toya Spears [facility]   Tuesday 1PM-6PM  [place]  Wednesday 7AM-2PM  [facility]  Thursday 10AM-6PM  [place]  Friday 7AM-3PM  [place]  Specialty schedulers:   [phone] to make an appointment with any Specialty Provider.        Urgent Care locations:    [facility] Monday-Friday 5 pm - 9 pm. Saturday-Sunday 9 am -5pm    Monday-Friday 11 am - 9 pm Saturday 9 am - 5 pm     Monday-Sunday 12 noon-8PM [phone]     If you need a medication refill, please contact us you may need lab work and/or a follow up visit prior to your refill (i.e. Antifungal medications).    [employer] (secure e-mail communication and access to your chart) to send a message or to make an appointment.    If MRI needed please call [place] [facility] at [phone]

## 2019-07-16 DIAGNOSIS — R05.8 COUGH WITH SPUTUM: ICD-10-CM

## 2019-07-17 RX ORDER — GUAIFENESIN 600 MG/1
1200 TABLET, EXTENDED RELEASE ORAL 2 TIMES DAILY
Qty: 180 TABLET | Refills: 0 | Status: SHIPPED | OUTPATIENT
Start: 2019-07-17 | End: 2019-07-31

## 2019-07-17 NOTE — TELEPHONE ENCOUNTER
Routing refill request to provider for review/approval because:  Drug not on the [facility] refill protocol   Zaida Edgar RN

## 2019-07-24 ENCOUNTER — MYC MEDICAL ADVICE (OUTPATIENT)
Dept: DERMATOLOGY | Facility: CLINIC | Age: 59
End: 2019-07-24

## 2019-07-29 ASSESSMENT — ENCOUNTER SYMPTOMS
ARTHRALGIAS: 1
EYE PAIN: 0
DYSURIA: 0
PALPITATIONS: 0
CHILLS: 0
FREQUENCY: 0
HEARTBURN: 0
HEMATOCHEZIA: 0
HEMATURIA: 0
COUGH: 0
NERVOUS/ANXIOUS: 0
WEAKNESS: 0
JOINT SWELLING: 1
SHORTNESS OF BREATH: 0
NAUSEA: 0
BREAST MASS: 0
MYALGIAS: 0
SORE THROAT: 0
DIZZINESS: 0
PARESTHESIAS: 0
FEVER: 0
DIARRHEA: 0
HEADACHES: 0
ABDOMINAL PAIN: 0
CONSTIPATION: 0

## 2019-07-30 ASSESSMENT — ENCOUNTER SYMPTOMS
SORE THROAT: 0
NAUSEA: 0
BREAST MASS: 0
FEVER: 0
DYSURIA: 0
DIZZINESS: 0
COUGH: 0
FREQUENCY: 0
JOINT SWELLING: 1
EYE PAIN: 0
CHILLS: 0
CONSTIPATION: 0
ARTHRALGIAS: 1
PARESTHESIAS: 0
NERVOUS/ANXIOUS: 0
MYALGIAS: 0
HEARTBURN: 0
DIARRHEA: 0
HEADACHES: 0
SHORTNESS OF BREATH: 0
HEMATOCHEZIA: 0
HEMATURIA: 0
ABDOMINAL PAIN: 0
WEAKNESS: 0
PALPITATIONS: 0
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
## 2019-07-31 ENCOUNTER — OFFICE VISIT (OUTPATIENT)
Dept: FAMILY MEDICINE | Facility: CLINIC | Age: 59
End: 2019-07-31
Payer: COMMERCIAL

## 2019-07-31 VITALS
HEIGHT: 63 IN | SYSTOLIC BLOOD PRESSURE: 120 MMHG | HEART RATE: 78 BPM | DIASTOLIC BLOOD PRESSURE: 78 MMHG | BODY MASS INDEX: 28.35 KG/M2 | TEMPERATURE: 98.3 F | WEIGHT: 160 LBS

## 2019-07-31 DIAGNOSIS — Z11.4 SCREENING FOR HIV (HUMAN IMMUNODEFICIENCY VIRUS): ICD-10-CM

## 2019-07-31 DIAGNOSIS — R05.8 COUGH WITH SPUTUM: ICD-10-CM

## 2019-07-31 DIAGNOSIS — Z00.00 ROUTINE HISTORY AND PHYSICAL EXAMINATION OF ADULT: Primary | ICD-10-CM

## 2019-07-31 DIAGNOSIS — J44.9 CHRONIC OBSTRUCTIVE PULMONARY DISEASE, UNSPECIFIED COPD TYPE (H): ICD-10-CM

## 2019-07-31 DIAGNOSIS — F32.0 MILD MAJOR DEPRESSION (H): ICD-10-CM

## 2019-07-31 DIAGNOSIS — E78.5 HYPERLIPIDEMIA LDL GOAL <130: Chronic | ICD-10-CM

## 2019-07-31 PROCEDURE — 99396 PREV VISIT EST AGE 40-64: CPT | Performed by: FAMILY MEDICINE

## 2019-07-31 PROCEDURE — 99214 OFFICE O/P EST MOD 30 MIN: CPT | Mod: 25 | Performed by: FAMILY MEDICINE

## 2019-07-31 RX ORDER — TIOTROPIUM BROMIDE 18 UG/1
18 CAPSULE ORAL; RESPIRATORY (INHALATION) DAILY
Qty: 1 CAPSULE | Refills: 11 | Status: SHIPPED | OUTPATIENT
Start: 2019-07-31 | End: 2020-08-26

## 2019-07-31 RX ORDER — SIMVASTATIN 40 MG
40 TABLET ORAL AT BEDTIME
Qty: 90 TABLET | Refills: 4 | Status: SHIPPED | OUTPATIENT
Start: 2019-07-31 | End: 2020-08-03

## 2019-07-31 RX ORDER — GUAIFENESIN 600 MG/1
1200 TABLET, EXTENDED RELEASE ORAL 2 TIMES DAILY
Qty: 180 TABLET | Refills: 3 | Status: SHIPPED | OUTPATIENT
Start: 2019-07-31 | End: 2020-08-07

## 2019-07-31 ASSESSMENT — MIFFLIN-ST. JEOR: SCORE: 1269.89

## 2019-08-01 DIAGNOSIS — E78.5 HYPERLIPIDEMIA LDL GOAL <130: Chronic | ICD-10-CM

## 2019-08-01 DIAGNOSIS — Z11.4 SCREENING FOR HIV (HUMAN IMMUNODEFICIENCY VIRUS): ICD-10-CM

## 2019-08-01 LAB
CHOLEST SERPL-MCNC: 205 MG/DL
HDLC SERPL-MCNC: 58 MG/DL
LDLC SERPL CALC-MCNC: 135 MG/DL
NONHDLC SERPL-MCNC: 147 MG/DL
TRIGL SERPL-MCNC: 60 MG/DL

## 2019-08-01 PROCEDURE — 36415 COLL VENOUS BLD VENIPUNCTURE: CPT | Performed by: FAMILY MEDICINE

## 2019-08-01 PROCEDURE — 87389 HIV-1 AG W/HIV-1&-2 AB AG IA: CPT | Performed by: FAMILY MEDICINE

## 2019-08-01 PROCEDURE — 80061 LIPID PANEL: CPT | Performed by: FAMILY MEDICINE

## 2019-08-02 LAB — HIV 1+2 AB+HIV1 P24 AG SERPL QL IA: NONREACTIVE

## 2019-08-19 NOTE — PROGRESS NOTES
Oncology On Treatment Visit:  Date on this visit: 8/20/2019    Diagnosis:  Stage IIb, T2N0M0, grade 3 triple negative cancer of the right breast.    Primary Physician: Radha Cazares     History Of Present Illness:  Ms. Alonzo is a 59-year-old female with h/o stage IIb, T2 N0 M0, grade 3, triple-negative invasive carcinoma of the right breast.  Routine screening mammogram on 07/27/2017 showed developing calcifications in the right breast at the 12 o'clock position 6 cm from the nipple.  Ultrasound demonstrated a 7-mm, irregular, hypoechoic mass at the 12 o'clock position.  Contrast-enhanced mammogram showed a peripherally enhancing, irregular mass measuring 1.9 cm as well as an additional 6-mm, enhancing focus anteromedial to the dominant mass.  The total area of abnormal enhancement on contrast mammogram measured up to 3.4 cm.  Right breast biopsy demonstrated a grade 3 invasive mammary carcinoma with associated high-grade DCIS.  Estrogen and progesterone receptor staining were negative.  HER2 was non-amplified by FISH.  Breast MRI measured the biopsy proven breast cancer at 3.2 cm.    Ms. Alonzo enrolled in the ISPY-2 clinical trial.  She began treatment with weekly taxol and once every 3 week pembrolizumab on 9/20/17.  Her course was complicated by pneumonitis and hepatitis.  Pembrolizumab was stopped and she resumed weekly taxol alone on 11/28/17.  She completed a total of 12 weeks of therapy on 12/26/17.  She completed 2 cycles of adriamycin and cyclophosphamide.  She was hospitalized both cycles due to neutropenic fever and also developed C. Difficile colitis.  Decision was made to forgo further chemotherapy.       Right breast lumpectomy and sentinel lymph node procedure on 2/27/18 showed a grade 2, 6 mm residual invasive mammary carcinoma with an associated 8 mm area of high grade DCIS with comedonecrosis and ADH.  Invasive tumor cellularity was 10%.  There was lymphovascular invasion.  Surgical margins  were negative.  A single sentinel lymph node was benign.  Classified as MDA RCB I.  She completed adjuvant radiation (4256 cGy to the right breast with additional 1000 cGy to the lumpectomy cavity) on 4/27/18.     Interval History:  Ms. Alonzo comes into clinic today for routine breast cancer followup.  Since last visit, she has had some improvement in energy levels.  She continues to work 40 hours per week and continues to come home from work and go to bed between 7 and 8 o'clock, however, now on Saturdays, she is able to perform some activities.  This usually will cause her to be tired by Sunday, at which time she will need to rest.  She denies current symptoms of depression.  Generally she is sleeping well.  She continues to have a small lump near her axillary incision.  She feels this is part of her seroma and reminds me we did additional imaging of that in November.  She denies other concerning lumps or masses of either breast.  She has occasional tenderness in the right axilla that is most prominent when she pushes on the area.  She denies current cough, shortness of breath or chest pain.  She has follow up with Dr. Valdez for pulmonary nodule in October and is wondering whether he will also treat her COPD.  She has no abdominal complaints, no headaches, visual changes or focal neurologic complaints.  The remainder of a complete 12-point review of systems was reviewed with the patient and was negative with the exception of that mentioned above.      Past Medical/Surgical History:  Past Medical History:   Diagnosis Date     Acute cystitis without hematuria 10/26/2017     Clostridium difficile diarrhea 1/12/2018     Neutropenic fever (H) 01/10/2018     She was hospitalized 1/10/18 - 1/13/18 with neutropenic fever     Neutropenic sepsis (H) 01/29/2018    hospitalized again  from 1/29/18 - 2/8/18 with neutropenic fever, mucositis, and C. difficile colitis      Pneumonia 11/11/2017    She was hospitalized 11/11/17 -  11/17/17 with fevers ranging from 102 - 105.  CT chest was consistent with pneumonitis.  She also had transaminitis in the 150 range.  She was started on corticosteroids.  Pembrolizumab was stopped      S/P radiation therapy     5,256 cGy completed on 4/27/2018 to [facility] with Dr. Zhong     Past Surgical History:   Procedure Laterality Date     APPENDECTOMY  10/06/2015    [facility]     BREAST BIOPSY, CORE RT/LT Right 08/22/2017     BRONCHOSCOPY (RIGID OR FLEXIBLE), DIAGNOSTIC N/A 2/6/2018    Procedure: COMBINED BRONCHOSCOPY (RIGID OR FLEXIBLE), LAVAGE;  COMBINED BRONCOSCOY (RIGID OR FLEXIBLE), LAVAGE;  Surgeon: Samir Pettit MD;  Location: [facility]     CLEAR LENS REPLACEMENT Bilateral 11/2016     COLONOSCOPY  11/15/2011    Procedure:COLONOSCOPY; Colonoscopy, screening; Surgeon:ANASTASIA BUNCH; Location:[facility]     INSERT PORT VASCULAR ACCESS Left 9/1/2017    Procedure: INSERT PORT VASCULAR ACCESS;  Single Lumen Chest Power Port;  Surgeon: Leif Parkinson PA-C;  [facility]     LUMPECTOMY BREAST WITH SENTINEL NODE, COMBINED Right 2/27/2018    Procedure: COMBINED LUMPECTOMY BREAST WITH SENTINEL NODE;  Right Wire Localized Lumpectomy, Right [place] Lymph Node Biopsy And Freddy Cath Removal;  Surgeon: Atul Gates MD;  Location: [facility]     REMOVE PORT VASCULAR ACCESS N/A 2/27/2018    Procedure: REMOVE PORT VASCULAR ACCESS;;  Surgeon: Atul Gates MD;  [facility]     TUBAL LIGATION  12/2004     Allergies:  Allergies as of 08/20/2019     (No Known Allergies)     Current Medications:  Current Outpatient Medications   Medication Sig Dispense Refill     albuterol (PROAIR HFA/PROVENTIL HFA/VENTOLIN HFA) 108 (90 Base) MCG/ACT inhaler Inhale 2 puffs into the lungs every 6 hours as needed for shortness of breath / dyspnea 1 Inhaler 5     ASPIRIN PO Take 81 mg by mouth daily       citalopram (CELEXA) 10 MG tablet Take 1 tablet (10 mg) by mouth daily 30 tablet 11      "eszopiclone (LUNESTA) 1 MG tablet Take 1 tablet (1 mg) by mouth nightly as needed for sleep 30 tablet 3     guaiFENesin (MUCINEX) 600 MG 12 hr tablet Take 2 tablets (1,200 mg) by mouth 2 times daily 180 tablet 3     hydrOXYzine (ATARAX) 25 MG tablet Take 1-2 tablets (25-50 mg) by mouth every 6 hours as needed for itching 60 tablet 3     order for DME autoCPAP 5-18 cmH20 1 Device 0     simvastatin (ZOCOR) 40 MG tablet Take 1 tablet (40 mg) by mouth At Bedtime 90 tablet 4     terbinafine (LAMISIL) 250 MG tablet Take 1 tablet (250 mg) by mouth daily 30 tablet 2     tiotropium (SPIRIVA) 18 MCG inhaled capsule Inhale 1 capsule (18 mcg) into the lungs daily Inhale contents of one capsule 1 capsule 11     VITAMIN D 1000 UNIT OR CAPS TAKE 2 CAPSULES BY MOUTH EVERY MORNING        Family and Social History:  Reviewed and unchanged from prior.  Please see initial consultation dated 8/28/17 for further details.    Physical Exam:  BP 94/64   Pulse 91   Temp 98.9  F (37.2  C) (Oral)   Resp 16   Ht 1.6 m (5' 2.99\")   Wt 73.9 kg (162 lb 14.7 oz)   SpO2 95%   BMI 28.87 kg/m    General:  Well appearing, well nourished adult female in NAD.  A&Ox3.  HEENT:  Normocephalic.  Sclera anicteric.  MMM.  No lesions of the oropharynx.  Lymph:  No palpable cervical, supraclavicular, or axillary LAD.    Chest:  Lungs are CTA bilaterally.  No wheezes or crackles.  Breast exam:  Bilateral breasts are of normal fibroglandular density.  There is a firm, mobile mass palpable at the medial edge of the right axillary incision measuring approximately 4-5 mm.  There are no other palpable masses in either breast.  Bilateral nipples are everted.  CV:  Tachycardic.  Regular rhythm.  Nl S1 and S2.  No m/r/g.  Abd:  Soft/NT/ND.  BSs normoactive.  No hepatosplenomegaly.  Ext:  No pitting edema of the bilateral lower extremities.  Pulses 2+ and symmetric.  Musculo:  Strength 5/5 throughout.  Full ROM of the bilateral upper extremities.  Neuro:  Cranial " "nerves grossly intact.  Gait stable.  Psych:  Mood and affect appear normal.  Skin:  No visible concerning skin rashes or skin lesions    Laboratory/Imaging Studies:  8/20/19 Bilateral screening mammograms:  No suspicious findings in either breast.    2/18/19 Right breast ultrasound:  Involution of oil cyst at 10:00, 9 cm from the nipple, now measuring 3 mm.    ASSESSMENT/PLAN:  Ms. Alonzo is a 59-year-old female with a h/o grade 3, T2N0M0, triple-negative infiltrating ductal carcinoma of the right breast s/p 12 weeks of Taxol along with 3 cycles pembrolizumab, 2 cycles of adriamycin and cyclophosphamide, lumpectomy, and radiation.     1.  Right breast cancer:   Ms. Alonzo is 1 year 6 months out from excision of a right breast cancer.  There is no evidence of disease recurrence on exam today.  Palpable mass near her axillary lymph node incision site, was benign on ultrasound in 02/2019, and is unchanged on today's exam from previous, indicating it is likely benign in nature.  Will continue to monitor at future visits.  I will see her back in 3 months for her next visit.      Due to a h/o ADH, I have recommended high risk screening with both annual mammogram and breast MRI, spacing the two studies so that she is having some form of imaging every 6 months.  She will be due for breast MRI in 02/2020.    2.  Fatigue:  Treatment related.  Has finally noticed some improvement in fatigue.  We again discussed recommendations to aim for 7-9 hours of sleep at night and try to achieve 20-30 minutes of daily cardiovascular exercise.  She states she is sleeping better and is exercising \"once in awhile\"; she will try to increase exercise.    3.  Depression:  Continue Celexa 10 mg PO daily.  Despite no current symptoms, she declines weaning off the medication at this time.    4. Pneumonitis/pulmonary nodules:  Chronic COPD.  Had pneumonitis secondary to pembrolizumab during breast cancer treatment.  CT in 07/2018 with improving " bilateral apical infiltrates.  Seen by pulmonology and repeat CT in 10/2019 planned to re-evaluate pulmonary infiltrates.  She has follow up with Dr. Valdez at that time.  Encouraged her to keep this appointment and allow him to decide if she needs a referral to another pulmonologist for treatment of COPD.    5.  Routine health maintenance:  Colonoscopy in 2011 was without concerning findings, next due in 2021.  Continue annual skin exams and pap smears per recommendation of PCP.  Will obtain CBC every 6 months given h/o anthracycline exposure.    6.  Followup:  Return to clinic in 3 months for visit with me.      It was a pleasure to meet with Ms. Alonzo and her  in clinic today.  They had multiple questions which were answered to their satisfaction.  A total of 20 minutes of our 30 minute face to face visit was spent in counseling.

## 2019-08-20 ENCOUNTER — ONCOLOGY VISIT (OUTPATIENT)
Dept: ONCOLOGY | Facility: CLINIC | Age: 59
End: 2019-08-20
Attending: INTERNAL MEDICINE
Payer: COMMERCIAL

## 2019-08-20 ENCOUNTER — ANCILLARY PROCEDURE (OUTPATIENT)
Dept: MAMMOGRAPHY | Facility: CLINIC | Age: 59
End: 2019-08-20
Payer: COMMERCIAL

## 2019-08-20 ENCOUNTER — APPOINTMENT (OUTPATIENT)
Dept: LAB | Facility: CLINIC | Age: 59
End: 2019-08-20
Attending: INTERNAL MEDICINE
Payer: COMMERCIAL

## 2019-08-20 VITALS
BODY MASS INDEX: 28.87 KG/M2 | DIASTOLIC BLOOD PRESSURE: 64 MMHG | HEIGHT: 63 IN | HEART RATE: 91 BPM | OXYGEN SATURATION: 95 % | TEMPERATURE: 98.9 F | WEIGHT: 162.92 LBS | RESPIRATION RATE: 16 BRPM | SYSTOLIC BLOOD PRESSURE: 94 MMHG

## 2019-08-20 DIAGNOSIS — Z17.1 MALIGNANT NEOPLASM OF UPPER-INNER QUADRANT OF RIGHT BREAST IN FEMALE, ESTROGEN RECEPTOR NEGATIVE (H): Primary | ICD-10-CM

## 2019-08-20 DIAGNOSIS — C50.211 MALIGNANT NEOPLASM OF UPPER-INNER QUADRANT OF RIGHT BREAST IN FEMALE, ESTROGEN RECEPTOR NEGATIVE (H): Primary | ICD-10-CM

## 2019-08-20 DIAGNOSIS — Z92.21 STATUS POST CHEMOTHERAPY: ICD-10-CM

## 2019-08-20 DIAGNOSIS — Z12.39 BREAST CANCER SCREENING, HIGH RISK PATIENT: ICD-10-CM

## 2019-08-20 LAB
ALBUMIN SERPL-MCNC: 3.7 G/DL (ref 3.4–5)
ALP SERPL-CCNC: 175 U/L (ref 40–150)
ALT SERPL W P-5'-P-CCNC: 29 U/L (ref 0–50)
ANION GAP SERPL CALCULATED.3IONS-SCNC: 3 MMOL/L (ref 3–14)
AST SERPL W P-5'-P-CCNC: 22 U/L (ref 0–45)
BASOPHILS # BLD AUTO: 0 10E9/L (ref 0–0.2)
BASOPHILS NFR BLD AUTO: 0.3 %
BILIRUB SERPL-MCNC: 0.4 MG/DL (ref 0.2–1.3)
BUN SERPL-MCNC: 13 MG/DL (ref 7–30)
CALCIUM SERPL-MCNC: 8.9 MG/DL (ref 8.5–10.1)
CHLORIDE SERPL-SCNC: 107 MMOL/L (ref 94–109)
CO2 SERPL-SCNC: 28 MMOL/L (ref 20–32)
CREAT SERPL-MCNC: 0.7 MG/DL (ref 0.52–1.04)
DIFFERENTIAL METHOD BLD: ABNORMAL
EOSINOPHIL # BLD AUTO: 0.2 10E9/L (ref 0–0.7)
EOSINOPHIL NFR BLD AUTO: 2.8 %
ERYTHROCYTE [DISTWIDTH] IN BLOOD BY AUTOMATED COUNT: 11.5 % (ref 10–15)
GFR SERPL CREATININE-BSD FRML MDRD: >90 ML/MIN/{1.73_M2}
GLUCOSE SERPL-MCNC: 85 MG/DL (ref 70–99)
HCT VFR BLD AUTO: 39.7 % (ref 35–47)
HGB BLD-MCNC: 14 G/DL (ref 11.7–15.7)
IMM GRANULOCYTES # BLD: 0 10E9/L (ref 0–0.4)
IMM GRANULOCYTES NFR BLD: 0.2 %
LYMPHOCYTES # BLD AUTO: 1.3 10E9/L (ref 0.8–5.3)
LYMPHOCYTES NFR BLD AUTO: 20.9 %
MCH RBC QN AUTO: 35.5 PG (ref 26.5–33)
MCHC RBC AUTO-ENTMCNC: 35.3 G/DL (ref 31.5–36.5)
MCV RBC AUTO: 101 FL (ref 78–100)
MONOCYTES # BLD AUTO: 0.6 10E9/L (ref 0–1.3)
MONOCYTES NFR BLD AUTO: 8.9 %
NEUTROPHILS # BLD AUTO: 4.1 10E9/L (ref 1.6–8.3)
NEUTROPHILS NFR BLD AUTO: 66.9 %
NRBC # BLD AUTO: 0 10*3/UL
NRBC BLD AUTO-RTO: 0 /100
PLATELET # BLD AUTO: 276 10E9/L (ref 150–450)
POTASSIUM SERPL-SCNC: 4.3 MMOL/L (ref 3.4–5.3)
PROT SERPL-MCNC: 7.4 G/DL (ref 6.8–8.8)
RBC # BLD AUTO: 3.94 10E12/L (ref 3.8–5.2)
SODIUM SERPL-SCNC: 138 MMOL/L (ref 133–144)
WBC # BLD AUTO: 6.2 10E9/L (ref 4–11)

## 2019-08-20 PROCEDURE — 85025 COMPLETE CBC W/AUTO DIFF WBC: CPT | Performed by: INTERNAL MEDICINE

## 2019-08-20 PROCEDURE — 99214 OFFICE O/P EST MOD 30 MIN: CPT | Mod: ZP | Performed by: INTERNAL MEDICINE

## 2019-08-20 PROCEDURE — 80053 COMPREHEN METABOLIC PANEL: CPT | Performed by: INTERNAL MEDICINE

## 2019-08-20 PROCEDURE — 36415 COLL VENOUS BLD VENIPUNCTURE: CPT

## 2019-08-20 PROCEDURE — G0463 HOSPITAL OUTPT CLINIC VISIT: HCPCS | Mod: ZF

## 2019-08-20 ASSESSMENT — MIFFLIN-ST. JEOR: SCORE: 1283

## 2019-08-20 ASSESSMENT — PAIN SCALES - GENERAL: PAINLEVEL: NO PAIN (0)

## 2019-08-20 NOTE — NURSING NOTE
Chief Complaint   Patient presents with     Blood Draw     Labs drawn by RN in Lab via Left Arm VPT.      Libertad Stewart RN

## 2019-08-20 NOTE — NURSING NOTE
"Oncology Rooming Note    August 20, 2019 10:50 AM   Ashleigh Alonzo is a 59 year old female who presents for:    Chief Complaint   Patient presents with     Blood Draw     Labs drawn by RN in Lab via Left Arm VPT.      RECHECK     onc breast ca     Initial Vitals: BP 94/64   Pulse 91   Temp 98.9  F (37.2  C) (Oral)   Resp 16   Ht 1.6 m (5' 2.99\")   Wt 73.9 kg (162 lb 14.7 oz)   SpO2 95%   BMI 28.87 kg/m   Estimated body mass index is 28.87 kg/m  as calculated from the following:    Height as of this encounter: 1.6 m (5' 2.99\").    Weight as of this encounter: 73.9 kg (162 lb 14.7 oz). Body surface area is 1.81 meters squared.  No Pain (0) Comment: Data Unavailable   No LMP recorded. Patient is postmenopausal.  Allergies reviewed: Yes  Medications reviewed: Yes    Medications: Medication refills not needed today.  Pharmacy name entered into Bundle: [facility] - [place], [place] - [address] [address]    Clinical concerns: none        Karen Joby, CMA              "

## 2019-08-20 NOTE — LETTER
8/20/2019       RE: Ashleigh Alonzo  [address] [place] [address]     Dear Colleague,    Thank you for referring your patient, Ashleigh Alonzo, to the [facility]. Please see a copy of my visit note below.    Oncology On Treatment Visit:  Date on this visit: 8/20/2019    Diagnosis:  Stage IIb, T2N0M0, grade 3 triple negative cancer of the right breast.    Primary Physician: Radha Cazares     History Of Present Illness:  Ms. Alonzo is a 59-year-old female with h/o stage IIb, T2 N0 M0, grade 3, triple-negative invasive carcinoma of the right breast.  Routine screening mammogram on 07/27/2017 showed developing calcifications in the right breast at the 12 o'clock position 6 cm from the nipple.  Ultrasound demonstrated a 7-mm, irregular, hypoechoic mass at the 12 o'clock position.  Contrast-enhanced mammogram showed a peripherally enhancing, irregular mass measuring 1.9 cm as well as an additional 6-mm, enhancing focus anteromedial to the dominant mass.  The total area of abnormal enhancement on contrast mammogram measured up to 3.4 cm.  Right breast biopsy demonstrated a grade 3 invasive mammary carcinoma with associated high-grade DCIS.  Estrogen and progesterone receptor staining were negative.  HER2 was non-amplified by FISH.  Breast MRI measured the biopsy proven breast cancer at 3.2 cm.    Ms. Alonzo enrolled in the ISPY-2 clinical trial.  She began treatment with weekly taxol and once every 3 week pembrolizumab on 9/20/17.  Her course was complicated by pneumonitis and hepatitis.  Pembrolizumab was stopped and she resumed weekly taxol alone on 11/28/17.  She completed a total of 12 weeks of therapy on 12/26/17.  She completed 2 cycles of adriamycin and cyclophosphamide.  She was hospitalized both cycles due to neutropenic fever and also developed C. Difficile colitis.  Decision was made to forgo further chemotherapy.       Right breast lumpectomy and sentinel lymph node procedure  on 2/27/18 showed a grade 2, 6 mm residual invasive mammary carcinoma with an associated 8 mm area of high grade DCIS with comedonecrosis and ADH.  Invasive tumor cellularity was 10%.  There was lymphovascular invasion.  Surgical margins were negative.  A single sentinel lymph node was benign.  Classified as MDA RCB I.  She completed adjuvant radiation (4256 cGy to the right breast with additional 1000 cGy to the lumpectomy cavity) on 4/27/18.     Interval History:  Ms. Alonzo comes into clinic today for routine breast cancer followup.  Since last visit, she has had some improvement in energy levels.  She continues to work 40 hours per week and continues to come home from work and go to bed between 7 and 8 o'clock, however, now on Saturdays, she is able to perform some activities.  This usually will cause her to be tired by Sunday, at which time she will need to rest.  She denies current symptoms of depression.  Generally she is sleeping well.  She continues to have a small lump near her axillary incision.  She feels this is part of her seroma and reminds me we did additional imaging of that in November.  She denies other concerning lumps or masses of either breast.  She has occasional tenderness in the right axilla that is most prominent when she pushes on the area.  She denies current cough, shortness of breath or chest pain.  She has follow up with Dr. Valdez for pulmonary nodule in October and is wondering whether he will also treat her COPD.  She has no abdominal complaints, no headaches, visual changes or focal neurologic complaints.  The remainder of a complete 12-point review of systems was reviewed with the patient and was negative with the exception of that mentioned above.      Past Medical/Surgical History:  Past Medical History:   Diagnosis Date     Acute cystitis without hematuria 10/26/2017     Clostridium difficile diarrhea 1/12/2018     Neutropenic fever (H) 01/10/2018     She was hospitalized 1/10/18 -  1/13/18 with neutropenic fever     Neutropenic sepsis (H) 01/29/2018    hospitalized again  from 1/29/18 - 2/8/18 with neutropenic fever, mucositis, and C. difficile colitis      Pneumonia 11/11/2017    She was hospitalized 11/11/17 - 11/17/17 with fevers ranging from 102 - 105.  CT chest was consistent with pneumonitis.  She also had transaminitis in the 150 range.  She was started on corticosteroids.  Pembrolizumab was stopped      S/P radiation therapy     [facility]     Past Surgical History:   Procedure Laterality Date     APPENDECTOMY  10/06/2015    [facility]     BREAST BIOPSY, CORE RT/LT Right 08/22/2017     BRONCHOSCOPY (RIGID OR FLEXIBLE), DIAGNOSTIC N/A 2/6/2018    Procedure: COMBINED BRONCHOSCOPY (RIGID OR FLEXIBLE), LAVAGE;  COMBINED BRONCOSCOY (RIGID OR FLEXIBLE), LAVAGE;  Surgeon: Samir Pettit MD;  Location: [facility]     CLEAR LENS REPLACEMENT Bilateral 11/2016     COLONOSCOPY  11/15/2011    Procedure:COLONOSCOPY; Colonoscopy, screening; Surgeon:ANASTASIA BUNCH; [facility]     INSERT PORT VASCULAR ACCESS Left 9/1/2017    Procedure: INSERT PORT VASCULAR ACCESS;  Single Lumen Chest Power Port;  Surgeon: Leif Parkinson PA-C;  [facility]     LUMPECTOMY BREAST WITH SENTINEL NODE, COMBINED Right 2/27/2018    Procedure: COMBINED LUMPECTOMY BREAST WITH SENTINEL NODE;  Right Wire Localized Lumpectomy, Right [place] Lymph Node Biopsy And Freddy Cath Removal;  Surgeon: Atul Gates MD;  [facility]     REMOVE PORT VASCULAR ACCESS N/A 2/27/2018    Procedure: REMOVE PORT VASCULAR ACCESS;;  Surgeon: Atul Gates MD;  [facility]     TUBAL LIGATION  12/2004     Allergies:  Allergies as of 08/20/2019     (No Known Allergies)     Current Medications:  Current Outpatient Medications   Medication Sig Dispense Refill     albuterol (PROAIR HFA/PROVENTIL HFA/VENTOLIN HFA) 108 (90 Base) MCG/ACT inhaler  "Inhale 2 puffs into the lungs every 6 hours as needed for shortness of breath / dyspnea 1 Inhaler 5     ASPIRIN PO Take 81 mg by mouth daily       citalopram (CELEXA) 10 MG tablet Take 1 tablet (10 mg) by mouth daily 30 tablet 11     eszopiclone (LUNESTA) 1 MG tablet Take 1 tablet (1 mg) by mouth nightly as needed for sleep 30 tablet 3     guaiFENesin (MUCINEX) 600 MG 12 hr tablet Take 2 tablets (1,200 mg) by mouth 2 times daily 180 tablet 3     hydrOXYzine (ATARAX) 25 MG tablet Take 1-2 tablets (25-50 mg) by mouth every 6 hours as needed for itching 60 tablet 3     order for DME autoCPAP 5-18 cmH20 1 Device 0     simvastatin (ZOCOR) 40 MG tablet Take 1 tablet (40 mg) by mouth At Bedtime 90 tablet 4     terbinafine (LAMISIL) 250 MG tablet Take 1 tablet (250 mg) by mouth daily 30 tablet 2     tiotropium (SPIRIVA) 18 MCG inhaled capsule Inhale 1 capsule (18 mcg) into the lungs daily Inhale contents of one capsule 1 capsule 11     VITAMIN D 1000 UNIT OR CAPS TAKE 2 CAPSULES BY MOUTH EVERY MORNING        Family and Social History:  Reviewed and unchanged from prior.  Please see initial consultation dated 8/28/17 for further details.    Physical Exam:  BP 94/64   Pulse 91   Temp 98.9  F (37.2  C) (Oral)   Resp 16   Ht 1.6 m (5' 2.99\")   Wt 73.9 kg (162 lb 14.7 oz)   SpO2 95%   BMI 28.87 kg/m     General:  Well appearing, well nourished adult female in NAD.  A&Ox3.  HEENT:  Normocephalic.  Sclera anicteric.  MMM.  No lesions of the oropharynx.  Lymph:  No palpable cervical, supraclavicular, or axillary LAD.    Chest:  Lungs are CTA bilaterally.  No wheezes or crackles.  Breast exam:  Bilateral breasts are of normal fibroglandular density.  There is a firm, mobile mass palpable at the medial edge of the right axillary incision measuring approximately 4-5 mm.  There are no other palpable masses in either breast.  Bilateral nipples are everted.  CV:  Tachycardic.  Regular rhythm.  Nl S1 and S2.  No m/r/g.  Abd:  " "Soft/NT/ND.  BSs normoactive.  No hepatosplenomegaly.  Ext:  No pitting edema of the bilateral lower extremities.  Pulses 2+ and symmetric.  Musculo:  Strength 5/5 throughout.  Full ROM of the bilateral upper extremities.  Neuro:  Cranial nerves grossly intact.  Gait stable.  Psych:  Mood and affect appear normal.  Skin:  No visible concerning skin rashes or skin lesions    Laboratory/Imaging Studies:  8/20/19 Bilateral screening mammograms:  No suspicious findings in either breast.    2/18/19 Right breast ultrasound:  Involution of oil cyst at 10:00, 9 cm from the nipple, now measuring 3 mm.    ASSESSMENT/PLAN:  Ms. Alonzo is a 59-year-old female with a h/o grade 3, T2N0M0, triple-negative infiltrating ductal carcinoma of the right breast s/p 12 weeks of Taxol along with 3 cycles pembrolizumab, 2 cycles of adriamycin and cyclophosphamide, lumpectomy, and radiation.     1.  Right breast cancer:   Ms. Alonzo is 1 year 6 months out from excision of a right breast cancer.  There is no evidence of disease recurrence on exam today.  Palpable mass near her axillary lymph node incision site, was benign on ultrasound in 02/2019, and is unchanged on today's exam from previous, indicating it is likely benign in nature.  Will continue to monitor at future visits.  I will see her back in 3 months for her next visit.      Due to a h/o ADH, I have recommended high risk screening with both annual mammogram and breast MRI, spacing the two studies so that she is having some form of imaging every 6 months.  She will be due for breast MRI in 02/2020.    2.  Fatigue:  Treatment related.  Has finally noticed some improvement in fatigue.  We again discussed recommendations to aim for 7-9 hours of sleep at night and try to achieve 20-30 minutes of daily cardiovascular exercise.  She states she is sleeping better and is exercising \"once in awhile\"; she will try to increase exercise.    3.  Depression:  Continue Celexa 10 mg PO daily.  " Despite no current symptoms, she declines weaning off the medication at this time.    4. Pneumonitis/pulmonary nodules:  Chronic COPD.  Had pneumonitis secondary to pembrolizumab during breast cancer treatment.  CT in 07/2018 with improving bilateral apical infiltrates.  Seen by pulmonology and repeat CT in 10/2019 planned to re-evaluate pulmonary infiltrates.  She has follow up with Dr. Valdez at that time.  Encouraged her to keep this appointment and allow him to decide if she needs a referral to another pulmonologist for treatment of COPD.    5.  Routine health maintenance:  Colonoscopy in 2011 was without concerning findings, next due in 2021.  Continue annual skin exams and pap smears per recommendation of PCP.  Will obtain CBC every 6 months given h/o anthracycline exposure.    6.  Followup:  Return to clinic in 3 months for visit with me.      It was a pleasure to meet with Ms. Alonzo and her  in clinic today.  They had multiple questions which were answered to their satisfaction.  A total of 20 minutes of our 30 minute face to face visit was spent in counseling.      Again, thank you for allowing me to participate in the care of your patient.      Sincerely,    Fara Bradshaw MD

## 2019-08-28 ENCOUNTER — TELEPHONE (OUTPATIENT)
Dept: DERMATOLOGY | Facility: CLINIC | Age: 59
End: 2019-08-28

## 2019-08-30 ENCOUNTER — OFFICE VISIT (OUTPATIENT)
Dept: PODIATRY | Facility: CLINIC | Age: 59
End: 2019-08-30
Payer: COMMERCIAL

## 2019-08-30 VITALS
BODY MASS INDEX: 28.7 KG/M2 | OXYGEN SATURATION: 96 % | WEIGHT: 162 LBS | HEART RATE: 96 BPM | DIASTOLIC BLOOD PRESSURE: 76 MMHG | SYSTOLIC BLOOD PRESSURE: 126 MMHG

## 2019-08-30 DIAGNOSIS — L60.0 INGROWING NAIL: Primary | ICD-10-CM

## 2019-08-30 PROCEDURE — 99213 OFFICE O/P EST LOW 20 MIN: CPT | Mod: 25 | Performed by: PODIATRIST

## 2019-08-30 PROCEDURE — 11730 AVULSION NAIL PLATE SIMPLE 1: CPT | Mod: T5 | Performed by: PODIATRIST

## 2019-08-30 NOTE — LETTER
8/30/2019         RE: Ashleigh Alonzo  [address] [place] [address]        Dear Colleague,    Thank you for referring your patient, Ashleigh Alonzo, to the [facility]. Please see a copy of my visit note below.    Subjective:    Pt is seen today w/ the c/c of a painful ingrown right great nail lateral border.  This has been problematic for 1 week(s).  negativehistory of drainage from the site. This is slowly getting worse.  Aggravated by activity and relieved by rest.  Has tried soaking which has not helped.   denies history of trauma to the area.  Denies fever and chill, denies numbness and tingling, denies erythema on dorsum of foot.  Patient is on an oral antifungal    ROS:  A 10-point review of systems was performed and is positive for that noted in the HPI and as seen below.  All other areas are negative.     Past Medical History:   Diagnosis Date     Acute cystitis without hematuria 10/26/2017     Clostridium difficile diarrhea 1/12/2018     Neutropenic fever (H) 01/10/2018     She was hospitalized 1/10/18 - 1/13/18 with neutropenic fever     Neutropenic sepsis (H) 01/29/2018    hospitalized again  from 1/29/18 - 2/8/18 with neutropenic fever, mucositis, and C. difficile colitis      Pneumonia 11/11/2017    She was hospitalized 11/11/17 - 11/17/17 with fevers ranging from 102 - 105.  CT chest was consistent with pneumonitis.  She also had transaminitis in the 150 range.  She was started on corticosteroids.  Pembrolizumab was stopped      S/P radiation therapy     5,256 cGy completed on 4/27/2018 to [facility] with Dr. Zhong       Past Surgical History:   Procedure Laterality Date     APPENDECTOMY  10/06/2015    [facility]     BREAST BIOPSY, CORE RT/LT Right 08/22/2017     BRONCHOSCOPY (RIGID OR FLEXIBLE), DIAGNOSTIC N/A 2/6/2018    Procedure: COMBINED BRONCHOSCOPY (RIGID OR FLEXIBLE), LAVAGE;  COMBINED BRONCOSCOY (RIGID OR FLEXIBLE), LAVAGE;   Surgeon: Samir Pettit MD;  [facility]: [facility]     CLEAR LENS REPLACEMENT Bilateral 2016     COLONOSCOPY  11/15/2011    Procedure:COLONOSCOPY; Colonoscopy, screening; Surgeon:ANASTASIA BUNCH; Location:[facility]     INSERT PORT VASCULAR ACCESS Left 2017    Procedure: INSERT PORT VASCULAR ACCESS;  Single Lumen Chest Power Port;  Surgeon: Leif Parkinson PA-C;  [facility]     LUMPECTOMY BREAST WITH SENTINEL NODE, COMBINED Right 2018    Procedure: COMBINED LUMPECTOMY BREAST WITH SENTINEL NODE;  Right Wire Localized Lumpectomy, Right [place] Lymph Node Biopsy And Freddy Cath Removal;  Surgeon: Atul Gates MD;  [facility]     REMOVE PORT VASCULAR ACCESS N/A 2018    Procedure: REMOVE PORT VASCULAR ACCESS;;  Surgeon: Atul Gates MD;  [facility]     TUBAL LIGATION  2004       Family History   Problem Relation Age of Onset     Cardiovascular Father 46        MI     Eye Disorder Father      Cerebrovascular Disease Father      Arthritis Sister      Neurologic Disorder Brother      Eye Surgery Maternal Grandmother         cataract     Prostate Cancer Maternal Grandfather      Prostate Cancer Maternal Uncle      Cancer Maternal Uncle         Throat cancer     Diabetes No family hx of      Hypertension No family hx of      Glaucoma No family hx of      Macular Degeneration No family hx of        Social History     Tobacco Use     Smoking status: Former Smoker     Packs/day: 0.75     Years: 20.00     Pack years: 15.00     Types: Cigarettes     Last attempt to quit: 2000     Years since quittin.6     Smokeless tobacco: Never Used   Substance Use Topics     Alcohol use: Yes     Comment: Daily to weekly use (beer)         Current Outpatient Medications:      albuterol (PROAIR HFA/PROVENTIL HFA/VENTOLIN HFA) 108 (90 Base) MCG/ACT inhaler, Inhale 2 puffs into the lungs every 6 hours as needed for shortness of breath / dyspnea, Disp: 1 Inhaler, Rfl: 5     ASPIRIN PO, Take  81 mg by mouth daily, Disp: , Rfl:      citalopram (CELEXA) 10 MG tablet, Take 1 tablet (10 mg) by mouth daily, Disp: 30 tablet, Rfl: 11     eszopiclone (LUNESTA) 1 MG tablet, Take 1 tablet (1 mg) by mouth nightly as needed for sleep (Patient not taking: Reported on 8/20/2019), Disp: 30 tablet, Rfl: 3     guaiFENesin (MUCINEX) 600 MG 12 hr tablet, Take 2 tablets (1,200 mg) by mouth 2 times daily, Disp: 180 tablet, Rfl: 3     hydrOXYzine (ATARAX) 25 MG tablet, Take 1-2 tablets (25-50 mg) by mouth every 6 hours as needed for itching, Disp: 60 tablet, Rfl: 3     order for DME, autoCPAP 5-18 cmH20 (Patient not taking: Reported on 8/20/2019), Disp: 1 Device, Rfl: 0     simvastatin (ZOCOR) 40 MG tablet, Take 1 tablet (40 mg) by mouth At Bedtime, Disp: 90 tablet, Rfl: 4     terbinafine (LAMISIL) 250 MG tablet, Take 1 tablet (250 mg) by mouth daily, Disp: 30 tablet, Rfl: 2     tiotropium (SPIRIVA) 18 MCG inhaled capsule, Inhale 1 capsule (18 mcg) into the lungs daily Inhale contents of one capsule, Disp: 1 capsule, Rfl: 11     VITAMIN D 1000 UNIT OR CAPS, TAKE 2 CAPSULES BY MOUTH EVERY MORNING, Disp: , Rfl:   No current facility-administered medications for this visit.     Facility-Administered Medications Ordered in Other Visits:      lidocaine 1 % 9 mL, 9 mL, Intradermal, Once, Fara Bradshaw MD     lidocaine-EPINEPHrine 1.5 %-1:248468 injection 10 mL, 10 mL, Other, Once, Radha Cazares MD     sodium bicarbonate 8.4 % injection 1 mEq, 1 mEq, Intradermal, Once, Fara Bradshaw MD     No Known Allergies    /76   Pulse 96   Wt 73.5 kg (162 lb)   SpO2 96%   BMI 28.70 kg/m   .      Constitutional/ general:  Pt is in no apparent distress, appears well-nourished.  Cooperative with history and physical exam.     Psych:  The patient answered questions appropriately.  Normal affect.  Seems to have reasonable expectations, in terms of treatment.     Eyes:  Visual scanning/ tracking without  deficit.     Ears:  Response to auditory stimuli is normal.  No  hearing aid devices.  Auricles in proper alignment.     Lymphatic:  Popliteal lymph nodes not enlarged.     Lungs:  Non labored breathing, non labored speech. No cough.  No audible wheezing. Even, quiet breathing.       Vascular:  Pedal pulses are palpable bilaterally for both the DP and PT arteries.  CFT < 3 sec.  No ankle varicosities or edema.  Pedal hair growth noted.     Neuro:  Alert and oriented x 3. Coordinated gait.  Light touch sensation is intact to the L4, L5, S1 distributions. No obvious deficits.  No evidence of neurological-based weakness, spasticity, or contracture in the lower extremities.     Derm: Normal texture and turgor.  No ecchymosis, or cyanosis.  Hair growth noted.        Musculoskeletal:     Patient is ambulatory without an assistive device or brace.  No gross deformities.  Normal arch with weightbearing.  No forefoot or rear foot deformities noted.  MS 5/5 all compartments.  Normal ROM all fore foot and rearfoot joints.  No equinus.  right great toe nail lateral border shows soft tissue impingement with localized erythema.   negative active drainage/purulence at this time.  No sinus tracts.  No nailbed masses or exostosis.  No pain with range of motion of IPJ or MTPJ.  No ascending cellulitis.    ASSESSMENT:    Onychocryptosis with paronychia right toe.    Discussed etiology and treatment options in detail w/ the pt.  The potential causes and nature of an ingrown toenail were discussed with the patient.  We reviewed the natural history/prognosis of the condition and potential risks if no treatment is provided.      Treatment options discussed included conservative management (oral antibiotics, soaking of foot, adequate width shoes)  as well as surgical management (partial or total nail removal).  The pros and cons of both forms of treatment were reviewed.  Handout given to patient.      After thorough discussion and  answering all questions, the patient elected to have nail avulsion.  Obtained consent, used 3cc of 1% lidocaine plain to block right first toe.  Sterile prep, then avulsed the affected border(s).  No evidence of deep abscess noted.  Pt tolerated procedure well.  Sterile bandage placed, gave wound care instruction.  Return to clinic prn.    Juan Celestin DPM DPM, FACFAS      Again, thank you for allowing me to participate in the care of your patient.        Sincerely,        Juan Celestin DPM

## 2019-08-30 NOTE — PATIENT INSTRUCTIONS
Weight management plan: Patient was referred to their PCP to discuss a diet and exercise plan.  We wish you continued good healing. If you have any questions or concerns, please do not hesitate to contact us at [phone]    Please remember to call and schedule a follow up appointment if one was recommended at your earliest convenience.   PODIATRY CLINIC HOURS  TELEPHONE NUMBER    Dr. Juan Celestin D.P.M [facility]    Clinics:  [facility]    [facility]   Tuesday 1PM-6PM  [place]  Wednesday 7AM-2PM  [facility]  Thursday 10AM-6PM  [place]  Friday 7AM-3PM  [place]  Specialty schedulers:   [phone] to make an appointment with any Specialty Provider.        Urgent Care locations:    [facility] Monday-Friday 5 pm - 9 pm. Saturday-Sunday 9 am -5pm    Monday-Friday 11 am - 9 pm Saturday 9 am - 5 pm     Monday-Sunday 12 noon-8PM [phone]     If you need a medication refill, please contact us you may need lab work and/or a follow up visit prior to your refill (i.e. Antifungal medications).    [facility] (secure e-mail communication and access to your chart) to send a message or to make an appointment.    If MRI needed please call [place] [facility] at [phone]

## 2019-08-30 NOTE — PROGRESS NOTES
Subjective:    Pt is seen today w/ the c/c of a painful ingrown right great nail lateral border.  This has been problematic for 1 week(s).  negativehistory of drainage from the site. This is slowly getting worse.  Aggravated by activity and relieved by rest.  Has tried soaking which has not helped.   denies history of trauma to the area.  Denies fever and chill, denies numbness and tingling, denies erythema on dorsum of foot.  Patient is on an oral antifungal    ROS:  A 10-point review of systems was performed and is positive for that noted in the HPI and as seen below.  All other areas are negative.     Past Medical History:   Diagnosis Date     Acute cystitis without hematuria 10/26/2017     Clostridium difficile diarrhea 1/12/2018     Neutropenic fever (H) 01/10/2018     She was hospitalized 1/10/18 - 1/13/18 with neutropenic fever     Neutropenic sepsis (H) 01/29/2018    hospitalized again  from 1/29/18 - 2/8/18 with neutropenic fever, mucositis, and C. difficile colitis      Pneumonia 11/11/2017    She was hospitalized 11/11/17 - 11/17/17 with fevers ranging from 102 - 105.  CT chest was consistent with pneumonitis.  She also had transaminitis in the 150 range.  She was started on corticosteroids.  Pembrolizumab was stopped      S/P radiation therapy     5,256 cGy completed on 4/27/2018 to [facility] with Dr. Zhong       Past Surgical History:   Procedure Laterality Date     APPENDECTOMY  10/06/2015    [facility]     BREAST BIOPSY, CORE RT/LT Right 08/22/2017     BRONCHOSCOPY (RIGID OR FLEXIBLE), DIAGNOSTIC N/A 2/6/2018    Procedure: COMBINED BRONCHOSCOPY (RIGID OR FLEXIBLE), LAVAGE;  COMBINED BRONCOSCOY (RIGID OR FLEXIBLE), LAVAGE;  Surgeon: Samir Pettit MD;  [facility]     CLEAR LENS REPLACEMENT Bilateral 11/2016     COLONOSCOPY  11/15/2011    Procedure:COLONOSCOPY; Colonoscopy, screening; Surgeon:ANASTASIA BUNCH; Location: OR     INSERT PORT VASCULAR ACCESS Left  2017    Procedure: INSERT PORT VASCULAR ACCESS;  Single Lumen Chest Power Port;  Surgeon: Leif Parkinson PA-C;  [facility]     LUMPECTOMY BREAST WITH SENTINEL NODE, COMBINED Right 2018    Procedure: COMBINED LUMPECTOMY BREAST WITH SENTINEL NODE;  Right Wire Localized Lumpectomy, Right [place] Lymph Node Biopsy And Freddy Cath Removal;  Surgeon: Atul Gates MD;  [facility]     REMOVE PORT VASCULAR ACCESS N/A 2018    Procedure: REMOVE PORT VASCULAR ACCESS;;  Surgeon: Atul Gates MD;  [facility]     TUBAL LIGATION  2004       Family History   Problem Relation Age of Onset     Cardiovascular Father 46        MI     Eye Disorder Father      Cerebrovascular Disease Father      Arthritis Sister      Neurologic Disorder Brother      Eye Surgery Maternal Grandmother         cataract     Prostate Cancer Maternal Grandfather      Prostate Cancer Maternal Uncle      Cancer Maternal Uncle         Throat cancer     Diabetes No family hx of      Hypertension No family hx of      Glaucoma No family hx of      Macular Degeneration No family hx of        Social History     Tobacco Use     Smoking status: Former Smoker     Packs/day: 0.75     Years: 20.00     Pack years: 15.00     Types: Cigarettes     Last attempt to quit: 2000     Years since quittin.6     Smokeless tobacco: Never Used   Substance Use Topics     Alcohol use: Yes     Comment: Daily to weekly use (beer)         Current Outpatient Medications:      albuterol (PROAIR HFA/PROVENTIL HFA/VENTOLIN HFA) 108 (90 Base) MCG/ACT inhaler, Inhale 2 puffs into the lungs every 6 hours as needed for shortness of breath / dyspnea, Disp: 1 Inhaler, Rfl: 5     ASPIRIN PO, Take 81 mg by mouth daily, Disp: , Rfl:      citalopram (CELEXA) 10 MG tablet, Take 1 tablet (10 mg) by mouth daily, Disp: 30 tablet, Rfl: 11     eszopiclone (LUNESTA) 1 MG tablet, Take 1 tablet (1 mg) by mouth nightly as needed for sleep (Patient not taking:  Reported on 8/20/2019), Disp: 30 tablet, Rfl: 3     guaiFENesin (MUCINEX) 600 MG 12 hr tablet, Take 2 tablets (1,200 mg) by mouth 2 times daily, Disp: 180 tablet, Rfl: 3     hydrOXYzine (ATARAX) 25 MG tablet, Take 1-2 tablets (25-50 mg) by mouth every 6 hours as needed for itching, Disp: 60 tablet, Rfl: 3     order for DME, autoCPAP 5-18 cmH20 (Patient not taking: Reported on 8/20/2019), Disp: 1 Device, Rfl: 0     simvastatin (ZOCOR) 40 MG tablet, Take 1 tablet (40 mg) by mouth At Bedtime, Disp: 90 tablet, Rfl: 4     terbinafine (LAMISIL) 250 MG tablet, Take 1 tablet (250 mg) by mouth daily, Disp: 30 tablet, Rfl: 2     tiotropium (SPIRIVA) 18 MCG inhaled capsule, Inhale 1 capsule (18 mcg) into the lungs daily Inhale contents of one capsule, Disp: 1 capsule, Rfl: 11     VITAMIN D 1000 UNIT OR CAPS, TAKE 2 CAPSULES BY MOUTH EVERY MORNING, Disp: , Rfl:   No current facility-administered medications for this visit.     Facility-Administered Medications Ordered in Other Visits:      lidocaine 1 % 9 mL, 9 mL, Intradermal, Once, Fara Bradshaw MD     lidocaine-EPINEPHrine 1.5 %-1:990051 injection 10 mL, 10 mL, Other, Once, Radha Cazares MD     sodium bicarbonate 8.4 % injection 1 mEq, 1 mEq, Intradermal, Once, Fara Bradshaw MD     No Known Allergies    /76   Pulse 96   Wt 73.5 kg (162 lb)   SpO2 96%   BMI 28.70 kg/m  .      Constitutional/ general:  Pt is in no apparent distress, appears well-nourished.  Cooperative with history and physical exam.     Psych:  The patient answered questions appropriately.  Normal affect.  Seems to have reasonable expectations, in terms of treatment.     Eyes:  Visual scanning/ tracking without deficit.     Ears:  Response to auditory stimuli is normal.  No  hearing aid devices.  Auricles in proper alignment.     Lymphatic:  Popliteal lymph nodes not enlarged.     Lungs:  Non labored breathing, non labored speech. No cough.  No audible wheezing.  Even, quiet breathing.       Vascular:  Pedal pulses are palpable bilaterally for both the DP and PT arteries.  CFT < 3 sec.  No ankle varicosities or edema.  Pedal hair growth noted.     Neuro:  Alert and oriented x 3. Coordinated gait.  Light touch sensation is intact to the L4, L5, S1 distributions. No obvious deficits.  No evidence of neurological-based weakness, spasticity, or contracture in the lower extremities.     Derm: Normal texture and turgor.  No ecchymosis, or cyanosis.  Hair growth noted.        Musculoskeletal:     Patient is ambulatory without an assistive device or brace.  No gross deformities.  Normal arch with weightbearing.  No forefoot or rear foot deformities noted.  MS 5/5 all compartments.  Normal ROM all fore foot and rearfoot joints.  No equinus.  right great toe nail lateral border shows soft tissue impingement with localized erythema.   negative active drainage/purulence at this time.  No sinus tracts.  No nailbed masses or exostosis.  No pain with range of motion of IPJ or MTPJ.  No ascending cellulitis.    ASSESSMENT:    Onychocryptosis with paronychia right toe.    Discussed etiology and treatment options in detail w/ the pt.  The potential causes and nature of an ingrown toenail were discussed with the patient.  We reviewed the natural history/prognosis of the condition and potential risks if no treatment is provided.      Treatment options discussed included conservative management (oral antibiotics, soaking of foot, adequate width shoes)  as well as surgical management (partial or total nail removal).  The pros and cons of both forms of treatment were reviewed.  Handout given to patient.      After thorough discussion and answering all questions, the patient elected to have nail avulsion.  Obtained consent, used 3cc of 1% lidocaine plain to block right first toe.  Sterile prep, then avulsed the affected border(s).  No evidence of deep abscess noted.  Pt tolerated procedure well.   Sterile bandage placed, gave wound care instruction.  Return to clinic prn.    Juan Celestin, ADILENEM DPM, FACFAS

## 2019-09-28 ENCOUNTER — HEALTH MAINTENANCE LETTER (OUTPATIENT)
Age: 59
End: 2019-09-28

## 2019-10-03 ENCOUNTER — OFFICE VISIT (OUTPATIENT)
Dept: PODIATRY | Facility: CLINIC | Age: 59
End: 2019-10-03
Payer: COMMERCIAL

## 2019-10-03 VITALS
DIASTOLIC BLOOD PRESSURE: 68 MMHG | BODY MASS INDEX: 28.7 KG/M2 | HEART RATE: 80 BPM | WEIGHT: 162 LBS | SYSTOLIC BLOOD PRESSURE: 128 MMHG

## 2019-10-03 DIAGNOSIS — B35.1 ONYCHOMYCOSIS: Primary | ICD-10-CM

## 2019-10-03 PROCEDURE — 99213 OFFICE O/P EST LOW 20 MIN: CPT | Performed by: PODIATRIST

## 2019-10-03 RX ORDER — TERBINAFINE HYDROCHLORIDE 250 MG/1
250 TABLET ORAL DAILY
Qty: 30 TABLET | Refills: 2 | Status: SHIPPED | OUTPATIENT
Start: 2019-10-03 | End: 2020-08-26

## 2019-10-03 NOTE — PROGRESS NOTES
Subjective:    Pt is seen today as a f/u pt for Lamisil treatment.  Has been on meds for 9 months.  Denies any s/e w/ meds and has been tolerating them well.      ROS:  Denies headaches or gi distress.     No Known Allergies    Current Outpatient Medications   Medication Sig Dispense Refill     albuterol (PROAIR HFA/PROVENTIL HFA/VENTOLIN HFA) 108 (90 Base) MCG/ACT inhaler Inhale 2 puffs into the lungs every 6 hours as needed for shortness of breath / dyspnea 1 Inhaler 5     ASPIRIN PO Take 81 mg by mouth daily       citalopram (CELEXA) 10 MG tablet Take 1 tablet (10 mg) by mouth daily 30 tablet 11     guaiFENesin (MUCINEX) 600 MG 12 hr tablet Take 2 tablets (1,200 mg) by mouth 2 times daily 180 tablet 3     hydrOXYzine (ATARAX) 25 MG tablet Take 1-2 tablets (25-50 mg) by mouth every 6 hours as needed for itching 60 tablet 3     simvastatin (ZOCOR) 40 MG tablet Take 1 tablet (40 mg) by mouth At Bedtime 90 tablet 4     terbinafine (LAMISIL) 250 MG tablet Take 1 tablet (250 mg) by mouth daily 30 tablet 2     terbinafine (LAMISIL) 250 MG tablet Take 1 tablet (250 mg) by mouth daily 30 tablet 2     tiotropium (SPIRIVA) 18 MCG inhaled capsule Inhale 1 capsule (18 mcg) into the lungs daily Inhale contents of one capsule 1 capsule 11     VITAMIN D 1000 UNIT OR CAPS TAKE 2 CAPSULES BY MOUTH EVERY MORNING       eszopiclone (LUNESTA) 1 MG tablet Take 1 tablet (1 mg) by mouth nightly as needed for sleep 30 tablet 3     order for DME autoCPAP 5-18 cmH20 (Patient not taking: Reported on 8/20/2019) 1 Device 0       Patient Active Problem List   Diagnosis     Anxiety     Anisocoria     Hyperlipidemia LDL goal <130     Lack of libido     Family history of ischemic heart disease     Mild major depression (H)     Lung nodule, multiple     MERLIN (obstructive sleep apnea)- 'mild' (AHI 9)     Urticaria     Chronic obstructive pulmonary disease, unspecified COPD type (H)     Malignant neoplasm of upper-inner quadrant of right breast in  female, estrogen receptor negative (H)     Long term (current) use of systemic steroids     Tongue ulcer     Periodontal disease       Past Medical History:   Diagnosis Date     Acute cystitis without hematuria 10/26/2017     Clostridium difficile diarrhea 1/12/2018     Neutropenic fever (H) 01/10/2018     She was hospitalized 1/10/18 - 1/13/18 with neutropenic fever     Neutropenic sepsis (H) 01/29/2018    hospitalized again  from 1/29/18 - 2/8/18 with neutropenic fever, mucositis, and C. difficile colitis      Pneumonia 11/11/2017    She was hospitalized 11/11/17 - 11/17/17 with fevers ranging from 102 - 105.  CT chest was consistent with pneumonitis.  She also had transaminitis in the 150 range.  She was started on corticosteroids.  Pembrolizumab was stopped      S/P radiation therapy     5,256 cGy completed on 4/27/2018 to [facility] with Dr. Zhong       Past Surgical History:   Procedure Laterality Date     APPENDECTOMY  10/06/2015    [facility]     BREAST BIOPSY, CORE RT/LT Right 08/22/2017     BRONCHOSCOPY (RIGID OR FLEXIBLE), DIAGNOSTIC N/A 2/6/2018    Procedure: COMBINED BRONCHOSCOPY (RIGID OR FLEXIBLE), LAVAGE;  COMBINED BRONCOSCOY (RIGID OR FLEXIBLE), LAVAGE;  Surgeon: Samir Pettit MD;  [facility]     CLEAR LENS REPLACEMENT Bilateral 11/2016     COLONOSCOPY  11/15/2011    Procedure:COLONOSCOPY; Colonoscopy, screening; Surgeon:ANASTASIA BUNCH; [facility]     INSERT PORT VASCULAR ACCESS Left 9/1/2017    Procedure: INSERT PORT VASCULAR ACCESS;  Single Lumen Chest Power Port;  Surgeon: Leif Parkinson PA-C;  [facility]     LUMPECTOMY BREAST WITH SENTINEL NODE, COMBINED Right 2/27/2018    Procedure: COMBINED LUMPECTOMY BREAST WITH SENTINEL NODE;  Right Wire Localized Lumpectomy, Right [place] Lymph Node Biopsy And Freddy Cath Removal;  Surgeon: Atul Gates MD;  [facility]     REMOVE PORT VASCULAR ACCESS N/A 2/27/2018    Procedure: REMOVE  PORT VASCULAR ACCESS;;  Surgeon: Atul Gates MD;  [facility]     TUBAL LIGATION  2004       Family History   Problem Relation Age of Onset     Cardiovascular Father 46        MI     Eye Disorder Father      Cerebrovascular Disease Father      Arthritis Sister      Neurologic Disorder Brother      Eye Surgery Maternal Grandmother         cataract     Prostate Cancer Maternal Grandfather      Prostate Cancer Maternal Uncle      Cancer Maternal Uncle         Throat cancer     Diabetes No family hx of      Hypertension No family hx of      Glaucoma No family hx of      Macular Degeneration No family hx of        Social History     Tobacco Use     Smoking status: Former Smoker     Packs/day: 0.75     Years: 20.00     Pack years: 15.00     Types: Cigarettes     Last attempt to quit: 2000     Years since quittin.7     Smokeless tobacco: Never Used   Substance Use Topics     Alcohol use: Yes     Comment: Daily to weekly use (beer)         Objective:    /68 (BP Location: Left arm)   Pulse 80   Wt 73.5 kg (162 lb)   BMI 28.70 kg/m  .  Patient pleasant to talk with and in no distress.  Pulses are palpable DP & PT bilateral.  Sensation to light touch is intact.  no forefoot deformities.  Normal ROM all forefoot joints.  Skin intact bilateral and not dry.  Both hallux nails now clearing proximally, especially the right.  No evidence of deep abscess, erythema, or infection noted.  No paronychia.  No pain upon palpation to the toe nails.  Nailbed healthy.  No subungual masses noted.     ALT 29   0 - 50 U/L Final 2019  9:34 AM 1740   AST 22   0 - 45 U/L Final 2019  9:34 AM 1740            Assessment:  Onychomycosis     Plan:  Discussed etiology and treatment options with the pt.  Since the medication appears to be effective.  A decision was made to continue Lamisil.  Risks complications and efficacy again discussed.  Reviewed recent LFTs.  Continue Lamisil treatment for 3 more months.  rtc  3 months.    Juan Celestin, DPM DPM, FACFAS   \

## 2019-10-03 NOTE — PATIENT INSTRUCTIONS
Weight management plan: Patient was referred to their PCP to discuss a diet and exercise plan.  We wish you continued good healing. If you have any questions or concerns, please do not hesitate to contact us at [phone]    Please remember to call and schedule a follow up appointment if one was recommended at your earliest convenience.   PODIATRY CLINIC HOURS  TELEPHONE NUMBER    Dr. Juan Celestin D.P.M [facility]    Clinics:  [facility]    Toya Spears [facility]   Tuesday 1PM-6PM  [place]  Wednesday 7AM-2PM  [facility]  Thursday 10AM-6PM  [place]  Friday 7AM-3PM  [place]  Specialty schedulers:   [phone] to make an appointment with any Specialty Provider.        Urgent Care locations:    [facility] Monday-Friday 5 pm - 9 pm. Saturday-Sunday 9 am -5pm    Monday-Friday 11 am - 9 pm Saturday 9 am - 5 pm     Monday-Sunday 12 noon-8PM [phone]     If you need a medication refill, please contact us you may need lab work and/or a follow up visit prior to your refill (i.e. Antifungal medications).    IT[facility]amhart (secure e-mail communication and access to your chart) to send a message or to make an appointment.    If MRI needed please call [place] Moore at [phone]

## 2019-10-03 NOTE — LETTER
10/3/2019         RE: Ashleigh Alonzo  [address]  [place] [address]        Dear Colleague,    Thank you for referring your patient, Ashleigh Alonzo, to the [facility]. Please see a copy of my visit note below.    Subjective:    Pt is seen today as a f/u pt for Lamisil treatment.  Has been on meds for 9 months.  Denies any s/e w/ meds and has been tolerating them well.      ROS:  Denies headaches or gi distress.     No Known Allergies    Current Outpatient Medications   Medication Sig Dispense Refill     albuterol (PROAIR HFA/PROVENTIL HFA/VENTOLIN HFA) 108 (90 Base) MCG/ACT inhaler Inhale 2 puffs into the lungs every 6 hours as needed for shortness of breath / dyspnea 1 Inhaler 5     ASPIRIN PO Take 81 mg by mouth daily       citalopram (CELEXA) 10 MG tablet Take 1 tablet (10 mg) by mouth daily 30 tablet 11     guaiFENesin (MUCINEX) 600 MG 12 hr tablet Take 2 tablets (1,200 mg) by mouth 2 times daily 180 tablet 3     hydrOXYzine (ATARAX) 25 MG tablet Take 1-2 tablets (25-50 mg) by mouth every 6 hours as needed for itching 60 tablet 3     simvastatin (ZOCOR) 40 MG tablet Take 1 tablet (40 mg) by mouth At Bedtime 90 tablet 4     terbinafine (LAMISIL) 250 MG tablet Take 1 tablet (250 mg) by mouth daily 30 tablet 2     terbinafine (LAMISIL) 250 MG tablet Take 1 tablet (250 mg) by mouth daily 30 tablet 2     tiotropium (SPIRIVA) 18 MCG inhaled capsule Inhale 1 capsule (18 mcg) into the lungs daily Inhale contents of one capsule 1 capsule 11     VITAMIN D 1000 UNIT OR CAPS TAKE 2 CAPSULES BY MOUTH EVERY MORNING       eszopiclone (LUNESTA) 1 MG tablet Take 1 tablet (1 mg) by mouth nightly as needed for sleep 30 tablet 3     order for DME autoCPAP 5-18 cmH20 (Patient not taking: Reported on 8/20/2019) 1 Device 0       Patient Active Problem List   Diagnosis     Anxiety     Anisocoria     Hyperlipidemia LDL goal <130     Lack of libido     Family history of ischemic heart disease     Mild major  depression (H)     Lung nodule, multiple     MERLIN (obstructive sleep apnea)- 'mild' (AHI 9)     Urticaria     Chronic obstructive pulmonary disease, unspecified COPD type (H)     Malignant neoplasm of upper-inner quadrant of right breast in female, estrogen receptor negative (H)     Long term (current) use of systemic steroids     Tongue ulcer     Periodontal disease       Past Medical History:   Diagnosis Date     Acute cystitis without hematuria 10/26/2017     Clostridium difficile diarrhea 1/12/2018     Neutropenic fever (H) 01/10/2018     She was hospitalized 1/10/18 - 1/13/18 with neutropenic fever     Neutropenic sepsis (H) 01/29/2018    hospitalized again  from 1/29/18 - 2/8/18 with neutropenic fever, mucositis, and C. difficile colitis      Pneumonia 11/11/2017    She was hospitalized 11/11/17 - 11/17/17 with fevers ranging from 102 - 105.  CT chest was consistent with pneumonitis.  She also had transaminitis in the 150 range.  She was started on corticosteroids.  Pembrolizumab was stopped      S/P radiation therapy     5,256 cGy completed on 4/27/2018 to [facility] with Dr. Zhong       Past Surgical History:   Procedure Laterality Date     APPENDECTOMY  10/06/2015    [facility]     BREAST BIOPSY, CORE RT/LT Right 08/22/2017     BRONCHOSCOPY (RIGID OR FLEXIBLE), DIAGNOSTIC N/A 2/6/2018    Procedure: COMBINED BRONCHOSCOPY (RIGID OR FLEXIBLE), LAVAGE;  COMBINED BRONCOSCOY (RIGID OR FLEXIBLE), LAVAGE;  Surgeon: Samir Pettit MD;  [facility]     CLEAR LENS REPLACEMENT Bilateral 11/2016     COLONOSCOPY  11/15/2011    Procedure:COLONOSCOPY; Colonoscopy, screening; Surgeon:ANASTASIA BUNCH; [facility]     INSERT PORT VASCULAR ACCESS Left 9/1/2017    Procedure: INSERT PORT VASCULAR ACCESS;  Single Lumen Chest Power Port;  Surgeon: Leif Parkinson PA-C;  [facility]     LUMPECTOMY BREAST WITH SENTINEL NODE, COMBINED Right 2/27/2018    Procedure: COMBINED  LUMPECTOMY BREAST WITH SENTINEL NODE;  Right Wire Localized Lumpectomy, Right [place] Lymph Node Biopsy And Freddy Cath Removal;  Surgeon: Atul Gates MD;  [facility]     REMOVE PORT VASCULAR ACCESS N/A 2018    Procedure: REMOVE PORT VASCULAR ACCESS;;  Surgeon: Atul Gates MD;  [facility]     TUBAL LIGATION  2004       Family History   Problem Relation Age of Onset     Cardiovascular Father 46        MI     Eye Disorder Father      Cerebrovascular Disease Father      Arthritis Sister      Neurologic Disorder Brother      Eye Surgery Maternal Grandmother         cataract     Prostate Cancer Maternal Grandfather      Prostate Cancer Maternal Uncle      Cancer Maternal Uncle         Throat cancer     Diabetes No family hx of      Hypertension No family hx of      Glaucoma No family hx of      Macular Degeneration No family hx of        Social History     Tobacco Use     Smoking status: Former Smoker     Packs/day: 0.75     Years: 20.00     Pack years: 15.00     Types: Cigarettes     Last attempt to quit: 2000     Years since quittin.7     Smokeless tobacco: Never Used   Substance Use Topics     Alcohol use: Yes     Comment: Daily to weekly use (beer)         Objective:    /68 (BP Location: Left arm)   Pulse 80   Wt 73.5 kg (162 lb)   BMI 28.70 kg/m   .  Patient pleasant to talk with and in no distress.  Pulses are palpable DP & PT bilateral.  Sensation to light touch is intact.  no forefoot deformities.  Normal ROM all forefoot joints.  Skin intact bilateral and not dry.  Both hallux nails now clearing proximally, especially the right.  No evidence of deep abscess, erythema, or infection noted.  No paronychia.  No pain upon palpation to the toe nails.  Nailbed healthy.  No subungual masses noted.     ALT 29   0 - 50 U/L Final 2019  9:34 AM 1740   AST 22   0 - 45 U/L Final 2019  9:34 AM 1740            Assessment:  Onychomycosis     Plan:  Discussed etiology and  treatment options with the pt.  Since the medication appears to be effective.  A decision was made to continue Lamisil.  Risks complications and efficacy again discussed.  Reviewed recent LFTs.  Continue Lamisil treatment for 3 more months.  rtc 3 months.    Juan Celestin DPM DPM, FACFAS   \      Again, thank you for allowing me to participate in the care of your patient.        Sincerely,        Juan Celestin DPM

## 2019-10-11 ENCOUNTER — ANCILLARY PROCEDURE (OUTPATIENT)
Dept: CT IMAGING | Facility: CLINIC | Age: 59
End: 2019-10-11
Attending: INTERNAL MEDICINE
Payer: COMMERCIAL

## 2019-10-11 DIAGNOSIS — T50.901D PULMONARY DRUG TOXICITY, ACCIDENTAL OR UNINTENTIONAL, SUBSEQUENT ENCOUNTER: ICD-10-CM

## 2019-10-11 DIAGNOSIS — J98.4 PULMONARY DRUG TOXICITY, ACCIDENTAL OR UNINTENTIONAL, SUBSEQUENT ENCOUNTER: ICD-10-CM

## 2019-10-11 PROCEDURE — 71250 CT THORAX DX C-: CPT | Performed by: RADIOLOGY

## 2019-10-14 ENCOUNTER — OFFICE VISIT (OUTPATIENT)
Dept: PULMONOLOGY | Facility: CLINIC | Age: 59
End: 2019-10-14
Payer: COMMERCIAL

## 2019-10-14 VITALS
HEART RATE: 92 BPM | BODY MASS INDEX: 29.81 KG/M2 | RESPIRATION RATE: 18 BRPM | SYSTOLIC BLOOD PRESSURE: 104 MMHG | OXYGEN SATURATION: 95 % | DIASTOLIC BLOOD PRESSURE: 72 MMHG | HEIGHT: 62 IN | WEIGHT: 162 LBS

## 2019-10-14 DIAGNOSIS — R91.8 LUNG NODULE, MULTIPLE: Primary | ICD-10-CM

## 2019-10-14 PROCEDURE — 99214 OFFICE O/P EST MOD 30 MIN: CPT | Performed by: INTERNAL MEDICINE

## 2019-10-14 ASSESSMENT — MIFFLIN-ST. JEOR: SCORE: 1263.08

## 2019-10-14 ASSESSMENT — PAIN SCALES - GENERAL: PAINLEVEL: NO PAIN (0)

## 2019-10-14 NOTE — PROGRESS NOTES
LUNG NODULE & INTERVENTIONAL PULMONARY CLINIC  [facility]     Ashleigh Alonzo MRN# [id number]   Age: 59 year old YOB: 1960     Reason for Consultation: lung nodule(s)     Assessment and Plan:    1. Established multiple pulmonary lung nodule(s). Given the characteristics on current/previous imaging and risk factors; I would classify this to be Low (<6%) risk for cancer. Multiple bilateral calcified and non-calcified nodules with mediastinal LN calcification has not changed since 2015. Likely represents old granulomatous disease.     2. New right pleural based thickening. In my view, I think this is related to the same process from 2017. Will repeat CT in 6mo for evaluation.     3.COPD. On Spiriva and prn albuterol. Up-to-date on vaccination.       Billing: The patient was seen and examined by me and the findings, assessment, and plan as documented was explained to the patient/family who expressed understand.       Wali Valdez MD   of Medicine  Interventional Pulmonology  Department of Pulmonary, Allergy, Critical Care and Sleep Medicine   [facility]  Pager: [phone]          History:     Ashleigh Alonzo is a 59 year old female with sig h/o for breast cancer, C. Diff,  who is here for evaluation/followup of lung nodule(s).    - No new resp sx or complaints. Denies dyspnea or cough.   - Here for f/u nodules. Previously seen by Yossi Singh and Brayden. At that time was being evaluated for pneumonitis/nodules possible related to chemo. Last hospitalization for pneumonitis was 12/2018. Here for f/u.   - Personal hx of cancer: breast cancer. Up-to-date on mammo and c-scope.   - Family hx of cancer: No lung cancer  - Exposure hx: Denies asbestos or radon exposure   - Tobacco hx: Past Smoker: 0.5ppd for 15years. Quit 20yrs ago   - My interpretation of the images relevant for this visit  includes: nodules   - My interpretation of the PFT's relevant for this visit includes: Obstructive pattern     Culprit Nodule(s):   1. Right upper pleural based thickening. New since 2017  2. Multiple bilateral lung nodule(s) that are sub 6 mm. First seen by chest CT on 2015. There is no interval change    Other active medical problems include:   - has MDD. Stable on celexa.    - Had right breast cancer (dx 8/2017, stage 2b) completed treatment 4/2018 (chemoradiation, lumpectomy). Undergoing surveillance with oncology.   - Has COPD. On spiriva and prn albuterol. No previous AECOPD requiring hospitalization. Up-to-date on flu/PNA vaccine.           Past Medical History:      Past Medical History:   Diagnosis Date     Acute cystitis without hematuria 10/26/2017     Clostridium difficile diarrhea 1/12/2018     Neutropenic fever (H) 01/10/2018     She was hospitalized 1/10/18 - 1/13/18 with neutropenic fever     Neutropenic sepsis (H) 01/29/2018    hospitalized again  from 1/29/18 - 2/8/18 with neutropenic fever, mucositis, and C. difficile colitis      Pneumonia 11/11/2017    She was hospitalized 11/11/17 - 11/17/17 with fevers ranging from 102 - 105.  CT chest was consistent with pneumonitis.  She also had transaminitis in the 150 range.  She was started on corticosteroids.  Pembrolizumab was stopped      S/P radiation therapy     5,256 cGy completed on 4/27/2018 to [facility] with Dr. Zhong           Past Surgical History:      Past Surgical History:   Procedure Laterality Date     APPENDECTOMY  10/06/2015    [facility]     BREAST BIOPSY, CORE RT/LT Right 08/22/2017     BRONCHOSCOPY (RIGID OR FLEXIBLE), DIAGNOSTIC N/A 2/6/2018    Procedure: COMBINED BRONCHOSCOPY (RIGID OR FLEXIBLE), LAVAGE;  COMBINED BRONCOSCOY (RIGID OR FLEXIBLE), LAVAGE;  Surgeon: Samir Pettit MD;  [facility]     CLEAR LENS REPLACEMENT Bilateral 11/2016     COLONOSCOPY  11/15/2011    Procedure:COLONOSCOPY;  Colonoscopy, screening; Surgeon:ANASTASIA BUNCH; [facility]     INSERT PORT VASCULAR ACCESS Left 2017    Procedure: INSERT PORT VASCULAR ACCESS;  Single Lumen Chest Power Port;  Surgeon: Leif Parkinson PA-C;  [facility]     LUMPECTOMY BREAST WITH SENTINEL NODE, COMBINED Right 2018    Procedure: COMBINED LUMPECTOMY BREAST WITH SENTINEL NODE;  Right Wire Localized Lumpectomy, Right [place] Lymph Node Biopsy And Freddy Cath Removal;  Surgeon: Atul Gates MD;  [facility]     REMOVE PORT VASCULAR ACCESS N/A 2018    Procedure: REMOVE PORT VASCULAR ACCESS;;  Surgeon: Atul Gates MD;  [facility]     TUBAL LIGATION  2004          Social History:     Social History     Tobacco Use     Smoking status: Former Smoker     Packs/day: 0.75     Years: 20.00     Pack years: 15.00     Types: Cigarettes     Last attempt to quit: 2000     Years since quittin.7     Smokeless tobacco: Never Used   Substance Use Topics     Alcohol use: Yes     Comment: Daily to weekly use (beer)          Family History:     Family History   Problem Relation Age of Onset     Cardiovascular Father 46        MI     Eye Disorder Father      Cerebrovascular Disease Father      Arthritis Sister      Neurologic Disorder Brother      Eye Surgery Maternal Grandmother         cataract     Prostate Cancer Maternal Grandfather      Prostate Cancer Maternal Uncle      Cancer Maternal Uncle         Throat cancer     Diabetes No family hx of      Hypertension No family hx of      Glaucoma No family hx of      Macular Degeneration No family hx of            Allergies:    No Known Allergies       Medications:     Current Outpatient Medications   Medication Sig     albuterol (PROAIR HFA/PROVENTIL HFA/VENTOLIN HFA) 108 (90 Base) MCG/ACT inhaler Inhale 2 puffs into the lungs every 6 hours as needed for shortness of breath / dyspnea     ASPIRIN PO Take 81 mg by mouth daily     citalopram (CELEXA) 10 MG tablet  Take 1 tablet (10 mg) by mouth daily     eszopiclone (LUNESTA) 1 MG tablet Take 1 tablet (1 mg) by mouth nightly as needed for sleep     guaiFENesin (MUCINEX) 600 MG 12 hr tablet Take 2 tablets (1,200 mg) by mouth 2 times daily     hydrOXYzine (ATARAX) 25 MG tablet Take 1-2 tablets (25-50 mg) by mouth every 6 hours as needed for itching     order for DME autoCPAP 5-18 cmH20     simvastatin (ZOCOR) 40 MG tablet Take 1 tablet (40 mg) by mouth At Bedtime     terbinafine (LAMISIL) 250 MG tablet Take 1 tablet (250 mg) by mouth daily     tiotropium (SPIRIVA) 18 MCG inhaled capsule Inhale 1 capsule (18 mcg) into the lungs daily Inhale contents of one capsule     VITAMIN D 1000 UNIT OR CAPS TAKE 2 CAPSULES BY MOUTH EVERY MORNING     No current facility-administered medications for this visit.      Facility-Administered Medications Ordered in Other Visits   Medication     lidocaine 1 % 9 mL     lidocaine-EPINEPHrine 1.5 %-1:[id number] injection 10 mL     sodium bicarbonate 8.4 % injection 1 mEq          Review of Systems:     CONSTITUTIONAL: negative for fever, chills, change in weight  INTEGUMENTARY/SKIN: no rash or obvious new lesions  ENT/MOUTH: no sore throat, new sinus pain or nasal drainage  RESP: see interval history  CV: negative for chest pain, palpitations or peripheral edema  GI: no nausea, vomiting, change in stools  : no dysuria  MUSCULOSKELETAL: no myalgias, arthralgias  ENDOCRINE: blood sugars with adequate control  PSYCHIATRIC: mood stable  LYMPHATIC: no new lymphadenopathy  HEME: no bleeding or easy bruisability  NEURO: no numbness, weakness, headaches         Physical Exam:     Pulse:  [92] 92  Resp:  [18] 18  BP: (104)/(72) 104/72  SpO2:  [95 %] 95 %  Wt Readings from Last 4 Encounters:   10/14/19 73.5 kg (162 lb)   10/03/19 73.5 kg (162 lb)   08/30/19 73.5 kg (162 lb)   08/20/19 73.9 kg (162 lb 14.7 oz)     Constitutional:   Awake, alert and in no apparent distress     Eyes:   Nonicteric, YANET     ENT:     Trachea is midline. No gross neck abnormalities      Neck:   Supple without supraclavicular or cervical lymphadenopathy     Lungs:   Good air flow.  No crackles. No rhonchi.  No wheezes.     Cardiovascular:   Normal S1 and S2.  RRR.  No murmur, gallop or rub.  Radial, DP and PT pulses normal and symmetric     Abdomen:   NABS, soft, nontender, nondistended.  No HSM.     Musculoskeletal:   No edema.      Neurologic:   Alert and conversant. Cranial nerves  intact.       Skin:   Warm, dry.  No rash on limited exam.           Current Laboratory Data:   All laboratory and imaging data reviewed.    No results found for this or any previous visit (from the past 24 hour(s)).         Previous Pulmonary Function Testing     FVC-Pred   Date Value Ref Range Status   10/11/2018 3.13 L    11/15/2016 3.19 L    11/07/2014 3.24 L      FVC-Pre   Date Value Ref Range Status   10/11/2018 2.41 L    11/15/2016 2.45 L    11/07/2014 2.50 L      FVC-%Pred-Pre   Date Value Ref Range Status   10/11/2018 76 %    11/15/2016 76 %    11/07/2014 77 %      FEV1-Pre   Date Value Ref Range Status   10/11/2018 1.65 L    11/15/2016 1.33 L    11/07/2014 1.36 L      FEV1-%Pred-Pre   Date Value Ref Range Status   10/11/2018 66 %    11/15/2016 52 %    11/07/2014 52 %      FEV1FVC-Pred   Date Value Ref Range Status   10/11/2018 78 %    11/15/2016 80 %    11/07/2014 80 %      FEV1FVC-Pre   Date Value Ref Range Status   10/11/2018 69 %    11/15/2016 54 %    11/07/2014 55 %      TJE7VVO-%Pred-Pre   Date Value Ref Range Status   11/07/2014 68 %      FEFMax-Pred   Date Value Ref Range Status   10/11/2018 6.26 L/sec    11/15/2016 6.36 L/sec    11/07/2014 6.45 L/sec      FEFMax-Pre   Date Value Ref Range Status   10/11/2018 5.34 L/sec    11/15/2016 4.70 L/sec    11/07/2014 4.14 L/sec      FEFMax-%Pred-Pre   Date Value Ref Range Status   10/11/2018 85 %    11/15/2016 73 %    11/07/2014 64 %      ExpTime-Pre   Date Value Ref Range Status   10/11/2018 7.48 sec     11/15/2016 11.46 sec    11/07/2014 9.39 sec      FIFMax-Pre   Date Value Ref Range Status   10/11/2018 3.49 L/sec    11/15/2016 2.92 L/sec    11/07/2014 2.98 L/sec      FEV1FEV6-Pred   Date Value Ref Range Status   10/11/2018 81 %    11/15/2016 81 %    11/07/2014 82 %      FEV1FEV6-Pre   Date Value Ref Range Status   10/11/2018 69 %    11/15/2016 60 %    11/07/2014 57 %      USE4BTA9-%Pred-Pre   Date Value Ref Range Status   11/07/2014 70 %             Previous Cardiology Imaging   No results found for this or any previous visit (from the past 8760 hour(s)).

## 2019-10-14 NOTE — PATIENT INSTRUCTIONS
If you have had any blood work, imaging or other testing completed we will be in touch within 1-2 weeks regarding the results.     If you need refills please contact your pharmacy.     It was a pleasure meeting with you today. Please let us know if there is anything else we can do for you so that we can be sure you are leaving completely satisfied with your care experience.     If you have any questions, concerns or need to schedule a follow up, please contact us at [phone] and our fax [phone].    Your Pulmonology Team at [facility]

## 2019-10-14 NOTE — NURSING NOTE
"Ashleigh Alonzo's goals for this visit include: Consult  She requests these members of her care team be copied on today's visit information: PCP    PCP: Susanne Church    Referring Provider:  No referring provider defined for this encounter.    /72   Pulse 92   Resp 18   Ht 1.575 m (5' 2\")   Wt 73.5 kg (162 lb)   SpO2 95%   BMI 29.63 kg/m      Do you need any medication refills at today's visit? N    "

## 2019-10-14 NOTE — LETTER
10/14/2019        RE: Ashleigh Alonzo  [address] [place] [address]        LUNG NODULE & INTERVENTIONAL PULMONARY CLINIC  [facility], [facility], [facility]     Ashleigh Alonzo MRN# [id number]   Age: 59 year old YOB: 1960     Reason for Consultation: lung nodule(s)     Assessment and Plan:    1. Established multiple pulmonary lung nodule(s). Given the characteristics on current/previous imaging and risk factors; I would classify this to be Low (<6%) risk for cancer. Multiple bilateral calcified and non-calcified nodules with mediastinal LN calcification has not changed since 2015. Likely represents old granulomatous disease.     2. New right pleural based thickening. In my view, I think this is related to the same process from 2017. Will repeat CT in 6mo for evaluation.     3.COPD. On Spiriva and prn albuterol. Up-to-date on vaccination.       Billing: The patient was seen and examined by me and the findings, assessment, and plan as documented was explained to the patient/family who expressed understand.       Wali Valdez MD   of Medicine  Interventional Pulmonology  Department of Pulmonary, Allergy, Critical Care and Sleep Medicine   [facility]  Pager: [phone]          History:     Ashleigh Alonzo is a 59 year old female with sig h/o for breast cancer, C. Diff,  who is here for evaluation/followup of lung nodule(s).    - No new resp sx or complaints. Denies dyspnea or cough.   - Here for f/u nodules. Previously seen by Yossi Singh and Brayden. At that time was being evaluated for pneumonitis/nodules possible related to chemo. Last hospitalization for pneumonitis was 12/2018. Here for f/u.   - Personal hx of cancer: breast cancer. Up-to-date on mammo and c-scope.   - Family hx of cancer: No lung cancer  - Exposure hx: Denies asbestos or radon exposure   - Tobacco hx: Past Smoker: 0.5ppd for  15years. Quit 20yrs ago   - My interpretation of the images relevant for this visit includes: nodules   - My interpretation of the PFT's relevant for this visit includes: Obstructive pattern     Culprit Nodule(s):   1. Right upper pleural based thickening. New since 2017  2. Multiple bilateral lung nodule(s) that are sub 6 mm. First seen by chest CT on 2015. There is no interval change    Other active medical problems include:   - has MDD. Stable on celexa.    - Had right breast cancer (dx 8/2017, stage 2b) completed treatment 4/2018 (chemoradiation, lumpectomy). Undergoing surveillance with oncology.   - Has COPD. On spiriva and prn albuterol. No previous AECOPD requiring hospitalization. Up-to-date on flu/PNA vaccine.           Past Medical History:      Past Medical History:   Diagnosis Date     Acute cystitis without hematuria 10/26/2017     Clostridium difficile diarrhea 1/12/2018     Neutropenic fever (H) 01/10/2018     She was hospitalized 1/10/18 - 1/13/18 with neutropenic fever     Neutropenic sepsis (H) 01/29/2018    hospitalized again  from 1/29/18 - 2/8/18 with neutropenic fever, mucositis, and C. difficile colitis      Pneumonia 11/11/2017    She was hospitalized 11/11/17 - 11/17/17 with fevers ranging from 102 - 105.  CT chest was consistent with pneumonitis.  She also had transaminitis in the 150 range.  She was started on corticosteroids.  Pembrolizumab was stopped      S/P radiation therapy     5,256 cGy completed on 4/27/2018 to [facility] with Dr. Zhong           Past Surgical History:      Past Surgical History:   Procedure Laterality Date     APPENDECTOMY  10/06/2015    [facility]     BREAST BIOPSY, CORE RT/LT Right 08/22/2017     BRONCHOSCOPY (RIGID OR FLEXIBLE), DIAGNOSTIC N/A 2/6/2018    Procedure: COMBINED BRONCHOSCOPY (RIGID OR FLEXIBLE), LAVAGE;  COMBINED BRONCOSCOY (RIGID OR FLEXIBLE), LAVAGE;  Surgeon: Samir Pettit MD;  Location:  GI     CLEAR LENS  REPLACEMENT Bilateral 2016     COLONOSCOPY  11/15/2011    Procedure:COLONOSCOPY; Colonoscopy, screening; Surgeon:ANASTASIA BUNCH; [facility]     INSERT PORT VASCULAR ACCESS Left 2017    Procedure: INSERT PORT VASCULAR ACCESS;  Single Lumen Chest Power Port;  Surgeon: Leif Parkinson PA-C;  [facility]     LUMPECTOMY BREAST WITH SENTINEL NODE, COMBINED Right 2018    Procedure: COMBINED LUMPECTOMY BREAST WITH SENTINEL NODE;  Right Wire Localized Lumpectomy, Right [place] Lymph Node Biopsy And Freddy Cath Removal;  Surgeon: Atul Gates MD;  [facility]: [facility]     REMOVE PORT VASCULAR ACCESS N/A 2018    Procedure: REMOVE PORT VASCULAR ACCESS;;  Surgeon: Atul Gates MD;  [facility]     TUBAL LIGATION  2004          Social History:     Social History     Tobacco Use     Smoking status: Former Smoker     Packs/day: 0.75     Years: 20.00     Pack years: 15.00     Types: Cigarettes     Last attempt to quit: 2000     Years since quittin.7     Smokeless tobacco: Never Used   Substance Use Topics     Alcohol use: Yes     Comment: Daily to weekly use (beer)          Family History:     Family History   Problem Relation Age of Onset     Cardiovascular Father 46        MI     Eye Disorder Father      Cerebrovascular Disease Father      Arthritis Sister      Neurologic Disorder Brother      Eye Surgery Maternal Grandmother         cataract     Prostate Cancer Maternal Grandfather      Prostate Cancer Maternal Uncle      Cancer Maternal Uncle         Throat cancer     Diabetes No family hx of      Hypertension No family hx of      Glaucoma No family hx of      Macular Degeneration No family hx of            Allergies:    No Known Allergies       Medications:     Current Outpatient Medications   Medication Sig     albuterol (PROAIR HFA/PROVENTIL HFA/VENTOLIN HFA) 108 (90 Base) MCG/ACT inhaler Inhale 2 puffs into the lungs every 6 hours as needed for shortness of breath /  dyspnea     ASPIRIN PO Take 81 mg by mouth daily     citalopram (CELEXA) 10 MG tablet Take 1 tablet (10 mg) by mouth daily     eszopiclone (LUNESTA) 1 MG tablet Take 1 tablet (1 mg) by mouth nightly as needed for sleep     guaiFENesin (MUCINEX) 600 MG 12 hr tablet Take 2 tablets (1,200 mg) by mouth 2 times daily     hydrOXYzine (ATARAX) 25 MG tablet Take 1-2 tablets (25-50 mg) by mouth every 6 hours as needed for itching     order for DME autoCPAP 5-18 cmH20     simvastatin (ZOCOR) 40 MG tablet Take 1 tablet (40 mg) by mouth At Bedtime     terbinafine (LAMISIL) 250 MG tablet Take 1 tablet (250 mg) by mouth daily     tiotropium (SPIRIVA) 18 MCG inhaled capsule Inhale 1 capsule (18 mcg) into the lungs daily Inhale contents of one capsule     VITAMIN D 1000 UNIT OR CAPS TAKE 2 CAPSULES BY MOUTH EVERY MORNING     No current facility-administered medications for this visit.      Facility-Administered Medications Ordered in Other Visits   Medication     lidocaine 1 % 9 mL     lidocaine-EPINEPHrine 1.5 %-1:[id number] injection 10 mL     sodium bicarbonate 8.4 % injection 1 mEq          Review of Systems:     CONSTITUTIONAL: negative for fever, chills, change in weight  INTEGUMENTARY/SKIN: no rash or obvious new lesions  ENT/MOUTH: no sore throat, new sinus pain or nasal drainage  RESP: see interval history  CV: negative for chest pain, palpitations or peripheral edema  GI: no nausea, vomiting, change in stools  : no dysuria  MUSCULOSKELETAL: no myalgias, arthralgias  ENDOCRINE: blood sugars with adequate control  PSYCHIATRIC: mood stable  LYMPHATIC: no new lymphadenopathy  HEME: no bleeding or easy bruisability  NEURO: no numbness, weakness, headaches         Physical Exam:     Pulse:  [92] 92  Resp:  [18] 18  BP: (104)/(72) 104/72  SpO2:  [95 %] 95 %  Wt Readings from Last 4 Encounters:   10/14/19 73.5 kg (162 lb)   10/03/19 73.5 kg (162 lb)   08/30/19 73.5 kg (162 lb)   08/20/19 73.9 kg (162 lb 14.7 oz)      Constitutional:   Awake, alert and in no apparent distress     Eyes:   Nonicteric, YANET     ENT:    Trachea is midline. No gross neck abnormalities      Neck:   Supple without supraclavicular or cervical lymphadenopathy     Lungs:   Good air flow.  No crackles. No rhonchi.  No wheezes.     Cardiovascular:   Normal S1 and S2.  RRR.  No murmur, gallop or rub.  Radial, DP and PT pulses normal and symmetric     Abdomen:   NABS, soft, nontender, nondistended.  No HSM.     Musculoskeletal:   No edema.      Neurologic:   Alert and conversant. Cranial nerves  intact.       Skin:   Warm, dry.  No rash on limited exam.           Current Laboratory Data:   All laboratory and imaging data reviewed.    No results found for this or any previous visit (from the past 24 hour(s)).         Previous Pulmonary Function Testing     FVC-Pred   Date Value Ref Range Status   10/11/2018 3.13 L    11/15/2016 3.19 L    11/07/2014 3.24 L      FVC-Pre   Date Value Ref Range Status   10/11/2018 2.41 L    11/15/2016 2.45 L    11/07/2014 2.50 L      FVC-%Pred-Pre   Date Value Ref Range Status   10/11/2018 76 %    11/15/2016 76 %    11/07/2014 77 %      FEV1-Pre   Date Value Ref Range Status   10/11/2018 1.65 L    11/15/2016 1.33 L    11/07/2014 1.36 L      FEV1-%Pred-Pre   Date Value Ref Range Status   10/11/2018 66 %    11/15/2016 52 %    11/07/2014 52 %      FEV1FVC-Pred   Date Value Ref Range Status   10/11/2018 78 %    11/15/2016 80 %    11/07/2014 80 %      FEV1FVC-Pre   Date Value Ref Range Status   10/11/2018 69 %    11/15/2016 54 %    11/07/2014 55 %      BVZ6NPC-%Pred-Pre   Date Value Ref Range Status   11/07/2014 68 %      FEFMax-Pred   Date Value Ref Range Status   10/11/2018 6.26 L/sec    11/15/2016 6.36 L/sec    11/07/2014 6.45 L/sec      FEFMax-Pre   Date Value Ref Range Status   10/11/2018 5.34 L/sec    11/15/2016 4.70 L/sec    11/07/2014 4.14 L/sec      FEFMax-%Pred-Pre   Date Value Ref Range Status   10/11/2018 85 %     11/15/2016 73 %    11/07/2014 64 %      ExpTime-Pre   Date Value Ref Range Status   10/11/2018 7.48 sec    11/15/2016 11.46 sec    11/07/2014 9.39 sec      FIFMax-Pre   Date Value Ref Range Status   10/11/2018 3.49 L/sec    11/15/2016 2.92 L/sec    11/07/2014 2.98 L/sec      FEV1FEV6-Pred   Date Value Ref Range Status   10/11/2018 81 %    11/15/2016 81 %    11/07/2014 82 %      FEV1FEV6-Pre   Date Value Ref Range Status   10/11/2018 69 %    11/15/2016 60 %    11/07/2014 57 %      GQT5ETW0-%Pred-Pre   Date Value Ref Range Status   11/07/2014 70 %             Previous Cardiology Imaging   No results found for this or any previous visit (from the past 8760 hour(s)).                 Sincerely,        Wali Valdez MD

## 2019-11-07 ENCOUNTER — TELEPHONE (OUTPATIENT)
Dept: ONCOLOGY | Facility: CLINIC | Age: 59
End: 2019-11-07

## 2019-11-07 NOTE — TELEPHONE ENCOUNTER
This writer contacted the patient to notify them of an envelope that will be mailed to them. Envelope contains the ISPY2 reconsent for Amendment 21.1 and a cover letter. Patient was instructed to review the reconsent document and to call the CRC-nurse if they have any questions. If the patient has no questions they have been encouraged to sign the consent forms and mail back to the  using the pre-addressed and postmarked envelope.    Zuly Gutiérrez    [phone]

## 2019-11-18 NOTE — PROGRESS NOTES
Oncology Follow Up:  Date on this visit: 11/19/2019    Diagnosis:  Stage IIb, T2N0M0, grade 3 triple negative cancer of the right breast.    Primary Physician: Radha Cazares     History Of Present Illness:  Ms. Alonzo is a 59-year-old female with h/o stage IIb, T2 N0 M0, grade 3, triple-negative invasive carcinoma of the right breast.  Routine screening mammogram on 07/27/2017 showed developing calcifications in the right breast at the 12 o'clock position 6 cm from the nipple.  Ultrasound demonstrated a 7-mm, irregular, hypoechoic mass at the 12 o'clock position.  Contrast-enhanced mammogram showed a peripherally enhancing, irregular mass measuring 1.9 cm as well as an additional 6-mm, enhancing focus anteromedial to the dominant mass.  The total area of abnormal enhancement on contrast mammogram measured up to 3.4 cm.  Right breast biopsy demonstrated a grade 3 invasive mammary carcinoma with associated high-grade DCIS.  Estrogen and progesterone receptor staining were negative.  HER2 was non-amplified by FISH.  Breast MRI measured the biopsy proven breast cancer at 3.2 cm.    Ms. Alonzo enrolled in the ISPY-2 clinical trial.  She began treatment with weekly taxol and once every 3 week pembrolizumab on 9/20/17.  Her course was complicated by pneumonitis and hepatitis.  Pembrolizumab was stopped and she resumed weekly taxol alone on 11/28/17.  She completed a total of 12 weeks of therapy on 12/26/17.  She completed 2 cycles of adriamycin and cyclophosphamide.  She was hospitalized both cycles due to neutropenic fever and also developed C. Difficile colitis.  Decision was made to forgo further chemotherapy.       Right breast lumpectomy and sentinel lymph node procedure on 2/27/18 showed a grade 2, 6 mm residual invasive mammary carcinoma with an associated 8 mm area of high grade DCIS with comedonecrosis and ADH.  Invasive tumor cellularity was 10%.  There was lymphovascular invasion.  Surgical margins were  negative.  A single sentinel lymph node was benign.  Classified as MDA RCB I.  She completed adjuvant radiation (4256 cGy to the right breast with additional 1000 cGy to the lumpectomy cavity) on 4/27/18.     Interval History:  Ms. Alonzo comes into clinic today for routine breast cancer followup.  She has a number of complaints.  Amongst these most bothersome remains ongoing fatigue.  She continues to have difficulty working more than 8 hours per day.  On days when she has to work 10-12 hours, she is significantly tired and reports difficulty recovering.  She also has difficulty working multiple days in a row.  She is concerned that she is picking up infections more easily since completion of chemotherapy.  She states that if she is exposed to anyone with a cold or other virus, 2-3 days later she will have similar symptoms.  She also reports ongoing neuropathy.  She characterizes this as a feeling of coldness in her left foot.  She denies similar symptoms in the right foot.  She does have some numbness of the fingers and toes.  This is not interfering with daily activity.  She denies current cough or shortness of breath.  She has no rhinorrhea or current sinus issues.  She has no abdominal pain.  No new bone or joint aches or pains.  No headaches, visual changes or focal neurologic complaints.  She reports increased tightness throughout her right breast.  The remainder of a complete 12 point review of systems was reviewed with the patient and was negative with the exception of that mentioned above.        Past Medical/Surgical History:  Past Medical History:   Diagnosis Date     Acute cystitis without hematuria 10/26/2017     Clostridium difficile diarrhea 1/12/2018     Neutropenic fever (H) 01/10/2018     She was hospitalized 1/10/18 - 1/13/18 with neutropenic fever     Neutropenic sepsis (H) 01/29/2018    hospitalized again  from 1/29/18 - 2/8/18 with neutropenic fever, mucositis, and C. difficile colitis       Pneumonia 11/11/2017    She was hospitalized 11/11/17 - 11/17/17 with fevers ranging from 102 - 105.  CT chest was consistent with pneumonitis.  She also had transaminitis in the 150 range.  She was started on corticosteroids.  Pembrolizumab was stopped      S/P radiation therapy     5,256 cGy completed on 4/27/2018 to [facility] with Dr. Zhong     Past Surgical History:   Procedure Laterality Date     APPENDECTOMY  10/06/2015    [facility]     BREAST BIOPSY, CORE RT/LT Right 08/22/2017     BRONCHOSCOPY (RIGID OR FLEXIBLE), DIAGNOSTIC N/A 2/6/2018    Procedure: COMBINED BRONCHOSCOPY (RIGID OR FLEXIBLE), LAVAGE;  COMBINED BRONCOSCOY (RIGID OR FLEXIBLE), LAVAGE;  Surgeon: Samir Pettit MD;  [facility]: [facility]     CLEAR LENS REPLACEMENT Bilateral 11/2016     COLONOSCOPY  11/15/2011    Procedure:COLONOSCOPY; Colonoscopy, screening; Surgeon:ANASTASIA BUNCH; Location[facility]     INSERT PORT VASCULAR ACCESS Left 9/1/2017    Procedure: INSERT PORT VASCULAR ACCESS;  Single Lumen Chest Power Port;  Surgeon: Leif Parkinson PA-C;  [facility]     LUMPECTOMY BREAST WITH SENTINEL NODE, COMBINED Right 2/27/2018    Procedure: COMBINED LUMPECTOMY BREAST WITH SENTINEL NODE;  Right Wire Localized Lumpectomy, Right [place] Lymph Node Biopsy And Freddy Cath Removal;  Surgeon: Atul Gates MD;  [facility]     REMOVE PORT VASCULAR ACCESS N/A 2/27/2018    Procedure: REMOVE PORT VASCULAR ACCESS;;  Surgeon: Atul Gates MD;  [facility]     TUBAL LIGATION  12/2004     Allergies:  Allergies as of 11/19/2019     (No Known Allergies)     Current Medications:  Current Outpatient Medications   Medication Sig Dispense Refill     albuterol (PROAIR HFA/PROVENTIL HFA/VENTOLIN HFA) 108 (90 Base) MCG/ACT inhaler Inhale 2 puffs into the lungs every 6 hours as needed for shortness of breath / dyspnea 1 Inhaler 5     ASPIRIN PO Take 81 mg by mouth daily       citalopram (CELEXA) 10 MG tablet  "Take 1 tablet (10 mg) by mouth daily 30 tablet 11     eszopiclone (LUNESTA) 1 MG tablet Take 1 tablet (1 mg) by mouth nightly as needed for sleep 30 tablet 3     guaiFENesin (MUCINEX) 600 MG 12 hr tablet Take 2 tablets (1,200 mg) by mouth 2 times daily 180 tablet 3     hydrOXYzine (ATARAX) 25 MG tablet Take 1-2 tablets (25-50 mg) by mouth every 6 hours as needed for itching 60 tablet 3     order for DME autoCPAP 5-18 cmH20 1 Device 0     simvastatin (ZOCOR) 40 MG tablet Take 1 tablet (40 mg) by mouth At Bedtime 90 tablet 4     terbinafine (LAMISIL) 250 MG tablet Take 1 tablet (250 mg) by mouth daily 30 tablet 2     tiotropium (SPIRIVA) 18 MCG inhaled capsule Inhale 1 capsule (18 mcg) into the lungs daily Inhale contents of one capsule 1 capsule 11     VITAMIN D 1000 UNIT OR CAPS TAKE 2 CAPSULES BY MOUTH EVERY MORNING        Family and Social History:  Reviewed and unchanged from prior.  Please see initial consultation dated 8/28/17 for further details.    Physical Exam:  /81   Pulse 105   Temp 100  F (37.8  C) (Oral)   Ht 1.575 m (5' 2\")   Wt 76.6 kg (168 lb 12.8 oz)   SpO2 98%   BMI 30.87 kg/m    General:  Well appearing, well nourished adult female in NAD.  A&Ox3.  HEENT:  Normocephalic.  Sclera anicteric.  MMM.  No lesions of the oropharynx.  Lymph:  No palpable cervical, supraclavicular, or axillary LAD.    Chest:  Lungs are CTA bilaterally.  No wheezes or crackles.  Breast exam:  Bilateral breasts are of normal fibroglandular density.  There is a firm, mobile mass palpable at the medial edge of the right axillary incision measuring approximately 4-5 mm, it is tender to palpation but is otherwise unchanged from prior exam.  There is thickening of skin and enhancement of pores of the inferior right breast c/w radiation change.  There are no other palpable masses in either breast.  Bilateral nipples are everted.  CV:  Tachycardic.  Regular rhythm.  Nl S1 and S2.  No m/r/g.  Abd:  Soft/NT/ND.  BSs " normoactive.   Ext:  No edema of the bilateral lower extremities.  Pulses 2+ and symmetric.  Musculo:  Full ROM of the bilateral upper extremities.  Neuro:  Cranial nerves grossly intact.  Gait stable.  Psych:  Mood and affect appear normal.  Skin:  No visible concerning skin rashes or skin lesions    Laboratory/Imaging Studies:  10/11/19 CT chest w/o contrast:  - the previously seen upper lobe predominant peribronchovascular opacities are unchanged and likely represent organizing pneumonia related to drug exposure  - new pleural-based opacities in the anterolateral RUL, which could be part of the same drug toxicity process.        ASSESSMENT/PLAN:  Ms. Alonzo is a 59-year-old female with a h/o grade 3, T2N0M0, triple-negative infiltrating ductal carcinoma of the right breast s/p 12 weeks of Taxol along with 3 cycles pembrolizumab, 2 cycles of adriamycin and cyclophosphamide, lumpectomy, and radiation.     1.  Right breast cancer:   Ms. Alonzo is 1 year 9 months out from excision of a right breast cancer.  There is no evidence of disease recurrence on exam today.  Of note, palpable mass medial to right breast lumpectomy incision was previously confirmed as an oil cyst and is unchanged from prior on exam today.  I will see her back in 3 months for her next visit.      Right breast pain and tightness, likely secondary to radiation changes/lymphedema of the breast.  Advised to wear a compressive sports bra.  Contact the clinic if persistent symptoms at which time would recommend referral to lymphedema clinic.    Due to a h/o ADH, I have recommended high risk screening with both annual mammogram and breast MRI, spacing the two studies so that she is having some form of imaging every 6 months.  She will be due for breast MRI at the time of her return visit.    2.  Fatigue:  Likely multi-factorial.  Likely a component due to breast cancer treatment.  She declines referral back to PT/OT.  We will help her with [place] paperwork  for time she may need off of work.      3.  Recurrent URIs:  Reassured her that blood counts have returned to wnl and that she should not have ongoing immunosuppression related to her prior treatment.  May be at risk for URIs 2/2 COPD and organizing pneumonia.  Okay to take immune stimulants such as vitamin C and zinc.    4.  Depression:  Symptoms stable since last visit.  Continue Celexa 10 mg PO daily.      5.  Neuropathy:  Numbness fingers/toes secondary to chemotherapy.  Likely permanent at this point.  Discussed cold feeling of left foot not likely due to chemotherapy induced neuropathy and may want to discuss other etiologies such as PAD with her PCP.    6.  Organizing pneumonia/pulmonary nodules:  Chronic COPD.  Had pneumonitis secondary to pembrolizumab during breast cancer treatment.  Follow up CT 10/2019 c/w organizing pneumonia including new pleural-based opacity in the anterolateral RUL.  Repeat CT chest in 6 months (04/2020) recommended.  Ongoing follow up with Dr. Valdez.    7.  Routine health maintenance:  Colonoscopy in 2011 was without concerning findings, next due in 2021.  Will obtain CBC every 6 months given h/o anthracycline exposure.    8.  Followup:  Return to clinic in 3 months for labs, breast MRI, and visit with me.      It was a pleasure to meet with Ms. Alonzo in clinic today.  A total of 30 minutes was spent in face to face time with the patient, >50% of which was spent in counseling regarding long term side effects of breast cancer treatments.

## 2019-11-19 ENCOUNTER — ONCOLOGY VISIT (OUTPATIENT)
Dept: ONCOLOGY | Facility: CLINIC | Age: 59
End: 2019-11-19
Attending: INTERNAL MEDICINE
Payer: COMMERCIAL

## 2019-11-19 VITALS
HEART RATE: 105 BPM | HEIGHT: 62 IN | BODY MASS INDEX: 31.06 KG/M2 | WEIGHT: 168.8 LBS | SYSTOLIC BLOOD PRESSURE: 122 MMHG | DIASTOLIC BLOOD PRESSURE: 81 MMHG | TEMPERATURE: 100 F | OXYGEN SATURATION: 98 %

## 2019-11-19 DIAGNOSIS — Z17.1 MALIGNANT NEOPLASM OF UPPER-INNER QUADRANT OF RIGHT BREAST IN FEMALE, ESTROGEN RECEPTOR NEGATIVE (H): Primary | ICD-10-CM

## 2019-11-19 DIAGNOSIS — Z92.21 STATUS POST CHEMOTHERAPY: ICD-10-CM

## 2019-11-19 DIAGNOSIS — Z87.42 HISTORY OF ATYPICAL HYPERPLASIA OF BREAST: ICD-10-CM

## 2019-11-19 DIAGNOSIS — C50.211 MALIGNANT NEOPLASM OF UPPER-INNER QUADRANT OF RIGHT BREAST IN FEMALE, ESTROGEN RECEPTOR NEGATIVE (H): Primary | ICD-10-CM

## 2019-11-19 DIAGNOSIS — Z85.3 PERSONAL HISTORY OF MALIGNANT NEOPLASM OF BREAST: ICD-10-CM

## 2019-11-19 PROCEDURE — G0463 HOSPITAL OUTPT CLINIC VISIT: HCPCS | Mod: ZF

## 2019-11-19 PROCEDURE — 99214 OFFICE O/P EST MOD 30 MIN: CPT | Mod: ZP | Performed by: INTERNAL MEDICINE

## 2019-11-19 ASSESSMENT — MIFFLIN-ST. JEOR: SCORE: 1293.92

## 2019-11-19 ASSESSMENT — PAIN SCALES - GENERAL: PAINLEVEL: NO PAIN (0)

## 2019-11-19 NOTE — NURSING NOTE
"Oncology Rooming Note    November 19, 2019 9:23 AM   Ashleigh Alonzo is a 59 year old female who presents for:    Chief Complaint   Patient presents with     Oncology Clinic Visit     Return visit.  BREAST CANCER.      Initial Vitals: /81   Pulse 105   Temp 100  F (37.8  C) (Oral)   Ht 1.575 m (5' 2\")   Wt 76.6 kg (168 lb 12.8 oz)   SpO2 98%   BMI 30.87 kg/m   Estimated body mass index is 30.87 kg/m  as calculated from the following:    Height as of this encounter: 1.575 m (5' 2\").    Weight as of this encounter: 76.6 kg (168 lb 12.8 oz). Body surface area is 1.83 meters squared.  No Pain (0) Comment: Data Unavailable   No LMP recorded. Patient is postmenopausal.  Allergies reviewed: Yes  Medications reviewed: Yes    Medications: Medication refills not needed today.  Pharmacy name entered into [employer]: [facility] - [place], [place] - [address] [address]    Clinical concerns: No additional concerns.  Dr. Bradshaw was notified.      Alyssa Palomo, [facility]              "

## 2019-11-19 NOTE — LETTER
11/19/2019       RE: Ashleigh Alonzo  [address] [place] [address]     Dear Colleague,    Thank you for referring your patient, Ashleigh Alonzo, to the [facility]. Please see a copy of my visit note below.    Oncology Follow Up:  Date on this visit: 11/19/2019    Diagnosis:  Stage IIb, T2N0M0, grade 3 triple negative cancer of the right breast.    Primary Physician: Radha Cazares     History Of Present Illness:  Ms. Alonzo is a 59-year-old female with h/o stage IIb, T2 N0 M0, grade 3, triple-negative invasive carcinoma of the right breast.  Routine screening mammogram on 07/27/2017 showed developing calcifications in the right breast at the 12 o'clock position 6 cm from the nipple.  Ultrasound demonstrated a 7-mm, irregular, hypoechoic mass at the 12 o'clock position.  Contrast-enhanced mammogram showed a peripherally enhancing, irregular mass measuring 1.9 cm as well as an additional 6-mm, enhancing focus anteromedial to the dominant mass.  The total area of abnormal enhancement on contrast mammogram measured up to 3.4 cm.  Right breast biopsy demonstrated a grade 3 invasive mammary carcinoma with associated high-grade DCIS.  Estrogen and progesterone receptor staining were negative.  HER2 was non-amplified by FISH.  Breast MRI measured the biopsy proven breast cancer at 3.2 cm.    Ms. Alonzo enrolled in the ISPY-2 clinical trial.  She began treatment with weekly taxol and once every 3 week pembrolizumab on 9/20/17.  Her course was complicated by pneumonitis and hepatitis.  Pembrolizumab was stopped and she resumed weekly taxol alone on 11/28/17.  She completed a total of 12 weeks of therapy on 12/26/17.  She completed 2 cycles of adriamycin and cyclophosphamide.  She was hospitalized both cycles due to neutropenic fever and also developed C. Difficile colitis.  Decision was made to forgo further chemotherapy.       Right breast lumpectomy and sentinel lymph node procedure on  2/27/18 showed a grade 2, 6 mm residual invasive mammary carcinoma with an associated 8 mm area of high grade DCIS with comedonecrosis and ADH.  Invasive tumor cellularity was 10%.  There was lymphovascular invasion.  Surgical margins were negative.  A single sentinel lymph node was benign.  Classified as MDA RCB I.  She completed adjuvant radiation (4256 cGy to the right breast with additional 1000 cGy to the lumpectomy cavity) on 4/27/18.     Interval History:  Ms. Alonzo comes into clinic today for routine breast cancer followup.  She has a number of complaints.  Amongst these most bothersome remains ongoing fatigue.  She continues to have difficulty working more than 8 hours per day.  On days when she has to work 10-12 hours, she is significantly tired and reports difficulty recovering.  She also has difficulty working multiple days in a row.  She is concerned that she is picking up infections more easily since completion of chemotherapy.  She states that if she is exposed to anyone with a cold or other virus, 2-3 days later she will have similar symptoms.  She also reports ongoing neuropathy.  She characterizes this as a feeling of coldness in her left foot.  She denies similar symptoms in the right foot.  She does have some numbness of the fingers and toes.  This is not interfering with daily activity.  She denies current cough or shortness of breath.  She has no rhinorrhea or current sinus issues.  She has no abdominal pain.  No new bone or joint aches or pains.  No headaches, visual changes or focal neurologic complaints.  She reports increased tightness throughout her right breast.  The remainder of a complete 12 point review of systems was reviewed with the patient and was negative with the exception of that mentioned above.        Past Medical/Surgical History:  Past Medical History:   Diagnosis Date     Acute cystitis without hematuria 10/26/2017     Clostridium difficile diarrhea 1/12/2018      Neutropenic fever (H) 01/10/2018     She was hospitalized 1/10/18 - 1/13/18 with neutropenic fever     Neutropenic sepsis (H) 01/29/2018    hospitalized again  from 1/29/18 - 2/8/18 with neutropenic fever, mucositis, and C. difficile colitis      Pneumonia 11/11/2017    She was hospitalized 11/11/17 - 11/17/17 with fevers ranging from 102 - 105.  CT chest was consistent with pneumonitis.  She also had transaminitis in the 150 range.  She was started on corticosteroids.  Pembrolizumab was stopped      S/P radiation therapy     5,256 cGy completed on 4/27/2018 to [facility] with Dr. Zhong     Past Surgical History:   Procedure Laterality Date     APPENDECTOMY  10/06/2015    [facility]     BREAST BIOPSY, CORE RT/LT Right 08/22/2017     BRONCHOSCOPY (RIGID OR FLEXIBLE), DIAGNOSTIC N/A 2/6/2018    Procedure: COMBINED BRONCHOSCOPY (RIGID OR FLEXIBLE), LAVAGE;  COMBINED BRONCOSCOY (RIGID OR FLEXIBLE), LAVAGE;  Surgeon: Samir Pettit MD;  [facility]     CLEAR LENS REPLACEMENT Bilateral 11/2016     COLONOSCOPY  11/15/2011    Procedure:COLONOSCOPY; Colonoscopy, screening; Surgeon:ANASTASIA BUNCH; [facility]     INSERT PORT VASCULAR ACCESS Left 9/1/2017    Procedure: INSERT PORT VASCULAR ACCESS;  Single Lumen Chest Power Port;  Surgeon: Leif Parkinson PA-C;  [facility]     LUMPECTOMY BREAST WITH SENTINEL NODE, COMBINED Right 2/27/2018    Procedure: COMBINED LUMPECTOMY BREAST WITH SENTINEL NODE;  Right Wire Localized Lumpectomy, Right [place] Lymph Node Biopsy And Freddy Cath Removal;  Surgeon: Atul Gates MD;  [facility]     REMOVE PORT VASCULAR ACCESS N/A 2/27/2018    Procedure: REMOVE PORT VASCULAR ACCESS;;  Surgeon: Atul Gates MD;  [facility]     TUBAL LIGATION  12/2004     Allergies:  Allergies as of 11/19/2019     (No Known Allergies)     Current Medications:  Current Outpatient Medications   Medication Sig Dispense Refill     albuterol  "(PROAIR HFA/PROVENTIL HFA/VENTOLIN HFA) 108 (90 Base) MCG/ACT inhaler Inhale 2 puffs into the lungs every 6 hours as needed for shortness of breath / dyspnea 1 Inhaler 5     ASPIRIN PO Take 81 mg by mouth daily       citalopram (CELEXA) 10 MG tablet Take 1 tablet (10 mg) by mouth daily 30 tablet 11     eszopiclone (LUNESTA) 1 MG tablet Take 1 tablet (1 mg) by mouth nightly as needed for sleep 30 tablet 3     guaiFENesin (MUCINEX) 600 MG 12 hr tablet Take 2 tablets (1,200 mg) by mouth 2 times daily 180 tablet 3     hydrOXYzine (ATARAX) 25 MG tablet Take 1-2 tablets (25-50 mg) by mouth every 6 hours as needed for itching 60 tablet 3     order for DME autoCPAP 5-18 cmH20 1 Device 0     simvastatin (ZOCOR) 40 MG tablet Take 1 tablet (40 mg) by mouth At Bedtime 90 tablet 4     terbinafine (LAMISIL) 250 MG tablet Take 1 tablet (250 mg) by mouth daily 30 tablet 2     tiotropium (SPIRIVA) 18 MCG inhaled capsule Inhale 1 capsule (18 mcg) into the lungs daily Inhale contents of one capsule 1 capsule 11     VITAMIN D 1000 UNIT OR CAPS TAKE 2 CAPSULES BY MOUTH EVERY MORNING        Family and Social History:  Reviewed and unchanged from prior.  Please see initial consultation dated 8/28/17 for further details.    Physical Exam:  /81   Pulse 105   Temp 100  F (37.8  C) (Oral)   Ht 1.575 m (5' 2\")   Wt 76.6 kg (168 lb 12.8 oz)   SpO2 98%   BMI 30.87 kg/m     General:  Well appearing, well nourished adult female in NAD.  A&Ox3.  HEENT:  Normocephalic.  Sclera anicteric.  MMM.  No lesions of the oropharynx.  Lymph:  No palpable cervical, supraclavicular, or axillary LAD.    Chest:  Lungs are CTA bilaterally.  No wheezes or crackles.  Breast exam:  Bilateral breasts are of normal fibroglandular density.  There is a firm, mobile mass palpable at the medial edge of the right axillary incision measuring approximately 4-5 mm, it is tender to palpation but is otherwise unchanged from prior exam.  There is thickening of skin " and enhancement of pores of the inferior right breast c/w radiation change.  There are no other palpable masses in either breast.  Bilateral nipples are everted.  CV:  Tachycardic.  Regular rhythm.  Nl S1 and S2.  No m/r/g.  Abd:  Soft/NT/ND.  BSs normoactive.   Ext:  No edema of the bilateral lower extremities.  Pulses 2+ and symmetric.  Musculo:  Full ROM of the bilateral upper extremities.  Neuro:  Cranial nerves grossly intact.  Gait stable.  Psych:  Mood and affect appear normal.  Skin:  No visible concerning skin rashes or skin lesions    Laboratory/Imaging Studies:  10/11/19 CT chest w/o contrast:  - the previously seen upper lobe predominant peribronchovascular opacities are unchanged and likely represent organizing pneumonia related to drug exposure  - new pleural-based opacities in the anterolateral RUL, which could be part of the same drug toxicity process.        ASSESSMENT/PLAN:  Ms. Alonzo is a 59-year-old female with a h/o grade 3, T2N0M0, triple-negative infiltrating ductal carcinoma of the right breast s/p 12 weeks of Taxol along with 3 cycles pembrolizumab, 2 cycles of adriamycin and cyclophosphamide, lumpectomy, and radiation.     1.  Right breast cancer:   Ms. Alonzo is 1 year 9 months out from excision of a right breast cancer.  There is no evidence of disease recurrence on exam today.  Of note, palpable mass medial to right breast lumpectomy incision was previously confirmed as an oil cyst and is unchanged from prior on exam today.  I will see her back in 3 months for her next visit.      Right breast pain and tightness, likely secondary to radiation changes/lymphedema of the breast.  Advised to wear a compressive sports bra.  Contact the clinic if persistent symptoms at which time would recommend referral to lymphedema clinic.    Due to a h/o ADH, I have recommended high risk screening with both annual mammogram and breast MRI, spacing the two studies so that she is having some form of imaging  every 6 months.  She will be due for breast MRI at the time of her return visit.    2.  Fatigue:  Likely multi-factorial.  Likely a component due to breast cancer treatment.  She declines referral back to PT/OT.  We will help her with FMLA paperwork for time she may need off of work.      3.  Recurrent URIs:  Reassured her that blood counts have returned to wnl and that she should not have ongoing immunosuppression related to her prior treatment.  May be at risk for URIs 2/2 COPD and organizing pneumonia.  Okay to take immune stimulants such as vitamin C and zinc.    4.  Depression:  Symptoms stable since last visit.  Continue Celexa 10 mg PO daily.      5.  Neuropathy:  Numbness fingers/toes secondary to chemotherapy.  Likely permanent at this point.  Discussed cold feeling of left foot not likely due to chemotherapy induced neuropathy and may want to discuss other etiologies such as PAD with her PCP.    6.  Organizing pneumonia/pulmonary nodules:  Chronic COPD.  Had pneumonitis secondary to pembrolizumab during breast cancer treatment.  Follow up CT 10/2019 c/w organizing pneumonia including new pleural-based opacity in the anterolateral RUL.  Repeat CT chest in 6 months (04/2020) recommended.  Ongoing follow up with Dr. Valdez.    7.  Routine health maintenance:  Colonoscopy in 2011 was without concerning findings, next due in 2021.  Will obtain CBC every 6 months given h/o anthracycline exposure.    8.  Followup:  Return to clinic in 3 months for labs, breast MRI, and visit with me.      It was a pleasure to meet with Ms. Alonzo in clinic today.  A total of 30 minutes was spent in face to face time with the patient, >50% of which was spent in counseling regarding long term side effects of breast cancer treatments.    Again, thank you for allowing me to participate in the care of your patient.      Sincerely,    Fara Bradshaw MD

## 2019-11-22 ENCOUNTER — OFFICE VISIT (OUTPATIENT)
Dept: DERMATOLOGY | Facility: CLINIC | Age: 59
End: 2019-11-22
Payer: COMMERCIAL

## 2019-11-22 DIAGNOSIS — Z12.83 SCREENING FOR SKIN CANCER: ICD-10-CM

## 2019-11-22 DIAGNOSIS — D22.9 MULTIPLE BENIGN NEVI: Primary | ICD-10-CM

## 2019-11-22 PROCEDURE — 99202 OFFICE O/P NEW SF 15 MIN: CPT | Performed by: DERMATOLOGY

## 2019-11-22 ASSESSMENT — PATIENT HEALTH QUESTIONNAIRE - PHQ9: SUM OF ALL RESPONSES TO PHQ QUESTIONS 1-9: 0

## 2019-11-22 ASSESSMENT — PAIN SCALES - GENERAL: PAINLEVEL: NO PAIN (0)

## 2019-11-22 NOTE — PROGRESS NOTES
Visit Date:   2019      SUBJECTIVE:  Ms. Alonzo comes in because she is a breast cancer treatment survivor of  a year and a half ago.  She completed radiation treatment and she was advised to have a skin check.  She has noted no lesions of any special concerns, but would like a skin exam.      OBJECTIVE:  On exam, we checked her face, neck, chest, back, arms, legs, breast, back, hands and feet.  She showed numerous cherry angiomata.  She showed a few very tiny nevi here and there.  There were no actinic keratoses or atypical moles, very few moles actually.  There was some poikiloderma on the neck and some mild sun changes on the arms, but nothing concerning.      ASSESSMENT:  No worrisome lesions.      PLAN:  Reassured.  I advised that she could probably come back in 2 years for another general skin check as long as  her skin is very clear and free of any concerning lesions.      MEDICATIONS AND ALLERGIES:  Reviewed.         FELIX WOLF MD             D: 2019   T: 2019   MT: NELLA      Name:     YUNI ALONZO   MRN:      [id number]        Account:      [id number]   :      1960           Visit Date:   2019      Document: [id number]

## 2019-11-22 NOTE — NURSING NOTE
Ashleigh Alonzo's goals for this visit include:   Chief Complaint   Patient presents with     Skin Check     no areas of concern        She requests these members of her care team be copied on today's visit information:     PCP: Susanne Church    Referring Provider:  No referring provider defined for this encounter.    There were no vitals taken for this visit.    Do you need any medication refills at today's visit? Lisa Gonsales LPN

## 2019-11-22 NOTE — LETTER
2019         RE: Yuni Alonzo  [address]  [place] [address]        Dear Colleague,    Thank you for referring your patient, Yuni Alonzo, to the [facility]. Please see a copy of my visit note below.    Visit Date:   2019      SUBJECTIVE:  Ms. Alonzo comes in because she is a breast cancer treatment survivor of  a year and a half ago.  She completed radiation treatment and she was advised to have a skin check.  She has noted no lesions of any special concerns, but would like a skin exam.      OBJECTIVE:  On exam, we checked her face, neck, chest, back, arms, legs, breast, back, hands and feet.  She showed numerous cherry angiomata.  She showed a few very tiny nevi here and there.  There were no actinic keratoses or atypical moles, very few moles actually.  There was some poikiloderma on the neck and some mild sun changes on the arms, but nothing concerning.      ASSESSMENT:  No worrisome lesions.      PLAN:  Reassured.  I advised that she could probably come back in 2 years for another general skin check as long as  her skin is very clear and free of any concerning lesions.      MEDICATIONS AND ALLERGIES:  Reviewed.         FELIX WOLF MD             D: 2019   T: 2019   MT: NELLA      Name:     YUNI ALONZO   MRN:      [id number]        Account:      [id number]   :      1960           Visit Date:   2019      Document: [id number]        Again, thank you for allowing me to participate in the care of your patient.        Sincerely,        FELIX Wolf MD

## 2019-12-04 ENCOUNTER — DOCUMENTATION ONLY (OUTPATIENT)
Dept: ONCOLOGY | Facility: CLINIC | Age: 59
End: 2019-12-04

## 2019-12-04 NOTE — PROGRESS NOTES
Form Request Documentation    Date Received in Clinic:  11/19/19  Name/Type of Form: STD  Questions that need to be addressed:   Amount of Leave Requested: Intermittent Leave  Date Completed: 12/4/2019  Copy Mailed to patient: Yes on 12/4/2019  Disposition of Form: Faxed to [employer] at [phone] on 12/4/2019

## 2020-01-03 DIAGNOSIS — J44.9 CHRONIC OBSTRUCTIVE PULMONARY DISEASE, UNSPECIFIED COPD TYPE (H): Primary | ICD-10-CM

## 2020-01-15 ENCOUNTER — TELEPHONE (OUTPATIENT)
Dept: PODIATRY | Facility: CLINIC | Age: 60
End: 2020-01-15

## 2020-01-15 NOTE — TELEPHONE ENCOUNTER
Patient calling back. States she is out of her medication Terbinafine. Has appointment scheduled for 1/29.

## 2020-01-15 NOTE — TELEPHONE ENCOUNTER
Spoke with Ashleigh vega that  will do her labs at her next appt. Ok if she if off the terbinafine for 2 weeks.    Toya Spears, JESSICA

## 2020-01-15 NOTE — TELEPHONE ENCOUNTER
Reason for Call:  Other call back    Detailed comments: patient is calling to discuss personal issue with care team. Please call to discuss. Thank you.    Phone Number Patient can be reached at: Home number on file [phone] (home)    Best Time:     Can we leave a detailed message on this number? YES    Call taken on 1/15/2020 at 10:25 AM by Glenys Gomez

## 2020-01-29 ENCOUNTER — OFFICE VISIT (OUTPATIENT)
Dept: PODIATRY | Facility: CLINIC | Age: 60
End: 2020-01-29
Payer: COMMERCIAL

## 2020-01-29 VITALS
SYSTOLIC BLOOD PRESSURE: 134 MMHG | DIASTOLIC BLOOD PRESSURE: 80 MMHG | BODY MASS INDEX: 31.09 KG/M2 | WEIGHT: 170 LBS | HEART RATE: 90 BPM

## 2020-01-29 DIAGNOSIS — B35.1 ONYCHOMYCOSIS: Primary | ICD-10-CM

## 2020-01-29 LAB
ALBUMIN SERPL-MCNC: 3.8 G/DL (ref 3.4–5)
ALP SERPL-CCNC: 179 U/L (ref 40–150)
ALT SERPL W P-5'-P-CCNC: 35 U/L (ref 0–50)
AST SERPL W P-5'-P-CCNC: 32 U/L (ref 0–45)
BILIRUB DIRECT SERPL-MCNC: <0.1 MG/DL (ref 0–0.2)
BILIRUB SERPL-MCNC: 0.5 MG/DL (ref 0.2–1.3)
PROT SERPL-MCNC: 7.9 G/DL (ref 6.8–8.8)

## 2020-01-29 PROCEDURE — 80076 HEPATIC FUNCTION PANEL: CPT | Performed by: PODIATRIST

## 2020-01-29 PROCEDURE — 36415 COLL VENOUS BLD VENIPUNCTURE: CPT | Performed by: PODIATRIST

## 2020-01-29 PROCEDURE — 99213 OFFICE O/P EST LOW 20 MIN: CPT | Performed by: PODIATRIST

## 2020-01-29 RX ORDER — TERBINAFINE HYDROCHLORIDE 250 MG/1
250 TABLET ORAL DAILY
Qty: 30 TABLET | Refills: 1 | Status: SHIPPED | OUTPATIENT
Start: 2020-01-29 | End: 2020-08-26

## 2020-01-29 NOTE — LETTER
1/29/2020         RE: Ashleigh Alonzo  [address] [place] [address]        Dear Colleague,    Thank you for referring your patient, Ashleigh Alonzo, to the [facility]. Please see a copy of my visit note below.    Subjective:    Pt is seen today as a f/u pt for Lamisil treatment.  Has been on meds for 12 months.  Denies any s/e w/ meds and has been tolerating them well.      ROS:  Denies headaches or gi distress.     No Known Allergies    Current Outpatient Medications   Medication Sig Dispense Refill     albuterol (PROAIR HFA/PROVENTIL HFA/VENTOLIN HFA) 108 (90 Base) MCG/ACT inhaler Inhale 2 puffs into the lungs every 6 hours as needed for shortness of breath / dyspnea 1 Inhaler 5     ASPIRIN PO Take 81 mg by mouth daily       citalopram (CELEXA) 10 MG tablet Take 1 tablet (10 mg) by mouth daily 30 tablet 11     eszopiclone (LUNESTA) 1 MG tablet Take 1 tablet (1 mg) by mouth nightly as needed for sleep (Patient not taking: Reported on 11/19/2019) 30 tablet 3     guaiFENesin (MUCINEX) 600 MG 12 hr tablet Take 2 tablets (1,200 mg) by mouth 2 times daily 180 tablet 3     hydrOXYzine (ATARAX) 25 MG tablet Take 1-2 tablets (25-50 mg) by mouth every 6 hours as needed for itching 60 tablet 3     order for DME autoCPAP 5-18 cmH20 1 Device 0     simvastatin (ZOCOR) 40 MG tablet Take 1 tablet (40 mg) by mouth At Bedtime 90 tablet 4     terbinafine (LAMISIL) 250 MG tablet Take 1 tablet (250 mg) by mouth daily 30 tablet 2     tiotropium (SPIRIVA) 18 MCG inhaled capsule Inhale 1 capsule (18 mcg) into the lungs daily Inhale contents of one capsule 1 capsule 11     umeclidinium (INCRUSE ELLIPTA) 62.5 MCG/INH inhaler Inhale 1 puff into the lungs daily 30 each 11     VITAMIN D 1000 UNIT OR CAPS TAKE 2 CAPSULES BY MOUTH EVERY MORNING         Patient Active Problem List   Diagnosis     Anxiety     Anisocoria     Hyperlipidemia LDL goal <130     Lack of libido     Family history of ischemic heart disease      Mild major depression (H)     Lung nodule, multiple     MERLIN (obstructive sleep apnea)- 'mild' (AHI 9)     Urticaria     Chronic obstructive pulmonary disease, unspecified COPD type (H)     Malignant neoplasm of upper-inner quadrant of right breast in female, estrogen receptor negative (H)     Long term (current) use of systemic steroids     Tongue ulcer     Periodontal disease       Past Medical History:   Diagnosis Date     Acute cystitis without hematuria 10/26/2017     Clostridium difficile diarrhea 1/12/2018     Neutropenic fever (H) 01/10/2018     She was hospitalized 1/10/18 - 1/13/18 with neutropenic fever     Neutropenic sepsis (H) 01/29/2018    hospitalized again  from 1/29/18 - 2/8/18 with neutropenic fever, mucositis, and C. difficile colitis      Pneumonia 11/11/2017    She was hospitalized 11/11/17 - 11/17/17 with fevers ranging from 102 - 105.  CT chest was consistent with pneumonitis.  She also had transaminitis in the 150 range.  She was started on corticosteroids.  Pembrolizumab was stopped      S/P radiation therapy     5,256 cGy [facility] on 4/27/2018 [facility] with Dr. Zhong       Past Surgical History:   Procedure Laterality Date     APPENDECTOMY  10/06/2015    [facility]     BREAST BIOPSY, CORE RT/LT Right 08/22/2017     BRONCHOSCOPY (RIGID OR FLEXIBLE), DIAGNOSTIC N/A 2/6/2018    Procedure: COMBINED BRONCHOSCOPY (RIGID OR FLEXIBLE), LAVAGE;  COMBINED BRONCOSCOY (RIGID OR FLEXIBLE), LAVAGE;  Surgeon: Samir Pettit MD;  [facility]     CLEAR LENS REPLACEMENT Bilateral 11/2016     COLONOSCOPY  11/15/2011    Procedure:COLONOSCOPY; Colonoscopy, screening; Surgeon:ANASTASIA BUNCH; [facility]     INSERT PORT VASCULAR ACCESS Left 9/1/2017    Procedure: INSERT PORT VASCULAR ACCESS;  Single Lumen Chest Power Port;  Surgeon: Leif Parkinson PA-C;  [facility]     LUMPECTOMY BREAST WITH SENTINEL NODE, COMBINED Right 2/27/2018     Procedure: COMBINED LUMPECTOMY BREAST WITH SENTINEL NODE;  Right Wire Localized Lumpectomy, Right [place] Lymph Node Biopsy And Freddy Cath Removal;  Surgeon: Atul Gates MD;  [facility]     REMOVE PORT VASCULAR ACCESS N/A 2018    Procedure: REMOVE PORT VASCULAR ACCESS;;  Surgeon: Atul Gates MD;  [facility]     TUBAL LIGATION  2004       Family History   Problem Relation Age of Onset     Cardiovascular Father 46        MI     Eye Disorder Father      Cerebrovascular Disease Father      Arthritis Sister      Neurologic Disorder Brother      Eye Surgery Maternal Grandmother         cataract     Prostate Cancer Maternal Grandfather      Prostate Cancer Maternal Uncle      Cancer Maternal Uncle         Throat cancer     Diabetes No family hx of      Hypertension No family hx of      Glaucoma No family hx of      Macular Degeneration No family hx of        Social History     Tobacco Use     Smoking status: Former Smoker     Packs/day: 0.75     Years: 20.00     Pack years: 15.00     Types: Cigarettes     Last attempt to quit: 2000     Years since quittin.0     Smokeless tobacco: Never Used   Substance Use Topics     Alcohol use: Yes     Comment: Daily to weekly use (beer)         Objective:    /80 (BP Location: Right arm)   Pulse 90   Wt 77.1 kg (170 lb)   BMI 31.09 kg/m   .  Patient pleasant to talk with and in no distress.  Pulses are palpable DP & PT bilateral.  Sensation to light touch is intact.  no forefoot deformities.  Normal ROM all forefoot joints.  Skin intact bilateral and not dry.  Both hallux nails now clearing proximally, especially the right.  Left hallux nail slightly clear proximally does not appear to have grown at all.  Right hallux nail now almost totally clear.  No evidence of deep abscess, erythema, or infection noted.  No paronychia.  No pain upon palpation to the toe nails.  Nailbed healthy.  No subungual masses noted.     ALT 29   0 - 50 U/L Final  08/20/2019  9:34 AM 1740   AST 22   0 - 45 U/L Final 08/20/2019  9:34 AM 1740            Assessment:  Onychomycosis     Plan:  Discussed etiology and treatment options with the pt.  Since the medication appears to be effective on the right hallux nail will continue this for 2 more months and then stop.  It should be cleared out by then.  I discussed the left hallux nail will probably not clear up.  We will send her to the lab for LFTs.  When she is done with medication she will get some Lamisil cream in place on her skin around her nails every other day.  She will keep her nails trimmed short.  She will rotate her shoes.  RTC PRN    Juan Celestin DPM DPM, FACFAS   \      Again, thank you for allowing me to participate in the care of your patient.        Sincerely,        Juan Celestin DPM

## 2020-01-29 NOTE — PATIENT INSTRUCTIONS
Weight management plan: Patient was referred to their PCP to discuss a diet and exercise plan.  We wish you continued good healing. If you have any questions or concerns, please do not hesitate to contact us at [phone]    Please remember to call and schedule a follow up appointment if one was recommended at your earliest convenience.   PODIATRY CLINIC HOURS  TELEPHONE NUMBER    Dr. Juan Celestin D.P.M [facility]    Clinics:  [facility]    Toya Spears [facility]   Tuesday 1PM-6PM  [facility]  Wednesday 7AM-2PM  [facility]  Thursday 10AM-6PM  [place]  Friday 7AM-3PM  [place]  Specialty schedulers:   [phone] to make an appointment with any Specialty Provider.        Urgent Care locations:    [facility] Monday-Friday 5 pm - 9 pm. Saturday-Sunday 9 am -5pm    Monday-Friday 11 am - 9 pm Saturday 9 am - 5 pm     Monday-Sunday 12 noon-8PM [phone]     If you need a medication refill, please contact us you may need lab work and/or a follow up visit prior to your refill (i.e. Antifungal medications).    [employer] (secure e-mail communication and access to your chart) to send a message or to make an appointment.    If MRI needed please call [facility] at [phone]

## 2020-01-29 NOTE — PROGRESS NOTES
Subjective:    Pt is seen today as a f/u pt for Lamisil treatment.  Has been on meds for 12 months.  Denies any s/e w/ meds and has been tolerating them well.      ROS:  Denies headaches or gi distress.     No Known Allergies    Current Outpatient Medications   Medication Sig Dispense Refill     albuterol (PROAIR HFA/PROVENTIL HFA/VENTOLIN HFA) 108 (90 Base) MCG/ACT inhaler Inhale 2 puffs into the lungs every 6 hours as needed for shortness of breath / dyspnea 1 Inhaler 5     ASPIRIN PO Take 81 mg by mouth daily       citalopram (CELEXA) 10 MG tablet Take 1 tablet (10 mg) by mouth daily 30 tablet 11     eszopiclone (LUNESTA) 1 MG tablet Take 1 tablet (1 mg) by mouth nightly as needed for sleep (Patient not taking: Reported on 11/19/2019) 30 tablet 3     guaiFENesin (MUCINEX) 600 MG 12 hr tablet Take 2 tablets (1,200 mg) by mouth 2 times daily 180 tablet 3     hydrOXYzine (ATARAX) 25 MG tablet Take 1-2 tablets (25-50 mg) by mouth every 6 hours as needed for itching 60 tablet 3     order for DME autoCPAP 5-18 cmH20 1 Device 0     simvastatin (ZOCOR) 40 MG tablet Take 1 tablet (40 mg) by mouth At Bedtime 90 tablet 4     terbinafine (LAMISIL) 250 MG tablet Take 1 tablet (250 mg) by mouth daily 30 tablet 2     tiotropium (SPIRIVA) 18 MCG inhaled capsule Inhale 1 capsule (18 mcg) into the lungs daily Inhale contents of one capsule 1 capsule 11     umeclidinium (INCRUSE ELLIPTA) 62.5 MCG/INH inhaler Inhale 1 puff into the lungs daily 30 each 11     VITAMIN D 1000 UNIT OR CAPS TAKE 2 CAPSULES BY MOUTH EVERY MORNING         Patient Active Problem List   Diagnosis     Anxiety     Anisocoria     Hyperlipidemia LDL goal <130     Lack of libido     Family history of ischemic heart disease     Mild major depression (H)     Lung nodule, multiple     MERLIN (obstructive sleep apnea)- 'mild' (AHI 9)     Urticaria     Chronic obstructive pulmonary disease, unspecified COPD type (H)     Malignant neoplasm of upper-inner quadrant of right  breast in female, estrogen receptor negative (H)     Long term (current) use of systemic steroids     Tongue ulcer     Periodontal disease       Past Medical History:   Diagnosis Date     Acute cystitis without hematuria 10/26/2017     Clostridium difficile diarrhea 1/12/2018     Neutropenic fever (H) 01/10/2018     She was hospitalized 1/10/18 - 1/13/18 with neutropenic fever     Neutropenic sepsis (H) 01/29/2018    hospitalized again  from 1/29/18 - 2/8/18 with neutropenic fever, mucositis, and C. difficile colitis      Pneumonia 11/11/2017    She was hospitalized 11/11/17 - 11/17/17 with fevers ranging from 102 - 105.  CT chest was consistent with pneumonitis.  She also had transaminitis in the 150 range.  She was started on corticosteroids.  Pembrolizumab was stopped      S/P radiation therapy     5,256 cGy completed on 4/27/2018 to [facility] with Dr. Zhong       Past Surgical History:   Procedure Laterality Date     APPENDECTOMY  10/06/2015    [facility]     BREAST BIOPSY, CORE RT/LT Right 08/22/2017     BRONCHOSCOPY (RIGID OR FLEXIBLE), DIAGNOSTIC N/A 2/6/2018    Procedure: COMBINED BRONCHOSCOPY (RIGID OR FLEXIBLE), LAVAGE;  COMBINED BRONCOSCOY (RIGID OR FLEXIBLE), LAVAGE;  Surgeon: Samir Pettit MD;  [facility]     CLEAR LENS REPLACEMENT Bilateral 11/2016     COLONOSCOPY  11/15/2011    Procedure:COLONOSCOPY; Colonoscopy, screening; Surgeon:AANSTASIA BUNCH; [facility]     INSERT PORT VASCULAR ACCESS Left 9/1/2017    Procedure: INSERT PORT VASCULAR ACCESS;  Single Lumen Chest Power Port;  Surgeon: Leif Parkinson PA-C;  [facility]     LUMPECTOMY BREAST WITH SENTINEL NODE, COMBINED Right 2/27/2018    Procedure: COMBINED LUMPECTOMY BREAST WITH SENTINEL NODE;  Right Wire Localized Lumpectomy, Right [place] Lymph Node Biopsy And Freddy Cath Removal;  Surgeon: Atul Gates MD;  [facility]: [facility]     REMOVE PORT VASCULAR ACCESS N/A 2/27/2018     Procedure: REMOVE PORT VASCULAR ACCESS;;  Surgeon: Atul Gates MD;  [facility]     TUBAL LIGATION  2004       Family History   Problem Relation Age of Onset     Cardiovascular Father 46        MI     Eye Disorder Father      Cerebrovascular Disease Father      Arthritis Sister      Neurologic Disorder Brother      Eye Surgery Maternal Grandmother         cataract     Prostate Cancer Maternal Grandfather      Prostate Cancer Maternal Uncle      Cancer Maternal Uncle         Throat cancer     Diabetes No family hx of      Hypertension No family hx of      Glaucoma No family hx of      Macular Degeneration No family hx of        Social History     Tobacco Use     Smoking status: Former Smoker     Packs/day: 0.75     Years: 20.00     Pack years: 15.00     Types: Cigarettes     Last attempt to quit: 2000     Years since quittin.0     Smokeless tobacco: Never Used   Substance Use Topics     Alcohol use: Yes     Comment: Daily to weekly use (beer)         Objective:    /80 (BP Location: Right arm)   Pulse 90   Wt 77.1 kg (170 lb)   BMI 31.09 kg/m  .  Patient pleasant to talk with and in no distress.  Pulses are palpable DP & PT bilateral.  Sensation to light touch is intact.  no forefoot deformities.  Normal ROM all forefoot joints.  Skin intact bilateral and not dry.  Both hallux nails now clearing proximally, especially the right.  Left hallux nail slightly clear proximally does not appear to have grown at all.  Right hallux nail now almost totally clear.  No evidence of deep abscess, erythema, or infection noted.  No paronychia.  No pain upon palpation to the toe nails.  Nailbed healthy.  No subungual masses noted.     ALT 29   0 - 50 U/L Final 2019  9:34 AM 1740   AST 22   0 - 45 U/L Final 2019  9:34 AM 1740            Assessment:  Onychomycosis     Plan:  Discussed etiology and treatment options with the pt.  Since the medication appears to be effective on the right hallux  nail will continue this for 2 more months and then stop.  It should be cleared out by then.  I discussed the left hallux nail will probably not clear up.  We will send her to the lab for LFTs.  When she is done with medication she will get some Lamisil cream in place on her skin around her nails every other day.  She will keep her nails trimmed short.  She will rotate her shoes.  RTC PRN    Juan Celestin, DAVID HERNANDEZ, FACFAS   \

## 2020-02-17 ENCOUNTER — ANCILLARY PROCEDURE (OUTPATIENT)
Dept: MRI IMAGING | Facility: CLINIC | Age: 60
End: 2020-02-17
Attending: INTERNAL MEDICINE
Payer: COMMERCIAL

## 2020-02-17 ENCOUNTER — APPOINTMENT (OUTPATIENT)
Dept: LAB | Facility: CLINIC | Age: 60
End: 2020-02-17
Payer: COMMERCIAL

## 2020-02-17 ENCOUNTER — ONCOLOGY VISIT (OUTPATIENT)
Dept: ONCOLOGY | Facility: CLINIC | Age: 60
End: 2020-02-17
Attending: INTERNAL MEDICINE
Payer: COMMERCIAL

## 2020-02-17 VITALS
RESPIRATION RATE: 16 BRPM | HEIGHT: 62 IN | TEMPERATURE: 98.4 F | SYSTOLIC BLOOD PRESSURE: 117 MMHG | HEART RATE: 92 BPM | DIASTOLIC BLOOD PRESSURE: 64 MMHG | BODY MASS INDEX: 31.54 KG/M2 | OXYGEN SATURATION: 96 % | WEIGHT: 171.4 LBS

## 2020-02-17 DIAGNOSIS — Z17.1 MALIGNANT NEOPLASM OF UPPER-INNER QUADRANT OF RIGHT BREAST IN FEMALE, ESTROGEN RECEPTOR NEGATIVE (H): ICD-10-CM

## 2020-02-17 DIAGNOSIS — C50.211 MALIGNANT NEOPLASM OF UPPER-INNER QUADRANT OF RIGHT BREAST IN FEMALE, ESTROGEN RECEPTOR NEGATIVE (H): ICD-10-CM

## 2020-02-17 DIAGNOSIS — J84.89 ORGANIZING PNEUMONIA (H): ICD-10-CM

## 2020-02-17 DIAGNOSIS — Z17.1 MALIGNANT NEOPLASM OF UPPER-INNER QUADRANT OF RIGHT BREAST IN FEMALE, ESTROGEN RECEPTOR NEGATIVE (H): Primary | ICD-10-CM

## 2020-02-17 DIAGNOSIS — C50.211 MALIGNANT NEOPLASM OF UPPER-INNER QUADRANT OF RIGHT BREAST IN FEMALE, ESTROGEN RECEPTOR NEGATIVE (H): Primary | ICD-10-CM

## 2020-02-17 PROCEDURE — G0463 HOSPITAL OUTPT CLINIC VISIT: HCPCS | Mod: ZF

## 2020-02-17 PROCEDURE — 99214 OFFICE O/P EST MOD 30 MIN: CPT | Mod: ZP | Performed by: INTERNAL MEDICINE

## 2020-02-17 RX ORDER — GADOBUTROL 604.72 MG/ML
7.5 INJECTION INTRAVENOUS ONCE
Status: COMPLETED | OUTPATIENT
Start: 2020-02-17 | End: 2020-02-17

## 2020-02-17 RX ADMIN — GADOBUTROL 7.5 ML: 604.72 INJECTION INTRAVENOUS at 08:29

## 2020-02-17 ASSESSMENT — PAIN SCALES - GENERAL: PAINLEVEL: NO PAIN (0)

## 2020-02-17 ASSESSMENT — MIFFLIN-ST. JEOR: SCORE: 1305.85

## 2020-02-17 NOTE — DISCHARGE INSTRUCTIONS
MRI Contrast Discharge Instructions    The IV contrast you received today will pass out of your body in your  urine. This will happen in the next 24 hours. You will not feel this process.  Your urine will not change color.    Drink at least 4 extra glasses of water or juice today (unless your doctor  has restricted your fluids). This reduces the stress on your kidneys.  You may take your regular medicines.    If you are on dialysis: It is best to have dialysis today.    If you have a reaction: Most reactions happen right away. If you have  any new symptoms after leaving the hospital (such as hives or swelling),  call your hospital at the correct number below. Or call your family doctor.  If you have breathing distress or wheezing, call 911.    Special instructions: ***    I have read and understand the above information.    Signature:______________________________________ Date:___________    Staff:__________________________________________ Date:___________     Time:__________    [facility] Departments:    ___[place]: [phone]  ___[facility]: [phone]  ___[place]: [phone] ___[facility]: [phone]  ___[facility]: [phone]  ___[facility]: [phone]  ___[phone]  ___[facility]: [phone]  ___[phone]

## 2020-02-17 NOTE — LETTER
2/17/2020       RE: Ashleigh Alonzo  [address] [place] [address]     Dear Colleague,    Thank you for referring your patient, Ashleigh Alonzo, to the [facility]. Please see a copy of my visit note below.    Oncology Follow Up:  Date on this visit: 2/17/2020    Diagnosis:  Stage IIb, T2N0M0, grade 3 triple negative cancer of the right breast.    Primary Physician: Radha Cazares     History Of Present Illness:  Ms. Alonzo is a 59-year-old female with a h/o stage IIb, T2 N0 M0, grade 3, triple-negative invasive carcinoma of the right breast.  Routine screening mammogram on 07/27/2017 showed developing calcifications in the right breast at the 12 o'clock position 6 cm from the nipple.  Ultrasound demonstrated a 7-mm, irregular, hypoechoic mass at the 12 o'clock position.  Contrast-enhanced mammogram showed a peripherally enhancing, irregular mass measuring 1.9 cm as well as an additional 6-mm, enhancing focus anteromedial to the dominant mass.  The total area of abnormal enhancement on contrast mammogram measured up to 3.4 cm.  Right breast biopsy demonstrated a grade 3 invasive mammary carcinoma with associated high-grade DCIS.  Estrogen and progesterone receptor staining were negative.  HER2 was non-amplified by FISH.  Breast MRI measured the biopsy proven breast cancer at 3.2 cm.    Ms. Alonzo enrolled in the ISPY-2 clinical trial.  She began treatment with weekly taxol and once every 3 week pembrolizumab on 9/20/17.  Her course was complicated by pneumonitis and hepatitis.  Pembrolizumab was stopped and she resumed weekly taxol alone on 11/28/17.  She completed a total of 12 weeks of therapy on 12/26/17.  She completed 2 cycles of adriamycin and cyclophosphamide.  She was hospitalized both cycles due to neutropenic fever and also developed C. Difficile colitis.  Decision was made to forgo further chemotherapy.       Right breast lumpectomy and sentinel lymph node procedure on  18 showed a grade 2, 6 mm residual invasive mammary carcinoma with an associated 8 mm area of high grade DCIS with comedonecrosis and ADH.  Invasive tumor cellularity was 10%.  There was lymphovascular invasion.  Surgical margins were negative.  A single sentinel lymph node was benign.  Classified as MDA RCB I.  She completed adjuvant radiation (4256 cGy to the right breast with additional 1000 cGy to the lumpectomy cavity) on 18.     Interval History:   Ms. Alonzo comes into clinic today for routine breast cancer followup.  She generally is without concerns.  Since last visit, she and her  have gotten a new [place] Griffith puppy, which has brought them happiness.  Unfortunately, the reason they got a new puppy is because their 14-year-old dog  in January.  This was a very upsetting event for them.   Overall, mood has been stable.  She denies significant hot flashes.  She denies new lumps or masses.  She reports that her right axillary scar is much more retracted and pulled into the breast.  This is bothersome, especially when extending her arm over her head.  She denies cough, shortness of breath or chest pain.  Fatigue is somewhat improved since the last time.  She takes 3-4 days off of work every month due to fatigue.  On these days, she sleeps all day in order to catch up on energy.  She denies abdominal complaints.  She has no new bone or joint aches or pains.  No headaches, visual changes or focal neurologic complaints.  The remainder of a complete 12-point review of systems was reviewed with the patient and was negative with the exception of that mentioned above.      Past Medical/Surgical History:  Past Medical History:   Diagnosis Date     Acute cystitis without hematuria 10/26/2017     Clostridium difficile diarrhea 2018     Neutropenic fever (H) 01/10/2018     She was hospitalized 1/10/18 - 18 with neutropenic fever     Neutropenic sepsis (H) 2018    hospitalized  again  from 1/29/18 - 2/8/18 with neutropenic fever, mucositis, and C. difficile colitis      Pneumonia 11/11/2017    She was hospitalized 11/11/17 - 11/17/17 with fevers ranging from 102 - 105.  CT chest was consistent with pneumonitis.  She also had transaminitis in the 150 range.  She was started on corticosteroids.  Pembrolizumab was stopped      S/P radiation therapy     5,256 cGy completed on 4/27/2018 to [facility] with Dr. Zhong     Past Surgical History:   Procedure Laterality Date     APPENDECTOMY  10/06/2015    [facility]     BREAST BIOPSY, CORE RT/LT Right 08/22/2017     BRONCHOSCOPY (RIGID OR FLEXIBLE), DIAGNOSTIC N/A 2/6/2018    Procedure: COMBINED BRONCHOSCOPY (RIGID OR FLEXIBLE), LAVAGE;  COMBINED BRONCOSCOY (RIGID OR FLEXIBLE), LAVAGE;  Surgeon: Samir Pettit MD;  [facility]     CLEAR LENS REPLACEMENT Bilateral 11/2016     COLONOSCOPY  11/15/2011    Procedure:COLONOSCOPY; Colonoscopy, screening; Surgeon:ANASTASIA BUNCH; [facility]     INSERT PORT VASCULAR ACCESS Left 9/1/2017    Procedure: INSERT PORT VASCULAR ACCESS;  Single Lumen Chest Power Port;  Surgeon: Leif Parkinson PA-C;  [facility]     LUMPECTOMY BREAST WITH SENTINEL NODE, COMBINED Right 2/27/2018    Procedure: COMBINED LUMPECTOMY BREAST WITH SENTINEL NODE;  Right Wire Localized Lumpectomy, Right [place] Lymph Node Biopsy And Freddy Cath Removal;  Surgeon: Atul Gates MD;  [facility]     REMOVE PORT VASCULAR ACCESS N/A 2/27/2018    Procedure: REMOVE PORT VASCULAR ACCESS;;  Surgeon: Atul Gates MD;  [facility]     TUBAL LIGATION  12/2004     Allergies:  Allergies as of 02/17/2020     (No Known Allergies)     Current Medications:  Current Outpatient Medications   Medication Sig Dispense Refill     albuterol (PROAIR HFA/PROVENTIL HFA/VENTOLIN HFA) 108 (90 Base) MCG/ACT inhaler Inhale 2 puffs into the lungs every 6 hours as needed for shortness of breath / dyspnea  "1 Inhaler 5     ASPIRIN PO Take 81 mg by mouth daily       citalopram (CELEXA) 10 MG tablet Take 1 tablet (10 mg) by mouth daily 30 tablet 11     eszopiclone (LUNESTA) 1 MG tablet Take 1 tablet (1 mg) by mouth nightly as needed for sleep (Patient not taking: Reported on 11/19/2019) 30 tablet 3     guaiFENesin (MUCINEX) 600 MG 12 hr tablet Take 2 tablets (1,200 mg) by mouth 2 times daily 180 tablet 3     hydrOXYzine (ATARAX) 25 MG tablet Take 1-2 tablets (25-50 mg) by mouth every 6 hours as needed for itching 60 tablet 3     order for DME autoCPAP 5-18 cmH20 1 Device 0     simvastatin (ZOCOR) 40 MG tablet Take 1 tablet (40 mg) by mouth At Bedtime 90 tablet 4     terbinafine (LAMISIL) 250 MG tablet Take 1 tablet (250 mg) by mouth daily 30 tablet 1     terbinafine (LAMISIL) 250 MG tablet Take 1 tablet (250 mg) by mouth daily 30 tablet 2     tiotropium (SPIRIVA) 18 MCG inhaled capsule Inhale 1 capsule (18 mcg) into the lungs daily Inhale contents of one capsule 1 capsule 11     umeclidinium (INCRUSE ELLIPTA) 62.5 MCG/INH inhaler Inhale 1 puff into the lungs daily 30 each 11     VITAMIN D 1000 UNIT OR CAPS TAKE 2 CAPSULES BY MOUTH EVERY MORNING        Family and Social History:  Reviewed and unchanged from prior.  Please see initial consultation dated 8/28/17 for further details.    Physical Exam:  /64 (BP Location: Right arm, Patient Position: Chair, Cuff Size: Adult Large)   Pulse 92   Temp 98.4  F (36.9  C) (Oral)   Resp 16   Ht 1.575 m (5' 2.01\")   Wt 77.7 kg (171 lb 6.4 oz)   SpO2 96%   BMI 31.34 kg/m     General:  Well appearing, well nourished adult female in NAD.  A&Ox3.  HEENT:  Normocephalic.  Sclera anicteric.  MMM.  No lesions of the oropharynx.  Lymph:  No palpable cervical, supraclavicular, or axillary LAD.    Chest:  Lungs are CTA bilaterally.  No wheezes or crackles.  Breast exam:  Bilateral breasts are of normal fibroglandular density.  There is a mobile mass palpable at the medial edge of " the right axillary incision measuring approximately 4-5 mm, it is tender to palpation but is otherwise unchanged from prior exam.  There is thickening of skin and enhancement of pores of the inferior right breast c/w radiation change.  There is retraction of upper outer breast incision/scar.  There are no discretely palpable masses in the area of retraction.  There are no discretely palpable masses in either breast.  Bilateral nipples are everted.  CV:  Tachycardic.  Regular rhythm.  Nl S1 and S2.  No m/r/g.  Abd:  Soft/NT/ND.  BSs normoactive.   Ext:  No edema of the bilateral lower extremities.    Musculo:  Full ROM of the bilateral upper extremities.  No evidence of lymphedema.  Neuro:  Cranial nerves grossly intact.  Gait stable.  Psych:  Mood and affect appear normal.  Skin:  No visible concerning skin rashes or skin lesions    Laboratory/Imaging Studies:  2/17/2020 Breast MRI:  No suspicious enhancement or lymphadenopathy    ASSESSMENT/PLAN:  Ms. Alonzo is a 59-year-old female with a h/o grade 3, T2N0M0, triple-negative infiltrating ductal carcinoma of the right breast s/p 12 weeks of Taxol along with 3 cycles pembrolizumab, 2 cycles of adriamycin and cyclophosphamide, lumpectomy, and radiation.     1.  Right breast cancer:   Ms. Alonzo is approximately 2 years out from excision of a right breast cancer.  There is no evidence of disease recurrence on exam today.  Her right breast scar is more retracted/puckered than prior but without corresponding palpable abnormality.  We reviewed the images of today's breast MRI which is also without concerning findings.  I will see her back in 3 months for her next visit.      Due to a h/o ADH, I have recommended high risk screening with both annual mammogram and breast MRI, spacing the two studies so that she is having some form of imaging every 6 months.  Recommend stopping breast MRIs once she is 5 years out from completion of treatment of her breast cancer.  She will be  due for mammogram 8/20/20 or later.    2.  Fatigue:  Likely multi-factorial.  Likely a component due to breast cancer treatment, however, I suspect COPD and other medical issues are also likely contributing.  She declines referral back to PT/OT.      3.  Depression:  Symptoms stable since last visit.  Continue Celexa 10 mg PO daily.      4.  Organizing pneumonia/pulmonary nodules:  Chronic COPD.  Had pneumonitis secondary to pembrolizumab during breast cancer treatment.  Follow up CT 10/2019 c/w organizing pneumonia including new pleural-based opacity in the anterolateral RUL.  Repeat CT chest in 04/2020 recommended and is currently scheduled for 4/6/2020 with visit with Dr. Valdez.    5.  Routine health maintenance:  Colonoscopy in 2011 was without concerning findings, next due in 2021.  Will obtain CBC every 6 months given h/o anthracycline exposure.    6.  Followup:  Return to clinic in 4 months for visit with me.      A total of 25 minutes was spent in face to face time with the patient, >25 minutes of which was spent in counseling.      Fara Bradshaw MD

## 2020-02-17 NOTE — NURSING NOTE
"Oncology Rooming Note    February 17, 2020 10:48 AM   Ashleigh Alonzo is a 59 year old female who presents for:    Chief Complaint   Patient presents with     Oncology Clinic Visit     [facility] RETURN- BREAST CA     Initial Vitals: /64 (BP Location: Right arm, Patient Position: Chair, Cuff Size: Adult Large)   Pulse 92   Temp 98.4  F (36.9  C) (Oral)   Resp 16   Ht 1.575 m (5' 2.01\")   Wt 77.7 kg (171 lb 6.4 oz)   SpO2 96%   BMI 31.34 kg/m   Estimated body mass index is 31.34 kg/m  as calculated from the following:    Height as of this encounter: 1.575 m (5' 2.01\").    Weight as of this encounter: 77.7 kg (171 lb 6.4 oz). Body surface area is 1.84 meters squared.  No Pain (0) Comment: Data Unavailable   No LMP recorded. Patient is postmenopausal.  Allergies reviewed: Yes  Medications reviewed: Yes    Medications: Medication refills not needed today.  Pharmacy name entered into [employer]: [facility] - [place], [place] - [address] [address]    Clinical concerns: No new concerns. Dr. Bradshaw was notified.      Rod Clay LPN            "

## 2020-02-17 NOTE — PROGRESS NOTES
Oncology Follow Up:  Date on this visit: 2/17/2020    Diagnosis:  Stage IIb, T2N0M0, grade 3 triple negative cancer of the right breast.    Primary Physician: Radha Cazares     History Of Present Illness:  Ms. Alonzo is a 59-year-old female with a h/o stage IIb, T2 N0 M0, grade 3, triple-negative invasive carcinoma of the right breast.  Routine screening mammogram on 07/27/2017 showed developing calcifications in the right breast at the 12 o'clock position 6 cm from the nipple.  Ultrasound demonstrated a 7-mm, irregular, hypoechoic mass at the 12 o'clock position.  Contrast-enhanced mammogram showed a peripherally enhancing, irregular mass measuring 1.9 cm as well as an additional 6-mm, enhancing focus anteromedial to the dominant mass.  The total area of abnormal enhancement on contrast mammogram measured up to 3.4 cm.  Right breast biopsy demonstrated a grade 3 invasive mammary carcinoma with associated high-grade DCIS.  Estrogen and progesterone receptor staining were negative.  HER2 was non-amplified by FISH.  Breast MRI measured the biopsy proven breast cancer at 3.2 cm.    Ms. Alonzo enrolled in the ISPY-2 clinical trial.  She began treatment with weekly taxol and once every 3 week pembrolizumab on 9/20/17.  Her course was complicated by pneumonitis and hepatitis.  Pembrolizumab was stopped and she resumed weekly taxol alone on 11/28/17.  She completed a total of 12 weeks of therapy on 12/26/17.  She completed 2 cycles of adriamycin and cyclophosphamide.  She was hospitalized both cycles due to neutropenic fever and also developed C. Difficile colitis.  Decision was made to forgo further chemotherapy.       Right breast lumpectomy and sentinel lymph node procedure on 2/27/18 showed a grade 2, 6 mm residual invasive mammary carcinoma with an associated 8 mm area of high grade DCIS with comedonecrosis and ADH.  Invasive tumor cellularity was 10%.  There was lymphovascular invasion.  Surgical margins were  negative.  A single sentinel lymph node was benign.  Classified as MDA RCB I.  She completed adjuvant radiation (4256 cGy to the right breast with additional 1000 cGy to the lumpectomy cavity) on 18.     Interval History:   Ms. Alonzo comes into clinic today for routine breast cancer followup.  She generally is without concerns.  Since last visit, she and her  have gotten a new [place] puppy, which has brought them happiness.  Unfortunately, the reason they got a new puppy is because their 14-year-old dog  in January.  This was a very upsetting event for them.   Overall, mood has been stable.  She denies significant hot flashes.  She denies new lumps or masses.  She reports that her right axillary scar is much more retracted and pulled into the breast.  This is bothersome, especially when extending her arm over her head.  She denies cough, shortness of breath or chest pain.  Fatigue is somewhat improved since the last time.  She takes 3-4 days off of work every month due to fatigue.  On these days, she sleeps all day in order to catch up on energy.  She denies abdominal complaints.  She has no new bone or joint aches or pains.  No headaches, visual changes or focal neurologic complaints.  The remainder of a complete 12-point review of systems was reviewed with the patient and was negative with the exception of that mentioned above.      Past Medical/Surgical History:  Past Medical History:   Diagnosis Date     Acute cystitis without hematuria 10/26/2017     Clostridium difficile diarrhea 2018     Neutropenic fever (H) 01/10/2018     She was hospitalized 1/10/18 - 18 with neutropenic fever     Neutropenic sepsis (H) 2018    hospitalized again  from 18 - 18 with neutropenic fever, mucositis, and C. difficile colitis      Pneumonia 2017    She was hospitalized 17 - 17 with fevers ranging from 102 - 105.  CT chest was consistent with pneumonitis.  She  also had transaminitis in the 150 range.  She was started on corticosteroids.  Pembrolizumab was stopped      S/P radiation therapy     5,256 cGy completed on 4/27/2018 to [facility] with Dr. Zhong     Past Surgical History:   Procedure Laterality Date     APPENDECTOMY  10/06/2015    [facility]     BREAST BIOPSY, CORE RT/LT Right 08/22/2017     BRONCHOSCOPY (RIGID OR FLEXIBLE), DIAGNOSTIC N/A 2/6/2018    Procedure: COMBINED BRONCHOSCOPY (RIGID OR FLEXIBLE), LAVAGE;  COMBINED BRONCOSCOY (RIGID OR FLEXIBLE), LAVAGE;  Surgeon: Samir Pettit MD;  Location: [facility]     CLEAR LENS REPLACEMENT Bilateral 11/2016     COLONOSCOPY  11/15/2011    Procedure:COLONOSCOPY; Colonoscopy, screening; Surgeon:ANASTASIA BUNCH; Location:[facility] OR     INSERT PORT VASCULAR ACCESS Left 9/1/2017    Procedure: INSERT PORT VASCULAR ACCESS;  Single Lumen Chest Power Port;  Surgeon: Leif Parkinson PA-C;  [facility]     LUMPECTOMY BREAST WITH SENTINEL NODE, COMBINED Right 2/27/2018    Procedure: COMBINED LUMPECTOMY BREAST WITH SENTINEL NODE;  Right Wire Localized Lumpectomy, Right [place] Lymph Node Biopsy And Freddy Cath Removal;  Surgeon: Atul Gates MD;  Location: [facility] OR     REMOVE PORT VASCULAR ACCESS N/A 2/27/2018    Procedure: REMOVE PORT VASCULAR ACCESS;;  Surgeon: Atul Gates MD;  Location: [facility] OR     TUBAL LIGATION  12/2004     Allergies:  Allergies as of 02/17/2020     (No Known Allergies)     Current Medications:  Current Outpatient Medications   Medication Sig Dispense Refill     albuterol (PROAIR HFA/PROVENTIL HFA/VENTOLIN HFA) 108 (90 Base) MCG/ACT inhaler Inhale 2 puffs into the lungs every 6 hours as needed for shortness of breath / dyspnea 1 Inhaler 5     ASPIRIN PO Take 81 mg by mouth daily       citalopram (CELEXA) 10 MG tablet Take 1 tablet (10 mg) by mouth daily 30 tablet 11     eszopiclone (LUNESTA) 1 MG tablet Take 1 tablet (1 mg) by mouth nightly as needed for sleep  "(Patient not taking: Reported on 11/19/2019) 30 tablet 3     guaiFENesin (MUCINEX) 600 MG 12 hr tablet Take 2 tablets (1,200 mg) by mouth 2 times daily 180 tablet 3     hydrOXYzine (ATARAX) 25 MG tablet Take 1-2 tablets (25-50 mg) by mouth every 6 hours as needed for itching 60 tablet 3     order for DME autoCPAP 5-18 cmH20 1 Device 0     simvastatin (ZOCOR) 40 MG tablet Take 1 tablet (40 mg) by mouth At Bedtime 90 tablet 4     terbinafine (LAMISIL) 250 MG tablet Take 1 tablet (250 mg) by mouth daily 30 tablet 1     terbinafine (LAMISIL) 250 MG tablet Take 1 tablet (250 mg) by mouth daily 30 tablet 2     tiotropium (SPIRIVA) 18 MCG inhaled capsule Inhale 1 capsule (18 mcg) into the lungs daily Inhale contents of one capsule 1 capsule 11     umeclidinium (INCRUSE ELLIPTA) 62.5 MCG/INH inhaler Inhale 1 puff into the lungs daily 30 each 11     VITAMIN D 1000 UNIT OR CAPS TAKE 2 CAPSULES BY MOUTH EVERY MORNING        Family and Social History:  Reviewed and unchanged from prior.  Please see initial consultation dated 8/28/17 for further details.    Physical Exam:  /64 (BP Location: Right arm, Patient Position: Chair, Cuff Size: Adult Large)   Pulse 92   Temp 98.4  F (36.9  C) (Oral)   Resp 16   Ht 1.575 m (5' 2.01\")   Wt 77.7 kg (171 lb 6.4 oz)   SpO2 96%   BMI 31.34 kg/m    General:  Well appearing, well nourished adult female in NAD.  A&Ox3.  HEENT:  Normocephalic.  Sclera anicteric.  MMM.  No lesions of the oropharynx.  Lymph:  No palpable cervical, supraclavicular, or axillary LAD.    Chest:  Lungs are CTA bilaterally.  No wheezes or crackles.  Breast exam:  Bilateral breasts are of normal fibroglandular density.  There is a mobile mass palpable at the medial edge of the right axillary incision measuring approximately 4-5 mm, it is tender to palpation but is otherwise unchanged from prior exam.  There is thickening of skin and enhancement of pores of the inferior right breast c/w radiation change.  " There is retraction of upper outer breast incision/scar.  There are no discretely palpable masses in the area of retraction.  There are no discretely palpable masses in either breast.  Bilateral nipples are everted.  CV:  Tachycardic.  Regular rhythm.  Nl S1 and S2.  No m/r/g.  Abd:  Soft/NT/ND.  BSs normoactive.   Ext:  No edema of the bilateral lower extremities.    Musculo:  Full ROM of the bilateral upper extremities.  No evidence of lymphedema.  Neuro:  Cranial nerves grossly intact.  Gait stable.  Psych:  Mood and affect appear normal.  Skin:  No visible concerning skin rashes or skin lesions    Laboratory/Imaging Studies:  2/17/2020 Breast MRI:  No suspicious enhancement or lymphadenopathy    ASSESSMENT/PLAN:  Ms. Alonzo is a 59-year-old female with a h/o grade 3, T2N0M0, triple-negative infiltrating ductal carcinoma of the right breast s/p 12 weeks of Taxol along with 3 cycles pembrolizumab, 2 cycles of adriamycin and cyclophosphamide, lumpectomy, and radiation.     1.  Right breast cancer:   Ms. Alonzo is approximately 2 years out from excision of a right breast cancer.  There is no evidence of disease recurrence on exam today.  Her right breast scar is more retracted/puckered than prior but without corresponding palpable abnormality.  We reviewed the images of today's breast MRI which is also without concerning findings.  I will see her back in 3 months for her next visit.      Due to a h/o ADH, I have recommended high risk screening with both annual mammogram and breast MRI, spacing the two studies so that she is having some form of imaging every 6 months.  Recommend stopping breast MRIs once she is 5 years out from completion of treatment of her breast cancer.  She will be due for mammogram 8/20/20 or later.    2.  Fatigue:  Likely multi-factorial.  Likely a component due to breast cancer treatment, however, I suspect COPD and other medical issues are also likely contributing.  She declines referral back  to PT/OT.      3.  Depression:  Symptoms stable since last visit.  Continue Celexa 10 mg PO daily.      4.  Organizing pneumonia/pulmonary nodules:  Chronic COPD.  Had pneumonitis secondary to pembrolizumab during breast cancer treatment.  Follow up CT 10/2019 c/w organizing pneumonia including new pleural-based opacity in the anterolateral RUL.  Repeat CT chest in 04/2020 recommended and is currently scheduled for 4/6/2020 with visit with Dr. Valdez.    5.  Routine health maintenance:  Colonoscopy in 2011 was without concerning findings, next due in 2021.  Will obtain CBC every 6 months given h/o anthracycline exposure.    6.  Followup:  Return to clinic in 4 months for visit with me.      A total of 25 minutes was spent in face to face time with the patient, >25 minutes of which was spent in counseling.

## 2020-02-24 DIAGNOSIS — C50.211 MALIGNANT NEOPLASM OF UPPER-INNER QUADRANT OF RIGHT BREAST IN FEMALE, ESTROGEN RECEPTOR NEGATIVE (H): Primary | ICD-10-CM

## 2020-02-24 DIAGNOSIS — Z17.1 MALIGNANT NEOPLASM OF UPPER-INNER QUADRANT OF RIGHT BREAST IN FEMALE, ESTROGEN RECEPTOR NEGATIVE (H): Primary | ICD-10-CM

## 2020-03-04 ENCOUNTER — PATIENT OUTREACH (OUTPATIENT)
Dept: ONCOLOGY | Facility: CLINIC | Age: 60
End: 2020-03-04

## 2020-03-04 DIAGNOSIS — G93.32 CHRONIC FATIGUE SYNDROME: ICD-10-CM

## 2020-03-04 DIAGNOSIS — F33.1 MODERATE EPISODE OF RECURRENT MAJOR DEPRESSIVE DISORDER (H): Primary | ICD-10-CM

## 2020-03-04 RX ORDER — DULOXETIN HYDROCHLORIDE 20 MG/1
20 CAPSULE, DELAYED RELEASE ORAL 2 TIMES DAILY
Qty: 60 CAPSULE | Refills: 11 | Status: SHIPPED | OUTPATIENT
Start: 2020-03-04 | End: 2021-04-24

## 2020-03-04 NOTE — PROGRESS NOTES
Call placed to patient with Dr. Bradshaw's recommendations.  Discussed how to take medication and to call us with an update in a couple weeks.  Pt also instructed to call if she has any questions or concerns.  Dionne Goode RNCC BSN CBCN          Yusra,   I would like her to start Cymbalta 20 mg PO BID.  Cymbalta has been shown to treat both depression and chronic fatigue syndrome.  I have sent a prescription to her local pharmacy.     Fara    Previous Messages      ----- Message -----   From: Dionne Goode RN   Sent: 3/4/2020  10:57 AM CST   To: MD Fara ButcherAshleigh would like to start an anti-depressant to start feeling better.   She is off work this week and has been sleeping quite a bit.  She said she is just not bouncing back.  She discussed possible surgery on her right side.  I asked her if she would like to have a consult but she said no.     Yusra Shukla

## 2020-03-18 ENCOUNTER — TELEPHONE (OUTPATIENT)
Dept: PLASTIC SURGERY | Facility: CLINIC | Age: 60
End: 2020-03-18

## 2020-03-18 NOTE — TELEPHONE ENCOUNTER
3/18/2020, 12:16 PM    Spoke with patient. Changed appointment from 3/24 to 3/25 at 0945h. The patient voiced agreement and understanding of date, time, and place of appointment.      LJ PortilloT

## 2020-03-19 NOTE — TELEPHONE ENCOUNTER
FUTURE VISIT INFORMATION      FUTURE VISIT INFORMATION:    Date: 3.25.2020    Time: 9:45 AM    Location: [facility]  REFERRAL INFORMATION:    Referring provider:   Dr. Bradshaw    Referring providers clinic:  [facility]    Reason for visit/diagnosis  Consult -Malignant neoplasm of upper-inner quadrant of right breast in female, estrogen receptor negative    RECORDS REQUESTED FROM:       Clinic name Comments Records Status Imaging Status   [facility] 2.24.2020 Fara Bradshaw  [facility]    Radiology 2.17.2020 MR right Breast  8.20.19 Mammogram bilateral  2.18.19 [facility] right breast  2.18.19 MR bilateral breast  11.12.18 Mammogram right  More available if needed Epic PACS

## 2020-03-24 ENCOUNTER — DOCUMENTATION ONLY (OUTPATIENT)
Dept: ONCOLOGY | Facility: CLINIC | Age: 60
End: 2020-03-24

## 2020-03-24 NOTE — PROGRESS NOTES
FMLA forms received via my chart.  Forms to be completed and put in folder for provider to approve.    [employer]  Fax: [phone]    Litzy Chambers CMA (AAMA)

## 2020-03-25 ENCOUNTER — OFFICE VISIT (OUTPATIENT)
Dept: PLASTIC SURGERY | Facility: CLINIC | Age: 60
End: 2020-03-25
Attending: PLASTIC SURGERY
Payer: COMMERCIAL

## 2020-03-25 ENCOUNTER — PRE VISIT (OUTPATIENT)
Dept: PLASTIC SURGERY | Facility: CLINIC | Age: 60
End: 2020-03-25

## 2020-03-25 VITALS
SYSTOLIC BLOOD PRESSURE: 143 MMHG | HEIGHT: 62 IN | DIASTOLIC BLOOD PRESSURE: 89 MMHG | WEIGHT: 167.7 LBS | BODY MASS INDEX: 30.86 KG/M2 | HEART RATE: 120 BPM | OXYGEN SATURATION: 95 %

## 2020-03-25 DIAGNOSIS — Z98.890 S/P LUMPECTOMY, RIGHT BREAST: Primary | ICD-10-CM

## 2020-03-25 ASSESSMENT — MIFFLIN-ST. JEOR: SCORE: 1289.09

## 2020-03-25 ASSESSMENT — PAIN SCALES - GENERAL: PAINLEVEL: SEVERE PAIN (7)

## 2020-03-25 NOTE — LETTER
3/25/2020       RE: Ashleigh Alonzo  [address] [address]     Dear Colleague,    Thank you for referring your patient, Ashleigh Alonzo, to the [facility]GERY at [facility]. Please see a copy of my visit note below.    Service Date: 03/25/2020      REFERRING PROVIDER:  Radha Cazares MD      PRESENTING COMPLAINT:  Consultation for right breast/chest wall tightness and pain.      HISTORY OF PRESENTING COMPLAINT:  Ms. Alonzo is 59 years old.  About 2 years ago, she was diagnosed with right-sided breast cancer, underwent a lumpectomy and radiation therapy with adjuvant chemotherapy.  She has noticed that her right upper outer quadrant area is tight when she abducts or elevates her arm and gives her discomfort.  She has been worked up for and followed for her cancer and she has no evidence of disease.      PAST MEDICAL HISTORY:  Anxiety disorder, COPD and depression.      PAST SURGICAL HISTORY:  Lumpectomy, port placement, tubal ligation, appendectomy.      MEDICATIONS:   1.  Albuterol inhaler.   2.  Aspirin.   3.  Citalopram.   4.  Duloxetine.   5.  Simvastatin.      ALLERGIES:  Nil.      SOCIAL HISTORY:  Used to smoke about a pack a day for 15-20 years, quit in 2000.  Drinks socially.      REVIEW OF SYSTEMS:  Denies chest pain.  She has some shortness of breath.  No MI, CVA, DVT or PE.      PHYSICAL EXAMINATION:  Vital signs are stable.  She is afebrile, in no obvious distress.  She is 5 feet 2 inches, 167 pounds, BMI of 31 kg/m2.  On examination of her right breast, she has breast asymmetry with the right breast being at least 25-30% smaller than the left side.  She has a periareolar incision.  Her breast has radiation changes.  The upper outer quadrant has an indentation from her axillary node sampling.  There is no palpable mass per se.  The area is soft.      ASSESSMENT AND PLAN:  Based on above findings, a diagnosis of a  lumpectomy and radiation therapy for breast cancer with subsequent radiation-induced fibrosis and pain was made.  I had a slava discussion with the patient that I do not think surgical treatment is necessary.  She just needs aggressive physical therapy, moisturization and range of motion but not surgery.  The asymmetry does not bother her, which would be the thing that could be helped with the surgery.  She understood.  She will see us back as needed.  All questions were answered.  All exam done in the presence of a chaperone.  She was happy with the visit.      Total time spent with the patient was 30 minutes, more than half was counseling.      cc:   Radha Cazares MD   [address]  [address]         RENAE MCCRARY MD             D: 2020   T: 2020   MT: nadia      Name:     YUNI CALIXTO   MRN:      [id number]        Account:      [id number]   :      1960           Service Date: 2020      Document: [id number]       Again, thank you for allowing me to participate in the care of your patient.      Sincerely,    RENAE Mccrary MD

## 2020-03-25 NOTE — PROGRESS NOTES
Service Date: 03/25/2020      REFERRING PROVIDER:  aRdha Cazares MD      PRESENTING COMPLAINT:  Consultation for right breast/chest wall tightness and pain.      HISTORY OF PRESENTING COMPLAINT:  Ms. Alonzo is 59 years old.  About 2 years ago, she was diagnosed with right-sided breast cancer, underwent a lumpectomy and radiation therapy with adjuvant chemotherapy.  She has noticed that her right upper outer quadrant area is tight when she abducts or elevates her arm and gives her discomfort.  She has been worked up for and followed for her cancer and she has no evidence of disease.      PAST MEDICAL HISTORY:  Anxiety disorder, COPD and depression.      PAST SURGICAL HISTORY:  Lumpectomy, port placement, tubal ligation, appendectomy.      MEDICATIONS:   1.  Albuterol inhaler.   2.  Aspirin.   3.  Citalopram.   4.  Duloxetine.   5.  Simvastatin.      ALLERGIES:  Nil.      SOCIAL HISTORY:  Used to smoke about a pack a day for 15-20 years, quit in 2000.  Drinks socially.      REVIEW OF SYSTEMS:  Denies chest pain.  She has some shortness of breath.  No MI, CVA, DVT or PE.      PHYSICAL EXAMINATION:  Vital signs are stable.  She is afebrile, in no obvious distress.  She is 5 feet 2 inches, 167 pounds, BMI of 31 kg/m2.  On examination of her right breast, she has breast asymmetry with the right breast being at least 25-30% smaller than the left side.  She has a periareolar incision.  Her breast has radiation changes.  The upper outer quadrant has an indentation from her axillary node sampling.  There is no palpable mass per se.  The area is soft.      ASSESSMENT AND PLAN:  Based on above findings, a diagnosis of a lumpectomy and radiation therapy for breast cancer with subsequent radiation-induced fibrosis and pain was made.  I had a slava discussion with the patient that I do not think surgical treatment is necessary.  She just needs aggressive physical therapy, moisturization and range of motion but not surgery.  The  asymmetry does not bother her, which would be the thing that could be helped with the surgery.  She understood.  She will see us back as needed.  All questions were answered.  All exam done in the presence of a chaperone.  She was happy with the visit.      Total time spent with the patient was 30 minutes, more than half was counseling.      cc:   Radha Cazares MD   [address] [place]  [address]          DK AAWD MD             D: 2020   T: 2020   MT: nadia      Name:     YUNI CALIXTO   MRN:      [id number]        Account:      [id number]   :      1960           Service Date: 2020      Document: [id number]

## 2020-03-25 NOTE — PROGRESS NOTES
Forms filled out and placed in provider folder for approval and signature.    Alyssa Lezama, CMA

## 2020-03-25 NOTE — NURSING NOTE
"Chief Complaint   Patient presents with     Consult     Patient here for right breast surgery consult.        Vitals:    03/25/20 0927   BP: (!) 143/89   BP Location: Left arm   Patient Position: Sitting   Cuff Size: Adult Regular   Pulse: 120   SpO2: 95%   Weight: 76.1 kg (167 lb 11.2 oz)   Height: 1.575 m (5' 2.01\")       Body mass index is 30.66 kg/m .    Samantha Hager LPN                     "

## 2020-03-31 NOTE — PROGRESS NOTES
Signed forms received and faxed back to [employer], [phone]. Fax confirmation verified via rightfax. Paperwork emailed to patient per request. Copy of paperwork sent to scanning.    Alyssa Lezama, CMA

## 2020-04-29 ENCOUNTER — TELEPHONE (OUTPATIENT)
Dept: BEHAVIORAL HEALTH | Facility: CLINIC | Age: 60
End: 2020-04-29

## 2020-04-29 NOTE — TELEPHONE ENCOUNTER
Community Paramedic Social Needs Outreach  [facility]/Voicemail       Clinical Data: Community Paramedic Outreach    Outreach attempted x 1.      Left message on patient's voicemail with call back information and requested return call.    Community Paramedic Plan will try to reach patient again in 1-2 business days.

## 2020-05-04 ENCOUNTER — TELEPHONE (OUTPATIENT)
Dept: BEHAVIORAL HEALTH | Facility: CLINIC | Age: 60
End: 2020-05-04

## 2020-05-04 SDOH — HEALTH STABILITY: MENTAL HEALTH
STRESS IS WHEN SOMEONE FEELS TENSE, NERVOUS, ANXIOUS, OR CAN'T SLEEP AT NIGHT BECAUSE THEIR MIND IS TROUBLED. HOW STRESSED ARE YOU?: NOT AT ALL

## 2020-05-04 SDOH — ECONOMIC STABILITY: FOOD INSECURITY: WITHIN THE PAST 12 MONTHS, THE FOOD YOU BOUGHT JUST DIDN'T LAST AND YOU DIDN'T HAVE MONEY TO GET MORE.: NEVER TRUE

## 2020-05-04 SDOH — ECONOMIC STABILITY: FOOD INSECURITY: WITHIN THE PAST 12 MONTHS, YOU WORRIED THAT YOUR FOOD WOULD RUN OUT BEFORE YOU GOT MONEY TO BUY MORE.: NEVER TRUE

## 2020-05-04 SDOH — SOCIAL STABILITY: SOCIAL NETWORK: IN A TYPICAL WEEK, HOW MANY TIMES DO YOU TALK ON THE PHONE WITH FAMILY, FRIENDS, OR NEIGHBORS?: ONCE A WEEK

## 2020-05-04 SDOH — ECONOMIC STABILITY: TRANSPORTATION INSECURITY
IN THE PAST 12 MONTHS, HAS LACK OF TRANSPORTATION KEPT YOU FROM MEETINGS, WORK, OR FROM GETTING THINGS NEEDED FOR DAILY LIVING?: NO

## 2020-05-04 SDOH — SOCIAL STABILITY: SOCIAL INSECURITY: WITHIN THE LAST YEAR, HAVE YOU BEEN AFRAID OF YOUR PARTNER OR EX-PARTNER?: NO

## 2020-05-04 SDOH — ECONOMIC STABILITY: TRANSPORTATION INSECURITY
IN THE PAST 12 MONTHS, HAS THE LACK OF TRANSPORTATION KEPT YOU FROM MEDICAL APPOINTMENTS OR FROM GETTING MEDICATIONS?: NO

## 2020-05-04 NOTE — TELEPHONE ENCOUNTER
Community Paramedic Social Needs Assessment    Spoke with: Patient      Refer also to Social Determinants of Health tab for information obtained    1. Have you or any family members you live with been unable to meet basic needs of food, clothing, medication, utilities, , health care needs, other: No    2. How do you get to and from appointments/work/groceries, household needs? Has own vehicle and can drive    3. What is your housing situation? Owns her own home    4. How many people do you currently live with? One other    5. Are you currently employed? Yes    6. Does the patient feel physically and emotionally safe where they currently live?  Yes    7. Are you experiencing increased, disruptive stress around Covid-19?  No    8. How often do you speak with those that care about you or you feel close to? On a weekly basis      Concerns/Barriers Noted During Call: no    Resources Given: no    Referrals: no

## 2020-05-28 DIAGNOSIS — L50.9 URTICARIA: ICD-10-CM

## 2020-05-28 RX ORDER — HYDROXYZINE HYDROCHLORIDE 25 MG/1
TABLET, FILM COATED ORAL
Qty: 60 TABLET | Refills: 0 | Status: SHIPPED | OUTPATIENT
Start: 2020-05-28 | End: 2020-09-18

## 2020-06-05 ENCOUNTER — TELEPHONE (OUTPATIENT)
Dept: PULMONOLOGY | Facility: CLINIC | Age: 60
End: 2020-06-05

## 2020-06-05 NOTE — TELEPHONE ENCOUNTER
Patient is scheduled for chest CT and pulmonary follow up 06-. Chest CT appt must be moved to any day prior to 06-. Follow up must be converted to video / telephone visit.    Informed patient of this information. Patient requesting to conduct telephone visit. CT chest has been r/s to 06-.      Morris Lucero LPN    Rheumatology / Pulmonology  [facility]

## 2020-06-15 ENCOUNTER — ANCILLARY PROCEDURE (OUTPATIENT)
Dept: CT IMAGING | Facility: CLINIC | Age: 60
End: 2020-06-15
Attending: INTERNAL MEDICINE
Payer: COMMERCIAL

## 2020-06-15 DIAGNOSIS — R91.8 LUNG NODULE, MULTIPLE: ICD-10-CM

## 2020-06-15 PROCEDURE — 71250 CT THORAX DX C-: CPT | Performed by: RADIOLOGY

## 2020-06-16 ENCOUNTER — VIRTUAL VISIT (OUTPATIENT)
Dept: ONCOLOGY | Facility: CLINIC | Age: 60
End: 2020-06-16
Attending: INTERNAL MEDICINE
Payer: COMMERCIAL

## 2020-06-16 ENCOUNTER — TELEPHONE (OUTPATIENT)
Dept: FAMILY MEDICINE | Facility: CLINIC | Age: 60
End: 2020-06-16

## 2020-06-16 DIAGNOSIS — Z13.820 SCREENING FOR OSTEOPOROSIS: Primary | ICD-10-CM

## 2020-06-16 DIAGNOSIS — N64.4 BREAST PAIN: Primary | ICD-10-CM

## 2020-06-16 DIAGNOSIS — R93.89 ABNORMAL CT OF THE CHEST: ICD-10-CM

## 2020-06-16 PROCEDURE — 40000114 ZZH STATISTIC NO CHARGE CLINIC VISIT

## 2020-06-16 PROCEDURE — 99214 OFFICE O/P EST MOD 30 MIN: CPT | Mod: 95 | Performed by: INTERNAL MEDICINE

## 2020-06-16 NOTE — TELEPHONE ENCOUNTER
Reason for Call: Request for an order or referral:    Order or referral being requested: DEXA    Date needed: as soon as possible    Has the patient been seen by the PCP for this problem? YES    Additional comments: patient is calling to ask pcp to order a DEXA for her per oncologist she should have another one. Patient would like a call when this has been orders she is trying to coordinate this with a bone scan she has scheduled next week  Thank you      Phone number Patient can be reached at:  Cell number on file:    Telephone Information:   Mobile [phone]       Best Time: any    Can we leave a detailed message on this number?  YES    Call taken on 6/16/2020 at 10:42 AM by Tiarra Hernandez

## 2020-06-16 NOTE — PROGRESS NOTES
Oncology Follow Up:  Date on this visit: 6/16/2020    Diagnosis:  Stage IIb, T2N0M0, grade 3 triple negative cancer of the right breast.    Primary Physician: Radha Cazares     History Of Present Illness:  Ms. Alonzo is a 60-year-old female with a h/o stage IIb, T2 N0 M0, grade 3, triple-negative invasive carcinoma of the right breast.  Routine screening mammogram on 07/27/2017 showed developing calcifications in the right breast at the 12 o'clock position 6 cm from the nipple.  Ultrasound demonstrated a 7-mm, irregular, hypoechoic mass at the 12 o'clock position.  Contrast-enhanced mammogram showed a peripherally enhancing, irregular mass measuring 1.9 cm as well as an additional 6-mm, enhancing focus anteromedial to the dominant mass.  The total area of abnormal enhancement on contrast mammogram measured up to 3.4 cm.  Right breast biopsy demonstrated a grade 3 invasive mammary carcinoma with associated high-grade DCIS.  Estrogen and progesterone receptor staining were negative.  HER2 was non-amplified by FISH.  Breast MRI measured the biopsy proven breast cancer at 3.2 cm.    Ms. Alonzo enrolled in the ISPY-2 clinical trial.  She began treatment with weekly taxol and once every 3 week pembrolizumab on 9/20/17.  Her course was complicated by pneumonitis and hepatitis.  Pembrolizumab was stopped and she resumed weekly taxol alone on 11/28/17.  She completed a total of 12 weeks of therapy on 12/26/17.  She completed 2 cycles of adriamycin and cyclophosphamide.  She was hospitalized both cycles due to neutropenic fever and also developed C. Difficile colitis.  Decision was made to forgo further chemotherapy.       Right breast lumpectomy and sentinel lymph node procedure on 2/27/18 showed a grade 2, 6 mm residual invasive mammary carcinoma with an associated 8 mm area of high grade DCIS with comedonecrosis and ADH.  Invasive tumor cellularity was 10%.  There was lymphovascular invasion.  Surgical margins were  negative.  A single sentinel lymph node was benign.  Classified as MDA RCB I.  She completed adjuvant radiation (4256 cGy to the right breast with additional 1000 cGy to the lumpectomy cavity) on 4/27/18.     Interval History:       Past Medical/Surgical History:  Past Medical History:   Diagnosis Date     Acute cystitis without hematuria 10/26/2017     Clostridium difficile diarrhea 1/12/2018     Neutropenic fever (H) 01/10/2018     She was hospitalized 1/10/18 - 1/13/18 with neutropenic fever     Neutropenic sepsis (H) 01/29/2018    hospitalized again  from 1/29/18 - 2/8/18 with neutropenic fever, mucositis, and C. difficile colitis      Pneumonia 11/11/2017    She was hospitalized 11/11/17 - 11/17/17 with fevers ranging from 102 - 105.  CT chest was consistent with pneumonitis.  She also had transaminitis in the 150 range.  She was started on corticosteroids.  Pembrolizumab was stopped      S/P radiation therapy     5,256 cGy completed on 4/27/2018 to [facility] with Dr. Zhong     Past Surgical History:   Procedure Laterality Date     APPENDECTOMY  10/06/2015    [facility]     BREAST BIOPSY, CORE RT/LT Right 08/22/2017     BRONCHOSCOPY (RIGID OR FLEXIBLE), DIAGNOSTIC N/A 2/6/2018    Procedure: COMBINED BRONCHOSCOPY (RIGID OR FLEXIBLE), LAVAGE;  COMBINED BRONCOSCOY (RIGID OR FLEXIBLE), LAVAGE;  Surgeon: Samir Pettit MD;  Location: [facility]     CLEAR LENS REPLACEMENT Bilateral 11/2016     COLONOSCOPY  11/15/2011    Procedure:COLONOSCOPY; Colonoscopy, screening; Surgeon:ANASTASIA BUNCH; Location:[facility]     INSERT PORT VASCULAR ACCESS Left 9/1/2017    Procedure: INSERT PORT VASCULAR ACCESS;  Single Lumen Chest Power Port;  Surgeon: Leif Parkinson PA-C;  [facility]     LUMPECTOMY BREAST WITH SENTINEL NODE, COMBINED Right 2/27/2018    Procedure: COMBINED LUMPECTOMY BREAST WITH SENTINEL NODE;  Right Wire Localized Lumpectomy, Right [place] Lymph Node Biopsy And Freddy  Cath Removal;  Surgeon: Atul Gates MD;  Location: [facility]     REMOVE PORT VASCULAR ACCESS N/A 2/27/2018    Procedure: REMOVE PORT VASCULAR ACCESS;;  Surgeon: Atul Gates MD;  Location: [facility]     TUBAL LIGATION  12/2004     Allergies:  Allergies as of 06/16/2020     (No Known Allergies)     Current Medications:  Current Outpatient Medications   Medication Sig Dispense Refill     albuterol (PROAIR HFA/PROVENTIL HFA/VENTOLIN HFA) 108 (90 Base) MCG/ACT inhaler Inhale 2 puffs into the lungs every 6 hours as needed for shortness of breath / dyspnea 1 Inhaler 5     ASPIRIN PO Take 81 mg by mouth daily       citalopram (CELEXA) 10 MG tablet Take 1 tablet (10 mg) by mouth daily 30 tablet 11     Coenzyme Q10 (CO Q 10 PO)        DULoxetine (CYMBALTA) 20 MG capsule Take 1 capsule (20 mg) by mouth 2 times daily 60 capsule 11     eszopiclone (LUNESTA) 1 MG tablet Take 1 tablet (1 mg) by mouth nightly as needed for sleep 30 tablet 3     guaiFENesin (MUCINEX) 600 MG 12 hr tablet Take 2 tablets (1,200 mg) by mouth 2 times daily 180 tablet 3     hydrOXYzine (ATARAX) 25 MG tablet Take 1-2 tablets by mouth every 6 hours if needed for itching 60 tablet 0     order for DME autoCPAP 5-18 cmH20 1 Device 0     simvastatin (ZOCOR) 40 MG tablet Take 1 tablet (40 mg) by mouth At Bedtime 90 tablet 4     terbinafine (LAMISIL) 250 MG tablet Take 1 tablet (250 mg) by mouth daily 30 tablet 1     terbinafine (LAMISIL) 250 MG tablet Take 1 tablet (250 mg) by mouth daily 30 tablet 2     tiotropium (SPIRIVA) 18 MCG inhaled capsule Inhale 1 capsule (18 mcg) into the lungs daily Inhale contents of one capsule 1 capsule 11     umeclidinium (INCRUSE ELLIPTA) 62.5 MCG/INH inhaler Inhale 1 puff into the lungs daily 30 each 11     VITAMIN D 1000 UNIT OR CAPS Take 3,000 Units by mouth         Family and Social History:  Reviewed and unchanged from prior.  Please see initial consultation dated 8/28/17 for further details.    Physical  Exam:      Laboratory/Imaging Studies:  2/17/2020 Breast MRI:  No suspicious enhancement or lymphadenopathy    ASSESSMENT/PLAN:  Ms. Alonzo is a 60-year-old female with a h/o grade 3, T2N0M0, triple-negative infiltrating ductal carcinoma of the right breast s/p 12 weeks of Taxol along with 3 cycles pembrolizumab, 2 cycles of adriamycin and cyclophosphamide, lumpectomy, and radiation.     1.  Right breast cancer:   Ms. Alonzo is approximately 2 years 3.5 months out from excision of a right breast cancer.  She is asymptomatic of disease recurrence on history taken today.  I will see her back in 4 months for her next visit.      Due to a h/o ADH, I have recommended high risk screening with both annual mammogram and breast MRI, spacing the two studies so that she is having some form of imaging every 6 months.  Recommend stopping breast MRIs once she is 5 years out from completion of treatment of her breast cancer.  She will be due for mammogram 8/20/20 or later, we will help her to schedule this today.    2.  Fatigue:  Likely multi-factorial.  Likely a component due to breast cancer treatment, however, I suspect COPD and other medical issues are also likely contributing.  She declines referral back to PT/OT.      3.  Depression:  Symptoms stable since last visit.  Continue Celexa 10 mg PO daily.      4.  Organizing pneumonia/pulmonary nodules:  Chronic COPD.  Had pneumonitis secondary to pembrolizumab during breast cancer treatment.  Follow up CT 10/2019 c/w organizing pneumonia including new pleural-based opacity in the anterolateral RUL.  Repeat CT chest 6/15/2020 was without changes, she has follow up with Dr. Valdez on 6/22.    5.  Routine health maintenance:  Colonoscopy in 2011 was without concerning findings, next due in 2021.  Will obtain CBC every 6 months given h/o anthracycline exposure.    6.  Followup:

## 2020-06-16 NOTE — LETTER
"    6/16/2020         RE: Ashleigh Alonzo  [address] [place] [address]        Dear Colleague,    Thank you for referring your patient, Ashleigh Alonzo, to the [facility]. Please see a copy of my visit note below.    Ashleigh Alonzo is a 60 year old female who is being evaluated via a billable telephone visit.      The patient has been notified of following:     \"This telephone visit will be conducted via a call between you and your physician/provider. We have found that certain health care needs can be provided without the need for a physical exam.  This service lets us provide the care you need with a short phone conversation.  If a prescription is necessary we can send it directly to your pharmacy.  If lab work is needed we can place an order for that and you can then stop by our lab to have the test done at a later time.    Telephone visits are billed at different rates depending on your insurance coverage. During this emergency period, for some insurers they may be billed the same as an in-person visit.  Please reach out to your insurance provider with any questions.    If during the course of the call the physician/provider feels a telephone visit is not appropriate, you will not be charged for this service.\"    Patient has given verbal consent for Telephone visit?  Yes    What phone number would you like to be contacted at? [phone]    How would you like to obtain your AVS? Edmundo Clay LPN    Phone call duration: 27 minutes    Fara Bradshaw MD      Oncology Follow Up:  Date on this visit: 6/16/2020    Diagnosis:  Stage IIb, T2N0M0, grade 3 triple negative cancer of the right breast.    Primary Physician: Radha Cazares     History Of Present Illness:  Ms. Alonzo is a 60-year-old female with a h/o stage IIb, T2 N0 M0, grade 3, triple-negative invasive carcinoma of the right breast.  Routine screening mammogram on 07/27/2017 showed developing " calcifications in the right breast at the 12 o'clock position 6 cm from the nipple.  Ultrasound demonstrated a 7-mm, irregular, hypoechoic mass at the 12 o'clock position.  Contrast-enhanced mammogram showed a peripherally enhancing, irregular mass measuring 1.9 cm as well as an additional 6-mm, enhancing focus anteromedial to the dominant mass.  The total area of abnormal enhancement on contrast mammogram measured up to 3.4 cm.  Right breast biopsy demonstrated a grade 3 invasive mammary carcinoma with associated high-grade DCIS.  Estrogen and progesterone receptor staining were negative.  HER2 was non-amplified by FISH.  Breast MRI measured the biopsy proven breast cancer at 3.2 cm.    Ms. Alonzo enrolled in the ISPY-2 clinical trial.  She began treatment with weekly taxol and once every 3 week pembrolizumab on 9/20/17.  Her course was complicated by pneumonitis and hepatitis.  Pembrolizumab was stopped and she resumed weekly taxol alone on 11/28/17.  She completed a total of 12 weeks of therapy on 12/26/17.  She completed 2 cycles of adriamycin and cyclophosphamide.  She was hospitalized both cycles due to neutropenic fever and also developed C. Difficile colitis.  Decision was made to forgo further chemotherapy.       Right breast lumpectomy and sentinel lymph node procedure on 2/27/18 showed a grade 2, 6 mm residual invasive mammary carcinoma with an associated 8 mm area of high grade DCIS with comedonecrosis and ADH.  Invasive tumor cellularity was 10%.  There was lymphovascular invasion.  Surgical margins were negative.  A single sentinel lymph node was benign.  Classified as MDA RCB I.  She completed adjuvant radiation (4256 cGy to the right breast with additional 1000 cGy to the lumpectomy cavity) on 4/27/18.     Interval History:  Patient was evaluated today via scheduled telephone visit in lieu of an in-person visit due to the ongoing COVID-19 pandemic. She reports overall feeling well today. She has felt  "quite a bit more energy over the last several weeks, and has not had to take time off from work at all in the last 4 weeks. She is doing her best to stay active. Sleeping and eating well, weight is down a couple of pounds actually. Still having some right arm and breast discomfort related to the radiation-related retraction in that area, and has seen plastic surgery about this and no surgical intervention was recommended. We discussed the results of her CT chest from yesterday and the finding of possible healing lung fractures. She does not remember any trauma to her ribs or being told she previously had rib fractures. This area is not painful. She does note that she has a 60 lb dog who \"treats her like a punching bag\", but no other memorable injuries. She also notes that in February of this year, had increased pain \"beneath the right breast\".  No other bothersome areas. No lumps or bumps in either breast or axilla. Denies any bone pain. No recent fevers, chills or infectious symptoms. She denies any respiratory complaints. The remainder of a comprehensive review of systems was otherwise negative or non-contributory.     Past Medical/Surgical History:  Past Medical History:   Diagnosis Date     Acute cystitis without hematuria 10/26/2017     Clostridium difficile diarrhea 1/12/2018     Neutropenic fever (H) 01/10/2018     She was hospitalized 1/10/18 - 1/13/18 with neutropenic fever     Neutropenic sepsis (H) 01/29/2018    hospitalized again  from 1/29/18 - 2/8/18 with neutropenic fever, mucositis, and C. difficile colitis      Pneumonia 11/11/2017    She was hospitalized 11/11/17 - 11/17/17 with fevers ranging from 102 - 105.  CT chest was consistent with pneumonitis.  She also had transaminitis in the 150 range.  She was started on corticosteroids.  Pembrolizumab was stopped      S/P radiation therapy     5,256 cGy completed on 4/27/2018 to [facility] with Dr. Zhong     Past Surgical " History:   Procedure Laterality Date     APPENDECTOMY  10/06/2015    [facility]     BREAST BIOPSY, CORE RT/LT Right 08/22/2017     BRONCHOSCOPY (RIGID OR FLEXIBLE), DIAGNOSTIC N/A 2/6/2018    Procedure: COMBINED BRONCHOSCOPY (RIGID OR FLEXIBLE), LAVAGE;  COMBINED BRONCOSCOY (RIGID OR FLEXIBLE), LAVAGE;  Surgeon: Samir Pettit MD;  Location: [facility]     CLEAR LENS REPLACEMENT Bilateral 11/2016     COLONOSCOPY  11/15/2011    Procedure:COLONOSCOPY; Colonoscopy, screening; Surgeon:ANASTASIA BUNCH; Location:[facility]     INSERT PORT VASCULAR ACCESS Left 9/1/2017    Procedure: INSERT PORT VASCULAR ACCESS;  Single Lumen Chest Power Port;  Surgeon: Leif Parkinson PA-C;  [facility]     LUMPECTOMY BREAST WITH SENTINEL NODE, COMBINED Right 2/27/2018    Procedure: COMBINED LUMPECTOMY BREAST WITH SENTINEL NODE;  Right Wire Localized Lumpectomy, Right [place] Lymph Node Biopsy And Freddy Cath Removal;  Surgeon: Atul Gates MD;  Location: [facility]     REMOVE PORT VASCULAR ACCESS N/A 2/27/2018    Procedure: REMOVE PORT VASCULAR ACCESS;;  Surgeon: Atul Gates MD;  [facility]: [facility]     TUBAL LIGATION  12/2004       Allergies:  Allergies as of 06/16/2020     (No Known Allergies)       Current Medications:  Current Outpatient Medications   Medication Sig Dispense Refill     albuterol (PROAIR HFA/PROVENTIL HFA/VENTOLIN HFA) 108 (90 Base) MCG/ACT inhaler Inhale 2 puffs into the lungs every 6 hours as needed for shortness of breath / dyspnea 1 Inhaler 5     ASPIRIN PO Take 81 mg by mouth daily       citalopram (CELEXA) 10 MG tablet Take 1 tablet (10 mg) by mouth daily 30 tablet 11     Coenzyme Q10 (CO Q 10 PO)        DULoxetine (CYMBALTA) 20 MG capsule Take 1 capsule (20 mg) by mouth 2 times daily 60 capsule 11     eszopiclone (LUNESTA) 1 MG tablet Take 1 tablet (1 mg) by mouth nightly as needed for sleep 30 tablet 3     guaiFENesin (MUCINEX) 600 MG 12 hr tablet Take 2 tablets (1,200 mg) by mouth 2 times daily  180 tablet 3     hydrOXYzine (ATARAX) 25 MG tablet Take 1-2 tablets by mouth every 6 hours if needed for itching 60 tablet 0     order for DME autoCPAP 5-18 cmH20 1 Device 0     simvastatin (ZOCOR) 40 MG tablet Take 1 tablet (40 mg) by mouth At Bedtime 90 tablet 4     terbinafine (LAMISIL) 250 MG tablet Take 1 tablet (250 mg) by mouth daily 30 tablet 1     terbinafine (LAMISIL) 250 MG tablet Take 1 tablet (250 mg) by mouth daily 30 tablet 2     tiotropium (SPIRIVA) 18 MCG inhaled capsule Inhale 1 capsule (18 mcg) into the lungs daily Inhale contents of one capsule 1 capsule 11     umeclidinium (INCRUSE ELLIPTA) 62.5 MCG/INH inhaler Inhale 1 puff into the lungs daily 30 each 11     VITAMIN D 1000 UNIT OR CAPS Take 3,000 Units by mouth         Family and Social History:  Reviewed and unchanged from prior.  Please see initial consultation dated 8/28/17 for further details.    Physical Exam:  Physical exam deferred due to telephone visit restrictions during the public health emergency.    Laboratory/Imaging Studies:  2/17/2020 Breast MRI:  - No suspicious enhancement or lymphadenopathy    6/15/202 CT chest w/o contrast:  - Stable radiation changes in the right upper hemithorax  - Increased sclerosis of multiple right ribs which may indicate continued healing of prior fractures  - Unchanged pulmonary nodules and non-enlarged mediastinal lymph nodes    ASSESSMENT/PLAN:  Ms. Alonzo is a 60-year-old female with a h/o grade 3, T2N0M0, triple-negative infiltrating ductal carcinoma of the right breast s/p 12 weeks of Taxol along with 3 cycles pembrolizumab, 2 cycles of adriamycin and cyclophosphamide, lumpectomy, and radiation.     1.  Right breast cancer:   Ms. Alonzo is approximately 2 years 3.5 months out from excision of a right breast cancer.  She is asymptomatic of disease recurrence on history taken today.  I will see her back in 4 months for her next visit.      Due to a h/o ADH, I have recommended high risk screening  with both annual mammogram and breast MRI, spacing the two studies so that she is having some form of imaging every 6 months.  Recommend stopping breast MRIs once she is 5 years out from completion of treatment of her breast cancer.  She will be due for mammogram 8/20/20 or later, we will help her to schedule this today.     2.  Right-sided rib fractures:  Noted to have increased sclerosis of several ribs on the right side, possibly healing rib fractures.  She does not recall any trauma to the chest, and denies any pain in this region.  Will order NM bone scan to further evaluate.    3.  Fatigue:  Likely multi-factorial.  Likely a component due to breast cancer treatment, however, I suspect COPD and other medical issues are also likely contributing.  She declines referral back to PT/OT as fatigue seems to be improving with conservative measures so far.    4.  Depression:  Symptoms stable since last visit.  Continue Celexa 10 mg PO daily.      5.  Organizing pneumonia/pulmonary nodules:  Chronic COPD.  Had pneumonitis secondary to pembrolizumab during breast cancer treatment.  Follow up CT 10/2019 c/w organizing pneumonia including new pleural-based opacity in the anterolateral RUL.  Repeat CT chest 6/15/2020 was without changes, she has follow up with Dr. Valdez on 6/22/2020.    6.  Routine health maintenance:  Colonoscopy in 2011 was without concerning findings, next due in 2021.  Will obtain CBC every 6 months given h/o anthracycline exposure.  Encouraged her to consider repeat DEXA scan per PCP given finding of     7.  Followup: NM bone scan within 1-2 weeks at [facility].  Bilateral diagnostic mammograms around 8/20/2020 at [facility].  Return to clinic in 4 months for visit with me.      Plan of care was discussed with Dr. Fara Bradshaw.    Pearl Mayes MD/PhD  Heme/Onc Fellow    This telephone visit was conducted with the patient, Dr. Mayes, and myself present.  I have edited the above note to reflect our joint  assessment and plan.  I spent a total of 27 minutes on the phone with this patient today.    Fara Bradshaw MD

## 2020-06-22 ENCOUNTER — ANCILLARY PROCEDURE (OUTPATIENT)
Dept: BONE DENSITY | Facility: CLINIC | Age: 60
End: 2020-06-22
Attending: FAMILY MEDICINE
Payer: COMMERCIAL

## 2020-06-22 ENCOUNTER — VIRTUAL VISIT (OUTPATIENT)
Dept: PULMONOLOGY | Facility: CLINIC | Age: 60
End: 2020-06-22
Payer: COMMERCIAL

## 2020-06-22 DIAGNOSIS — Z13.820 SCREENING FOR OSTEOPOROSIS: ICD-10-CM

## 2020-06-22 DIAGNOSIS — R91.8 LUNG NODULE, MULTIPLE: Primary | ICD-10-CM

## 2020-06-22 PROCEDURE — 77080 DXA BONE DENSITY AXIAL: CPT | Performed by: RADIOLOGY

## 2020-06-22 PROCEDURE — 99214 OFFICE O/P EST MOD 30 MIN: CPT | Mod: TEL | Performed by: INTERNAL MEDICINE

## 2020-06-22 NOTE — PROGRESS NOTES
"LUNG NODULE & INTERVENTIONAL PULMONARY CLINIC  CL[facility]ICS [facility] SUR[facility]ERY [facility], [facility], [facility]   VIRTUAL TELEPHONE VISIT    Ashleigh Alonzo MRN# [id number]   Age: 60 year old YOB: 1960     Reason for Consultation: lung abnormality(s)    Requesting Physician: No referring provider defined for this encounter.    Ashleigh Alonzo is a 60 year old female who is being evaluated via a billable telephone visit.      The patient has been notified of following: \"This telephone visit will be conducted via a call between you and your physician/provider. We have found that certain health care needs can be provided without the need for a physical exam.  This service lets us provide the care you need with a short phone conversation.  If a prescription is necessary we can send it directly to your pharmacy.  If lab work is needed we can place an order for that and you can then stop by our lab to have the test done at a later time. Telephone visits are billed at different rates depending on your insurance coverage. During this emergency period, for some insurers they may be billed the same as an in-person visit.  Please reach out to your insurance provider with any questions. If during the course of the call the physician/provider feels a telephone visit is not appropriate, you will not be charged for this service.\"    Patient has given verbal consent for Telephone visit?  Yes    How would you like to obtain your AVS? Edmundo    Phone call duration: 25 minutes    Additional provider notes: see below     Assessment and Plan:    1. Established multiple pulmonary lung nodule(s). Given the characteristics on current/previous imaging and risk factors; I would classify this to be Low (<6%) risk for cancer. Multiple bilateral calcified and non-calcified nodules with mediastinal LN calcification has not changed since 2015. Likely represents old granulomatous disease.      2. New right pleural based " thickening. In my view, I think this is related to the same process from 2017. NO interval change and likely from radiation. Next CT in 12mo, if no change, ok to discontinue surveillance.     3.COPD. On Spiriva and prn albuterol. Up-to-date on vaccination.      Billing: I spent more than 30 minutes face to face and greater than 50% of time was for counseling and coordination of care about the issues above.      Wali Valdez MD   of Medicine  Interventional Pulmonology  Department of Pulmonary, Allergy, Critical Care and Sleep Medicine   [facility], [facility]  Pager: [phone]          History:     Ashleigh Alonzo is a 59 year old female with sig h/o for breast cancer, C. Diff, who is here for evaluation/followup of lung nodule(s).     - No new resp sx or complaints. Denies dyspnea or cough.   - Here for f/u nodules and pleural thickening    - Personal hx of cancer: breast cancer. Up-to-date on mammo and c-scope.   - Family hx of cancer: No lung cancer  - Exposure hx: Denies asbestos or radon exposure   - Tobacco hx: Past Smoker: 0.5ppd for 15years. Quit 20yrs ago   - My interpretation of the images relevant for this visit includes: nodules   - My interpretation of the PFT's relevant for this visit includes: Obstructive pattern      Culprit Nodule(s):   1. Right upper pleural based thickening. New since 2017 and without interval change.   2. Multiple bilateral lung nodule(s) that are sub 6 mm. First seen by chest CT on 2015. There is no interval change     Other active medical problems include:   - has MDD. Stable on celexa.    - Had right breast cancer (dx 8/2017, stage 2b) completed treatment 4/2018 (chemoradiation, lumpectomy). Undergoing surveillance with oncology.   - Has COPD. On spiriva and prn albuterol. No previous AECOPD requiring hospitalization. Up-to-date on flu/PNA vaccine.           Past Medical History:      Past Medical History:   Diagnosis Date     Acute cystitis  without hematuria 10/26/2017     Clostridium difficile diarrhea 1/12/2018     Neutropenic fever (H) 01/10/2018     She was hospitalized 1/10/18 - 1/13/18 with neutropenic fever     Neutropenic sepsis (H) 01/29/2018    hospitalized again  from 1/29/18 - 2/8/18 with neutropenic fever, mucositis, and C. difficile colitis      Pneumonia 11/11/2017    She was hospitalized 11/11/17 - 11/17/17 with fevers ranging from 102 - 105.  CT chest was consistent with pneumonitis.  She also had transaminitis in the 150 range.  She was started on corticosteroids.  Pembrolizumab was stopped      S/P radiation therapy     5,256 cGy completed on 4/27/2018 to [facility] with Dr. Zhong           Past Surgical History:      Past Surgical History:   Procedure Laterality Date     APPENDECTOMY  10/06/2015    [facility]     BREAST BIOPSY, CORE RT/LT Right 08/22/2017     BRONCHOSCOPY (RIGID OR FLEXIBLE), DIAGNOSTIC N/A 2/6/2018    Procedure: COMBINED BRONCHOSCOPY (RIGID OR FLEXIBLE), LAVAGE;  COMBINED BRONCOSCOY (RIGID OR FLEXIBLE), LAVAGE;  Surgeon: Samir Pettit MD;  Location: [facility]     CLEAR LENS REPLACEMENT Bilateral 11/2016     COLONOSCOPY  11/15/2011    Procedure:COLONOSCOPY; Colonoscopy, screening; Surgeon:ANASTASIA BUNCH; Location:[facility] OR     INSERT PORT VASCULAR ACCESS Left 9/1/2017    Procedure: INSERT PORT VASCULAR ACCESS;  Single Lumen Chest Power Port;  Surgeon: Leif Parkinson PA-C;  Location: [facility] OR     LUMPECTOMY BREAST WITH SENTINEL NODE, COMBINED Right 2/27/2018    Procedure: COMBINED LUMPECTOMY BREAST WITH SENTINEL NODE;  Right Wire Localized Lumpectomy, Right [place] Lymph Node Biopsy And Freddy Cath Removal;  Surgeon: Atul Gates MD;  Location: [facility] OR     REMOVE PORT VASCULAR ACCESS N/A 2/27/2018    Procedure: REMOVE PORT VASCULAR ACCESS;;  Surgeon: Atul Gates MD;  Location: [facility] OR     TUBAL LIGATION  12/2004          Social History:     Social History     Tobacco Use      Smoking status: Former Smoker     Packs/day: 0.75     Years: 20.00     Pack years: 15.00     Types: Cigarettes     Last attempt to quit: 2000     Years since quittin.4     Smokeless tobacco: Never Used   Substance Use Topics     Alcohol use: Yes     Comment: Daily to weekly use (beer)          Family History:     Family History   Problem Relation Age of Onset     Cardiovascular Father 46        MI     Eye Disorder Father      Cerebrovascular Disease Father      Arthritis Sister      Neurologic Disorder Brother      Eye Surgery Maternal Grandmother         cataract     Prostate Cancer Maternal Grandfather      Prostate Cancer Maternal Uncle      Cancer Maternal Uncle         Throat cancer     Diabetes No family hx of      Hypertension No family hx of      Glaucoma No family hx of      Macular Degeneration No family hx of            Allergies:    No Known Allergies       Medications:     Current Outpatient Medications   Medication Sig     albuterol (PROAIR HFA/PROVENTIL HFA/VENTOLIN HFA) 108 (90 Base) MCG/ACT inhaler Inhale 2 puffs into the lungs every 6 hours as needed for shortness of breath / dyspnea     ASPIRIN PO Take 81 mg by mouth daily     citalopram (CELEXA) 10 MG tablet Take 1 tablet (10 mg) by mouth daily     Coenzyme Q10 (CO Q 10 PO)      DULoxetine (CYMBALTA) 20 MG capsule Take 1 capsule (20 mg) by mouth 2 times daily     guaiFENesin (MUCINEX) 600 MG 12 hr tablet Take 2 tablets (1,200 mg) by mouth 2 times daily     hydrOXYzine (ATARAX) 25 MG tablet Take 1-2 tablets by mouth every 6 hours if needed for itching     simvastatin (ZOCOR) 40 MG tablet Take 1 tablet (40 mg) by mouth At Bedtime     umeclidinium (INCRUSE ELLIPTA) 62.5 MCG/INH inhaler Inhale 1 puff into the lungs daily     VITAMIN D 1000 UNIT OR CAPS Take 3,000 Units by mouth      eszopiclone (LUNESTA) 1 MG tablet Take 1 tablet (1 mg) by mouth nightly as needed for sleep (Patient not taking: Reported on 2020)     order for DME  autoCPAP 5-18 cmH20 (Patient not taking: Reported on 6/22/2020)     terbinafine (LAMISIL) 250 MG tablet Take 1 tablet (250 mg) by mouth daily (Patient not taking: Reported on 6/22/2020)     terbinafine (LAMISIL) 250 MG tablet Take 1 tablet (250 mg) by mouth daily (Patient not taking: Reported on 6/22/2020)     tiotropium (SPIRIVA) 18 MCG inhaled capsule Inhale 1 capsule (18 mcg) into the lungs daily Inhale contents of one capsule (Patient not taking: Reported on 6/22/2020)     No current facility-administered medications for this visit.      Facility-Administered Medications Ordered in Other Visits   Medication     lidocaine 1 % 9 mL     lidocaine-EPINEPHrine 1.5 %-[id number] injection 10 mL     sodium bicarbonate 8.4 % injection 1 mEq          Review of Systems:     CONSTITUTIONAL: negative for fever, chills, change in weight  INTEGUMENTARY/SKIN: no rash or obvious new lesions  ENT/MOUTH: no sore throat, new sinus pain or nasal drainage  RESP: see interval history  CV: negative for chest pain, palpitations or peripheral edema  GI: no nausea, vomiting, change in stools  : no dysuria  MUSCULOSKELETAL: no myalgias, arthralgias  ENDOCRINE: blood sugars with adequate control  PSYCHIATRIC: mood stable  LYMPHATIC: no new lymphadenopathy  HEME: no bleeding or easy bruisability  NEURO: no numbness, weakness, headaches         Physical Exam:      A comprehensive physical examination is deferred due to PHE (public health emergency) telephone visit restrictions.         Current Laboratory Data:   All laboratory and imaging data reviewed.    Results for orders placed or performed in visit on 06/22/20 (from the past 24 hour(s))   DX Hip/Pelvis/Spine    Narrative    HISTORY: Screening for osteoporosis    COMPARISON:   8/20/2018    [age]  years.  Height: 62 inches  Weight: 167 pounds  Sex: Female  Ethnicity: White    Image quality: Adequate    Lumbar spine T-score in region of L1-L4 = -1.7   L1-4 percent change: Not  significant%     HIPS:  Mean total hip T-score: -1.3  Mean total hip percent change: Not significant%     Left femoral neck T-score = -1.4  Right femoral neck T-score= -1.7     FRAX:  10 year probability of major osteoporotic fracture: 16.1%  10 year probability of hip fracture: 0.8%  The 10 year probability of fracture may be lower than reported if the  patient has received treatment. FRAX data should be disregarded in  patient's taking bisphosphonates.    World Health Organization definition of osteoporosis and osteopenia  for  women:   Normal: T-score at or above -1.0  Low Bone Mass (Osteopenia): T-score between -1.0 and -2.5.   Osteoporosis: T-score at or below -2.5   T-scores are reported for postmenopausal women and men over 50 years  of age.      Impression    IMPRESSION:  Osteopenia. No significant change in bone mass.    LOS ROGEL MD            Previous Pulmonary Function Testing     FVC-Pred   Date Value Ref Range Status   10/11/2018 3.13 L    11/15/2016 3.19 L    11/07/2014 3.24 L      FVC-Pre   Date Value Ref Range Status   10/11/2018 2.41 L    11/15/2016 2.45 L    11/07/2014 2.50 L      FVC-%Pred-Pre   Date Value Ref Range Status   10/11/2018 76 %    11/15/2016 76 %    11/07/2014 77 %      FEV1-Pre   Date Value Ref Range Status   10/11/2018 1.65 L    11/15/2016 1.33 L    11/07/2014 1.36 L      FEV1-%Pred-Pre   Date Value Ref Range Status   10/11/2018 66 %    11/15/2016 52 %    11/07/2014 52 %      FEV1FVC-Pred   Date Value Ref Range Status   10/11/2018 78 %    11/15/2016 80 %    11/07/2014 80 %      FEV1FVC-Pre   Date Value Ref Range Status   10/11/2018 69 %    11/15/2016 54 %    11/07/2014 55 %      [id number]-%Pred-Pre   Date Value Ref Range Status   11/07/2014 68 %      FEFMax-Pred   Date Value Ref Range Status   10/11/2018 6.26 L/sec    11/15/2016 6.36 L/sec    11/07/2014 6.45 L/sec      FEFMax-Pre   Date Value Ref Range Status   10/11/2018 5.34 L/sec    11/15/2016 4.70 L/sec     11/07/2014 4.14 L/sec      FEFMax-%Pred-Pre   Date Value Ref Range Status   10/11/2018 85 %    11/15/2016 73 %    11/07/2014 64 %      ExpTime-Pre   Date Value Ref Range Status   10/11/2018 7.48 sec    11/15/2016 11.46 sec    11/07/2014 9.39 sec      FIFMax-Pre   Date Value Ref Range Status   10/11/2018 3.49 L/sec    11/15/2016 2.92 L/sec    11/07/2014 2.98 L/sec      FEV1FEV6-Pred   Date Value Ref Range Status   10/11/2018 81 %    11/15/2016 81 %    11/07/2014 82 %      FEV1FEV6-Pre   Date Value Ref Range Status   10/11/2018 69 %    11/15/2016 60 %    11/07/2014 57 %      [id number]-%Pred-Pre   Date Value Ref Range Status   11/07/2014 70 %             Previous Chest Imaging   No images are attached to the encounter.  No images are attached to the encounter or orders placed in the encounter.         Previous Cardiology Imaging   No results found for this or any previous visit (from the past 8760 hour(s)).

## 2020-06-22 NOTE — PROGRESS NOTES
"Ashleigh Alonzo is a 60 year old female who is being evaluated via a billable telephone visit.      The patient has been notified of following:     \"This telephone visit will be conducted via a call between you and your physician/provider. We have found that certain health care needs can be provided without the need for a physical exam.  This service lets us provide the care you need with a short phone conversation.  If a prescription is necessary we can send it directly to your pharmacy.  If lab work is needed we can place an order for that and you can then stop by our lab to have the test done at a later time.    Telephone visits are billed at different rates depending on your insurance coverage. During this emergency period, for some insurers they may be billed the same as an in-person visit.  Please reach out to your insurance provider with any questions.    If during the course of the call the physician/provider feels a telephone visit is not appropriate, you will not be charged for this service.\"    Patient has given verbal consent for Telephone visit?  Yes    What phone number would you like to be contacted at? [phone]    How would you like to obtain your AVS? Edmundo Lucero LPN    Rheumatology / Pulmonology  [facility]    Phone call duration: 25 minutes    Wali Valdez MD    "

## 2020-06-23 ENCOUNTER — ANCILLARY PROCEDURE (OUTPATIENT)
Dept: NUCLEAR MEDICINE | Facility: CLINIC | Age: 60
End: 2020-06-23
Attending: INTERNAL MEDICINE
Payer: COMMERCIAL

## 2020-06-23 DIAGNOSIS — R93.89 ABNORMAL CT OF THE CHEST: ICD-10-CM

## 2020-06-23 PROCEDURE — 78306 BONE IMAGING WHOLE BODY: CPT | Performed by: RADIOLOGY

## 2020-06-23 PROCEDURE — A9503 TC99M MEDRONATE: HCPCS | Performed by: RADIOLOGY

## 2020-06-23 RX ORDER — TC 99M MEDRONATE 20 MG/10ML
25.9 INJECTION, POWDER, LYOPHILIZED, FOR SOLUTION INTRAVENOUS ONCE
Status: COMPLETED | OUTPATIENT
Start: 2020-06-23 | End: 2020-06-23

## 2020-06-23 RX ADMIN — TC 99M MEDRONATE 25.9 MCI.: 20 INJECTION, POWDER, LYOPHILIZED, FOR SOLUTION INTRAVENOUS at 09:38

## 2020-08-03 DIAGNOSIS — F33.1 MODERATE EPISODE OF RECURRENT MAJOR DEPRESSIVE DISORDER (H): ICD-10-CM

## 2020-08-03 DIAGNOSIS — R05.8 COUGH WITH SPUTUM: ICD-10-CM

## 2020-08-05 RX ORDER — GUAIFENESIN 600 MG/1
1200 TABLET, EXTENDED RELEASE ORAL 2 TIMES DAILY
Qty: 180 TABLET | Refills: 3 | OUTPATIENT
Start: 2020-08-05

## 2020-08-05 RX ORDER — CITALOPRAM HYDROBROMIDE 10 MG/1
10 TABLET ORAL DAILY
Qty: 30 TABLET | Refills: 11 | OUTPATIENT
Start: 2020-08-05

## 2020-08-05 NOTE — TELEPHONE ENCOUNTER
She needs to do visit  She is due for yearly physical if she would like to schedule  Could do virtual if she prefers.  Is she on both cymbalta and celexa?    Susanne Marin MD

## 2020-08-05 NOTE — TELEPHONE ENCOUNTER
Routing refill request to provider for review/approval because:  Patient needs to be seen because it has been more than 1 year since last office visit. Needs phq-9 updated.  Medication not on RN protocol.  Eleonora MORAN, RN

## 2020-08-07 ENCOUNTER — TELEPHONE (OUTPATIENT)
Dept: FAMILY MEDICINE | Facility: CLINIC | Age: 60
End: 2020-08-07

## 2020-08-07 DIAGNOSIS — R05.8 COUGH WITH SPUTUM: ICD-10-CM

## 2020-08-07 DIAGNOSIS — F33.1 MODERATE EPISODE OF RECURRENT MAJOR DEPRESSIVE DISORDER (H): ICD-10-CM

## 2020-08-07 RX ORDER — CITALOPRAM HYDROBROMIDE 10 MG/1
10 TABLET ORAL DAILY
Qty: 1 TABLET | Refills: 0 | Status: SHIPPED | OUTPATIENT
Start: 2020-08-07 | End: 2020-08-26

## 2020-08-07 RX ORDER — GUAIFENESIN 600 MG/1
1200 TABLET, EXTENDED RELEASE ORAL 2 TIMES DAILY
Qty: 180 TABLET | Refills: 0 | Status: SHIPPED | OUTPATIENT
Start: 2020-08-07 | End: 2020-09-18

## 2020-08-07 NOTE — TELEPHONE ENCOUNTER
Patient has physical scheduled 8/26 with Dr. Susanne Marin    To provider for refills      Dionna Rich BSN, RN, CPN

## 2020-08-07 NOTE — TELEPHONE ENCOUNTER
She should have ran out of celexa back in February.  I noted she was started on cymbalta in march of 2019 by another provider.  Has she been taking both of them all along?  Really should only be on one or the other as they both increase serotonin.   She can just stop the celexa as it is a low dose.     Did refill the mucinex.     Susanne Marin MD

## 2020-08-07 NOTE — TELEPHONE ENCOUNTER
Patient calling is out of her celexa and mucinex, pharmacy has not heard back. Patient would like this sent to pharmacy today please as she wants to pick it up this afternoon.

## 2020-08-26 ENCOUNTER — OFFICE VISIT (OUTPATIENT)
Dept: FAMILY MEDICINE | Facility: CLINIC | Age: 60
End: 2020-08-26
Payer: COMMERCIAL

## 2020-08-26 VITALS
BODY MASS INDEX: 30.55 KG/M2 | HEART RATE: 86 BPM | SYSTOLIC BLOOD PRESSURE: 134 MMHG | WEIGHT: 166 LBS | TEMPERATURE: 98.2 F | HEIGHT: 62 IN | DIASTOLIC BLOOD PRESSURE: 88 MMHG

## 2020-08-26 DIAGNOSIS — Z13.1 SCREENING FOR DIABETES MELLITUS: ICD-10-CM

## 2020-08-26 DIAGNOSIS — F32.0 MILD MAJOR DEPRESSION (H): ICD-10-CM

## 2020-08-26 DIAGNOSIS — Z00.00 ROUTINE HISTORY AND PHYSICAL EXAMINATION OF ADULT: Primary | ICD-10-CM

## 2020-08-26 DIAGNOSIS — J44.9 CHRONIC OBSTRUCTIVE PULMONARY DISEASE, UNSPECIFIED COPD TYPE (H): ICD-10-CM

## 2020-08-26 DIAGNOSIS — G62.0 DRUG-INDUCED POLYNEUROPATHY (H): ICD-10-CM

## 2020-08-26 DIAGNOSIS — E78.5 HYPERLIPIDEMIA LDL GOAL <130: Chronic | ICD-10-CM

## 2020-08-26 LAB
ANION GAP SERPL CALCULATED.3IONS-SCNC: 6 MMOL/L (ref 3–14)
BUN SERPL-MCNC: 13 MG/DL (ref 7–30)
CALCIUM SERPL-MCNC: 9.1 MG/DL (ref 8.5–10.1)
CHLORIDE SERPL-SCNC: 104 MMOL/L (ref 94–109)
CHOLEST SERPL-MCNC: 194 MG/DL
CO2 SERPL-SCNC: 27 MMOL/L (ref 20–32)
CREAT SERPL-MCNC: 0.84 MG/DL (ref 0.52–1.04)
GFR SERPL CREATININE-BSD FRML MDRD: 76 ML/MIN/{1.73_M2}
GLUCOSE SERPL-MCNC: 87 MG/DL (ref 70–99)
HDLC SERPL-MCNC: 59 MG/DL
LDLC SERPL CALC-MCNC: 121 MG/DL
NONHDLC SERPL-MCNC: 135 MG/DL
POTASSIUM SERPL-SCNC: 4.2 MMOL/L (ref 3.4–5.3)
SODIUM SERPL-SCNC: 137 MMOL/L (ref 133–144)
TRIGL SERPL-MCNC: 70 MG/DL

## 2020-08-26 PROCEDURE — 80048 BASIC METABOLIC PNL TOTAL CA: CPT | Performed by: FAMILY MEDICINE

## 2020-08-26 PROCEDURE — 80061 LIPID PANEL: CPT | Performed by: FAMILY MEDICINE

## 2020-08-26 PROCEDURE — 99214 OFFICE O/P EST MOD 30 MIN: CPT | Mod: 25 | Performed by: FAMILY MEDICINE

## 2020-08-26 PROCEDURE — 36415 COLL VENOUS BLD VENIPUNCTURE: CPT | Performed by: FAMILY MEDICINE

## 2020-08-26 PROCEDURE — 99396 PREV VISIT EST AGE 40-64: CPT | Performed by: FAMILY MEDICINE

## 2020-08-26 RX ORDER — SIMVASTATIN 40 MG
40 TABLET ORAL AT BEDTIME
Qty: 90 TABLET | Refills: 3 | Status: SHIPPED | OUTPATIENT
Start: 2020-08-26 | End: 2021-08-30

## 2020-08-26 ASSESSMENT — MIFFLIN-ST. JEOR: SCORE: 1276.22

## 2020-08-26 NOTE — PROGRESS NOTES
SUBJECTIVE:   CC: Ashleigh Alonzo is an 60 year old woman who presents for preventive health visit.     Healthy Habits:    Do you get at least three servings of calcium containing foods daily (dairy, green leafy vegetables, etc.)? yes    Amount of exercise or daily activities, outside of work: 0 day(s) per week    Problems taking medications regularly No    Medication side effects: No    Have you had an eye exam in the past two years? yes    Do you see a dentist twice per year? yes    Do you have sleep apnea, excessive snoring or daytime drowsiness?sleep apnea with a dental appliance     meds reviewed and med list cleaned up    Pt started on cymbalta by oncologist. Pt already on celexa. Had pt stop the celexa. She states she seems to be doing well on the cymbalta bid so will have her continue on this medication.     Pt with copd on elipta and albuterol. She is doing well. She sees pulmonologist for this and is stable    Pt with some neuropathy from her chemo. She is done with her breast cancer treatment at this time    Pt with elevated cholesterol on medication.  Due for recheck and refill          Today's PHQ-2 Score:   PHQ-2 ( 1999 [employer]) 8/26/2020 7/29/2019   Q1: Little interest or pleasure in doing things 0 0   Q2: Feeling down, depressed or hopeless 0 0   PHQ-2 Score 0 0   Q1: Little interest or pleasure in doing things - Not at all   Q2: Feeling down, depressed or hopeless - Not at all   PHQ-2 Score - 0       Abuse: Current or Past(Physical, Sexual or Emotional)- No  Do you feel safe in your environment? Yes    Have you ever done Advance Care Planning? (For example, a Health Directive, POLST, or a discussion with a medical provider or your loved ones about your wishes): No, advance care planning information given to patient to review.  Patient declined advance care planning discussion at this time.    Social History     Tobacco Use     Smoking status: Former Smoker     Packs/day: 0.75     Years: 20.00      "Pack years: 15.00     Types: Cigarettes     Last attempt to quit: 2000     Years since quittin.6     Smokeless tobacco: Never Used   Substance Use Topics     Alcohol use: Yes     Comment: Daily to weekly use (beer)     If you drink alcohol do you typically have >3 drinks per day or >7 drinks per week? No                     Reviewed orders with patient.  Reviewed health maintenance and updated orders accordingly - Yes  Lab work is in process    Mammogram Screening: Patient over age 50, mutual decision to screen reflected in health maintenance.    Pertinent mammograms are reviewed under the imaging tab.  History of abnormal Pap smear: NO - age 30-65 PAP every 5 years with negative HPV co-testing recommended  PAP / HPV Latest Ref Rng & Units 2017   PAP - NIL NIL NIL   HPV 16 DNA NEG Negative - -   HPV 18 DNA NEG Negative - -   OTHER HR HPV NEG Negative - -     Reviewed and updated as needed this visit by clinical staff  Tobacco  Allergies  Meds  Med Hx  Surg Hx  Fam Hx  Soc Hx        Reviewed and updated as needed this visit by Provider            ROS:  CONSTITUTIONAL: NEGATIVE for fever, chills, change in weight  INTEGUMENTARY/SKIN: NEGATIVE for worrisome rashes, moles or lesions  EYES: NEGATIVE for vision changes or irritation  ENT: NEGATIVE for ear, mouth and throat problems  RESP: NEGATIVE for significant cough or SOB  BREAST: NEGATIVE for masses, tenderness or discharge  CV: NEGATIVE for chest pain, palpitations or peripheral edema  GI: NEGATIVE for nausea, abdominal pain, heartburn, or change in bowel habits  : NEGATIVE for unusual urinary or vaginal symptoms. No vaginal bleeding.  MUSCULOSKELETAL: NEGATIVE for significant arthralgias or myalgia  NEURO: NEGATIVE for weakness, dizziness or paresthesias  PSYCHIATRIC: NEGATIVE for changes in mood or affect     OBJECTIVE:   /88   Pulse 86   Temp 98.2  F (36.8  C) (Oral)   Ht 1.575 m (5' 2\")   Wt 75.3 kg (166 lb)   " "BMI 30.36 kg/m    EXAM:  GENERAL: healthy, alert and no distress  EYES: Eyes grossly normal to inspection, PERRL and conjunctivae and sclerae normal  HENT: ear canals and TM's normal, nose and mouth without ulcers or lesions  NECK: no adenopathy, no asymmetry, masses, or scars and thyroid normal to palpation  RESP: lungs clear to auscultation - no rales, rhonchi or wheezes  BREAST: normal without masses, tenderness or nipple discharge and no palpable axillary masses or adenopathy  CV: regular rate and rhythm, normal S1 S2, no S3 or S4, no murmur, click or rub, no peripheral edema and peripheral pulses strong  ABDOMEN: soft, nontender, no hepatosplenomegaly, no masses and bowel sounds normal  MS: no gross musculoskeletal defects noted, no edema  SKIN: no suspicious lesions or rashes  NEURO: Normal strength and tone, mentation intact and speech normal  PSYCH: mentation appears normal, affect normal/bright    Diagnostic Test Results:  Labs reviewed in Epic    ASSESSMENT/PLAN:   1. Routine history and physical examination of adult      2. Mild major depression (H)  Now on cymbalta, off celexa and doing well    3. Chronic obstructive pulmonary disease, unspecified COPD type (H)  Stable, per pulm    4. Drug-induced polyneuropathy (H)  Done with chemo    5. Hyperlipidemia LDL goal <130  Refill statin and recheck cholesterl  - simvastatin (ZOCOR) 40 MG tablet; Take 1 tablet (40 mg) by mouth At Bedtime  Dispense: 90 tablet; Refill: 3  - Lipid panel reflex to direct LDL Non-fasting    6. Screening for diabetes mellitus    - Basic metabolic panel    COUNSELING:   Reviewed preventive health counseling, as reflected in patient instructions       Regular exercise       Healthy diet/nutrition       Vision screening    Estimated body mass index is 30.36 kg/m  as calculated from the following:    Height as of this encounter: 1.575 m (5' 2\").    Weight as of this encounter: 75.3 kg (166 lb).    Weight management plan: Discussed " healthy diet and exercise guidelines     reports that she quit smoking about 20 years ago. Her smoking use included cigarettes. She has a 15.00 pack-year smoking history. She has never used smokeless tobacco.      Counseling Resources:  ATP IV Guidelines  Pooled Cohorts Equation Calculator  Breast Cancer Risk Calculator  FRAX Risk Assessment  ICSI Preventive Guidelines  Dietary Guidelines for Americans, 2010  [employer]'s MyPlate  ASA Prophylaxis  Lung CA Screening    Susanne Marin MD  [facility]

## 2020-09-10 ENCOUNTER — ANCILLARY PROCEDURE (OUTPATIENT)
Dept: MAMMOGRAPHY | Facility: CLINIC | Age: 60
End: 2020-09-10
Attending: INTERNAL MEDICINE
Payer: COMMERCIAL

## 2020-09-10 DIAGNOSIS — N64.4 BREAST PAIN: ICD-10-CM

## 2020-09-18 DIAGNOSIS — R05.8 COUGH WITH SPUTUM: ICD-10-CM

## 2020-09-18 DIAGNOSIS — L50.9 URTICARIA: ICD-10-CM

## 2020-09-18 RX ORDER — GUAIFENESIN 600 MG/1
1200 TABLET, EXTENDED RELEASE ORAL 2 TIMES DAILY
Qty: 180 TABLET | Refills: 0 | Status: SHIPPED | OUTPATIENT
Start: 2020-09-18 | End: 2020-12-09

## 2020-09-18 RX ORDER — HYDROXYZINE HYDROCHLORIDE 25 MG/1
TABLET, FILM COATED ORAL
Qty: 60 TABLET | Refills: 0 | Status: SHIPPED | OUTPATIENT
Start: 2020-09-18 | End: 2020-12-09

## 2020-09-18 NOTE — TELEPHONE ENCOUNTER
Routing refill request to provider for review/approval because:  Drug not on the [facility] refill protocol   Eleonora Marlow BSN, RN

## 2020-10-25 NOTE — PROGRESS NOTES
"Ashleigh Alonzo is a 60 year old female who is being evaluated via a billable telephone visit.      The patient has been notified of following:     \"This telephone visit will be conducted via a call between you and your physician/provider. We have found that certain health care needs can be provided without the need for a physical exam.  This service lets us provide the care you need with a short phone conversation.  If a prescription is necessary we can send it directly to your pharmacy.  If lab work is needed we can place an order for that and you can then stop by our lab to have the test done at a later time.    Telephone visits are billed at different rates depending on your insurance coverage. During this emergency period, for some insurers they may be billed the same as an in-person visit.  Please reach out to your insurance provider with any questions.    If during the course of the call the physician/provider feels a telephone visit is not appropriate, you will not be charged for this service.\"    Patient has given verbal consent for Telephone visit?  Yes    What phone number would you like to be contacted at? [phone]    How would you like to obtain your AVS? MyChart    I have reviewed and updated the patient's allergies and medication list.    Concerns: No new concerns.   Refills: None needed.      Vitals - Patient Reported  Weight (Patient Reported): 74.8 kg (165 lb)  Height (Patient Reported): 157.5 cm (5' 2\")  BMI (Based on Pt Reported Ht/Wt): 30.18  Pain Score: No Pain (0)      Alyssa Lezama CMA      Phone call duration: 25 minutes    Fara Bradshaw MD          Oncology Follow Up:  Date on this visit: 10/26/2020    Diagnosis:  Stage IIb, T2N0M0, grade 3 triple negative cancer of the right breast.    Primary Physician: Radha Cazares     History Of Present Illness:  Ms. Alonzo is a 60-year-old female with a h/o stage IIb, T2 N0 M0, grade 3, triple-negative invasive carcinoma of the " right breast.  Routine screening mammogram on 07/27/2017 showed developing calcifications in the right breast at the 12 o'clock position 6 cm from the nipple.  Ultrasound demonstrated a 7-mm, irregular, hypoechoic mass at the 12 o'clock position.  Contrast-enhanced mammogram showed a peripherally enhancing, irregular mass measuring 1.9 cm as well as an additional 6-mm, enhancing focus anteromedial to the dominant mass.  The total area of abnormal enhancement on contrast mammogram measured up to 3.4 cm.  Right breast biopsy demonstrated a grade 3 invasive mammary carcinoma with associated high-grade DCIS.  Estrogen and progesterone receptor staining were negative.  HER2 was non-amplified by FISH.  Breast MRI measured the biopsy proven breast cancer at 3.2 cm.    Ms. Alonzo enrolled in the ISPY-2 clinical trial.  She began treatment with weekly taxol and once every 3 week pembrolizumab on 9/20/17.  Her course was complicated by pneumonitis and hepatitis.  Pembrolizumab was stopped and she resumed weekly taxol alone on 11/28/17.  She completed a total of 12 weeks of therapy on 12/26/17.  She completed 2 cycles of adriamycin and cyclophosphamide.  She was hospitalized both cycles due to neutropenic fever and also developed C. Difficile colitis.  Decision was made to forgo further chemotherapy.       Right breast lumpectomy and sentinel lymph node procedure on 2/27/18 showed a grade 2, 6 mm residual invasive mammary carcinoma with an associated 8 mm area of high grade DCIS with comedonecrosis and ADH.  Invasive tumor cellularity was 10%.  There was lymphovascular invasion.  Surgical margins were negative.  A single sentinel lymph node was benign.  Classified as MDA RCB I.  She completed adjuvant radiation (4256 cGy to the right breast with additional 1000 cGy to the lumpectomy cavity) on 4/27/18.     Interval History:  Ms. Alonzo was contacted for routine breast cancer followup.  She generally is feeling well.  She  continues to be most bothered by ongoing fatigue.  This has been present for a number of years now.  She is uncertain of the etiology.  She has been intermittently taking leave from work due to fatigue.  This is has amounted to approximately 3-5 days per month.  She is continuing to work full-time during the pandemic and is working from her site of employment.        She denies concerning lumps or masses of either breast.  She has no breast pain or swelling.  She reports dyspnea on exertion; however, this is not new for her.  She has had no fevers, chills, cough or chest discomfort.  She has no current abdominal complaints.  No headaches, visual changes or focal neurologic complaints.  In regards to the fatigue, she states she previously had a sleep study which diagnosed her with sleep apnea.  She elected not to use a breathing machine and instead saw her dentist to be fitted with some plates.  She states she is no longer snoring, but she has not had a repeat sleep study with the plates in place as of yet.        She denies symptoms of depression and continues to take Celexa 10 mg once daily in the evenings.  She also mentions that she may have had slight worsening of neuropathy in her bilateral feet; however, this could be due to wearing a pair of ill-fitting clogs over the summer. The remainder of a complete 12-point review of systems was reviewed with the patient and was negative with the exception of that mentioned above.       Past Medical/Surgical History:  Past Medical History:   Diagnosis Date     Acute cystitis without hematuria 10/26/2017     Clostridium difficile diarrhea 1/12/2018     Neutropenic fever (H) 01/10/2018     She was hospitalized 1/10/18 - 1/13/18 with neutropenic fever     Neutropenic sepsis (H) 01/29/2018    hospitalized again  from 1/29/18 - 2/8/18 with neutropenic fever, mucositis, and C. difficile colitis      Pneumonia 11/11/2017    She was hospitalized 11/11/17 - 11/17/17 with fevers  ranging from 102 - 105.  CT chest was consistent with pneumonitis.  She also had transaminitis in the 150 range.  She was started on corticosteroids.  Pembrolizumab was stopped      S/P radiation therapy     5,256 cGy completed on 4/27/2018 to [facility] with Dr. Zhong     Past Surgical History:   Procedure Laterality Date     APPENDECTOMY  10/06/2015    [facility]     BREAST BIOPSY, CORE RT/LT Right 08/22/2017     BRONCHOSCOPY (RIGID OR FLEXIBLE), DIAGNOSTIC N/A 2/6/2018    Procedure: COMBINED BRONCHOSCOPY (RIGID OR FLEXIBLE), LAVAGE;  COMBINED BRONCOSCOY (RIGID OR FLEXIBLE), LAVAGE;  Surgeon: Samir Pettit MD;  Location: [facility]     CLEAR LENS REPLACEMENT Bilateral 11/2016     COLONOSCOPY  11/15/2011    Procedure:COLONOSCOPY; Colonoscopy, screening; Surgeon:ANASTASIA BUNCH; Location:[facility]     INSERT PORT VASCULAR ACCESS Left 9/1/2017    Procedure: INSERT PORT VASCULAR ACCESS;  Single Lumen Chest Power Port;  Surgeon: Leif Parkinson PA-C;  [facility]     LUMPECTOMY BREAST WITH SENTINEL NODE, COMBINED Right 2/27/2018    Procedure: COMBINED LUMPECTOMY BREAST WITH SENTINEL NODE;  Right Wire Localized Lumpectomy, Right [place] Lymph Node Biopsy And Freddy Cath Removal;  Surgeon: Atul Gates MD;  Location: [facility]     REMOVE PORT VASCULAR ACCESS N/A 2/27/2018    Procedure: REMOVE PORT VASCULAR ACCESS;;  Surgeon: Atul Gates MD;  Location: [facility]     TUBAL LIGATION  12/2004       Allergies:  Allergies as of 10/26/2020     (No Known Allergies)       Current Medications:  Current Outpatient Medications   Medication Sig Dispense Refill     albuterol (PROAIR HFA/PROVENTIL HFA/VENTOLIN HFA) 108 (90 Base) MCG/ACT inhaler Inhale 2 puffs into the lungs every 6 hours as needed for shortness of breath / dyspnea 1 Inhaler 5     ASPIRIN PO Take 81 mg by mouth daily       DULoxetine (CYMBALTA) 20 MG capsule Take 1 capsule (20 mg) by mouth 2 times daily 60 capsule 11      guaiFENesin (MUCINEX) 600 MG 12 hr tablet Take 2 tablets (1,200 mg) by mouth 2 times daily 180 tablet 0     hydrOXYzine (ATARAX) 25 MG tablet Take 1-2 tablets by mouth every 6 hours if needed for itching 60 tablet 0     order for DME autoCPAP 5-18 cmH20 1 Device 0     simvastatin (ZOCOR) 40 MG tablet Take 1 tablet (40 mg) by mouth At Bedtime 90 tablet 3     umeclidinium (INCRUSE ELLIPTA) 62.5 MCG/INH inhaler Inhale 1 puff into the lungs daily 30 each 11     VITAMIN D 1000 UNIT OR CAPS Take 3,000 Units by mouth         Family and Social History:  Reviewed and unchanged from prior.  Please see initial consultation dated 8/28/17 for further details.    Physical Exam:  Physical exam deferred due to telephone visit restrictions during the public health emergency.    Laboratory/Imaging Studies:  8/26/2020 Labs:  Electrolytes and creatinine wnl.    9/10/2020 Bilateral diagnostic mammograms:  No significant changes in either breast.    ASSESSMENT/PLAN:  Ms. Alonzo is a 60-year-old female with a h/o grade 3, T2N0M0, triple-negative infiltrating ductal carcinoma of the right breast s/p 12 weeks of Taxol along with 3 cycles pembrolizumab, 2 cycles of adriamycin and cyclophosphamide, lumpectomy ([id number]), and radiation.     1.  Right breast cancer:   Ms. Alonzo is approximately 2 years, 8 months out from excision of a right breast cancer.  She is asymptomatic of disease recurrence on history taken today.  Mammograms performed in September were reviewed and are without concerning finding.  I will see her back in 4 months.      Due to a h/o ADH, I have recommended high risk screening with both annual mammogram and breast MRI, spacing the two studies so that she is having some form of imaging every 6 months.  Recommend stopping breast MRIs once she is 5 years out from completion of treatment of her breast cancer.  Plan for breast MRI at the time of her return visit.     2.  Fatigue:  Multi-factorial.  Likely a component due to  breast cancer treatment, however, I suspect COPD and other medical issues are also likely contributing.      We reviewed her history of sleep apnea.  She is not using a CPAP but rather was fitted for dental plates.  I recommend that she have a repeat sleep study with the dental plates in place.    3.  Depression:  Slight worsening of symptoms with the pandemic.  Continue Celexa 10 mg PO daily.  She denies need for change in dosing at this time.    4.  Organizing pneumonia/pulmonary nodules:  Chronic COPD.  Had pneumonitis secondary to pembrolizumab during breast cancer treatment.  Follow up CT 10/2019 c/w organizing pneumonia including new pleural-based opacity in the anterolateral RUL.  Repeat CT chest 6/15/2020 was without changes, she met with Dr. Valdez on 6/22/2020 who recommended repeat CT in 06/2021.    5.  Routine health maintenance:  Next colonoscopy due in 11/2021.  Will obtain CBC every 6 months given h/o anthracycline exposure.      6.  Followup: Labs, breast MRI and in person visit with me in 4 months.    I spent approximately 25 minutes on the telephone with this patient today.

## 2020-10-26 ENCOUNTER — VIRTUAL VISIT (OUTPATIENT)
Dept: ONCOLOGY | Facility: CLINIC | Age: 60
End: 2020-10-26
Attending: INTERNAL MEDICINE
Payer: COMMERCIAL

## 2020-10-26 DIAGNOSIS — C50.211 MALIGNANT NEOPLASM OF UPPER-INNER QUADRANT OF RIGHT BREAST IN FEMALE, ESTROGEN RECEPTOR NEGATIVE (H): Primary | ICD-10-CM

## 2020-10-26 DIAGNOSIS — Z12.39 BREAST CANCER SCREENING, HIGH RISK PATIENT: ICD-10-CM

## 2020-10-26 DIAGNOSIS — Z17.1 MALIGNANT NEOPLASM OF UPPER-INNER QUADRANT OF RIGHT BREAST IN FEMALE, ESTROGEN RECEPTOR NEGATIVE (H): Primary | ICD-10-CM

## 2020-10-26 DIAGNOSIS — Z92.21 STATUS POST CHEMOTHERAPY: ICD-10-CM

## 2020-10-26 PROCEDURE — 999N001193 HC VIDEO/TELEPHONE VISIT; NO CHARGE

## 2020-10-26 PROCEDURE — 99214 OFFICE O/P EST MOD 30 MIN: CPT | Mod: 95 | Performed by: INTERNAL MEDICINE

## 2020-10-26 NOTE — LETTER
"    10/26/2020         RE: Ashleigh Alonzo  [address] [place] [address]        Dear Colleague,    Thank you for referring your patient, Ashleigh Alonzo, to the [facility]. Please see a copy of my visit note below.    Ashleigh Alonzo is a 60 year old female who is being evaluated via a billable telephone visit.      The patient has been notified of following:     \"This telephone visit will be conducted via a call between you and your physician/provider. We have found that certain health care needs can be provided without the need for a physical exam.  This service lets us provide the care you need with a short phone conversation.  If a prescription is necessary we can send it directly to your pharmacy.  If lab work is needed we can place an order for that and you can then stop by our lab to have the test done at a later time.    Telephone visits are billed at different rates depending on your insurance coverage. During this emergency period, for some insurers they may be billed the same as an in-person visit.  Please reach out to your insurance provider with any questions.    If during the course of the call the physician/provider feels a telephone visit is not appropriate, you will not be charged for this service.\"    Patient has given verbal consent for Telephone visit?  Yes    What phone number would you like to be contacted at? [phone]    How would you like to obtain your AVS? [place]    I have reviewed and updated the patient's allergies and medication list.    Concerns: No new concerns.   Refills: None needed.      Vitals - Patient Reported  Weight (Patient Reported): 74.8 kg (165 lb)  Height (Patient Reported): 157.5 cm (5' 2\")  BMI (Based on Pt Reported Ht/Wt): 30.18  Pain Score: No Pain (0)      lAyssa Lezama CMA      Phone call duration: 25 minutes    Fara Bradshaw MD          Oncology Follow Up:  Date on this visit: 10/26/2020    Diagnosis:  Stage " IIb, T2N0M0, grade 3 triple negative cancer of the right breast.    Primary Physician: Radha Cazares     History Of Present Illness:  Ms. Alonzo is a 60-year-old female with a h/o stage IIb, T2 N0 M0, grade 3, triple-negative invasive carcinoma of the right breast.  Routine screening mammogram on 07/27/2017 showed developing calcifications in the right breast at the 12 o'clock position 6 cm from the nipple.  Ultrasound demonstrated a 7-mm, irregular, hypoechoic mass at the 12 o'clock position.  Contrast-enhanced mammogram showed a peripherally enhancing, irregular mass measuring 1.9 cm as well as an additional 6-mm, enhancing focus anteromedial to the dominant mass.  The total area of abnormal enhancement on contrast mammogram measured up to 3.4 cm.  Right breast biopsy demonstrated a grade 3 invasive mammary carcinoma with associated high-grade DCIS.  Estrogen and progesterone receptor staining were negative.  HER2 was non-amplified by FISH.  Breast MRI measured the biopsy proven breast cancer at 3.2 cm.    Ms. Alonzo enrolled in the ISPY-2 clinical trial.  She began treatment with weekly taxol and once every 3 week pembrolizumab on 9/20/17.  Her course was complicated by pneumonitis and hepatitis.  Pembrolizumab was stopped and she resumed weekly taxol alone on 11/28/17.  She completed a total of 12 weeks of therapy on 12/26/17.  She completed 2 cycles of adriamycin and cyclophosphamide.  She was hospitalized both cycles due to neutropenic fever and also developed C. Difficile colitis.  Decision was made to forgo further chemotherapy.       Right breast lumpectomy and sentinel lymph node procedure on 2/27/18 showed a grade 2, 6 mm residual invasive mammary carcinoma with an associated 8 mm area of high grade DCIS with comedonecrosis and ADH.  Invasive tumor cellularity was 10%.  There was lymphovascular invasion.  Surgical margins were negative.  A single sentinel lymph node was benign.  Classified as MDA RCB  I.  She completed adjuvant radiation (4256 cGy to the right breast with additional 1000 cGy to the lumpectomy cavity) on 4/27/18.     Interval History:  Ms. Alonzo was contacted for routine breast cancer followup.  She generally is feeling well.  She continues to be most bothered by ongoing fatigue.  This has been present for a number of years now.  She is uncertain of the etiology.  She has been intermittently taking leave from work due to fatigue.  This is has amounted to approximately 3-5 days per month.  She is continuing to work full-time during the pandemic and is working from her site of employment.        She denies concerning lumps or masses of either breast.  She has no breast pain or swelling.  She reports dyspnea on exertion; however, this is not new for her.  She has had no fevers, chills, cough or chest discomfort.  She has no current abdominal complaints.  No headaches, visual changes or focal neurologic complaints.  In regards to the fatigue, she states she previously had a sleep study which diagnosed her with sleep apnea.  She elected not to use a breathing machine and instead saw her dentist to be fitted with some plates.  She states she is no longer snoring, but she has not had a repeat sleep study with the plates in place as of yet.        She denies symptoms of depression and continues to take Celexa 10 mg once daily in the evenings.  She also mentions that she may have had slight worsening of neuropathy in her bilateral feet; however, this could be due to wearing a pair of ill-fitting clogs over the summer. The remainder of a complete 12-point review of systems was reviewed with the patient and was negative with the exception of that mentioned above.       Past Medical/Surgical History:  Past Medical History:   Diagnosis Date     Acute cystitis without hematuria 10/26/2017     Clostridium difficile diarrhea 1/12/2018     Neutropenic fever (H) 01/10/2018     She was hospitalized 1/10/18 - 1/13/18  with neutropenic fever     Neutropenic sepsis (H) 01/29/2018    hospitalized again  from 1/29/18 - 2/8/18 with neutropenic fever, mucositis, and C. difficile colitis      Pneumonia 11/11/2017    She was hospitalized 11/11/17 - 11/17/17 with fevers ranging from 102 - 105.  CT chest was consistent with pneumonitis.  She also had transaminitis in the 150 range.  She was started on corticosteroids.  Pembrolizumab was stopped      S/P radiation therapy     5,256 cGy completed on 4/27/2018 to [facility] with Dr. Zhong     Past Surgical History:   Procedure Laterality Date     APPENDECTOMY  10/06/2015    [facility]     BREAST BIOPSY, CORE RT/LT Right 08/22/2017     BRONCHOSCOPY (RIGID OR FLEXIBLE), DIAGNOSTIC N/A 2/6/2018    Procedure: COMBINED BRONCHOSCOPY (RIGID OR FLEXIBLE), LAVAGE;  COMBINED BRONCOSCOY (RIGID OR FLEXIBLE), LAVAGE;  Surgeon: Samir Pettit MD;  Location: [facility]     CLEAR LENS REPLACEMENT Bilateral 11/2016     COLONOSCOPY  11/15/2011    Procedure:COLONOSCOPY; Colonoscopy, screening; Surgeon:ANASTASIA BUNCH; Location:[facility]     INSERT PORT VASCULAR ACCESS Left 9/1/2017    Procedure: INSERT PORT VASCULAR ACCESS;  Single Lumen Chest Power Port;  Surgeon: Leif Parkinson PA-C;  [facility]     LUMPECTOMY BREAST WITH SENTINEL NODE, COMBINED Right 2/27/2018    Procedure: COMBINED LUMPECTOMY BREAST WITH SENTINEL NODE;  Right Wire Localized Lumpectomy, Right [place] Lymph Node Biopsy And Freddy Cath Removal;  Surgeon: Atul Gates MD;  [facility]     REMOVE PORT VASCULAR ACCESS N/A 2/27/2018    Procedure: REMOVE PORT VASCULAR ACCESS;;  Surgeon: Atul Gates MD;  [facility]: [facility]     TUBAL LIGATION  12/2004       Allergies:  Allergies as of 10/26/2020     (No Known Allergies)       Current Medications:  Current Outpatient Medications   Medication Sig Dispense Refill     albuterol (PROAIR HFA/PROVENTIL HFA/VENTOLIN HFA) 108 (90 Base) MCG/ACT inhaler Inhale  2 puffs into the lungs every 6 hours as needed for shortness of breath / dyspnea 1 Inhaler 5     ASPIRIN PO Take 81 mg by mouth daily       DULoxetine (CYMBALTA) 20 MG capsule Take 1 capsule (20 mg) by mouth 2 times daily 60 capsule 11     guaiFENesin (MUCINEX) 600 MG 12 hr tablet Take 2 tablets (1,200 mg) by mouth 2 times daily 180 tablet 0     hydrOXYzine (ATARAX) 25 MG tablet Take 1-2 tablets by mouth every 6 hours if needed for itching 60 tablet 0     order for DME autoCPAP 5-18 cmH20 1 Device 0     simvastatin (ZOCOR) 40 MG tablet Take 1 tablet (40 mg) by mouth At Bedtime 90 tablet 3     umeclidinium (INCRUSE ELLIPTA) 62.5 MCG/INH inhaler Inhale 1 puff into the lungs daily 30 each 11     VITAMIN D 1000 UNIT OR CAPS Take 3,000 Units by mouth         Family and Social History:  Reviewed and unchanged from prior.  Please see initial consultation dated 8/28/17 for further details.    Physical Exam:  Physical exam deferred due to telephone visit restrictions during the public health emergency.    Laboratory/Imaging Studies:  8/26/2020 Labs:  Electrolytes and creatinine wnl.    9/10/2020 Bilateral diagnostic mammograms:  No significant changes in either breast.    ASSESSMENT/PLAN:  Ms. Alonzo is a 60-year-old female with a h/o grade 3, T2N0M0, triple-negative infiltrating ductal carcinoma of the right breast s/p 12 weeks of Taxol along with 3 cycles pembrolizumab, 2 cycles of adriamycin and cyclophosphamide, lumpectomy ([id number]), and radiation.     1.  Right breast cancer:   Ms. Alonzo is approximately 2 years, 8 months out from excision of a right breast cancer.  She is asymptomatic of disease recurrence on history taken today.  Mammograms performed in September were reviewed and are without concerning finding.  I will see her back in 4 months.      Due to a h/o ADH, I have recommended high risk screening with both annual mammogram and breast MRI, spacing the two studies so that she is having some form of imaging  every 6 months.  Recommend stopping breast MRIs once she is 5 years out from completion of treatment of her breast cancer.  Plan for breast MRI at the time of her return visit.     2.  Fatigue:  Multi-factorial.  Likely a component due to breast cancer treatment, however, I suspect COPD and other medical issues are also likely contributing.      We reviewed her history of sleep apnea.  She is not using a CPAP but rather was fitted for dental plates.  I recommend that she have a repeat sleep study with the dental plates in place.    3.  Depression:  Slight worsening of symptoms with the pandemic.  Continue Celexa 10 mg PO daily.  She denies need for change in dosing at this time.    4.  Organizing pneumonia/pulmonary nodules:  Chronic COPD.  Had pneumonitis secondary to pembrolizumab during breast cancer treatment.  Follow up CT 10/2019 c/w organizing pneumonia including new pleural-based opacity in the anterolateral RUL.  Repeat CT chest 6/15/2020 was without changes, she met with Dr. Valdez on 6/22/2020 who recommended repeat CT in 06/2021.    5.  Routine health maintenance:  Next colonoscopy due in 11/2021.  Will obtain CBC every 6 months given h/o anthracycline exposure.      6.  Followup: Labs, breast MRI and in person visit with me in 4 months.    I spent approximately 25 minutes on the telephone with this patient today.        Again, thank you for allowing me to participate in the care of your patient.        Sincerely,        Fara Bradshaw MD

## 2020-11-09 DIAGNOSIS — F33.1 MODERATE EPISODE OF RECURRENT MAJOR DEPRESSIVE DISORDER (H): ICD-10-CM

## 2020-11-09 DIAGNOSIS — R05.8 COUGH WITH SPUTUM: ICD-10-CM

## 2020-11-10 NOTE — TELEPHONE ENCOUNTER
Routing refill request to provider for review/approval because:  Drug not active on refill protocol:   mucinex  PHQ-9    Junie Flanagan BSN, RN

## 2020-11-16 NOTE — TELEPHONE ENCOUNTER
Left message on answering machine for patient to call back to [phone] or respond to [employer] message.  Eleonora MORAN, RN

## 2020-11-17 VITALS — BODY MASS INDEX: 29.18 KG/M2 | WEIGHT: 164.7 LBS | HEIGHT: 63 IN

## 2020-11-17 ASSESSMENT — MIFFLIN-ST. JEOR: SCORE: 1286.2

## 2020-11-17 NOTE — PATIENT INSTRUCTIONS
Work with your sleep dentist to make some adjustments to the oral appliance for sleep apnea.  See if this helps with your symptoms of the fatigue in the morning.  You will get a call in the next 3 to 4 weeks to schedule a home sleep apnea. I will send you some results via [employer].  In the meantime also work on weight loss as this might be helpful.  Your  weight at the last sleep study was around 144 lbs     Your BMI is Body mass index is 29.18 kg/m .  Weight management is a personal decision.  If you are interested in exploring weight loss strategies, the following discussion covers the approaches that may be successful. Body mass index (BMI) is one way to tell whether you are at a healthy weight, overweight, or obese. It measures your weight in relation to your height.  A BMI of 18.5 to 24.9 is in the healthy range. A person with a BMI of 25 to 29.9 is considered overweight, and someone with a BMI of 30 or greater is considered obese. More than two-thirds of American adults are considered overweight or obese.  Being overweight or obese increases the risk for further weight gain. Excess weight may lead to heart disease and diabetes.  Creating and following plans for healthy eating and physical activity may help you improve your health.  Weight control is part of healthy lifestyle and includes exercise, emotional health, and healthy eating habits. Careful eating habits lifelong are the mainstay of weight control. Though there are significant health benefits from weight loss, long-term weight loss with diet alone may be very difficult to achieve- studies show long-term success with dietary management in less than 10% of people. Attaining a healthy weight may be especially difficult to achieve in those with severe obesity. In some cases, medications, devices and surgical management might be considered.  What can you do?  If you are overweight or obese and are interested in methods for weight loss, you should discuss  this with your provider.     Consider reducing daily calorie intake by 500 calories.     Keep a food journal.     Avoiding skipping meals, consider cutting portions instead.    Diet combined with exercise helps maintain muscle while optimizing fat loss. Strength training is particularly important for building and maintaining muscle mass. Exercise helps reduce stress, increase energy, and improves fitness. Increasing exercise without diet control, however, may not burn enough calories to loose weight.       Start walking three days a week 10-20 minutes at a time    Work towards walking thirty minutes five days a week     Eventually, increase the speed of your walking for 1-2 minutes at time      Weight management plan: Patient was referred to their PCP to discuss a diet and exercise plan.

## 2020-11-18 ENCOUNTER — VIRTUAL VISIT (OUTPATIENT)
Dept: SLEEP MEDICINE | Facility: CLINIC | Age: 60
End: 2020-11-18
Payer: COMMERCIAL

## 2020-11-18 DIAGNOSIS — J44.9 CHRONIC OBSTRUCTIVE PULMONARY DISEASE, UNSPECIFIED COPD TYPE (H): Chronic | ICD-10-CM

## 2020-11-18 DIAGNOSIS — R53.83 FATIGUE, UNSPECIFIED TYPE: ICD-10-CM

## 2020-11-18 DIAGNOSIS — G47.33 OSA (OBSTRUCTIVE SLEEP APNEA): Primary | ICD-10-CM

## 2020-11-18 PROCEDURE — 99214 OFFICE O/P EST MOD 30 MIN: CPT | Mod: TEL | Performed by: INTERNAL MEDICINE

## 2020-11-18 RX ORDER — CITALOPRAM HYDROBROMIDE 10 MG/1
10 TABLET ORAL DAILY
Qty: 1 TABLET | Refills: 0 | OUTPATIENT
Start: 2020-11-18

## 2020-11-18 RX ORDER — GUAIFENESIN 600 MG/1
1200 TABLET, EXTENDED RELEASE ORAL 2 TIMES DAILY
Qty: 180 TABLET | Refills: 0 | OUTPATIENT
Start: 2020-11-18

## 2020-11-18 NOTE — PROGRESS NOTES
"Ashleigh Alonzo is a 60 year old female who is being evaluated via a billable telephone visit.      The patient has been notified of following:     \"This telephone visit will be conducted via a call between you and your physician/provider. We have found that certain health care needs can be provided without the need for a physical exam.  This service lets us provide the care you need with a short phone conversation.  If a prescription is necessary we can send it directly to your pharmacy.  If lab work is needed we can place an order for that and you can then stop by our lab to have the test done at a later time.    Telephone visits are billed at different rates depending on your insurance coverage. During this emergency period, for some insurers they may be billed the same as an in-person visit.  Please reach out to your insurance provider with any questions.    If during the course of the call the physician/provider feels a telephone visit is not appropriate, you will not be charged for this service.\"    Patient has given verbal consent for Telephone visit?  Yes    What phone number would you like to be contacted at? [phone]    How would you like to obtain your AVS? Mail a copy    Phone call duration: 20 minutes    Zonia Singh MD    Chief complaint: Excessive fatigue    History of Present Illness: 60-year-old female with history of COPD and obstructive sleep apnea.  She also has a history of breast cancer which caused a lot of fatigue associated with chemotherapy.  She did feel little bit better after completing therapy.  However she has been having a lot more fatigue in the mornings lately.  She continues to use oral appliance.  She sleeps on her sides.  Bedtime typically around 7 PM with rise time around 3:30 AM.  On nonwork days she will stay in bed until 8 to10 AM.  She is not taking naps.  She has gained weight since her last sleep study.  She is tolerating the oral appliance well.  No jaw pain " or teeth movement.  She has not had a test to evaluate for effectiveness although it had been recommended.    Total score - [place]: 0 (11/17/2020  2:00 PM) (Less than 10 normal)    WENDI Total Score: 5 (normal 0-7, mild 8-14, moderate 15-21, severe 22-28)    Past Medical History:   Diagnosis Date     Acute cystitis without hematuria 10/26/2017     Clostridium difficile diarrhea 1/12/2018     Neutropenic fever (H) 01/10/2018     She was hospitalized 1/10/18 - 1/13/18 with neutropenic fever     Neutropenic sepsis (H) 01/29/2018    hospitalized again  from 1/29/18 - 2/8/18 with neutropenic fever, mucositis, and C. difficile colitis      Pneumonia 11/11/2017    She was hospitalized 11/11/17 - 11/17/17 with fevers ranging from 102 - 105.  CT chest was consistent with pneumonitis.  She also had transaminitis in the 150 range.  She was started on corticosteroids.  Pembrolizumab was stopped      S/P radiation therapy     5,256 cGy completed on 4/27/2018 to [facility] with Dr. Zhong       No Known Allergies    Current Outpatient Medications   Medication     albuterol (PROAIR HFA/PROVENTIL HFA/VENTOLIN HFA) 108 (90 Base) MCG/ACT inhaler     ASPIRIN PO     DULoxetine (CYMBALTA) 20 MG capsule     guaiFENesin (MUCINEX) 600 MG 12 hr tablet     hydrOXYzine (ATARAX) 25 MG tablet     order for DME     simvastatin (ZOCOR) 40 MG tablet     umeclidinium (INCRUSE ELLIPTA) 62.5 MCG/INH inhaler     VITAMIN D 1000 UNIT OR CAPS     No current facility-administered medications for this visit.      Facility-Administered Medications Ordered in Other Visits   Medication     lidocaine 1 % 9 mL     lidocaine-EPINEPHrine 1.5 %-[id number] injection 10 mL     sodium bicarbonate 8.4 % injection 1 mEq       Social History     Socioeconomic History     Marital status:      Spouse name: Aly     Number of children: 2     Years of education: Not on file     Highest education level: Not on file   Occupational History      Occupation: Equipment dispatcher     Employer: [employer]    Social Needs     Financial resource strain: Not on file     Food insecurity     Worry: Never true     Inability: Never true     Transportation needs     Medical: No     Non-medical: No   Tobacco Use     Smoking status: Former Smoker     Packs/day: 0.75     Years: 20.00     Pack years: 15.00     Types: Cigarettes     Quit date: 2000     Years since quittin.8     Smokeless tobacco: Never Used   Substance and Sexual Activity     Alcohol use: Yes     Comment: Daily to weekly use (beer)     Drug use: No     Sexual activity: Not Currently     Partners: Male     Birth control/protection: Post-menopausal, Female Surgical     Comment: Hx. tubal ligation   Lifestyle     Physical activity     Days per week: Not on file     Minutes per session: Not on file     Stress: Not at all   Relationships     Social connections     Talks on phone: Once a week     Gets together: Not on file     Attends Taoism service: Not on file     Active member of club or organization: Not on file     Attends meetings of clubs or organizations: Not on file     Relationship status: Not on file     Intimate partner violence     Fear of current or ex partner: No     Emotionally abused: Not on file     Physically abused: Not on file     Forced sexual activity: Not on file   Other Topics Concern     Parent/sibling w/ CABG, MI or angioplasty before 65F 55M? Yes   Social History Narrative    Patient lives in [facility] [place]. She [place] in [place] with her . 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 No recent travel. No international travel. No sick contacts or exposure to small children (she has small grand children, 1-1 y/o but has not seen them in a few weeks). They have two dogs and a cat, and she takes care of the litter box sometimes, however she has not clean after the cat since her cancer diagnosis.       Family History  "  Problem Relation Age of Onset     Cardiovascular Father 46        MI     Eye Disorder Father      Cerebrovascular Disease Father      Arthritis Sister      Neurologic Disorder Brother      Eye Surgery Maternal Grandmother         cataract     Prostate Cancer Maternal Grandfather      Prostate Cancer Maternal Uncle      Cancer Maternal Uncle         Throat cancer     Diabetes No family hx of      Hypertension No family hx of      Glaucoma No family hx of      Macular Degeneration No family hx of          EXAM:  Ht 1.6 m (5' 3\")   Wt 74.7 kg (164 lb 11.2 oz)   BMI 29.18 kg/m    Pulmonary: Speaking full sentences, no audible cough or wheezing  Psychiatric: Alert, normal affect    PSG 9/6/2018  Weight 144 lbs BMI 24.6  AHI 9.7, RDI 21.9, Lowest O2 SAT 63%    PSG Titration 1/29/2016  Weight 155 lbs BMI 27.1  CPAP 15 (10-18)    PSG 12/4/2015  Weight 155 lbs BMI 27.1  AHI 21, RDI 57.3, Lowest O2 SAT 61%   Time below 89% 26 minutes    PFTs 10/11/2018 Moderate airflow obstruction, normal diffusion    ASSESSMENT:  60-year-old female with history of obstructive sleep apnea.  Most recent sleep study showed overall mild severity with REM predominance at a lower weight.  She is currently using an oral appliance and has gained weight and is experiencing excessive fatigue.  She also has history of breast cancer which could be contributing to fatigue.    PLAN:  Recommended advancing her oral appliance over the next few weeks.  She should work with her sleep dentist to ensure she does this properly.  Will perform home sleep apnea test ideally before the end of the year to evaluate effectiveness of settings.  Patient is can continue to sleep on her sides.  Patient is encouraged to work on weight loss as this may also be helpful. Consider repeat PFTS for further evaluation if hypoxemia persists despite optimally treated AHI.        Zonia Singh M.D.  Pulmonary/Critical Care/Sleep Medicine    [facility] - " [facility]   [address], [address]   [address] [place] [address]   Appointments: [phone]    The above note was dictated using voice recognition software and may include typographical errors. Please contact the author for any clarifications.

## 2020-11-20 ENCOUNTER — MYC MEDICAL ADVICE (OUTPATIENT)
Dept: ONCOLOGY | Facility: CLINIC | Age: 60
End: 2020-11-20

## 2020-11-23 NOTE — TELEPHONE ENCOUNTER
FMLA forms received via my chart from patient  .      Forms to be completed and put in folder for provider to approve.    Send to pt, upon completion.    Litzy Chambers CMA ([facility])

## 2020-12-01 NOTE — TELEPHONE ENCOUNTER
FMLA paperwork completed, checked for accuracy, signed and sent to scanning and original mailed to patient at home address.    Litzy Chambers CMA

## 2020-12-02 ENCOUNTER — MYC MEDICAL ADVICE (OUTPATIENT)
Dept: ONCOLOGY | Facility: CLINIC | Age: 60
End: 2020-12-02

## 2020-12-08 ENCOUNTER — TELEPHONE (OUTPATIENT)
Dept: FAMILY MEDICINE | Facility: CLINIC | Age: 60
End: 2020-12-08

## 2020-12-08 DIAGNOSIS — L50.9 URTICARIA: ICD-10-CM

## 2020-12-08 DIAGNOSIS — R05.8 COUGH WITH SPUTUM: ICD-10-CM

## 2020-12-09 RX ORDER — GUAIFENESIN 600 MG/1
1200 TABLET, EXTENDED RELEASE ORAL 2 TIMES DAILY
Qty: 180 TABLET | Refills: 0 | Status: SHIPPED | OUTPATIENT
Start: 2020-12-09 | End: 2021-02-12

## 2020-12-09 RX ORDER — HYDROXYZINE HYDROCHLORIDE 25 MG/1
TABLET, FILM COATED ORAL
Qty: 60 TABLET | Refills: 0 | Status: SHIPPED | OUTPATIENT
Start: 2020-12-09 | End: 2021-02-16

## 2020-12-09 NOTE — TELEPHONE ENCOUNTER
Routing refill request to provider for review/approval because:  Drug not on the [facility] refill protocol  josepex    Junie Flanagan BSN, RN

## 2020-12-10 NOTE — TELEPHONE ENCOUNTER
Informed patient refills of both medications confirmed pharmacy receipt at 12/09/20 1:11 pm  ANANDA Aleman

## 2020-12-10 NOTE — TELEPHONE ENCOUNTER
Reason for Call:  Other prescription    Detailed comments: Pt calling for her medication is out and would like for her Provider to send in Rx to Pharmacy as soon as possible.  She would like a call back to confirm Rx has been sent.    Phone Number Patient can be reached at: Cell number on file:    Telephone Information:   Mobile [phone]       Best Time: anytime    Can we leave a detailed message on this number? YES    Call taken on 12/10/2020 at 8:21 AM by Thierry Gonzalez

## 2020-12-15 DIAGNOSIS — J44.9 CHRONIC OBSTRUCTIVE PULMONARY DISEASE, UNSPECIFIED COPD TYPE (H): ICD-10-CM

## 2020-12-17 RX ORDER — ALBUTEROL SULFATE 90 UG/1
AEROSOL, METERED RESPIRATORY (INHALATION)
Qty: 8.5 G | Refills: 1 | Status: SHIPPED | OUTPATIENT
Start: 2020-12-17 | End: 2021-06-15

## 2020-12-23 ENCOUNTER — OFFICE VISIT (OUTPATIENT)
Dept: SLEEP MEDICINE | Facility: CLINIC | Age: 60
End: 2020-12-23
Payer: COMMERCIAL

## 2020-12-23 DIAGNOSIS — G47.33 OSA (OBSTRUCTIVE SLEEP APNEA): ICD-10-CM

## 2020-12-23 PROCEDURE — G0399 HOME SLEEP TEST/TYPE 3 PORTA: HCPCS | Performed by: INTERNAL MEDICINE

## 2020-12-24 ENCOUNTER — DOCUMENTATION ONLY (OUTPATIENT)
Dept: SLEEP MEDICINE | Facility: CLINIC | Age: 60
End: 2020-12-24
Payer: COMMERCIAL

## 2020-12-24 DIAGNOSIS — G47.33 OSA (OBSTRUCTIVE SLEEP APNEA): ICD-10-CM

## 2020-12-24 NOTE — PROGRESS NOTES
HST POST-STUDY QUESTIONNAIRE    1. What time did you go to bed?  1030  2. How long do you think it took to fall asleep?  40 min  3. What time did you wake up to start the day?  0745  4. Did you get up during the night at all?  yes  5. If you woke up, do you remember approximately what time(s)? no  6. Did you have any difficulty with the equipment?  No  7. Did you us any type of treatment with this study?  Oral Appliance  8. Was the head of the bed elevated? No  9. Did you sleep in a recliner?  No  10. Did you stop using CPAP at least 3 days before this test?  NA  11. Any other information you'd like us to know? None    This HSAT was performed using a Noxturnal T3 device which recorded snore, sound, movement activity, body position, nasal pressure, oronasal thermal airflow, pulse, oximetry and both chest and abdominal respiratory effort. HSAT data was restricted to the time patient states they were in bed.     HSAT was scored using 1B 4% hypopnea rule.     HST AHI (Non-PAT): 4.8  Snoring was reported as none.  Time with SpO2 below 89% was 1 minutes.   Overall signal quality was good     Pt will follow up with sleep provider to determine appropriate therapy.

## 2020-12-28 NOTE — PROGRESS NOTES
HST POST-STUDY QUESTIONNAIRE    1. What time did you go to bed?  6:30 pm  2. How long do you think it took to fall asleep?  45 minutes  3. What time did you wake up to start the day?  3:30 am  4. Did you get up during the night at all?  no  5. If you woke up, do you remember approximately what time(s)? n/a  6. Did you have any difficulty with the equipment?  No  7. Did you us any type of treatment with this study?  Oral Appliance  8. Was the head of the bed elevated? No  9. Did you sleep in a recliner?  No  10. Did you stop using CPAP at least 3 days before this test?  Yes  11. Any other information you'd like us to know? n/a

## 2020-12-28 NOTE — PROGRESS NOTES
This HSAT was performed using a Noxturnal T3 device which recorded snore, sound, movement activity, body position, nasal pressure, oronasal thermal airflow, pulse, oximetry and both chest and abdominal respiratory effort. HSAT data was restricted to the time patient states they were in bed.     HSAT was scored using 1B 4% hypopnea rule.     AHI: 9.9.   Snoring was reported as moderate.  Time with SpO2 below 89% was 6.1 minutes. The patient slept with Oral appliance in place during the study.  Overall signal quality was good     Pt will follow up with sleep provider to determine appropriate therapy.     Ordering Provider, Zonia Singh MD Charles O., BA, RPS, RST System Clinical Specialist 12/28/2020

## 2021-01-05 VITALS — WEIGHT: 164 LBS | BODY MASS INDEX: 29.06 KG/M2 | HEIGHT: 63 IN

## 2021-01-05 ASSESSMENT — MIFFLIN-ST. JEOR: SCORE: 1283.03

## 2021-01-05 NOTE — PATIENT INSTRUCTIONS
Restart CPAP.  You can try both with and without the oral appliance in place as well.  Just keep track of when you switch if you do.    For general sleep health questions: http://sleepeducation.org    For tips about PAP and COVID -19:  https://www.thoracic.org/patients/patient-resources/resources/covid-19-and-home-pap-therapy.pdf        Continue PAP therapy every night, for all hours that you are sleeping.  If you nap use CPAP.  As always, try to get at least 8 hours of sleep or more each day, keep a regular sleep schedule, and avoid sleep deprivation. Avoid alcohol.    Reasons that you might need a change to your pressure therapy would be weight gain or loss, waking having inadvertently removed your PAP overnight, having previously felt refreshed by sleep with CPAP use and now waking un-refreshed, and return of daytime sleepiness. Also, the development of new medical problems  (such as heart failure, stroke, medications such as narcotics) can sometimes affect breathing at night and change your PAP therapy needs.    Please bring PAP with you if you are hospitalized.  If anticipating surgery be sure to discuss with your surgeon that you have sleep apnea and use PAP therapy.      Maintain your equipment as recommended which includes routine cleaning and replacement of supplies.      Call DME for any questions regarding supplies or maintenance.    [facility], [facility] [phone]      Do not drive on engage in potentially dangerous activities if feeling sleepy.    Please follow up in sleep clinic again in 2 months and bring your machine and card to your follow-up visit.          Tips for your PAP use-    Mask fitting tips  Mask fitting exercise:    To improve your mask seal and your mobility at night, put mask on and secure in place.  Lie down in bed with full pressure and roll to one side, adjust headgear while in that position to eliminate any leaks. Repeat process  rolling to other side.     The mask seal does not have to be perfect:   CPAP machines are designed to make up for small leaks. However, you will not tolerate leaks blowing in your eyes so you will need to adjust.   Any leak should only be near or at the bottom of the mask.  We expect your mask to leak slightly at night.    Do not over-tighten the headgear straps, tighter IS NOT better, we expect minimal leak.    First try re-positioning the mask or headgear before tightening the headgear straps.  Mask leaks are expected due to changing sleeping positions. Try pulling the mask away from your skin allowing the cushion to re-inflate will minimize the leak.  If you struggle for a good fit, try turning the CPAP off and then readjust the mask by pulling it away from your face and then turning back on the CPAP.        Humidifier tips  Humidifiers can be adjusted to increase or decrease the amount of moisture according to your comfort level. You may need to adjust this frequently at first, but then might only change it with seasonal weather changes.     Try INCREASING the humidity if:  You experience a dry, irritated nasal passage or throat.  You have a runny, drippy nose or sneezing fits after using CPAP.  You experience nasal congestion during or after CPAP use.    Try DECREASING the humidity if:  You have excessive condensation or  rain out  in the tubing or mask.  Otherwise keep the tubing warm during the night by running it underneath the blankets or pillow.      Clinic visit after initial PAP set-up   Bring your equipment with you to your 4 week follow up clinic visit.  We will be extracting your data from the machine.        Travel  Always take your equipment with you.  If you fly with your equipment bring it on with you as a carry on.  Medical equipment does not count as a carry on.    If you travel international the machines take 110-240.  The only adapter needed is the adapter that will fit into the receptacle  (outlet).    You may also want to bring an extension cord as many hotel rooms have limited outlets at the bedside.  Do not travel with water in your humidifier chamber.     Cleaning and Maintenance Guidelines    Equipment Frequency Cleaning Method   Mask First Day    Daily      Weekly Soak mask in hot soapy water for 30 minutes, rinse and air dry.  Wipe nasal cushion with a hot soapy (Ivory, baby shampoo) cloth and rinse.  Baby wipes may also be used.  Do not use anti-bacterial soaps,Darleen  liquid soap, rubbing alcohol, bleach or ammonia.  Wash frame in hot soapy water (Ivory, baby shampoo) rinse and let air dry   Headgear Biweekly Wash in hot soapy water, rinse and air dry   Reusable Gray Filter Weekly Wash in hot soapy water, rinse, put in towel squeeze moisture out, let air dry   Disposable White Filter Check Weekly Replace when brown or gray in color; at least every 2 to 3 months   Humidifier Chamber Daily    Weekly Empty distilled water from humidifier and let air dry    Hand wash in hot soapy water, rinse and air dry   Tubing Weekly Wash in hot soapy water, rinse and let air dry   Mask, Tubing and Humidifier Chamber As needed Disinfect: Soak in 1 part distilled white vinegar to 3 parts hot water for 30 minutes, rinse well and air dry  Not the material headgear        MASK AND SUPPLY REORDERING and EQUIPMENT NEEDS through your DME and per your insurance  Reminder: Most insurance companies will allow for a new mask, headgear, tubing, and reusable gray filter every six months.  Disposable white ultra-fine filters are covered monthly.      HOME AND SAFETY INSTRUCTIONS    Do not use frayed or cracked electrical cords, multi plug adaptors, or switched receptacles    Do not immerse electrical equipment into water    Assure that electrical cords do not become a tripping hazard      Your BMI is Body mass index is 29.05 kg/m .  Weight management is a personal decision.  If you are interested in exploring weight loss  strategies, the following discussion covers the approaches that may be successful. Body mass index (BMI) is one way to tell whether you are at a healthy weight, overweight, or obese. It measures your weight in relation to your height.  A BMI of 18.5 to 24.9 is in the healthy range. A person with a BMI of 25 to 29.9 is considered overweight, and someone with a BMI of 30 or greater is considered obese. More than two-thirds of American adults are considered overweight or obese.  Being overweight or obese increases the risk for further weight gain. Excess weight may lead to heart disease and diabetes.  Creating and following plans for healthy eating and physical activity may help you improve your health.  Weight control is part of healthy lifestyle and includes exercise, emotional health, and healthy eating habits. Careful eating habits lifelong are the mainstay of weight control. Though there are significant health benefits from weight loss, long-term weight loss with diet alone may be very difficult to achieve- studies show long-term success with dietary management in less than 10% of people. Attaining a healthy weight may be especially difficult to achieve in those with severe obesity. In some cases, medications, devices and surgical management might be considered.  What can you do?  If you are overweight or obese and are interested in methods for weight loss, you should discuss this with your provider.     Consider reducing daily calorie intake by 500 calories.     Keep a food journal.     Avoiding skipping meals, consider cutting portions instead.    Diet combined with exercise helps maintain muscle while optimizing fat loss. Strength training is particularly important for building and maintaining muscle mass. Exercise helps reduce stress, increase energy, and improves fitness. Increasing exercise without diet control, however, may not burn enough calories to loose weight.       Start walking three days a week  10-20 minutes at a time    Work towards walking thirty minutes five days a week     Eventually, increase the speed of your walking for 1-2 minutes at time    In addition, we recommend that you review healthy lifestyles and methods for weight loss available through the National Institutes of Health patient information sites:  http://win.niddk.nih.gov/publications/index.htm    And look into health and wellness programs that may be available through your health insurance provider, employer, local community center, or rodney club.    Weight management plan: Patient was referred to their PCP to discuss a diet and exercise plan.

## 2021-01-05 NOTE — PROGRESS NOTES
Ashleigh Alonzo is a 60 year old female who is being evaluated via a billable telephone visit.      What phone number would you like to be contacted at? [phone]  How would you like to obtain your AVS? [facility]  Assessment & Plan   Problem List Items Addressed This Visit     MERLIN (obstructive sleep apnea)- 'mild' (AHI 9) - Primary (Chronic)    Relevant Orders    Comprehensive DME (Completed)      Other Visit Diagnoses     Other fatigue          Patient with history of breast cancer COPD and mild sleep apnea.  Despite use of oral appliance appears to have continued mild sleep apnea with desaturations down to 79%.  Options to optimize treatment include advancement of oral appliance.  Continue efforts at weight loss.  Trial of returning to CPAP.  Patient electing to try CPAP again to see if it indeed makes a difference for her symptoms of profound fatigue that are somewhat intermittent.  I will place orders for updated supplies if needed.  Sent message to the sleep therapy management team as data has not been collected from her machine since August of this year.  Patient should follow-up with me in 2 months.  She should keep track of when she uses the oral appliance and try both with and without oral appliance and CPAP.        23 minutes spent on the date of the encounter doing chart review, history and exam, documentation and further activities as noted above               Return in about 2 months (around 3/6/2021).    Zonia Singh MD  [facility] [place]    Subjective     Ashleigh Alonzo is a 60 year old who presents to clinic today for the following health issues sleep apnea, follow-up home sleep apnea testing with oral appliance in place    HPI       Patient with history of breast cancer, COPD, sleep apnea.  She been having a lot of fatigue which she thought was may be associated with chemotherapy but also was wondering if it could be sleep apnea.  On sleep apnea testing was done with  oral appliance in place to assess for adequacy of treatment.  Those results were reviewed with her in detail.  They do show persistent mild sleep apnea with desaturations down to 79%.  Total time with oxygen saturation at or below 88% was less than 5 minutes.  There was some mild sleep associated tachycardia.          Objective           Vitals:  No vitals were obtained today due to virtual visit.    Physical Exam   alert and no distress  PSYCH: Alert and oriented times 3; coherent speech, normal   rate and volume, able to articulate logical thoughts, able   to abstract reason, no tangential thoughts, no hallucinations   or delusions  Her affect is normal  RESP: No cough, no audible wheezing, able to talk in full sentences  Remainder of exam unable to be completed due to telephone visits    HSAT 12/23/2020 with oral appliance in place  Weight 164 pounds, BMI 29  No supine sleep  AHI 9.9, lowest oxygen saturation 79%, total time with oxygen saturation at or below 88% 3.6 minutes     PSG 9/6/2018  Weight 144 lbs BMI 24.6  AHI 9.7, RDI 21.9, Lowest O2 SAT 63%     PSG Titration 1/29/2016  Weight 155 lbs BMI 27.1  CPAP 15 (10-18)     PSG 12/4/2015  Weight 155 lbs BMI 27.1  AHI 21, RDI 57.3, Lowest O2 SAT 61%   Time below 89% 26 minutes     PFTs 10/11/2018 Moderate airflow obstruction, normal diffusion          Phone call duration: 15 minutes

## 2021-01-06 ENCOUNTER — VIRTUAL VISIT (OUTPATIENT)
Dept: SLEEP MEDICINE | Facility: CLINIC | Age: 61
End: 2021-01-06
Payer: COMMERCIAL

## 2021-01-06 DIAGNOSIS — J44.9 CHRONIC OBSTRUCTIVE PULMONARY DISEASE, UNSPECIFIED COPD TYPE (H): Chronic | ICD-10-CM

## 2021-01-06 DIAGNOSIS — G47.33 OSA (OBSTRUCTIVE SLEEP APNEA): Primary | ICD-10-CM

## 2021-01-06 DIAGNOSIS — R53.83 OTHER FATIGUE: ICD-10-CM

## 2021-01-06 PROCEDURE — 99214 OFFICE O/P EST MOD 30 MIN: CPT | Mod: TEL | Performed by: INTERNAL MEDICINE

## 2021-01-06 NOTE — PROCEDURES
"HOME SLEEP STUDY INTERPRETATION    Patient: Ashleigh Alonzo  MRN: [id number]  YOB: 1960  Study Date: 12/23/2020  Referring Provider: Rachid Marin MD  Ordering Provider: Zonia Singh MD     Indications for Home Study: Ashleigh Alonzo is a 60 year old female with a history of breast cancer, COPD, mild sleep apnea, who presents with oral appliance for treatment of sleep apnea..    Estimated body mass index is 29.05 kg/m  as calculated from the following:    Height as of 1/5/21: 1.6 m (5' 3\").    Weight as of 1/5/21: 74.4 kg (164 lb).  Total score - [place]: 0 (11/17/2020  2:00 PM)      Data: A full night home sleep study was performed recording the standard physiologic parameters including body position, movement, sound, nasal pressure, thermal oral airflow, chest and abdominal movements with respiratory inductance plethysmography, and oxygen saturation by pulse oximetry. Pulse rate was estimated by oximetry recording. This study was considered adequate based on > 4 hours of quality oximetry and respiratory recording. As specified by the AASM Manual for the Scoring of Sleep and Associated events, version 2.3, Rule VIII.D 1B, 4% oxygen desaturation scoring for hypopneas is used as a standard of care on all home sleep apnea testing.    Analysis Time:  498 minutes    Respiration:   Sleep Associated Hypoxemia: sustained hypoxemia was not present. Baseline oxygen saturation was 95%.  Time with saturation less than or equal to 88% was 3.6 minutes. The lowest oxygen saturation was 79%.   Snoring: Snoring was present.  Respiratory events: The home study revealed a presence of 1 obstructive apneas and 21 mixed and central apneas. There were 60 hypopneas resulting in a combined apnea/hypopnea index [AHI] of 9.9 events per hour.  AHI was 5.3 per hour supine, NA per hour prone, 8.6 per hour on left side, and 12.0 per hour on right side.   Pattern: Excluding events noted above, respiratory rate and pattern " was Normal.    Position: Percent of time spent: supine - 2%, prone - 0%, on left - 57%, on right - 40%.    Heart Rate: By pulse oximetry tachycardia was noted.     Assessment:   Mild obstructive sleep apnea despite use of oral appliance.  Sleep associated hypoxemia was not present.    Recommendations:  Consider auto-CPAP at 5-15 cmH2O or advancing oral appliance.  Suggest optimizing sleep hygiene and avoiding sleep deprivation.  Weight management.    Diagnosis Code(s): Obstructive Sleep Apnea G47.33    Zonia Singh MD, January 5, 2021   Diplomate, American Board of Internal Medicine, Sleep Medicine

## 2021-01-12 ENCOUNTER — CARE COORDINATION (OUTPATIENT)
Dept: SLEEP MEDICINE | Facility: CLINIC | Age: 61
End: 2021-01-12

## 2021-01-22 ENCOUNTER — MYC MEDICAL ADVICE (OUTPATIENT)
Dept: FAMILY MEDICINE | Facility: CLINIC | Age: 61
End: 2021-01-22

## 2021-02-12 DIAGNOSIS — R05.8 COUGH WITH SPUTUM: ICD-10-CM

## 2021-02-12 NOTE — TELEPHONE ENCOUNTER
Routing refill request to provider for review/approval because:  Drug not on the [facility] refill protocol     Junie MORAN, RN

## 2021-02-15 DIAGNOSIS — L50.9 URTICARIA: ICD-10-CM

## 2021-02-16 RX ORDER — HYDROXYZINE HYDROCHLORIDE 25 MG/1
TABLET, FILM COATED ORAL
Qty: 60 TABLET | Refills: 0 | Status: SHIPPED | OUTPATIENT
Start: 2021-02-16 | End: 2021-04-27

## 2021-02-19 ENCOUNTER — MYC MEDICAL ADVICE (OUTPATIENT)
Dept: SLEEP MEDICINE | Facility: CLINIC | Age: 61
End: 2021-02-19

## 2021-02-24 DIAGNOSIS — J44.9 CHRONIC OBSTRUCTIVE PULMONARY DISEASE, UNSPECIFIED COPD TYPE (H): ICD-10-CM

## 2021-02-24 NOTE — TELEPHONE ENCOUNTER
Pending Prescriptions:                       Disp   Refills    umeclidinium (INCRUSE ELLIPTA) 62.5 MCG/I*30 each11           Sig: Inhale 1 puff into the lungs daily    Last Prescribed: 1/30/20  Last Visit: 6/22/20  Next Visit: June 2021    Dom De La Cruz RN   [facility] Care Coordinator  [facility]   Phone: [phone]  Fax: [phone]

## 2021-02-24 NOTE — TELEPHONE ENCOUNTER
[facility] Call Center    Phone Message    May a detailed message be left on voicemail: yes     Reason for Call: Medication Refill Request    Has the patient contacted the pharmacy for the refill? Yes   Name of medication being requested: umeclidinium (INCRUSE ELLIPTA) 62.5 MCG/INH inhaler  Provider who prescribed the medication: Fara Marshall APRN CNS  Pharmacy:   [address]       Date medication is needed: 02/26/21         Action Taken: Message routed to:  Adult Clinics: Pulmonology p [id number]    Travel Screening: Not Applicable

## 2021-02-28 NOTE — PROGRESS NOTES
"Ashleigh Perear is a 60 year old who is being evaluated via a billable telephone visit.      What phone number would you like to be contacted at? [phone]  How would you like to obtain your AVS? Mail a copy     Vitals - Patient Reported  Weight (Patient Reported): 77.1 kg (170 lb)  Height (Patient Reported): 157.5 cm (5' 2\")  BMI (Based on Pt Reported Ht/Wt): 31.09  Pain Score: No Pain (0)      I have reviewed and updated patient's allergy and medication list.    Concerns: RESULTS  Refills: NONE      Maribel Sanjay [facility]    Phone call duration: 25 minutes    35 minutes spent on the date of the encounter doing chart review, review of test results, interpretation of tests, patient visit and documentation.     Oncology Follow Up:  Date on this visit: 3/1/2021    Diagnosis:  Stage IIb, T2N0M0, grade 3 triple negative cancer of the right breast.    Primary Physician: Radha Cazares     History Of Present Illness:  Ms. Alonzo is a 60-year-old female with a h/o stage IIb, T2 N0 M0, grade 3, triple-negative invasive carcinoma of the right breast.  Routine screening mammogram on 07/27/2017 showed developing calcifications in the right breast at the 12 o'clock position 6 cm from the nipple.  Ultrasound demonstrated a 7-mm, irregular, hypoechoic mass at the 12 o'clock position.  Contrast-enhanced mammogram showed a peripherally enhancing, irregular mass measuring 1.9 cm as well as an additional 6-mm, enhancing focus anteromedial to the dominant mass.  The total area of abnormal enhancement on contrast mammogram measured up to 3.4 cm.  Right breast biopsy demonstrated a grade 3 invasive mammary carcinoma with associated high-grade DCIS.  Estrogen and progesterone receptor staining were negative.  HER2 was non-amplified by FISH.  Breast MRI measured the biopsy proven breast cancer at 3.2 cm.    Ms. Alonzo enrolled in the ISPY-2 clinical trial.  She began treatment with weekly taxol and once every 3 week pembrolizumab on 9/20/17.  " Her course was complicated by pneumonitis and hepatitis.  Pembrolizumab was stopped and she resumed weekly taxol alone on 11/28/17.  She completed a total of 12 weeks of therapy on 12/26/17.  She completed 2 cycles of adriamycin and cyclophosphamide.  She was hospitalized both cycles due to neutropenic fever and also developed C. Difficile colitis.  Decision was made to forgo further chemotherapy.       Right breast lumpectomy and sentinel lymph node procedure on 2/27/18 showed a grade 2, 6 mm residual invasive mammary carcinoma with an associated 8 mm area of high grade DCIS with comedonecrosis and ADH.  Invasive tumor cellularity was 10%.  There was lymphovascular invasion.  Surgical margins were negative.  A single sentinel lymph node was benign.  Classified as MDA RCB I.  She completed adjuvant radiation (4256 cGy to the right breast with additional 1000 cGy to the lumpectomy cavity) on 4/27/18.     Interval History:  Ms. Alonzo was contacted via telephone today for routine breast cancer follow-up.  She has felt poorly over the last few months.  She states this is due to excessive fatigue.  She sleeps poorly.  She gets up at 3:30 in the morning in order to get ready for work.  She states at work there is not enough to keep her busy and she gets very bored.  She reports dyspnea on exertion that is increased from baseline.  She has noted increased swelling in her bilateral lower extremities.  Both she and her  thinks the swelling has been fairly mild.  She has had no chest pain.  When she wakes up in the morning, occasionally her right hand will be numb.  She denies other neurologic complaints.  She denies bone or joint aches or pains and specifically denies back pain.  She has had no cough.  She has chronic shortness of breath related to COPD which is unchanged from previous.  She denies current abdominal complaints.  After eating she has symptoms of indigestion.  She states it feels like she holds onto the  food for a long period of time.  She denies fevers, chills, or infectious complaints.  She states a senior living incentive was offered at work and she plans to take it.  The remainder of a complete 12 point review of systems is reviewed with the patient was negative with exception that mentioned above.     Past Medical/Surgical History:  Past Medical History:   Diagnosis Date     Acute cystitis without hematuria 10/26/2017     Clostridium difficile diarrhea 1/12/2018     Neutropenic fever (H) 01/10/2018     She was hospitalized 1/10/18 - 1/13/18 with neutropenic fever     Neutropenic sepsis (H) 01/29/2018    hospitalized again  from 1/29/18 - 2/8/18 with neutropenic fever, mucositis, and C. difficile colitis      Pneumonia 11/11/2017    She was hospitalized 11/11/17 - 11/17/17 with fevers ranging from 102 - 105.  CT chest was consistent with pneumonitis.  She also had transaminitis in the 150 range.  She was started on corticosteroids.  Pembrolizumab was stopped      S/P radiation therapy     5,256 cGy completed on 4/27/2018 to [facility] with Dr. Zhong     Past Surgical History:   Procedure Laterality Date     APPENDECTOMY  10/06/2015    [facility]     BREAST BIOPSY, CORE RT/LT Right 08/22/2017     BRONCHOSCOPY (RIGID OR FLEXIBLE), DIAGNOSTIC N/A 2/6/2018    Procedure: COMBINED BRONCHOSCOPY (RIGID OR FLEXIBLE), LAVAGE;  COMBINED BRONCOSCOY (RIGID OR FLEXIBLE), LAVAGE;  Surgeon: Samir Pettit MD;  Location: [facility]     CLEAR LENS REPLACEMENT Bilateral 11/2016     COLONOSCOPY  11/15/2011    Procedure:COLONOSCOPY; Colonoscopy, screening; Surgeon:ANASTASIA BUNCH; Location:[facility] OR     INSERT PORT VASCULAR ACCESS Left 9/1/2017    Procedure: INSERT PORT VASCULAR ACCESS;  Single Lumen Chest Power Port;  Surgeon: Leif Parkinson PA-C;  Location: [facility] OR     LUMPECTOMY BREAST WITH SENTINEL NODE, COMBINED Right 2/27/2018    Procedure: COMBINED LUMPECTOMY BREAST WITH SENTINEL NODE;   Right Wire Localized Lumpectomy, Right [place] Lymph Node Biopsy And Freddy Cath Removal;  Surgeon: Atul Gates MD;  Location: [facility] OR     REMOVE PORT VASCULAR ACCESS N/A 2/27/2018    Procedure: REMOVE PORT VASCULAR ACCESS;;  Surgeon: Atul Gates MD;  Location: [facility] OR     TUBAL LIGATION  12/2004       Allergies:  Allergies as of 03/01/2021     (No Known Allergies)       Current Medications:  Current Outpatient Medications   Medication Sig Dispense Refill     ASPIRIN PO Take 81 mg by mouth daily       DULoxetine (CYMBALTA) 20 MG capsule Take 1 capsule (20 mg) by mouth 2 times daily 60 capsule 11     guaiFENesin (MUCINEX) 600 MG 12 hr tablet Take 2 tablets (1,200 mg) by mouth 2 times daily 180 tablet 0     hydrOXYzine (ATARAX) 25 MG tablet Take 1-2 tablets by mouth every 6 hours if needed for itching 60 tablet 0     order for DME autoCPAP 5-18 cmH20 (Patient not taking: Reported on 1/5/2021) 1 Device 0     PROAIR  (90 Base) MCG/ACT inhaler Inhale 2 Puffs by mouth every 6 hours if needed for shortness of breath / dyspnea. 8.5 g 1     simvastatin (ZOCOR) 40 MG tablet Take 1 tablet (40 mg) by mouth At Bedtime 90 tablet 3     umeclidinium (INCRUSE ELLIPTA) 62.5 MCG/INH inhaler Inhale 1 puff into the lungs daily 30 each 11     VITAMIN D 1000 UNIT OR CAPS Take 3,000 Units by mouth         Family and Social History:  Reviewed and unchanged from prior.  Please see initial consultation dated 8/28/17 for further details.    Physical Exam:  Physical exam deferred due to telephone visit restrictions during the public health emergency.    Laboratory/Imaging Studies:  3/1/2021 Labs:  Electrolytes and creatinine are relatively wnl with the exception of calcium is low at 7.6.  Albumin is low at 2.9.  Calcium corrected for hypoalbuminemia is 8.48 mg/dL  Alkaline phosphatase is elevated at 158.    Blood counts are wnl.    3/1/2021 Breast MRI:  No new areas of enhancement in either breast or lymphadenopathy on my view.   Radiology read is pending.    ASSESSMENT/PLAN:  Ms. Alonzo is a 60-year-old female with a h/o grade 3, T2N0M0, triple-negative infiltrating ductal carcinoma of the right breast s/p 12 weeks of Taxol along with 3 cycles pembrolizumab, 2 cycles of adriamycin and cyclophosphamide, lumpectomy ([id number]), and radiation.     1.  Right breast cancer:   Ms. Alonzo is approximately 3 years out from excision of a right breast cancer.      She is asymptomatic of disease recurrence on history taken today.   Due to a h/o ADH, I have recommended high risk screening with both annual mammogram and breast MRI, spacing the two studies so that she is having some form of imaging every 6 months.  Recommend stopping breast MRIs once she is 5 years out from completion of treatment of her breast cancer.  Breast MRI performed today was reviewed and shows...  I will see her back in 6 months.    2.  Fatigue:  Chronic and likely multi-factorial.  At this point less likely due to breast cancer treatment and more likely due to other medical conditions including COPD and sleep apnea.  She has had sleep study since last visit and is more regularly using her CPAP.  On labs today, she has new hypoalbuminemia.  Given fatigue and SUAZO, will add on an NT-BNP to today's labs to evaluate for CHF.    3.  Hypoalbuminemia:  Denies recent weight loss or change in diet.  ALT and creatinine/GFR are wnl.  Will add on a NT-proBNP to today's labs.  Encouraged to increase intake of lean proteins in the diet.    4.  Depression:  Denies exacerbation of symptoms today.  Currently on Cymbalta 20 mg PO BID.    5.  Elevated alkaline phosphatase:  Suspect secondary to statin.  Will add on a GGT to today's labs to confirm.    6.  Organizing pneumonia/pulmonary nodules:  Chronic COPD.  Had pneumonitis secondary to pembrolizumab during breast cancer treatment.  Follow up CT 10/2019 c/w organizing pneumonia including new pleural-based opacity in the anterolateral RUL.  Has had  serial CT follow up, next due in 06/2021.  She follows with pulmonary nodule clinic for this.    7.  Routine health maintenance:  Next colonoscopy due in 11/2021.  Will obtain CBC every 6 months given h/o anthracycline exposure.      8.  Followup: Labs, visit with me, and bilateral screening mammograms in 6 months.

## 2021-03-01 ENCOUNTER — ANCILLARY PROCEDURE (OUTPATIENT)
Dept: MRI IMAGING | Facility: CLINIC | Age: 61
End: 2021-03-01
Attending: INTERNAL MEDICINE
Payer: COMMERCIAL

## 2021-03-01 ENCOUNTER — VIRTUAL VISIT (OUTPATIENT)
Dept: ONCOLOGY | Facility: CLINIC | Age: 61
End: 2021-03-01
Attending: INTERNAL MEDICINE
Payer: COMMERCIAL

## 2021-03-01 ENCOUNTER — PATIENT OUTREACH (OUTPATIENT)
Dept: ONCOLOGY | Facility: CLINIC | Age: 61
End: 2021-03-01

## 2021-03-01 ENCOUNTER — APPOINTMENT (OUTPATIENT)
Dept: LAB | Facility: CLINIC | Age: 61
End: 2021-03-01
Attending: INTERNAL MEDICINE
Payer: COMMERCIAL

## 2021-03-01 VITALS
SYSTOLIC BLOOD PRESSURE: 132 MMHG | BODY MASS INDEX: 30.45 KG/M2 | WEIGHT: 171.9 LBS | HEART RATE: 97 BPM | RESPIRATION RATE: 16 BRPM | OXYGEN SATURATION: 99 % | TEMPERATURE: 97.1 F | DIASTOLIC BLOOD PRESSURE: 87 MMHG

## 2021-03-01 DIAGNOSIS — R74.8 ALKALINE PHOSPHATASE ELEVATION: ICD-10-CM

## 2021-03-01 DIAGNOSIS — Z12.31 VISIT FOR SCREENING MAMMOGRAM: ICD-10-CM

## 2021-03-01 DIAGNOSIS — Z92.21 STATUS POST CHEMOTHERAPY: ICD-10-CM

## 2021-03-01 DIAGNOSIS — E88.09 HYPOALBUMINEMIA: ICD-10-CM

## 2021-03-01 DIAGNOSIS — Z17.1 MALIGNANT NEOPLASM OF UPPER-INNER QUADRANT OF RIGHT BREAST IN FEMALE, ESTROGEN RECEPTOR NEGATIVE (H): Primary | ICD-10-CM

## 2021-03-01 DIAGNOSIS — E55.9 VITAMIN D DEFICIENCY: ICD-10-CM

## 2021-03-01 DIAGNOSIS — Z12.39 BREAST CANCER SCREENING, HIGH RISK PATIENT: ICD-10-CM

## 2021-03-01 DIAGNOSIS — R53.82 CHRONIC FATIGUE: ICD-10-CM

## 2021-03-01 DIAGNOSIS — E83.51 HYPOCALCEMIA: ICD-10-CM

## 2021-03-01 DIAGNOSIS — E88.09 HYPOALBUMINEMIA: Primary | ICD-10-CM

## 2021-03-01 DIAGNOSIS — C50.211 MALIGNANT NEOPLASM OF UPPER-INNER QUADRANT OF RIGHT BREAST IN FEMALE, ESTROGEN RECEPTOR NEGATIVE (H): Primary | ICD-10-CM

## 2021-03-01 LAB
ALBUMIN SERPL-MCNC: 2.9 G/DL (ref 3.4–5)
ALP SERPL-CCNC: 158 U/L (ref 40–150)
ALT SERPL W P-5'-P-CCNC: 44 U/L (ref 0–50)
ANION GAP SERPL CALCULATED.3IONS-SCNC: 8 MMOL/L (ref 3–14)
AST SERPL W P-5'-P-CCNC: ABNORMAL U/L (ref 0–45)
BASOPHILS # BLD AUTO: 0 10E9/L (ref 0–0.2)
BASOPHILS NFR BLD AUTO: 0.4 %
BILIRUB SERPL-MCNC: 0.4 MG/DL (ref 0.2–1.3)
BUN SERPL-MCNC: 9 MG/DL (ref 7–30)
CALCIUM SERPL-MCNC: 7.6 MG/DL (ref 8.5–10.1)
CHLORIDE SERPL-SCNC: 111 MMOL/L (ref 94–109)
CO2 SERPL-SCNC: 21 MMOL/L (ref 20–32)
CREAT SERPL-MCNC: 0.59 MG/DL (ref 0.52–1.04)
DIFFERENTIAL METHOD BLD: ABNORMAL
EOSINOPHIL # BLD AUTO: 0.2 10E9/L (ref 0–0.7)
EOSINOPHIL NFR BLD AUTO: 2.3 %
ERYTHROCYTE [DISTWIDTH] IN BLOOD BY AUTOMATED COUNT: 11.6 % (ref 10–15)
GFR SERPL CREATININE-BSD FRML MDRD: >90 ML/MIN/{1.73_M2}
GLUCOSE SERPL-MCNC: 86 MG/DL (ref 70–99)
HCT VFR BLD AUTO: 38.9 % (ref 35–47)
HGB BLD-MCNC: 13.5 G/DL (ref 11.7–15.7)
IMM GRANULOCYTES # BLD: 0 10E9/L (ref 0–0.4)
IMM GRANULOCYTES NFR BLD: 0.4 %
LYMPHOCYTES # BLD AUTO: 1.4 10E9/L (ref 0.8–5.3)
LYMPHOCYTES NFR BLD AUTO: 20.2 %
MCH RBC QN AUTO: 33.9 PG (ref 26.5–33)
MCHC RBC AUTO-ENTMCNC: 34.7 G/DL (ref 31.5–36.5)
MCV RBC AUTO: 98 FL (ref 78–100)
MONOCYTES # BLD AUTO: 0.6 10E9/L (ref 0–1.3)
MONOCYTES NFR BLD AUTO: 8 %
NEUTROPHILS # BLD AUTO: 4.8 10E9/L (ref 1.6–8.3)
NEUTROPHILS NFR BLD AUTO: 68.7 %
NRBC # BLD AUTO: 0 10*3/UL
NRBC BLD AUTO-RTO: 0 /100
PLATELET # BLD AUTO: 324 10E9/L (ref 150–450)
POTASSIUM SERPL-SCNC: 4.8 MMOL/L (ref 3.4–5.3)
PROT SERPL-MCNC: 6.6 G/DL (ref 6.8–8.8)
RBC # BLD AUTO: 3.98 10E12/L (ref 3.8–5.2)
SODIUM SERPL-SCNC: 140 MMOL/L (ref 133–144)
WBC # BLD AUTO: 7 10E9/L (ref 4–11)

## 2021-03-01 PROCEDURE — 80048 BASIC METABOLIC PNL TOTAL CA: CPT

## 2021-03-01 PROCEDURE — 84155 ASSAY OF PROTEIN SERUM: CPT

## 2021-03-01 PROCEDURE — 77049 MRI BREAST C-+ W/CAD BI: CPT | Performed by: RADIOLOGY

## 2021-03-01 PROCEDURE — 999N001193 HC VIDEO/TELEPHONE VISIT; NO CHARGE

## 2021-03-01 PROCEDURE — 99214 OFFICE O/P EST MOD 30 MIN: CPT | Mod: 95 | Performed by: INTERNAL MEDICINE

## 2021-03-01 PROCEDURE — 84460 ALANINE AMINO (ALT) (SGPT): CPT

## 2021-03-01 PROCEDURE — 36415 COLL VENOUS BLD VENIPUNCTURE: CPT

## 2021-03-01 PROCEDURE — 82247 BILIRUBIN TOTAL: CPT

## 2021-03-01 PROCEDURE — 82040 ASSAY OF SERUM ALBUMIN: CPT

## 2021-03-01 PROCEDURE — 84075 ASSAY ALKALINE PHOSPHATASE: CPT

## 2021-03-01 PROCEDURE — A9585 GADOBUTROL INJECTION: HCPCS | Performed by: RADIOLOGY

## 2021-03-01 PROCEDURE — 85025 COMPLETE CBC W/AUTO DIFF WBC: CPT | Mod: TEL | Performed by: INTERNAL MEDICINE

## 2021-03-01 RX ORDER — GADOBUTROL 604.72 MG/ML
7.5 INJECTION INTRAVENOUS ONCE
Status: COMPLETED | OUTPATIENT
Start: 2021-03-01 | End: 2021-03-01

## 2021-03-01 RX ADMIN — GADOBUTROL 7.5 ML: 604.72 INJECTION INTRAVENOUS at 09:57

## 2021-03-01 ASSESSMENT — PAIN SCALES - GENERAL: PAINLEVEL: NO PAIN (0)

## 2021-03-01 NOTE — PROGRESS NOTES
RN CARE COORDINATION NOTE      Outgoing: Called patient to advise she needs to have labs drawn tomorrow, the lab is unable to add onto the specimen from today.   She asked for the appointment to be scheduled for 1:00. Message sent to the scheduling pool.       Cristal Hernandez MSN, RN, OCN  RN Care Coordinator  [facility]  [phone]

## 2021-03-01 NOTE — LETTER
"3/1/2021      RE: Ashleigh Alonzo  [address] [place] [address]       Ashleigh Perera is a 60 year old who is being evaluated via a billable telephone visit.      What phone number would you like to be contacted at? [phone]  How would you like to obtain your AVS? Mail a copy     Vitals - Patient Reported  Weight (Patient Reported): 77.1 kg (170 lb)  Height (Patient Reported): 157.5 cm (5' 2\")  BMI (Based on Pt Reported Ht/Wt): 31.09  Pain Score: No Pain (0)      I have reviewed and updated patient's allergy and medication list.    Concerns: RESULTS  Refills: NONE      Maribel Grace CMA    Phone call duration: 25 minutes    35 minutes spent on the date of the encounter doing chart review, review of test results, interpretation of tests, patient visit and documentation.     Oncology Follow Up:  Date on this visit: 3/1/2021    Diagnosis:  Stage IIb, T2N0M0, grade 3 triple negative cancer of the right breast.    Primary Physician: Radha Cazares     History Of Present Illness:  Ms. Alonzo is a 60-year-old female with a h/o stage IIb, T2 N0 M0, grade 3, triple-negative invasive carcinoma of the right breast.  Routine screening mammogram on 07/27/2017 showed developing calcifications in the right breast at the 12 o'clock position 6 cm from the nipple.  Ultrasound demonstrated a 7-mm, irregular, hypoechoic mass at the 12 o'clock position.  Contrast-enhanced mammogram showed a peripherally enhancing, irregular mass measuring 1.9 cm as well as an additional 6-mm, enhancing focus anteromedial to the dominant mass.  The total area of abnormal enhancement on contrast mammogram measured up to 3.4 cm.  Right breast biopsy demonstrated a grade 3 invasive mammary carcinoma with associated high-grade DCIS.  Estrogen and progesterone receptor staining were negative.  HER2 was non-amplified by FISH.  Breast MRI measured the biopsy proven breast cancer at 3.2 cm.    Ms. Alonzo enrolled in the ISPY-2 clinical trial.  She " began treatment with weekly taxol and once every 3 week pembrolizumab on 9/20/17.  Her course was complicated by pneumonitis and hepatitis.  Pembrolizumab was stopped and she resumed weekly taxol alone on 11/28/17.  She completed a total of 12 weeks of therapy on 12/26/17.  She completed 2 cycles of adriamycin and cyclophosphamide.  She was hospitalized both cycles due to neutropenic fever and also developed C. Difficile colitis.  Decision was made to forgo further chemotherapy.       Right breast lumpectomy and sentinel lymph node procedure on 2/27/18 showed a grade 2, 6 mm residual invasive mammary carcinoma with an associated 8 mm area of high grade DCIS with comedonecrosis and ADH.  Invasive tumor cellularity was 10%.  There was lymphovascular invasion.  Surgical margins were negative.  A single sentinel lymph node was benign.  Classified as MDA RCB I.  She completed adjuvant radiation (4256 cGy to the right breast with additional 1000 cGy to the lumpectomy cavity) on 4/27/18.     Interval History:  Ms. Alonzo was contacted via telephone today for routine breast cancer follow-up.  She has felt poorly over the last few months.  She states this is due to excessive fatigue.  She sleeps poorly.  She gets up at 3:30 in the morning in order to get ready for work.  She states at work there is not enough to keep her busy and she gets very bored.  She reports dyspnea on exertion that is increased from baseline.  She has noted increased swelling in her bilateral lower extremities.  Both she and her  thinks the swelling has been fairly mild.  She has had no chest pain.  When she wakes up in the morning, occasionally her right hand will be numb.  She denies other neurologic complaints.  She denies bone or joint aches or pains and specifically denies back pain.  She has had no cough.  She has chronic shortness of breath related to COPD which is unchanged from previous.  She denies current abdominal complaints.  After  eating she has symptoms of indigestion.  She states it feels like she holds onto the food for a long period of time.  She denies fevers, chills, or infectious complaints.  She states a residential incentive was offered at work and she plans to take it.  The remainder of a complete 12 point review of systems is reviewed with the patient was negative with exception that mentioned above.     Past Medical/Surgical History:  Past Medical History:   Diagnosis Date     Acute cystitis without hematuria 10/26/2017     Clostridium difficile diarrhea 1/12/2018     Neutropenic fever (H) 01/10/2018     She was hospitalized 1/10/18 - 1/13/18 with neutropenic fever     Neutropenic sepsis (H) 01/29/2018    hospitalized again  from 1/29/18 - 2/8/18 with neutropenic fever, mucositis, and C. difficile colitis      Pneumonia 11/11/2017    She was hospitalized 11/11/17 - 11/17/17 with fevers ranging from 102 - 105.  CT chest was consistent with pneumonitis.  She also had transaminitis in the 150 range.  She was started on corticosteroids.  Pembrolizumab was stopped      S/P radiation therapy     5,256 cGy completed on 4/27/2018 to [facility] with Dr. Zhong     Past Surgical History:   Procedure Laterality Date     APPENDECTOMY  10/06/2015    [facility]     BREAST BIOPSY, CORE RT/LT Right 08/22/2017     BRONCHOSCOPY (RIGID OR FLEXIBLE), DIAGNOSTIC N/A 2/6/2018    Procedure: COMBINED BRONCHOSCOPY (RIGID OR FLEXIBLE), LAVAGE;  COMBINED BRONCOSCOY (RIGID OR FLEXIBLE), LAVAGE;  Surgeon: Samir Pettit MD;  [facility]     CLEAR LENS REPLACEMENT Bilateral 11/2016     COLONOSCOPY  11/15/2011    Procedure:COLONOSCOPY; Colonoscopy, screening; Surgeon:ANASTASIA BUNCH; Location:[facility]     INSERT PORT VASCULAR ACCESS Left 9/1/2017    Procedure: INSERT PORT VASCULAR ACCESS;  Single Lumen Chest Power Port;  Surgeon: Leif Parkinson PA-C;  [facility]     LUMPECTOMY BREAST WITH SENTINEL NODE,  COMBINED Right 2/27/2018    Procedure: COMBINED LUMPECTOMY BREAST WITH SENTINEL NODE;  Right Wire Localized Lumpectomy, Right [place] Lymph Node Biopsy And Freddy Cath Removal;  Surgeon: Atul Gates MD;  [facility]     REMOVE PORT VASCULAR ACCESS N/A 2/27/2018    Procedure: REMOVE PORT VASCULAR ACCESS;;  Surgeon: Atul Gates MD;  [facility]     TUBAL LIGATION  12/2004       Allergies:  Allergies as of 03/01/2021     (No Known Allergies)       Current Medications:  Current Outpatient Medications   Medication Sig Dispense Refill     ASPIRIN PO Take 81 mg by mouth daily       DULoxetine (CYMBALTA) 20 MG capsule Take 1 capsule (20 mg) by mouth 2 times daily 60 capsule 11     guaiFENesin (MUCINEX) 600 MG 12 hr tablet Take 2 tablets (1,200 mg) by mouth 2 times daily 180 tablet 0     hydrOXYzine (ATARAX) 25 MG tablet Take 1-2 tablets by mouth every 6 hours if needed for itching 60 tablet 0     order for DME autoCPAP 5-18 cmH20 (Patient not taking: Reported on 1/5/2021) 1 Device 0     PROAIR  (90 Base) MCG/ACT inhaler Inhale 2 Puffs by mouth every 6 hours if needed for shortness of breath / dyspnea. 8.5 g 1     simvastatin (ZOCOR) 40 MG tablet Take 1 tablet (40 mg) by mouth At Bedtime 90 tablet 3     umeclidinium (INCRUSE ELLIPTA) 62.5 MCG/INH inhaler Inhale 1 puff into the lungs daily 30 each 11     VITAMIN D 1000 UNIT OR CAPS Take 3,000 Units by mouth         Family and Social History:  Reviewed and unchanged from prior.  Please see initial consultation dated 8/28/17 for further details.    Physical Exam:  Physical exam deferred due to telephone visit restrictions during the public health emergency.    Laboratory/Imaging Studies:  3/1/2021 Labs:  Electrolytes and creatinine are relatively wnl with the exception of calcium is low at 7.6.  Albumin is low at 2.9.  Calcium corrected for hypoalbuminemia is 8.48 mg/dL  Alkaline phosphatase is elevated at 158.    Blood counts are wnl.    3/1/2021 Breast  MRI:  No new areas of enhancement in either breast or lymphadenopathy on my view.  Radiology read is pending.    ASSESSMENT/PLAN:  Ms. Alonzo is a 60-year-old female with a h/o grade 3, T2N0M0, triple-negative infiltrating ductal carcinoma of the right breast s/p 12 weeks of Taxol along with 3 cycles pembrolizumab, 2 cycles of adriamycin and cyclophosphamide, lumpectomy ([id number]), and radiation.     1.  Right breast cancer:   Ms. Alonzo is approximately 3 years out from excision of a right breast cancer.      She is asymptomatic of disease recurrence on history taken today.   Due to a h/o ADH, I have recommended high risk screening with both annual mammogram and breast MRI, spacing the two studies so that she is having some form of imaging every 6 months.  Recommend stopping breast MRIs once she is 5 years out from completion of treatment of her breast cancer.  Breast MRI performed today was reviewed and shows...  I will see her back in 6 months.    2.  Fatigue:  Chronic and likely multi-factorial.  At this point less likely due to breast cancer treatment and more likely due to other medical conditions including COPD and sleep apnea.  She has had sleep study since last visit and is more regularly using her CPAP.  On labs today, she has new hypoalbuminemia.  Given fatigue and SUAZO, will add on an NT-BNP to today's labs to evaluate for CHF.    3.  Hypoalbuminemia:  Denies recent weight loss or change in diet.  ALT and creatinine/GFR are wnl.  Will add on a NT-proBNP to today's labs.  Encouraged to increase intake of lean proteins in the diet.    4.  Depression:  Denies exacerbation of symptoms today.  Currently on Cymbalta 20 mg PO BID.    5.  Elevated alkaline phosphatase:  Suspect secondary to statin.  Will add on a GGT to today's labs to confirm.    6.  Organizing pneumonia/pulmonary nodules:  Chronic COPD.  Had pneumonitis secondary to pembrolizumab during breast cancer treatment.  Follow up CT 10/2019 c/w organizing  pneumonia including new pleural-based opacity in the anterolateral RUL.  Has had serial CT follow up, next due in 06/2021.  She follows with pulmonary nodule clinic for this.    7.  Routine health maintenance:  Next colonoscopy due in 11/2021.  Will obtain CBC every 6 months given h/o anthracycline exposure.      8.  Followup: Labs, visit with me, and bilateral screening mammograms in 6 months.      Fara Bradshaw MD

## 2021-03-01 NOTE — LETTER
"    3/1/2021         RE: Ashleigh Alonzo  [address] [place] [address]        Dear Colleague,    Thank you for referring your patient, Ashleigh lAonzo, to the [facility]. Please see a copy of my visit note below.    Ashleigh Perera is a 60 year old who is being evaluated via a billable telephone visit.      What phone number would you like to be contacted at? [phone]  How would you like to obtain your AVS? Mail a copy     Vitals - Patient Reported  Weight (Patient Reported): 77.1 kg (170 lb)  Height (Patient Reported): 157.5 cm (5' 2\")  BMI (Based on Pt Reported Ht/Wt): 31.09  Pain Score: No Pain (0)      I have reviewed and updated patient's allergy and medication list.    Concerns: RESULTS  Refills: NONE      Maribel Grace CMA    Phone call duration: 25 minutes    35 minutes spent on the date of the encounter doing chart review, review of test results, interpretation of tests, patient visit and documentation.     Oncology Follow Up:  Date on this visit: 3/1/2021    Diagnosis:  Stage IIb, T2N0M0, grade 3 triple negative cancer of the right breast.    Primary Physician: Radha Cazares     History Of Present Illness:  Ms. Alonzo is a 60-year-old female with a h/o stage IIb, T2 N0 M0, grade 3, triple-negative invasive carcinoma of the right breast.  Routine screening mammogram on 07/27/2017 showed developing calcifications in the right breast at the 12 o'clock position 6 cm from the nipple.  Ultrasound demonstrated a 7-mm, irregular, hypoechoic mass at the 12 o'clock position.  Contrast-enhanced mammogram showed a peripherally enhancing, irregular mass measuring 1.9 cm as well as an additional 6-mm, enhancing focus anteromedial to the dominant mass.  The total area of abnormal enhancement on contrast mammogram measured up to 3.4 cm.  Right breast biopsy demonstrated a grade 3 invasive mammary carcinoma with associated high-grade DCIS.  Estrogen and progesterone receptor " staining were negative.  HER2 was non-amplified by FISH.  Breast MRI measured the biopsy proven breast cancer at 3.2 cm.    Ms. Alonzo enrolled in the ISPY-2 clinical trial.  She began treatment with weekly taxol and once every 3 week pembrolizumab on 9/20/17.  Her course was complicated by pneumonitis and hepatitis.  Pembrolizumab was stopped and she resumed weekly taxol alone on 11/28/17.  She completed a total of 12 weeks of therapy on 12/26/17.  She completed 2 cycles of adriamycin and cyclophosphamide.  She was hospitalized both cycles due to neutropenic fever and also developed C. Difficile colitis.  Decision was made to forgo further chemotherapy.       Right breast lumpectomy and sentinel lymph node procedure on 2/27/18 showed a grade 2, 6 mm residual invasive mammary carcinoma with an associated 8 mm area of high grade DCIS with comedonecrosis and ADH.  Invasive tumor cellularity was 10%.  There was lymphovascular invasion.  Surgical margins were negative.  A single sentinel lymph node was benign.  Classified as MDA RCB I.  She completed adjuvant radiation (4256 cGy to the right breast with additional 1000 cGy to the lumpectomy cavity) on 4/27/18.     Interval History:  Ms. Alonzo was contacted via telephone today for routine breast cancer follow-up.  She has felt poorly over the last few months.  She states this is due to excessive fatigue.  She sleeps poorly.  She gets up at 3:30 in the morning in order to get ready for work.  She states at work there is not enough to keep her busy and she gets very bored.  She reports dyspnea on exertion that is increased from baseline.  She has noted increased swelling in her bilateral lower extremities.  Both she and her  thinks the swelling has been fairly mild.  She has had no chest pain.  When she wakes up in the morning, occasionally her right hand will be numb.  She denies other neurologic complaints.  She denies bone or joint aches or pains and specifically  denies back pain.  She has had no cough.  She has chronic shortness of breath related to COPD which is unchanged from previous.  She denies current abdominal complaints.  After eating she has symptoms of indigestion.  She states it feels like she holds onto the food for a long period of time.  She denies fevers, chills, or infectious complaints.  She states a care home incentive was offered at work and she plans to take it.  The remainder of a complete 12 point review of systems is reviewed with the patient was negative with exception that mentioned above.     Past Medical/Surgical History:  Past Medical History:   Diagnosis Date     Acute cystitis without hematuria 10/26/2017     Clostridium difficile diarrhea 1/12/2018     Neutropenic fever (H) 01/10/2018     She was hospitalized 1/10/18 - 1/13/18 with neutropenic fever     Neutropenic sepsis (H) 01/29/2018    hospitalized again  from 1/29/18 - 2/8/18 with neutropenic fever, mucositis, and C. difficile colitis      Pneumonia 11/11/2017    She was hospitalized 11/11/17 - 11/17/17 with fevers ranging from 102 - 105.  CT chest was consistent with pneumonitis.  She also had transaminitis in the 150 range.  She was started on corticosteroids.  Pembrolizumab was stopped      S/P radiation therapy     5,256 cGy completed on 4/27/2018 to [facility] with Dr. Zhong     Past Surgical History:   Procedure Laterality Date     APPENDECTOMY  10/06/2015    [facility]     BREAST BIOPSY, CORE RT/LT Right 08/22/2017     BRONCHOSCOPY (RIGID OR FLEXIBLE), DIAGNOSTIC N/A 2/6/2018    Procedure: COMBINED BRONCHOSCOPY (RIGID OR FLEXIBLE), LAVAGE;  COMBINED BRONCOSCOY (RIGID OR FLEXIBLE), LAVAGE;  Surgeon: Samir Pettit MD;  [facility]     CLEAR LENS REPLACEMENT Bilateral 11/2016     COLONOSCOPY  11/15/2011    Procedure:COLONOSCOPY; Colonoscopy, screening; Surgeon:ANASTASIA BUNCH; Location: OR     INSERT PORT VASCULAR ACCESS Left 9/1/2017     Procedure: INSERT PORT VASCULAR ACCESS;  Single Lumen Chest Power Port;  Surgeon: Leif Parkinson PA-C;  [facility]     LUMPECTOMY BREAST WITH SENTINEL NODE, COMBINED Right 2/27/2018    Procedure: COMBINED LUMPECTOMY BREAST WITH SENTINEL NODE;  Right Wire Localized Lumpectomy, Right [place] Lymph Node Biopsy And Freddy Cath Removal;  Surgeon: Atul Gates MD;  [facility]     REMOVE PORT VASCULAR ACCESS N/A 2/27/2018    Procedure: REMOVE PORT VASCULAR ACCESS;;  Surgeon: Atul Gates MD;  [facility]     TUBAL LIGATION  12/2004       Allergies:  Allergies as of 03/01/2021     (No Known Allergies)       Current Medications:  Current Outpatient Medications   Medication Sig Dispense Refill     ASPIRIN PO Take 81 mg by mouth daily       DULoxetine (CYMBALTA) 20 MG capsule Take 1 capsule (20 mg) by mouth 2 times daily 60 capsule 11     guaiFENesin (MUCINEX) 600 MG 12 hr tablet Take 2 tablets (1,200 mg) by mouth 2 times daily 180 tablet 0     hydrOXYzine (ATARAX) 25 MG tablet Take 1-2 tablets by mouth every 6 hours if needed for itching 60 tablet 0     order for DME autoCPAP 5-18 cmH20 (Patient not taking: Reported on 1/5/2021) 1 Device 0     PROAIR  (90 Base) MCG/ACT inhaler Inhale 2 Puffs by mouth every 6 hours if needed for shortness of breath / dyspnea. 8.5 g 1     simvastatin (ZOCOR) 40 MG tablet Take 1 tablet (40 mg) by mouth At Bedtime 90 tablet 3     umeclidinium (INCRUSE ELLIPTA) 62.5 MCG/INH inhaler Inhale 1 puff into the lungs daily 30 each 11     VITAMIN D 1000 UNIT OR CAPS Take 3,000 Units by mouth         Family and Social History:  Reviewed and unchanged from prior.  Please see initial consultation dated 8/28/17 for further details.    Physical Exam:  Physical exam deferred due to telephone visit restrictions during the public health emergency.    Laboratory/Imaging Studies:  3/1/2021 Labs:  Electrolytes and creatinine are relatively wnl with the exception of calcium is  low at 7.6.  Albumin is low at 2.9.  Calcium corrected for hypoalbuminemia is 8.48 mg/dL  Alkaline phosphatase is elevated at 158.    Blood counts are wnl.    3/1/2021 Breast MRI:  No new areas of enhancement in either breast or lymphadenopathy on my view.  Radiology read is pending.    ASSESSMENT/PLAN:  Ms. Alonzo is a 60-year-old female with a h/o grade 3, T2N0M0, triple-negative infiltrating ductal carcinoma of the right breast s/p 12 weeks of Taxol along with 3 cycles pembrolizumab, 2 cycles of adriamycin and cyclophosphamide, lumpectomy ([id number]), and radiation.     1.  Right breast cancer:   Ms. Alonzo is approximately 3 years out from excision of a right breast cancer.      She is asymptomatic of disease recurrence on history taken today.   Due to a h/o ADH, I have recommended high risk screening with both annual mammogram and breast MRI, spacing the two studies so that she is having some form of imaging every 6 months.  Recommend stopping breast MRIs once she is 5 years out from completion of treatment of her breast cancer.  Breast MRI performed today was reviewed and shows...  I will see her back in 6 months.    2.  Fatigue:  Chronic and likely multi-factorial.  At this point less likely due to breast cancer treatment and more likely due to other medical conditions including COPD and sleep apnea.  She has had sleep study since last visit and is more regularly using her CPAP.  On labs today, she has new hypoalbuminemia.  Given fatigue and SUAZO, will add on an NT-BNP to today's labs to evaluate for CHF.    3.  Hypoalbuminemia:  Denies recent weight loss or change in diet.  ALT and creatinine/GFR are wnl.  Will add on a NT-proBNP to today's labs.  Encouraged to increase intake of lean proteins in the diet.    4.  Depression:  Denies exacerbation of symptoms today.  Currently on Cymbalta 20 mg PO BID.    5.  Elevated alkaline phosphatase:  Suspect secondary to statin.  Will add on a GGT to today's labs to  confirm.    6.  Organizing pneumonia/pulmonary nodules:  Chronic COPD.  Had pneumonitis secondary to pembrolizumab during breast cancer treatment.  Follow up CT 10/2019 c/w organizing pneumonia including new pleural-based opacity in the anterolateral RUL.  Has had serial CT follow up, next due in 06/2021.  She follows with pulmonary nodule clinic for this.    7.  Routine health maintenance:  Next colonoscopy due in 11/2021.  Will obtain CBC every 6 months given h/o anthracycline exposure.      8.  Followup: Labs, visit with me, and bilateral screening mammograms in 6 months.      Again, thank you for allowing me to participate in the care of your patient.        Sincerely,        Fara Bradshaw MD

## 2021-03-01 NOTE — NURSING NOTE
Chief Complaint   Patient presents with     Blood Draw     Labs drawn from IV that was placed in MRI by RN in lab. Vitals taken.      Labs drawn from IV by RN in lab.  Vital signs taken.    Nellie Almaraz RN

## 2021-03-01 NOTE — DISCHARGE INSTRUCTIONS
MRI Contrast Discharge Instructions    The IV contrast you received today will pass out of your body in your  urine. This will happen in the next 24 hours. You will not feel this process.  Your urine will not change color.    Drink at least 4 extra glasses of water or juice today (unless your doctor  has restricted your fluids). This reduces the stress on your kidneys.  You may take your regular medicines.    If you are on dialysis: It is best to have dialysis today.    If you have a reaction: Most reactions happen right away. If you have  any new symptoms after leaving the hospital (such as hives or swelling),  call your hospital at the correct number below. Or call your family doctor.  If you have breathing distress or wheezing, call 911.    Special instructions: ***    I have read and understand the above information.    Signature:______________________________________ Date:___________    Staff:__________________________________________ Date:___________     Time:__________    [facility] Departments:    ___[place]: [phone]  ___[facility]: [phone]  ___[place]: [phone] ___[facility]: [phone]  ___[facility]: [phone]  ___[facility]: [phone]  ___[phone]  ___[facility]: [phone]  ___[phone]

## 2021-03-02 DIAGNOSIS — R74.8 ALKALINE PHOSPHATASE ELEVATION: ICD-10-CM

## 2021-03-02 DIAGNOSIS — E88.09 HYPOALBUMINEMIA: ICD-10-CM

## 2021-03-02 DIAGNOSIS — R53.82 CHRONIC FATIGUE: ICD-10-CM

## 2021-03-02 LAB
CREAT UR-MCNC: 52 MG/DL
DEPRECATED CALCIDIOL+CALCIFEROL SERPL-MC: 48 UG/L (ref 20–75)
GGT SERPL-CCNC: 91 U/L (ref 0–40)
MICROALBUMIN UR-MCNC: <5 MG/L
MICROALBUMIN/CREAT UR: NORMAL MG/G CR (ref 0–25)
NT-PROBNP SERPL-MCNC: 118 PG/ML (ref 0–125)
PTH-INTACT SERPL-MCNC: 40 PG/ML (ref 18–80)

## 2021-03-02 PROCEDURE — 82306 VITAMIN D 25 HYDROXY: CPT | Performed by: INTERNAL MEDICINE

## 2021-03-02 PROCEDURE — 82043 UR ALBUMIN QUANTITATIVE: CPT | Performed by: INTERNAL MEDICINE

## 2021-03-02 PROCEDURE — 83880 ASSAY OF NATRIURETIC PEPTIDE: CPT | Performed by: INTERNAL MEDICINE

## 2021-03-02 PROCEDURE — 83970 ASSAY OF PARATHORMONE: CPT | Performed by: INTERNAL MEDICINE

## 2021-03-02 PROCEDURE — 82977 ASSAY OF GGT: CPT | Performed by: INTERNAL MEDICINE

## 2021-03-02 NOTE — NURSING NOTE
Chief Complaint   Patient presents with     Blood Draw     Labs drawn via  by RN in lab. VS taken.      Humaira Mulligan RN

## 2021-03-03 ENCOUNTER — VIRTUAL VISIT (OUTPATIENT)
Dept: FAMILY MEDICINE | Facility: CLINIC | Age: 61
End: 2021-03-03
Payer: COMMERCIAL

## 2021-03-03 ENCOUNTER — MYC MEDICAL ADVICE (OUTPATIENT)
Dept: FAMILY MEDICINE | Facility: CLINIC | Age: 61
End: 2021-03-03

## 2021-03-03 DIAGNOSIS — R74.8 ELEVATED SERUM GGT LEVEL: ICD-10-CM

## 2021-03-03 DIAGNOSIS — R74.8 ELEVATED ALKALINE PHOSPHATASE LEVEL: Primary | ICD-10-CM

## 2021-03-03 PROCEDURE — 99213 OFFICE O/P EST LOW 20 MIN: CPT | Mod: 95 | Performed by: FAMILY MEDICINE

## 2021-03-03 NOTE — PROGRESS NOTES
Ashleigh Perera is a 60 year old who is being evaluated via a billable video visit.      How would you like to obtain your AVS? MyChart  If the video visit is dropped, the invitation should be resent by: Text to cell phone: [phone]  Will anyone else be joining your video visit? No    Video Start Time: 11:03    Assessment & Plan     Elevated alkaline phosphatase level  Pt to stop statin and recheck levels in 3-4 weeks. If not normalized, will need further work up  - Comprehensive metabolic panel; Future  - GGT; Future    Elevated serum GGT level  same  - Comprehensive metabolic panel; Future  - GGT; Future                 No follow-ups on file.    Susanne Marin MD  [facility] [place]    Subjective   Ashleigh Perera is a 60 year old who presents for the following health issues     HPI       Was seen through oncology and had low protein. Referred back to primary  Has elevated GGT and alk phos  Suspect due to statin  Will have her stop statin and recheck in 3 weeks          Review of Systems   Constitutional, HEENT, cardiovascular, pulmonary, gi and gu systems are negative, except as otherwise noted.      Objective           Vitals:  No vitals were obtained today due to virtual visit.    Physical Exam   GENERAL: Healthy, alert and no distress  EYES: Eyes grossly normal to inspection.  No discharge or erythema, or obvious scleral/conjunctival abnormalities.  RESP: No audible wheeze, cough, or visible cyanosis.  No visible retractions or increased work of breathing.    SKIN: Visible skin clear. No significant rash, abnormal pigmentation or lesions.  NEURO: Cranial nerves grossly intact.  Mentation and speech appropriate for age.  PSYCH: Mentation appears normal, affect normal/bright, judgement and insight intact, normal speech and appearance well-groomed.    Results for orders placed or performed in visit on 03/02/21 (from the past 24 hour(s))   Parathyroid Hormone Intact   Result Value Ref Range     Parathyroid Hormone Intact 40 18 - 80 pg/mL   N terminal pro BNP outpatient   Result Value Ref Range    N-Terminal Pro Bnp 118 0 - 125 pg/mL   Vitamin D deficiency screening   Result Value Ref Range    Vitamin D Deficiency screening 48 20 - 75 ug/L   GGT   Result Value Ref Range    GGT 91 (H) 0 - 40 U/L   Albumin Random Urine Quantitative with Creat Ratio   Result Value Ref Range    Creatinine Urine 52 mg/dL    Albumin Urine mg/L <5 mg/L    Albumin Urine mg/g Cr Unable to calculate due to low value 0 - 25 mg/g Cr               Video-Visit Details    Type of service:  Video Visit    Video End Time:11:09 AM    Originating Location (pt. Location): Home    Distant Location (provider location):  [facility] [place]     Platform used for Video Visit: Cellwitch

## 2021-03-11 ENCOUNTER — OFFICE VISIT (OUTPATIENT)
Dept: OPTOMETRY | Facility: CLINIC | Age: 61
End: 2021-03-11
Payer: COMMERCIAL

## 2021-03-11 DIAGNOSIS — H52.13 MYOPIA OF BOTH EYES: Primary | ICD-10-CM

## 2021-03-11 DIAGNOSIS — H52.4 PRESBYOPIA: ICD-10-CM

## 2021-03-11 DIAGNOSIS — Z96.1 PSEUDOPHAKIA, BOTH EYES: ICD-10-CM

## 2021-03-11 DIAGNOSIS — H52.222 REGULAR ASTIGMATISM OF LEFT EYE: ICD-10-CM

## 2021-03-11 PROCEDURE — 92014 COMPRE OPH EXAM EST PT 1/>: CPT | Performed by: OPTOMETRIST

## 2021-03-11 PROCEDURE — 92015 DETERMINE REFRACTIVE STATE: CPT | Performed by: OPTOMETRIST

## 2021-03-11 ASSESSMENT — TONOMETRY
IOP_METHOD: APPLANATION
OD_IOP_MMHG: 14
OS_IOP_MMHG: 14

## 2021-03-11 ASSESSMENT — REFRACTION_WEARINGRX
OS_ADD: +2.50
OD_SPHERE: +0.25
OD_CYLINDER: SPHERE
OS_CYLINDER: +1.00
SPECS_TYPE: PAL
OS_SPHERE: -1.75
OS_AXIS: 122
OD_ADD: +2.50

## 2021-03-11 ASSESSMENT — REFRACTION_MANIFEST
OS_AXIS: 125
METHOD_AUTOREFRACTION: 1
OD_CYLINDER: SPHERE
OD_CYLINDER: +0.25
OS_AXIS: 122
OS_SPHERE: -2.25
OS_SPHERE: -2.25
OS_CYLINDER: +1.00
OD_AXIS: 048
OS_CYLINDER: +1.25
OD_ADD: +2.50
OD_SPHERE: -0.75
OS_ADD: +2.50
OD_SPHERE: -1.00

## 2021-03-11 ASSESSMENT — SLIT LAMP EXAM - LIDS
COMMENTS: NORMAL
COMMENTS: NORMAL

## 2021-03-11 ASSESSMENT — CUP TO DISC RATIO
OD_RATIO: 0.2
OS_RATIO: 0.2

## 2021-03-11 ASSESSMENT — VISUAL ACUITY
OD_PH_CC+: -3
OS_CC+: -3
OD_CC+: -2
OD_CC: 20/30
OS_CC: 20/40-2
CORRECTION_TYPE: GLASSES
METHOD: SNELLEN - LINEAR
OD_CC: 20/40
OS_PH_CC: 20/30
OS_PH_CC+: -1
OS_CC: 20/40
OD_PH_CC: 20/20

## 2021-03-11 ASSESSMENT — CONF VISUAL FIELD
METHOD: COUNTING FINGERS
OD_NORMAL: 1
OS_NORMAL: 1

## 2021-03-11 ASSESSMENT — KERATOMETRY
OD_K2POWER_DIOPTERS: 45.25
OD_AXISANGLE2_DEGREES: 2
OD_K1POWER_DIOPTERS: 44.00
OS_K2POWER_DIOPTERS: 45.75
OS_AXISANGLE2_DEGREES: 180
OS_K1POWER_DIOPTERS: 43.75

## 2021-03-11 ASSESSMENT — EXTERNAL EXAM - LEFT EYE: OS_EXAM: NORMAL

## 2021-03-11 ASSESSMENT — EXTERNAL EXAM - RIGHT EYE: OD_EXAM: NORMAL

## 2021-03-11 NOTE — PROGRESS NOTES
Chief Complaint   Patient presents with     COMPREHENSIVE EYE EXAM         Last Eye Exam: 3/5/2019  Dilated Previously: Yes    What are you currently using to see?  glasses       Distance Vision Acuity: Noticed gradual change in both eyes, glasses not working for her as well as they should.     Near Vision Acuity: Also thinks that there are some changes     Eye Comfort: good  Do you use eye drops? 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       OBJECTIVE: See Ophthalmology exam    ASSESSMENT:    ICD-10-CM    1. Myopia of both eyes  H52.13    2. Regular astigmatism of left eye  H52.222    3. Presbyopia  H52.4    4. Pseudophakia, both eyes  Z96.1       PLAN:     Patient Instructions   Patient was advised of today's exam findings.  Fill glasses prescription  Allow 2 weeks to adapt to change in glasses  Return in 1 year for eye exam    Maria C Velasco O.D.  [facility]  [address] [place] [address]  [phone]

## 2021-03-11 NOTE — PATIENT INSTRUCTIONS
Patient was advised of today's exam findings.  Fill glasses prescription  Allow 2 weeks to adapt to change in glasses  Return in 1 year for eye exam    Maria C Velasco O.D.  [facility]  [address] [place] [address] [phone]

## 2021-03-11 NOTE — LETTER
3/11/2021         RE: Ashleigh Alonzo  [address] [place] [address]        Dear Colleague,    Thank you for referring your patient, Ashleigh Alonzo, to the [facility]. Please see a copy of my visit note below.    Chief Complaint   Patient presents with     COMPREHENSIVE EYE EXAM         Last Eye Exam: 3/5/2019  Dilated Previously: Yes    What are you currently using to see?  glasses       Distance Vision Acuity: Noticed gradual change in both eyes, glasses not working for her as well as they should.     Near Vision Acuity: Also thinks that there are some changes     Eye Comfort: good  Do you use eye drops? 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       OBJECTIVE: See Ophthalmology exam    ASSESSMENT:    ICD-10-CM    1. Myopia of both eyes  H52.13    2. Regular astigmatism of left eye  H52.222    3. Presbyopia  H52.4    4. Pseudophakia, both eyes  Z96.1       PLAN:     Patient Instructions   Patient was advised of today's exam findings.  Fill glasses prescription  Allow 2 weeks to adapt to change in glasses  Return in 1 year for eye exam    Maria C Velasco O.D.  [facility]  [address] [place], [place] [id number] [phone]           Again, thank you for allowing me to participate in the care of your patient.        Sincerely,        Maria C Velasco, OD

## 2021-03-24 ENCOUNTER — IMMUNIZATION (OUTPATIENT)
Dept: NURSING | Facility: CLINIC | Age: 61
End: 2021-03-24
Payer: COMMERCIAL

## 2021-03-24 PROCEDURE — 91300 PR COVID VAC PFIZER DIL RECON 30 MCG/0.3 ML IM: CPT

## 2021-03-24 PROCEDURE — 0001A PR COVID VAC PFIZER DIL RECON 30 MCG/0.3 ML IM: CPT

## 2021-03-31 DIAGNOSIS — R74.8 ELEVATED SERUM GGT LEVEL: ICD-10-CM

## 2021-03-31 DIAGNOSIS — R74.8 ELEVATED ALKALINE PHOSPHATASE LEVEL: ICD-10-CM

## 2021-03-31 LAB
ALBUMIN SERPL-MCNC: 3.8 G/DL (ref 3.4–5)
ALP SERPL-CCNC: 170 U/L (ref 40–150)
ALT SERPL W P-5'-P-CCNC: 35 U/L (ref 0–50)
ANION GAP SERPL CALCULATED.3IONS-SCNC: 3 MMOL/L (ref 3–14)
AST SERPL W P-5'-P-CCNC: 27 U/L (ref 0–45)
BILIRUB SERPL-MCNC: 0.2 MG/DL (ref 0.2–1.3)
BUN SERPL-MCNC: 14 MG/DL (ref 7–30)
CALCIUM SERPL-MCNC: 9 MG/DL (ref 8.5–10.1)
CHLORIDE SERPL-SCNC: 105 MMOL/L (ref 94–109)
CO2 SERPL-SCNC: 28 MMOL/L (ref 20–32)
CREAT SERPL-MCNC: 0.69 MG/DL (ref 0.52–1.04)
GFR SERPL CREATININE-BSD FRML MDRD: >90 ML/MIN/{1.73_M2}
GGT SERPL-CCNC: 59 U/L (ref 0–40)
GLUCOSE SERPL-MCNC: 93 MG/DL (ref 70–99)
POTASSIUM SERPL-SCNC: 4.4 MMOL/L (ref 3.4–5.3)
PROT SERPL-MCNC: 7.8 G/DL (ref 6.8–8.8)
SODIUM SERPL-SCNC: 136 MMOL/L (ref 133–144)

## 2021-03-31 PROCEDURE — 82977 ASSAY OF GGT: CPT | Performed by: FAMILY MEDICINE

## 2021-03-31 PROCEDURE — 80053 COMPREHEN METABOLIC PANEL: CPT | Performed by: FAMILY MEDICINE

## 2021-03-31 PROCEDURE — 36415 COLL VENOUS BLD VENIPUNCTURE: CPT | Performed by: FAMILY MEDICINE

## 2021-04-01 DIAGNOSIS — R74.8 ELEVATED SERUM GGT LEVEL: ICD-10-CM

## 2021-04-01 DIAGNOSIS — R74.8 ALKALINE PHOSPHATASE ELEVATION: Primary | ICD-10-CM

## 2021-04-03 ENCOUNTER — VIRTUAL VISIT (OUTPATIENT)
Dept: URGENT CARE | Facility: CLINIC | Age: 61
End: 2021-04-03
Payer: COMMERCIAL

## 2021-04-03 DIAGNOSIS — R50.9 FEVER, UNSPECIFIED FEVER CAUSE: ICD-10-CM

## 2021-04-03 DIAGNOSIS — M79.10 MYALGIA: ICD-10-CM

## 2021-04-03 LAB
SARS-COV-2 RNA RESP QL NAA+PROBE: NORMAL
SPECIMEN SOURCE: NORMAL

## 2021-04-03 PROCEDURE — U0003 INFECTIOUS AGENT DETECTION BY NUCLEIC ACID (DNA OR RNA); SEVERE ACUTE RESPIRATORY SYNDROME CORONAVIRUS 2 (SARS-COV-2) (CORONAVIRUS DISEASE [COVID-19]), AMPLIFIED PROBE TECHNIQUE, MAKING USE OF HIGH THROUGHPUT TECHNOLOGIES AS DESCRIBED BY CMS-2020-01-R: HCPCS | Performed by: FAMILY MEDICINE

## 2021-04-03 PROCEDURE — U0005 INFEC AGEN DETEC AMPLI PROBE: HCPCS | Performed by: FAMILY MEDICINE

## 2021-04-03 PROCEDURE — 99213 OFFICE O/P EST LOW 20 MIN: CPT | Mod: TEL | Performed by: FAMILY MEDICINE

## 2021-04-03 NOTE — PATIENT INSTRUCTIONS
Instructions for Patients  Your symptoms show that you may have coronavirus (COVID-19). This illness can cause fever, cough and trouble breathing. Many people get a mild case and get better on their own. Some people can get very sick.     Not all patients are tested for COVID-19. If you need to be tested, your care team will let you know.    How can I protect others?    Without a test, we can t know for sure that you have COVID-19. For safety, it s very important to follow these rules.    Stay home and away from others (self-isolate) until:    You ve had no fever--and no medicine that reduces fever--for 1 full day (24 hours), And     Your other symptoms have resolved (gotten better). For example, your cough or breathing has improved, And     At least 10 days have passed since your symptoms started.    During this time:    Stay in your own room (and use your own bathroom), if you can.    Stay away from others in your home. No hugging, kissing or shaking hands.    No visitors.    Don t go to work, school or anywhere else.     Clean  high touch  surfaces often (doorknobs, counters, handles, etc.). Use a household cleaning spray or wipes.    Cover your mouth and nose with a mask, tissue or washcloth to avoid spreading germs.    Wash your hands and face often. Use soap and water.    For more tips, go to https://www.cdc.gov/coronavirus/2019-ncov/downloads/10Things.pdf.    How can I take care of myself?    1. Get lots of rest. Drink extra fluids (unless a doctor has told you not to).     2. Take Tylenol (acetaminophen) for fever or pain. If you have liver or kidney problems, ask your family doctor if it's okay to take Tylenol.     Adults can take either:     650 mg (two 325 mg pills) every 4 to 6 hours, or     1,000 mg (two 500 mg pills) every 8 hours as needed.     Note: Don't take more than 3,000 mg in one day.   Acetaminophen is found in many medicines (both prescribed and over-the-counter medicines). Read all labels  "to be sure you don't take too much.   For children, check the Tylenol bottle for the right dose. The dose is based on  the child's age or weight.    3. If you have other health problems (like cancer, heart failure, an organ transplant or severe kidney disease): Call your specialty clinic if you don't feel better in the next 2 days.    4. Know when to call 911: If your breathing is so bad that it keeps you from doing normal activities, call 911 or go to the emergency room. Tell them that you've been staying home and may have COVID-19.    Sign Up for [employer]  COVID-19 Symptoms  We know it's scary to hear you might have COVID-19. We want to track your symptoms to make sure you're OK over the next 2 weeks.    Please look for an email from [employer]. This is a free, online program that we'll use to keep in touch. To sign up, click the link in the email you get.    If you don't get an email, please call your [facility] clinic. Ask them to sign you up for My[employer].    You can learn more at [email or web address].  For informational purposes only. Not to replace the advice of your health care provider.   Copyright   2020 [facility]. Clinically reviewed by [employer]. All rights reserved. [employer] [id number] - 04/20.      How can I protect others?  If you have symptoms (fever, cough, body aches or trouble breathing): Stay home and away from others (self-isolate) until:    At least 10 days have passed since your symptoms started, And     You ve had no fever--and no medicine that reduces fever--for 1 full day (24 hours), And      Your other symptoms have resolved (gotten better).     If you don t have symptoms, but a test showed that you have COVID-19 (you tested positive):    Stay home and away from others (self-isolate). Follow the tips under \"How do I self-isolate?\" below for 10 days (20 days if you have a weak immune system).    You don't need to be retested for COVID-19 " before going back to school or work. As long as you're fever-free and feeling better, you can go back to school, work and other activities after waiting the 10 or 20 days.     How do I self-isolate?    Stay in your own room, even for meals. Use your own bathroom if you can.     Stay away from others in your home. No hugging, kissing or shaking hands. No visitors.    Don t go to work, school or anywhere else.     Clean  high touch  surfaces often (doorknobs, counters, handles, etc.). Use a household cleaning spray or wipes. You ll find a full list on the EPA website:  www.epa.gov/pesticide-registration/list-n-disinfectants-use-against-sars-cov-2.    Cover your mouth and nose with a mask, tissue or washcloth to avoid spreading germs.    Wash your hands and face often. Use soap and water.    Caregivers in these groups are at risk for severe illness due to COVID-19:  o People 65 years and older  o People who live in a nursing home or long-term care facility  o People with chronic disease (lung, heart, cancer, diabetes, kidney, liver, immunologic)  o People who have a weakened immune system, including those who:  - Are in cancer treatment  - Take medicine that weakens the immune system, such as corticosteroids  - Had a bone marrow or organ transplant  - Have an immune deficiency  - Have poorly controlled HIV or AIDS  - Are obese (body mass index of 40 or higher)  - Smoke regularly    Caregivers should wear gloves while washing dishes, handling laundry and cleaning bedrooms and bathrooms.    Use caution when washing and drying laundry: Don t shake dirty laundry, and use the warmest water setting that you can.    For more tips, go to www.cdc.gov/coronavirus/2019-ncov/downloads/10Things.pdf.    How can I take care of myself?  1. Get lots of rest. Drink extra fluids (unless a doctor has told you not to).     2. Take Tylenol (acetaminophen) for fever or pain. If you have liver or kidney problems, ask your family doctor if  it s okay to take Tylenol.     Adults can take either:     650 mg (two 325 mg pills) every 4 to 6 hours, or     1,000 mg (two 500 mg pills) every 8 hours as needed.     Note: Don t take more than 3,000 mg in one day.   Acetaminophen is found in many medicines (both prescribed and over-the-counter medicines). Read all labels to be sure you don t take too much.     For children, check the Tylenol bottle for the right dose. The dose is based on the child s age or weight.    3. If you have other health problems (like cancer, heart failure, an organ transplant or severe kidney disease): Call your specialty clinic if you don t feel better in the next 2 days.    4. Know when to call 911: Emergency warning signs include:    Trouble breathing or shortness of breath    Pain or pressure in the chest that doesn t go away    Feeling confused like you haven t felt before, or not being able to wake up    Bluish-colored lips or face    What are the symptoms of COVID-19?     The most common symptoms are cough, fever and trouble breathing.     Less common symptoms include body aches, chills, diarrhea (loose, watery poops), fatigue (feeling very tired), headache, runny nose, sore throat and loss of smell.    COVID-19 can cause severe coughing (bronchitis) and lung infection (pneumonia).    How does it spread?     The virus may spread when a person coughs or sneezes into the air. The virus can travel about 6 feet this way, and it can live on surfaces.      Common  (household disinfectants) will kill the virus.    Who is at risk?  Anyone can catch COVID-19 if they re around someone who has the virus.    How can others protect themselves?     Stay away from people who have COVID-19 (or symptoms of COVID-19).    Wash hands often with soap and water. Or, use hand  with at least 60% alcohol.    Avoid touching the eyes, nose or mouth.     Wear a face mask when you go out in public, when sick or when caring for a sick  person.    Where can I get more information?    [facility]: About COVID-19: [email or web address]    CDC: What to Do If You re Sick: www.cdc.gov/coronavirus/2019-ncov/about/steps-when-sick.html    CDC: Ending Home Isolation: www.cdc.gov/coronavirus/2019-ncov/hcp/disposition-in-home-patients.html     CDC: Caring for Someone: www.cdc.gov/coronavirus/2019-ncov/if-you-are-sick/care-for-someone.html     [facility]: Interim Guidance for Hospital Discharge to Home: www.health.[facility].[place]./diseases/coronavirus/hcp/hospdischarge.pdf    [facility] clinical trials (COVID-19 research studies): clinicalaffairs.[facility]-clinical-trials     Below are the COVID-19 hotlines at the [place] Department of Health ([facility]). Interpreters are available.   o For health questions: Call 603-340-3085 or 1-825.128.8044 (7 a.m. to 7 p.m.)  o For questions about schools and childcare: Call 271-972-2694 or 1-675.482.3683 (7 a.m. to 7 p.m.)          Thank you for limiting contact with others, wearing a simple mask to cover your cough, practice good hand hygiene habits and accessing our virtual services where possible to limit the spread of this virus.    For more information about COVID19 and options for caring for yourself at home, please visit the CDC website at https://www.cdc.gov/coronavirus/2019-ncov/about/steps-when-sick.html  For more options for care at [facility], please visit our website at [email or web address]

## 2021-04-03 NOTE — PROGRESS NOTES
"  The patient has been notified of following:     \"This telephone visit will be conducted via a call between you and your physician/provider. We have found that certain health care needs can be provided without the need for a physical exam.  This service lets us provide the care you need with a short phone conversation.  If a prescription is necessary we can send it directly to your pharmacy.  If lab work is needed we can place an order for that and you can then stop by our lab to have the test done at a later time.    Telephone visits are billed at different rates depending on your insurance coverage. During this emergency period, for some insurers they may be billed the same as an in-person visit.  Please reach out to your insurance provider with any questions.    If during the course of the call the physician/provider feels a telephone visit is not appropriate, you will not be charged for this service.\"     Patient has given verbal consent for Telephone visit?  Yes    What phone number would you like to be contacted at? [phone]    How would you like to obtain your AVS? [employer]    Subjective   CC: Ashleigh Alonzo  is a 60 year old female who presents via phone visit today for the following health issues:   Chief Complaint   Patient presents with     Covid Concern     Infection        Concern for COVID-19  About how many days ago did these symptoms start? 3  Is this your first visit for this illness? Yes  In the 14 days before your symptoms started, have you had close contact with someone with COVID-19 (Coronavirus)? I do not know.  Do you have a fever or chills? Yes, the highest temperature was 99  Are you having new or worsening difficulty breathing? No  Do you have new or worsening cough? No  Have you had any new or unexplained body aches? YES    Have you experienced any of the following NEW symptoms?    Headache: YES    Sore throat: No    Loss of taste or smell: No    Chest pain: No    Diarrhea: No    Rash: " No  What treatments have you tried? none  Who do you live with? Did not ask  Are you, or a household member, a healthcare worker or a ? No  Do you live in a nursing home, group home, or shelter? No  Do you have a way to get food/medications if quarantined? yes          Current Outpatient Medications   Medication Sig Dispense Refill     ASPIRIN PO Take 81 mg by mouth daily       DULoxetine (CYMBALTA) 20 MG capsule Take 1 capsule (20 mg) by mouth 2 times daily 60 capsule 11     guaiFENesin (MUCINEX) 600 MG 12 hr tablet Take 2 tablets (1,200 mg) by mouth 2 times daily 180 tablet 0     hydrOXYzine (ATARAX) 25 MG tablet Take 1-2 tablets by mouth every 6 hours if needed for itching 60 tablet 0     order for DME autoCPAP 5-18 cmH20 1 Device 0     PROAIR  (90 Base) MCG/ACT inhaler Inhale 2 Puffs by mouth every 6 hours if needed for shortness of breath / dyspnea. 8.5 g 1     simvastatin (ZOCOR) 40 MG tablet Take 1 tablet (40 mg) by mouth At Bedtime 90 tablet 3     umeclidinium (INCRUSE ELLIPTA) 62.5 MCG/INH inhaler Inhale 1 puff into the lungs daily 30 each 11     VITAMIN D 1000 UNIT OR CAPS Take 3,000 Units by mouth            Reviewed and updated as needed this visit by Provider  Tobacco  Allergies  Meds  Problems  Med Hx  Surg Hx  Fam Hx          Review of Systems   negative except listed in HPI        Objective    Gen: Patient is alert, oriented  Resp: Speaking full sentence, no audible shortness of breath.  No cough of wheeze.  Psych: mentation appears normal and affect normal/bright    Diagnostic Test Results:  Labs reviewed in Epic  none           Assessment/Plan:  1. Fever, unspecified fever cause     - Symptomatic COVID-19 Virus (Coronavirus) by PCR; Future  - [employer] [facility] - Daily outreach email for English-speaking adults/peds    2. Myalgia     - Symptomatic COVID-19 Virus (Coronavirus) by PCR; Future  - [employer] [facility] - Daily outreach email for English-speaking adults/peds    Covid PCR  testing curbside  Get well loop    Phone call duration:  15 minutes    Deb Geiger MD

## 2021-04-04 LAB
LABORATORY COMMENT REPORT: NORMAL
SARS-COV-2 RNA RESP QL NAA+PROBE: NEGATIVE
SPECIMEN SOURCE: NORMAL

## 2021-04-06 ENCOUNTER — ANCILLARY PROCEDURE (OUTPATIENT)
Dept: ULTRASOUND IMAGING | Facility: CLINIC | Age: 61
End: 2021-04-06
Attending: FAMILY MEDICINE
Payer: COMMERCIAL

## 2021-04-06 DIAGNOSIS — R74.8 ELEVATED ALKALINE PHOSPHATASE LEVEL: ICD-10-CM

## 2021-04-06 DIAGNOSIS — R74.8 ELEVATED SERUM GGT LEVEL: ICD-10-CM

## 2021-04-06 PROCEDURE — 76700 US EXAM ABDOM COMPLETE: CPT | Performed by: RADIOLOGY

## 2021-04-09 ENCOUNTER — MYC MEDICAL ADVICE (OUTPATIENT)
Dept: FAMILY MEDICINE | Facility: CLINIC | Age: 61
End: 2021-04-09

## 2021-04-09 DIAGNOSIS — R10.84 ABDOMINAL PAIN, GENERALIZED: Primary | ICD-10-CM

## 2021-04-09 DIAGNOSIS — K80.20 GALLSTONES: ICD-10-CM

## 2021-04-14 ENCOUNTER — OFFICE VISIT (OUTPATIENT)
Dept: NURSING | Facility: CLINIC | Age: 61
End: 2021-04-14
Attending: INTERNAL MEDICINE
Payer: COMMERCIAL

## 2021-04-14 PROCEDURE — 91300 PR COVID VAC PFIZER DIL RECON 30 MCG/0.3 ML IM: CPT

## 2021-04-14 PROCEDURE — 0002A PR COVID VAC PFIZER DIL RECON 30 MCG/0.3 ML IM: CPT

## 2021-04-19 ENCOUNTER — TELEPHONE (OUTPATIENT)
Dept: SURGERY | Facility: CLINIC | Age: 61
End: 2021-04-19

## 2021-04-19 ENCOUNTER — OFFICE VISIT (OUTPATIENT)
Dept: SURGERY | Facility: CLINIC | Age: 61
End: 2021-04-19
Payer: COMMERCIAL

## 2021-04-19 VITALS
BODY MASS INDEX: 30.65 KG/M2 | SYSTOLIC BLOOD PRESSURE: 138 MMHG | HEART RATE: 80 BPM | WEIGHT: 173 LBS | DIASTOLIC BLOOD PRESSURE: 80 MMHG

## 2021-04-19 DIAGNOSIS — K80.20 CALCULUS OF GALLBLADDER WITHOUT CHOLECYSTITIS WITHOUT OBSTRUCTION: Primary | ICD-10-CM

## 2021-04-19 DIAGNOSIS — Z20.822 ENCOUNTER FOR LABORATORY TESTING FOR COVID-19 VIRUS: ICD-10-CM

## 2021-04-19 DIAGNOSIS — Z11.59 ENCOUNTER FOR SCREENING FOR OTHER VIRAL DISEASES: ICD-10-CM

## 2021-04-19 PROCEDURE — 99204 OFFICE O/P NEW MOD 45 MIN: CPT | Performed by: SURGERY

## 2021-04-19 NOTE — TELEPHONE ENCOUNTER
Type of surgery: cholecystectomy,laparoscopic,with cholangiogram (n/a)  CPT 22305, 58329   Calculus of gallbladder without cholecystitis without obstruction K80.20    Location of surgery: [facility]  Date and time of surgery: 4-27-21  TBD  Surgeon: Dr Lechuga  Pre-Op Appt Date: 4-23-21  Post-Op Appt Date: 5-12-21   Packet sent out: Yes  Pre-cert/Authorization completed: No prior auth required per [employer] online list.  Date: 4-19-21    Jojo Jackson  Prior Authorization Dept  [phone]

## 2021-04-19 NOTE — PROGRESS NOTES
"Patient seen in consultation for gallstone by Susanne Marin    HPI:  Patient is a 60 year old female  with complaints of elevated liver labs (GGT and Alk phos)  The patient noticed the symptoms about 1.5-3 months ago.  Has some \"indigestion\" or possible reflux issues, also had felt a pain in the RUQ. Could feel full even after a small meal, more frequent belching  Had radiation to the right side for breast cancer  RUQ pain is intermittent, so relation to meals/foods that she has found  nothing makes the episode better.  Patient has not family history of gallbladder problems    Review Of Systems    Skin: thickened on right from radiation  Ears/Nose/Throat: negative  Respiratory: copd, lung nodules being monitored  Cardiovascular: negative  Gastrointestinal: as above  Genitourinary: negative  Musculoskeletal: negative  Neurologic: negative  Hematologic/Lymphatic/Immunologic: negative  Endocrine: negative      Past Medical History:   Diagnosis Date     Acute cystitis without hematuria 10/26/2017     Clostridium difficile diarrhea 1/12/2018     Neutropenic fever (H) 01/10/2018     She was hospitalized 1/10/18 - 1/13/18 with neutropenic fever     Neutropenic sepsis (H) 01/29/2018    hospitalized again  from 1/29/18 - 2/8/18 with neutropenic fever, mucositis, and C. difficile colitis      Nonsenile cataract      Pneumonia 11/11/2017    She was hospitalized 11/11/17 - 11/17/17 with fevers ranging from 102 - 105.  CT chest was consistent with pneumonitis.  She also had transaminitis in the 150 range.  She was started on corticosteroids.  Pembrolizumab was stopped      S/P radiation therapy     5,256 cGy completed on 4/27/2018 to [facility] with Dr. Zhong       Past Surgical History:   Procedure Laterality Date     APPENDECTOMY  10/06/2015    [facility]     BREAST BIOPSY, CORE RT/LT Right 08/22/2017     BRONCHOSCOPY (RIGID OR FLEXIBLE), DIAGNOSTIC N/A 02/06/2018    Procedure: COMBINED " BRONCHOSCOPY (RIGID OR FLEXIBLE), LAVAGE;  COMBINED BRONCOSCOY (RIGID OR FLEXIBLE), LAVAGE;  Surgeon: Samir Pettit MD;  Location: [facility]     CATARACT IOL, RT/LT       CLEAR LENS REPLACEMENT Bilateral 2016    [facility]     COLONOSCOPY  11/15/2011    Procedure:COLONOSCOPY; Colonoscopy, screening; Surgeon:ANASTASIA BUNCH; Location:[facility]     INSERT PORT VASCULAR ACCESS Left 2017    Procedure: INSERT PORT VASCULAR ACCESS;  Single Lumen Chest Power Port;  Surgeon: Leif Parkinson PA-C;  [facility]     LUMPECTOMY BREAST WITH SENTINEL NODE, COMBINED Right 2018    Procedure: COMBINED LUMPECTOMY BREAST WITH SENTINEL NODE;  Right Wire Localized Lumpectomy, Right [place] Lymph Node Biopsy And Freddy Cath Removal;  Surgeon: Atul Gates MD;  Location: [facility]     REMOVE PORT VASCULAR ACCESS N/A 2018    Procedure: REMOVE PORT VASCULAR ACCESS;;  Surgeon: Atul Gates MD;  [facility]: [facility]     TUBAL LIGATION  2004       Social History     Socioeconomic History     Marital status:      Spouse name: Aly     Number of children: 2     Years of education: Not on file     Highest education level: Not on file   Occupational History     Occupation: Equipment dispatcher     Employer: [employer]    Social Needs     Financial resource strain: Not on file     Food insecurity     Worry: Never true     Inability: Never true     Transportation needs     Medical: No     Non-medical: No   Tobacco Use     Smoking status: Former Smoker     Packs/day: 0.75     Years: 20.00     Pack years: 15.00     Types: Cigarettes     Quit date: 2000     Years since quittin.3     Smokeless tobacco: Never Used   Substance and Sexual Activity     Alcohol use: Yes     Comment: Daily to weekly use (beer)     Drug use: No     Sexual activity: Not Currently     Partners: Male     Birth control/protection: Post-menopausal, Female Surgical     Comment: Hx. tubal ligation   Lifestyle     Physical  activity     Days per week: Not on file     Minutes per session: Not on file     Stress: Not at all   Relationships     Social connections     Talks on phone: Once a week     Gets together: Not on file     Attends Gnosticism service: Not on file     Active member of club or organization: Not on file     Attends meetings of clubs or organizations: Not on file     Relationship status: Not on file     Intimate partner violence     Fear of current or ex partner: No     Emotionally abused: Not on file     Physically abused: Not on file     Forced sexual activity: Not on file   Other Topics Concern     Parent/sibling w/ CABG, MI or angioplasty before 65F 55M? Yes   Social History Narrative    Patient lives in [place]. She lives in a [place] with her . 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 No recent travel. No international travel. No sick contacts or exposure to small children (she has small grand children, 1-1 y/o but has not seen them in a few weeks). They have two dogs and a cat, and she takes care of the litter box sometimes, however she has not clean after the cat since her cancer diagnosis.       Current Outpatient Medications   Medication Sig Dispense Refill     ASPIRIN PO Take 81 mg by mouth daily       DULoxetine (CYMBALTA) 20 MG capsule Take 1 capsule (20 mg) by mouth 2 times daily 60 capsule 11     guaiFENesin (MUCINEX) 600 MG 12 hr tablet Take 2 tablets (1,200 mg) by mouth 2 times daily 180 tablet 0     hydrOXYzine (ATARAX) 25 MG tablet Take 1-2 tablets by mouth every 6 hours if needed for itching 60 tablet 0     order for DME autoCPAP 5-18 cmH20 1 Device 0     PROAIR  (90 Base) MCG/ACT inhaler Inhale 2 Puffs by mouth every 6 hours if needed for shortness of breath / dyspnea. 8.5 g 1     simvastatin (ZOCOR) 40 MG tablet Take 1 tablet (40 mg) by mouth At Bedtime 90 tablet 3     umeclidinium (INCRUSE ELLIPTA) 62.5 MCG/INH inhaler Inhale 1 puff  into the lungs daily 30 each 11     VITAMIN D 1000 UNIT OR CAPS Take 3,000 Units by mouth          Medications and history reviewed    Physical exam:  Vitals: /80   Pulse 80   Wt 78.5 kg (173 lb)   BMI 30.65 kg/m    BMI= Body mass index is 30.65 kg/m .    Constitutional: healthy, alert and no distress  Head: Normocephalic. No masses, lesions, tenderness or abnormalities  Gastrointestinal: Abdomen soft, non-tender. BS normal. No masses, organomegaly  : Deferred  Musculoskeletal: extremities normal- no gross deformities noted, gait normal and normal muscle tone  Skin: no suspicious lesions or rashes  Psychiatric: mentation appears normal and affect normal/bright    Labs show:  GGT 59 down from 94 in march  ALk Phos 170  Rest of CMP normal    Imaging shows: personally viewed  US ABDOMEN COMPLETE 4/6/2021 1:47 PM     CLINICAL HISTORY: Elevated alkaline phosphatase level; Elevated serum  GGT level.     TECHNIQUE: Complete abdominal ultrasound.     COMPARISON: None.     FINDINGS:     GALLBLADDER: Possible cholelithiasis, no cholecystitis demonstrated.     BILE DUCTS: No biliary dilatation. The common duct measures 4 mm.     LIVER: Increased echogenicity compatible with fatty infiltration.  Focal areas of decreased echogenicity could be focal fatty sparing. No  intrahepatic biliary dilatation. Unremarkable contour.     RIGHT KIDNEY: Normal size. Normal echogenicity with no hydronephrosis  or mass.      LEFT KIDNEY: Normal size. Normal echogenicity with no hydronephrosis  or mass.      SPLEEN: Normal.     PANCREAS: The visualized portions are normal.     AORTA: Normal in caliber.      IVC: Normal where visualized.     No ascites.                                                                      IMPRESSION:  1.  Cholelithiasis without cholecystitis.  2.  A stone appears in the neck of the gallbladder and may be  obstructing outflow.  3.  Heterogeneous fatty infiltration of the liver.    Assessment:      ICD-10-CM    1. Calculus of gallbladder without cholecystitis without obstruction  K80.20 Case Request: CHOLECYSTECTOMY, LAPAROSCOPIC, WITH CHOLANGIOGRAM   2. Encounter for laboratory testing for COVID-19 virus  Z20.822 Asymptomatic COVID-19 Virus (Coronavirus) by PCR     Plan: Discussed imaging findings and what to expect with surgery. Risks of bleeding, infection, anesthesia complications, conversion to open, injury to nearby structures and bile duct injury all discussed. Also possibility of ERCP if blocking ductal stone found.  We went over my discharge instructions and post-op restrictions.  Patient questions answered and we will schedule the procedure.  Discussed doing intra-op cholangiogram given the lab abnormalities.    Jaxon Lechuga MD

## 2021-04-19 NOTE — LETTER
"    4/19/2021         RE: Ashleigh Alonzo  [address]  [place] [address]        Dear Colleague,    Thank you for referring your patient, Ashleigh Alonzo[facility] Please see a copy of my visit note below.    Patient seen in consultation for gallstone by Susanne Marin    HPI:  Patient is a 60 year old female  with complaints of elevated liver labs (GGT and Alk phos)  The patient noticed the symptoms about 1.5-3 months ago.  Has some \"indigestion\" or possible reflux issues, also had felt a pain in the RUQ. Could feel full even after a small meal, more frequent belching  Had radiation to the right side for breast cancer  RUQ pain is intermittent, so relation to meals/foods that she has found  nothing makes the episode better.  Patient has not family history of gallbladder problems    Review Of Systems    Skin: thickened on right from radiation  Ears/Nose/Throat: negative  Respiratory: copd, lung nodules being monitored  Cardiovascular: negative  Gastrointestinal: as above  Genitourinary: negative  Musculoskeletal: negative  Neurologic: negative  Hematologic/Lymphatic/Immunologic: negative  Endocrine: negative      Past Medical History:   Diagnosis Date     Acute cystitis without hematuria 10/26/2017     Clostridium difficile diarrhea 1/12/2018     Neutropenic fever (H) 01/10/2018     She was hospitalized 1/10/18 - 1/13/18 with neutropenic fever     Neutropenic sepsis (H) 01/29/2018    hospitalized again  from 1/29/18 - 2/8/18 with neutropenic fever, mucositis, and C. difficile colitis      Nonsenile cataract      Pneumonia 11/11/2017    She was hospitalized 11/11/17 - 11/17/17 with fevers ranging from 102 - 105.  CT chest was consistent with pneumonitis.  She also had transaminitis in the 150 range.  She was started on corticosteroids.  Pembrolizumab was stopped      S/P radiation therapy     5,256 cGy completed on 4/27/2018 to [facility] with " Dr. Zhong       Past Surgical History:   Procedure Laterality Date     APPENDECTOMY  10/06/2015    [facility]     BREAST BIOPSY, CORE RT/LT Right 2017     BRONCHOSCOPY (RIGID OR FLEXIBLE), DIAGNOSTIC N/A 2018    Procedure: COMBINED BRONCHOSCOPY (RIGID OR FLEXIBLE), LAVAGE;  COMBINED BRONCOSCOY (RIGID OR FLEXIBLE), LAVAGE;  Surgeon: Samir Pettit MD;  [facility]     CATARACT IOL, RT/LT       CLEAR LENS REPLACEMENT Bilateral 2016    [facility]     COLONOSCOPY  11/15/2011    Procedure:COLONOSCOPY; Colonoscopy, screening; Surgeon:ANASTASIA BUNCH; Location[facility]     INSERT PORT VASCULAR ACCESS Left 2017    Procedure: INSERT PORT VASCULAR ACCESS;  Single Lumen Chest Power Port;  Surgeon: Leif Parkinson PA-C;  [facility]     LUMPECTOMY BREAST WITH SENTINEL NODE, COMBINED Right 2018    Procedure: COMBINED LUMPECTOMY BREAST WITH SENTINEL NODE;  Right Wire Localized Lumpectomy, Right [place] Lymph Node Biopsy And Freddy Cath Removal;  Surgeon: Atul Gates MD;  [facility]     REMOVE PORT VASCULAR ACCESS N/A 2018    Procedure: REMOVE PORT VASCULAR ACCESS;;  Surgeon: Atul Gates MD;  [facility]     TUBAL LIGATION  2004       Social History     Socioeconomic History     Marital status:      Spouse name: Aly     Number of children: 2     Years of education: Not on file     Highest education level: Not on file   Occupational History     Occupation: Equipment dispatcher     Employer: [employer]    Social Needs     Financial resource strain: Not on file     Food insecurity     Worry: Never true     Inability: Never true     Transportation needs     Medical: No     Non-medical: No   Tobacco Use     Smoking status: Former Smoker     Packs/day: 0.75     Years: 20.00     Pack years: 15.00     Types: Cigarettes     Quit date: 2000     Years since quittin.3     Smokeless tobacco: Never Used   Substance and Sexual Activity      Alcohol use: Yes     Comment: Daily to weekly use (beer)     Drug use: No     Sexual activity: Not Currently     Partners: Male     Birth control/protection: Post-menopausal, Female Surgical     Comment: Hx. tubal ligation   Lifestyle     Physical activity     Days per week: Not on file     Minutes per session: Not on file     Stress: Not at all   Relationships     Social connections     Talks on phone: Once a week     Gets together: Not on file     Attends Synagogue service: Not on file     Active member of club or organization: Not on file     Attends meetings of clubs or organizations: Not on file     Relationship status: Not on file     Intimate partner violence     Fear of current or ex partner: No     Emotionally abused: Not on file     Physically abused: Not on file     Forced sexual activity: Not on file   Other Topics Concern     Parent/sibling w/ CABG, MI or angioplasty before 65F 55M? Yes   Social History Narrative    Patient lives in [facility] [place] [facility]. She lives in a [place]y bean and hay farm with her . 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 No recent travel. No international travel. No sick contacts or exposure to small children (she has small grand children, 1-3 y/o but has not seen them in a few weeks). They have two dogs and a cat, and she takes care of the litter box sometimes, however she has not clean after the cat since her cancer diagnosis.       Current Outpatient Medications   Medication Sig Dispense Refill     ASPIRIN PO Take 81 mg by mouth daily       DULoxetine (CYMBALTA) 20 MG capsule Take 1 capsule (20 mg) by mouth 2 times daily 60 capsule 11     guaiFENesin (MUCINEX) 600 MG 12 hr tablet Take 2 tablets (1,200 mg) by mouth 2 times daily 180 tablet 0     hydrOXYzine (ATARAX) 25 MG tablet Take 1-2 tablets by mouth every 6 hours if needed for itching 60 tablet 0     order for DME autoCPAP 5-18 cmH20 1 Device 0     PROAIR  (90 Base) MCG/ACT inhaler  Inhale 2 Puffs by mouth every 6 hours if needed for shortness of breath / dyspnea. 8.5 g 1     simvastatin (ZOCOR) 40 MG tablet Take 1 tablet (40 mg) by mouth At Bedtime 90 tablet 3     umeclidinium (INCRUSE ELLIPTA) 62.5 MCG/INH inhaler Inhale 1 puff into the lungs daily 30 each 11     VITAMIN D 1000 UNIT OR CAPS Take 3,000 Units by mouth          Medications and history reviewed    Physical exam:  Vitals: /80   Pulse 80   Wt 78.5 kg (173 lb)   BMI 30.65 kg/m    BMI= Body mass index is 30.65 kg/m .    Constitutional: healthy, alert and no distress  Head: Normocephalic. No masses, lesions, tenderness or abnormalities  Gastrointestinal: Abdomen soft, non-tender. BS normal. No masses, organomegaly  : Deferred  Musculoskeletal: extremities normal- no gross deformities noted, gait normal and normal muscle tone  Skin: no suspicious lesions or rashes  Psychiatric: mentation appears normal and affect normal/bright    Labs show:  GGT 59 down from 94 in march  ALk Phos 170  Rest of CMP normal    Imaging shows: personally viewed  US ABDOMEN COMPLETE 4/6/2021 1:47 PM     CLINICAL HISTORY: Elevated alkaline phosphatase level; Elevated serum  GGT level.     TECHNIQUE: Complete abdominal ultrasound.     COMPARISON: None.     FINDINGS:     GALLBLADDER: Possible cholelithiasis, no cholecystitis demonstrated.     BILE DUCTS: No biliary dilatation. The common duct measures 4 mm.     LIVER: Increased echogenicity compatible with fatty infiltration.  Focal areas of decreased echogenicity could be focal fatty sparing. No  intrahepatic biliary dilatation. Unremarkable contour.     RIGHT KIDNEY: Normal size. Normal echogenicity with no hydronephrosis  or mass.      LEFT KIDNEY: Normal size. Normal echogenicity with no hydronephrosis  or mass.      SPLEEN: Normal.     PANCREAS: The visualized portions are normal.     AORTA: Normal in caliber.      IVC: Normal where visualized.     No ascites.                                                                       IMPRESSION:  1.  Cholelithiasis without cholecystitis.  2.  A stone appears in the neck of the gallbladder and may be  obstructing outflow.  3.  Heterogeneous fatty infiltration of the liver.    Assessment:     ICD-10-CM    1. Calculus of gallbladder without cholecystitis without obstruction  K80.20 Case Request: CHOLECYSTECTOMY, LAPAROSCOPIC, WITH CHOLANGIOGRAM   2. Encounter for laboratory testing for COVID-19 virus  Z20.822 Asymptomatic COVID-19 Virus (Coronavirus) by PCR     Plan: Discussed imaging findings and what to expect with surgery. Risks of bleeding, infection, anesthesia complications, conversion to open, injury to nearby structures and bile duct injury all discussed. Also possibility of ERCP if blocking ductal stone found.  We went over my discharge instructions and post-op restrictions.  Patient questions answered and we will schedule the procedure.  Discussed doing intra-op cholangiogram given the lab abnormalities.    Jaxon Lechuga MD        Again, thank you for allowing me to participate in the care of your patient.        Sincerely,        Jaxon Lechuga MD

## 2021-04-22 DIAGNOSIS — G93.32 CHRONIC FATIGUE SYNDROME: ICD-10-CM

## 2021-04-22 DIAGNOSIS — R05.8 COUGH WITH SPUTUM: ICD-10-CM

## 2021-04-22 DIAGNOSIS — F33.1 MODERATE EPISODE OF RECURRENT MAJOR DEPRESSIVE DISORDER (H): ICD-10-CM

## 2021-04-22 NOTE — PATIENT INSTRUCTIONS

## 2021-04-22 NOTE — PROGRESS NOTES
[facility]  [address] [facility] [address]  Phone: [phone]  Primary Provider: Susanne Marin  Pre-op Performing Provider: SUSANNE MARIN      PREOPERATIVE EVALUATION:  Today's date: 4/27/2021    Ashleigh Alonzo is a 60 year old female who presents for a preoperative evaluation.    Surgical Information:  Surgery/Procedure: cholecystectomy   Surgery Location:[facility]   Surgeon: Alexandrea   Surgery Date: 4/30/21  Time of Surgery: 12:15   Where patient plans to recover: At home with family  Fax number for surgical facility: Note does not need to be faxed, will be available electronically in Epic.    Type of Anesthesia Anticipated: to be determined    Assessment & Plan     The proposed surgical procedure is considered LOW risk.    Preop general physical exam  Per surgery    Calculus of gallbladder without cholecystitis without obstruction  Per surgery    Chronic obstructive pulmonary disease, unspecified COPD type (H)  Stable on current meds    MERLIN (obstructive sleep apnea)- 'mild' (AHI 9)  Pt to bring in dental appliance for sleep apnea    Drug-induced polyneuropathy (H)  stable    Mild major depression (H)  Stable on meds           Risks and Recommendations:  The patient has the following additional risks and recommendations for perioperative complications:   - No identified additional risk factors other than previously addressed    Medication Instructions:   - Statins: Continue taking on the day of surgery.    - SSRIs, SNRIs, TCAs, Antipsychotics: Continue without modification.    - LABA, inhaled corticosteroid, long-acting anticholinergics: Continue without modification.   - rescue Inhaler: Continue PRN. Bring to hospital on the day of surgery.    RECOMMENDATION:  APPROVAL GIVEN to proceed with proposed procedure, without further diagnostic evaluation.                      Subjective     HPI related to upcoming procedure: pt to undergo  cholecystectomy due to abdominal pain and gallstones      Preop Questions 4/27/2021   1. Have you ever had a heart attack or stroke? No   2. Have you ever had surgery on your heart or blood vessels, such as a stent placement, a coronary artery bypass, or surgery on an artery in your head, neck, heart, or legs? No   3. Do you have chest pain with activity? No   4. Do you have a history of  heart failure? No   5. Do you currently have a cold, bronchitis or symptoms of other infection? No   6. Do you have a cough, shortness of breath, or wheezing? No   7. Do you or anyone in your family have previous history of blood clots? No   8. Do you or does anyone in your family have a serious bleeding problem such as prolonged bleeding following surgeries or cuts? No   9. Have you ever had problems with anemia or been told to take iron pills? No   10. Have you had any abnormal blood loss such as black, tarry or bloody stools, or abnormal vaginal bleeding? No   11. Have you ever had a blood transfusion? YES - 2017 due to breast cancer   11a. Have you ever had a transfusion reaction? No   12. Are you willing to have a blood transfusion if it is medically needed before, during, or after your surgery? Yes   13. Have you or any of your relatives ever had problems with anesthesia? No   14. Do you have sleep apnea, excessive snoring or daytime drowsiness? YES - MERLIN   14a. Do you have a CPAP machine? Yes   15. Do you have any artifical heart valves or other implanted medical devices like a pacemaker, defibrillator, or continuous glucose monitor? No   16. Do you have artificial joints? No   17. Are you allergic to latex? No   18. Is there any chance that you may be pregnant? No     Health Care Directive:  Patient does not have a Health Care Directive or Living Will: Discussed advance care planning with patient; information given to patient to review.    Preoperative Review of :   reviewed - no record of controlled substances  prescribed.      Status of Chronic Conditions:  COPD - Patient has a longstanding history of moderate-severe COPD . Patient has been doing well overall noting NO SYMPTOMS and continues on medication regimen consisting of incruse ellipta and albuterol without adverse reactions or side effects.    DEPRESSION - Patient has a long history of Depression of moderate severity requiring medication for control with recent symptoms being stable..Current symptoms of depression include none.     HYPERLIPIDEMIA - Patient has a long history of significant Hyperlipidemia requiring medication for treatment with recent good control. Patient reports no problems or side effects with the medication.     SLEEP PROBLEM - Patient has a longstanding history of sleep apnea.. Patient has CPAP machine    Review of Systems  Constitutional, neuro, ENT, endocrine, pulmonary, cardiac, gastrointestinal, genitourinary, musculoskeletal, integument and psychiatric systems are negative, except as otherwise noted.    Patient Active Problem List    Diagnosis Date Noted     Hyperlipidemia LDL goal <130 10/03/2010     Priority: High     Calculus of gallbladder without cholecystitis without obstruction 04/19/2021     Priority: Medium     Added automatically from request for surgery [id number]       Drug-induced polyneuropathy (H) 08/26/2020     Priority: Medium     Periodontal disease      Priority: Medium     Long term (current) use of systemic steroids 02/05/2018     Priority: Medium     Tongue ulcer 02/05/2018     Priority: Medium     Malignant neoplasm of upper-inner quadrant of right breast in female, estrogen receptor negative (H) 08/22/2017     Priority: Medium      Stage IIa, T2 N0 M0, grade 3, triple-negative invasive carcinoma of the right breast.  Routine bilateral screening mammogram on 07/27/2017. Right breast biopsy demonstrated a grade 3 invasive mammary carcinoma with associated high-grade DCIS.       Ms. Alonzo enrolled in the ISPY-2 clinical  trial.  She began treatment with weekly taxol and once every 3 week pembrolizumab on 9/20/17.  She was hospitalized 11/11/17 - 11/17/17 with fevers ranging from 102 - 105.  CT chest was consistent with pneumonitis.  She also had transaminitis in the 150 range.  She was started on corticosteroids.  Pembrolizumab was stopped and she resumed weekly taxol alone on 11/28/17.  She completed a total of 12 weeks of therapy on 12/26/17.  She received first cycle of adriamycin and cyclophosphamide on 1/2/18.  She was hospitalized 1/10/18 - 1/13/18 with neutropenic fever.  She was hospitalized again  from 1/29/18 - 2/8/18 with neutropenic fever, mucositis, and C. difficile colitis following cycle #2.  Given poor clinical status and frequent hospitalizations, plan was made to forgo the planned final 2 cycles of AC and proceed with chemotherapy.        On 2/27/18 she underwent right breast lumpectomy and sentinel lymph node procedure.  Pathology showed a grade 2, 6 mm residual invasive mammary carcinoma with an associated 8 mm area of high grade DCIS with comedonecrosis and ADH.  Invasive tumor cellularity was 10%.  There was lymphovascular invasion.  Surgical margins were negative for both invasive and noninvasive disease.  A single sentinel lymph node was benign.  MDA residual cancer burden was class I.      Elected radiation therapy, finished 4/2018.             Chronic obstructive pulmonary disease, unspecified COPD type (H) 07/25/2016     Priority: Medium     PFTS 11/2014 - FEV1- 1.36 (52%), ratio 0.55, DLCO 86% Dr Francisco Vicente      Priority: Medium     MERLIN (obstructive sleep apnea)- 'mild' (AHI 9) 12/11/2015     Priority: Medium     Study Date: 12/4/2015-  (155.0 lbs) apnea/hypopnea index was 21.0 events per hour.  The REM AHI was 57.3 events per hour.  The supine AHI was 50.6 events per hour.  Transcutaneous carbon dioxide monitoring was not used, however hypoventilation was suggested by oximetry.. Lowest  oxygen saturation was 60.7%.  Time spent below 89% was 26.8 minutes.  Study Date: 1/29/2016- CPAP final  pressure achieved was 15 cmH2O with a residual AHI of 2.2 events per hour.  Time in REM supine on final pressure was 16.0 minutes. Supine REM was also seen at 10 cm with  fair control of events. Further titration principally done for airflow limitations.   9/6/2018 [facility] Sleep Study (144.0 lbs) - AHI 9.7, RDI 20.7, Supine AHI 19.1, REM AHI 21.9, Low O2 63.3%, Time Spent ?88% 6.4 minutes / Time Spent ?89% 8.0 minutes.       Lung nodule, multiple 11/17/2015     Priority: Medium     Stable for >2 years and statistically benign.       Family history of ischemic heart disease 06/25/2012     Priority: Medium     Mild major depression (H) 06/25/2012     Priority: Medium     Lack of libido 06/22/2011     Priority: Medium     Anisocoria 05/21/2010     Priority: Medium     Anxiety 04/08/2010     Priority: Medium      Past Medical History:   Diagnosis Date     Acute cystitis without hematuria 10/26/2017     Clostridium difficile diarrhea 1/12/2018     Neutropenic fever (H) 01/10/2018     She was hospitalized 1/10/18 - 1/13/18 with neutropenic fever     Neutropenic sepsis (H) 01/29/2018    hospitalized again  from 1/29/18 - 2/8/18 with neutropenic fever, mucositis, and C. difficile colitis      Nonsenile cataract      Pneumonia 11/11/2017    She was hospitalized 11/11/17 - 11/17/17 with fevers ranging from 102 - 105.  CT chest was consistent with pneumonitis.  She also had transaminitis in the 150 range.  She was started on corticosteroids.  Pembrolizumab was stopped      S/P radiation therapy     5,256 cGy completed on 4/27/2018 to [facility] with Dr. Zhong     Past Surgical History:   Procedure Laterality Date     APPENDECTOMY  10/06/2015    [facility]     BREAST BIOPSY, CORE RT/LT Right 08/22/2017     BRONCHOSCOPY (RIGID OR FLEXIBLE), DIAGNOSTIC N/A 02/06/2018    Procedure:  COMBINED BRONCHOSCOPY (RIGID OR FLEXIBLE), LAVAGE;  COMBINED BRONCOSCOY (RIGID OR FLEXIBLE), LAVAGE;  Surgeon: Samir Pettit MD;  Location: [facility]     CATARACT IOL, RT/LT       CLEAR LENS REPLACEMENT Bilateral 11/2016    [facility]     COLONOSCOPY  11/15/2011    Procedure:COLONOSCOPY; Colonoscopy, screening; Surgeon:ANASTASIA BUNCH; Location:[facility] OR     INSERT PORT VASCULAR ACCESS Left 09/01/2017    Procedure: INSERT PORT VASCULAR ACCESS;  Single Lumen Chest Power Port;  Surgeon: Leif Parkinson PA-C;  Location: [facility]     LUMPECTOMY BREAST WITH SENTINEL NODE, COMBINED Right 02/27/2018    Procedure: COMBINED LUMPECTOMY BREAST WITH SENTINEL NODE;  Right Wire Localized Lumpectomy, Right [place] Lymph Node Biopsy And Freddy Cath Removal;  Surgeon: Atul Gates MD;  Location: [facility] OR     REMOVE PORT VASCULAR ACCESS N/A 02/27/2018    Procedure: REMOVE PORT VASCULAR ACCESS;;  Surgeon: Atul Gates MD;  Location: [facility]     TUBAL LIGATION  12/2004     Current Outpatient Medications   Medication Sig Dispense Refill     ASPIRIN PO Take 81 mg by mouth daily       DULoxetine (CYMBALTA) 20 MG capsule Take 1 capsule by mouth 2 times daily. 60 capsule 11     hydrOXYzine (ATARAX) 25 MG tablet Take 1-2 tablets by mouth every 6 hours if needed for itching 60 tablet 0     MUCINEX 600 MG 12 hr tablet Take 2 tablets by mouth 2 times daily. 180 tablet 0     order for DME autoCPAP 5-18 cmH20 1 Device 0     PROAIR  (90 Base) MCG/ACT inhaler Inhale 2 Puffs by mouth every 6 hours if needed for shortness of breath / dyspnea. 8.5 g 1     simvastatin (ZOCOR) 40 MG tablet Take 1 tablet (40 mg) by mouth At Bedtime 90 tablet 3     umeclidinium (INCRUSE ELLIPTA) 62.5 MCG/INH inhaler Inhale 1 puff into the lungs daily 30 each 11     VITAMIN D 1000 UNIT OR CAPS Take 3,000 Units by mouth          No Known Allergies     Social History     Tobacco Use     Smoking status: Former Smoker     Packs/day: 0.75     Years: 20.00     Pack  "years: 15.00     Types: Cigarettes     Quit date: 2000     Years since quittin.3     Smokeless tobacco: Never Used   Substance Use Topics     Alcohol use: Yes     Comment: Daily to weekly use (beer)     Family History   Problem Relation Age of Onset     Cardiovascular Father 46        MI     Eye Disorder Father      Cerebrovascular Disease Father      Arthritis Sister      Neurologic Disorder Brother      Eye Surgery Maternal Grandmother         cataract     Prostate Cancer Maternal Grandfather      Prostate Cancer Maternal Uncle      Cancer Maternal Uncle         Throat cancer     Diabetes No family hx of      Hypertension No family hx of      Glaucoma No family hx of      Macular Degeneration No family hx of      History   Drug Use No         Objective     /80   Pulse 109   Temp 98.7  F (37.1  C) (Oral)   Ht 1.6 m (5' 3\")   Wt 77.6 kg (171 lb)   BMI 30.29 kg/m      Physical Exam    GENERAL APPEARANCE: healthy, alert and no distress     EYES: EOMI, PERRL     HENT: ear canals and TM's normal and nose and mouth without ulcers or lesions     NECK: no adenopathy, no asymmetry, masses, or scars and thyroid normal to palpation     RESP: lungs clear to auscultation - no rales, rhonchi or wheezes     CV: regular rates and rhythm, normal S1 S2, no S3 or S4 and no murmur, click or rub     ABDOMEN:  soft, nontender, no HSM or masses and bowel sounds normal     MS: extremities normal- no gross deformities noted, no evidence of inflammation in joints, FROM in all extremities.     SKIN: no suspicious lesions or rashes     NEURO: Normal strength and tone, sensory exam grossly normal, mentation intact and speech normal     PSYCH: mentation appears normal. and affect normal/bright     LYMPHATICS: No cervical adenopathy    Recent Labs   Lab Test 21  1202 21  1040 19  0934 19  0934   HGB  --  13.5  --  14.0   PLT  --  324  --  276    140   < > 138   POTASSIUM 4.4 4.8   < > 4.3   CR " 0.69 0.59   < > 0.70    < > = values in this interval not displayed.        Diagnostics:  No labs were ordered during this visit.   No EKG required for low risk surgery (cataract, skin procedure, breast biopsy, etc).     Lab Results   Component Value Date    WBC 7.0 03/01/2021     Lab Results   Component Value Date    RBC 3.98 03/01/2021     Lab Results   Component Value Date    HGB 13.5 03/01/2021     Lab Results   Component Value Date    HCT 38.9 03/01/2021     No components found for: MCT  Lab Results   Component Value Date    MCV 98 03/01/2021     Lab Results   Component Value Date    MCH 33.9 03/01/2021     Lab Results   Component Value Date    MCHC 34.7 03/01/2021     Lab Results   Component Value Date    RDW 11.6 03/01/2021     Lab Results   Component Value Date     03/01/2021       Last Comprehensive Metabolic Panel:  Sodium   Date Value Ref Range Status   03/31/2021 136 133 - 144 mmol/L Final     Potassium   Date Value Ref Range Status   03/31/2021 4.4 3.4 - 5.3 mmol/L Final     Chloride   Date Value Ref Range Status   03/31/2021 105 94 - 109 mmol/L Final     Carbon Dioxide   Date Value Ref Range Status   03/31/2021 28 20 - 32 mmol/L Final     Anion Gap   Date Value Ref Range Status   03/31/2021 3 3 - 14 mmol/L Final     Glucose   Date Value Ref Range Status   03/31/2021 93 70 - 99 mg/dL Final     Comment:     Non Fasting     Urea Nitrogen   Date Value Ref Range Status   03/31/2021 14 7 - 30 mg/dL Final     Creatinine   Date Value Ref Range Status   03/31/2021 0.69 0.52 - 1.04 mg/dL Final     GFR Estimate   Date Value Ref Range Status   03/31/2021 >90 >60 mL/min/[1.73_m2] Final     Comment:     Non  GFR Calc  Starting 12/18/2018, serum creatinine based estimated GFR (eGFR) will be   calculated using the Chronic Kidney Disease Epidemiology Collaboration   (CKD-EPI) equation.       Calcium   Date Value Ref Range Status   03/31/2021 9.0 8.5 - 10.1 mg/dL Final         Revised Cardiac Risk  Index (RCRI):  The patient has the following serious cardiovascular risks for perioperative complications:   - No serious cardiac risks = 0 points     RCRI Interpretation: 0 points: Class I (very low risk - 0.4% complication rate)           Signed Electronically by: Susanne Marin MD  Copy of this evaluation report is provided to requesting physician.

## 2021-04-22 NOTE — TELEPHONE ENCOUNTER
Routing refill request to provider for review/approval because:  Drug not on the [facility] refill protocol     Next 5 appointments (look out 90 days)    Apr 27, 2021 11:00 AM  Pre-Op physical with Susanne Marin MD  [facility] [address] [facility] [address]  [phone]   Apr 27, 2021 11:30 AM  Pre-procedure Covid with [facility]  [facility] ([facility] [address] [facility] [address]  [phone]        Junie Flanagan BSN, RN

## 2021-04-23 NOTE — TELEPHONE ENCOUNTER
[facility] Triage Note    Medication requested: Cymbalta  Last filled:3/4/20 Dr. Bradshaw    Last oncology clinic visit: 3/1/21  Provider: Leeann    Next oncology clinic visit: 9/13/21    Medication review notes from last provider visit:   Depression:  Denies exacerbation of symptoms today.  Currently on Cymbalta 20 mg PO BID.    Routed to provider for refill

## 2021-04-24 RX ORDER — DULOXETIN HYDROCHLORIDE 20 MG/1
CAPSULE, DELAYED RELEASE ORAL
Qty: 60 CAPSULE | Refills: 11 | Status: SHIPPED | OUTPATIENT
Start: 2021-04-24 | End: 2021-07-22

## 2021-04-27 ENCOUNTER — OFFICE VISIT (OUTPATIENT)
Dept: FAMILY MEDICINE | Facility: CLINIC | Age: 61
End: 2021-04-27
Payer: COMMERCIAL

## 2021-04-27 VITALS
HEIGHT: 63 IN | HEART RATE: 109 BPM | BODY MASS INDEX: 30.3 KG/M2 | SYSTOLIC BLOOD PRESSURE: 123 MMHG | WEIGHT: 171 LBS | DIASTOLIC BLOOD PRESSURE: 80 MMHG | TEMPERATURE: 98.7 F

## 2021-04-27 DIAGNOSIS — F32.0 MILD MAJOR DEPRESSION (H): ICD-10-CM

## 2021-04-27 DIAGNOSIS — Z01.818 PREOP GENERAL PHYSICAL EXAM: Primary | ICD-10-CM

## 2021-04-27 DIAGNOSIS — G62.0 DRUG-INDUCED POLYNEUROPATHY (H): ICD-10-CM

## 2021-04-27 DIAGNOSIS — J44.9 CHRONIC OBSTRUCTIVE PULMONARY DISEASE, UNSPECIFIED COPD TYPE (H): ICD-10-CM

## 2021-04-27 DIAGNOSIS — G47.33 OSA (OBSTRUCTIVE SLEEP APNEA): ICD-10-CM

## 2021-04-27 DIAGNOSIS — K80.20 CALCULUS OF GALLBLADDER WITHOUT CHOLECYSTITIS WITHOUT OBSTRUCTION: ICD-10-CM

## 2021-04-27 DIAGNOSIS — Z11.59 ENCOUNTER FOR SCREENING FOR OTHER VIRAL DISEASES: ICD-10-CM

## 2021-04-27 DIAGNOSIS — L50.9 URTICARIA: ICD-10-CM

## 2021-04-27 LAB
SARS-COV-2 RNA RESP QL NAA+PROBE: NORMAL
SPECIMEN SOURCE: NORMAL

## 2021-04-27 PROCEDURE — 99214 OFFICE O/P EST MOD 30 MIN: CPT | Mod: 25 | Performed by: FAMILY MEDICINE

## 2021-04-27 PROCEDURE — U0003 INFECTIOUS AGENT DETECTION BY NUCLEIC ACID (DNA OR RNA); SEVERE ACUTE RESPIRATORY SYNDROME CORONAVIRUS 2 (SARS-COV-2) (CORONAVIRUS DISEASE [COVID-19]), AMPLIFIED PROBE TECHNIQUE, MAKING USE OF HIGH THROUGHPUT TECHNOLOGIES AS DESCRIBED BY CMS-2020-01-R: HCPCS | Performed by: SURGERY

## 2021-04-27 PROCEDURE — 90715 TDAP VACCINE 7 YRS/> IM: CPT | Performed by: FAMILY MEDICINE

## 2021-04-27 PROCEDURE — U0005 INFEC AGEN DETEC AMPLI PROBE: HCPCS | Performed by: SURGERY

## 2021-04-27 PROCEDURE — 90471 IMMUNIZATION ADMIN: CPT | Performed by: FAMILY MEDICINE

## 2021-04-27 ASSESSMENT — MIFFLIN-ST. JEOR: SCORE: 1314.78

## 2021-04-27 NOTE — NURSING NOTE
Prior to immunization administration, verified patients identity using patient s name and date of birth. Please see Immunization Activity for additional information.     Screening Questionnaire for Adult Immunization    Are you sick today?   No   Do you have allergies to medications, food, a vaccine component or latex?   No   Have you ever had a serious reaction after receiving a vaccination?   No   Do you have a long-term health problem with heart, lung, kidney, or metabolic disease (e.g., diabetes), asthma, a blood disorder, no spleen, complement component deficiency, a cochlear implant, or a spinal fluid leak?  Are you on long-term aspirin therapy?   Yes copd   Do you have cancer, leukemia, HIV/AIDS, or any other immune system problem?   No   Do you have a parent, brother, or sister with an immune system problem?   No   In the past 3 months, have you taken medications that affect  your immune system, such as prednisone, other steroids, or anticancer drugs; drugs for the treatment of rheumatoid arthritis, Crohn s disease, or psoriasis; or have you had radiation treatments?   No   Have you had a seizure, or a brain or other nervous system problem?   No   During the past year, have you received a transfusion of blood or blood    products, or been given immune (gamma) globulin or antiviral drug?   No   For women: Are you pregnant or is there a chance you could become       pregnant during the next month?   No   Have you received any vaccinations in the past 4 weeks?   Yes     Immunization questionnaire was positive for at least one answer.  Notified Dr. Susanne Church  .        Per orders of Dr. Church, injection of Tdap given by Fely Kwon MA. Patient instructed to remain in clinic for 15 minutes afterwards, and to report any adverse reaction to me immediately.       Screening performed by Fely Kwon MA on 4/27/2021 at 11:51 AM.

## 2021-04-28 RX ORDER — HYDROXYZINE HYDROCHLORIDE 25 MG/1
TABLET, FILM COATED ORAL
Qty: 60 TABLET | Refills: 0 | Status: SHIPPED | OUTPATIENT
Start: 2021-04-28 | End: 2021-07-22

## 2021-04-29 ENCOUNTER — ANESTHESIA EVENT (OUTPATIENT)
Dept: SURGERY | Facility: AMBULATORY SURGERY CENTER | Age: 61
End: 2021-04-29

## 2021-04-30 ENCOUNTER — ANESTHESIA (OUTPATIENT)
Dept: SURGERY | Facility: AMBULATORY SURGERY CENTER | Age: 61
End: 2021-04-30

## 2021-04-30 ENCOUNTER — HOSPITAL ENCOUNTER (OUTPATIENT)
Facility: AMBULATORY SURGERY CENTER | Age: 61
Discharge: HOME | End: 2021-04-30
Attending: SURGERY | Admitting: SURGERY
Payer: COMMERCIAL

## 2021-04-30 ENCOUNTER — ANCILLARY PROCEDURE (OUTPATIENT)
Dept: GENERAL RADIOLOGY | Facility: CLINIC | Age: 61
End: 2021-04-30
Attending: SURGERY
Payer: COMMERCIAL

## 2021-04-30 VITALS
SYSTOLIC BLOOD PRESSURE: 155 MMHG | RESPIRATION RATE: 16 BRPM | TEMPERATURE: 97.2 F | OXYGEN SATURATION: 92 % | HEART RATE: 98 BPM | DIASTOLIC BLOOD PRESSURE: 85 MMHG

## 2021-04-30 DIAGNOSIS — K80.20 CALCULUS OF GALLBLADDER WITHOUT CHOLECYSTITIS WITHOUT OBSTRUCTION: ICD-10-CM

## 2021-04-30 DIAGNOSIS — K80.20 CALCULUS OF GALLBLADDER WITHOUT CHOLECYSTITIS WITHOUT OBSTRUCTION: Primary | ICD-10-CM

## 2021-04-30 PROCEDURE — G8916 PT W IV AB GIVEN ON TIME: HCPCS

## 2021-04-30 PROCEDURE — 47563 LAPARO CHOLECYSTECTOMY/GRAPH: CPT

## 2021-04-30 PROCEDURE — 47563 LAPARO CHOLECYSTECTOMY/GRAPH: CPT | Performed by: SURGERY

## 2021-04-30 PROCEDURE — G8907 PT DOC NO EVENTS ON DISCHARG: HCPCS

## 2021-04-30 PROCEDURE — 88304 TISSUE EXAM BY PATHOLOGIST: CPT | Performed by: PATHOLOGY

## 2021-04-30 RX ORDER — GABAPENTIN 300 MG/1
300 CAPSULE ORAL ONCE
Status: COMPLETED | OUTPATIENT
Start: 2021-04-30 | End: 2021-04-30

## 2021-04-30 RX ORDER — ESMOLOL HYDROCHLORIDE 10 MG/ML
INJECTION INTRAVENOUS PRN
Status: DISCONTINUED | OUTPATIENT
Start: 2021-04-30 | End: 2021-04-30

## 2021-04-30 RX ORDER — DEXAMETHASONE SODIUM PHOSPHATE 4 MG/ML
INJECTION, SOLUTION INTRA-ARTICULAR; INTRALESIONAL; INTRAMUSCULAR; INTRAVENOUS; SOFT TISSUE PRN
Status: DISCONTINUED | OUTPATIENT
Start: 2021-04-30 | End: 2021-04-30

## 2021-04-30 RX ORDER — CEFAZOLIN SODIUM 2 G/100ML
2 INJECTION, SOLUTION INTRAVENOUS SEE ADMIN INSTRUCTIONS
Status: DISCONTINUED | OUTPATIENT
Start: 2021-04-30 | End: 2021-05-01 | Stop reason: HOSPADM

## 2021-04-30 RX ORDER — HYDROCODONE BITARTRATE AND ACETAMINOPHEN 5; 325 MG/1; MG/1
1-2 TABLET ORAL EVERY 4 HOURS PRN
Qty: 10 TABLET | Refills: 0 | Status: SHIPPED | OUTPATIENT
Start: 2021-04-30 | End: 2021-08-30

## 2021-04-30 RX ORDER — FENTANYL CITRATE 50 UG/ML
INJECTION, SOLUTION INTRAMUSCULAR; INTRAVENOUS PRN
Status: DISCONTINUED | OUTPATIENT
Start: 2021-04-30 | End: 2021-04-30

## 2021-04-30 RX ORDER — ACETAMINOPHEN 325 MG/1
975 TABLET ORAL ONCE
Status: COMPLETED | OUTPATIENT
Start: 2021-04-30 | End: 2021-04-30

## 2021-04-30 RX ORDER — ONDANSETRON 2 MG/ML
INJECTION INTRAMUSCULAR; INTRAVENOUS PRN
Status: DISCONTINUED | OUTPATIENT
Start: 2021-04-30 | End: 2021-04-30

## 2021-04-30 RX ORDER — OXYCODONE HYDROCHLORIDE 5 MG/1
5 TABLET ORAL EVERY 4 HOURS PRN
Status: DISCONTINUED | OUTPATIENT
Start: 2021-04-30 | End: 2021-05-01 | Stop reason: HOSPADM

## 2021-04-30 RX ORDER — ONDANSETRON 2 MG/ML
4 INJECTION INTRAMUSCULAR; INTRAVENOUS EVERY 30 MIN PRN
Status: DISCONTINUED | OUTPATIENT
Start: 2021-04-30 | End: 2021-05-01 | Stop reason: HOSPADM

## 2021-04-30 RX ORDER — NALOXONE HYDROCHLORIDE 0.4 MG/ML
0.2 INJECTION, SOLUTION INTRAMUSCULAR; INTRAVENOUS; SUBCUTANEOUS
Status: DISCONTINUED | OUTPATIENT
Start: 2021-04-30 | End: 2021-05-01 | Stop reason: HOSPADM

## 2021-04-30 RX ORDER — MEPERIDINE HYDROCHLORIDE 25 MG/ML
12.5 INJECTION INTRAMUSCULAR; INTRAVENOUS; SUBCUTANEOUS
Status: DISCONTINUED | OUTPATIENT
Start: 2021-04-30 | End: 2021-05-01 | Stop reason: HOSPADM

## 2021-04-30 RX ORDER — NALOXONE HYDROCHLORIDE 0.4 MG/ML
0.4 INJECTION, SOLUTION INTRAMUSCULAR; INTRAVENOUS; SUBCUTANEOUS
Status: DISCONTINUED | OUTPATIENT
Start: 2021-04-30 | End: 2021-05-01 | Stop reason: HOSPADM

## 2021-04-30 RX ORDER — FENTANYL CITRATE 50 UG/ML
25-50 INJECTION, SOLUTION INTRAMUSCULAR; INTRAVENOUS
Status: DISCONTINUED | OUTPATIENT
Start: 2021-04-30 | End: 2021-05-01 | Stop reason: HOSPADM

## 2021-04-30 RX ORDER — PROPOFOL 10 MG/ML
INJECTION, EMULSION INTRAVENOUS PRN
Status: DISCONTINUED | OUTPATIENT
Start: 2021-04-30 | End: 2021-04-30

## 2021-04-30 RX ORDER — BUPIVACAINE HYDROCHLORIDE AND EPINEPHRINE 5; 5 MG/ML; UG/ML
INJECTION, SOLUTION PERINEURAL PRN
Status: DISCONTINUED | OUTPATIENT
Start: 2021-04-30 | End: 2021-04-30 | Stop reason: HOSPADM

## 2021-04-30 RX ORDER — CEFAZOLIN SODIUM 2 G/100ML
2 INJECTION, SOLUTION INTRAVENOUS
Status: DISCONTINUED | OUTPATIENT
Start: 2021-04-30 | End: 2021-05-01 | Stop reason: HOSPADM

## 2021-04-30 RX ORDER — IOPAMIDOL 612 MG/ML
INJECTION, SOLUTION INTRATHECAL PRN
Status: DISCONTINUED | OUTPATIENT
Start: 2021-04-30 | End: 2021-04-30 | Stop reason: HOSPADM

## 2021-04-30 RX ORDER — LIDOCAINE 40 MG/G
CREAM TOPICAL
Status: DISCONTINUED | OUTPATIENT
Start: 2021-04-30 | End: 2021-05-01 | Stop reason: HOSPADM

## 2021-04-30 RX ORDER — SODIUM CHLORIDE, SODIUM LACTATE, POTASSIUM CHLORIDE, CALCIUM CHLORIDE 600; 310; 30; 20 MG/100ML; MG/100ML; MG/100ML; MG/100ML
INJECTION, SOLUTION INTRAVENOUS CONTINUOUS
Status: DISCONTINUED | OUTPATIENT
Start: 2021-04-30 | End: 2021-05-01 | Stop reason: HOSPADM

## 2021-04-30 RX ORDER — LIDOCAINE HYDROCHLORIDE 20 MG/ML
INJECTION, SOLUTION INFILTRATION; PERINEURAL PRN
Status: DISCONTINUED | OUTPATIENT
Start: 2021-04-30 | End: 2021-04-30

## 2021-04-30 RX ORDER — PROPOFOL 10 MG/ML
INJECTION, EMULSION INTRAVENOUS CONTINUOUS PRN
Status: DISCONTINUED | OUTPATIENT
Start: 2021-04-30 | End: 2021-04-30

## 2021-04-30 RX ORDER — ONDANSETRON 4 MG/1
4 TABLET, ORALLY DISINTEGRATING ORAL EVERY 30 MIN PRN
Status: DISCONTINUED | OUTPATIENT
Start: 2021-04-30 | End: 2021-05-01 | Stop reason: HOSPADM

## 2021-04-30 RX ADMIN — ESMOLOL HYDROCHLORIDE 20 MG: 10 INJECTION INTRAVENOUS at 12:24

## 2021-04-30 RX ADMIN — Medication 40 MG: at 11:54

## 2021-04-30 RX ADMIN — CEFAZOLIN SODIUM 2 G: 2 INJECTION, SOLUTION INTRAVENOUS at 11:49

## 2021-04-30 RX ADMIN — DEXAMETHASONE SODIUM PHOSPHATE 4 MG: 4 INJECTION, SOLUTION INTRA-ARTICULAR; INTRALESIONAL; INTRAMUSCULAR; INTRAVENOUS; SOFT TISSUE at 12:01

## 2021-04-30 RX ADMIN — ACETAMINOPHEN 975 MG: 325 TABLET ORAL at 11:23

## 2021-04-30 RX ADMIN — ESMOLOL HYDROCHLORIDE 20 MG: 10 INJECTION INTRAVENOUS at 12:27

## 2021-04-30 RX ADMIN — Medication 10 MG: at 12:37

## 2021-04-30 RX ADMIN — ONDANSETRON 4 MG: 2 INJECTION INTRAMUSCULAR; INTRAVENOUS at 12:05

## 2021-04-30 RX ADMIN — LIDOCAINE HYDROCHLORIDE 60 MG: 20 INJECTION, SOLUTION INFILTRATION; PERINEURAL at 11:54

## 2021-04-30 RX ADMIN — OXYCODONE HYDROCHLORIDE 5 MG: 5 TABLET ORAL at 13:28

## 2021-04-30 RX ADMIN — GABAPENTIN 300 MG: 300 CAPSULE ORAL at 11:23

## 2021-04-30 RX ADMIN — ESMOLOL HYDROCHLORIDE 20 MG: 10 INJECTION INTRAVENOUS at 12:44

## 2021-04-30 RX ADMIN — PROPOFOL 100 MCG/KG/MIN: 10 INJECTION, EMULSION INTRAVENOUS at 11:56

## 2021-04-30 RX ADMIN — FENTANYL CITRATE 100 MCG: 50 INJECTION, SOLUTION INTRAMUSCULAR; INTRAVENOUS at 11:54

## 2021-04-30 RX ADMIN — PROPOFOL 200 MG: 10 INJECTION, EMULSION INTRAVENOUS at 11:54

## 2021-04-30 RX ADMIN — SODIUM CHLORIDE, SODIUM LACTATE, POTASSIUM CHLORIDE, CALCIUM CHLORIDE: 600; 310; 30; 20 INJECTION, SOLUTION INTRAVENOUS at 11:22

## 2021-04-30 ASSESSMENT — COPD QUESTIONNAIRES
CAT_SEVERITY: MODERATE
COPD: 1

## 2021-04-30 ASSESSMENT — LIFESTYLE VARIABLES: TOBACCO_USE: 1

## 2021-04-30 NOTE — ANESTHESIA CARE TRANSFER NOTE
Patient: Ashleigh Alonzo    Procedure(s):  CHOLECYSTECTOMY, LAPAROSCOPIC, WITH CHOLANGIOGRAM    Diagnosis: Calculus of gallbladder without cholecystitis without obstruction [K80.20]  Diagnosis Additional Information: No value filed.    Anesthesia Type:   General     Note:      Level of Consciousness: awake and drowsy  Oxygen Supplementation: nasal cannula  Level of Supplemental Oxygen (L/min / FiO2): 3  Independent Airway: airway patency satisfactory and stable  Dentition: dentition unchanged  Vital Signs Stable: post-procedure vital signs reviewed and stable  Report to RN Given: handoff report given  Patient transferred to: PACU  Comments: Prior to extubation, oropharynx gently suctioned, awake extubation, good aeration, SpO2 99%, equal bilateral breath sounds.  Transported to PACU on oxygen 3 lpm.  Patient awake, alert, SpO2 98% in PACU.  No apparent anesthesia complications.    Handoff Report: Identifed the Patient, Identified the Reponsible Provider, Reviewed the pertinent medical history, Discussed the surgical course, Reviewed Intra-OP anesthesia mangement and issues during anesthesia, Set expectations for post-procedure period and Allowed opportunity for questions and acknowledgement of understanding      Vitals: (Last set prior to Anesthesia Care Transfer)  CRNA VITALS  4/30/2021 1237 - 4/30/2021 1313      4/30/2021             NIBP:  (!) 172/98    NIBP Mean:  113        Electronically Signed By: JENNIFER Will CRNA  April 30, 2021  1:13 PM

## 2021-04-30 NOTE — ANESTHESIA PROCEDURE NOTES
Airway       Patient location during procedure: OR       Procedure Start/Stop Times: 4/30/2021 11:54 AM and 4/30/2021 11:56 AM  Staff -        CRNA: Liz Grace APRN CRNA       Performed By: anesthesiologist  Consent for Airway        Urgency: elective  Indications and Patient Condition       Indications for airway management: josué-procedural       Induction type:intravenous       Mask difficulty assessment: 2 - vent by mask + OA or adjuvant +/- NMBA    Final Airway Details       Final airway type: endotracheal airway       Successful airway: ETT - single and Oral  Endotracheal Airway Details        ETT size (mm): 6.5       Successful intubation technique: direct laryngoscopy       DL Blade Type: Shaikh 2       Grade View of Cords: 1       Adjucts: stylet and tooth guard       Position: Right       Measured from: lips       Secured at (cm): 22       Bite block used: Soft    Post intubation assessment        Placement verified by: capnometry, equal breath sounds and chest rise        Number of attempts at approach: 1       Number of other approaches attempted: 0       Secured with: commercial tube courtney and plastic tape       Ease of procedure: easy       Dentition: Intact and Unchanged    Medication(s) Administered   Medication Administration Time: 4/30/2021 11:56 AM

## 2021-04-30 NOTE — DISCHARGE INSTRUCTIONS
[facility]   Adult Discharge Orders & Instructions     For 24 hours after surgery    1. Get plenty of rest.  A responsible adult must stay with you for at least 24 hours after you leave the hospital.   2. Do not drive or use heavy equipment.  If you have weakness or tingling, don't drive or use heavy equipment until this feeling goes away.  3. Do not drink alcohol.  4. Avoid strenuous or risky activities.  Ask for help when climbing stairs.   5. You may feel lightheaded.  IF so, sit for a few minutes before standing.  Have someone help you get up.   6. If you have nausea (feel sick to your stomach): Drink only clear liquids such as apple juice, ginger ale, broth or 7-Up.  Rest may also help.  Be sure to drink enough fluids.  Move to a regular diet as you feel able.  7. You may have a slight fever. Call the doctor if your fever is over 100 F (37.7 C) (taken under the tongue) or lasts longer than 24 hours.  8. You may have a dry mouth, a sore throat, muscle aches or trouble sleeping.  These should go away after 24 hours.  9. Do not make important or legal decisions.     Call your doctor for any of the followin.  Signs of infection (fever, growing tenderness at the surgery site, a large amount of drainage or bleeding, severe pain, foul-smelling drainage, redness, swelling).    2. It has been over 8 to 10 hours since surgery and you are still not able to urinate (pass water).    3.  Headache for over 24 hours.    4.  Numbness, tingling or weakness the day after surgery (if you had spinal anesthesia).                  5. Signs of Covid-19 infection (temperature over 100 degrees, shortness of breath, cough, loss of taste/smell, generalized body aches, persistent headache,                  chills, sore throat, nausea/vomiting/diarrhea).    To contact Dr Lechuga call [phone].    Tylenol 975 mg given @ 11:30 AM

## 2021-04-30 NOTE — ANESTHESIA PREPROCEDURE EVALUATION
Anesthesia Pre-Procedure Evaluation    Patient: Ashleigh Alonzo   MRN: [id number] : 1960        Preoperative Diagnosis: Calculus of gallbladder without cholecystitis without obstruction [K80.20]   Procedure : Procedure(s):  CHOLECYSTECTOMY, LAPAROSCOPIC, WITH CHOLANGIOGRAM     Past Medical History:   Diagnosis Date     Acute cystitis without hematuria 10/26/2017     Clostridium difficile diarrhea 2018     Neutropenic fever (H) 01/10/2018     She was hospitalized 1/10/18 - 18 with neutropenic fever     Neutropenic sepsis (H) 2018    hospitalized again  from 18 - 18 with neutropenic fever, mucositis, and C. difficile colitis      Nonsenile cataract      Pneumonia 2017    She was hospitalized 17 - 17 with fevers ranging from 102 - 105.  CT chest was consistent with pneumonitis.  She also had transaminitis in the 150 range.  She was started on corticosteroids.  Pembrolizumab was stopped      S/P radiation therapy     5,256 cGy completed on 2018 to [facility] with Dr. Zhong      Past Surgical History:   Procedure Laterality Date     APPENDECTOMY  10/06/2015    [facility]     BREAST BIOPSY, CORE RT/LT Right 2017     BRONCHOSCOPY (RIGID OR FLEXIBLE), DIAGNOSTIC N/A 2018    Procedure: COMBINED BRONCHOSCOPY (RIGID OR FLEXIBLE), LAVAGE;  COMBINED BRONCOSCOY (RIGID OR FLEXIBLE), LAVAGE;  Surgeon: Samir Pettit MD;  Location: [facility]     CATARACT IOL, RT/LT       CLEAR LENS REPLACEMENT Bilateral 2016    [facility]     COLONOSCOPY  11/15/2011    Procedure:COLONOSCOPY; Colonoscopy, screening; Surgeon:ANASTASIA BUNCH; Location:[facility]     INSERT PORT VASCULAR ACCESS Left 2017    Procedure: INSERT PORT VASCULAR ACCESS;  Single Lumen Chest Power Port;  Surgeon: Leif Parkinson PA-C;  Location: [facility]     LUMPECTOMY BREAST WITH SENTINEL NODE, COMBINED Right 2018    Procedure: COMBINED LUMPECTOMY BREAST WITH SENTINEL  NODE;  Right Wire Localized Lumpectomy, Right [place] Lymph Node Biopsy And Freddy Cath Removal;  Surgeon: Atul Gates MD;  Location: [facility]     REMOVE PORT VASCULAR ACCESS N/A 2018    Procedure: REMOVE PORT VASCULAR ACCESS;;  Surgeon: Atul Gates MD;  Location: [facility]     TUBAL LIGATION  2004      No Known Allergies   Social History     Tobacco Use     Smoking status: Former Smoker     Packs/day: 0.75     Years: 20.00     Pack years: 15.00     Types: Cigarettes     Quit date: 2000     Years since quittin.3     Smokeless tobacco: Never Used   Substance Use Topics     Alcohol use: Yes     Comment: Daily to weekly use (beer)      Wt Readings from Last 1 Encounters:   21 77.6 kg (171 lb)        Anesthesia Evaluation   Pt has had prior anesthetic. Type: General and MAC.    No history of anesthetic complications       ROS/MED HX  ENT/Pulmonary: Comment: Pulmonary nodules. Tongue ulcer, periodontal disease    (+) sleep apnea, mild, tobacco use, Past use, moderate,  COPD,     Neurologic:     (+) peripheral neuropathy, - Chemotherapy induced polyneuropathy.     Cardiovascular:  - neg cardiovascular ROS     METS/Exercise Tolerance:     Hematologic: Comments: Hx/o neutropenia - years ago with chemotherapy      Musculoskeletal:  - neg musculoskeletal ROS     GI/Hepatic:     (+) cholecystitis/cholelithiasis,     Renal/Genitourinary:  - neg Renal ROS     Endo: Comment: Hx/o steroids, currently not on any systemic steroid medication      Psychiatric/Substance Use:     (+) psychiatric history anxiety and depression     Infectious Disease:  - neg infectious disease ROS     Malignancy:   (+) Malignancy, History of Breast.Breast CA Remission status post Chemo, Radiation and Surgery.        Other: Comment: Anisocoria, cataract           Physical Exam    Airway        Mallampati: III   TM distance: < 3 FB   Neck ROM: full   Mouth opening: > 3 cm    Respiratory Devices and Support         Dental  no notable  dental history         Cardiovascular   cardiovascular exam normal          Pulmonary   pulmonary exam normal                OUTSIDE LABS:  CBC:   Lab Results   Component Value Date    WBC 7.0 03/01/2021    WBC 6.2 08/20/2019    HGB 13.5 03/01/2021    HGB 14.0 08/20/2019    HCT 38.9 03/01/2021    HCT 39.7 08/20/2019     03/01/2021     08/20/2019     BMP:   Lab Results   Component Value Date     03/31/2021     03/01/2021    POTASSIUM 4.4 03/31/2021    POTASSIUM 4.8 03/01/2021    CHLORIDE 105 03/31/2021    CHLORIDE 111 (H) 03/01/2021    CO2 28 03/31/2021    CO2 21 03/01/2021    BUN 14 03/31/2021    BUN 9 03/01/2021    CR 0.69 03/31/2021    CR 0.59 03/01/2021    GLC 93 03/31/2021    GLC 86 03/01/2021     COAGS:   Lab Results   Component Value Date    PTT 37 02/01/2018    INR 1.12 02/01/2018    FIBR 647 (H) 02/01/2018     POC:   Lab Results   Component Value Date     (H) 02/27/2018     HEPATIC:   Lab Results   Component Value Date    ALBUMIN 3.8 03/31/2021    PROTTOTAL 7.8 03/31/2021    ALT 35 03/31/2021    AST 27 03/31/2021    GGT 59 (H) 03/31/2021    ALKPHOS 170 (H) 03/31/2021    BILITOTAL 0.2 03/31/2021     OTHER:   Lab Results   Component Value Date    PH 7.433 01/29/2016    LACT 0.7 01/31/2018    A1C 5.4 09/05/2017    JAVIER 9.0 03/31/2021    PHOS 2.1 (L) 01/13/2018    MAG 1.9 01/30/2018    TSH 1.51 02/18/2019    CRP 84.0 (H) 10/12/2017       Anesthesia Plan    ASA Status:  2   NPO Status:  NPO Appropriate    Anesthesia Type: General.     - Airway: ETT   Induction: Intravenous.   Maintenance: Balanced.   Techniques and Equipment:     - Airway: Video-Laryngoscope         Consents    Anesthesia Plan(s) and associated risks, benefits, and realistic alternatives discussed. Questions answered and patient/representative(s) expressed understanding.     - Discussed with:  Patient      - Extended Intubation/Ventilatory Support Discussed: No.      - Patient is DNR/DNI Status: No    Use of blood  products discussed: Yes.     - Discussed with: Patient.     Postoperative Care    Pain management: IV analgesics, Oral pain medications.   PONV prophylaxis: Ondansetron (or other 5HT-3), Background Propofol Infusion, Dexamethasone or Solumedrol     Comments:    GETA, Standard ASA monitoring  All available and pertinent medical records and test results reviewed.  Risks, including but not limited to airway injury, bronchospasm,  hypoxemia, PONV, need for blood transfusion d/w patient            Sadia Hernandez MD

## 2021-04-30 NOTE — ANESTHESIA POSTPROCEDURE EVALUATION
Patient: Ashleigh Alonzo    Procedure(s):  CHOLECYSTECTOMY, LAPAROSCOPIC, WITH CHOLANGIOGRAM    Diagnosis:Calculus of gallbladder without cholecystitis without obstruction [K80.20]  Diagnosis Additional Information: No value filed.    Anesthesia Type:  General    Note:  Disposition: Outpatient   Postop Pain Control: Uneventful            Sign Out: Well controlled pain   PONV:    Neuro/Psych: Uneventful            Sign Out: Acceptable/Baseline neuro status   Airway/Respiratory: Uneventful            Sign Out: Acceptable/Baseline resp. status   CV/Hemodynamics: Uneventful            Sign Out: Acceptable CV status; No obvious hypovolemia; No obvious fluid overload   Other NRE:    DID A NON-ROUTINE EVENT OCCUR?            Last vitals:  Vitals:    04/30/21 1330 04/30/21 1345 04/30/21 1400   BP: (!) 166/99 (!) 163/96 (!) 155/85   Pulse: 102 98    Resp: 16 9 16   Temp:   36.2  C (97.2  F)   SpO2: 93% 93% 92%       Last vitals prior to Anesthesia Care Transfer:  CRNA VITALS  4/30/2021 1237 - 4/30/2021 1337      4/30/2021             NIBP:  (!) 172/98    NIBP Mean:  113          Electronically Signed By: Sadia Hernandez MD  April 30, 2021  2:13 PM

## 2021-04-30 NOTE — OP NOTE
Date of Service: 4/30/2021     STAFF SURGEON:  Jaxon Lechuga MD     ASSISTANT:  None.     PREOPERATIVE DIAGNOSIS:  cholelithiasis     POSTOPERATIVE DIAGNOSIS:  Same, chronic cholecystitis     NAME OF PROCEDURE(S):  laparoscopic cholecystectomy with cholangiogram     INDICATIONS FOR PROCEDURE:  The patient is a 61yo female with symptomatic cholelithiasis whom I had seen in clinic. Workup had shown stones on US and she also had elevated GGT though were trending down. I offered cholecystectomy with cholangiogram though I felt the chance of ductal stone was low.. The risks, benefits and alternatives of the procedure were discussed preoperatively and consent obtained.     EBL: 5 cc    ANESTHESIA:  General    COMPLICATIONS: None     DRAINS:  None.     SPECIMENS:  Gallbladder     IMPLANTS:none     OPERATIVE FINDINGS:  Gallbladder with omental adhesions, no acute inflammation. Cholangiogram without filling defect, contrast flowed easily through ducts and into duodenum.     PROCEDURE DETAIL:  Following consent, the patient was brought from the preoperative holding area to the operating suite and laid in supine position. Patient underwent general anesthesia and endotracheal intubation without difficulty. Abdomen was prepped and draped in usual fashion and time out performed. Patient got preoperative antibiotics and had SCDs for DVT prophylaxis. Following the time out I made a 2 cm infraumbilical incision and dissected to abdominal fascia. This was elevated between Kochers and divided with curved Farmer gaining peritoneal access. I placed 2 0 vicryl sutures on either side of the defect then placed the Xiomara. Pneumoperitoneum achieved. I inserted the scope and placed 3 5mm ports under direct vision one subxiphoid, one RUQ and the other in the right lateral abdomen. The gallbladder fundus was elevated and the infundibulum then retracted laterally. I dissected through the tissue in the triangle of Calot with cautery  circumferentially dissecting the cystic duct and artery. Critical view of safety obtained. I made a ductotomy and brought in the cholangiogram catheter which was placed and clipped to secure. C-arm brought in and cholangiogram completed. C-arm was backed out and the catheter was removed. The duct and artery were then clipped and divided. Gallbladder then dissected from the liver with cautery until free. This was placed in an Endocatch bag and removed from the abdomen and passed off for pathology. The area was inspected and no evidence of bleeding or bile with irrigation. Ports were removed under direct vision then the abdomen desufflated and the Xiomara removed. The fasical defect closed with the previous sutures and an additional figure of eight 0 vicryl placed between. Skin incisions closed with 4-0 monocryl and covered with dermabond and injected with the local anesthetic. Patient tolerated the procedure well and was discharged from the OR to PACU for recovery with plans to discharge home.            Jaxon Lechuga MD

## 2021-05-05 LAB — COPATH REPORT: NORMAL

## 2021-05-12 ENCOUNTER — OFFICE VISIT (OUTPATIENT)
Dept: SURGERY | Facility: CLINIC | Age: 61
End: 2021-05-12
Payer: COMMERCIAL

## 2021-05-12 VITALS
HEART RATE: 81 BPM | BODY MASS INDEX: 30.47 KG/M2 | DIASTOLIC BLOOD PRESSURE: 84 MMHG | SYSTOLIC BLOOD PRESSURE: 138 MMHG | WEIGHT: 172 LBS

## 2021-05-12 DIAGNOSIS — Z90.49 S/P LAPAROSCOPIC CHOLECYSTECTOMY: Primary | ICD-10-CM

## 2021-05-12 PROCEDURE — 99024 POSTOP FOLLOW-UP VISIT: CPT | Performed by: SURGERY

## 2021-05-12 NOTE — LETTER
5/12/2021         RE: Ashleigh Alonzo  [address] [facility] [address]        Dear Colleague,    Thank you for referring your patient, Ashleigh Alonzo, to the [facility]. Please see a copy of my visit note below.    General Surgery Post Op    Pt returns for follow up visit s/p laparoscopic cholecystectomy on 4/30/2021.    Patient has been doing well, tolerating diet. Bowels moving well. Pain controlled. No issues with wound healing/redness/drainage. No fevers.  The umbilical incision can still have some soreness and pain compared to the others.    Physical exam: Vitals: /84   Pulse 81   Wt 78 kg (172 lb)   BMI 30.47 kg/m    BMI= Body mass index is 30.47 kg/m .    Exam:  Constitutional: healthy, alert and no distress  Gastrointestinal: Abdomen soft, non-tender. BS normal. No masses, organomegaly  Incisions healing well    Path:  FINAL DIAGNOSIS:   Gallbladder, Cholecystectomy:   Mild nonspecific lamina propria inflammation; otherwise no significant   morphologic abnormalities    Assessment:     ICD-10-CM    1. S/P laparoscopic cholecystectomy  Z90.49      Plan: Recovering well from lap neli.  No current issues.  I discussed still following a low-fat diet but can start reintroducing foods over the next few weeks.  No other restrictions.  Follow-up with me as needed.    Jaxon Lechuga MD          Again, thank you for allowing me to participate in the care of your patient.        Sincerely,        Jaxon Lechuga MD

## 2021-05-12 NOTE — PROGRESS NOTES
General Surgery Post Op    Pt returns for follow up visit s/p laparoscopic cholecystectomy on 4/30/2021.    Patient has been doing well, tolerating diet. Bowels moving well. Pain controlled. No issues with wound healing/redness/drainage. No fevers.  The umbilical incision can still have some soreness and pain compared to the others.    Physical exam: Vitals: /84   Pulse 81   Wt 78 kg (172 lb)   BMI 30.47 kg/m    BMI= Body mass index is 30.47 kg/m .    Exam:  Constitutional: healthy, alert and no distress  Gastrointestinal: Abdomen soft, non-tender. BS normal. No masses, organomegaly  Incisions healing well    Path:  FINAL DIAGNOSIS:   Gallbladder, Cholecystectomy:   Mild nonspecific lamina propria inflammation; otherwise no significant   morphologic abnormalities    Assessment:     ICD-10-CM    1. S/P laparoscopic cholecystectomy  Z90.49      Plan: Recovering well from lap neli.  No current issues.  I discussed still following a low-fat diet but can start reintroducing foods over the next few weeks.  No other restrictions.  Follow-up with me as needed.    Jaxon Lechuga MD

## 2021-06-02 ENCOUNTER — MYC MEDICAL ADVICE (OUTPATIENT)
Dept: FAMILY MEDICINE | Facility: CLINIC | Age: 61
End: 2021-06-02

## 2021-06-02 DIAGNOSIS — R05.8 COUGH WITH SPUTUM: ICD-10-CM

## 2021-06-02 NOTE — TELEPHONE ENCOUNTER
"To Provider,  Message received from patient via [employer]    \"Good Morning Dr. Church - when you have a moment, I 'm ready to have my lab work done.  Thanks, Ashleigh Perera\"    Her physical is scheduled for 8/30/2021. Would you like her to come in for any lab work any time soon?  Aracely Palm   "

## 2021-06-15 DIAGNOSIS — R74.8 ELEVATED SERUM GGT LEVEL: ICD-10-CM

## 2021-06-15 DIAGNOSIS — R74.8 ELEVATED ALKALINE PHOSPHATASE LEVEL: ICD-10-CM

## 2021-06-15 DIAGNOSIS — J44.9 CHRONIC OBSTRUCTIVE PULMONARY DISEASE, UNSPECIFIED COPD TYPE (H): ICD-10-CM

## 2021-06-15 DIAGNOSIS — R74.8 ALKALINE PHOSPHATASE ELEVATION: ICD-10-CM

## 2021-06-15 LAB — TSH SERPL DL<=0.005 MIU/L-ACNC: 2.43 MU/L (ref 0.4–4)

## 2021-06-15 PROCEDURE — 36415 COLL VENOUS BLD VENIPUNCTURE: CPT | Performed by: FAMILY MEDICINE

## 2021-06-15 PROCEDURE — 86803 HEPATITIS C AB TEST: CPT | Performed by: FAMILY MEDICINE

## 2021-06-15 PROCEDURE — 86706 HEP B SURFACE ANTIBODY: CPT | Performed by: FAMILY MEDICINE

## 2021-06-15 PROCEDURE — 84443 ASSAY THYROID STIM HORMONE: CPT | Performed by: FAMILY MEDICINE

## 2021-06-16 LAB
HBV SURFACE AB SERPL IA-ACNC: 15.97 M[IU]/ML
HCV AB SERPL QL IA: NONREACTIVE

## 2021-06-16 RX ORDER — ALBUTEROL SULFATE 90 UG/1
AEROSOL, METERED RESPIRATORY (INHALATION)
Qty: 8.5 G | Refills: 1 | Status: SHIPPED | OUTPATIENT
Start: 2021-06-16 | End: 2022-08-31

## 2021-06-17 DIAGNOSIS — R74.8 ELEVATED ALKALINE PHOSPHATASE LEVEL: Primary | ICD-10-CM

## 2021-06-21 ENCOUNTER — MYC MEDICAL ADVICE (OUTPATIENT)
Dept: FAMILY MEDICINE | Facility: CLINIC | Age: 61
End: 2021-06-21

## 2021-06-21 DIAGNOSIS — R74.8 ELEVATED ALKALINE PHOSPHATASE LEVEL: ICD-10-CM

## 2021-06-21 LAB
ALBUMIN SERPL-MCNC: 3.4 G/DL (ref 3.4–5)
ALP SERPL-CCNC: 141 U/L (ref 40–150)
ALT SERPL W P-5'-P-CCNC: 50 U/L (ref 0–50)
AST SERPL W P-5'-P-CCNC: 36 U/L (ref 0–45)
BILIRUB DIRECT SERPL-MCNC: <0.1 MG/DL (ref 0–0.2)
BILIRUB SERPL-MCNC: 0.5 MG/DL (ref 0.2–1.3)
PROT SERPL-MCNC: 7.4 G/DL (ref 6.8–8.8)

## 2021-06-21 PROCEDURE — 36415 COLL VENOUS BLD VENIPUNCTURE: CPT | Performed by: FAMILY MEDICINE

## 2021-06-21 PROCEDURE — 80076 HEPATIC FUNCTION PANEL: CPT | Performed by: FAMILY MEDICINE

## 2021-06-22 ENCOUNTER — MYC MEDICAL ADVICE (OUTPATIENT)
Dept: FAMILY MEDICINE | Facility: CLINIC | Age: 61
End: 2021-06-22

## 2021-07-01 DIAGNOSIS — R91.8 PULMONARY NODULES: Primary | ICD-10-CM

## 2021-07-20 ENCOUNTER — ANCILLARY PROCEDURE (OUTPATIENT)
Dept: CT IMAGING | Facility: CLINIC | Age: 61
End: 2021-07-20
Payer: COMMERCIAL

## 2021-07-20 DIAGNOSIS — R91.8 PULMONARY NODULES: ICD-10-CM

## 2021-07-20 PROCEDURE — 71250 CT THORAX DX C-: CPT | Performed by: RADIOLOGY

## 2021-07-22 ENCOUNTER — VIRTUAL VISIT (OUTPATIENT)
Dept: PULMONOLOGY | Facility: CLINIC | Age: 61
End: 2021-07-22
Payer: COMMERCIAL

## 2021-07-22 DIAGNOSIS — R91.8 PULMONARY NODULES: ICD-10-CM

## 2021-07-22 DIAGNOSIS — J43.2 CENTRILOBULAR EMPHYSEMA (H): Primary | ICD-10-CM

## 2021-07-22 PROCEDURE — 99214 OFFICE O/P EST MOD 30 MIN: CPT | Mod: 95 | Performed by: INTERNAL MEDICINE

## 2021-07-22 NOTE — PROGRESS NOTES
Ashleigh Perera is a 61 year old who is being evaluated via a billable telephone visit.      What phone number would you like to be contacted at? [phone] (H)    How would you like to obtain your AVS? Edmundo Lucero LPN  Pulmonary Medicine:  [facility]  Phone: [phone] Fax: [phone]

## 2021-07-22 NOTE — RESULT ENCOUNTER NOTE
Results discussed directly with patient while patient was present. Any further details documented in the note.   Amina Licona MD

## 2021-08-03 ENCOUNTER — NURSE TRIAGE (OUTPATIENT)
Dept: FAMILY MEDICINE | Facility: CLINIC | Age: 61
End: 2021-08-03

## 2021-08-03 NOTE — TELEPHONE ENCOUNTER
No exposure  Vaccinated  Running nose & fever last Thurs/Fri/Sat  feelng a little bit better today no fever  Cough productive phlegm light green   COPD history, no exacerbation of symptoms   Ok for home care, home care advice provided    Bridget Pierson RN, BSN, CMSRN  [facility]      Reason for Disposition    Cough with no complications    Additional Information    Negative: Chest pain present when not coughing    Negative: Difficulty breathing    Negative: Passed out (i.e., fainted, collapsed and was not responding)    Negative: Previous asthma attacks and this feels like asthma attack    Negative: Bluish (or gray) lips or face    Negative: Severe difficulty breathing (e.g., struggling for each breath, speaks in single words)    Negative: Rapid onset of cough and has hives    Negative: Coughing started suddenly after medicine, an allergic food or bee sting    Negative: Difficulty breathing after exposure to flames, smoke, or fumes    Negative: Sounds like a life-threatening emergency to the triager    Negative: Patient sounds very sick or weak to the triager    Negative: Coughed up > 1 tablespoon (15 ml) blood (Exception: blood-tinged sputum)    Negative: Fever > 103 F (39.4 C)    Negative: Fever > 101 F (38.3 C) and over 60 years of age    Negative: Fever > 100.0 F (37.8 C) and has diabetes mellitus or a weak immune system (e.g., HIV positive, cancer chemotherapy, organ transplant, splenectomy, chronic steroids)    Negative: Fever > 100.0 F (37.8 C) and bedridden (e.g., nursing home patient, stroke, chronic illness, recovering from surgery)    Negative: Increasing ankle swelling    Negative: Wheezing is present    Negative: Known COPD or other severe lung disease (i.e., bronchiectasis, cystic fibrosis, lung surgery) and worsening symptoms (i.e., increased sputum purulence or amount, increased breathing difficulty)     COPD but not breathing difficulty    Negative: Continuous (nonstop)  coughing interferes with work or school and no improvement using cough treatment per Care Advice    Negative: SEVERE coughing spells (e.g., whooping sound after coughing, vomiting after coughing)    Negative: Coughing up zita-colored (reddish-brown) or blood-tinged sputum    Negative: Fever present > 3 days (72 hours)    Negative: Fever returns after gone for over 24 hours and symptoms worse or not improved    Negative: Using nasal washes and pain medicine > 24 hours and sinus pain persists    Negative: Patient wants to be seen    Negative: Cough has been present for > 3 weeks    Negative: Allergy symptoms are also present (e.g., itchy eyes, clear nasal discharge, postnasal drip)    Negative: Nasal discharge present > 10 days    Negative: Exposure to TB (Tuberculosis)    Negative: Taking an ACE Inhibitor medication (e.g., benazepril/LOTENSIN, captopril/CAPOTEN, enalapril/VASOTEC, lisinopril/ZESTRIL)    Protocols used: COUGH-A-OH

## 2021-08-09 NOTE — PHARMACY-VANCOMYCIN DOSING SERVICE
Pharmacy Vancomycin Initial Note  Date of Service 2018  Patient's  1960  57 year old, female    Indication: Febrile Neutropenia    Current estimated CrCl = Estimated Creatinine Clearance: 129.7 mL/min (based on Cr of 0.46).    Creatinine for last 3 days  2018:  6:47 AM Creatinine 0.46 mg/dL    Recent Vancomycin Level(s) for last 3 days  2018:  7:43 AM Vancomycin Level 11.7 mg/L      Vancomycin IV Administrations (past 72 hours)      No vancomycin orders with administrations in past 72 hours.                Nephrotoxins and other renal medications     None          Contrast Orders - past 72 hours (72h ago through future)    Start     Dose/Rate Route Frequency Ordered Stop    18 1200  iopamidol (ISOVUE-370) solution 95 mL      95 mL Intravenous ONCE 18 1154 18 1232                Plan:  1.  Start vancomycin  1500 mg IV q12h. This dose is based on vanco level obtained on 18 from previous course of therapy.  2.  Goal Trough Level: 15-20 mg/L   3.  Pharmacy will check trough levels as appropriate in 1-3 Days.    4. Serum creatinine levels will be ordered daily for the first week of therapy and at least twice weekly for subsequent weeks.    5. [place] method utilized to dose vancomycin therapy: Based on previous course of therapy.    Leonard Damon       None known None known None known None known None known None known None known None known None known None known None known None known None known None known None known None known None known

## 2021-08-17 DIAGNOSIS — R05.8 COUGH WITH SPUTUM: ICD-10-CM

## 2021-08-17 RX ORDER — GUAIFENESIN 600 MG/1
TABLET ORAL
Qty: 180 TABLET | Refills: 0 | Status: SHIPPED | OUTPATIENT
Start: 2021-08-17 | End: 2021-11-03

## 2021-08-17 NOTE — TELEPHONE ENCOUNTER
Routing refill request to provider for review/approval because:  Drug not on the [facility] refill protocol.       Heena Schilling RN  [facility]

## 2021-08-30 ENCOUNTER — ANCILLARY PROCEDURE (OUTPATIENT)
Dept: GENERAL RADIOLOGY | Facility: CLINIC | Age: 61
End: 2021-08-30
Attending: FAMILY MEDICINE
Payer: COMMERCIAL

## 2021-08-30 ENCOUNTER — OFFICE VISIT (OUTPATIENT)
Dept: FAMILY MEDICINE | Facility: CLINIC | Age: 61
End: 2021-08-30
Payer: COMMERCIAL

## 2021-08-30 VITALS
BODY MASS INDEX: 29.06 KG/M2 | SYSTOLIC BLOOD PRESSURE: 154 MMHG | HEART RATE: 110 BPM | WEIGHT: 164 LBS | RESPIRATION RATE: 18 BRPM | DIASTOLIC BLOOD PRESSURE: 92 MMHG | HEIGHT: 63 IN | TEMPERATURE: 98.9 F

## 2021-08-30 DIAGNOSIS — M79.644 PAIN OF FINGER OF RIGHT HAND: ICD-10-CM

## 2021-08-30 DIAGNOSIS — Z00.00 ROUTINE GENERAL MEDICAL EXAMINATION AT A HEALTH CARE FACILITY: Primary | ICD-10-CM

## 2021-08-30 DIAGNOSIS — Z17.1 MALIGNANT NEOPLASM OF UPPER-INNER QUADRANT OF RIGHT BREAST IN FEMALE, ESTROGEN RECEPTOR NEGATIVE (H): ICD-10-CM

## 2021-08-30 DIAGNOSIS — E78.5 HYPERLIPIDEMIA LDL GOAL <130: Chronic | ICD-10-CM

## 2021-08-30 DIAGNOSIS — R91.8 LUNG NODULE, MULTIPLE: ICD-10-CM

## 2021-08-30 DIAGNOSIS — Z13.1 SCREENING FOR DIABETES MELLITUS: ICD-10-CM

## 2021-08-30 DIAGNOSIS — Z12.11 SCREEN FOR COLON CANCER: ICD-10-CM

## 2021-08-30 DIAGNOSIS — C50.211 MALIGNANT NEOPLASM OF UPPER-INNER QUADRANT OF RIGHT BREAST IN FEMALE, ESTROGEN RECEPTOR NEGATIVE (H): ICD-10-CM

## 2021-08-30 LAB
ANION GAP SERPL CALCULATED.3IONS-SCNC: 7 MMOL/L (ref 3–14)
BUN SERPL-MCNC: 9 MG/DL (ref 7–30)
CALCIUM SERPL-MCNC: 9.2 MG/DL (ref 8.5–10.1)
CHLORIDE BLD-SCNC: 110 MMOL/L (ref 94–109)
CHOLEST SERPL-MCNC: 215 MG/DL
CO2 SERPL-SCNC: 23 MMOL/L (ref 20–32)
CREAT SERPL-MCNC: 0.67 MG/DL (ref 0.52–1.04)
FASTING STATUS PATIENT QL REPORTED: YES
GFR SERPL CREATININE-BSD FRML MDRD: >90 ML/MIN/1.73M2
GLUCOSE BLD-MCNC: 98 MG/DL (ref 70–99)
HDLC SERPL-MCNC: 37 MG/DL
LDLC SERPL CALC-MCNC: 149 MG/DL
NONHDLC SERPL-MCNC: 178 MG/DL
POTASSIUM BLD-SCNC: 4.1 MMOL/L (ref 3.4–5.3)
SODIUM SERPL-SCNC: 140 MMOL/L (ref 133–144)
TRIGL SERPL-MCNC: 144 MG/DL

## 2021-08-30 PROCEDURE — 99396 PREV VISIT EST AGE 40-64: CPT | Performed by: FAMILY MEDICINE

## 2021-08-30 PROCEDURE — 80048 BASIC METABOLIC PNL TOTAL CA: CPT | Performed by: FAMILY MEDICINE

## 2021-08-30 PROCEDURE — 73130 X-RAY EXAM OF HAND: CPT | Mod: RT | Performed by: RADIOLOGY

## 2021-08-30 PROCEDURE — 80061 LIPID PANEL: CPT | Performed by: FAMILY MEDICINE

## 2021-08-30 PROCEDURE — 36415 COLL VENOUS BLD VENIPUNCTURE: CPT | Performed by: FAMILY MEDICINE

## 2021-08-30 PROCEDURE — 99213 OFFICE O/P EST LOW 20 MIN: CPT | Mod: 25 | Performed by: FAMILY MEDICINE

## 2021-08-30 RX ORDER — SIMVASTATIN 40 MG
40 TABLET ORAL AT BEDTIME
Qty: 90 TABLET | Refills: 3 | Status: SHIPPED | OUTPATIENT
Start: 2021-08-30 | End: 2022-01-14

## 2021-08-30 ASSESSMENT — MIFFLIN-ST. JEOR: SCORE: 1278.03

## 2021-08-30 ASSESSMENT — ENCOUNTER SYMPTOMS
HEMATOCHEZIA: 0
HEARTBURN: 0
BREAST MASS: 0
WEAKNESS: 0
HEADACHES: 0
NERVOUS/ANXIOUS: 0
DYSURIA: 0
PALPITATIONS: 0
DIZZINESS: 0
MYALGIAS: 0
DIARRHEA: 0
CONSTIPATION: 0
JOINT SWELLING: 0
SHORTNESS OF BREATH: 0
CHILLS: 0
SORE THROAT: 0
PARESTHESIAS: 0
NAUSEA: 0
COUGH: 0
HEMATURIA: 0
ARTHRALGIAS: 0
EYE PAIN: 0
ABDOMINAL PAIN: 0
FREQUENCY: 0
FEVER: 0

## 2021-08-30 ASSESSMENT — ANXIETY QUESTIONNAIRES
5. BEING SO RESTLESS THAT IT IS HARD TO SIT STILL: NOT AT ALL
3. WORRYING TOO MUCH ABOUT DIFFERENT THINGS: NOT AT ALL
2. NOT BEING ABLE TO STOP OR CONTROL WORRYING: NOT AT ALL
6. BECOMING EASILY ANNOYED OR IRRITABLE: NOT AT ALL
1. FEELING NERVOUS, ANXIOUS, OR ON EDGE: NOT AT ALL
7. FEELING AFRAID AS IF SOMETHING AWFUL MIGHT HAPPEN: NOT AT ALL
IF YOU CHECKED OFF ANY PROBLEMS ON THIS QUESTIONNAIRE, HOW DIFFICULT HAVE THESE PROBLEMS MADE IT FOR YOU TO DO YOUR WORK, TAKE CARE OF THINGS AT HOME, OR GET ALONG WITH OTHER PEOPLE: NOT DIFFICULT AT ALL
GAD7 TOTAL SCORE: 0

## 2021-08-30 ASSESSMENT — PATIENT HEALTH QUESTIONNAIRE - PHQ9: 5. POOR APPETITE OR OVEREATING: NOT AT ALL
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
## 2021-08-31 ASSESSMENT — ANXIETY QUESTIONNAIRES: GAD7 TOTAL SCORE: 0

## 2021-09-07 DIAGNOSIS — E78.5 HYPERLIPIDEMIA LDL GOAL <130: Chronic | ICD-10-CM

## 2021-09-07 RX ORDER — SIMVASTATIN 40 MG
40 TABLET ORAL AT BEDTIME
Qty: 90 TABLET | Refills: 3 | Status: CANCELLED | OUTPATIENT
Start: 2021-09-07

## 2021-09-08 ENCOUNTER — MYC MEDICAL ADVICE (OUTPATIENT)
Dept: ONCOLOGY | Facility: CLINIC | Age: 61
End: 2021-09-08

## 2021-09-10 NOTE — TELEPHONE ENCOUNTER
RN contacted [facility] to inquire about simvastatin refill request, as chart review reveals refills should already be on file.    simvastatin (ZOCOR) 40 MG tablet 90 tablet 3 8/30/2021     Sig - Route: Take 1 tablet (40 mg) by mouth At Bedtime - Oral   Sent to pharmacy as: Simvastatin 40 MG Oral Tablet (ZOCOR)         E-Prescribing Status: Receipt confirmed by pharmacy (8/30/2021  9:41 AM CDT)     Pharmacy technician confirms okay to disregard refill request as 8/30/21 order is on file and has been picked up.    Libertad Terry, MORGANN, RN

## 2021-09-12 NOTE — PROGRESS NOTES
Ashleigh Perera is a 61 year old who is being evaluated via a billable telephone visit.      What phone number would you like to be contacted at? [phone]  How would you like to obtain your AVS? [employer]  Phone call duration: 20 minutes, an additional 10 minutes was spent on the day of the visit reviewing medical records, reviewing and interpreting tests, placing orders, and in documentation.    Oncology Follow Up:  Date on this visit: 9/13/2021    Diagnosis:  Stage IIb, T2N0M0, grade 3 triple negative cancer of the right breast.    Primary Physician: Radha Cazares     History Of Present Illness:  Ms. Alonzo is a 61-year-old female with a h/o stage IIb, T2 N0 M0, grade 3, triple-negative invasive carcinoma of the right breast.  Routine screening mammogram on 07/27/2017 showed developing calcifications in the right breast at the 12 o'clock position 6 cm from the nipple.  Ultrasound demonstrated a 7-mm, irregular, hypoechoic mass at the 12 o'clock position.  Contrast-enhanced mammogram showed a peripherally enhancing, irregular mass measuring 1.9 cm as well as an additional 6-mm, enhancing focus anteromedial to the dominant mass.  The total area of abnormal enhancement on contrast mammogram measured up to 3.4 cm.  Right breast biopsy demonstrated a grade 3 invasive mammary carcinoma with associated high-grade DCIS.  Estrogen and progesterone receptor staining were negative.  HER2 was non-amplified by FISH.  Breast MRI measured the biopsy proven breast cancer at 3.2 cm.    Ms. Alonzo enrolled in the ISPY-2 clinical trial.  She began treatment with weekly taxol and once every 3 week pembrolizumab on 9/20/17.  Her course was complicated by pneumonitis and hepatitis.  Pembrolizumab was stopped and she resumed weekly taxol alone on 11/28/17.  She completed a total of 12 weeks of therapy on 12/26/17.  She completed 2 cycles of adriamycin and cyclophosphamide.  She was hospitalized both cycles due to neutropenic fever and  also developed C. Difficile colitis.  Decision was made to forgo further chemotherapy.       Right breast lumpectomy and sentinel lymph node procedure on 2/27/18 showed a grade 2, 6 mm residual invasive mammary carcinoma with an associated 8 mm area of high grade DCIS with comedonecrosis and ADH.  Invasive tumor cellularity was 10%.  There was lymphovascular invasion.  Surgical margins were negative.  A single sentinel lymph node was benign.  Classified as MDA RCB I.  She completed adjuvant radiation (4256 cGy to the right breast with additional 1000 cGy to the lumpectomy cavity) on 4/27/18.     Interval History:  Ms. Schrader was contacted via telephone today for routine breast cancer follow-up. She states in the past year her health has generally been very good. In fact, she retired in April. Upon retiring she has had significant improvement in both symptoms of depression as well as fatigue. She is no longer requiring an antidepressant. She denies signs or symptoms of mood disorder at this time. She is enjoying group home. She has had no fevers, chills, or infectious complaints. She noted she was recently using her COPD inhaler more frequently and pulmonologist started her on a new inhaler. She denies current shortness of breath or chest pain. She denies breast lumps, pain, or swelling. She has no current abdominal complaints. She denies headaches, visual changes, or focal neurologic complaints. The remainder of a complete 12 point review of systems was reviewed with patient was negative with exception that mentioned above.     Past Medical/Surgical History:  Past Medical History:   Diagnosis Date     Acute cystitis without hematuria 10/26/2017     Clostridium difficile diarrhea 1/12/2018     Neutropenic fever (H) 01/10/2018     She was hospitalized 1/10/18 - 1/13/18 with neutropenic fever     Neutropenic sepsis (H) 01/29/2018    hospitalized again  from 1/29/18 - 2/8/18 with neutropenic fever, mucositis, and C.  difficile colitis      Nonsenile cataract      Pneumonia 11/11/2017    She was hospitalized 11/11/17 - 11/17/17 with fevers ranging from 102 - 105.  CT chest was consistent with pneumonitis.  She also had transaminitis in the 150 range.  She was started on corticosteroids.  Pembrolizumab was stopped      S/P radiation therapy     5,256 cGy completed on 4/27/2018 to [facility] with Dr. Zhong     Past Surgical History:   Procedure Laterality Date     APPENDECTOMY  10/06/2015    [facility]     BREAST BIOPSY, CORE RT/LT Right 08/22/2017     BRONCHOSCOPY (RIGID OR FLEXIBLE), DIAGNOSTIC N/A 02/06/2018    Procedure: COMBINED BRONCHOSCOPY (RIGID OR FLEXIBLE), LAVAGE;  COMBINED BRONCOSCOY (RIGID OR FLEXIBLE), LAVAGE;  Surgeon: Samir Pettit MD;  Location: [facility]     CATARACT IOL, RT/LT       CLEAR LENS REPLACEMENT Bilateral 11/2016    [facility]     COLONOSCOPY  11/15/2011    Procedure:COLONOSCOPY; Colonoscopy, screening; Surgeon:ANASTASIA BUNCH; Location:[facility] OR     INSERT PORT VASCULAR ACCESS Left 09/01/2017    Procedure: INSERT PORT VASCULAR ACCESS;  Single Lumen Chest Power Port;  Surgeon: Leif Parkinson PA-C;  Location: [facility]     LAPAROSCOPIC CHOLECYSTECTOMY N/A 4/30/2021    Procedure: CHOLECYSTECTOMY, LAPAROSCOPIC, WITH CHOLANGIOGRAM;  Surgeon: Jaxon Lechuga MD;  Location: [facility] OR     LUMPECTOMY BREAST WITH SENTINEL NODE, COMBINED Right 02/27/2018    Procedure: COMBINED LUMPECTOMY BREAST WITH SENTINEL NODE;  Right Wire Localized Lumpectomy, Right [place] Lymph Node Biopsy And Freddy Cath Removal;  Surgeon: Atul Gates MD;  Location: [facility] OR     REMOVE PORT VASCULAR ACCESS N/A 02/27/2018    Procedure: REMOVE PORT VASCULAR ACCESS;;  Surgeon: Atul Gates MD;  Location: [facility]     TUBAL LIGATION  12/2004       Allergies:  Allergies as of 09/13/2021     (No Known Allergies)       Current Medications:  Current Outpatient Medications   Medication Sig Dispense Refill      albuterol (PROAIR HFA) 108 (90 Base) MCG/ACT inhaler Inhale 2 Puffs by mouth every 6 hours if needed for shortness of breath / dyspnea. 8.5 g 1     order for DME autoCPAP 5-18 cmH20 1 Device 0     simvastatin (ZOCOR) 40 MG tablet Take 1 tablet (40 mg) by mouth At Bedtime 90 tablet 3     SM MUCUS RELIEF 600 MG 12 hr tablet TAKE TWO TABLETS BY MOUTH TWICE A  tablet 0     umeclidinium-vilanterol (ANORO ELLIPTA) 62.5-25 MCG/INH oral inhaler Inhale 1 puff into the lungs daily 1 each 11     VITAMIN D 1000 UNIT OR CAPS Take 3,000 Units by mouth         Family and Social History:  Reviewed and unchanged from prior.  Please see initial consultation dated 8/28/17 for further details.    Physical Exam:  Physical exam deferred due to telephone visit restrictions during the public health emergency.    Laboratory/Imaging Studies:  9/13/2021 Labs:  WBC, hemoglobin, and platelets are wnl.    Electrolytes and creatinine are wnl.  Alkaline phosphatase is elevated at 207.    ALT is elevated at 53.  AST is wnl.    9/13/2021 Bilateral screening mammograms:  Small cluster of calcifications at the right breast lumpectomy site, radiology read is pending.    7/20/2021 CT chest w/o contrast:  - No new or enlarging pulmonary nodules  - Extensive sequelae of prior granumlomatous infection  - postsurgical changes of right breast lumpectomy without evidence of local ercurrence  - Sequelae of radiation therapy with subpleural fibrosis in the RUL.  Contiguous anterior right rib fractures likely also represent sequelae of radiation therapy.  - avascular necrosis of the right humeral head without subchondral collapse    ASSESSMENT/PLAN:  Ms. Alonzo is a 61-year-old female with a h/o grade 3, T2N0M0, triple-negative infiltrating ductal carcinoma of the right breast s/p 12 weeks of Taxol along with 3 cycles pembrolizumab, 2 cycles of adriamycin and cyclophosphamide, lumpectomy ([id number]), and radiation.     1.  Right breast cancer:   Ms. Alonzo is  approximately 3.5 years out from excision of a right breast cancer.      She is asymptomatic of disease recurrence on history taken today.   Due to a h/o ADH, I have recommended high risk screening with both annual mammogram and breast MRI, spacing the two studies so that she is having some form of imaging every 6 months.  Recommend stopping breast MRIs once she is 5 years out from completion of treatment of her breast cancer.  I reviewed the images of bilateral screening mammograms performed today and note a cluster of calcifications at the right breast lumpectomy site.  We discussed radiology may request further imaging.  We will await their report.  I will see her back in 6 months with breast MRI the same day.    She inquired about ISPY2 results of her arm of the trial.  We discussed the FDA approved Keytruda as part of neoadjuvant treatment for triple negative breast cancer in July.    2.  Fatigue:  Improved s/p MCC.  Likely due to prior mood disorder and exacerbated by comorbid medical conditions including COPD and sleep apnea.  She uses a CPAP at night.  She recently started a new inhaler for her COPD.    3.  Organizing pneumonia/pulmonary nodules:  Chronic COPD.  Had pneumonitis secondary to pembrolizumab during breast cancer treatment.  Has serial CT scans for follow up of pulmonary nodules and organizing pneumonia.  Most recent in 07/2021 was without significant change, next due 07/2022.  She is following with Pulmonology.    4.  Routine health maintenance:  Next colonoscopy due in 11/2021.  Her PCP placed a referral and she will call to schedule.  Will obtain CBC every 6 months given h/o anthracycline exposure.      5.  Elevated alkaline phosphatase:  Alk phos is elevated at 207 today.  Has been chronically elevated.  ALT also elevated at 53.  Prior check of GGT was elevated, therefore I suspect these may be due to statin use.  She will discuss with her PCP.      6.  Followup: Colonoscopy 11/2021.   Labs, visit with me, and breast MRI in 6 months.

## 2021-09-13 ENCOUNTER — VIRTUAL VISIT (OUTPATIENT)
Dept: ONCOLOGY | Facility: CLINIC | Age: 61
End: 2021-09-13
Attending: INTERNAL MEDICINE
Payer: COMMERCIAL

## 2021-09-13 ENCOUNTER — APPOINTMENT (OUTPATIENT)
Dept: LAB | Facility: CLINIC | Age: 61
End: 2021-09-13
Attending: INTERNAL MEDICINE
Payer: COMMERCIAL

## 2021-09-13 ENCOUNTER — ANCILLARY PROCEDURE (OUTPATIENT)
Dept: MAMMOGRAPHY | Facility: CLINIC | Age: 61
End: 2021-09-13
Attending: INTERNAL MEDICINE
Payer: COMMERCIAL

## 2021-09-13 VITALS
OXYGEN SATURATION: 99 % | HEART RATE: 78 BPM | WEIGHT: 164.4 LBS | DIASTOLIC BLOOD PRESSURE: 74 MMHG | SYSTOLIC BLOOD PRESSURE: 134 MMHG | RESPIRATION RATE: 16 BRPM | BODY MASS INDEX: 29.12 KG/M2 | TEMPERATURE: 98.7 F

## 2021-09-13 DIAGNOSIS — C50.211 MALIGNANT NEOPLASM OF UPPER-INNER QUADRANT OF RIGHT BREAST IN FEMALE, ESTROGEN RECEPTOR NEGATIVE (H): Primary | ICD-10-CM

## 2021-09-13 DIAGNOSIS — Z85.3 PERSONAL HISTORY OF MALIGNANT NEOPLASM OF BREAST: ICD-10-CM

## 2021-09-13 DIAGNOSIS — Z12.39 BREAST CANCER SCREENING, HIGH RISK PATIENT: ICD-10-CM

## 2021-09-13 DIAGNOSIS — Z12.31 VISIT FOR SCREENING MAMMOGRAM: ICD-10-CM

## 2021-09-13 DIAGNOSIS — Z87.42 HISTORY OF ATYPICAL HYPERPLASIA OF BREAST: ICD-10-CM

## 2021-09-13 DIAGNOSIS — Z17.1 MALIGNANT NEOPLASM OF UPPER-INNER QUADRANT OF RIGHT BREAST IN FEMALE, ESTROGEN RECEPTOR NEGATIVE (H): Primary | ICD-10-CM

## 2021-09-13 DIAGNOSIS — R74.8 ELEVATED ALKALINE PHOSPHATASE LEVEL: ICD-10-CM

## 2021-09-13 DIAGNOSIS — Z92.21 STATUS POST CHEMOTHERAPY: ICD-10-CM

## 2021-09-13 LAB
ALBUMIN SERPL-MCNC: 3.5 G/DL (ref 3.4–5)
ALP SERPL-CCNC: 207 U/L (ref 40–150)
ALT SERPL W P-5'-P-CCNC: 53 U/L (ref 0–50)
ANION GAP SERPL CALCULATED.3IONS-SCNC: 9 MMOL/L (ref 3–14)
AST SERPL W P-5'-P-CCNC: 43 U/L (ref 0–45)
BASOPHILS # BLD AUTO: 0 10E3/UL (ref 0–0.2)
BASOPHILS NFR BLD AUTO: 0 %
BILIRUB SERPL-MCNC: 0.6 MG/DL (ref 0.2–1.3)
BUN SERPL-MCNC: 8 MG/DL (ref 7–30)
CALCIUM SERPL-MCNC: 9.3 MG/DL (ref 8.5–10.1)
CHLORIDE BLD-SCNC: 107 MMOL/L (ref 94–109)
CO2 SERPL-SCNC: 23 MMOL/L (ref 20–32)
CREAT SERPL-MCNC: 0.8 MG/DL (ref 0.52–1.04)
EOSINOPHIL # BLD AUTO: 0.4 10E3/UL (ref 0–0.7)
EOSINOPHIL NFR BLD AUTO: 6 %
ERYTHROCYTE [DISTWIDTH] IN BLOOD BY AUTOMATED COUNT: 12.2 % (ref 10–15)
GFR SERPL CREATININE-BSD FRML MDRD: 80 ML/MIN/1.73M2
GLUCOSE BLD-MCNC: 128 MG/DL (ref 70–99)
HCT VFR BLD AUTO: 40.8 % (ref 35–47)
HGB BLD-MCNC: 14.1 G/DL (ref 11.7–15.7)
IMM GRANULOCYTES # BLD: 0 10E3/UL
IMM GRANULOCYTES NFR BLD: 0 %
LYMPHOCYTES # BLD AUTO: 1.2 10E3/UL (ref 0.8–5.3)
LYMPHOCYTES NFR BLD AUTO: 19 %
MCH RBC QN AUTO: 33.2 PG (ref 26.5–33)
MCHC RBC AUTO-ENTMCNC: 34.6 G/DL (ref 31.5–36.5)
MCV RBC AUTO: 96 FL (ref 78–100)
MONOCYTES # BLD AUTO: 0.5 10E3/UL (ref 0–1.3)
MONOCYTES NFR BLD AUTO: 7 %
NEUTROPHILS # BLD AUTO: 4.4 10E3/UL (ref 1.6–8.3)
NEUTROPHILS NFR BLD AUTO: 68 %
NRBC # BLD AUTO: 0 10E3/UL
NRBC BLD AUTO-RTO: 0 /100
PLATELET # BLD AUTO: 295 10E3/UL (ref 150–450)
POTASSIUM BLD-SCNC: 4.2 MMOL/L (ref 3.4–5.3)
PROT SERPL-MCNC: 7.8 G/DL (ref 6.8–8.8)
RBC # BLD AUTO: 4.25 10E6/UL (ref 3.8–5.2)
SODIUM SERPL-SCNC: 139 MMOL/L (ref 133–144)
WBC # BLD AUTO: 6.4 10E3/UL (ref 4–11)

## 2021-09-13 PROCEDURE — 85025 COMPLETE CBC W/AUTO DIFF WBC: CPT | Performed by: INTERNAL MEDICINE

## 2021-09-13 PROCEDURE — 82040 ASSAY OF SERUM ALBUMIN: CPT | Mod: TEL | Performed by: INTERNAL MEDICINE

## 2021-09-13 PROCEDURE — 77063 BREAST TOMOSYNTHESIS BI: CPT | Mod: GC | Performed by: STUDENT IN AN ORGANIZED HEALTH CARE EDUCATION/TRAINING PROGRAM

## 2021-09-13 PROCEDURE — 99214 OFFICE O/P EST MOD 30 MIN: CPT | Mod: 95 | Performed by: INTERNAL MEDICINE

## 2021-09-13 PROCEDURE — 36415 COLL VENOUS BLD VENIPUNCTURE: CPT | Mod: TEL | Performed by: INTERNAL MEDICINE

## 2021-09-13 PROCEDURE — 36415 COLL VENOUS BLD VENIPUNCTURE: CPT | Performed by: INTERNAL MEDICINE

## 2021-09-13 PROCEDURE — 77067 SCR MAMMO BI INCL CAD: CPT | Mod: GC | Performed by: STUDENT IN AN ORGANIZED HEALTH CARE EDUCATION/TRAINING PROGRAM

## 2021-09-13 ASSESSMENT — PAIN SCALES - GENERAL: PAINLEVEL: NO PAIN (0)

## 2021-09-13 NOTE — NURSING NOTE
Chief Complaint   Patient presents with     Blood Draw     Labs drawn by  in lab by RN. VS taken.      Labs collected from venipuncture by RN. Vitals taken.   Lawson Fuentes RN

## 2021-09-13 NOTE — LETTER
9/13/2021         RE: Ashleigh Alonzo  [address] [facility] [address]        Dear Colleague,    Thank you for referring your patient, Ashleigh Alonzo, to the [facility]. Please see a copy of my visit note below.    Ashleigh Perera is a 61 year old who is being evaluated via a billable telephone visit.      What phone number would you like to be contacted at? [phone]  How would you like to obtain your AVS? [employer]  Phone call duration: 20 minutes, an additional 10 minutes was spent on the day of the visit reviewing medical records, reviewing and interpreting tests, placing orders, and in documentation.    Oncology Follow Up:  Date on this visit: 9/13/2021    Diagnosis:  Stage IIb, T2N0M0, grade 3 triple negative cancer of the right breast.    Primary Physician: Radha Cazares     History Of Present Illness:  Ms. Alonzo is a 61-year-old female with a h/o stage IIb, T2 N0 M0, grade 3, triple-negative invasive carcinoma of the right breast.  Routine screening mammogram on 07/27/2017 showed developing calcifications in the right breast at the 12 o'clock position 6 cm from the nipple.  Ultrasound demonstrated a 7-mm, irregular, hypoechoic mass at the 12 o'clock position.  Contrast-enhanced mammogram showed a peripherally enhancing, irregular mass measuring 1.9 cm as well as an additional 6-mm, enhancing focus anteromedial to the dominant mass.  The total area of abnormal enhancement on contrast mammogram measured up to 3.4 cm.  Right breast biopsy demonstrated a grade 3 invasive mammary carcinoma with associated high-grade DCIS.  Estrogen and progesterone receptor staining were negative.  HER2 was non-amplified by FISH.  Breast MRI measured the biopsy proven breast cancer at 3.2 cm.    Ms. Alonzo enrolled in the ISPY-2 clinical trial.  She began treatment with weekly taxol and once every 3 week pembrolizumab on 9/20/17.  Her course was complicated by pneumonitis and hepatitis.   Pembrolizumab was stopped and she resumed weekly taxol alone on 11/28/17.  She completed a total of 12 weeks of therapy on 12/26/17.  She completed 2 cycles of adriamycin and cyclophosphamide.  She was hospitalized both cycles due to neutropenic fever and also developed C. Difficile colitis.  Decision was made to forgo further chemotherapy.       Right breast lumpectomy and sentinel lymph node procedure on 2/27/18 showed a grade 2, 6 mm residual invasive mammary carcinoma with an associated 8 mm area of high grade DCIS with comedonecrosis and ADH.  Invasive tumor cellularity was 10%.  There was lymphovascular invasion.  Surgical margins were negative.  A single sentinel lymph node was benign.  Classified as MDA RCB I.  She completed adjuvant radiation (4256 cGy to the right breast with additional 1000 cGy to the lumpectomy cavity) on 4/27/18.     Interval History:  Ms. Schrader was contacted via telephone today for routine breast cancer follow-up. She states in the past year her health has generally been very good. In fact, she retired in April. Upon retiring she has had significant improvement in both symptoms of depression as well as fatigue. She is no longer requiring an antidepressant. She denies signs or symptoms of mood disorder at this time. She is enjoying residential. She has had no fevers, chills, or infectious complaints. She noted she was recently using her COPD inhaler more frequently and pulmonologist started her on a new inhaler. She denies current shortness of breath or chest pain. She denies breast lumps, pain, or swelling. She has no current abdominal complaints. She denies headaches, visual changes, or focal neurologic complaints. The remainder of a complete 12 point review of systems was reviewed with patient was negative with exception that mentioned above.     Past Medical/Surgical History:  Past Medical History:   Diagnosis Date     Acute cystitis without hematuria 10/26/2017     Clostridium  difficile diarrhea 1/12/2018     Neutropenic fever (H) 01/10/2018     She was hospitalized 1/10/18 - 1/13/18 with neutropenic fever     Neutropenic sepsis (H) 01/29/2018    hospitalized again  from 1/29/18 - 2/8/18 with neutropenic fever, mucositis, and C. difficile colitis      Nonsenile cataract      Pneumonia 11/11/2017    She was hospitalized 11/11/17 - 11/17/17 with fevers ranging from 102 - 105.  CT chest was consistent with pneumonitis.  She also had transaminitis in the 150 range.  She was started on corticosteroids.  Pembrolizumab was stopped      S/P radiation therapy     5,256 cGy completed on 4/27/2018 to [facility] with Dr. Zhong     Past Surgical History:   Procedure Laterality Date     APPENDECTOMY  10/06/2015    [facility]     BREAST BIOPSY, CORE RT/LT Right 08/22/2017     BRONCHOSCOPY (RIGID OR FLEXIBLE), DIAGNOSTIC N/A 02/06/2018    Procedure: COMBINED BRONCHOSCOPY (RIGID OR FLEXIBLE), LAVAGE;  COMBINED BRONCOSCOY (RIGID OR FLEXIBLE), LAVAGE;  Surgeon: Samir Pettit MD;  [facility]     CATARACT IOL, RT/LT       CLEAR LENS REPLACEMENT Bilateral 11/2016    [facility]     COLONOSCOPY  11/15/2011    Procedure:COLONOSCOPY; Colonoscopy, screening; Surgeon:ANASTASIA BUNCH; Location:[facility]     INSERT PORT VASCULAR ACCESS Left 09/01/2017    Procedure: INSERT PORT VASCULAR ACCESS;  Single Lumen Chest Power Port;  Surgeon: Leif Parkinson PA-C;  [facility]: [facility]     LAPAROSCOPIC CHOLECYSTECTOMY N/A 4/30/2021    Procedure: CHOLECYSTECTOMY, LAPAROSCOPIC, WITH CHOLANGIOGRAM;  Surgeon: Jaxon Lechuga MD;  Location: [facility]     LUMPECTOMY BREAST WITH SENTINEL NODE, COMBINED Right 02/27/2018    Procedure: COMBINED LUMPECTOMY BREAST WITH SENTINEL NODE;  Right Wire Localized Lumpectomy, Right [place] Lymph Node Biopsy And Freddy Cath Removal;  Surgeon: Atul Gates MD;  [facility]     REMOVE PORT VASCULAR ACCESS N/A 02/27/2018    Procedure: REMOVE PORT  VASCULAR ACCESS;;  Surgeon: Atul Gates MD;  [facility]     TUBAL LIGATION  12/2004       Allergies:  Allergies as of 09/13/2021     (No Known Allergies)       Current Medications:  Current Outpatient Medications   Medication Sig Dispense Refill     albuterol (PROAIR HFA) 108 (90 Base) MCG/ACT inhaler Inhale 2 Puffs by mouth every 6 hours if needed for shortness of breath / dyspnea. 8.5 g 1     order for DME autoCPAP 5-18 cmH20 1 Device 0     simvastatin (ZOCOR) 40 MG tablet Take 1 tablet (40 mg) by mouth At Bedtime 90 tablet 3     SM MUCUS RELIEF 600 MG 12 hr tablet TAKE TWO TABLETS BY MOUTH TWICE A  tablet 0     umeclidinium-vilanterol (ANORO ELLIPTA) 62.5-25 MCG/INH oral inhaler Inhale 1 puff into the lungs daily 1 each 11     VITAMIN D 1000 UNIT OR CAPS Take 3,000 Units by mouth         Family and Social History:  Reviewed and unchanged from prior.  Please see initial consultation dated 8/28/17 for further details.    Physical Exam:  Physical exam deferred due to telephone visit restrictions during the public health emergency.    Laboratory/Imaging Studies:  9/13/2021 Labs:  WBC, hemoglobin, and platelets are wnl.    Electrolytes and creatinine are wnl.  Alkaline phosphatase is elevated at 207.    ALT is elevated at 53.  AST is wnl.    9/13/2021 Bilateral screening mammograms:  Small cluster of calcifications at the right breast lumpectomy site, radiology read is pending.    7/20/2021 CT chest w/o contrast:  - No new or enlarging pulmonary nodules  - Extensive sequelae of prior granumlomatous infection  - postsurgical changes of right breast lumpectomy without evidence of local ercurrence  - Sequelae of radiation therapy with subpleural fibrosis in the RUL.  Contiguous anterior right rib fractures likely also represent sequelae of radiation therapy.  - avascular necrosis of the right humeral head without subchondral collapse    ASSESSMENT/PLAN:  Ms. Alonzo is a 61-year-old female with a h/o grade  3, T2N0M0, triple-negative infiltrating ductal carcinoma of the right breast s/p 12 weeks of Taxol along with 3 cycles pembrolizumab, 2 cycles of adriamycin and cyclophosphamide, lumpectomy ([id number]), and radiation.     1.  Right breast cancer:   Ms. Alonzo is approximately 3.5 years out from excision of a right breast cancer.      She is asymptomatic of disease recurrence on history taken today.   Due to a h/o ADH, I have recommended high risk screening with both annual mammogram and breast MRI, spacing the two studies so that she is having some form of imaging every 6 months.  Recommend stopping breast MRIs once she is 5 years out from completion of treatment of her breast cancer.  I reviewed the images of bilateral screening mammograms performed today and note a cluster of calcifications at the right breast lumpectomy site.  We discussed radiology may request further imaging.  We will await their report.  I will see her back in 6 months with breast MRI the same day.    She inquired about ISPY2 results of her arm of the trial.  We discussed the FDA approved Keytruda as part of neoadjuvant treatment for triple negative breast cancer in July.    2.  Fatigue:  Improved s/p [facility].  Likely due to prior mood disorder and exacerbated by comorbid medical conditions including COPD and sleep apnea.  She uses a CPAP at night.  She recently started a new inhaler for her COPD.    3.  Organizing pneumonia/pulmonary nodules:  Chronic COPD.  Had pneumonitis secondary to pembrolizumab during breast cancer treatment.  Has serial CT scans for follow up of pulmonary nodules and organizing pneumonia.  Most recent in 07/2021 was without significant change, next due 07/2022.  She is following with Pulmonology.    4.  Routine health maintenance:  Next colonoscopy due in 11/2021.  Her PCP placed a referral and she will call to schedule.  Will obtain CBC every 6 months given h/o anthracycline exposure.      5.  Elevated alkaline  phosphatase:  Alk phos is elevated at 207 today.  Has been chronically elevated.  ALT also elevated at 53.  Prior check of GGT was elevated, therefore I suspect these may be due to statin use.  She will discuss with her PCP.      6.  Followup: Colonoscopy 11/2021.  Labs, visit with me, and breast MRI in 6 months.      Again, thank you for allowing me to participate in the care of your patient.        Sincerely,        Fara Bradshaw MD

## 2021-09-15 ENCOUNTER — TELEPHONE (OUTPATIENT)
Dept: GASTROENTEROLOGY | Facility: CLINIC | Age: 61
End: 2021-09-15

## 2021-09-15 ENCOUNTER — MYC MEDICAL ADVICE (OUTPATIENT)
Dept: FAMILY MEDICINE | Facility: CLINIC | Age: 61
End: 2021-09-15

## 2021-09-15 DIAGNOSIS — R74.8 ELEVATED LIVER ENZYMES: Primary | ICD-10-CM

## 2021-09-15 NOTE — TELEPHONE ENCOUNTER
Screening Questions  1. Are you active on mychart?    2. What insurance is in the chart? [employer]    2.  Ordering/Referring Provider: Susanne Marin MD    3. BMI 28.1    4. Are you on daily home oxygen? No     5. Do you have a history of difficult airway?  no    6. Have you had a heart, lung, or liver transplant?  no    7. Are you currently on dialysis?  no    8. Have you had a stroke or Transient ischemic atttack (TIA) within 6 months?  no    9. In the past 6 months, have you had any heart related issues including cardiomyopathy or heart attack?         If yes, did it require cardiac stenting or other implantable device? no    10. Do you have any implantable devices in your body (pacemaker, defib, LVAD)?  no    11. Do you take nitroglycerin? If yes, how often?  no    12. Are you currently taking any blood thinners? No     13. Are you a diabetic?  no    14. (Females) Are you currently pregnant?   If yes, how many weeks?    15. Have you had a procedure in the past that was difficult to tolerate with conscious sedation? Any allergies to Fentanyl or Versed  no    16. Are you taking any scheduled prescription narcotics more than once daily?  No     17. Do you have any chemical dependencies such as alcohol, street drugs, or methadone?  no    18. Do you have any history of post-traumatic stress syndrome or mental health issues?  no    19. Do you transfer independently?  Yes     20.  Do you have any issues with constipation? no    21. Preferred Pharmacy for Pre Prescription  [facility] [address] [facility]    Scheduling Details    Which Colonoscopy Prep was Sent?: Miralax  Procedure Scheduled: colon  Provider/Surgeon: Alexandrea  Date of Procedure:  10/12/2021  Location:   Caller (Please ask for phone number if not scheduled by patient): Ashleigh Alonzo      Sedation Type: CS  Conscious Sedation- Needs  for 6 hours after the procedure  MAC/General-Needs   for 24 hours after procedure    Pre-op Required at [facility] [place], [place] and OR for MAC sedation:   (if yes advise patient they will need a pre-op prior to procedure)      Is patient on blood thinners? -no (If yes- inform patient to follow up with PCP or provider for follow up instructions)     Informed patient they will need an adult  yes  Cannot take any type of public or medical transportation alone    Informed Patient of COVID Test Requirement yes    Confirmed Nurse will call to complete assessment yes    Additional comments: no

## 2021-09-19 DIAGNOSIS — Z11.59 ENCOUNTER FOR SCREENING FOR OTHER VIRAL DISEASES: ICD-10-CM

## 2021-10-08 ENCOUNTER — LAB (OUTPATIENT)
Dept: LAB | Facility: CLINIC | Age: 61
End: 2021-10-08
Payer: COMMERCIAL

## 2021-10-08 DIAGNOSIS — Z11.59 ENCOUNTER FOR SCREENING FOR OTHER VIRAL DISEASES: ICD-10-CM

## 2021-10-08 PROCEDURE — U0005 INFEC AGEN DETEC AMPLI PROBE: HCPCS

## 2021-10-08 PROCEDURE — U0003 INFECTIOUS AGENT DETECTION BY NUCLEIC ACID (DNA OR RNA); SEVERE ACUTE RESPIRATORY SYNDROME CORONAVIRUS 2 (SARS-COV-2) (CORONAVIRUS DISEASE [COVID-19]), AMPLIFIED PROBE TECHNIQUE, MAKING USE OF HIGH THROUGHPUT TECHNOLOGIES AS DESCRIBED BY CMS-2020-01-R: HCPCS

## 2021-10-09 LAB — SARS-COV-2 RNA RESP QL NAA+PROBE: NEGATIVE

## 2021-10-12 ENCOUNTER — HOSPITAL ENCOUNTER (OUTPATIENT)
Facility: AMBULATORY SURGERY CENTER | Age: 61
Discharge: HOME | End: 2021-10-12
Attending: SURGERY | Admitting: SURGERY
Payer: COMMERCIAL

## 2021-10-12 VITALS
SYSTOLIC BLOOD PRESSURE: 138 MMHG | TEMPERATURE: 97.2 F | HEART RATE: 93 BPM | RESPIRATION RATE: 16 BRPM | DIASTOLIC BLOOD PRESSURE: 64 MMHG | OXYGEN SATURATION: 98 %

## 2021-10-12 LAB — COLONOSCOPY: NORMAL

## 2021-10-12 PROCEDURE — 88305 TISSUE EXAM BY PATHOLOGIST: CPT | Performed by: PATHOLOGY

## 2021-10-12 PROCEDURE — 99152 MOD SED SAME PHYS/QHP 5/>YRS: CPT | Mod: 59 | Performed by: SURGERY

## 2021-10-12 PROCEDURE — G8918 PT W/O PREOP ORDER IV AB PRO: HCPCS

## 2021-10-12 PROCEDURE — 45385 COLONOSCOPY W/LESION REMOVAL: CPT

## 2021-10-12 PROCEDURE — 45385 COLONOSCOPY W/LESION REMOVAL: CPT | Mod: PT | Performed by: SURGERY

## 2021-10-12 PROCEDURE — 45381 COLONOSCOPY SUBMUCOUS NJX: CPT

## 2021-10-12 PROCEDURE — G8907 PT DOC NO EVENTS ON DISCHARG: HCPCS

## 2021-10-12 PROCEDURE — 45381 COLONOSCOPY SUBMUCOUS NJX: CPT | Mod: PT | Performed by: SURGERY

## 2021-10-12 RX ORDER — FENTANYL CITRATE 50 UG/ML
INJECTION, SOLUTION INTRAMUSCULAR; INTRAVENOUS PRN
Status: DISCONTINUED | OUTPATIENT
Start: 2021-10-12 | End: 2021-10-12 | Stop reason: HOSPADM

## 2021-10-12 RX ORDER — NALOXONE HYDROCHLORIDE 0.4 MG/ML
0.4 INJECTION, SOLUTION INTRAMUSCULAR; INTRAVENOUS; SUBCUTANEOUS
Status: DISCONTINUED | OUTPATIENT
Start: 2021-10-12 | End: 2021-10-13 | Stop reason: HOSPADM

## 2021-10-12 RX ORDER — ONDANSETRON 2 MG/ML
4 INJECTION INTRAMUSCULAR; INTRAVENOUS EVERY 6 HOURS PRN
Status: DISCONTINUED | OUTPATIENT
Start: 2021-10-12 | End: 2021-10-13 | Stop reason: HOSPADM

## 2021-10-12 RX ORDER — ONDANSETRON 2 MG/ML
4 INJECTION INTRAMUSCULAR; INTRAVENOUS
Status: DISCONTINUED | OUTPATIENT
Start: 2021-10-12 | End: 2021-10-13 | Stop reason: HOSPADM

## 2021-10-12 RX ORDER — LIDOCAINE 40 MG/G
CREAM TOPICAL
Status: DISCONTINUED | OUTPATIENT
Start: 2021-10-12 | End: 2021-10-13 | Stop reason: HOSPADM

## 2021-10-12 RX ORDER — ONDANSETRON 4 MG/1
4 TABLET, ORALLY DISINTEGRATING ORAL EVERY 6 HOURS PRN
Status: DISCONTINUED | OUTPATIENT
Start: 2021-10-12 | End: 2021-10-13 | Stop reason: HOSPADM

## 2021-10-12 RX ORDER — PROCHLORPERAZINE MALEATE 10 MG
10 TABLET ORAL EVERY 6 HOURS PRN
Status: DISCONTINUED | OUTPATIENT
Start: 2021-10-12 | End: 2021-10-13 | Stop reason: HOSPADM

## 2021-10-12 RX ORDER — NALOXONE HYDROCHLORIDE 0.4 MG/ML
0.2 INJECTION, SOLUTION INTRAMUSCULAR; INTRAVENOUS; SUBCUTANEOUS
Status: DISCONTINUED | OUTPATIENT
Start: 2021-10-12 | End: 2021-10-13 | Stop reason: HOSPADM

## 2021-10-12 RX ORDER — FLUMAZENIL 0.1 MG/ML
0.2 INJECTION, SOLUTION INTRAVENOUS
Status: ACTIVE | OUTPATIENT
Start: 2021-10-12 | End: 2021-10-12

## 2021-10-14 LAB
PATH REPORT.COMMENTS IMP SPEC: NORMAL
PATH REPORT.COMMENTS IMP SPEC: NORMAL
PATH REPORT.FINAL DX SPEC: NORMAL
PATH REPORT.GROSS SPEC: NORMAL
PATH REPORT.MICROSCOPIC SPEC OTHER STN: NORMAL
PATH REPORT.RELEVANT HX SPEC: NORMAL
PHOTO IMAGE: NORMAL

## 2021-10-20 ENCOUNTER — MYC MEDICAL ADVICE (OUTPATIENT)
Dept: SURGERY | Facility: CLINIC | Age: 61
End: 2021-10-20

## 2021-11-03 DIAGNOSIS — R05.8 COUGH WITH SPUTUM: ICD-10-CM

## 2021-11-03 RX ORDER — GUAIFENESIN 600 MG/1
TABLET ORAL
Qty: 160 TABLET | Refills: 0 | Status: SHIPPED | OUTPATIENT
Start: 2021-11-03 | End: 2022-01-14

## 2021-11-04 ENCOUNTER — LAB (OUTPATIENT)
Dept: LAB | Facility: CLINIC | Age: 61
End: 2021-11-04
Payer: COMMERCIAL

## 2021-11-04 ENCOUNTER — MYC MEDICAL ADVICE (OUTPATIENT)
Dept: FAMILY MEDICINE | Facility: CLINIC | Age: 61
End: 2021-11-04

## 2021-11-04 DIAGNOSIS — R74.8 ELEVATED LIVER ENZYMES: ICD-10-CM

## 2021-11-04 DIAGNOSIS — E78.5 HYPERLIPIDEMIA LDL GOAL <130: Primary | ICD-10-CM

## 2021-11-04 DIAGNOSIS — E78.5 HYPERLIPIDEMIA LDL GOAL <130: ICD-10-CM

## 2021-11-04 LAB
ALBUMIN SERPL-MCNC: 3.9 G/DL (ref 3.4–5)
ALP SERPL-CCNC: 157 U/L (ref 40–150)
ALT SERPL W P-5'-P-CCNC: 29 U/L (ref 0–50)
AST SERPL W P-5'-P-CCNC: 22 U/L (ref 0–45)
BILIRUB DIRECT SERPL-MCNC: 0.1 MG/DL (ref 0–0.2)
BILIRUB SERPL-MCNC: 0.6 MG/DL (ref 0.2–1.3)
PROT SERPL-MCNC: 8.1 G/DL (ref 6.8–8.8)

## 2021-11-04 PROCEDURE — 80076 HEPATIC FUNCTION PANEL: CPT

## 2021-11-04 PROCEDURE — 36415 COLL VENOUS BLD VENIPUNCTURE: CPT

## 2021-11-04 PROCEDURE — 80061 LIPID PANEL: CPT

## 2021-11-05 DIAGNOSIS — R74.8 ELEVATED LIVER ENZYMES: Primary | ICD-10-CM

## 2021-11-05 LAB
CHOLEST SERPL-MCNC: 319 MG/DL
HDLC SERPL-MCNC: 47 MG/DL
LDLC SERPL CALC-MCNC: 241 MG/DL
NONHDLC SERPL-MCNC: 272 MG/DL
TRIGL SERPL-MCNC: 157 MG/DL

## 2021-12-15 ENCOUNTER — DOCUMENTATION ONLY (OUTPATIENT)
Dept: LAB | Facility: CLINIC | Age: 61
End: 2021-12-15
Payer: COMMERCIAL

## 2021-12-15 NOTE — PROGRESS NOTES
I see a LFT from Dr Delgado but scheduling notes state orders from you..Please place or confirm orders for upcoming lab appointment on 12/16/21.  Thank You,  Shelli

## 2021-12-16 ENCOUNTER — LAB (OUTPATIENT)
Dept: LAB | Facility: CLINIC | Age: 61
End: 2021-12-16
Payer: COMMERCIAL

## 2021-12-16 ENCOUNTER — ANCILLARY PROCEDURE (OUTPATIENT)
Dept: GENERAL RADIOLOGY | Facility: CLINIC | Age: 61
End: 2021-12-16
Attending: NURSE PRACTITIONER
Payer: COMMERCIAL

## 2021-12-16 ENCOUNTER — OFFICE VISIT (OUTPATIENT)
Dept: URGENT CARE | Facility: URGENT CARE | Age: 61
End: 2021-12-16
Payer: COMMERCIAL

## 2021-12-16 VITALS
DIASTOLIC BLOOD PRESSURE: 84 MMHG | HEART RATE: 97 BPM | WEIGHT: 164.8 LBS | SYSTOLIC BLOOD PRESSURE: 142 MMHG | OXYGEN SATURATION: 99 % | BODY MASS INDEX: 29.19 KG/M2 | TEMPERATURE: 99 F

## 2021-12-16 DIAGNOSIS — S20.211A RIB CONTUSION, RIGHT, INITIAL ENCOUNTER: Primary | ICD-10-CM

## 2021-12-16 DIAGNOSIS — R74.8 ELEVATED LIVER ENZYMES: ICD-10-CM

## 2021-12-16 DIAGNOSIS — S20.211A RIB CONTUSION, RIGHT, INITIAL ENCOUNTER: ICD-10-CM

## 2021-12-16 LAB
ALBUMIN SERPL-MCNC: 3.8 G/DL (ref 3.4–5)
ALP SERPL-CCNC: 155 U/L (ref 40–150)
ALT SERPL W P-5'-P-CCNC: 41 U/L (ref 0–50)
AST SERPL W P-5'-P-CCNC: 27 U/L (ref 0–45)
BILIRUB DIRECT SERPL-MCNC: 0.1 MG/DL (ref 0–0.2)
BILIRUB SERPL-MCNC: 0.4 MG/DL (ref 0.2–1.3)
PROT SERPL-MCNC: 7.9 G/DL (ref 6.8–8.8)

## 2021-12-16 PROCEDURE — 80076 HEPATIC FUNCTION PANEL: CPT

## 2021-12-16 PROCEDURE — 99213 OFFICE O/P EST LOW 20 MIN: CPT | Performed by: NURSE PRACTITIONER

## 2021-12-16 PROCEDURE — 36415 COLL VENOUS BLD VENIPUNCTURE: CPT

## 2021-12-16 PROCEDURE — 71101 X-RAY EXAM UNILAT RIBS/CHEST: CPT | Mod: RT | Performed by: RADIOLOGY

## 2021-12-16 NOTE — PROGRESS NOTES
Assessment & Plan     Rib contusion, right, initial encounter  Right rib contusion 2/2 mechanical fall.  X-ray negative, radiologist reading pending. Pain managed with acetaminophen and ibuprofen.   - XR Ribs & Chest Rt 3vw      Return if symptoms worsen or fail to improve.    Kat Donovan NP  [facility] [place]    Subjective     Ashleigh Perera is a 61 year old female who presents to clinic today for the following health issues:  Chief Complaint   Patient presents with     Musculoskeletal Problem     right side, hx of rib fracutres in the past. Slipped at home almost 1.5 weeks ago.     HPI  Patient slipped while walking 1.5 weeks ago in house and injured right side. Denies SOB but feels her fibs may be fractured. No bruising noted.     MS Injury/Pain    Onset of symptoms was 1.5 week(s) ago.  Location: right ribs    Context:       The injury happened while at home      Mechanism: fall       Patient experienced immediate pain  Course of symptoms is improving.    Severity moderate  Current and Associated symptoms: Pain  Denies  Bruising, Warmth and Redness  Aggravating Factors: twisting  Therapies to improve symptoms include: heat, ice, ibuprofen and Tylenol  This is the first time this type of problem has occurred for this patient.       Review of Systems  Constitutional, HEENT, cardiovascular, pulmonary, gi and gu systems are negative, except as otherwise noted.      Objective    BP (!) 142/84   Pulse 97   Temp 99  F (37.2  C) (Tympanic)   Wt 74.8 kg (164 lb 12.8 oz)   SpO2 99%   BMI 29.19 kg/m    Physical Exam   GENERAL: healthy, alert and no distress  MS: RUE exam shows no deformities    Xray - Reviewed and interpreted by me.  Negative for fracture, radiologist interpretation pending

## 2021-12-17 ENCOUNTER — MYC MEDICAL ADVICE (OUTPATIENT)
Dept: FAMILY MEDICINE | Facility: CLINIC | Age: 61
End: 2021-12-17
Payer: COMMERCIAL

## 2021-12-17 DIAGNOSIS — R74.8 ELEVATED LIVER ENZYMES: Primary | ICD-10-CM

## 2022-01-13 ENCOUNTER — LAB (OUTPATIENT)
Dept: LAB | Facility: CLINIC | Age: 62
End: 2022-01-13
Attending: FAMILY MEDICINE
Payer: COMMERCIAL

## 2022-01-13 DIAGNOSIS — R74.8 ELEVATED LIVER ENZYMES: ICD-10-CM

## 2022-01-13 LAB
ALBUMIN SERPL-MCNC: 3.8 G/DL (ref 3.4–5)
ALP SERPL-CCNC: 141 U/L (ref 40–150)
ALT SERPL W P-5'-P-CCNC: 33 U/L (ref 0–50)
AST SERPL W P-5'-P-CCNC: 26 U/L (ref 0–45)
BILIRUB DIRECT SERPL-MCNC: 0.1 MG/DL (ref 0–0.2)
BILIRUB SERPL-MCNC: 0.5 MG/DL (ref 0.2–1.3)
PROT SERPL-MCNC: 7.7 G/DL (ref 6.8–8.8)

## 2022-01-13 PROCEDURE — 80076 HEPATIC FUNCTION PANEL: CPT

## 2022-01-13 PROCEDURE — 36415 COLL VENOUS BLD VENIPUNCTURE: CPT

## 2022-01-14 ENCOUNTER — MYC MEDICAL ADVICE (OUTPATIENT)
Dept: FAMILY MEDICINE | Facility: CLINIC | Age: 62
End: 2022-01-14
Payer: COMMERCIAL

## 2022-01-14 DIAGNOSIS — E78.5 HYPERLIPIDEMIA LDL GOAL <130: Chronic | ICD-10-CM

## 2022-01-14 DIAGNOSIS — R05.8 COUGH WITH SPUTUM: ICD-10-CM

## 2022-01-14 RX ORDER — SIMVASTATIN 40 MG
40 TABLET ORAL AT BEDTIME
Qty: 90 TABLET | Refills: 3 | Status: SHIPPED | OUTPATIENT
Start: 2022-01-14 | End: 2022-08-31

## 2022-01-14 RX ORDER — GUAIFENESIN 600 MG/1
TABLET, EXTENDED RELEASE ORAL
Qty: 160 TABLET | Refills: 0 | Status: SHIPPED | OUTPATIENT
Start: 2022-01-14 | End: 2022-02-25

## 2022-02-25 DIAGNOSIS — R05.8 COUGH WITH SPUTUM: ICD-10-CM

## 2022-02-25 RX ORDER — GUAIFENESIN 600 MG/1
TABLET ORAL
Qty: 160 TABLET | Refills: 0 | Status: SHIPPED | OUTPATIENT
Start: 2022-02-25 | End: 2022-10-17

## 2022-03-13 NOTE — PROGRESS NOTES
Oncology Follow Up:  Date on this visit: 3/14/2022    Diagnosis:  Stage IIb, T2N0M0, grade 3 triple negative cancer of the right breast.    Primary Physician: Radha Cazares     History Of Present Illness:  Ms. Alonzo is a 61-year-old female with a h/o stage IIb, T2 N0 M0, grade 3, triple-negative invasive carcinoma of the right breast.  Routine screening mammogram on 07/27/2017 showed developing calcifications in the right breast at the 12 o'clock position 6 cm from the nipple.  Ultrasound demonstrated a 7-mm, irregular, hypoechoic mass at the 12 o'clock position.  Contrast-enhanced mammogram showed a peripherally enhancing, irregular mass measuring 1.9 cm as well as an additional 6-mm, enhancing focus anteromedial to the dominant mass.  The total area of abnormal enhancement on contrast mammogram measured up to 3.4 cm.  Right breast biopsy demonstrated a grade 3 invasive mammary carcinoma with associated high-grade DCIS.  Estrogen and progesterone receptor staining were negative.  HER2 was non-amplified by FISH.  Breast MRI measured the biopsy proven breast cancer at 3.2 cm.    Ms. Alonzo enrolled in the ISPY-2 clinical trial.  She began treatment with weekly taxol and once every 3 week pembrolizumab on 9/20/17.  Her course was complicated by pneumonitis and hepatitis.  Pembrolizumab was stopped and she resumed weekly taxol alone on 11/28/17.  She completed a total of 12 weeks of therapy on 12/26/17.  She completed 2 cycles of adriamycin and cyclophosphamide.  She was hospitalized both cycles due to neutropenic fever and also developed C. Difficile colitis.  Decision was made to forgo further chemotherapy.       Right breast lumpectomy and sentinel lymph node procedure on 2/27/18 showed a grade 2, 6 mm residual invasive mammary carcinoma with an associated 8 mm area of high grade DCIS with comedonecrosis and ADH.  Invasive tumor cellularity was 10%.  There was lymphovascular invasion.  Surgical margins were  negative.  A single sentinel lymph node was benign.  Classified as MDA RCB I.  She completed adjuvant radiation (4256 cGy to the right breast with additional 1000 cGy to the lumpectomy cavity) on 4/27/18.     Interval History:  Ms. Alonzo comes into clinic today for routine breast cancer follow-up.  Her health has been good.  She retired from working approximately 1 year ago.  Since longterm, she has had significant improvement in fatigue.  She is sleeping much better and is able to now sleep as much as she needs.  She has not been watching what she eats.  Despite this, she has not gained weight.  She routinely walks.  She denies current breast lumps, pain, or swelling.  She has no cough, shortness of breath, or chest pain.  She denies current abdominal complaints.  She has no new bone or joint aches or pains.  She reports her right breast continues overall feel more firm than the left.  This is chronic and present since radiation.  She is having a root canal on 3/24 and wonders if she needs an antibiotic prior.  The remainder of a complete 12 point review of systems is reviewed with the patient was negative with exception that mentioned above.     Past Medical/Surgical History:  Past Medical History:   Diagnosis Date     Acute cystitis without hematuria 10/26/2017     Clostridium difficile diarrhea 1/12/2018     Neutropenic fever (H) 01/10/2018     She was hospitalized 1/10/18 - 1/13/18 with neutropenic fever     Neutropenic sepsis (H) 01/29/2018    hospitalized again  from 1/29/18 - 2/8/18 with neutropenic fever, mucositis, and C. difficile colitis      Nonsenile cataract      Pneumonia 11/11/2017    She was hospitalized 11/11/17 - 11/17/17 with fevers ranging from 102 - 105.  CT chest was consistent with pneumonitis.  She also had transaminitis in the 150 range.  She was started on corticosteroids.  Pembrolizumab was stopped      S/P radiation therapy     5,256 cGy completed on 4/27/2018 to right breast - [facility]  [facility] with Dr. Zhong     Past Surgical History:   Procedure Laterality Date     APPENDECTOMY  10/06/2015    [facility]     BREAST BIOPSY, CORE RT/LT Right 08/22/2017     BRONCHOSCOPY (RIGID OR FLEXIBLE), DIAGNOSTIC N/A 02/06/2018    Procedure: COMBINED BRONCHOSCOPY (RIGID OR FLEXIBLE), LAVAGE;  COMBINED BRONCOSCOY (RIGID OR FLEXIBLE), LAVAGE;  Surgeon: Samir Pettit MD;  [facility]     CATARACT IOL, RT/LT       CLEAR LENS REPLACEMENT Bilateral 11/2016    [facility]     COLONOSCOPY  11/15/2011    Procedure:COLONOSCOPY; Colonoscopy, screening; Surgeon:ANASTASIA BUNCH; [facility]     COLONOSCOPY N/A 10/12/2021    Procedure: Colonoscopy, Flexible, With Lesion Removal Using Snare;  Surgeon: Jaxon Lechuga MD;  [facility]     COLONOSCOPY WITH CO2 INSUFFLATION N/A 10/12/2021    Procedure: COLONOSCOPY, WITH CO2 INSUFFLATION;  Surgeon: Jaxon Lechuga MD;  [facility]     INSERT PORT VASCULAR ACCESS Left 09/01/2017    Procedure: INSERT PORT VASCULAR ACCESS;  Single Lumen Chest Power Port;  Surgeon: Leif Parkinson PA-C;  [facility]     LAPAROSCOPIC CHOLECYSTECTOMY N/A 4/30/2021    Procedure: CHOLECYSTECTOMY, LAPAROSCOPIC, WITH CHOLANGIOGRAM;  Surgeon: Jaxon Lechuga MD;  [facility]     LUMPECTOMY BREAST WITH SENTINEL NODE, COMBINED Right 02/27/2018    Procedure: COMBINED LUMPECTOMY BREAST WITH SENTINEL NODE;  Right Wire Localized Lumpectomy, Right [place] Lymph Node Biopsy And Freddy Cath Removal;  Surgeon: Atul Gates MD;  [facility]     REMOVE PORT VASCULAR ACCESS N/A 02/27/2018    Procedure: REMOVE PORT VASCULAR ACCESS;;  Surgeon: Atul Gates MD;  [facility]     TUBAL LIGATION  12/2004       Allergies:  Allergies as of 03/14/2022     (No Known Allergies)       Current Medications:  Current Outpatient Medications   Medication Sig Dispense Refill     albuterol (PROAIR HFA) 108 (90 Base) MCG/ACT inhaler Inhale 2 Puffs by  mouth every 6 hours if needed for shortness of breath / dyspnea. (Patient not taking: Reported on 12/16/2021) 8.5 g 1     order for DME autoCPAP 5-18 cmH20 (Patient not taking: Reported on 12/16/2021) 1 Device 0     simvastatin (ZOCOR) 40 MG tablet Take 1 tablet (40 mg) by mouth At Bedtime 90 tablet 3     SM MUCUS RELIEF 600 MG 12 hr tablet TAKE TWO TABLETS BY MOUTH TWICE A  tablet 0     umeclidinium-vilanterol (ANORO ELLIPTA) 62.5-25 MCG/INH oral inhaler Inhale 1 puff into the lungs daily 1 each 11     VITAMIN D 1000 UNIT OR CAPS Take 3,000 Units by mouth         Family and Social History:  Reviewed and unchanged from prior.  Please see initial consultation dated 8/28/17 for further details.    Physical Exam:  /62   Pulse 90   Temp 98.5  F (36.9  C) (Oral)   Resp 16   Wt 75.6 kg (166 lb 9.6 oz)   SpO2 96%   BMI 29.51 kg/m    General:  Well appearing adult female in NAD.  Alert and oriented.  HEENT:  Normocephalic.  Sclera anicteric.  MMM.  No lesions of the oropharynx.  Lymph:  No palpable cervical, supraclavicular, or axillary LAD.  Chest:  CTA bilaterally.  No wheezes or crackles.  CV:  RRR.  Nl S1 and S2.    Breast:  Right breast with increased density and skin thickening c/w radiation change.  Right breast is much smaller and firmer overall than the left breast.  There are no discretely palpable masses in either breast.  Bilateral nipples are everted and without discharge.  Abd:  Soft/ND.   Ext:  No pitting edema of the bilateral lower extremities.    Musculo:  Full ROM of the bilateral upper extremities.  Neuro:  Cranial nerves grossly intact.  Psych:  Mood and affect appear normal.      Laboratory/Imaging Studies:  10/12/2021 Colonoscopy:  1 cm sessile polyp at the appendiceal orifice   Pathology c/w sessile serrated adenoma  8 mm polyp in the distal transverse colon.   Pathology c/w sessile serrated adenoma    3/14/2022 Breast MRI:  We reviewed the images together in clinic today.  There  is no suspicious enhancement in either breast.  No lymphadenopathy.    3/14/2022 Labs:  Electrolytes and kidney function are wnl.  Alkaline phosphatase is chronically elevated, is 159 today.  Remaining LFTs are wnl.  Blood counts are wnl.    ASSESSMENT/PLAN:  Ms. Alonzo is a 61-year-old female with a h/o grade 3, T2N0M0, triple-negative infiltrating ductal carcinoma of the right breast s/p 12 weeks of Taxol along with 3 cycles pembrolizumab, 2 cycles of adriamycin and cyclophosphamide, lumpectomy ([id number]), and radiation.     1.  Right breast cancer:   Ms. Alonzo is just over 4 years out from excision of a right breast cancer.      She is asymptomatic of disease recurrence on history taken today and clinical exam is without concerning findings.   Due to a h/o ADH, I have recommended high risk screening with both annual mammogram and breast MRI, spacing the two studies so that she is having some form of imaging every 6 months.  Recommend stopping breast MRIs once she is 5 years out from completion of treatment of her breast cancer.  We reviewed the images of breast MRI performed today together in clinic.  The breast MRI is without significant areas of enhancement or lymphadenopathy.  Radiology read is pending and will be released to the patient when available.  I will see her back in clinic in 6 months with bilateral mammograms the same day.    2.  Organizing pneumonia/pulmonary nodules:  Chronic COPD.  Had pneumonitis secondary to pembrolizumab during breast cancer treatment.  Has serial CT scans for follow up of pulmonary nodules and organizing pneumonia.  She has CT chest and pulmonology visit scheduled in 07/2022.      3.  Colyn polyps:  1 cm and 8 mm polyps removed 10/2021.  Next colonoscopy in 3 years (10/2024)    4.  Planned root canal:  No need for prophylactic antibiotic from oncologic standpoint, recommend discussing with her dentist.    5.  Elevated alkaline phosphatase:  Previous check of GGT was elevated,  therefore suspect this is secondary to statin.    6.  Followup: Bilateral screening mammograms and visit with me 9/13/2022 or later.    30 minutes spent on the date of the encounter doing chart review, review of test results, interpretation of tests, patient visit and documentation.

## 2022-03-14 ENCOUNTER — ONCOLOGY VISIT (OUTPATIENT)
Dept: ONCOLOGY | Facility: CLINIC | Age: 62
End: 2022-03-14
Attending: INTERNAL MEDICINE
Payer: COMMERCIAL

## 2022-03-14 ENCOUNTER — ANCILLARY PROCEDURE (OUTPATIENT)
Dept: MRI IMAGING | Facility: CLINIC | Age: 62
End: 2022-03-14
Attending: INTERNAL MEDICINE
Payer: COMMERCIAL

## 2022-03-14 ENCOUNTER — APPOINTMENT (OUTPATIENT)
Dept: LAB | Facility: CLINIC | Age: 62
End: 2022-03-14
Attending: INTERNAL MEDICINE
Payer: COMMERCIAL

## 2022-03-14 VITALS
OXYGEN SATURATION: 96 % | SYSTOLIC BLOOD PRESSURE: 121 MMHG | RESPIRATION RATE: 16 BRPM | HEART RATE: 90 BPM | TEMPERATURE: 98.5 F | BODY MASS INDEX: 29.51 KG/M2 | WEIGHT: 166.6 LBS | DIASTOLIC BLOOD PRESSURE: 62 MMHG

## 2022-03-14 DIAGNOSIS — Z92.21 STATUS POST CHEMOTHERAPY: ICD-10-CM

## 2022-03-14 DIAGNOSIS — Z86.0100 PERSONAL HISTORY OF COLONIC POLYPS: ICD-10-CM

## 2022-03-14 DIAGNOSIS — Z12.39 BREAST CANCER SCREENING, HIGH RISK PATIENT: ICD-10-CM

## 2022-03-14 DIAGNOSIS — Z12.31 VISIT FOR SCREENING MAMMOGRAM: ICD-10-CM

## 2022-03-14 DIAGNOSIS — R74.8 ELEVATED ALKALINE PHOSPHATASE LEVEL: ICD-10-CM

## 2022-03-14 DIAGNOSIS — Z85.3 PERSONAL HISTORY OF MALIGNANT NEOPLASM OF BREAST: ICD-10-CM

## 2022-03-14 DIAGNOSIS — Z87.42 HISTORY OF ATYPICAL HYPERPLASIA OF BREAST: ICD-10-CM

## 2022-03-14 DIAGNOSIS — C50.211 MALIGNANT NEOPLASM OF UPPER-INNER QUADRANT OF RIGHT BREAST IN FEMALE, ESTROGEN RECEPTOR NEGATIVE (H): Primary | ICD-10-CM

## 2022-03-14 DIAGNOSIS — Z17.1 MALIGNANT NEOPLASM OF UPPER-INNER QUADRANT OF RIGHT BREAST IN FEMALE, ESTROGEN RECEPTOR NEGATIVE (H): Primary | ICD-10-CM

## 2022-03-14 LAB
ALBUMIN SERPL-MCNC: 3.6 G/DL (ref 3.4–5)
ALP SERPL-CCNC: 159 U/L (ref 40–150)
ALT SERPL W P-5'-P-CCNC: 41 U/L (ref 0–50)
ANION GAP SERPL CALCULATED.3IONS-SCNC: 8 MMOL/L (ref 3–14)
AST SERPL W P-5'-P-CCNC: 27 U/L (ref 0–45)
BASOPHILS # BLD AUTO: 0 10E3/UL (ref 0–0.2)
BASOPHILS NFR BLD AUTO: 0 %
BILIRUB SERPL-MCNC: 0.4 MG/DL (ref 0.2–1.3)
BUN SERPL-MCNC: 12 MG/DL (ref 7–30)
CALCIUM SERPL-MCNC: 8.7 MG/DL (ref 8.5–10.1)
CHLORIDE BLD-SCNC: 107 MMOL/L (ref 94–109)
CO2 SERPL-SCNC: 26 MMOL/L (ref 20–32)
CREAT SERPL-MCNC: 0.84 MG/DL (ref 0.52–1.04)
EOSINOPHIL # BLD AUTO: 0.2 10E3/UL (ref 0–0.7)
EOSINOPHIL NFR BLD AUTO: 2 %
ERYTHROCYTE [DISTWIDTH] IN BLOOD BY AUTOMATED COUNT: 11.9 % (ref 10–15)
GFR SERPL CREATININE-BSD FRML MDRD: 79 ML/MIN/1.73M2
GLUCOSE BLD-MCNC: 131 MG/DL (ref 70–99)
HCT VFR BLD AUTO: 38.3 % (ref 35–47)
HGB BLD-MCNC: 13.2 G/DL (ref 11.7–15.7)
IMM GRANULOCYTES # BLD: 0 10E3/UL
IMM GRANULOCYTES NFR BLD: 0 %
LYMPHOCYTES # BLD AUTO: 1.4 10E3/UL (ref 0.8–5.3)
LYMPHOCYTES NFR BLD AUTO: 20 %
MCH RBC QN AUTO: 33.7 PG (ref 26.5–33)
MCHC RBC AUTO-ENTMCNC: 34.5 G/DL (ref 31.5–36.5)
MCV RBC AUTO: 98 FL (ref 78–100)
MONOCYTES # BLD AUTO: 0.6 10E3/UL (ref 0–1.3)
MONOCYTES NFR BLD AUTO: 8 %
NEUTROPHILS # BLD AUTO: 4.9 10E3/UL (ref 1.6–8.3)
NEUTROPHILS NFR BLD AUTO: 70 %
NRBC # BLD AUTO: 0 10E3/UL
NRBC BLD AUTO-RTO: 0 /100
PLATELET # BLD AUTO: 295 10E3/UL (ref 150–450)
POTASSIUM BLD-SCNC: 4.1 MMOL/L (ref 3.4–5.3)
PROT SERPL-MCNC: 7.3 G/DL (ref 6.8–8.8)
RBC # BLD AUTO: 3.92 10E6/UL (ref 3.8–5.2)
SODIUM SERPL-SCNC: 141 MMOL/L (ref 133–144)
WBC # BLD AUTO: 7 10E3/UL (ref 4–11)

## 2022-03-14 PROCEDURE — G0463 HOSPITAL OUTPT CLINIC VISIT: HCPCS

## 2022-03-14 PROCEDURE — 77049 MRI BREAST C-+ W/CAD BI: CPT | Performed by: RADIOLOGY

## 2022-03-14 PROCEDURE — 80053 COMPREHEN METABOLIC PANEL: CPT | Performed by: INTERNAL MEDICINE

## 2022-03-14 PROCEDURE — 85004 AUTOMATED DIFF WBC COUNT: CPT | Performed by: INTERNAL MEDICINE

## 2022-03-14 PROCEDURE — A9585 GADOBUTROL INJECTION: HCPCS | Performed by: RADIOLOGY

## 2022-03-14 PROCEDURE — 36415 COLL VENOUS BLD VENIPUNCTURE: CPT | Performed by: INTERNAL MEDICINE

## 2022-03-14 PROCEDURE — 99214 OFFICE O/P EST MOD 30 MIN: CPT | Performed by: INTERNAL MEDICINE

## 2022-03-14 RX ORDER — GADOBUTROL 604.72 MG/ML
7.5 INJECTION INTRAVENOUS ONCE
Status: COMPLETED | OUTPATIENT
Start: 2022-03-14 | End: 2022-03-14

## 2022-03-14 RX ADMIN — GADOBUTROL 7.5 ML: 604.72 INJECTION INTRAVENOUS at 11:17

## 2022-03-14 ASSESSMENT — PAIN SCALES - GENERAL: PAINLEVEL: NO PAIN (0)

## 2022-03-14 NOTE — NURSING NOTE
Chief Complaint   Patient presents with     Blood Draw     Vitals, blood drawn off PIV that was in place. Pt checked into appt.      PIV removed during lab visit.     LUIS Espinosa LPN

## 2022-03-14 NOTE — LETTER
3/14/2022         RE: Ashleigh Alonzo  [address] [facility] [address]        Dear Colleague,    Thank you for referring your patient, Ashleigh Alonzo, to the [facility]. Please see a copy of my visit note below.    Oncology Follow Up:  Date on this visit: 3/14/2022    Diagnosis:  Stage IIb, T2N0M0, grade 3 triple negative cancer of the right breast.    Primary Physician: Radha Cazares     History Of Present Illness:  Ms. Alonzo is a 61-year-old female with a h/o stage IIb, T2 N0 M0, grade 3, triple-negative invasive carcinoma of the right breast.  Routine screening mammogram on 07/27/2017 showed developing calcifications in the right breast at the 12 o'clock position 6 cm from the nipple.  Ultrasound demonstrated a 7-mm, irregular, hypoechoic mass at the 12 o'clock position.  Contrast-enhanced mammogram showed a peripherally enhancing, irregular mass measuring 1.9 cm as well as an additional 6-mm, enhancing focus anteromedial to the dominant mass.  The total area of abnormal enhancement on contrast mammogram measured up to 3.4 cm.  Right breast biopsy demonstrated a grade 3 invasive mammary carcinoma with associated high-grade DCIS.  Estrogen and progesterone receptor staining were negative.  HER2 was non-amplified by FISH.  Breast MRI measured the biopsy proven breast cancer at 3.2 cm.    Ms. Alonzo enrolled in the ISPY-2 clinical trial.  She began treatment with weekly taxol and once every 3 week pembrolizumab on 9/20/17.  Her course was complicated by pneumonitis and hepatitis.  Pembrolizumab was stopped and she resumed weekly taxol alone on 11/28/17.  She completed a total of 12 weeks of therapy on 12/26/17.  She completed 2 cycles of adriamycin and cyclophosphamide.  She was hospitalized both cycles due to neutropenic fever and also developed C. Difficile colitis.  Decision was made to forgo further chemotherapy.       Right breast lumpectomy and sentinel lymph node  procedure on 2/27/18 showed a grade 2, 6 mm residual invasive mammary carcinoma with an associated 8 mm area of high grade DCIS with comedonecrosis and ADH.  Invasive tumor cellularity was 10%.  There was lymphovascular invasion.  Surgical margins were negative.  A single sentinel lymph node was benign.  Classified as MDA RCB I.  She completed adjuvant radiation (4256 cGy to the right breast with additional 1000 cGy to the lumpectomy cavity) on 4/27/18.     Interval History:  Ms. Alonzo comes into clinic today for routine breast cancer follow-up.  Her health has been good.  She retired from working approximately 1 year ago.  Since MCFP, she has had significant improvement in fatigue.  She is sleeping much better and is able to now sleep as much as she needs.  She has not been watching what she eats.  Despite this, she has not gained weight.  She routinely walks.  She denies current breast lumps, pain, or swelling.  She has no cough, shortness of breath, or chest pain.  She denies current abdominal complaints.  She has no new bone or joint aches or pains.  She reports her right breast continues overall feel more firm than the left.  This is chronic and present since radiation.  She is having a root canal on 3/24 and wonders if she needs an antibiotic prior.  The remainder of a complete 12 point review of systems is reviewed with the patient was negative with exception that mentioned above.     Past Medical/Surgical History:  Past Medical History:   Diagnosis Date     Acute cystitis without hematuria 10/26/2017     Clostridium difficile diarrhea 1/12/2018     Neutropenic fever (H) 01/10/2018     She was hospitalized 1/10/18 - 1/13/18 with neutropenic fever     Neutropenic sepsis (H) 01/29/2018    hospitalized again  from 1/29/18 - 2/8/18 with neutropenic fever, mucositis, and C. difficile colitis      Nonsenile cataract      Pneumonia 11/11/2017    She was hospitalized 11/11/17 - 11/17/17 with fevers ranging from  102 - 105.  CT chest was consistent with pneumonitis.  She also had transaminitis in the 150 range.  She was started on corticosteroids.  Pembrolizumab was stopped      S/P radiation therapy     5,256 cGy completed on 4/27/2018 to [facility] with Dr. Zhong     Past Surgical History:   Procedure Laterality Date     APPENDECTOMY  10/06/2015    [facility]     BREAST BIOPSY, CORE RT/LT Right 08/22/2017     BRONCHOSCOPY (RIGID OR FLEXIBLE), DIAGNOSTIC N/A 02/06/2018    Procedure: COMBINED BRONCHOSCOPY (RIGID OR FLEXIBLE), LAVAGE;  COMBINED BRONCOSCOY (RIGID OR FLEXIBLE), LAVAGE;  Surgeon: Samir Pettit MD;  [facility]     CATARACT IOL, RT/LT       CLEAR LENS REPLACEMENT Bilateral 11/2016    [facility]     COLONOSCOPY  11/15/2011    Procedure:COLONOSCOPY; Colonoscopy, screening; Surgeon:ANASTASIA BUNCH; [facility]     COLONOSCOPY N/A 10/12/2021    Procedure: Colonoscopy, Flexible, With Lesion Removal Using Snare;  Surgeon: Jaxon Lechuga MD;  [facility]     COLONOSCOPY WITH CO2 INSUFFLATION N/A 10/12/2021    Procedure: COLONOSCOPY, WITH CO2 INSUFFLATION;  Surgeon: Jaxon Lechuga MD;  [facility]     INSERT PORT VASCULAR ACCESS Left 09/01/2017    Procedure: INSERT PORT VASCULAR ACCESS;  Single Lumen Chest Power Port;  Surgeon: Leif Parkinson PA-C;  [facility]     LAPAROSCOPIC CHOLECYSTECTOMY N/A 4/30/2021    Procedure: CHOLECYSTECTOMY, LAPAROSCOPIC, WITH CHOLANGIOGRAM;  Surgeon: Jaxon Lechuga MD;  [facility]     LUMPECTOMY BREAST WITH SENTINEL NODE, COMBINED Right 02/27/2018    Procedure: COMBINED LUMPECTOMY BREAST WITH SENTINEL NODE;  Right Wire Localized Lumpectomy, Right [place] Lymph Node Biopsy And Freddy Cath Removal;  Surgeon: Atul Gates MD;  [facility]     REMOVE PORT VASCULAR ACCESS N/A 02/27/2018    Procedure: REMOVE PORT VASCULAR ACCESS;;  Surgeon: Atul Gates MD;  [facility]     TUBAL LIGATION   12/2004       Allergies:  Allergies as of 03/14/2022     (No Known Allergies)       Current Medications:  Current Outpatient Medications   Medication Sig Dispense Refill     albuterol (PROAIR HFA) 108 (90 Base) MCG/ACT inhaler Inhale 2 Puffs by mouth every 6 hours if needed for shortness of breath / dyspnea. (Patient not taking: Reported on 12/16/2021) 8.5 g 1     order for DME autoCPAP 5-18 cmH20 (Patient not taking: Reported on 12/16/2021) 1 Device 0     simvastatin (ZOCOR) 40 MG tablet Take 1 tablet (40 mg) by mouth At Bedtime 90 tablet 3     SM MUCUS RELIEF 600 MG 12 hr tablet TAKE TWO TABLETS BY MOUTH TWICE A  tablet 0     umeclidinium-vilanterol (ANORO ELLIPTA) 62.5-25 MCG/INH oral inhaler Inhale 1 puff into the lungs daily 1 each 11     VITAMIN D 1000 UNIT OR CAPS Take 3,000 Units by mouth         Family and Social History:  Reviewed and unchanged from prior.  Please see initial consultation dated 8/28/17 for further details.    Physical Exam:  /62   Pulse 90   Temp 98.5  F (36.9  C) (Oral)   Resp 16   Wt 75.6 kg (166 lb 9.6 oz)   SpO2 96%   BMI 29.51 kg/m    General:  Well appearing adult female in NAD.  Alert and oriented.  HEENT:  Normocephalic.  Sclera anicteric.  MMM.  No lesions of the oropharynx.  Lymph:  No palpable cervical, supraclavicular, or axillary LAD.  Chest:  CTA bilaterally.  No wheezes or crackles.  CV:  RRR.  Nl S1 and S2.    Breast:  Right breast with increased density and skin thickening c/w radiation change.  Right breast is much smaller and firmer overall than the left breast.  There are no discretely palpable masses in either breast.  Bilateral nipples are everted and without discharge.  Abd:  Soft/ND.   Ext:  No pitting edema of the bilateral lower extremities.    Musculo:  Full ROM of the bilateral upper extremities.  Neuro:  Cranial nerves grossly intact.  Psych:  Mood and affect appear normal.      Laboratory/Imaging Studies:  10/12/2021 Colonoscopy:  1 cm  sessile polyp at the appendiceal orifice   Pathology c/w sessile serrated adenoma  8 mm polyp in the distal transverse colon.   Pathology c/w sessile serrated adenoma    3/14/2022 Breast MRI:  We reviewed the images together in clinic today.  There is no suspicious enhancement in either breast.  No lymphadenopathy.    3/14/2022 Labs:  Electrolytes and kidney function are wnl.  Alkaline phosphatase is chronically elevated, is 159 today.  Remaining LFTs are wnl.  Blood counts are wnl.    ASSESSMENT/PLAN:  Ms. Alonzo is a 61-year-old female with a h/o grade 3, T2N0M0, triple-negative infiltrating ductal carcinoma of the right breast s/p 12 weeks of Taxol along with 3 cycles pembrolizumab, 2 cycles of adriamycin and cyclophosphamide, lumpectomy ([id number]), and radiation.     1.  Right breast cancer:   Ms. Alonzo is just over 4 years out from excision of a right breast cancer.      She is asymptomatic of disease recurrence on history taken today and clinical exam is without concerning findings.   Due to a h/o ADH, I have recommended high risk screening with both annual mammogram and breast MRI, spacing the two studies so that she is having some form of imaging every 6 months.  Recommend stopping breast MRIs once she is 5 years out from completion of treatment of her breast cancer.  We reviewed the images of breast MRI performed today together in clinic.  The breast MRI is without significant areas of enhancement or lymphadenopathy.  Radiology read is pending and will be released to the patient when available.  I will see her back in clinic in 6 months with bilateral mammograms the same day.    2.  Organizing pneumonia/pulmonary nodules:  Chronic COPD.  Had pneumonitis secondary to pembrolizumab during breast cancer treatment.  Has serial CT scans for follow up of pulmonary nodules and organizing pneumonia.  She has CT chest and pulmonology visit scheduled in 07/2022.      3.  Colyn polyps:  1 cm and 8 mm polyps removed  10/2021.  Next colonoscopy in 3 years (10/2024)    4.  Planned root canal:  No need for prophylactic antibiotic from oncologic standpoint, recommend discussing with her dentist.    5.  Elevated alkaline phosphatase:  Previous check of GGT was elevated, therefore suspect this is secondary to statin.    6.  Followup: Bilateral screening mammograms and visit with me 9/13/2022 or later.    30 minutes spent on the date of the encounter doing chart review, review of test results, interpretation of tests, patient visit and documentation.         Again, thank you for allowing me to participate in the care of your patient.      Sincerely,    Fara Bradshaw MD

## 2022-03-14 NOTE — NURSING NOTE
"Oncology Rooming Note    March 14, 2022 1:27 PM   Ashleigh Alonzo is a 61 year old female who presents for:    Chief Complaint   Patient presents with     Blood Draw     Vitals, blood drawn off PIV that was in place. Pt checked into appt.      Oncology Clinic Visit     Malignant neoplasm of upper-inner quadrant of right breast in female,estrogen receptor negative     Initial Vitals: /62   Pulse 90   Temp 98.5  F (36.9  C) (Oral)   Resp 16   Wt 75.6 kg (166 lb 9.6 oz)   SpO2 96%   BMI 29.51 kg/m   Estimated body mass index is 29.51 kg/m  as calculated from the following:    Height as of 8/30/21: 1.6 m (5' 3\").    Weight as of this encounter: 75.6 kg (166 lb 9.6 oz). Body surface area is 1.83 meters squared.  No Pain (0) Comment: Data Unavailable   No LMP recorded. Patient is postmenopausal.  Allergies reviewed: Yes  Medications reviewed: Yes    Medications: Medication refills not needed today.  Pharmacy name entered into [facility]:    [facility] - [place], [place] - [address] [facility] [place] - [address] [address], [address]    Clinical concerns: No changes reported.        Yumiko Downing LPN March 14, 2022 1:27 PM                "

## 2022-03-14 NOTE — DISCHARGE INSTRUCTIONS
MRI Contrast Discharge Instructions    The IV contrast you received today will pass out of your body in your  urine. This will happen in the next 24 hours. You will not feel this process.  Your urine will not change color.    Drink at least 4 extra glasses of water or juice today (unless your doctor  has restricted your fluids). This reduces the stress on your kidneys.  You may take your regular medicines.    If you are on dialysis: It is best to have dialysis today.    If you have a reaction: Most reactions happen right away. If you have  any new symptoms after leaving the hospital (such as hives or swelling),  call your hospital at the correct number below. Or call your family doctor.  If you have breathing distress or wheezing, call 911.    Special instructions: ***    I have read and understand the above information.    Signature:______________________________________ Date:___________    Staff:__________________________________________ Date:___________     Time:__________    [place] [facility] Departments:    ___[place]: [phone]  ___[facility]: [phone]  ___[place]: [phone] ___[facility]: [phone]  ___[facility]: [phone]  ___[facility]: [phone]  ___[phone]  ___[facility]: [phone]  ___[phone]

## 2022-05-03 NOTE — PROGRESS NOTES
Rapid response called for lactic acid level 4.0. Respiratory and float/float nurse to bedside. MD notified. IV fluids changed to NS from LR, bolus started. Recheck in AM.    Doxepin Pregnancy And Lactation Text: This medication is Pregnancy Category C and it isn't known if it is safe during pregnancy. It is also excreted in breast milk and breast feeding isn't recommended.

## 2022-06-01 DIAGNOSIS — J43.2 CENTRILOBULAR EMPHYSEMA (H): ICD-10-CM

## 2022-06-01 NOTE — TELEPHONE ENCOUNTER
Medication:     umeclidinium-vilanterol (ANORO ELLIPTA) 62.5-25 MCG/INH oral inhaler 1 each 11 7/22/2021  --   Sig - Route: Inhale 1 puff into the lungs daily - Inhalation     Date last written: 07/22/2021  Dispensed amount: 1 inhaler  Refills: 11        Pt's last office visit: 07/22/2021  Next scheduled office visit: 07/20/2022      Per the RN/LPN medication refill protocol, writer is able to refill this request.

## 2022-07-20 ENCOUNTER — OFFICE VISIT (OUTPATIENT)
Dept: PULMONOLOGY | Facility: CLINIC | Age: 62
End: 2022-07-20
Payer: COMMERCIAL

## 2022-07-20 ENCOUNTER — ANCILLARY PROCEDURE (OUTPATIENT)
Dept: CT IMAGING | Facility: CLINIC | Age: 62
End: 2022-07-20
Payer: COMMERCIAL

## 2022-07-20 VITALS — OXYGEN SATURATION: 96 % | HEART RATE: 74 BPM | SYSTOLIC BLOOD PRESSURE: 133 MMHG | DIASTOLIC BLOOD PRESSURE: 86 MMHG

## 2022-07-20 DIAGNOSIS — R91.8 PULMONARY NODULES: Primary | ICD-10-CM

## 2022-07-20 DIAGNOSIS — R91.8 PULMONARY NODULES: ICD-10-CM

## 2022-07-20 PROCEDURE — 71250 CT THORAX DX C-: CPT | Mod: GC | Performed by: RADIOLOGY

## 2022-07-20 PROCEDURE — 99215 OFFICE O/P EST HI 40 MIN: CPT | Performed by: INTERNAL MEDICINE

## 2022-07-20 RX ORDER — CETIRIZINE HYDROCHLORIDE 10 MG/1
10 TABLET ORAL DAILY
COMMUNITY

## 2022-07-20 ASSESSMENT — PAIN SCALES - GENERAL: PAINLEVEL: NO PAIN (0)

## 2022-07-20 NOTE — NURSING NOTE
Ashleigh Alonzo's goals for this visit include: no   She requests these members of her care team be copied on today's visit information: yes    PCP: Susanne Marin    Referring Provider:  No referring provider defined for this encounter.    BP (!) 145/98 (BP Location: Left arm, Patient Position: Sitting, Cuff Size: Adult Regular)   Pulse 74   SpO2 96%     Do you need any medication refills at today's visit? no

## 2022-07-20 NOTE — PROGRESS NOTES
Pulmonary Clinic Return Patient Visit  Reason for Visit: COPD  History of Present Illness  Ashleigh Alonzo is a pleasant 62 yr old female with a history of COPD, pulmonary nodules and breast cancer s/p chemoradiation and mastectomy who presents for follow up today. I last saw her in 7/2021  To briefly review, she was diagnosed with COPD several years ago and isvwell managed on Anoro. She was diagnosed with breast cancer in 2017 and was started on chemotherapy and had several clinic visits and hospitalizations for neutropenic fever and drug induced pneumonitis.  In 02/2018, during an admission for neutropenic fever and diffuse pulmonary infiltrates, pulmonary team was consulted and a bronch with BAL was done. Cultures were negative and she was started on high-dose methylprednisolone and eventually discharged on prednisone.  This was slowly tapered prior to her mastectomy.  By the time of followup for the mastectomy, respiratory symptoms had resolved as well as neutropenia.  Presumed etiology for the peribronchovascular consolidations was the chemotherapeutic agent pembrolizumab.     Today, she has no new complaints. She is retired and has been trying to be more active with no exercise restriction while using Anoro. She has no need for her rescue inhalers and there has been no AECOPDs since the last clinic visit.  She has minimal daily cough and she denies any fevers, no night sweats and no chest pain.  She is no longer on chemo and she continues to follow with radiation oncology with no issues    Review of Systems:  10 of 14 systems reviewed and are negative unless otherwise stated in HPI.    Past Medical History:   Diagnosis Date     Acute cystitis without hematuria 10/26/2017     Clostridium difficile diarrhea 1/12/2018     Neutropenic fever (H) 01/10/2018     She was hospitalized 1/10/18 - 1/13/18 with neutropenic fever     Neutropenic sepsis (H) 01/29/2018    hospitalized again  from 1/29/18 - 2/8/18 with neutropenic  fever, mucositis, and C. difficile colitis      Nonsenile cataract      Pneumonia 11/11/2017    She was hospitalized 11/11/17 - 11/17/17 with fevers ranging from 102 - 105.  CT chest was consistent with pneumonitis.  She also had transaminitis in the 150 range.  She was started on corticosteroids.  Pembrolizumab was stopped      S/P radiation therapy     5,256 cGy completed on 4/27/2018 to [facility] with Dr. Zhong       Past Surgical History:   Procedure Laterality Date     APPENDECTOMY  10/06/2015    [facility]     BREAST BIOPSY, CORE RT/LT Right 08/22/2017     BRONCHOSCOPY (RIGID OR FLEXIBLE), DIAGNOSTIC N/A 02/06/2018    Procedure: COMBINED BRONCHOSCOPY (RIGID OR FLEXIBLE), LAVAGE;  COMBINED BRONCOSCOY (RIGID OR FLEXIBLE), LAVAGE;  Surgeon: Samir Pettit MD;  Location: [facility]     CATARACT IOL, RT/LT       CLEAR LENS REPLACEMENT Bilateral 11/2016    [facility]     COLONOSCOPY  11/15/2011    Procedure:COLONOSCOPY; Colonoscopy, screening; Surgeon:ANASTASIA BUNCH; Location:[facility] OR     COLONOSCOPY N/A 10/12/2021    Procedure: Colonoscopy, Flexible, With Lesion Removal Using Snare;  Surgeon: Jaxon Lechuga MD;  Location: [facility]     COLONOSCOPY WITH CO2 INSUFFLATION N/A 10/12/2021    Procedure: COLONOSCOPY, WITH CO2 INSUFFLATION;  Surgeon: Jaxon Lechuga MD;  Location: [facility]     INSERT PORT VASCULAR ACCESS Left 09/01/2017    Procedure: INSERT PORT VASCULAR ACCESS;  Single Lumen Chest Power Port;  Surgeon: Leif Parkinson PA-C;  [facility]: [facility]     LAPAROSCOPIC CHOLECYSTECTOMY N/A 4/30/2021    Procedure: CHOLECYSTECTOMY, LAPAROSCOPIC, WITH CHOLANGIOGRAM;  Surgeon: Jaxon Lechuga MD;  Location: [facility]     LUMPECTOMY BREAST WITH SENTINEL NODE, COMBINED Right 02/27/2018    Procedure: COMBINED LUMPECTOMY BREAST WITH SENTINEL NODE;  Right Wire Localized Lumpectomy, Right Quinton Lymph Node Biopsy And Freddy Cath Removal;  Surgeon: Atul Gates MD;  Location:  [facility]     REMOVE PORT VASCULAR ACCESS N/A 2018    Procedure: REMOVE PORT VASCULAR ACCESS;;  Surgeon: Atul Gates MD;  Location: [facility]     TUBAL LIGATION  2004       Family History   Problem Relation Age of Onset     Cardiovascular Father 46        MI     Eye Disorder Father      Cerebrovascular Disease Father      Arthritis Sister      Colon Polyps Sister         tubular adenoma polyps     Neurologic Disorder Brother      Eye Surgery Maternal Grandmother         cataract     Prostate Cancer Maternal Grandfather      Prostate Cancer Maternal Uncle      Cancer Maternal Uncle         Throat cancer     Diabetes No family hx of      Hypertension No family hx of      Glaucoma No family hx of      Macular Degeneration No family hx of        Social History     Socioeconomic History     Marital status:      Spouse name: Aly     Number of children: 2     Years of education: None     Highest education level: None   Occupational History     Occupation: Equipment dispatcher     Employer: [employer]    Tobacco Use     Smoking status: Former Smoker     Packs/day: 0.75     Years: 20.00     Pack years: 15.00     Types: Cigarettes     Quit date: 2000     Years since quittin.5     Smokeless tobacco: Never Used   Vaping Use     Vaping Use: Never used   Substance and Sexual Activity     Alcohol use: Yes     Comment: Daily to weekly use (beer)     Drug use: No     Sexual activity: Not Currently     Partners: Male     Birth control/protection: Post-menopausal, Female Surgical     Comment: Hx. tubal ligation   Other Topics Concern     Parent/sibling w/ CABG, MI or angioplasty before 65F 55M? Yes   Social History Narrative    Patient lives in [place]. She lives in [place] with her . 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 No recent travel. No international travel. No sick contacts or exposure to small children (she has small grand children,  1-3 y/o but has not seen them in a few weeks). They have two dogs and a cat, and she takes care of the litter box sometimes, however she has not clean after the cat since her cancer diagnosis.         No Known Allergies      Current Outpatient Medications:      albuterol (PROAIR HFA) 108 (90 Base) MCG/ACT inhaler, Inhale 2 Puffs by mouth every 6 hours if needed for shortness of breath / dyspnea., Disp: 8.5 g, Rfl: 1     ANORO ELLIPTA 62.5-25 MCG/INH oral inhaler, INHALE ONE PUFF INTO THE LUNGS ONCE DAILY, Disp: 3 each, Rfl: 3     cetirizine (ZYRTEC) 10 MG tablet, Take 10 mg by mouth daily, Disp: , Rfl:      simvastatin (ZOCOR) 40 MG tablet, Take 1 tablet (40 mg) by mouth At Bedtime, Disp: 90 tablet, Rfl: 3     SM MUCUS RELIEF 600 MG 12 hr tablet, TAKE TWO TABLETS BY MOUTH TWICE A DAY, Disp: 160 tablet, Rfl: 0     VITAMIN D 1000 UNIT OR CAPS, Take 3,000 Units by mouth , Disp: , Rfl:      order for DME, autoCPAP 5-18 cmH20 (Patient not taking: No sig reported), Disp: 1 Device, Rfl: 0  No current facility-administered medications for this visit.    Facility-Administered Medications Ordered in Other Visits:      lidocaine 1 % 9 mL, 9 mL, Intradermal, Once, Fara Bradshaw MD     lidocaine-EPINEPHrine 1.5 %-1:[id number] injection 10 mL, 10 mL, Other, Once, Radha Cazares MD     sodium bicarbonate 8.4 % injection 1 mEq, 1 mEq, Intradermal, Once, Fara Bradshaw MD      Physical Exam:  BP (!) 145/98 (BP Location: Left arm, Patient Position: Sitting, Cuff Size: Adult Regular)   Pulse 74   SpO2 96%   GENERAL: Well developed, well nourished, alert, and in no apparent distress.  HEENT: Normocephalic, atraumatic. PERRL, EOMI. Oral mucosa is moist. No perioral cyanosis.  NECK: supple, no masses, no thyromegaly.  RESP:  Normal respiratory effort.  CTAB.  No rales, wheezes, rhonchi.  No cyanosis or clubbing.  CV: Normal S1, S2, regular rhythm, normal rate. No murmur.  No LE edema.   ABDOMEN:  Soft,  non-tender, non-distended.   SKIN: warm and dry. No rash.  NEURO: AAOx3.  Normal gait.  Fluent speech.  PSYCH: mentation appears normal.     Results:  Imaging (personally reviewed in clinic today): CT Chest 7/20/2022  IMPRESSION:   1. No new or enlarging pulmonary nodules.  2. Stable multifocal pulmonary fibrotic changes including postradiation changes in the anterolateral right upper lobe.  3. Postsurgical changes of right breast lumpectomy without evidence of local or metastatic disease.  4. Granulomatous disease.    ASSESSMENT AND PLAN:   1.  Chronic obstructive pulmonary disease (Group B)  The patient has a previous diagnosis of COPD.  She quit smoking in 1999 and is doing very well with Anoro inhaler and very minimal symptoms.   2.  Pulmonary nodules:   Stable CT chest on today's imaging. She has a remote history of pulmonary nodules, have been stable on prior imaging indicating likely benign etiology.  Given duration of time since quitting smoking, she is no longer eligible for lung cancer screening with low-dose CT scan. We will continue yearly surveillance for up to 5 yrs due to her prior history of breast cancer. CT Chest in 12 years.   3.  Drug reaction/pneumonitis possibly related to pembrolizumab, treated with high-dose steroids.   Resolved and no recurrence.   4. Sleep apnea on oral appliances  Follow up with sleep medicine.  I spent a total of 40 minutes face to face with Ashleigh Alonzo during today's office visit. Over 50% of this time was spent counseling the patient and/or coordinating care regarding their pulmonary disease.    RTC in 12 months with CT chest  Amina Licona MD  Pulmonary, Critical Care and Sleep Medicine  [facility]  Pager: [phone]  The above note was dictated using voice recognition software and may include typographical errors. Please contact the author for any clarifications.
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
## 2022-08-31 ENCOUNTER — OFFICE VISIT (OUTPATIENT)
Dept: FAMILY MEDICINE | Facility: CLINIC | Age: 62
End: 2022-08-31
Payer: COMMERCIAL

## 2022-08-31 VITALS
WEIGHT: 156 LBS | DIASTOLIC BLOOD PRESSURE: 76 MMHG | TEMPERATURE: 98.7 F | SYSTOLIC BLOOD PRESSURE: 126 MMHG | OXYGEN SATURATION: 98 % | RESPIRATION RATE: 17 BRPM | HEIGHT: 63 IN | HEART RATE: 82 BPM | BODY MASS INDEX: 27.64 KG/M2

## 2022-08-31 DIAGNOSIS — C50.211 MALIGNANT NEOPLASM OF UPPER-INNER QUADRANT OF RIGHT BREAST IN FEMALE, ESTROGEN RECEPTOR NEGATIVE (H): ICD-10-CM

## 2022-08-31 DIAGNOSIS — E78.5 HYPERLIPIDEMIA LDL GOAL <130: Chronic | ICD-10-CM

## 2022-08-31 DIAGNOSIS — J44.9 CHRONIC OBSTRUCTIVE PULMONARY DISEASE, UNSPECIFIED COPD TYPE (H): ICD-10-CM

## 2022-08-31 DIAGNOSIS — Z17.1 MALIGNANT NEOPLASM OF UPPER-INNER QUADRANT OF RIGHT BREAST IN FEMALE, ESTROGEN RECEPTOR NEGATIVE (H): ICD-10-CM

## 2022-08-31 DIAGNOSIS — R91.8 LUNG NODULE, MULTIPLE: ICD-10-CM

## 2022-08-31 DIAGNOSIS — Z00.00 ROUTINE GENERAL MEDICAL EXAMINATION AT A HEALTH CARE FACILITY: Primary | ICD-10-CM

## 2022-08-31 DIAGNOSIS — Z12.4 SCREENING FOR MALIGNANT NEOPLASM OF CERVIX: ICD-10-CM

## 2022-08-31 DIAGNOSIS — G62.0 DRUG-INDUCED POLYNEUROPATHY (H): ICD-10-CM

## 2022-08-31 DIAGNOSIS — Z23 NEED FOR PNEUMOCOCCAL VACCINE: ICD-10-CM

## 2022-08-31 LAB
ALBUMIN SERPL-MCNC: 4.1 G/DL (ref 3.4–5)
ALP SERPL-CCNC: 124 U/L (ref 40–150)
ALT SERPL W P-5'-P-CCNC: 25 U/L (ref 0–50)
ANION GAP SERPL CALCULATED.3IONS-SCNC: 5 MMOL/L (ref 3–14)
AST SERPL W P-5'-P-CCNC: 28 U/L (ref 0–45)
BILIRUB SERPL-MCNC: 0.5 MG/DL (ref 0.2–1.3)
BUN SERPL-MCNC: 11 MG/DL (ref 7–30)
CALCIUM SERPL-MCNC: 9.3 MG/DL (ref 8.5–10.1)
CHLORIDE BLD-SCNC: 106 MMOL/L (ref 94–109)
CHOLEST SERPL-MCNC: 180 MG/DL
CO2 SERPL-SCNC: 28 MMOL/L (ref 20–32)
CREAT SERPL-MCNC: 0.76 MG/DL (ref 0.52–1.04)
FASTING STATUS PATIENT QL REPORTED: YES
GFR SERPL CREATININE-BSD FRML MDRD: 88 ML/MIN/1.73M2
GLUCOSE BLD-MCNC: 95 MG/DL (ref 70–99)
HDLC SERPL-MCNC: 46 MG/DL
LDLC SERPL CALC-MCNC: 117 MG/DL
NONHDLC SERPL-MCNC: 134 MG/DL
POTASSIUM BLD-SCNC: 4.8 MMOL/L (ref 3.4–5.3)
PROT SERPL-MCNC: 7.9 G/DL (ref 6.8–8.8)
SODIUM SERPL-SCNC: 139 MMOL/L (ref 133–144)
TRIGL SERPL-MCNC: 86 MG/DL

## 2022-08-31 PROCEDURE — 99396 PREV VISIT EST AGE 40-64: CPT | Mod: 25 | Performed by: FAMILY MEDICINE

## 2022-08-31 PROCEDURE — 87624 HPV HI-RISK TYP POOLED RSLT: CPT | Performed by: FAMILY MEDICINE

## 2022-08-31 PROCEDURE — 99214 OFFICE O/P EST MOD 30 MIN: CPT | Mod: 25 | Performed by: FAMILY MEDICINE

## 2022-08-31 PROCEDURE — 80061 LIPID PANEL: CPT | Performed by: FAMILY MEDICINE

## 2022-08-31 PROCEDURE — 80053 COMPREHEN METABOLIC PANEL: CPT | Performed by: FAMILY MEDICINE

## 2022-08-31 PROCEDURE — 90471 IMMUNIZATION ADMIN: CPT | Performed by: FAMILY MEDICINE

## 2022-08-31 PROCEDURE — 36415 COLL VENOUS BLD VENIPUNCTURE: CPT | Performed by: FAMILY MEDICINE

## 2022-08-31 PROCEDURE — G0145 SCR C/V CYTO,THINLAYER,RESCR: HCPCS | Performed by: FAMILY MEDICINE

## 2022-08-31 PROCEDURE — 90677 PCV20 VACCINE IM: CPT | Performed by: FAMILY MEDICINE

## 2022-08-31 RX ORDER — SIMVASTATIN 40 MG
40 TABLET ORAL AT BEDTIME
Qty: 90 TABLET | Refills: 3 | Status: SHIPPED | OUTPATIENT
Start: 2022-08-31 | End: 2023-10-08

## 2022-08-31 RX ORDER — ALBUTEROL SULFATE 90 UG/1
AEROSOL, METERED RESPIRATORY (INHALATION)
Qty: 8.5 G | Refills: 1 | Status: SHIPPED | OUTPATIENT
Start: 2022-08-31 | End: 2023-07-19

## 2022-08-31 ASSESSMENT — ENCOUNTER SYMPTOMS
ABDOMINAL PAIN: 0
COUGH: 0
DIZZINESS: 0
BREAST MASS: 0
PARESTHESIAS: 0
NAUSEA: 0
SHORTNESS OF BREATH: 0
CONSTIPATION: 0
WEAKNESS: 0
ARTHRALGIAS: 0
FREQUENCY: 0
DYSURIA: 0
DIARRHEA: 0
SORE THROAT: 0
NERVOUS/ANXIOUS: 0
PALPITATIONS: 0
HEARTBURN: 0
MYALGIAS: 0
HEMATURIA: 0
EYE PAIN: 0
HEMATOCHEZIA: 0
FEVER: 0
HEADACHES: 0
JOINT SWELLING: 0
CHILLS: 0

## 2022-08-31 ASSESSMENT — PATIENT HEALTH QUESTIONNAIRE - PHQ9
10. IF YOU CHECKED OFF ANY PROBLEMS, HOW DIFFICULT HAVE THESE PROBLEMS MADE IT FOR YOU TO DO YOUR WORK, TAKE CARE OF THINGS AT HOME, OR GET ALONG WITH OTHER PEOPLE: NOT DIFFICULT AT ALL
SUM OF ALL RESPONSES TO PHQ QUESTIONS 1-9: 0
SUM OF ALL RESPONSES TO PHQ QUESTIONS 1-9: 0

## 2022-08-31 ASSESSMENT — PAIN SCALES - GENERAL: PAINLEVEL: NO PAIN (0)

## 2022-08-31 NOTE — NURSING NOTE
Prior to immunization administration, verified patients identity using patient s name and date of birth. Please see Immunization Activity for additional information.     Screening Questionnaire for Adult Immunization    Are you sick today?   No   Do you have allergies to medications, food, a vaccine component or latex?   No   Have you ever had a serious reaction after receiving a vaccination?   No   Do you have a long-term health problem with heart, lung, kidney, or metabolic disease (e.g., diabetes), asthma, a blood disorder, no spleen, complement component deficiency, a cochlear implant, or a spinal fluid leak?  Are you on long-term aspirin therapy?   Yes   Do you have cancer, leukemia, HIV/AIDS, or any other immune system problem?   No   Do you have a parent, brother, or sister with an immune system problem?   No   In the past 3 months, have you taken medications that affect  your immune system, such as prednisone, other steroids, or anticancer drugs; drugs for the treatment of rheumatoid arthritis, Crohn s disease, or psoriasis; or have you had radiation treatments?   No   Have you had a seizure, or a brain or other nervous system problem?   No   During the past year, have you received a transfusion of blood or blood    products, or been given immune (gamma) globulin or antiviral drug?   No   For women: Are you pregnant or is there a chance you could become       pregnant during the next month?   No   Have you received any vaccinations in the past 4 weeks?   Yes     Immunization questionnaire was positive for at least one answer.  Notified Dr. Susanne Gomez .        Per orders of Dr. Gomez, injection of prevnar 20 given by Fely Kwon MA. Patient instructed to remain in clinic for 15 minutes afterwards, and to report any adverse reaction to me immediately.       Screening performed by Fely Kwon MA on 8/31/2022 at 11:51 AM.

## 2022-09-02 ENCOUNTER — MYC MEDICAL ADVICE (OUTPATIENT)
Dept: FAMILY MEDICINE | Facility: CLINIC | Age: 62
End: 2022-09-02

## 2022-09-02 DIAGNOSIS — R73.09 ELEVATED GLUCOSE: Primary | ICD-10-CM

## 2022-09-02 LAB
BKR LAB AP GYN ADEQUACY: NORMAL
BKR LAB AP GYN INTERPRETATION: NORMAL
BKR LAB AP HPV REFLEX: NORMAL
BKR LAB AP PREVIOUS ABNORMAL: NORMAL
PATH REPORT.COMMENTS IMP SPEC: NORMAL
PATH REPORT.COMMENTS IMP SPEC: NORMAL
PATH REPORT.RELEVANT HX SPEC: NORMAL

## 2022-09-06 NOTE — TELEPHONE ENCOUNTER
Patient responding to PCP's Result Note.  Routing to provider to advise.  Junie Flanagan BSN, RN

## 2022-09-07 LAB
HUMAN PAPILLOMA VIRUS 16 DNA: NEGATIVE
HUMAN PAPILLOMA VIRUS 18 DNA: NEGATIVE
HUMAN PAPILLOMA VIRUS FINAL DIAGNOSIS: NORMAL
HUMAN PAPILLOMA VIRUS OTHER HR: NEGATIVE

## 2022-09-18 ENCOUNTER — MYC MEDICAL ADVICE (OUTPATIENT)
Dept: FAMILY MEDICINE | Facility: CLINIC | Age: 62
End: 2022-09-18

## 2022-09-18 NOTE — PROGRESS NOTES
Oncology Follow Up:  Date on this visit: 9/19/2022    Diagnosis:  Stage IIb, T2N0M0, grade 3 triple negative cancer of the right breast.    Primary Physician: Radha Cazares     History Of Present Illness:  Ms. Alonzo is a 62-year-old female with a h/o stage IIb, T2 N0 M0, grade 3, triple-negative invasive carcinoma of the right breast.  Routine screening mammogram on 07/27/2017 showed developing calcifications in the right breast at the 12 o'clock position 6 cm from the nipple.  Ultrasound demonstrated a 7-mm, irregular, hypoechoic mass at the 12 o'clock position.  Contrast-enhanced mammogram showed a peripherally enhancing, irregular mass measuring 1.9 cm as well as an additional 6-mm, enhancing focus anteromedial to the dominant mass.  The total area of abnormal enhancement on contrast mammogram measured up to 3.4 cm.  Right breast biopsy demonstrated a grade 3 invasive mammary carcinoma with associated high-grade DCIS.  Estrogen and progesterone receptor staining were negative.  HER2 was non-amplified by FISH.  Breast MRI measured the biopsy proven breast cancer at 3.2 cm.    Ms. Alonzo enrolled in the ISPY-2 clinical trial.  She began treatment with weekly taxol and once every 3 week pembrolizumab on 9/20/17.  Her course was complicated by pneumonitis and hepatitis.  Pembrolizumab was stopped and she resumed weekly taxol alone on 11/28/17.  She completed a total of 12 weeks of therapy on 12/26/17.  She completed 2 cycles of adriamycin and cyclophosphamide.  She was hospitalized both cycles due to neutropenic fever and also developed C. Difficile colitis.  Decision was made to forgo further chemotherapy.       Right breast lumpectomy and sentinel lymph node procedure on 2/27/18 showed a grade 2, 6 mm residual invasive mammary carcinoma with an associated 8 mm area of high grade DCIS with comedonecrosis and ADH.  Invasive tumor cellularity was 10%.  There was lymphovascular invasion.  Surgical margins were  negative.  A single sentinel lymph node was benign.  Classified as MDA RCB I.  She completed adjuvant radiation (4256 cGy to the right breast with additional 1000 cGy to the lumpectomy cavity) on 4/27/18.     Interval History:  Ms. Alonzo comes into clinic today, alongside her spouse, for routine breast cancer follow-up.  She generally feels well.  She continues to enjoy assisted.  They have not done any recent traveling.  She specifically denies any fever, chills, cough, sore throat, shortness of breath, or chest pain.  She has intermittent aches and pains that come and go, she denies anything focal or persistent.  She denies concerning lumps, pain, or swelling of either breast.  She has occasional achiness of the right breast that improves with stretching and massage.  She is routinely doing this.  She denies abdominal complaints.  She has not noted any swelling of her lower extremities.  The remainder of a complete 12 point review of systems was reviewed with patient was negative with exception that mentioned above.     Past Medical/Surgical History:  Past Medical History:   Diagnosis Date     Acute cystitis without hematuria 10/26/2017     Clostridium difficile diarrhea 1/12/2018     Neutropenic fever (H) 01/10/2018     She was hospitalized 1/10/18 - 1/13/18 with neutropenic fever     Neutropenic sepsis (H) 01/29/2018    hospitalized again  from 1/29/18 - 2/8/18 with neutropenic fever, mucositis, and C. difficile colitis      Nonsenile cataract      Pneumonia 11/11/2017    She was hospitalized 11/11/17 - 11/17/17 with fevers ranging from 102 - 105.  CT chest was consistent with pneumonitis.  She also had transaminitis in the 150 range.  She was started on corticosteroids.  Pembrolizumab was stopped      S/P radiation therapy     5,256 cGy completed on 4/27/2018 to [facility] with Dr. Zhong     Past Surgical History:   Procedure Laterality Date     APPENDECTOMY  10/06/2015    [facility]     BREAST BIOPSY, CORE RT/LT Right 08/22/2017     BRONCHOSCOPY (RIGID OR FLEXIBLE), DIAGNOSTIC N/A 02/06/2018    Procedure: COMBINED BRONCHOSCOPY (RIGID OR FLEXIBLE), LAVAGE;  COMBINED BRONCOSCOY (RIGID OR FLEXIBLE), LAVAGE;  Surgeon: Samir Pettit MD;  [facility]: [facility]     CATARACT IOL, RT/LT       CLEAR LENS REPLACEMENT Bilateral 11/2016    [facility]     COLONOSCOPY  11/15/2011    Procedure:COLONOSCOPY; Colonoscopy, screening; Surgeon:ANASTASIA BUNCH; Location:[facility]     COLONOSCOPY N/A 10/12/2021    Procedure: Colonoscopy, Flexible, With Lesion Removal Using Snare;  Surgeon: Jaxon Lechuga MD;  [facility]     COLONOSCOPY WITH CO2 INSUFFLATION N/A 10/12/2021    Procedure: COLONOSCOPY, WITH CO2 INSUFFLATION;  Surgeon: Jaxon Lechuga MD;  [facility]     INSERT PORT VASCULAR ACCESS Left 09/01/2017    Procedure: INSERT PORT VASCULAR ACCESS;  Single Lumen Chest Power Port;  Surgeon: Leif Parkinson PA-C;  [facility]     LAPAROSCOPIC CHOLECYSTECTOMY N/A 4/30/2021    Procedure: CHOLECYSTECTOMY, LAPAROSCOPIC, WITH CHOLANGIOGRAM;  Surgeon: Jaxon Lechuga MD;  [facility]     LUMPECTOMY BREAST WITH SENTINEL NODE, COMBINED Right 02/27/2018    Procedure: COMBINED LUMPECTOMY BREAST WITH SENTINEL NODE;  Right Wire Localized Lumpectomy, Right [place] Lymph Node Biopsy And Freddy Cath Removal;  Surgeon: Atul Gates MD;  [facility]     REMOVE PORT VASCULAR ACCESS N/A 02/27/2018    Procedure: REMOVE PORT VASCULAR ACCESS;;  Surgeon: Atul Gates MD;  [facility]     TUBAL LIGATION  12/2004       Allergies:  Allergies as of 09/19/2022     (No Known Allergies)       Current Medications:  Current Outpatient Medications   Medication Sig Dispense Refill     albuterol (PROAIR HFA) 108 (90 Base) MCG/ACT inhaler Inhale 2 Puffs by mouth every 6 hours if needed for shortness of breath / dyspnea. Pt will call when needed 8.5 g 1     ANORO ELLIPTA 62.5-25  MCG/INH oral inhaler INHALE ONE PUFF INTO THE LUNGS ONCE DAILY 3 each 3     cetirizine (ZYRTEC) 10 MG tablet Take 10 mg by mouth daily       simvastatin (ZOCOR) 40 MG tablet Take 1 tablet (40 mg) by mouth At Bedtime 90 tablet 3     SM MUCUS RELIEF 600 MG 12 hr tablet TAKE TWO TABLETS BY MOUTH TWICE A  tablet 0     VITAMIN D 1000 UNIT OR CAPS Take 3,000 Units by mouth         Family and Social History:  Reviewed and unchanged from prior.  Please see initial consultation dated 8/28/17 for further details.    Physical Exam:  BP 94/62   Pulse 92   Temp 98.3  F (36.8  C) (Oral)   Wt 71.4 kg (157 lb 8 oz)   SpO2 98%   BMI 27.90 kg/m    General:  Well appearing adult female in NAD.  Alert and oriented x 3.  HEENT:  Normocephalic.  Sclera anicteric.  MMM.  No lesions of the oropharynx.  Lymph:  No palpable cervical, supraclavicular, or axillary LAD.  Chest:  CTA bilaterally.  No wheezes or crackles.  CV:  RRR.  Nl S1 and S2.    Breast:  Right breast with increased density and skin thickening c/w radiation change.  Right breast is much smaller and firmer overall than the left breast, there is puckering/retraction of the lower breast without palpable underlying mass.  There are no discretely palpable masses in either breast.  Bilateral nipples are everted and without discharge.  Abd:  Soft/ND.   Ext:  No pitting edema of the bilateral lower extremities.    Musculo:  Full ROM of the bilateral upper extremities.  Neuro:  Cranial nerves grossly intact.  Gait stable.    Psych:  Mood and affect appear normal.  Skin:  No visible concerning skin rashes or lesions.    Laboratory/Imaging Studies:  9/19/2022 Bilateral screening mammograms:  I personally reviewed the images in clinic today.  There are no significant changes when compared to mammogram performed one year prior.    7/20/2022 CT chest w/o contrast:  1. No new or enlarging pulmonary nodules.  2. Stable multifocal pulmonary fibrotic changes  including  postradiation changes in the anterolateral right upper lobe.  3. Postsurgical changes of right breast lumpectomy without evidence of local or metastatic disease.  4. Granulomatous disease.    ASSESSMENT/PLAN:  Ms. Alonzo is a 62-year-old female with a h/o grade 3, T2N0M0, triple-negative infiltrating ductal carcinoma of the right breast s/p 12 weeks of Taxol along with 3 cycles pembrolizumab, 2 cycles of adriamycin and cyclophosphamide, lumpectomy ([id number]), and radiation.     1.  Right breast cancer:   Ms. Alonzo is > 4.5 years out from excision of a right breast cancer.      She is asymptomatic of disease recurrence on history taken today and clinical exam is without concerning findings.   Due to a h/o ADH, I have recommended high risk screening with both annual mammogram and breast MRI, spacing the two studies so that she is having some form of imaging every 6 months.  Recommend stopping breast MRIs once she is 5 years out from completion of treatment of her breast cancer.  I personally reviewed the images of bilateral screening mammograms performed today which on my view show persistent increased breast density, but no significant change when compared to one year prior.  The radiology read is pending and will be released to the patient via [employer] when available.  I will see her back in clinic in 6 months with breast MRI the same day.  This will be the last planned breast MRI.    2.  Organizing pneumonia/pulmonary nodules:  Chronic COPD.  Had pneumonitis secondary to pembrolizumab during breast cancer treatment.  Has serial CT scans for follow up of pulmonary nodules and organizing pneumonia.  CT performed in 07/2022 was reviewed and was stable.  Per patient repeat CT in 07/2023 is planned.  Per pulmonology, plan to stop CTs after 5 years surveillance.    3.  Colyn polyps:  1 cm and 8 mm polyps removed 10/2021.  Next colonoscopy due in 10/2024.    4.  Elevated alkaline phosphatase:  Resolved.  Patient  remains on a statin, but mentions that she quit drinking beer.    5.  Weight management:  She has lost 9 pounds in the last 6 months.  We reviewed patients who lose weight after breast cancer and those that maintain a normal BMI have improved breast cancer outcomes.  I congratulated her on this weight loss.  She will continue healthy eating and I encouraged to continue routine exercise, especially during the winter months.    6.  Followup: Breast MRI and in person visit with me in 6 months.    30 minutes spent on the date of the encounter doing chart review, review of test results, interpretation of tests, patient visit and documentation

## 2022-09-19 ENCOUNTER — ANCILLARY PROCEDURE (OUTPATIENT)
Dept: MAMMOGRAPHY | Facility: CLINIC | Age: 62
End: 2022-09-19
Attending: INTERNAL MEDICINE
Payer: COMMERCIAL

## 2022-09-19 ENCOUNTER — ONCOLOGY VISIT (OUTPATIENT)
Dept: ONCOLOGY | Facility: CLINIC | Age: 62
End: 2022-09-19
Attending: INTERNAL MEDICINE
Payer: COMMERCIAL

## 2022-09-19 VITALS
OXYGEN SATURATION: 98 % | TEMPERATURE: 98.3 F | WEIGHT: 157.5 LBS | HEART RATE: 92 BPM | SYSTOLIC BLOOD PRESSURE: 94 MMHG | BODY MASS INDEX: 27.9 KG/M2 | DIASTOLIC BLOOD PRESSURE: 62 MMHG

## 2022-09-19 DIAGNOSIS — Z87.42 HISTORY OF ATYPICAL HYPERPLASIA OF BREAST: ICD-10-CM

## 2022-09-19 DIAGNOSIS — Z85.3 PERSONAL HISTORY OF MALIGNANT NEOPLASM OF BREAST: ICD-10-CM

## 2022-09-19 DIAGNOSIS — R91.8 PULMONARY NODULES: ICD-10-CM

## 2022-09-19 DIAGNOSIS — Z17.1 MALIGNANT NEOPLASM OF UPPER-INNER QUADRANT OF RIGHT BREAST IN FEMALE, ESTROGEN RECEPTOR NEGATIVE (H): Primary | ICD-10-CM

## 2022-09-19 DIAGNOSIS — Z12.39 BREAST CANCER SCREENING, HIGH RISK PATIENT: ICD-10-CM

## 2022-09-19 DIAGNOSIS — Z12.31 VISIT FOR SCREENING MAMMOGRAM: ICD-10-CM

## 2022-09-19 DIAGNOSIS — C50.211 MALIGNANT NEOPLASM OF UPPER-INNER QUADRANT OF RIGHT BREAST IN FEMALE, ESTROGEN RECEPTOR NEGATIVE (H): Primary | ICD-10-CM

## 2022-09-19 PROCEDURE — 99214 OFFICE O/P EST MOD 30 MIN: CPT | Performed by: INTERNAL MEDICINE

## 2022-09-19 PROCEDURE — G0463 HOSPITAL OUTPT CLINIC VISIT: HCPCS

## 2022-09-19 PROCEDURE — 77063 BREAST TOMOSYNTHESIS BI: CPT | Mod: GC | Performed by: RADIOLOGY

## 2022-09-19 PROCEDURE — 77067 SCR MAMMO BI INCL CAD: CPT | Mod: GC | Performed by: RADIOLOGY

## 2022-09-19 ASSESSMENT — PAIN SCALES - GENERAL: PAINLEVEL: NO PAIN (0)

## 2022-09-19 NOTE — NURSING NOTE
"Oncology Rooming Note    September 19, 2022 10:26 AM   Ashleigh Alonzo is a 62 year old female who presents for:    Chief Complaint   Patient presents with     Oncology Clinic Visit     Breast cancer     Initial Vitals: BP 94/62   Pulse 92   Temp 98.3  F (36.8  C) (Oral)   Wt 71.4 kg (157 lb 8 oz)   SpO2 98%   BMI 27.90 kg/m   Estimated body mass index is 27.9 kg/m  as calculated from the following:    Height as of 8/31/22: 1.6 m (5' 3\").    Weight as of this encounter: 71.4 kg (157 lb 8 oz). Body surface area is 1.78 meters squared.  No Pain (0) Comment: Data Unavailable   No LMP recorded. Patient is postmenopausal.  Allergies reviewed: Yes  Medications reviewed: Yes    Medications: Medication refills not needed today.  Pharmacy name entered into [employer]:    [facility], [place] - [address] [facility] [place], [place] - [address] [address] [facility], [address]    Clinical concerns: none       Nellie Light            "

## 2022-09-19 NOTE — LETTER
9/19/2022         RE: Ashleigh Alonzo  [address] [facility] [address]        Dear Colleague,    Thank you for referring your patient, Ashleigh Alonzo, to the [facility]. Please see a copy of my visit note below.    Oncology Follow Up:  Date on this visit: 9/19/2022    Diagnosis:  Stage IIb, T2N0M0, grade 3 triple negative cancer of the right breast.    Primary Physician: Radha Cazares     History Of Present Illness:  Ms. Alonzo is a 62-year-old female with a h/o stage IIb, T2 N0 M0, grade 3, triple-negative invasive carcinoma of the right breast.  Routine screening mammogram on 07/27/2017 showed developing calcifications in the right breast at the 12 o'clock position 6 cm from the nipple.  Ultrasound demonstrated a 7-mm, irregular, hypoechoic mass at the 12 o'clock position.  Contrast-enhanced mammogram showed a peripherally enhancing, irregular mass measuring 1.9 cm as well as an additional 6-mm, enhancing focus anteromedial to the dominant mass.  The total area of abnormal enhancement on contrast mammogram measured up to 3.4 cm.  Right breast biopsy demonstrated a grade 3 invasive mammary carcinoma with associated high-grade DCIS.  Estrogen and progesterone receptor staining were negative.  HER2 was non-amplified by FISH.  Breast MRI measured the biopsy proven breast cancer at 3.2 cm.    Ms. Alonzo enrolled in the ISPY-2 clinical trial.  She began treatment with weekly taxol and once every 3 week pembrolizumab on 9/20/17.  Her course was complicated by pneumonitis and hepatitis.  Pembrolizumab was stopped and she resumed weekly taxol alone on 11/28/17.  She completed a total of 12 weeks of therapy on 12/26/17.  She completed 2 cycles of adriamycin and cyclophosphamide.  She was hospitalized both cycles due to neutropenic fever and also developed C. Difficile colitis.  Decision was made to forgo further chemotherapy.       Right breast lumpectomy and sentinel lymph node  procedure on 2/27/18 showed a grade 2, 6 mm residual invasive mammary carcinoma with an associated 8 mm area of high grade DCIS with comedonecrosis and ADH.  Invasive tumor cellularity was 10%.  There was lymphovascular invasion.  Surgical margins were negative.  A single sentinel lymph node was benign.  Classified as MDA RCB I.  She completed adjuvant radiation (4256 cGy to the right breast with additional 1000 cGy to the lumpectomy cavity) on 4/27/18.     Interval History:  Ms. Alonzo comes into clinic today, alongside her spouse, for routine breast cancer follow-up.  She generally feels well.  She continues to enjoy assisted.  They have not done any recent traveling.  She specifically denies any fever, chills, cough, sore throat, shortness of breath, or chest pain.  She has intermittent aches and pains that come and go, she denies anything focal or persistent.  She denies concerning lumps, pain, or swelling of either breast.  She has occasional achiness of the right breast that improves with stretching and massage.  She is routinely doing this.  She denies abdominal complaints.  She has not noted any swelling of her lower extremities.  The remainder of a complete 12 point review of systems was reviewed with patient was negative with exception that mentioned above.     Past Medical/Surgical History:  Past Medical History:   Diagnosis Date     Acute cystitis without hematuria 10/26/2017     Clostridium difficile diarrhea 1/12/2018     Neutropenic fever (H) 01/10/2018     She was hospitalized 1/10/18 - 1/13/18 with neutropenic fever     Neutropenic sepsis (H) 01/29/2018    hospitalized again  from 1/29/18 - 2/8/18 with neutropenic fever, mucositis, and C. difficile colitis      Nonsenile cataract      Pneumonia 11/11/2017    She was hospitalized 11/11/17 - 11/17/17 with fevers ranging from 102 - 105.  CT chest was consistent with pneumonitis.  She also had transaminitis in the 150 range.  She was started on  corticosteroids.  Pembrolizumab was stopped      S/P radiation therapy     5,256 cGy completed on 4/27/2018 to [facility] with Dr. Zhong     Past Surgical History:   Procedure Laterality Date     APPENDECTOMY  10/06/2015    [facility]     BREAST BIOPSY, CORE RT/LT Right 08/22/2017     BRONCHOSCOPY (RIGID OR FLEXIBLE), DIAGNOSTIC N/A 02/06/2018    Procedure: COMBINED BRONCHOSCOPY (RIGID OR FLEXIBLE), LAVAGE;  COMBINED BRONCOSCOY (RIGID OR FLEXIBLE), LAVAGE;  Surgeon: Samir Pettit MD;  [facility]: [facility]     CATARACT IOL, RT/LT       CLEAR LENS REPLACEMENT Bilateral 11/2016    [facility]     COLONOSCOPY  11/15/2011    Procedure:COLONOSCOPY; Colonoscopy, screening; Surgeon:ANASTASIA BUNCH; [facility]     COLONOSCOPY N/A 10/12/2021    Procedure: Colonoscopy, Flexible, With Lesion Removal Using Snare;  Surgeon: Jaxon Lechuga MD;  [facility]: [facility]     COLONOSCOPY WITH CO2 INSUFFLATION N/A 10/12/2021    Procedure: COLONOSCOPY, WITH CO2 INSUFFLATION;  Surgeon: Jaxon Lechuga MD;  [facility]: [facility]     INSERT PORT VASCULAR ACCESS Left 09/01/2017    Procedure: INSERT PORT VASCULAR ACCESS;  Single Lumen Chest Power Port;  Surgeon: Leif Parkinson PA-C;  [facility]     LAPAROSCOPIC CHOLECYSTECTOMY N/A 4/30/2021    Procedure: CHOLECYSTECTOMY, LAPAROSCOPIC, WITH CHOLANGIOGRAM;  Surgeon: Jaxon Lechuga MD;  [facility]: [facility]     LUMPECTOMY BREAST WITH SENTINEL NODE, COMBINED Right 02/27/2018    Procedure: COMBINED LUMPECTOMY BREAST WITH SENTINEL NODE;  Right Wire Localized Lumpectomy, Right [place] Lymph Node Biopsy And Freddy Cath Removal;  Surgeon: Atul Gates MD;  [facility]     REMOVE PORT VASCULAR ACCESS N/A 02/27/2018    Procedure: REMOVE PORT VASCULAR ACCESS;;  Surgeon: Atul Gates MD;  [facility]     TUBAL LIGATION  12/2004       Allergies:  Allergies as of 09/19/2022     (No Known Allergies)       Current Medications:  Current  Outpatient Medications   Medication Sig Dispense Refill     albuterol (PROAIR HFA) 108 (90 Base) MCG/ACT inhaler Inhale 2 Puffs by mouth every 6 hours if needed for shortness of breath / dyspnea. Pt will call when needed 8.5 g 1     ANORO ELLIPTA 62.5-25 MCG/INH oral inhaler INHALE ONE PUFF INTO THE LUNGS ONCE DAILY 3 each 3     cetirizine (ZYRTEC) 10 MG tablet Take 10 mg by mouth daily       simvastatin (ZOCOR) 40 MG tablet Take 1 tablet (40 mg) by mouth At Bedtime 90 tablet 3     SM MUCUS RELIEF 600 MG 12 hr tablet TAKE TWO TABLETS BY MOUTH TWICE A  tablet 0     VITAMIN D 1000 UNIT OR CAPS Take 3,000 Units by mouth         Family and Social History:  Reviewed and unchanged from prior.  Please see initial consultation dated 8/28/17 for further details.    Physical Exam:  BP 94/62   Pulse 92   Temp 98.3  F (36.8  C) (Oral)   Wt 71.4 kg (157 lb 8 oz)   SpO2 98%   BMI 27.90 kg/m    General:  Well appearing adult female in NAD.  Alert and oriented x 3.  HEENT:  Normocephalic.  Sclera anicteric.  MMM.  No lesions of the oropharynx.  Lymph:  No palpable cervical, supraclavicular, or axillary LAD.  Chest:  CTA bilaterally.  No wheezes or crackles.  CV:  RRR.  Nl S1 and S2.    Breast:  Right breast with increased density and skin thickening c/w radiation change.  Right breast is much smaller and firmer overall than the left breast, there is puckering/retraction of the lower breast without palpable underlying mass.  There are no discretely palpable masses in either breast.  Bilateral nipples are everted and without discharge.  Abd:  Soft/ND.   Ext:  No pitting edema of the bilateral lower extremities.    Musculo:  Full ROM of the bilateral upper extremities.  Neuro:  Cranial nerves grossly intact.  Gait stable.    Psych:  Mood and affect appear normal.  Skin:  No visible concerning skin rashes or lesions.    Laboratory/Imaging Studies:  9/19/2022 Bilateral screening mammograms:  I personally reviewed the images  in clinic today.  There are no significant changes when compared to mammogram performed one year prior.    7/20/2022 CT chest w/o contrast:  1. No new or enlarging pulmonary nodules.  2. Stable multifocal pulmonary fibrotic changes including  postradiation changes in the anterolateral right upper lobe.  3. Postsurgical changes of right breast lumpectomy without evidence of local or metastatic disease.  4. Granulomatous disease.    ASSESSMENT/PLAN:  Ms. Alonzo is a 62-year-old female with a h/o grade 3, T2N0M0, triple-negative infiltrating ductal carcinoma of the right breast s/p 12 weeks of Taxol along with 3 cycles pembrolizumab, 2 cycles of adriamycin and cyclophosphamide, lumpectomy ([id number]), and radiation.     1.  Right breast cancer:   Ms. Alonzo is > 4.5 years out from excision of a right breast cancer.      She is asymptomatic of disease recurrence on history taken today and clinical exam is without concerning findings.   Due to a h/o ADH, I have recommended high risk screening with both annual mammogram and breast MRI, spacing the two studies so that she is having some form of imaging every 6 months.  Recommend stopping breast MRIs once she is 5 years out from completion of treatment of her breast cancer.  I personally reviewed the images of bilateral screening mammograms performed today which on my view show persistent increased breast density, but no significant change when compared to one year prior.  The radiology read is pending and will be released to the patient via [employer] when available.  I will see her back in clinic in 6 months with breast MRI the same day.  This will be the last planned breast MRI.    2.  Organizing pneumonia/pulmonary nodules:  Chronic COPD.  Had pneumonitis secondary to pembrolizumab during breast cancer treatment.  Has serial CT scans for follow up of pulmonary nodules and organizing pneumonia.  CT performed in 07/2022 was reviewed and was stable.  Per patient repeat CT in  07/2023 is planned.  Per pulmonology, plan to stop CTs after 5 years surveillance.    3.  Colyn polyps:  1 cm and 8 mm polyps removed 10/2021.  Next colonoscopy due in 10/2024.    4.  Elevated alkaline phosphatase:  Resolved.  Patient remains on a statin, but mentions that she quit drinking beer.    5.  Weight management:  She has lost 9 pounds in the last 6 months.  We reviewed patients who lose weight after breast cancer and those that maintain a normal BMI have improved breast cancer outcomes.  I congratulated her on this weight loss.  She will continue healthy eating and I encouraged to continue routine exercise, especially during the winter months.    6.  Followup: Breast MRI and in person visit with me in 6 months.    30 minutes spent on the date of the encounter doing chart review, review of test results, interpretation of tests, patient visit and documentation         Again, thank you for allowing me to participate in the care of your patient.      Sincerely,    Fara Bradshaw MD

## 2022-09-20 ENCOUNTER — LAB (OUTPATIENT)
Dept: LAB | Facility: CLINIC | Age: 62
End: 2022-09-20
Payer: COMMERCIAL

## 2022-09-20 DIAGNOSIS — R73.09 ELEVATED GLUCOSE: ICD-10-CM

## 2022-09-20 LAB — HBA1C MFR BLD: 5.1 % (ref 0–5.6)

## 2022-09-20 PROCEDURE — 36415 COLL VENOUS BLD VENIPUNCTURE: CPT

## 2022-09-20 PROCEDURE — 83036 HEMOGLOBIN GLYCOSYLATED A1C: CPT

## 2022-10-09 ENCOUNTER — HEALTH MAINTENANCE LETTER (OUTPATIENT)
Age: 62
End: 2022-10-09

## 2022-10-09 NOTE — NURSING NOTE
"Chief Complaint   Patient presents with     Port Draw     Labs drawn from port by RN. Line flushed with saline and heparin. Vitals taken and pt checked in for appt     Port accessed with 20g 3/4\" gripper needle by RN, labs collected, line flushed with saline and heparin.  Vitals taken. Pt checked in for appointment(s).    Luana Teran RN  " stated

## 2022-10-17 DIAGNOSIS — R05.8 COUGH WITH SPUTUM: ICD-10-CM

## 2022-10-17 RX ORDER — GUAIFENESIN 600 MG/1
TABLET ORAL
Qty: 160 TABLET | Refills: 0 | Status: SHIPPED | OUTPATIENT
Start: 2022-10-17

## 2023-01-29 NOTE — RESULT ENCOUNTER NOTE
Results discussed directly with patient while patient was present. Any further details documented in the note.   Amina Licona MD No restrictions

## 2023-02-13 ENCOUNTER — NURSE TRIAGE (OUTPATIENT)
Dept: NURSING | Facility: CLINIC | Age: 63
End: 2023-02-13
Payer: COMMERCIAL

## 2023-03-05 NOTE — PROGRESS NOTES
Oncology Follow Up:  Date on this visit: 3/6/2023    Diagnosis:  Stage IIb, T2N0M0, grade 3 triple negative cancer of the right breast.    Primary Physician: Radha Cazares     History Of Present Illness:  Ms. Alonzo is a 62-year-old female with a h/o stage IIb, T2 N0 M0, grade 3, triple-negative invasive carcinoma of the right breast.  Routine screening mammogram on 07/27/2017 showed developing calcifications in the right breast at the 12 o'clock position 6 cm from the nipple.  Ultrasound demonstrated a 7-mm, irregular, hypoechoic mass at the 12 o'clock position.  Contrast-enhanced mammogram showed a peripherally enhancing, irregular mass measuring 1.9 cm as well as an additional 6-mm, enhancing focus anteromedial to the dominant mass.  The total area of abnormal enhancement on contrast mammogram measured up to 3.4 cm.  Right breast biopsy demonstrated a grade 3 invasive mammary carcinoma with associated high-grade DCIS.  Estrogen and progesterone receptor staining were negative.  HER2 was non-amplified by FISH.  Breast MRI measured the biopsy proven breast cancer at 3.2 cm.    Ms. Alonzo enrolled in the ISPY-2 clinical trial.  She began treatment with weekly taxol and once every 3 week pembrolizumab on 9/20/17.  Her course was complicated by pneumonitis and hepatitis.  Pembrolizumab was stopped and she resumed weekly taxol alone on 11/28/17.  She completed a total of 12 weeks of therapy on 12/26/17.  She completed 2 cycles of adriamycin and cyclophosphamide.  She was hospitalized both cycles due to neutropenic fever and also developed C. Difficile colitis.  Decision was made to forgo further chemotherapy.       Right breast lumpectomy and sentinel lymph node procedure on 2/27/18 showed a grade 2, 6 mm residual invasive mammary carcinoma with an associated 8 mm area of high grade DCIS with comedonecrosis and ADH.  Invasive tumor cellularity was 10%.  There was lymphovascular invasion.  Surgical margins were  negative.  A single sentinel lymph node was benign.  Classified as MDA RCB I.  She completed adjuvant radiation (4256 cGy to the right breast with additional 1000 cGy to the lumpectomy cavity) on 4/27/18.     Interval History:  Ms. Alonzo comes into clinic, alongside her spouse, today for routine breast cancer follow-up.  Since her last visit, her health has been good.  She has had no hospitalizations or illnesses.  She denies concerning lumps, pain, or swelling of either breast.  She has had no cough, shortness of breath, or chest pain.  She states she has not been walking as much during the winter.  Despite this, she has been able to maintain her weight.  She has no current abdominal complaints.  She denies new bone or joint aches or pains.  She reports chronic pain in her bilateral hands.  She has a family history of osteoarthritis.  She notes some swelling of the hands when she awakens in the morning, this improves throughout the day.     Past Medical/Surgical History:  Past Medical History:   Diagnosis Date     Acute cystitis without hematuria 10/26/2017     Clostridium difficile diarrhea 1/12/2018     Neutropenic fever (H) 01/10/2018     She was hospitalized 1/10/18 - 1/13/18 with neutropenic fever     Neutropenic sepsis (H) 01/29/2018    hospitalized again  from 1/29/18 - 2/8/18 with neutropenic fever, mucositis, and C. difficile colitis      Nonsenile cataract      Pneumonia 11/11/2017    She was hospitalized 11/11/17 - 11/17/17 with fevers ranging from 102 - 105.  CT chest was consistent with pneumonitis.  She also had transaminitis in the 150 range.  She was started on corticosteroids.  Pembrolizumab was stopped      S/P radiation therapy     5,256 cGy completed on 4/27/2018 to [facility] with Dr. Zhong     Past Surgical History:   Procedure Laterality Date     APPENDECTOMY  10/06/2015    [facility]     BREAST BIOPSY, CORE RT/LT Right 08/22/2017     BRONCHOSCOPY (RIGID OR  FLEXIBLE), DIAGNOSTIC N/A 02/06/2018    Procedure: COMBINED BRONCHOSCOPY (RIGID OR FLEXIBLE), LAVAGE;  COMBINED BRONCOSCOY (RIGID OR FLEXIBLE), LAVAGE;  Surgeon: Samir Pettit MD;  [facility]: [facility]     CATARACT IOL, RT/LT       CLEAR LENS REPLACEMENT Bilateral 11/2016    [facility]     COLONOSCOPY  11/15/2011    Procedure:COLONOSCOPY; Colonoscopy, screening; Surgeon:ANASTASIA BUNCH; Location:[facility]     COLONOSCOPY N/A 10/12/2021    Procedure: Colonoscopy, Flexible, With Lesion Removal Using Snare;  Surgeon: Jaxon Lechuga MD;  [facility]: [facility]     COLONOSCOPY WITH CO2 INSUFFLATION N/A 10/12/2021    Procedure: COLONOSCOPY, WITH CO2 INSUFFLATION;  Surgeon: Jaxon Lechuga MD;  Location: [facility]     INSERT PORT VASCULAR ACCESS Left 09/01/2017    Procedure: INSERT PORT VASCULAR ACCESS;  Single Lumen Chest Power Port;  Surgeon: Leif Parkinson PA-C;  [facility]     LAPAROSCOPIC CHOLECYSTECTOMY N/A 4/30/2021    Procedure: CHOLECYSTECTOMY, LAPAROSCOPIC, WITH CHOLANGIOGRAM;  Surgeon: Jaxon Lechuga MD;  [facility]: [facility]     LUMPECTOMY BREAST WITH SENTINEL NODE, COMBINED Right 02/27/2018    Procedure: COMBINED LUMPECTOMY BREAST WITH SENTINEL NODE;  Right Wire Localized Lumpectomy, Right Lane Lymph Node Biopsy And Freddy Cath Removal;  Surgeon: Atul Gates MD;  [facility]: [facility]     REMOVE PORT VASCULAR ACCESS N/A 02/27/2018    Procedure: REMOVE PORT VASCULAR ACCESS;;  Surgeon: Atul Gates MD;  [facility]: [facility]     TUBAL LIGATION  12/2004       Allergies:  Allergies as of 03/06/2023     (No Known Allergies)       Current Medications:  Current Outpatient Medications   Medication Sig Dispense Refill     albuterol (PROAIR HFA) 108 (90 Base) MCG/ACT inhaler Inhale 2 Puffs by mouth every 6 hours if needed for shortness of breath / dyspnea. Pt will call when needed 8.5 g 1     ANORO ELLIPTA 62.5-25 MCG/INH oral inhaler INHALE ONE PUFF INTO THE LUNGS ONCE DAILY 3 each 3     cetirizine  (ZYRTEC) 10 MG tablet Take 10 mg by mouth daily       simvastatin (ZOCOR) 40 MG tablet Take 1 tablet (40 mg) by mouth At Bedtime 90 tablet 3     SM MUCUS RELIEF 600 MG 12 hr tablet TAKE TWO TABLETS BY MOUTH TWICE A  tablet 0     VITAMIN D 1000 UNIT OR CAPS Take 3,000 Units by mouth         Family and Social History:  Reviewed and unchanged from prior.  Please see initial consultation dated 8/28/17 for further details.    Physical Exam:  General:  Well appearing adult female in NAD.  Alert and oriented x 3.  HEENT:  Normocephalic.  Sclera anicteric.   Lymph:  No palpable cervical, supraclavicular, or axillary LAD.  Chest:  CTA bilaterally.  No wheezes or crackles.  CV:  RRR.  Nl S1 and S2.    Breast:  Breast exam is unchanged from prior.  Notably, right breast with increased density and skin thickening c/w radiation change.  Right breast is much smaller and overall firmer than the left breast, there is puckering/retraction of the lower breast without palpable underlying mass.  There are no discretely palpable masses in either breast.  Bilateral nipples are everted and without discharge.  Abd:  Soft/ND.   Ext:  No pitting edema of the bilateral lower extremities.    Musculo:  Full ROM of the bilateral upper extremities.  Neuro:  Cranial nerves grossly intact.  Gait stable.    Psych:  Mood and affect appear normal.  Skin:  No visible concerning skin rashes or lesions.    Laboratory/Imaging Studies:  7/20/2022 CT chest w/o contrast:  1. No new or enlarging pulmonary nodules.  2. Stable multifocal pulmonary fibrotic changes including  postradiation changes in the anterolateral right upper lobe.  3. Postsurgical changes of right breast lumpectomy without evidence of local or metastatic disease.  4. Granulomatous disease.    3/6/2023 breast MRI (I personally reviewed these images with the patient in clinic today):  There is no suspicious enhancement in either breast.  Normal appearing lymph nodes in the bilateral  axillae    ASSESSMENT/PLAN:  Ms. Alonzo is a 62-year-old female with a h/o grade 3, T2N0M0, triple-negative infiltrating ductal carcinoma of the right breast s/p 12 weeks of Taxol along with 3 cycles pembrolizumab, 2 cycles of adriamycin and cyclophosphamide, lumpectomy ([id number]), and radiation.     1.  Right breast cancer:   Ms. Alonzo is 5 years out from excision of a right breast cancer.      She is asymptomatic of disease recurrence on history taken today and clinical exam is without concerning findings.   Due to a h/o ADH, I had previously recommended high risk screening with both annual mammogram and breast MRI.  I personally reviewed the images of breast MRI performed today which which are without concerning enhancement or lymphadenopathy.  Given she is now 5 years out from diagnosis of her breast cancer, it is okay to forgo further breast MRIs at this time.    We discussed triple negative breast cancer rarely recurs after the 5 year point.  Therefore it would be reasonable to discharge from oncology clinic and resume annual screening mammogram with her PCP at this time.  She specifies that she would like to continue annual screening in oncology clinic.  I will therefore see her back around the time of her next screening mammogram, around 9/19/2023, and then annually thereafter.    2.  Organizing pneumonia/pulmonary nodules:  No change since last visit.  Chronic COPD.  Had pneumonitis secondary to pembrolizumab during breast cancer treatment.  Has serial CT scans for follow up of pulmonary nodules and organizing pneumonia.  CT performed in 07/2022 was reviewed and was stable.  Per patient repeat CT in 07/2023 is planned.  Per pulmonology, plan is to stop CTs after 5 years surveillance.    3.  Colyn polyps:  No change since last visit.  1 cm and 8 mm polyps removed 10/2021.  Next colonoscopy due in 10/2024.    4.  Weight management:  Patients who lose weight after breast cancer and those that maintain a normal  BMI have improved breast cancer outcomes.  Weight has been stable since last visit.  I recommend that she add back routine walking this Spring and Summer.    5.  Followup:   Bilateral screening mammograms and visit with me 9/19/2023 or later.    25 minutes spent on the date of the encounter doing chart review, review of test results, interpretation of tests, patient visit and documentation.

## 2023-03-06 ENCOUNTER — ONCOLOGY VISIT (OUTPATIENT)
Dept: ONCOLOGY | Facility: CLINIC | Age: 63
End: 2023-03-06
Attending: INTERNAL MEDICINE
Payer: COMMERCIAL

## 2023-03-06 ENCOUNTER — ANCILLARY PROCEDURE (OUTPATIENT)
Dept: MRI IMAGING | Facility: CLINIC | Age: 63
End: 2023-03-06
Attending: INTERNAL MEDICINE
Payer: COMMERCIAL

## 2023-03-06 DIAGNOSIS — Z17.1 MALIGNANT NEOPLASM OF UPPER-INNER QUADRANT OF RIGHT BREAST IN FEMALE, ESTROGEN RECEPTOR NEGATIVE (H): Primary | ICD-10-CM

## 2023-03-06 DIAGNOSIS — C50.211 MALIGNANT NEOPLASM OF UPPER-INNER QUADRANT OF RIGHT BREAST IN FEMALE, ESTROGEN RECEPTOR NEGATIVE (H): Primary | ICD-10-CM

## 2023-03-06 DIAGNOSIS — Z87.42 HISTORY OF ATYPICAL HYPERPLASIA OF BREAST: ICD-10-CM

## 2023-03-06 DIAGNOSIS — Z12.31 VISIT FOR SCREENING MAMMOGRAM: ICD-10-CM

## 2023-03-06 DIAGNOSIS — Z85.3 PERSONAL HISTORY OF MALIGNANT NEOPLASM OF BREAST: ICD-10-CM

## 2023-03-06 DIAGNOSIS — Z12.39 BREAST CANCER SCREENING, HIGH RISK PATIENT: ICD-10-CM

## 2023-03-06 PROCEDURE — A9585 GADOBUTROL INJECTION: HCPCS | Performed by: RADIOLOGY

## 2023-03-06 PROCEDURE — 77049 MRI BREAST C-+ W/CAD BI: CPT | Performed by: RADIOLOGY

## 2023-03-06 PROCEDURE — 99213 OFFICE O/P EST LOW 20 MIN: CPT | Performed by: INTERNAL MEDICINE

## 2023-03-06 RX ORDER — GADOBUTROL 604.72 MG/ML
7.5 INJECTION INTRAVENOUS ONCE
Status: COMPLETED | OUTPATIENT
Start: 2023-03-06 | End: 2023-03-06

## 2023-03-06 RX ADMIN — GADOBUTROL 7.5 ML: 604.72 INJECTION INTRAVENOUS at 11:22

## 2023-03-06 NOTE — LETTER
3/6/2023         RE: Ashleigh Alonzo  [address] [facility] [address]        Dear Colleague,    Thank you for referring your patient, Ashleigh Alonzo, to the [facility]. Please see a copy of my visit note below.    Oncology Follow Up:  Date on this visit: 3/6/2023    Diagnosis:  Stage IIb, T2N0M0, grade 3 triple negative cancer of the right breast.    Primary Physician: Radha Cazares     History Of Present Illness:  Ms. Alonzo is a 62-year-old female with a h/o stage IIb, T2 N0 M0, grade 3, triple-negative invasive carcinoma of the right breast.  Routine screening mammogram on 07/27/2017 showed developing calcifications in the right breast at the 12 o'clock position 6 cm from the nipple.  Ultrasound demonstrated a 7-mm, irregular, hypoechoic mass at the 12 o'clock position.  Contrast-enhanced mammogram showed a peripherally enhancing, irregular mass measuring 1.9 cm as well as an additional 6-mm, enhancing focus anteromedial to the dominant mass.  The total area of abnormal enhancement on contrast mammogram measured up to 3.4 cm.  Right breast biopsy demonstrated a grade 3 invasive mammary carcinoma with associated high-grade DCIS.  Estrogen and progesterone receptor staining were negative.  HER2 was non-amplified by FISH.  Breast MRI measured the biopsy proven breast cancer at 3.2 cm.    Ms. Alonzo enrolled in the ISPY-2 clinical trial.  She began treatment with weekly taxol and once every 3 week pembrolizumab on 9/20/17.  Her course was complicated by pneumonitis and hepatitis.  Pembrolizumab was stopped and she resumed weekly taxol alone on 11/28/17.  She completed a total of 12 weeks of therapy on 12/26/17.  She completed 2 cycles of adriamycin and cyclophosphamide.  She was hospitalized both cycles due to neutropenic fever and also developed C. Difficile colitis.  Decision was made to forgo further chemotherapy.       Right breast lumpectomy and sentinel lymph node  procedure on 2/27/18 showed a grade 2, 6 mm residual invasive mammary carcinoma with an associated 8 mm area of high grade DCIS with comedonecrosis and ADH.  Invasive tumor cellularity was 10%.  There was lymphovascular invasion.  Surgical margins were negative.  A single sentinel lymph node was benign.  Classified as MDA RCB I.  She completed adjuvant radiation (4256 cGy to the right breast with additional 1000 cGy to the lumpectomy cavity) on 4/27/18.     Interval History:  Ms. Alonzo comes into clinic, alongside her spouse, today for routine breast cancer follow-up.  Since her last visit, her health has been good.  She has had no hospitalizations or illnesses.  She denies concerning lumps, pain, or swelling of either breast.  She has had no cough, shortness of breath, or chest pain.  She states she has not been walking as much during the winter.  Despite this, she has been able to maintain her weight.  She has no current abdominal complaints.  She denies new bone or joint aches or pains.  She reports chronic pain in her bilateral hands.  She has a family history of osteoarthritis.  She notes some swelling of the hands when she awakens in the morning, this improves throughout the day.     Past Medical/Surgical History:  Past Medical History:   Diagnosis Date     Acute cystitis without hematuria 10/26/2017     Clostridium difficile diarrhea 1/12/2018     Neutropenic fever (H) 01/10/2018     She was hospitalized 1/10/18 - 1/13/18 with neutropenic fever     Neutropenic sepsis (H) 01/29/2018    hospitalized again  from 1/29/18 - 2/8/18 with neutropenic fever, mucositis, and C. difficile colitis      Nonsenile cataract      Pneumonia 11/11/2017    She was hospitalized 11/11/17 - 11/17/17 with fevers ranging from 102 - 105.  CT chest was consistent with pneumonitis.  She also had transaminitis in the 150 range.  She was started on corticosteroids.  Pembrolizumab was stopped      S/P radiation therapy     5,256 cGy  completed on 4/27/2018 to [facility] with Dr. Zhong     Past Surgical History:   Procedure Laterality Date     APPENDECTOMY  10/06/2015    [facility]     BREAST BIOPSY, CORE RT/LT Right 08/22/2017     BRONCHOSCOPY (RIGID OR FLEXIBLE), DIAGNOSTIC N/A 02/06/2018    Procedure: COMBINED BRONCHOSCOPY (RIGID OR FLEXIBLE), LAVAGE;  COMBINED BRONCOSCOY (RIGID OR FLEXIBLE), LAVAGE;  Surgeon: Samir Pettit MD;  Location: [facility]     CATARACT IOL, RT/LT       CLEAR LENS REPLACEMENT Bilateral 11/2016    [facility]     COLONOSCOPY  11/15/2011    Procedure:COLONOSCOPY; Colonoscopy, screening; Surgeon:ANASTASIA BUNCH; [facility]     COLONOSCOPY N/A 10/12/2021    Procedure: Colonoscopy, Flexible, With Lesion Removal Using Snare;  Surgeon: Jaxon Lechuga MD;  [facility]: [facility]     COLONOSCOPY WITH CO2 INSUFFLATION N/A 10/12/2021    Procedure: COLONOSCOPY, WITH CO2 INSUFFLATION;  Surgeon: Jaxon Lechuga MD;  [facility]: [facility]     INSERT PORT VASCULAR ACCESS Left 09/01/2017    Procedure: INSERT PORT VASCULAR ACCESS;  Single Lumen Chest Power Port;  Surgeon: Leif Parkinson PA-C;  [facility]     LAPAROSCOPIC CHOLECYSTECTOMY N/A 4/30/2021    Procedure: CHOLECYSTECTOMY, LAPAROSCOPIC, WITH CHOLANGIOGRAM;  Surgeon: Jaxon Lechuga MD;  Location: [facility]     LUMPECTOMY BREAST WITH SENTINEL NODE, COMBINED Right 02/27/2018    Procedure: COMBINED LUMPECTOMY BREAST WITH SENTINEL NODE;  Right Wire Localized Lumpectomy, Right [place] Lymph Node Biopsy And Freddy Cath Removal;  Surgeon: Atul Gates MD;  [facility]     REMOVE PORT VASCULAR ACCESS N/A 02/27/2018    Procedure: REMOVE PORT VASCULAR ACCESS;;  Surgeon: Atul Gates MD;  [facility]     TUBAL LIGATION  12/2004       Allergies:  Allergies as of 03/06/2023     (No Known Allergies)       Current Medications:  Current Outpatient Medications   Medication Sig Dispense Refill     albuterol (PROAIR HFA) 108 (90  Base) MCG/ACT inhaler Inhale 2 Puffs by mouth every 6 hours if needed for shortness of breath / dyspnea. Pt will call when needed 8.5 g 1     ANORO ELLIPTA 62.5-25 MCG/INH oral inhaler INHALE ONE PUFF INTO THE LUNGS ONCE DAILY 3 each 3     cetirizine (ZYRTEC) 10 MG tablet Take 10 mg by mouth daily       simvastatin (ZOCOR) 40 MG tablet Take 1 tablet (40 mg) by mouth At Bedtime 90 tablet 3     SM MUCUS RELIEF 600 MG 12 hr tablet TAKE TWO TABLETS BY MOUTH TWICE A  tablet 0     VITAMIN D 1000 UNIT OR CAPS Take 3,000 Units by mouth         Family and Social History:  Reviewed and unchanged from prior.  Please see initial consultation dated 8/28/17 for further details.    Physical Exam:  General:  Well appearing adult female in NAD.  Alert and oriented x 3.  HEENT:  Normocephalic.  Sclera anicteric.   Lymph:  No palpable cervical, supraclavicular, or axillary LAD.  Chest:  CTA bilaterally.  No wheezes or crackles.  CV:  RRR.  Nl S1 and S2.    Breast:  Breast exam is unchanged from prior.  Notably, right breast with increased density and skin thickening c/w radiation change.  Right breast is much smaller and overall firmer than the left breast, there is puckering/retraction of the lower breast without palpable underlying mass.  There are no discretely palpable masses in either breast.  Bilateral nipples are everted and without discharge.  Abd:  Soft/ND.   Ext:  No pitting edema of the bilateral lower extremities.    Musculo:  Full ROM of the bilateral upper extremities.  Neuro:  Cranial nerves grossly intact.  Gait stable.    Psych:  Mood and affect appear normal.  Skin:  No visible concerning skin rashes or lesions.    Laboratory/Imaging Studies:  7/20/2022 CT chest w/o contrast:  1. No new or enlarging pulmonary nodules.  2. Stable multifocal pulmonary fibrotic changes including  postradiation changes in the anterolateral right upper lobe.  3. Postsurgical changes of right breast lumpectomy without evidence of  local or metastatic disease.  4. Granulomatous disease.    3/6/2023 breast MRI (I personally reviewed these images with the patient in clinic today):  There is no suspicious enhancement in either breast.  Normal appearing lymph nodes in the bilateral axillae    ASSESSMENT/PLAN:  Ms. Alonzo is a 62-year-old female with a h/o grade 3, T2N0M0, triple-negative infiltrating ductal carcinoma of the right breast s/p 12 weeks of Taxol along with 3 cycles pembrolizumab, 2 cycles of adriamycin and cyclophosphamide, lumpectomy ([id number]), and radiation.     1.  Right breast cancer:   Ms. Alonzo is 5 years out from excision of a right breast cancer.      She is asymptomatic of disease recurrence on history taken today and clinical exam is without concerning findings.   Due to a h/o ADH, I had previously recommended high risk screening with both annual mammogram and breast MRI.  I personally reviewed the images of breast MRI performed today which which are without concerning enhancement or lymphadenopathy.  Given she is now 5 years out from diagnosis of her breast cancer, it is okay to forgo further breast MRIs at this time.    We discussed triple negative breast cancer rarely recurs after the 5 year point.  Therefore it would be reasonable to discharge from oncology clinic and resume annual screening mammogram with her PCP at this time.  She specifies that she would like to continue annual screening in oncology clinic.  I will therefore see her back around the time of her next screening mammogram, around 9/19/2023, and then annually thereafter.    2.  Organizing pneumonia/pulmonary nodules:  No change since last visit.  Chronic COPD.  Had pneumonitis secondary to pembrolizumab during breast cancer treatment.  Has serial CT scans for follow up of pulmonary nodules and organizing pneumonia.  CT performed in 07/2022 was reviewed and was stable.  Per patient repeat CT in 07/2023 is planned.  Per pulmonology, plan is to stop CTs after  5 years surveillance.    3.  Colyn polyps:  No change since last visit.  1 cm and 8 mm polyps removed 10/2021.  Next colonoscopy due in 10/2024.    4.  Weight management:  Patients who lose weight after breast cancer and those that maintain a normal BMI have improved breast cancer outcomes.  Weight has been stable since last visit.  I recommend that she add back routine walking this Spring and Summer.    5.  Followup:   Bilateral screening mammograms and visit with me 9/19/2023 or later.    25 minutes spent on the date of the encounter doing chart review, review of test results, interpretation of tests, patient visit and documentation.         Fara Bradshaw MD

## 2023-03-15 DIAGNOSIS — I89.0 LYMPHEDEMA OF BREAST: Primary | ICD-10-CM

## 2023-03-16 ENCOUNTER — OFFICE VISIT (OUTPATIENT)
Dept: DERMATOLOGY | Facility: CLINIC | Age: 63
End: 2023-03-16
Payer: COMMERCIAL

## 2023-03-16 DIAGNOSIS — L85.9 HYPERKERATOSIS: Primary | ICD-10-CM

## 2023-03-16 DIAGNOSIS — L82.1 SEBORRHEIC KERATOSIS: ICD-10-CM

## 2023-03-16 PROCEDURE — 99203 OFFICE O/P NEW LOW 30 MIN: CPT | Performed by: PHYSICIAN ASSISTANT

## 2023-03-16 RX ORDER — UREA 40 %
CREAM (GRAM) TOPICAL
Qty: 85 G | Refills: 11 | Status: SHIPPED | OUTPATIENT
Start: 2023-03-16

## 2023-03-16 ASSESSMENT — PAIN SCALES - GENERAL: PAINLEVEL: NO PAIN (0)

## 2023-03-16 NOTE — LETTER
3/16/2023         RE: Ashleigh Alonzo  [address] [facility] [address]        Dear Colleague,    Thank you for referring your patient, Ashleigh Alonzo, to the [facility]. Please see a copy of my visit note below.    HPI:   Chief complaints: Ashleigh Alonzo is a pleasant 62 year old female who presents for evaluation of a rough patch of skin on the left elbow. It has been present for years. She does lean on the area frequently. She also has a new dark mole on the left thigh which she recently noticed. She does not have a history of skin cancer.       PHYSICAL EXAM:    There were no vitals taken for this visit.  Skin exam performed as follows: Type 2 skin. Mood appropriate  Alert and Oriented X 3. Well developed, well nourished in no distress.  General appearance: Normal  Head including face: Normal  Eyes: conjunctiva and lids: Normal  Mouth: Lips, teeth, gums: Normal  Neck: Normal  Skin: Scalp and body hair: See below    Thickened hyperkeratotic patch on the left extensor elbow  Keratotic papule on the left thigh, left dorsal hand    ASSESSMENT/PLAN:     1. Hyperkeratosis - discussed from persistent friction  --Start 40% urea cream every day-BID   2. Seborrheic keratoses on the left thigh, left dorsal hand - advised benign no treatment needed          Follow-up: PRN  CC:   Scribed By: Gemma Han MS, PAOPAL          Again, thank you for allowing me to participate in the care of your patient.        Sincerely,        Gemma Han PA-C

## 2023-03-16 NOTE — NURSING NOTE
Ashleigh Perera Clinton's chief complaint for this visit includes:  Chief Complaint   Patient presents with     Derm Problem     Patient has a rough, discoloration dry patch on left elbow and a spot on left thigh that she is concerned about.      PCP: Susanne Marin    Referring Provider:  Referred Self, MD  No address on file    There were no vitals taken for this visit.  No Pain (0)      No Known Allergies      Do you need any medication refills at today's visit? No    Elizabeth George MA

## 2023-03-16 NOTE — PROGRESS NOTES
HPI:   Chief complaints: Ashleigh Alonzo is a pleasant 62 year old female who presents for evaluation of a rough patch of skin on the left elbow. It has been present for years. She does lean on the area frequently. She also has a new dark mole on the left thigh which she recently noticed. She does not have a history of skin cancer.       PHYSICAL EXAM:    There were no vitals taken for this visit.  Skin exam performed as follows: Type 2 skin. Mood appropriate  Alert and Oriented X 3. Well developed, well nourished in no distress.  General appearance: Normal  Head including face: Normal  Eyes: conjunctiva and lids: Normal  Mouth: Lips, teeth, gums: Normal  Neck: Normal  Skin: Scalp and body hair: See below    Thickened hyperkeratotic patch on the left extensor elbow  Keratotic papule on the left thigh, left dorsal hand    ASSESSMENT/PLAN:     1. Hyperkeratosis - discussed from persistent friction  --Start 40% urea cream every day-BID   2. Seborrheic keratoses on the left thigh, left dorsal hand - advised benign no treatment needed          Follow-up: PRN  CC:   Scribed By: Gemma Han, MS, PA-C

## 2023-03-23 ENCOUNTER — HOSPITAL ENCOUNTER (OUTPATIENT)
Dept: PHYSICAL THERAPY | Facility: CLINIC | Age: 63
Setting detail: THERAPIES SERIES
Discharge: HOME | End: 2023-03-23
Attending: INTERNAL MEDICINE
Payer: COMMERCIAL

## 2023-03-23 DIAGNOSIS — I89.0 LYMPHEDEMA OF BREAST: ICD-10-CM

## 2023-03-23 PROCEDURE — 97161 PT EVAL LOW COMPLEX 20 MIN: CPT | Mod: GP | Performed by: PHYSICAL THERAPIST

## 2023-03-23 PROCEDURE — 97140 MANUAL THERAPY 1/> REGIONS: CPT | Mod: GP | Performed by: PHYSICAL THERAPIST

## 2023-03-25 NOTE — PROGRESS NOTES
03/23/23 1045   Rehab Discipline   Discipline PT   Type of Visit   Type of visit Initial Edema Evaluation   General Information   Start of care 03/23/23   Referring physician Fara Bradshaw MD   Orders Evaluate and treat as indicated   Order date 03/15/23   Medical diagnosis Lymphedema of breast (I89.0)   Onset of illness / date of surgery 02/27/18   Edema onset 03/15/23  (Date of order)   Affected body parts Other;Trunk  (R breast)   Edema etiology Cancer with lymph node dissection;Radiation;Chemo;Surgery   Location - Cancer with lymph node dissection Right breast lumpectomy and sentinel lymph node procedure 2/27/18   Location - Radiation RUQ   Radiation comments Ended 4/27/18   Chemotherapy comments 12 weeks of chemotherapy in 2017 - 12 weeks of Taxol along with 3 cycle pembrolizumab, 2 cycles of Adriamycin and cyclophosphamide   Pertinent history of current problem (PT: include personal factors and/or comorbidities that impact the POC; OT: include additional occupational profile info) 63 y/o female with h/o stage IIb, T2 N0 M0, grade 3, triple-negative invasive carcinoma of the right breast.  Routine screening mammogram on 7/27/17 showed developing calcifications in the right breast at the 12 o clock position 6 cm from the nipple.  Right breast biopsy demonstrated a grade 3 invasive mammary carcinoma with associated high-grade DCIS.  Completed 12 weeks of chemotherapy in 2017 - 12 weeks of Taxol along with 3 cycles pembrolizumab, 2 cycles of Adriamycin and cyclophosphamide.  Right breast lumpectomy and sentinel lymph node procedure on 2/27/18.  There was lymphovascular invasion.  Completed adjuvant radiation on 4/27/18.  Most recent follow up with oncology was 3/6/23 - breast MRI showed no suspicious enhancement in either breast, normal appearing lymph nodes in bilateral axillae.  Note states she is asymptomatic of disease recurrence and clinical exam without concerning findings.  She was  discharged from oncology clinic and will now resume annual screening mammogram.  Today she reports she hasn t noticed swelling in the right breast or arm, reports her biggest issues are soreness and pain in the armpit, breast, lateral trunk.  Reports the soreness has been present since radiation ended in 2018, but states she has a high pain tolerance and has just gotten used to it.  She does not have any compression garments, has been wearing a normal bra - [employer] - no underwires, can no longer tolerate that.   Surgical / medical history reviewed Yes   Prior level of functional mobility Independent   Patient role / employment history Retired   Living environment [place] / [facility]   Assistive device comments No AD   Fall Risk Screen   Fall screen completed by PT   Have you fallen 2 or more times in the past year? No   Have you fallen and had an injury in the past year? No   Is patient a fall risk? No   Abuse Screen (yes response referral indicated)   Feels Unsafe at Home or Work/School no   Feels Threatened by Someone no   Does Anyone Try to Keep You From Having Contact with Others or Doing Things Outside Your Home? no   Physical Signs of Abuse Present no   System Outcome Measures   Outcome Measures Lymphedema;Cancer Rehab   FACIT Fatigue Subscale (score out of 52). The higher the score, the better the QOL. 0   Lymphedema Life Impact Scale (score range 0-72). A higher score indicates greater impairment. 11   Subjective Report   Patient report of symptoms Pain, soreness   Pain   Patient currently in pain Yes   Pain location R breast, axilla, lateral trunk   Pain rating 8/10   Pain description comment Soreness   Edema Exam / Assessment   Skin condition Non-pitting;Intact   Skin condition comments Scar mobility normal, pocket of fluid superior and medial, most likely drainage impeded by presence of scar.  Increased tenderness superior to R breast/along muscle belly of pec, increased sensitivity to even light touch.   Increased tenderness and firmness noted 4 o clock to 8 o clock position of R breast.  No peau d orange changes noted.  Nipple inverted.  R breast smaller in size as compared to L breast - expected.   Scar Yes   Location R axilla   Mobility Normal   Capillary refill Symmetrical   Radial pulse Symmetrical   Stemmer sign Negative   Ulceration No   Girth Measurements   Girth Measurements Refer to separate girth measurement flowsheet   Volume UE   Right UE (mL) 2267.65   Left UE (mL) 2278.16   UE volume comparison LUE volume greater than RUE volume   % difference 0.46%   Range of Motion   ROM comments R shoulder: Flexion 143 deg (no pain, feels like it s stretching in the back), Abduction 128 deg (little bit of pain), ER 30 deg (no pain), IR thumb to T10 (pain).  L shoulder: Flexion 147 deg (no pain), Abduction 145 deg (no pain), ER 31 deg (no pain), IR thumb to T7 (no pain)   Strength   Strength No deficits were identified   Posture   Posture Forward head position;Protracted shoulders   Palpation   Palpation Increased tenderness superior R breast along pec muscle, increased tenderness inferior R breast   Activities of Daily Living   Activities of Daily Living Independent   Bed Mobility   Bed mobility Independent   Transfers   Transfers Independent   Gait / Locomotion   Gait / Locomotion Independent   Coordination   Coordination Gross motor coordination appropriate   Muscle Tone   Muscle tone No deficits were identified   Planned Edema Interventions   Planned edema interventions Manual lymph drainage;Gradient compression bandaging;Fit for compression garment;Exercises;Precautions to prevent infection / exacerbation;Education;Manual therapy;Skin care / precautions;Scar mobilization;Soft tissue mobilization;Myofascial release;Home management program development   Clinical Impression   Criteria for skilled therapeutic intervention met Yes   Therapy diagnosis R breast, trunk edema   Influenced by the following impairments /  conditions Edema;Stage 1   Functional limitations due to impairments / conditions Pain, increased risk for progression of edema   Clinical Presentation Stable/Uncomplicated   Clinical Presentation Rationale Based on observation, history, evaluation and clinical judgment.   Clinical Decision Making (Complexity) Low complexity   Treatment Frequency 1x/week   Treatment duration 90 days   Patient / family and/or staff in agreement with plan of care Yes   Risks and benefits of therapy have been explained Yes   Clinical impression comments Patient presents with increased swelling R breast/trunk, increased muscle tightness, no UE swelling as indicated by only 0.46% inter limb volume difference.  Presence of scar interfering with lymphatic drainage.  Patient is not wearing any compression at this time.  She will benefit from skilled PT intervention to focus on MLD, compression, soft tissue mobilization, exercises, self care for symptom management.   Education Assessment   Preferred learning style Listening;Reading;Demonstration;Pictures / video   Barriers to learning No barriers   Goals   Edema Eval Goals 1;2;3   Goal 1   Goal identifier 1   Goal description Patient will demonstrate correct technique with performance of self MLD independently in order to ensure maintenance of edema management after discharge from therapy   Target date 06/20/23   Goal 2   Goal identifier 2   Goal description Patient will demonstrate adequate management of R breast/trunk edema through compression garments   Target date 06/20/23   Goal 3   Goal identifier 3   Goal description Patient will demonstrate full, painfree R shoulder AROM in order to facilitate return to prior level of function.   Target date 06/20/23   Total Evaluation Time   PT Eval, Low Complexity Minutes (79177) 35           Cheryl Lundberg PT, DPT, CLT-RAMON  [facility]  Physical Therapist     Phone: [phone]  Email: [email or web address]

## 2023-04-11 ENCOUNTER — HOSPITAL ENCOUNTER (OUTPATIENT)
Dept: PHYSICAL THERAPY | Facility: CLINIC | Age: 63
Setting detail: THERAPIES SERIES
Discharge: HOME | End: 2023-04-11
Attending: INTERNAL MEDICINE
Payer: COMMERCIAL

## 2023-04-11 PROCEDURE — 97140 MANUAL THERAPY 1/> REGIONS: CPT | Mod: GP | Performed by: PHYSICAL THERAPIST

## 2023-04-18 ENCOUNTER — HOSPITAL ENCOUNTER (OUTPATIENT)
Dept: PHYSICAL THERAPY | Facility: CLINIC | Age: 63
Setting detail: THERAPIES SERIES
Discharge: HOME | End: 2023-04-18
Attending: INTERNAL MEDICINE
Payer: COMMERCIAL

## 2023-04-18 PROCEDURE — 97110 THERAPEUTIC EXERCISES: CPT | Mod: GP | Performed by: PHYSICAL THERAPIST

## 2023-04-18 PROCEDURE — 97140 MANUAL THERAPY 1/> REGIONS: CPT | Mod: GP | Performed by: PHYSICAL THERAPIST

## 2023-05-03 ENCOUNTER — HOSPITAL ENCOUNTER (OUTPATIENT)
Dept: PHYSICAL THERAPY | Facility: CLINIC | Age: 63
Setting detail: THERAPIES SERIES
Discharge: HOME | End: 2023-05-03
Attending: INTERNAL MEDICINE
Payer: COMMERCIAL

## 2023-05-03 PROCEDURE — 97140 MANUAL THERAPY 1/> REGIONS: CPT | Mod: GP | Performed by: PHYSICAL THERAPIST

## 2023-05-03 PROCEDURE — 97110 THERAPEUTIC EXERCISES: CPT | Mod: GP | Performed by: PHYSICAL THERAPIST

## 2023-05-09 ENCOUNTER — HOSPITAL ENCOUNTER (OUTPATIENT)
Dept: PHYSICAL THERAPY | Facility: CLINIC | Age: 63
Setting detail: THERAPIES SERIES
Discharge: HOME | End: 2023-05-09
Attending: INTERNAL MEDICINE
Payer: COMMERCIAL

## 2023-05-09 PROCEDURE — 97140 MANUAL THERAPY 1/> REGIONS: CPT | Mod: GP | Performed by: PHYSICAL THERAPIST

## 2023-05-16 ENCOUNTER — HOSPITAL ENCOUNTER (OUTPATIENT)
Dept: PHYSICAL THERAPY | Facility: CLINIC | Age: 63
Setting detail: THERAPIES SERIES
Discharge: HOME | End: 2023-05-16
Attending: INTERNAL MEDICINE
Payer: COMMERCIAL

## 2023-05-16 PROCEDURE — 97140 MANUAL THERAPY 1/> REGIONS: CPT | Mod: GP | Performed by: PHYSICAL THERAPIST

## 2023-05-16 PROCEDURE — 97010 HOT OR COLD PACKS THERAPY: CPT | Mod: GP | Performed by: PHYSICAL THERAPIST

## 2023-05-23 ENCOUNTER — THERAPY VISIT (OUTPATIENT)
Dept: PHYSICAL THERAPY | Facility: CLINIC | Age: 63
End: 2023-05-23
Attending: INTERNAL MEDICINE
Payer: COMMERCIAL

## 2023-05-23 DIAGNOSIS — I89.0 LYMPHEDEMA OF BREAST: ICD-10-CM

## 2023-05-23 PROCEDURE — 97010 HOT OR COLD PACKS THERAPY: CPT | Mod: GP | Performed by: PHYSICAL THERAPIST

## 2023-05-23 PROCEDURE — 97110 THERAPEUTIC EXERCISES: CPT | Mod: GP | Performed by: PHYSICAL THERAPIST

## 2023-05-23 PROCEDURE — 97140 MANUAL THERAPY 1/> REGIONS: CPT | Mod: GP | Performed by: PHYSICAL THERAPIST

## 2023-05-31 ENCOUNTER — THERAPY VISIT (OUTPATIENT)
Dept: PHYSICAL THERAPY | Facility: CLINIC | Age: 63
End: 2023-05-31
Attending: INTERNAL MEDICINE
Payer: COMMERCIAL

## 2023-05-31 DIAGNOSIS — I89.0 LYMPHEDEMA OF BREAST: Primary | ICD-10-CM

## 2023-05-31 PROCEDURE — 97110 THERAPEUTIC EXERCISES: CPT | Mod: GP | Performed by: PHYSICAL THERAPIST

## 2023-05-31 NOTE — PROGRESS NOTES
DISCHARGE    Reason for Discharge: Patient has met all goals.    Equipment Issued: N/A    Discharge Plan: Patient to continue home program.    Referring Provider:  Fara Bradshaw MD       05/31/23 0500   Appointment Info   Signing clinician's name / credentials Cheryl Lundberg, PT, DPT, CLT-RAMON   Visits Used 8   Medical Diagnosis Lymphedema of breast (I89.0)   PT Tx Diagnosis R breast, trunk edema   Progress Note/Certification   Start of Care Date 03/23/23   Onset of illness/injury or Date of Surgery 02/27/18   Therapy Frequency 1x/week   Predicted Duration 90 days   GOALS   PT Goals 2;3   PT Goal 1   Goal Identifier 1   Goal Description Patient will demonstrate correct technique with performance of self MLD independently in order to ensure maintenance of edema management after discharge from therapy   Goal Progress Demonstrates ability to perform correctly, including drainage and massage of breast.  Goal met.   Target Date 06/20/23   Date Met 05/31/23   PT Goal 2   Goal Identifier 2   Goal Description Patient will demonstrate adequate management of R breast/trunk edema through compression garments   Goal Progress Patient reports wearing compression garments 24/7, tried to go without it overnight but reports it felt better to wear it.  Also has a [employer] serratus pad that she s been wearing daily, removes for really active times such as when she was doing heavy housework this past weekend.  Goal met.   Target Date 06/20/23   Date Met 05/31/23   PT Goal 3   Goal Identifier 3   Goal Description Patient will demonstrate full, painfree R shoulder AROM in order to facilitate return to prior level of function.   Goal Progress R shoulder AROM measurements: 3/23/23: Flexion 143 deg (no pain, feels like it s stretching in the back), Abduction 128 deg (little bit of pain), ER 30 deg (no pain), IR thumb to T10 (pain).  5/31/23: Flexion 168 deg (no pain), Abduction 168 deg (no pain), ER 57 deg (no pain), IR thumb  to T10 (no pain).  Goal met.   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  Today is last scheduled session, feels ready to be done with therapy.  She has her compression garments, she has a home program set up, and her breast heaviness symptoms have been resolved.   Objective Measures   Objective Measures Objective Measure 1;Objective Measure 2;Objective Measure 3;Objective Measure 4   Objective Measure 1   Objective Measure R shoulder AROM - 3/23/23   Details 3/23/23: Flexion 143 deg (no pain, feels like it s stretching in the back), Abduction 128 deg (little bit of pain), ER 30 deg (no pain), IR thumb to T10 (pain).   Objective Measure 2   Objective Measure R shoulder AROM - 5/31/23   Details 5/31/23: Flexion 168 deg (no pain), Abduction 168 deg (no pain), ER 57 deg (no pain), IR thumb to T10 (no pain   Objective Measure 3   Objective Measure L shoulder AROM - 3/23/23   Details 3/23/23: Flexion 147 deg (no pain), Abduction 145 deg (no pain), ER 31 deg (no pain), IR thumb to T7 (no pain)   Objective Measure 4   Objective Measure L shoulder AROM - 5/31/23   Details 5/31/23: Flexion 158 deg (no pain), Abduction 152 deg (no pain), ER 64 deg (no pain), IR thumb to T7 (no pain)   Treatment Interventions (PT)   Interventions Therapeutic Activity   Therapeutic Procedure/Exercise   Therapeutic Procedures: strength, endurance, ROM, flexibillity minutes (45154) 30   Ther Proc 1 Discussion, POC, goal assessment   Ther Proc 1 - Details Assessment and discussion of goals, POC, home program.  Patient is knowledgeable on how to perform MLD and massage/drainage of breast, knowledgeable on compression garments, including when to wear them and how to incorporate [employer] serratus pad.  She has been set up with home exercise program, admits she  doesn't work on it as much as she should, but she's also been a lot more active lately, using her arm a lot more and paying attention to make sure she doesn't baby it too much.  She feels confident and comfortable with plan to discharge from therapy at this time, no further concerns or issues.   Education   Learner/Method Patient;Listening;Demonstration;Pictures/Video;No Barriers to Learning   Plan   Home program 3/23: Supine towel roll stretch, chest stretch with hands behind back, pec doorway stretch.  4/11: Self massage R breast.  4/18: Ceiling punches.  5/3: Hands behind head in seated and supine, hands clasped behind back.  5/23: Scap squeezes, supine chest flies with dumbbell   Plan for next session N/A - discharged from therapy   Comments   Comments Patient has coverage for compression bras at 80/20 once deductible has been met (as of 4/11/23, she had $400 left to go)   Total Session Time   Timed Code Treatment Minutes 30   Total Treatment Time (sum of timed and untimed services) 30   Medicare Claim Information   Medical diagnosis Lymphedema of breast (I89.0)   Therapy diagnosis R breast, trunk edema   Start of care 03/23/23   Onset of illness / date of surgery 02/27/18         Cheryl Lundberg, PT, DPT, CLT-RAMON MACDONALD [facility]  Physical Therapist     Phone: [phone]  Email: [email or web address]

## 2023-06-22 NOTE — PATIENT INSTRUCTIONS
Please contact [facility] RN with questions or concerns following today's appointment: [phone].    Thank you!     DASH Diet

## 2023-07-19 ENCOUNTER — ANCILLARY PROCEDURE (OUTPATIENT)
Dept: CT IMAGING | Facility: CLINIC | Age: 63
End: 2023-07-19
Attending: INTERNAL MEDICINE
Payer: COMMERCIAL

## 2023-07-19 ENCOUNTER — OFFICE VISIT (OUTPATIENT)
Dept: PULMONOLOGY | Facility: CLINIC | Age: 63
End: 2023-07-19
Payer: COMMERCIAL

## 2023-07-19 VITALS
SYSTOLIC BLOOD PRESSURE: 106 MMHG | WEIGHT: 159.3 LBS | OXYGEN SATURATION: 99 % | HEART RATE: 101 BPM | DIASTOLIC BLOOD PRESSURE: 75 MMHG | BODY MASS INDEX: 28.22 KG/M2

## 2023-07-19 DIAGNOSIS — R91.8 PULMONARY NODULES: ICD-10-CM

## 2023-07-19 DIAGNOSIS — J43.2 CENTRILOBULAR EMPHYSEMA (H): ICD-10-CM

## 2023-07-19 DIAGNOSIS — J44.9 CHRONIC OBSTRUCTIVE PULMONARY DISEASE, UNSPECIFIED COPD TYPE (H): ICD-10-CM

## 2023-07-19 PROCEDURE — 99215 OFFICE O/P EST HI 40 MIN: CPT | Performed by: INTERNAL MEDICINE

## 2023-07-19 PROCEDURE — 71250 CT THORAX DX C-: CPT | Mod: GC | Performed by: RADIOLOGY

## 2023-07-19 RX ORDER — ALBUTEROL SULFATE 90 UG/1
AEROSOL, METERED RESPIRATORY (INHALATION)
Qty: 8.5 G | Refills: 1 | Status: SHIPPED | OUTPATIENT
Start: 2023-07-19 | End: 2024-07-17

## 2023-07-19 NOTE — NURSING NOTE
"Ashleigh Alonzo's goals for this visit include:   Chief Complaint   Patient presents with     COPD     1 year follow-up on COPD       PCP: Susanne Marin    Referring Provider:  No referring provider defined for this encounter.      Initial /75 (BP Location: Left arm, Patient Position: Sitting, Cuff Size: Adult Regular)   Pulse 101   Wt 72.3 kg (159 lb 4.8 oz)   SpO2 99%   Breastfeeding No   BMI 28.22 kg/m   Estimated body mass index is 28.22 kg/m  as calculated from the following:    Height as of 8/31/22: 1.6 m (5' 3\").    Weight as of this encounter: 72.3 kg (159 lb 4.8 oz).    Medication Reconciliation: complete    Do you need any medication refills at today's visit? Yes    ANKIT Schultz  Rheumatology/Infectious disease  [facility]   [phone]    "

## 2023-07-19 NOTE — PROGRESS NOTES
Pulmonary Clinic Return Patient Visit  Reason for Visit: COPD  History of Present Illness  Ashleigh Alonzo is a pleasant 62 yr old female with a history of COPD, pulmonary nodules and breast cancer s/p chemoradiation and mastectomy who presents for follow up today. I last saw her in 7/2022  To briefly review, she was diagnosed with COPD several years ago and is  well managed on Anoro. She was diagnosed with breast cancer in 2017 and was started on chemotherapy and had several clinic visits and hospitalizations for neutropenic fever and drug induced pneumonitis.  In 02/2018, during an admission for neutropenic fever and diffuse pulmonary infiltrates, pulmonary team was consulted and a bronch with BAL was done. Cultures were negative and she was started on high-dose methylprednisolone and eventually discharged on prednisone.  This was slowly tapered prior to her mastectomy.  By the time of followup for the mastectomy, respiratory symptoms had resolved as well as neutropenia.  Presumed etiology for the peribronchovascular consolidations was the chemotherapeutic agent- pembrolizumab.     Today, she has no new complaints. She is retired and has been more more active with no exercise restriction while using Anoro. She has no need for her rescue inhalers and there has been no AECOPDs since the last clinic visit.  She has minimal daily cough and she denies any fevers, no night sweats and no chest pain.  She is no longer on chemo and she continues to follow with radiation oncology with no issues    Review of Systems:  10 of 14 systems reviewed and are negative unless otherwise stated in HPI.    Past Medical History:   Diagnosis Date     Acute cystitis without hematuria 10/26/2017     Clostridium difficile diarrhea 01/12/2018     Neutropenic fever (H) 01/10/2018     She was hospitalized 1/10/18 - 1/13/18 with neutropenic fever     Neutropenic sepsis (H) 01/29/2018    hospitalized again  from 1/29/18 - 2/8/18 with neutropenic  fever, mucositis, and C. difficile colitis      Nonsenile cataract      Pneumonia 11/11/2017    She was hospitalized 11/11/17 - 11/17/17 with fevers ranging from 102 - 105.  CT chest was consistent with pneumonitis.  She also had transaminitis in the 150 range.  She was started on corticosteroids.  Pembrolizumab was stopped      S/P radiation therapy     5,256 cGy completed on 4/27/2018 to [facility] with Dr. Zhong       Past Surgical History:   Procedure Laterality Date     APPENDECTOMY  10/06/2015    [facility]     BREAST BIOPSY, CORE RT/LT Right 08/22/2017     BRONCHOSCOPY (RIGID OR FLEXIBLE), DIAGNOSTIC N/A 02/06/2018    Procedure: COMBINED BRONCHOSCOPY (RIGID OR FLEXIBLE), LAVAGE;  COMBINED BRONCOSCOY (RIGID OR FLEXIBLE), LAVAGE;  Surgeon: Samir Pettit MD;  Location: [facility]     CATARACT IOL, RT/LT       CLEAR LENS REPLACEMENT Bilateral 11/2016    [facility]     COLONOSCOPY  11/15/2011    Procedure:COLONOSCOPY; Colonoscopy, screening; Surgeon:ANASTASIA BUNCH; Location:[facility] OR     COLONOSCOPY N/A 10/12/2021    Procedure: Colonoscopy, Flexible, With Lesion Removal Using Snare;  Surgeon: Jaxon Lechuga MD;  Location: [facility]     COLONOSCOPY WITH CO2 INSUFFLATION N/A 10/12/2021    Procedure: COLONOSCOPY, WITH CO2 INSUFFLATION;  Surgeon: Jaxon Lechuga MD;  Location: [facility]     INSERT PORT VASCULAR ACCESS Left 09/01/2017    Procedure: INSERT PORT VASCULAR ACCESS;  Single Lumen Chest Power Port;  Surgeon: Leif Parkinson PA-C;  [facility]: [facility]     LAPAROSCOPIC CHOLECYSTECTOMY N/A 4/30/2021    Procedure: CHOLECYSTECTOMY, LAPAROSCOPIC, WITH CHOLANGIOGRAM;  Surgeon: Jaxon Lechuga MD;  Location: [facility]     LUMPECTOMY BREAST WITH SENTINEL NODE, COMBINED Right 02/27/2018    Procedure: COMBINED LUMPECTOMY BREAST WITH SENTINEL NODE;  Right Wire Localized Lumpectomy, Right [place] Lymph Node Biopsy And Freddy Cath Removal;  Surgeon: Atul Gates MD;  Location:  [facility]     REMOVE PORT VASCULAR ACCESS N/A 2018    Procedure: REMOVE PORT VASCULAR ACCESS;;  Surgeon: Atul Gates MD;  Location: [facility]     TUBAL LIGATION  2004       Family History   Problem Relation Age of Onset     Macular Degeneration Mother      Cardiovascular Father 46        MI     Eye Disorder Father      Cerebrovascular Disease Father      Colon Polyps Sister         tubular adenoma polyps     Neurologic Disorder Brother      Eye Surgery Maternal Grandmother         cataract     Prostate Cancer Maternal Grandfather      Prostate Cancer Maternal Uncle      Cancer Maternal Uncle         Throat cancer     Diabetes No family hx of      Hypertension No family hx of      Glaucoma No family hx of        Social History     Socioeconomic History     Marital status:      Spouse name: Aly     Number of children: 2     Years of education: None     Highest education level: None   Occupational History     Occupation: Equipment dispatcher     Employer: [employer]    Tobacco Use     Smoking status: Former Smoker     Packs/day: 0.75     Years: 20.00     Pack years: 15.00     Types: Cigarettes     Quit date: 2000     Years since quittin.5     Smokeless tobacco: Never Used   Vaping Use     Vaping Use: Never used   Substance and Sexual Activity     Alcohol use: Yes     Comment: Daily to weekly use (beer)     Drug use: No     Sexual activity: Not Currently     Partners: Male     Birth control/protection: Post-menopausal, Female Surgical     Comment: Hx. tubal ligation   Other Topics Concern     Parent/sibling w/ CABG, MI or angioplasty before 65F 55M? Yes   Social History Narrative    Patient lives in [facility] [facility] [place]. She [place] with her . 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 No recent travel. No international travel. No sick contacts or exposure to small children (she has small grand children, 1-1 y/o but has not seen them  in a few weeks). They have two dogs and a cat, and she takes care of the litter box sometimes, however she has not clean after the cat since her cancer diagnosis.         No Known Allergies      Current Outpatient Medications:      albuterol (PROAIR HFA) 108 (90 Base) MCG/ACT inhaler, Inhale 2 Puffs by mouth every 6 hours if needed for shortness of breath / dyspnea. Pt will call when needed, Disp: 8.5 g, Rfl: 1     ANORO ELLIPTA 62.5-25 MCG/INH oral inhaler, INHALE ONE PUFF INTO THE LUNGS ONCE DAILY, Disp: 3 each, Rfl: 3     cetirizine (ZYRTEC) 10 MG tablet, Take 10 mg by mouth daily, Disp: , Rfl:      Multiple Vitamin (MULTIVITAMIN ADULT PO), Take 1 tablet by mouth daily, Disp: , Rfl:      simvastatin (ZOCOR) 40 MG tablet, Take 1 tablet (40 mg) by mouth At Bedtime, Disp: 90 tablet, Rfl: 3     SM MUCUS RELIEF 600 MG 12 hr tablet, TAKE TWO TABLETS BY MOUTH TWICE A DAY, Disp: 160 tablet, Rfl: 0     Urea 40 % CREA, Apply to elbow BID PRN, Disp: 85 g, Rfl: 11     VITAMIN D 1000 UNIT OR CAPS, Take 3,000 Units by mouth , Disp: , Rfl:   No current facility-administered medications for this visit.    Facility-Administered Medications Ordered in Other Visits:      lidocaine 1 % 9 mL, 9 mL, Intradermal, Once, Fara Bradshaw MD     lidocaine-EPINEPHrine 1.5 %-1:[id number] injection 10 mL, 10 mL, Other, Once, Radha Cazares MD     sodium bicarbonate 8.4 % injection 1 mEq, 1 mEq, Intradermal, Once, Fara Bradshaw MD      Physical Exam:  /75 (BP Location: Left arm, Patient Position: Sitting, Cuff Size: Adult Regular)   Pulse 101   Wt 72.3 kg (159 lb 4.8 oz)   SpO2 99%   Breastfeeding No   BMI 28.22 kg/m    GENERAL: Well developed, well nourished, alert, and in no apparent distress.  HEENT: Normocephalic, atraumatic. PERRL, EOMI. Oral mucosa is moist. No perioral cyanosis.  NECK: supple, no masses, no thyromegaly.  RESP:  Normal respiratory effort.  CTAB.  No rales, wheezes, rhonchi.  No  cyanosis or clubbing.  CV: Normal S1, S2, regular rhythm, normal rate. No murmur.  No LE edema.   ABDOMEN:  Soft, non-tender, non-distended.   SKIN: warm and dry. No rash.  NEURO: AAOx3.  Normal gait.  Fluent speech.  PSYCH: mentation appears normal.     Results:  Imaging (personally reviewed in clinic today): CT Chest 7/19/2023  IMPRESSION:   1. No new or suspicious pulmonary nodules.  2. Unchanged upper lobe predominant reticular fibrotic changes. These  are most notable in the right upper lobe and are likely sequelae of  prior radiation therapy.  3. Post surgical changes of right lumpectomy.  4. Sequelae of prior granulomatous disease.    ASSESSMENT AND PLAN:   1.  Chronic obstructive pulmonary disease (Group B)  The patient has a previous diagnosis of COPD.  She quit smoking in 1999 and is doing very well with Anoro inhaler and very minimal symptoms and a CAT score of 2.   2.  Pulmonary nodules:   Stable CT chest on today's imaging. She has a remote history of pulmonary nodules, have been stable on prior imaging indicating likely benign etiology.  Given duration of time since quitting smoking, she is no longer eligible for lung cancer screening with low-dose CT scan. Her Nodules have remained stable for > 2 years and there is no need for further surveillance. She is also 5 years out since her post breast cancer surveillance   3.  Drug reaction/pneumonitis possibly related to pembrolizumab, treated with high-dose steroids.   Resolved and no recurrence.   4. Sleep apnea on oral appliances  Follow up with sleep medicine.  I spent a total of 40 minutes face to face with Ashleigh Alonzo during today's office visit. Over 50% of this time was spent counseling the patient and/or coordinating care regarding their pulmonary disease.    RTC in 12 months  Amina Licona MD  Pulmonary, Critical Care and Sleep Medicine  [facility]  Pager: [phone]  The above note was dictated using voice recognition  software and may include typographical errors. Please contact the author for any clarifications.

## 2023-08-01 ENCOUNTER — PATIENT OUTREACH (OUTPATIENT)
Dept: CARE COORDINATION | Facility: CLINIC | Age: 63
End: 2023-08-01
Payer: COMMERCIAL

## 2023-08-15 ENCOUNTER — PATIENT OUTREACH (OUTPATIENT)
Dept: CARE COORDINATION | Facility: CLINIC | Age: 63
End: 2023-08-15
Payer: COMMERCIAL

## 2023-09-24 NOTE — PROGRESS NOTES
Oncology Follow Up:  Date on this visit: 9/26/2023    Diagnosis:  Stage IIb, T2N0M0, grade 3 triple negative cancer of the right breast.    Primary Physician: Radha Cazares     History Of Present Illness:  Ms. Alonzo is a 63-year-old female with a h/o stage IIb, T2 N0 M0, grade 3, triple-negative invasive carcinoma of the right breast.  Routine screening mammogram on 07/27/2017 showed developing calcifications in the right breast at the 12 o'clock position 6 cm from the nipple.  Ultrasound demonstrated a 7-mm, irregular, hypoechoic mass at the 12 o'clock position.  Contrast-enhanced mammogram showed a peripherally enhancing, irregular mass measuring 1.9 cm as well as an additional 6-mm, enhancing focus anteromedial to the dominant mass.  The total area of abnormal enhancement on contrast mammogram measured up to 3.4 cm.  Right breast biopsy demonstrated a grade 3 invasive mammary carcinoma with associated high-grade DCIS.  Estrogen and progesterone receptor staining were negative.  HER2 was non-amplified by FISH.  Breast MRI measured the biopsy proven breast cancer at 3.2 cm.    Ms. Alonzo enrolled in the ISPY-2 clinical trial.  She began treatment with weekly taxol and once every 3 week pembrolizumab on 9/20/17.  Her course was complicated by pneumonitis and hepatitis.  Pembrolizumab was stopped and she resumed weekly taxol alone on 11/28/17.  She completed a total of 12 weeks of therapy on 12/26/17.  She completed 2 cycles of adriamycin and cyclophosphamide.  She was hospitalized both cycles due to neutropenic fever and also developed C. Difficile colitis.  Decision was made to forgo further chemotherapy.       Right breast lumpectomy and sentinel lymph node procedure on 2/27/18 showed a grade 2, 6 mm residual invasive mammary carcinoma with an associated 8 mm area of high grade DCIS with comedonecrosis and ADH.  Invasive tumor cellularity was 10%.  There was lymphovascular invasion.  Surgical margins were  negative.  A single sentinel lymph node was benign.  Classified as MDA RCB I.  She completed adjuvant radiation (4256 cGy to the right breast with additional 1000 cGy to the lumpectomy cavity) on 4/27/18.     Interval History:  Ms. Alonzo presents to clinic today for routine breast cancer follow-up. She reports her health has overall been good since our last visit.  She denies concerning breast lumps, pain or swelling.  She has full ROM of her RUE.  She has no cough, shortness of breath, or chest pain.  She has no abdominal complaints.  She does not purposefully obtain routine exercise.  She does try to adhere to a healthy diet.  She continues to enjoy [facility].     Past Medical/Surgical History:  Past Medical History:   Diagnosis Date    Acute cystitis without hematuria 10/26/2017    Clostridium difficile diarrhea 01/12/2018    Neutropenic fever (H) 01/10/2018     She was hospitalized 1/10/18 - 1/13/18 with neutropenic fever    Neutropenic sepsis (H) 01/29/2018    hospitalized again  from 1/29/18 - 2/8/18 with neutropenic fever, mucositis, and C. difficile colitis     Nonsenile cataract     Pneumonia 11/11/2017    She was hospitalized 11/11/17 - 11/17/17 with fevers ranging from 102 - 105.  CT chest was consistent with pneumonitis.  She also had transaminitis in the 150 range.  She was started on corticosteroids.  Pembrolizumab was stopped     S/P radiation therapy     5,256 cGy completed on 4/27/2018 to [facility] with Dr. Zhong     Past Surgical History:   Procedure Laterality Date    APPENDECTOMY  10/06/2015    [facility]    BREAST BIOPSY, CORE RT/LT Right 08/22/2017    BRONCHOSCOPY (RIGID OR FLEXIBLE), DIAGNOSTIC N/A 02/06/2018    Procedure: COMBINED BRONCHOSCOPY (RIGID OR FLEXIBLE), LAVAGE;  COMBINED BRONCOSCOY (RIGID OR FLEXIBLE), LAVAGE;  Surgeon: Samir Pettit MD;  Location: [facility]    CATARACT IOL, RT/LT      CLEAR LENS REPLACEMENT Bilateral 11/2016    [facility]    COLONOSCOPY   11/15/2011    Procedure:COLONOSCOPY; Colonoscopy, screening; Surgeon:ANASTASIA BUNCH; [facility]    COLONOSCOPY N/A 10/12/2021    Procedure: Colonoscopy, Flexible, With Lesion Removal Using Snare;  Surgeon: Jaxon Lechuga MD;  [facility]: [facility]    COLONOSCOPY WITH CO2 INSUFFLATION N/A 10/12/2021    Procedure: COLONOSCOPY, WITH CO2 INSUFFLATION;  Surgeon: Jaxon Lechuga MD;  [facility]: [facility]    INSERT PORT VASCULAR ACCESS Left 09/01/2017    Procedure: INSERT PORT VASCULAR ACCESS;  Single Lumen Chest Power Port;  Surgeon: Leif Parkinson PA-C;  [facility]    LAPAROSCOPIC CHOLECYSTECTOMY N/A 4/30/2021    Procedure: CHOLECYSTECTOMY, LAPAROSCOPIC, WITH CHOLANGIOGRAM;  Surgeon: Jaxon Lechuga MD;  [facility]    LUMPECTOMY BREAST WITH SENTINEL NODE, COMBINED Right 02/27/2018    Procedure: COMBINED LUMPECTOMY BREAST WITH SENTINEL NODE;  Right Wire Localized Lumpectomy, Right [place] Lymph Node Biopsy And Freddy Cath Removal;  Surgeon: Atul Gates MD;  [facility]    REMOVE PORT VASCULAR ACCESS N/A 02/27/2018    Procedure: REMOVE PORT VASCULAR ACCESS;;  Surgeon: Atul Gates MD;  [facility]    TUBAL LIGATION  12/2004     Allergies:  Allergies as of 09/26/2023    (No Known Allergies)     Current Medications:  Current Outpatient Medications   Medication Sig Dispense Refill    albuterol (PROAIR HFA) 108 (90 Base) MCG/ACT inhaler Inhale 2 Puffs by mouth every 6 hours if needed for shortness of breath / dyspnea. Pt will call when needed 8.5 g 1    cetirizine (ZYRTEC) 10 MG tablet Take 10 mg by mouth daily      Multiple Vitamin (MULTIVITAMIN ADULT PO) Take 1 tablet by mouth daily      simvastatin (ZOCOR) 40 MG tablet Take 1 tablet (40 mg) by mouth At Bedtime 90 tablet 3    SM MUCUS RELIEF 600 MG 12 hr tablet TAKE TWO TABLETS BY MOUTH TWICE A  tablet 0    umeclidinium-vilanterol (ANORO ELLIPTA) 62.5-25 MCG/ACT oral inhaler INHALE ONE PUFF INTO THE LUNGS ONCE  DAILY 3 each 3    Urea 40 % CREA Apply to elbow BID PRN 85 g 11    VITAMIN D 1000 UNIT OR CAPS Take 3,000 Units by mouth         Family and Social History:  Reviewed and unchanged from prior.  Please see initial consultation dated 8/28/17 for further details.    Physical Exam:  /77 (BP Location: Right arm, Patient Position: Sitting, Cuff Size: Adult Regular)   Pulse 85   Temp 98.6  F (37  C) (Oral)   Wt 73.9 kg (163 lb)   SpO2 98%   BMI 28.87 kg/m    General:  Well appearing adult female in NAD.  Alert and oriented x 3.  HEENT:  Normocephalic.  Sclera anicteric.   Lymph:  No palpable cervical, supraclavicular, or axillary LAD.  Chest:  CTA bilaterally.  No wheezes or crackles.  CV:  RRR.  Nl S1 and S2.    Breast:  Breast exam is unchanged from prior.  Notably, right breast with increased density and skin thickening c/w radiation change.  Right breast is much smaller and overall firmer than the left breast, there is puckering/retraction of the lower breast without palpable underlying mass.  There are no discretely palpable masses in either breast.  Bilateral nipples are everted and without discharge.  Abd:  Soft/ND.   Ext:  No pitting edema of the bilateral lower extremities.    Musculo:  Full ROM of the bilateral upper extremities.  Neuro:  Cranial nerves grossly intact.  Gait stable.    Psych:  Mood and affect appear normal.  Skin:  No visible concerning skin rashes or lesions.    Laboratory/Imaging Studies:  I personally reviewed the below images:    9/25/2023 Bilateral screening mammograms:  There are no suspicious or concerning findings when compared to prior.    7/19/2023 CT chest w/o contrast:  FINDINGS:     Devices: None.     Lower neck and axillae: No enlarged supraclavicular nodes are present.  Unremarkable thyroid. No axillary lymphadenopathy.     Heart: Normal heart size. No pericardial effusion. Mild coronary  artery calcifications.     Mediastinum and frankie: No mediastinal mass is present.  Stable  appearance of mediastinal lymphadenopathy. Several calcified  mediastinal and bilateral hilar nodes similar to prior.     Vessels: Normal caliber aorta. Normal caliber main pulmonary artery.  No significant atherosclerotic disease. Overlying arch anatomical  variant with left common carotid artery originating off the base of  the right brachiocephalic.     Airways: The central tracheobronchial tree is clear.     Lungs: No pleural effusion or pneumothorax. Costophrenic granulomata.  Unchanged biapical scarring and reticular fibrotic changes of the  upper lobes. Notable changes of the right upper lobe likely a sequelae  of prior radiation therapy. No new or suspicious pulmonary nodularity.     Upper abdomen: Cholecystectomy. Abdominal aortic atherosclerotic  calcifications.     Bones: No acute or aggressive osseous abnormality. Unchanged sclerotic  appearance of the right anterolateral fourth through sixth ribs  suggestion of healed trauma especially of the fourth and fifth ribs.  Degenerative endplate changes.     Soft tissues: Lumpectomy changes of the right breast. No suspicious  soft tissue findings.                                                       IMPRESSION:   1. No new or suspicious pulmonary nodules.  2. Unchanged upper lobe predominant reticular fibrotic changes. These are most notable in the right upper lobe and are likely sequelae of  prior radiation therapy.  3. Post surgical changes of right lumpectomy.  4. Sequelae of prior granulomatous disease.    ASSESSMENT/PLAN:  Ms. Alonzo is a 63-year-old female with a h/o grade 3, T2N0M0, triple-negative infiltrating ductal carcinoma of the right breast s/p 12 weeks of Taxol along with 3 cycles pembrolizumab, 2 cycles of adriamycin and cyclophosphamide, lumpectomy ([id number]), and radiation.     1.  Right breast cancer:   Ms. Alonzo is 5 years, 7 months out from excision of a right breast cancer.      She is asymptomatic of disease recurrence on history  taken today and clinical exam is without concerning findings.   She has opted to continue annual screening mammogram and surveillance in Oncology clinic.  I reviewed the images of the mammograms performed today which are without concerning findings.  Radiology read was pending at the time of our visit and will be released to the patient via [employer] when available.    2.  Organizing pneumonia/pulmonary nodules/radiation fibrosis:  Chronic COPD.  Had pneumonitis secondary to pembrolizumab during breast cancer treatment.  Has had serial CT scans for follow up of pulmonary nodules and organizing pneumonia.  CT performed in 07/2023 was reviewed and was without significant change.  Plan to stop CT scans after a total of 5 years, therefore no further imaging follow up is needed.    3.  Colyn polyps:  No change since last visit.  1 cm and 8 mm polyps removed 10/2021.  Next colonoscopy due in 10/2024.    4.  Weight management:  Patients who lose weight after breast cancer and those that maintain a normal BMI have improved breast cancer outcomes.  Weight has been stable since last visit.    5.  Followup:   Bilateral screening mammograms and visit with me 9/25/2024 or later.    Patient was seen alongside MS4, Chad Manrique.  The medical student was personally supervised by me. I personally verified the medical history, performed a physical exam and the medical decision-making. I made appropriate changes to the documentation and the assessment and plan based on my verification, exam, and medical decision making.    I personally spent 30 minutes on the date of the encounter doing chart review, review of test results, interpretation of tests, patient visit, and documentation.       Fara Bradshaw MD

## 2023-09-26 ENCOUNTER — ONCOLOGY VISIT (OUTPATIENT)
Dept: ONCOLOGY | Facility: CLINIC | Age: 63
End: 2023-09-26
Attending: INTERNAL MEDICINE
Payer: COMMERCIAL

## 2023-09-26 ENCOUNTER — ANCILLARY PROCEDURE (OUTPATIENT)
Dept: MAMMOGRAPHY | Facility: CLINIC | Age: 63
End: 2023-09-26
Attending: INTERNAL MEDICINE
Payer: COMMERCIAL

## 2023-09-26 VITALS
DIASTOLIC BLOOD PRESSURE: 77 MMHG | BODY MASS INDEX: 28.87 KG/M2 | TEMPERATURE: 98.6 F | WEIGHT: 163 LBS | SYSTOLIC BLOOD PRESSURE: 120 MMHG | HEART RATE: 85 BPM | OXYGEN SATURATION: 98 %

## 2023-09-26 DIAGNOSIS — J70.1 RADIATION FIBROSIS OF LUNG (H): ICD-10-CM

## 2023-09-26 DIAGNOSIS — Z12.31 ENCOUNTER FOR SCREENING MAMMOGRAM FOR BREAST CANCER: ICD-10-CM

## 2023-09-26 DIAGNOSIS — Z17.1 MALIGNANT NEOPLASM OF UPPER-INNER QUADRANT OF RIGHT BREAST IN FEMALE, ESTROGEN RECEPTOR NEGATIVE (H): Primary | ICD-10-CM

## 2023-09-26 DIAGNOSIS — Z85.3 PERSONAL HISTORY OF MALIGNANT NEOPLASM OF BREAST: ICD-10-CM

## 2023-09-26 DIAGNOSIS — C50.211 MALIGNANT NEOPLASM OF UPPER-INNER QUADRANT OF RIGHT BREAST IN FEMALE, ESTROGEN RECEPTOR NEGATIVE (H): Primary | ICD-10-CM

## 2023-09-26 DIAGNOSIS — Z12.31 VISIT FOR SCREENING MAMMOGRAM: ICD-10-CM

## 2023-09-26 PROCEDURE — 77063 BREAST TOMOSYNTHESIS BI: CPT | Mod: GC

## 2023-09-26 PROCEDURE — 77067 SCR MAMMO BI INCL CAD: CPT | Mod: GC

## 2023-09-26 PROCEDURE — G0463 HOSPITAL OUTPT CLINIC VISIT: HCPCS | Performed by: INTERNAL MEDICINE

## 2023-09-26 PROCEDURE — 99214 OFFICE O/P EST MOD 30 MIN: CPT | Performed by: INTERNAL MEDICINE

## 2023-09-26 ASSESSMENT — PAIN SCALES - GENERAL: PAINLEVEL: NO PAIN (0)

## 2023-09-26 NOTE — LETTER
9/26/2023         RE: Ashleigh Alonzo  [address] [facility] [address]        Dear Colleague,    Thank you for referring your patient, Ashleigh Alonzo, to the [facility]. Please see a copy of my visit note below.    Oncology Follow Up:  Date on this visit: 9/26/2023    Diagnosis:  Stage IIb, T2N0M0, grade 3 triple negative cancer of the right breast.    Primary Physician: Radha Cazares     History Of Present Illness:  Ms. Alonzo is a 63-year-old female with a h/o stage IIb, T2 N0 M0, grade 3, triple-negative invasive carcinoma of the right breast.  Routine screening mammogram on 07/27/2017 showed developing calcifications in the right breast at the 12 o'clock position 6 cm from the nipple.  Ultrasound demonstrated a 7-mm, irregular, hypoechoic mass at the 12 o'clock position.  Contrast-enhanced mammogram showed a peripherally enhancing, irregular mass measuring 1.9 cm as well as an additional 6-mm, enhancing focus anteromedial to the dominant mass.  The total area of abnormal enhancement on contrast mammogram measured up to 3.4 cm.  Right breast biopsy demonstrated a grade 3 invasive mammary carcinoma with associated high-grade DCIS.  Estrogen and progesterone receptor staining were negative.  HER2 was non-amplified by FISH.  Breast MRI measured the biopsy proven breast cancer at 3.2 cm.    Ms. Alonzo enrolled in the ISPY-2 clinical trial.  She began treatment with weekly taxol and once every 3 week pembrolizumab on 9/20/17.  Her course was complicated by pneumonitis and hepatitis.  Pembrolizumab was stopped and she resumed weekly taxol alone on 11/28/17.  She completed a total of 12 weeks of therapy on 12/26/17.  She completed 2 cycles of adriamycin and cyclophosphamide.  She was hospitalized both cycles due to neutropenic fever and also developed C. Difficile colitis.  Decision was made to forgo further chemotherapy.       Right breast lumpectomy and sentinel lymph node  procedure on 2/27/18 showed a grade 2, 6 mm residual invasive mammary carcinoma with an associated 8 mm area of high grade DCIS with comedonecrosis and ADH.  Invasive tumor cellularity was 10%.  There was lymphovascular invasion.  Surgical margins were negative.  A single sentinel lymph node was benign.  Classified as MDA RCB I.  She completed adjuvant radiation (4256 cGy to the right breast with additional 1000 cGy to the lumpectomy cavity) on 4/27/18.     Interval History:  Ms. Alonzo presents to clinic today for routine breast cancer follow-up. She reports her health has overall been good since our last visit.  She denies concerning breast lumps, pain or swelling.  She has full ROM of her RUE.  She has no cough, shortness of breath, or chest pain.  She has no abdominal complaints.  She does not purposefully obtain routine exercise.  She does try to adhere to a healthy diet.  She continues to enjoy shelter.     Past Medical/Surgical History:  Past Medical History:   Diagnosis Date    Acute cystitis without hematuria 10/26/2017    Clostridium difficile diarrhea 01/12/2018    Neutropenic fever (H) 01/10/2018     She was hospitalized 1/10/18 - 1/13/18 with neutropenic fever    Neutropenic sepsis (H) 01/29/2018    hospitalized again  from 1/29/18 - 2/8/18 with neutropenic fever, mucositis, and C. difficile colitis     Nonsenile cataract     Pneumonia 11/11/2017    She was hospitalized 11/11/17 - 11/17/17 with fevers ranging from 102 - 105.  CT chest was consistent with pneumonitis.  She also had transaminitis in the 150 range.  She was started on corticosteroids.  Pembrolizumab was stopped     S/P radiation therapy     5,256 cGy completed on 4/27/2018 to [facility] with Dr. Zhong     Past Surgical History:   Procedure Laterality Date    APPENDECTOMY  10/06/2015    [facility]    BREAST BIOPSY, CORE RT/LT Right 08/22/2017    BRONCHOSCOPY (RIGID OR FLEXIBLE), DIAGNOSTIC N/A 02/06/2018     Procedure: COMBINED BRONCHOSCOPY (RIGID OR FLEXIBLE), LAVAGE;  COMBINED BRONCOSCOY (RIGID OR FLEXIBLE), LAVAGE;  Surgeon: Samir Pettit MD;  Location: [facility]    CATARACT IOL, RT/LT      CLEAR LENS REPLACEMENT Bilateral 11/2016    [facility]    COLONOSCOPY  11/15/2011    Procedure:COLONOSCOPY; Colonoscopy, screening; Surgeon:ANASTASIA BUNCH; [facility]    COLONOSCOPY N/A 10/12/2021    Procedure: Colonoscopy, Flexible, With Lesion Removal Using Snare;  Surgeon: Jaxon Lechuga MD;  Location: [facility]    COLONOSCOPY WITH CO2 INSUFFLATION N/A 10/12/2021    Procedure: COLONOSCOPY, WITH CO2 INSUFFLATION;  Surgeon: Jaxon Lechuga MD;  Location: [facility]    INSERT PORT VASCULAR ACCESS Left 09/01/2017    Procedure: INSERT PORT VASCULAR ACCESS;  Single Lumen Chest Power Port;  Surgeon: Leif Parkinson PA-C;  [facility]    LAPAROSCOPIC CHOLECYSTECTOMY N/A 4/30/2021    Procedure: CHOLECYSTECTOMY, LAPAROSCOPIC, WITH CHOLANGIOGRAM;  Surgeon: Jaxon Lechuga MD;  Location: [facility]    LUMPECTOMY BREAST WITH SENTINEL NODE, COMBINED Right 02/27/2018    Procedure: COMBINED LUMPECTOMY BREAST WITH SENTINEL NODE;  Right Wire Localized Lumpectomy, Right [place] Lymph Node Biopsy And Freddy Cath Removal;  Surgeon: Atul Gates MD;  [facility]    REMOVE PORT VASCULAR ACCESS N/A 02/27/2018    Procedure: REMOVE PORT VASCULAR ACCESS;;  Surgeon: Atul Gates MD;  [facility]    TUBAL LIGATION  12/2004     Allergies:  Allergies as of 09/26/2023    (No Known Allergies)     Current Medications:  Current Outpatient Medications   Medication Sig Dispense Refill    albuterol (PROAIR HFA) 108 (90 Base) MCG/ACT inhaler Inhale 2 Puffs by mouth every 6 hours if needed for shortness of breath / dyspnea. Pt will call when needed 8.5 g 1    cetirizine (ZYRTEC) 10 MG tablet Take 10 mg by mouth daily      Multiple Vitamin (MULTIVITAMIN ADULT PO) Take 1 tablet by mouth daily      simvastatin (ZOCOR) 40 MG  tablet Take 1 tablet (40 mg) by mouth At Bedtime 90 tablet 3    SM MUCUS RELIEF 600 MG 12 hr tablet TAKE TWO TABLETS BY MOUTH TWICE A  tablet 0    umeclidinium-vilanterol (ANORO ELLIPTA) 62.5-25 MCG/ACT oral inhaler INHALE ONE PUFF INTO THE LUNGS ONCE DAILY 3 each 3    Urea 40 % CREA Apply to elbow BID PRN 85 g 11    VITAMIN D 1000 UNIT OR CAPS Take 3,000 Units by mouth         Family and Social History:  Reviewed and unchanged from prior.  Please see initial consultation dated 8/28/17 for further details.    Physical Exam:  /77 (BP Location: Right arm, Patient Position: Sitting, Cuff Size: Adult Regular)   Pulse 85   Temp 98.6  F (37  C) (Oral)   Wt 73.9 kg (163 lb)   SpO2 98%   BMI 28.87 kg/m    General:  Well appearing adult female in NAD.  Alert and oriented x 3.  HEENT:  Normocephalic.  Sclera anicteric.   Lymph:  No palpable cervical, supraclavicular, or axillary LAD.  Chest:  CTA bilaterally.  No wheezes or crackles.  CV:  RRR.  Nl S1 and S2.    Breast:  Breast exam is unchanged from prior.  Notably, right breast with increased density and skin thickening c/w radiation change.  Right breast is much smaller and overall firmer than the left breast, there is puckering/retraction of the lower breast without palpable underlying mass.  There are no discretely palpable masses in either breast.  Bilateral nipples are everted and without discharge.  Abd:  Soft/ND.   Ext:  No pitting edema of the bilateral lower extremities.    Musculo:  Full ROM of the bilateral upper extremities.  Neuro:  Cranial nerves grossly intact.  Gait stable.    Psych:  Mood and affect appear normal.  Skin:  No visible concerning skin rashes or lesions.    Laboratory/Imaging Studies:  I personally reviewed the below images:    9/25/2023 Bilateral screening mammograms:  There are no suspicious or concerning findings when compared to prior.    7/19/2023 CT chest w/o contrast:  FINDINGS:     Devices: None.     Lower neck and  axillae: No enlarged supraclavicular nodes are present.  Unremarkable thyroid. No axillary lymphadenopathy.     Heart: Normal heart size. No pericardial effusion. Mild coronary  artery calcifications.     Mediastinum and frankie: No mediastinal mass is present. Stable  appearance of mediastinal lymphadenopathy. Several calcified  mediastinal and bilateral hilar nodes similar to prior.     Vessels: Normal caliber aorta. Normal caliber main pulmonary artery.  No significant atherosclerotic disease. Overlying arch anatomical  variant with left common carotid artery originating off the base of  the right brachiocephalic.     Airways: The central tracheobronchial tree is clear.     Lungs: No pleural effusion or pneumothorax. Costophrenic granulomata.  Unchanged biapical scarring and reticular fibrotic changes of the  upper lobes. Notable changes of the right upper lobe likely a sequelae  of prior radiation therapy. No new or suspicious pulmonary nodularity.     Upper abdomen: Cholecystectomy. Abdominal aortic atherosclerotic  calcifications.     Bones: No acute or aggressive osseous abnormality. Unchanged sclerotic  appearance of the right anterolateral fourth through sixth ribs  suggestion of healed trauma especially of the fourth and fifth ribs.  Degenerative endplate changes.     Soft tissues: Lumpectomy changes of the right breast. No suspicious  soft tissue findings.                                                       IMPRESSION:   1. No new or suspicious pulmonary nodules.  2. Unchanged upper lobe predominant reticular fibrotic changes. These are most notable in the right upper lobe and are likely sequelae of  prior radiation therapy.  3. Post surgical changes of right lumpectomy.  4. Sequelae of prior granulomatous disease.    ASSESSMENT/PLAN:  Ms. Alonzo is a 63-year-old female with a h/o grade 3, T2N0M0, triple-negative infiltrating ductal carcinoma of the right breast s/p 12 weeks of Taxol along with 3 cycles  pembrolizumab, 2 cycles of adriamycin and cyclophosphamide, lumpectomy ([id number]), and radiation.     1.  Right breast cancer:   Ms. Alonzo is 5 years, 7 months out from excision of a right breast cancer.      She is asymptomatic of disease recurrence on history taken today and clinical exam is without concerning findings.   She has opted to continue annual screening mammogram and surveillance in Oncology clinic.  I reviewed the images of the mammograms performed today which are without concerning findings.  Radiology read was pending at the time of our visit and will be released to the patient via [employer] when available.    2.  Organizing pneumonia/pulmonary nodules/radiation fibrosis:  Chronic COPD.  Had pneumonitis secondary to pembrolizumab during breast cancer treatment.  Has had serial CT scans for follow up of pulmonary nodules and organizing pneumonia.  CT performed in 07/2023 was reviewed and was without significant change.  Plan to stop CT scans after a total of 5 years, therefore no further imaging follow up is needed.    3.  Colyn polyps:  No change since last visit.  1 cm and 8 mm polyps removed 10/2021.  Next colonoscopy due in 10/2024.    4.  Weight management:  Patients who lose weight after breast cancer and those that maintain a normal BMI have improved breast cancer outcomes.  Weight has been stable since last visit.    5.  Followup:   Bilateral screening mammograms and visit with me 9/25/2024 or later.    Patient was seen alongside MS4, Chad Manrique.  The medical student was personally supervised by me. I personally verified the medical history, performed a physical exam and the medical decision-making. I made appropriate changes to the documentation and the assessment and plan based on my verification, exam, and medical decision making.    I personally spent 30 minutes on the date of the encounter doing chart review, review of test results, interpretation of tests, patient visit, and  documentation.       Fara Bradshaw MD

## 2023-09-26 NOTE — NURSING NOTE
"Oncology Rooming Note    September 26, 2023 10:21 AM   Ashleigh Alonzo is a 63 year old female who presents for:    Chief Complaint   Patient presents with    Oncology Clinic Visit     Malignant neoplasm of upper-inner quadrant of right breast     Initial Vitals: /77 (BP Location: Right arm, Patient Position: Sitting, Cuff Size: Adult Regular)   Pulse 85   Temp 98.6  F (37  C) (Oral)   Wt 73.9 kg (163 lb)   SpO2 98%   BMI 28.87 kg/m   Estimated body mass index is 28.87 kg/m  as calculated from the following:    Height as of 8/31/22: 1.6 m (5' 3\").    Weight as of this encounter: 73.9 kg (163 lb). Body surface area is 1.81 meters squared.  No Pain (0) Comment: Data Unavailable   No LMP recorded. Patient is postmenopausal.  Allergies reviewed: Yes  Medications reviewed: Yes    Medications: Medication refills not needed today.  Pharmacy name entered into [facility]:    [facility], [place] - [address] [place], [place] - [address] [facility], [address]    Clinical concerns: none.       David Rice"

## 2023-10-09 ENCOUNTER — OFFICE VISIT (OUTPATIENT)
Dept: FAMILY MEDICINE | Facility: CLINIC | Age: 63
End: 2023-10-09
Payer: COMMERCIAL

## 2023-10-09 VITALS
RESPIRATION RATE: 12 BRPM | WEIGHT: 162.2 LBS | HEART RATE: 83 BPM | HEIGHT: 63 IN | DIASTOLIC BLOOD PRESSURE: 71 MMHG | TEMPERATURE: 98.7 F | BODY MASS INDEX: 28.74 KG/M2 | SYSTOLIC BLOOD PRESSURE: 118 MMHG | OXYGEN SATURATION: 98 %

## 2023-10-09 DIAGNOSIS — G47.33 OSA (OBSTRUCTIVE SLEEP APNEA): Chronic | ICD-10-CM

## 2023-10-09 DIAGNOSIS — G62.0 DRUG-INDUCED POLYNEUROPATHY (H): ICD-10-CM

## 2023-10-09 DIAGNOSIS — R91.8 LUNG NODULE, MULTIPLE: ICD-10-CM

## 2023-10-09 DIAGNOSIS — Z17.1 MALIGNANT NEOPLASM OF UPPER-INNER QUADRANT OF RIGHT BREAST IN FEMALE, ESTROGEN RECEPTOR NEGATIVE (H): Chronic | ICD-10-CM

## 2023-10-09 DIAGNOSIS — C50.211 MALIGNANT NEOPLASM OF UPPER-INNER QUADRANT OF RIGHT BREAST IN FEMALE, ESTROGEN RECEPTOR NEGATIVE (H): Chronic | ICD-10-CM

## 2023-10-09 DIAGNOSIS — Z00.00 ROUTINE GENERAL MEDICAL EXAMINATION AT A HEALTH CARE FACILITY: Primary | ICD-10-CM

## 2023-10-09 DIAGNOSIS — J44.9 CHRONIC OBSTRUCTIVE PULMONARY DISEASE, UNSPECIFIED COPD TYPE (H): ICD-10-CM

## 2023-10-09 DIAGNOSIS — E78.5 HYPERLIPIDEMIA LDL GOAL <130: Chronic | ICD-10-CM

## 2023-10-09 LAB
ERYTHROCYTE [DISTWIDTH] IN BLOOD BY AUTOMATED COUNT: 11.7 % (ref 10–15)
HCT VFR BLD AUTO: 43.3 % (ref 35–47)
HGB BLD-MCNC: 14.9 G/DL (ref 11.7–15.7)
MCH RBC QN AUTO: 33.7 PG (ref 26.5–33)
MCHC RBC AUTO-ENTMCNC: 34.4 G/DL (ref 31.5–36.5)
MCV RBC AUTO: 98 FL (ref 78–100)
PLATELET # BLD AUTO: 300 10E3/UL (ref 150–450)
RBC # BLD AUTO: 4.42 10E6/UL (ref 3.8–5.2)
WBC # BLD AUTO: 7.3 10E3/UL (ref 4–11)

## 2023-10-09 PROCEDURE — 36415 COLL VENOUS BLD VENIPUNCTURE: CPT | Performed by: FAMILY MEDICINE

## 2023-10-09 PROCEDURE — 90471 IMMUNIZATION ADMIN: CPT | Performed by: FAMILY MEDICINE

## 2023-10-09 PROCEDURE — 80053 COMPREHEN METABOLIC PANEL: CPT | Performed by: FAMILY MEDICINE

## 2023-10-09 PROCEDURE — 90682 RIV4 VACC RECOMBINANT DNA IM: CPT | Performed by: FAMILY MEDICINE

## 2023-10-09 PROCEDURE — 99396 PREV VISIT EST AGE 40-64: CPT | Mod: 25 | Performed by: FAMILY MEDICINE

## 2023-10-09 PROCEDURE — 80061 LIPID PANEL: CPT | Performed by: FAMILY MEDICINE

## 2023-10-09 PROCEDURE — 99213 OFFICE O/P EST LOW 20 MIN: CPT | Mod: 25 | Performed by: FAMILY MEDICINE

## 2023-10-09 PROCEDURE — 85027 COMPLETE CBC AUTOMATED: CPT | Performed by: FAMILY MEDICINE

## 2023-10-09 RX ORDER — OMEGA-3S/DHA/EPA/FISH OIL/D3 1150-1000
LIQUID (ML) ORAL
COMMUNITY

## 2023-10-09 RX ORDER — SIMVASTATIN 40 MG
40 TABLET ORAL AT BEDTIME
Qty: 90 TABLET | Refills: 3 | Status: SHIPPED | OUTPATIENT
Start: 2023-10-09 | End: 2024-09-30

## 2023-10-09 RX ORDER — ALBUTEROL SULFATE 90 UG/1
AEROSOL, METERED RESPIRATORY (INHALATION)
Qty: 8.5 G | Refills: 1 | Status: CANCELLED | OUTPATIENT
Start: 2023-10-09

## 2023-10-09 ASSESSMENT — ENCOUNTER SYMPTOMS
SHORTNESS OF BREATH: 0
ARTHRALGIAS: 0
HEMATOCHEZIA: 0
NAUSEA: 0
PARESTHESIAS: 0
HEADACHES: 0
HEMATURIA: 0
COUGH: 0
HEARTBURN: 0
EYE PAIN: 0
DIZZINESS: 0
CHILLS: 0
FREQUENCY: 0
MYALGIAS: 0
NERVOUS/ANXIOUS: 0
FEVER: 0
DIARRHEA: 0
WEAKNESS: 0
PALPITATIONS: 0
BREAST MASS: 0
ABDOMINAL PAIN: 0
SORE THROAT: 0
CONSTIPATION: 0
JOINT SWELLING: 0
DYSURIA: 0

## 2023-10-09 ASSESSMENT — PATIENT HEALTH QUESTIONNAIRE - PHQ9
SUM OF ALL RESPONSES TO PHQ QUESTIONS 1-9: 0
10. IF YOU CHECKED OFF ANY PROBLEMS, HOW DIFFICULT HAVE THESE PROBLEMS MADE IT FOR YOU TO DO YOUR WORK, TAKE CARE OF THINGS AT HOME, OR GET ALONG WITH OTHER PEOPLE: NOT DIFFICULT AT ALL
SUM OF ALL RESPONSES TO PHQ QUESTIONS 1-9: 0

## 2023-10-09 ASSESSMENT — PAIN SCALES - GENERAL: PAINLEVEL: NO PAIN (1)

## 2023-10-09 NOTE — PROGRESS NOTES
SUBJECTIVE:   CC: Ashleigh Perera is an 63 year old who presents for preventive health visit.       10/9/2023    10:50 AM   Additional Questions   Roomed by Mariama       Healthy Habits:     Getting at least 3 servings of Calcium per day:  NO    Bi-annual eye exam:  Yes    Dental care twice a year:  Yes    Sleep apnea or symptoms of sleep apnea:  Sleep apnea    Diet:  Regular (no restrictions)    Frequency of exercise:  1 day/week    Duration of exercise:  Less than 15 minutes    Taking medications regularly:  Yes    Medication side effects:  Not applicable    Additional concerns today:  No      Today's PHQ-9 Score:       10/9/2023    10:38 AM   PHQ-9 SCORE   PHQ-9 Total Score MyChart 0   PHQ-9 Total Score 0           Social History     Tobacco Use    Smoking status: Former     Packs/day: 0.75     Years: 20.00     Pack years: 15.00     Types: Cigarettes     Quit date: 2000     Years since quittin.7    Smokeless tobacco: Never   Substance Use Topics    Alcohol use: Yes     Comment: Daily to weekly use (beer)             10/9/2023    10:40 AM   Alcohol Use   Prescreen: >3 drinks/day or >7 drinks/week? No     Reviewed orders with patient.  Reviewed health maintenance and updated orders accordingly - Yes  Lab work is in process    Breast Cancer Screening:    FHS-7:       2021    11:41 AM 2022     9:32 AM 2023     9:20 AM   Breast CA Risk Assessment (FHS-7)   Did any of your first-degree relatives have breast or ovarian cancer? No No No   Did any of your relatives have bilateral breast cancer? No No No   Did any man in your family have breast cancer? No No No   Did any woman in your family have breast and ovarian cancer? No No No   Did any woman in your family have breast cancer before age 50 y? No No No   Do you have 2 or more relatives with breast and/or ovarian cancer? No No No   Do you have 2 or more relatives with breast and/or bowel cancer? No No No       Mammogram Screening - Alternate mammogram  schedule due to breast cancer history  Pertinent mammograms are reviewed under the imaging tab.    History of abnormal Pap smear: NO - age 30-65 PAP every 5 years with negative HPV co-testing recommended      Latest Ref Rng & Units 8/31/2022    11:15 AM 7/27/2017     2:00 PM 7/27/2017     1:56 PM   PAP / HPV   PAP  Negative for Intraepithelial Lesion or Malignancy (NILM)      PAP (Historical)    NIL    HPV 16 DNA Negative Negative  Negative     HPV 18 DNA Negative Negative  Negative     Other HR HPV Negative Negative  Negative       Reviewed and updated as needed this visit by clinical staff   Tobacco  Allergies               Reviewed and updated as needed this visit by Provider                     Review of Systems   Constitutional:  Negative for chills and fever.   HENT:  Negative for congestion, ear pain, hearing loss and sore throat.    Eyes:  Negative for pain and visual disturbance.   Respiratory:  Negative for cough and shortness of breath.    Cardiovascular:  Negative for chest pain, palpitations and peripheral edema.   Gastrointestinal:  Negative for abdominal pain, constipation, diarrhea, heartburn, hematochezia and nausea.   Breasts:  Negative for tenderness, breast mass and discharge.   Genitourinary:  Negative for dysuria, frequency, genital sores, hematuria, pelvic pain, urgency, vaginal bleeding and vaginal discharge.   Musculoskeletal:  Negative for arthralgias, joint swelling and myalgias.   Skin:  Negative for rash.   Neurological:  Negative for dizziness, weakness, headaches and paresthesias.   Psychiatric/Behavioral:  Negative for mood changes. The patient is not nervous/anxious.      CONSTITUTIONAL: NEGATIVE for fever, chills, change in weight  INTEGUMENTARY/SKIN: NEGATIVE for worrisome rashes, moles or lesions  EYES: NEGATIVE for vision changes or irritation  ENT: NEGATIVE for ear, mouth and throat problems  RESP: NEGATIVE for significant cough or SOB  BREAST: NEGATIVE for masses, tenderness  "or discharge  CV: NEGATIVE for chest pain, palpitations or peripheral edema  GI: NEGATIVE for nausea, abdominal pain, heartburn, or change in bowel habits  : NEGATIVE for unusual urinary or vaginal symptoms. No vaginal bleeding.  MUSCULOSKELETAL: NEGATIVE for significant arthralgias or myalgia  NEURO: NEGATIVE for weakness, dizziness or paresthesias  PSYCHIATRIC: NEGATIVE for changes in mood or affect      OBJECTIVE:   /71   Pulse 83   Temp 98.7  F (37.1  C) (Oral)   Resp 12   Ht 1.6 m (5' 3\")   Wt 73.6 kg (162 lb 3.2 oz)   SpO2 98%   BMI 28.73 kg/m    Physical Exam  GENERAL: healthy, alert and no distress  EYES: Eyes grossly normal to inspection, PERRL and conjunctivae and sclerae normal  HENT: ear canals and TM's normal, nose and mouth without ulcers or lesions  NECK: no adenopathy, no asymmetry, masses, or scars and thyroid normal to palpation  RESP: lungs clear to auscultation - no rales, rhonchi or wheezes  CV: regular rate and rhythm, normal S1 S2, no S3 or S4, no murmur, click or rub, no peripheral edema and peripheral pulses strong  ABDOMEN: soft, nontender, no hepatosplenomegaly, no masses and bowel sounds normal  MS: no gross musculoskeletal defects noted, no edema  SKIN: no suspicious lesions or rashes  NEURO: Normal strength and tone, mentation intact and speech normal  PSYCH: mentation appears normal, affect normal/bright    Diagnostic Test Results:  Labs reviewed in Epic    ASSESSMENT/PLAN:   (Z00.00) Routine general medical examination at a health care facility  (primary encounter diagnosis)  Comment:   Plan:     (J44.9) Chronic obstructive pulmonary disease, unspecified COPD type (H)  Comment: per pulmonology  Plan:     (E78.5) Hyperlipidemia LDL goal <130  Comment: refill  Plan: simvastatin (ZOCOR) 40 MG tablet, **CBC with         platelets FUTURE 2mo, **Comprehensive metabolic        panel FUTURE 2mo, Lipid panel reflex to direct         LDL Fasting            (G62.0) Drug-induced " "polyneuropathy (H24)  Comment: not on any meds at this time  Plan:     (C50.211,  Z17.1) Malignant neoplasm of upper-inner quadrant of right breast in female, estrogen receptor negative (H)  Comment: per oncology  Plan:     (G47.33) MERLIN (obstructive sleep apnea)- 'mild' (AHI 9)  Comment:   Plan:     (R91.8) Lung nodule, multiple  Comment: per pulmonology  Plan:     Patient has been advised of split billing requirements and indicates understanding: Yes      COUNSELING:  Reviewed preventive health counseling, as reflected in patient instructions       Regular exercise       Healthy diet/nutrition       Vision screening       Colorectal Cancer Screening      BMI:   Estimated body mass index is 28.73 kg/m  as calculated from the following:    Height as of this encounter: 1.6 m (5' 3\").    Weight as of this encounter: 73.6 kg (162 lb 3.2 oz).   Weight management plan: Discussed healthy diet and exercise guidelines      She reports that she quit smoking about 23 years ago. Her smoking use included cigarettes. She has a 15.00 pack-year smoking history. She has never used smokeless tobacco.          Susanne Marin MD  [facility] AND[facility] Medical CenterAnswers submitted by the patient for this visit:  Patient Health Questionnaire (Submitted on 10/9/2023)  If you checked off any problems, how difficult have these problems made it for you to do your work, take care of things at home, or get along with other people?: Not difficult at all  PHQ9 TOTAL SCORE: 0    "

## 2023-10-10 ENCOUNTER — PATIENT OUTREACH (OUTPATIENT)
Dept: GASTROENTEROLOGY | Facility: CLINIC | Age: 63
End: 2023-10-10
Payer: COMMERCIAL

## 2023-10-10 LAB
ALBUMIN SERPL BCG-MCNC: 4.5 G/DL (ref 3.5–5.2)
ALP SERPL-CCNC: 137 U/L (ref 35–104)
ALT SERPL W P-5'-P-CCNC: 37 U/L (ref 0–50)
ANION GAP SERPL CALCULATED.3IONS-SCNC: 12 MMOL/L (ref 7–15)
AST SERPL W P-5'-P-CCNC: 37 U/L (ref 0–45)
BILIRUB SERPL-MCNC: 0.4 MG/DL
BUN SERPL-MCNC: 11.3 MG/DL (ref 8–23)
CALCIUM SERPL-MCNC: 9.4 MG/DL (ref 8.8–10.2)
CHLORIDE SERPL-SCNC: 100 MMOL/L (ref 98–107)
CHOLEST SERPL-MCNC: 196 MG/DL
CREAT SERPL-MCNC: 0.71 MG/DL (ref 0.51–0.95)
DEPRECATED HCO3 PLAS-SCNC: 24 MMOL/L (ref 22–29)
EGFRCR SERPLBLD CKD-EPI 2021: >90 ML/MIN/1.73M2
GLUCOSE SERPL-MCNC: 110 MG/DL (ref 70–99)
HDLC SERPL-MCNC: 55 MG/DL
LDLC SERPL CALC-MCNC: 127 MG/DL
NONHDLC SERPL-MCNC: 141 MG/DL
POTASSIUM SERPL-SCNC: 4.3 MMOL/L (ref 3.4–5.3)
PROT SERPL-MCNC: 7.8 G/DL (ref 6.4–8.3)
SODIUM SERPL-SCNC: 136 MMOL/L (ref 135–145)
TRIGL SERPL-MCNC: 72 MG/DL

## 2024-06-12 ENCOUNTER — TELEPHONE (OUTPATIENT)
Dept: PULMONOLOGY | Facility: CLINIC | Age: 64
End: 2024-06-12
Payer: COMMERCIAL

## 2024-06-12 NOTE — TELEPHONE ENCOUNTER
[facility] Call Center    Phone Message    May a detailed message be left on voicemail: yes     Reason for Call: Pt has an appt scheduled to follow-up with Dr. Licona 7/17/24-she is wondering if she should be having any breathing tests done prior to this appt? Last set of PFT's was in October 2018.     Action Taken: Other: Pulm    Travel Screening: Not Applicable

## 2024-07-04 DIAGNOSIS — J43.2 CENTRILOBULAR EMPHYSEMA (H): ICD-10-CM

## 2024-07-05 ENCOUNTER — MYC REFILL (OUTPATIENT)
Dept: PULMONOLOGY | Facility: CLINIC | Age: 64
End: 2024-07-05
Payer: COMMERCIAL

## 2024-07-05 DIAGNOSIS — J43.2 CENTRILOBULAR EMPHYSEMA (H): ICD-10-CM

## 2024-07-05 RX ORDER — UMECLIDINIUM BROMIDE AND VILANTEROL TRIFENATATE 62.5; 25 UG/1; UG/1
POWDER RESPIRATORY (INHALATION)
Qty: 3 EACH | Refills: 3 | Status: SHIPPED | OUTPATIENT
Start: 2024-07-05

## 2024-07-05 RX ORDER — UMECLIDINIUM BROMIDE AND VILANTEROL TRIFENATATE 62.5; 25 UG/1; UG/1
POWDER RESPIRATORY (INHALATION)
Qty: 3 EACH | Refills: 3 | OUTPATIENT
Start: 2024-07-05

## 2024-07-05 NOTE — TELEPHONE ENCOUNTER
umeclidinium-vilanterol (ANORO ELLIPTA) 62.5-25 MCG/ACT oral inhaler 3 each 3 7/19/2023 -- No   Sig: INHALE ONE PUFF INTO THE LUNGS ONCE DAILY           Last Office Visit: 7/19/23  Future Office visit:    Next 5 appointments (look out 90 days)      Jul 17, 2024 11:00 AM  Return Visit with Amina Licona MD  [facility] ([facility] [address] [address] [place] [id number] [phone]             Routing refill request to provider for review/approval because:  NA, passed protocol.

## 2024-07-12 ENCOUNTER — PATIENT OUTREACH (OUTPATIENT)
Dept: GASTROENTEROLOGY | Facility: CLINIC | Age: 64
End: 2024-07-12
Payer: COMMERCIAL

## 2024-07-12 DIAGNOSIS — Z12.11 SPECIAL SCREENING FOR MALIGNANT NEOPLASMS, COLON: Primary | ICD-10-CM

## 2024-07-17 ENCOUNTER — OFFICE VISIT (OUTPATIENT)
Dept: PULMONOLOGY | Facility: CLINIC | Age: 64
End: 2024-07-17
Payer: COMMERCIAL

## 2024-07-17 ENCOUNTER — OFFICE VISIT (OUTPATIENT)
Dept: NURSING | Facility: CLINIC | Age: 64
End: 2024-07-17
Payer: COMMERCIAL

## 2024-07-17 VITALS
SYSTOLIC BLOOD PRESSURE: 124 MMHG | OXYGEN SATURATION: 98 % | DIASTOLIC BLOOD PRESSURE: 78 MMHG | BODY MASS INDEX: 29.23 KG/M2 | HEART RATE: 55 BPM | WEIGHT: 165 LBS

## 2024-07-17 VITALS — OXYGEN SATURATION: 97 % | HEART RATE: 94 BPM | WEIGHT: 165 LBS | BODY MASS INDEX: 29.23 KG/M2

## 2024-07-17 DIAGNOSIS — J43.2 CENTRILOBULAR EMPHYSEMA (H): ICD-10-CM

## 2024-07-17 DIAGNOSIS — J44.9 CHRONIC OBSTRUCTIVE PULMONARY DISEASE, UNSPECIFIED COPD TYPE (H): ICD-10-CM

## 2024-07-17 PROCEDURE — 99215 OFFICE O/P EST HI 40 MIN: CPT | Mod: 25 | Performed by: INTERNAL MEDICINE

## 2024-07-17 PROCEDURE — 94729 DIFFUSING CAPACITY: CPT | Performed by: INTERNAL MEDICINE

## 2024-07-17 PROCEDURE — 94375 RESPIRATORY FLOW VOLUME LOOP: CPT | Performed by: INTERNAL MEDICINE

## 2024-07-17 PROCEDURE — 94726 PLETHYSMOGRAPHY LUNG VOLUMES: CPT | Performed by: INTERNAL MEDICINE

## 2024-07-17 RX ORDER — ALBUTEROL SULFATE 90 UG/1
AEROSOL, METERED RESPIRATORY (INHALATION)
Qty: 8.5 G | Refills: 11 | Status: SHIPPED | OUTPATIENT
Start: 2024-07-17

## 2024-07-17 NOTE — PROGRESS NOTES
Pulmonary Clinic Return Patient Visit  Reason for Visit: COPD  History of Present Illness  Ashleigh Alonzo is a pleasant 64 yr old female with a history of COPD, pulmonary nodules and breast cancer s/p chemoradiation and lumpectomy who presents for follow up today. I last saw her in 7/2023  To briefly review, she was diagnosed with COPD several years ago and is  well managed on Anoro. She was diagnosed with breast cancer in 2017 and was started on chemotherapy and had several clinic visits and hospitalizations for neutropenic fever and drug induced pneumonitis.  In 02/2018, during an admission for neutropenic fever and diffuse pulmonary infiltrates, pulmonary team was consulted and a bronch with BAL was done. Cultures were negative and she was started on high-dose methylprednisolone and eventually discharged on prednisone.  This was slowly tapered prior to her lumpectomy.  By the time of followup for the lumpectimy, respiratory symptoms had resolved as well as neutropenia.  Presumed etiology for the peribronchovascular consolidations was the chemotherapeutic agent- pembrolizumab.     Today, she has no new complaints. She is retired and has been more more active with no exercise restriction while using Anoro. She has no need for her rescue inhalers and there has been no AECOPDs since the last clinic visit.  She has minimal daily cough and she denies any fevers, no night sweats and no chest pain.  She is no longer on chemo and she continues to follow with radiation oncology with no issues. She continues to be in remission.    Review of Systems:  10 of 14 systems reviewed and are negative unless otherwise stated in HPI.    Past Medical History:   Diagnosis Date    Acute cystitis without hematuria 10/26/2017    Clostridium difficile diarrhea 01/12/2018    Neutropenic fever  (H24) 01/10/2018     She was hospitalized 1/10/18 - 1/13/18 with neutropenic fever    Neutropenic sepsis (H) 01/29/2018    hospitalized again  from  1/29/18 - 2/8/18 with neutropenic fever, mucositis, and C. difficile colitis     Nonsenile cataract     Pneumonia 11/11/2017    She was hospitalized 11/11/17 - 11/17/17 with fevers ranging from 102 - 105.  CT chest was consistent with pneumonitis.  She also had transaminitis in the 150 range.  She was started on corticosteroids.  Pembrolizumab was stopped     S/P radiation therapy     5,256 cGy completed on 4/27/2018 to [facility] with Dr. Zhong       Past Surgical History:   Procedure Laterality Date    APPENDECTOMY  10/06/2015    [facility]    BREAST BIOPSY, CORE RT/LT Right 08/22/2017    BRONCHOSCOPY (RIGID OR FLEXIBLE), DIAGNOSTIC N/A 02/06/2018    Procedure: COMBINED BRONCHOSCOPY (RIGID OR FLEXIBLE), LAVAGE;  COMBINED BRONCOSCOY (RIGID OR FLEXIBLE), LAVAGE;  Surgeon: Samir Pettit MD;  Location: [facility]    CATARACT IOL, RT/LT      CLEAR LENS REPLACEMENT Bilateral 11/2016    [facility]    COLONOSCOPY  11/15/2011    Procedure:COLONOSCOPY; Colonoscopy, screening; Surgeon:ANASTASIA BUNCH; Location:[facility] OR    COLONOSCOPY N/A 10/12/2021    Procedure: Colonoscopy, Flexible, With Lesion Removal Using Snare;  Surgeon: Jaxon Lechuga MD;  Location: [facility]    COLONOSCOPY WITH CO2 INSUFFLATION N/A 10/12/2021    Procedure: COLONOSCOPY, WITH CO2 INSUFFLATION;  Surgeon: Jaxon Lechuga MD;  Location: [facility]    INSERT PORT VASCULAR ACCESS Left 09/01/2017    Procedure: INSERT PORT VASCULAR ACCESS;  Single Lumen Chest Power Port;  Surgeon: Leif Parkinson PA-C;  [facility]    LAPAROSCOPIC CHOLECYSTECTOMY N/A 4/30/2021    Procedure: CHOLECYSTECTOMY, LAPAROSCOPIC, WITH CHOLANGIOGRAM;  Surgeon: Jaxon Lechuga MD;  Location: [facility]    LUMPECTOMY BREAST WITH SENTINEL NODE, COMBINED Right 02/27/2018    Procedure: COMBINED LUMPECTOMY BREAST WITH SENTINEL NODE;  Right Wire Localized Lumpectomy, Right [place] Lymph Node Biopsy And Freddy Cath Removal;  Surgeon: Atul Gates  MD RENAE;  Location: [facility]    REMOVE PORT VASCULAR ACCESS N/A 2018    Procedure: REMOVE PORT VASCULAR ACCESS;;  Surgeon: Atul Gates MD;  Location: [facility]    TUBAL LIGATION  2004       Family History   Problem Relation Age of Onset    Macular Degeneration Mother     Cardiovascular Father 46        MI    Eye Disorder Father     Cerebrovascular Disease Father     Colon Polyps Sister         tubular adenoma polyps    Neurologic Disorder Brother     Eye Surgery Maternal Grandmother         cataract    Prostate Cancer Maternal Grandfather     Prostate Cancer Maternal Uncle     Cancer Maternal Uncle         Throat cancer    Diabetes No family hx of     Hypertension No family hx of     Glaucoma No family hx of        Social History     Socioeconomic History    Marital status:      Spouse name: Aly    Number of children: 2    Years of education: None    Highest education level: None   Occupational History    Occupation: Equipment dispatcher     Employer: [employer]    Tobacco Use    Smoking status: Former Smoker     Packs/day: 0.75     Years: 20.00     Pack years: 15.00     Types: Cigarettes     Quit date: 2000     Years since quittin.5    Smokeless tobacco: Never Used   Vaping Use    Vaping Use: Never used   Substance and Sexual Activity    Alcohol use: Yes     Comment: Daily to weekly use (beer)    Drug use: No    Sexual activity: Not Currently     Partners: Male     Birth control/protection: Post-menopausal, Female Surgical     Comment: Hx. tubal ligation   Other Topics Concern    Parent/sibling w/ CABG, MI or angioplasty before 65F 55M? Yes   Social History Narrative    Patient lives in [facility] [place]. She [place] in [place] with her . 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 No recent travel. No international travel. No sick contacts or exposure to small children (she has small grand children, 1-1 y/o but has not seen them in a few  weeks). They have two dogs and a cat, and she takes care of the litter box sometimes, however she has not clean after the cat since her cancer diagnosis.         No Known Allergies      Current Outpatient Medications:     albuterol (PROAIR HFA) 108 (90 Base) MCG/ACT inhaler, Inhale 2 Puffs by mouth every 6 hours if needed for shortness of breath / dyspnea. Pt will call when needed, Disp: 8.5 g, Rfl: 1    cetirizine (ZYRTEC) 10 MG tablet, Take 10 mg by mouth daily, Disp: , Rfl:     Multiple Vitamin (MULTIVITAMIN ADULT PO), Take 1 tablet by mouth daily, Disp: , Rfl:     Multiple Vitamins-Minerals ([facility]) [facility] , Disp: , Rfl:     simvastatin (ZOCOR) 40 MG tablet, Take 1 tablet (40 mg) by mouth at bedtime, Disp: 90 tablet, Rfl: 3    SM MUCUS RELIEF 600 MG 12 hr tablet, TAKE TWO TABLETS BY MOUTH TWICE A DAY, Disp: 160 tablet, Rfl: 0    umeclidinium-vilanterol (ANORO ELLIPTA) 62.5-25 MCG/ACT oral inhaler, INHALE ONE PUFF INTO THE LUNGS ONCE DAILY, Disp: 3 each, Rfl: 3    Urea 40 % CREA, Apply to elbow BID PRN, Disp: 85 g, Rfl: 11    VITAMIN D 1000 UNIT OR CAPS, Take 3,000 Units by mouth , Disp: , Rfl:   No current facility-administered medications for this visit.    Facility-Administered Medications Ordered in Other Visits:     lidocaine 1 % 9 mL, 9 mL, Intradermal, Once, Fara Bradshaw MD    lidocaine-EPINEPHrine 1.5 %-1:[id number] injection 10 mL, 10 mL, Other, Once, Radha Cazares MD    sodium bicarbonate 8.4 % injection 1 mEq, 1 mEq, Intradermal, Once, Fara Bradshaw MD      Physical Exam:  /78 (BP Location: Left arm, Patient Position: Chair, Cuff Size: Adult Regular)   Pulse 55   Wt 74.8 kg (165 lb)   SpO2 98%   BMI 29.23 kg/m    GENERAL: Well developed, well nourished, alert, and in no apparent distress.  HEENT: Normocephalic, atraumatic. PERRL, EOMI. Oral mucosa is moist. No perioral cyanosis.  NECK: supple, no masses, no thyromegaly.  RESP:  Normal respiratory  effort.  CTAB.  No rales, wheezes, rhonchi.  No cyanosis or clubbing.  CV: Normal S1, S2, regular rhythm, normal rate. No murmur.  No LE edema.   ABDOMEN:  Soft, non-tender, non-distended.   SKIN: warm and dry. No rash.  NEURO: AAOx3.  Normal gait.  Fluent speech.  PSYCH: mentation appears normal.     Most Recent [facility] Pulmonary Function Testing  PFTs- Reviewed and discussed with patient- stable lung function  FVC-Pred   Date Value Ref Range Status   07/17/2024 2.72 L      FVC-Pre   Date Value Ref Range Status   07/17/2024 2.35 L      FVC-%Pred-Pre   Date Value Ref Range Status   07/17/2024 86 %      FEV1-Pre   Date Value Ref Range Status   07/17/2024 1.61 L      FEV1-%Pred-Pre   Date Value Ref Range Status   07/17/2024 74 %      FEV1FVC-Pred   Date Value Ref Range Status   07/17/2024 80 %      FEV1FVC-Pre   Date Value Ref Range Status   07/17/2024 68 %      KOA4NZK-%Pred-Pre   Date Value Ref Range Status   11/07/2014 68 %      FEFMax-Pred   Date Value Ref Range Status   07/17/2024 5.93 L/sec      FEFMax-Pre   Date Value Ref Range Status   07/17/2024 5.40 L/sec      FEFMax-%Pred-Pre   Date Value Ref Range Status   07/17/2024 91 %      ExpTime-Pre   Date Value Ref Range Status   07/17/2024 7.85 sec      FIFMax-Pre   Date Value Ref Range Status   07/17/2024 3.60 L/sec      FEV1FEV6-Pred   Date Value Ref Range Status   07/17/2024 80 %      FEV1FEV6-Pre   Date Value Ref Range Status   07/17/2024 69 %      VCK6WBP0-%Pred-Pre   Date Value Ref Range Status   11/07/2014 70 %         Results:  Imaging (personally reviewed in clinic today): CT Chest 7/19/2023  IMPRESSION:   1. No new or suspicious pulmonary nodules.  2. Unchanged upper lobe predominant reticular fibrotic changes. These  are most notable in the right upper lobe and are likely sequelae of  prior radiation therapy.  3. Post surgical changes of right lumpectomy.  4. Sequelae of prior granulomatous disease.    ASSESSMENT AND PLAN:   1.  Chronic obstructive  pulmonary disease (Group B)  CAT score of 5. The patient has a previous diagnosis of COPD.  She quit smoking in 1999 and is doing very well with Anoro inhaler   2.  Pulmonary nodules:  No need for continued survelliance. Nodules have shown stability for 2-3 years and are very likely benign   3.  Drug reaction/pneumonitis possibly related to pembrolizumab, treated with high-dose steroids.   Resolved and no recurrence.   4. Sleep apnea on oral appliances  Follow up with sleep medicine.  I spent a total of 40 minutes face to face with Ashleigh Alonzo during today's office visit. Over 50% of this time was spent counseling the patient and/or coordinating care regarding their pulmonary disease.    RTC in 12 months  Amina Licona MD  Pulmonary, Critical Care and Sleep Medicine  [facility]  Pager: [phone]  The above note was dictated using voice recognition software and may include typographical errors. Please contact the author for any clarifications.

## 2024-07-18 LAB
DLCOUNC-%PRED-PRE: 83 %
DLCOUNC-PRE: 15.95 ML/MIN/MMHG
DLCOUNC-PRED: 19.06 ML/MIN/MMHG
ERV-%PRED-PRE: 35 %
ERV-PRE: 0.37 L
ERV-PRED: 1.02 L
EXPTIME-PRE: 7.85 SEC
FEF2575-%PRED-PRE: 47 %
FEF2575-PRE: 0.93 L/SEC
FEF2575-PRED: 1.95 L/SEC
FEFMAX-%PRED-PRE: 91 %
FEFMAX-PRE: 5.4 L/SEC
FEFMAX-PRED: 5.93 L/SEC
FEV1-%PRED-PRE: 74 %
FEV1-PRE: 1.61 L
FEV1FEV6-PRE: 69 %
FEV1FEV6-PRED: 80 %
FEV1FVC-PRE: 68 %
FEV1FVC-PRED: 80 %
FEV1SVC-PRE: 70 %
FEV1SVC-PRED: 70 %
FIFMAX-PRE: 3.6 L/SEC
FRCPLETH-%PRED-PRE: 117 %
FRCPLETH-PRE: 3.12 L
FRCPLETH-PRED: 2.66 L
FVC-%PRED-PRE: 86 %
FVC-PRE: 2.35 L
FVC-PRED: 2.72 L
IC-%PRED-PRE: 94 %
IC-PRE: 1.92 L
IC-PRED: 2.04 L
RVPLETH-%PRED-PRE: 142 %
RVPLETH-PRE: 2.75 L
RVPLETH-PRED: 1.93 L
TLCPLETH-%PRED-PRE: 104 %
TLCPLETH-PRE: 5.04 L
TLCPLETH-PRED: 4.81 L
VA-%PRED-PRE: 76 %
VA-PRE: 3.44 L
VC-%PRED-PRE: 73 %
VC-PRE: 2.29 L
VC-PRED: 3.1 L

## 2024-09-16 ENCOUNTER — TELEPHONE (OUTPATIENT)
Dept: GASTROENTEROLOGY | Facility: CLINIC | Age: 64
End: 2024-09-16
Payer: COMMERCIAL

## 2024-09-16 NOTE — TELEPHONE ENCOUNTER
"Endoscopy Scheduling Screen    Have you had any respiratory illness or flu-like symptoms in the last 10 days?  No    What is your communication preference for Instructions and/or Bowel Prep?   MyChart    What insurance is in the chart?  Other:  [employer]    Ordering/Referring Provider: HERNAN ROSALES   (If ordering provider performs procedure, schedule with ordering provider unless otherwise instructed. )    BMI: Estimated body mass index is 29.23 kg/m  as calculated from the following:    Height as of 10/9/23: 1.6 m (5' 3\").    Weight as of 7/17/24: 74.8 kg (165 lb).     Sedation Ordered  MAC/deep sedation.   BMI<= 45 45 < BMI <= 48 48 < BMI < = 50  BMI > 50   No Restrictions No [facility]  No [facility]  [facility] with exceptions Hospital Only OR Only       Do you have a history of malignant hyperthermia?  No    (Females) Are you currently pregnant?   No     Have you been diagnosed or told you have pulmonary hypertension?   No    Do you have an LVAD?  No    Have you been told you have moderate to severe sleep apnea?  No.    Have you been told you have COPD, asthma, or any other lung disease?  Yes     What breathing problems do you have?  COPD     Do you use home oxygen?  No    Have your breathing problems required an ED visit or hospitalization in the last year?  No.    Do you have any heart conditions?  No     Have you ever had or are you waiting for an organ transplant?  No. Continue scheduling, no site restrictions.    Have you had a stroke or transient ischemic attack (TIA aka \"mini stroke\" in the last 6 months?   No    Have you been diagnosed with or been told you have cirrhosis of the liver?   No.    Are you currently on dialysis?   No    Do you need assistance transferring?   No    BMI: Estimated body mass index is 29.23 kg/m  as calculated from the following:    Height as of 10/9/23: 1.6 m (5' 3\").    Weight as of 7/17/24: 74.8 kg (165 lb).     Is patients BMI > 40 and scheduling location [facility]?  No    Do you " take an injectable or oral medication for weight loss or diabetes (excluding insulin)?  No    Do you take the medication Naltrexone?  No    Do you take blood thinners?  No       Prep   Are you currently on dialysis or do you have chronic kidney disease?  No    Do you have a diagnosis of diabetes?  No    Do you have a diagnosis of cystic fibrosis (CF)?  No    On a regular basis do you go 3 -5 days between bowel movements?  No    BMI > 40?  No    Preferred Pharmacy:    [facility] [address] [address] [facility] [facility] [place] [address]  [address] [facility] [facility] [place] [address]  [facility] [address]  Phone: [phone] Fax: [phone]      Final Scheduling Details     Procedure scheduled  Colonoscopy    Surgeon:  TARA     Date of procedure:  10/31/24     Pre-OP / PAC:   No - Not required for this site.    Location  [facility] - Patient preference.    Sedation   MAC/Deep Sedation - Per order.      Patient Reminders:   You will receive a call from a Nurse to review instructions and health history.  This assessment must be completed prior to your procedure.  Failure to complete the Nurse assessment may result in the procedure being cancelled.      On the day of your procedure, please designate an adult(s) who can drive you home stay with you for the next 24 hours. The medicines used in the exam will make you sleepy. You will not be able to drive.      You cannot take public transportation, ride share services, or non-medical taxi service without a responsible caregiver.  Medical transport services are allowed with the requirement that a responsible caregiver will receive you at your destination.  We require that drivers and caregivers are confirmed prior to your procedure.

## 2024-09-29 NOTE — PROGRESS NOTES
Oncology Follow Up:  Date on this visit: 9/30/2024    Diagnosis:  Stage IIb, T2N0M0, grade 3 triple negative cancer of the right breast.    Primary Physician: Radha Cazares     History Of Present Illness:  Ms. Alonzo is a 64-year-old female with a h/o stage IIb, T2 N0 M0, grade 3, triple-negative invasive carcinoma of the right breast.  Routine screening mammogram on 07/27/2017 showed developing calcifications in the right breast at the 12 o'clock position 6 cm from the nipple.  Ultrasound demonstrated a 7-mm, irregular, hypoechoic mass at the 12 o'clock position.  Contrast-enhanced mammogram showed a peripherally enhancing, irregular mass measuring 1.9 cm as well as an additional 6-mm, enhancing focus anteromedial to the dominant mass.  The total area of abnormal enhancement on contrast mammogram measured up to 3.4 cm.  Right breast biopsy demonstrated a grade 3 invasive mammary carcinoma with associated high-grade DCIS.  Estrogen and progesterone receptor staining were negative.  HER2 was non-amplified by FISH.  Breast MRI measured the biopsy proven breast cancer at 3.2 cm.    Ms. Alonzo enrolled in the ISPY-2 clinical trial.  She began treatment with weekly taxol and once every 3 week pembrolizumab on 9/20/17.  Her course was complicated by pneumonitis and hepatitis.  Pembrolizumab was stopped and she resumed weekly taxol alone on 11/28/17.  She completed a total of 12 weeks of therapy on 12/26/17.  She completed 2 cycles of adriamycin and cyclophosphamide.  She was hospitalized both cycles due to neutropenic fever and also developed C. Difficile colitis.  Decision was made to forgo further chemotherapy.       Right breast lumpectomy and sentinel lymph node procedure on 2/27/18 showed a grade 2, 6 mm residual invasive mammary carcinoma with an associated 8 mm area of high grade DCIS with comedonecrosis and ADH.  Invasive tumor cellularity was 10%.  There was lymphovascular invasion.  Surgical margins were  negative.  A single sentinel lymph node was benign.  Classified as MDA RCB I.  She completed adjuvant radiation (4256 cGy to the right breast with additional 1000 cGy to the lumpectomy cavity) on 4/27/18.     Interval History:  Ashleigh Alonzo presents to clinic today for a routine breast cancer surveillance visit as well as annual bilateral screening mammograms.  She presents today alongside her . Her health has overall been good in the past year.  She denies new symptoms including chest pain, new lumpsor nodules of the breast, dyspnea, or bone pains.     She still has neuropathy in her feet, unchanged from prior.  She has her colonoscopy scheduled for October 31st. She denies unintentional weight loss, hematochezia, melena, or changes in stool habits.     She admittedly doesn't exercise much.     Past Medical/Surgical History:  Past Medical History:   Diagnosis Date    Acute cystitis without hematuria 10/26/2017    Clostridium difficile diarrhea 01/12/2018    Neutropenic fever (H) 01/10/2018     She was hospitalized 1/10/18 - 1/13/18 with neutropenic fever    Neutropenic sepsis (H) 01/29/2018    hospitalized again  from 1/29/18 - 2/8/18 with neutropenic fever, mucositis, and C. difficile colitis     Nonsenile cataract     Pneumonia 11/11/2017    She was hospitalized 11/11/17 - 11/17/17 with fevers ranging from 102 - 105.  CT chest was consistent with pneumonitis.  She also had transaminitis in the 150 range.  She was started on corticosteroids.  Pembrolizumab was stopped     S/P radiation therapy     5,256 cGy completed on 4/27/2018 to [facility] with Dr. Zhong     Past Surgical History:   Procedure Laterality Date    APPENDECTOMY  10/06/2015    [facility]    BREAST BIOPSY, CORE RT/LT Right 08/22/2017    BRONCHOSCOPY (RIGID OR FLEXIBLE), DIAGNOSTIC N/A 02/06/2018    Procedure: COMBINED BRONCHOSCOPY (RIGID OR FLEXIBLE), LAVAGE;  COMBINED BRONCOSCOY (RIGID OR FLEXIBLE), LAVAGE;   Surgeon: Samir Pettit MD;  [facility]: [facility]    CATARACT IOL, RT/LT      CLEAR LENS REPLACEMENT Bilateral 11/2016    [facility]    COLONOSCOPY  11/15/2011    Procedure:COLONOSCOPY; Colonoscopy, screening; Surgeon:ANASTASIA BUNCH; [facility]    COLONOSCOPY N/A 10/12/2021    Procedure: Colonoscopy, Flexible, With Lesion Removal Using Snare;  Surgeon: Jaxon Lechuga MD;  [facility]    COLONOSCOPY WITH CO2 INSUFFLATION N/A 10/12/2021    Procedure: COLONOSCOPY, WITH CO2 INSUFFLATION;  Surgeon: Jaxon Lechuga MD;  [facility]    INSERT PORT VASCULAR ACCESS Left 09/01/2017    Procedure: INSERT PORT VASCULAR ACCESS;  Single Lumen Chest Power Port;  Surgeon: Leif Parkinson PA-C;  [facility]    LAPAROSCOPIC CHOLECYSTECTOMY N/A 4/30/2021    Procedure: CHOLECYSTECTOMY, LAPAROSCOPIC, WITH CHOLANGIOGRAM;  Surgeon: Jaxon Lechuga MD;  [facility]    LUMPECTOMY BREAST WITH SENTINEL NODE, COMBINED Right 02/27/2018    Procedure: COMBINED LUMPECTOMY BREAST WITH SENTINEL NODE;  Right Wire Localized Lumpectomy, Right Cooper Lymph Node Biopsy And Freddy Cath Removal;  Surgeon: Atul Gates MD;  [facility]    REMOVE PORT VASCULAR ACCESS N/A 02/27/2018    Procedure: REMOVE PORT VASCULAR ACCESS;;  Surgeon: Atul Gates MD;  [facility]    TUBAL LIGATION  12/2004     Allergies:  Allergies as of 09/30/2024    (No Known Allergies)     Current Medications:  Current Outpatient Medications   Medication Sig Dispense Refill    albuterol (PROAIR HFA) 108 (90 Base) MCG/ACT inhaler Inhale 2 Puffs by mouth every 6 hours if needed for shortness of breath / dyspnea. Pt will call when needed 8.5 g 11    cetirizine (ZYRTEC) 10 MG tablet Take 10 mg by mouth daily      Multiple Vitamin (MULTIVITAMIN ADULT PO) Take 1 tablet by mouth daily      Multiple Vitamins-Minerals ([facility]) MISC       simvastatin (ZOCOR) 40 MG tablet Take 1 tablet (40 mg) by mouth at bedtime 90  tablet 3    SM MUCUS RELIEF 600 MG 12 hr tablet TAKE TWO TABLETS BY MOUTH TWICE A  tablet 0    umeclidinium-vilanterol (ANORO ELLIPTA) 62.5-25 MCG/ACT oral inhaler INHALE ONE PUFF INTO THE LUNGS ONCE DAILY 3 each 3    Urea 40 % CREA Apply to elbow BID PRN 85 g 11    VITAMIN D 1000 UNIT OR CAPS Take 3,000 Units by mouth         Family and Social History:  Reviewed and unchanged from prior.  Please see initial consultation dated 8/28/17 for further details.    Physical Exam:  /88 (BP Location: Left arm, Patient Position: Sitting, Cuff Size: Adult Regular)   Pulse 92   Temp 98.9  F (37.2  C)   Resp 18   Wt 73.6 kg (162 lb 3.2 oz)   SpO2 98%   BMI 28.73 kg/m    General:  Well appearing adult female in NAD.  Alert and oriented x 3.  HEENT:  Normocephalic.  Sclera anicteric. MMM.  Lymph:  No palpable cervical, supraclavicular, or axillary LAD.  There is a small palpable density just medial to the medial edge of her right axillary incision.  Chest:  CTA bilaterally.  No wheezes or crackles.  CV:  RRR.  Nl S1 and S2.  No audible m/r/g.  Breast:  Right breast smaller and overall firmer than the left breast, there is puckering/retraction of the lower right breast without palpable underlying mass. There is palpable lump superior to the right breast lumpectomy site. There are no other discretely palpable masses in either breast.  Bilateral nipples are everted and without discharge.  Abd:  Soft/ND.   Ext:  No pitting edema of the bilateral lower extremities.    Musculo:  Full ROM of the bilateral upper extremities.  Neuro:  Cranial nerves grossly intact.  Gait stable.    Psych:  Mood and affect appear normal.  Skin:  No visible concerning skin rashes or lesions.    Laboratory/Imaging Studies:  I personally reviewed the below images:    9/30/2024 Bilateral screening mammograms: These images were reviewed with Dr. Hart, breast radiologist in clinic today.  Findings: The breasts are heterogeneously dense, which  may obscure small masses.  Right breast conservation therapy changes.  There is no radiographic evidence of malignancy.                                                                    IMPRESSION: ACR BI-RADS Category 2: Benign      ASSESSMENT/PLAN:  Ms. Alonzo is a 64-year-old female with a h/o grade 3, T2N0M0, triple-negative infiltrating ductal carcinoma of the right breast s/p 12 weeks of Taxol along with 3 cycles pembrolizumab, 2 cycles of adriamycin and cyclophosphamide, lumpectomy ([id number]), and radiation.     1.  Right breast cancer:   Ms. Alonzo is 6 years, 7 months out from excision of a right breast cancer.      She is asymptomatic of disease recurrence on history taken today and clinical exam is without concerning findings. She has what seemed to be an oil cyst lateral to the nipple of her right breast (10' O clock position, 9cm from nipple). She has opted to continue annual screening mammogram and surveillance in Oncology clinic.    - I personally reviewed the images of the mammograms performed today along with reviewing them with a radiologist. The mammograms showed scattered fibroglandular densities without new mass or architectural distortion.  - Of note, palpable breast lump at 12:00, superior to the right breast lumpectomy incision correlates with fat necrosis on the mammogram.  Palpable lump at 10:00, 9 cm from the nipple correlates with a known oil cyst (see right breast ultrasound 2019).  Therefore, there are no concerning findings on exam today.    2.  Colyn polyps:  No change since last visit.  1 cm and 8 mm polyps removed 10/2021.  Her colonoscopy is scheduled for 10/31/24.     3.  Weight management:  Patients who lose weight after breast cancer and those that maintain a normal BMI have improved breast cancer outcomes.  Weight has been stable since last visit. Encouraged to exercise.     4.  Followup:   Colonoscopy planned for 10/31/24  Bilateral screening mammograms and visit with me  10/1/2025 or later.    Patient seen and discussed with Dr. Bradshaw.   Bayron Gallegos DO   Hematology/Oncology/BMT Fellow PGY5  Pager: [phone]    Patient was seen and discussed with oncology fellow, Dr. Gallegos.  The oncology fellow was personally supervised by me during the patient examination. I personally verified the medical history, performed a physical exam and the medical decision-making. I made appropriate changes to the documentation and the assessment and plan based on my verification, exam, and medical decision making.    I personally spent 30 minutes on the date of the encounter doing chart review, review of test results, interpretation of tests, patient visit, documentation, and discussion with other provider(s)       Fara Bradshaw MD

## 2024-09-30 ENCOUNTER — ANCILLARY PROCEDURE (OUTPATIENT)
Dept: MAMMOGRAPHY | Facility: CLINIC | Age: 64
End: 2024-09-30
Payer: COMMERCIAL

## 2024-09-30 ENCOUNTER — ONCOLOGY VISIT (OUTPATIENT)
Dept: ONCOLOGY | Facility: CLINIC | Age: 64
End: 2024-09-30
Attending: INTERNAL MEDICINE
Payer: COMMERCIAL

## 2024-09-30 VITALS
WEIGHT: 162.2 LBS | HEART RATE: 92 BPM | OXYGEN SATURATION: 98 % | DIASTOLIC BLOOD PRESSURE: 88 MMHG | TEMPERATURE: 98.9 F | RESPIRATION RATE: 18 BRPM | SYSTOLIC BLOOD PRESSURE: 125 MMHG | BODY MASS INDEX: 28.73 KG/M2

## 2024-09-30 DIAGNOSIS — Z12.31 VISIT FOR SCREENING MAMMOGRAM: ICD-10-CM

## 2024-09-30 DIAGNOSIS — Z17.1 MALIGNANT NEOPLASM OF UPPER-INNER QUADRANT OF RIGHT BREAST IN FEMALE, ESTROGEN RECEPTOR NEGATIVE (H): Primary | ICD-10-CM

## 2024-09-30 DIAGNOSIS — Z86.0100 PERSONAL HISTORY OF COLONIC POLYPS: ICD-10-CM

## 2024-09-30 DIAGNOSIS — C50.211 MALIGNANT NEOPLASM OF UPPER-INNER QUADRANT OF RIGHT BREAST IN FEMALE, ESTROGEN RECEPTOR NEGATIVE (H): Primary | ICD-10-CM

## 2024-09-30 DIAGNOSIS — E78.5 HYPERLIPIDEMIA LDL GOAL <130: Chronic | ICD-10-CM

## 2024-09-30 PROCEDURE — 77063 BREAST TOMOSYNTHESIS BI: CPT | Performed by: STUDENT IN AN ORGANIZED HEALTH CARE EDUCATION/TRAINING PROGRAM

## 2024-09-30 PROCEDURE — 99214 OFFICE O/P EST MOD 30 MIN: CPT | Mod: GC | Performed by: INTERNAL MEDICINE

## 2024-09-30 PROCEDURE — 77067 SCR MAMMO BI INCL CAD: CPT | Performed by: STUDENT IN AN ORGANIZED HEALTH CARE EDUCATION/TRAINING PROGRAM

## 2024-09-30 PROCEDURE — 99213 OFFICE O/P EST LOW 20 MIN: CPT | Performed by: INTERNAL MEDICINE

## 2024-09-30 RX ORDER — SIMVASTATIN 40 MG
40 TABLET ORAL AT BEDTIME
Qty: 90 TABLET | Refills: 0 | Status: SHIPPED | OUTPATIENT
Start: 2024-09-30

## 2024-09-30 NOTE — LETTER
9/30/2024      Ashleigh Alonzo  [address] [facility] [address]      Dear Colleague,    Thank you for referring your patient, Ashleigh Alonzo, to the [facility]. Please see a copy of my visit note below.    Oncology Follow Up:  Date on this visit: 9/30/2024    Diagnosis:  Stage IIb, T2N0M0, grade 3 triple negative cancer of the right breast.    Primary Physician: Radha Cazares     History Of Present Illness:  Ms. Alonzo is a 64-year-old female with a h/o stage IIb, T2 N0 M0, grade 3, triple-negative invasive carcinoma of the right breast.  Routine screening mammogram on 07/27/2017 showed developing calcifications in the right breast at the 12 o'clock position 6 cm from the nipple.  Ultrasound demonstrated a 7-mm, irregular, hypoechoic mass at the 12 o'clock position.  Contrast-enhanced mammogram showed a peripherally enhancing, irregular mass measuring 1.9 cm as well as an additional 6-mm, enhancing focus anteromedial to the dominant mass.  The total area of abnormal enhancement on contrast mammogram measured up to 3.4 cm.  Right breast biopsy demonstrated a grade 3 invasive mammary carcinoma with associated high-grade DCIS.  Estrogen and progesterone receptor staining were negative.  HER2 was non-amplified by FISH.  Breast MRI measured the biopsy proven breast cancer at 3.2 cm.    Ms. Alonzo enrolled in the ISPY-2 clinical trial.  She began treatment with weekly taxol and once every 3 week pembrolizumab on 9/20/17.  Her course was complicated by pneumonitis and hepatitis.  Pembrolizumab was stopped and she resumed weekly taxol alone on 11/28/17.  She completed a total of 12 weeks of therapy on 12/26/17.  She completed 2 cycles of adriamycin and cyclophosphamide.  She was hospitalized both cycles due to neutropenic fever and also developed C. Difficile colitis.  Decision was made to forgo further chemotherapy.       Right breast lumpectomy and sentinel lymph node procedure on  2/27/18 showed a grade 2, 6 mm residual invasive mammary carcinoma with an associated 8 mm area of high grade DCIS with comedonecrosis and ADH.  Invasive tumor cellularity was 10%.  There was lymphovascular invasion.  Surgical margins were negative.  A single sentinel lymph node was benign.  Classified as MDA RCB I.  She completed adjuvant radiation (4256 cGy to the right breast with additional 1000 cGy to the lumpectomy cavity) on 4/27/18.     Interval History:  Ashleigh Alonzo presents to clinic today for a routine breast cancer surveillance visit as well as annual bilateral screening mammograms.  She presents today alongside her . Her health has overall been good in the past year.  She denies new symptoms including chest pain, new lumpsor nodules of the breast, dyspnea, or bone pains.     She still has neuropathy in her feet, unchanged from prior.  She has her colonoscopy scheduled for October 31st. She denies unintentional weight loss, hematochezia, melena, or changes in stool habits.     She admittedly doesn't exercise much.     Past Medical/Surgical History:  Past Medical History:   Diagnosis Date     Acute cystitis without hematuria 10/26/2017     Clostridium difficile diarrhea 01/12/2018     Neutropenic fever (H) 01/10/2018     She was hospitalized 1/10/18 - 1/13/18 with neutropenic fever     Neutropenic sepsis (H) 01/29/2018    hospitalized again  from 1/29/18 - 2/8/18 with neutropenic fever, mucositis, and C. difficile colitis      Nonsenile cataract      Pneumonia 11/11/2017    She was hospitalized 11/11/17 - 11/17/17 with fevers ranging from 102 - 105.  CT chest was consistent with pneumonitis.  She also had transaminitis in the 150 range.  She was started on corticosteroids.  Pembrolizumab was stopped      S/P radiation therapy     5,256 cGy completed on 4/27/2018 to right breast - [facility] with Dr. Zhong     Past Surgical History:   Procedure Laterality Date     APPENDECTOMY  10/06/2015     [facility]     BREAST BIOPSY, CORE RT/LT Right 08/22/2017     BRONCHOSCOPY (RIGID OR FLEXIBLE), DIAGNOSTIC N/A 02/06/2018    Procedure: COMBINED BRONCHOSCOPY (RIGID OR FLEXIBLE), LAVAGE;  COMBINED BRONCOSCOY (RIGID OR FLEXIBLE), LAVAGE;  Surgeon: Samir Pettit MD;  [facility]: [facility]     CATARACT IOL, RT/LT       CLEAR LENS REPLACEMENT Bilateral 11/2016    [facility]     COLONOSCOPY  11/15/2011    Procedure:COLONOSCOPY; Colonoscopy, screening; Surgeon:ANASTASIA BUNCH; [facility]     COLONOSCOPY N/A 10/12/2021    Procedure: Colonoscopy, Flexible, With Lesion Removal Using Snare;  Surgeon: Jaxon Lechuga MD;  [facility]     COLONOSCOPY WITH CO2 INSUFFLATION N/A 10/12/2021    Procedure: COLONOSCOPY, WITH CO2 INSUFFLATION;  Surgeon: Jaxon Lechuga MD;  [facility]     INSERT PORT VASCULAR ACCESS Left 09/01/2017    Procedure: INSERT PORT VASCULAR ACCESS;  Single Lumen Chest Power Port;  Surgeon: Leif Parkinson PA-C;  [facility]     LAPAROSCOPIC CHOLECYSTECTOMY N/A 4/30/2021    Procedure: CHOLECYSTECTOMY, LAPAROSCOPIC, WITH CHOLANGIOGRAM;  Surgeon: Jaxon Lechuga MD;  [facility]     LUMPECTOMY BREAST WITH SENTINEL NODE, COMBINED Right 02/27/2018    Procedure: COMBINED LUMPECTOMY BREAST WITH SENTINEL NODE;  Right Wire Localized Lumpectomy, Right [place] Lymph Node Biopsy And Freddy Cath Removal;  Surgeon: Atul Gates MD;  [facility]     REMOVE PORT VASCULAR ACCESS N/A 02/27/2018    Procedure: REMOVE PORT VASCULAR ACCESS;;  Surgeon: Atul Gates MD;  [facility]     TUBAL LIGATION  12/2004     Allergies:  Allergies as of 09/30/2024     (No Known Allergies)     Current Medications:  Current Outpatient Medications   Medication Sig Dispense Refill     albuterol (PROAIR HFA) 108 (90 Base) MCG/ACT inhaler Inhale 2 Puffs by mouth every 6 hours if needed for shortness of breath / dyspnea. Pt will call when needed 8.5 g 11     cetirizine (ZYRTEC) 10  MG tablet Take 10 mg by mouth daily       Multiple Vitamin (MULTIVITAMIN ADULT PO) Take 1 tablet by mouth daily       Multiple Vitamins-Minerals ([facility] HEALTH) MISC        simvastatin (ZOCOR) 40 MG tablet Take 1 tablet (40 mg) by mouth at bedtime 90 tablet 3     SM MUCUS RELIEF 600 MG 12 hr tablet TAKE TWO TABLETS BY MOUTH TWICE A  tablet 0     umeclidinium-vilanterol (ANORO ELLIPTA) 62.5-25 MCG/ACT oral inhaler INHALE ONE PUFF INTO THE LUNGS ONCE DAILY 3 each 3     Urea 40 % CREA Apply to elbow BID PRN 85 g 11     VITAMIN D 1000 UNIT OR CAPS Take 3,000 Units by mouth         Family and Social History:  Reviewed and unchanged from prior.  Please see initial consultation dated 8/28/17 for further details.    Physical Exam:  /88 (BP Location: Left arm, Patient Position: Sitting, Cuff Size: Adult Regular)   Pulse 92   Temp 98.9  F (37.2  C)   Resp 18   Wt 73.6 kg (162 lb 3.2 oz)   SpO2 98%   BMI 28.73 kg/m    General:  Well appearing adult female in NAD.  Alert and oriented x 3.  HEENT:  Normocephalic.  Sclera anicteric. MMM.  Lymph:  No palpable cervical, supraclavicular, or axillary LAD.  There is a small palpable density just medial to the medial edge of her right axillary incision.  Chest:  CTA bilaterally.  No wheezes or crackles.  CV:  RRR.  Nl S1 and S2.  No audible m/r/g.  Breast:  Right breast smaller and overall firmer than the left breast, there is puckering/retraction of the lower right breast without palpable underlying mass. There is palpable lump superior to the right breast lumpectomy site. There are no other discretely palpable masses in either breast.  Bilateral nipples are everted and without discharge.  Abd:  Soft/ND.   Ext:  No pitting edema of the bilateral lower extremities.    Musculo:  Full ROM of the bilateral upper extremities.  Neuro:  Cranial nerves grossly intact.  Gait stable.    Psych:  Mood and affect appear normal.  Skin:  No visible concerning skin rashes or  lesions.    Laboratory/Imaging Studies:  I personally reviewed the below images:    9/30/2024 Bilateral screening mammograms: These images were reviewed with Dr. Hart, breast radiologist in clinic today.  Findings: The breasts are heterogeneously dense, which may obscure small masses.  Right breast conservation therapy changes.  There is no radiographic evidence of malignancy.                                                                    IMPRESSION: ACR BI-RADS Category 2: Benign      ASSESSMENT/PLAN:  Ms. Alonzo is a 64-year-old female with a h/o grade 3, T2N0M0, triple-negative infiltrating ductal carcinoma of the right breast s/p 12 weeks of Taxol along with 3 cycles pembrolizumab, 2 cycles of adriamycin and cyclophosphamide, lumpectomy ([id number]), and radiation.     1.  Right breast cancer:   Ms. Alonzo is 6 years, 7 months out from excision of a right breast cancer.      She is asymptomatic of disease recurrence on history taken today and clinical exam is without concerning findings. She has what seemed to be an oil cyst lateral to the nipple of her right breast (10' O clock position, 9cm from nipple). She has opted to continue annual screening mammogram and surveillance in Oncology clinic.    - I personally reviewed the images of the mammograms performed today along with reviewing them with a radiologist. The mammograms showed scattered fibroglandular densities without new mass or architectural distortion.  - Of note, palpable breast lump at 12:00, superior to the right breast lumpectomy incision correlates with fat necrosis on the mammogram.  Palpable lump at 10:00, 9 cm from the nipple correlates with a known oil cyst (see right breast ultrasound 2019).  Therefore, there are no concerning findings on exam today.    2.  Colyn polyps:  No change since last visit.  1 cm and 8 mm polyps removed 10/2021.  Her colonoscopy is scheduled for 10/31/24.     3.  Weight management:  Patients who lose weight after  breast cancer and those that maintain a normal BMI have improved breast cancer outcomes.  Weight has been stable since last visit. Encouraged to exercise.     4.  Followup:   Colonoscopy planned for 10/31/24  Bilateral screening mammograms and visit with me 10/1/2025 or later.    Patient seen and discussed with Dr. Bradshaw.   Bayron Gallegos DO   Hematology/Oncology/BMT Fellow PGY5  Pager: [phone]    Patient was seen and discussed with oncology fellow, Dr. Gallegos.  The oncology fellow was personally supervised by me during the patient examination. I personally verified the medical history, performed a physical exam and the medical decision-making. I made appropriate changes to the documentation and the assessment and plan based on my verification, exam, and medical decision making.    I personally spent 30 minutes on the date of the encounter doing chart review, review of test results, interpretation of tests, patient visit, documentation, and discussion with other provider(s)       Fara Bradshaw MD      Again, thank you for allowing me to participate in the care of your patient.        Sincerely,        Fara Bradshaw MD

## 2024-09-30 NOTE — NURSING NOTE
"Oncology Rooming Note    September 30, 2024 10:30 AM   Ashleigh Alonzo is a 64 year old female who presents for:    Chief Complaint   Patient presents with    Oncology Clinic Visit     Malignant neoplasm of upper-inner quadrant of right brest     Initial Vitals: /88 (BP Location: Left arm, Patient Position: Sitting, Cuff Size: Adult Regular)   Pulse 92   Temp 98.9  F (37.2  C)   Resp 18   Wt 73.6 kg (162 lb 3.2 oz)   SpO2 98%   BMI 28.73 kg/m   Estimated body mass index is 28.73 kg/m  as calculated from the following:    Height as of 10/9/23: 1.6 m (5' 3\").    Weight as of this encounter: 73.6 kg (162 lb 3.2 oz). Body surface area is 1.81 meters squared.  Data Unavailable Comment: Data Unavailable   No LMP recorded. Patient is postmenopausal.  Allergies reviewed: Yes  Medications reviewed: Yes    Medications: Medication refills not needed today.  Pharmacy name entered into Fii[facility]:    [place], [place] - [address] [place], [place] - [address] [facility], [address]    Frailty Screening:   Is the patient here for a new oncology consult visit in cancer care?       Clinical concerns: none    Eder Valdovinos LPN            "

## 2024-10-10 NOTE — TELEPHONE ENCOUNTER
Standard Miralax Bowel Prep recommended due to standard bowel prep. Instructions were sent via i[employer]pert.

## 2024-10-30 ENCOUNTER — ANESTHESIA EVENT (OUTPATIENT)
Dept: GASTROENTEROLOGY | Facility: CLINIC | Age: 64
End: 2024-10-30
Payer: COMMERCIAL

## 2024-10-30 ASSESSMENT — LIFESTYLE VARIABLES: TOBACCO_USE: 1

## 2024-10-30 ASSESSMENT — COPD QUESTIONNAIRES: COPD: 1

## 2024-10-30 NOTE — ANESTHESIA PREPROCEDURE EVALUATION
Anesthesia Pre-Procedure Evaluation    Patient: Ashleigh Alonzo   MRN: [id number] : 1960        Procedure : Procedure(s):  Colonoscopy          Past Medical History:   Diagnosis Date    Acute cystitis without hematuria 10/26/2017    Clostridium difficile diarrhea 2018    Neutropenic fever (H) 01/10/2018     She was hospitalized 1/10/18 - 18 with neutropenic fever    Neutropenic sepsis (H) 2018    hospitalized again  from 18 - 18 with neutropenic fever, mucositis, and C. difficile colitis     Nonsenile cataract     Pneumonia 2017    She was hospitalized 17 - 17 with fevers ranging from 102 - 105.  CT chest was consistent with pneumonitis.  She also had transaminitis in the 150 range.  She was started on corticosteroids.  Pembrolizumab was stopped     S/P radiation therapy     5,256 cGy completed on 2018 to [facility] with Dr. Zhong      Past Surgical History:   Procedure Laterality Date    APPENDECTOMY  10/06/2015    [facility]    BREAST BIOPSY, CORE RT/LT Right 2017    BRONCHOSCOPY (RIGID OR FLEXIBLE), DIAGNOSTIC N/A 2018    Procedure: COMBINED BRONCHOSCOPY (RIGID OR FLEXIBLE), LAVAGE;  COMBINED BRONCOSCOY (RIGID OR FLEXIBLE), LAVAGE;  Surgeon: Samir Pettit MD;  [facility]    CATARACT IOL, RT/LT      CLEAR LENS REPLACEMENT Bilateral 2016    [facility]    COLONOSCOPY  11/15/2011    Procedure:COLONOSCOPY; Colonoscopy, screening; Surgeon:ANASTASIA BUNCH; [facility]    COLONOSCOPY N/A 10/12/2021    Procedure: Colonoscopy, Flexible, With Lesion Removal Using Snare;  Surgeon: Jaxon Lechuga MD;  [facility]    COLONOSCOPY WITH CO2 INSUFFLATION N/A 10/12/2021    Procedure: COLONOSCOPY, WITH CO2 INSUFFLATION;  Surgeon: Jaxon Lechuga MD;  [facility]    INSERT PORT VASCULAR ACCESS Left 2017    Procedure: INSERT PORT VASCULAR ACCESS;  Single Lumen Chest Power Port;  Surgeon: Iggy  Leif Lu PA-C;  [facility]    LAPAROSCOPIC CHOLECYSTECTOMY N/A 2021    Procedure: CHOLECYSTECTOMY, LAPAROSCOPIC, WITH CHOLANGIOGRAM;  Surgeon: Jaxon Lechuga MD;  [facility]    LUMPECTOMY BREAST WITH SENTINEL NODE, COMBINED Right 2018    Procedure: COMBINED LUMPECTOMY BREAST WITH SENTINEL NODE;  Right Wire Localized Lumpectomy, Right [place] Lymph Node Biopsy And Freddy Cath Removal;  Surgeon: Atul Gates MD;  [facility]    REMOVE PORT VASCULAR ACCESS N/A 2018    Procedure: REMOVE PORT VASCULAR ACCESS;;  Surgeon: Atul Gates MD;  [facility]    TUBAL LIGATION  2004      No Known Allergies   Social History     Tobacco Use    Smoking status: Former     Current packs/day: 0.00     Average packs/day: 0.8 packs/day for 20.0 years (15.0 ttl pk-yrs)     Types: Cigarettes     Start date: 1980     Quit date: 2000     Years since quittin.8    Smokeless tobacco: Never   Substance Use Topics    Alcohol use: Yes     Comment: Daily to weekly use (beer)      Wt Readings from Last 1 Encounters:   24 73.6 kg (162 lb 3.2 oz)        Anesthesia Evaluation   Pt has had prior anesthetic. Type: General and MAC.        ROS/MED HX  ENT/Pulmonary: Comment: Lung nodule    (+) sleep apnea (uses dental device), doesn't use CPAP,              tobacco use, Past use,  15  Pack-Year Hx,       moderate,  COPD,              Neurologic: Comment: Drug-induced polyneuropathy        Cardiovascular:     (+) Dyslipidemia - -   -  - -                                 Previous cardiac testing   Echo: Date: Results:    Stress Test:  Date: Results:    ECG Reviewed:  Date: 18 Results:  SR  Cath:  Date: Results:      METS/Exercise Tolerance:     Hematologic:  - neg hematologic  ROS     Musculoskeletal:  - neg musculoskeletal ROS     GI/Hepatic:     (+)        bowel prep,         (-) GERD   Renal/Genitourinary:  - neg Renal ROS     Endo:     (+)            Chronic steroid usage  for COPD.         Psychiatric/Substance Use:     (+) psychiatric history anxiety       Infectious Disease:  - neg infectious disease ROS     Malignancy:   (+) Malignancy, History of Breast.Breast CA status post Radiation, Chemo and Surgery.      Other:  - neg other ROS          Physical Exam    Airway        Mallampati: II   TM distance: > 3 FB   Neck ROM: full   Mouth opening: > 3 cm    Respiratory Devices and Support         Dental       (+) Multiple crowns, permanant bridges      Cardiovascular   cardiovascular exam normal       Rhythm and rate: regular and normal     Pulmonary   pulmonary exam normal        breath sounds clear to auscultation           OUTSIDE LABS:  CBC:   Lab Results   Component Value Date    WBC 7.3 10/09/2023    WBC 7.0 03/14/2022    HGB 14.9 10/09/2023    HGB 13.2 03/14/2022    HCT 43.3 10/09/2023    HCT 38.3 03/14/2022     10/09/2023     03/14/2022     BMP:   Lab Results   Component Value Date     10/09/2023     08/31/2022    POTASSIUM 4.3 10/09/2023    POTASSIUM 4.8 08/31/2022    CHLORIDE 100 10/09/2023    CHLORIDE 106 08/31/2022    CO2 24 10/09/2023    CO2 28 08/31/2022    BUN 11.3 10/09/2023    BUN 11 08/31/2022    CR 0.71 10/09/2023    CR 0.76 08/31/2022     (H) 10/09/2023    GLC 95 08/31/2022     COAGS:   Lab Results   Component Value Date    PTT 37 02/01/2018    INR 1.12 02/01/2018    FIBR 647 (H) 02/01/2018     POC:   Lab Results   Component Value Date     (H) 02/27/2018     HEPATIC:   Lab Results   Component Value Date    ALBUMIN 4.5 10/09/2023    PROTTOTAL 7.8 10/09/2023    ALT 37 10/09/2023    AST 37 10/09/2023    GGT 59 (H) 03/31/2021    ALKPHOS 137 (H) 10/09/2023    BILITOTAL 0.4 10/09/2023     OTHER:   Lab Results   Component Value Date    PH 7.433 01/29/2016    LACT 0.7 01/31/2018    A1C 5.1 09/20/2022    JAVIER 9.4 10/09/2023    PHOS 2.1 (L) 01/13/2018    MAG 1.9 01/30/2018    TSH 2.43 06/15/2021    CRP 84.0 (H) 10/12/2017        Anesthesia Plan    ASA Status:  3    NPO Status:  NPO Appropriate    Anesthesia Type: MAC.     - Reason for MAC: immobility needed, straight local not clinically adequate   Induction: Intravenous, Propofol.   Maintenance: TIVA.        Consents    Anesthesia Plan(s) and associated risks, benefits, and realistic alternatives discussed. Questions answered and patient/representative(s) expressed understanding.     - Discussed: Risks, Benefits and Alternatives for BOTH SEDATION and the PROCEDURE were discussed     - Discussed with:  Patient      - Extended Intubation/Ventilatory Support Discussed: No.      - Patient is DNR/DNI Status: No     Use of blood products discussed: No .     Postoperative Care       PONV prophylaxis: Background Propofol Infusion     Comments:    Other Comments: The risks and benefits of anesthesia, and the alternatives where applicable, have been discussed with the patient, and they wish to proceed.              JENNIFER Cavanaugh CRNA    I have reviewed the pertinent notes and labs in the chart from the past 30 days and (re)examined the patient.  Any updates or changes from those notes are reflected in this note.

## 2024-10-31 ENCOUNTER — ANESTHESIA (OUTPATIENT)
Dept: GASTROENTEROLOGY | Facility: CLINIC | Age: 64
End: 2024-10-31
Payer: COMMERCIAL

## 2024-10-31 ENCOUNTER — HOSPITAL ENCOUNTER (OUTPATIENT)
Facility: CLINIC | Age: 64
Discharge: HOME | End: 2024-10-31
Attending: SURGERY | Admitting: SURGERY
Payer: COMMERCIAL

## 2024-10-31 VITALS
HEART RATE: 97 BPM | TEMPERATURE: 97 F | SYSTOLIC BLOOD PRESSURE: 91 MMHG | WEIGHT: 162.2 LBS | DIASTOLIC BLOOD PRESSURE: 59 MMHG | RESPIRATION RATE: 17 BRPM | OXYGEN SATURATION: 98 % | BODY MASS INDEX: 28.73 KG/M2

## 2024-10-31 LAB — COLONOSCOPY: NORMAL

## 2024-10-31 PROCEDURE — 45380 COLONOSCOPY AND BIOPSY: CPT | Performed by: SURGERY

## 2024-10-31 PROCEDURE — 250N000009 HC RX 250: Performed by: NURSE ANESTHETIST, CERTIFIED REGISTERED

## 2024-10-31 PROCEDURE — 88305 TISSUE EXAM BY PATHOLOGIST: CPT | Mod: TC | Performed by: SURGERY

## 2024-10-31 PROCEDURE — 370N000017 HC ANESTHESIA TECHNICAL FEE, PER MIN: Performed by: SURGERY

## 2024-10-31 PROCEDURE — 250N000011 HC RX IP 250 OP 636: Performed by: NURSE ANESTHETIST, CERTIFIED REGISTERED

## 2024-10-31 PROCEDURE — 45385 COLONOSCOPY W/LESION REMOVAL: CPT | Mod: PT | Performed by: SURGERY

## 2024-10-31 PROCEDURE — 88305 TISSUE EXAM BY PATHOLOGIST: CPT | Mod: 26

## 2024-10-31 RX ORDER — ONDANSETRON 2 MG/ML
4 INJECTION INTRAMUSCULAR; INTRAVENOUS EVERY 6 HOURS PRN
Status: DISCONTINUED | OUTPATIENT
Start: 2024-10-31 | End: 2024-10-31 | Stop reason: HOSPADM

## 2024-10-31 RX ORDER — PROPOFOL 10 MG/ML
INJECTION, EMULSION INTRAVENOUS CONTINUOUS PRN
Status: DISCONTINUED | OUTPATIENT
Start: 2024-10-31 | End: 2024-10-31

## 2024-10-31 RX ORDER — PROPOFOL 10 MG/ML
INJECTION, EMULSION INTRAVENOUS PRN
Status: DISCONTINUED | OUTPATIENT
Start: 2024-10-31 | End: 2024-10-31

## 2024-10-31 RX ORDER — NALOXONE HYDROCHLORIDE 0.4 MG/ML
0.2 INJECTION, SOLUTION INTRAMUSCULAR; INTRAVENOUS; SUBCUTANEOUS
Status: DISCONTINUED | OUTPATIENT
Start: 2024-10-31 | End: 2024-10-31 | Stop reason: HOSPADM

## 2024-10-31 RX ORDER — ONDANSETRON 4 MG/1
4 TABLET, ORALLY DISINTEGRATING ORAL EVERY 6 HOURS PRN
Status: DISCONTINUED | OUTPATIENT
Start: 2024-10-31 | End: 2024-10-31 | Stop reason: HOSPADM

## 2024-10-31 RX ORDER — DEXAMETHASONE SODIUM PHOSPHATE 10 MG/ML
4 INJECTION, SOLUTION INTRAMUSCULAR; INTRAVENOUS
Status: DISCONTINUED | OUTPATIENT
Start: 2024-10-31 | End: 2024-10-31 | Stop reason: HOSPADM

## 2024-10-31 RX ORDER — ONDANSETRON 4 MG/1
4 TABLET, ORALLY DISINTEGRATING ORAL EVERY 30 MIN PRN
Status: DISCONTINUED | OUTPATIENT
Start: 2024-10-31 | End: 2024-10-31 | Stop reason: HOSPADM

## 2024-10-31 RX ORDER — PROCHLORPERAZINE MALEATE 5 MG/1
10 TABLET ORAL EVERY 6 HOURS PRN
Status: DISCONTINUED | OUTPATIENT
Start: 2024-10-31 | End: 2024-10-31 | Stop reason: HOSPADM

## 2024-10-31 RX ORDER — LIDOCAINE 40 MG/G
CREAM TOPICAL
Status: DISCONTINUED | OUTPATIENT
Start: 2024-10-31 | End: 2024-10-31 | Stop reason: HOSPADM

## 2024-10-31 RX ORDER — LIDOCAINE HYDROCHLORIDE 20 MG/ML
INJECTION, SOLUTION INFILTRATION; PERINEURAL PRN
Status: DISCONTINUED | OUTPATIENT
Start: 2024-10-31 | End: 2024-10-31

## 2024-10-31 RX ORDER — NALOXONE HYDROCHLORIDE 0.4 MG/ML
0.1 INJECTION, SOLUTION INTRAMUSCULAR; INTRAVENOUS; SUBCUTANEOUS
Status: DISCONTINUED | OUTPATIENT
Start: 2024-10-31 | End: 2024-10-31 | Stop reason: HOSPADM

## 2024-10-31 RX ORDER — ONDANSETRON 2 MG/ML
4 INJECTION INTRAMUSCULAR; INTRAVENOUS
Status: DISCONTINUED | OUTPATIENT
Start: 2024-10-31 | End: 2024-10-31 | Stop reason: HOSPADM

## 2024-10-31 RX ORDER — NALOXONE HYDROCHLORIDE 0.4 MG/ML
0.4 INJECTION, SOLUTION INTRAMUSCULAR; INTRAVENOUS; SUBCUTANEOUS
Status: DISCONTINUED | OUTPATIENT
Start: 2024-10-31 | End: 2024-10-31 | Stop reason: HOSPADM

## 2024-10-31 RX ORDER — FLUMAZENIL 0.1 MG/ML
0.2 INJECTION, SOLUTION INTRAVENOUS
Status: DISCONTINUED | OUTPATIENT
Start: 2024-10-31 | End: 2024-10-31 | Stop reason: HOSPADM

## 2024-10-31 RX ORDER — ONDANSETRON 2 MG/ML
4 INJECTION INTRAMUSCULAR; INTRAVENOUS EVERY 30 MIN PRN
Status: DISCONTINUED | OUTPATIENT
Start: 2024-10-31 | End: 2024-10-31 | Stop reason: HOSPADM

## 2024-10-31 RX ADMIN — LIDOCAINE HYDROCHLORIDE 50 MG: 20 INJECTION, SOLUTION INFILTRATION; PERINEURAL at 09:28

## 2024-10-31 RX ADMIN — PROPOFOL 200 MCG/KG/MIN: 10 INJECTION, EMULSION INTRAVENOUS at 09:29

## 2024-10-31 RX ADMIN — PROPOFOL 80 MG: 10 INJECTION, EMULSION INTRAVENOUS at 09:29

## 2024-10-31 ASSESSMENT — ACTIVITIES OF DAILY LIVING (ADL)
ADLS_ACUITY_SCORE: 0

## 2024-10-31 ASSESSMENT — COPD QUESTIONNAIRES: CAT_SEVERITY: MODERATE

## 2024-10-31 NOTE — H&P
Patient seen for Endoscopy    HPI:  Patient is a 64 year old female here for endoscopy. Not taking blood thinning medications. No MI or CVA history. No issues with previous sedation. No recent acute illness.    Review Of Systems    Skin: negative  Ears/Nose/Throat: negative  Respiratory: No shortness of breath, dyspnea on exertion, cough, or hemoptysis  Cardiovascular: negative  Gastrointestinal: negative  Genitourinary: negative  Musculoskeletal: negative  Neurologic: negative  Hematologic/Lymphatic/Immunologic: negative  Endocrine: negative      Past Medical History:   Diagnosis Date    Acute cystitis without hematuria 10/26/2017    Clostridium difficile diarrhea 01/12/2018    Neutropenic fever (H) 01/10/2018     She was hospitalized 1/10/18 - 1/13/18 with neutropenic fever    Neutropenic sepsis (H) 01/29/2018    hospitalized again  from 1/29/18 - 2/8/18 with neutropenic fever, mucositis, and C. difficile colitis     Nonsenile cataract     Pneumonia 11/11/2017    She was hospitalized 11/11/17 - 11/17/17 with fevers ranging from 102 - 105.  CT chest was consistent with pneumonitis.  She also had transaminitis in the 150 range.  She was started on corticosteroids.  Pembrolizumab was stopped     S/P radiation therapy     [facility]       Past Surgical History:   Procedure Laterality Date    APPENDECTOMY  10/06/2015    [facility]    BREAST BIOPSY, CORE RT/LT Right 08/22/2017    BRONCHOSCOPY (RIGID OR FLEXIBLE), DIAGNOSTIC N/A 02/06/2018    Procedure: COMBINED BRONCHOSCOPY (RIGID OR FLEXIBLE), LAVAGE;  COMBINED BRONCOSCOY (RIGID OR FLEXIBLE), LAVAGE;  Surgeon: Samir Pettit MD;  [facility]    CATARACT IOL, RT/LT      CLEAR LENS REPLACEMENT Bilateral 11/2016    [facility]    COLONOSCOPY  11/15/2011    Procedure:COLONOSCOPY; Colonoscopy, screening; Surgeon:ANASTASIA BUNCH; [facility]    COLONOSCOPY N/A 10/12/2021    Procedure:  Colonoscopy, Flexible, With Lesion Removal Using Snare;  Surgeon: Jaxon Lechuga MD;  [facility]    COLONOSCOPY WITH CO2 INSUFFLATION N/A 10/12/2021    Procedure: COLONOSCOPY, WITH CO2 INSUFFLATION;  Surgeon: Jaxon Lechuga MD;  [facility]    INSERT PORT VASCULAR ACCESS Left 09/01/2017    Procedure: INSERT PORT VASCULAR ACCESS;  Single Lumen Chest Power Port;  Surgeon: Leif Parkinson PA-C;  [facility]    LAPAROSCOPIC CHOLECYSTECTOMY N/A 4/30/2021    Procedure: CHOLECYSTECTOMY, LAPAROSCOPIC, WITH CHOLANGIOGRAM;  Surgeon: Jaxon Lechuga MD;  [facility]    LUMPECTOMY BREAST WITH SENTINEL NODE, COMBINED Right 02/27/2018    Procedure: COMBINED LUMPECTOMY BREAST WITH SENTINEL NODE;  Right Wire Localized Lumpectomy, Right [place] Lymph Node Biopsy And Freddy Cath Removal;  Surgeon: Atul Gates MD;  [facility]    REMOVE PORT VASCULAR ACCESS N/A 02/27/2018    Procedure: REMOVE PORT VASCULAR ACCESS;;  Surgeon: Atul Gates MD;  [facility]    TUBAL LIGATION  12/2004       Family History   Problem Relation Age of Onset    Macular Degeneration Mother     Cardiovascular Father 46        MI    Eye Disorder Father     Cerebrovascular Disease Father     Colon Polyps Sister         tubular adenoma polyps    Neurologic Disorder Brother     Eye Surgery Maternal Grandmother         cataract    Prostate Cancer Maternal Grandfather     Prostate Cancer Maternal Uncle     Cancer Maternal Uncle         Throat cancer    Diabetes No family hx of     Hypertension No family hx of     Glaucoma No family hx of        Social History     Socioeconomic History    Marital status:      Spouse name: Aly    Number of children: 2    Years of education: Not on file    Highest education level: Not on file   Occupational History    Occupation: Equipment dispatcher     Employer: [employer]    Tobacco Use    Smoking status: Former     Current packs/day: 0.00     Average  packs/day: 0.8 packs/day for 20.0 years (15.0 ttl pk-yrs)     Types: Cigarettes     Start date: 1980     Quit date: 2000     Years since quittin.8    Smokeless tobacco: Never   Vaping Use    Vaping status: Never Used   Substance and Sexual Activity    Alcohol use: Yes     Comment: Daily to weekly use (beer)    Drug use: No    Sexual activity: Not Currently     Partners: Male     Birth control/protection: Post-menopausal, Female Surgical     Comment: Hx. tubal ligation   Other Topics Concern    Parent/sibling w/ CABG, MI or angioplasty before 65F 55M? Yes   Social History Narrative    Patient lives in [facility]. She [place] in [place] with her . 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 No recent travel. No international travel. No sick contacts or exposure to small children (she has small grand children, 1-1 y/o but has not seen them in a few weeks). They have two dogs and a cat, and she takes care of the litter box sometimes, however she has not clean after the cat since her cancer diagnosis.     Social Drivers of Health     Financial Resource Strain: Low Risk  (10/9/2023)    Financial Resource Strain     Within the past 12 months, have you or your family members you live with been unable to get utilities (heat, electricity) when it was really needed?: No   Food Insecurity: Low Risk  (10/9/2023)    Food Insecurity     Within the past 12 months, did you worry that your food would run out before you got money to buy more?: No     Within the past 12 months, did the food you bought just not last and you didn t have money to get more?: No   Transportation Needs: Low Risk  (10/9/2023)    Transportation Needs     Within the past 12 months, has lack of transportation kept you from medical appointments, getting your medicines, non-medical meetings or appointments, work, or from getting things that you need?: No   Physical Activity: Not on file   Stress:  No Stress Concern Present (5/4/2020)    [employer] [place] [employer] - Occupational Stress Questionnaire     Feeling of Stress : Not at all   Social Connections: Unknown (5/4/2020)    Social Connection and Isolation Panel [NHANES]     Frequency of Communication with Friends and Family: Once a week     Frequency of Social Gatherings with Friends and Family: Not on file     Attends Jainism Services: Not on file     Active Member of Clubs or Organizations: Not on file     Attends Club or Organization Meetings: Not on file     Marital Status: Not on file   Interpersonal Safety: Low Risk  (10/31/2024)    Interpersonal Safety     Do you feel physically and emotionally safe where you currently live?: Yes     Within the past 12 months, have you been hit, slapped, kicked or otherwise physically hurt by someone?: No     Within the past 12 months, have you been humiliated or emotionally abused in other ways by your partner or ex-partner?: No   Housing Stability: Low Risk  (10/9/2023)    Housing Stability     Do you have housing? : Yes     Are you worried about losing your housing?: No       No current outpatient medications on file.       Medications and history reviewed    Physical exam:  Vitals: /76   Pulse 105   Temp 97  F (36.1  C) (Temporal)   Resp 17   Wt 73.6 kg (162 lb 3.2 oz)   SpO2 95%   BMI 28.73 kg/m    BMI= Body mass index is 28.73 kg/m .    Constitutional: Healthy, alert, non-distressed   Head: Normo-cephalic, atraumatic, no lesions, masses or tenderness   Cardiovascular: RRR, no new murmurs, +S1, +S2 heart sounds, no clicks, rubs or gallops   Respiratory: CTAB, no rales, rhonchi or wheezing, equal chest rise, good respiratory effort   Gastrointestinal: Soft, non-tender, non distended, no rebound rigidity or guarding, no masses or hernias palpated   : Deferred  Musculoskeletal: Moves all extremities, normal  strength, no deformities noted   Skin: No suspicious lesions or rashes    Psychiatric: Mentation appears normal, affect appropriate   Hematologic/Lymphatic/Immunologic: Normal cervical and supraclavicular lymph nodes   Patient able to get up on table without difficulty.    Labs show:  No results found for this or any previous visit (from the past 24 hours).    Assessment: Endoscopy  Plan: Pt cleared for anesthesia for proposed procedure.    Moris Moreno, DO

## 2024-10-31 NOTE — LETTER
Ashleigh Alonzo  [id number] [facility]  [place] [address]  November 6, 2024    Dear Ashleigh Perera,  This letter is to inform you of the results of your pathology report from your colonoscopy.  Your pathology report was:  Final Diagnosis   Cecum, polyp, biopsy/polypectomy:  -Sessile serrated adenoma fragments; negative for cytologic dysplasia or malignancy.   These are benign polyps. These types of polyps do carry a small risk of developing into a cancer over time if not removed. Yours were completely removed at the time of your colonoscopy. You should have another surveillance colonoscopy in 5 years.  If you have further questions please don t hesitate to call our clinic at [phone].   Sincerely,     Moris Moreno, DO

## 2024-10-31 NOTE — ANESTHESIA POSTPROCEDURE EVALUATION
Patient: Ashleigh Alonzo    Procedure: Procedure(s):  COLONOSCOPY, WITH POLYPECTOMY       Anesthesia Type:  MAC    Note:  Disposition: Outpatient   Postop Pain Control: Uneventful            Sign Out: Well controlled pain   PONV: No   Neuro/Psych: Uneventful            Sign Out: Acceptable/Baseline neuro status   Airway/Respiratory: Uneventful            Sign Out: Acceptable/Baseline resp. status   CV/Hemodynamics: Uneventful            Sign Out: Acceptable CV status   Other NRE: NONE   DID A NON-ROUTINE EVENT OCCUR? No    Event details/Postop Comments:  Pt was happy with anesthesia care.  No complications.  I will follow up with the pt if needed.           Last vitals:  Vitals Value Taken Time   /77 10/31/24 1000   Temp     Pulse 99 10/31/24 1000   Resp     SpO2 97 % 10/31/24 1001   Vitals shown include unfiled device data.    Electronically Signed By: JENNIFER Marroquin CRNA  October 31, 2024  10:02 AM

## 2024-10-31 NOTE — DISCHARGE INSTRUCTIONS
[facility]    Home Care Following Endoscopy          Activity:  You have just undergone an endoscopic procedure usually performed with conscious sedation.  Do not work or operate machinery (including a car) for at least 12 hours.    I encourage you to walk and attempt to pass this air as soon as possible.    Diet:  Return to the diet you were on before your procedure but eat lightly for the first 12-24 hours.  Drink plenty of water.  Resume any regular medications unless otherwise advised by your physician.  Please begin any new medication prescribed as a result of your procedure as directed by your physician.   If you had any biopsy or polyp removed please refrain from aspirin or aspirin products for 2 days.  If on Coumadin please restart as instructed by your physician.   Pain:  You may take Tylenol as needed for pain.  Expected Recovery:  You can expect some mild abdominal fullness and/or discomfort due to the air used to inflate your intestinal tract. It is also normal to have a mild sore throat after upper endoscopy.    Call Your Physician if You Have:  After Colonoscopy:  Worsening persisting abdominal pain which is worse with activity.  Fevers (>101 degrees F), chills or shakes.  Passage of continued blood with bowel movements.     Any questions or concerns about your recovery, please call [phone] or after hours [phone] ([phone]) Nurse Advice Line.    Follow-up Care:  You did have polyps/biopsy tissue sample(s) removed.  The polyps/biopsy tissue sample(s) will be sent to pathology.    You should receive letter in your My Chart from Dr. Moreno with your results within 1-2 weeks. If you do not participate in My Chart a physical letter will come in the mail in 2-3 weeks.  Please call if you have not received a notification of your results.  If asked to return to clinic please make an appointment 1 week after your procedure.  Call [phone].

## 2024-10-31 NOTE — ANESTHESIA CARE TRANSFER NOTE
Patient: Ashleigh Alonzo    Procedure: Procedure(s):  COLONOSCOPY, WITH POLYPECTOMY       Diagnosis: Special screening for malignant neoplasms, colon [Z12.11]  Diagnosis Additional Information: No value filed.    Anesthesia Type:   MAC     Note:    Oropharynx: oropharynx clear of all foreign objects and spontaneously breathing  Level of Consciousness: drowsy  Oxygen Supplementation: face mask    Independent Airway: airway patency satisfactory and stable  Dentition: dentition unchanged  Vital Signs Stable: post-procedure vital signs reviewed and stable  Report to RN Given: handoff report given  Patient transferred to: Phase II    Handoff Report: Identifed the Patient, Identified the Reponsible Provider, Reviewed the pertinent medical history, Discussed the surgical course, Reviewed Intra-OP anesthesia mangement and issues during anesthesia, Set expectations for post-procedure period and Allowed opportunity for questions and acknowledgement of understanding      Vitals:  Vitals Value Taken Time   /77 10/31/24 1000   Temp     Pulse 99 10/31/24 1000   Resp     SpO2 97 % 10/31/24 1001   Vitals shown include unfiled device data.    Electronically Signed By: JENNIFER Marroquin CRNA  October 31, 2024  10:02 AM

## 2024-11-11 ENCOUNTER — OFFICE VISIT (OUTPATIENT)
Dept: FAMILY MEDICINE | Facility: CLINIC | Age: 64
End: 2024-11-11
Payer: COMMERCIAL

## 2024-11-11 VITALS
HEART RATE: 88 BPM | BODY MASS INDEX: 28 KG/M2 | TEMPERATURE: 98.3 F | OXYGEN SATURATION: 98 % | WEIGHT: 158 LBS | SYSTOLIC BLOOD PRESSURE: 135 MMHG | DIASTOLIC BLOOD PRESSURE: 79 MMHG | RESPIRATION RATE: 20 BRPM | HEIGHT: 63 IN

## 2024-11-11 DIAGNOSIS — C50.211 MALIGNANT NEOPLASM OF UPPER-INNER QUADRANT OF RIGHT BREAST IN FEMALE, ESTROGEN RECEPTOR NEGATIVE (H): Chronic | ICD-10-CM

## 2024-11-11 DIAGNOSIS — Z00.00 ROUTINE GENERAL MEDICAL EXAMINATION AT A HEALTH CARE FACILITY: Primary | ICD-10-CM

## 2024-11-11 DIAGNOSIS — R91.8 LUNG NODULE, MULTIPLE: ICD-10-CM

## 2024-11-11 DIAGNOSIS — J44.9 CHRONIC OBSTRUCTIVE PULMONARY DISEASE, UNSPECIFIED COPD TYPE (H): Chronic | ICD-10-CM

## 2024-11-11 DIAGNOSIS — G62.0 DRUG-INDUCED POLYNEUROPATHY (H): ICD-10-CM

## 2024-11-11 DIAGNOSIS — E78.5 HYPERLIPIDEMIA LDL GOAL <130: Chronic | ICD-10-CM

## 2024-11-11 DIAGNOSIS — Z17.1 MALIGNANT NEOPLASM OF UPPER-INNER QUADRANT OF RIGHT BREAST IN FEMALE, ESTROGEN RECEPTOR NEGATIVE (H): Chronic | ICD-10-CM

## 2024-11-11 LAB
ERYTHROCYTE [DISTWIDTH] IN BLOOD BY AUTOMATED COUNT: 11.9 % (ref 10–15)
HCT VFR BLD AUTO: 41.1 % (ref 35–47)
HGB BLD-MCNC: 13.9 G/DL (ref 11.7–15.7)
MCH RBC QN AUTO: 33.3 PG (ref 26.5–33)
MCHC RBC AUTO-ENTMCNC: 33.8 G/DL (ref 31.5–36.5)
MCV RBC AUTO: 99 FL (ref 78–100)
PLATELET # BLD AUTO: 292 10E3/UL (ref 150–450)
RBC # BLD AUTO: 4.17 10E6/UL (ref 3.8–5.2)
WBC # BLD AUTO: 7.5 10E3/UL (ref 4–11)

## 2024-11-11 PROCEDURE — 80048 BASIC METABOLIC PNL TOTAL CA: CPT | Performed by: FAMILY MEDICINE

## 2024-11-11 PROCEDURE — 99213 OFFICE O/P EST LOW 20 MIN: CPT | Mod: 25 | Performed by: FAMILY MEDICINE

## 2024-11-11 PROCEDURE — 85027 COMPLETE CBC AUTOMATED: CPT | Performed by: FAMILY MEDICINE

## 2024-11-11 PROCEDURE — 36415 COLL VENOUS BLD VENIPUNCTURE: CPT | Performed by: FAMILY MEDICINE

## 2024-11-11 PROCEDURE — 80061 LIPID PANEL: CPT | Performed by: FAMILY MEDICINE

## 2024-11-11 PROCEDURE — 99396 PREV VISIT EST AGE 40-64: CPT | Mod: 25 | Performed by: FAMILY MEDICINE

## 2024-11-11 PROCEDURE — 90673 RIV3 VACCINE NO PRESERV IM: CPT | Performed by: FAMILY MEDICINE

## 2024-11-11 PROCEDURE — 90471 IMMUNIZATION ADMIN: CPT | Performed by: FAMILY MEDICINE

## 2024-11-11 RX ORDER — SIMVASTATIN 40 MG
40 TABLET ORAL AT BEDTIME
Qty: 90 TABLET | Refills: 3 | Status: SHIPPED | OUTPATIENT
Start: 2024-11-11

## 2024-11-11 SDOH — HEALTH STABILITY: PHYSICAL HEALTH: ON AVERAGE, HOW MANY DAYS PER WEEK DO YOU ENGAGE IN MODERATE TO STRENUOUS EXERCISE (LIKE A BRISK WALK)?: 0 DAYS

## 2024-11-11 SDOH — HEALTH STABILITY: PHYSICAL HEALTH: ON AVERAGE, HOW MANY MINUTES DO YOU ENGAGE IN EXERCISE AT THIS LEVEL?: 0 MIN

## 2024-11-11 ASSESSMENT — PAIN SCALES - GENERAL: PAINLEVEL_OUTOF10: NO PAIN (0)

## 2024-11-11 ASSESSMENT — SOCIAL DETERMINANTS OF HEALTH (SDOH): HOW OFTEN DO YOU GET TOGETHER WITH FRIENDS OR RELATIVES?: THREE TIMES A WEEK

## 2024-11-11 NOTE — PROGRESS NOTES
"Preventive Care Visit  [facility]  Susanne Marin MD, Family Medicine  Nov 11, 2024      Assessment & Plan     Routine general medical examination at a health care facility      Hyperlipidemia LDL goal <130  Refill and recheck  - simvastatin (ZOCOR) 40 MG tablet; Take 1 tablet (40 mg) by mouth at bedtime.  - Lipid panel reflex to direct LDL Fasting; Future  - **CBC with platelets FUTURE 2mo; Future  - **Basic metabolic panel FUTURE 2mo; Future    Malignant neoplasm of upper-inner quadrant of right breast in female, estrogen receptor negative (H)  Per oncology    Chronic obstructive pulmonary disease, unspecified COPD type (H)  Per pulm    Drug-induced polyneuropathy (H)  Not on any meds at this time    Lung nodule, multiple  Per pulm    Patient has been advised of split billing requirements and indicates understanding: Yes        BMI  Estimated body mass index is 27.99 kg/m  as calculated from the following:    Height as of this encounter: 1.6 m (5' 3\").    Weight as of this encounter: 71.7 kg (158 lb).   Weight management plan: Discussed healthy diet and exercise guidelines    Counseling  Appropriate preventive services were addressed with this patient via screening, questionnaire, or discussion as appropriate for fall prevention, nutrition, physical activity, Tobacco-use cessation, social engagement, weight loss and cognition.  Checklist reviewing preventive services available has been given to the patient.  Reviewed patient's diet, addressing concerns and/or questions.           Subjective   Ashleigh Perera is a 64 year old, presenting for the following:  Physical        11/11/2024     1:13 PM   Additional Questions   Roomed by marie   Accompanied by self         11/11/2024     1:13 PM   Patient Reported Additional Medications   Patient reports taking the following new medications see chart          HPI    Doing well  No concerns  Seeing pulm and oncology        Health Care " Directive  Patient does not have a Health Care Directive: Discussed advance care planning with patient; information given to patient to review.      11/11/2024   General Health   How would you rate your overall physical health? Good   Feel stress (tense, anxious, or unable to sleep) Not at all            11/11/2024   Nutrition   Three or more servings of calcium each day? (!) NO   Diet: I don't know   How many servings of fruit and vegetables per day? (!) 0-1   How many sweetened beverages each day? 0-1            11/11/2024   Exercise   Days per week of moderate/strenous exercise 0 days   Average minutes spent exercising at this level 0 min      (!) EXERCISE CONCERN      11/11/2024   Social Factors   Frequency of gathering with friends or relatives Three times a week   Worry food won't last until get money to buy more No   Food not last or not have enough money for food? No   Do you have housing? (Housing is defined as stable permanent housing and does not include staying ouside in a car, in a tent, in an abandoned building, in an overnight shelter, or couch-surfing.) No   Are you worried about losing your housing? No   Lack of transportation? No   Unable to get utilities (heat,electricity)? No   Want help with housing or utility concern? No      (!) HOUSING CONCERN PRESENT      11/11/2024   Fall Risk   Fallen 2 or more times in the past year? No    Trouble with walking or balance? No        Patient-reported          11/11/2024   Dental   Dentist two times every year? Yes            11/11/2024   TB Screening   Were you born outside of the [place]? No              Today's PHQ-2 Score:       7/17/2024     9:24 AM   PHQ-2 ( 1999 [employer])   Q1: Little interest or pleasure in doing things 0   Q2: Feeling down, depressed or hopeless 0   PHQ-2 Score 0         11/11/2024   Substance Use   Alcohol more than 3/day or more than 7/wk No   Do you use any other substances recreationally? No        Social History     Tobacco Use     Smoking status: Former     Current packs/day: 0.00     Average packs/day: 0.8 packs/day for 20.0 years (15.0 ttl pk-yrs)     Types: Cigarettes     Start date: 1980     Quit date: 2000     Years since quittin.8     Passive exposure: Never    Smokeless tobacco: Never   Vaping Use    Vaping status: Never Used   Substance Use Topics    Alcohol use: Yes     Comment: Daily to weekly use (beer)    Drug use: No           2024   LAST FHS-7 RESULTS   1st degree relative breast or ovarian cancer No   Any relative bilateral breast cancer No   Any male have breast cancer No   Any ONE woman have BOTH breast AND ovarian cancer No   Any woman with breast cancer before 50yrs No   2 or more relatives with breast AND/OR ovarian cancer No   2 or more relatives with breast AND/OR bowel cancer No           Mammogram Screening - Mammogram every 1-2 years updated in Health Maintenance based on mutual decision making        2024   STI Screening   New sexual partner(s) since last STI/HIV test? No        History of abnormal Pap smear: No - age 30- 64 PAP with HPV every 5 years recommended        Latest Ref Rng & Units 2022    11:15 AM 2017     2:00 PM 2017     1:56 PM   PAP / HPV   PAP  Negative for Intraepithelial Lesion or Malignancy (NILM)      PAP (Historical)    NIL    HPV 16 DNA Negative Negative  Negative     HPV 18 DNA Negative Negative  Negative     Other HR HPV Negative Negative  Negative       ASCVD Risk   The 10-year ASCVD risk score (Semaj LIRA, et al., 2019) is: 5.5%    Values used to calculate the score:      Age: 64 years      Sex: Female      Is Non- : No      Diabetic: No      Tobacco smoker: No      Systolic Blood Pressure: 135 mmHg      Is BP treated: No      HDL Cholesterol: 55 mg/dL      Total Cholesterol: 196 mg/dL    Dexa with osteopenia about 4 years ago  Reviewed and updated as needed this visit by Provider                          Review of  "Systems  Constitutional, HEENT, cardiovascular, pulmonary, gi and gu systems are negative, except as otherwise noted.     Objective    Exam  /79   Pulse 88   Temp 98.3  F (36.8  C) (Tympanic)   Resp 20   Ht 1.6 m (5' 3\")   Wt 71.7 kg (158 lb)   SpO2 98%   BMI 27.99 kg/m     Estimated body mass index is 27.99 kg/m  as calculated from the following:    Height as of this encounter: 1.6 m (5' 3\").    Weight as of this encounter: 71.7 kg (158 lb).    Physical Exam  GENERAL: alert and no distress  EYES: Eyes grossly normal to inspection, PERRL and conjunctivae and sclerae normal  HENT: ear canals and TM's normal, nose and mouth without ulcers or lesions  NECK: no adenopathy, no asymmetry, masses, or scars  RESP: lungs clear to auscultation - no rales, rhonchi or wheezes  CV: regular rate and rhythm, normal S1 S2, no S3 or S4, no murmur, click or rub, no peripheral edema  ABDOMEN: soft, nontender, no hepatosplenomegaly, no masses and bowel sounds normal  MS: no gross musculoskeletal defects noted, no edema  SKIN: no suspicious lesions or rashes  NEURO: Normal strength and tone, mentation intact and speech normal  PSYCH: mentation appears normal, affect normal/bright        Signed Electronically by: Susanne Marin MD    "

## 2024-11-11 NOTE — PATIENT INSTRUCTIONS
Patient Education   Preventive Care Advice   This is general advice given by our system to help you stay healthy. However, your care team may have specific advice just for you. Please talk to your care team about your preventive care needs.  Nutrition  Eat 5 or more servings of fruits and vegetables each day.  Try wheat bread, brown rice and whole grain pasta (instead of white bread, rice, and pasta).  Get enough calcium and vitamin D. Check the label on foods and aim for 100% of the RDA (recommended daily allowance).  Lifestyle  Exercise at least 150 minutes each week  (30 minutes a day, 5 days a week).  Do muscle strengthening activities 2 days a week. These help control your weight and prevent disease.  No smoking.  Wear sunscreen to prevent skin cancer.  Have a dental exam and cleaning every 6 months.  Yearly exams  See your health care team every year to talk about:  Any changes in your health.  Any medicines your care team has prescribed.  Preventive care, family planning, and ways to prevent chronic diseases.  Shots (vaccines)   HPV shots (up to age 26), if you've never had them before.  Hepatitis B shots (up to age 59), if you've never had them before.  COVID-19 shot: Get this shot when it's due.  Flu shot: Get a flu shot every year.  Tetanus shot: Get a tetanus shot every 10 years.  Pneumococcal, hepatitis A, and RSV shots: Ask your care team if you need these based on your risk.  Shingles shot (for age 50 and up)  General health tests  Diabetes screening:  Starting at age 35, Get screened for diabetes at least every 3 years.  If you are younger than age 35, ask your care team if you should be screened for diabetes.  Cholesterol test: At age 39, start having a cholesterol test every 5 years, or more often if advised.  Bone density scan (DEXA): At age 50, ask your care team if you should have this scan for osteoporosis (brittle bones).  Hepatitis C: Get tested at least once in your life.  STIs (sexually  transmitted infections)  Before age 24: Ask your care team if you should be screened for STIs.  After age 24: Get screened for STIs if you're at risk. You are at risk for STIs (including HIV) if:  You are sexually active with more than one person.  You don't use condoms every time.  You or a partner was diagnosed with a sexually transmitted infection.  If you are at risk for HIV, ask about PrEP medicine to prevent HIV.  Get tested for HIV at least once in your life, whether you are at risk for HIV or not.  Cancer screening tests  Cervical cancer screening: If you have a cervix, begin getting regular cervical cancer screening tests starting at age 21.  Breast cancer scan (mammogram): If you've ever had breasts, begin having regular mammograms starting at age 40. This is a scan to check for breast cancer.  Colon cancer screening: It is important to start screening for colon cancer at age 45.  Have a colonoscopy test every 10 years (or more often if you're at risk) Or, ask your provider about stool tests like a FIT test every year or Cologuard test every 3 years.  To learn more about your testing options, visit:   .  For help making a decision, visit:   https://bit.ly/tc99407.  Prostate cancer screening test: If you have a prostate, ask your care team if a prostate cancer screening test (PSA) at age 55 is right for you.  Lung cancer screening: If you are a current or former smoker ages 50 to 80, ask your care team if ongoing lung cancer screenings are right for you.  For informational purposes only. Not to replace the advice of your health care provider. Copyright   2023 [employer]. All rights reserved. Clinically reviewed by the [facility]. [employer] [id number] - REV 01/24.

## 2024-11-12 LAB
ANION GAP SERPL CALCULATED.3IONS-SCNC: 13 MMOL/L (ref 7–15)
BUN SERPL-MCNC: 8.7 MG/DL (ref 8–23)
CALCIUM SERPL-MCNC: 9.3 MG/DL (ref 8.8–10.4)
CHLORIDE SERPL-SCNC: 104 MMOL/L (ref 98–107)
CHOLEST SERPL-MCNC: 181 MG/DL
CREAT SERPL-MCNC: 0.8 MG/DL (ref 0.51–0.95)
EGFRCR SERPLBLD CKD-EPI 2021: 82 ML/MIN/1.73M2
FASTING STATUS PATIENT QL REPORTED: YES
FASTING STATUS PATIENT QL REPORTED: YES
GLUCOSE SERPL-MCNC: 84 MG/DL (ref 70–99)
HCO3 SERPL-SCNC: 22 MMOL/L (ref 22–29)
HDLC SERPL-MCNC: 45 MG/DL
LDLC SERPL CALC-MCNC: 119 MG/DL
NONHDLC SERPL-MCNC: 136 MG/DL
POTASSIUM SERPL-SCNC: 4.6 MMOL/L (ref 3.4–5.3)
SODIUM SERPL-SCNC: 139 MMOL/L (ref 135–145)
TRIGL SERPL-MCNC: 87 MG/DL

## 2024-11-18 NOTE — DISCHARGE INSTRUCTIONS
MRI Contrast Discharge Instructions    The IV contrast you received today will pass out of your body in your  urine. This will happen in the next 24 hours. You will not feel this process.  Your urine will not change color.    Drink at least 4 extra glasses of water or juice today (unless your doctor  has restricted your fluids). This reduces the stress on your kidneys.  You may take your regular medicines.    If you are on dialysis: It is best to have dialysis today.    If you have a reaction: Most reactions happen right away. If you have  any new symptoms after leaving the hospital (such as hives or swelling),  call your hospital at the correct number below. Or call your family doctor.  If you have breathing distress or wheezing, call 911.    Special instructions: ***    I have read and understand the above information.    Signature:______________________________________ Date:___________    Staff:__________________________________________ Date:___________     Time:__________    [facility] Departments:    ___[place]: [phone]  ___[facility]: [phone]  ___[place]: [phone] ___[facility]: [phone]  ___[facility]: [phone]  ___[facility]: [phone]  ___[phone]  ___[facility]: [phone]  ___[phone]  
Not until seen by surgeon, not while on pain med./No...

## 2024-12-26 ENCOUNTER — PATIENT OUTREACH (OUTPATIENT)
Dept: ONCOLOGY | Facility: CLINIC | Age: 64
End: 2024-12-26
Payer: COMMERCIAL

## 2024-12-26 NOTE — PROGRESS NOTES
[facility]: Cancer Care                                                                                         Completed chart audit to assign Oncology Care Coordination enrollment status.      Cristal Hernandez MSN, RN, OCN   RN Care Coordinator   [facility]        
Unremarkable

## 2025-01-31 NOTE — TELEPHONE ENCOUNTER
01/30/2511:55 PM    Marga     Per update to patient attribution workflow:     When patient is scheduled with another  PCP- No encounter to be sent- Only new  PCP to update at time of first visit.     When patient has already established care with another  PCP and your office is still listed   Call new PCP office ([facility], Admin, Coordinator) speak to Admin, have office change to appropriate PCP- No encounter needs to be sent to care gap.     Thank you,  Deborah Gardner    Telephone visit with Dr. Delgado scheduled for today 3/3/2021.  ANANDA Palm

## 2025-05-27 NOTE — TELEPHONE ENCOUNTER
Called pt, left msg asking her to come in for port flush as heparin flush was inadvertently omitted 10/10/17.  Had to lv  msg.     no

## (undated) DEVICE — PREP CHLORAPREP W/ORANGE TINT 10.5ML 260715

## (undated) DEVICE — CLIP HORIZON SM RED WIDE SLOT 001201

## (undated) DEVICE — MARKER MARGIN MARKER STD 6 COLOR SGL USE MMS6

## (undated) DEVICE — SU DERMABOND ADVANCED .7ML DNX12

## (undated) DEVICE — Device

## (undated) DEVICE — SUCTION IRR SYSTEM W/O TIP INTERPULSE HANDPIECE 0210-100-000

## (undated) DEVICE — TUBING SUCTION 6"X3/16" N56A

## (undated) DEVICE — KIT ENDO TURNOVER/PROCEDURE CARRY-ON 101822

## (undated) DEVICE — CLIP HORIZON MED BLUE 002200

## (undated) DEVICE — SYR 10ML LL W/O NDL 302995

## (undated) DEVICE — SU MONOCRYL 4-0 PS-2 18" UND Y496G

## (undated) DEVICE — PREP CHLORAPREP 26ML TINTED ORANGE  260815

## (undated) DEVICE — DECANTER VIAL 2006S

## (undated) DEVICE — SUCTION MANIFOLD DORNOCH ULTRA CART UL-CL500

## (undated) DEVICE — SU MONOCRYL 4-0 P-3 18" UND Y494G

## (undated) DEVICE — GLOVE PROTEXIS W/NEU-THERA 7.5  2D73TE75

## (undated) DEVICE — KNIFE HANDLE W/15 BLADE 371615

## (undated) DEVICE — ADH SKIN CLOSURE PREMIERPRO EXOFIN 1.0ML 3470

## (undated) DEVICE — NDL 25GA 2"  8881200441

## (undated) DEVICE — DRAPE C-ARM 60X42" 1013

## (undated) DEVICE — GOWN XLG DISP 9545

## (undated) DEVICE — SU PDS II 4-0 FS-2 27" Z422H

## (undated) DEVICE — LINEN TOWEL PACK X5 5464

## (undated) DEVICE — CLIP APPLIER ENDO 5MM M/L LIGAMAX EL5ML

## (undated) DEVICE — DRAPE SHEET MED 44X70" 9355

## (undated) DEVICE — DRAPE SHEET REV FOLD 3/4 9349

## (undated) DEVICE — SU SILK 3-0 SH 30" K832H

## (undated) DEVICE — ENDO SNARE EXACTO COLD 9MM LOOP 2.4MMX230CM 00711115

## (undated) DEVICE — ESU GROUND PAD ADULT W/CORD E7507

## (undated) DEVICE — SOL WATER IRRIG 1000ML BOTTLE 07139-09

## (undated) DEVICE — LINEN TOWEL PACK X6 WHITE 5487

## (undated) DEVICE — GOWN SM/MED DISP 9505

## (undated) DEVICE — SPONGE RAY-TEC 4X8" 7318

## (undated) DEVICE — SU VICRYL 3-0 SH 27" J316H

## (undated) DEVICE — DRAPE IOBAN INCISE 23X17" 6650EZ

## (undated) DEVICE — ENDO TRAP POLYP E-TRAP 00711099

## (undated) DEVICE — TUBING IV EXTENSION SET 60" HI FLOW 2N3349

## (undated) DEVICE — SYR 20ML LL W/O NDL

## (undated) DEVICE — ENDO TROCAR SLEEVE KII ADV FIXATION 05X100MM CFS02

## (undated) DEVICE — GLOVE PROTEXIS POWDER FREE SMT 7.0  2D72PT70X

## (undated) DEVICE — ENDO TROCAR FIRST ENTRY KII FIOS ADV FIX 05X100MM CFF03

## (undated) DEVICE — GLOVE PROTEXIS POWDER FREE SMT 8.0  2D72PT80X

## (undated) DEVICE — PACK CENTRAL LINE INSERTION SAN32CLFCG

## (undated) DEVICE — SOL WATER IRRIG 1000ML BOTTLE 2F7114

## (undated) DEVICE — SU VICRYL 4-0 PS-2 18" UND J496H

## (undated) DEVICE — DRAPE COVER C-ARM SEAMLESS SNAP-KAP 03-KP26 LATEX FREE

## (undated) DEVICE — PACK MINOR SBA15MIFSE

## (undated) DEVICE — NDL ANGIOCATH 14GA 1.25" 3068

## (undated) DEVICE — ESU ENDO SCISSORS 5MM CVD 5DCS

## (undated) DEVICE — ENDO TROCAR BLUNT TIP KII BALLOON 12X100MM C0R47

## (undated) DEVICE — ENDO POUCH UNIV RETRIEVAL SYSTEM INZII 10MM CD001

## (undated) DEVICE — ESU PENCIL SMOKE EVAC W/ROCKER SWITCH 0703-047-000

## (undated) DEVICE — COVER ULTRASOUND PROBE W/GEL FLEXI-FEEL 6"X58" LF  25-FF658

## (undated) DEVICE — CATH ANGIO MARINER KUMPE 4FRX40CM 11732704

## (undated) DEVICE — DECANTER BAG 2002S

## (undated) DEVICE — SU VICRYL 0 UR-6 27" J603H

## (undated) DEVICE — GLOVE PROTEXIS BLUE W/NEU-THERA 7.5  2D73EB75

## (undated) DEVICE — ESU ELEC BLADE 2.75" COATED/INSULATED E1455

## (undated) DEVICE — SU ETHILON 3-0 PS-1 18" 1663G

## (undated) RX ORDER — ALBUTEROL SULFATE 90 UG/1
AEROSOL, METERED RESPIRATORY (INHALATION)
Status: DISPENSED
Start: 2021-04-30

## (undated) RX ORDER — FENTANYL CITRATE 50 UG/ML
INJECTION, SOLUTION INTRAMUSCULAR; INTRAVENOUS
Status: DISPENSED
Start: 2021-04-30

## (undated) RX ORDER — PROPOFOL 10 MG/ML
INJECTION, EMULSION INTRAVENOUS
Status: DISPENSED
Start: 2018-02-27

## (undated) RX ORDER — ACETAMINOPHEN 325 MG/1
TABLET ORAL
Status: DISPENSED
Start: 2021-04-30

## (undated) RX ORDER — PHENYLEPHRINE HCL IN 0.9% NACL 1 MG/10 ML
SYRINGE (ML) INTRAVENOUS
Status: DISPENSED
Start: 2018-02-27

## (undated) RX ORDER — OXYCODONE HYDROCHLORIDE 5 MG/1
TABLET ORAL
Status: DISPENSED
Start: 2021-04-30

## (undated) RX ORDER — ISOSULFAN BLUE 50 MG/5ML
INJECTION, SOLUTION SUBCUTANEOUS
Status: DISPENSED
Start: 2018-02-27

## (undated) RX ORDER — HYDROMORPHONE HCL/0.9% NACL/PF 0.2MG/0.2
SYRINGE (ML) INTRAVENOUS
Status: DISPENSED
Start: 2018-02-27

## (undated) RX ORDER — FENTANYL CITRATE 50 UG/ML
INJECTION, SOLUTION INTRAMUSCULAR; INTRAVENOUS
Status: DISPENSED
Start: 2018-02-27

## (undated) RX ORDER — ONDANSETRON 2 MG/ML
INJECTION INTRAMUSCULAR; INTRAVENOUS
Status: DISPENSED
Start: 2017-09-01

## (undated) RX ORDER — ALBUTEROL SULFATE 0.83 MG/ML
SOLUTION RESPIRATORY (INHALATION)
Status: DISPENSED
Start: 2018-02-06

## (undated) RX ORDER — PROPOFOL 10 MG/ML
INJECTION, EMULSION INTRAVENOUS
Status: DISPENSED
Start: 2017-09-01

## (undated) RX ORDER — LIDOCAINE HYDROCHLORIDE 10 MG/ML
INJECTION, SOLUTION EPIDURAL; INFILTRATION; INTRACAUDAL; PERINEURAL
Status: DISPENSED
Start: 2018-02-06

## (undated) RX ORDER — FENTANYL CITRATE 50 UG/ML
INJECTION, SOLUTION INTRAMUSCULAR; INTRAVENOUS
Status: DISPENSED
Start: 2018-02-06

## (undated) RX ORDER — LIDOCAINE HYDROCHLORIDE 20 MG/ML
INJECTION, SOLUTION EPIDURAL; INFILTRATION; INTRACAUDAL; PERINEURAL
Status: DISPENSED
Start: 2018-02-27

## (undated) RX ORDER — OXYCODONE HYDROCHLORIDE 5 MG/1
TABLET ORAL
Status: DISPENSED
Start: 2018-02-27

## (undated) RX ORDER — DEXAMETHASONE SODIUM PHOSPHATE 4 MG/ML
INJECTION, SOLUTION INTRA-ARTICULAR; INTRALESIONAL; INTRAMUSCULAR; INTRAVENOUS; SOFT TISSUE
Status: DISPENSED
Start: 2017-09-01

## (undated) RX ORDER — ONDANSETRON 2 MG/ML
INJECTION INTRAMUSCULAR; INTRAVENOUS
Status: DISPENSED
Start: 2018-02-06

## (undated) RX ORDER — ESMOLOL HYDROCHLORIDE 10 MG/ML
INJECTION INTRAVENOUS
Status: DISPENSED
Start: 2021-04-30

## (undated) RX ORDER — CEFAZOLIN SODIUM 2 G/100ML
INJECTION, SOLUTION INTRAVENOUS
Status: DISPENSED
Start: 2018-02-27

## (undated) RX ORDER — DEXAMETHASONE SODIUM PHOSPHATE 4 MG/ML
INJECTION, SOLUTION INTRA-ARTICULAR; INTRALESIONAL; INTRAMUSCULAR; INTRAVENOUS; SOFT TISSUE
Status: DISPENSED
Start: 2018-02-27

## (undated) RX ORDER — LIDOCAINE HYDROCHLORIDE 40 MG/ML
SOLUTION TOPICAL
Status: DISPENSED
Start: 2018-02-06

## (undated) RX ORDER — LIDOCAINE HYDROCHLORIDE 20 MG/ML
INJECTION, SOLUTION EPIDURAL; INFILTRATION; INTRACAUDAL; PERINEURAL
Status: DISPENSED
Start: 2017-09-01

## (undated) RX ORDER — LIDOCAINE HYDROCHLORIDE AND EPINEPHRINE 10; 10 MG/ML; UG/ML
INJECTION, SOLUTION INFILTRATION; PERINEURAL
Status: DISPENSED
Start: 2018-02-27

## (undated) RX ORDER — DEXAMETHASONE SODIUM PHOSPHATE 4 MG/ML
INJECTION, SOLUTION INTRA-ARTICULAR; INTRALESIONAL; INTRAMUSCULAR; INTRAVENOUS; SOFT TISSUE
Status: DISPENSED
Start: 2021-04-30

## (undated) RX ORDER — FENTANYL CITRATE 50 UG/ML
INJECTION, SOLUTION INTRAMUSCULAR; INTRAVENOUS
Status: DISPENSED
Start: 2021-10-12

## (undated) RX ORDER — GABAPENTIN 300 MG/1
CAPSULE ORAL
Status: DISPENSED
Start: 2021-04-30

## (undated) RX ORDER — BUPIVACAINE HYDROCHLORIDE 2.5 MG/ML
INJECTION, SOLUTION EPIDURAL; INFILTRATION; INTRACAUDAL
Status: DISPENSED
Start: 2018-02-27

## (undated) RX ORDER — ONDANSETRON 2 MG/ML
INJECTION INTRAMUSCULAR; INTRAVENOUS
Status: DISPENSED
Start: 2021-04-30

## (undated) RX ORDER — ACETAMINOPHEN 325 MG/1
TABLET ORAL
Status: DISPENSED
Start: 2018-02-27

## (undated) RX ORDER — ONDANSETRON 2 MG/ML
INJECTION INTRAMUSCULAR; INTRAVENOUS
Status: DISPENSED
Start: 2018-02-27